# Patient Record
Sex: MALE | Race: WHITE | NOT HISPANIC OR LATINO | Employment: UNEMPLOYED | URBAN - METROPOLITAN AREA
[De-identification: names, ages, dates, MRNs, and addresses within clinical notes are randomized per-mention and may not be internally consistent; named-entity substitution may affect disease eponyms.]

---

## 2017-01-05 ENCOUNTER — OFFICE VISIT (OUTPATIENT)
Dept: PAIN MEDICINE | Facility: CLINIC | Age: 42
End: 2017-01-05
Payer: COMMERCIAL

## 2017-01-05 VITALS
WEIGHT: 207 LBS | HEIGHT: 67 IN | SYSTOLIC BLOOD PRESSURE: 173 MMHG | RESPIRATION RATE: 17 BRPM | TEMPERATURE: 99 F | BODY MASS INDEX: 32.49 KG/M2 | DIASTOLIC BLOOD PRESSURE: 103 MMHG | HEART RATE: 108 BPM

## 2017-01-05 DIAGNOSIS — M47.816 LUMBAR FACET ARTHROPATHY: ICD-10-CM

## 2017-01-05 DIAGNOSIS — M62.838 MUSCLE SPASM: ICD-10-CM

## 2017-01-05 DIAGNOSIS — M79.18 MYOFASCIAL PAIN: ICD-10-CM

## 2017-01-05 DIAGNOSIS — M70.62 GREATER TROCHANTERIC BURSITIS, LEFT: ICD-10-CM

## 2017-01-05 DIAGNOSIS — M54.17 LUMBOSACRAL RADICULOPATHY: Primary | ICD-10-CM

## 2017-01-05 DIAGNOSIS — M96.1 POSTLAMINECTOMY SYNDROME OF LUMBAR REGION: ICD-10-CM

## 2017-01-05 PROCEDURE — 99214 OFFICE O/P EST MOD 30 MIN: CPT | Mod: S$GLB,,, | Performed by: PHYSICIAN ASSISTANT

## 2017-01-05 PROCEDURE — 1159F MED LIST DOCD IN RCRD: CPT | Mod: S$GLB,,, | Performed by: PHYSICIAN ASSISTANT

## 2017-01-05 PROCEDURE — 99999 PR PBB SHADOW E&M-EST. PATIENT-LVL III: CPT | Mod: PBBFAC,,, | Performed by: PHYSICIAN ASSISTANT

## 2017-01-05 RX ORDER — HYDROCODONE BITARTRATE AND ACETAMINOPHEN 10; 325 MG/1; MG/1
1 TABLET ORAL EVERY 8 HOURS PRN
Qty: 90 TABLET | Refills: 0 | Status: SHIPPED | OUTPATIENT
Start: 2017-01-05 | End: 2017-02-04

## 2017-01-05 RX ORDER — METHYLPREDNISOLONE ACETATE 40 MG/ML
40 INJECTION, SUSPENSION INTRA-ARTICULAR; INTRALESIONAL; INTRAMUSCULAR; SOFT TISSUE ONCE
Status: COMPLETED | OUTPATIENT
Start: 2017-01-05 | End: 2017-01-05

## 2017-01-05 RX ORDER — ZOLPIDEM TARTRATE 10 MG/1
TABLET ORAL
Refills: 3 | COMMUNITY
Start: 2017-01-02 | End: 2017-02-07

## 2017-01-05 RX ORDER — HYDROCODONE BITARTRATE AND ACETAMINOPHEN 10; 325 MG/1; MG/1
1 TABLET ORAL EVERY 8 HOURS PRN
Qty: 90 TABLET | Refills: 0 | Status: SHIPPED | OUTPATIENT
Start: 2017-01-05 | End: 2017-01-05 | Stop reason: SDUPTHER

## 2017-01-05 RX ORDER — BACLOFEN 10 MG/1
10 TABLET ORAL 3 TIMES DAILY PRN
Qty: 90 TABLET | Refills: 0 | Status: SHIPPED | OUTPATIENT
Start: 2017-01-05 | End: 2017-02-07

## 2017-01-05 RX ORDER — VENLAFAXINE HYDROCHLORIDE 225 MG/1
TABLET, EXTENDED RELEASE ORAL
Refills: 3 | COMMUNITY
Start: 2016-12-16 | End: 2017-01-31 | Stop reason: SDUPTHER

## 2017-01-05 RX ADMIN — METHYLPREDNISOLONE ACETATE 40 MG: 40 INJECTION, SUSPENSION INTRA-ARTICULAR; INTRALESIONAL; INTRAMUSCULAR; SOFT TISSUE at 10:01

## 2017-01-05 NOTE — PROGRESS NOTES
Referring Physician: No ref. provider found    PCP: Emilie Modi MD      CC: Low back and left leg pain.    INTERVAL HISTORY:   Mr. Quintanilla is a 41 y.o. male with chronic low back pain who presents today for evaluation of worsening pain. He is s/p lumbar RFA on 12/20. Pain improved on the right. 1 week ago he developed severe pain that starts in his low back and extends into his mid back. He also has severe pain in left lateral hip that is extending into his groin. He is having difficulty with weight bearing. Zanaflex has not been helpful.  Continues to have pain down posterior left thigh.  He has history of  microdiskectomy, decompression and fusion at L5-S1 on 6/6/16.  Lumbar JEWELS in December helped  with his radicular pain.   He denies any worsening weakness.  No bowel bladder changes.    He currently takes Lyrica 25 mg 3 times a day with mild benefits.  He rates his pain 10/10 today.    Prior HPI:   Patient is a 40-year-old male referred to us by Dr. Dowling for low back and left leg pain.  Pain has been present for over 6 years.  No traumatic incident.  He has constant aching, throbbing, deep lower back pain.  Pain shoots down his buttocks into his left hip.  Pain also radiates to his groin and testicles and down his medial leg to his knee.  He has tingling in his feet.  No weakness.  No bowel bladder changes.  Pain worsens with sitting, bending, lifting, extension, and getting up.  Pain improves with rest.  He was taking NSAIDs very often until recently.  He was found to have worsening kidney function.  He had a recent lumbar MRI which showed a disc bulge at L5-S1.   He also states having posterior neck pain as well.  This is been present for similar moderate time.  No traumatic incident.  Pain radiates into bilateral shoulders.  He denies weakness.  He rates his pain 9/10 today.    Pain interventional history: s/p L5-S1 TFESI on 3/2016 and 10/2016 with 75% relief of his leg  pain    ROS:  CONSTITUTIONAL: No fevers, chills, night sweats, wt. loss, appetite changes  SKIN: no rashes or itching  ENT: No headaches, head trauma, vision changes, or eye pain  LYMPH NODES: None noticed   CV: No chest pain, palpitations.   RESP: No shortness of breath, dyspnea on exertion, cough, wheezing, or hemoptysis  GI: No nausea, emesis, diarrhea, constipation, melena, hematochezia, pain.    : No dysuria, hematuria, urgency, or frequency   HEME: No easy bruising, bleeding problems  PSYCHIATRIC: No depression, anxiety, psychosis, hallucinations.  NEURO: No seizures, memory loss, dizziness or difficulty sleeping  MSK: + History of present illness      Past Medical History   Diagnosis Date    ADHD (attention deficit hyperactivity disorder)     Arthritis     Asthma      as child only    Back pain     Degeneration of lumbar intervertebral disc 05/2016    Depression     Elevated PSA     Headache     Kidney damage      stage 3 ; d/t aleve abuse    Kidney stone     Neck pain     Numbness and tingling in hands     Numbness and tingling of both legs     Seizures      from inapsine 2002    Sleep apnea      no cpap     Past Surgical History   Procedure Laterality Date    Removal of abcess       scrotal    Pilonidal cyst drainage      Tympanostomy tube placement      Back surgery  2016     back surgery     Family History   Problem Relation Age of Onset    Heart disease Mother     Migraines Mother     Hypertension Mother     Early death Father     Diabetes Maternal Aunt     Diabetes Maternal Uncle     Diabetes Maternal Grandfather      Social History     Social History    Marital status:      Spouse name: N/A    Number of children: N/A    Years of education: N/A     Social History Main Topics    Smoking status: Former Smoker     Packs/day: 0.25     Types: Cigarettes    Smokeless tobacco: Former User     Quit date: 6/5/2016    Alcohol use Yes      Comment: rare    Drug use: No  "   Sexual activity: Yes     Partners: Female     Other Topics Concern    None     Social History Narrative         Medications/Allergies: See med card    Vitals:    01/05/17 0953 01/05/17 0956   BP: (!) 173/103    Pulse: 108    Resp: 17    Temp: 98.5 °F (36.9 °C)    Weight: 93.9 kg (207 lb 0.2 oz)    Height: 5' 7" (1.702 m)    PainSc: 10-Worst pain ever 10-Worst pain ever   PainLoc: Back          Physical exam:    GENERAL: A and O x3, the patient appears well groomed and is in no acute distress.  Skin: No rashes or obvious lesions  HEENT: normocephalic, atraumatic  CARDIOVASCULAR:  RRR  LUNGS: non labored breathing  ABDOMEN: soft, nontender   UPPER EXTREMITIES: Normal alignment, normal range of motion, no atrophy, no skin changes,  hair growth and nail growth normal and equal bilaterally. No swelling, no tenderness.    LOWER EXTREMITIES:  Normal alignment, normal range of motion, no atrophy, no skin changes,  hair growth and nail growth normal and equal bilaterally. No swelling, no tenderness.  CERVICAL SPINE:  Cervical spine: ROM is full in flexion, extension and lateral rotation with mild increased pain.  Spurling's maneuver causes neck pain to bilateral side.  Myofascial exam: No Tenderness to palpation across cervical paraspinous region bilaterally.    LUMBAR SPINE  Lumbar spine: ROM is full with flexion extension and oblique extension with mild increased pain.    Daniel's test causes no increased pain on either side.    Supine straight leg raise is positive on left at 60°    Internal and external rotation of the hip causes no increased pain on either side.  Myofascial exam: moderate tenderness to palpation across lumbar paraspinous muscles. L>R. Trigger points noted on left      MENTAL STATUS: normal orientation, speech, language, and fund of knowledge for social situation.  Emotional state appropriate.    CRANIAL NERVES:  II:  PERRL bilaterally,   III,IV,VI: EOMI.    V:  Facial sensation equal " bilaterally  VII:  Facial motor function normal.  VIII:  Hearing equal to finger rub bilaterally  IX/X: Gag normal, palate symmetric  XI:  Shoulder shrug equal, head turn equal  XII:  Tongue midline without fasciculations      MOTOR: Tone and bulk: normal bilateral upper and lower Strength: normal   Delt Bi Tri WE WF     R 5 5 5 5 5 5   L 5 5 5 5 5 5     IP ADD ABD Quad TA Gas HAM  R 5 5 5 5 5 5 5  L 5 5 5 5 5 5 5    SENSATION: Light touch and pinprick intact bilaterally  REFLEXES: normal, symmetric, nonbrisk.  Toes down, no clonus. No hoffmans.  GAIT: normal rise, base, steps, and arm swing.        Imaging:  MRI lumbar spine 2/17/2016  At L5-S1 there is disc protrusion/herniation to the left with mild spinal canal and severe left neural foraminal stenosis with L5 radiculitis. Milder degenerative disc disease is noted at T12-L1, L1-2 and L2-3 without disc herniation or stenosis    X-ray cervical spine 2/11/2016  Degenerative disc disease and spondylosis at C5-6. Otherwise negative cervical spine x-rays       Assessment:  Mr. Quintanilla is a 41 y.o. male with   1. Lumbar radiculopathy    2. Lumbar facet arthropathy    3. Postlaminectomy syndrome of lumbar region    4. Myofascial pain    5. Muscle spasm    6. Greater trochanteric bursitis, left          Plan:  1. IM Mythylprednisolone injection 40 mg  2. Start Baclofen 10 mg q 8 h prn  3. Hydrocodone  mg q 8 h prn   4. If no improvement in 1 week, will update lumbar MRI  5. F/u prn

## 2017-01-05 NOTE — MR AVS SNAPSHOT
[facility]  [address] [place] [address]  [address]  [place] [address]  Phone: [phone]                  Lee Quintanilla   2017 3:00 PM   Office Visit    Description:  Male : 1975   Provider:  Jolene Guerra PA-C   Department:  [facility]           Reason for Visit     Low-back Pain     Neck Pain     Headache                To Do List           Future Appointments        Provider Department Dept Phone    2017 3:00 PM [facility] [phone]    2017 9:00 AM [facility] [phone]    2017 10:40 AM MD [facility] [phone]      Goals (5 Years of Data)     None       These Medications        Disp Refills Start End    baclofen (LIORESAL) 10 MG tablet 90 tablet 0 2017    Take 1 tablet (10 mg total) by mouth 3 (three) times daily as needed. - Oral    Pharmacy: [facility] [place], [place]  [address]eda [facility]. Ph #: [phone]       hydrocodone-acetaminophen 10-325mg (NORCO)  mg Tab 90 tablet 0 2017    Take 1 tablet by mouth every 8 (eight) hours as needed for Pain. - Oral    Pharmacy: [facility] [place], [place]  [address]a [facility]. Ph #: [phone]         [facility] Call     [facility] On Call Nurse Care Line -  Assistance  Registered nurses in the [facility] provide clinical advisement, health education, appointment booking, and other advisory services.  Call for this free service at [phone].             Medications           Message regarding Medications     Verify the changes and/or additions to your medication regime listed below are the same as discussed with your clinician today.  If any of these changes or additions are incorrect, please notify your healthcare provider.        START taking these NEW medications        Refills    baclofen (LIORESAL) 10 MG tablet 0    Sig:  "Take 1 tablet (10 mg total) by mouth 3 (three) times daily as needed.    Class: Normal    Route: Oral    hydrocodone-acetaminophen 10-325mg (NORCO)  mg Tab 0    Sig: Take 1 tablet by mouth every 8 (eight) hours as needed for Pain.    Class: Print    Route: Oral           Verify that the below list of medications is an accurate representation of the medications you are currently taking.  If none reported, the list may be blank. If incorrect, please contact your healthcare provider. Carry this list with you in case of emergency.           Current Medications     pregabalin (LYRICA) 25 MG capsule Week 1: 1 pill QHS, week 2 1 pill am/pm, week 3: 1 pill am/2 pills PM    venlafaxine 225 mg TR24 Take 225 mg by mouth once daily.    VYVANSE 60 mg capsule Take 1 capsule (60 mg total) by mouth every morning.    zolpidem (AMBIEN) 10 mg Tab     baclofen (LIORESAL) 10 MG tablet Take 1 tablet (10 mg total) by mouth 3 (three) times daily as needed.    hydrocodone-acetaminophen 10-325mg (NORCO)  mg Tab Take 1 tablet by mouth every 8 (eight) hours as needed for Pain.    venlafaxine 225 mg TR24     VYVANSE 60 mg capsule Take 1 capsule (60 mg total) by mouth every morning.    VYVANSE 60 mg capsule Starting on Jan 07, 2017. Take 1 capsule (60 mg total) by mouth every morning.           Clinical Reference Information           Vital Signs - Last Recorded  Most recent update: 1/5/2017  9:54 AM by Maylin Nair LPN    BP Pulse Temp Resp Ht Wt    (!) 173/103 108 98.5 °F (36.9 °C) 17 5' 7" (1.702 m) 93.9 kg (207 lb 0.2 oz)    BMI                32.42 kg/m2          Blood Pressure          Most Recent Value    BP  (!)  173/103      Allergies as of 1/5/2017     Inapsine [Droperidol]    Pcn [Penicillins]    Bactrim [Sulfamethoxazole-trimethoprim]      Immunizations Administered on Date of Encounter - 1/5/2017     None      "

## 2017-01-09 ENCOUNTER — PATIENT MESSAGE (OUTPATIENT)
Dept: PAIN MEDICINE | Facility: CLINIC | Age: 42
End: 2017-01-09

## 2017-01-12 ENCOUNTER — OFFICE VISIT (OUTPATIENT)
Dept: UROLOGY | Facility: CLINIC | Age: 42
End: 2017-01-12
Payer: COMMERCIAL

## 2017-01-12 ENCOUNTER — APPOINTMENT (OUTPATIENT)
Dept: LAB | Facility: HOSPITAL | Age: 42
End: 2017-01-12
Attending: UROLOGY
Payer: COMMERCIAL

## 2017-01-12 VITALS
BODY MASS INDEX: 33.98 KG/M2 | WEIGHT: 216.5 LBS | HEIGHT: 67 IN | HEART RATE: 103 BPM | TEMPERATURE: 99 F | DIASTOLIC BLOOD PRESSURE: 96 MMHG | SYSTOLIC BLOOD PRESSURE: 150 MMHG

## 2017-01-12 DIAGNOSIS — R81 GLUCOSURIA: Primary | ICD-10-CM

## 2017-01-12 DIAGNOSIS — N52.9 ERECTILE DYSFUNCTION, UNSPECIFIED ERECTILE DYSFUNCTION TYPE: ICD-10-CM

## 2017-01-12 DIAGNOSIS — R31.29 MICROSCOPIC HEMATURIA: ICD-10-CM

## 2017-01-12 DIAGNOSIS — N50.819 TESTICULAR PAIN: ICD-10-CM

## 2017-01-12 LAB
BILIRUB SERPL-MCNC: ABNORMAL MG/DL
BLOOD URINE, POC: ABNORMAL
COLOR, POC UA: ABNORMAL
GLUCOSE UR QL STRIP: ABNORMAL
KETONES UR QL STRIP: ABNORMAL
LEUKOCYTE ESTERASE URINE, POC: ABNORMAL
MICROSCOPIC COMMENT: NORMAL
NITRITE, POC UA: ABNORMAL
PH, POC UA: 5
PROTEIN, POC: ABNORMAL
RBC #/AREA URNS HPF: 1 /HPF (ref 0–4)
SPECIFIC GRAVITY, POC UA: 1.02
URATE CRY URNS QL MICRO: NORMAL
UROBILINOGEN, POC UA: ABNORMAL
WBC #/AREA URNS HPF: 1 /HPF (ref 0–5)

## 2017-01-12 PROCEDURE — 99999 PR PBB SHADOW E&M-EST. PATIENT-LVL III: CPT | Mod: PBBFAC,,, | Performed by: UROLOGY

## 2017-01-12 PROCEDURE — 81000 URINALYSIS NONAUTO W/SCOPE: CPT

## 2017-01-12 PROCEDURE — 1159F MED LIST DOCD IN RCRD: CPT | Mod: S$GLB,,, | Performed by: UROLOGY

## 2017-01-12 PROCEDURE — 99214 OFFICE O/P EST MOD 30 MIN: CPT | Mod: 25,S$GLB,, | Performed by: UROLOGY

## 2017-01-12 PROCEDURE — 81002 URINALYSIS NONAUTO W/O SCOPE: CPT | Mod: S$GLB,,, | Performed by: UROLOGY

## 2017-01-12 RX ORDER — SILDENAFIL 100 MG/1
TABLET, FILM COATED ORAL
Qty: 6 TABLET | Refills: 12 | Status: SHIPPED | OUTPATIENT
Start: 2017-01-12 | End: 2017-03-21

## 2017-01-12 NOTE — PROGRESS NOTES
[facility] Progress Note  PCP: Dr.Guidry [facility] active    Chief Complaint: Advice Only (testicular pain and severe ED)      Subjective:        HPI: Lee Quintanilla is a 41 y.o. male presents with     Last seen 8/16/16    He had a h/o scrotal abscess s/p drainage on 6/4/15    Testicular pain  He's had scrotal pain for the past 6 years. He's seen two urologists in the pasts. He was given doxycycline by one and another one did a cystoscopy. Nothing helped. He says he has constant heaviness in his scrotum that initially resolved with back surgery. Was also referred for pt which he went to. Comes in otday stating that he stopped going to PT bc his insurance ran out. Was helping lower back and neck. Testicular pain started a few months ago.     microhematuria  Called back in for appt as pt had microhematuria which showed 4rbc's in April and I had reocmmended ctu, cytology and cystoscopy but pt never followed up. He does htesave known kid dz and  saw the nephrologist who told him to stop taking the tramadol and alleve and cr was rechecked and improved. Today he has blood and glucose.    Severe ED  Rarely able to get an erection hard enough penetration, started in October when he was having work issues. Not having morning erections. Poor duration. Has no sex partner. Testosterone checked a while ago. Denies libido. Having fatigue. Tried [id number] which helped.     IIEF:2  AUASSx: 13, mostly satisfied     The past medical, surgical, social and family hx were reviewed. He has had back surgery since I've seen him.   Cataracts? none    Urologic meds: none  Anticoagulation: No    Objective:     Vitals:    01/12/17 1109   BP: (!) 150/96   Pulse: 103   Temp: 98.8 °F (37.1 °C)          Physical Exam   Constitutional: He is oriented to person, place, and time. He appears well-developed and well-nourished. He is cooperative. No distress.   HENT:   Head: Normocephalic and atraumatic.   Eyes:  Pupils are equal, round, and reactive to light.   Cardiovascular: Normal rate and regular rhythm.    Pulmonary/Chest: Effort normal.   Abdominal: Soft. Normal appearance.   Musculoskeletal: Normal range of motion.   Neurological: He is alert and oriented to person, place, and time. He has normal reflexes.   Skin: Skin is intact.     Psychiatric: He has a normal mood and affect.       Scrotum: no testes in scrotum    Urinalysis:1.010/5/remainder negative  Labs:cr 1.2 7/25/16  ua microscopic: 2rbcs phpf    hba1c - 5.3    Urine culture  8/25/14 ng    PATHOLOGY REVIEW:  Urine cytology 8/25/14 - negative    RADIOGRAPHIC REVIEW:  CTRSS 9/8/14  punctate nonobstructing renal stone with no hydronephrosis, opaque ureteral stone, ureteral obstruction or acute findings. Tiny fat containing umbilical hernia and small fat containing inguinal hernias also noted    US Scrotum 9/3/14  Echogenic area      rbus 7/24/14  normal      Assessment:       1. Glucosuria    2. Erectile dysfunction, unspecified erectile dysfunction type    3. Microscopic hematuria    4. Testicular pain        Plan:     Microhematuria  -ua  Microscopic, needs dawn if elevated. Does  Have a h/o punctate non obstructibe stone    Glucosuria  -hba1c    Testicular pain  -explained that I will not be able to help him much for this, he does have worsening back pain and he had improvement of scrotal pain with his back issues.   -ibuprofen as needed.      ED  -testosterone panel in am  -Viagra as needed - will try 50 first, he will  Let me know if insuranc overs    F/u 2-3 months    Pt will make appt with ortho and neuro to further eval back which may help his scrotal sx      [facility]: active

## 2017-01-12 NOTE — MR AVS SNAPSHOT
[facility]  [address]  [place] [address]  Phone: [phone]                  Lee Quintanilla   2017 10:45 AM   Office Visit    Description:  Male : 1975   Provider:  Vicky Saeed MD   Department:  [facility]           Reason for Visit     Advice Only           Diagnoses this Visit        Comments    Glucosuria    -  Primary     Erectile dysfunction, unspecified erectile dysfunction type         Microscopic hematuria         Testicular pain                To Do List           Future Appointments        Provider Department Dept Phone    2017 8:30 AM LAB, [address] [address] [facility] [phone]    2017 9:00 AM [facility] [phone]    2017 10:40 AM MD [facility] - Family Medicine [phone]    3/9/2017 10:00 AM Ronaldo Lanier MD [facility] - Neurology [phone]      Goals (5 Years of Data)     None      Follow-Up and Disposition     Follow-up and Disposition History       These Medications        Disp Refills Start End    sildenafil (VIAGRA) 100 MG tablet 6 tablet 12 2017     Take 1/2 to 1 tablet daily as needed.  Take on an empty stomach or with a light meal.    Pharmacy: [facility] [place] [facility] - [address] Ph #: [phone]         [facility]     [facility] Care [facility] -  Assistance  Registered nurses in the [facility] provide clinical advisement, health education, appointment booking, and other advisory services.  Call for this free service at [phone].             Medications           Message regarding Medications     Verify the changes and/or additions to your medication regime listed below are the same as discussed with your clinician today.  If any of these changes or additions are incorrect, please notify your healthcare provider.        START taking these NEW  "medications        Refills    sildenafil (VIAGRA) 100 MG tablet 12    Sig: Take 1/2 to 1 tablet daily as needed.  Take on an empty stomach or with a light meal.    Class: Normal           Verify that the below list of medications is an accurate representation of the medications you are currently taking.  If none reported, the list may be blank. If incorrect, please contact your healthcare provider. Carry this list with you in case of emergency.           Current Medications     baclofen (LIORESAL) 10 MG tablet Take 1 tablet (10 mg total) by mouth 3 (three) times daily as needed.    hydrocodone-acetaminophen 10-325mg (NORCO)  mg Tab Take 1 tablet by mouth every 8 (eight) hours as needed for Pain.    pregabalin (LYRICA) 25 MG capsule Week 1: 1 pill QHS, week 2 1 pill am/pm, week 3: 1 pill am/2 pills PM    sildenafil (VIAGRA) 100 MG tablet Take 1/2 to 1 tablet daily as needed.  Take on an empty stomach or with a light meal.    venlafaxine 225 mg TR24 Take 225 mg by mouth once daily.    venlafaxine 225 mg TR24     VYVANSE 60 mg capsule Take 1 capsule (60 mg total) by mouth every morning.    VYVANSE 60 mg capsule Take 1 capsule (60 mg total) by mouth every morning.    zolpidem (AMBIEN) 10 mg Tab            Clinical Reference Information           Vital Signs - Last Recorded  Most recent update: 1/12/2017 11:10 AM by Syeda Child MA    BP Pulse Temp Ht Wt BMI    (!) 150/96 103 98.8 °F (37.1 °C) 5' 7" (1.702 m) 98.2 kg (216 lb 7.9 oz) 33.91 kg/m2      Blood Pressure          Most Recent Value    BP  (!)  150/96      Allergies as of 1/12/2017     Inapsine [Droperidol]    Pcn [Penicillins]    Bactrim [Sulfamethoxazole-trimethoprim]      Immunizations Administered on Date of Encounter - 1/12/2017     None      Orders Placed During Today's Visit      Normal Orders This Visit    POCT URINE DIPSTICK WITHOUT MICROSCOPE     Urinalysis Microscopic     Future Labs/Procedures Expected by Expires    Hemoglobin A1c  " 1/12/2017 3/13/2018    TESTOSTERONE PANEL  1/12/2017 3/13/2018         1/12/2017 12:06 PM - Syeda Child MA      Component Results     Component    Color, UA    y/c    Spec Grav, UA    1.020    pH, UA    5    WBC, UA    n    Nitrite, UA    n    Protein, UA    n    Glucose, UA    tr    Ketones, UA    n    Urobilinogen, UA    n    Bilirubin, UA    n    Blood, UA    tr

## 2017-01-13 ENCOUNTER — LAB VISIT (OUTPATIENT)
Dept: LAB | Facility: HOSPITAL | Age: 42
End: 2017-01-13
Attending: UROLOGY
Payer: COMMERCIAL

## 2017-01-13 DIAGNOSIS — R81 GLUCOSURIA: ICD-10-CM

## 2017-01-13 PROCEDURE — 83036 HEMOGLOBIN GLYCOSYLATED A1C: CPT

## 2017-01-13 PROCEDURE — 84270 ASSAY OF SEX HORMONE GLOBUL: CPT

## 2017-01-13 PROCEDURE — 36415 COLL VENOUS BLD VENIPUNCTURE: CPT

## 2017-01-14 LAB
ESTIMATED AVG GLUCOSE: 108 MG/DL
HBA1C MFR BLD HPLC: 5.4 %

## 2017-01-19 LAB
ALBUMIN SERPL-MCNC: 4.3 G/DL (ref 3.6–5.1)
SHBG SERPL-SCNC: 17 NMOL/L (ref 10–50)
TESTOST FREE SERPL-MCNC: 33.6 PG/ML (ref 46–224)
TESTOST SERPL-MCNC: 168 NG/DL (ref 250–1100)
TESTOSTERONE.FREE+WB SERPL-MCNC: 66.1 NG/DL (ref 110–575)

## 2017-01-31 ENCOUNTER — OFFICE VISIT (OUTPATIENT)
Dept: PAIN MEDICINE | Facility: CLINIC | Age: 42
End: 2017-01-31
Payer: COMMERCIAL

## 2017-01-31 VITALS
SYSTOLIC BLOOD PRESSURE: 158 MMHG | HEIGHT: 67 IN | DIASTOLIC BLOOD PRESSURE: 105 MMHG | WEIGHT: 216 LBS | HEART RATE: 118 BPM | BODY MASS INDEX: 33.9 KG/M2

## 2017-01-31 DIAGNOSIS — M54.17 LUMBOSACRAL RADICULOPATHY: Primary | ICD-10-CM

## 2017-01-31 DIAGNOSIS — M96.1 POSTLAMINECTOMY SYNDROME OF LUMBAR REGION: ICD-10-CM

## 2017-01-31 DIAGNOSIS — M47.816 LUMBAR FACET ARTHROPATHY: ICD-10-CM

## 2017-01-31 DIAGNOSIS — M79.18 MYOFASCIAL PAIN: ICD-10-CM

## 2017-01-31 PROCEDURE — 99999 PR PBB SHADOW E&M-EST. PATIENT-LVL III: CPT | Mod: PBBFAC,,, | Performed by: PHYSICIAN ASSISTANT

## 2017-01-31 PROCEDURE — 99213 OFFICE O/P EST LOW 20 MIN: CPT | Mod: S$GLB,,, | Performed by: PHYSICIAN ASSISTANT

## 2017-01-31 PROCEDURE — 1159F MED LIST DOCD IN RCRD: CPT | Mod: S$GLB,,, | Performed by: PHYSICIAN ASSISTANT

## 2017-01-31 NOTE — PROGRESS NOTES
Referring Physician: No ref. provider found    PCP: Emilie Modi MD      CC: Low back and left leg pain.    INTERVAL HISTORY:   Mr. Quintanilla is a 41 y.o. male with chronic low back pain who presents today for follow up. Low back pain has improved since LCV.  He is s/p lumbar RFA on 12/20.  Baclofen is beneficial. He takes this and Hydrocodone very sparingly. He has history of  microdiskectomy, decompression and fusion at L5-S1 on 6/6/16.  Lumbar JEWELS in December helped  with his radicular pain.   He denies any worsening weakness.  No bowel bladder changes.    He currently takes Lyrica 25 mg 3 times a day with mild benefits.  He rates his pain 6/10 today.    Prior HPI:   Patient is a 40-year-old male referred to us by Dr. Dowling for low back and left leg pain.  Pain has been present for over 6 years.  No traumatic incident.  He has constant aching, throbbing, deep lower back pain.  Pain shoots down his buttocks into his left hip.  Pain also radiates to his groin and testicles and down his medial leg to his knee.  He has tingling in his feet.  No weakness.  No bowel bladder changes.  Pain worsens with sitting, bending, lifting, extension, and getting up.  Pain improves with rest.  He was taking NSAIDs very often until recently.  He was found to have worsening kidney function.  He had a recent lumbar MRI which showed a disc bulge at L5-S1.   He also states having posterior neck pain as well.  This is been present for similar moderate time.  No traumatic incident.  Pain radiates into bilateral shoulders.  He denies weakness.  He rates his pain 9/10 today.    Pain interventional history: s/p L5-S1 TFESI on 3/2016 and 10/2016 with 75% relief of his leg pain    ROS:  CONSTITUTIONAL: No fevers, chills, night sweats, wt. loss, appetite changes  SKIN: no rashes or itching  ENT: No headaches, head trauma, vision changes, or eye pain  LYMPH NODES: None noticed   CV: No chest pain, palpitations.   RESP: No shortness of  "breath, dyspnea on exertion, cough, wheezing, or hemoptysis  GI: No nausea, emesis, diarrhea, constipation, melena, hematochezia, pain.    : No dysuria, hematuria, urgency, or frequency   HEME: No easy bruising, bleeding problems  PSYCHIATRIC: No depression, anxiety, psychosis, hallucinations.  NEURO: No seizures, memory loss, dizziness or difficulty sleeping  MSK: + History of present illness      Past Medical History   Diagnosis Date    ADHD (attention deficit hyperactivity disorder)     Arthritis     Asthma      as child only    Back pain     Degeneration of lumbar intervertebral disc 05/2016    Depression     Elevated PSA     Headache     Kidney damage      stage 3 ; d/t aleve abuse    Kidney stone     Neck pain     Numbness and tingling in hands     Numbness and tingling of both legs     Seizures      from inapsine 2002    Sleep apnea      no cpap     Past Surgical History   Procedure Laterality Date    Removal of abcess       scrotal    Pilonidal cyst drainage      Tympanostomy tube placement      Back surgery  2016     back surgery     Family History   Problem Relation Age of Onset    Heart disease Mother     Migraines Mother     Hypertension Mother     Early death Father     Diabetes Maternal Aunt     Diabetes Maternal Uncle     Diabetes Maternal Grandfather      Social History     Social History    Marital status:      Spouse name: N/A    Number of children: N/A    Years of education: N/A     Social History Main Topics    Smoking status: Former Smoker     Packs/day: 0.25     Types: Cigarettes    Smokeless tobacco: Former User     Quit date: 6/5/2016    Alcohol use Yes      Comment: rare    Drug use: No    Sexual activity: Yes     Partners: Female     Other Topics Concern    None     Social History Narrative         Medications/Allergies: See med card    Vitals:    01/31/17 0916   BP: (!) 158/105   Pulse: (!) 118   Weight: 98 kg (216 lb)   Height: 5' 7" (1.702 m) "   PainSc:   6   PainLoc: Back         Physical exam:    GENERAL: A and O x3, the patient appears well groomed and is in no acute distress.  Skin: No rashes or obvious lesions  HEENT: normocephalic, atraumatic  CARDIOVASCULAR:  Tachycardia   LUNGS: non labored breathing  ABDOMEN: soft, nontender   UPPER EXTREMITIES: Normal alignment, normal range of motion, no atrophy, no skin changes,  hair growth and nail growth normal and equal bilaterally. No swelling, no tenderness.    LOWER EXTREMITIES:  Normal alignment, normal range of motion, no atrophy, no skin changes,  hair growth and nail growth normal and equal bilaterally. No swelling, no tenderness.  CERVICAL SPINE:  Cervical spine: ROM is full in flexion, extension and lateral rotation with mild increased pain.  Spurling's maneuver causes neck pain to bilateral side.  Myofascial exam: No Tenderness to palpation across cervical paraspinous region bilaterally.    LUMBAR SPINE  Lumbar spine: ROM is full with flexion extension and oblique extension with mild increased pain.    Daniel's test causes no increased pain on either side.    Supine straight leg raise is positive on left at 60°    Internal and external rotation of the hip causes no increased pain on either side.  Myofascial exam: moderate tenderness to palpation across lumbar paraspinous muscles. L>R. Trigger points noted on left      MENTAL STATUS: normal orientation, speech, language, and fund of knowledge for social situation.  Emotional state appropriate.    CRANIAL NERVES:  II:  PERRL bilaterally,   III,IV,VI: EOMI.    V:  Facial sensation equal bilaterally  VII:  Facial motor function normal.  VIII:  Hearing equal to finger rub bilaterally  IX/X: Gag normal, palate symmetric  XI:  Shoulder shrug equal, head turn equal  XII:  Tongue midline without fasciculations      MOTOR: Tone and bulk: normal bilateral upper and lower Strength: normal    Delt Bi Tri WE WF     R 5 5 5 5 5 5   L 5 5 5 5 5 5     IP ADD ABD Quad TA Gas HAM  R 5 5 5 5 5 5 5  L 5 5 5 5 5 5 5    SENSATION: Light touch and pinprick intact bilaterally  REFLEXES: normal, symmetric, nonbrisk.  Toes down, no clonus. No hoffmans.  GAIT: normal rise, base, steps, and arm swing.        Imaging:  MRI lumbar spine 2/17/2016  At L5-S1 there is disc protrusion/herniation to the left with mild spinal canal and severe left neural foraminal stenosis with L5 radiculitis. Milder degenerative disc disease is noted at T12-L1, L1-2 and L2-3 without disc herniation or stenosis    X-ray cervical spine 2/11/2016  Degenerative disc disease and spondylosis at C5-6. Otherwise negative cervical spine x-rays       Assessment:  Mr. Quintanilla is a 41 y.o. male with   1. Lumbosacral radiculopathy    2. Lumbar facet arthropathy    3. Postlaminectomy syndrome of lumbar region    4. Myofascial pain          Plan:  1.  I have stressed the importance of physical activity and a home exercise plan to help with pain and improve overall health.  2. Continue Baclofen 10 mg q 8 h prn and Hydrocodone  mg q 8 h prn   3. Continue PT  4. F/u with PCP for elevated BP and tachycardia   5. F/u prn

## 2017-02-01 ENCOUNTER — DOCUMENTATION ONLY (OUTPATIENT)
Dept: FAMILY MEDICINE | Facility: CLINIC | Age: 42
End: 2017-02-01

## 2017-02-01 NOTE — PROGRESS NOTES
Pre-Visit Chart Review  For Appointment Scheduled on 2/7/17.    Health Maintenance Due   Topic Date Due    Lipid Panel  1975    TETANUS VACCINE  08/29/1993

## 2017-02-07 ENCOUNTER — OFFICE VISIT (OUTPATIENT)
Dept: UROLOGY | Facility: CLINIC | Age: 42
End: 2017-02-07
Payer: COMMERCIAL

## 2017-02-07 ENCOUNTER — OFFICE VISIT (OUTPATIENT)
Dept: FAMILY MEDICINE | Facility: CLINIC | Age: 42
End: 2017-02-07
Payer: COMMERCIAL

## 2017-02-07 ENCOUNTER — APPOINTMENT (OUTPATIENT)
Dept: LAB | Facility: HOSPITAL | Age: 42
End: 2017-02-07
Attending: UROLOGY
Payer: COMMERCIAL

## 2017-02-07 VITALS
BODY MASS INDEX: 33.94 KG/M2 | DIASTOLIC BLOOD PRESSURE: 80 MMHG | SYSTOLIC BLOOD PRESSURE: 130 MMHG | TEMPERATURE: 99 F | HEIGHT: 67 IN | HEART RATE: 103 BPM | WEIGHT: 216.25 LBS

## 2017-02-07 VITALS
BODY MASS INDEX: 33.94 KG/M2 | SYSTOLIC BLOOD PRESSURE: 168 MMHG | TEMPERATURE: 98 F | HEART RATE: 91 BPM | HEIGHT: 67 IN | DIASTOLIC BLOOD PRESSURE: 102 MMHG | WEIGHT: 216.25 LBS

## 2017-02-07 DIAGNOSIS — F98.8 ADD (ATTENTION DEFICIT DISORDER): ICD-10-CM

## 2017-02-07 DIAGNOSIS — Z23 IMMUNIZATION DUE: Primary | ICD-10-CM

## 2017-02-07 DIAGNOSIS — R31.29 MICROHEMATURIA: Primary | ICD-10-CM

## 2017-02-07 DIAGNOSIS — R81 GLUCOSURIA: ICD-10-CM

## 2017-02-07 DIAGNOSIS — N50.819 TESTICULAR PAIN: ICD-10-CM

## 2017-02-07 LAB
BILIRUB SERPL-MCNC: ABNORMAL MG/DL
BLOOD URINE, POC: ABNORMAL
COLOR, POC UA: ABNORMAL
GLUCOSE UR QL STRIP: ABNORMAL
KETONES UR QL STRIP: ABNORMAL
LEUKOCYTE ESTERASE URINE, POC: ABNORMAL
NITRITE, POC UA: ABNORMAL
PH, POC UA: 5
PROTEIN, POC: ABNORMAL
SPECIFIC GRAVITY, POC UA: 1.01
UROBILINOGEN, POC UA: ABNORMAL

## 2017-02-07 PROCEDURE — 81001 URINALYSIS AUTO W/SCOPE: CPT | Mod: S$GLB,,, | Performed by: UROLOGY

## 2017-02-07 PROCEDURE — 99999 PR PBB SHADOW E&M-EST. PATIENT-LVL III: CPT | Mod: PBBFAC,,, | Performed by: FAMILY MEDICINE

## 2017-02-07 PROCEDURE — 88112 CYTOPATH CELL ENHANCE TECH: CPT | Performed by: PATHOLOGY

## 2017-02-07 PROCEDURE — 99999 PR PBB SHADOW E&M-EST. PATIENT-LVL IV: CPT | Mod: PBBFAC,,, | Performed by: UROLOGY

## 2017-02-07 PROCEDURE — 88112 CYTOPATH CELL ENHANCE TECH: CPT | Mod: 26,,, | Performed by: PATHOLOGY

## 2017-02-07 PROCEDURE — 81002 URINALYSIS NONAUTO W/O SCOPE: CPT | Mod: S$GLB,,, | Performed by: UROLOGY

## 2017-02-07 PROCEDURE — 99213 OFFICE O/P EST LOW 20 MIN: CPT | Mod: 25,S$GLB,, | Performed by: UROLOGY

## 2017-02-07 PROCEDURE — 99214 OFFICE O/P EST MOD 30 MIN: CPT | Mod: 25,S$PBB,, | Performed by: FAMILY MEDICINE

## 2017-02-07 PROCEDURE — 90471 IMMUNIZATION ADMIN: CPT | Mod: PBBFAC,PO | Performed by: FAMILY MEDICINE

## 2017-02-07 PROCEDURE — 99213 OFFICE O/P EST LOW 20 MIN: CPT | Mod: PBBFAC,PO,25 | Performed by: FAMILY MEDICINE

## 2017-02-07 PROCEDURE — 90715 TDAP VACCINE 7 YRS/> IM: CPT | Mod: PBBFAC,PO | Performed by: FAMILY MEDICINE

## 2017-02-07 RX ORDER — LISDEXAMFETAMINE DIMESYLATE 60 MG/1
60 CAPSULE ORAL EVERY MORNING
Qty: 30 CAPSULE | Refills: 0 | Status: SHIPPED | OUTPATIENT
Start: 2017-03-11 | End: 2017-04-26 | Stop reason: SDUPTHER

## 2017-02-07 RX ORDER — LISDEXAMFETAMINE DIMESYLATE 60 MG/1
60 CAPSULE ORAL EVERY MORNING
Qty: 30 CAPSULE | Refills: 0 | Status: ON HOLD | OUTPATIENT
Start: 2017-04-10 | End: 2017-04-05

## 2017-02-07 RX ORDER — TESTOSTERONE CYPIONATE 200 MG/ML
200 INJECTION, SOLUTION INTRAMUSCULAR
Qty: 1 ML | Refills: 5 | Status: SHIPPED | OUTPATIENT
Start: 2017-02-07 | End: 2017-02-09 | Stop reason: SDUPTHER

## 2017-02-07 RX ORDER — LISDEXAMFETAMINE DIMESYLATE 60 MG/1
60 CAPSULE ORAL EVERY MORNING
Qty: 30 CAPSULE | Refills: 0 | Status: SHIPPED | OUTPATIENT
Start: 2017-02-09 | End: 2017-03-11

## 2017-02-07 NOTE — MR AVS SNAPSHOT
[facility]  [address]  [place] [address]  Phone: [phone]                  Lee Quintanilla   2017 2:15 PM   Office Visit    Description:  Male : 1975   Provider:  Vicky Saeed MD   Department:  [facility]           Reason for Visit     Follow-up           Diagnoses this Visit        Comments    Microhematuria    -  Primary     Glucosuria         Testicular pain                To Do List           Future Appointments        Provider Department Dept Phone    2017 9:10 AM LAB, MOB 1 DRAW STATION [facility] [phone]    2017 10:00 AM UROLOGY, NURSE [facility] [phone]    2/10/2017 9:00 AM [facility]CH CT1 LIMIT 400 LBS [facility] [phone]    3/9/2017 10:00 AM Ronaldo Lanier MD [facility] [phone]    2017 3:20 PM MD [facility] [phone]      Goals (5 Years of Data)     None      Follow-Up and Disposition     Follow-up and Disposition History       These Medications        Disp Refills Start End    testosterone cypionate (DEPOTESTOTERONE CYPIONATE) 200 mg/mL injection 1 mL 5 2017 3/9/2017    Inject 1 mL (200 mg total) into the muscle every 28 days. - Intramuscular    Pharmacy: [facility] - [place] - [address] Ph #: [phone]         [facility]     [facility] Care [facility] -  Assistance  Registered nurses in the [facility] provide clinical advisement, health education, appointment booking, and other advisory services.  Call for this free service at [phone].             Medications           Message regarding Medications     Verify the changes and/or additions to your medication regime listed below are the same as discussed with your clinician today.  If any of these changes or additions are incorrect, please notify your healthcare provider.       "  START taking these NEW medications        Refills    testosterone cypionate (DEPOTESTOTERONE CYPIONATE) 200 mg/mL injection 5    Sig: Inject 1 mL (200 mg total) into the muscle every 28 days.    Class: Normal    Route: Intramuscular           Verify that the below list of medications is an accurate representation of the medications you are currently taking.  If none reported, the list may be blank. If incorrect, please contact your healthcare provider. Carry this list with you in case of emergency.           Current Medications     pregabalin (LYRICA) 25 MG capsule Week 1: 1 pill QHS, week 2 1 pill am/pm, week 3: 1 pill am/2 pills PM    venlafaxine 225 mg TR24 Take 225 mg by mouth once daily.    VYVANSE 60 mg capsule Starting on Feb 09, 2017. Take 1 capsule (60 mg total) by mouth every morning.    VYVANSE 60 mg capsule Starting on Mar 11, 2017. Take 1 capsule (60 mg total) by mouth every morning.    VYVANSE 60 mg capsule Starting on Apr 10, 2017. Take 1 capsule (60 mg total) by mouth every morning.    sildenafil (VIAGRA) 100 MG tablet Take 1/2 to 1 tablet daily as needed.  Take on an empty stomach or with a light meal.    testosterone cypionate (DEPOTESTOTERONE CYPIONATE) 200 mg/mL injection Inject 1 mL (200 mg total) into the muscle every 28 days.           Clinical Reference Information           Your Vitals Were     BP Pulse Temp Height Weight BMI    168/102 91 98.2 °F (36.8 °C) 5' 7" (1.702 m) 98.1 kg (216 lb 4.3 oz) 33.87 kg/m2      Blood Pressure          Most Recent Value    BP  (!)  168/102      Allergies as of 2/7/2017     Inapsine [Droperidol]    Pcn [Penicillins]    Bactrim [Sulfamethoxazole-trimethoprim]      Immunizations Administered on Date of Encounter - 2/7/2017     Name Date Dose VIS Date Route    TDAP 2/7/2017 0.5 mL 2/24/2015 Intramuscular      Orders Placed During Today's Visit      Normal Orders This Visit    Case Request Operating Room: CYSTOSCOPY     Cytology, urine     POCT URINE DIPSTICK " WITHOUT MICROSCOPE     Urine Dip with micro, POC     Future Labs/Procedures Expected by Expires    CBC Without Differential  2/7/2017 2/7/2018    Comprehensive metabolic panel  2/7/2017 2/7/2018    CT Urogram Abd Pelvis W WO  2/7/2017 2/7/2018    Lipid panel  2/7/2017 2/7/2018    Prostate Specific Antigen, Diagnostic  2/7/2017 2/7/2018      Language Assistance Services     ATTENTION: Language assistance services are available, free of charge. Please call [phone].      ATENCIÓN: Si habla español, tiene a whittington disposición servicios gratuitos de asistencia lingüística. Llame al [phone].     CHÚ Ý: N?u b?n nói Ti?ng Vi?t, có các d?ch v? h? tr? ngôn ng? mi?n phí dành cho b?n. G?i s? [phone].         [facility] complies with applicable Federal civil rights laws and does not discriminate on the basis of race, color, national origin, age, disability, or sex.

## 2017-02-07 NOTE — PROGRESS NOTES
[facility] Progress Note  PCP: Dr.Guidry [facility]: active    Chief Complaint: Follow-up (discuss low testosterone)      Subjective:        HPI: Lee Quintanilla is a 41 y.o. male presents with     Last seen 1/12/17    He had a h/o scrotal abscess s/p drainage on 6/4/15    Testicular pain  He's had scrotal pain for the past 6 years. He's seen two urologists in the pasts. He was given doxycycline by one and another one did a cystoscopy. Nothing helped. He says he has constant heaviness in his scrotum that initially resolved with back surgery. Was also referred for pt which he went to. Comes in otday stating that he stopped going to PT bc his insurance ran out. Was helping lower back and neck. Testicular pain started a few months ago.     microhematuria  Called back in for appt as pt had microhematuria which showed 4rbc's in April and I had reocmmended ctu, cytology and cystoscopy but pt never followed up. He does have known kid dz and  saw the nephrologist who told him to stop taking the tramadol and alleve and cr was rechecked and improved. Today he has blood and glucose.    Severe ED  Rarely able to get an erection hard enough penetration, started in October when he was having work issues. Not having morning erections. Poor duration. Has no sex partner. Testosterone checked a while ago. poor libido. Having fatigue. Tried [id number] which helped. I checked a T which was 168 at 9am in the morning. He is here today discuss his T results. i wrote him for viagra but he never tried. He tried cialis 10mg last year which helped wo SE.     IIEF:2  AUASSx: 11, mostly satisfied     The past medical, surgical, social and family hx were reviewed. He has had back surgery since I've seen him.   Cataracts? none    Urologic meds: none  Anticoagulation: No    Objective:     Vitals:    02/07/17 1427   BP: (!) 168/102   Pulse: 91   Temp: 98.2 °F (36.8 °C)          Physical Exam   Constitutional: He is  oriented to person, place, and time. He appears well-developed and well-nourished. He is cooperative. No distress.   HENT:   Head: Normocephalic and atraumatic.   Eyes: Pupils are equal, round, and reactive to light.   Cardiovascular: Normal rate and regular rhythm.    Pulmonary/Chest: Effort normal.   Abdominal: Soft. Normal appearance.   Musculoskeletal: Normal range of motion.   Neurological: He is alert and oriented to person, place, and time. He has normal reflexes.   Skin: Skin is intact.     Psychiatric: He has a normal mood and affect.       Scrotum: no testes in scrotum    Urinalysis:1.010/5/remainder negative  Labs:cr 1.2 7/25/16  ua microscopic:   1/12/17 - 1   5/31/16 - 4    hba1c 1/13/17 - 5.4    Urine culture  8/25/14 ng    PATHOLOGY REVIEW:  Urine cytology 8/25/14 - negative    RADIOGRAPHIC REVIEW:  CTRSS 9/8/14  punctate nonobstructing renal stone with no hydronephrosis, opaque ureteral stone, ureteral obstruction or acute findings. Tiny fat containing umbilical hernia and small fat containing inguinal hernias also noted    US Scrotum 9/3/14  Echogenic area      rbus 7/24/14  normal      Assessment:       1. Microhematuria    2. Glucosuria    3. Testicular pain        Plan:     Microhematuria  -persistent MH  -recommend ctu and cystoscopy, ordered today, urine cytology sent again (20 pk yr hx of smoking but has quit), also no longer on alleve, does have h/o small nonobstructing stones    Glucosuria  None today    Testicular pain  -explained that I will not be able to help him much for this, he does have worsening back pain and he had improvement of scrotal pain with his back issues.   -ibuprofen as needed.      ED and hypogonadism  -Viagra as needed 100mg dose (which he will cut in half) - he received 6 pills for 30  -has hypogonadism with fatigue, decreased libido and ED  Start with cbc, cmp, psa and lipid panel  Discussed gel, injection and testopel (but pt prone to boils)  Start testosterone 200mg  q3 weeks    Just prior to 4th injections will recheck all labs  He will let me know and I'll repeat all labs then    F/u 3 months    Pt will make appt with ortho and neuro to further eval back which may help his scrotal sx      [facility]: active

## 2017-02-07 NOTE — PROGRESS NOTES
CHIEF COMPLAINT:   Follow up      HISTORY OF PRESENT ILLNESS:  Lee Quintanilla is a 41 y.o. male who presents to clinic for follow up on MDD, MYNOR     1. ADD: on vyvanse 60 mg daily. This controls his symptoms. He denies any CP, tremor, worsening anxiety, insomnia, lexi loss, palpitations    2  MDD, MYNOR: please see my note from 8/9/16 for complete details. At his last visit his effexor xr was increased to 225 mg daily.  He denies any side effects at this higher dose.  He states that this has been helping with his symptoms.     3. He was recently found to have low testosterone level and will be following up with urology today. He wonders if his vyvanse and effexor can be stopped in the future if he starts testosterone replacement.          REVIEW OF SYSTEMS:  The patient denies any fever, chills, night sweats, headaches, vision changes, difficulty speaking or swallowing, decreased hearing, weight loss, weight gain, chest pain, palpitations, shortness of breath, cough, nausea, vomiting, abdominal pain, dysuria, diarrhea, constipation, hematuria, hematochezia, melena, changes in his hair, skin, nails, numbness or weakness in his extremities, erythema, swelling or pain over any of his joints, myalgia, swollen glands, easy bruising, fatigue, edema. He denies any scrotal/testicular masses, penile discharge.       MEDICATIONS:   Reviewed and/or reconciled in EPIC    ALLERGIES:  Reviewed and/or reconciled in [facility]    PAST MEDICAL/SURGICAL HISTORY:   Past Medical History   Diagnosis Date    ADHD (attention deficit hyperactivity disorder)     Arthritis     Asthma      as child only    Back pain     Degeneration of lumbar intervertebral disc 05/2016    Depression     Elevated PSA     Headache     Kidney damage      stage 3 ; d/t aleve abuse    Kidney stone     Neck pain     Numbness and tingling in hands     Numbness and tingling of both legs     Seizures      from inapsine 2002    Sleep apnea      no  cpap      Past Surgical History   Procedure Laterality Date    Removal of abcess       scrotal    Pilonidal cyst drainage      Tympanostomy tube placement      Back surgery  2016     back surgery       FAMILY HISTORY:    Family History   Problem Relation Age of Onset    Heart disease Mother     Migraines Mother     Hypertension Mother     Early death Father     Diabetes Maternal Aunt     Diabetes Maternal Uncle     Diabetes Maternal Grandfather        SOCIAL HISTORY:    Social History     Social History    Marital status:      Spouse name: N/A    Number of children: N/A    Years of education: N/A     Occupational History    Not on file.     Social History Main Topics    Smoking status: Former Smoker     Packs/day: 0.25     Types: Cigarettes    Smokeless tobacco: Former User     Quit date: 6/5/2016    Alcohol use Yes      Comment: rare    Drug use: No    Sexual activity: Yes     Partners: Female     Other Topics Concern    Not on file     Social History Narrative       PHYSICAL EXAM:  VITAL SIGNS:   There were no vitals filed for this visit.  GENERAL:  Patient appears well nourished, sitting on exam table, in no acute distress.  HEENT:  Atraumatic, normocephalic, PERRLA, EOMI, no conjunctival injection, sclerae are anicteric, normal external auditory canals,TMs clear b/l, gross hearing intact to whisper, MMM, no oropharygneal erythema or exudate.  NECK:  Supple, normal ROM, trachea is midline , no supraclavicular or cervical LAD or masses palpated.  Thyroid gland not palpable.  CARDIOVASCULAR:  RRR, normal S1 and S2, no m/r/g.  RESPIRATORY:  CTA b/l, no wheezes, rhonchi, rales.  No increased work of breathing, no  use of accessory muscles.  ABDOMEN:  Soft, nontender, nondistended, normoactive bowel sounds in all four quadrants, no rebound or guarding, no HSM or masses palpated.  Normal percussion.  EXTREMITIES:  2+ DP pulses b/l, no edema.  SKIN:  Warm, no lesions on exposed  "skin.  NEUROMUSCULAR:  Cranial nerves II-XII grossly intact.   No clubbing or cyanosis of digits/nails.  Steady gait.  PSYCH:  Patient is alert and oriented to person, time, place. They are appropriately dressed and groomed. There is normal eye contact. Rate and tone of speech is normal. Normal insight, judgement. Normal thought content and process. He describes his mood as "ok" affect is euthymic        ASSESSMENT/PLAN: This is a 41 y.o. male who presents to clinic for evaluation of the following concerns  1. ADD (attention deficit disorder)  Refill vyvanse. He will receive a prescription with 2/9/17 as the first date and 2 prescriptions with start dates of 30 and 60 days from today. The risks, side effects of this medication were discussed with him, follow up in 3 months    2. Major depressive disorder, single episode, mild  Continue with  effexor  225 mg daily. Consider tapering and stopping this if he starts testosterone supplementation      3. MYNOR (generalized anxiety disorder)  See above    4. Obesity, Class I, BMI 30-34.9  See below        Patient readiness: acceptance and barriers:none    During the course of the visit the patient was educated and counseled about the following:     Obesity:Not addressed at today's visit      Goals: Obesity: Reduce calorie intake and BMI     Did patient meet goals/outcomes: No    The following self management tools provided: declined    Patient Instructions (the written plan) was given to the patient/family.     Time spent with patient: 30 minutes            Emilie Modi MD  "

## 2017-02-07 NOTE — MR AVS SNAPSHOT
[facility]  [address]  [place] [address]  Phone: [phone]  Fax: [phone]                  Lee Quintanilla   2017 10:40 AM   Office Visit    Description:  Male : 1975   Provider:  Emilie Modi MD   Department:  [place] [facility]           Reason for Visit     Follow-up           Diagnoses this Visit        Comments    Immunization due    -  Primary     ADD (attention deficit disorder)                To Do List           Future Appointments        Provider Department Dept Phone    2017 2:15 PM MD Stephania Schilling [facility] - Urology [phone]    3/9/2017 10:00 AM Ronaldo Lanier MD [facility] Neurology [phone]    2017 3:20 PM MD Stephania Estes [facility] [phone]      Goals (5 Years of Data)     None      Follow-Up and Disposition     Return in about 3 months (around 2017) for ADD.       These Medications        Disp Refills Start End    VYVANSE 60 mg capsule 30 capsule 0 2017 3/11/2017    Take 1 capsule (60 mg total) by mouth every morning. - Oral    Pharmacy: [facility] [place], [place] - [address] Ph #: [phone]       VYVANSE 60 mg capsule 30 capsule 0 3/11/2017 4/10/2017    Take 1 capsule (60 mg total) by mouth every morning. - Oral    Pharmacy: [facility] [place], [place] - [address] Ph #: [phone]       VYVANSE 60 mg capsule 30 capsule 0 4/10/2017 5/10/2017    Take 1 capsule (60 mg total) by mouth every morning. - Oral    Pharmacy: [facility] [place], [place] - [address] Ph #: [phone]         [facility]     [facility] Nurse Care Line -  Assistance  Registered nurses in the [facility] Center provide clinical advisement, health education, appointment booking, and other advisory services.  Call for this free service at [phone].             Medications           Message regarding Medications     Verify the changes  "and/or additions to your medication regime listed below are the same as discussed with your clinician today.  If any of these changes or additions are incorrect, please notify your healthcare provider.        START taking these NEW medications        Refills    VYVANSE 60 mg capsule 0    Starting on: 3/11/2017    Sig: Take 1 capsule (60 mg total) by mouth every morning.    Class: Print    Route: Oral    VYVANSE 60 mg capsule 0    Starting on: 4/10/2017    Sig: Take 1 capsule (60 mg total) by mouth every morning.    Class: Print    Route: Oral      STOP taking these medications     baclofen (LIORESAL) 10 MG tablet Take 1 tablet (10 mg total) by mouth 3 (three) times daily as needed.    zolpidem (AMBIEN) 10 mg Tab            Verify that the below list of medications is an accurate representation of the medications you are currently taking.  If none reported, the list may be blank. If incorrect, please contact your healthcare provider. Carry this list with you in case of emergency.           Current Medications     pregabalin (LYRICA) 25 MG capsule Week 1: 1 pill QHS, week 2 1 pill am/pm, week 3: 1 pill am/2 pills PM    venlafaxine 225 mg TR24 Take 225 mg by mouth once daily.    VYVANSE 60 mg capsule Starting on Feb 09, 2017. Take 1 capsule (60 mg total) by mouth every morning.    VYVANSE 60 mg capsule Starting on Mar 11, 2017. Take 1 capsule (60 mg total) by mouth every morning.    VYVANSE 60 mg capsule Starting on Apr 10, 2017. Take 1 capsule (60 mg total) by mouth every morning.    sildenafil (VIAGRA) 100 MG tablet Take 1/2 to 1 tablet daily as needed.  Take on an empty stomach or with a light meal.           Clinical Reference Information           Your Vitals Were     BP Pulse Temp Height Weight BMI    130/80 (BP Location: Left arm, Patient Position: Sitting, BP Method: Manual) 103 99.4 °F (37.4 °C) (Oral) 5' 7" (1.702 m) 98.1 kg (216 lb 4.3 oz) 33.87 kg/m2      Blood Pressure          Most Recent Value    BP  " 130/80      Allergies as of 2/7/2017     Inapsine [Droperidol]    Pcn [Penicillins]    Bactrim [Sulfamethoxazole-trimethoprim]      Immunizations Administered on Date of Encounter - 2/7/2017     Name Date Dose VIS Date Route    TDAP 2/7/2017 0.5 mL 2/24/2015 Intramuscular      Orders Placed During Today's Visit      Normal Orders This Visit    Tdap Vaccine (Adult)       Language Assistance Services     ATTENTION: Language assistance services are available, free of charge. Please call [phone].      ATENCIÓN: Si habla español, tiene a whittington disposición servicios gratuitos de asistencia lingüística. Llame al [phone].     CURTIS Ý: N?u b?n nói Ti?ng Vi?t, có các d?ch v? h? tr? ngôn ng? mi?n phí dành cho b?n. G?i s? [phone].         [facility] complies with applicable Federal civil rights laws and does not discriminate on the basis of race, color, national origin, age, disability, or sex.

## 2017-02-08 ENCOUNTER — LAB VISIT (OUTPATIENT)
Dept: LAB | Facility: HOSPITAL | Age: 42
End: 2017-02-08
Attending: UROLOGY
Payer: COMMERCIAL

## 2017-02-08 ENCOUNTER — PATIENT MESSAGE (OUTPATIENT)
Dept: UROLOGY | Facility: CLINIC | Age: 42
End: 2017-02-08

## 2017-02-08 DIAGNOSIS — R31.29 MICROHEMATURIA: ICD-10-CM

## 2017-02-08 DIAGNOSIS — N50.819 TESTICULAR PAIN: ICD-10-CM

## 2017-02-08 DIAGNOSIS — R81 GLUCOSURIA: ICD-10-CM

## 2017-02-08 LAB
ALBUMIN SERPL BCP-MCNC: 3.8 G/DL
ALP SERPL-CCNC: 61 U/L
ALT SERPL W/O P-5'-P-CCNC: 25 U/L
ANION GAP SERPL CALC-SCNC: 11 MMOL/L
AST SERPL-CCNC: 14 U/L
BILIRUB SERPL-MCNC: 0.4 MG/DL
BUN SERPL-MCNC: 9 MG/DL
CALCIUM SERPL-MCNC: 9.5 MG/DL
CHLORIDE SERPL-SCNC: 105 MMOL/L
CHOLEST/HDLC SERPL: 6.4 {RATIO}
CO2 SERPL-SCNC: 22 MMOL/L
COMPLEXED PSA SERPL-MCNC: 1.3 NG/ML
CREAT SERPL-MCNC: 1.1 MG/DL
ERYTHROCYTE [DISTWIDTH] IN BLOOD BY AUTOMATED COUNT: 13.9 %
EST. GFR  (AFRICAN AMERICAN): >60 ML/MIN/1.73 M^2
EST. GFR  (NON AFRICAN AMERICAN): >60 ML/MIN/1.73 M^2
GLUCOSE SERPL-MCNC: 92 MG/DL
HCT VFR BLD AUTO: 46 %
HDL/CHOLESTEROL RATIO: 15.5 %
HDLC SERPL-MCNC: 283 MG/DL
HDLC SERPL-MCNC: 44 MG/DL
HGB BLD-MCNC: 15.5 G/DL
LDLC SERPL CALC-MCNC: 187.2 MG/DL
MCH RBC QN AUTO: 31.2 PG
MCHC RBC AUTO-ENTMCNC: 33.6 %
MCV RBC AUTO: 93 FL
NONHDLC SERPL-MCNC: 239 MG/DL
PLATELET # BLD AUTO: 285 K/UL
PMV BLD AUTO: 9.4 FL
POTASSIUM SERPL-SCNC: 4.1 MMOL/L
PROT SERPL-MCNC: 7.5 G/DL
RBC # BLD AUTO: 4.95 M/UL
SODIUM SERPL-SCNC: 138 MMOL/L
TRIGL SERPL-MCNC: 259 MG/DL
WBC # BLD AUTO: 10.3 K/UL

## 2017-02-08 PROCEDURE — 85027 COMPLETE CBC AUTOMATED: CPT

## 2017-02-08 PROCEDURE — 36415 COLL VENOUS BLD VENIPUNCTURE: CPT

## 2017-02-08 PROCEDURE — 84153 ASSAY OF PSA TOTAL: CPT

## 2017-02-08 PROCEDURE — 80053 COMPREHEN METABOLIC PANEL: CPT

## 2017-02-08 PROCEDURE — 80061 LIPID PANEL: CPT

## 2017-02-09 ENCOUNTER — PATIENT MESSAGE (OUTPATIENT)
Dept: FAMILY MEDICINE | Facility: CLINIC | Age: 42
End: 2017-02-09

## 2017-02-09 ENCOUNTER — TELEPHONE (OUTPATIENT)
Dept: UROLOGY | Facility: CLINIC | Age: 42
End: 2017-02-09

## 2017-02-09 ENCOUNTER — CLINICAL SUPPORT (OUTPATIENT)
Dept: UROLOGY | Facility: CLINIC | Age: 42
End: 2017-02-09
Payer: COMMERCIAL

## 2017-02-09 DIAGNOSIS — R79.89 LOW SERUM TESTOSTERONE: Primary | ICD-10-CM

## 2017-02-09 PROCEDURE — 99499 UNLISTED E&M SERVICE: CPT | Mod: S$GLB,,, | Performed by: UROLOGY

## 2017-02-09 PROCEDURE — 96372 THER/PROPH/DIAG INJ SC/IM: CPT | Mod: S$GLB,,, | Performed by: UROLOGY

## 2017-02-09 RX ORDER — TESTOSTERONE CYPIONATE 200 MG/ML
200 INJECTION, SOLUTION INTRAMUSCULAR
Status: DISCONTINUED | OUTPATIENT
Start: 2017-02-09 | End: 2017-03-22

## 2017-02-09 RX ORDER — TESTOSTERONE CYPIONATE 200 MG/ML
200 INJECTION, SOLUTION INTRAMUSCULAR
Qty: 1 ML | Refills: 5 | Status: SHIPPED | OUTPATIENT
Start: 2017-02-09 | End: 2017-02-22 | Stop reason: SDUPTHER

## 2017-02-09 RX ADMIN — TESTOSTERONE CYPIONATE 200 MG: 200 INJECTION, SOLUTION INTRAMUSCULAR at 10:02

## 2017-02-09 NOTE — PROGRESS NOTES
Pt in clinic today per  received testosterone 200mg IM in right gluteal. Pt tolerated well, no adverse reaction noted. Patient supplied medication

## 2017-02-09 NOTE — TELEPHONE ENCOUNTER
Was the lab work fasting or not?    i saw that he was started on testosterone, needs to let me know in the next month if he wants to try to wean the effexor or vyvanse

## 2017-02-09 NOTE — TELEPHONE ENCOUNTER
At this point in time the LDL level and the cardiac risk is not high enough to start a statin.  The triglycerides are elevated as well.  He needs to eat a low fat diet.  I would consider starting a statin as he cannot exercise, and he is starting testosterone which can increase cholesterol levels.    Let me know if we can start a medication such as lipitor or crestor or pravachol

## 2017-02-09 NOTE — TELEPHONE ENCOUNTER
Patient in clinic today for testosterone injection prescription states testosterone every 28 days. Pt needs new prescription for testosterone 200mg every 21 days

## 2017-02-10 ENCOUNTER — HOSPITAL ENCOUNTER (OUTPATIENT)
Dept: RADIOLOGY | Facility: HOSPITAL | Age: 42
Discharge: HOME | End: 2017-02-10
Attending: UROLOGY
Payer: COMMERCIAL

## 2017-02-10 ENCOUNTER — TELEPHONE (OUTPATIENT)
Dept: UROLOGY | Facility: CLINIC | Age: 42
End: 2017-02-10

## 2017-02-10 DIAGNOSIS — R31.29 MICROHEMATURIA: ICD-10-CM

## 2017-02-10 DIAGNOSIS — N50.819 TESTICULAR PAIN: ICD-10-CM

## 2017-02-10 DIAGNOSIS — R81 GLUCOSURIA: ICD-10-CM

## 2017-02-10 DIAGNOSIS — N20.1 LEFT URETERAL STONE: Primary | ICD-10-CM

## 2017-02-10 PROCEDURE — 74178 CT ABD&PLV WO CNTR FLWD CNTR: CPT | Mod: 26,,, | Performed by: RADIOLOGY

## 2017-02-10 PROCEDURE — 25500020 PHARM REV CODE 255

## 2017-02-10 PROCEDURE — 74178 CT ABD&PLV WO CNTR FLWD CNTR: CPT | Mod: TC

## 2017-02-10 RX ORDER — TAMSULOSIN HYDROCHLORIDE 0.4 MG/1
0.4 CAPSULE ORAL DAILY
Qty: 30 CAPSULE | Refills: 0 | Status: SHIPPED | OUTPATIENT
Start: 2017-02-10 | End: 2017-03-28

## 2017-02-10 RX ADMIN — IOHEXOL 100 ML: 350 INJECTION, SOLUTION INTRAVENOUS at 09:02

## 2017-02-10 NOTE — TELEPHONE ENCOUNTER
Pt with 4mm left proximal ureteral stone wo hydro  kub ordered for now  kub ordered in a month  Start flomax  Fu in 1month after kub    See result note

## 2017-02-13 ENCOUNTER — HOSPITAL ENCOUNTER (OUTPATIENT)
Dept: RADIOLOGY | Facility: HOSPITAL | Age: 42
Discharge: HOME | End: 2017-02-13
Attending: UROLOGY
Payer: COMMERCIAL

## 2017-02-13 ENCOUNTER — TELEPHONE (OUTPATIENT)
Dept: UROLOGY | Facility: CLINIC | Age: 42
End: 2017-02-13

## 2017-02-13 DIAGNOSIS — N20.0 NEPHROLITHIASIS: Primary | ICD-10-CM

## 2017-02-13 DIAGNOSIS — N20.1 LEFT URETERAL STONE: ICD-10-CM

## 2017-02-13 PROCEDURE — 74000 XR ABDOMEN AP 1 VIEW: CPT | Mod: TC

## 2017-02-13 PROCEDURE — 74000 XR ABDOMEN AP 1 VIEW: CPT | Mod: 26,,, | Performed by: RADIOLOGY

## 2017-02-13 NOTE — TELEPHONE ENCOUNTER
kub shows probable left uvj stone  If pt doesn't pass stone will fu with kub in 4 weeks (ordered)  Continue flomax until then    Also scheduled for cysto   Of note: pt had atypical cytology - i will discuss with him when i se him

## 2017-02-20 ENCOUNTER — TELEPHONE (OUTPATIENT)
Dept: UROLOGY | Facility: CLINIC | Age: 42
End: 2017-02-20

## 2017-02-20 ENCOUNTER — HOSPITAL ENCOUNTER (OUTPATIENT)
Facility: AMBULARY SURGERY CENTER | Age: 42
Discharge: HOME | End: 2017-02-20
Attending: UROLOGY | Admitting: UROLOGY
Payer: COMMERCIAL

## 2017-02-20 ENCOUNTER — APPOINTMENT (OUTPATIENT)
Dept: LAB | Facility: HOSPITAL | Age: 42
End: 2017-02-20
Attending: UROLOGY
Payer: COMMERCIAL

## 2017-02-20 ENCOUNTER — SURGERY (OUTPATIENT)
Age: 42
End: 2017-02-20

## 2017-02-20 DIAGNOSIS — N50.819 TESTICULAR PAIN: ICD-10-CM

## 2017-02-20 DIAGNOSIS — R31.29 MICROHEMATURIA: ICD-10-CM

## 2017-02-20 DIAGNOSIS — E29.1 HYPOGONADISM IN MALE: Primary | ICD-10-CM

## 2017-02-20 DIAGNOSIS — R81 GLUCOSURIA: ICD-10-CM

## 2017-02-20 LAB
BACTERIA SPEC CULT: NORMAL
BILIRUB SERPL-MCNC: NORMAL MG/DL
BLOOD URINE, POC: 50
CASTS: NORMAL
COLOR, POC UA: NORMAL
CRYSTALS: NORMAL
GLUCOSE UR QL STRIP: NORMAL
KETONES UR QL STRIP: NORMAL
LEUKOCYTE ESTERASE URINE, POC: NORMAL
NITRITE, POC UA: NORMAL
PH, POC UA: 5
PROTEIN, POC: NORMAL
RBC CELLS COUNTED: NORMAL
SPECIFIC GRAVITY, POC UA: 1.01
UROBILINOGEN, POC UA: NORMAL
WHITE BLOOD CELLS: NORMAL

## 2017-02-20 PROCEDURE — 52000 CYSTOURETHROSCOPY: CPT | Performed by: UROLOGY

## 2017-02-20 PROCEDURE — 87086 URINE CULTURE/COLONY COUNT: CPT

## 2017-02-20 PROCEDURE — 52000 CYSTOURETHROSCOPY: CPT | Mod: ,,, | Performed by: UROLOGY

## 2017-02-20 RX ORDER — WATER 1000 ML/1000ML
INJECTION, SOLUTION INTRAVENOUS
Status: DISCONTINUED | OUTPATIENT
Start: 2017-02-20 | End: 2017-02-20 | Stop reason: HOSPADM

## 2017-02-20 RX ORDER — LIDOCAINE HYDROCHLORIDE 20 MG/ML
JELLY TOPICAL
Status: DISCONTINUED | OUTPATIENT
Start: 2017-02-20 | End: 2017-02-20 | Stop reason: HOSPADM

## 2017-02-20 RX ADMIN — LIDOCAINE HYDROCHLORIDE 5 ML: 20 JELLY TOPICAL at 02:02

## 2017-02-20 RX ADMIN — WATER 500 ML: 1000 INJECTION, SOLUTION INTRAVENOUS at 02:02

## 2017-02-20 NOTE — OP NOTE
[facility] [place] Procedure Note  Date: 02/20/2017    Procedures: Flexible cystourethroscopy     Pre Procedure Diagnosis: microhematuria    Post Procedure Diagnosis:Normal cystoscopy    Surgeon: Vicky Saeed MD    UA:1.015/5/tr wbc/50 blood/tr protein'    Specimen: urine for culture today    Anesthesia: 2% uro-jet lidocaine jelly for local analgesia    Flexible cysto-urethroscopy was performed after consent was obtained.  The risks and benefits were explained.    2% lidocaine urojet was used for local analgesia.  The genitalia was prepped and draped in the sterile fashion with betadine. .    The flexible scope was advanced into the urethra and into the bladder.  Bilateral ureteral orifice were evaluated and noted to be normal with clear efflux.  The bladder was completely surveyed in a systematic fashion.   No bladder tumors or lesions were seen.  No strictures were noted.  The prostate showed Mild hypertrophy.  There was No median lobe present.    The patient tolerated the procedure well without complication.    HPI: Lee Quintanilla is a 41 y.o. male who presents for cystoscopy today for microhematuria. ctu showed left ureteral stone. Cytology was abnormal (+h/o smoking). Hx of testicular/groin pain.    Findings:   1. No tumors, no stones in bladder  2. No median lobe  3. Mild b hypertrophy  4. prostatitis    Plan:  Strain all urine  Continue flomax  F/u 1 month with ctrss prior (our nurse will schedule)  If pt passes stone then cancel ctrss and will plan to f/u after 3rd injection of Testosterone prior to 4th injection with cbc, cmp, testosterone, lipid panel and psa (fasting)  He will f/u with dr lutz for lipid panel  F/u urine culture    Follow up:  1 month with ctrss prior  After 3rd injection prior to 4th with labs

## 2017-02-20 NOTE — IP AVS SNAPSHOT
[facility]  [address] [address]           Patient Discharge Instructions     Our goal is to set you up for success. This packet includes information on your condition, medications, and your home care. It will help you to care for yourself so you don't get sicker and need to go back to the hospital.     Please ask your nurse if you have any questions.        There are many details to remember when preparing to leave the hospital. Here is what you will need to do:    1. Take your medicine. If you are prescribed medications, review your Medication List in the following pages. You may have new medications to  at the pharmacy and others that you'll need to stop taking. Review the instructions for how and when to take your medications. Talk with your doctor or nurses if you are unsure of what to do.     2. Go to your follow-up appointments. Specific follow-up information is listed in the following pages. Your may be contacted by a transition nurse or clinical provider about future appointments. Be sure we have all of the phone numbers to reach you, if needed. Please contact your provider's office if you are unable to make an appointment.     3. Watch for warning signs. Your doctor or nurse will give you detailed warning signs to watch for and when to call for assistance. These instructions may also include educational information about your condition. If you experience any of warning signs to your health, call your doctor.               [facility]  Unless otherwise directed by your provider, please contact [facility], our nurse care line that is available for 24/7 assistance.     [phone] (toll-free)    Registered nurses in the [facility] provide clinical advisement, health education, appointment booking, and other advisory services.                    ** Verify the list of medication(s) below is accurate and up  to date. Carry this with you in case of emergency. If your medications have changed, please notify your healthcare provider.             Medication List      ASK your doctor about these medications        Additional Info                      pregabalin 25 MG capsule   Commonly known as:  LYRICA   Quantity:  90 capsule   Refills:  6    Instructions:  Week 1: 1 pill QHS, week 2 1 pill am/pm, week 3: 1 pill am/2 pills PM     Begin Date    AM    Noon    PM    Bedtime       sildenafil 100 MG tablet   Commonly known as:  VIAGRA   Quantity:  6 tablet   Refills:  12    Instructions:  Take 1/2 to 1 tablet daily as needed.  Take on an empty stomach or with a light meal.     Begin Date    AM    Noon    PM    Bedtime       tamsulosin 0.4 mg Cp24   Commonly known as:  FLOMAX   Quantity:  30 capsule   Refills:  0   Dose:  0.4 mg    Instructions:  Take 1 capsule (0.4 mg total) by mouth once daily. Take at bedtime     Begin Date    AM    Noon    PM    Bedtime       testosterone cypionate 200 mg/mL injection   Commonly known as:  DEPOTESTOTERONE CYPIONATE   Quantity:  1 mL   Refills:  5   Dose:  200 mg    Instructions:  Inject 1 mL (200 mg total) into the muscle every 21 days.     Begin Date    AM    Noon    PM    Bedtime       venlafaxine 225 mg Tr24   Quantity:  90 each   Refills:  3   Dose:  225 mg    Instructions:  Take 225 mg by mouth once daily.     Begin Date    AM    Noon    PM    Bedtime       * VYVANSE 60 MG capsule   Quantity:  30 capsule   Refills:  0   Dose:  60 mg   Generic drug:  lisdexamfetamine    Instructions:  Take 1 capsule (60 mg total) by mouth every morning.     Begin Date    AM    Noon    PM    Bedtime       * VYVANSE 60 MG capsule   Quantity:  30 capsule   Refills:  0   Dose:  60 mg   Generic drug:  lisdexamfetamine    Start Date:  3/11/2017   Instructions:  Take 1 capsule (60 mg total) by mouth every morning.     Begin Date    AM    Noon    PM    Bedtime       * VYVANSE 60 MG capsule   Quantity:  30 capsule    Refills:  0   Dose:  60 mg   Generic drug:  lisdexamfetamine    Start Date:  4/10/2017   Instructions:  Take 1 capsule (60 mg total) by mouth every morning.     Begin Date    AM    Noon    PM    Bedtime       * Notice:  This list has 3 medication(s) that are the same as other medications prescribed for you. Read the directions carefully, and ask your doctor or other care provider to review them with you.               Please bring to all follow up appointments:    1. A copy of your discharge instructions.  2. All medicines you are currently taking in their original bottles.  3. Identification and insurance card.    Please arrive 15 minutes ahead of scheduled appointment time.    Please call 24 hours in advance if you must reschedule your appointment and/or time.        Your Scheduled Appointments     Mar 02, 2017 10:00 AM CST   Nurse Visit with UROLOGY, NURSE STEPHANIA HURD - Urology ([facility])    [address]  [facility] [address]   [phone]            Mar 09, 2017 10:00 AM CST   Consult with Ronaldo Lanier MD   [facility] - Neurology ([facility] )    [address]  [place] [address]   [phone]            Mar 20, 2017 10:15 AM CDT   Ct Renal Stone with [facility] CT1 LIMIT 400 LBS   [facility] [address]  [facility] [address]   [phone]            Mar 21, 2017 10:30 AM CDT   Established Patient Visit with MD Stepahnia Schilling - Urology ([facility])    [address]  [facility] [address]   [phone]            May 08, 2017  3:20 PM CDT   Established Patient Visit with MD Stephania Estes - Family Medicine ([place])    [address]  [facility] [address]   [phone]                Discharge Instructions     Future Orders    CT Renal Stone Study ABD Pelvis WO     Questions:    Reason for Exam:  left ureteral stone    Diet general     Questions:     "Total calories:      Fat restriction, if any:      Protein restriction, if any:      Na restriction, if any:      Fluid restriction:      Additional restrictions:          Discharge Instructions                After the procedure    · Drink plenty of fluids.  · You may have burning or light bleeding when you urinate--this is normal.  · Medications may be prescribed to ease any discomfort or prevent infection. Take these as directed.  · Call your doctor if you have heavy bleeding or blood clots, burning that lasts more than a day, a fever over 100°F  (38° C), or trouble urinating.              Admission Information     Date & Time Provider Department CSN    2/20/2017  1:15 PM Vicky Saeed MD [facility] [id number]      Care Providers     Provider Role Specialty Primary office phone    Vicky Saeed MD Attending Provider Urology [phone]    Vicky Saeed MD Surgeon  Urology [phone]      Your Vitals Were     BP Pulse Temp Resp Height Weight    151/100 115 99.7 °F (37.6 °C) (Oral) 20 5' 7" (1.702 m) 98 kg (216 lb)    SpO2 BMI             98% 33.83 kg/m2         Recent Lab Values        7/8/2014 4/12/2016 1/13/2017                     9:46 AM  8:31 AM  8:54 AM         A1C 5.4 5.3 5.4         Comment for A1C at  8:54 AM on 1/13/2017:  According to ADA guidelines, hemoglobin A1C <7.0% represents  optimal control in non-pregnant diabetic patients.  Different  metrics may apply to specific populations.   Standards of Medical Care in Diabetes - 2016.  For the purpose of screening for the presence of diabetes:  <5.7%     Consistent with the absence of diabetes  5.7-6.4%  Consistent with increasing risk for diabetes   (prediabetes)  >or=6.5%  Consistent with diabetes  Currently no consensus exists for use of hemoglobin A1C  for diagnosis of diabetes for children.        Pending Labs     Order Current Status    Urine culture In process      Allergies as of 2/20/2017     "    Reactions    Inapsine [Droperidol] Anaphylaxis    seizures    Pcn [Penicillins]     Bactrim [Sulfamethoxazole-trimethoprim] Rash    Dry red rash all over      Smoking Cessation     If you would like to quit smoking:   You may be eligible for free services if you are a [place] resident and started smoking cigarettes before September 1, 1988.  Call the Smoking Cessation [employer] (SCT) toll free at (970) 090-0855 or (874) 488-1501.   Call 1-800-QUIT-NOW if you do not meet the above criteria.            Language Assistance Services     ATTENTION: Language assistance services are available, free of charge. Please call [phone].      ATENCIÓN: Si habla español, tiene a whittington disposición servicios gratuitos de asistencia lingüística. Llame al [phone].     CURTIS Ý: N?u b?n nói Ti?ng Vi?t, có các d?ch v? h? tr? ngôn ng? mi?n phí dành cho b?n. G?i s? [phone].         [facility] complies with applicable Federal civil rights laws and does not discriminate on the basis of race, color, national origin, age, disability, or sex.

## 2017-02-20 NOTE — H&P
[facility] Progress Note  PCP: Dr.Guidry [facility]: active     Chief Complaint: Follow-up (discuss low testosterone)        Subjective:         HPI: Lee Quintanilla is a 41 y.o. male presents with      Last seen 1/12/17     He had a h/o scrotal abscess s/p drainage on 6/4/15     Microhematuria/nephrolithiasis  ctu 2/10/17- showed left proximal 4mm ureteral stone and nonobstrcucting left 2mm renal stone, started on flomax  Cystoscopy today  Urine cytology with atypical stones  kub 2/13/17  Probable 3mm left uvj stone  LLP - 3mm, 1mm and 2mm stone    Testicular pain  He's had scrotal pain for the past 6 years. He's seen two urologists in the pasts. He was given doxycycline by one and another one did a cystoscopy. Nothing helped. He says he has constant heaviness in his scrotum that initially resolved with back surgery. Was also referred for pt which he went to. Comes in otday stating that he stopped going to PT bc his insurance ran out. Was helping lower back and neck. Testicular pain started a few months ago.      Severe ED  Rarely able to get an erection hard enough penetration, started in October when he was having work issues. Not having morning erections. Poor duration. Has no sex partner. Testosterone checked a while ago. poor libido. Having fatigue. Tried [id number] which helped. I checked a T which was 168 at 9am in the morning. He is here today discuss his T results. i wrote him for viagra but he never tried. He tried cialis 10mg last year which helped wo SE. On viagra as needed started lst visit. Started on T 200q3 weeks for T 168 on 1/13/17.  All labs normal except lipid panel.      IIEF:2  AUASSx: 11, mostly satisfied      The past medical, surgical, social and family hx were reviewed. He has had back surgery since I've seen him.   Cataracts? none     Urologic meds: none  Anticoagulation: No     Objective:      Vitals:    02/20/17 1420   BP: (!) 151/100   Pulse: (!) 115    Resp: 20   Temp:           Physical Exam   Constitutional: He is oriented to person, place, and time. He appears well-developed and well-nourished. He is cooperative. No distress.   HENT:   Head: Normocephalic and atraumatic.   Eyes: Pupils are equal, round, and reactive to light.   Cardiovascular: Normal rate and regular rhythm.    Pulmonary/Chest: Effort normal.   Abdominal: Soft. Normal appearance.   Musculoskeletal: Normal range of motion.   Neurological: He is alert and oriented to person, place, and time. He has normal reflexes.   Skin: Skin is intact.     Psychiatric: He has a normal mood and affect.       Scrotum: no testes in scrotum    Urinalysistr :leukocytes  /50 blood/tr protein - sent for culture  Labs:cr 1.2 7/25/16  ua microscopic:   1/12/17 - 1   5/31/16 - 4     hba1c 1/13/17 - 5.4  psa 2/8/17  1.3  Lipid panel 2/8/17 - very elevated  H/h 2/8/17 /15.5/46.0  lfts 2/8/17 - normal    Urine culture  8/25/14                      ng     PATHOLOGY REVIEW:  Voided urine 2/7/17:Atypical cells present.  Urine cytology 8/25/14 - negative     RADIOGRAPHIC REVIEW:  kub 2/13/17  Probable 3mm left uvj stone however pt has phleboliths on ct  LLP - 3mm, 1mm and 2mm stone    Ct urogram 2/10/17  2mm nonosbtructing left renal stone  4mm left proximal ureteral stone  No right renal stones  Other findings: 8mm hypodense cyst, small fat cotnaining inguinal hernias, prostate gland mildly enlarged    CTRSS 9/8/14  punctate nonobstructing renal stone with no hydronephrosis, opaque ureteral stone, ureteral obstruction or acute findings. Tiny fat containing umbilical hernia and small fat containing inguinal hernias also noted     US Scrotum 9/3/14  Echogenic area       rbus 7/24/14  normal        Assessment:       1. Microhematuria    2. Glucosuria    3. Testicular pain        Plan:      Microhematuria/nephrolithaisis  -persistent MH  -cysto today  -flomax for stone. There is a stone on kub but i's likely a phlebolith. Will  need ctrss if does not pass stone in a month     Glucosuria  None today     Testicular pain  -explained that I will not be able to help him much for this, he does have worsening back pain and he had improvement of scrotal pain with his back issues.   -ibuprofen as needed.       ED and hypogonadism  -viagra as needed  -continue T 200 q3 weeks  Discussed gel, injection and testopel (but pt prone to boils)    Just prior to 4th injections will recheck all labs  He will let me know and I'll repeat all labs then        Pt will make appt with ortho and neuro to further eval back which may help his scrotal sx    F/u 1 month with ctrss prior        [facility] active

## 2017-02-20 NOTE — TELEPHONE ENCOUNTER
----- Message from Vicky Saeed MD sent at 2/20/2017  2:58 PM CST -----  F/u 1 month with ctrss prior

## 2017-02-20 NOTE — DISCHARGE SUMMARY
[facility]  Discharge Note  Short Stay    Admit Date: 2/20/2017    Discharge Date and Time: No discharge date for patient encounter.     Attending Physician: Vicky Saeed,*     Discharge Provider: Vicky Saeed    Diagnoses:  Active Hospital Problems    Diagnosis  POA    Microhematuria [R31.29]  Yes      Resolved Hospital Problems    Diagnosis Date Resolved POA   No resolved problems to display.       Discharged Condition: good    Hospital Course: Patient was admitted for an outpatient procedure and tolerated the procedure well with no complications.    Final Diagnoses: Same as principal problem.    Disposition: Home or Self Care    Follow up/Patient Instructions:    Medications:  Reconciled Home Medications:   Current Discharge Medication List      CONTINUE these medications which have NOT CHANGED    Details   pregabalin (LYRICA) 25 MG capsule Week 1: 1 pill QHS, week 2 1 pill am/pm, week 3: 1 pill am/2 pills PM  Qty: 90 capsule, Refills: 6      tamsulosin (FLOMAX) 0.4 mg Cp24 Take 1 capsule (0.4 mg total) by mouth once daily. Take at bedtime  Qty: 30 capsule, Refills: 0      venlafaxine 225 mg TR24 Take 225 mg by mouth once daily.  Qty: 90 each, Refills: 3      !! VYVANSE 60 mg capsule Take 1 capsule (60 mg total) by mouth every morning.  Qty: 30 capsule, Refills: 0      sildenafil (VIAGRA) 100 MG tablet Take 1/2 to 1 tablet daily as needed.  Take on an empty stomach or with a light meal.  Qty: 6 tablet, Refills: 12      testosterone cypionate (DEPOTESTOTERONE CYPIONATE) 200 mg/mL injection Inject 1 mL (200 mg total) into the muscle every 21 days.  Qty: 1 mL, Refills: 5      !! VYVANSE 60 mg capsule Take 1 capsule (60 mg total) by mouth every morning.  Qty: 30 capsule, Refills: 0      !! VYVANSE 60 mg capsule Take 1 capsule (60 mg total) by mouth every morning.  Qty: 30 capsule, Refills: 0       !! - Potential duplicate medications found. Please discuss with provider.           Discharge Procedure Orders  CT Renal Stone Study ABD Pelvis WO   Standing Status: Future  Standing Exp. Date: 02/21/18   Order Specific Question Answer Comments   Reason for Exam: left ureteral stone      Diet general           Discharge Procedure Orders (must include Diet, Follow-up, Activity):    Discharge Procedure Orders (must include Diet, Follow-up, Activity)  CT Renal Stone Study ABD Pelvis WO   Standing Status: Future  Standing Exp. Date: 02/21/18   Order Specific Question Answer Comments   Reason for Exam: left ureteral stone      Diet general

## 2017-02-22 VITALS
HEART RATE: 100 BPM | RESPIRATION RATE: 20 BRPM | WEIGHT: 216 LBS | BODY MASS INDEX: 33.9 KG/M2 | DIASTOLIC BLOOD PRESSURE: 91 MMHG | HEIGHT: 67 IN | SYSTOLIC BLOOD PRESSURE: 140 MMHG | TEMPERATURE: 100 F | OXYGEN SATURATION: 98 %

## 2017-02-22 LAB — BACTERIA UR CULT: NO GROWTH

## 2017-02-22 RX ORDER — TESTOSTERONE CYPIONATE 200 MG/ML
200 INJECTION, SOLUTION INTRAMUSCULAR
Qty: 1 ML | Refills: 5 | Status: SHIPPED | OUTPATIENT
Start: 2017-02-22 | End: 2017-05-08

## 2017-02-22 NOTE — TELEPHONE ENCOUNTER
----- Message from Vicky Saeed MD sent at 2/22/2017  1:55 PM CST -----  Your urine culture showed no bacteria

## 2017-03-09 ENCOUNTER — TELEPHONE (OUTPATIENT)
Dept: NEUROLOGY | Facility: CLINIC | Age: 42
End: 2017-03-09

## 2017-03-09 NOTE — TELEPHONE ENCOUNTER
Left a message for the patient to let him know an appointment was scheduled for him on March 15 at 12:20 with Dr. Gallegos. He was asked to call back to reschedule if this is not a good day and time for him or to confirm this appointment.

## 2017-03-09 NOTE — TELEPHONE ENCOUNTER
Called the patient to let him know his appointment with Dr. Lanier was cancelled because he is being treated for his back pain. The patient asked to call back to confirm this message.

## 2017-03-14 ENCOUNTER — PATIENT MESSAGE (OUTPATIENT)
Dept: UROLOGY | Facility: CLINIC | Age: 42
End: 2017-03-14

## 2017-03-17 ENCOUNTER — LAB VISIT (OUTPATIENT)
Dept: LAB | Facility: HOSPITAL | Age: 42
End: 2017-03-17
Attending: UROLOGY
Payer: COMMERCIAL

## 2017-03-17 DIAGNOSIS — E29.1 HYPOGONADISM IN MALE: ICD-10-CM

## 2017-03-17 LAB
ALBUMIN SERPL BCP-MCNC: 3.9 G/DL
ALP SERPL-CCNC: 59 U/L
ALT SERPL W/O P-5'-P-CCNC: 31 U/L
ANION GAP SERPL CALC-SCNC: 12 MMOL/L
AST SERPL-CCNC: 15 U/L
BILIRUB SERPL-MCNC: 0.6 MG/DL
BUN SERPL-MCNC: 12 MG/DL
CALCIUM SERPL-MCNC: 9.3 MG/DL
CHLORIDE SERPL-SCNC: 104 MMOL/L
CHOLEST/HDLC SERPL: 6.4 {RATIO}
CO2 SERPL-SCNC: 21 MMOL/L
COMPLEXED PSA SERPL-MCNC: 0.98 NG/ML
CREAT SERPL-MCNC: 1.3 MG/DL
ERYTHROCYTE [DISTWIDTH] IN BLOOD BY AUTOMATED COUNT: 14.2 %
EST. GFR  (AFRICAN AMERICAN): >60 ML/MIN/1.73 M^2
EST. GFR  (NON AFRICAN AMERICAN): >60 ML/MIN/1.73 M^2
GLUCOSE SERPL-MCNC: 74 MG/DL
HCT VFR BLD AUTO: 48.1 %
HDL/CHOLESTEROL RATIO: 15.7 %
HDLC SERPL-MCNC: 281 MG/DL
HDLC SERPL-MCNC: 44 MG/DL
HGB BLD-MCNC: 15.8 G/DL
LDLC SERPL CALC-MCNC: 202.4 MG/DL
MCH RBC QN AUTO: 31.4 PG
MCHC RBC AUTO-ENTMCNC: 32.9 %
MCV RBC AUTO: 96 FL
NONHDLC SERPL-MCNC: 237 MG/DL
PLATELET # BLD AUTO: 274 K/UL
PMV BLD AUTO: 10.2 FL
POTASSIUM SERPL-SCNC: 4 MMOL/L
PROT SERPL-MCNC: 7.9 G/DL
RBC # BLD AUTO: 5.03 M/UL
SODIUM SERPL-SCNC: 137 MMOL/L
TESTOST SERPL-MCNC: 136 NG/DL
TRIGL SERPL-MCNC: 173 MG/DL
WBC # BLD AUTO: 9.3 K/UL

## 2017-03-17 PROCEDURE — 84153 ASSAY OF PSA TOTAL: CPT

## 2017-03-17 PROCEDURE — 84403 ASSAY OF TOTAL TESTOSTERONE: CPT

## 2017-03-17 PROCEDURE — 80053 COMPREHEN METABOLIC PANEL: CPT

## 2017-03-17 PROCEDURE — 36415 COLL VENOUS BLD VENIPUNCTURE: CPT

## 2017-03-17 PROCEDURE — 80061 LIPID PANEL: CPT

## 2017-03-17 PROCEDURE — 85027 COMPLETE CBC AUTOMATED: CPT

## 2017-03-20 ENCOUNTER — HOSPITAL ENCOUNTER (OUTPATIENT)
Dept: RADIOLOGY | Facility: HOSPITAL | Age: 42
Discharge: HOME | End: 2017-03-20
Attending: UROLOGY
Payer: COMMERCIAL

## 2017-03-20 DIAGNOSIS — R31.29 MICROHEMATURIA: ICD-10-CM

## 2017-03-20 PROCEDURE — 74176 CT ABD & PELVIS W/O CONTRAST: CPT | Mod: TC

## 2017-03-20 PROCEDURE — 74176 CT ABD & PELVIS W/O CONTRAST: CPT | Mod: 26,,, | Performed by: RADIOLOGY

## 2017-03-21 ENCOUNTER — OFFICE VISIT (OUTPATIENT)
Dept: UROLOGY | Facility: CLINIC | Age: 42
End: 2017-03-21
Payer: COMMERCIAL

## 2017-03-21 VITALS
HEIGHT: 67 IN | HEART RATE: 83 BPM | WEIGHT: 214.75 LBS | TEMPERATURE: 100 F | SYSTOLIC BLOOD PRESSURE: 154 MMHG | BODY MASS INDEX: 33.71 KG/M2 | DIASTOLIC BLOOD PRESSURE: 98 MMHG

## 2017-03-21 DIAGNOSIS — N20.1 URETERAL STONE: ICD-10-CM

## 2017-03-21 DIAGNOSIS — R31.29 MICROHEMATURIA: ICD-10-CM

## 2017-03-21 DIAGNOSIS — E29.1 HYPOGONADISM IN MALE: Primary | ICD-10-CM

## 2017-03-21 LAB
BILIRUB SERPL-MCNC: NORMAL MG/DL
BLOOD URINE, POC: NORMAL
COLOR, POC UA: NORMAL
GLUCOSE UR QL STRIP: NORMAL
KETONES UR QL STRIP: NORMAL
LEUKOCYTE ESTERASE URINE, POC: NORMAL
NITRITE, POC UA: NORMAL
PH, POC UA: 5
PROTEIN, POC: NORMAL
SPECIFIC GRAVITY, POC UA: 1.02
UROBILINOGEN, POC UA: NORMAL

## 2017-03-21 PROCEDURE — 99999 PR PBB SHADOW E&M-EST. PATIENT-LVL III: CPT | Mod: PBBFAC,,, | Performed by: UROLOGY

## 2017-03-21 PROCEDURE — 1160F RVW MEDS BY RX/DR IN RCRD: CPT | Mod: S$GLB,,, | Performed by: UROLOGY

## 2017-03-21 PROCEDURE — 99215 OFFICE O/P EST HI 40 MIN: CPT | Mod: 25,S$GLB,, | Performed by: UROLOGY

## 2017-03-21 PROCEDURE — 81002 URINALYSIS NONAUTO W/O SCOPE: CPT | Mod: S$GLB,,, | Performed by: UROLOGY

## 2017-03-21 RX ORDER — TESTOSTERONE 20.25 MG/1.25G
40.5 GEL TOPICAL DAILY
Qty: 75 G | Refills: 2 | Status: SHIPPED | OUTPATIENT
Start: 2017-03-21 | End: 2017-03-22 | Stop reason: SDUPTHER

## 2017-03-21 NOTE — PROGRESS NOTES
[facility] Progress Note  PCP: Dr.Guidry [facility]: active    Chief Complaint: Follow-up (1 month fu)      Subjective:        HPI: Lee Quintanilla is a 41 y.o. male presents with     Last seen 1/12/17    Severe ED and hypogonadism.   No significant improvement with cialis. Found to have T 198. Started on T 200q3 weeks 2/9/17. Has had 2 injections now.  Repeat labs show no significant change and T still 136. Returns today stating he still feels chronic fatigue an  No libido. Has used cialis in the past with success but has no desire. +breast enlargement and loss of hair progressively increasing.     Testicular pain  No longer c/o pain    Microhematuria, atypical urine cytology, kidney stone  ctu 2/10/17 - 2mm left renal stone, 4mm proximal ureteral stone  Cystoscopy 2/20/17 - normal, prostatitis, mild b hypertrophy  Urine cytology 2/7/17 - atypical cells,  8/25/14 - normal    Repeat ct on 3/20/17 (5 weeks later) to see if stone passed with flomax shows stone still in left proximal ureter without hydro. He says he's had stones before, years ago and caught the stone. No c/o flank pain. Not on flomax now.     IIEF:5   AUASSx: 17, mixed     The past medical, surgical, social and family hx were reviewed. He has had back surgery since I've seen him.   Cataracts? none    Urologic meds:  T 200q3 weeks.   Anticoagulation: No    Objective:     Vitals:    03/21/17 1113   BP: (!) 154/98   Pulse: 83   Temp: 99.9 °F (37.7 °C)          Physical Exam   Constitutional: He is oriented to person, place, and time. He appears well-developed and well-nourished. He is cooperative. No distress.   HENT:   Head: Normocephalic and atraumatic.   Eyes: Pupils are equal, round, and reactive to light.   Cardiovascular: Normal rate and regular rhythm.    Pulmonary/Chest: Effort normal.   Abdominal: Soft. Normal appearance.   Musculoskeletal: Normal range of motion.   Neurological: He is alert and oriented to person,  place, and time. He has normal reflexes.   Skin: Skin is intact.     Psychiatric: He has a normal mood and affect.       Scrotum: no testes in scrotum    Urinalysis:1.010/5/remainder negative  Labs:cr 1.2 7/25/16  ua microscopic:   1/12/17 - 1   5/31/16 - 4    hba1c 1/13/17 - 5.4    Urine culture  2/20/17 ng  8/25/14 Ng    Labs 3/17/17  psa  0.98  Lipid panel - elevated, not worse than last labs 1 month ago  T 136  LFTs normal  H/H 15.8/48.1     PATHOLOGY REVIEW:  Urine cytology 2/7/17 - atypical cells  Urine cytology 8/25/14 - negative    RADIOGRAPHIC REVIEW:  ctrss 3/20/17  ctrss 2/10/17  3mm left upj stone without hydro  2mm left intrarenal stone    kub 2/13/17  Stone in left ureter not visible    ctu 2/10/17  2mm left renal stone, 4mm proximal ureteral stone  Prostate gland mildly enlarged  Other findings: 8mm hypodense mass in leiver likely a cyst    CTRSS 9/8/14  punctate nonobstructing renal stone with no hydronephrosis, opaque ureteral stone, ureteral obstruction or acute findings. Tiny fat containing umbilical hernia and small fat containing inguinal hernias also noted    US Scrotum 9/3/14  Echogenic area      rbus 7/24/14  normal      Assessment:       1. Hypogonadism in male        Plan:     Microhematuria  -dawn showed left ureteral stone and renal stone  -atypical urine cytology, will repeat after we treat kidney stone as I think this is what is causing this. Does have a h/o smoking. 1.5 ppd x 20 years, quit last year    nephrolithaisis  Gave a 5 week trial of passage on flomax. Ct on 3/20 showed stone still there. Has not had pain  Will plan for eswl on 3/29. Stone not seen on kub last time so will repeat morning of. But will plan to put pt on eswl table to see if we can idenitify ureteral stone. If not will place stent and plan for ureteroscopy in 2 weeks after stent placement. Will also hold off on any more flomax for now.  If stone is seen on eswl and does not break up will still plan to place stent  and remove stone later. May try to ureteroscopy same day if able to do so and ureter dilated but unlikely. Pt understands this.     ED and hypogonadism  -all labs reviewed  -start androgel 1.62% 2 pumps (40.5mg) qday  -check T level at 14 days and 28 days  -testopel would be good for pt but easily prone to boils  -cialis 10mg work for him bu thasn't used  -watch for breast enlargement, c/o this today    Testicular pain  -improved, rarely having pain       Pt will make appt with ortho and neuro to further eval back which may help his scrotal sx      [facility]: active

## 2017-03-22 RX ORDER — TESTOSTERONE 20.25 MG/1.25G
40.5 GEL TOPICAL DAILY
Qty: 75 G | Refills: 2 | Status: SHIPPED | OUTPATIENT
Start: 2017-03-22 | End: 2017-03-22 | Stop reason: SDUPTHER

## 2017-03-22 RX ORDER — TESTOSTERONE 20.25 MG/1.25G
40.5 GEL TOPICAL DAILY
Qty: 75 G | Refills: 2 | Status: SHIPPED | OUTPATIENT
Start: 2017-03-22 | End: 2017-07-18 | Stop reason: SDUPTHER

## 2017-03-23 RX ORDER — VENLAFAXINE HYDROCHLORIDE 225 MG/1
225 TABLET, EXTENDED RELEASE ORAL DAILY
Qty: 90 EACH | Refills: 3 | Status: CANCELLED | OUTPATIENT
Start: 2017-03-23 | End: 2018-03-23

## 2017-03-28 ENCOUNTER — ANESTHESIA EVENT (OUTPATIENT)
Dept: SURGERY | Facility: HOSPITAL | Age: 42
End: 2017-03-28
Payer: COMMERCIAL

## 2017-03-29 ENCOUNTER — ANESTHESIA (OUTPATIENT)
Dept: SURGERY | Facility: HOSPITAL | Age: 42
End: 2017-03-29
Payer: COMMERCIAL

## 2017-03-29 ENCOUNTER — TELEPHONE (OUTPATIENT)
Dept: UROLOGY | Facility: CLINIC | Age: 42
End: 2017-03-29

## 2017-03-29 ENCOUNTER — SURGERY (OUTPATIENT)
Age: 42
End: 2017-03-29

## 2017-04-05 ENCOUNTER — HOSPITAL ENCOUNTER (OUTPATIENT)
Facility: HOSPITAL | Age: 42
Discharge: HOME | End: 2017-04-05
Attending: UROLOGY | Admitting: UROLOGY
Payer: COMMERCIAL

## 2017-04-05 ENCOUNTER — SURGERY (OUTPATIENT)
Age: 42
End: 2017-04-05

## 2017-04-05 VITALS
RESPIRATION RATE: 18 BRPM | OXYGEN SATURATION: 98 % | DIASTOLIC BLOOD PRESSURE: 69 MMHG | BODY MASS INDEX: 33.74 KG/M2 | HEIGHT: 67 IN | HEART RATE: 87 BPM | WEIGHT: 215 LBS | TEMPERATURE: 98 F | SYSTOLIC BLOOD PRESSURE: 129 MMHG

## 2017-04-05 DIAGNOSIS — N20.0 NEPHROLITHIASIS: ICD-10-CM

## 2017-04-05 DIAGNOSIS — R10.9 ABDOMINAL PAIN: ICD-10-CM

## 2017-04-05 DIAGNOSIS — E29.1 HYPOGONADISM IN MALE: Primary | ICD-10-CM

## 2017-04-05 PROCEDURE — 71000015 HC POSTOP RECOV 1ST HR: Performed by: UROLOGY

## 2017-04-05 PROCEDURE — 25000003 PHARM REV CODE 250: Performed by: ANESTHESIOLOGY

## 2017-04-05 PROCEDURE — C1769 GUIDE WIRE: HCPCS | Performed by: UROLOGY

## 2017-04-05 PROCEDURE — 37000009 HC ANESTHESIA EA ADD 15 MINS: Performed by: UROLOGY

## 2017-04-05 PROCEDURE — 63600175 PHARM REV CODE 636 W HCPCS: Performed by: UROLOGY

## 2017-04-05 PROCEDURE — 63600175 PHARM REV CODE 636 W HCPCS: Performed by: NURSE ANESTHETIST, CERTIFIED REGISTERED

## 2017-04-05 PROCEDURE — 71000033 HC RECOVERY, INTIAL HOUR: Performed by: UROLOGY

## 2017-04-05 PROCEDURE — 71000039 HC RECOVERY, EACH ADD'L HOUR: Performed by: UROLOGY

## 2017-04-05 PROCEDURE — D9220A PRA ANESTHESIA: Mod: CRNA,,, | Performed by: NURSE ANESTHETIST, CERTIFIED REGISTERED

## 2017-04-05 PROCEDURE — 52332 CYSTOSCOPY AND TREATMENT: CPT | Mod: 51,LT,, | Performed by: UROLOGY

## 2017-04-05 PROCEDURE — 25000003 PHARM REV CODE 250: Performed by: NURSE ANESTHETIST, CERTIFIED REGISTERED

## 2017-04-05 PROCEDURE — 36000707: Performed by: UROLOGY

## 2017-04-05 PROCEDURE — 63600175 PHARM REV CODE 636 W HCPCS: Performed by: ANESTHESIOLOGY

## 2017-04-05 PROCEDURE — 99900104 DSU ONLY-NO CHARGE-EA ADD'L HR (STAT): Performed by: UROLOGY

## 2017-04-05 PROCEDURE — 99900103 DSU ONLY-NO CHARGE-INITIAL HR (STAT): Performed by: UROLOGY

## 2017-04-05 PROCEDURE — C2617 STENT, NON-COR, TEM W/O DEL: HCPCS | Performed by: UROLOGY

## 2017-04-05 PROCEDURE — D9220A PRA ANESTHESIA: Mod: ANES,,, | Performed by: ANESTHESIOLOGY

## 2017-04-05 PROCEDURE — 50590 FRAGMENTING OF KIDNEY STONE: CPT | Mod: LT,,, | Performed by: UROLOGY

## 2017-04-05 PROCEDURE — 37000008 HC ANESTHESIA 1ST 15 MINUTES: Performed by: UROLOGY

## 2017-04-05 PROCEDURE — 27100019 HC AMBU BAG ADULT/PED: Performed by: NURSE ANESTHETIST, CERTIFIED REGISTERED

## 2017-04-05 PROCEDURE — 36000706: Performed by: UROLOGY

## 2017-04-05 PROCEDURE — 27200651 HC AIRWAY, LMA: Performed by: NURSE ANESTHETIST, CERTIFIED REGISTERED

## 2017-04-05 DEVICE — STENT SET URETERAL 6X24CM: Type: IMPLANTABLE DEVICE | Site: URETER | Status: FUNCTIONAL

## 2017-04-05 RX ORDER — MIDAZOLAM HYDROCHLORIDE 1 MG/ML
INJECTION, SOLUTION INTRAMUSCULAR; INTRAVENOUS
Status: DISCONTINUED | OUTPATIENT
Start: 2017-04-05 | End: 2017-04-05

## 2017-04-05 RX ORDER — METOCLOPRAMIDE HYDROCHLORIDE 5 MG/ML
10 INJECTION INTRAMUSCULAR; INTRAVENOUS EVERY 10 MIN PRN
Status: DISCONTINUED | OUTPATIENT
Start: 2017-04-05 | End: 2017-04-05 | Stop reason: HOSPADM

## 2017-04-05 RX ORDER — LIDOCAINE HYDROCHLORIDE 10 MG/ML
1 INJECTION, SOLUTION EPIDURAL; INFILTRATION; INTRACAUDAL; PERINEURAL ONCE
Status: COMPLETED | OUTPATIENT
Start: 2017-04-05 | End: 2017-04-05

## 2017-04-05 RX ORDER — HYDROCODONE BITARTRATE AND ACETAMINOPHEN 5; 325 MG/1; MG/1
1 TABLET ORAL EVERY 6 HOURS PRN
Qty: 20 TABLET | Refills: 0 | Status: SHIPPED | OUTPATIENT
Start: 2017-04-05 | End: 2017-04-15

## 2017-04-05 RX ORDER — FENTANYL CITRATE 50 UG/ML
25 INJECTION, SOLUTION INTRAMUSCULAR; INTRAVENOUS EVERY 5 MIN PRN
Status: DISCONTINUED | OUTPATIENT
Start: 2017-04-05 | End: 2017-04-05 | Stop reason: HOSPADM

## 2017-04-05 RX ORDER — PROPOFOL 10 MG/ML
VIAL (ML) INTRAVENOUS
Status: DISCONTINUED | OUTPATIENT
Start: 2017-04-05 | End: 2017-04-05

## 2017-04-05 RX ORDER — GLYCOPYRROLATE 0.2 MG/ML
INJECTION INTRAMUSCULAR; INTRAVENOUS
Status: DISCONTINUED | OUTPATIENT
Start: 2017-04-05 | End: 2017-04-05

## 2017-04-05 RX ORDER — LIDOCAINE HCL/PF 100 MG/5ML
SYRINGE (ML) INTRAVENOUS
Status: DISCONTINUED | OUTPATIENT
Start: 2017-04-05 | End: 2017-04-05

## 2017-04-05 RX ORDER — CIPROFLOXACIN 500 MG/1
500 TABLET ORAL 2 TIMES DAILY
Qty: 6 TABLET | Refills: 0 | Status: SHIPPED | OUTPATIENT
Start: 2017-04-05 | End: 2017-04-08

## 2017-04-05 RX ORDER — HYDROCODONE BITARTRATE AND ACETAMINOPHEN 5; 325 MG/1; MG/1
1 TABLET ORAL
Status: DISCONTINUED | OUTPATIENT
Start: 2017-04-05 | End: 2017-04-05 | Stop reason: HOSPADM

## 2017-04-05 RX ORDER — SODIUM CHLORIDE 0.9 % (FLUSH) 0.9 %
3 SYRINGE (ML) INJECTION
Status: DISCONTINUED | OUTPATIENT
Start: 2017-04-05 | End: 2017-04-05 | Stop reason: HOSPADM

## 2017-04-05 RX ORDER — TAMSULOSIN HYDROCHLORIDE 0.4 MG/1
0.4 CAPSULE ORAL
Qty: 30 CAPSULE | Refills: 0 | Status: SHIPPED | OUTPATIENT
Start: 2017-04-05 | End: 2017-05-08

## 2017-04-05 RX ORDER — SODIUM CHLORIDE, SODIUM LACTATE, POTASSIUM CHLORIDE, CALCIUM CHLORIDE 600; 310; 30; 20 MG/100ML; MG/100ML; MG/100ML; MG/100ML
INJECTION, SOLUTION INTRAVENOUS CONTINUOUS
Status: DISCONTINUED | OUTPATIENT
Start: 2017-04-05 | End: 2017-04-05 | Stop reason: HOSPADM

## 2017-04-05 RX ORDER — FENTANYL CITRATE 50 UG/ML
INJECTION, SOLUTION INTRAMUSCULAR; INTRAVENOUS
Status: DISCONTINUED | OUTPATIENT
Start: 2017-04-05 | End: 2017-04-05

## 2017-04-05 RX ORDER — CIPROFLOXACIN 2 MG/ML
400 INJECTION, SOLUTION INTRAVENOUS
Status: COMPLETED | OUTPATIENT
Start: 2017-04-05 | End: 2017-04-05

## 2017-04-05 RX ORDER — ONDANSETRON 2 MG/ML
4 INJECTION INTRAMUSCULAR; INTRAVENOUS DAILY PRN
Status: DISCONTINUED | OUTPATIENT
Start: 2017-04-05 | End: 2017-04-05 | Stop reason: HOSPADM

## 2017-04-05 RX ORDER — DEXAMETHASONE SODIUM PHOSPHATE 4 MG/ML
INJECTION, SOLUTION INTRA-ARTICULAR; INTRALESIONAL; INTRAMUSCULAR; INTRAVENOUS; SOFT TISSUE
Status: DISCONTINUED | OUTPATIENT
Start: 2017-04-05 | End: 2017-04-05

## 2017-04-05 RX ORDER — ACETAMINOPHEN 10 MG/ML
INJECTION, SOLUTION INTRAVENOUS
Status: DISCONTINUED | OUTPATIENT
Start: 2017-04-05 | End: 2017-04-05

## 2017-04-05 RX ADMIN — HYDROCODONE BITARTRATE AND ACETAMINOPHEN 1 TABLET: 5; 325 TABLET ORAL at 11:04

## 2017-04-05 RX ADMIN — PROPOFOL 300 MG: 10 INJECTION, EMULSION INTRAVENOUS at 09:04

## 2017-04-05 RX ADMIN — LIDOCAINE HYDROCHLORIDE 100 MG: 20 INJECTION, SOLUTION INTRAVENOUS at 09:04

## 2017-04-05 RX ADMIN — PROPOFOL 100 MG: 10 INJECTION, EMULSION INTRAVENOUS at 10:04

## 2017-04-05 RX ADMIN — MIDAZOLAM 2 MG: 1 INJECTION INTRAMUSCULAR; INTRAVENOUS at 09:04

## 2017-04-05 RX ADMIN — CIPROFLOXACIN 400 MG: 2 INJECTION, SOLUTION INTRAVENOUS at 09:04

## 2017-04-05 RX ADMIN — FENTANYL CITRATE 100 MCG: 50 INJECTION INTRAMUSCULAR; INTRAVENOUS at 09:04

## 2017-04-05 RX ADMIN — ONDANSETRON 4 MG: 2 INJECTION, SOLUTION INTRAMUSCULAR; INTRAVENOUS at 10:04

## 2017-04-05 RX ADMIN — SODIUM CHLORIDE, SODIUM LACTATE, POTASSIUM CHLORIDE, AND CALCIUM CHLORIDE: .6; .31; .03; .02 INJECTION, SOLUTION INTRAVENOUS at 10:04

## 2017-04-05 RX ADMIN — DEXAMETHASONE SODIUM PHOSPHATE 8 MG: 4 INJECTION, SOLUTION INTRAMUSCULAR; INTRAVENOUS at 09:04

## 2017-04-05 RX ADMIN — GLYCOPYRROLATE 0.2 MG: 0.2 INJECTION, SOLUTION INTRAMUSCULAR; INTRAVENOUS at 10:04

## 2017-04-05 RX ADMIN — LIDOCAINE HYDROCHLORIDE 10 MG: 10 INJECTION, SOLUTION EPIDURAL; INFILTRATION; INTRACAUDAL; PERINEURAL at 07:04

## 2017-04-05 RX ADMIN — SODIUM CHLORIDE, SODIUM LACTATE, POTASSIUM CHLORIDE, AND CALCIUM CHLORIDE: .6; .31; .03; .02 INJECTION, SOLUTION INTRAVENOUS at 09:04

## 2017-04-05 RX ADMIN — ACETAMINOPHEN 1000 MG: 10 INJECTION, SOLUTION INTRAVENOUS at 10:04

## 2017-04-05 NOTE — IP AVS SNAPSHOT
[address] [place] [address]  Phone: [phone]           Patient Discharge Instructions   Our goal is to set you up for success. This packet includes information on your condition, medications, and your home care.  It will help you care for yourself to prevent having to return to the hospital.     Please ask your nurse if you have any questions.      There are many details to remember when preparing to leave the hospital. Here is what you will need to do:    1. Take your medicine. If you are prescribed medications, review your Medication List on the following pages. You may have new medications to  at the pharmacy and others that you'll need to stop taking. Review the instructions for how and when to take your medications. Talk with your doctor or nurses if you are unsure of what to do.     2. Go to your follow-up appointments. Specific follow-up information is listed in the following pages. Your may be contacted by a nurse or clinical provider about future appointments. Be sure we have all of the phone numbers to reach you. Please contact your provider's office if you are unable to make an appointment.     3. Watch for warning signs. Your doctor or nurse will give you detailed warning signs to watch for and when to call for assistance. These instructions may also include educational information about your condition. If you experience any of warning signs to your health, call your doctor.           [facility]  Unless otherwise directed by your provider, please   contact [facility], our nurse care line   that is available for 24/7 assistance.     [phone] (toll-free)     Registered nurses in the [facility]   provide: appointment scheduling, clinical advisement, health education, and other advisory services.                  ** Verify the list of medication(s) below is accurate and up to date. Carry this with you in case of  emergency. If your medications have changed, please notify your healthcare provider.             Medication List      START taking these medications        Additional Info                      ciprofloxacin HCl 500 MG tablet   Commonly known as:  CIPRO   Quantity:  6 tablet   Refills:  0   Dose:  500 mg    Instructions:  Take 1 tablet (500 mg total) by mouth 2 (two) times daily.     Begin Date    AM    Noon    PM    Bedtime       hydrocodone-acetaminophen 5-325mg 5-325 mg per tablet   Commonly known as:  NORCO   Quantity:  20 tablet   Refills:  0   Dose:  1 tablet    Last time this was given:  1 tablet on 4/5/2017 11:25 AM   Instructions:  Take 1 tablet by mouth every 6 (six) hours as needed for Pain.     Begin Date    AM    Noon    PM    Bedtime       tamsulosin 0.4 mg Cp24   Commonly known as:  FLOMAX   Quantity:  30 capsule   Refills:  0   Dose:  0.4 mg    Instructions:  Take 1 capsule (0.4 mg total) by mouth after dinner.     Begin Date    AM    Noon    PM    Bedtime         ASK your doctor about these medications        Additional Info                      testosterone 20.25 mg/1.25 gram (1.62 %) Glpm   Commonly known as:  ANDROGEL   Quantity:  75 g   Refills:  2   Dose:  40.5 mg    Instructions:  Place 40.5 mg onto the skin once daily.     Begin Date    AM    Noon    PM    Bedtime       testosterone cypionate 200 mg/mL injection   Commonly known as:  DEPOTESTOTERONE CYPIONATE   Quantity:  1 mL   Refills:  5   Dose:  200 mg    Instructions:  Inject 1 mL (200 mg total) into the muscle every 21 days.     Begin Date    AM    Noon    PM    Bedtime       venlafaxine 225 mg Tr24   Quantity:  90 each   Refills:  3   Dose:  225 mg    Instructions:  Take 225 mg by mouth once daily.     Begin Date    AM    Noon    PM    Bedtime       VYVANSE 60 MG capsule   Quantity:  30 capsule   Refills:  0   Dose:  60 mg   What changed:  Another medication with the same name was removed. Continue taking this medication, and follow the  directions you see here.   Generic drug:  lisdexamfetamine    Instructions:  Take 1 capsule (60 mg total) by mouth every morning.     Begin Date    AM    Noon    PM    Bedtime            Where to Get Your Medications      These medications were sent to [facility] - [address]  [address] [place] [address]     Phone:  [phone]     ciprofloxacin HCl 500 MG tablet    hydrocodone-acetaminophen 5-325mg 5-325 mg per tablet    tamsulosin 0.4 mg Cp24                  Please bring to all follow up appointments:    1. A copy of your discharge instructions.  2. All medicines you are currently taking in their original bottles.  3. Identification and insurance card.    Please arrive 15 minutes ahead of scheduled appointment time.    Please call 24 hours in advance if you must reschedule your appointment and/or time.        Your Scheduled Appointments     May 08, 2017  3:20 PM CDT   Established Patient Visit with MD Stephania Estes - [facility] ([facility])    [address]  [place] [address] [phone]                Discharge Instructions     Future Orders    X-Ray Abdomen AP 1 View     Questions:    Reason for Exam:  left ureteral and renal stone s/p eswl (l2/l3)    Activity as tolerated     Call MD for:  temperature >100.4     Diet general     Questions:    Total calories:      Fat restriction, if any:      Protein restriction, if any:      Na restriction, if any:      Fluid restriction:      Additional restrictions:          Discharge Instructions       General Information:    1.  Do not drink alcoholic beverages including beer for 24 hours or as long as you are on pain medication..  2.  Do not drive a motor vehicle, operate machinery or power tools, or signs legal papers for 24 hours or as long as you are on pain medication.   3.  You may experience light-headedness, dizziness, and sleepiness following surgery. Please do not stay alone. A responsible adult  should be with you for this 24 hour period.  4.  Go home and rest.    5. Progress slowly to a normal diet unless instructed.  Otherwise, begin with liquids such as soft drinks, then soup and crackers working up to solid foods. Drink plenty of nonalcoholic fluids.  6.  Certain anesthetics and pain medications produce nausea and vomiting in certain       individuals. If nausea becomes a problem at home, call you doctor.    7. A nurse will be calling you sometime after surgery. Do not be alarmed. This is our way of finding out how you are doing.    8. Several times every hour while you are awake, take 2-3 deep breaths and cough. If you had stomach surgery hold a pillow or rolled towel firmly against your stomach before you cough. This will help with any pain the cough might cause.  9. Several times every hour while you are awake, pump and flex your feet 5-6 times and do foot circles. This will help prevent blood clots.    10.Call your doctor for severe pain, bleeding, fever, or signs or symptoms of infection (pain, swelling, redness, foul odor, drainage).Discharge Instructions: After Your Surgery/Procedure  Youve just had surgery. During surgery you were given medicine called anesthesia to keep you relaxed and free of pain. After surgery you may have some pain or nausea. This is common. Here are some tips for feeling better and getting well after surgery.     Stay on schedule with your medication.   Going home  Your doctor or nurse will show you how to take care of yourself when you go home. He or she will also answer your questions. Have an adult family member or friend drive you home.      For your safety we recommend these precaution for the first 24 hours after your procedure:  · Do not drive or use heavy equipment.  · Do not make important decisions or sign legal papers.  · Do not drink alcohol.  · Have someone stay with you, if needed. He or she can watch for problems and help keep you safe.  · Your  "concentration, balance, coordination, and judgement may be impaired for many hours after anesthesia.  Use caution when ambulating or standing up.     · You may feel weak and "washed out" after anesthesia and surgery.      Subtle residual effects of general anesthesia or sedation with regional / local anesthesia can last more than 24 hours.  Rest for the remainder of the day or longer if your Doctor/Surgeon has advised you to do so.  Although you may feel normal within the first 24 hours, your reflexes and mental ability may be impaired without you realizing it.  You may feel dizzy, lightheaded or sleepy for 24 hours or longer.      Be sure to go to all follow-up visits with your doctor. And rest after your surgery for as long as your doctor tells you to.  Coping with pain  If you have pain after surgery, pain medicine will help you feel better. Take it as told, before pain becomes severe. Also, ask your doctor or pharmacist about other ways to control pain. This might be with heat, ice, or relaxation. And follow any other instructions your surgeon or nurse gives you.  Tips for taking pain medicine  To get the best relief possible, remember these points:  · Pain medicines can upset your stomach. Taking them with a little food may help.  · Most pain relievers taken by mouth need at least 20 to 30 minutes to start to work.  · Taking medicine on a schedule can help you remember to take it. Try to time your medicine so that you can take it before starting an activity. This might be before you get dressed, go for a walk, or sit down for dinner.  · Constipation is a common side effect of pain medicines. Call your doctor before taking any medicines such as laxatives or stool softeners to help ease constipation. Also ask if you should skip any foods. Drinking lots of fluids and eating foods such as fruits and vegetables that are high in fiber can also help. Remember, do not take laxatives unless your surgeon has prescribed " them.  · Drinking alcohol and taking pain medicine can cause dizziness and slow your breathing. It can even be deadly. Do not drink alcohol while taking pain medicine.  · Pain medicine can make you react more slowly to things. Do not drive or run machinery while taking pain medicine.  Your health care provider may tell you to take acetaminophen to help ease your pain. Ask him or her how much you are supposed to take each day. Acetaminophen or other pain relievers may interact with your prescription medicines or other over-the-counter (OTC) drugs. Some prescription medicines have acetaminophen and other ingredients. Using both prescription and OTC acetaminophen for pain can cause you to overdose. Read the labels on your OTC medicines with care. This will help you to clearly know the list of ingredients, how much to take, and any warnings. It may also help you not take too much acetaminophen. If you have questions or do not understand the information, ask your pharmacist or health care provider to explain it to you before you take the OTC medicine.  Managing nausea  Some people have an upset stomach after surgery. This is often because of anesthesia, pain, or pain medicine, or the stress of surgery. These tips will help you handle nausea and eat healthy foods as you get better. If you were on a special food plan before surgery, ask your doctor if you should follow it while you get better. These tips may help:  · Do not push yourself to eat. Your body will tell you when to eat and how much.  · Start off with clear liquids and soup. They are easier to digest.  · Next try semi-solid foods, such as mashed potatoes, applesauce, and gelatin, as you feel ready.  · Slowly move to solid foods. Dont eat fatty, rich, or spicy foods at first.  · Do not force yourself to have 3 large meals a day. Instead eat smaller amounts more often.  · Take pain medicines with a small amount of solid food, such as crackers or toast, to avoid  nausea.     Call your surgeon if  · You still have pain an hour after taking medicine. The medicine may not be strong enough.  · You feel too sleepy, dizzy, or groggy. The medicine may be too strong.  · You have side effects like nausea, vomiting, or skin changes, such as rash, itching, or hives.       If you have obstructive sleep apnea  You were given anesthesia medicine during surgery to keep you comfortable and free of pain. After surgery, you may have more apnea spells because of this medicine and other medicines you were given. The spells may last longer than usual.   At home:  · Keep using the continuous positive airway pressure (CPAP) device when you sleep. Unless your health care provider tells you not to, use it when you sleep, day or night. CPAP is a common device used to treat obstructive sleep apnea.  · Talk with your provider before taking any pain medicine, muscle relaxants, or sedatives. Your provider will tell you about the possible dangers of taking these medicines.  © 7433-2928 [employer]. [address] [place] [address]. All rights reserved. This information is not intended as a substitute for professional medical care. Always follow your healthcare professional's instructions.    Ureteral Stents  A ureteral stent is a soft plastic tube with holes in it. Its temporarily inserted into a ureter to help drain urine into the bladder. One end goes in the kidney. The other end goes in the bladder. A coil on each end holds the stent in place. The stent cant be seen from outside the body. It shouldnt interfere with your normal routine. Your stent will be put in by a urologist (doctor trained in treating the urinary tract) or another specialist. The procedure is done in a hospital or surgery center. Youll likely go home the same day.  When Is a Ureteral Stent Used?  A ureteral stent may be used:  · To bypass a blockage in a kidney or ureter.  · During kidney stone  removal.  · To let a ureter heal after surgery.    Before the Procedure  Your doctor will give you instructions to prepare for the procedure. X-rays or other imaging tests of your kidneys and ureters may be done beforehand.  During the Procedure  · You receive medication to prevent pain and help you relax or sleep during the procedure. Once this takes effect, the procedure starts.  · The doctor inserts a cystoscope (lighted instrument) through the urethra and into the bladder. This shows the opening to the ureter.  · A thin wire is carefully threaded through the cystoscope, up the ureter, and into the kidney. The stent is inserted over the wire.  · A fluoroscope (special X-ray machine) is used to help position the stent. When the stent is in place, the wire and cystoscope are removed.  While You Have a Stent  · Some discomfort is normal. Certain movements may trigger pain or a feeling that you need to urinate. You may also feel mild soreness or pressure before or during urination. These symptoms will go away a few days after the stent is removed.  · Medication to control pain or bladder spasms or to prevent infection may be prescribed. Take this as directed.  · Drink plenty of fluids to help flush out your urinary tract.  · Your urine may be slightly pink or red. This is due to bleeding caused by minor irritation from the stent. This may happen on and off while you have the stent.  · As with any synthetic device placed in the body, there is a risk of infection. The stent may have to be removed if this happens.   How Long Will You Need a Stent?  The stent is often taken out after the blockage in the ureter is treated or the ureter has healed. This may take 1 week to 2 weeks, or longer. If a stent is needed for a long time, it may need to be changed every few months.  Call Your Doctor  Contact your doctor right away if:  · Your urine contains blood clots or you see a large amount of blood-tinged urine.   · You have  "symptoms similar to those you had before the stent was placed.   · You constantly leak urine.  · You have a fever over 100.4°F (38°C), chills, nausea, or vomiting.  · Your pain is not relieved with medication.  · The end of the stent comes out of the urethra.   Date Last Reviewed: 11/26/2014  © 9064-5217 [employer]. [address] [place] [address], [place] [address]. All rights reserved. This information is not intended as a substitute for professional medical care. Always follow your healthcare professional's instructions.      Testerone level on Tuesday when Xray is done.     Follow up appointment with Dr Saeed will be decided when she calls you        Primary Diagnosis     Your primary diagnosis was:  Kidney Stone      Admission Information     Date & Time Provider Department CSN    4/5/2017  7:26 AM Vicky Saeed MD [facility] [id number]      Care Providers     Provider Role Specialty Primary office phone    Vicky Saeed MD Attending Provider Urology [phone]    Vicky Saeed MD Surgeon  Urology [phone]      Your Vitals Were     BP Pulse Temp Resp Height Weight    129/76 88 97.7 °F (36.5 °C) (Oral) 26 5' 7" (1.702 m) 97.5 kg (215 lb)    SpO2 BMI             96% 33.67 kg/m2         Recent Lab Values        7/8/2014 4/12/2016 1/13/2017                     9:46 AM  8:31 AM  8:54 AM         A1C 5.4 5.3 5.4         Comment for A1C at  8:54 AM on 1/13/2017:  According to ADA guidelines, hemoglobin A1C <7.0% represents  optimal control in non-pregnant diabetic patients.  Different  metrics may apply to specific populations.   Standards of Medical Care in Diabetes - 2016.  For the purpose of screening for the presence of diabetes:  <5.7%     Consistent with the absence of diabetes  5.7-6.4%  Consistent with increasing risk for diabetes   (prediabetes)  >or=6.5%  Consistent with diabetes  Currently no consensus exists for use of hemoglobin A1C  for " diagnosis of diabetes for children.        Allergies as of 4/5/2017        Reactions    Inapsine [Droperidol] Anaphylaxis    seizures    Pcn [Penicillins]     Bactrim [Sulfamethoxazole-trimethoprim] Rash    Dry red rash all over      Advance Directives     An advance directive is a document which, in the event you are no longer able to make decisions for yourself, tells your healthcare team what kind of treatment you do or do not want to receive, or who you would like to make those decisions for you.  If you do not currently have an advance directive, [facility] encourages you to create one.  For more information call:  [phone]), [phone] ([phone]),  or log on to [email or web address].        Smoking Cessation     If you would like to quit smoking:   You may be eligible for free services if you are a [place] resident and started smoking cigarettes before September 1, 1988.  Call the Smoking Cessation Trust ([facility]) toll free at [phone] or [phone].   Call 5-150-QUIT-NOW if you do not meet the above criteria.   Contact us via email: [email or web address]   View our website for more information: [email or web address]        Language Assistance Services     ATTENTION: Language assistance services are available, free of charge. Please call [phone].      ATENCIÓN: Si habla español, tiene a whittington disposición servicios gratuitos de asistencia lingüística. Llame al [phone].     CHÚ Ý: N?u b?n nói Ti?ng Vi?t, có các d?ch v? h? tr? ngôn ng? mi?n phí dành cho b?n. G?i s? [phone].        Chronic Kindey Disease Education              [facility] complies with applicable Federal civil rights laws and does not discriminate on the basis of race, color, national origin, age, disability, or sex.

## 2017-04-05 NOTE — DISCHARGE INSTRUCTIONS
General Information:    1.  Do not drink alcoholic beverages including beer for 24 hours or as long as you are on pain medication..  2.  Do not drive a motor vehicle, operate machinery or power tools, or signs legal papers for 24 hours or as long as you are on pain medication.   3.  You may experience light-headedness, dizziness, and sleepiness following surgery. Please do not stay alone. A responsible adult should be with you for this 24 hour period.  4.  Go home and rest.    5. Progress slowly to a normal diet unless instructed.  Otherwise, begin with liquids such as soft drinks, then soup and crackers working up to solid foods. Drink plenty of nonalcoholic fluids.  6.  Certain anesthetics and pain medications produce nausea and vomiting in certain       individuals. If nausea becomes a problem at home, call you doctor.    7. A nurse will be calling you sometime after surgery. Do not be alarmed. This is our way of finding out how you are doing.    8. Several times every hour while you are awake, take 2-3 deep breaths and cough. If you had stomach surgery hold a pillow or rolled towel firmly against your stomach before you cough. This will help with any pain the cough might cause.  9. Several times every hour while you are awake, pump and flex your feet 5-6 times and do foot circles. This will help prevent blood clots.    10.Call your doctor for severe pain, bleeding, fever, or signs or symptoms of infection (pain, swelling, redness, foul odor, drainage).Discharge Instructions: After Your Surgery/Procedure  Youve just had surgery. During surgery you were given medicine called anesthesia to keep you relaxed and free of pain. After surgery you may have some pain or nausea. This is common. Here are some tips for feeling better and getting well after surgery.     Stay on schedule with your medication.   Going home  Your doctor or nurse will show you how to take care of yourself when you go home. He or she will also  "answer your questions. Have an adult family member or friend drive you home.      For your safety we recommend these precaution for the first 24 hours after your procedure:  · Do not drive or use heavy equipment.  · Do not make important decisions or sign legal papers.  · Do not drink alcohol.  · Have someone stay with you, if needed. He or she can watch for problems and help keep you safe.  · Your concentration, balance, coordination, and judgement may be impaired for many hours after anesthesia.  Use caution when ambulating or standing up.     · You may feel weak and "washed out" after anesthesia and surgery.      Subtle residual effects of general anesthesia or sedation with regional / local anesthesia can last more than 24 hours.  Rest for the remainder of the day or longer if your Doctor/Surgeon has advised you to do so.  Although you may feel normal within the first 24 hours, your reflexes and mental ability may be impaired without you realizing it.  You may feel dizzy, lightheaded or sleepy for 24 hours or longer.      Be sure to go to all follow-up visits with your doctor. And rest after your surgery for as long as your doctor tells you to.  Coping with pain  If you have pain after surgery, pain medicine will help you feel better. Take it as told, before pain becomes severe. Also, ask your doctor or pharmacist about other ways to control pain. This might be with heat, ice, or relaxation. And follow any other instructions your surgeon or nurse gives you.  Tips for taking pain medicine  To get the best relief possible, remember these points:  · Pain medicines can upset your stomach. Taking them with a little food may help.  · Most pain relievers taken by mouth need at least 20 to 30 minutes to start to work.  · Taking medicine on a schedule can help you remember to take it. Try to time your medicine so that you can take it before starting an activity. This might be before you get dressed, go for a walk, or sit " down for dinner.  · Constipation is a common side effect of pain medicines. Call your doctor before taking any medicines such as laxatives or stool softeners to help ease constipation. Also ask if you should skip any foods. Drinking lots of fluids and eating foods such as fruits and vegetables that are high in fiber can also help. Remember, do not take laxatives unless your surgeon has prescribed them.  · Drinking alcohol and taking pain medicine can cause dizziness and slow your breathing. It can even be deadly. Do not drink alcohol while taking pain medicine.  · Pain medicine can make you react more slowly to things. Do not drive or run machinery while taking pain medicine.  Your health care provider may tell you to take acetaminophen to help ease your pain. Ask him or her how much you are supposed to take each day. Acetaminophen or other pain relievers may interact with your prescription medicines or other over-the-counter (OTC) drugs. Some prescription medicines have acetaminophen and other ingredients. Using both prescription and OTC acetaminophen for pain can cause you to overdose. Read the labels on your OTC medicines with care. This will help you to clearly know the list of ingredients, how much to take, and any warnings. It may also help you not take too much acetaminophen. If you have questions or do not understand the information, ask your pharmacist or health care provider to explain it to you before you take the OTC medicine.  Managing nausea  Some people have an upset stomach after surgery. This is often because of anesthesia, pain, or pain medicine, or the stress of surgery. These tips will help you handle nausea and eat healthy foods as you get better. If you were on a special food plan before surgery, ask your doctor if you should follow it while you get better. These tips may help:  · Do not push yourself to eat. Your body will tell you when to eat and how much.  · Start off with clear liquids and  soup. They are easier to digest.  · Next try semi-solid foods, such as mashed potatoes, applesauce, and gelatin, as you feel ready.  · Slowly move to solid foods. Dont eat fatty, rich, or spicy foods at first.  · Do not force yourself to have 3 large meals a day. Instead eat smaller amounts more often.  · Take pain medicines with a small amount of solid food, such as crackers or toast, to avoid nausea.     Call your surgeon if  · You still have pain an hour after taking medicine. The medicine may not be strong enough.  · You feel too sleepy, dizzy, or groggy. The medicine may be too strong.  · You have side effects like nausea, vomiting, or skin changes, such as rash, itching, or hives.       If you have obstructive sleep apnea  You were given anesthesia medicine during surgery to keep you comfortable and free of pain. After surgery, you may have more apnea spells because of this medicine and other medicines you were given. The spells may last longer than usual.   At home:  · Keep using the continuous positive airway pressure (CPAP) device when you sleep. Unless your health care provider tells you not to, use it when you sleep, day or night. CPAP is a common device used to treat obstructive sleep apnea.  · Talk with your provider before taking any pain medicine, muscle relaxants, or sedatives. Your provider will tell you about the possible dangers of taking these medicines.  © 3432-1570 [employer]. [address] [place] [id number]. All rights reserved. This information is not intended as a substitute for professional medical care. Always follow your healthcare professional's instructions.    Ureteral Stents  A ureteral stent is a soft plastic tube with holes in it. Its temporarily inserted into a ureter to help drain urine into the bladder. One end goes in the kidney. The other end goes in the bladder. A coil on each end holds the stent in place. The stent cant be seen from outside the  body. It shouldnt interfere with your normal routine. Your stent will be put in by a urologist (doctor trained in treating the urinary tract) or another specialist. The procedure is done in a hospital or surgery center. Youll likely go home the same day.  When Is a Ureteral Stent Used?  A ureteral stent may be used:  · To bypass a blockage in a kidney or ureter.  · During kidney stone removal.  · To let a ureter heal after surgery.    Before the Procedure  Your doctor will give you instructions to prepare for the procedure. X-rays or other imaging tests of your kidneys and ureters may be done beforehand.  During the Procedure  · You receive medication to prevent pain and help you relax or sleep during the procedure. Once this takes effect, the procedure starts.  · The doctor inserts a cystoscope (lighted instrument) through the urethra and into the bladder. This shows the opening to the ureter.  · A thin wire is carefully threaded through the cystoscope, up the ureter, and into the kidney. The stent is inserted over the wire.  · A fluoroscope (special X-ray machine) is used to help position the stent. When the stent is in place, the wire and cystoscope are removed.  While You Have a Stent  · Some discomfort is normal. Certain movements may trigger pain or a feeling that you need to urinate. You may also feel mild soreness or pressure before or during urination. These symptoms will go away a few days after the stent is removed.  · Medication to control pain or bladder spasms or to prevent infection may be prescribed. Take this as directed.  · Drink plenty of fluids to help flush out your urinary tract.  · Your urine may be slightly pink or red. This is due to bleeding caused by minor irritation from the stent. This may happen on and off while you have the stent.  · As with any synthetic device placed in the body, there is a risk of infection. The stent may have to be removed if this happens.   How Long Will You  Need a Stent?  The stent is often taken out after the blockage in the ureter is treated or the ureter has healed. This may take 1 week to 2 weeks, or longer. If a stent is needed for a long time, it may need to be changed every few months.  Call Your Doctor  Contact your doctor right away if:  · Your urine contains blood clots or you see a large amount of blood-tinged urine.   · You have symptoms similar to those you had before the stent was placed.   · You constantly leak urine.  · You have a fever over 100.4°F (38°C), chills, nausea, or vomiting.  · Your pain is not relieved with medication.  · The end of the stent comes out of the urethra.   Date Last Reviewed: 11/26/2014 © 2000-2016 [employer]. [address] [place] [address], [place] [address]. All rights reserved. This information is not intended as a substitute for professional medical care. Always follow your healthcare professional's instructions.      Testerone level on Tuesday when Xray is done.     Follow up appointment with Dr Saeed will be decided when she calls you

## 2017-04-05 NOTE — ANESTHESIA PREPROCEDURE EVALUATION
04/05/2017  Lee Quintanilla is a 41 y.o., male.    [facility] Anesthesia Evaluation    I have reviewed the Patient Summary Reports.    I have reviewed the Nursing Notes.   I have reviewed the Medications.     Review of Systems  Anesthesia Hx:  Denies Hx of Anesthetic complications  Denies Family Hx of Anesthesia complications.   Denies Personal Hx of Anesthesia complications.   Social:  Smoker    Pulmonary:   Asthma mild Sleep Apnea    Renal/:   Chronic Renal Disease renal calculi Azotemia from aleve use   Hepatic/GI:  Hepatic/GI Normal    Neurological:   Headaches Denies Seizures. Extrapyramidal side effects from Inapsine mistaken for seizure   Endocrine:  Endocrine Normal        Physical Exam  General:  Obesity    Airway/Jaw/Neck:  Airway Findings: Mouth Opening: Normal Tongue: Normal  General Airway Assessment: Adult  Mallampati: III  TM Distance: Normal, at least 6 cm  Jaw/Neck Findings:  Neck ROM: Extension Decreased, Mild  Neck Findings:  Girth Increased      Dental:  Dental Findings: In tact   Chest/Lungs:  Chest/Lungs Clear    Heart/Vascular:  Heart Findings: Normal            Anesthesia Plan  Type of Anesthesia, risks & benefits discussed:  Anesthesia Type:  general  Patient's Preference:   Intra-op Monitoring Plan:   Intra-op Monitoring Plan Comments:   Post Op Pain Control Plan:   Post Op Pain Control Plan Comments:   Induction:   IV  Beta Blocker:  Patient is not currently on a Beta-Blocker (No further documentation required).       Informed Consent: Patient understands risks and agrees with Anesthesia plan.  Questions answered. Anesthesia consent signed with patient.  ASA Score: 2     Day of Surgery Review of History & Physical:    H&P update referred to the surgeon.         Ready For Surgery From Anesthesia Perspective.

## 2017-04-05 NOTE — OP NOTE
[facility]  Operative Note    Date: 04/05/2017    Pre-Op Diagnosis: Left renal stone    Post-Op Diagnosis: same    Procedure(s) Performed:   1.  Left ESWL  2.  Cystoscopy with Left JJ ureteral stent placement  Fluoro < 1 h    Stent indications:   Left ureteral stone    Specimen(s): none    Staff Surgeon: Vicky Saeed MD    Findings:   Left ureteral stone and renal stone completely fragmented    Estimated Blood Loss: none    Drains:   1.  6 x 24 Left Double J ureteral stent    Anesthesia: Monitored Local Anesthesia with Sedation    Indications:Lee Quintanilla is a 41 y.o. male with ureteral 4mm and left renal 2mm  stone presenting for definitive stone management.  The stone is radio-opaque on KUB.      Procedure in Detail:  After risks, benefits and possible complications of the procedure were explained, the patient elected to undergo the procedure and informed consent was obtained.  All questions were answered in the marlon-operative area. The patient was transferred to the cystoscopy suite and placed on the fluoroscopy table in the supine position.  SCDs were applied and working. Time out was performed, marlon-procedural antibiotics were given. MAC anesthesia was administered.  After adequate anesthesia the patient was placed in dorsal lithotomy position and prepped and draped in the usual sterile fashion.     A rigid cystoscope in a 22 Fr sheath was introduced into the patients bladder per urethra.  This passed easily.  The entire urethra was visualized and revealed no strictures or masses.  Formal cystoscopy was performed which showed the right and left ureteral orifices in the normal anatomic position effluxing clear urine.  There were no masses or lesions seen, no bladder stones, no diverticuli and no trabeculations.      Our attention was turned to the patients left ureteral orifice.    A 0.38 guide wire was advanced up the  ureteral orifice and the 5 Bahamian open ended ureteral  catheter was advanced over the wire leaving 5 french open ended ureteral catheter in place giving me an estimate of ureteral length. The wire was then replaced through the 5 french open ended ureteral catheter and the 5 french open ended ureteral catheter was removed.A sensor tip guide wire was advanced up the left ureteral orifice. This was confirmed using fluoro.  We then passed a 6 x 24 Double J ureteral stent with strings over the wire to the level of the renal pelvis under direct vision as well as flouroscopy. The guide wire was removed. A 180 degree coil was observed in the renal pelvis as well as the bladder using fluoro.  A 180 degree coil was also seen using direct visualization in the bladder.      The patient's bladder was drained. The pt was then taking to a separate OR for ESWL.      The pt was placed on the litho vito table.   The patient's left ureteral  stone was aligned on the X-Y- and Z axis and  ESWL was begun. 1500 shocks delivered to left ureteral stone, completely fragmented then moved to left renal stone.  3000 thousand shocks were administered at a rate of 2 shocks per second, beginning at 16 KV of power and advanced to 26 KV. Intermittent fluoroscopy was used to monitor fragmentation of the stone.    At the end of the procedure the stones appeared completely fragmented.      The patient tolerated the procedure well and was transferred to the PACU in stable condition.      Plan:   Home with cipro 500 bid x 3d, flomax x 2 weeks, norco as needed  kub on Tuesday  Keep stent in until i see kub  Then i will call him  And tell him when to remove stent      Need to find out if pt on T  May need T as well    Vicky Saeed MD

## 2017-04-05 NOTE — PLAN OF CARE
Discharge instructions given to pt and mother who voice understanding.  Taking po fluids without nausea. Denies pain.  Voiding clear/red urine without difficulty.  Stent string secured to penis w/tegaderm

## 2017-04-05 NOTE — ANESTHESIA POSTPROCEDURE EVALUATION
"Anesthesia Post Evaluation    Patient: Lee Quintanilla    Procedure(s) Performed: Procedure(s) (LRB):  CYSTOSCOPY WITH STENT PLACEMENT (Left)  LITHOTRIPSY-EXTRACORPOREAL SHOCK WAVE (Left)    Final Anesthesia Type: general  Patient location during evaluation: PACU  Patient participation: Yes- Able to Participate  Level of consciousness: awake and alert and oriented  Post-procedure vital signs: reviewed and stable  Pain management: adequate  Airway patency: patent  PONV status at discharge: No PONV  Anesthetic complications: no      Cardiovascular status: blood pressure returned to baseline  Respiratory status: unassisted, spontaneous ventilation and room air  Hydration status: euvolemic  Follow-up not needed.        Visit Vitals    /76    Pulse 88    Temp 36.5 °C (97.7 °F) (Oral)    Resp (!) 26    Ht 5' 7" (1.702 m)    Wt 97.5 kg (215 lb)    SpO2 96%    BMI 33.67 kg/m2       Pain/Tanmay Score: Pain Assessment Performed: Yes (4/5/2017 12:00 PM)  Presence of Pain: denies (4/5/2017 12:00 PM)  Pain Rating Prior to Med Admin: 2 (4/5/2017 11:25 AM)  Tanmay Score: 10 (4/5/2017 12:00 PM)      "

## 2017-04-05 NOTE — H&P
[facility] Progress Note  PCP: Dr.Guidry [facility]: active     Chief Complaint: Follow-up (1 month fu)        Subjective:         HPI: Lee Quintanilla is a 41 y.o. male presents with      Last seen 1/12/17     Severe ED and hypogonadism.   No significant improvement with cialis. Found to have T 198. Started on T 200q3 weeks 2/9/17. Has had 2 injections now.  Repeat labs show no significant change and T still 136. Returns today stating he still feels chronic fatigue an  No libido. Has used cialis in the past with success but has no desire. +breast enlargement and loss of hair progressively increasing.      Testicular pain  No longer c/o pain     Microhematuria, atypical urine cytology, kidney stone  ctu 2/10/17 - 2mm left renal stone, 4mm proximal ureteral stone  Cystoscopy 2/20/17 - normal, prostatitis, mild b hypertrophy  Urine cytology 2/7/17 - atypical cells,  8/25/14 - normal     Repeat ct on 3/20/17 (5 weeks later) to see if stone passed with flomax shows stone still in left proximal ureter without hydro. He says he's had stones before, years ago and caught the stone. No c/o flank pain. Not on flomax now.      IIEF:5   AUASSx: 17, mixed      The past medical, surgical, social and family hx were reviewed. He has had back surgery since I've seen him.   Cataracts? none     Urologic meds:  T 200q3 weeks.   Anticoagulation: No     Objective:      Vitals:    04/05/17 0749   BP: (!) 155/84   Pulse: 94   Resp: 18   Temp: 99 °F (37.2 °C)          Physical Exam   Constitutional: He is oriented to person, place, and time. He appears well-developed and well-nourished. He is cooperative. No distress.   HENT:   Head: Normocephalic and atraumatic.   Eyes: Pupils are equal, round, and reactive to light.   Cardiovascular: Normal rate and regular rhythm.    Pulmonary/Chest: Effort normal.   Abdominal: Soft. Normal appearance.   Musculoskeletal: Normal range of motion.   Neurological: He is alert  and oriented to person, place, and time. He has normal reflexes.   Skin: Skin is intact.     Psychiatric: He has a normal mood and affect.       Scrotum: no testes in scrotum     Urinalysis 3/21/17:1.010/5/remainder negative  Labs:cr 1.2 7/25/16  ua microscopic:   1/12/17 - 1   5/31/16 - 4     hba1c 1/13/17 - 5.4     Urine culture  2/20/17                      ng  8/25/14                      Ng     Labs 3/17/17  psa  0.98  Lipid panel - elevated, not worse than last labs 1 month ago  T 136  LFTs normal  H/H 15.8/48.1      PATHOLOGY REVIEW:  Urine cytology 2/7/17 - atypical cells  Urine cytology 8/25/14 - negative     RADIOGRAPHIC REVIEW:  ctrss 3/20/17  ctrss 2/10/17  3mm left upj stone without hydro  2mm left intrarenal stone     kub 2/13/17  Stone in left ureter not visible  On review of hard films can see stone in ureter and kidney.     ctu 2/10/17  2mm left renal stone, 4mm proximal ureteral stone  Prostate gland mildly enlarged  Other findings: 8mm hypodense mass in leiver likely a cyst     CTRSS 9/8/14  punctate nonobstructing renal stone with no hydronephrosis, opaque ureteral stone, ureteral obstruction or acute findings. Tiny fat containing umbilical hernia and small fat containing inguinal hernias also noted     US Scrotum 9/3/14  Echogenic area       rbus 7/24/14  normal        Assessment:       1. Hypogonadism in male        Plan:     nephrolithaisis  Gave a 5 week trial of passage on flomax. Ct on 3/20 showed stone still there. Has not had pain  Will plan for eswl on 3/29. Stone not seen on kub last time so will repeat morning of. But will plan to put pt on eswl table to see if we can idenitify ureteral stone. If not will place stent and plan for ureteroscopy in 2 weeks after stent placement. Will also hold off on any more flomax for now.  If stone is seen on eswl and does not break up will still plan to place stent and remove stone later. May try to ureteroscopy same day if able to do so and ureter  dilated but unlikely. Pt understands this.  kub this morning morning       ED and hypogonadism  -all labs reviewed  -androgel 1.62% 2 pumps (40.5mg) qday  -check T level at 14 days and 28 days - need to order  -testopel would be good for pt but easily prone to boils  -cialis 10mg work for him bu thasn't used  -watch for breast enlargement, c/o this today     Testicular pain  -improved, rarely having pain      Microhematuria  -dawn showed left ureteral stone and renal stone  -atypical urine cytology, will repeat after we treat kidney stone as I think this is what is causing this. Does have a h/o smoking. 1.5 ppd x 20 years, quit last year   Pt will make appt with ortho and neuro to further eval back which may help his scrotal sx          [facility]: active

## 2017-04-06 ENCOUNTER — TELEPHONE (OUTPATIENT)
Dept: UROLOGY | Facility: CLINIC | Age: 42
End: 2017-04-06

## 2017-04-06 NOTE — TELEPHONE ENCOUNTER
----- Message from Vicky Saeed MD sent at 4/5/2017 11:20 AM CDT -----  pleaase find out if pt has been on testosterone gel  If so needs a testosterone level next Tuesday as well when he gets kub  In am

## 2017-04-06 NOTE — TELEPHONE ENCOUNTER
Spoke with patient warren scheduled for Tuesday informed him doctor will call him to let him know when to pull string. Patient is using testosterone gel.

## 2017-04-06 NOTE — TELEPHONE ENCOUNTER
----- Message from Brianna Reed sent at 4/5/2017  4:07 PM CDT -----  Contact: [phone]  Patient is returning nurse's phone call.  Please call patient back at [phone].

## 2017-04-11 ENCOUNTER — TELEPHONE (OUTPATIENT)
Dept: UROLOGY | Facility: CLINIC | Age: 42
End: 2017-04-11

## 2017-04-11 ENCOUNTER — HOSPITAL ENCOUNTER (OUTPATIENT)
Dept: RADIOLOGY | Facility: HOSPITAL | Age: 42
Discharge: HOME | End: 2017-04-11
Attending: UROLOGY
Payer: COMMERCIAL

## 2017-04-11 DIAGNOSIS — N20.0 NEPHROLITHIASIS: Primary | ICD-10-CM

## 2017-04-11 DIAGNOSIS — N20.0 NEPHROLITHIASIS: ICD-10-CM

## 2017-04-11 PROCEDURE — 74000 XR ABDOMEN AP 1 VIEW: CPT | Mod: 26,,, | Performed by: RADIOLOGY

## 2017-04-11 PROCEDURE — 74000 XR ABDOMEN AP 1 VIEW: CPT | Mod: TC

## 2017-04-11 NOTE — TELEPHONE ENCOUNTER
----- Message from Jolene Campbell LPN sent at 4/11/2017  2:55 PM CDT -----  Spoke with patient informed him of results. Patient states he has passed some stone fragments not much. Wants to repeat to CT scan, if surgery needs to be scheduled patient states he can do surgery either week. Please place orders for ct

## 2017-04-11 NOTE — TELEPHONE ENCOUNTER
Please schedule ct for pt asap and text me when results return. i will look at results or speak to whomever reveiws them and then decide if pt needs procedure next week or the following week

## 2017-04-12 ENCOUNTER — TELEPHONE (OUTPATIENT)
Dept: UROLOGY | Facility: CLINIC | Age: 42
End: 2017-04-12

## 2017-04-12 ENCOUNTER — PATIENT MESSAGE (OUTPATIENT)
Dept: UROLOGY | Facility: CLINIC | Age: 42
End: 2017-04-12

## 2017-04-12 NOTE — TELEPHONE ENCOUNTER
----- Message from Vicky Saeed MD sent at 4/11/2017  5:33 PM CDT -----  Josué said he would look at ct when its done to see if there are stones along stent

## 2017-04-12 NOTE — TELEPHONE ENCOUNTER
It looks like he decided on repeating CT and it is scheduled. When the results return please have one of the other physicians look at it, I think I already asked  but anyone that happens to be in the office and if there are no remaining stones he can pull his stent out. If there are remaining stones then see if he would be ok with doing ureteroscopy to remove the stones on 4/26/17 and let me know in a telephone note so I can book.     If he absolutely insists on ureteroscopy (if stones still present) on 4/19 then he will unfortunatley be later in the day and I have 6 scheduled. I can make him one of the first cases on the 26th. If he decides on the 19th, I need him to come for a urine culture asap. If he decides on the 26th I need a urine culture on the 19th.

## 2017-04-13 ENCOUNTER — TELEPHONE (OUTPATIENT)
Dept: UROLOGY | Facility: CLINIC | Age: 42
End: 2017-04-13

## 2017-04-13 ENCOUNTER — HOSPITAL ENCOUNTER (OUTPATIENT)
Dept: RADIOLOGY | Facility: HOSPITAL | Age: 42
Discharge: HOME | End: 2017-04-13
Attending: UROLOGY
Payer: COMMERCIAL

## 2017-04-13 DIAGNOSIS — N20.0 NEPHROLITHIASIS: ICD-10-CM

## 2017-04-13 PROCEDURE — 74176 CT ABD & PELVIS W/O CONTRAST: CPT | Mod: 26,,, | Performed by: RADIOLOGY

## 2017-04-13 PROCEDURE — 74176 CT ABD & PELVIS W/O CONTRAST: CPT | Mod: TC

## 2017-04-14 NOTE — TELEPHONE ENCOUNTER
No stones seen along stent  Still has 1mm stone in left mid pole  Please call pt and ask him to remove his stent  Can f/u with me as scheduled. If no fu please make sure has a 3 month fu with me.   Document when you have spoken to him about removing his stent.

## 2017-04-18 NOTE — TELEPHONE ENCOUNTER
Spoke with patient informed him of recommendations. Patient verbally voiced understanding and states he spoke with someone last week. Follow up scheduled for 7/13 with

## 2017-04-26 RX ORDER — LISDEXAMFETAMINE DIMESYLATE 60 MG/1
60 CAPSULE ORAL EVERY MORNING
Qty: 30 CAPSULE | Refills: 0 | Status: SHIPPED | OUTPATIENT
Start: 2017-04-26 | End: 2017-05-08 | Stop reason: SDUPTHER

## 2017-04-26 NOTE — TELEPHONE ENCOUNTER
Dr. Modi---please review pts My Chart message and advise. Last OV Dr. Modi 2/7/17. Thanks, Ellyn

## 2017-05-05 ENCOUNTER — DOCUMENTATION ONLY (OUTPATIENT)
Dept: FAMILY MEDICINE | Facility: CLINIC | Age: 42
End: 2017-05-05

## 2017-05-05 ENCOUNTER — TELEPHONE (OUTPATIENT)
Dept: PAIN MEDICINE | Facility: CLINIC | Age: 42
End: 2017-05-05

## 2017-05-05 RX ORDER — HYDROCODONE BITARTRATE AND ACETAMINOPHEN 10; 325 MG/1; MG/1
1 TABLET ORAL EVERY 8 HOURS PRN
Qty: 45 TABLET | Refills: 0 | Status: SHIPPED | OUTPATIENT
Start: 2017-05-05 | End: 2017-06-04

## 2017-05-05 NOTE — TELEPHONE ENCOUNTER
Pain is increasing in lower back. It is running across left hip and down back of left leg. Moderate numbness. Also neck pain and headaches have increased also. Pain starts in lower portion of neck and runs up the back of head. Also have moderate numbness in arms and hands. Would like to get something for the pain until i can an appt to see Mushtaq Fraser or Jolene. Thank you

## 2017-05-08 ENCOUNTER — OFFICE VISIT (OUTPATIENT)
Dept: FAMILY MEDICINE | Facility: CLINIC | Age: 42
End: 2017-05-08
Payer: COMMERCIAL

## 2017-05-08 ENCOUNTER — OFFICE VISIT (OUTPATIENT)
Dept: PAIN MEDICINE | Facility: CLINIC | Age: 42
End: 2017-05-08
Payer: COMMERCIAL

## 2017-05-08 VITALS
BODY MASS INDEX: 34.67 KG/M2 | DIASTOLIC BLOOD PRESSURE: 82 MMHG | SYSTOLIC BLOOD PRESSURE: 130 MMHG | RESPIRATION RATE: 18 BRPM | HEART RATE: 101 BPM | HEIGHT: 67 IN | WEIGHT: 220.88 LBS | OXYGEN SATURATION: 96 % | TEMPERATURE: 100 F

## 2017-05-08 VITALS
DIASTOLIC BLOOD PRESSURE: 92 MMHG | WEIGHT: 215 LBS | HEIGHT: 67 IN | SYSTOLIC BLOOD PRESSURE: 136 MMHG | BODY MASS INDEX: 33.74 KG/M2 | HEART RATE: 104 BPM

## 2017-05-08 DIAGNOSIS — M54.17 LUMBOSACRAL RADICULOPATHY: Primary | ICD-10-CM

## 2017-05-08 DIAGNOSIS — R51.9 FREQUENT HEADACHES: ICD-10-CM

## 2017-05-08 DIAGNOSIS — M96.1 POSTLAMINECTOMY SYNDROME OF LUMBAR REGION: ICD-10-CM

## 2017-05-08 DIAGNOSIS — E66.9 OBESITY (BMI 30.0-34.9): ICD-10-CM

## 2017-05-08 DIAGNOSIS — F98.8 ADD (ATTENTION DEFICIT DISORDER): Primary | ICD-10-CM

## 2017-05-08 DIAGNOSIS — M47.816 LUMBAR FACET ARTHROPATHY: ICD-10-CM

## 2017-05-08 DIAGNOSIS — F41.1 GAD (GENERALIZED ANXIETY DISORDER): ICD-10-CM

## 2017-05-08 DIAGNOSIS — F32.0 MAJOR DEPRESSIVE DISORDER, SINGLE EPISODE, MILD: ICD-10-CM

## 2017-05-08 DIAGNOSIS — M79.18 MYOFASCIAL PAIN: ICD-10-CM

## 2017-05-08 PROCEDURE — 99213 OFFICE O/P EST LOW 20 MIN: CPT | Mod: S$GLB,,, | Performed by: PHYSICIAN ASSISTANT

## 2017-05-08 PROCEDURE — 1160F RVW MEDS BY RX/DR IN RCRD: CPT | Mod: S$GLB,,, | Performed by: FAMILY MEDICINE

## 2017-05-08 PROCEDURE — 99999 PR PBB SHADOW E&M-EST. PATIENT-LVL II: CPT | Mod: PBBFAC,,, | Performed by: PHYSICIAN ASSISTANT

## 2017-05-08 PROCEDURE — 99999 PR PBB SHADOW E&M-EST. PATIENT-LVL III: CPT | Mod: PBBFAC,,, | Performed by: FAMILY MEDICINE

## 2017-05-08 PROCEDURE — 99214 OFFICE O/P EST MOD 30 MIN: CPT | Mod: S$GLB,,, | Performed by: FAMILY MEDICINE

## 2017-05-08 PROCEDURE — 1160F RVW MEDS BY RX/DR IN RCRD: CPT | Mod: S$GLB,,, | Performed by: PHYSICIAN ASSISTANT

## 2017-05-08 RX ORDER — LISDEXAMFETAMINE DIMESYLATE 60 MG/1
60 CAPSULE ORAL EVERY MORNING
Qty: 30 CAPSULE | Refills: 0 | Status: SHIPPED | OUTPATIENT
Start: 2017-06-25 | End: 2017-07-25

## 2017-05-08 RX ORDER — LISDEXAMFETAMINE DIMESYLATE 60 MG/1
60 CAPSULE ORAL EVERY MORNING
Qty: 30 CAPSULE | Refills: 0 | Status: SHIPPED | OUTPATIENT
Start: 2017-05-26 | End: 2017-06-25

## 2017-05-08 RX ORDER — LISDEXAMFETAMINE DIMESYLATE 60 MG/1
60 CAPSULE ORAL EVERY MORNING
Qty: 30 CAPSULE | Refills: 0 | Status: SHIPPED | OUTPATIENT
Start: 2017-07-25 | End: 2017-08-09

## 2017-05-08 NOTE — MR AVS SNAPSHOT
[facility]  [address]  [facility] [address]  Phone: [phone]  Fax: [phone]                  Lee Quintanilla   2017 3:20 PM   Office Visit    Description:  Male : 1975   Provider:  Emilie Modi MD   Department:  [place] [facility]           Reason for Visit     Follow-up           Diagnoses this Visit        Comments    ADD (attention deficit disorder)    -  Primary     MYNOR (generalized anxiety disorder)         Major depressive disorder, single episode, mild         Obesity (BMI 30.0-34.9)                To Do List           Future Appointments        Provider Department Dept Phone    2017 9:15 AM Vicky Saeed MD [facility] Urology [phone]    2017 3:40 PM Emilie Modi MD [facility] [phone]      Goals (5 Years of Data)     None       These Medications        Disp Refills Start End    VYVANSE 60 mg capsule 30 capsule 0 2017    Take 1 capsule (60 mg total) by mouth every morning. - Oral    Pharmacy: [facility] [place] [address]. Ph #: [phone]       VYVANSE 60 mg capsule 30 capsule 0 2017    Take 1 capsule (60 mg total) by mouth every morning. - Oral    Pharmacy: [facility] [place] [address]. Ph #: [phone]       VYVANSE 60 mg capsule 30 capsule 0 2017    Take 1 capsule (60 mg total) by mouth every morning. - Oral    Pharmacy: [facility] [place] [address]. Ph #: [phone]         [facility]     [place] On Call Nurse Care Line -  Assistance  Unless otherwise directed by your provider, please contact [facility], our nurse care line that is available for  assistance.     Registered nurses in the [facility] provide: appointment scheduling, clinical advisement, health education, and other advisory services.  Call: [phone] (toll free)                Medications           Message regarding Medications     Verify the changes and/or additions to your medication regime listed below are the same as discussed with your clinician today.  If any of these changes or additions are incorrect, please notify your healthcare provider.        START taking these NEW medications        Refills    VYVANSE 60 mg capsule 0    Starting on: 6/25/2017    Sig: Take 1 capsule (60 mg total) by mouth every morning.    Class: Print    Route: Oral    VYVANSE 60 mg capsule 0    Starting on: 7/25/2017    Sig: Take 1 capsule (60 mg total) by mouth every morning.    Class: Print    Route: Oral      STOP taking these medications     tamsulosin (FLOMAX) 0.4 mg Cp24 Take 1 capsule (0.4 mg total) by mouth after dinner.    testosterone cypionate (DEPOTESTOTERONE CYPIONATE) 200 mg/mL injection Inject 1 mL (200 mg total) into the muscle every 21 days.           Verify that the below list of medications is an accurate representation of the medications you are currently taking.  If none reported, the list may be blank. If incorrect, please contact your healthcare provider. Carry this list with you in case of emergency.           Current Medications     hydrocodone-acetaminophen 10-325mg (NORCO)  mg Tab Take 1 tablet by mouth every 8 (eight) hours as needed (pain).    testosterone (ANDROGEL) 20.25 mg/1.25 gram (1.62 %) GlPm Place 40.5 mg onto the skin once daily.    venlafaxine 225 mg TR24 Take 225 mg by mouth once daily.    VYVANSE 60 mg capsule Starting on May 26, 2017. Take 1 capsule (60 mg total) by mouth every morning.    VYVANSE 60 mg capsule Starting on Jun 25, 2017. Take 1 capsule (60 mg total) by mouth every morning.    VYVANSE 60 mg capsule Starting on Jul 25, 2017. Take 1 capsule (60 mg total) by mouth every morning.           Clinical Reference Information           Your Vitals Were     BP Pulse Temp Resp Height Weight    130/82 (BP Location: Left arm, Patient Position: Sitting, BP  "Method: Manual) 101 99.8 °F (37.7 °C) (Oral) 18 5' 7" (1.702 m) 100.2 kg (220 lb 14.4 oz)    SpO2 BMI             96% 34.6 kg/m2         Blood Pressure          Most Recent Value    BP  130/82      Allergies as of 5/8/2017     Inapsine [Droperidol]    Pcn [Penicillins]    Bactrim [Sulfamethoxazole-trimethoprim]      Immunizations Administered on Date of Encounter - 5/8/2017     None      Language Assistance Services     ATTENTION: Language assistance services are available, free of charge. Please call [phone].      ATENCIÓN: Si habla español, tiene a whittington disposición servicios gratuitos de asistencia lingüística. Llame al [phone].     CURTIS Ý: N?u b?n nói Ti?ng Vi?t, có các d?ch v? h? tr? ngôn ng? mi?n phí dành cho b?n. G?i s? [phone].         [facility] complies with applicable Federal civil rights laws and does not discriminate on the basis of race, color, national origin, age, disability, or sex.        "

## 2017-05-08 NOTE — PROGRESS NOTES
CHIEF COMPLAINT:   Follow up      HISTORY OF PRESENT ILLNESS:  Lee Quintanilla is a 41 y.o. male who presents to clinic for follow up on MDD, MYNOR     1. ADD: on vyvanse 60 mg daily. This controls his symptoms. He denies any CP, tremor, worsening anxiety, insomnia, lexi loss, palpitations, HTN.     2  MDD, MYNOR: please see my note from 8/9/16 for complete details. At his last visit his effexor xr was increased to 225 mg daily.  He denies any side effects at this higher dose.  He states that this has been helping with his symptoms.             REVIEW OF SYSTEMS:  The patient denies any fever, chills, night sweats, headaches, vision changes, difficulty speaking or swallowing, decreased hearing, weight loss, weight gain, chest pain, palpitations, shortness of breath, cough, nausea, vomiting, abdominal pain, dysuria, diarrhea, constipation, hematuria, hematochezia, melena, changes in his hair, skin, nails, numbness or weakness in his extremities, erythema, swelling or pain over any of his joints, myalgia, swollen glands, easy bruising, fatigue, edema. He denies any scrotal/testicular masses, penile discharge.       MEDICATIONS:   Reviewed and/or reconciled in EPIC    ALLERGIES:  Reviewed and/or reconciled in [facility]    PAST MEDICAL/SURGICAL HISTORY:   Past Medical History:   Diagnosis Date    ADHD (attention deficit hyperactivity disorder)     Arthritis     Asthma     as child only    Back pain     Degeneration of lumbar intervertebral disc 05/2016    Depression     Elevated PSA     Headache     Kidney damage     stage 3 ; d/t aleve abuse    Kidney stone     Neck pain     Numbness and tingling in hands     Numbness and tingling of both legs     Seizures     from inapsine 2002    Sleep apnea     no cpap      Past Surgical History:   Procedure Laterality Date    BACK SURGERY  2016    back surgery    PILONIDAL CYST DRAINAGE      removal of abcess      scrotal    TYMPANOSTOMY TUBE PLACEMENT          FAMILY HISTORY:    Family History   Problem Relation Age of Onset    Heart disease Mother     Migraines Mother     Hypertension Mother     Early death Father     Diabetes Maternal Aunt     Diabetes Maternal Uncle     Diabetes Maternal Grandfather        SOCIAL HISTORY:    Social History     Social History    Marital status:      Spouse name: N/A    Number of children: N/A    Years of education: N/A     Occupational History    Not on file.     Social History Main Topics    Smoking status: Former Smoker     Packs/day: 0.25     Types: Cigarettes     Quit date: 1/1/2016    Smokeless tobacco: Former User     Quit date: 6/5/2016    Alcohol use Yes      Comment: rare    Drug use: No    Sexual activity: Yes     Partners: Female     Other Topics Concern    Not on file     Social History Narrative       PHYSICAL EXAM:  VITAL SIGNS:   There were no vitals filed for this visit.  GENERAL:  Patient appears well nourished, sitting on exam table, in no acute distress.  HEENT:  Atraumatic, normocephalic, PERRLA, EOMI, no conjunctival injection, sclerae are anicteric, normal external auditory canals,TMs clear b/l, gross hearing intact to whisper, MMM, no oropharygneal erythema or exudate.  NECK:  Supple, normal ROM, trachea is midline , no supraclavicular or cervical LAD or masses palpated.  Thyroid gland not palpable.  CARDIOVASCULAR:  RRR, normal S1 and S2, no m/r/g.  RESPIRATORY:  CTA b/l, no wheezes, rhonchi, rales.  No increased work of breathing, no  use of accessory muscles.  ABDOMEN:  Soft, nontender, nondistended, normoactive bowel sounds in all four quadrants, no rebound or guarding, no HSM or masses palpated.  Normal percussion.  EXTREMITIES:  2+ DP pulses b/l, no edema.  SKIN:  Warm, no lesions on exposed skin.  NEUROMUSCULAR:  Cranial nerves II-XII grossly intact.   No clubbing or cyanosis of digits/nails.  Steady gait.  PSYCH:  Patient is alert and oriented to person, time, place. They are  "appropriately dressed and groomed. There is normal eye contact. Rate and tone of speech is normal. Normal insight, judgement. Normal thought content and process. He describes his mood as "ok" affect is euthymic        ASSESSMENT/PLAN: This is a 41 y.o. male who presents to clinic for evaluation of the following concerns  1. ADD (attention deficit disorder)  Refill vyvanse. He will receive a prescription with 5/26/17 as the first date and 2 prescriptions with start dates of 30 and 60 days from today. The risks, side effects of this medication were discussed with him, follow up in 3 months    2. Major depressive disorder, single episode, mild  Continue with  effexor  225 mg daily.       3. MYNOR (generalized anxiety disorder)  See above    4. Obesity, Class I, BMI 30-34.9  See below        Patient readiness: acceptance and barriers:none    During the course of the visit the patient was educated and counseled about the following:     Obesity:Not addressed at today's visit      Goals: Obesity: Reduce calorie intake and BMI     Did patient meet goals/outcomes: No    The following self management tools provided: declined    Patient Instructions (the written plan) was given to the patient/family.     Time spent with patient: 30 minutes            Emilie Modi MD  "

## 2017-05-08 NOTE — PROGRESS NOTES
Referring Physician: No ref. provider found    PCP: Emilie Modi MD      CC: Low back and left leg pain.    INTERVAL HISTORY:   Mr. Quintanilla is a 41 y.o. male with chronic low back pain who presents today for follow up. Reports a recent exacerbation of neck and back pain. Low back pain and bilateral leg pain has worsened. numbness reading down his left leg as well as pain shooting down his right posterior thigh. He is s/p lumbar RFA on 12/20.  He takes Hydrocodone very sparingly. He has history of  microdiskectomy, decompression and fusion at L5-S1 on 6/6/16.     He denies any worsening weakness.  No bowel bladder changes.    He currently takes Lyrica 25 mg 3 times a day with mild benefits. He also c/o neck pain, popping and pulling. Pain extends from the base of his neck to the top of his head.   He rates his pain 7/10 today.    Prior HPI:   Patient is a 40-year-old male referred to us by Dr. Dowling for low back and left leg pain.  Pain has been present for over 6 years.  No traumatic incident.  He has constant aching, throbbing, deep lower back pain.  Pain shoots down his buttocks into his left hip.  Pain also radiates to his groin and testicles and down his medial leg to his knee.  He has tingling in his feet.  No weakness.  No bowel bladder changes.  Pain worsens with sitting, bending, lifting, extension, and getting up.  Pain improves with rest.  He was taking NSAIDs very often until recently.  He was found to have worsening kidney function.  He had a recent lumbar MRI which showed a disc bulge at L5-S1.   He also states having posterior neck pain as well.  This is been present for similar moderate time.  No traumatic incident.  Pain radiates into bilateral shoulders.  He denies weakness.  He rates his pain 9/10 today.    Pain interventional history: s/p L5-S1 TFESI on 3/2016 and 10/2016 with 75% relief of his leg pain    ROS:  CONSTITUTIONAL: No fevers, chills, night sweats, wt. loss, appetite  changes  SKIN: no rashes or itching  ENT: No headaches, head trauma, vision changes, or eye pain  LYMPH NODES: None noticed   CV: No chest pain, palpitations.   RESP: No shortness of breath, dyspnea on exertion, cough, wheezing, or hemoptysis  GI: No nausea, emesis, diarrhea, constipation, melena, hematochezia, pain.    : No dysuria, hematuria, urgency, or frequency   HEME: No easy bruising, bleeding problems  PSYCHIATRIC: No depression, anxiety, psychosis, hallucinations.  NEURO: No seizures, memory loss, dizziness or difficulty sleeping  MSK: + History of present illness      Past Medical History:   Diagnosis Date    ADHD (attention deficit hyperactivity disorder)     Arthritis     Asthma     as child only    Back pain     Degeneration of lumbar intervertebral disc 05/2016    Depression     Elevated PSA     Headache     Kidney damage     stage 3 ; d/t aleve abuse    Kidney stone     Neck pain     Numbness and tingling in hands     Numbness and tingling of both legs     Seizures     from inapsine 2002    Sleep apnea     no cpap     Past Surgical History:   Procedure Laterality Date    BACK SURGERY  2016    back surgery    PILONIDAL CYST DRAINAGE      removal of abcess      scrotal    TYMPANOSTOMY TUBE PLACEMENT       Family History   Problem Relation Age of Onset    Heart disease Mother     Migraines Mother     Hypertension Mother     Early death Father     Diabetes Maternal Aunt     Diabetes Maternal Uncle     Diabetes Maternal Grandfather      Social History     Social History    Marital status:      Spouse name: N/A    Number of children: N/A    Years of education: N/A     Social History Main Topics    Smoking status: Former Smoker     Packs/day: 0.25     Types: Cigarettes     Quit date: 1/1/2016    Smokeless tobacco: Former User     Quit date: 6/5/2016    Alcohol use Yes      Comment: rare    Drug use: No    Sexual activity: Yes     Partners: Female     Other Topics  "Concern    Not on file     Social History Narrative         Medications/Allergies: See med card    Vitals:    05/08/17 0912   BP: (!) 136/92   Pulse: 104   Weight: 97.5 kg (215 lb)   Height: 5' 7" (1.702 m)   PainSc:   7         Physical exam:    GENERAL: A and O x3, the patient appears well groomed and is in no acute distress.  Skin: No rashes or obvious lesions  HEENT: normocephalic, atraumatic  CARDIOVASCULAR:  Tachycardia   LUNGS: non labored breathing  ABDOMEN: soft, nontender   UPPER EXTREMITIES: Normal alignment, normal range of motion, no atrophy, no skin changes,  hair growth and nail growth normal and equal bilaterally. No swelling, no tenderness.    LOWER EXTREMITIES:  Normal alignment, normal range of motion, no atrophy, no skin changes,  hair growth and nail growth normal and equal bilaterally. No swelling, no tenderness.  CERVICAL SPINE:  Cervical spine: ROM is full in flexion, extension and lateral rotation with mild increased pain.  Spurling's maneuver causes neck pain to bilateral side.  Myofascial exam: No Tenderness to palpation across cervical paraspinous region bilaterally.    LUMBAR SPINE  Lumbar spine: ROM is full with flexion extension and oblique extension with mild increased pain.    Daniel's test causes no increased pain on either side.    Supine straight leg raise is positive on left at 60°    Internal and external rotation of the hip causes no increased pain on either side.  Myofascial exam: moderate tenderness to palpation across lumbar paraspinous muscles. L>R. Trigger points noted on left      MENTAL STATUS: normal orientation, speech, language, and fund of knowledge for social situation.  Emotional state appropriate.    CRANIAL NERVES:  II:  PERRL bilaterally,   III,IV,VI: EOMI.    V:  Facial sensation equal bilaterally  VII:  Facial motor function normal.  VIII:  Hearing equal to finger rub bilaterally  IX/X: Gag normal, palate symmetric  XI:  Shoulder shrug equal, head turn " equal  XII:  Tongue midline without fasciculations      MOTOR: Tone and bulk: normal bilateral upper and lower Strength: normal   Delt Bi Tri WE WF     R 5 5 5 5 5 5   L 5 5 5 5 5 5     IP ADD ABD Quad TA Gas HAM  R 5 5 5 5 5 5 5  L 5 5 5 5 5 5 5    SENSATION: Light touch and pinprick intact bilaterally  REFLEXES: normal, symmetric, nonbrisk.  Toes down, no clonus. No hoffmans.  GAIT: normal rise, base, steps, and arm swing.        Imaging:  MRI lumbar spine 2/17/2016  At L5-S1 there is disc protrusion/herniation to the left with mild spinal canal and severe left neural foraminal stenosis with L5 radiculitis. Milder degenerative disc disease is noted at T12-L1, L1-2 and L2-3 without disc herniation or stenosis    X-ray cervical spine 2/11/2016  Degenerative disc disease and spondylosis at C5-6. Otherwise negative cervical spine x-rays     Assessment:  Mr. Quintanilla is a 41 y.o. male with   1. Lumbosacral radiculopathy    2. Lumbar facet arthropathy    3. Postlaminectomy syndrome of lumbar region    4. Myofascial pain    5. Frequent headaches          Plan:  1.  I have stressed the importance of physical activity and a home exercise plan to help with pain and improve overall health.  2. Continue Baclofen 10 mg q 8 h prn and Hydrocodone  mg q 8 h prn   3. Continue PT exercises  4. F/u with PCP for tachycardia   5. Schedule bilateral lumbar TFESI at L5-S1. I have explained the risks, benefits, and alternatives of the procedure in detail. The patient voices understanding and all questions have been answered. The patient agrees to proceed as planned. Written Consent obtained.   6. Next month we can schedule lumbar MB RFA.   7. Discussed referral to neurosurgery for 2nd opinion. He will think about this  8. F/u with Dr. Fraser for cervical TPI and possible occipital nerve block.

## 2017-05-16 DIAGNOSIS — M54.16 LUMBAR RADICULOPATHY: Primary | ICD-10-CM

## 2017-05-26 ENCOUNTER — PATIENT MESSAGE (OUTPATIENT)
Dept: UROLOGY | Facility: CLINIC | Age: 42
End: 2017-05-26

## 2017-05-26 DIAGNOSIS — E29.1 HYPOGONADISM IN MALE: Primary | ICD-10-CM

## 2017-05-26 NOTE — TELEPHONE ENCOUNTER
Please schedule morning testosterone and estrogen level for pt  Will review and call pt and depending on results may suggest testopel (would need bactrim prophylaxis with h/o mrsa)

## 2017-05-30 ENCOUNTER — LAB VISIT (OUTPATIENT)
Dept: LAB | Facility: HOSPITAL | Age: 42
End: 2017-05-30
Attending: UROLOGY
Payer: COMMERCIAL

## 2017-05-30 DIAGNOSIS — E29.1 HYPOGONADISM IN MALE: ICD-10-CM

## 2017-05-30 PROCEDURE — 36415 COLL VENOUS BLD VENIPUNCTURE: CPT

## 2017-05-30 PROCEDURE — 82672 ASSAY OF ESTROGEN: CPT

## 2017-06-01 ENCOUNTER — TELEPHONE (OUTPATIENT)
Dept: PAIN MEDICINE | Facility: CLINIC | Age: 42
End: 2017-06-01

## 2017-06-01 ENCOUNTER — PATIENT MESSAGE (OUTPATIENT)
Dept: UROLOGY | Facility: CLINIC | Age: 42
End: 2017-06-01

## 2017-06-01 LAB — ESTROGEN SERPL-MCNC: 227 PG/ML

## 2017-06-02 ENCOUNTER — PATIENT MESSAGE (OUTPATIENT)
Dept: UROLOGY | Facility: CLINIC | Age: 42
End: 2017-06-02

## 2017-06-02 DIAGNOSIS — E29.1 HYPOGONADISM IN MALE: Primary | ICD-10-CM

## 2017-06-02 NOTE — TELEPHONE ENCOUNTER
Please have pt repeat the testosterone and estrogen in the am before he applies the cream  Is he on the cream or shot?   Please update his med list with whatever he is on  If on the shot what is his dose again - 200 every 2 weeks?  Also find out if he is having breast swelling or tenderness?    Will have pt f/u after labs to discuss labs and plan  May need referral depending on results  Tried to contact via phone but no answer

## 2017-06-05 ENCOUNTER — PATIENT MESSAGE (OUTPATIENT)
Dept: UROLOGY | Facility: CLINIC | Age: 42
End: 2017-06-05

## 2017-06-06 ENCOUNTER — TELEPHONE (OUTPATIENT)
Dept: UROLOGY | Facility: CLINIC | Age: 42
End: 2017-06-06

## 2017-06-06 ENCOUNTER — LAB VISIT (OUTPATIENT)
Dept: LAB | Facility: HOSPITAL | Age: 42
End: 2017-06-06
Attending: UROLOGY
Payer: COMMERCIAL

## 2017-06-06 DIAGNOSIS — E29.1 HYPOGONADISM IN MALE: ICD-10-CM

## 2017-06-06 LAB — TESTOST SERPL-MCNC: 496 NG/DL

## 2017-06-06 PROCEDURE — 84403 ASSAY OF TOTAL TESTOSTERONE: CPT

## 2017-06-06 PROCEDURE — 36415 COLL VENOUS BLD VENIPUNCTURE: CPT

## 2017-06-06 PROCEDURE — 82672 ASSAY OF ESTROGEN: CPT

## 2017-06-06 NOTE — TELEPHONE ENCOUNTER
Currently he's on androgel 2 pumps a day and he's started noticing some breast swelling. He has fatigue on this for 3 months but is developing breast enlargement.      He used to be on T 200 q3 weeks. He still has fatigue on this. He's having no improvement with erections either. Had previously tried viagra but has no libido.     Labs: (all labs in am, prior to shot or gel)  Testosterone      Estrogen  1/13/17 168 on T 200 q3 weeks   3/7/17  136 switched to androgel  4/11/17 576 Androgel 2 pumps daily  5/30/17      227    Will see what the labs return and discuss changing to testosterone injections weekly vs testopel. Had held off on his testopel bc of his h/o skin infections with MRSA.     Awaiting repeating labs  Pt following up Monday  Discuss possible aromatase inhibitor.

## 2017-06-08 LAB — ESTROGEN SERPL-MCNC: 121 PG/ML

## 2017-06-12 ENCOUNTER — APPOINTMENT (OUTPATIENT)
Dept: LAB | Facility: HOSPITAL | Age: 42
End: 2017-06-12
Attending: UROLOGY
Payer: COMMERCIAL

## 2017-06-12 ENCOUNTER — OFFICE VISIT (OUTPATIENT)
Dept: UROLOGY | Facility: CLINIC | Age: 42
End: 2017-06-12
Payer: COMMERCIAL

## 2017-06-12 VITALS
SYSTOLIC BLOOD PRESSURE: 136 MMHG | TEMPERATURE: 98 F | BODY MASS INDEX: 34.67 KG/M2 | WEIGHT: 220.88 LBS | HEART RATE: 86 BPM | HEIGHT: 67 IN | DIASTOLIC BLOOD PRESSURE: 88 MMHG

## 2017-06-12 DIAGNOSIS — R82.89 URINE CYTOLOGY ABNORMAL: Primary | ICD-10-CM

## 2017-06-12 DIAGNOSIS — E29.1 HYPOGONADISM IN MALE: ICD-10-CM

## 2017-06-12 PROCEDURE — 99214 OFFICE O/P EST MOD 30 MIN: CPT | Mod: 24,25,S$GLB, | Performed by: UROLOGY

## 2017-06-12 PROCEDURE — 88112 CYTOPATH CELL ENHANCE TECH: CPT | Performed by: PATHOLOGY

## 2017-06-12 PROCEDURE — 99999 PR PBB SHADOW E&M-EST. PATIENT-LVL III: CPT | Mod: PBBFAC,,, | Performed by: UROLOGY

## 2017-06-12 PROCEDURE — 81002 URINALYSIS NONAUTO W/O SCOPE: CPT | Mod: S$GLB,,, | Performed by: UROLOGY

## 2017-06-12 PROCEDURE — 88112 CYTOPATH CELL ENHANCE TECH: CPT | Mod: 26,,, | Performed by: PATHOLOGY

## 2017-06-12 RX ORDER — ANASTROZOLE 1 MG/1
1 TABLET ORAL DAILY
Qty: 30 TABLET | Refills: 3 | Status: SHIPPED | OUTPATIENT
Start: 2017-06-12 | End: 2017-11-01 | Stop reason: ALTCHOICE

## 2017-06-12 NOTE — PROGRESS NOTES
[facility] Progress Note  PCP: Dr.Havlovic [facility]: active     Chief Complaint: Follow-up (1 month fu)        Subjective:        HPI: Lee Quintanilla is a 41 y.o. male presents with      Last seen 3/21/17 (3 months)  We have been actively communicating via [facility] and epic     Severe ED and hypogonadism.   No significant improvement with cialis. Found to have T 198. Started on T 200q3 weeks 2/9/17. Has had 2 injections now.  Repeat labs show no significant change and T still 136. When I saw him in March he was c/o fatigue and decreased libido. I switched him from T injections to Androgel 2 pumps. But he states that he has been having night sweats, fatigue, sensitive disposition/labile emotions,  breast enlargement (which has actually been present since before starting T injections). He is concerned the androgel is sweating off.     Labs: (all labs in am, prior to shot or gel)     Testosterone                                             Estrogen  1/13/17                      168 on T 200 q3 weeks   3/7/17                        136 switched to androgel  4/11/17                     576 Androgel 2 pumps daily  5/30/17                                                              227  6/6/17   496      121        Testicular pain  No longer c/o pain     Microhematuria, atypical urine cytology, kidney stone  ctu 2/10/17 - 2mm left renal stone, 4mm proximal ureteral stone  Cystoscopy 2/20/17 - normal, prostatitis, mild b hypertrophy  Urine cytology 2/7/17 - atypical cells,  8/25/14 - normal     Repeat ct on 3/20/17 (5 weeks later) to see if stone passed with flomax shows stone still in left proximal ureter without hydro.   4/5/17 cystoscopy, L ESWL and stent placement on strings. Pt did not pass any cytology   4/13/17 ctrss showed stone had passed but still had 1mm stone in LMP, pt removed stent which was on strings     IIEF: did not octavio out today, but previously 5  AUASSx: 8, mostly  satisfied    REVIEW OF SYSTEMS:  General ROS: no fevers, no chills  Psychological ROS: no depression  Endocrine ROS: no heat or cold  Respiratory ROS: no SOB  Cardiovascular ROS: no CP  Gastrointestinal ROS: no abdominal pain, no constipation, no diarrhea, no BRBPR  Musculoskeletal ROS: no muscle pain  Neurological ROS: no headaches  Dermatological ROS: no rashes  HEENT: no glasses, no sinus   ROS: per HPI       The past medical, surgical, social and family hx were reviewed. He has had back surgery since I've seen him.   Cataracts? none     Urologic meds:  androgel 2 pumps a day  Anticoagulation: No     Objective:      Vitals:    06/12/17 1115   BP: 136/88   Pulse: 86   Temp: 98.4 °F (36.9 °C)          Physical Exam   Constitutional: He is oriented to person, place, and time. He appears well-developed and well-nourished. He is cooperative. No distress.   HENT:   Head: Normocephalic and atraumatic.   Eyes: Pupils are equal, round, and reactive to light.   Cardiovascular: Normal rate and regular rhythm.    Pulmonary/Chest: Effort normal.   Abdominal: Soft. Normal appearance.   Musculoskeletal: Normal range of motion.   Neurological: He is alert and oriented to person, place, and time. He has normal reflexes.   Skin: Skin is intact.     Psychiatric: He has a normal mood and affect.       Scrotum: no testes in scrotum     Urinalysis:1.020/5/tr protein/tr blood - MH dawn negative    Labs:cr 1.2 7/25/16    ua microscopic:   1/12/17 - 1   5/31/16 - 4     hba1c 1/13/17 - 5.4     Urine culture  2/20/17                      ng  8/25/14                      Ng     Labs 3/17/17  psa  0.98  Lipid panel - elevated, not worse than last labs 1 month ago  T 136  LFTs normal  H/H 15.8/48.1      PATHOLOGY REVIEW:  Urine cytology 2/7/17 - atypical cells  Urine cytology 8/25/14 - negative     RADIOGRAPHIC REVIEW:  ctrss 4/13/17  Appropriately positioned left ureteral stent with resolution of left ureterolithiasis, without residual  ureteral stone.  Persistent punctate left nephrolithiasis.    ctrss 3/20/17  ctrss 2/10/17  3mm left upj stone without hydro  2mm left intrarenal stone     kub 2/13/17  Stone in left ureter not visible     ctu 2/10/17  2mm left renal stone, 4mm proximal ureteral stone  Prostate gland mildly enlarged  Other findings: 8mm hypodense mass in leiver likely a cyst     CTRSS 9/8/14  punctate nonobstructing renal stone with no hydronephrosis, opaque ureteral stone, ureteral obstruction or acute findings. Tiny fat containing umbilical hernia and small fat containing inguinal hernias also noted     US Scrotum 9/3/14  Echogenic area       rbus 7/24/14  normal        Assessment:       1. Hypogonadism in male        Plan:      Microhematuria. Found to have stone. H/o abnormal urine cytology.   -dawn showed left ureteral stone and renal stone  -atypical urine cytology, will repeat after we treat kidney stone as I think this is what is causing this. Does have a h/o smoking. 1.5 ppd x 20 years, quit last year. Repeating today  -if urine cytology is abnormal still will see in 5 weeks, repeat and then plan for cysto selective cytologies     nephrolithaisis  -had a proximal ureteral stone tx with eswl  -still has 1mm LMP stone     ED and hypogonadism, with breast enlargement, persistent decreased libido  -start anastrozole 1mg daily  -recommended continuing androgel 2 pumps a day instead of switching to T because more level  -check a testosterone and estrogen in 1 month (in am prior to application)  -testopel would be good for pt but easily prone to boils, discussed again today but pt uninterested.   -cialis 10mg work for him bu thasn't used  -watch for breast enlargement, c/o this today     Testicular pain  -improved, rarely having pain    F/u 5 weeks        [facility]: active

## 2017-06-12 NOTE — ADDENDUM NOTE
Encounter addended by: Syeda Child MA on: 6/12/2017  2:30 PM<BR>    Actions taken:  activity accessed

## 2017-06-12 NOTE — PATIENT INSTRUCTIONS
Microhematuria. Found to have stone. H/o abnormal urine cytology.   -dawn showed left ureteral stone and renal stone  -atypical urine cytology, will repeat after we treat kidney stone as I think this is what is causing this. Does have a h/o smoking. 1.5 ppd x 20 years, quit last year. Repeating today  -if urine cytology is abnormal still will see in 5 weeks, repeat and then plan for cysto selective cytologies     nephrolithaisis  -had a proximal ureteral stone tx with eswl  -still has 1mm LMP stone     ED and hypogonadism, with breast enlargement, persistent decreased libido  -start anastrozole 1mg daily  -recommended continuing androgel 2 pumps a day instead of switching to T because more level  -check a testosterone and estrogen in 1 month (in am prior to application)  -testopel would be good for pt but easily prone to boils, discussed again today but pt uninterested.   -cialis 10mg work for him bu thasn't used  -watch for breast enlargement, c/o this today     Testicular pain  -improved, rarely having pain    F/u 5 weeks

## 2017-06-13 LAB
BILIRUB SERPL-MCNC: ABNORMAL MG/DL
BLOOD URINE, POC: ABNORMAL
COLOR, POC UA: ABNORMAL
GLUCOSE UR QL STRIP: ABNORMAL
KETONES UR QL STRIP: ABNORMAL
LEUKOCYTE ESTERASE URINE, POC: ABNORMAL
NITRITE, POC UA: ABNORMAL
PH, POC UA: 5
PROTEIN, POC: ABNORMAL
SPECIFIC GRAVITY, POC UA: 1020
UROBILINOGEN, POC UA: ABNORMAL

## 2017-07-04 ENCOUNTER — PATIENT MESSAGE (OUTPATIENT)
Dept: UROLOGY | Facility: CLINIC | Age: 42
End: 2017-07-04

## 2017-07-06 ENCOUNTER — PATIENT MESSAGE (OUTPATIENT)
Dept: NEUROSURGERY | Facility: CLINIC | Age: 42
End: 2017-07-06

## 2017-07-12 ENCOUNTER — LAB VISIT (OUTPATIENT)
Dept: LAB | Facility: HOSPITAL | Age: 42
End: 2017-07-12
Attending: UROLOGY
Payer: COMMERCIAL

## 2017-07-12 DIAGNOSIS — E29.1 HYPOGONADISM IN MALE: ICD-10-CM

## 2017-07-12 DIAGNOSIS — R82.89 URINE CYTOLOGY ABNORMAL: ICD-10-CM

## 2017-07-12 LAB — TESTOST SERPL-MCNC: 487 NG/DL

## 2017-07-12 PROCEDURE — 82672 ASSAY OF ESTROGEN: CPT

## 2017-07-12 PROCEDURE — 36415 COLL VENOUS BLD VENIPUNCTURE: CPT

## 2017-07-12 PROCEDURE — 84403 ASSAY OF TOTAL TESTOSTERONE: CPT

## 2017-07-14 LAB — ESTROGEN SERPL-MCNC: 80 PG/ML

## 2017-07-18 ENCOUNTER — OFFICE VISIT (OUTPATIENT)
Dept: UROLOGY | Facility: CLINIC | Age: 42
End: 2017-07-18
Payer: COMMERCIAL

## 2017-07-18 VITALS
DIASTOLIC BLOOD PRESSURE: 94 MMHG | SYSTOLIC BLOOD PRESSURE: 142 MMHG | WEIGHT: 203.06 LBS | HEART RATE: 107 BPM | TEMPERATURE: 98 F | HEIGHT: 67 IN | BODY MASS INDEX: 31.87 KG/M2

## 2017-07-18 DIAGNOSIS — E29.1 HYPOGONADISM IN MALE: ICD-10-CM

## 2017-07-18 DIAGNOSIS — N20.0 NEPHROLITHIASIS: Primary | ICD-10-CM

## 2017-07-18 DIAGNOSIS — R31.29 MICROHEMATURIA: ICD-10-CM

## 2017-07-18 LAB
BILIRUB SERPL-MCNC: ABNORMAL MG/DL
BLOOD URINE, POC: 50
COLOR, POC UA: ABNORMAL
GLUCOSE UR QL STRIP: ABNORMAL
KETONES UR QL STRIP: ABNORMAL
LEUKOCYTE ESTERASE URINE, POC: ABNORMAL
NITRITE, POC UA: ABNORMAL
PH, POC UA: 6
PROTEIN, POC: ABNORMAL
SPECIFIC GRAVITY, POC UA: 1020
UROBILINOGEN, POC UA: ABNORMAL

## 2017-07-18 PROCEDURE — 81002 URINALYSIS NONAUTO W/O SCOPE: CPT | Mod: S$GLB,,, | Performed by: UROLOGY

## 2017-07-18 PROCEDURE — 99213 OFFICE O/P EST LOW 20 MIN: CPT | Mod: 25,S$GLB,, | Performed by: UROLOGY

## 2017-07-18 PROCEDURE — 87086 URINE CULTURE/COLONY COUNT: CPT

## 2017-07-18 PROCEDURE — 99999 PR PBB SHADOW E&M-EST. PATIENT-LVL III: CPT | Mod: PBBFAC,,, | Performed by: UROLOGY

## 2017-07-18 RX ORDER — TESTOSTERONE 20.25 MG/1.25G
40.5 GEL TOPICAL DAILY
Qty: 75 G | Refills: 2 | Status: SHIPPED | OUTPATIENT
Start: 2017-07-18 | End: 2017-09-26 | Stop reason: SDUPTHER

## 2017-07-18 RX ORDER — TESTOSTERONE CYPIONATE 200 MG/ML
INJECTION, SOLUTION INTRAMUSCULAR
COMMUNITY
Start: 2017-07-10 | End: 2017-07-18 | Stop reason: ALTCHOICE

## 2017-07-18 NOTE — PROGRESS NOTES
"[facility] Progress Note  PCP: Dr.Havlovic [facility]: active     Chief Complaint: Follow-up (1 month fu)        Subjective:        HPI: Lee Quintanilla is a 41 y.o. male presents with      Last seen 6/13/17 (1 month ago)  We have been actively communicating via [facility] and epic     Severe ED and hypogonadism.   -No significant improvement with cialis. Found to have T 198. Started on T 200q3 weeks 2/9/17. Repeat labs show no significant change and T still 136. When I saw him in March he was c/o fatigue and decreased libido. I switched him from T injections to Androgel 2 pumps. He states that he had been having night sweats, fatigue, sensitive disposition/labile emotions,  breast enlargement (which has actually been present since before starting T injections) and was concerned the androgel was sweating off. His T was found to be 496 on androgel 2 pumps daily with estrogen of 121. I started him on anastrazole on 6/13/17 and he returns today stating that he could not get the androgel filled again and had a shot 2 weeks ago - 200mg. His repeat T level was 487 and estrogen 80.  He says he still feels like "crap" but not as crappy. He thinks he's lost 20 pounds in the past month (also changed diet). Still  No desire. On venlafaxine and vyvanz. Hasn't seen psych.   -cialis works well for him when he does have libido.     Labs: (all labs in am, prior to shot or gel)     Testosterone                                   Estrogen  1/13/17            168 on T 200 q3 weeks   3/7/17                        136 switched to androgel  4/11/17                     576 Androgel 2 pumps daily  5/30/17                                                             227  6/6/17   496     121   7/12/17   487     80       Microhematuria, atypical urine cytology, kidney stone  ctu 2/10/17 - 2mm left renal stone, 4mm proximal ureteral stone  Cystoscopy 2/20/17 - normal, prostatitis, mild b hypertrophy  Urine " cytology 2/7/17 - atypical cells,  8/25/14 - normal     Repeat ct on 3/20/17 (5 weeks later) to see if stone passed with flomax shows stone still in left proximal ureter without hydro.   4/5/17 cystoscopy, L ESWL and stent placement on strings. Pt did not pass any cytology   4/13/17 ctrss showed stone had passed but still had 1mm stone in LMP, pt removed stent which was on strings     IIEF: did not octavio out today, but previously 5  AUASSx: 8, mostly satisfied    REVIEW OF SYSTEMS:  General ROS: no fevers, no chills  Psychological ROS: no depression  Endocrine ROS: no heat or cold  Respiratory ROS: no SOB  Cardiovascular ROS: no CP  Gastrointestinal ROS: no abdominal pain, no constipation, no diarrhea, no BRBPR  Musculoskeletal ROS: no muscle pain  Neurological ROS: no headaches  Dermatological ROS: no rashes  HEENT: no glasses, no sinus   ROS: per HPI       The past medical, surgical, social and family hx were reviewed. He has had back surgery since I've seen him. Saw neurosurgeon and said s1 impinging on back and causing issues in his left leg.   Cataracts? none     Urologic meds:  androgel 2 pumps a day, but most recently had T 200 2 weeks ago bc pump was not refilled.   Anticoagulation: No     Objective:      Vitals:    07/18/17 1041   BP: (!) 142/94   Pulse: 107   Temp: 98.4 °F (36.9 °C)          Physical Exam   Constitutional: He is oriented to person, place, and time. He appears well-developed and well-nourished. He is cooperative. No distress.   HENT:   Head: Normocephalic and atraumatic.   Eyes: Pupils are equal, round, and reactive to light.   Cardiovascular: Normal rate and regular rhythm.    Pulmonary/Chest: Effort normal.   Abdominal: Soft. Normal appearance.   Musculoskeletal: Normal range of motion.   Neurological: He is alert and oriented to person, place, and time. He has normal reflexes.   Skin: Skin is intact.     Psychiatric: He has a normal mood and affect.       Scrotum: no testes in scrotum      Urinalysis:1.020/6/1+leuk/1+protein/2+ketones/50 blood - send for culture, voided, no sx (circumcised)    Labs:cr 1.2 7/25/16    ua microscopic:   1/12/17 - 1   5/31/16 - 4     hba1c 1/13/17 - 5.4     Urine culture  2/20/17                      ng  8/25/14                      Ng     Labs 3/17/17  psa  0.98, Lipid panel - elevated, not worse than last labs 1 month ago, T 136, LFTs normal, H/H 15.8/48.1      PATHOLOGY REVIEW:  Voided urine 6/12/17- Negative for malignant cells.  Urine cytology 2/7/17 - atypical cells  Urine cytology 8/25/14 - negative     RADIOGRAPHIC REVIEW:  ctrss 4/13/17  Appropriately positioned left ureteral stent with resolution of left ureterolithiasis, without residual ureteral stone.  Persistent punctate left nephrolithiasis.    ctrss 3/20/17  ctrss 2/10/17  3mm left upj stone without hydro  2mm left intrarenal stone     kub 2/13/17  Stone in left ureter not visible     ctu 2/10/17  2mm left renal stone, 4mm proximal ureteral stone  Prostate gland mildly enlarged  Other findings: 8mm hypodense mass in leiver likely a cyst     CTRSS 9/8/14  punctate nonobstructing renal stone with no hydronephrosis, opaque ureteral stone, ureteral obstruction or acute findings. Tiny fat containing umbilical hernia and small fat containing inguinal hernias also noted     US Scrotum 9/3/14  Echogenic area       rbus 7/24/14  normal        Assessment:       1. Hypogonadism in male      microhematuria   Kidney stone  Plan:      ED and hypogonadism, with breast enlargement, persistent decreased libido  -continue anastrozole 1mg daily. Breast enlargement improvement. Has lost 20 pounds.   -recommended continuing androgel 2 pumps a day instead of switching to T because more level, will refill this.   -cbc, cmp, lipid panel, tsh (will fax results to ) - labs haven't been checked since starting androgel  -testopel would be good for pt but easily prone to boils, discussed again today but pt uninterested.    -cialis 10mg work for him bu thasn't used bc of decreased libido. Will see  next week. Libido could be related to antidepressant. Consider checking thyroid panel to explain energy level. Think pt would benefit from seeing psych.      Microhematuria. Found to have stone. H/o abnormal urine cytology.   -dawn showed left ureteral stone which was treated  and renal stone  -urine cytology came back normal after stone treated.  Does have a h/o smoking. 1.5 ppd x 20 years, quit last year.      nephrolithaisis  -had a proximal ureteral stone tx with eswl  -still has 1mm LMP stone    F/u 3 months     [facility]: active

## 2017-07-19 ENCOUNTER — LAB VISIT (OUTPATIENT)
Dept: LAB | Facility: HOSPITAL | Age: 42
End: 2017-07-19
Attending: UROLOGY
Payer: COMMERCIAL

## 2017-07-19 DIAGNOSIS — N20.0 NEPHROLITHIASIS: ICD-10-CM

## 2017-07-19 DIAGNOSIS — E29.1 HYPOGONADISM IN MALE: ICD-10-CM

## 2017-07-19 LAB
ALBUMIN SERPL BCP-MCNC: 3.9 G/DL
ALP SERPL-CCNC: 64 U/L
ALT SERPL W/O P-5'-P-CCNC: 67 U/L
ANION GAP SERPL CALC-SCNC: 12 MMOL/L
AST SERPL-CCNC: 29 U/L
BACTERIA UR CULT: NO GROWTH
BILIRUB SERPL-MCNC: 0.5 MG/DL
BUN SERPL-MCNC: 12 MG/DL
CALCIUM SERPL-MCNC: 9.7 MG/DL
CHLORIDE SERPL-SCNC: 105 MMOL/L
CHOLEST/HDLC SERPL: 8.4 {RATIO}
CO2 SERPL-SCNC: 21 MMOL/L
CREAT SERPL-MCNC: 1.2 MG/DL
ERYTHROCYTE [DISTWIDTH] IN BLOOD BY AUTOMATED COUNT: 13.5 %
EST. GFR  (AFRICAN AMERICAN): >60 ML/MIN/1.73 M^2
EST. GFR  (NON AFRICAN AMERICAN): >60 ML/MIN/1.73 M^2
GLUCOSE SERPL-MCNC: 56 MG/DL
HCT VFR BLD AUTO: 49.1 %
HDL/CHOLESTEROL RATIO: 11.9 %
HDLC SERPL-MCNC: 311 MG/DL
HDLC SERPL-MCNC: 37 MG/DL
HGB BLD-MCNC: 16.6 G/DL
LDLC SERPL CALC-MCNC: 224.6 MG/DL
MCH RBC QN AUTO: 32.5 PG
MCHC RBC AUTO-ENTMCNC: 33.9 %
MCV RBC AUTO: 96 FL
NONHDLC SERPL-MCNC: 274 MG/DL
PLATELET # BLD AUTO: 238 K/UL
PMV BLD AUTO: 10.7 FL
POTASSIUM SERPL-SCNC: 4.1 MMOL/L
PROT SERPL-MCNC: 8.1 G/DL
RBC # BLD AUTO: 5.11 M/UL
SODIUM SERPL-SCNC: 138 MMOL/L
TRIGL SERPL-MCNC: 247 MG/DL
TSH SERPL DL<=0.005 MIU/L-ACNC: 1.62 UIU/ML
WBC # BLD AUTO: 14.4 K/UL

## 2017-07-19 PROCEDURE — 80053 COMPREHEN METABOLIC PANEL: CPT

## 2017-07-19 PROCEDURE — 84443 ASSAY THYROID STIM HORMONE: CPT

## 2017-07-19 PROCEDURE — 36415 COLL VENOUS BLD VENIPUNCTURE: CPT

## 2017-07-19 PROCEDURE — 80061 LIPID PANEL: CPT

## 2017-07-19 PROCEDURE — 85027 COMPLETE CBC AUTOMATED: CPT

## 2017-07-21 ENCOUNTER — PATIENT MESSAGE (OUTPATIENT)
Dept: UROLOGY | Facility: CLINIC | Age: 42
End: 2017-07-21

## 2017-08-04 ENCOUNTER — DOCUMENTATION ONLY (OUTPATIENT)
Dept: FAMILY MEDICINE | Facility: CLINIC | Age: 42
End: 2017-08-04

## 2017-08-04 NOTE — PROGRESS NOTES
Pre-Visit Chart Review  For Appointment Scheduled on 8/9/17.    Health Maintenance Due   Topic Date Due    Influenza Vaccine  08/01/2017

## 2017-08-07 ENCOUNTER — PATIENT MESSAGE (OUTPATIENT)
Dept: UROLOGY | Facility: CLINIC | Age: 42
End: 2017-08-07

## 2017-08-09 ENCOUNTER — OFFICE VISIT (OUTPATIENT)
Dept: FAMILY MEDICINE | Facility: CLINIC | Age: 42
End: 2017-08-09
Payer: COMMERCIAL

## 2017-08-09 VITALS
DIASTOLIC BLOOD PRESSURE: 100 MMHG | SYSTOLIC BLOOD PRESSURE: 150 MMHG | BODY MASS INDEX: 32.53 KG/M2 | WEIGHT: 207.25 LBS | HEIGHT: 67 IN | HEART RATE: 102 BPM | TEMPERATURE: 99 F

## 2017-08-09 DIAGNOSIS — F32.1 MODERATE SINGLE CURRENT EPISODE OF MAJOR DEPRESSIVE DISORDER: Primary | ICD-10-CM

## 2017-08-09 DIAGNOSIS — R79.89 ABNORMAL CBC: ICD-10-CM

## 2017-08-09 DIAGNOSIS — F41.1 GAD (GENERALIZED ANXIETY DISORDER): ICD-10-CM

## 2017-08-09 DIAGNOSIS — E78.5 HYPERLIPIDEMIA, UNSPECIFIED HYPERLIPIDEMIA TYPE: ICD-10-CM

## 2017-08-09 DIAGNOSIS — E66.9 OBESITY (BMI 30.0-34.9): ICD-10-CM

## 2017-08-09 PROCEDURE — 99999 PR PBB SHADOW E&M-EST. PATIENT-LVL III: CPT | Mod: PBBFAC,,, | Performed by: FAMILY MEDICINE

## 2017-08-09 PROCEDURE — 3008F BODY MASS INDEX DOCD: CPT | Mod: S$GLB,,, | Performed by: FAMILY MEDICINE

## 2017-08-09 PROCEDURE — 99214 OFFICE O/P EST MOD 30 MIN: CPT | Mod: S$GLB,,, | Performed by: FAMILY MEDICINE

## 2017-08-09 RX ORDER — BUSPIRONE HYDROCHLORIDE 10 MG/1
TABLET ORAL
Qty: 30 TABLET | Refills: 0 | Status: SHIPPED | OUTPATIENT
Start: 2017-08-09 | End: 2017-10-11 | Stop reason: CLARIF

## 2017-08-09 NOTE — PATIENT INSTRUCTIONS
Dr. Jumana Stevens    [facility]    Dr. Urvashi Judge       Call or e-mail clinic in one week to give update on buspar.

## 2017-08-10 ENCOUNTER — LAB VISIT (OUTPATIENT)
Dept: LAB | Facility: HOSPITAL | Age: 42
End: 2017-08-10
Attending: FAMILY MEDICINE
Payer: COMMERCIAL

## 2017-08-10 DIAGNOSIS — R79.89 ABNORMAL CBC: ICD-10-CM

## 2017-08-10 LAB
BASOPHILS # BLD AUTO: 0.07 K/UL
BASOPHILS NFR BLD: 0.4 %
DIFFERENTIAL METHOD: ABNORMAL
EOSINOPHIL # BLD AUTO: 0.7 K/UL
EOSINOPHIL NFR BLD: 4.3 %
ERYTHROCYTE [DISTWIDTH] IN BLOOD BY AUTOMATED COUNT: 15.2 %
HCT VFR BLD AUTO: 49.3 %
HGB BLD-MCNC: 16.2 G/DL
LYMPHOCYTES # BLD AUTO: 3.2 K/UL
LYMPHOCYTES NFR BLD: 20 %
MCH RBC QN AUTO: 31.9 PG
MCHC RBC AUTO-ENTMCNC: 32.9 G/DL
MCV RBC AUTO: 97 FL
MONOCYTES # BLD AUTO: 0.9 K/UL
MONOCYTES NFR BLD: 5.6 %
NEUTROPHILS # BLD AUTO: 11 K/UL
NEUTROPHILS NFR BLD: 69 %
PLATELET # BLD AUTO: 322 K/UL
PMV BLD AUTO: 12.2 FL
RBC # BLD AUTO: 5.08 M/UL
WBC # BLD AUTO: 15.88 K/UL

## 2017-08-10 PROCEDURE — 85025 COMPLETE CBC W/AUTO DIFF WBC: CPT

## 2017-08-10 PROCEDURE — 36415 COLL VENOUS BLD VENIPUNCTURE: CPT | Mod: PO

## 2017-08-11 ENCOUNTER — TELEPHONE (OUTPATIENT)
Dept: PAIN MEDICINE | Facility: CLINIC | Age: 42
End: 2017-08-11

## 2017-08-11 NOTE — TELEPHONE ENCOUNTER
----- Message from Mikaela Burrows sent at 8/11/2017  9:51 AM CDT -----  [facility] / [place] / sekou [phone]  [phone] /asking for  any restrictions for work

## 2017-08-12 NOTE — PROGRESS NOTES
CHIEF COMPLAINT:   Follow up      HISTORY OF PRESENT ILLNESS:  Lee Quintanilla is a 41 y.o. male who presents to clinic for follow up on MDD, MYNOR     1.MDD, MYNOR: Please see my note from 8/9/16 for complete details. He is on effexor xr 225 mg daily but feels that this is not helping with his symptoms. His depression and anxiety has worsened as he lost his job with [employer] and is filing for bankruptcy. He stopped his vyvanse.  He denies any SI.     2. He recently had evidence of leukocytosis on CBC, denies any fever, chills, GI,  or respiratory symptoms.    3. Cholesterol is elevated, he is currently on testosterone supplementation.       REVIEW OF SYSTEMS:  The patient denies any fever, chills, night sweats, headaches, vision changes, difficulty speaking or swallowing, decreased hearing, weight loss, weight gain, chest pain, palpitations, shortness of breath, cough, nausea, vomiting, abdominal pain, dysuria, diarrhea, constipation, hematuria, hematochezia, melena, changes in his hair, skin, nails, numbness or weakness in his extremities, erythema, swelling  over any of his joints, myalgia, swollen glands, easy bruising, fatigue, edema.      MEDICATIONS:   Reviewed and/or reconciled in EPIC    ALLERGIES:  Reviewed and/or reconciled in [facility]    PAST MEDICAL/SURGICAL HISTORY:   Past Medical History:   Diagnosis Date    ADHD (attention deficit hyperactivity disorder)     Arthritis     Asthma     as child only    Back pain     Degeneration of lumbar intervertebral disc 05/2016    Depression     Elevated PSA     Headache     Kidney damage     stage 3 ; d/t aleve abuse    Kidney stone     Neck pain     Numbness and tingling in hands     Numbness and tingling of both legs     Seizures     from inapsine 2002    Sleep apnea     no cpap      Past Surgical History:   Procedure Laterality Date    BACK SURGERY  2016    back surgery    PILONIDAL CYST DRAINAGE      removal of abcess      scrotal     "TYMPANOSTOMY TUBE PLACEMENT         FAMILY HISTORY:    Family History   Problem Relation Age of Onset    Heart disease Mother     Migraines Mother     Hypertension Mother     Early death Father     Diabetes Maternal Aunt     Diabetes Maternal Uncle     Diabetes Maternal Grandfather        SOCIAL HISTORY:    Social History     Social History    Marital status:      Spouse name: N/A    Number of children: N/A    Years of education: N/A     Occupational History    Not on file.     Social History Main Topics    Smoking status: Former Smoker     Packs/day: 0.25     Types: Cigarettes     Quit date: 1/1/2016    Smokeless tobacco: Former User     Quit date: 6/5/2016    Alcohol use Yes      Comment: rare    Drug use: No    Sexual activity: Yes     Partners: Female     Other Topics Concern    Not on file     Social History Narrative    No narrative on file       PHYSICAL EXAM:  VITAL SIGNS:   Vitals:    08/09/17 1527   BP: (!) 150/100   BP Location: Right arm   Patient Position: Sitting   Pulse: 102   Temp: 98.5 °F (36.9 °C)   TempSrc: Oral   Weight: 94 kg (207 lb 3.7 oz)   Height: 5' 7" (1.702 m)     GENERAL:  Patient appears well nourished, sitting on exam table, in no acute distress.  HEENT:  Atraumatic, normocephalic, PERRLA, EOMI, no conjunctival injection, sclerae are anicteric, normal external auditory canals,TMs clear b/l, gross hearing intact to whisper, MMM, no oropharygneal erythema or exudate.  NECK:  Supple, normal ROM, trachea is midline , no supraclavicular or cervical LAD or masses palpated.  Thyroid gland not palpable.  CARDIOVASCULAR:  RRR, normal S1 and S2, no m/r/g.  RESPIRATORY:  CTA b/l, no wheezes, rhonchi, rales.  No increased work of breathing, no  use of accessory muscles.  ABDOMEN:  Soft, nontender, nondistended, normoactive bowel sounds in all four quadrants, no rebound or guarding, no HSM or masses palpated.  Normal percussion.  EXTREMITIES:  2+ DP pulses b/l, no " "edema.  SKIN:  Warm, no lesions on exposed skin.  NEUROMUSCULAR:  Cranial nerves II-XII grossly intact.   No clubbing or cyanosis of digits/nails.  Steady gait.  PSYCH:  Patient is alert and oriented to person, time, place. They are appropriately dressed and groomed. There is normal eye contact. Rate and tone of speech is normal. Normal insight, judgement. Normal thought content and process. He describes his mood as "ok" affect is depressed.         ASSESSMENT/PLAN: This is a 41 y.o. male who presents to clinic for evaluation of the following concerns  1. Major depressive disorder, single episode, moderate, MYNOR  Continue with  effexor 225 mg daily and add buspar until he is able to establish care with psychiatry.     2. Abnormal CBC  - CBC auto differential; Future    3. Obesity (BMI 30.0-34.9)  See below    4.Hyperlipidemia, unspecified hyperlipidemia type  Patient does not wish to start a statin at this time, will eat low fat/cholesterol diet, work on increasing exercise and recheck lipid panel in 3 months.         Patient readiness: acceptance and barriers:none    During the course of the visit the patient was educated and counseled about the following:     Obesity:Not addressed at today's visit      Goals: Obesity: Reduce calorie intake and BMI     Did patient meet goals/outcomes: No    The following self management tools provided: declined    Patient Instructions (the written plan) was given to the patient/family.     Time spent with patient: 30 minutes            Emilie Modi MD  "

## 2017-08-14 ENCOUNTER — PATIENT MESSAGE (OUTPATIENT)
Dept: FAMILY MEDICINE | Facility: CLINIC | Age: 42
End: 2017-08-14

## 2017-08-14 ENCOUNTER — TELEPHONE (OUTPATIENT)
Dept: PAIN MEDICINE | Facility: CLINIC | Age: 42
End: 2017-08-14

## 2017-08-14 NOTE — TELEPHONE ENCOUNTER
----- Message from Belen Corral sent at 8/14/2017 12:26 PM CDT -----  Contact: Reg with [employer] with [employer] called advising that he received a call back from Dr. Fraser's nurse yesterday advising that the patient does not have any updated work restrictions.  Reg would like to know if there are any work restrictions.  Please call Reg back at [phone] ext [phone].  Thank you!

## 2017-08-16 ENCOUNTER — PATIENT MESSAGE (OUTPATIENT)
Dept: FAMILY MEDICINE | Facility: CLINIC | Age: 42
End: 2017-08-16

## 2017-08-16 ENCOUNTER — TELEPHONE (OUTPATIENT)
Dept: NEUROSURGERY | Facility: CLINIC | Age: 42
End: 2017-08-16

## 2017-08-16 DIAGNOSIS — D72.829 LEUKOCYTOSIS, UNSPECIFIED TYPE: Primary | ICD-10-CM

## 2017-08-16 NOTE — TELEPHONE ENCOUNTER
[employer] called regarding pt restrictions. Per svetlana the restrictions have not changed since may but he does not have the restrictions from may. Please call back at [phone] ext [phone]

## 2017-08-16 NOTE — TELEPHONE ENCOUNTER
Please schedule CBC, CMP, UA, UCX and CXR for next week. If everything is negative but WBC is still high will refer to hematology.

## 2017-08-16 NOTE — TELEPHONE ENCOUNTER
The total white blood cell count is slightly higher than usual and the neutrophil and eosinophil counts are also slightly elevated.  He has no signes of infection?  URI, urinary, or GI?  He hasn't been on any steroid therapy?  The testosterone and arimidex would cause the opposite effect.

## 2017-08-17 ENCOUNTER — PATIENT MESSAGE (OUTPATIENT)
Dept: FAMILY MEDICINE | Facility: CLINIC | Age: 42
End: 2017-08-17

## 2017-08-23 ENCOUNTER — CLINICAL SUPPORT (OUTPATIENT)
Dept: FAMILY MEDICINE | Facility: CLINIC | Age: 42
End: 2017-08-23
Payer: COMMERCIAL

## 2017-08-23 ENCOUNTER — HOSPITAL ENCOUNTER (OUTPATIENT)
Dept: RADIOLOGY | Facility: CLINIC | Age: 42
Discharge: HOME | End: 2017-08-23
Attending: FAMILY MEDICINE
Payer: COMMERCIAL

## 2017-08-23 VITALS — DIASTOLIC BLOOD PRESSURE: 82 MMHG | SYSTOLIC BLOOD PRESSURE: 138 MMHG

## 2017-08-23 DIAGNOSIS — D72.829 LEUKOCYTOSIS, UNSPECIFIED TYPE: ICD-10-CM

## 2017-08-23 PROCEDURE — 71020 XR CHEST PA AND LATERAL: CPT | Mod: 26,,, | Performed by: RADIOLOGY

## 2017-08-23 PROCEDURE — 71020 XR CHEST PA AND LATERAL: CPT | Mod: TC,PO

## 2017-08-25 ENCOUNTER — HOSPITAL ENCOUNTER (OUTPATIENT)
Dept: RADIOLOGY | Facility: HOSPITAL | Age: 42
Discharge: HOME | End: 2017-08-25
Attending: FAMILY MEDICINE
Payer: COMMERCIAL

## 2017-08-25 ENCOUNTER — PATIENT MESSAGE (OUTPATIENT)
Dept: FAMILY MEDICINE | Facility: CLINIC | Age: 42
End: 2017-08-25

## 2017-08-25 ENCOUNTER — TELEPHONE (OUTPATIENT)
Dept: FAMILY MEDICINE | Facility: CLINIC | Age: 42
End: 2017-08-25

## 2017-08-25 DIAGNOSIS — Z87.442 HISTORY OF NEPHROLITHIASIS: ICD-10-CM

## 2017-08-25 DIAGNOSIS — R10.32 LEFT GROIN PAIN: ICD-10-CM

## 2017-08-25 DIAGNOSIS — R50.9 FEVER, UNSPECIFIED FEVER CAUSE: Primary | ICD-10-CM

## 2017-08-25 DIAGNOSIS — R50.9 FEVER, UNSPECIFIED FEVER CAUSE: ICD-10-CM

## 2017-08-25 PROCEDURE — 74176 CT ABD & PELVIS W/O CONTRAST: CPT | Mod: TC

## 2017-08-25 PROCEDURE — 74176 CT ABD & PELVIS W/O CONTRAST: CPT | Mod: 26,,, | Performed by: RADIOLOGY

## 2017-08-25 RX ORDER — CIPROFLOXACIN 500 MG/1
500 TABLET ORAL EVERY 12 HOURS
Qty: 14 TABLET | Refills: 0 | Status: SHIPPED | OUTPATIENT
Start: 2017-08-25 | End: 2017-09-01

## 2017-08-25 NOTE — TELEPHONE ENCOUNTER
WBC is still elevated but lower than previously.    UA demonstrates blood but culture is negative.    His kidney function is decreased    His chest xray was normal.     He needs to stay hydrated, avoid NSAIDS, and can take tylenol for any fever, pain. He needs to have repeat CMP next week and CBC next week. He needs a stat renal CT to look for stones again.     Am empirically starting antibiotics. If his symptoms worsen he needs to go to the eR

## 2017-08-28 ENCOUNTER — PATIENT MESSAGE (OUTPATIENT)
Dept: FAMILY MEDICINE | Facility: CLINIC | Age: 42
End: 2017-08-28

## 2017-08-28 NOTE — TELEPHONE ENCOUNTER
I was out of the office on Friday afternoon and he would have been notified by the on call physician if there was an abnormality. I am sorry that this was not communicated to him.    CT was negative for any renal or bladder stones. However he needs to complete the entire course of antibiotics.     The repeat CBC and CMP were supposed to be scheduled for this week-but aren't. Can we please get them scheduled.    How is he feeling?

## 2017-08-29 ENCOUNTER — LAB VISIT (OUTPATIENT)
Dept: LAB | Facility: HOSPITAL | Age: 42
End: 2017-08-29
Attending: FAMILY MEDICINE
Payer: COMMERCIAL

## 2017-08-29 DIAGNOSIS — R50.9 FEVER, UNSPECIFIED FEVER CAUSE: ICD-10-CM

## 2017-08-29 DIAGNOSIS — Z87.442 HISTORY OF NEPHROLITHIASIS: ICD-10-CM

## 2017-08-29 DIAGNOSIS — R10.32 LEFT GROIN PAIN: ICD-10-CM

## 2017-08-29 LAB
ALBUMIN SERPL BCP-MCNC: 3.6 G/DL
ALP SERPL-CCNC: 57 U/L
ALT SERPL W/O P-5'-P-CCNC: 38 U/L
ANION GAP SERPL CALC-SCNC: 12 MMOL/L
AST SERPL-CCNC: 18 U/L
BASOPHILS # BLD AUTO: 0.04 K/UL
BASOPHILS NFR BLD: 0.5 %
BILIRUB SERPL-MCNC: 0.5 MG/DL
BUN SERPL-MCNC: 12 MG/DL
CALCIUM SERPL-MCNC: 9.3 MG/DL
CHLORIDE SERPL-SCNC: 106 MMOL/L
CO2 SERPL-SCNC: 21 MMOL/L
CREAT SERPL-MCNC: 1.6 MG/DL
DIFFERENTIAL METHOD: ABNORMAL
EOSINOPHIL # BLD AUTO: 0.5 K/UL
EOSINOPHIL NFR BLD: 6.3 %
ERYTHROCYTE [DISTWIDTH] IN BLOOD BY AUTOMATED COUNT: 14.9 %
EST. GFR  (AFRICAN AMERICAN): >60 ML/MIN/1.73 M^2
EST. GFR  (NON AFRICAN AMERICAN): 52.4 ML/MIN/1.73 M^2
GLUCOSE SERPL-MCNC: 84 MG/DL
HCT VFR BLD AUTO: 49.2 %
HGB BLD-MCNC: 16.4 G/DL
LYMPHOCYTES # BLD AUTO: 3 K/UL
LYMPHOCYTES NFR BLD: 34.8 %
MCH RBC QN AUTO: 31.5 PG
MCHC RBC AUTO-ENTMCNC: 33.3 G/DL
MCV RBC AUTO: 95 FL
MONOCYTES # BLD AUTO: 0.5 K/UL
MONOCYTES NFR BLD: 5.9 %
NEUTROPHILS # BLD AUTO: 4.4 K/UL
NEUTROPHILS NFR BLD: 51.7 %
PLATELET # BLD AUTO: 355 K/UL
PMV BLD AUTO: 12 FL
POTASSIUM SERPL-SCNC: 4.6 MMOL/L
PROT SERPL-MCNC: 8.1 G/DL
RBC # BLD AUTO: 5.2 M/UL
SODIUM SERPL-SCNC: 139 MMOL/L
WBC # BLD AUTO: 8.48 K/UL

## 2017-08-29 PROCEDURE — 36415 COLL VENOUS BLD VENIPUNCTURE: CPT | Mod: PO

## 2017-08-29 PROCEDURE — 85025 COMPLETE CBC W/AUTO DIFF WBC: CPT

## 2017-08-29 PROCEDURE — 80053 COMPREHEN METABOLIC PANEL: CPT

## 2017-08-30 ENCOUNTER — PATIENT MESSAGE (OUTPATIENT)
Dept: FAMILY MEDICINE | Facility: CLINIC | Age: 42
End: 2017-08-30

## 2017-08-30 DIAGNOSIS — N28.9 DECREASED RENAL FUNCTION: Primary | ICD-10-CM

## 2017-09-01 ENCOUNTER — PATIENT MESSAGE (OUTPATIENT)
Dept: FAMILY MEDICINE | Facility: CLINIC | Age: 42
End: 2017-09-01

## 2017-09-01 NOTE — TELEPHONE ENCOUNTER
Please see e-mail I sent to patient and call him on Tuesday to set up repeat bmp for 1-2 weeks to make sure renal function has normalized

## 2017-09-07 NOTE — TELEPHONE ENCOUNTER
I would recommend seeing ID/immunology as we cannot find a reason for his infection or fever. Let me know if we can schedule. Have Dr. Collins, Dr. Montano, Dr. Moya in [place] and then [facility].

## 2017-09-08 ENCOUNTER — PATIENT MESSAGE (OUTPATIENT)
Dept: FAMILY MEDICINE | Facility: CLINIC | Age: 42
End: 2017-09-08

## 2017-09-08 DIAGNOSIS — R50.9 RECURRENT FEVER OF UNKNOWN CAUSE: Primary | ICD-10-CM

## 2017-09-11 ENCOUNTER — TELEPHONE (OUTPATIENT)
Dept: FAMILY MEDICINE | Facility: CLINIC | Age: 42
End: 2017-09-11

## 2017-09-11 NOTE — TELEPHONE ENCOUNTER
Left message on machine for pt to call back. Received fax from Dr. Montano and Dr. Moya's office. They will not be able to see patient for over 4 weeks due to one of the providers out on maternity leave. Pt will have to go to [facility] [place]. Thanks, Ellyn

## 2017-09-12 NOTE — TELEPHONE ENCOUNTER
----- Message from Abigail Tsang sent at 9/12/2017 10:10 AM CDT -----  Contact: self  Patient called regarding missed call. Please contact [phone] (home)

## 2017-09-12 NOTE — TELEPHONE ENCOUNTER
Called pt and advised Dr. Moya and Dr. Montano did not have any appts. Scheduled at [facility] [place] for next week. Thanks, Ellyn

## 2017-09-25 ENCOUNTER — OFFICE VISIT (OUTPATIENT)
Dept: INFECTIOUS DISEASES | Facility: CLINIC | Age: 42
End: 2017-09-25
Payer: COMMERCIAL

## 2017-09-25 ENCOUNTER — LAB VISIT (OUTPATIENT)
Dept: LAB | Facility: HOSPITAL | Age: 42
End: 2017-09-25
Attending: INTERNAL MEDICINE
Payer: COMMERCIAL

## 2017-09-25 VITALS — BODY MASS INDEX: 35.33 KG/M2 | WEIGHT: 225.06 LBS | HEIGHT: 67 IN | TEMPERATURE: 99 F

## 2017-09-25 DIAGNOSIS — K46.9 HERNIA: ICD-10-CM

## 2017-09-25 DIAGNOSIS — R50.9 FUO (FEVER OF UNKNOWN ORIGIN): Primary | ICD-10-CM

## 2017-09-25 DIAGNOSIS — R50.9 FUO (FEVER OF UNKNOWN ORIGIN): ICD-10-CM

## 2017-09-25 PROBLEM — R31.29 MICROHEMATURIA: Status: RESOLVED | Noted: 2017-02-20 | Resolved: 2017-09-25

## 2017-09-25 LAB
25(OH)D3+25(OH)D2 SERPL-MCNC: 13 NG/ML
ALBUMIN SERPL BCP-MCNC: 3.5 G/DL
ALP SERPL-CCNC: 61 U/L
ALT SERPL W/O P-5'-P-CCNC: 57 U/L
ANION GAP SERPL CALC-SCNC: 8 MMOL/L
AST SERPL-CCNC: 22 U/L
BASOPHILS # BLD AUTO: 0.06 K/UL
BASOPHILS NFR BLD: 0.5 %
BILIRUB SERPL-MCNC: 0.3 MG/DL
BUN SERPL-MCNC: 8 MG/DL
CALCIUM SERPL-MCNC: 9 MG/DL
CHLORIDE SERPL-SCNC: 109 MMOL/L
CO2 SERPL-SCNC: 23 MMOL/L
CREAT SERPL-MCNC: 1.3 MG/DL
CRP SERPL-MCNC: 8.7 MG/L
DIFFERENTIAL METHOD: ABNORMAL
EOSINOPHIL # BLD AUTO: 0.6 K/UL
EOSINOPHIL NFR BLD: 5.3 %
ERYTHROCYTE [DISTWIDTH] IN BLOOD BY AUTOMATED COUNT: 15.1 %
ERYTHROCYTE [SEDIMENTATION RATE] IN BLOOD BY WESTERGREN METHOD: 1 MM/HR
EST. GFR  (AFRICAN AMERICAN): >60 ML/MIN/1.73 M^2
EST. GFR  (NON AFRICAN AMERICAN): >60 ML/MIN/1.73 M^2
GLUCOSE SERPL-MCNC: 88 MG/DL
HCT VFR BLD AUTO: 47 %
HGB BLD-MCNC: 15.3 G/DL
LYMPHOCYTES # BLD AUTO: 2.8 K/UL
LYMPHOCYTES NFR BLD: 24.6 %
MCH RBC QN AUTO: 30.5 PG
MCHC RBC AUTO-ENTMCNC: 32.6 G/DL
MCV RBC AUTO: 94 FL
MONOCYTES # BLD AUTO: 0.7 K/UL
MONOCYTES NFR BLD: 5.8 %
NEUTROPHILS # BLD AUTO: 7 K/UL
NEUTROPHILS NFR BLD: 62.5 %
PLATELET # BLD AUTO: 375 K/UL
PMV BLD AUTO: 12 FL
POTASSIUM SERPL-SCNC: 4 MMOL/L
PROT SERPL-MCNC: 7.7 G/DL
RBC # BLD AUTO: 5.01 M/UL
RHEUMATOID FACT SERPL-ACNC: <10 IU/ML
SODIUM SERPL-SCNC: 140 MMOL/L
TSH SERPL DL<=0.005 MIU/L-ACNC: 1.54 UIU/ML
WBC # BLD AUTO: 11.24 K/UL

## 2017-09-25 PROCEDURE — 85025 COMPLETE CBC W/AUTO DIFF WBC: CPT

## 2017-09-25 PROCEDURE — 86644 CMV ANTIBODY: CPT

## 2017-09-25 PROCEDURE — 85651 RBC SED RATE NONAUTOMATED: CPT

## 2017-09-25 PROCEDURE — 3008F BODY MASS INDEX DOCD: CPT | Mod: S$GLB,,, | Performed by: INTERNAL MEDICINE

## 2017-09-25 PROCEDURE — 86592 SYPHILIS TEST NON-TREP QUAL: CPT

## 2017-09-25 PROCEDURE — 99205 OFFICE O/P NEW HI 60 MIN: CPT | Mod: S$GLB,,, | Performed by: INTERNAL MEDICINE

## 2017-09-25 PROCEDURE — 84443 ASSAY THYROID STIM HORMONE: CPT

## 2017-09-25 PROCEDURE — 86665 EPSTEIN-BARR CAPSID VCA: CPT | Mod: 59

## 2017-09-25 PROCEDURE — 86431 RHEUMATOID FACTOR QUANT: CPT

## 2017-09-25 PROCEDURE — 86140 C-REACTIVE PROTEIN: CPT

## 2017-09-25 PROCEDURE — 80053 COMPREHEN METABOLIC PANEL: CPT

## 2017-09-25 PROCEDURE — 86645 CMV ANTIBODY IGM: CPT

## 2017-09-25 PROCEDURE — 99999 PR PBB SHADOW E&M-EST. PATIENT-LVL III: CPT | Mod: PBBFAC,,, | Performed by: INTERNAL MEDICINE

## 2017-09-25 PROCEDURE — 86038 ANTINUCLEAR ANTIBODIES: CPT

## 2017-09-25 PROCEDURE — 86665 EPSTEIN-BARR CAPSID VCA: CPT

## 2017-09-25 PROCEDURE — 82306 VITAMIN D 25 HYDROXY: CPT

## 2017-09-25 PROCEDURE — 86703 HIV-1/HIV-2 1 RESULT ANTBDY: CPT

## 2017-09-25 NOTE — LETTER
September 25, 2017      JOSE M Mueller  [address]  [place] [address]           [facility]  [address]  [facility] [address]  Phone: [phone]  Fax: [phone]          Patient: Lee Quintanilla   MR Number: [id number]   YOB: 1975   Date of Visit: 9/25/2017       Dear Tamia Hector:    Thank you for referring Lee Quintanilla to me for evaluation. Attached you will find relevant portions of my assessment and plan of care.    If you have questions, please do not hesitate to call me. I look forward to following Lee Quintanilla along with you.    Sincerely,    Rocio Mcnally MD    Enclosure  CC:  No Recipients    If you would like to receive this communication electronically, please contact [email or web address] or [phone] to request more information on [employer] Link access.    For providers and/or their staff who would like to refer a patient to [facility], please contact us through our one-stop-shop provider referral line, [facility], at [phone].    If you feel you have received this communication in error or would no longer like to receive these types of communications, please e-mail [email or web address]

## 2017-09-25 NOTE — PROGRESS NOTES
"Subjective:      Patient ID: Lee Quintanilla is a 42 y.o. male.    Chief Complaint:Fever (for 5 months)      History of Present Illness  HPI     Fever    Additional comments: for 5 months       Last edited by Sirena Galvin MA on 9/25/2017  1:13 PM. (History)      "feels bad".  Last felt well about 4-5 months ago.  Currently not working, had back fusion June 2016 by Dr. Simms at [facility].  L5-S1 fusion with persistent pain.  Saw someone in Dr. Santa's group who plans on hardware removal in May 2018.  Previously worked as  for [employer].  Unable to work because of back pain, depression/ anxiety.  Long standing history of depression, worse since the surgery.  , no children.  Lives alone, 2 dogs.  [place]ives in city, parents close by.      Not seeing anyone for his depression, on antidepressants since teenage years.  No prior psychiatric evaluation.  No suicidal or homicidal ideations.      States that he has fever that begins in the afternoon, 99.5- 100.5.  Goes to sleep and feels poorly when he wakes up.    C/o night sweats, no weight loss.  15# weight gain in last month.  Generally comfort food diet.  No alcohol routinely.      Also, complains of groin pain, worse with activity.      History of kidney stones, s/p stent placement in past.  CT shows small R inguinal hernia.    History of leukocytosis which has resolved.    Had trial of cipro which did not resolve his symptoms.    Review of Systems   Constitution: Positive for chills, fever, weakness, malaise/fatigue, night sweats and weight gain. Negative for decreased appetite and weight loss.   HENT: Negative for congestion, ear pain, hearing loss, hoarse voice, sore throat and tinnitus.    Eyes: Negative for blurred vision, redness and visual disturbance.   Cardiovascular: Positive for palpitations. Negative for chest pain and leg swelling.   Respiratory: Negative for cough, hemoptysis, shortness of breath and sputum production.  "   Hematologic/Lymphatic: Negative for adenopathy. Bruises/bleeds easily.   Skin: Negative for dry skin, itching, rash and suspicious lesions.   Musculoskeletal: Positive for back pain, joint pain and myalgias. Negative for neck pain.   Gastrointestinal: Positive for heartburn. Negative for abdominal pain, constipation, diarrhea, nausea and vomiting.   Genitourinary: Positive for dysuria and hematuria. Negative for flank pain, frequency, hesitancy and urgency.   Neurological: Negative for dizziness, headaches, numbness and paresthesias.   Psychiatric/Behavioral: Positive for depression and memory loss. The patient has insomnia and is nervous/anxious.      Objective:   Physical Exam   Constitutional: He is oriented to person, place, and time. He appears well-developed and well-nourished.   HENT:   Head: Normocephalic and atraumatic.   Right Ear: External ear normal.   Left Ear: External ear normal.   Mouth/Throat: Oropharynx is clear and moist.   Eyes: Conjunctivae and EOM are normal. Pupils are equal, round, and reactive to light.   Neck: Normal range of motion. Neck supple. No thyromegaly present.   Cardiovascular: Normal rate, regular rhythm and normal heart sounds.    No murmur heard.  Pulmonary/Chest: Effort normal and breath sounds normal. He has no wheezes. He has no rales.   Abdominal: Soft. Bowel sounds are normal. He exhibits no mass. There is no tenderness. There is no rebound.   Musculoskeletal: Normal range of motion.   Lymphadenopathy:     He has no cervical adenopathy.   Neurological: He is alert and oriented to person, place, and time.   Skin: Skin is warm and dry.   Psychiatric: He has a normal mood and affect. His behavior is normal.   Vitals reviewed.    Assessment:       1. FUO (fever of unknown origin)    2. Hernia          Plan:           Lee was seen today for fever.    Diagnoses and all orders for this visit:    FUO (fever of unknown origin)  -     CBC auto differential; Future  -      Comprehensive metabolic panel; Future  -     C-reactive protein; Future  -     Sedimentation rate, manual; Future  -     RPR; Future  -     HIV-1 and HIV-2 antibodies; Future  -     TSH; Future  -     Vitamin D; Future  -     Slava-Gtz virus VCA, IgG; Future  -     Slava-Barr virus VCA, IgM; Future  -     Cytomegalovirus (Cmv) Ab, Igm; Future  -     Cytomegalovirus antibody, IgG; Future  -     Rheumatoid factor; Future  -     RAVEN; Future    Hernia  -     Ambulatory referral to General Surgery    RTC 3 week    Consider psych consult for anxiety and depression.

## 2017-09-26 LAB
ANA SER QL IF: NORMAL
CMV IGG SERPL QL IA: NORMAL
EBV VCA IGG SER QL IA: POSITIVE
EBV VCA IGM SER QL IA: NEGATIVE
HIV 1+2 AB+HIV1 P24 AG SERPL QL IA: NEGATIVE
RPR SER QL: NORMAL

## 2017-09-27 LAB — CMV IGM TITR SERPL: <8 U/ML

## 2017-10-04 ENCOUNTER — OFFICE VISIT (OUTPATIENT)
Dept: SURGERY | Facility: CLINIC | Age: 42
End: 2017-10-04
Payer: COMMERCIAL

## 2017-10-04 VITALS
DIASTOLIC BLOOD PRESSURE: 102 MMHG | BODY MASS INDEX: 33.74 KG/M2 | SYSTOLIC BLOOD PRESSURE: 169 MMHG | HEART RATE: 113 BPM | TEMPERATURE: 99 F | HEIGHT: 67 IN | WEIGHT: 215 LBS

## 2017-10-04 DIAGNOSIS — K40.20 NON-RECURRENT BILATERAL INGUINAL HERNIA WITHOUT OBSTRUCTION OR GANGRENE: Primary | ICD-10-CM

## 2017-10-04 PROCEDURE — 99999 PR PBB SHADOW E&M-EST. PATIENT-LVL III: CPT | Mod: PBBFAC,,, | Performed by: SURGERY

## 2017-10-04 PROCEDURE — 99203 OFFICE O/P NEW LOW 30 MIN: CPT | Mod: S$GLB,,, | Performed by: SURGERY

## 2017-10-04 RX ORDER — TESTOSTERONE 16.2 MG/G
GEL TRANSDERMAL
Qty: 75 G | Refills: 2 | Status: SHIPPED | OUTPATIENT
Start: 2017-10-04 | End: 2017-10-26 | Stop reason: ALTCHOICE

## 2017-10-04 NOTE — PATIENT INSTRUCTIONS
What Is a Hernia?    A hernia is when an organ or tissue pushes through a weak area in the belly (abdominal) wall. This weak area may be present at birth. Or it may be caused by abdominal strain over time. If not treated, a hernia can get worse with time and physical stress.  When a bulge forms  When there is a weak area in the abdominal wall, an organ or tissue can push outward. This often causes a bulge that you can see under your skin. The bulge may get bigger when you stand up. It may go away when you lie down. You may also feel some pressure or mild pain when lifting, coughing, urinating, or doing other activities.  Types of hernias  The type of hernia you have depends on its location. Most hernias form in the groin at or near the internal ring. This is the entrance to a canal between the abdomen and groin. Hernias can also occur in the abdomen, thigh, or genitals.  · An incisional hernia occurs at the site of a previous surgical incision.  · An umbilical hernia occurs at the navel.  · An indirect inguinal hernia occurs in the groin at the internal ring.  · A direct inguinal hernia occurs in the groin near the internal ring.  · A femoral hernia occurs just below the groin.  · An epigastric hernia occurs in the upper abdomen at the midline.  Diagnosis  In most cases, your healthcare provider can diagnose a hernia by doing a physical exam.  In some cases it might not be clear why you have a swelling in the belly wall. Then your provider may order an imaging test such as an ultrasound. This can help with the diagnosis.  Surgery  A hernia will not heal on its own. Surgery is needed to fix the weak spot in the abdominal wall. If not treated, a hernia can get larger. It can also cause serious health problems. The good news is that hernia surgery can be done quickly and safely. In some cases, you can go home the same day as your surgery.   When to call your provider  Call your healthcare provider right away if the  swelling around your hernia becomes larger, firmer, or more painful. These may be signs that your intestines are stuck in the abdominal wall. This is an emergency. The hernia must be repaired right away to avoid serious problems.  Date Last Reviewed: 7/1/2016  © 2049-4001 [employer]. [address] [place] [address]. All rights reserved. This information is not intended as a substitute for professional medical care. Always follow your healthcare professional's instructions.        How a Hernia Develops  Although a hernia bulge may appear suddenly, hernias often take years to develop. They grow larger as pressure inside the body presses the intestines or other tissues out through a weak area in the abdominal wall, often at the belly button or a site of previous surgery. With time, these tissues can bulge out beneath the skin.  Stages of hernia development    The wall weakens or tears. The abdominal lining bulges out through a weak area and begins to form a hernia sac. The sac may contain fat, intestine, or other tissues. At this point, the hernia may or may not cause a visible bulge.        The intestine pushes into the sac. As the intestine pushes further into the sac, it forms a visible bulge. The bulge may flatten when you lie down or push against it. This is called a reducible hernia and does not cause any immediate danger.             The intestine may become trapped. The sac containing the intestine may become trapped by muscle (incarcerated). If this happens, you wont be able to flatten the bulge. You may also have pain. Prompt treatment is needed.        The intestine may become strangulated. If the intestine is tightly trapped, it becomes strangulated. The strangulated area loses blood supply and may die. This can cause severe pain and block the intestine. Emergency surgery is needed.   Date Last Reviewed: 8/1/2016  © 8113-1996 [employer]. [address] [place] [address],  JOSE M Chaudhry [address]. All rights reserved. This information is not intended as a substitute for professional medical care. Always follow your healthcare professional's instructions.        Having Hernia Surgery: Patch Repair  Surgery treats a hernia by repairing the weakness in the abdominal wall. An incision is made so the surgeon has a direct view of the hernia. The repair is then done through this incision (open surgery). To repair the defect, special mesh materials are used to patch the weak area and make a tension-free repair. Follow your healthcare providers advice on how to get ready for the procedure. You can usually go home the same day as your surgery. In some cases, though, you may need to stay in the hospital overnight.  Getting ready for surgery  Your healthcare provider will talk with you about preparing for surgery. Follow all the instructions youre given and be sure to:   · Tell your healthcare provider about any medicines, supplements, or herbs you take. This includes both prescription and over-the-counter items.  · Stop taking aspirin, ibuprofen, naproxen and other NSAIDs as directed.  · Arrange for an adult family member or friend to give you a ride home after surgery.  · Stop smoking. Smoking affects blood flow and can slow healing.  · Gently wash the surgical area the night before surgery.  · Follow any directions you are given for not eating or drinking before surgery.     Repair in Front           Repair in Back           Combination Repair      The day of surgery  Arrive at the hospital or surgical center at your scheduled time. Youll be asked to change into a patient gown. Youll then be given an IV to provide fluids and medicine. Shortly before surgery, an anesthesiologist or nurse anesthetist will talk with you. He or she will explain the types of anesthesia used to prevent pain during surgery. You will have one or more of the following:  · Monitored sedation to make you relaxed and  sleepy.  · Local anesthesia to numb the surgical site.  · Regional anesthesia to numb specific areas of your body.  · General anesthesia to let you sleep during surgery.  During the surgery  Most hernias are treated using tension-free repairs. This is surgery that uses special mesh materials to repair the weak area. Unlike traditional repairs, the abdominal fascia (tissue around the muscle that gives strength to the abdominal wall) isnt sewn together. Instead, the mesh covers the weak area like a patch. This repairs the defect without tension on the muscles. It also makes it less likely to happen again. The mesh is made of strong, flexible plastic that stays in the body. Over time, nearby tissues grow into the mesh to strengthen the repair.  After surgery  When the procedure is over, youll be taken to the recovery area to rest. Your blood pressure and heart rate will be monitored. Youll also have a bandage over the surgical site. To help reduce discomfort, youll be given pain medicines. You may also be given breathing exercises to keep your lungs clear. Later, youll be asked to get up and walk. This helps prevent blood clots in the legs. You can go home when your healthcare provider says youre ready.     Risks and complications  Hernia surgery is safe, but does have risks including:  · Bleeding  · Infection  · Anesthesia risks  · Mesh complications  · Inability to urinate   · Bowel or bladder injury   · Numbness or pain in the groin or leg  · Risk the hernia will happen again  · Damage to the testicles or testicular function      Date Last Reviewed: 10/1/2016  © 6104-1186 [employer]. [address] [place] [address], [place] [address]. All rights reserved. This information is not intended as a substitute for professional medical care. Always follow your healthcare professional's instructions.        After Hernia Surgery    You can usually go home the same day as surgery. If you had surgery to  repair ventral or incisional hernia, you may need to stay in the hospital overnight. To speed healing, take an active role in your recovery. The tips below can help.  Reducing swelling  Early on, its common for the area around your incision to be swollen, bruised, and sore. To reduce swelling, put an ice pack or bag of frozen peas in a thin towel. Place the towel on the swollen area 3 to 5 times a day for 15 to 20 minutes at a time.  Managing pain  Take any prescribed pain medicines as directed. Be aware that some pain medicines can cause constipation. So your healthcare provider may also suggest a laxative or stool softener.  Returning to normal  You can return to your normal routine as soon as you feel able. Just take it easy and follow these guidelines:  · Take short walks to improve circulation.  · Avoid heavy lifting for at least a week.  · Ask your healthcare provider when you can drive and return to work.  · You can have sex again when you feel ready.  Follow-up care  Be sure to keep all follow-up appointments with your healthcare provider. These ensure youre healing well. During visits, your stitches, staples, or bandage may be removed.  When to call your healthcare provider   Call your healthcare provider if you have any of the following:  · A large amount of swelling or bruising (some testicular swelling and bruising is normal)  · Fever of 100.4°F (38°C) or higher, or as directed by your healthcare provider  · Pain, redness, bleeding, or fluid from the incision that gets worse  · Trouble urinating  · Constipation  · Vomiting   Date Last Reviewed: 10/1/2016  © 5075-1515 [employer]. [address] [place] [address], [place] [address]. All rights reserved. This information is not intended as a substitute for professional medical care. Always follow your healthcare professional's instructions.

## 2017-10-04 NOTE — LETTER
October 4, 2017      Rocio Mcnally MD  [address]  [place] [address]           [facility]  [address]  [facility] [address]  Phone: [phone]          Patient: Lee Quintanilla   MR Number: [id number]   YOB: 1975   Date of Visit: 10/4/2017       Dear Dr. Rocio Mcnally:    Thank you for referring Lee Quintanilla to me for evaluation. Attached you will find relevant portions of my assessment and plan of care.    If you have questions, please do not hesitate to call me. I look forward to following Lee Quintanilla along with you.    Sincerely,    Vincent Waller MD    Enclosure  CC:  No Recipients    If you would like to receive this communication electronically, please contact [email or web address] or [phone] to request more information on [employer] Link access.    For providers and/or their staff who would like to refer a patient to [facility], please contact us through our one-stop-shop provider referral line, [facility], at [phone].    If you feel you have received this communication in error or would no longer like to receive these types of communications, please e-mail [email or web address]

## 2017-10-04 NOTE — PROGRESS NOTES
History & Physical    SUBJECTIVE:     History of Present Illness:  Patient is a 42 y.o. male presents with bilaterl groin pain left greater than right. Onset of symptoms was gradual starting several years ago with gradually worsening course since that time. Patient denies obstructive symptoms. Symptoms are aggravated by activity. Symptoms improve with rest.      Chief Complaint   Patient presents with    Consult       Review of patient's allergies indicates:   Allergen Reactions    Inapsine [droperidol] Anaphylaxis     seizures    Pcn [penicillins]     Bactrim [sulfamethoxazole-trimethoprim] Rash     Dry red rash all over       Current Outpatient Prescriptions   Medication Sig Dispense Refill    anastrozole (ARIMIDEX) 1 mg Tab Take 1 tablet (1 mg total) by mouth once daily. 30 tablet 3    ANDROGEL 20.25 mg/1.25 gram (1.62 %) GlPm PLACE 40.5MG ONTO THE SKIN ONCE A DAY 75 g 2    busPIRone (BUSPAR) 10 MG tablet 1 tablet PO up to three times daily as needed for anxiety 30 tablet 0    venlafaxine 225 mg TR24 Take 225 mg by mouth once daily. 90 each 3     No current facility-administered medications for this visit.        Past Medical History:   Diagnosis Date    ADHD (attention deficit hyperactivity disorder)     Arthritis     Asthma     as child only    Back pain     Degeneration of lumbar intervertebral disc 05/2016    Depression     Elevated PSA     Headache     Kidney damage     stage 3 ; d/t aleve abuse    Kidney stone     Neck pain     Numbness and tingling in hands     Numbness and tingling of both legs     Seizures     from inapsine 2002    Sleep apnea     no cpap     Past Surgical History:   Procedure Laterality Date    BACK SURGERY  2016    back surgery    PILONIDAL CYST DRAINAGE      removal of abcess      scrotal    TYMPANOSTOMY TUBE PLACEMENT       Family History   Problem Relation Age of Onset    Heart disease Mother     Migraines Mother     Hypertension Mother     Early death  "Father     Diabetes Maternal Aunt     Diabetes Maternal Uncle     Diabetes Maternal Grandfather      Social History   Substance Use Topics    Smoking status: Former Smoker     Packs/day: 0.25     Types: Cigarettes     Quit date: 1/1/2016    Smokeless tobacco: Former User     Quit date: 6/5/2016    Alcohol use Yes      Comment: rare        Review of Systems:           Review of Systems  Anesthesia Hx:  No problems with previous Anesthesia   Hematology/Oncology:  Hematology Normal   Oncology Normal     EENT/Dental:EENT/Dental Normal   Cardiovascular:  Cardiovascular Normal     Renal/:   Chronic Renal Disease    Musculoskeletal:   Arthritis   Spine Disorders: lumbar    Neurological:   Neuromuscular Disease, Headaches Seizures    Dermatological:  Skin Normal    Psych:  Psychiatric Normal           OBJECTIVE:     Vital Signs (Most Recent)  Temp: 98.6 °F (37 °C) (10/04/17 1124)  Pulse: (!) 113 (10/04/17 1124)  BP: (!) 169/102 (10/04/17 1124)  5' 7" (1.702 m)  97.5 kg (215 lb)     Physical Exam:    Physical Exam  General:  Well nourished    Airway/Jaw/Neck:  AIRWAY FINDINGS: Normal      Eyes/Ears/Nose:  EYES/EARS/NOSE FINDINGS: Normal    Chest/Lungs:  Chest/Lungs Clear    Heart/Vascular:  Heart Findings: Normal Heart Murmur: Bilateral inguinal hernias.     Abdomen:  Abdomen Findings:  Soft, Nontender  Bilateral inguinal hernias       Musculoskeletal:  Musculoskeletal Findings: Normal   Skin:  Skin Findings: Normal    Mental Status:  Mental Status Findings: Normal        Laboratory  none    Diagnostic Results:  CT:  small bilateral fat containing hernias    ASSESSMENT/PLAN:     [facility]    PLAN:Plan     Repair with mesh       "

## 2017-10-05 DIAGNOSIS — K40.20 NON-RECURRENT BILATERAL INGUINAL HERNIA WITHOUT OBSTRUCTION OR GANGRENE: Primary | ICD-10-CM

## 2017-10-06 DIAGNOSIS — K40.20 BILATERAL INGUINAL HERNIA: ICD-10-CM

## 2017-10-06 RX ORDER — SODIUM CHLORIDE 9 MG/ML
INJECTION, SOLUTION INTRAVENOUS CONTINUOUS
Status: CANCELLED | OUTPATIENT
Start: 2017-10-06

## 2017-10-11 ENCOUNTER — TELEPHONE (OUTPATIENT)
Dept: SURGERY | Facility: CLINIC | Age: 42
End: 2017-10-11

## 2017-10-23 ENCOUNTER — OFFICE VISIT (OUTPATIENT)
Dept: INFECTIOUS DISEASES | Facility: CLINIC | Age: 42
End: 2017-10-23
Payer: COMMERCIAL

## 2017-10-23 VITALS
SYSTOLIC BLOOD PRESSURE: 138 MMHG | BODY MASS INDEX: 34.01 KG/M2 | TEMPERATURE: 99 F | DIASTOLIC BLOOD PRESSURE: 78 MMHG | HEART RATE: 84 BPM | WEIGHT: 216.69 LBS | HEIGHT: 67 IN

## 2017-10-23 DIAGNOSIS — K41.20 BILATERAL FEMORAL HERNIA WITHOUT OBSTRUCTION OR GANGRENE, RECURRENCE NOT SPECIFIED: ICD-10-CM

## 2017-10-23 DIAGNOSIS — R53.83 OTHER FATIGUE: Primary | ICD-10-CM

## 2017-10-23 PROCEDURE — 99999 PR PBB SHADOW E&M-EST. PATIENT-LVL III: CPT | Mod: PBBFAC,,, | Performed by: INTERNAL MEDICINE

## 2017-10-23 PROCEDURE — 99215 OFFICE O/P EST HI 40 MIN: CPT | Mod: S$GLB,,, | Performed by: INTERNAL MEDICINE

## 2017-10-23 NOTE — PROGRESS NOTES
Subjective:      Patient ID: Lee Quintanilla is a 42 y.o. male.    Chief Complaint:Follow-up (FUO)      History of Present Illness  HPI     Follow-up    Additional comments: FUO       Last edited by Carmen Antoine LPN on 10/23/2017  1:13 PM. (History)      Continues to run low grade temps and feels poorly.  Has B hernias; has postponed repaired.    Review of Systems   Constitution: Positive for chills, fever, weakness and night sweats. Negative for decreased appetite, malaise/fatigue, weight gain and weight loss.   HENT: Positive for congestion, ear pain and sore throat. Negative for hearing loss, hoarse voice and tinnitus.    Eyes: Negative for blurred vision, redness and visual disturbance.   Cardiovascular: Negative for chest pain, leg swelling and palpitations.   Respiratory: Negative for cough, hemoptysis, shortness of breath and sputum production.    Hematologic/Lymphatic: Negative for adenopathy. Does not bruise/bleed easily.   Skin: Negative for dry skin, itching, rash and suspicious lesions.   Musculoskeletal: Positive for back pain and joint pain. Negative for myalgias and neck pain.   Gastrointestinal: Positive for abdominal pain, heartburn and nausea. Negative for constipation, diarrhea and vomiting.   Genitourinary: Negative for dysuria, flank pain, frequency, hematuria, hesitancy and urgency.   Neurological: Positive for dizziness, numbness and paresthesias. Negative for headaches.   Psychiatric/Behavioral: Positive for depression. Negative for memory loss. The patient has insomnia and is nervous/anxious.      Objective:   Physical Exam   Constitutional: He appears well-developed and well-nourished.   Cardiovascular: Normal rate and regular rhythm.    Pulmonary/Chest: Effort normal and breath sounds normal.   Abdominal: Soft. Bowel sounds are normal.   Nursing note and vitals reviewed.    Assessment:       1. Other fatigue    2. Bilateral femoral hernia without obstruction or gangrene,  recurrence not specified          Plan:           Other problems as before.    Advised to have surgical repair.

## 2017-10-26 ENCOUNTER — OFFICE VISIT (OUTPATIENT)
Dept: UROLOGY | Facility: CLINIC | Age: 42
End: 2017-10-26
Payer: COMMERCIAL

## 2017-10-26 VITALS
BODY MASS INDEX: 34.3 KG/M2 | WEIGHT: 218.56 LBS | DIASTOLIC BLOOD PRESSURE: 85 MMHG | SYSTOLIC BLOOD PRESSURE: 145 MMHG | HEIGHT: 67 IN | HEART RATE: 102 BPM | TEMPERATURE: 99 F

## 2017-10-26 DIAGNOSIS — E29.1 HYPOGONADISM IN MALE: ICD-10-CM

## 2017-10-26 DIAGNOSIS — N52.9 ERECTILE DYSFUNCTION, UNSPECIFIED ERECTILE DYSFUNCTION TYPE: ICD-10-CM

## 2017-10-26 DIAGNOSIS — R31.29 MICROHEMATURIA: ICD-10-CM

## 2017-10-26 DIAGNOSIS — N20.0 NEPHROLITHIASIS: Primary | ICD-10-CM

## 2017-10-26 LAB
BILIRUB SERPL-MCNC: ABNORMAL MG/DL
BLOOD URINE, POC: ABNORMAL
COLOR, POC UA: YELLOW
GLUCOSE UR QL STRIP: ABNORMAL
KETONES UR QL STRIP: ABNORMAL
LEUKOCYTE ESTERASE URINE, POC: ABNORMAL
NITRITE, POC UA: ABNORMAL
PH, POC UA: 6
PROTEIN, POC: ABNORMAL
SPECIFIC GRAVITY, POC UA: 1.02
UROBILINOGEN, POC UA: ABNORMAL

## 2017-10-26 PROCEDURE — 81002 URINALYSIS NONAUTO W/O SCOPE: CPT | Mod: S$GLB,,, | Performed by: UROLOGY

## 2017-10-26 PROCEDURE — 99214 OFFICE O/P EST MOD 30 MIN: CPT | Mod: 25,S$GLB,, | Performed by: UROLOGY

## 2017-10-26 PROCEDURE — 99999 PR PBB SHADOW E&M-EST. PATIENT-LVL III: CPT | Mod: PBBFAC,,, | Performed by: UROLOGY

## 2017-10-26 NOTE — PROGRESS NOTES
"[facility] Progress Note  PCP: Dr.Havlovic [facility]: active     Chief Complaint: Follow-up (1 month fu)        Subjective:        HPI: Lee Quintanilla is a 41 y.o. male presents with      Last seen 7/18/17 (3 months ago)  We have been actively communicating via [facility] and [facility]    Hypgonadism and Fatigue  -Pt has tried T 200 q3 weeks, no improvement in sx, then androgel 2 pumps daily, no improvement in sx and c/o breast enlargement,  then started anastrozole daily. He had no improvement in sx and in sept stopped the androgel and started T100 q2 weeks (personal rx??). Has not had labs rechecked and he said he "feels good". Still doing anastrozole twice a week.  -no longer c/o breast enlargement  -sx of low T include: tired, sluggish         Testosterone                                   Estrogen  7/12/17  487     80  6/6/17   576 started anastrozole  121  5/30/17       227  4/11/17                     576 Androgel 2 pumps aristeo  3/7/17                        136 switched to androgel   1/13/17            168 on T 200 q3 weeks     ED  -on this much T without medication he has able to get an erection hard enough for penetration but has poor duration with partner, self ok. Has not had to take cialis.         Microhematuria, atypical urine cytology repeated and was normal, kidney stones  ctu 2/10/17 - 2mm left renal stone, 4mm L proximal ureteral stone  Cystoscopy 2/20/17 - normal, prostatitis, mild b hypertrophy  Urine cytology 6/12/17 - negative, 2/7/17 - atypical cells,  8/25/14 - normal   Has started smoking again - 1 pack a week    Repeat ct on 3/20/17 (5 weeks later) to see if stone passed with flomax shows stone still in left proximal ureter without hydro.   4/5/17 cystoscopy, L ESWL and stent placement on strings. Pt did not pass any cytology   4/13/17 ctrss showed stone had passed but still had 1mm stone in LMP, pt removed stent which was on strings  8/25/17 ctrss showed 2mm " LMP stone  Drinks 5 to 6 bottles of water a day.     Groin pain/ B hernias   found to have bilateral hernias and was going to get surgery in [place] but wants tx here on [place].      IIEF: did not octavio out today, but previously 5  AUASSx: 7, pleased    REVIEW OF SYSTEMS:  General ROS: no fevers, no chills  Psychological ROS: no depression  Endocrine ROS: no heat or cold  Respiratory ROS: no SOB  Cardiovascular ROS: no CP  Gastrointestinal ROS: no abdominal pain, no constipation, no diarrhea, no BRBPR  Musculoskeletal ROS: no muscle pain  Neurological ROS: no headaches  Dermatological ROS: no rashes  HEENT: no glasses, no sinus   ROS: per HPI       The past medical, surgical, social and family hx were reviewed. He has had back surgery since I've seen him. Saw neurosurgeon and said s1 impinging on back and causing issues in his left leg.   Cataracts? none     Urologic meds:  androgel 2 pumps a day, but most recently had T 200 2 weeks ago bc pump was not refilled.   Anticoagulation: No     Objective:      Vitals:    10/26/17 1428   BP: (!) 145/85   Pulse: 102   Temp: 99.3 °F (37.4 °C)          Physical Exam   Constitutional: He is oriented to person, place, and time. He appears well-developed and well-nourished. He is cooperative. No distress.   HENT:   Head: Normocephalic and atraumatic.   Eyes: Pupils are equal, round, and reactive to light.   Cardiovascular: Normal rate and regular rhythm.    Pulmonary/Chest: Effort normal.   Abdominal: Soft. Normal appearance.   Musculoskeletal: Normal range of motion.   Neurological: He is alert and oriented to person, place, and time. He has normal reflexes.   Skin: Skin is intact.     Psychiatric: He has a normal mood and affect.       Urinalysis:1.020/5/tr leuk and 1+blood - no sx    Labs:cr 1.2 7/25/16    ua microscopic:   1/12/17 - 1   5/31/16 - 4     hba1c 1/13/17 - 5.4     Urine culture  8/23/17 Ng,   7/18/17  Ng, 1+leuk/1+protein/2+ketones/50 blood - voided, no sx  2/20/17                       ng  8/25/14                      Ng    Labs 9/25/17  H/h 15.3/47  Platelets 375  Alt elevated     Labs 3/17/17  psa  0.98  Lipid panel - elevated, not worse than last labs 1 month ago  T 136  LFTs normal  H/H 15.8/48.1      PATHOLOGY REVIEW:  Voided urine 6/12/17- Negative for malignant cells.  Urine cytology 2/7/17 - atypical cells  Urine cytology 8/25/14 - negative     RADIOGRAPHIC REVIEW:  ctrss 8/25/17  2mm LMP stone  Other findings: 3mm nodule in lung unchanged from 2014    ctrss 4/13/17  Appropriately positioned left ureteral stent with resolution of left ureterolithiasis, without residual ureteral stone.  Persistent punctate left nephrolithiasis.    ctrss 3/20/17  ctrss 2/10/17  3mm left upj stone without hydro  2mm left intrarenal stone     kub 2/13/17  Stone in left ureter not visible     ctu 2/10/17  2mm left renal stone, 4mm proximal ureteral stone  Prostate gland mildly enlarged  Other findings: 8mm hypodense mass in leiver likely a cyst     CTRSS 9/8/14  punctate nonobstructing renal stone with no hydronephrosis, opaque ureteral stone, ureteral obstruction or acute findings. Tiny fat containing umbilical hernia and small fat containing inguinal hernias also noted     US Scrotum 9/3/14  Echogenic area       rbus 7/24/14  normal        Assessment:       hypgonadism and ED  Nephrolithiasis  Groin pain    Plan:      ED and hypogonadism  -pt is self administering 100 2x a week and says he sig improvement in sx, started this a month ago. T is from [place]. Do not recommend this.   -next shot is Monday, prior to injection he will obtain labs  -cbc, cmp, lipid panel, T, estrogen and PSA     nephrolithaisis/microhematuria  -had a proximal ureteral stone tx with eswl  -still has 1mm LMP stone  -pth and uric acid  -recommend 24 hour urine    Groin pain and BIH  -refer back to general surgery     Recommend no smoking    F/u 3 weeks to review all labs      [facility]: active

## 2017-10-26 NOTE — PATIENT INSTRUCTIONS
ED and hypogonadism  -pt is self administering 100 2x a week and says he sig improvement in sx, started this a month ago. T is from [place]. Do not recommend this.   -next shot is Monday, prior to injection he will obtain labs  -cbc, cmp, lipid panel, T, estrogen and PSA     nephrolithaisis/microhematuria  -had a proximal ureteral stone tx with eswl  -still has 1mm LMP stone  -pth and uric acid  -recommend 24 hour urine    Groin pain and BIH  -refer back to general surgery     Recommend no smoking    F/u 3 weeks to review all labs

## 2017-10-30 ENCOUNTER — LAB VISIT (OUTPATIENT)
Dept: LAB | Facility: HOSPITAL | Age: 42
End: 2017-10-30
Attending: UROLOGY
Payer: COMMERCIAL

## 2017-10-30 DIAGNOSIS — N20.0 NEPHROLITHIASIS: ICD-10-CM

## 2017-10-30 DIAGNOSIS — N52.9 ERECTILE DYSFUNCTION, UNSPECIFIED ERECTILE DYSFUNCTION TYPE: ICD-10-CM

## 2017-10-30 DIAGNOSIS — R31.29 MICROHEMATURIA: ICD-10-CM

## 2017-10-30 DIAGNOSIS — E29.1 HYPOGONADISM IN MALE: ICD-10-CM

## 2017-10-30 LAB
ALBUMIN SERPL BCP-MCNC: 3.2 G/DL
ALP SERPL-CCNC: 59 U/L
ALT SERPL W/O P-5'-P-CCNC: 148 U/L
ANION GAP SERPL CALC-SCNC: 13 MMOL/L
AST SERPL-CCNC: 49 U/L
BILIRUB SERPL-MCNC: 0.5 MG/DL
BUN SERPL-MCNC: 10 MG/DL
CALCIUM SERPL-MCNC: 9 MG/DL
CHLORIDE SERPL-SCNC: 107 MMOL/L
CHOLEST SERPL-MCNC: 280 MG/DL
CHOLEST/HDLC SERPL: 10.8 {RATIO}
CO2 SERPL-SCNC: 21 MMOL/L
COMPLEXED PSA SERPL-MCNC: 1.4 NG/ML
CREAT SERPL-MCNC: 1.4 MG/DL
ERYTHROCYTE [DISTWIDTH] IN BLOOD BY AUTOMATED COUNT: 14.8 %
EST. GFR  (AFRICAN AMERICAN): >60 ML/MIN/1.73 M^2
EST. GFR  (NON AFRICAN AMERICAN): >60 ML/MIN/1.73 M^2
GLUCOSE SERPL-MCNC: 70 MG/DL
HCT VFR BLD AUTO: 46.7 %
HDLC SERPL-MCNC: 26 MG/DL
HDLC SERPL: 9.3 %
HGB BLD-MCNC: 15.4 G/DL
LDLC SERPL CALC-MCNC: 209 MG/DL
MCH RBC QN AUTO: 29.8 PG
MCHC RBC AUTO-ENTMCNC: 33.1 G/DL
MCV RBC AUTO: 90 FL
NONHDLC SERPL-MCNC: 254 MG/DL
PLATELET # BLD AUTO: 410 K/UL
PMV BLD AUTO: 10.8 FL
POTASSIUM SERPL-SCNC: 4.3 MMOL/L
PROT SERPL-MCNC: 7.7 G/DL
PTH-INTACT SERPL-MCNC: 109 PG/ML
RBC # BLD AUTO: 5.19 M/UL
SODIUM SERPL-SCNC: 141 MMOL/L
TESTOST SERPL-MCNC: 728 NG/DL
TRIGL SERPL-MCNC: 225 MG/DL
URATE SERPL-MCNC: 3 MG/DL
WBC # BLD AUTO: 13.5 K/UL

## 2017-10-30 PROCEDURE — 83970 ASSAY OF PARATHORMONE: CPT

## 2017-10-30 PROCEDURE — 85027 COMPLETE CBC AUTOMATED: CPT

## 2017-10-30 PROCEDURE — 36415 COLL VENOUS BLD VENIPUNCTURE: CPT

## 2017-10-30 PROCEDURE — 84153 ASSAY OF PSA TOTAL: CPT

## 2017-10-30 PROCEDURE — 84403 ASSAY OF TOTAL TESTOSTERONE: CPT

## 2017-10-30 PROCEDURE — 80061 LIPID PANEL: CPT

## 2017-10-30 PROCEDURE — 80053 COMPREHEN METABOLIC PANEL: CPT

## 2017-10-30 PROCEDURE — 82672 ASSAY OF ESTROGEN: CPT

## 2017-10-30 PROCEDURE — 84550 ASSAY OF BLOOD/URIC ACID: CPT

## 2017-10-31 ENCOUNTER — PATIENT MESSAGE (OUTPATIENT)
Dept: UROLOGY | Facility: CLINIC | Age: 42
End: 2017-10-31

## 2017-11-01 ENCOUNTER — DOCUMENTATION ONLY (OUTPATIENT)
Dept: TRANSPLANT | Facility: CLINIC | Age: 42
End: 2017-11-01

## 2017-11-01 ENCOUNTER — TELEPHONE (OUTPATIENT)
Dept: UROLOGY | Facility: CLINIC | Age: 42
End: 2017-11-01

## 2017-11-01 ENCOUNTER — PATIENT MESSAGE (OUTPATIENT)
Dept: UROLOGY | Facility: CLINIC | Age: 42
End: 2017-11-01

## 2017-11-01 ENCOUNTER — TELEPHONE (OUTPATIENT)
Dept: FAMILY MEDICINE | Facility: CLINIC | Age: 42
End: 2017-11-01

## 2017-11-01 DIAGNOSIS — R79.89 LFT ELEVATION: Primary | ICD-10-CM

## 2017-11-01 DIAGNOSIS — E29.1 HYPOGONADISM IN MALE: Primary | ICD-10-CM

## 2017-11-01 LAB — ESTROGEN SERPL-MCNC: 448 PG/ML

## 2017-11-01 NOTE — TELEPHONE ENCOUNTER
The pt's LFTs are elevate, likely related to the twice weekly T use from [place]. He also has elevated lipids and needs treatment but cannot start this with elevated LFts.    I recommend that we go back to using T 100 q1 week and check another T level just prior to the 3rd injection (instead of the 2nd) as well as another cmp (labs to be drawn before 9am) and estrogen. If cmp remains elevated cannot increase T injections and will need workup for transaminitis.     His Estrogen level is very high (449) which could also be a result of the T or not using the anastrozole regularly. Either way anastrozole can cause hepatic dysfxn so would like to hold off on this until we recheck after we've changed to T 100 weekly.     Also let him know I have been communicating with  and she states she cannot start a statin yet for his cholesterol until we workup or evaluate if his LFTs (liver function tests) improve with decreased use of T from a trusted source. In the meantime he needs to continue a low fat diet. We also recommend a psychiatry f/u to discuss his anti-depressants and see if we can optimize this as well as untreated depression can cause similar symptoms to low testosterone.     Ask him if he has had any fevers and let him know his elevated white count could be from ingredients in his T he is receiving from [employer].  will cc his hematologist with these results. Not sure if these symptoms, fevers, elevation in white count started after he started using the [place] T 2x a week. I see his labs were elevated in July and pt says he started using this T in September, but he may not know the exact dates    Jolene please schedule the labs this for pt and let him know and have him come to clinic here for his T shots.

## 2017-11-01 NOTE — TELEPHONE ENCOUNTER
----- Message from Rocio Mcnally MD sent at 11/1/2017  1:11 PM CDT -----  You are so correct about the depression.  I advised him to seek counseling as well.  I couldn't find any evidence of an infection , and the last time I saw him he had not had any fever.  He could have fever now with LFTs.  Suggest heptaology consult.  I can't remember if he smokes are not, but that can elevate your wbc count.  ----- Message -----  From: Emilie Modi MD  Sent: 11/1/2017  12:32 PM  To: Vicky Saeed MD, #    I wanted to let you both know that I cc'd you in a message on Mr. Quintanilla's labs.  It sounds like he was taking testosterone from [place]? It may be the cause of the elevated LFTs. He can't start a statin with them that elevated, let me know Vicky if you are rechecking them or want him worked up for transaminitis.      I don't know what to make of his intermittent leukocytosis or the fact that he constantly feels unwell, I know he has had a complete work up with you Dr. Mcnally and would appreciate any additional input you may have.    I think he is seriously depressed due to his pain and chronic medical conditions and have advised that he establish care with psychiatry due to multiple SSRI/SNRIs being ineffective. I  have not heard if he has done so.     Thank you,    Emilie

## 2017-11-01 NOTE — TELEPHONE ENCOUNTER
----- Message from Vicky Saeed MD sent at 10/30/2017  4:04 PM CDT -----  pth could be related to kidneys  Will plan to repeat his pth in a few months    Also platelets elevated and wbc elevated   Will cc dr lutz

## 2017-11-01 NOTE — TELEPHONE ENCOUNTER
Please let patient know that I received the recent lab results that Dr. Saeed ordered    1. WBC is mildly elevated. Is he continuing to have a fever?  I am forwarding this message to his ID doctor.     2. LFTS are elevated, higher than previously and his cholesterol is extremely elevated especially LDL or bad cholesterol. The arimidex or testosterone could potentially elevated the LFTs.  Has he been started on any other medications?  I will contact Dr. Saeed and see if she feels that this is the case or if she wants him evaluated for other causes of elevated liver function tests.  He needs to eat a low fat/cholesterol diet. We can't start a cholesterol medication with his liver function tests this elevated.    3. Did he establish care with a psychiatirst?

## 2017-11-01 NOTE — LETTER
November 1, 2017    Lee Quintanilla  [address] [address]      Dear Lee Quintanilla:    Your doctor has referred you to the [facility]. You will be contacted by our office and an initial appointment will then be scheduled for you.    We look forward to seeing you soon. If you have any further questions, please contact us at [phone].       Sincerely,        [facility]   [address] [place] [id number]  [phone]

## 2017-11-02 NOTE — NURSING
Pt records reviewed.   Pt will be referred to Hepatology.    Initial referral received  from the [facility].   Referring Provider/diagnosis  SEEMA SAMPSON Provider:   Diagnosis: LFT elevation       Referral letter sent to provider and patient.

## 2017-11-03 ENCOUNTER — TELEPHONE (OUTPATIENT)
Dept: HEPATOLOGY | Facility: CLINIC | Age: 42
End: 2017-11-03

## 2017-11-03 NOTE — TELEPHONE ENCOUNTER
----- Message from Dayana De Jesus sent at 11/1/2017 10:20 PM CDT -----  Pt records reviewed.   Pt will be referred to Hepatology.    Initial referral received  from the [facility].   Referring Provider/diagnosis  SEEMA SAMPSON Provider:  Diagnosis: LFT elevation      Referral letter sent to provider and patient.

## 2017-11-05 ENCOUNTER — PATIENT MESSAGE (OUTPATIENT)
Dept: UROLOGY | Facility: CLINIC | Age: 42
End: 2017-11-05

## 2017-11-06 ENCOUNTER — TELEPHONE (OUTPATIENT)
Dept: FAMILY MEDICINE | Facility: CLINIC | Age: 42
End: 2017-11-06

## 2017-11-06 ENCOUNTER — CLINICAL SUPPORT (OUTPATIENT)
Dept: UROLOGY | Facility: CLINIC | Age: 42
End: 2017-11-06
Payer: COMMERCIAL

## 2017-11-06 DIAGNOSIS — E29.1 HYPOGONADISM IN MALE: Primary | ICD-10-CM

## 2017-11-06 DIAGNOSIS — D72.829 LEUKOCYTOSIS, UNSPECIFIED TYPE: Primary | ICD-10-CM

## 2017-11-06 PROCEDURE — 96372 THER/PROPH/DIAG INJ SC/IM: CPT | Mod: S$GLB,,, | Performed by: UROLOGY

## 2017-11-06 PROCEDURE — 99499 UNLISTED E&M SERVICE: CPT | Mod: S$GLB,,, | Performed by: UROLOGY

## 2017-11-06 RX ORDER — TESTOSTERONE CYPIONATE 200 MG/ML
100 INJECTION, SOLUTION INTRAMUSCULAR WEEKLY
Status: COMPLETED | OUTPATIENT
Start: 2017-11-06 | End: 2017-11-20

## 2017-11-06 RX ADMIN — TESTOSTERONE CYPIONATE 100 MG: 200 INJECTION, SOLUTION INTRAMUSCULAR at 12:11

## 2017-11-06 NOTE — PROGRESS NOTES
Patient in clinic today per  received testosterone IM in left gluteal. Patient tolerated well, no adverse reaction noted.

## 2017-11-06 NOTE — TELEPHONE ENCOUNTER
Patient came to clinic concerning referral to hematology and hepatology, patient upset states no one in Dr Madrigal office could give him information about what to do regarding hepatology and hematology appointments.  Notified patient his wbc count is elevated possible due to elevated LFT per Dr Modi . Patient states he usually feels like he has a fever in the evening,temp in clinic at this time 98.4 oral.patient states he also feels like he has sore throat and is congested Please sign referral will give patient information to call and send staff message to both hepatology and hematology to call and schedule patient

## 2017-11-07 ENCOUNTER — TELEPHONE (OUTPATIENT)
Dept: HEMATOLOGY/ONCOLOGY | Facility: CLINIC | Age: 42
End: 2017-11-07

## 2017-11-07 NOTE — TELEPHONE ENCOUNTER
----- Message from Mikaela Burrows sent at 11/7/2017  7:02 AM CST -----  Pt is being referred by Dr Modi .. Request schedule an appointment ..[phone]

## 2017-11-09 ENCOUNTER — OFFICE VISIT (OUTPATIENT)
Dept: SURGERY | Facility: CLINIC | Age: 42
End: 2017-11-09
Payer: COMMERCIAL

## 2017-11-09 VITALS — WEIGHT: 218.69 LBS | HEIGHT: 67 IN | BODY MASS INDEX: 34.33 KG/M2

## 2017-11-09 DIAGNOSIS — K40.20 NON-RECURRENT BILATERAL INGUINAL HERNIA WITHOUT OBSTRUCTION OR GANGRENE: Primary | ICD-10-CM

## 2017-11-09 PROCEDURE — 99999 PR PBB SHADOW E&M-EST. PATIENT-LVL II: CPT | Mod: PBBFAC,,, | Performed by: SURGERY

## 2017-11-09 PROCEDURE — 99214 OFFICE O/P EST MOD 30 MIN: CPT | Mod: S$GLB,,, | Performed by: SURGERY

## 2017-11-09 NOTE — PROGRESS NOTES
Subjective:       Patient ID: Lee Quintanilla is a 42 y.o. male.    Chief Complaint: Consult (bilateral inguinal hernias)    Patient is a 42 y.o. male presents with bilaterl groin pain left greater than right. Onset of symptoms was gradual starting several years ago with gradually worsening course since that time. Patient denies obstructive symptoms. Symptoms are aggravated by activity. Symptoms improve with rest.    He saw Dr. Waller and was scheduled for surgery but cancelled.  He would like to schedule repair.   Has been having elevated liver enzymes and is being worked up for that.  Also has appt with Heme Onc tomorrow     Past Medical History:  No date: ADHD (attention deficit hyperactivity disorder)  No date: Arthritis  No date: Asthma      Comment: as child only  No date: Back pain  05/2016: Degeneration of lumbar intervertebral disc  No date: Depression  No date: Elevated PSA  No date: Headache  No date: Kidney damage      Comment: stage 3 ; d/t aleve abuse  No date: Kidney stone  No date: Neck pain  No date: Numbness and tingling in hands  No date: Numbness and tingling of both legs  No date: Seizures      Comment: from inapsine 2002  No date: Sleep apnea      Comment: no cpap  Past Surgical History:  2016: BACK SURGERY      Comment: back surgery  No date: PILONIDAL CYST DRAINAGE  No date: removal of abcess      Comment: scrotal  No date: TYMPANOSTOMY TUBE PLACEMENT      Current Outpatient Prescriptions:   TESTOSTERONE CYPIONATE IM, Inject 200 mg into the muscle., Disp: , Rfl:   venlafaxine 225 mg TR24, Take 225 mg by mouth once daily. (Patient taking differently: Take 225 mg by mouth every morning. ), Disp: 90 each, Rfl: 3  Current Facility-Administered Medications:   testosterone cypionate injection 100 mg, 100 mg, Intramuscular, Weekly, Vicky Saeed MD, 100 mg at 11/06/17 5947    Review of patient's allergies indicates:   -- Inapsine (droperidol) -- Anaphylaxis    --  seizures    -- Pcn (penicillins)    -- Bactrim (sulfamethoxazole-trimethoprim) -- Rash    --  Dry red rash all over    Review of patient's family history indicates:    Heart disease                  Mother                    Migraines                      Mother                    Hypertension                   Mother                    Early death                    Father                    Diabetes                       Maternal Aunt             Diabetes                       Maternal Uncle            Diabetes                       Maternal Grandfather      Social History    Marital status:             Spouse name:                       Years of education:                 Number of children:               Occupational History    None on file    Social History Main Topics    Smoking status: Former Smoker                                                                Packs/day: 0.25      Years: 0.00           Types: Cigarettes       Quit date: 1/1/2016    Smokeless tobacco: Former User                          Quit date: 6/5/2016    Alcohol use: Yes                Comment: rare    Drug use: No              Sexual activity: Yes               Partners with: Female    Other Topics            Concern    None on file    Social History Narrative    None on file          Review of Systems   Constitutional: Negative for activity change, appetite change, chills, fatigue, fever and unexpected weight change.   HENT: Negative for congestion, dental problem, hearing loss, nosebleeds, sinus pressure, sore throat and voice change.    Eyes: Negative for pain and visual disturbance.   Respiratory: Negative for cough, chest tightness and shortness of breath.    Cardiovascular: Negative for chest pain, palpitations and leg swelling.   Gastrointestinal: Negative for abdominal distention, abdominal pain, constipation, diarrhea, nausea and vomiting.   Endocrine: Negative for cold intolerance and heat intolerance.   Genitourinary: Negative  for difficulty urinating, flank pain, frequency and hematuria.   Musculoskeletal: Negative for arthralgias, back pain, gait problem, joint swelling, myalgias, neck pain and neck stiffness.   Skin: Negative for rash.   Allergic/Immunologic: Negative for immunocompromised state.   Neurological: Negative for dizziness, tremors, seizures, syncope, weakness, light-headedness, numbness and headaches.   Hematological: Negative for adenopathy. Does not bruise/bleed easily.   Psychiatric/Behavioral: Negative for confusion, decreased concentration, hallucinations, sleep disturbance and suicidal ideas. The patient is not nervous/anxious.        Objective:      Physical Exam   Constitutional: He is oriented to person, place, and time. He appears well-developed and well-nourished.   HENT:   Head: Normocephalic and atraumatic.   Right Ear: External ear normal.   Left Ear: External ear normal.   Eyes: Conjunctivae are normal. Pupils are equal, round, and reactive to light.   Neck: Normal range of motion.   Cardiovascular: Normal rate and regular rhythm.    Pulmonary/Chest: Effort normal. No respiratory distress. He exhibits no tenderness.   Abdominal: Soft. He exhibits no distension and no mass. A hernia is present. Hernia confirmed positive in the right inguinal area and confirmed positive in the left inguinal area.   Musculoskeletal: He exhibits no edema or tenderness.   Neurological: He is alert and oriented to person, place, and time. He has normal reflexes. No cranial nerve deficit.   Skin: Skin is warm and dry. No rash noted. No erythema. No pallor.   Psychiatric: He has a normal mood and affect. His behavior is normal. Judgment and thought content normal.   Nursing note and vitals reviewed.      Assessment:       1. Non-recurrent bilateral inguinal hernia without obstruction or gangrene        Plan:       For robotic repair when cleared by heme onc and hepatology.  All aspects of procedure including risks and possible  complications were discussed in detail with the patient who agrees to proceed with procedure.  All questions were answered and consent was obtained. He will call to schedule.

## 2017-11-10 ENCOUNTER — INITIAL CONSULT (OUTPATIENT)
Dept: HEMATOLOGY/ONCOLOGY | Facility: CLINIC | Age: 42
End: 2017-11-10
Payer: COMMERCIAL

## 2017-11-10 VITALS
WEIGHT: 218.06 LBS | HEIGHT: 67 IN | TEMPERATURE: 99 F | BODY MASS INDEX: 34.22 KG/M2 | RESPIRATION RATE: 18 BRPM | DIASTOLIC BLOOD PRESSURE: 87 MMHG | SYSTOLIC BLOOD PRESSURE: 153 MMHG | HEART RATE: 94 BPM

## 2017-11-10 DIAGNOSIS — R53.83 FATIGUE, UNSPECIFIED TYPE: ICD-10-CM

## 2017-11-10 DIAGNOSIS — E34.9 TESTOSTERONE DEFICIENCY: ICD-10-CM

## 2017-11-10 DIAGNOSIS — D75.839 THROMBOCYTOSIS: ICD-10-CM

## 2017-11-10 DIAGNOSIS — Z15.89 JAK2 GENE MUTATION: ICD-10-CM

## 2017-11-10 DIAGNOSIS — R74.01 TRANSAMINITIS: ICD-10-CM

## 2017-11-10 DIAGNOSIS — R20.2 PARESTHESIA: ICD-10-CM

## 2017-11-10 DIAGNOSIS — D72.829 LEUKOCYTOSIS, UNSPECIFIED TYPE: Primary | ICD-10-CM

## 2017-11-10 DIAGNOSIS — D72.823 LEUKEMOID REACTION: ICD-10-CM

## 2017-11-10 PROCEDURE — 99999 PR PBB SHADOW E&M-EST. PATIENT-LVL III: CPT | Mod: PBBFAC,,, | Performed by: INTERNAL MEDICINE

## 2017-11-10 PROCEDURE — 99245 OFF/OP CONSLTJ NEW/EST HI 55: CPT | Mod: S$GLB,,, | Performed by: INTERNAL MEDICINE

## 2017-11-10 NOTE — LETTER
November 15, 2017      Emilie Modi MD  [address]  [facility] [address]           [facility]  [address], [address]  [facility] [address]  Phone: [phone]          Patient: Lee Quintanilla   MR Number: [id number]   YOB: 1975   Date of Visit: 11/10/2017       Dear Dr. Emilie Modi:    Thank you for referring Lee Quintanilla to me for evaluation. Attached you will find relevant portions of my assessment and plan of care.    If you have questions, please do not hesitate to call me. I look forward to following Lee Quintanilla along with you.    Sincerely,    Pinky Sweet  CC:  No Recipients    If you would like to receive this communication electronically, please contact [email or web address] or [phone] to request more information on [facility] [employer] access.    For providers and/or their staff who would like to refer a patient to [facility], please contact us through our one-stop-shop provider referral line, Lamont Montoya, at [phone].    If you feel you have received this communication in error or would no longer like to receive these types of communications, please e-mail [email or web address]

## 2017-11-10 NOTE — PROGRESS NOTES
Consult (Dr Modi - leukocytosis)      Lee Quintanilla is a 42 y.o.  This is 42 yr old man who has had an elevated WBC count of unknown etiology. He reports times where he is extremely exhausted  He is on testosterone replacement with an antidepressant  He has chronic back pain and is worried that he may have an underlying malignancy given his chronically elevated WBC count      Past Medical History:   Diagnosis Date    ADHD (attention deficit hyperactivity disorder)     Arthritis     Asthma     as child only    Back pain     Degeneration of lumbar intervertebral disc 05/2016    Depression     Elevated PSA     Headache     Kidney damage     stage 3 ; d/t aleve abuse    Kidney stone     Neck pain     Numbness and tingling in hands     Numbness and tingling of both legs     Seizures     from inapsine 2002    Sleep apnea     no cpap     Past Surgical History:   Procedure Laterality Date    BACK SURGERY  2016    back surgery    PILONIDAL CYST DRAINAGE      removal of abcess      scrotal    TYMPANOSTOMY TUBE PLACEMENT         Current Outpatient Prescriptions:     TESTOSTERONE CYPIONATE IM, Inject 200 mg into the muscle., Disp: , Rfl:     venlafaxine 225 mg TR24, Take 225 mg by mouth once daily. (Patient taking differently: Take 225 mg by mouth every morning. ), Disp: 90 each, Rfl: 3    Current Facility-Administered Medications:     testosterone cypionate injection 100 mg, 100 mg, Intramuscular, Weekly, Vicky Saeed MD, 100 mg at 11/06/17 1217  Review of patient's allergies indicates:   Allergen Reactions    Inapsine [droperidol] Anaphylaxis     seizures    Pcn [penicillins]     Bactrim [sulfamethoxazole-trimethoprim] Rash     Dry red rash all over     Social History   Substance Use Topics    Smoking status: Former Smoker     Packs/day: 0.25     Types: Cigarettes     Quit date: 1/1/2016    Smokeless tobacco: Former User     Quit date: 6/5/2016    Alcohol use Yes       "Comment: rare     Family History   Problem Relation Age of Onset    Heart disease Mother     Migraines Mother     Hypertension Mother     Early death Father     Diabetes Maternal Aunt     Diabetes Maternal Uncle     Diabetes Maternal Grandfather        CONSTITUTIONAL: No fevers, chills, night sweats, wt. loss, appetite changes  SKIN: no rashes or itching  ENT: No headaches, head trauma, vision changes, or eye pain  LYMPH NODES: None noticed   CV: No chest pain, palpitations.   RESP: No dyspnea on exertion, cough, wheezing, or hemoptysis  GI: No nausea, emesis, diarrhea, constipation, melena, hematochezia, pain.   : No dysuria, hematuria, urgency, or frequency   HEME: No easy bruising, bleeding problems  PSYCHIATRIC: No depression, anxiety, psychosis, hallucinations.  NEURO: No seizures, memory loss, dizziness or difficulty sleeping  MSK: No arthralgias or joint swelling         BP (!) 153/87   Pulse 94   Temp 98.8 °F (37.1 °C)   Resp 18   Ht 5' 7" (1.702 m)   Wt 98.9 kg (218 lb 0.6 oz)   BMI 34.15 kg/m²   Gen: NAD, A and O x3,   Psych: pleasant affect, normal thought process  Eyes: Pupils round and non dilated, EOM intact  Nose: Nares patent  OP clear, mucosa patent  Neck: suppple, no JVD, trachea midline, no palpable mass, no adenopathy  Lungs: CTAB, no wheezes, no use of accessory muscles  CV: S1S2 with RRR, No mrg  Abd: soft, NTND, + BS, No HSM, no ascites  Extr: No CCE, ANTONIO, strength normal, good capillary refill  Neuro: steady gait, CNs grossly intact  Skin: intact, no lesions noted  Rheum: No joint swelling    Lab Results   Component Value Date    WBC 13.50 (H) 10/30/2017    HGB 15.4 10/30/2017    HCT 46.7 10/30/2017    MCV 90 10/30/2017     (H) 10/30/2017     CMP  Sodium   Date Value Ref Range Status   10/30/2017 141 136 - 145 mmol/L Final     Potassium   Date Value Ref Range Status   10/30/2017 4.3 3.5 - 5.1 mmol/L Final     Chloride   Date Value Ref Range Status   10/30/2017 107 95 - " 110 mmol/L Final     CO2   Date Value Ref Range Status   10/30/2017 21 (L) 23 - 29 mmol/L Final     Glucose   Date Value Ref Range Status   10/30/2017 70 70 - 110 mg/dL Final     BUN, Bld   Date Value Ref Range Status   10/30/2017 10 6 - 20 mg/dL Final     Creatinine   Date Value Ref Range Status   10/30/2017 1.4 0.5 - 1.4 mg/dL Final     Calcium   Date Value Ref Range Status   10/30/2017 9.0 8.7 - 10.5 mg/dL Final     Total Protein   Date Value Ref Range Status   10/30/2017 7.7 6.0 - 8.4 g/dL Final     Albumin   Date Value Ref Range Status   10/30/2017 3.2 (L) 3.5 - 5.2 g/dL Final   01/13/2017 4.3 3.6 - 5.1 g/dL Final     Comment:     @ Test Performed By:  [facility] DREW Dodd M.D.,   [address] [place] [id number]  [facility]  [id number]       Total Bilirubin   Date Value Ref Range Status   10/30/2017 0.5 0.1 - 1.0 mg/dL Final     Comment:     For infants and newborns, interpretation of results should be based  on gestational age, weight and in agreement with clinical  observations.  Premature Infant recommended reference ranges:  Up to 24 hours.............<8.0 mg/dL  Up to 48 hours............<12.0 mg/dL  3-5 days..................<15.0 mg/dL  6-29 days.................<15.0 mg/dL       Alkaline Phosphatase   Date Value Ref Range Status   10/30/2017 59 55 - 135 U/L Final     AST   Date Value Ref Range Status   10/30/2017 49 (H) 10 - 40 U/L Final     ALT   Date Value Ref Range Status   10/30/2017 148 (H) 10 - 44 U/L Final     Anion Gap   Date Value Ref Range Status   10/30/2017 13 8 - 16 mmol/L Final     eGFR if    Date Value Ref Range Status   10/30/2017 >60 >60 mL/min/1.73 m^2 Final     eGFR if non    Date Value Ref Range Status   10/30/2017 >60 >60 mL/min/1.73 m^2 Final     Comment:     Calculation used to obtain the estimated glomerular filtration  rate (eGFR) is the CKD-EPI equation. Since race is unknown   in our  information system, the eGFR values for   -American and Non--American patients are given   for each creatinine result.         Leukocytosis, unspecified type  -     ANTI-NEUTROPHILIC CYTOPLASMIC ANTIBODY; Future; Expected date: 11/24/2017  -     ANTI SM/RNP ANTIBODY; Future; Expected date: 11/24/2017  -     RAVEN; Future; Expected date: 11/24/2017  -     ANTI-DNA ANTIBODY, DOUBLE-STRANDED; Future; Expected date: 11/24/2017  -     ANTI -SSA ANTIBODY; Future; Expected date: 11/24/2017  -     ANTI-SSB ANTIBODY; Future; Expected date: 11/24/2017  -     Lactate dehydrogenase; Future; Expected date: 11/10/2017  -     Beta 2 Microglobulin, Serum; Future; Expected date: 11/10/2017  -     TSH; Future; Expected date: 11/10/2017  -     T3; Future; Expected date: 11/24/2017  -     Leukemia/Lymphoma Screen - Blood; Future; Expected date: 11/24/2017  -     Methylmalonic acid, serum; Future; Expected date: 11/10/2017  -     Folate; Future; Expected date: 11/10/2017  -     Iron and TIBC; Future; Expected date: 11/10/2017  -     Pathologist Interpretation Differential; Future; Expected date: 11/24/2017  -     SEDIMENTATION RATE, MANUAL; Future; Expected date: 11/24/2017  -     C-REACTIVE PROTEIN; Future; Expected date: 11/24/2017  -     IMMUNOGLOBULIN FREE LT CHAINS BLOOD; Future; Expected date: 11/24/2017  -     IMMUNOGLOBULINS (IGG, IGA, IGM) QUANTITATIVE; Future; Expected date: 11/24/2017  -     CMP; Future; Expected date: 11/24/2017  -     CBC auto differential; Future; Expected date: 11/10/2017  -     CBC auto differential; Future; Expected date: 11/19/2017    Fatigue, unspecified type  -     ANTI-NEUTROPHILIC CYTOPLASMIC ANTIBODY; Future; Expected date: 11/24/2017  -     ANTI SM/RNP ANTIBODY; Future; Expected date: 11/24/2017  -     RAVEN; Future; Expected date: 11/24/2017  -     ANTI-DNA ANTIBODY, DOUBLE-STRANDED; Future; Expected date: 11/24/2017  -     ANTI -SSA ANTIBODY; Future; Expected date: 11/24/2017  -      ANTI-SSB ANTIBODY; Future; Expected date: 11/24/2017  -     Lactate dehydrogenase; Future; Expected date: 11/10/2017  -     Beta 2 Microglobulin, Serum; Future; Expected date: 11/10/2017  -     TSH; Future; Expected date: 11/10/2017  -     T3; Future; Expected date: 11/24/2017  -     Leukemia/Lymphoma Screen - Blood; Future; Expected date: 11/24/2017  -     Methylmalonic acid, serum; Future; Expected date: 11/10/2017  -     Folate; Future; Expected date: 11/10/2017  -     Iron and TIBC; Future; Expected date: 11/10/2017  -     Pathologist Interpretation Differential; Future; Expected date: 11/24/2017  -     SEDIMENTATION RATE, MANUAL; Future; Expected date: 11/24/2017  -     C-REACTIVE PROTEIN; Future; Expected date: 11/24/2017  -     IMMUNOGLOBULIN FREE LT CHAINS BLOOD; Future; Expected date: 11/24/2017  -     IMMUNOGLOBULINS (IGG, IGA, IGM) QUANTITATIVE; Future; Expected date: 11/24/2017  -     CMP; Future; Expected date: 11/24/2017  -     CBC auto differential; Future; Expected date: 11/10/2017    Thrombocytosis  -     ANTI-NEUTROPHILIC CYTOPLASMIC ANTIBODY; Future; Expected date: 11/24/2017  -     ANTI SM/RNP ANTIBODY; Future; Expected date: 11/24/2017  -     RAVEN; Future; Expected date: 11/24/2017  -     ANTI-DNA ANTIBODY, DOUBLE-STRANDED; Future; Expected date: 11/24/2017  -     ANTI -SSA ANTIBODY; Future; Expected date: 11/24/2017  -     ANTI-SSB ANTIBODY; Future; Expected date: 11/24/2017  -     Lactate dehydrogenase; Future; Expected date: 11/10/2017  -     Beta 2 Microglobulin, Serum; Future; Expected date: 11/10/2017  -     TSH; Future; Expected date: 11/10/2017  -     T3; Future; Expected date: 11/24/2017  -     Leukemia/Lymphoma Screen - Blood; Future; Expected date: 11/24/2017  -     Methylmalonic acid, serum; Future; Expected date: 11/10/2017  -     Folate; Future; Expected date: 11/10/2017  -     Iron and TIBC; Future; Expected date: 11/10/2017  -     Pathologist Interpretation Differential; Future;  Expected date: 11/24/2017  -     SEDIMENTATION RATE, MANUAL; Future; Expected date: 11/24/2017  -     C-REACTIVE PROTEIN; Future; Expected date: 11/24/2017  -     IMMUNOGLOBULIN FREE LT CHAINS BLOOD; Future; Expected date: 11/24/2017  -     IMMUNOGLOBULINS (IGG, IGA, IGM) QUANTITATIVE; Future; Expected date: 11/24/2017  -     CMP; Future; Expected date: 11/24/2017  -     CBC auto differential; Future; Expected date: 11/10/2017  -     CBC auto differential; Future; Expected date: 11/19/2017    JAK2 gene mutation  -     ANTI-NEUTROPHILIC CYTOPLASMIC ANTIBODY; Future; Expected date: 11/24/2017  -     ANTI SM/RNP ANTIBODY; Future; Expected date: 11/24/2017  -     RAVEN; Future; Expected date: 11/24/2017  -     ANTI-DNA ANTIBODY, DOUBLE-STRANDED; Future; Expected date: 11/24/2017  -     ANTI -SSA ANTIBODY; Future; Expected date: 11/24/2017  -     ANTI-SSB ANTIBODY; Future; Expected date: 11/24/2017  -     Lactate dehydrogenase; Future; Expected date: 11/10/2017  -     Beta 2 Microglobulin, Serum; Future; Expected date: 11/10/2017  -     TSH; Future; Expected date: 11/10/2017  -     T3; Future; Expected date: 11/24/2017  -     Leukemia/Lymphoma Screen - Blood; Future; Expected date: 11/24/2017  -     Methylmalonic acid, serum; Future; Expected date: 11/10/2017  -     Folate; Future; Expected date: 11/10/2017  -     Iron and TIBC; Future; Expected date: 11/10/2017  -     Pathologist Interpretation Differential; Future; Expected date: 11/24/2017  -     SEDIMENTATION RATE, MANUAL; Future; Expected date: 11/24/2017  -     C-REACTIVE PROTEIN; Future; Expected date: 11/24/2017  -     IMMUNOGLOBULIN FREE LT CHAINS BLOOD; Future; Expected date: 11/24/2017  -     IMMUNOGLOBULINS (IGG, IGA, IGM) QUANTITATIVE; Future; Expected date: 11/24/2017  -     CMP; Future; Expected date: 11/24/2017  -     CBC auto differential; Future; Expected date: 11/10/2017    Paresthesia  -     ANTI-NEUTROPHILIC CYTOPLASMIC ANTIBODY; Future; Expected date:  11/24/2017  -     ANTI SM/RNP ANTIBODY; Future; Expected date: 11/24/2017  -     RAVEN; Future; Expected date: 11/24/2017  -     ANTI-DNA ANTIBODY, DOUBLE-STRANDED; Future; Expected date: 11/24/2017  -     ANTI -SSA ANTIBODY; Future; Expected date: 11/24/2017  -     ANTI-SSB ANTIBODY; Future; Expected date: 11/24/2017  -     Lactate dehydrogenase; Future; Expected date: 11/10/2017  -     Beta 2 Microglobulin, Serum; Future; Expected date: 11/10/2017  -     TSH; Future; Expected date: 11/10/2017  -     T3; Future; Expected date: 11/24/2017  -     Leukemia/Lymphoma Screen - Blood; Future; Expected date: 11/24/2017  -     Methylmalonic acid, serum; Future; Expected date: 11/10/2017  -     Folate; Future; Expected date: 11/10/2017  -     Iron and TIBC; Future; Expected date: 11/10/2017  -     Pathologist Interpretation Differential; Future; Expected date: 11/24/2017  -     SEDIMENTATION RATE, MANUAL; Future; Expected date: 11/24/2017  -     C-REACTIVE PROTEIN; Future; Expected date: 11/24/2017  -     IMMUNOGLOBULIN FREE LT CHAINS BLOOD; Future; Expected date: 11/24/2017  -     IMMUNOGLOBULINS (IGG, IGA, IGM) QUANTITATIVE; Future; Expected date: 11/24/2017  -     CMP; Future; Expected date: 11/24/2017  -     CBC auto differential; Future; Expected date: 11/10/2017    Transaminitis  -     ANTI-NEUTROPHILIC CYTOPLASMIC ANTIBODY; Future; Expected date: 11/24/2017  -     ANTI SM/RNP ANTIBODY; Future; Expected date: 11/24/2017  -     RAVEN; Future; Expected date: 11/24/2017  -     ANTI-DNA ANTIBODY, DOUBLE-STRANDED; Future; Expected date: 11/24/2017  -     ANTI -SSA ANTIBODY; Future; Expected date: 11/24/2017  -     ANTI-SSB ANTIBODY; Future; Expected date: 11/24/2017  -     Lactate dehydrogenase; Future; Expected date: 11/10/2017  -     Beta 2 Microglobulin, Serum; Future; Expected date: 11/10/2017  -     TSH; Future; Expected date: 11/10/2017  -     T3; Future; Expected date: 11/24/2017  -     Leukemia/Lymphoma Screen - Blood;  Future; Expected date: 11/24/2017  -     Methylmalonic acid, serum; Future; Expected date: 11/10/2017  -     Folate; Future; Expected date: 11/10/2017  -     Iron and TIBC; Future; Expected date: 11/10/2017  -     Pathologist Interpretation Differential; Future; Expected date: 11/24/2017  -     SEDIMENTATION RATE, MANUAL; Future; Expected date: 11/24/2017  -     C-REACTIVE PROTEIN; Future; Expected date: 11/24/2017  -     IMMUNOGLOBULIN FREE LT CHAINS BLOOD; Future; Expected date: 11/24/2017  -     IMMUNOGLOBULINS (IGG, IGA, IGM) QUANTITATIVE; Future; Expected date: 11/24/2017  -     CMP; Future; Expected date: 11/24/2017  -     CBC auto differential; Future; Expected date: 11/10/2017    Testosterone deficiency  -     ANTI-NEUTROPHILIC CYTOPLASMIC ANTIBODY; Future; Expected date: 11/24/2017  -     ANTI SM/RNP ANTIBODY; Future; Expected date: 11/24/2017  -     RAVEN; Future; Expected date: 11/24/2017  -     ANTI-DNA ANTIBODY, DOUBLE-STRANDED; Future; Expected date: 11/24/2017  -     ANTI -SSA ANTIBODY; Future; Expected date: 11/24/2017  -     ANTI-SSB ANTIBODY; Future; Expected date: 11/24/2017  -     Lactate dehydrogenase; Future; Expected date: 11/10/2017  -     Beta 2 Microglobulin, Serum; Future; Expected date: 11/10/2017  -     TSH; Future; Expected date: 11/10/2017  -     T3; Future; Expected date: 11/24/2017  -     Leukemia/Lymphoma Screen - Blood; Future; Expected date: 11/24/2017  -     Methylmalonic acid, serum; Future; Expected date: 11/10/2017  -     Folate; Future; Expected date: 11/10/2017  -     Iron and TIBC; Future; Expected date: 11/10/2017  -     Pathologist Interpretation Differential; Future; Expected date: 11/24/2017  -     SEDIMENTATION RATE, MANUAL; Future; Expected date: 11/24/2017  -     C-REACTIVE PROTEIN; Future; Expected date: 11/24/2017  -     IMMUNOGLOBULIN FREE LT CHAINS BLOOD; Future; Expected date: 11/24/2017  -     IMMUNOGLOBULINS (IGG, IGA, IGM) QUANTITATIVE; Future; Expected date:  11/24/2017  -     CMP; Future; Expected date: 11/24/2017  -     CBC auto differential; Future; Expected date: 11/10/2017    Estrogen excess  -     ANTI-NEUTROPHILIC CYTOPLASMIC ANTIBODY; Future; Expected date: 11/24/2017  -     ANTI SM/RNP ANTIBODY; Future; Expected date: 11/24/2017  -     RAVEN; Future; Expected date: 11/24/2017  -     ANTI-DNA ANTIBODY, DOUBLE-STRANDED; Future; Expected date: 11/24/2017  -     ANTI -SSA ANTIBODY; Future; Expected date: 11/24/2017  -     ANTI-SSB ANTIBODY; Future; Expected date: 11/24/2017  -     Lactate dehydrogenase; Future; Expected date: 11/10/2017  -     Beta 2 Microglobulin, Serum; Future; Expected date: 11/10/2017  -     TSH; Future; Expected date: 11/10/2017  -     T3; Future; Expected date: 11/24/2017  -     Leukemia/Lymphoma Screen - Blood; Future; Expected date: 11/24/2017  -     Methylmalonic acid, serum; Future; Expected date: 11/10/2017  -     Folate; Future; Expected date: 11/10/2017  -     Iron and TIBC; Future; Expected date: 11/10/2017  -     Pathologist Interpretation Differential; Future; Expected date: 11/24/2017  -     SEDIMENTATION RATE, MANUAL; Future; Expected date: 11/24/2017  -     C-REACTIVE PROTEIN; Future; Expected date: 11/24/2017  -     IMMUNOGLOBULIN FREE LT CHAINS BLOOD; Future; Expected date: 11/24/2017  -     IMMUNOGLOBULINS (IGG, IGA, IGM) QUANTITATIVE; Future; Expected date: 11/24/2017  -     CMP; Future; Expected date: 11/24/2017  -     CBC auto differential; Future; Expected date: 11/10/2017    Leukemoid reaction      Hx of hematuria and renal stones      Recommend flow cytometry   ecplained differential with elev platelets as well  Add inflammatory markers to assess further incl sed rate and CRP  Add branden 2 reflex  Hold arimidex  This could be the cause of his elevated LFTs  Recheck cmp  If LFTs are elevated then will send him for an ultrasound to check for fatty liver etc  Assess nutritional status with B12 and folate levels      Thank you for  allowing me to evaluate and participate in the care of this pleasant patient. Please fell free to call me with any questions or concerns.    Warmly,  Netta Vicente MD    This note was dictated with Dragon and briefly proofread. Please excuse any sentences that may be unclear or words misspelled

## 2017-11-13 ENCOUNTER — CLINICAL SUPPORT (OUTPATIENT)
Dept: UROLOGY | Facility: CLINIC | Age: 42
End: 2017-11-13
Payer: COMMERCIAL

## 2017-11-13 ENCOUNTER — TELEPHONE (OUTPATIENT)
Dept: UROLOGY | Facility: CLINIC | Age: 42
End: 2017-11-13

## 2017-11-13 DIAGNOSIS — E29.1 HYPOGONADISM IN MALE: Primary | ICD-10-CM

## 2017-11-13 PROCEDURE — 99499 UNLISTED E&M SERVICE: CPT | Mod: S$GLB,,, | Performed by: UROLOGY

## 2017-11-13 PROCEDURE — 96372 THER/PROPH/DIAG INJ SC/IM: CPT | Mod: S$GLB,,, | Performed by: UROLOGY

## 2017-11-13 RX ADMIN — TESTOSTERONE CYPIONATE 100 MG: 200 INJECTION, SOLUTION INTRAMUSCULAR at 09:11

## 2017-11-13 NOTE — TELEPHONE ENCOUNTER
Can reschedule for a few weeks after labs  Which labs is he getting for me exactly?  He is supposed to get a T level just before his next shot, so if he's do for another shot next week it needs to be the morning before his shot that day

## 2017-11-13 NOTE — TELEPHONE ENCOUNTER
Patient in clinic today per  for testosterone injection 100mg #2 scheduled for labs on Monday 11/20 and follow up appointment this Thursday on 11/13 should appointment be rescheduled

## 2017-11-14 ENCOUNTER — PATIENT MESSAGE (OUTPATIENT)
Dept: UROLOGY | Facility: CLINIC | Age: 42
End: 2017-11-14

## 2017-11-17 ENCOUNTER — PATIENT MESSAGE (OUTPATIENT)
Dept: SURGERY | Facility: CLINIC | Age: 42
End: 2017-11-17

## 2017-11-17 ENCOUNTER — OFFICE VISIT (OUTPATIENT)
Dept: HEMATOLOGY/ONCOLOGY | Facility: CLINIC | Age: 42
End: 2017-11-17
Payer: COMMERCIAL

## 2017-11-17 VITALS
HEIGHT: 67 IN | RESPIRATION RATE: 20 BRPM | BODY MASS INDEX: 34.26 KG/M2 | DIASTOLIC BLOOD PRESSURE: 51 MMHG | SYSTOLIC BLOOD PRESSURE: 159 MMHG | HEART RATE: 97 BPM | WEIGHT: 218.25 LBS

## 2017-11-17 DIAGNOSIS — D72.9 NEUTROPHILIA: ICD-10-CM

## 2017-11-17 DIAGNOSIS — K40.20 INGUINAL HERNIA BILATERAL, NON-RECURRENT: ICD-10-CM

## 2017-11-17 DIAGNOSIS — K40.20 NON-RECURRENT BILATERAL INGUINAL HERNIA WITHOUT OBSTRUCTION OR GANGRENE: Primary | ICD-10-CM

## 2017-11-17 DIAGNOSIS — D72.823 LEUKEMOID REACTION: Primary | ICD-10-CM

## 2017-11-17 DIAGNOSIS — R74.01 TRANSAMINITIS: ICD-10-CM

## 2017-11-17 PROCEDURE — 99999 PR PBB SHADOW E&M-EST. PATIENT-LVL III: CPT | Mod: PBBFAC,,, | Performed by: INTERNAL MEDICINE

## 2017-11-17 PROCEDURE — 99213 OFFICE O/P EST LOW 20 MIN: CPT | Mod: S$GLB,,, | Performed by: INTERNAL MEDICINE

## 2017-11-17 RX ORDER — SODIUM CHLORIDE 9 MG/ML
INJECTION, SOLUTION INTRAVENOUS CONTINUOUS
Status: CANCELLED | OUTPATIENT
Start: 2017-11-22

## 2017-11-17 NOTE — PROGRESS NOTES
Follow-up (1 week follow up with labs [facility])      Lee Quintanilla is a 42 y.o.  Pt reports he is nervous wreck. He remains with night sweats, no weight loss    Past Medical History:   Diagnosis Date    ADHD (attention deficit hyperactivity disorder)     Arthritis     Asthma     as child only    Back pain     Degeneration of lumbar intervertebral disc 05/2016    Depression     Elevated PSA     Headache     Kidney damage     stage 3 ; d/t aleve abuse    Kidney stone     Neck pain     Numbness and tingling in hands     Numbness and tingling of both legs     Seizures     from inapsine 2002    Sleep apnea     no cpap     Past Surgical History:   Procedure Laterality Date    BACK SURGERY  2016    back surgery    PILONIDAL CYST DRAINAGE      removal of abcess      scrotal    TYMPANOSTOMY TUBE PLACEMENT         Current Outpatient Prescriptions:     TESTOSTERONE CYPIONATE IM, Inject 200 mg into the muscle., Disp: , Rfl:     Current Facility-Administered Medications:     testosterone cypionate injection 100 mg, 100 mg, Intramuscular, Weekly, Vicky Saeed MD, 100 mg at 11/13/17 0941  Review of patient's allergies indicates:   Allergen Reactions    Inapsine [droperidol] Anaphylaxis     seizures    Pcn [penicillins]     Bactrim [sulfamethoxazole-trimethoprim] Rash     Dry red rash all over         CONSTITUTIONAL: No fevers, chills, night sweats, wt. loss, appetite changes  SKIN: no rashes or itching  ENT: No headaches, head trauma, vision changes, or eye pain  LYMPH NODES: None noticed   CV: No chest pain, palpitations.   RESP: No dyspnea on exertion, cough, wheezing, or hemoptysis  GI: No nausea, emesis, diarrhea, constipation, melena, hematochezia, pain.   : No dysuria, hematuria, urgency, or frequency   HEME: No easy bruising, bleeding problems  PSYCHIATRIC: No depression, anxiety, psychosis, hallucinations.  NEURO: No seizures, memory loss, dizziness or difficulty sleeping  MSK: No  "arthralgias or joint swelling         BP (!) 159/51 (BP Location: Left arm, Patient Position: Sitting, BP Method: Medium (Automatic))   Pulse 97   Resp 20   Ht 5' 7" (1.702 m)   Wt 99 kg (218 lb 4.1 oz)   BMI 34.18 kg/m²   Gen: NAD, A and O x3,   Psych: pleasant affect, normal thought process  Eyes: Pupils round and non dilated, EOM intact  Nose: Nares patent  OP clear, mucosa patent  Neck: suppple, no JVD, trachea midline, no palpable mass, no adenopathy  Lungs: CTAB, no wheezes, no use of accessory muscles  CV: S1S2 with RRR, No mrg  Abd: soft, NTND, + BS, No HSM, no ascites  Extr: No CCE, ANTONIO, strength normal, good capillary refill  Neuro: steady gait, CNs grossly intact  Skin: intact, no lesions noted  Rheum: No joint swelling    Lab Results   Component Value Date    WBC 13.50 (H) 10/30/2017    HGB 15.4 10/30/2017    HCT 46.7 10/30/2017    MCV 90 10/30/2017     (H) 10/30/2017     CMP  Sodium   Date Value Ref Range Status   10/30/2017 141 136 - 145 mmol/L Final     Potassium   Date Value Ref Range Status   10/30/2017 4.3 3.5 - 5.1 mmol/L Final     Chloride   Date Value Ref Range Status   10/30/2017 107 95 - 110 mmol/L Final     CO2   Date Value Ref Range Status   10/30/2017 21 (L) 23 - 29 mmol/L Final     Glucose   Date Value Ref Range Status   10/30/2017 70 70 - 110 mg/dL Final     BUN, Bld   Date Value Ref Range Status   10/30/2017 10 6 - 20 mg/dL Final     Creatinine   Date Value Ref Range Status   10/30/2017 1.4 0.5 - 1.4 mg/dL Final     Calcium   Date Value Ref Range Status   10/30/2017 9.0 8.7 - 10.5 mg/dL Final     Total Protein   Date Value Ref Range Status   10/30/2017 7.7 6.0 - 8.4 g/dL Final     Albumin   Date Value Ref Range Status   10/30/2017 3.2 (L) 3.5 - 5.2 g/dL Final   01/13/2017 4.3 3.6 - 5.1 g/dL Final     Comment:     @ Test Performed By:  [facility]  Pinky Conner M.D., FCBETTINA.,   [address] [place] [id number]  [place]  " [id number]       Total Bilirubin   Date Value Ref Range Status   10/30/2017 0.5 0.1 - 1.0 mg/dL Final     Comment:     For infants and newborns, interpretation of results should be based  on gestational age, weight and in agreement with clinical  observations.  Premature Infant recommended reference ranges:  Up to 24 hours.............<8.0 mg/dL  Up to 48 hours............<12.0 mg/dL  3-5 days..................<15.0 mg/dL  6-29 days.................<15.0 mg/dL       Alkaline Phosphatase   Date Value Ref Range Status   10/30/2017 59 55 - 135 U/L Final     AST   Date Value Ref Range Status   10/30/2017 49 (H) 10 - 40 U/L Final     ALT   Date Value Ref Range Status   10/30/2017 148 (H) 10 - 44 U/L Final     Anion Gap   Date Value Ref Range Status   10/30/2017 13 8 - 16 mmol/L Final     eGFR if    Date Value Ref Range Status   10/30/2017 >60 >60 mL/min/1.73 m^2 Final     eGFR if non    Date Value Ref Range Status   10/30/2017 >60 >60 mL/min/1.73 m^2 Final     Comment:     Calculation used to obtain the estimated glomerular filtration  rate (eGFR) is the CKD-EPI equation. Since race is unknown   in our information system, the eGFR values for   -American and Non--American patients are given   for each creatinine result.       All labs reviewed with pt    Leukemoid reaction  -     CBC auto differential; Future; Expected date: 11/17/2017    Neutrophilia  -     CBC auto differential; Future; Expected date: 11/17/2017    Transaminitis    awaiting results from flow cytometry  Will call pt with such and make further recs accordingly  Assured him all other tests were negative  He will return to hs PCP for routine follow up as well    Thank you for allowing me to evaluate and participate in the care of this pleasant patient. Please fell free to call me with any questions or concerns.    WarmlyNetta MD    This note was dictated with Dragon and briefly proofread. Please excuse  any sentences that may be unclear or words misspelled

## 2017-11-20 ENCOUNTER — HOSPITAL ENCOUNTER (OUTPATIENT)
Dept: PREADMISSION TESTING | Facility: HOSPITAL | Age: 42
Discharge: HOME | End: 2017-11-20
Attending: UROLOGY
Payer: COMMERCIAL

## 2017-11-20 ENCOUNTER — OFFICE VISIT (OUTPATIENT)
Dept: UROLOGY | Facility: CLINIC | Age: 42
End: 2017-11-20
Payer: COMMERCIAL

## 2017-11-20 VITALS — WEIGHT: 215 LBS | HEIGHT: 67 IN | BODY MASS INDEX: 33.74 KG/M2

## 2017-11-20 VITALS — SYSTOLIC BLOOD PRESSURE: 155 MMHG | DIASTOLIC BLOOD PRESSURE: 67 MMHG | HEART RATE: 97 BPM | TEMPERATURE: 98 F

## 2017-11-20 DIAGNOSIS — R82.991 HYPOCITRATURIA: ICD-10-CM

## 2017-11-20 DIAGNOSIS — E29.1 HYPOGONADISM MALE: Primary | ICD-10-CM

## 2017-11-20 DIAGNOSIS — R82.994 HYPERCALCIURIA: ICD-10-CM

## 2017-11-20 DIAGNOSIS — E29.1 HYPOGONADISM IN MALE: Primary | ICD-10-CM

## 2017-11-20 DIAGNOSIS — R82.992 HYPEROXALURIA: ICD-10-CM

## 2017-11-20 DIAGNOSIS — E21.3 HYPERPARATHYROIDISM: ICD-10-CM

## 2017-11-20 DIAGNOSIS — K40.20 NON-RECURRENT BILATERAL INGUINAL HERNIA WITHOUT OBSTRUCTION OR GANGRENE: ICD-10-CM

## 2017-11-20 DIAGNOSIS — N20.0 NEPHROLITHIASIS: ICD-10-CM

## 2017-11-20 LAB
BASOPHILS # BLD AUTO: 0.1 K/UL
BASOPHILS NFR BLD: 0.7 %
DIFFERENTIAL METHOD: NORMAL
EOSINOPHIL # BLD AUTO: 0.4 K/UL
EOSINOPHIL NFR BLD: 3.6 %
ERYTHROCYTE [DISTWIDTH] IN BLOOD BY AUTOMATED COUNT: 14.2 %
HCT VFR BLD AUTO: 46.7 %
HGB BLD-MCNC: 15.6 G/DL
LYMPHOCYTES # BLD AUTO: 2 K/UL
LYMPHOCYTES NFR BLD: 20.5 %
MCH RBC QN AUTO: 29.4 PG
MCHC RBC AUTO-ENTMCNC: 33.5 G/DL
MCV RBC AUTO: 88 FL
MONOCYTES # BLD AUTO: 0.6 K/UL
MONOCYTES NFR BLD: 6.3 %
NEUTROPHILS # BLD AUTO: 6.6 K/UL
NEUTROPHILS NFR BLD: 68.9 %
PLATELET # BLD AUTO: 323 K/UL
PMV BLD AUTO: 9.7 FL
RBC # BLD AUTO: 5.31 M/UL
WBC # BLD AUTO: 9.6 K/UL

## 2017-11-20 PROCEDURE — 99999 PR PBB SHADOW E&M-EST. PATIENT-LVL III: CPT | Mod: PBBFAC,,, | Performed by: UROLOGY

## 2017-11-20 PROCEDURE — 96372 THER/PROPH/DIAG INJ SC/IM: CPT | Mod: S$GLB,,, | Performed by: UROLOGY

## 2017-11-20 PROCEDURE — 99900104 DSU ONLY-NO CHARGE-EA ADD'L HR (STAT)

## 2017-11-20 PROCEDURE — 85025 COMPLETE CBC W/AUTO DIFF WBC: CPT

## 2017-11-20 PROCEDURE — 99900103 DSU ONLY-NO CHARGE-INITIAL HR (STAT)

## 2017-11-20 PROCEDURE — 36415 COLL VENOUS BLD VENIPUNCTURE: CPT

## 2017-11-20 PROCEDURE — 99214 OFFICE O/P EST MOD 30 MIN: CPT | Mod: 25,S$GLB,, | Performed by: UROLOGY

## 2017-11-20 RX ORDER — FERROUS SULFATE 325(65) MG
325 TABLET ORAL
COMMUNITY
End: 2018-09-04

## 2017-11-20 RX ORDER — HYDROCHLOROTHIAZIDE 25 MG/1
25 TABLET ORAL DAILY
Qty: 30 TABLET | Refills: 3 | Status: SHIPPED | OUTPATIENT
Start: 2017-11-20 | End: 2018-02-21 | Stop reason: SDUPTHER

## 2017-11-20 RX ADMIN — TESTOSTERONE CYPIONATE 100 MG: 200 INJECTION, SOLUTION INTRAMUSCULAR at 10:11

## 2017-11-20 NOTE — PROGRESS NOTES
[facility] Progress Note  PCP: Dr.Havlovic [facility]: active     Chief Complaint: Follow-up (1 month fu)        Subjective:        HPI: Lee Quintanilla is a 41 y.o. male presents with      Last seen 10/30/17  We have been actively communicating via [facility] and [facility]    Hypgonadism and Fatigue  -Pt had tried T 200 q3 weeks, no improvement in sx, then androgel 2 pumps daily, no improvement in sx and c/o breast enlargement,  then started anastrozole daily. He had no improvement in sx and in sept stopped the androgel and started T100 2x a week (from [place]) and was taking anastrozole 2x a week. I had rec'd he stop this as he was vague complaints, elevated wbc and elevated liver enzymes. We restarted T 100 1x a week with plans to recheck a T,E and CMP. He was referred to hepatology but has been evaluated by  who says that his LFTs have improved since stopping anastrozole and has held off on hepatology appt. She also started him on Iron for iron deficiency (3d ago). He also has elevated lipids but had to hold off on tx bc of elevated LFTs.   -no longer c/o breast enlargement  -sx of low T include: tired, sluggish    Does the pt have the following?  Incomplete or Delayed sexual development - no  Breast discomfort or gynecomastia - yes  Loss of axillar and/or pubic body hair - no  Very small or shrinking testes - no  Height loss due to vertebral fractures, low trauma fractures or low bone density - no  No hot flashes, + night sweats, + chills       Testosterone                                   Estrogen  10/30/17  728     448  7/12/17  487     80  6/6/17   576 started anastrozole  121  5/30/17       227  4/11/17                     576 Androgel 2 pumps aristeo  3/7/17                        136 switched to androgel   1/13/17            168 on T 200 q3 weeks     ED  -on this much T without medication he has able to get an erection hard enough for penetration but has poor duration with  partner, self ok. Has not had to take cialis.         Microhematuria, atypical urine cytology repeated and was normal, kidney stones  ctu 2/10/17 - 2mm left renal stone, 4mm L proximal ureteral stone  Cystoscopy 2/20/17 - normal, prostatitis, mild b hypertrophy  Urine cytology 6/12/17 - negative, 2/7/17 - atypical cells,  8/25/14 - normal   Has started smoking again - 1 pack a week    Repeat ct on 3/20/17 (5 weeks later) to see if stone passed with flomax shows stone still in left proximal ureter without hydro.   4/5/17 cystoscopy, L ESWL and stent placement on strings. Pt did not pass any cytology   4/13/17 ctrss showed stone had passed but still had 1mm stone in LMP, pt removed stent which was on strings  8/25/17 ctrss showed 2mm LMP stone  Drinks 5 to 6 bottles of water a day.     24 Hour Urine Results From [facility]  Date: 11/7/17    Urine volume (0.5-4L): 1.96  SS CaOx (6-10): 9.14  Urine Calcium (mg/day) (male <250, female <200): 346  Urine Oxalate (mg/day) (20-40): 57  Urine Citrate (mg/day)(male>450, female>550): 203  SS CaP (0.5-2): 2.49  24 Hour Urine pH (5.8-6.2):6.092  SS Uric Acid (0-1): 1.05  Urine Uric Acid (g/day) (male<0.800, female<0.750): 1.245  Creatinine: 3848 (says he collected for exactly 24 hours)    Interpretation of Lab Results:  suboptimal urine volume -increase fluid intake (1 gallon a day) to increase urine output to 2.5 L per day  Hypercalciuria - lower sodium and could will start hctz   Hyperoxaluria   hypocitraturia    Serum labs:   Uric Acid: 3  PTH: 109  Calcium: 9.0    Groin pain/ B hernias   found to have bilateral hernias and was going to get surgery in [place] but wants tx here on [place].      IIEF: did not octavio out today, but previously 5  AUASSx: 7, pleased    REVIEW OF SYSTEMS:  General ROS: no fevers, no chills  Psychological ROS: no depression  Endocrine ROS: no heat or cold  Respiratory ROS: no SOB  Cardiovascular ROS: no CP  Gastrointestinal ROS: no abdominal pain, no  constipation, no diarrhea, no BRBPR  Musculoskeletal ROS: no muscle pain  Neurological ROS: no headaches  Dermatological ROS: no rashes  HEENT: no glasses, no sinus   ROS: per HPI    The past medical, surgical, social and family hx were reviewed. He has had back surgery since I've seen him. Saw neurosurgeon and said s1 impinging on back and causing issues in his left leg.   Cataracts? none     Urologic meds:  androgel 2 pumps a day, but most recently had T 200 2 weeks ago bc pump was not refilled.   Anticoagulation: No     Objective:      Vitals:    11/20/17 0951   BP: (!) 155/67   Pulse: 97   Temp: 98 °F (36.7 °C)          Physical Exam   Constitutional: He is oriented to person, place, and time. He appears well-developed and well-nourished. He is cooperative. No distress.   HENT:   Head: Normocephalic and atraumatic.   Eyes: Pupils are equal, round, and reactive to light.   Cardiovascular: Normal rate and regular rhythm.    Pulmonary/Chest: Effort normal.   Abdominal: Soft. Normal appearance.   Musculoskeletal: Normal range of motion.   Neurological: He is alert and oriented to person, place, and time. He has normal reflexes.   Skin: Skin is intact.     Psychiatric: He has a normal mood and affect.       Urinalysis:1.020/5/tr leuk and 1+blood - no sx    Labs:cr 1.2 7/25/16    ua microscopic:   1/12/17 - 1   5/31/16 - 4     hba1c 1/13/17 - 5.4     Urine culture  8/23/17  Ng,   7/18/17  Ng, 1+leuk/1+protein/2+ketones/50 blood - voided, no sx  2/20/17                      ng  8/25/14                      Ng    Labs 9/25/17  H/h 15.3/47  Platelets 375  Alt elevated     Labs 3/17/17  psa  0.98  Lipid panel - elevated, not worse than last labs 1 month ago  T 136  LFTs normal  H/H 15.8/48.1      PATHOLOGY REVIEW:  Voided urine 6/12/17- Negative for malignant cells.  Urine cytology 2/7/17 - atypical cells  Urine cytology 8/25/14 - negative     RADIOGRAPHIC REVIEW:  ctrss 8/25/17  2mm LMP stone  Other findings: 3mm  nodule in lung unchanged from 2014    ctrss 4/13/17  Appropriately positioned left ureteral stent with resolution of left ureterolithiasis, without residual ureteral stone.  Persistent punctate left nephrolithiasis.    ctrss 3/20/17  ctrss 2/10/17  3mm left upj stone without hydro  2mm left intrarenal stone     kub 2/13/17  Stone in left ureter not visible     ctu 2/10/17  2mm left renal stone, 4mm proximal ureteral stone  Prostate gland mildly enlarged  Other findings: 8mm hypodense mass in leiver likely a cyst     CTRSS 9/8/14  punctate nonobstructing renal stone with no hydronephrosis, opaque ureteral stone, ureteral obstruction or acute findings. Tiny fat containing umbilical hernia and small fat containing inguinal hernias also noted     US Scrotum 9/3/14  Echogenic area       rbus 7/24/14  normal        Assessment:       hypgonadism and ED  Elevated LFTs  Groin pain, b hernias  Nephrolithiasis  hypocitraturia  Hypercalciuria  hyperoxaluria      Plan:      ED and hypogonadism  -f/u labs  -continue T 100q1 week, can self administer, will write final rx based on labs today.   -will hold of on anastrozole depending on what cmp looks like and what  says.  -We will contact [facility] and schedule date and they will work on referral and see if insurance covers testopel. Would need bactrim prophylaxis with h/o boils.      nephrolithaisis/microhematuria  -had a proximal ureteral stone tx with eswl  -still has 1mm LMP stone  - Hypercalciuria, hyperparathryoidism and has elevated cr, slightly, unsure if pth is elevated in response to this. Referral to  If kidneys normal may refer to  for further workup of his hyperparathyroidism. Bone density test?? Will go ahead and start on hctz in the meantime. Also has elevated bp.   -hyperoxaluria: given list of foods to avoid  -hypocitraturia: pt wants to avoid any new meds, will hold off on starting but pt will add lemon juice and lemon oil, lemon  extract, crystal light lemonade  -repeat the 24 hours in 6 months and if still low will start then.     Groin pain and BIH  -pt have b hernia repair wed     Recommend no smoking    F/u 1 month     [facility]: active

## 2017-11-20 NOTE — PROGRESS NOTES
Per  patient received testosterone 100mg IM in left gluteal. Patient tolerated well no adverse reaction noted

## 2017-11-21 ENCOUNTER — ANESTHESIA EVENT (OUTPATIENT)
Dept: SURGERY | Facility: HOSPITAL | Age: 42
End: 2017-11-21
Payer: COMMERCIAL

## 2017-11-21 ENCOUNTER — TELEPHONE (OUTPATIENT)
Dept: HEMATOLOGY/ONCOLOGY | Facility: CLINIC | Age: 42
End: 2017-11-21

## 2017-11-21 ENCOUNTER — PATIENT MESSAGE (OUTPATIENT)
Dept: UROLOGY | Facility: CLINIC | Age: 42
End: 2017-11-21

## 2017-11-21 ENCOUNTER — PATIENT MESSAGE (OUTPATIENT)
Dept: HEMATOLOGY/ONCOLOGY | Facility: CLINIC | Age: 42
End: 2017-11-21

## 2017-11-21 DIAGNOSIS — R94.5 LIVER FUNCTION ABNORMALITY: Primary | ICD-10-CM

## 2017-11-21 DIAGNOSIS — E29.1 HYPOGONADISM IN MALE: ICD-10-CM

## 2017-11-21 RX ORDER — TESTOSTERONE CYPIONATE 200 MG/ML
100 INJECTION, SOLUTION INTRAMUSCULAR
Qty: 10 ML | Refills: 0 | Status: SHIPPED | OUTPATIENT
Start: 2017-11-21 | End: 2018-07-05

## 2017-11-21 NOTE — TELEPHONE ENCOUNTER
Pt's T level is 806 on 100mg q1 week, estrogen pending  We will continue at this dose  He wants to self administer at home while he is out of town  Stressed to him he cannot abuse this  I would like to check another T level in a month (before 9am just befor a shot) and refer to hepatology for persistently elevated LFTs.     If we do testopel will start with 6 pellets first -please let reji michel know

## 2017-11-21 NOTE — TELEPHONE ENCOUNTER
Message left instructing patient to call [phone] ext [phone] to f/uy with labs and urine sample.

## 2017-11-22 ENCOUNTER — SURGERY (OUTPATIENT)
Age: 42
End: 2017-11-22

## 2017-11-22 ENCOUNTER — HOSPITAL ENCOUNTER (OUTPATIENT)
Facility: HOSPITAL | Age: 42
Discharge: HOME | End: 2017-11-22
Attending: SURGERY | Admitting: SURGERY
Payer: COMMERCIAL

## 2017-11-22 ENCOUNTER — ANESTHESIA (OUTPATIENT)
Dept: SURGERY | Facility: HOSPITAL | Age: 42
End: 2017-11-22
Payer: COMMERCIAL

## 2017-11-22 ENCOUNTER — PATIENT MESSAGE (OUTPATIENT)
Dept: UROLOGY | Facility: CLINIC | Age: 42
End: 2017-11-22

## 2017-11-22 ENCOUNTER — DOCUMENTATION ONLY (OUTPATIENT)
Dept: TRANSPLANT | Facility: CLINIC | Age: 42
End: 2017-11-22

## 2017-11-22 VITALS
DIASTOLIC BLOOD PRESSURE: 77 MMHG | BODY MASS INDEX: 33.74 KG/M2 | OXYGEN SATURATION: 100 % | HEIGHT: 67 IN | WEIGHT: 215 LBS | HEART RATE: 90 BPM | SYSTOLIC BLOOD PRESSURE: 146 MMHG | TEMPERATURE: 98 F | RESPIRATION RATE: 18 BRPM

## 2017-11-22 DIAGNOSIS — K40.20 NON-RECURRENT BILATERAL INGUINAL HERNIA WITHOUT OBSTRUCTION OR GANGRENE: ICD-10-CM

## 2017-11-22 DIAGNOSIS — K40.20 INGUINAL HERNIA BILATERAL, NON-RECURRENT: Primary | ICD-10-CM

## 2017-11-22 PROCEDURE — 71000039 HC RECOVERY, EACH ADD'L HOUR: Performed by: SURGERY

## 2017-11-22 PROCEDURE — C1781 MESH (IMPLANTABLE): HCPCS | Performed by: SURGERY

## 2017-11-22 PROCEDURE — 25000003 PHARM REV CODE 250: Performed by: ANESTHESIOLOGY

## 2017-11-22 PROCEDURE — 63600175 PHARM REV CODE 636 W HCPCS: Performed by: NURSE ANESTHETIST, CERTIFIED REGISTERED

## 2017-11-22 PROCEDURE — 49650 LAP ING HERNIA REPAIR INIT: CPT | Mod: 50,,, | Performed by: SURGERY

## 2017-11-22 PROCEDURE — 71000015 HC POSTOP RECOV 1ST HR: Performed by: SURGERY

## 2017-11-22 PROCEDURE — 25000003 PHARM REV CODE 250: Performed by: NURSE ANESTHETIST, CERTIFIED REGISTERED

## 2017-11-22 PROCEDURE — 36000711: Performed by: SURGERY

## 2017-11-22 PROCEDURE — 99900104 DSU ONLY-NO CHARGE-EA ADD'L HR (STAT): Performed by: SURGERY

## 2017-11-22 PROCEDURE — 27201423 OPTIME MED/SURG SUP & DEVICES STERILE SUPPLY: Performed by: SURGERY

## 2017-11-22 PROCEDURE — 25000003 PHARM REV CODE 250: Performed by: SURGERY

## 2017-11-22 PROCEDURE — 71000016 HC POSTOP RECOV ADDL HR: Performed by: SURGERY

## 2017-11-22 PROCEDURE — 71000033 HC RECOVERY, INTIAL HOUR: Performed by: SURGERY

## 2017-11-22 PROCEDURE — D9220A PRA ANESTHESIA: Mod: CRNA,,, | Performed by: NURSE ANESTHETIST, CERTIFIED REGISTERED

## 2017-11-22 PROCEDURE — 36000710: Performed by: SURGERY

## 2017-11-22 PROCEDURE — 63600175 PHARM REV CODE 636 W HCPCS: Performed by: SURGERY

## 2017-11-22 PROCEDURE — 99900103 DSU ONLY-NO CHARGE-INITIAL HR (STAT): Performed by: SURGERY

## 2017-11-22 PROCEDURE — D9220A PRA ANESTHESIA: Mod: ANES,,, | Performed by: ANESTHESIOLOGY

## 2017-11-22 PROCEDURE — 37000008 HC ANESTHESIA 1ST 15 MINUTES: Performed by: SURGERY

## 2017-11-22 PROCEDURE — 37000009 HC ANESTHESIA EA ADD 15 MINS: Performed by: SURGERY

## 2017-11-22 DEVICE — MESH PROGRIP LAP 10X15CM RIGHT: Type: IMPLANTABLE DEVICE | Site: ABDOMEN | Status: FUNCTIONAL

## 2017-11-22 DEVICE — MESH PROGRIP LAP 10X15CM LEFT: Type: IMPLANTABLE DEVICE | Site: ABDOMEN | Status: FUNCTIONAL

## 2017-11-22 RX ORDER — LIDOCAINE HCL/PF 100 MG/5ML
SYRINGE (ML) INTRAVENOUS
Status: DISCONTINUED | OUTPATIENT
Start: 2017-11-22 | End: 2017-11-22

## 2017-11-22 RX ORDER — ONDANSETRON 2 MG/ML
4 INJECTION INTRAMUSCULAR; INTRAVENOUS ONCE AS NEEDED
Status: DISCONTINUED | OUTPATIENT
Start: 2017-11-22 | End: 2017-11-22 | Stop reason: HOSPADM

## 2017-11-22 RX ORDER — HYDROMORPHONE HYDROCHLORIDE 2 MG/ML
0.2 INJECTION, SOLUTION INTRAMUSCULAR; INTRAVENOUS; SUBCUTANEOUS EVERY 5 MIN PRN
Status: DISCONTINUED | OUTPATIENT
Start: 2017-11-22 | End: 2017-11-22 | Stop reason: HOSPADM

## 2017-11-22 RX ORDER — ACETAMINOPHEN 10 MG/ML
INJECTION, SOLUTION INTRAVENOUS
Status: DISCONTINUED | OUTPATIENT
Start: 2017-11-22 | End: 2017-11-22

## 2017-11-22 RX ORDER — MEPERIDINE HYDROCHLORIDE 25 MG/ML
12.5 INJECTION INTRAMUSCULAR; INTRAVENOUS; SUBCUTANEOUS ONCE AS NEEDED
Status: DISCONTINUED | OUTPATIENT
Start: 2017-11-22 | End: 2017-11-22 | Stop reason: HOSPADM

## 2017-11-22 RX ORDER — OXYCODONE HYDROCHLORIDE 5 MG/1
5 TABLET ORAL ONCE AS NEEDED
Status: COMPLETED | OUTPATIENT
Start: 2017-11-22 | End: 2017-11-22

## 2017-11-22 RX ORDER — CISATRACURIUM BESYLATE 10 MG/ML
INJECTION, SOLUTION INTRAVENOUS
Status: DISCONTINUED | OUTPATIENT
Start: 2017-11-22 | End: 2017-11-22

## 2017-11-22 RX ORDER — ACETAMINOPHEN 500 MG
1000 TABLET ORAL EVERY 6 HOURS PRN
COMMUNITY
End: 2018-11-21 | Stop reason: ALTCHOICE

## 2017-11-22 RX ORDER — PHENYLEPHRINE HYDROCHLORIDE 10 MG/ML
INJECTION INTRAVENOUS
Status: DISCONTINUED | OUTPATIENT
Start: 2017-11-22 | End: 2017-11-22

## 2017-11-22 RX ORDER — SODIUM CHLORIDE 9 MG/ML
INJECTION, SOLUTION INTRAVENOUS CONTINUOUS
Status: DISCONTINUED | OUTPATIENT
Start: 2017-11-22 | End: 2017-11-22 | Stop reason: HOSPADM

## 2017-11-22 RX ORDER — FENTANYL CITRATE 50 UG/ML
25 INJECTION, SOLUTION INTRAMUSCULAR; INTRAVENOUS EVERY 5 MIN PRN
Status: DISCONTINUED | OUTPATIENT
Start: 2017-11-22 | End: 2017-11-22 | Stop reason: HOSPADM

## 2017-11-22 RX ORDER — BUPIVACAINE HYDROCHLORIDE AND EPINEPHRINE 2.5; 5 MG/ML; UG/ML
INJECTION, SOLUTION EPIDURAL; INFILTRATION; INTRACAUDAL; PERINEURAL
Status: DISCONTINUED | OUTPATIENT
Start: 2017-11-22 | End: 2017-11-22 | Stop reason: HOSPADM

## 2017-11-22 RX ORDER — LIDOCAINE HYDROCHLORIDE 10 MG/ML
1 INJECTION, SOLUTION EPIDURAL; INFILTRATION; INTRACAUDAL; PERINEURAL ONCE
Status: COMPLETED | OUTPATIENT
Start: 2017-11-22 | End: 2017-11-22

## 2017-11-22 RX ORDER — ONDANSETRON HYDROCHLORIDE 2 MG/ML
INJECTION, SOLUTION INTRAMUSCULAR; INTRAVENOUS
Status: DISCONTINUED | OUTPATIENT
Start: 2017-11-22 | End: 2017-11-22

## 2017-11-22 RX ORDER — SUCCINYLCHOLINE CHLORIDE 20 MG/ML
INJECTION INTRAMUSCULAR; INTRAVENOUS
Status: DISCONTINUED | OUTPATIENT
Start: 2017-11-22 | End: 2017-11-22

## 2017-11-22 RX ORDER — NEOSTIGMINE METHYLSULFATE 1 MG/ML
INJECTION, SOLUTION INTRAVENOUS
Status: DISCONTINUED | OUTPATIENT
Start: 2017-11-22 | End: 2017-11-22

## 2017-11-22 RX ORDER — HYDROMORPHONE HYDROCHLORIDE 2 MG/ML
1 INJECTION, SOLUTION INTRAMUSCULAR; INTRAVENOUS; SUBCUTANEOUS EVERY 4 HOURS PRN
Status: DISCONTINUED | OUTPATIENT
Start: 2017-11-22 | End: 2017-11-22 | Stop reason: HOSPADM

## 2017-11-22 RX ORDER — CIPROFLOXACIN 2 MG/ML
400 INJECTION, SOLUTION INTRAVENOUS
Status: DISCONTINUED | OUTPATIENT
Start: 2017-11-22 | End: 2017-11-22 | Stop reason: HOSPADM

## 2017-11-22 RX ORDER — DEXAMETHASONE SODIUM PHOSPHATE 4 MG/ML
INJECTION, SOLUTION INTRA-ARTICULAR; INTRALESIONAL; INTRAMUSCULAR; INTRAVENOUS; SOFT TISSUE
Status: DISCONTINUED | OUTPATIENT
Start: 2017-11-22 | End: 2017-11-22

## 2017-11-22 RX ORDER — ROCURONIUM BROMIDE 10 MG/ML
INJECTION, SOLUTION INTRAVENOUS
Status: DISCONTINUED | OUTPATIENT
Start: 2017-11-22 | End: 2017-11-22

## 2017-11-22 RX ORDER — MIDAZOLAM HYDROCHLORIDE 1 MG/ML
INJECTION, SOLUTION INTRAMUSCULAR; INTRAVENOUS
Status: DISCONTINUED | OUTPATIENT
Start: 2017-11-22 | End: 2017-11-22

## 2017-11-22 RX ORDER — PROPOFOL 10 MG/ML
VIAL (ML) INTRAVENOUS
Status: DISCONTINUED | OUTPATIENT
Start: 2017-11-22 | End: 2017-11-22

## 2017-11-22 RX ORDER — SODIUM CHLORIDE 0.9 % (FLUSH) 0.9 %
3 SYRINGE (ML) INJECTION
Status: DISCONTINUED | OUTPATIENT
Start: 2017-11-22 | End: 2017-11-22 | Stop reason: HOSPADM

## 2017-11-22 RX ORDER — SODIUM CHLORIDE, SODIUM LACTATE, POTASSIUM CHLORIDE, CALCIUM CHLORIDE 600; 310; 30; 20 MG/100ML; MG/100ML; MG/100ML; MG/100ML
75 INJECTION, SOLUTION INTRAVENOUS CONTINUOUS
Status: DISCONTINUED | OUTPATIENT
Start: 2017-11-22 | End: 2017-11-22 | Stop reason: HOSPADM

## 2017-11-22 RX ORDER — KETAMINE HYDROCHLORIDE 10 MG/ML
INJECTION, SOLUTION INTRAMUSCULAR; INTRAVENOUS
Status: DISCONTINUED | OUTPATIENT
Start: 2017-11-22 | End: 2017-11-22

## 2017-11-22 RX ORDER — GLYCOPYRROLATE 0.2 MG/ML
INJECTION INTRAMUSCULAR; INTRAVENOUS
Status: DISCONTINUED | OUTPATIENT
Start: 2017-11-22 | End: 2017-11-22

## 2017-11-22 RX ORDER — HYDROCODONE BITARTRATE AND ACETAMINOPHEN 5; 325 MG/1; MG/1
1 TABLET ORAL EVERY 4 HOURS PRN
Qty: 20 TABLET | Refills: 0 | Status: SHIPPED | OUTPATIENT
Start: 2017-11-22 | End: 2017-12-04

## 2017-11-22 RX ORDER — FENTANYL CITRATE 50 UG/ML
INJECTION, SOLUTION INTRAMUSCULAR; INTRAVENOUS
Status: DISCONTINUED | OUTPATIENT
Start: 2017-11-22 | End: 2017-11-22

## 2017-11-22 RX ORDER — ONDANSETRON 2 MG/ML
4 INJECTION INTRAMUSCULAR; INTRAVENOUS ONCE
Status: DISCONTINUED | OUTPATIENT
Start: 2017-11-22 | End: 2017-11-22 | Stop reason: HOSPADM

## 2017-11-22 RX ORDER — OXYCODONE HYDROCHLORIDE 5 MG/1
5 TABLET ORAL ONCE AS NEEDED
Status: DISCONTINUED | OUTPATIENT
Start: 2017-11-23 | End: 2017-11-22 | Stop reason: HOSPADM

## 2017-11-22 RX ORDER — KETOROLAC TROMETHAMINE 30 MG/ML
INJECTION, SOLUTION INTRAMUSCULAR; INTRAVENOUS
Status: DISCONTINUED | OUTPATIENT
Start: 2017-11-22 | End: 2017-11-22

## 2017-11-22 RX ORDER — DIPHENHYDRAMINE HYDROCHLORIDE 50 MG/ML
25 INJECTION INTRAMUSCULAR; INTRAVENOUS EVERY 6 HOURS PRN
Status: DISCONTINUED | OUTPATIENT
Start: 2017-11-22 | End: 2017-11-22 | Stop reason: HOSPADM

## 2017-11-22 RX ADMIN — PROPOFOL 150 MG: 10 INJECTION, EMULSION INTRAVENOUS at 09:11

## 2017-11-22 RX ADMIN — CISATRACURIUM BESYLATE 4 MG: 10 INJECTION INTRAVENOUS at 10:11

## 2017-11-22 RX ADMIN — SODIUM CHLORIDE, SODIUM GLUCONATE, SODIUM ACETATE, POTASSIUM CHLORIDE, MAGNESIUM CHLORIDE, SODIUM PHOSPHATE, DIBASIC, AND POTASSIUM PHOSPHATE: .53; .5; .37; .037; .03; .012; .00082 INJECTION, SOLUTION INTRAVENOUS at 08:11

## 2017-11-22 RX ADMIN — FENTANYL CITRATE 50 MCG: 50 INJECTION, SOLUTION INTRAMUSCULAR; INTRAVENOUS at 11:11

## 2017-11-22 RX ADMIN — PHENYLEPHRINE HYDROCHLORIDE 200 MCG: 10 INJECTION INTRAVENOUS at 10:11

## 2017-11-22 RX ADMIN — LIDOCAINE HYDROCHLORIDE 100 MG: 20 INJECTION, SOLUTION INTRAVENOUS at 11:11

## 2017-11-22 RX ADMIN — DEXAMETHASONE SODIUM PHOSPHATE 4 MG: 4 INJECTION, SOLUTION INTRAMUSCULAR; INTRAVENOUS at 09:11

## 2017-11-22 RX ADMIN — PHENYLEPHRINE HYDROCHLORIDE 100 MCG: 10 INJECTION INTRAVENOUS at 10:11

## 2017-11-22 RX ADMIN — CISATRACURIUM BESYLATE 6 MG: 10 INJECTION INTRAVENOUS at 09:11

## 2017-11-22 RX ADMIN — ONDANSETRON 4 MG: 2 INJECTION, SOLUTION INTRAMUSCULAR; INTRAVENOUS at 09:11

## 2017-11-22 RX ADMIN — FENTANYL CITRATE 50 MCG: 50 INJECTION, SOLUTION INTRAMUSCULAR; INTRAVENOUS at 09:11

## 2017-11-22 RX ADMIN — LIDOCAINE HYDROCHLORIDE 75 MG: 20 INJECTION, SOLUTION INTRAVENOUS at 09:11

## 2017-11-22 RX ADMIN — BUPIVACAINE HYDROCHLORIDE AND EPINEPHRINE BITARTRATE 30 ML: 2.5; .0091 INJECTION, SOLUTION EPIDURAL; INFILTRATION; INTRACAUDAL; PERINEURAL at 10:11

## 2017-11-22 RX ADMIN — EPHEDRINE SULFATE 10 MG: 50 INJECTION, SOLUTION INTRAMUSCULAR; INTRAVENOUS; SUBCUTANEOUS at 10:11

## 2017-11-22 RX ADMIN — EPHEDRINE SULFATE 5 MG: 50 INJECTION, SOLUTION INTRAMUSCULAR; INTRAVENOUS; SUBCUTANEOUS at 11:11

## 2017-11-22 RX ADMIN — ACETAMINOPHEN 1000 MG: 10 INJECTION, SOLUTION INTRAVENOUS at 10:11

## 2017-11-22 RX ADMIN — SUCCINYLCHOLINE CHLORIDE 150 MG: 20 INJECTION, SOLUTION INTRAMUSCULAR; INTRAVENOUS at 09:11

## 2017-11-22 RX ADMIN — CISATRACURIUM BESYLATE 2 MG: 10 INJECTION INTRAVENOUS at 11:11

## 2017-11-22 RX ADMIN — EPHEDRINE SULFATE 5 MG: 50 INJECTION, SOLUTION INTRAMUSCULAR; INTRAVENOUS; SUBCUTANEOUS at 10:11

## 2017-11-22 RX ADMIN — MIDAZOLAM 2 MG: 1 INJECTION INTRAMUSCULAR; INTRAVENOUS at 09:11

## 2017-11-22 RX ADMIN — KETAMINE HYDROCHLORIDE 50 MG: 10 INJECTION, SOLUTION INTRAMUSCULAR; INTRAVENOUS at 09:11

## 2017-11-22 RX ADMIN — CIPROFLOXACIN 400 MG: 2 INJECTION, SOLUTION INTRAVENOUS at 09:11

## 2017-11-22 RX ADMIN — ROCURONIUM BROMIDE 10 MG: 10 INJECTION, SOLUTION INTRAVENOUS at 09:11

## 2017-11-22 RX ADMIN — LIDOCAINE HYDROCHLORIDE: 10 INJECTION, SOLUTION EPIDURAL; INFILTRATION; INTRACAUDAL; PERINEURAL at 08:11

## 2017-11-22 RX ADMIN — CISATRACURIUM BESYLATE 2 MG: 10 INJECTION INTRAVENOUS at 10:11

## 2017-11-22 RX ADMIN — OXYCODONE HYDROCHLORIDE 5 MG: 5 TABLET ORAL at 01:11

## 2017-11-22 RX ADMIN — OXYCODONE HYDROCHLORIDE 5 MG: 5 TABLET ORAL at 12:11

## 2017-11-22 RX ADMIN — SODIUM CHLORIDE, SODIUM GLUCONATE, SODIUM ACETATE, POTASSIUM CHLORIDE, MAGNESIUM CHLORIDE, SODIUM PHOSPHATE, DIBASIC, AND POTASSIUM PHOSPHATE: .53; .5; .37; .037; .03; .012; .00082 INJECTION, SOLUTION INTRAVENOUS at 11:11

## 2017-11-22 RX ADMIN — GLYCOPYRROLATE 0.6 MG: 0.2 INJECTION, SOLUTION INTRAMUSCULAR; INTRAVENOUS at 11:11

## 2017-11-22 RX ADMIN — NEOSTIGMINE METHYLSULFATE 5 MG: 1 INJECTION INTRAVENOUS at 11:11

## 2017-11-22 RX ADMIN — KETOROLAC TROMETHAMINE 30 MG: 30 INJECTION, SOLUTION INTRAMUSCULAR; INTRAVENOUS at 11:11

## 2017-11-22 NOTE — DISCHARGE SUMMARY
Discharge Summary  General Surgery      Admit Date: 11/22/2017    Discharge Date :11/22/2017    Attending Physician: Dakota Macario     Discharge Physician: Dakota Macario    Discharged Condition: good    Discharge Diagnosis: Non-recurrent bilateral inguinal hernia without obstruction or gangrene [K40.20]    Treatments/Procedures: Procedure(s) (LRB):  REPAIR-HERNIA-INGUINAL-ROBOTIC (Bilateral)    Hospital Course: Uneventful; Discharged home from Recovery    Significant Diagnostic Studies: none    Disposition: Home or Self Care    Diet: Regular    Follow up: Office 10-14 days    Activity: No heavy lifting till seen in office.    Patient Instructions:   Current Discharge Medication List      START taking these medications    Details   hydrocodone-acetaminophen 5-325mg (NORCO) 5-325 mg per tablet Take 1 tablet by mouth every 4 (four) hours as needed for Pain.  Qty: 20 tablet, Refills: 0         CONTINUE these medications which have NOT CHANGED    Details   acetaminophen (TYLENOL) 500 MG tablet Take 1,000 mg by mouth every 6 (six) hours as needed for Pain.      ferrous sulfate 325 mg (65 mg iron) Tab tablet Take 325 mg by mouth daily with breakfast.      hydroCHLOROthiazide (HYDRODIURIL) 25 MG tablet Take 1 tablet (25 mg total) by mouth once daily.  Qty: 30 tablet, Refills: 3      testosterone cypionate (DEPOTESTOTERONE CYPIONATE) 200 mg/mL injection Inject 0.5 mLs (100 mg total) into the muscle every 7 days.  Qty: 10 mL, Refills: 0               Discharge Procedure Orders  Diet general     Activity as tolerated     Shower on day dressing removed (No bath)     Lifting restrictions     Call MD for:  temperature >100.4     Call MD for:  persistent nausea and vomiting     Call MD for:  severe uncontrolled pain     Call MD for:  difficulty breathing, headache or visual disturbances     Call MD for:  redness, tenderness, or signs of infection (pain, swelling, redness, odor or green/yellow discharge around incision site)     Call  MD for:  hives     Call MD for:  persistent dizziness or light-headedness     Call MD for:  extreme fatigue

## 2017-11-22 NOTE — PLAN OF CARE
Pt lying in bed sleeping/snoring. No signs/symptoms of pain or distress. Family updated upon pt arrival to recovery room.

## 2017-11-22 NOTE — ANESTHESIA POSTPROCEDURE EVALUATION
"Anesthesia Post Evaluation    Patient: Lee Quintanilla    Procedure(s) Performed: Procedure(s) (LRB):  REPAIR-HERNIA-INGUINAL-ROBOTIC (Bilateral)    Final Anesthesia Type: general  Patient location during evaluation: PACU  Patient participation: Yes- Able to Participate  Level of consciousness: awake and alert  Post-procedure vital signs: reviewed and stable  Pain management: adequate  Airway patency: patent  PONV status at discharge: No PONV  Anesthetic complications: no      Cardiovascular status: blood pressure returned to baseline and hemodynamically stable  Respiratory status: unassisted  Hydration status: euvolemic  Follow-up not needed.        Visit Vitals  BP (!) 152/67   Pulse 96   Temp 37 °C (98.6 °F) (Temporal)   Resp 16   Ht 5' 7" (1.702 m)   Wt 97.5 kg (215 lb)   SpO2 95%   BMI 33.67 kg/m²       Pain/Tanmay Score: Pain Assessment Performed: Yes (11/22/2017 11:48 AM)  Presence of Pain: non-verbal indicators absent (11/22/2017 12:00 PM)  Tanmay Score: 8 (11/22/2017 12:00 PM)      "

## 2017-11-22 NOTE — DISCHARGE INSTRUCTIONS
General Information:    1.  Do not drink alcoholic beverages including beer for 24 hours or as long as you are on pain medication..  2.  Do not drive a motor vehicle, operate machinery or power tools, or signs legal papers for 24 hours or as long as you are on pain medication.   3.  You may experience light-headedness, dizziness, and sleepiness following surgery. Please do not stay alone. A responsible adult should be with you for this 24 hour period.  4.  Go home and rest.    5. Progress slowly to a normal diet unless instructed.  Otherwise, begin with liquids such as soft drinks, then soup and crackers working up to solid foods. Drink plenty of nonalcoholic fluids.  6.  Certain anesthetics and pain medications produce nausea and vomiting in certain       individuals. If nausea becomes a problem at home, call you doctor.    7. A nurse will be calling you sometime after surgery. Do not be alarmed. This is our way of finding out how you are doing.    8. Several times every hour while you are awake, take 2-3 deep breaths and cough. If you had stomach surgery hold a pillow or rolled towel firmly against your stomach before you cough. This will help with any pain the cough might cause.  9. Several times every hour while you are awake, pump and flex your feet 5-6 times and do foot circles. This will help prevent blood clots.    10.Call your doctor for severe pain, bleeding, fever, or signs or symptoms of infection (pain, swelling, redness, foul odor, drainage).    Discharge Instructions: After Your Surgery/Procedure  Youve just had surgery. During surgery you were given medicine called anesthesia to keep you relaxed and free of pain. After surgery you may have some pain or nausea. This is common. Here are some tips for feeling better and getting well after surgery.     Stay on schedule with your medication.   Going home  Your doctor or nurse will show you how to take care of yourself when you go home. He or she will  "also answer your questions. Have an adult family member or friend drive you home.      For your safety we recommend these precaution for the first 24 hours after your procedure:  · Do not drive or use heavy equipment.  · Do not make important decisions or sign legal papers.  · Do not drink alcohol.  · Have someone stay with you, if needed. He or she can watch for problems and help keep you safe.  · Your concentration, balance, coordination, and judgement may be impaired for many hours after anesthesia.  Use caution when ambulating or standing up.     · You may feel weak and "washed out" after anesthesia and surgery.      Subtle residual effects of general anesthesia or sedation with regional / local anesthesia can last more than 24 hours.  Rest for the remainder of the day or longer if your Doctor/Surgeon has advised you to do so.  Although you may feel normal within the first 24 hours, your reflexes and mental ability may be impaired without you realizing it.  You may feel dizzy, lightheaded or sleepy for 24 hours or longer.      Be sure to go to all follow-up visits with your doctor. And rest after your surgery for as long as your doctor tells you to.  Coping with pain  If you have pain after surgery, pain medicine will help you feel better. Take it as told, before pain becomes severe. Also, ask your doctor or pharmacist about other ways to control pain. This might be with heat, ice, or relaxation. And follow any other instructions your surgeon or nurse gives you.  Tips for taking pain medicine  To get the best relief possible, remember these points:  · Pain medicines can upset your stomach. Taking them with a little food may help.  · Most pain relievers taken by mouth need at least 20 to 30 minutes to start to work.  · Taking medicine on a schedule can help you remember to take it. Try to time your medicine so that you can take it before starting an activity. This might be before you get dressed, go for a walk, " or sit down for dinner.  · Constipation is a common side effect of pain medicines. Call your doctor before taking any medicines such as laxatives or stool softeners to help ease constipation. Also ask if you should skip any foods. Drinking lots of fluids and eating foods such as fruits and vegetables that are high in fiber can also help. Remember, do not take laxatives unless your surgeon has prescribed them.  · Drinking alcohol and taking pain medicine can cause dizziness and slow your breathing. It can even be deadly. Do not drink alcohol while taking pain medicine.  · Pain medicine can make you react more slowly to things. Do not drive or run machinery while taking pain medicine.  Your health care provider may tell you to take acetaminophen to help ease your pain. Ask him or her how much you are supposed to take each day. Acetaminophen or other pain relievers may interact with your prescription medicines or other over-the-counter (OTC) drugs. Some prescription medicines have acetaminophen and other ingredients. Using both prescription and OTC acetaminophen for pain can cause you to overdose. Read the labels on your OTC medicines with care. This will help you to clearly know the list of ingredients, how much to take, and any warnings. It may also help you not take too much acetaminophen. If you have questions or do not understand the information, ask your pharmacist or health care provider to explain it to you before you take the OTC medicine.  Managing nausea  Some people have an upset stomach after surgery. This is often because of anesthesia, pain, or pain medicine, or the stress of surgery. These tips will help you handle nausea and eat healthy foods as you get better. If you were on a special food plan before surgery, ask your doctor if you should follow it while you get better. These tips may help:  · Do not push yourself to eat. Your body will tell you when to eat and how much.  · Start off with clear  liquids and soup. They are easier to digest.  · Next try semi-solid foods, such as mashed potatoes, applesauce, and gelatin, as you feel ready.  · Slowly move to solid foods. Dont eat fatty, rich, or spicy foods at first.  · Do not force yourself to have 3 large meals a day. Instead eat smaller amounts more often.  · Take pain medicines with a small amount of solid food, such as crackers or toast, to avoid nausea.     Call your surgeon if  · You still have pain an hour after taking medicine. The medicine may not be strong enough.  · You feel too sleepy, dizzy, or groggy. The medicine may be too strong.  · You have side effects like nausea, vomiting, or skin changes, such as rash, itching, or hives.       If you have obstructive sleep apnea  You were given anesthesia medicine during surgery to keep you comfortable and free of pain. After surgery, you may have more apnea spells because of this medicine and other medicines you were given. The spells may last longer than usual.   At home:  · Keep using the continuous positive airway pressure (CPAP) device when you sleep. Unless your health care provider tells you not to, use it when you sleep, day or night. CPAP is a common device used to treat obstructive sleep apnea.  · Talk with your provider before taking any pain medicine, muscle relaxants, or sedatives. Your provider will tell you about the possible dangers of taking these medicines.  © 4836-3859 [employer]. [address] [place] [address]. All rights reserved. This information is not intended as a substitute for professional medical care. Always follow your healthcare professional's instructions.  Post op instructions for prevention of DVT  What is deep vein thrombosis?  Deep vein thrombosis (DVT) is the medical term for blood clots in the deep veins of the leg.  These blood clots can be dangerous.  A DVT can block a blood vessel and keep blood from getting where it needs to go.   Another problem is that the clot can travel to other parts of the body such as the lungs.  A clot that travels to the lungs is called a pulmonary embolus (PE) and can cause serious problems with breathing which can lead to death.  Am I at risk for DVT/PE?  If you are not very active, you are at risk of DVT.  Anyone confined to bed, sitting for long periods of time, recovering from surgery, etc. increases the risk of DVT.  Other risk factors are cancer diagnosis, certain medications, estrogen replacement in any form,older age, obesity, pregnancy, smoking, history of clotting disorders, and dehydration.  How will I know if I have a DVT?   Swelling in the lower leg   Pain   Warmth, redness, hardness or bulging of the vein  If you have any of these symptoms, call your doctors office right away.  Some people will not have any symptoms until the clot moves to the lungs.  What are the symptoms of a PE?   Panting, shortness of breath, or trouble breathing   Sharp, knife-like chest pain when you breathe   Coughing or coughing up blood   Rapid heartbeat  If you have any of these symptoms or get worse quickly, call 911 for emergency treatment.  How can I prevent a DVT?   Avoid long periods of inactivity and dont cross your legs--get up and walk around every hour or so.   Stay active--walking after surgery is highly encouraged.  This means you should get out of the house and walk in the neighborhood.  Going up and down stairs will not impair healing (unless advised against such activity by your doctor).     Drink plenty of noncaffeinated, nonalcoholic fluids each day to prevent dehydration.   Wear special support stockings, if they have been advised by your doctor.   If you travel, stop at least once an hour and walk around.   Avoid smoking (assistance with stopping is available through your healthcare provider)  Always notify your doctor if you are not able to follow the post operative instructions that are  given to you at the time of discharge.  It may be necessary to prescribe one of the medications available to prevent DVT.Using Opioids for Pain Management     Your doctor has given instructions for you to take an opioid.  This is a drug for bad pain.  It helps control pain without causing bleeding and kidney problems.  Common opioid names are morphine, hydromorphone, oxycodone, and methadone. These drugs are called narcotics.    There are several safety concerns you need to know.     · It is against the law to give or sell this drug to another person.  You must keep this medicine safely locked.    · You may have side effects from taking this medication.  These include nausea, itching, sweating, sleepiness, a change in your ability to breathe, and depression.  · Do not take alcohol or sleeping pills opioids.    · Long-term opoid use may no longer giver you relief from pain.  It can cause you stomach pain, mental anxiety, and headaches.  Long-term opoid use can potentially lead to unlawful street drug abuse and reduce your ability to stay employed.    · Your body may become opioid tolerant if you need to take more to get relief.    · You must stop taking opioids if you begin having more pain as a result of the medicine.    · Opioid withdrawal occurs when you have to stop taking the drug.  It can cause you to have nausea, vomiting, diarrhea, stomach pain, anxiety, and dilated pupils in your eyes. This condition means you are opioid dependent.    · Addiction is a drug induced brain disease. It means there are changes in how your brain is working.  Children, teens, and young adults under 25 years old are more likely to get addicted to opioids.      · Addiction can happen with repeated opioid use.  It does not happen with short-term use of two weeks or less.       For more information, please speak with your doctor or pharmacist.          After Laparoscopic Hernia Repair  You had a procedure called laparoscopic hernia  repair. A hernia is a defect in the tough tissue covering the musculature of the abdominal wall (fascia). During laparoscopic hernia surgery, a surgeon inserts a telescope attached to a camera as well as surgical instruments through tiny incisions in your abdomen. The surgeon repairs the hernia with a mesh, which patches the tear or weakness in the fascia.  Home care  · Note that your shoulder may feel tight or your neck may be stiff for 24 to 48 hours after your surgery. This is common and usually lasts a short time. You may also have numbness around the incision area.  · Keep doing the coughing and deep breathing exercises that you learned in the hospital. These will help to prevent lung infection.  · Prevent constipation so you dont strain when going to the bathroom. Eat fruits, vegetables, and whole grains. Drink 6 to 8 glasses of water a day, unless otherwise directed. Use a laxative or a mild stool softener if your healthcare provider says its OK.  · Wash your incision with mild soap and water. Pat it dry. Dont use oil, powder, or lotion on your incision.  · Shower or take baths as instructed by your healthcare provider. Instructions will vary based on how your incision was closed and how its healing. It may be closed with glue, sutures, or staples. Your healthcare provider may have different advice for each kind.  Activity  · Ask others to help with chores and errands while you recover.  · Dont lift anything heavier than 10 pounds until your healthcare provider says its OK.  · Dont mow the lawn, use a vacuum , or do other strenuous activities until your healthcare provider says it's OK.  · Climb stairs slowly and pause after every few steps.  · Walk as often as you feel able.  · Ask your healthcare provider when you can drive again. This may be when you stop taking pain medicine and can move comfortably from side to side. Dont drive if you are still taking opioid pain medicine.  When to call  your healthcare provider   Call your healthcare provider right away if you have any of the following:  · Pain, bleeding, redness, or fluid at the incision site that gets worse  · Fever of 100.4°F (38°C) or higher, or as directed by your healthcare provider  · Vomiting or nausea that doesnt go away  · Inability to urinate  · No bowel movement after 3 days  · Swelling in abdomen or groin that gets worse  · Pain thats not relieved by medicine   Date Last Reviewed: 10/1/2016  © 6815-2128 [employer]. [address] [place] [address], [place] [address]. All rights reserved. This information is not intended as a substitute for professional medical care. Always follow your healthcare professional's instructions.      We hope your stay was comfortable as you heal now, mend and rest.    For we have enjoyed taking care of you by giving your our best.    And as you get better, by regaining your health and strength;   We count it as a privilege to have served you and hope your time at [facility] was well spent.      Thank  You!!!Discharge Instructions: After Your Surgery/Procedure  Youve just had surgery. During surgery you were given medicine called anesthesia to keep you relaxed and free of pain. After surgery you may have some pain or nausea. This is common. Here are some tips for feeling better and getting well after surgery.     Stay on schedule with your medication.   Going home  Your doctor or nurse will show you how to take care of yourself when you go home. He or she will also answer your questions. Have an adult family member or friend drive you home.      For your safety we recommend these precaution for the first 24 hours after your procedure:  · Do not drive or use heavy equipment.  · Do not make important decisions or sign legal papers.  · Do not drink alcohol.  · Have someone stay with you, if needed. He or she can watch for problems and help keep you safe.  · Your concentration, balance, coordination, and  "judgement may be impaired for many hours after anesthesia.  Use caution when ambulating or standing up.     · You may feel weak and "washed out" after anesthesia and surgery.      Subtle residual effects of general anesthesia or sedation with regional / local anesthesia can last more than 24 hours.  Rest for the remainder of the day or longer if your Doctor/Surgeon has advised you to do so.  Although you may feel normal within the first 24 hours, your reflexes and mental ability may be impaired without you realizing it.  You may feel dizzy, lightheaded or sleepy for 24 hours or longer.      Be sure to go to all follow-up visits with your doctor. And rest after your surgery for as long as your doctor tells you to.  Coping with pain  If you have pain after surgery, pain medicine will help you feel better. Take it as told, before pain becomes severe. Also, ask your doctor or pharmacist about other ways to control pain. This might be with heat, ice, or relaxation. And follow any other instructions your surgeon or nurse gives you.  Tips for taking pain medicine  To get the best relief possible, remember these points:  · Pain medicines can upset your stomach. Taking them with a little food may help.  · Most pain relievers taken by mouth need at least 20 to 30 minutes to start to work.  · Taking medicine on a schedule can help you remember to take it. Try to time your medicine so that you can take it before starting an activity. This might be before you get dressed, go for a walk, or sit down for dinner.  · Constipation is a common side effect of pain medicines. Call your doctor before taking any medicines such as laxatives or stool softeners to help ease constipation. Also ask if you should skip any foods. Drinking lots of fluids and eating foods such as fruits and vegetables that are high in fiber can also help. Remember, do not take laxatives unless your surgeon has prescribed them.  · Drinking alcohol and taking pain " medicine can cause dizziness and slow your breathing. It can even be deadly. Do not drink alcohol while taking pain medicine.  · Pain medicine can make you react more slowly to things. Do not drive or run machinery while taking pain medicine.  Your health care provider may tell you to take acetaminophen to help ease your pain. Ask him or her how much you are supposed to take each day. Acetaminophen or other pain relievers may interact with your prescription medicines or other over-the-counter (OTC) drugs. Some prescription medicines have acetaminophen and other ingredients. Using both prescription and OTC acetaminophen for pain can cause you to overdose. Read the labels on your OTC medicines with care. This will help you to clearly know the list of ingredients, how much to take, and any warnings. It may also help you not take too much acetaminophen. If you have questions or do not understand the information, ask your pharmacist or health care provider to explain it to you before you take the OTC medicine.  Managing nausea  Some people have an upset stomach after surgery. This is often because of anesthesia, pain, or pain medicine, or the stress of surgery. These tips will help you handle nausea and eat healthy foods as you get better. If you were on a special food plan before surgery, ask your doctor if you should follow it while you get better. These tips may help:  · Do not push yourself to eat. Your body will tell you when to eat and how much.  · Start off with clear liquids and soup. They are easier to digest.  · Next try semi-solid foods, such as mashed potatoes, applesauce, and gelatin, as you feel ready.  · Slowly move to solid foods. Dont eat fatty, rich, or spicy foods at first.  · Do not force yourself to have 3 large meals a day. Instead eat smaller amounts more often.  · Take pain medicines with a small amount of solid food, such as crackers or toast, to avoid nausea.     Call your surgeon if  · You  still have pain an hour after taking medicine. The medicine may not be strong enough.  · You feel too sleepy, dizzy, or groggy. The medicine may be too strong.  · You have side effects like nausea, vomiting, or skin changes, such as rash, itching, or hives.       If you have obstructive sleep apnea  You were given anesthesia medicine during surgery to keep you comfortable and free of pain. After surgery, you may have more apnea spells because of this medicine and other medicines you were given. The spells may last longer than usual.   At home:  · Keep using the continuous positive airway pressure (CPAP) device when you sleep. Unless your health care provider tells you not to, use it when you sleep, day or night. CPAP is a common device used to treat obstructive sleep apnea.  · Talk with your provider before taking any pain medicine, muscle relaxants, or sedatives. Your provider will tell you about the possible dangers of taking these medicines.  © 2017-8354 [employer]. [address] [place] [address], [place] [address]. All rights reserved. This information is not intended as a substitute for professional medical care. Always follow your healthcare professional's instructions.    General Information:    1.  Do not drink alcoholic beverages including beer for 24 hours or as long as you are on pain medication..  2.  Do not drive a motor vehicle, operate machinery or power tools, or signs legal papers for 24 hours or as long as you are on pain medication.   3.  You may experience light-headedness, dizziness, and sleepiness following surgery. Please do not stay alone. A responsible adult should be with you for this 24 hour period.  4.  Go home and rest.    5. Progress slowly to a normal diet unless instructed.  Otherwise, begin with liquids such as soft drinks, then soup and crackers working up to solid foods. Drink plenty of nonalcoholic fluids.  6.  Certain anesthetics and pain medications produce nausea and  vomiting in certain       individuals. If nausea becomes a problem at home, call you doctor.    7. A nurse will be calling you sometime after surgery. Do not be alarmed. This is our way of finding out how you are doing.    8. Several times every hour while you are awake, take 2-3 deep breaths and cough. If you had stomach surgery hold a pillow or rolled towel firmly against your stomach before you cough. This will help with any pain the cough might cause.  9. Several times every hour while you are awake, pump and flex your feet 5-6 times and do foot circles. This will help prevent blood clots.    10.Call your doctor for severe pain, bleeding, fever, or signs or symptoms of infection (pain, swelling, redness, foul odor, drainage).    Post op instructions for prevention of DVT  What is deep vein thrombosis?  Deep vein thrombosis (DVT) is the medical term for blood clots in the deep veins of the leg.  These blood clots can be dangerous.  A DVT can block a blood vessel and keep blood from getting where it needs to go.  Another problem is that the clot can travel to other parts of the body such as the lungs.  A clot that travels to the lungs is called a pulmonary embolus (PE) and can cause serious problems with breathing which can lead to death.  Am I at risk for DVT/PE?  If you are not very active, you are at risk of DVT.  Anyone confined to bed, sitting for long periods of time, recovering from surgery, etc. increases the risk of DVT.  Other risk factors are cancer diagnosis, certain medications, estrogen replacement in any form,older age, obesity, pregnancy, smoking, history of clotting disorders, and dehydration.  How will I know if I have a DVT?   Swelling in the lower leg   Pain   Warmth, redness, hardness or bulging of the vein  If you have any of these symptoms, call your doctors office right away.  Some people will not have any symptoms until the clot moves to the lungs.  What are the symptoms of a  PE?   Panting, shortness of breath, or trouble breathing   Sharp, knife-like chest pain when you breathe   Coughing or coughing up blood   Rapid heartbeat  If you have any of these symptoms or get worse quickly, call 911 for emergency treatment.  How can I prevent a DVT?   Avoid long periods of inactivity and dont cross your legs--get up and walk around every hour or so.   Stay active--walking after surgery is highly encouraged.  This means you should get out of the house and walk in the neighborhood.  Going up and down stairs will not impair healing (unless advised against such activity by your doctor).     Drink plenty of noncaffeinated, nonalcoholic fluids each day to prevent dehydration.   Wear special support stockings, if they have been advised by your doctor.   If you travel, stop at least once an hour and walk around.   Avoid smoking (assistance with stopping is available through your healthcare provider)  Always notify your doctor if you are not able to follow the post operative instructions that are given to you at the time of discharge.  It may be necessary to prescribe one of the   medications available to prevent DVT.    Using Opioids for Pain Management     Your doctor has given instructions for you to take an opioid.  This is a drug for bad pain.  It helps control pain without causing bleeding and kidney problems.  Common opioid names are morphine, hydromorphone, oxycodone, and methadone. These drugs are called narcotics.    There are several safety concerns you need to know.     · It is against the law to give or sell this drug to another person.  You must keep this medicine safely locked.    · You may have side effects from taking this medication.  These include nausea, itching, sweating, sleepiness, a change in your ability to breathe, and depression.  · Do not take alcohol or sleeping pills opioids.    · Long-term opoid use may no longer giver you relief from pain.  It can cause you  stomach pain, mental anxiety, and headaches.  Long-term opoid use can potentially lead to unlawful street drug abuse and reduce your ability to stay employed.    · Your body may become opioid tolerant if you need to take more to get relief.    · You must stop taking opioids if you begin having more pain as a result of the medicine.    · Opioid withdrawal occurs when you have to stop taking the drug.  It can cause you to have nausea, vomiting, diarrhea, stomach pain, anxiety, and dilated pupils in your eyes. This condition means you are opioid dependent.    · Addiction is a drug induced brain disease. It means there are changes in how your brain is working.  Children, teens, and young adults under 25 years old are more likely to get addicted to opioids.      · Addiction can happen with repeated opioid use.  It does not happen with short-term use of two weeks or less.       For more information, please speak with your doctor or pharmacist.      We hope your stay was comfortable as you heal now, mend and rest.    For we have enjoyed taking care of you by giving your our best.    And as you get better, by regaining your health and strength;   We count it as a privilege to have served you and hope your time at [facility] was well spent.      Thank  You!!!  After Laparoscopic Hernia Repair  You had a procedure called laparoscopic hernia repair. A hernia is a defect in the tough tissue covering the musculature of the abdominal wall (fascia). During laparoscopic hernia surgery, a surgeon inserts a telescope attached to a camera as well as surgical instruments through tiny incisions in your abdomen. The surgeon repairs the hernia with a mesh, which patches the tear or weakness in the fascia.  Home care  · Note that your shoulder may feel tight or your neck may be stiff for 24 to 48 hours after your surgery. This is common and usually lasts a short time. You may also have numbness around the incision area.  · Keep doing the  coughing and deep breathing exercises that you learned in the hospital. These will help to prevent lung infection.  · Prevent constipation so you dont strain when going to the bathroom. Eat fruits, vegetables, and whole grains. Drink 6 to 8 glasses of water a day, unless otherwise directed. Use a laxative or a mild stool softener if your healthcare provider says its OK.  · Wash your incision with mild soap and water. Pat it dry. Dont use oil, powder, or lotion on your incision.  · Shower or take baths as instructed by your healthcare provider. Instructions will vary based on how your incision was closed and how its healing. It may be closed with glue, sutures, or staples. Your healthcare provider may have different advice for each kind.  Activity  · Ask others to help with chores and errands while you recover.  · Dont lift anything heavier than 10 pounds until your healthcare provider says its OK.  · Dont mow the lawn, use a vacuum , or do other strenuous activities until your healthcare provider says it's OK.  · Climb stairs slowly and pause after every few steps.  · Walk as often as you feel able.  · Ask your healthcare provider when you can drive again. This may be when you stop taking pain medicine and can move comfortably from side to side. Dont drive if you are still taking opioid pain medicine.  When to call your healthcare provider   Call your healthcare provider right away if you have any of the following:  · Pain, bleeding, redness, or fluid at the incision site that gets worse  · Fever of 100.4°F (38°C) or higher, or as directed by your healthcare provider  · Vomiting or nausea that doesnt go away  · Inability to urinate  · No bowel movement after 3 days  · Swelling in abdomen or groin that gets worse  · Pain thats not relieved by medicine   Date Last Reviewed: 10/1/2016  © 2655-5425 [employer]. [address] [place] [address], [place] [address]. All rights reserved. This  information is not intended as a substitute for professional medical care. Always follow your healthcare professional's instructions.

## 2017-11-22 NOTE — LETTER
November 22, 2017    Lee Quintanilla  [address] [address]      Dear Lee Quintanilla:    Your doctor has referred you to the [facility]. You will be contacted by our office and an initial appointment will then be scheduled for you.    We look forward to seeing you soon. If you have any further questions, please contact us at [phone].       Sincerely,        [facility]   [address] [place] [id number]  [phone]

## 2017-11-22 NOTE — H&P (VIEW-ONLY)
Subjective:       Patient ID: Lee Quintanilla is a 42 y.o. male.    Chief Complaint: Consult (bilateral inguinal hernias)    Patient is a 42 y.o. male presents with bilaterl groin pain left greater than right. Onset of symptoms was gradual starting several years ago with gradually worsening course since that time. Patient denies obstructive symptoms. Symptoms are aggravated by activity. Symptoms improve with rest.    He saw Dr. Waller and was scheduled for surgery but cancelled.  He would like to schedule repair.   Has been having elevated liver enzymes and is being worked up for that.  Also has appt with Heme Onc tomorrow     Past Medical History:  No date: ADHD (attention deficit hyperactivity disorder)  No date: Arthritis  No date: Asthma      Comment: as child only  No date: Back pain  05/2016: Degeneration of lumbar intervertebral disc  No date: Depression  No date: Elevated PSA  No date: Headache  No date: Kidney damage      Comment: stage 3 ; d/t aleve abuse  No date: Kidney stone  No date: Neck pain  No date: Numbness and tingling in hands  No date: Numbness and tingling of both legs  No date: Seizures      Comment: from inapsine 2002  No date: Sleep apnea      Comment: no cpap  Past Surgical History:  2016: BACK SURGERY      Comment: back surgery  No date: PILONIDAL CYST DRAINAGE  No date: removal of abcess      Comment: scrotal  No date: TYMPANOSTOMY TUBE PLACEMENT      Current Outpatient Prescriptions:   TESTOSTERONE CYPIONATE IM, Inject 200 mg into the muscle., Disp: , Rfl:   venlafaxine 225 mg TR24, Take 225 mg by mouth once daily. (Patient taking differently: Take 225 mg by mouth every morning. ), Disp: 90 each, Rfl: 3  Current Facility-Administered Medications:   testosterone cypionate injection 100 mg, 100 mg, Intramuscular, Weekly, Vicky Saeed MD, 100 mg at 11/06/17 7087    Review of patient's allergies indicates:   -- Inapsine (droperidol) -- Anaphylaxis    --  seizures    -- Pcn (penicillins)    -- Bactrim (sulfamethoxazole-trimethoprim) -- Rash    --  Dry red rash all over    Review of patient's family history indicates:    Heart disease                  Mother                    Migraines                      Mother                    Hypertension                   Mother                    Early death                    Father                    Diabetes                       Maternal Aunt             Diabetes                       Maternal Uncle            Diabetes                       Maternal Grandfather      Social History    Marital status:             Spouse name:                       Years of education:                 Number of children:               Occupational History    None on file    Social History Main Topics    Smoking status: Former Smoker                                                                Packs/day: 0.25      Years: 0.00           Types: Cigarettes       Quit date: 1/1/2016    Smokeless tobacco: Former User                          Quit date: 6/5/2016    Alcohol use: Yes                Comment: rare    Drug use: No              Sexual activity: Yes               Partners with: Female    Other Topics            Concern    None on file    Social History Narrative    None on file          Review of Systems   Constitutional: Negative for activity change, appetite change, chills, fatigue, fever and unexpected weight change.   HENT: Negative for congestion, dental problem, hearing loss, nosebleeds, sinus pressure, sore throat and voice change.    Eyes: Negative for pain and visual disturbance.   Respiratory: Negative for cough, chest tightness and shortness of breath.    Cardiovascular: Negative for chest pain, palpitations and leg swelling.   Gastrointestinal: Negative for abdominal distention, abdominal pain, constipation, diarrhea, nausea and vomiting.   Endocrine: Negative for cold intolerance and heat intolerance.   Genitourinary: Negative  for difficulty urinating, flank pain, frequency and hematuria.   Musculoskeletal: Negative for arthralgias, back pain, gait problem, joint swelling, myalgias, neck pain and neck stiffness.   Skin: Negative for rash.   Allergic/Immunologic: Negative for immunocompromised state.   Neurological: Negative for dizziness, tremors, seizures, syncope, weakness, light-headedness, numbness and headaches.   Hematological: Negative for adenopathy. Does not bruise/bleed easily.   Psychiatric/Behavioral: Negative for confusion, decreased concentration, hallucinations, sleep disturbance and suicidal ideas. The patient is not nervous/anxious.        Objective:      Physical Exam   Constitutional: He is oriented to person, place, and time. He appears well-developed and well-nourished.   HENT:   Head: Normocephalic and atraumatic.   Right Ear: External ear normal.   Left Ear: External ear normal.   Eyes: Conjunctivae are normal. Pupils are equal, round, and reactive to light.   Neck: Normal range of motion.   Cardiovascular: Normal rate and regular rhythm.    Pulmonary/Chest: Effort normal. No respiratory distress. He exhibits no tenderness.   Abdominal: Soft. He exhibits no distension and no mass. A hernia is present. Hernia confirmed positive in the right inguinal area and confirmed positive in the left inguinal area.   Musculoskeletal: He exhibits no edema or tenderness.   Neurological: He is alert and oriented to person, place, and time. He has normal reflexes. No cranial nerve deficit.   Skin: Skin is warm and dry. No rash noted. No erythema. No pallor.   Psychiatric: He has a normal mood and affect. His behavior is normal. Judgment and thought content normal.   Nursing note and vitals reviewed.      Assessment:       1. Non-recurrent bilateral inguinal hernia without obstruction or gangrene        Plan:       For robotic repair when cleared by heme onc and hepatology.  All aspects of procedure including risks and possible  complications were discussed in detail with the patient who agrees to proceed with procedure.  All questions were answered and consent was obtained. He will call to schedule.

## 2017-11-22 NOTE — PLAN OF CARE
Vs stable.  Pain tolerable.  Due to void.  Tolerating po fluids.  Will discharge when voids and discharge teaching completed if condition remains unchanged.

## 2017-11-22 NOTE — ANESTHESIA PREPROCEDURE EVALUATION
11/22/2017  Lee Quintanilla is a 42 y.o., male.    Anesthesia Evaluation    I have reviewed the Patient Summary Reports.    I have reviewed the Nursing Notes.      Review of Systems  Anesthesia Hx:  No problems with previous Anesthesia    Pulmonary:   Asthma asymptomatic Sleep Apnea, CPAP        Physical Exam  General:  Obesity                 Anesthesia Plan  Type of Anesthesia, risks & benefits discussed:  Anesthesia Type:  general  Patient's Preference:   Intra-op Monitoring Plan:   Intra-op Monitoring Plan Comments:   Post Op Pain Control Plan:   Post Op Pain Control Plan Comments:   Induction:   IV  Beta Blocker:  Patient is not currently on a Beta-Blocker (No further documentation required).       Informed Consent: Patient understands risks and agrees with Anesthesia plan.  Questions answered. Anesthesia consent signed with patient.  ASA Score: 2     Day of Surgery Review of History & Physical:    H&P update referred to the surgeon.         Ready For Surgery From Anesthesia Perspective.

## 2017-11-22 NOTE — BRIEF OP NOTE
[facility]  Brief Operative Note    SUMMARY     Surgery Date: 11/22/2017     Surgeon(s) and Role:     * Dakota Macario MD - Primary    Assisting Surgeon: None    Pre-op Diagnosis:  Non-recurrent bilateral inguinal hernia without obstruction or gangrene [K40.20]    Post-op Diagnosis:  Post-Op Diagnosis Codes:     * Non-recurrent bilateral inguinal hernia without obstruction or gangrene [K40.20]    Procedure(s) (LRB):  REPAIR-HERNIA-INGUINAL-ROBOTIC (Bilateral)    Anesthesia: General    Description of Procedure: Repair with Progrip mesh.    Description of the findings of the procedure: Bilateral indirect lipoma hernias    Estimated Blood Loss: *10 cc *         Specimens:   Specimen (12h ago through future)    None

## 2017-11-22 NOTE — INTERVAL H&P NOTE
The patient has been examined and the H&P has been reviewed:    I concur with the findings and no changes have occurred since H&P was written.    Anesthesia/Surgery risks, benefits and alternative options discussed and understood by patient/family.          Active Hospital Problems    Diagnosis  POA    Inguinal hernia bilateral, non-recurrent [K40.20]  Yes      Resolved Hospital Problems    Diagnosis Date Resolved POA   No resolved problems to display.

## 2017-11-22 NOTE — PLAN OF CARE
Pt ambulated to bathroom and voided without difficulty.  States that pain is tolerable.  States that he is ready for discharge

## 2017-11-22 NOTE — OP NOTE
DATE OF PROCEDURE:  11/22/2017     PREOPERATIVE DIAGNOSIS:  Bilateral inguinal hernias.     POSTOPERATIVE DIAGNOSIS:  Bilateral indirect inguinal hernias     OPERATION:  Robotic-assisted repair of bilateral inguinal hernias with ProGrip   mesh.     SURGEON:  Dakota Macario M.D.     ANESTHESIA:  General.     PROCEDURE AND FINDINGS:  With the patient in supine position under satisfactory   general endotracheal anesthesia, the abdomen was prepped and draped in the usual   manner for surgery.  An epigastric skin incision was made approximately 2   fingerbreadths above the umbilicus and the Veress needle was inserted in the   abdomen in the standard manner.  The abdomen was insufflated to 12 mmHg pressure   with carbon dioxide and the Veress needle replaced with a laparoscopic trocar.    The laparoscope was then introduced and the abdomen was surveyed.    On both the left and right sides there was no   evidence of any peritoneal sacs going into the inguinal canal; however,   preoperative CT scan as well as physical examination confirmed fat-containing inguinal hernias, which   were causing the patient symptoms.      Two other 8 mm robotic trocars were placed on the right and left   side, approximately 10 cm from the first trocar.  The da Guy SI robot was then   docked after placing the patient in steep Trendelenburg position.  A monopolar   mikey and bipolar fenestrated forceps were introduced under direct vision into   the abdomen and I then went to the robotic console to assume control of the   robot.   Attention was then turned toward the   left inguinal hernia first.  A transverse skin incision was made approximately 4   centimeters above the inguinal canal from the iliac spine to the median   umbilical ligaments.  A skin flap was then developed inferiorly sweeping the   peritoneum off of the transversalis fascia.  Dissection was carried down to the internal ring and the peritoneum was dissected completely away from  the spermatic cord, exposing the cord structures. The internal ring was identified   after clearing the epigastric vessels off of the peritoneum.  The cord itself   was identified and there was noted to be a large lipoma.  The lipoma was then   reduced back into the preperitoneal space using blunt dissection  it   from the cord with good hemostasis.  The   peritoneum was cleared inferiorly and posteriorly in order to allow placement of   the ProGrip mesh.    The pubis and Coopers ligament was cleared.  The anatomic   left ProGrip mesh was then introduced into the abdomen and put into position.    This lay very nicely against the transversalis overlying all defects with no   wrinkling.  The peritoneum was then closed with a running 2-0 StrataFix suture.    Repair was very satisfactory.  Attention was turned toward the right side where   identical procedure was performed.  On this side there was an indirect defect and lipoma of the cord which was reduced into the preperitoneal space and the peritoneum  from the cord structures.  Repair was done in a   similar manner, creating a peritoneal flap, which was taken down exposing the   inguinal canal.  The perineum was dissected off of the cord.  The lipoma was   reduced into the preperitoneal space from the indirect defect.  The pubis was cleared, the vas identified and traced into the pelvis.  An anatomic right ProGrip mesh was then positioned into place and the peritoneum was resecured with 2-0 Stratafix sutures.    Repair was very satisfactory.  Hemostasis was very satisfactory.  The   abdomen was then desufflated after removing the instruments and undocked the   robot.  Trocars were removed.  The fascia was closed with figure-of-eight 0   Vicryl and the skin incisions were closed with 4-0 Monocryl subcuticular   sutures.  The wounds were washed and dried.  Steri-Strips and sterile dressings   were applied.  The patient tolerated the procedure well  and was sent to Recovery   in stable condition.     ESTIMATED BLOOD LOSS:  10 mL     COMPLICATIONS:  None.     DRAINS:  None.     SPECIMENS:  None.

## 2017-11-22 NOTE — LETTER
November 22, 2017    Vicky Saeed MD  [address] [place] [address] [address]  [facility] [address]      Dear Dr. Saeed    Patient: Lee Quintanilla   MR Number: [id number]   YOB: 1975     Thank you for the referral of Lee Quintanilla to the [facility]. An initial appointment will be scheduled for your patient with one of our Hepatologists.      Thank you again for your trust in our program.  If there is anything we can do for you or your staff, please feel free to contact us.        Sincerely,        [facility]  [address] [place] [id number]  [phone]

## 2017-11-22 NOTE — NURSING
Pt records reviewed.   Pt will be referred to Hepatology.  Liver function abnormality  Hypogonadism in male  Initial referral received  from the [facility].   Referring Provider/diagnosis  Vicky Saeed MD  Referral letter sent to provider and patient.

## 2017-11-22 NOTE — TRANSFER OF CARE
"Anesthesia Transfer of Care Note    Patient: Lee Quintanilla    Procedure(s) Performed: Procedure(s) (LRB):  REPAIR-HERNIA-INGUINAL-ROBOTIC (Bilateral)    Patient location: PACU    Anesthesia Type: general    Transport from OR: Transported from OR on 2-3 L/min O2 by NC with adequate spontaneous ventilation    Post pain: adequate analgesia    Post assessment: no apparent anesthetic complications    Post vital signs: stable    Level of consciousness: awake and alert    Nausea/Vomiting: no nausea/vomiting    Complications: none    Transfer of care protocol was followed      Last vitals:   Visit Vitals  BP (!) 132/55   Pulse 89   Temp 37 °C (98.6 °F) (Temporal)   Resp 19   Ht 5' 7" (1.702 m)   Wt 97.5 kg (215 lb)   SpO2 (!) 90%   BMI 33.67 kg/m²     "

## 2017-11-24 ENCOUNTER — TELEPHONE (OUTPATIENT)
Dept: HEPATOLOGY | Facility: CLINIC | Age: 42
End: 2017-11-24

## 2017-11-24 ENCOUNTER — TELEPHONE (OUTPATIENT)
Dept: HEMATOLOGY/ONCOLOGY | Facility: CLINIC | Age: 42
End: 2017-11-24

## 2017-11-24 NOTE — TELEPHONE ENCOUNTER
----- Message from Yolande Deng LPN sent at 11/24/2017  8:37 AM CST -----  Pt being referred.  Dr. Vicente requesting patient scheduled asap.  Referral in [facility].  Happy Friday.  Thank you so much!  Yolande

## 2017-11-24 NOTE — TELEPHONE ENCOUNTER
MA attempted to call patient to schedule his appt he is unable to reached left him Vm to please give us a callback. CARLO

## 2017-11-27 ENCOUNTER — PATIENT MESSAGE (OUTPATIENT)
Dept: HEMATOLOGY/ONCOLOGY | Facility: CLINIC | Age: 42
End: 2017-11-27

## 2017-12-04 ENCOUNTER — OFFICE VISIT (OUTPATIENT)
Dept: NEPHROLOGY | Facility: CLINIC | Age: 42
End: 2017-12-04
Payer: COMMERCIAL

## 2017-12-04 ENCOUNTER — LAB VISIT (OUTPATIENT)
Dept: LAB | Facility: HOSPITAL | Age: 42
End: 2017-12-04
Attending: INTERNAL MEDICINE
Payer: COMMERCIAL

## 2017-12-04 ENCOUNTER — OFFICE VISIT (OUTPATIENT)
Dept: ORTHOPEDICS | Facility: CLINIC | Age: 42
End: 2017-12-04
Payer: COMMERCIAL

## 2017-12-04 VITALS
HEIGHT: 67 IN | WEIGHT: 215.38 LBS | BODY MASS INDEX: 33.8 KG/M2 | HEART RATE: 96 BPM | DIASTOLIC BLOOD PRESSURE: 84 MMHG | SYSTOLIC BLOOD PRESSURE: 140 MMHG | OXYGEN SATURATION: 98 %

## 2017-12-04 VITALS
DIASTOLIC BLOOD PRESSURE: 98 MMHG | HEIGHT: 67 IN | WEIGHT: 215 LBS | BODY MASS INDEX: 33.74 KG/M2 | HEART RATE: 89 BPM | SYSTOLIC BLOOD PRESSURE: 133 MMHG

## 2017-12-04 DIAGNOSIS — E21.3 HYPERPARATHYROIDISM: ICD-10-CM

## 2017-12-04 DIAGNOSIS — R80.9 PROTEINURIA, UNSPECIFIED TYPE: ICD-10-CM

## 2017-12-04 DIAGNOSIS — N20.0 KIDNEY STONES: Primary | ICD-10-CM

## 2017-12-04 DIAGNOSIS — M70.62 TROCHANTERIC BURSITIS OF LEFT HIP: ICD-10-CM

## 2017-12-04 DIAGNOSIS — M54.42 CHRONIC LEFT-SIDED LOW BACK PAIN WITH LEFT-SIDED SCIATICA: Primary | ICD-10-CM

## 2017-12-04 DIAGNOSIS — G89.29 CHRONIC LEFT-SIDED LOW BACK PAIN WITH LEFT-SIDED SCIATICA: Primary | ICD-10-CM

## 2017-12-04 DIAGNOSIS — N20.0 KIDNEY STONES: ICD-10-CM

## 2017-12-04 DIAGNOSIS — N18.30 CKD (CHRONIC KIDNEY DISEASE) STAGE 3, GFR 30-59 ML/MIN: ICD-10-CM

## 2017-12-04 DIAGNOSIS — N20.0 NEPHROLITHIASIS: ICD-10-CM

## 2017-12-04 LAB
ANION GAP SERPL CALC-SCNC: 9 MMOL/L
BUN SERPL-MCNC: 14 MG/DL
CA-I BLDV-SCNC: 1.21 MMOL/L
CALCIUM SERPL-MCNC: 9.9 MG/DL
CHLORIDE SERPL-SCNC: 100 MMOL/L
CO2 SERPL-SCNC: 28 MMOL/L
CREAT SERPL-MCNC: 1.5 MG/DL
EST. GFR  (AFRICAN AMERICAN): >60 ML/MIN/1.73 M^2
EST. GFR  (NON AFRICAN AMERICAN): 56.6 ML/MIN/1.73 M^2
GLUCOSE SERPL-MCNC: 93 MG/DL
POTASSIUM SERPL-SCNC: 4 MMOL/L
PTH-INTACT SERPL-MCNC: 54 PG/ML
SODIUM SERPL-SCNC: 137 MMOL/L

## 2017-12-04 PROCEDURE — 20610 DRAIN/INJ JOINT/BURSA W/O US: CPT | Mod: LT,,, | Performed by: ORTHOPAEDIC SURGERY

## 2017-12-04 PROCEDURE — 99203 OFFICE O/P NEW LOW 30 MIN: CPT | Mod: 25,,, | Performed by: ORTHOPAEDIC SURGERY

## 2017-12-04 PROCEDURE — 80048 BASIC METABOLIC PNL TOTAL CA: CPT

## 2017-12-04 PROCEDURE — 72100 X-RAY EXAM L-S SPINE 2/3 VWS: CPT | Mod: ,,, | Performed by: ORTHOPAEDIC SURGERY

## 2017-12-04 PROCEDURE — 36415 COLL VENOUS BLD VENIPUNCTURE: CPT | Mod: PO

## 2017-12-04 PROCEDURE — 99243 OFF/OP CNSLTJ NEW/EST LOW 30: CPT | Mod: S$GLB,,, | Performed by: INTERNAL MEDICINE

## 2017-12-04 PROCEDURE — 82330 ASSAY OF CALCIUM: CPT

## 2017-12-04 PROCEDURE — 72170 X-RAY EXAM OF PELVIS: CPT | Mod: RT,,, | Performed by: ORTHOPAEDIC SURGERY

## 2017-12-04 PROCEDURE — 83970 ASSAY OF PARATHORMONE: CPT

## 2017-12-04 PROCEDURE — 99999 PR PBB SHADOW E&M-EST. PATIENT-LVL III: CPT | Mod: PBBFAC,,, | Performed by: INTERNAL MEDICINE

## 2017-12-04 RX ORDER — TRIAMCINOLONE ACETONIDE 40 MG/ML
40 INJECTION, SUSPENSION INTRA-ARTICULAR; INTRAMUSCULAR
Status: DISCONTINUED | OUTPATIENT
Start: 2017-12-04 | End: 2017-12-04 | Stop reason: HOSPADM

## 2017-12-04 RX ADMIN — TRIAMCINOLONE ACETONIDE 40 MG: 40 INJECTION, SUSPENSION INTRA-ARTICULAR; INTRAMUSCULAR at 02:12

## 2017-12-04 NOTE — PROCEDURES
Large Joint Aspiration/Injection  Date/Time: 12/4/2017 2:21 PM  Performed by: ALESIA WATERS  Authorized by: ALESIA WATERS     Consent Done?:  Yes (Verbal)  Indications:  Pain  Procedure site marked: Yes    Timeout: Prior to procedure the correct patient, procedure, and site was verified      Location:  Hip  Site:  L greater trochanteric bursa  Prep: Patient was prepped and draped in usual sterile fashion    Needle size:  22 G  Medications:  40 mg triamcinolone acetonide 40 mg/mL  Patient tolerance:  Patient tolerated the procedure well with no immediate complications

## 2017-12-04 NOTE — Clinical Note
December 14, 2017      Vicky Saeed MD  [address] [place] [address] [address]  [facility] [address]           [facility]  [address] [facility] [address] [place] [address]  Phone: [phone]          Patient: Lee Quintanilla   MR Number: [id number]   YOB: 1975   Date of Visit: 12/4/2017       Dear Dr. Vicky Saeed:    Thank you for referring Lee Quintanilla to me for evaluation. Attached you will find relevant portions of my assessment and plan of care.    If you have questions, please do not hesitate to call me. I look forward to following Lee Quintanilla along with you.    Sincerely,    Pinky Sweet  CC:  No Recipients    If you would like to receive this communication electronically, please contact [email or web address] or [phone] to request more information on [employer] Link access.    For providers and/or their staff who would like to refer a patient to [facility], please contact us through our one-stop-shop provider referral line, Lamont Montoya, at [phone].    If you feel you have received this communication in error or would no longer like to receive these types of communications, please e-mail [email or web address]

## 2017-12-04 NOTE — PROGRESS NOTES
Past Medical History:   Diagnosis Date    ADHD (attention deficit hyperactivity disorder)     Arthritis     Asthma     as child only    Back pain     Degeneration of lumbar intervertebral disc 05/2016    Depression     Elevated PSA     Headache     Kidney damage     stage 3 ; d/t aleve abuse    Kidney stone     Neck pain     Numbness and tingling in hands     Numbness and tingling of both legs     Seizures     from inapsine 2002    Sleep apnea     no cpap       Past Surgical History:   Procedure Laterality Date    BACK SURGERY  2016    back surgery    LITHOTRIPSY      PILONIDAL CYST DRAINAGE      removal of abcess      scrotal    TYMPANOSTOMY TUBE PLACEMENT         Current Outpatient Prescriptions   Medication Sig    acetaminophen (TYLENOL) 500 MG tablet Take 1,000 mg by mouth every 6 (six) hours as needed for Pain.    ferrous sulfate 325 mg (65 mg iron) Tab tablet Take 325 mg by mouth daily with breakfast.    hydroCHLOROthiazide (HYDRODIURIL) 25 MG tablet Take 1 tablet (25 mg total) by mouth once daily.    testosterone cypionate (DEPOTESTOTERONE CYPIONATE) 200 mg/mL injection Inject 0.5 mLs (100 mg total) into the muscle every 7 days.     No current facility-administered medications for this visit.        Review of patient's allergies indicates:   Allergen Reactions    Inapsine [droperidol] Anaphylaxis     seizures    Pcn [penicillins]        Family History   Problem Relation Age of Onset    Heart disease Mother     Migraines Mother     Hypertension Mother     Early death Father     Diabetes Maternal Aunt     Diabetes Maternal Uncle     Diabetes Maternal Grandfather        Social History     Social History    Marital status:      Spouse name: N/A    Number of children: N/A    Years of education: N/A     Occupational History    Not on file.     Social History Main Topics    Smoking status: Current Some Day Smoker     Packs/day: 0.25     Types: Cigarettes     Last attempt  "to quit: 1/1/2016    Smokeless tobacco: Former User     Quit date: 6/5/2016      Comment: occasional    Alcohol use Yes      Comment: rare    Drug use: No    Sexual activity: Yes     Partners: Female     Other Topics Concern    Not on file     Social History Narrative    No narrative on file       Chief Complaint:   Chief Complaint   Patient presents with    Spine - Pain     For about 18 months has had Low back pain that goes across the left hip with nerve pain through the buttocks. Patient is also having weakness in the left leg. Patient had Back SX in 06/2016. Patient had MRI and CT       Consulting Physician: No ref. provider found    History of Present Illness:    This is a 42 y.o. year old male who complains of the patient seen today with chronic lower back pain and left hip pain patient is about a year and a half status post L5-S1 fusion with pedicle screws he was seen recently by Dr. Quiroga who sent him to me with left hip pain      ROS    Examination:    Vital Signs:    Vitals:    12/04/17 1347   BP: (!) 133/98   Pulse: 89   Weight: 97.5 kg (215 lb)   Height: 5' 7" (1.702 m)   PainSc:   6   PainLoc: Back       This a well-developed, well nourished patient in no acute distress.    Alert and oriented and cooperative to examination.       Physical Exam: Left Hip Exam    Gait:   Normal    Skin  Rash:   None  Scars:   None    Inspection  Erythema:  None  Bruising:  None  Swelling:  None  Masses:  None  Lymphadenopathy: None    Range of Motion  Flexion:  150°  Extension:  0°  External Rotation: 50°  Internal Rotation: 15°  Abduction:  50°    No pain with hip range of motion.    Straight Leg Raise: Negative  Log Roll:  Negative    Tenderness  Groin:   Negative  Greater Trochanter: Positive    Strength:  5/5    Stability:  Normal    Sensation:  Intact    Vascular  Pulses:  Palpable distally          Imaging: X-rays ordered and reviewed today Apand lateral x-ray lumbar spine shows an L5-S1 fusion with pedicle " screws AP of the pelvis shows no abnormalities in the left hip     Assessment: status post L5-S1 lumbar fusion bursitis left hip    Plan:  We'll inject the area with Kenalog in his left greater trochanter of his hip      DISCLAIMER: This note may have been dictated using voice recognition software and may contain grammatical errors.     NOTE: Consult report sent to referring provider via [facility].

## 2017-12-05 ENCOUNTER — PATIENT MESSAGE (OUTPATIENT)
Dept: NEPHROLOGY | Facility: CLINIC | Age: 42
End: 2017-12-05

## 2017-12-05 ENCOUNTER — TELEPHONE (OUTPATIENT)
Dept: NEPHROLOGY | Facility: CLINIC | Age: 42
End: 2017-12-05

## 2017-12-05 DIAGNOSIS — N20.0 KIDNEY STONES: Primary | ICD-10-CM

## 2017-12-06 ENCOUNTER — OFFICE VISIT (OUTPATIENT)
Dept: SURGERY | Facility: CLINIC | Age: 42
End: 2017-12-06
Payer: COMMERCIAL

## 2017-12-06 VITALS — TEMPERATURE: 98 F

## 2017-12-06 DIAGNOSIS — Z98.890 POST-OPERATIVE STATE: Primary | ICD-10-CM

## 2017-12-06 PROCEDURE — 99024 POSTOP FOLLOW-UP VISIT: CPT | Mod: S$GLB,,, | Performed by: SURGERY

## 2017-12-06 PROCEDURE — 99999 PR PBB SHADOW E&M-EST. PATIENT-LVL II: CPT | Mod: PBBFAC,,, | Performed by: SURGERY

## 2017-12-07 ENCOUNTER — PATIENT MESSAGE (OUTPATIENT)
Dept: NEPHROLOGY | Facility: CLINIC | Age: 42
End: 2017-12-07

## 2017-12-11 ENCOUNTER — LAB VISIT (OUTPATIENT)
Dept: LAB | Facility: HOSPITAL | Age: 42
End: 2017-12-11
Attending: INTERNAL MEDICINE
Payer: COMMERCIAL

## 2017-12-11 ENCOUNTER — PATIENT MESSAGE (OUTPATIENT)
Dept: NEPHROLOGY | Facility: CLINIC | Age: 42
End: 2017-12-11

## 2017-12-11 ENCOUNTER — TELEPHONE (OUTPATIENT)
Dept: HEPATOLOGY | Facility: CLINIC | Age: 42
End: 2017-12-11

## 2017-12-11 ENCOUNTER — OFFICE VISIT (OUTPATIENT)
Dept: HEPATOLOGY | Facility: CLINIC | Age: 42
End: 2017-12-11
Payer: COMMERCIAL

## 2017-12-11 ENCOUNTER — PROCEDURE VISIT (OUTPATIENT)
Dept: HEPATOLOGY | Facility: CLINIC | Age: 42
End: 2017-12-11
Attending: INTERNAL MEDICINE
Payer: COMMERCIAL

## 2017-12-11 VITALS
TEMPERATURE: 97 F | OXYGEN SATURATION: 97 % | WEIGHT: 212.06 LBS | DIASTOLIC BLOOD PRESSURE: 92 MMHG | HEART RATE: 90 BPM | SYSTOLIC BLOOD PRESSURE: 155 MMHG | BODY MASS INDEX: 33.28 KG/M2 | RESPIRATION RATE: 18 BRPM | HEIGHT: 67 IN

## 2017-12-11 DIAGNOSIS — R74.01 ELEVATED TRANSAMINASE LEVEL: ICD-10-CM

## 2017-12-11 DIAGNOSIS — N20.0 KIDNEY STONES: ICD-10-CM

## 2017-12-11 DIAGNOSIS — R74.01 ELEVATED TRANSAMINASE LEVEL: Primary | ICD-10-CM

## 2017-12-11 LAB
ANION GAP SERPL CALC-SCNC: 9 MMOL/L
BUN SERPL-MCNC: 15 MG/DL
CALCIUM SERPL-MCNC: 9.2 MG/DL
CERULOPLASMIN SERPL-MCNC: 28 MG/DL
CHLORIDE SERPL-SCNC: 105 MMOL/L
CO2 SERPL-SCNC: 24 MMOL/L
CREAT SERPL-MCNC: 1.3 MG/DL
EST. GFR  (AFRICAN AMERICAN): >60 ML/MIN/1.73 M^2
EST. GFR  (NON AFRICAN AMERICAN): >60 ML/MIN/1.73 M^2
GLUCOSE SERPL-MCNC: 92 MG/DL
POTASSIUM SERPL-SCNC: 4.2 MMOL/L
SODIUM SERPL-SCNC: 138 MMOL/L

## 2017-12-11 PROCEDURE — 36415 COLL VENOUS BLD VENIPUNCTURE: CPT

## 2017-12-11 PROCEDURE — 82104 ALPHA-1-ANTITRYPSIN PHENO: CPT

## 2017-12-11 PROCEDURE — 99244 OFF/OP CNSLTJ NEW/EST MOD 40: CPT | Mod: S$GLB,,, | Performed by: INTERNAL MEDICINE

## 2017-12-11 PROCEDURE — 80048 BASIC METABOLIC PNL TOTAL CA: CPT

## 2017-12-11 PROCEDURE — 86704 HEP B CORE ANTIBODY TOTAL: CPT

## 2017-12-11 PROCEDURE — 82390 ASSAY OF CERULOPLASMIN: CPT

## 2017-12-11 PROCEDURE — 91200 LIVER ELASTOGRAPHY: CPT | Mod: S$GLB,,, | Performed by: INTERNAL MEDICINE

## 2017-12-11 PROCEDURE — 86803 HEPATITIS C AB TEST: CPT

## 2017-12-11 PROCEDURE — 86790 VIRUS ANTIBODY NOS: CPT

## 2017-12-11 PROCEDURE — 87340 HEPATITIS B SURFACE AG IA: CPT

## 2017-12-11 PROCEDURE — 86706 HEP B SURFACE ANTIBODY: CPT

## 2017-12-11 PROCEDURE — 99999 PR PBB SHADOW E&M-EST. PATIENT-LVL IV: CPT | Mod: PBBFAC,,, | Performed by: INTERNAL MEDICINE

## 2017-12-11 NOTE — TELEPHONE ENCOUNTER
Fibroscan Procedure     Name: Lee Quintanilla  Date of Procedure : 2017   :: Nikki Childress MD  Diagnosis: other    Probe: M    Fibroscan readin.4 KPa    Fibrosis:F 0-1 minimal or no fibrosis    CAP readin dB/m    Steatosis: :S2 11-34% steatosis.      Pl inform patient:  No scarring in the liver.  There is some fat in the liver 11-34%.      Be on a low carbohydrate diet, eat less flour-based products (such as bread, pasta), potatoes, corn, rice and sweets.

## 2017-12-11 NOTE — PATIENT INSTRUCTIONS
Fibroscan Procedure     Name: Lee Quintanilla  Date of Procedure : 2017   :: Nikki Childress MD  Diagnosis: other    Probe: M    Fibroscan readin.4 KPa    Fibrosis:F 0-1 minimal or no fibrosis    CAP readin dB/m    Steatosis: :S2 11-34% steatosis.

## 2017-12-11 NOTE — PROCEDURES
Procedures   Fibroscan Procedure     Name: Lee Quintanilla  Date of Procedure : 2017   :: Nikki Childress MD  Diagnosis: other    Probe: M    Fibroscan readin.4 KPa    Fibrosis:F 0-1 minimal or no fibrosis    CAP readin dB/m    Steatosis: :S2 11-34% steatosis.

## 2017-12-11 NOTE — Clinical Note
Recommendations: -  Labs today:   HAVAb, HBsAg, HBsAb, HBcAb, HCVAb, ceruloplasmin, alpha-1 AT phenotype.  -  Fibroscan today. -  Continue current meds, cut down on tylenol -  Avoid high intake of Tylenol (more than 4 extra-strength pills in one day) -  Return in 1 month.

## 2017-12-11 NOTE — PROGRESS NOTES
[facility] Consultation Note    Reason for Consult:  The encounter diagnosis was Elevated transaminase level.    PCP: Primary Doctor [address] / [place] [id number]    HPI:  This is a 42 y.o. male here for evaluation of: elevation of AST, ALT since July 2017, lately 67, 143, resp.      Had daily fevers 99.5 to 101, chills, night sweats, associated with leukocytosis ranging from 11K to 16K, early during illness, he was diagnosed with left renal stone, was treated with lithotripsy, ureteral stent placed for a week, then removed.  Fevers have persisted from Feb to August 2017, but now reduced to twice a week.  Bilateral inguinal herniae repaired on 11/22/17.    Has taken 10-12 500 mg tylenols (5-6 gm)/day and some days aleve for back pain.  - still taking these.    CT abd and pelvis 4/13/17: The liver, gallbladder, pancreas, spleen, and adrenal glands are unremarkable.  The small bowel and colon are normal in caliber and appearance.  Normal appendix is in place.  There is a small fat containing right inguinal hernia.      Ct renal stone study 8/25/17:  The liver is normal in size and contour but contains a subcentimeter hypodense focus within segment 3 of the left hepatic lobe is too small to characterize but suggestive for a small cyst.    RAVEN neg.   No hepatitis serologies available.        Elevated liver enzymes: Yes  Abnormal imaging: No  Cirrhosis: No  Hepatitis C: No  Hepatitis B: No  Fatty liver: No  Encephalopathy: No  Post-hospital discharge: No  Symptoms: fevers twice/wk.  Has nausea, vomiting bile a few nights ago.      Primary hepatic manifestations:  Fatigue:Yes  Edema:No  Ascites:No  Encephalopathy:No  Abdominal pain:No  GI bleeds: No  Pruritus:No  Weight Changes:No  Changes in Bowel habits: No  Muscle cramps:No    Risk factors for liver disease:  No jaundice  No transfusions  No IVDU  Did not snort cocaine or similar agents  Did not live with anyone with  hepatitis B or C  Sexual partner not tested  No hepatotoxic medications  No exposure to industrial toxins  Alcohol: drank 3-4 beers/day, a few more on weekends until mid 2016.  However, stopped when he had back surgery in 2016, and had 2-3 drinks this entire year.        ROS:  Constitutional: No fevers, chills, weight changes, fatigue  ENT: No allergies, nosebleeds,   CV: No chest pain  Pulm: No cough, shortness of breath  Ophtho: No vision changes  GI/Liver: see HPI  Derm: No rash, itching  Heme: No swollen glands, bruising  MSK: Had spinal fusion, and pain after the surgery, back pain then determined to be from screw near the L5 vertebra, No joint pains, joint swelling.  : No dysuria, hematuria, decrease in urine output.  Endo: No hot or cold intolerance  Neuro: No confusion, disorientation, difficulty with sleep, memory, concentration, syncope, seizure  Psych: No anxiety, depression    Medical History:  has a past medical history of ADHD (attention deficit hyperactivity disorder); Arthritis; Asthma; Back pain; Degeneration of lumbar intervertebral disc (05/2016); Depression; Elevated PSA; Headache; Kidney damage; Kidney stone; Neck pain; Numbness and tingling in hands; Numbness and tingling of both legs; Seizures; and Sleep apnea.    Surgical History:  has a past surgical history that includes removal of abcess; Pilonidal cyst drainage; Tympanostomy tube placement; Back surgery (2016); Lithotripsy; and Hernia repair (Bilateral, 11/22/2017).    Family History: family history includes Diabetes in his maternal aunt, maternal grandfather, and maternal uncle; Early death in his father; Heart disease in his mother; Hypertension in his mother; Migraines in his mother..     Social History:  reports that he has been smoking Cigarettes.  He has been smoking about 0.25 packs per day. He quit smokeless tobacco use about 18 months ago. He reports that he drinks alcohol. He reports that he does not use drugs.    Review of  "patient's allergies indicates:   Allergen Reactions    Inapsine [droperidol] Anaphylaxis     seizures    Pcn [penicillins]        Current Outpatient Prescriptions   Medication Sig    acetaminophen (TYLENOL) 500 MG tablet Take 1,000 mg by mouth every 6 (six) hours as needed for Pain.    ferrous sulfate 325 mg (65 mg iron) Tab tablet Take 325 mg by mouth daily with breakfast.    hydroCHLOROthiazide (HYDRODIURIL) 25 MG tablet Take 1 tablet (25 mg total) by mouth once daily.    testosterone cypionate (DEPOTESTOTERONE CYPIONATE) 200 mg/mL injection Inject 0.5 mLs (100 mg total) into the muscle every 7 days.     No current facility-administered medications for this visit.        Objective Findings:    Vital Signs:  BP (!) 155/92 (BP Location: Left arm, Patient Position: Sitting)   Pulse 90   Temp 96.9 °F (36.1 °C) (Oral)   Resp 18   Ht 5' 7" (1.702 m)   Wt 96.2 kg (212 lb 1.3 oz)   SpO2 97%   BMI 33.22 kg/m²   Body mass index is 33.22 kg/m².    Physical Exam:  General Appearance: Well appearing in no acute distress  Head:   Normocephalic, without obvious abnormality  Eyes:    No scleral icterus, EOMI  ENT: Neck supple, Lips, mucosa, and tongue normal; teeth and gums normal  Lungs: CTA bilaterally in anterior and posterior fields, no wheezes, no crackles.  Heart:  Regular rate and rhythm, S1, S2 normal, no murmurs heard  Abdomen: Soft, non tender, non distended with positive bowel sounds in all four quadrants. No hepatosplenomegaly, ascites, or mass  Extremities: 2+ pulses, no clubbing, cyanosis or edema  Skin: No rash  Neurologic: CN II-XII intact, alert, oriented x 3. No asterixis      Labs:  Lab Results   Component Value Date    WBC 9.60 11/20/2017    HGB 15.6 11/20/2017    HCT 46.7 11/20/2017     11/20/2017    CHOL 280 (H) 10/30/2017    TRIG 225 (H) 10/30/2017    HDL 26 (L) 10/30/2017    INR 0.9 05/31/2016    CREATININE 1.3 12/11/2017    BUN 15 12/11/2017    BILITOT 1.1 (H) 11/20/2017     (H) " 11/20/2017    AST 67 (H) 11/20/2017    ALKPHOS 63 11/20/2017     12/11/2017    K 4.2 12/11/2017     12/11/2017    CO2 24 12/11/2017    TSH 1.544 09/25/2017    PSA 1.4 10/30/2017    HGBA1C 5.4 01/13/2017       Imaging:       Endoscopy:      Assessment:  1. Elevated transaminase level       elevated enzymes preceded by several months of fevers.   No serologic markers available.   Could be due to tylenol 5-6 gm/d..  Need markers.     Recommendations:  -  Labs today:    HAVAb, HBsAg, HBsAb, HBcAb, HCVAb, ceruloplasmin, alpha-1 AT phenotype.   -  Fibroscan today.  -  Continue current meds, cut down on tylenol  -  Avoid high intake of Tylenol (more than 4 extra-strength pills in one day)  -  Return in 1 month.       Return in about 1 month (around 1/11/2018).      Order summary:  Orders Placed This Encounter   Procedures    US Elastography Liver    US Elastography Liver    Alpha 1 Antitrypsin Phenotype    Ceruloplasmin    Hepatitis B core antibody, total    Hepatitis B surface antibody    Hepatitis B surface antigen    Hepatitis C antibody    Hepatitis A antibody, IgG    Hepatitis B surface antigen       Thank you so much for allowing me to participate in the care of Lee Childress MD

## 2017-12-12 ENCOUNTER — TELEPHONE (OUTPATIENT)
Dept: HEPATOLOGY | Facility: CLINIC | Age: 42
End: 2017-12-12

## 2017-12-12 LAB
HBV CORE AB SERPL QL IA: NEGATIVE
HBV SURFACE AB SER-ACNC: NEGATIVE M[IU]/ML
HBV SURFACE AG SERPL QL IA: NEGATIVE
HCV AB SERPL QL IA: NEGATIVE
HEPATITIS A ANTIBODY, IGG: NEGATIVE

## 2017-12-13 LAB
A1AT PHENOTYP SERPL-IMP: ABNORMAL BANDS
A1AT SERPL NEPH-MCNC: 205 MG/DL

## 2017-12-17 ENCOUNTER — TELEPHONE (OUTPATIENT)
Dept: HEPATOLOGY | Facility: CLINIC | Age: 42
End: 2017-12-17

## 2017-12-17 DIAGNOSIS — Z29.9 PROPHYLACTIC MEASURE: Primary | ICD-10-CM

## 2017-12-20 ENCOUNTER — TELEPHONE (OUTPATIENT)
Dept: HEPATOLOGY | Facility: CLINIC | Age: 42
End: 2017-12-20

## 2017-12-20 RX ORDER — ANASTROZOLE 1 MG/1
TABLET ORAL
Qty: 30 TABLET | Refills: 3 | OUTPATIENT
Start: 2017-12-20

## 2017-12-20 NOTE — TELEPHONE ENCOUNTER
----- Message from Nikki Childress MD sent at 12/17/2017  7:16 AM CST -----  Pl Inform patient:  All labs normal/negative. Therefore, consistent with fatty liver. Needs twinrix vaccination to protect against hep  A and B. Order placed. Give injection appts, series of 3 injections. thanks

## 2017-12-20 NOTE — TELEPHONE ENCOUNTER
MA attempted to call patient he is unable to reached left him Vm to please give us a callback. CARLO

## 2017-12-22 ENCOUNTER — TELEPHONE (OUTPATIENT)
Dept: HEPATOLOGY | Facility: CLINIC | Age: 42
End: 2017-12-22

## 2017-12-22 NOTE — TELEPHONE ENCOUNTER
----- Message from Rajendra Joel sent at 12/22/2017 10:54 AM CST -----  Contact: Pt  Pt returning Mayela call in regards to test results.     Pt would like to know if she can leave the results in a message on [facility], pt it snot around his phone a lot and will probably miss the next call he says.           Call back number: [phone]

## 2017-12-22 NOTE — TELEPHONE ENCOUNTER
MA attempted to call patient again he is unable to reached left him VM to please give us a callback. CARLO

## 2017-12-26 NOTE — PROGRESS NOTES
Subjective:       Patient ID: Lee Quintanilla is a 42 y.o. White male who presents for new evaluation of Nephrolithiasis    HPI  Review of Systems    Objective:      Physical Exam    Assessment:       1. Kidney stones    2. Hyperparathyroidism    3. Proteinuria, unspecified type    4. CKD (chronic kidney disease) stage 3, GFR 30-59 ml/min    5. Nephrolithiasis        Plan:             24 hour stone study May 2018

## 2018-01-04 ENCOUNTER — TELEPHONE (OUTPATIENT)
Dept: HEPATOLOGY | Facility: CLINIC | Age: 43
End: 2018-01-04

## 2018-01-04 NOTE — TELEPHONE ENCOUNTER
MA called patient to reschedule his appt today 1/4/18 due to MD had Emergency. He understood reschedule his appt mailed appt reminder to patient.Schuyler

## 2018-01-08 DIAGNOSIS — E29.1 HYPOGONADISM IN MALE: Primary | ICD-10-CM

## 2018-01-11 ENCOUNTER — PATIENT MESSAGE (OUTPATIENT)
Dept: UROLOGY | Facility: CLINIC | Age: 43
End: 2018-01-11

## 2018-01-16 ENCOUNTER — LAB VISIT (OUTPATIENT)
Dept: LAB | Facility: HOSPITAL | Age: 43
End: 2018-01-16
Attending: INTERNAL MEDICINE
Payer: COMMERCIAL

## 2018-01-16 DIAGNOSIS — D72.9 NEUTROPHILIA: ICD-10-CM

## 2018-01-16 DIAGNOSIS — D72.823 LEUKEMOID REACTION: ICD-10-CM

## 2018-01-16 LAB
BASOPHILS # BLD AUTO: 0 K/UL
BASOPHILS NFR BLD: 0.4 %
DIFFERENTIAL METHOD: ABNORMAL
EOSINOPHIL # BLD AUTO: 0.4 K/UL
EOSINOPHIL NFR BLD: 4.5 %
ERYTHROCYTE [DISTWIDTH] IN BLOOD BY AUTOMATED COUNT: 15.9 %
HCT VFR BLD AUTO: 48.2 %
HGB BLD-MCNC: 15.8 G/DL
LYMPHOCYTES # BLD AUTO: 2 K/UL
LYMPHOCYTES NFR BLD: 21 %
MCH RBC QN AUTO: 28.2 PG
MCHC RBC AUTO-ENTMCNC: 32.9 G/DL
MCV RBC AUTO: 86 FL
MONOCYTES # BLD AUTO: 0.6 K/UL
MONOCYTES NFR BLD: 6.4 %
NEUTROPHILS # BLD AUTO: 6.3 K/UL
NEUTROPHILS NFR BLD: 67.7 %
PLATELET # BLD AUTO: 303 K/UL
PMV BLD AUTO: 9.8 FL
RBC # BLD AUTO: 5.62 M/UL
WBC # BLD AUTO: 9.3 K/UL

## 2018-01-16 PROCEDURE — 36415 COLL VENOUS BLD VENIPUNCTURE: CPT

## 2018-01-16 PROCEDURE — 85025 COMPLETE CBC W/AUTO DIFF WBC: CPT

## 2018-01-17 ENCOUNTER — PATIENT MESSAGE (OUTPATIENT)
Dept: HEMATOLOGY/ONCOLOGY | Facility: CLINIC | Age: 43
End: 2018-01-17

## 2018-01-17 ENCOUNTER — TELEPHONE (OUTPATIENT)
Dept: HEMATOLOGY/ONCOLOGY | Facility: CLINIC | Age: 43
End: 2018-01-17

## 2018-01-17 NOTE — TELEPHONE ENCOUNTER
Message left notifying patient that the clinic will be closed until noon today due to the weather.

## 2018-01-19 ENCOUNTER — OFFICE VISIT (OUTPATIENT)
Dept: HEMATOLOGY/ONCOLOGY | Facility: CLINIC | Age: 43
End: 2018-01-19
Payer: COMMERCIAL

## 2018-01-19 VITALS
SYSTOLIC BLOOD PRESSURE: 151 MMHG | RESPIRATION RATE: 18 BRPM | HEART RATE: 84 BPM | HEIGHT: 67 IN | DIASTOLIC BLOOD PRESSURE: 83 MMHG

## 2018-01-19 DIAGNOSIS — F41.1 GAD (GENERALIZED ANXIETY DISORDER): Primary | ICD-10-CM

## 2018-01-19 DIAGNOSIS — R74.01 ELEVATED TRANSAMINASE LEVEL: ICD-10-CM

## 2018-01-19 PROCEDURE — 99999 PR PBB SHADOW E&M-EST. PATIENT-LVL II: CPT | Mod: PBBFAC,,, | Performed by: INTERNAL MEDICINE

## 2018-01-19 PROCEDURE — 99213 OFFICE O/P EST LOW 20 MIN: CPT | Mod: S$GLB,,, | Performed by: INTERNAL MEDICINE

## 2018-01-19 NOTE — PROGRESS NOTES
Follow-up (2 month fu ) and Results (labs)      Lee Quintanilla is a 42 y.o.  Pt  Here to review labs for history leukocytosis  CBC shows normalization of his WBC  Flow re reviewed no evidence of lymphoma or leukemia  He is tolerating iron po and testosterone replacement    Past Medical History:   Diagnosis Date    ADHD (attention deficit hyperactivity disorder)     Arthritis     Asthma     as child only    Back pain     Degeneration of lumbar intervertebral disc 05/2016    Depression     Elevated PSA     Headache     Kidney damage     stage 3 ; d/t aleve abuse    Kidney stone     Neck pain     Numbness and tingling in hands     Numbness and tingling of both legs     Seizures     from inapsine 2002    Sleep apnea     no cpap         Current Outpatient Prescriptions:     acetaminophen (TYLENOL) 500 MG tablet, Take 1,000 mg by mouth every 6 (six) hours as needed for Pain., Disp: , Rfl:     ferrous sulfate 325 mg (65 mg iron) Tab tablet, Take 325 mg by mouth daily with breakfast., Disp: , Rfl:     hydroCHLOROthiazide (HYDRODIURIL) 25 MG tablet, Take 1 tablet (25 mg total) by mouth once daily., Disp: 30 tablet, Rfl: 3    testosterone cypionate (DEPOTESTOTERONE CYPIONATE) 200 mg/mL injection, Inject 0.5 mLs (100 mg total) into the muscle every 7 days., Disp: 10 mL, Rfl: 0      CONSTITUTIONAL: No fevers, chills, night sweats, wt. loss, appetite changes  SKIN: no rashes or itching  ENT: No headaches, head trauma, vision changes, or eye pain  LYMPH NODES: None noticed   CV: No chest pain, palpitations.   RESP: No dyspnea on exertion, cough, wheezing, or hemoptysis  GI: No nausea, emesis, diarrhea, constipation, melena, hematochezia, pain.   : No dysuria, hematuria, urgency, or frequency   HEME: No easy bruising, bleeding problems  PSYCHIATRIC: No depression, anxiety, psychosis, hallucinations.  NEURO: No seizures, memory loss, dizziness or difficulty sleeping  MSK: No arthralgias or joint  "swelling         BP (!) 151/83   Pulse 84   Resp 18   Ht 5' 7" (1.702 m)   Gen: NAD, A and O x3,   Psych: pleasant affect, normal thought process  Eyes: Pupils round and non dilated, EOM intact  Nose: Nares patent  OP clear, mucosa patent  Neck: suppple, no JVD, trachea midline, no palpable mass, no adenopathy  Lungs: CTAB, no wheezes, no use of accessory muscles  CV: S1S2 with RRR, No mrg  Abd: soft, NTND, + BS, No HSM, no ascites  Extr: No CCE, ANTONIO, strength normal, good capillary refill  Neuro: steady gait, CNs grossly intact  Rheum: No joint swelling    Lab Results   Component Value Date    WBC 9.30 01/16/2018    HGB 15.8 01/16/2018    HCT 48.2 01/16/2018    MCV 86 01/16/2018     01/16/2018     CMP  Sodium   Date Value Ref Range Status   12/11/2017 138 136 - 145 mmol/L Final     Potassium   Date Value Ref Range Status   12/11/2017 4.2 3.5 - 5.1 mmol/L Final     Chloride   Date Value Ref Range Status   12/11/2017 105 95 - 110 mmol/L Final     CO2   Date Value Ref Range Status   12/11/2017 24 23 - 29 mmol/L Final     Glucose   Date Value Ref Range Status   12/11/2017 92 70 - 110 mg/dL Final     BUN, Bld   Date Value Ref Range Status   12/11/2017 15 6 - 20 mg/dL Final     Creatinine   Date Value Ref Range Status   12/11/2017 1.3 0.5 - 1.4 mg/dL Final     Calcium   Date Value Ref Range Status   12/11/2017 9.2 8.7 - 10.5 mg/dL Final     Total Protein   Date Value Ref Range Status   11/20/2017 7.8 6.0 - 8.4 g/dL Final     Albumin   Date Value Ref Range Status   11/20/2017 3.8 3.5 - 5.2 g/dL Final   01/13/2017 4.3 3.6 - 5.1 g/dL Final     Comment:     @ Test Performed By:  [facility] Conner M.D., FCAPIsaac,   [id number] [place] [id number]  [facility]  [id number]       Total Bilirubin   Date Value Ref Range Status   11/20/2017 1.1 (H) 0.1 - 1.0 mg/dL Final     Comment:     For infants and newborns, interpretation of results should be based  on " gestational age, weight and in agreement with clinical  observations.  Premature Infant recommended reference ranges:  Up to 24 hours.............<8.0 mg/dL  Up to 48 hours............<12.0 mg/dL  3-5 days..................<15.0 mg/dL  6-29 days.................<15.0 mg/dL       Alkaline Phosphatase   Date Value Ref Range Status   11/20/2017 63 55 - 135 U/L Final     AST   Date Value Ref Range Status   11/20/2017 67 (H) 10 - 40 U/L Final     ALT   Date Value Ref Range Status   11/20/2017 143 (H) 10 - 44 U/L Final     Anion Gap   Date Value Ref Range Status   12/11/2017 9 8 - 16 mmol/L Final     eGFR if    Date Value Ref Range Status   12/11/2017 >60 >60 mL/min/1.73 m^2 Final     eGFR if non    Date Value Ref Range Status   12/11/2017 >60 >60 mL/min/1.73 m^2 Final     Comment:     Calculation used to obtain the estimated glomerular filtration  rate (eGFR) is the CKD-EPI equation.        All labs reviewed with pt    MYNOR (generalized anxiety disorder)    Elevated transaminase level       Explained that he has no evidence of a blood cancer  Back pain due to hardware : this can cause fluctuations in his WBC count  For now he is stable  Cont iron to help stimulate marrow  Cont HRT as needed  Discharged from [facility] for now   RTC prn     Thank you for allowing me to evaluate and participate in the care of this pleasant patient. Please fell free to call me with any questions or concerns.    Netta Anderson MD    This note was dictated with Dragon and briefly proofread. Please excuse any sentences that may be unclear or words misspelled

## 2018-01-29 ENCOUNTER — PATIENT MESSAGE (OUTPATIENT)
Dept: NEPHROLOGY | Facility: CLINIC | Age: 43
End: 2018-01-29

## 2018-02-05 ENCOUNTER — LAB VISIT (OUTPATIENT)
Dept: LAB | Facility: HOSPITAL | Age: 43
End: 2018-02-05
Attending: INTERNAL MEDICINE
Payer: COMMERCIAL

## 2018-02-05 ENCOUNTER — OFFICE VISIT (OUTPATIENT)
Dept: HEPATOLOGY | Facility: CLINIC | Age: 43
End: 2018-02-05
Payer: COMMERCIAL

## 2018-02-05 ENCOUNTER — CLINICAL SUPPORT (OUTPATIENT)
Dept: INFECTIOUS DISEASES | Facility: CLINIC | Age: 43
End: 2018-02-05

## 2018-02-05 VITALS
BODY MASS INDEX: 33.71 KG/M2 | SYSTOLIC BLOOD PRESSURE: 147 MMHG | RESPIRATION RATE: 18 BRPM | OXYGEN SATURATION: 95 % | DIASTOLIC BLOOD PRESSURE: 76 MMHG | HEART RATE: 88 BPM | WEIGHT: 214.75 LBS | HEIGHT: 67 IN

## 2018-02-05 DIAGNOSIS — Z29.9 PROPHYLACTIC MEASURE: ICD-10-CM

## 2018-02-05 DIAGNOSIS — R74.01 ELEVATED TRANSAMINASE LEVEL: ICD-10-CM

## 2018-02-05 DIAGNOSIS — R74.01 ELEVATED TRANSAMINASE LEVEL: Primary | ICD-10-CM

## 2018-02-05 LAB
ALBUMIN SERPL BCP-MCNC: 3.6 G/DL
ALP SERPL-CCNC: 44 U/L
ALT SERPL W/O P-5'-P-CCNC: 43 U/L
AST SERPL-CCNC: 23 U/L
BILIRUB DIRECT SERPL-MCNC: 0.2 MG/DL
BILIRUB SERPL-MCNC: 0.5 MG/DL
PROT SERPL-MCNC: 7.9 G/DL

## 2018-02-05 PROCEDURE — 99999 PR PBB SHADOW E&M-EST. PATIENT-LVL III: CPT | Mod: PBBFAC,,, | Performed by: INTERNAL MEDICINE

## 2018-02-05 PROCEDURE — 3008F BODY MASS INDEX DOCD: CPT | Mod: ,,, | Performed by: INTERNAL MEDICINE

## 2018-02-05 PROCEDURE — 99999 PR PBB SHADOW E&M-EST. PATIENT-LVL I: CPT | Mod: PBBFAC,,,

## 2018-02-05 PROCEDURE — 80076 HEPATIC FUNCTION PANEL: CPT

## 2018-02-05 PROCEDURE — 36415 COLL VENOUS BLD VENIPUNCTURE: CPT

## 2018-02-05 PROCEDURE — 90471 IMMUNIZATION ADMIN: CPT | Mod: PBBFAC

## 2018-02-05 PROCEDURE — 99213 OFFICE O/P EST LOW 20 MIN: CPT | Mod: S$PBB,,, | Performed by: INTERNAL MEDICINE

## 2018-02-05 NOTE — Clinical Note
Plan: -  Labs today:  Hepatic panel today and every 3 months from now.  -  Pl give hepatitis A and B vaccine injections today, and 1 and 6 months from today.  -  Continue current meds, cut down on tylenol -  Avoid high intake of Tylenol (more than 4 extra-strength pills in one day) -  Return in 1 yr.

## 2018-02-05 NOTE — PROGRESS NOTES
[facility] Follow-up Note    Reason for Consult:  The encounter diagnosis was Elevated transaminase level.    PCP: Primary Doctor No       HPI:  This is a 42 y.o. male here for evaluation of: elevation of AST, ALT since July 2017, lately 67, 143, resp.      Had daily fevers 99.5 to 101, chills, night sweats, associated with leukocytosis ranging from 11K to 16K, early during illness, he was diagnosed with left renal stone, was treated with lithotripsy, ureteral stent placed for a week, then removed.  Fevers have persisted from Feb to August 2017, but now reduced to twice a week.  Bilateral inguinal herniae repaired on 11/22/17.    Has taken 10-12 500 mg tylenols (5-6 gm)/day and some days aleve for back pain.  - still taking these.    CT abd and pelvis 4/13/17: The liver, gallbladder, pancreas, spleen, and adrenal glands are unremarkable.  The small bowel and colon are normal in caliber and appearance.  Normal appendix is in place.  There is a small fat containing right inguinal hernia.      Ct renal stone study 8/25/17:  The liver is normal in size and contour but contains a subcentimeter hypodense focus within segment 3 of the left hepatic lobe is too small to characterize but suggestive for a small cyst.    RAVEN neg.   Hepatitis serologies negative, including HBcAb total and HAVAB IgG.  Ceruloplasmin 28 wnl, A1AT MM, 205 wnl,     My impression: All labs normal/negative. Therefore, consistent with fatty liver. Needs twinrix vaccination to protect against hep  A and B. Order placed. Give injection appts, series of 3 injections.        Elevated liver enzymes: Yes  Abnormal imaging: No  Cirrhosis: No  Hepatitis C: No  Hepatitis B: No  Fatty liver: No  Encephalopathy: No  Post-hospital discharge: No  Symptoms: fevers twice/wk.  Has nausea, vomiting bile a few nights ago.      Primary hepatic manifestations:  Fatigue:Yes  Edema:No  Ascites:No  Encephalopathy:No  Abdominal pain:No  GI bleeds:  No  Pruritus:No  Weight Changes:No  Changes in Bowel habits: No  Muscle cramps:No    Risk factors for liver disease:  No jaundice  No transfusions  No IVDU  Did not snort cocaine or similar agents  Did not live with anyone with hepatitis B or C  Sexual partner not tested  No hepatotoxic medications  No exposure to industrial toxins  Alcohol: drank 3-4 beers/day, a few more on weekends until mid 2016.  However, stopped when he had back surgery in 2016, and had 2-3 drinks this entire year.        ROS:  Constitutional: No fevers, chills, weight changes, fatigue  ENT: No allergies, nosebleeds,   CV: No chest pain  Pulm: No cough, shortness of breath  Ophtho: No vision changes  GI/Liver: see HPI  Derm: No rash, itching  Heme: No swollen glands, bruising  MSK: Had spinal fusion, and pain after the surgery, back pain then determined to be from screw near the L5 vertebra, No joint pains, joint swelling.  : No dysuria, hematuria, decrease in urine output.  Endo: No hot or cold intolerance  Neuro: No confusion, disorientation, difficulty with sleep, memory, concentration, syncope, seizure  Psych: No anxiety, depression    Medical History:  has a past medical history of ADHD (attention deficit hyperactivity disorder); Arthritis; Asthma; Back pain; Degeneration of lumbar intervertebral disc (05/2016); Depression; Elevated PSA; Headache; Kidney damage; Kidney stone; Neck pain; Numbness and tingling in hands; Numbness and tingling of both legs; Seizures; and Sleep apnea.    Surgical History:  has a past surgical history that includes removal of abcess; Pilonidal cyst drainage; Tympanostomy tube placement; Back surgery (2016); Lithotripsy; and Hernia repair (Bilateral, 11/22/2017).    Family History: family history includes Diabetes in his maternal aunt, maternal grandfather, and maternal uncle; Early death in his father; Heart disease in his mother; Hypertension in his mother; Migraines in his mother..     Social History:   "reports that he has been smoking Cigarettes.  He has been smoking about 0.25 packs per day. He quit smokeless tobacco use about 20 months ago. He reports that he drinks alcohol. He reports that he does not use drugs.    Review of patient's allergies indicates:   Allergen Reactions    Inapsine [droperidol] Anaphylaxis     seizures    Pcn [penicillins]        Current Outpatient Prescriptions   Medication Sig    acetaminophen (TYLENOL) 500 MG tablet Take 1,000 mg by mouth every 6 (six) hours as needed for Pain.    ferrous sulfate 325 mg (65 mg iron) Tab tablet Take 325 mg by mouth daily with breakfast.    hydroCHLOROthiazide (HYDRODIURIL) 25 MG tablet Take 1 tablet (25 mg total) by mouth once daily.    testosterone cypionate (DEPOTESTOTERONE CYPIONATE) 200 mg/mL injection Inject 0.5 mLs (100 mg total) into the muscle every 7 days.     No current facility-administered medications for this visit.        Objective Findings:    Vital Signs:  BP (!) 147/76 (BP Location: Left arm, Patient Position: Sitting, BP Method: Medium (Automatic))   Pulse 88   Resp 18   Ht 5' 7" (1.702 m)   Wt 97.4 kg (214 lb 11.7 oz)   SpO2 95%   BMI 33.63 kg/m²   Body mass index is 33.63 kg/m².    Physical Exam:  General Appearance: Well appearing in no acute distress  Head:   Normocephalic, without obvious abnormality  Eyes:    No scleral icterus, EOMI  ENT: Neck supple, Lips, mucosa, and tongue normal; teeth and gums normal  Lungs: CTA bilaterally in anterior and posterior fields, no wheezes, no crackles.  Heart:  Regular rate and rhythm, S1, S2 normal, no murmurs heard  Abdomen: Soft, non tender, non distended with positive bowel sounds in all four quadrants. No hepatosplenomegaly, ascites, or mass  Extremities: 2+ pulses, no clubbing, cyanosis or edema  Skin: No rash  Neurologic: CN II-XII intact, alert, oriented x 3. No asterixis      Labs:  Lab Results   Component Value Date    WBC 9.30 01/16/2018    HGB 15.8 01/16/2018    HCT 48.2 " 2018     2018    CHOL 280 (H) 10/30/2017    TRIG 225 (H) 10/30/2017    HDL 26 (L) 10/30/2017    INR 0.9 2016    CREATININE 1.3 2017    BUN 15 2017    BILITOT 1.1 (H) 2017     (H) 2017    AST 67 (H) 2017    ALKPHOS 63 2017     2017    K 4.2 2017     2017    CO2 24 2017    TSH 1.544 2017    PSA 1.4 10/30/2017    HGBA1C 5.4 2017       Imaging:       Endoscopy:      Assessment:  1. Elevated transaminase level      Elevated enzymes preceded by several months of fevers.   serologic markers neg, A1AT and cerulo normal. Elevations ould be due to tylenol 5-6 gm/d. Or fatty liver.       Fibroscan 17:  6.4 KPa, Fibrosis: F 0-1 minimal or no fibrosis.  CAP readin dB/m, Steatosis: :S2 11-34% steatosis.      Needed twinrix vaccination to protect against hep  A and B. Order was placed for series of 3 injections.     Plan:  -  Labs today:  Hepatic panel today and every 3 months from now.   -  Pl give hepatitis A and B vaccine injections today, and 1 and 6 months from today.   -  Continue current meds, cut down on tylenol  -  Avoid high intake of Tylenol (more than 4 extra-strength pills in one day)  -  Return in 1 yr.       Follow-up in about 1 year (around 2019).      Order summary:  Orders Placed This Encounter   Procedures    Hepatic function panel       Thank you so much for allowing me to participate in the care of Lee Tristan Childress MD

## 2018-02-21 RX ORDER — HYDROCHLOROTHIAZIDE 25 MG/1
TABLET ORAL
Qty: 30 TABLET | Refills: 3 | Status: SHIPPED | OUTPATIENT
Start: 2018-02-21 | End: 2018-03-22 | Stop reason: SDUPTHER

## 2018-03-01 ENCOUNTER — PROCEDURE VISIT (OUTPATIENT)
Dept: UROLOGY | Facility: CLINIC | Age: 43
End: 2018-03-01
Payer: COMMERCIAL

## 2018-03-01 VITALS
TEMPERATURE: 98 F | HEART RATE: 90 BPM | DIASTOLIC BLOOD PRESSURE: 75 MMHG | SYSTOLIC BLOOD PRESSURE: 143 MMHG | BODY MASS INDEX: 33.57 KG/M2 | HEIGHT: 67 IN | WEIGHT: 213.88 LBS

## 2018-03-01 DIAGNOSIS — E29.1 HYPOGONADISM IN MALE: Primary | ICD-10-CM

## 2018-03-01 LAB
BILIRUB SERPL-MCNC: NORMAL MG/DL
BLOOD URINE, POC: NORMAL
COLOR, POC UA: NORMAL
GLUCOSE UR QL STRIP: NORMAL
KETONES UR QL STRIP: NORMAL
LEUKOCYTE ESTERASE URINE, POC: NORMAL
NITRITE, POC UA: NORMAL
PH, POC UA: 7
PROTEIN, POC: NORMAL
SPECIFIC GRAVITY, POC UA: 1010
UROBILINOGEN, POC UA: NORMAL

## 2018-03-01 PROCEDURE — 11980 IMPLANT HORMONE PELLET(S): CPT | Mod: S$GLB,,, | Performed by: UROLOGY

## 2018-03-01 PROCEDURE — S0189 TESTOSTERONE PELLET 75 MG: HCPCS | Mod: S$GLB,,, | Performed by: UROLOGY

## 2018-03-01 PROCEDURE — 81002 URINALYSIS NONAUTO W/O SCOPE: CPT | Mod: S$GLB,,, | Performed by: UROLOGY

## 2018-03-01 RX ORDER — SULFAMETHOXAZOLE AND TRIMETHOPRIM 800; 160 MG/1; MG/1
1 TABLET ORAL 2 TIMES DAILY
Qty: 14 TABLET | Refills: 0 | Status: SHIPPED | OUTPATIENT
Start: 2018-03-01 | End: 2018-03-08

## 2018-03-01 NOTE — PATIENT INSTRUCTIONS
Disposition: Status post testopel insertion  Bactrim ds po BID x 7d - high risk for abscess.   Fasting lipid- will see NP if elevated to discuss starting lipid meds (has lost about 15 pounds)  Testosterone in 3 months and f/u after   Cbc, testosterone, psa, lipid panel, cmp in 6 months (order at 3 month f/u)

## 2018-03-01 NOTE — PROCEDURES
Procedures         [facility]: Testopel Insertion  [facility]: active    Verbal consent obtained. Risks and benefits explained.    Procedures : Testopel insertion (subcutaneous testosterone pellet implantation),   Procedure code: 45362  - location: :right  lateral thigh     Diagnosis: Hypogonadism     EBL: none     Complications: none     Anesthesia: Local: 1% lidocaine with epinephrine    Findings: 10 pellets inserted    Indications:  Lee Quintanilla is a 42 y.o. male with hypgonadism on T 100q1 week. Was on anastrozole but stopped bc of elevated LFTS Has been worked up for elevated LFTS, now normalized. Previous lipids elevated never started on cholesterol meds bc of LFTs. Does not have a pcp right now.      Procedure in Detail: Testopel Insertion    Appropriate informed written consent was obtained about discussion of risks and benefits.The risks of testopel explained to the patient include but are not limited to worsening of BPH, edema with or without congestive heart failure, gynecomastia, cellulitis and abscess, venous thromboembolic events, testicular atrophy, possible cardiac sequelae and with high dose testosterone the risk of liver function elevation (peliosis hepatitis) and hepatocellular carcinoma. Peliosis hepatitis can be a fatal complication.     Time Out was performed with the patient and nurse in the room.       He was placed in left lateral decubitous position. Patient's lateral thigh/gluteal region was then prepped with alcohol then betadine solution and allowed to dry. A bleb was made under the skin using Lidocaine 1% with epinephrine at the planned site of pellet placement, and then to anesthetize two tracts under the skin in a V formation. Once adequate analgesia had taken effect, a 15 blade was used to make a small incision at site of initial bleb. Using manufactures trocar, a tract was made in the subcutaneous tissues, following the V  pattern previously anesthetized, and the penetrating trocar was removed. The pusher tool was inserted and 10 pellets were inserted, 5 per tract, in the subcutaneous tissue. The incision site was then closed using one 4-0 monocryl horizontal mattress sutures and pressure dressing was then applied with gauze and silk tape. Patient was asked to lay on the side the pellets were placed for  For and additional 5 minutesadded hemostasis. Patient tolerated the procedure well, there were no complications.      Disposition: Status post testopel insertion  Bactrim ds po BID x 7d - high risk for abscess.   Fasting lipid- will see NP if elevated to discuss starting lipid meds (has lost about 15 pounds)  Testosterone in 3 months and f/u after   Cbc, testosterone, psa, lipid panel, cmp in 6 months (order at 3 month f/u)

## 2018-03-02 ENCOUNTER — LAB VISIT (OUTPATIENT)
Dept: LAB | Facility: HOSPITAL | Age: 43
End: 2018-03-02
Attending: UROLOGY
Payer: COMMERCIAL

## 2018-03-02 ENCOUNTER — PATIENT MESSAGE (OUTPATIENT)
Dept: UROLOGY | Facility: CLINIC | Age: 43
End: 2018-03-02

## 2018-03-02 DIAGNOSIS — E29.1 HYPOGONADISM IN MALE: ICD-10-CM

## 2018-03-02 LAB
CHOLEST SERPL-MCNC: 364 MG/DL
CHOLEST/HDLC SERPL: 15.2 {RATIO}
HDLC SERPL-MCNC: 24 MG/DL
HDLC SERPL: 6.6 %
LDLC SERPL CALC-MCNC: 270 MG/DL
NONHDLC SERPL-MCNC: 340 MG/DL
TRIGL SERPL-MCNC: 350 MG/DL

## 2018-03-02 PROCEDURE — 36415 COLL VENOUS BLD VENIPUNCTURE: CPT

## 2018-03-02 PROCEDURE — 80061 LIPID PANEL: CPT

## 2018-03-05 ENCOUNTER — TELEPHONE (OUTPATIENT)
Dept: FAMILY MEDICINE | Facility: CLINIC | Age: 43
End: 2018-03-05

## 2018-03-05 NOTE — TELEPHONE ENCOUNTER
----- Message from Vicky Saeed MD sent at 3/2/2018  2:37 PM CST -----  Regarding: cholesterol elvated, on T treatment, needs cholesterol meds  Emilie Santos his cholesterol is very elevated. He's even lost 15 pounds and its higher than it was. I have treated him with testopel and I am concerned his lipids will only worsen. Testopel is subcutaneous implant and essentially non-reversible.    Anyway you guys could see him soon and set him up for discussion of lipid lowering meds?    He did mention to me in the past he did not get along with you bc you didn't just stop his anti-depressant which was making him lethargic. I tried to explain to him you have to be tapered off (like you were doing) and that you are a very good doctor. Only telling you bc he said he didn't want to f/u initially. So he never really made further f/u for his cholesterol. I  Should have checked it before I gave him the testopel but  With his weight loss assumed it would be improving.     Either way he will be losing his insurance soon until he switches to disability? Any way you or your NP could see him? I understand if you want the NP to see him.     Thanks again and please in the future let me know if any of my patients are disgruntled with me and why. Some things I can fix or work on. His grudge with you is only bc you were trying to do the right thing    kaz

## 2018-03-05 NOTE — TELEPHONE ENCOUNTER
Please see Dr. Saeed's message. Patient needs to be seen for hyperlipidemia as soon as possible before he potentially loses insurance. If he does not wish to see me can we get him in with another MD or ADILENE? Both his LDL and his triglycerides need to be lowered to prevent heart disease, stroke or other vascular disease.

## 2018-03-05 NOTE — TELEPHONE ENCOUNTER
Spoke to patient. Patient states he is aware of his elevated cholesterol.   Appointment scheduled with richie dobbs patient confirmed appointment.

## 2018-03-12 ENCOUNTER — LAB VISIT (OUTPATIENT)
Dept: LAB | Facility: HOSPITAL | Age: 43
End: 2018-03-12
Attending: PHYSICIAN ASSISTANT

## 2018-03-12 ENCOUNTER — OFFICE VISIT (OUTPATIENT)
Dept: FAMILY MEDICINE | Facility: CLINIC | Age: 43
End: 2018-03-12

## 2018-03-12 ENCOUNTER — DOCUMENTATION ONLY (OUTPATIENT)
Dept: FAMILY MEDICINE | Facility: CLINIC | Age: 43
End: 2018-03-12

## 2018-03-12 VITALS
HEIGHT: 67 IN | TEMPERATURE: 99 F | OXYGEN SATURATION: 97 % | HEART RATE: 84 BPM | DIASTOLIC BLOOD PRESSURE: 73 MMHG | SYSTOLIC BLOOD PRESSURE: 154 MMHG | WEIGHT: 204.56 LBS | BODY MASS INDEX: 32.11 KG/M2

## 2018-03-12 DIAGNOSIS — R74.01 TRANSAMINITIS: ICD-10-CM

## 2018-03-12 DIAGNOSIS — E78.5 HYPERLIPIDEMIA, UNSPECIFIED HYPERLIPIDEMIA TYPE: Primary | ICD-10-CM

## 2018-03-12 DIAGNOSIS — E66.9 OBESITY (BMI 30.0-34.9): ICD-10-CM

## 2018-03-12 PROCEDURE — 99214 OFFICE O/P EST MOD 30 MIN: CPT | Mod: S$PBB,,, | Performed by: PHYSICIAN ASSISTANT

## 2018-03-12 PROCEDURE — 36415 COLL VENOUS BLD VENIPUNCTURE: CPT | Mod: PO

## 2018-03-12 PROCEDURE — 80076 HEPATIC FUNCTION PANEL: CPT

## 2018-03-12 PROCEDURE — 99999 PR PBB SHADOW E&M-EST. PATIENT-LVL III: CPT | Mod: PBBFAC,,, | Performed by: PHYSICIAN ASSISTANT

## 2018-03-12 PROCEDURE — 99213 OFFICE O/P EST LOW 20 MIN: CPT | Mod: PBBFAC,PO | Performed by: PHYSICIAN ASSISTANT

## 2018-03-12 NOTE — PROGRESS NOTES
Pre-Visit Chart Review  For Appointment Scheduled on 03/11/18    Health Maintenance Due   Topic Date Due    Pneumococcal PPSV23 (Medium Risk) (1) 08/29/1993    Influenza Vaccine  08/01/2017

## 2018-03-12 NOTE — PROGRESS NOTES
Subjective:       Patient ID: Lee Quintanilla is a 42 y.o. male.    Chief Complaint: Hyperlipidemia    HPI   Patient is a 42 year old  male presenting to the clinic per Dr. Saeed's request after noting very elevated cholesterol after starting testosterone injections. Patient also seeing Dr. Childress for elevated liver functions. Patient reports testosterone is making him feel much better & would rather deal with treating high cholesterol than stopping testosterone replacement. He does report that he is not eating a very healthy diet despite 10 lb weight loss.   Review of Systems   Constitutional: Negative for activity change, appetite change, chills, fatigue and fever.   HENT: Negative for congestion, ear pain, postnasal drip, rhinorrhea and sinus pressure.    Respiratory: Negative for cough, shortness of breath and wheezing.    Cardiovascular: Negative for chest pain and palpitations.   Gastrointestinal: Negative for abdominal pain, constipation, diarrhea, nausea and vomiting.   Genitourinary: Negative for frequency, hematuria and urgency.   Musculoskeletal: Negative for back pain and gait problem.   Skin: Negative for rash.   Neurological: Negative for syncope, weakness and headaches.   Psychiatric/Behavioral: Negative for agitation and confusion.       Objective:      Physical Exam   Constitutional: Vital signs are normal. He appears well-developed and well-nourished. No distress.   Cardiovascular: Normal rate, regular rhythm, S1 normal, S2 normal and normal heart sounds.  Exam reveals no gallop.    No murmur heard.  Pulses:       Radial pulses are 2+ on the right side, and 2+ on the left side.   Pulmonary/Chest: Effort normal and breath sounds normal. No respiratory distress. He has no wheezes. He has no rhonchi.   Skin: Skin is warm and dry. He is not diaphoretic.   Psychiatric: He has a normal mood and affect. His speech is normal and behavior is normal. Judgment and thought content normal.  Cognition and memory are normal.       Assessment:       1. Hyperlipidemia, unspecified hyperlipidemia type    2. Transaminitis    3. Obesity (BMI 30.0-34.9)        Plan:       Lee was seen today for hyperlipidemia.    Diagnoses and all orders for this visit:    Hyperlipidemia, unspecified hyperlipidemia type  Consider starting Crestor 5mg (at [facility] [employer]) with goodrx card for about $20/month since patient lost insurance as of today.  The only Statin on [facility] $4 list is lovastatin & we discussed that medication may not work as well.  Hepatic function lab first  Will send message to Dr. Childress following lab to discuss if potentially starting a statin medication would be okay.    Transaminitis  -     Hepatic function panel; Future    Obesity (BMI 30.0-34.9)  Patient readiness: acceptance and barriers:none    During the course of the visit the patient was educated and counseled about the following:     Obesity:   Regular aerobic exercise program discussed.    Goals: Obesity: Reduce calorie intake and BMI    Did patient meet goals/outcomes: No    The following self management tools provided: declined    Patient Instructions (the written plan) was given to the patient/family.     Time spent with patient: 15 minutes    Barriers to medications present (no )    Adverse reactions to current medications (no)    Over the counter medications reviewed (Yes)

## 2018-03-13 LAB
ALBUMIN SERPL BCP-MCNC: 3.4 G/DL
ALP SERPL-CCNC: 44 U/L
ALT SERPL W/O P-5'-P-CCNC: 61 U/L
AST SERPL-CCNC: 35 U/L
BILIRUB DIRECT SERPL-MCNC: 0.3 MG/DL
BILIRUB SERPL-MCNC: 0.7 MG/DL
PROT SERPL-MCNC: 7.3 G/DL

## 2018-03-14 DIAGNOSIS — E78.5 HYPERLIPIDEMIA, UNSPECIFIED HYPERLIPIDEMIA TYPE: Primary | ICD-10-CM

## 2018-03-14 DIAGNOSIS — R74.8 ELEVATED LIVER ENZYMES: ICD-10-CM

## 2018-03-14 RX ORDER — ROSUVASTATIN CALCIUM 5 MG/1
5 TABLET, COATED ORAL DAILY
Qty: 30 TABLET | Refills: 2 | Status: SHIPPED | OUTPATIENT
Start: 2018-03-14 | End: 2018-10-18 | Stop reason: SDUPTHER

## 2018-03-14 NOTE — TELEPHONE ENCOUNTER
----- Message from JOSE M Castanon sent at 3/14/2018 11:22 AM CDT -----  Please inform patient that Dr. Childress agreed to start patient on crestor 5mg daily. This was sent to [facility]. Needs to recheck hepatic function & lipid panel in 3 months. Please schedule labs.

## 2018-03-22 RX ORDER — HYDROCHLOROTHIAZIDE 25 MG/1
25 TABLET ORAL DAILY
Qty: 90 TABLET | Refills: 3 | Status: SHIPPED | OUTPATIENT
Start: 2018-03-22 | End: 2019-04-05 | Stop reason: SDUPTHER

## 2018-03-29 ENCOUNTER — LAB VISIT (OUTPATIENT)
Dept: LAB | Facility: HOSPITAL | Age: 43
End: 2018-03-29
Attending: INTERNAL MEDICINE

## 2018-03-29 DIAGNOSIS — N20.0 KIDNEY STONES: ICD-10-CM

## 2018-03-29 LAB
ALBUMIN SERPL BCP-MCNC: 3.7 G/DL
ANION GAP SERPL CALC-SCNC: 11 MMOL/L
BUN SERPL-MCNC: 22 MG/DL
CALCIUM SERPL-MCNC: 9.3 MG/DL
CHLORIDE SERPL-SCNC: 99 MMOL/L
CO2 SERPL-SCNC: 27 MMOL/L
CREAT SERPL-MCNC: 1.4 MG/DL
EST. GFR  (AFRICAN AMERICAN): >60 ML/MIN/1.73 M^2
EST. GFR  (NON AFRICAN AMERICAN): >60 ML/MIN/1.73 M^2
GLUCOSE SERPL-MCNC: 80 MG/DL
PHOSPHATE SERPL-MCNC: 2.7 MG/DL
POTASSIUM SERPL-SCNC: 3.8 MMOL/L
PTH-INTACT SERPL-MCNC: 47.9 PG/ML
SODIUM SERPL-SCNC: 137 MMOL/L

## 2018-03-29 PROCEDURE — 80069 RENAL FUNCTION PANEL: CPT

## 2018-03-29 PROCEDURE — 83970 ASSAY OF PARATHORMONE: CPT

## 2018-03-29 PROCEDURE — 36415 COLL VENOUS BLD VENIPUNCTURE: CPT

## 2018-04-03 ENCOUNTER — PATIENT MESSAGE (OUTPATIENT)
Dept: UROLOGY | Facility: CLINIC | Age: 43
End: 2018-04-03

## 2018-04-03 DIAGNOSIS — N52.9 ERECTILE DYSFUNCTION, UNSPECIFIED ERECTILE DYSFUNCTION TYPE: Primary | ICD-10-CM

## 2018-04-03 NOTE — TELEPHONE ENCOUNTER
Pt c/o nipple soreness, please schedule him for E and T level (am).   I told him I would hold off on any anastrozole for now bc of LFTs but want to get baseline E and T

## 2018-04-04 ENCOUNTER — TELEPHONE (OUTPATIENT)
Dept: UROLOGY | Facility: CLINIC | Age: 43
End: 2018-04-04

## 2018-04-04 NOTE — TELEPHONE ENCOUNTER
LM orders for lab draw (estrogen and testosterone) in the system for tomorrow morning.  Call clinic if any questions.

## 2018-04-09 ENCOUNTER — LAB VISIT (OUTPATIENT)
Dept: LAB | Facility: HOSPITAL | Age: 43
End: 2018-04-09
Attending: UROLOGY

## 2018-04-09 DIAGNOSIS — N52.9 ERECTILE DYSFUNCTION, UNSPECIFIED ERECTILE DYSFUNCTION TYPE: ICD-10-CM

## 2018-04-09 LAB — TESTOST SERPL-MCNC: 743 NG/DL

## 2018-04-09 PROCEDURE — 36415 COLL VENOUS BLD VENIPUNCTURE: CPT

## 2018-04-09 PROCEDURE — 84403 ASSAY OF TOTAL TESTOSTERONE: CPT

## 2018-04-09 PROCEDURE — 82672 ASSAY OF ESTROGEN: CPT

## 2018-04-10 LAB — ESTROGEN SERPL-MCNC: 406 PG/ML

## 2018-04-11 ENCOUNTER — PATIENT MESSAGE (OUTPATIENT)
Dept: UROLOGY | Facility: CLINIC | Age: 43
End: 2018-04-11

## 2018-04-16 ENCOUNTER — PATIENT MESSAGE (OUTPATIENT)
Dept: UROLOGY | Facility: CLINIC | Age: 43
End: 2018-04-16

## 2018-04-16 DIAGNOSIS — E29.1 HYPOGONADISM IN MALE: Primary | ICD-10-CM

## 2018-04-16 RX ORDER — ANASTROZOLE 1 MG/1
1 TABLET ORAL
Qty: 15 TABLET | Refills: 0 | Status: SHIPPED | OUTPATIENT
Start: 2018-04-16 | End: 2018-05-14 | Stop reason: SDUPTHER

## 2018-04-16 NOTE — TELEPHONE ENCOUNTER
Pt's estrogen is 406 and he is c/o nipple tenderness. I'm going to start anastrozole 1mg 3x a week and recheck a cmp, testosterone and estrogen in 3 weeks.    If his liver function has not worsened I will continue

## 2018-04-22 ENCOUNTER — PATIENT MESSAGE (OUTPATIENT)
Dept: UROLOGY | Facility: CLINIC | Age: 43
End: 2018-04-22

## 2018-05-04 ENCOUNTER — LAB VISIT (OUTPATIENT)
Dept: LAB | Facility: HOSPITAL | Age: 43
End: 2018-05-04
Attending: INTERNAL MEDICINE

## 2018-05-04 DIAGNOSIS — R74.01 ELEVATED TRANSAMINASE LEVEL: ICD-10-CM

## 2018-05-04 LAB
ALBUMIN SERPL BCP-MCNC: 3.7 G/DL
ALP SERPL-CCNC: 50 U/L
ALT SERPL W/O P-5'-P-CCNC: 79 U/L
AST SERPL-CCNC: 41 U/L
BILIRUB DIRECT SERPL-MCNC: 0.3 MG/DL
BILIRUB SERPL-MCNC: 0.7 MG/DL
PROT SERPL-MCNC: 7.4 G/DL

## 2018-05-04 PROCEDURE — 80076 HEPATIC FUNCTION PANEL: CPT

## 2018-05-04 PROCEDURE — 36415 COLL VENOUS BLD VENIPUNCTURE: CPT | Mod: PO

## 2018-05-07 ENCOUNTER — LAB VISIT (OUTPATIENT)
Dept: LAB | Facility: HOSPITAL | Age: 43
End: 2018-05-07
Attending: UROLOGY

## 2018-05-07 DIAGNOSIS — E29.1 HYPOGONADISM IN MALE: ICD-10-CM

## 2018-05-07 LAB
ALBUMIN SERPL BCP-MCNC: 3.8 G/DL
ALP SERPL-CCNC: 48 U/L
ALT SERPL W/O P-5'-P-CCNC: 47 U/L
ANION GAP SERPL CALC-SCNC: 8 MMOL/L
AST SERPL-CCNC: 27 U/L
BILIRUB SERPL-MCNC: 0.6 MG/DL
BUN SERPL-MCNC: 16 MG/DL
CALCIUM SERPL-MCNC: 9.3 MG/DL
CHLORIDE SERPL-SCNC: 101 MMOL/L
CO2 SERPL-SCNC: 29 MMOL/L
CREAT SERPL-MCNC: 1.4 MG/DL
EST. GFR  (AFRICAN AMERICAN): >60 ML/MIN/1.73 M^2
EST. GFR  (NON AFRICAN AMERICAN): >60 ML/MIN/1.73 M^2
GLUCOSE SERPL-MCNC: 93 MG/DL
POTASSIUM SERPL-SCNC: 3.8 MMOL/L
PROT SERPL-MCNC: 7.5 G/DL
SODIUM SERPL-SCNC: 138 MMOL/L
TESTOST SERPL-MCNC: 349 NG/DL

## 2018-05-07 PROCEDURE — 80053 COMPREHEN METABOLIC PANEL: CPT

## 2018-05-07 PROCEDURE — 36415 COLL VENOUS BLD VENIPUNCTURE: CPT

## 2018-05-07 PROCEDURE — 84403 ASSAY OF TOTAL TESTOSTERONE: CPT

## 2018-05-07 PROCEDURE — 82672 ASSAY OF ESTROGEN: CPT

## 2018-05-08 ENCOUNTER — PATIENT MESSAGE (OUTPATIENT)
Dept: UROLOGY | Facility: CLINIC | Age: 43
End: 2018-05-08

## 2018-05-08 ENCOUNTER — TELEPHONE (OUTPATIENT)
Dept: HEPATOLOGY | Facility: CLINIC | Age: 43
End: 2018-05-08

## 2018-05-08 LAB — ESTROGEN SERPL-MCNC: 812 PG/ML

## 2018-05-08 NOTE — TELEPHONE ENCOUNTER
----- Message from Nikki Childress MD sent at 5/8/2018  4:18 AM CDT -----  Pl inform pt:  Liver enzymes remain elevated.  Continue low carb diet.

## 2018-05-09 ENCOUNTER — PATIENT MESSAGE (OUTPATIENT)
Dept: UROLOGY | Facility: CLINIC | Age: 43
End: 2018-05-09

## 2018-05-09 DIAGNOSIS — R79.89 LOW TESTOSTERONE: Primary | ICD-10-CM

## 2018-05-09 NOTE — TELEPHONE ENCOUNTER
We can restart (1cc)t100 q1 week for now until he gets appt with endocrinology, does he want to inject himself?    [facility]  I would like to repeat the testosterone and estrogen in a month.     Will place referral for endocrinology

## 2018-05-09 NOTE — TELEPHONE ENCOUNTER
He will have to call to schedule this appointment I don't have access to schedule on there schedule

## 2018-05-10 ENCOUNTER — TELEPHONE (OUTPATIENT)
Dept: UROLOGY | Facility: CLINIC | Age: 43
End: 2018-05-10

## 2018-05-10 NOTE — TELEPHONE ENCOUNTER
----- Message from Vicky Saeed MD sent at 5/10/2018 10:30 AM CDT -----  He needs 1cc a week or 200mg once a week

## 2018-05-10 NOTE — TELEPHONE ENCOUNTER
Spoke with patient informed him prescription called in to the pharmacy. Patient verbally voiced understanding.

## 2018-05-10 NOTE — TELEPHONE ENCOUNTER
----- Message from Tamara Chu sent at 5/10/2018 10:26 AM CDT -----  Contact: patient  Type:  RX Refill Request    Who Called:  Patient   Refill or New Rx: Refill  RX Name and Strength: testosterone cypionate (DEPOTESTOTERONE CYPIONATE) 200 mg/mL injection  How is the patient currently taking it? (ex. 1XDay):  Inject 0.5 mLs (100 mg total) into the muscle every 7 days. - Intramuscular  Is this a 30 day or 90 day RX:  10 mL  Preferred Pharmacy with phone number:    [place] [address]     Local or Mail Order:  Local  Ordering Provider:  Dr Vicky Saeed  Best Call Back Number:  [phone]  Additional Information:  He is calling to request a refill. Please advise.

## 2018-05-10 NOTE — TELEPHONE ENCOUNTER
Testosterone 200mg 10cc vial inject 1cc every 7 days called in to [facility] at [facility] pharmacy.

## 2018-05-14 RX ORDER — ANASTROZOLE 1 MG/1
1 TABLET ORAL
Qty: 15 TABLET | Refills: 0 | Status: SHIPPED | OUTPATIENT
Start: 2018-05-14 | End: 2018-07-05 | Stop reason: ALTCHOICE

## 2018-05-27 ENCOUNTER — PATIENT MESSAGE (OUTPATIENT)
Dept: UROLOGY | Facility: CLINIC | Age: 43
End: 2018-05-27

## 2018-05-29 ENCOUNTER — PATIENT MESSAGE (OUTPATIENT)
Dept: UROLOGY | Facility: CLINIC | Age: 43
End: 2018-05-29

## 2018-05-29 ENCOUNTER — LAB VISIT (OUTPATIENT)
Dept: LAB | Facility: HOSPITAL | Age: 43
End: 2018-05-29
Attending: UROLOGY

## 2018-05-29 DIAGNOSIS — R79.89 LOW TESTOSTERONE: ICD-10-CM

## 2018-05-29 LAB — TESTOST SERPL-MCNC: >1500 NG/DL

## 2018-05-29 PROCEDURE — 36415 COLL VENOUS BLD VENIPUNCTURE: CPT

## 2018-05-29 PROCEDURE — 84403 ASSAY OF TOTAL TESTOSTERONE: CPT

## 2018-06-01 ENCOUNTER — TELEPHONE (OUTPATIENT)
Dept: UROLOGY | Facility: CLINIC | Age: 43
End: 2018-06-01

## 2018-06-11 NOTE — TELEPHONE ENCOUNTER
Patient needs lipid panel & hepatic function panel to see how cholesterol medication is working. There are already orders in order review that can he scheduled.

## 2018-07-05 RX ORDER — ANASTROZOLE 1 MG/1
1 TABLET ORAL WEEKLY
Qty: 4 TABLET | Refills: 3 | Status: SHIPPED | OUTPATIENT
Start: 2018-07-05 | End: 2018-09-04 | Stop reason: SDUPTHER

## 2018-07-05 RX ORDER — TESTOSTERONE CYPIONATE 200 MG/ML
200 INJECTION, SOLUTION INTRAMUSCULAR
Qty: 1 ML | Refills: 5 | Status: SHIPPED | OUTPATIENT
Start: 2018-07-05 | End: 2018-09-04 | Stop reason: SDUPTHER

## 2018-07-05 RX ORDER — ANASTROZOLE 1 MG/1
1 TABLET ORAL
Qty: 15 TABLET | Refills: 0 | OUTPATIENT
Start: 2018-07-06

## 2018-07-05 RX ORDER — TESTOSTERONE CYPIONATE 200 MG/ML
100 INJECTION, SOLUTION INTRAMUSCULAR
Qty: 10 ML | Refills: 0 | OUTPATIENT
Start: 2018-07-05 | End: 2019-04-11

## 2018-07-05 NOTE — TELEPHONE ENCOUNTER
Prescription sent for testsoteone 200 q 10 days and anastrozole 1mg one x a week  Cannot write for any  More after sept unless he has an appt. Needs to be seen every 6 months at minimum

## 2018-07-06 ENCOUNTER — PATIENT MESSAGE (OUTPATIENT)
Dept: UROLOGY | Facility: CLINIC | Age: 43
End: 2018-07-06

## 2018-07-06 NOTE — TELEPHONE ENCOUNTER
Meant to write 200 q 10d (1cc every 10d).  - 6cc is enough to last for 2 months.   However I can only write him for a max of 6 months from last treatment which was in march.   I asked him how many cc's he received.   If he has enough to last until then then no more refills.

## 2018-07-09 NOTE — TELEPHONE ENCOUNTER
Prescription to be sent to [facility] for 10mL vial  200mg/10mL vial     Inject 1cc/200mg every 3 weeks   arimidex 1x  A week     F/u in sept

## 2018-07-31 ENCOUNTER — PATIENT MESSAGE (OUTPATIENT)
Dept: UROLOGY | Facility: CLINIC | Age: 43
End: 2018-07-31

## 2018-07-31 DIAGNOSIS — E29.1 HYPOGONADISM IN MALE: Primary | ICD-10-CM

## 2018-07-31 NOTE — TELEPHONE ENCOUNTER
These labs need to be done at least 1-2 weeks prior to f/u   He also needs to note when he does injection in relation to testosterone draw  Will offer to see if pt wants to see a pcp which may be less expensive for him since he is uninsured

## 2018-08-28 ENCOUNTER — LAB VISIT (OUTPATIENT)
Dept: LAB | Facility: HOSPITAL | Age: 43
End: 2018-08-28
Attending: UROLOGY

## 2018-08-28 DIAGNOSIS — E29.1 HYPOGONADISM IN MALE: ICD-10-CM

## 2018-08-28 LAB
ALBUMIN SERPL BCP-MCNC: 3.9 G/DL
ALP SERPL-CCNC: 57 U/L
ALT SERPL W/O P-5'-P-CCNC: 27 U/L
ANION GAP SERPL CALC-SCNC: 8 MMOL/L
AST SERPL-CCNC: 23 U/L
BILIRUB SERPL-MCNC: 0.6 MG/DL
BUN SERPL-MCNC: 18 MG/DL
CALCIUM SERPL-MCNC: 9.4 MG/DL
CHLORIDE SERPL-SCNC: 99 MMOL/L
CO2 SERPL-SCNC: 28 MMOL/L
COMPLEXED PSA SERPL-MCNC: 0.8 NG/ML
CREAT SERPL-MCNC: 1.1 MG/DL
ERYTHROCYTE [DISTWIDTH] IN BLOOD BY AUTOMATED COUNT: 16.1 %
EST. GFR  (AFRICAN AMERICAN): >60 ML/MIN/1.73 M^2
EST. GFR  (NON AFRICAN AMERICAN): >60 ML/MIN/1.73 M^2
GLUCOSE SERPL-MCNC: 94 MG/DL
HCT VFR BLD AUTO: 51.7 %
HGB BLD-MCNC: 17 G/DL
MCH RBC QN AUTO: 28.9 PG
MCHC RBC AUTO-ENTMCNC: 32.8 G/DL
MCV RBC AUTO: 88 FL
PLATELET # BLD AUTO: 203 K/UL
PMV BLD AUTO: 10.1 FL
POTASSIUM SERPL-SCNC: 3.7 MMOL/L
PROT SERPL-MCNC: 7.4 G/DL
RBC # BLD AUTO: 5.87 M/UL
SODIUM SERPL-SCNC: 135 MMOL/L
TESTOST SERPL-MCNC: 704 NG/DL
WBC # BLD AUTO: 10.6 K/UL

## 2018-08-28 PROCEDURE — 85027 COMPLETE CBC AUTOMATED: CPT

## 2018-08-28 PROCEDURE — 84153 ASSAY OF PSA TOTAL: CPT

## 2018-08-28 PROCEDURE — 84403 ASSAY OF TOTAL TESTOSTERONE: CPT

## 2018-08-28 PROCEDURE — 82672 ASSAY OF ESTROGEN: CPT

## 2018-08-28 PROCEDURE — 80053 COMPREHEN METABOLIC PANEL: CPT

## 2018-08-28 PROCEDURE — 36415 COLL VENOUS BLD VENIPUNCTURE: CPT

## 2018-08-30 LAB — ESTROGEN SERPL-MCNC: 214 PG/ML

## 2018-09-04 ENCOUNTER — OFFICE VISIT (OUTPATIENT)
Dept: UROLOGY | Facility: CLINIC | Age: 43
End: 2018-09-04

## 2018-09-04 VITALS
SYSTOLIC BLOOD PRESSURE: 139 MMHG | TEMPERATURE: 98 F | HEIGHT: 67 IN | DIASTOLIC BLOOD PRESSURE: 80 MMHG | WEIGHT: 181.44 LBS | BODY MASS INDEX: 28.48 KG/M2 | HEART RATE: 85 BPM

## 2018-09-04 DIAGNOSIS — R79.89 LOW TESTOSTERONE IN MALE: ICD-10-CM

## 2018-09-04 DIAGNOSIS — R31.29 MICROHEMATURIA: Primary | ICD-10-CM

## 2018-09-04 DIAGNOSIS — N20.0 NEPHROLITHIASIS: ICD-10-CM

## 2018-09-04 LAB
BILIRUB SERPL-MCNC: NORMAL MG/DL
BLOOD URINE, POC: NORMAL
COLOR, POC UA: YELLOW
GLUCOSE UR QL STRIP: NORMAL
KETONES UR QL STRIP: NORMAL
LEUKOCYTE ESTERASE URINE, POC: NORMAL
NITRITE, POC UA: NORMAL
PH, POC UA: 6
PROTEIN, POC: NORMAL
SPECIFIC GRAVITY, POC UA: 1.01
UROBILINOGEN, POC UA: NORMAL

## 2018-09-04 PROCEDURE — 81002 URINALYSIS NONAUTO W/O SCOPE: CPT | Mod: PBBFAC,PN | Performed by: UROLOGY

## 2018-09-04 PROCEDURE — 99213 OFFICE O/P EST LOW 20 MIN: CPT | Mod: PBBFAC,PN | Performed by: UROLOGY

## 2018-09-04 PROCEDURE — 99999 PR PBB SHADOW E&M-EST. PATIENT-LVL III: CPT | Mod: PBBFAC,,, | Performed by: UROLOGY

## 2018-09-04 PROCEDURE — 99214 OFFICE O/P EST MOD 30 MIN: CPT | Mod: S$PBB,,, | Performed by: UROLOGY

## 2018-09-04 RX ORDER — NAPROXEN SODIUM 220 MG/1
81 TABLET, FILM COATED ORAL DAILY
Qty: 180 TABLET | Refills: 3 | Status: SHIPPED | OUTPATIENT
Start: 2018-09-04 | End: 2019-04-05 | Stop reason: ALTCHOICE

## 2018-09-04 RX ORDER — ANASTROZOLE 1 MG/1
1 TABLET ORAL
Qty: 24 TABLET | Refills: 1 | Status: SHIPPED | OUTPATIENT
Start: 2018-09-06 | End: 2019-04-05

## 2018-09-04 RX ORDER — TESTOSTERONE CYPIONATE 200 MG/ML
100 INJECTION, SOLUTION INTRAMUSCULAR
Qty: 10 ML | Refills: 0 | Status: SHIPPED | OUTPATIENT
Start: 2018-09-04 | End: 2019-04-05

## 2018-09-04 NOTE — PATIENT INSTRUCTIONS
Low testosterone  -continue T 100 q 1 week. Doing well on this. Testopel caused too high elevation E and also has no insurance and T 100 q 1 week affordable.  -elevated H/H. Will start aspiring 81mg daily indefinitely. Counseled on stroke risk. Will also ask him to donate 1 unit every 4 weeks for now.  -increase anastrozole to 2x a week. Currently 4:1 ratio (704/214). LFts normal now that he is not taking tylenol like he was taking  -cbc and cmp in 3 months to see how his labs are on q1 month phlebotomy and anastrozole 2x a week.   -return in 6 months with a cbc, T, cmp, psa, estrogen    Microhematuria and kidney stones  -likely still has LMP stone. With no insurance and no symptoms and no blood in urine will hold off on further imaging for now.  -continue increased citrate take, any kind of citrus fruits and water intake.     -cbc and cmp in 3 months to see how his labs are on q1 month phlebotomy and anastrozole 2x a week.   -return in 6 months with a cbc, T, cmp, psa, estrogen      F/u in 6 months with labs

## 2018-09-04 NOTE — PROGRESS NOTES
[facility] Progress Note - Stephania  Urology Group: Darlin/Hilario/Felicia/Josué  PCP: Kristi Kennon [facility]: active    Chief Complaint: hypgonadism, microhematuria, h/o stones    Subjective:        HPI: Lee Quintanilla is a 43 y.o. male presents with     Hypgonadism and Fatigue  -Pt had tried T 200 q3 weeks, no improvement in sx, then androgel 2 pumps daily, no improvement in sx and c/o breast enlargement,  then started anastrozole daily. He had no improvement in sx and in sept stopped the androgel and started T100 2x a week (from [place]) and was taking anastrozole 2x a week. I had rec'd he stop this as he was vague complaints, elevated wbc and elevated liver enzymes. We restarted T 100 1x a week with plans to recheck a T,E and CMP. He was referred to hepatology but has been evaluated by  who says that his LFTs have improved since stopping anastrozole and has held off on hepatology appt. She also started him on Iron for iron deficiency (3d ago). He also has elevated lipids but had to hold off on tx bc of elevated LFTs. he was also taking 10 tylenols a day.  -returns today for f/u since testopel 3/18which resulted in elevated E, which he was symptomatic from. We ended up starting him back on T 100 q 1 week. LFts normal again but H/H high. On anastrozole 1x a week.   -sx of low T include: tired, sluggish     Labs  8/28/18   T 704 (1 d prior to next injection), E 214, psa 0.8, LFTs normal (first time), 17.0/51.7 (highest its been) on T100 q1 week  7/9/18  Asked him to do 200 q3 weeks  5/29/18 T > 1500 (injection day prior)  5/7/18 E 812 5/4/18 LFts elevated, asked him to do 0.5cc/100 1x a week  4/9/18  T 743, E 409  3/1/18 Testopel 10 pellets  10/3/17 T 728, E 448, 9/25/17 15.3/47, LFts elevated   7/12/17 T 487, E 80  6/6/17 T 576, E 121  5/30/17 E 227  3/7/17 T 136, switched to androgel, psa 0.98, LFTs normal  1/13/17 T 168 T200q3 weeks       ED  -on this much T without  medication he has able to get an erection hard enough for penetration but has poor duration with partner, self ok. Has not had to take cialis.                     Microhematuria, atypical urine cytology repeated and was normal, kidney stones  ctu 2/10/17 - 2mm left renal stone, 4mm L proximal ureteral stone  Cystoscopy 2/20/17 - normal, prostatitis, mild b hypertrophy  Urine cytology 6/12/17 - negative, 2/7/17 - atypical cells,  8/25/14 - normal     Repeat ct on 3/20/17 (5 weeks later) to see if stone passed with flomax shows stone still in left proximal ureter without hydro.   4/5/17 cystoscopy, L ESWL and stent placement on strings. Pt did not pass any cytology   4/13/17 ctrss showed stone had passed but still had 1mm stone in LMP, pt removed stent which was on strings  8/25/17 ctrss showed 2mm LMP stone. Drinks 5 to 6 bottles of water a day.   24 Hour Urine Results From [facility] 11/7/17: Urine Calcium: 346, Urine [id number], Urine Citrate: 203    He has not passed any stones since I've seen him. He has not been actively increasing his citrate intake.     Ua today: 1.015/6/remainder neg  Urine history  8/23/17                        Ng,   7/18/17                        Ng, 1+leuk/1+protein/2+ketones/50 blood - voided, no sx  2/20/17                      ng  1/12/17   1 rbc  5/31/16  4 rbc  8/25/14                      Ng          REVIEW OF SYSTEMS:  General ROS: no fevers, no chills  Psychological ROS: no depression  Endocrine ROS: no heat or cold  Respiratory ROS: no SOB  Cardiovascular ROS: n CP  Gastrointestinal ROS: no abdominal pain, no constipation, no diarrhea, no BRBPR  Musculoskeletal ROS: no muscle pain  Neurological ROS: no headaches  Dermatological ROS: no rashes  HEENT: no glasses, no sinus   ROS: per HPI     The past medical, surgical, social and family hx were reviewed. There have not been any changes.   Cataracts? none    Urologic meds: T 100 q 1week  Anticoagulation: No    Objective:     Vitals:     09/04/18 0952   BP: 139/80   Pulse: 85   Temp: 98.2 °F (36.8 °C)       General:WDWN in NAD  Eyes: PERRLA, normal conjunctiva  Respiratory: No increased work on breathing.   Cardiovascular: No obvious extremity edema. Warm and well perfused.   GI: palpation of masses. No tenderness. No hepatosplenomegaly to palpation.  Musculoskeletal: normal range of motion of bilateral upper extremities. Normal muscle strength and tone.  Skin: no obvious rashes or lesions. No tightening of skin noted.  Neurologic: CN grossly normal. Normal sensation.   Psychiatric: awake, alert and oriented x 3. Mood and affect normal. Cooperative.    :  Inspection of anus normal  No scrotal rashes, cysts or lesions  Epididymis normal in size, no tenderness  Testes normal and size, equal size bilaterally, no masses  Urethral meatus normal without discharge  Penis is circumcised,    TEODORA: 35g gland without masses, tenderness. SV not palpable. Normal sphincter tone. No hemhorroids.  No bilateral inguinal hernias noted       Labs:  Lab Results   Component Value Date    WBC 10.60 08/28/2018    HGB 17.0 08/28/2018    HCT 51.7 08/28/2018    MCV 88 08/28/2018     08/28/2018     BMP  Lab Results   Component Value Date     (L) 08/28/2018    K 3.7 08/28/2018    CL 99 08/28/2018    CO2 28 08/28/2018    BUN 18 08/28/2018    CREATININE 1.1 08/28/2018    CALCIUM 9.4 08/28/2018    ANIONGAP 8 08/28/2018    ESTGFRAFRICA >60 08/28/2018    EGFRNONAA >60 08/28/2018       Lab Results   Component Value Date    PSA 0.80 08/28/2018    PSA 1.4 10/30/2017           PATHOLOGY REVIEW:  Voided urine 6/12/17- Negative for malignant cells.  Urine cytology 2/7/17 - atypical cells  Urine cytology 8/25/14 - negative     RADIOGRAPHIC REVIEW:  ctrss 8/25/17  2mm LMP stone  Other findings: 3mm nodule in lung unchanged from 2014     ctrss 4/13/17  Appropriately positioned left ureteral stent with resolution of left ureterolithiasis, without residual ureteral stone.   Persistent punctate left nephrolithiasis.     ctrss 3/20/17  ctrss 2/10/17  3mm left upj stone without hydro  2mm left intrarenal stone     kub 2/13/17  Stone in left ureter not visible     ctu 2/10/17  2mm left renal stone, 4mm proximal ureteral stone  Prostate gland mildly enlarged  Other findings: 8mm hypodense mass in leiver likely a cyst     CTRSS 9/8/14  punctate nonobstructing renal stone with no hydronephrosis, opaque ureteral stone, ureteral obstruction or acute findings. Tiny fat containing umbilical hernia and small fat containing inguinal hernias also noted     US Scrotum 9/3/14  Echogenic area       rbus 7/24/14  normal    Assessment:       1. Microhematuria    2. Low testosterone in male    3. Nephrolithiasis        Plan:       Low testosterone  -continue T 100 q 1 week. Doing well on this. Testopel caused too high elevation E and also has no insurance and T 100 q 1 week affordable.  -elevated H/H. Will start aspiring 81mg daily indefinitely. Counseled on stroke risk. Will also ask him to donate 1 unit every 4 weeks for now.  -increase anastrozole to 2x a week. Currently 4:1 ratio (704/214). LFts normal now that he is not taking tylenol like he was taking  -cbc and cmp in 3 months to see how his labs are on q1 month phlebotomy and anastrozole 2x a week.   -return in 6 months with a cbc, T, cmp, psa, estrogen    Microhematuria and kidney stones  -likely still has LMP stone. With no insurance and no symptoms and no blood in urine will hold off on further imaging for now.  -continue increased citrate take, any kind of citrus fruits and water intake.     -cbc and cmp in 3 months to see how his labs are on q1 month phlebotomy and anastrozole 2x a week.   -return in 6 months with a cbc, T, cmp, psa, estrogen      F/u in 6 months with labs

## 2018-09-13 ENCOUNTER — PATIENT MESSAGE (OUTPATIENT)
Dept: FAMILY MEDICINE | Facility: CLINIC | Age: 43
End: 2018-09-13

## 2018-09-13 NOTE — TELEPHONE ENCOUNTER
Sent message to patient via Patient Portal to call us to schedule hepatic function panel and lipid panel.

## 2018-10-15 ENCOUNTER — TELEPHONE (OUTPATIENT)
Dept: FAMILY MEDICINE | Facility: CLINIC | Age: 43
End: 2018-10-15

## 2018-10-15 NOTE — TELEPHONE ENCOUNTER
----- Message from Kavita Dumas sent at 10/15/2018 11:48 AM CDT -----  Contact: self   Patient miss call from your office please call back at [phone] (ynpr)

## 2018-10-16 ENCOUNTER — PATIENT MESSAGE (OUTPATIENT)
Dept: FAMILY MEDICINE | Facility: CLINIC | Age: 43
End: 2018-10-16

## 2018-10-16 ENCOUNTER — TELEPHONE (OUTPATIENT)
Dept: FAMILY MEDICINE | Facility: CLINIC | Age: 43
End: 2018-10-16

## 2018-10-16 NOTE — TELEPHONE ENCOUNTER
Sent message via Patient Portal to patient to have him call into the office, as we have left numerous voice mails without success.

## 2018-10-18 ENCOUNTER — LAB VISIT (OUTPATIENT)
Dept: LAB | Facility: HOSPITAL | Age: 43
End: 2018-10-18
Attending: PHYSICIAN ASSISTANT

## 2018-10-18 DIAGNOSIS — E78.5 HYPERLIPIDEMIA, UNSPECIFIED HYPERLIPIDEMIA TYPE: ICD-10-CM

## 2018-10-18 DIAGNOSIS — R74.8 ELEVATED LIVER ENZYMES: ICD-10-CM

## 2018-10-18 LAB
ALBUMIN SERPL BCP-MCNC: 3.7 G/DL
ALP SERPL-CCNC: 45 U/L
ALT SERPL W/O P-5'-P-CCNC: 37 U/L
AST SERPL-CCNC: 25 U/L
BILIRUB DIRECT SERPL-MCNC: 0.2 MG/DL
BILIRUB SERPL-MCNC: 0.5 MG/DL
CHOLEST SERPL-MCNC: 271 MG/DL
CHOLEST/HDLC SERPL: 8.5 {RATIO}
HDLC SERPL-MCNC: 32 MG/DL
HDLC SERPL: 11.8 %
LDLC SERPL CALC-MCNC: 205 MG/DL
NONHDLC SERPL-MCNC: 239 MG/DL
PROT SERPL-MCNC: 7.5 G/DL
TRIGL SERPL-MCNC: 170 MG/DL

## 2018-10-18 PROCEDURE — 80076 HEPATIC FUNCTION PANEL: CPT

## 2018-10-18 PROCEDURE — 80061 LIPID PANEL: CPT

## 2018-10-18 PROCEDURE — 36415 COLL VENOUS BLD VENIPUNCTURE: CPT

## 2018-10-18 RX ORDER — ROSUVASTATIN CALCIUM 5 MG/1
TABLET, COATED ORAL
Qty: 30 TABLET | Refills: 2 | OUTPATIENT
Start: 2018-10-18

## 2018-10-18 RX ORDER — ROSUVASTATIN CALCIUM 10 MG/1
5 TABLET, COATED ORAL DAILY
Qty: 90 TABLET | Refills: 1 | Status: SHIPPED | OUTPATIENT
Start: 2018-10-18 | End: 2018-11-21 | Stop reason: ALTCHOICE

## 2018-10-19 ENCOUNTER — TELEPHONE (OUTPATIENT)
Dept: FAMILY MEDICINE | Facility: CLINIC | Age: 43
End: 2018-10-19

## 2018-10-19 NOTE — TELEPHONE ENCOUNTER
----- Message from Reena Patton sent at 10/19/2018 12:24 PM CDT -----  Contact: Lee  Type:  Patient Returning Call    Who Called:  patient  Who Left Message for Patient:  Gaby  Does the patient know what this is regarding?:  Lab results  Best Call Back Number:  [phone]  Additional Information:   Asking if will sent via [facility] response to why calling due to picked up Rx. Thanks!

## 2018-10-30 ENCOUNTER — PATIENT MESSAGE (OUTPATIENT)
Dept: UROLOGY | Facility: CLINIC | Age: 43
End: 2018-10-30

## 2018-11-06 ENCOUNTER — PATIENT MESSAGE (OUTPATIENT)
Dept: FAMILY MEDICINE | Facility: CLINIC | Age: 43
End: 2018-11-06

## 2018-11-08 ENCOUNTER — PATIENT MESSAGE (OUTPATIENT)
Dept: UROLOGY | Facility: CLINIC | Age: 43
End: 2018-11-08

## 2018-11-08 RX ORDER — TESTOSTERONE CYPIONATE 200 MG/ML
100 INJECTION, SOLUTION INTRAMUSCULAR
Qty: 10 ML | Refills: 0 | OUTPATIENT
Start: 2018-11-08 | End: 2019-01-11

## 2018-11-21 ENCOUNTER — OFFICE VISIT (OUTPATIENT)
Dept: FAMILY MEDICINE | Facility: CLINIC | Age: 43
End: 2018-11-21

## 2018-11-21 ENCOUNTER — DOCUMENTATION ONLY (OUTPATIENT)
Dept: FAMILY MEDICINE | Facility: CLINIC | Age: 43
End: 2018-11-21

## 2018-11-21 VITALS
SYSTOLIC BLOOD PRESSURE: 134 MMHG | BODY MASS INDEX: 28.2 KG/M2 | WEIGHT: 179.69 LBS | HEIGHT: 67 IN | HEART RATE: 75 BPM | TEMPERATURE: 99 F | DIASTOLIC BLOOD PRESSURE: 74 MMHG

## 2018-11-21 DIAGNOSIS — E78.5 HYPERLIPIDEMIA, UNSPECIFIED HYPERLIPIDEMIA TYPE: ICD-10-CM

## 2018-11-21 DIAGNOSIS — F41.8 DEPRESSION WITH ANXIETY: Primary | ICD-10-CM

## 2018-11-21 DIAGNOSIS — Z23 NEEDS FLU SHOT: ICD-10-CM

## 2018-11-21 PROCEDURE — 99999 PR PBB SHADOW E&M-EST. PATIENT-LVL III: CPT | Mod: PBBFAC,,, | Performed by: PHYSICIAN ASSISTANT

## 2018-11-21 PROCEDURE — 99213 OFFICE O/P EST LOW 20 MIN: CPT | Mod: S$PBB,,, | Performed by: PHYSICIAN ASSISTANT

## 2018-11-21 PROCEDURE — 90471 IMMUNIZATION ADMIN: CPT | Mod: PBBFAC,PO

## 2018-11-21 PROCEDURE — 99213 OFFICE O/P EST LOW 20 MIN: CPT | Mod: PBBFAC,PO | Performed by: PHYSICIAN ASSISTANT

## 2018-11-21 RX ORDER — DULOXETIN HYDROCHLORIDE 30 MG/1
CAPSULE, DELAYED RELEASE ORAL
Qty: 60 CAPSULE | Refills: 0 | Status: SHIPPED | OUTPATIENT
Start: 2018-11-21 | End: 2018-12-26 | Stop reason: SDUPTHER

## 2018-11-21 RX ORDER — ROSUVASTATIN CALCIUM 10 MG/1
5 TABLET, COATED ORAL DAILY
COMMUNITY
End: 2018-12-26 | Stop reason: SDUPTHER

## 2018-11-21 NOTE — PROGRESS NOTES
Subjective:       Patient ID: Lee Quintanilla is a 43 y.o. male.    Chief Complaint: Anxiety and Depression    HPI   Patient is a 43 year old  male presenting to the clinic with concern of worsening depression and anxiety. He has been treated in the past with BuSpar, Pristiq, Effexor, Xanax. He reports depression & anxiety stem from him being without a job/insurance, now enrolled in school. He has to go to court next week regarding his previous work related back issues. This has increased his stress. He reports depressed mood, loss of interest in doing things. He reports that when he gets under stress, he tends to become reclused. 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   Review of Systems   Constitutional: Negative for activity change and unexpected weight change.   HENT: Negative for hearing loss, rhinorrhea and trouble swallowing.    Eyes: Negative for discharge and visual disturbance.   Respiratory: Negative for chest tightness and wheezing.    Cardiovascular: Negative for chest pain.   Gastrointestinal: Negative for blood in stool, constipation, diarrhea and vomiting.   Endocrine: Negative for polydipsia and polyuria.   Genitourinary: Negative for difficulty urinating, hematuria and urgency.   Musculoskeletal: Positive for arthralgias. Negative for joint swelling and neck pain.   Neurological: Negative for weakness and headaches.   Psychiatric/Behavioral: Positive for decreased concentration and dysphoric mood. Negative for confusion, self-injury and suicidal ideas. The patient is nervous/anxious. The patient is not hyperactive.        Objective:      Physical Exam   Constitutional: Vital signs are normal. He appears well-developed and well-nourished. No distress.   Cardiovascular: Normal rate, regular rhythm, S1 normal, S2 normal and normal heart sounds. Exam reveals no gallop.   No murmur heard.  Pulses:       Radial pulses are  2+ on the right side, and 2+ on the left side.   <2sec cap refill fingers bilat     Pulmonary/Chest: Effort normal and breath sounds normal. No respiratory distress. He has no wheezes. He has no rhonchi.   Skin: Skin is warm and dry. He is not diaphoretic.   Appropriate skin turgor   Psychiatric: His behavior is normal. Judgment and thought content normal. His mood appears anxious. His speech is rapid and/or pressured. Cognition and memory are normal. He exhibits a depressed mood.       Assessment:       1. Depression with anxiety    2. Hyperlipidemia, unspecified hyperlipidemia type    3. Needs flu shot        Plan:       Lee was seen today for anxiety and depression.    Diagnoses and all orders for this visit:    Depression with anxiety  -     Ambulatory referral to Psychology  -     DULoxetine (CYMBALTA) 30 MG capsule; Take 1 tablet daily x 2 weeks; then increase to 2 tablets daily  Patient to email us in 4 weeks to let us know how medication is working.  He is going to call and try to get a cash payment plan set up to see therapist    Hyperlipidemia, unspecified hyperlipidemia type  Patient reports myalgias with STATIN medication.  He will try to take the medication every other day & add Co-Q 10 to help with mylagias and arthralgias.     Needs flu shot  -     Influenza - Quadrivalent (3 years & older) (PF)    Other orders

## 2018-11-21 NOTE — PROGRESS NOTES
Pre-Visit Chart Review  For Appointment Scheduled on 11/21/18    Health Maintenance Due   Topic Date Due    Pneumococcal PPSV23 (Medium Risk) (1) 08/29/1993    Influenza Vaccine  08/01/2018

## 2018-12-04 ENCOUNTER — LAB VISIT (OUTPATIENT)
Dept: LAB | Facility: HOSPITAL | Age: 43
End: 2018-12-04
Attending: UROLOGY

## 2018-12-04 DIAGNOSIS — R31.29 MICROHEMATURIA: ICD-10-CM

## 2018-12-04 DIAGNOSIS — R79.89 LOW TESTOSTERONE IN MALE: ICD-10-CM

## 2018-12-04 DIAGNOSIS — N20.0 NEPHROLITHIASIS: ICD-10-CM

## 2018-12-04 LAB
ALBUMIN SERPL BCP-MCNC: 4.1 G/DL
ALP SERPL-CCNC: 64 U/L
ALT SERPL W/O P-5'-P-CCNC: 52 U/L
ANION GAP SERPL CALC-SCNC: 11 MMOL/L
AST SERPL-CCNC: 28 U/L
BILIRUB SERPL-MCNC: 0.6 MG/DL
BUN SERPL-MCNC: 19 MG/DL
CALCIUM SERPL-MCNC: 10.1 MG/DL
CHLORIDE SERPL-SCNC: 100 MMOL/L
CO2 SERPL-SCNC: 28 MMOL/L
CREAT SERPL-MCNC: 1.4 MG/DL
ERYTHROCYTE [DISTWIDTH] IN BLOOD BY AUTOMATED COUNT: 14.6 %
EST. GFR  (AFRICAN AMERICAN): >60 ML/MIN/1.73 M^2
EST. GFR  (NON AFRICAN AMERICAN): >60 ML/MIN/1.73 M^2
GLUCOSE SERPL-MCNC: 111 MG/DL
HCT VFR BLD AUTO: 51.2 %
HGB BLD-MCNC: 17.8 G/DL
MCH RBC QN AUTO: 30.6 PG
MCHC RBC AUTO-ENTMCNC: 34.8 G/DL
MCV RBC AUTO: 88 FL
PLATELET # BLD AUTO: 207 K/UL
PMV BLD AUTO: 10.2 FL
POTASSIUM SERPL-SCNC: 4 MMOL/L
PROT SERPL-MCNC: 8.1 G/DL
RBC # BLD AUTO: 5.82 M/UL
SODIUM SERPL-SCNC: 139 MMOL/L
WBC # BLD AUTO: 7.8 K/UL

## 2018-12-04 PROCEDURE — 80053 COMPREHEN METABOLIC PANEL: CPT

## 2018-12-04 PROCEDURE — 85027 COMPLETE CBC AUTOMATED: CPT

## 2018-12-04 PROCEDURE — 36415 COLL VENOUS BLD VENIPUNCTURE: CPT

## 2018-12-05 RX ORDER — ANASTROZOLE 1 MG/1
TABLET ORAL
Qty: 24 TABLET | Refills: 1 | OUTPATIENT
Start: 2018-12-05

## 2018-12-06 ENCOUNTER — TELEPHONE (OUTPATIENT)
Dept: UROLOGY | Facility: CLINIC | Age: 43
End: 2018-12-06

## 2018-12-06 NOTE — TELEPHONE ENCOUNTER
----- Message from Mikaela Burrows sent at 12/6/2018 12:48 PM CST -----  Please call pt at [phone] returning call for Jolene

## 2018-12-14 ENCOUNTER — PATIENT MESSAGE (OUTPATIENT)
Dept: UROLOGY | Facility: CLINIC | Age: 43
End: 2018-12-14

## 2018-12-18 ENCOUNTER — LAB VISIT (OUTPATIENT)
Dept: LAB | Facility: HOSPITAL | Age: 43
End: 2018-12-18
Attending: PHYSICIAN ASSISTANT

## 2018-12-18 DIAGNOSIS — E78.5 HYPERLIPIDEMIA, UNSPECIFIED HYPERLIPIDEMIA TYPE: ICD-10-CM

## 2018-12-18 LAB
ALBUMIN SERPL BCP-MCNC: 4 G/DL
ALP SERPL-CCNC: 59 U/L
ALT SERPL W/O P-5'-P-CCNC: 65 U/L
AST SERPL-CCNC: 28 U/L
BILIRUB DIRECT SERPL-MCNC: 0.3 MG/DL
BILIRUB SERPL-MCNC: 0.5 MG/DL
PROT SERPL-MCNC: 7.8 G/DL

## 2018-12-18 PROCEDURE — 80076 HEPATIC FUNCTION PANEL: CPT

## 2018-12-18 PROCEDURE — 36415 COLL VENOUS BLD VENIPUNCTURE: CPT | Mod: PO

## 2018-12-20 ENCOUNTER — PATIENT MESSAGE (OUTPATIENT)
Dept: FAMILY MEDICINE | Facility: CLINIC | Age: 43
End: 2018-12-20

## 2018-12-26 ENCOUNTER — PATIENT MESSAGE (OUTPATIENT)
Dept: FAMILY MEDICINE | Facility: CLINIC | Age: 43
End: 2018-12-26

## 2018-12-26 RX ORDER — DULOXETIN HYDROCHLORIDE 30 MG/1
30 CAPSULE, DELAYED RELEASE ORAL 2 TIMES DAILY
Qty: 60 CAPSULE | Refills: 5 | Status: SHIPPED | OUTPATIENT
Start: 2018-12-26 | End: 2019-04-05 | Stop reason: SDUPTHER

## 2018-12-26 RX ORDER — ROSUVASTATIN CALCIUM 10 MG/1
5 TABLET, COATED ORAL DAILY
Qty: 90 TABLET | Refills: 3 | Status: SHIPPED | OUTPATIENT
Start: 2018-12-26 | End: 2019-12-27 | Stop reason: SDUPTHER

## 2019-01-14 ENCOUNTER — OFFICE VISIT (OUTPATIENT)
Dept: ENDOCRINOLOGY | Facility: CLINIC | Age: 44
End: 2019-01-14

## 2019-01-14 VITALS
HEIGHT: 67 IN | DIASTOLIC BLOOD PRESSURE: 86 MMHG | TEMPERATURE: 100 F | SYSTOLIC BLOOD PRESSURE: 141 MMHG | RESPIRATION RATE: 18 BRPM | WEIGHT: 179 LBS | BODY MASS INDEX: 28.09 KG/M2 | HEART RATE: 93 BPM

## 2019-01-14 DIAGNOSIS — E55.9 HYPOVITAMINOSIS D: ICD-10-CM

## 2019-01-14 DIAGNOSIS — N20.0 KIDNEY STONES: ICD-10-CM

## 2019-01-14 DIAGNOSIS — E29.1 HYPOGONADISM IN MALE: Primary | ICD-10-CM

## 2019-01-14 DIAGNOSIS — E78.5 HYPERLIPIDEMIA, UNSPECIFIED HYPERLIPIDEMIA TYPE: ICD-10-CM

## 2019-01-14 DIAGNOSIS — Z83.3 FAMILY HISTORY OF DIABETES MELLITUS: ICD-10-CM

## 2019-01-14 PROCEDURE — 99999 PR PBB SHADOW E&M-EST. PATIENT-LVL III: ICD-10-PCS | Mod: PBBFAC,,, | Performed by: PHYSICIAN ASSISTANT

## 2019-01-14 PROCEDURE — 99213 OFFICE O/P EST LOW 20 MIN: CPT | Mod: PBBFAC,PO | Performed by: PHYSICIAN ASSISTANT

## 2019-01-14 PROCEDURE — 99999 PR PBB SHADOW E&M-EST. PATIENT-LVL III: CPT | Mod: PBBFAC,,, | Performed by: PHYSICIAN ASSISTANT

## 2019-01-14 PROCEDURE — 99203 PR OFFICE/OUTPT VISIT, NEW, LEVL III, 30-44 MIN: ICD-10-PCS | Mod: S$PBB,,, | Performed by: PHYSICIAN ASSISTANT

## 2019-01-14 PROCEDURE — 99203 OFFICE O/P NEW LOW 30 MIN: CPT | Mod: S$PBB,,, | Performed by: PHYSICIAN ASSISTANT

## 2019-01-14 NOTE — PROGRESS NOTES
"CC: Hypogonadism    HPI: Lee Quintanilla is a 43 y.o. male here for hypogonadism along with pending conditions listed in the Visit Diagnosis. +FHx of DM in his aunts, grandma and uncle. Started injections in 2017. He has used pellets (this increased his estrogen and caused breast enlargement)  and androgel ( 2 pumps) in the past. Referred by urology after pt stated his testosterone spilled but his H/H were still elevated on labs ~ 1 mth later. Also, he was supposed to be donating blood every month but states he has not done this in 10 years. His LFTs were elevated from 10/17 to 8/18. They decreased when the dose of arimdex decreased.    New to endocrine.    Taking 100 mg of testosterone cypionate every seven days. Taking arimdex 1 mg twice weekly. Last injection was 2nd week of November.     Started puberty at 12-13 years old.   He has had ~3-4 concussions.   He did use testosterone injections ~15 years ago for ~2 mths.  He did have one child ~ 10 years ago who passed away during delivery.     He energy is decreased, no libido, sexual function has decreased. Occasional morning erections. No breast tenderness. He has been emotional (feeling down) over the past 3 weeks.    PMHx, PSHx: reviewed in epic.  Social Hx: no ETOH use. He smokes 0.25 ppd since he was 16 years old.      ROS:   Constitutional: No recent significant weight change  Eyes: No recent visual changes  Cardiovascular: Denies current anginal symptoms  Respiratory: Denies current respiratory difficulty  Gastrointestinal: Denies recent bowel disturbances  GenitoUrinary - No dysuria  Skin: No new skin rash  Neurologic: No focal neurologic complaints  Remainder ROS negative     BP (!) 141/86 (BP Location: Left arm, Patient Position: Sitting, BP Method: Medium (Automatic))   Pulse 93   Temp 99.6 °F (37.6 °C) (Oral)   Resp 18   Ht 5' 7" (1.702 m)   Wt 81.2 kg (179 lb 0.2 oz)   BMI 28.04 kg/m²      Personally reviewed labs below:    Labs  8/28/18 "   T 704 (1 d prior to next injection), E 214, psa 0.8, LFTs normal (first time), 17.0/51.7 (highest its been) on T100 q1 week  7/9/18   200 q3 weeks  5/29/18 T > 1500 (injection day prior)  5/7/18  E 812   5/4/18 LFts elevated, decreased dose to 0.5cc/100 1x a week  4/9/18   T 743, E 409  3/1/18 Testopel 10 pellets  10/3/17 T 728, E 448, 9/25/17 15.3/47, LFts elevated   7/12/17 T 487, E 80  6/6/17  T 576, E 121  5/30/17 E 227  3/7/17  T 136, switched to androgel, psa 0.98, LFTs normal  1/13/17 T 168 T200q3 weeks    Lab Results   Component Value Date    TSH 1.544 09/25/2017          Chemistry        Component Value Date/Time     12/04/2018 0736    K 4.0 12/04/2018 0736     12/04/2018 0736    CO2 28 12/04/2018 0736    BUN 19 12/04/2018 0736    CREATININE 1.4 12/04/2018 0736     (H) 12/04/2018 0736        Component Value Date/Time    CALCIUM 10.1 12/04/2018 0736    ALKPHOS 59 12/18/2018 0900    AST 28 12/18/2018 0900    ALT 65 (H) 12/18/2018 0900    BILITOT 0.5 12/18/2018 0900    ESTGFRAFRICA >60 12/04/2018 0736    EGFRNONAA >60 12/04/2018 0736           Lab Results   Component Value Date    HGBA1C 5.4 01/13/2017    HGBA1C 5.3 04/12/2016    HGBA1C 5.4 07/08/2014        PE:  GENERAL: Well developed, well nourished  NECK: Supple neck, normal thyroid. No bruit  LYMPHATIC: No cervical or supraclavicular lymphadenopathy  CARDIOVASCULAR: Normal heart sounds, no pedal edema  RESPIRATORY: Normal effort, clear to auscultation  ABDOMEN: soft, non-tender, non-distended.  FEET: appropriate footwear.     Assessment/Plan:   1. Hypogonadism in male  Follicle stimulating hormone    Luteinizing hormone    CBC auto differential    Comprehensive metabolic panel    Testosterone Panel    ESTROGENS, FRACTIONATED    PSA, total and free    CANCELED: Testosterone Panel   2. Hyperlipidemia, unspecified hyperlipidemia type  Lipid panel    T3    T4, free    TSH   3. Kidney stones  T3    T4, free    TSH    PTH, intact     Calcium, ionized   4. Hypovitaminosis D  Vitamin D   5. Family history of diabetes mellitus  Hemoglobin A1c       Hypogonadism- labs. Will proceed with MRI or u/s following results of FSH/LH  HLD-cholesterol and TFTs  Kidney stones-check pth and ica  Hypovitaminosis D-check vd  Fhx of DM-check a1c    F/u in 3 mths

## 2019-01-14 NOTE — LETTER
January 15, 2019      Vicky Saeed MD  [address] [place] [address]   [address]  [place] [address]           [place] [facility]  [address]  [place] [address]  Phone: [phone]          Patient: Lee Quintanilla   MR Number: [id number]   YOB: 1975   Date of Visit: 1/14/2019       Dear Dr. Vicky Saeed:    Thank you for referring Lee Quintanilla to me for evaluation. Attached you will find relevant portions of my assessment and plan of care.    If you have questions, please do not hesitate to call me. I look forward to following Lee Quintanilla along with you.    Sincerely,    KI Arriaga PA-C    Enclosure  CC:  No Recipients    If you would like to receive this communication electronically, please contact [email or web address] or [phone] to request more information on [facility] [employer] access.    For providers and/or their staff who would like to refer a patient to [facility], please contact us through our one-stop-shop provider referral line, [facility] , at [phone].    If you feel you have received this communication in error or would no longer like to receive these types of communications, please e-mail [email or web address]

## 2019-01-15 ENCOUNTER — LAB VISIT (OUTPATIENT)
Dept: LAB | Facility: HOSPITAL | Age: 44
End: 2019-01-15
Attending: PHYSICIAN ASSISTANT

## 2019-01-15 DIAGNOSIS — N20.0 KIDNEY STONES: ICD-10-CM

## 2019-01-15 DIAGNOSIS — E55.9 HYPOVITAMINOSIS D: ICD-10-CM

## 2019-01-15 DIAGNOSIS — E29.1 HYPOGONADISM IN MALE: ICD-10-CM

## 2019-01-15 DIAGNOSIS — E78.5 HYPERLIPIDEMIA, UNSPECIFIED HYPERLIPIDEMIA TYPE: ICD-10-CM

## 2019-01-15 DIAGNOSIS — Z83.3 FAMILY HISTORY OF DIABETES MELLITUS: ICD-10-CM

## 2019-01-15 LAB
25(OH)D3+25(OH)D2 SERPL-MCNC: 16 NG/ML
ALBUMIN SERPL BCP-MCNC: 4 G/DL
ALP SERPL-CCNC: 60 U/L
ALT SERPL W/O P-5'-P-CCNC: 45 U/L
ANION GAP SERPL CALC-SCNC: 9 MMOL/L
AST SERPL-CCNC: 24 U/L
BASOPHILS # BLD AUTO: 0.05 K/UL
BASOPHILS NFR BLD: 0.8 %
BILIRUB SERPL-MCNC: 0.6 MG/DL
BUN SERPL-MCNC: 13 MG/DL
CA-I BLDV-SCNC: 1.21 MMOL/L
CALCIUM SERPL-MCNC: 9.4 MG/DL
CHLORIDE SERPL-SCNC: 101 MMOL/L
CHOLEST SERPL-MCNC: 203 MG/DL
CHOLEST/HDLC SERPL: 3.9 {RATIO}
CO2 SERPL-SCNC: 26 MMOL/L
CREAT SERPL-MCNC: 1.2 MG/DL
DIFFERENTIAL METHOD: ABNORMAL
EOSINOPHIL # BLD AUTO: 0.6 K/UL
EOSINOPHIL NFR BLD: 9.3 %
ERYTHROCYTE [DISTWIDTH] IN BLOOD BY AUTOMATED COUNT: 13.8 %
EST. GFR  (AFRICAN AMERICAN): >60 ML/MIN/1.73 M^2
EST. GFR  (NON AFRICAN AMERICAN): >60 ML/MIN/1.73 M^2
ESTIMATED AVG GLUCOSE: 103 MG/DL
FSH SERPL-ACNC: 3.8 MIU/ML
GLUCOSE SERPL-MCNC: 101 MG/DL
HBA1C MFR BLD HPLC: 5.2 %
HCT VFR BLD AUTO: 47.3 %
HDLC SERPL-MCNC: 52 MG/DL
HDLC SERPL: 25.6 %
HGB BLD-MCNC: 16.1 G/DL
IMM GRANULOCYTES # BLD AUTO: 0.02 K/UL
IMM GRANULOCYTES NFR BLD AUTO: 0.3 %
LDLC SERPL CALC-MCNC: 126.6 MG/DL
LH SERPL-ACNC: 2.6 MIU/ML
LYMPHOCYTES # BLD AUTO: 1.5 K/UL
LYMPHOCYTES NFR BLD: 24.2 %
MCH RBC QN AUTO: 31.6 PG
MCHC RBC AUTO-ENTMCNC: 34 G/DL
MCV RBC AUTO: 93 FL
MONOCYTES # BLD AUTO: 0.4 K/UL
MONOCYTES NFR BLD: 7.3 %
NEUTROPHILS # BLD AUTO: 3.5 K/UL
NEUTROPHILS NFR BLD: 58.1 %
NONHDLC SERPL-MCNC: 151 MG/DL
NRBC BLD-RTO: 0 /100 WBC
PLATELET # BLD AUTO: 204 K/UL
PMV BLD AUTO: 12.2 FL
POTASSIUM SERPL-SCNC: 3.6 MMOL/L
PROSTATE SPECIFIC ANTIGEN, TOTAL: 0.38 NG/ML
PROT SERPL-MCNC: 7.7 G/DL
PSA FREE MFR SERPL: 65.79 %
PSA FREE SERPL-MCNC: 0.25 NG/ML
PTH-INTACT SERPL-MCNC: 39 PG/ML
RBC # BLD AUTO: 5.1 M/UL
SODIUM SERPL-SCNC: 136 MMOL/L
T3 SERPL-MCNC: 90 NG/DL
T4 FREE SERPL-MCNC: 0.98 NG/DL
TRIGL SERPL-MCNC: 122 MG/DL
TSH SERPL DL<=0.005 MIU/L-ACNC: 0.62 UIU/ML
WBC # BLD AUTO: 5.99 K/UL

## 2019-01-15 PROCEDURE — 84443 ASSAY THYROID STIM HORMONE: CPT

## 2019-01-15 PROCEDURE — 83002 ASSAY OF GONADOTROPIN (LH): CPT

## 2019-01-15 PROCEDURE — 84439 ASSAY OF FREE THYROXINE: CPT

## 2019-01-15 PROCEDURE — 82679 ASSAY OF ESTRONE: CPT

## 2019-01-15 PROCEDURE — 82330 ASSAY OF CALCIUM: CPT

## 2019-01-15 PROCEDURE — 83970 ASSAY OF PARATHORMONE: CPT

## 2019-01-15 PROCEDURE — 84480 ASSAY TRIIODOTHYRONINE (T3): CPT

## 2019-01-15 PROCEDURE — 83036 HEMOGLOBIN GLYCOSYLATED A1C: CPT

## 2019-01-15 PROCEDURE — 82306 VITAMIN D 25 HYDROXY: CPT

## 2019-01-15 PROCEDURE — 83001 ASSAY OF GONADOTROPIN (FSH): CPT

## 2019-01-15 PROCEDURE — 85025 COMPLETE CBC W/AUTO DIFF WBC: CPT

## 2019-01-15 PROCEDURE — 80061 LIPID PANEL: CPT

## 2019-01-15 PROCEDURE — 36415 COLL VENOUS BLD VENIPUNCTURE: CPT | Mod: PO

## 2019-01-15 PROCEDURE — 80053 COMPREHEN METABOLIC PANEL: CPT

## 2019-01-15 PROCEDURE — 82040 ASSAY OF SERUM ALBUMIN: CPT

## 2019-01-15 PROCEDURE — 84154 ASSAY OF PSA FREE: CPT

## 2019-01-17 ENCOUNTER — PATIENT MESSAGE (OUTPATIENT)
Dept: ENDOCRINOLOGY | Facility: CLINIC | Age: 44
End: 2019-01-17

## 2019-01-17 LAB
ESTRADIOL SERPL-MCNC: 25 PG/ML (ref 10–40)
ESTRONE SERPL-MCNC: 47 PG/ML (ref 10–60)

## 2019-01-19 LAB
ALBUMIN SERPL-MCNC: 4.4 G/DL (ref 3.6–5.1)
SHBG SERPL-SCNC: 35 NMOL/L (ref 10–50)
TESTOST FREE SERPL-MCNC: 60.2 PG/ML (ref 46–224)
TESTOST SERPL-MCNC: 470 NG/DL (ref 250–1100)
TESTOSTERONE.FREE+WB SERPL-MCNC: 121.2 NG/DL (ref 110–575)

## 2019-01-22 ENCOUNTER — PATIENT MESSAGE (OUTPATIENT)
Dept: ENDOCRINOLOGY | Facility: CLINIC | Age: 44
End: 2019-01-22

## 2019-01-24 RX ORDER — DULOXETIN HYDROCHLORIDE 30 MG/1
30 CAPSULE, DELAYED RELEASE ORAL 2 TIMES DAILY
Qty: 60 CAPSULE | Refills: 5 | OUTPATIENT
Start: 2019-01-24

## 2019-03-25 RX ORDER — ANASTROZOLE 1 MG/1
1 TABLET ORAL
Qty: 24 TABLET | Refills: 1 | OUTPATIENT
Start: 2019-03-25 | End: 2019-10-21

## 2019-03-25 RX ORDER — HYDROCHLOROTHIAZIDE 25 MG/1
25 TABLET ORAL DAILY
Qty: 90 TABLET | Refills: 3 | OUTPATIENT
Start: 2019-03-25

## 2019-03-26 RX ORDER — HYDROCHLOROTHIAZIDE 25 MG/1
25 TABLET ORAL DAILY
Qty: 90 TABLET | Refills: 3 | Status: CANCELLED | OUTPATIENT
Start: 2019-03-26

## 2019-03-26 RX ORDER — HYDROCHLOROTHIAZIDE 25 MG/1
TABLET ORAL
Qty: 90 TABLET | Refills: 3 | OUTPATIENT
Start: 2019-03-26

## 2019-04-05 ENCOUNTER — DOCUMENTATION ONLY (OUTPATIENT)
Dept: FAMILY MEDICINE | Facility: CLINIC | Age: 44
End: 2019-04-05

## 2019-04-05 ENCOUNTER — OFFICE VISIT (OUTPATIENT)
Dept: FAMILY MEDICINE | Facility: CLINIC | Age: 44
End: 2019-04-05

## 2019-04-05 VITALS
HEART RATE: 91 BPM | HEIGHT: 67 IN | DIASTOLIC BLOOD PRESSURE: 83 MMHG | SYSTOLIC BLOOD PRESSURE: 133 MMHG | BODY MASS INDEX: 29.55 KG/M2 | TEMPERATURE: 98 F | WEIGHT: 188.25 LBS

## 2019-04-05 DIAGNOSIS — F41.1 GAD (GENERALIZED ANXIETY DISORDER): Primary | ICD-10-CM

## 2019-04-05 DIAGNOSIS — K43.2 INCISIONAL HERNIA, WITHOUT OBSTRUCTION OR GANGRENE: ICD-10-CM

## 2019-04-05 DIAGNOSIS — I10 HYPERTENSION, UNSPECIFIED TYPE: ICD-10-CM

## 2019-04-05 PROCEDURE — 99213 OFFICE O/P EST LOW 20 MIN: CPT | Mod: S$PBB,,, | Performed by: PHYSICIAN ASSISTANT

## 2019-04-05 PROCEDURE — 99999 PR PBB SHADOW E&M-EST. PATIENT-LVL III: CPT | Mod: PBBFAC,,, | Performed by: PHYSICIAN ASSISTANT

## 2019-04-05 PROCEDURE — 99213 PR OFFICE/OUTPT VISIT, EST, LEVL III, 20-29 MIN: ICD-10-PCS | Mod: S$PBB,,, | Performed by: PHYSICIAN ASSISTANT

## 2019-04-05 PROCEDURE — 99999 PR PBB SHADOW E&M-EST. PATIENT-LVL III: ICD-10-PCS | Mod: PBBFAC,,, | Performed by: PHYSICIAN ASSISTANT

## 2019-04-05 PROCEDURE — 99213 OFFICE O/P EST LOW 20 MIN: CPT | Mod: PBBFAC,PO | Performed by: PHYSICIAN ASSISTANT

## 2019-04-05 RX ORDER — TESTOSTERONE CYPIONATE 1000 MG/10ML
200 INJECTION, SOLUTION INTRAMUSCULAR WEEKLY
COMMUNITY
End: 2020-05-13

## 2019-04-05 RX ORDER — DULOXETIN HYDROCHLORIDE 30 MG/1
CAPSULE, DELAYED RELEASE ORAL
Qty: 270 CAPSULE | Refills: 0 | Status: SHIPPED | OUTPATIENT
Start: 2019-04-05 | End: 2019-05-20 | Stop reason: SDUPTHER

## 2019-04-05 RX ORDER — HYDROCHLOROTHIAZIDE 25 MG/1
25 TABLET ORAL DAILY
Qty: 90 TABLET | Refills: 3 | Status: SHIPPED | OUTPATIENT
Start: 2019-04-05 | End: 2020-05-13

## 2019-04-05 NOTE — PROGRESS NOTES
"Subjective:       Patient ID: Lee Quintanilla is a 43 y.o. male.    Chief Complaint: Hernia and Anxiety    HPI   Patient is a 43-year-old  male presenting to the clinic for several issues:    1.)  Anxiety-patient states that he was previously doing well on Cymbalta 30 mg twice daily, but over the past month he has felt like he is having more panic attacks normal.  He attributes this to normal daily life and also feels like it is related to his ongoing back pain, school stressors.  Is requesting to increase his Cymbalta.  2.)  Incisional hernia- patient had previous laprascopic surgery for b/l inguinal hernia repair with Dr. Macario. Patient states that he has a very small hernia through one of the incisions above his umbilicus. He reports this feels "uncomfortable" at times, but otherwise, no pain. This has always been reducible.   Review of Systems   Constitutional: Negative for activity change and unexpected weight change.   HENT: Negative for hearing loss, rhinorrhea and trouble swallowing.    Eyes: Negative for discharge and visual disturbance.   Respiratory: Negative for chest tightness and wheezing.    Cardiovascular: Negative for chest pain and palpitations.   Gastrointestinal: Negative for blood in stool, constipation, diarrhea and vomiting.   Endocrine: Negative for polydipsia and polyuria.   Genitourinary: Negative for difficulty urinating, hematuria and urgency.   Musculoskeletal: Positive for arthralgias and neck pain. Negative for joint swelling.   Neurological: Positive for headaches. Negative for weakness.   Psychiatric/Behavioral: Positive for agitation. Negative for confusion and dysphoric mood. The patient is nervous/anxious.        Objective:      Physical Exam   Constitutional: Vital signs are normal. He appears well-developed and well-nourished. No distress.   Cardiovascular: Normal rate, regular rhythm, S1 normal, S2 normal and normal heart sounds. Exam reveals no gallop.   No " murmur heard.  Pulses:       Radial pulses are 2+ on the right side, and 2+ on the left side.   <2sec cap refill fingers bilat     Pulmonary/Chest: Effort normal and breath sounds normal. No respiratory distress. He has no wheezes. He has no rhonchi.   Abdominal: A hernia is present. Hernia confirmed positive in the ventral area.       Skin: Skin is warm and dry. He is not diaphoretic.   Appropriate skin turgor   Psychiatric: He has a normal mood and affect. His speech is normal and behavior is normal. Judgment and thought content normal. Cognition and memory are normal.       Assessment:       1. MYNOR (generalized anxiety disorder)    2. Incisional hernia, without obstruction or gangrene    3. Hypertension, unspecified type        Plan:       Lee was seen today for hernia and anxiety.    Diagnoses and all orders for this visit:    MYNOR (generalized anxiety disorder)  Uncontrolled; Increase Cymbalta to 30mg AM and 60mg PM; may further increase to cymbalta 60mg BID  -     DULoxetine (CYMBALTA) 30 MG capsule; Take 1 tablet AM and 2 tablets PM    Incisional hernia, without obstruction or gangrene  Watchful waiting; consider referral to gen surgery with worsening symptoms/      Hypertension, unspecified type  Well controlled; no changes needed   -     hydroCHLOROthiazide (HYDRODIURIL) 25 MG tablet; Take 1 tablet (25 mg total) by mouth once daily.    Patient readiness: acceptance and barriers:none    During the course of the visit the patient was educated and counseled about the following:     Hypertension:   Medication: no change.    Goals: Hypertension: Reduce Blood Pressure    Did patient meet goals/outcomes: No    The following self management tools provided: declined    Patient Instructions (the written plan) was given to the patient/family.     Time spent with patient: 15 minutes    Barriers to medications present (no )    Adverse reactions to current medications (no)    Over the counter medications reviewed  (Yes)

## 2019-04-05 NOTE — PROGRESS NOTES
Pre-Visit Chart Review  For Appointment Scheduled on 04/05/19    Health Maintenance Due   Topic Date Due    Pneumococcal Vaccine (Highest Risk) (1 of 3 - PCV13) 08/29/1994

## 2019-05-20 DIAGNOSIS — F41.1 GAD (GENERALIZED ANXIETY DISORDER): ICD-10-CM

## 2019-07-09 RX ORDER — DULOXETIN HYDROCHLORIDE 30 MG/1
CAPSULE, DELAYED RELEASE ORAL
Qty: 60 CAPSULE | Refills: 5 | Status: SHIPPED | OUTPATIENT
Start: 2019-07-09 | End: 2020-05-13

## 2019-08-03 ENCOUNTER — TELEPHONE (OUTPATIENT)
Dept: HEPATOLOGY | Facility: CLINIC | Age: 44
End: 2019-08-03

## 2019-08-12 ENCOUNTER — OFFICE VISIT (OUTPATIENT)
Dept: ORTHOPEDICS | Facility: CLINIC | Age: 44
End: 2019-08-12

## 2019-08-12 VITALS
DIASTOLIC BLOOD PRESSURE: 72 MMHG | BODY MASS INDEX: 29.51 KG/M2 | HEIGHT: 67 IN | HEART RATE: 79 BPM | WEIGHT: 188 LBS | SYSTOLIC BLOOD PRESSURE: 110 MMHG

## 2019-08-12 DIAGNOSIS — M54.16 LUMBAR RADICULOPATHY: ICD-10-CM

## 2019-08-12 DIAGNOSIS — Z98.1 HISTORY OF LUMBAR SPINAL FUSION: ICD-10-CM

## 2019-08-12 DIAGNOSIS — S32.009K PSEUDOARTHROSIS OF LUMBAR SPINE: ICD-10-CM

## 2019-08-12 DIAGNOSIS — M51.36 DDD (DEGENERATIVE DISC DISEASE), LUMBAR: Primary | ICD-10-CM

## 2019-08-12 PROCEDURE — 99213 PR OFFICE/OUTPT VISIT, EST, LEVL III, 20-29 MIN: ICD-10-PCS | Mod: 25,S$GLB,, | Performed by: ORTHOPAEDIC SURGERY

## 2019-08-12 PROCEDURE — 99213 OFFICE O/P EST LOW 20 MIN: CPT | Mod: 25,S$GLB,, | Performed by: ORTHOPAEDIC SURGERY

## 2019-08-12 NOTE — PROGRESS NOTES
Subjective:       Patient ID: Lee Quintanilla is a 43 y.o. male.    Chief Complaint: Pain of the Lumbar Spine (/lumbar pain x years that radiates down both hip and legs to feet with numbness, tingling and burning. pt had surgery w/ Dr. Simms in 2016. CT done at [facility] 5-2018)      History of Present Illness  Patient presents with complaints of low back pain and bilateral leg pain. The history is that he underwent an L5-S1 laminectomy and spinal fusion with interbody devices and segmental instrumentation performed by Dr. Carter on 06/06/2016. Following surgery due to continued complete symptoms he had an epidural steroid injection in October 2016 medial branch blocks December 2016 and radiofrequency ablation December 2016. He saw Dr. Lobo Vicente who ordered a CT scan of the lumbar spine on 05/17/2018 follow his lumbar surgery his pain never resolved. He has symptoms that the forearm the left leg prior before surgery and now he has bilateral leg complaints he has numbness of his legs no bowel or bladder incontinence symptoms on a constant daily basis.    Current Medications  Current Outpatient Medications   Medication Sig Dispense Refill    DULoxetine (CYMBALTA) 30 MG capsule TAKE ONE CAPSULE BY MOUTH DAILY FOR 2 WEEKS THEN INCREASE TO TAKE TWO CAPSULES EVERY DAY 60 capsule 5    hydroCHLOROthiazide (HYDRODIURIL) 25 MG tablet Take 1 tablet (25 mg total) by mouth once daily. 90 tablet 3    testosterone cypionate (DEPOTESTOTERONE CYPIONATE) 100 mg/mL injection Inject into the muscle once a week.      rosuvastatin (CRESTOR) 10 MG tablet Take 0.5 tablets (5 mg total) by mouth once daily. 90 tablet 3     No current facility-administered medications for this visit.        Allergies  Review of patient's allergies indicates:   Allergen Reactions    Inapsine [droperidol] Anaphylaxis     seizures    Pcn [penicillins]        Past Medical History  Past Medical History:   Diagnosis Date    ADHD (attention deficit  hyperactivity disorder)     Arthritis     Asthma     as child only    Back pain     Degeneration of lumbar intervertebral disc 05/2016    Depression     Elevated PSA     Headache     Hyperlipidemia     Hypertension     Kidney damage     stage 3 ; d/t aleve abuse    Kidney stone     Neck pain     Numbness and tingling in hands     Numbness and tingling of both legs     Seizures     from inapsine 2002    Sleep apnea     no cpap       Surgical History  Past Surgical History:   Procedure Laterality Date    BACK SURGERY  2016    back surgery    BLOCK-NERVE-MEDIAL BRANCH-LUMBAR Bilateral 12/6/2016    [facility]    CYSTOSCOPY N/A 2/20/2017    [facility]    CYSTOSCOPY WITH STENT PLACEMENT Left 4/5/2017    [facility]    FUSION-LAMINECTOMY-LUMBAR-L5-S1 Decompression and PSIF N/A 6/6/2016    [facility] OR    HERNIA REPAIR Bilateral 11/22/2017    INJECTION-STEROID-EPIDURAL-TRANSFORAMINAL Bilateral 10/14/2016    [facility]    INJECTION-STEROID-EPIDURAL-TRANSFORAMINAL Left 3/18/2016    [facility]    LITHOTRIPSY      LITHOTRIPSY-EXTRACORPOREAL SHOCK WAVE Left 4/5/2017    [facility]    PILONIDAL CYST DRAINAGE      RADIOFREQUENCY THERMOCOAGULATION (RFTC)-NERVE-MEDIAN BRANCH-LUMBAR Bilateral 12/20/2016    [facility] OR    removal of abcess      scrotal    REPAIR-HERNIA-INGUINAL-ROBOTIC Bilateral 11/22/2017    [facility]    TYMPANOSTOMY TUBE PLACEMENT         Family History:   Family History   Problem Relation Age of Onset    Heart disease Mother     Migraines Mother     Hypertension Mother     Early death Father     Diabetes Maternal Aunt     Diabetes Maternal Uncle     Diabetes Maternal Grandfather         Social History:   Social History     Socioeconomic History    Marital status:      Spouse name: Not on file    Number of children: Not on file    Years of education: Not on file    Highest education level: Not on file   Occupational History    Not on file   Social Needs    Financial resource strain: Not on file    Food insecurity:     Worry: Not on file     Inability: Not on file    Transportation needs:     Medical: Not on file     Non-medical: Not on file   Tobacco Use    Smoking status: Former Smoker     Packs/day: 0.25     Types: Cigarettes     Last attempt to quit: 1/1/2016     Years since quitting: 3.6    Smokeless tobacco: Former User     Quit date: 6/5/2016    Tobacco comment: occasional   Substance and Sexual Activity    Alcohol use: Yes     Comment: rare    Drug use: No    Sexual activity: Yes     Partners: Female   Lifestyle    Physical activity:     Days per week: Not on file     Minutes per session: Not on file    Stress: Not on file   Relationships    Social connections:     Talks on phone: Not on file     Gets together: Not on file     Attends Sikhism service: Not on file     Active member of club or organization: Not on file     Attends meetings of clubs or organizations: Not on file     Relationship status: Not on file   Other Topics Concern    Not on file   Social History Narrative    Not on file       Hospitalization/Major Diagnostic Procedure:     Review of Systems     General/Constitutional:  Chills denies. Fatigue denies. Fever denies. Weight gain denies. Weight loss denies.    Respiratory:  Shortness of breath denies.    Cardiovascular:  Chest pain denies.    Gastrointestinal:  Constipation denies. Diarrhea denies. Nausea denies. Vomiting denies.     Hematology:  Easy bruising denies. Prolonged bleeding denies.     Genitourinary:  Frequent urination denies. Pain in lower back denies. Painful urination denies.     Musculoskeletal:  See HPI for details    Skin:   Rash denies.    Neurologic:  Dizziness denies. Gait abnormalities denies. Seizures denies. Tingling/Numbess denies.    Psychiatric:  Anxiety denies. Depressed mood denies.     Objective:   Vital Signs:   Vitals:    08/12/19 0855   BP: 110/72   Pulse: 79        Physical Exam      General Examination:     Constitutional: The patient is alert and oriented to lace person and time. Mood is pleasant.     Head/Face: Normal facial features normal eyebrows    Eyes: Normal extraocular motion bilaterally    Lungs: Respirations are equal and unlabored    Gait is coordinated.    Cardiovascular: There are no swelling or varicosities present.    Lymphatic: Negative for adenopathy    Skin: Normal    Neurological: Level of consciousness normal. Oriented to place person and time and situation    Psychiatric: Oriented to time place person and situation  Lumbar exam shows well-healed 5 cm midline incision bilateral paraspinous muscle spasm noted L4-S1 range of motion markedly restricted in all directions. Straight leg raising maneuver weakly positive on left side. Patella and Achilles reflexes intact. No edema adenopathy or varicosities noted. Peripheral pulses intact. Hip and knee range of motion intact.          XRAY Report/ Interpretation : MRI lumbar spine performed 02/17/2016 was reviewed. There is focal disc herniation at L5-S1 with a protrusion to the left side causing severe neural foraminal stenosis  Lumbar MRI performed 06/08/2017 following surgery was reviewed the MRI study shows a transitional L5 level and postoperative changes With his now termed L4-5  Lumbar CT scan without contrast performed 05/17/2018 was reviewed there is a transitional segment postoperative changes at with his termed L4-5 and evidence of bone graft within the L4-5 disc space but no mature bony fusion across the interbody graft. There is no bony fusion posterior laterally actually all of bone posterior laterally appears to be resort on the axial view  of the left L5 screw appears to be in fairly close approximation to the L5 nerve root it may have reached a medial pedicle wall  The previous operative report of the lumbar spine dated 06/06/2016 was personally reviewed  AP and lateral x-rays of the lumbar spine will performed today. Indications postop lumbar spine surgery. Findings: Instrumented lumbar fusion L5-S1 level with transitional S1-S2 disc. No signs of any hardware loosening. Interbody device and attempted fusion in the disc space. I believe this still is a transverse radiolucency in the posterior aspect of the disc suggestive of a persistent nonunion.    AP pelvis x-ray was taken today. Indications low back pain and/or hip pain. Findings AP pelvis x-ray appears to be normal with no evidence of fractures or significant degenerative disease  Assessment:       1. DDD (degenerative disc disease), lumbar    2. Lumbar radiculopathy    3. History of lumbar spinal fusion    4. Pseudoarthrosis of lumbar spine        Plan:       Lee was seen today for pain.    Diagnoses and all orders for this visit:    DDD (degenerative disc disease), lumbar  -     X-Ray Lumbar Spine Ap And Lateral  -     CT Lumbar Spine Without Contrast  -     MRI Lumbar Spine W WO Cont    Lumbar radiculopathy  -     X-Ray Pelvis Routine AP  -     CT Lumbar Spine Without Contrast  -     MRI Lumbar Spine W WO Cont    History of lumbar spinal fusion  -     CT Lumbar Spine Without Contrast  -     MRI Lumbar Spine W WO Cont    Pseudoarthrosis of lumbar spine  Comments:  L5-S1 level         Follow up for MRI Lumbar Results, CT lumbar results.    A lengthy discussion ensued regards to this patient's condition. Patient was educated regards his present state and prognosis. 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 I see no evidence of any posterior lateral fusion present wears his operative report suggested that some bone graft and bone morphogenic protein was placed  in the posterior lateral gutters. Therefore it appears this bone has resorbed. A CT scan performed a year ago does show a radiolucency in the ConstaVac is forced the bone graft in the disc space. However there is not significant loosening of the hardware a not an not a significant lucency. I'm not sure cause of his symptoms perhaps he has postoperative arachnoiditis. Further diagnostic studies are needed.   A CT scan with 1 mm cuts has been advised to determine the status of the interbody fusion.  Him bar MRI with and without contrast advised to rule out postoperative arachnoiditis a prior MRI was not done with and without contrast  Patient agrees to the present treatment plan.    This note was created using Dragon voice recognition software that occasionally misinterpreted phrases or words.

## 2019-08-15 ENCOUNTER — HOSPITAL ENCOUNTER (OUTPATIENT)
Dept: RADIOLOGY | Facility: HOSPITAL | Age: 44
Discharge: HOME | End: 2019-08-15
Attending: ORTHOPAEDIC SURGERY

## 2019-08-15 LAB
CREAT SERPL-MCNC: 1.8 MG/DL (ref 0.5–1.4)
SAMPLE: ABNORMAL

## 2019-08-15 PROCEDURE — 72158 MRI LUMBAR SPINE W/O & W/DYE: CPT | Mod: TC

## 2019-08-15 PROCEDURE — A9585 GADOBUTROL INJECTION: HCPCS | Performed by: ORTHOPAEDIC SURGERY

## 2019-08-15 PROCEDURE — 25500020 PHARM REV CODE 255: Performed by: ORTHOPAEDIC SURGERY

## 2019-08-15 PROCEDURE — 72131 CT LUMBAR SPINE W/O DYE: CPT | Mod: TC

## 2019-08-15 RX ORDER — GADOBUTROL 604.72 MG/ML
8.5 INJECTION INTRAVENOUS
Status: COMPLETED | OUTPATIENT
Start: 2019-08-15 | End: 2019-08-15

## 2019-08-15 RX ADMIN — GADOBUTROL 8.5 ML: 604.72 INJECTION INTRAVENOUS at 02:08

## 2019-08-21 ENCOUNTER — OFFICE VISIT (OUTPATIENT)
Dept: ORTHOPEDICS | Facility: CLINIC | Age: 44
End: 2019-08-21

## 2019-08-21 VITALS
HEIGHT: 67 IN | BODY MASS INDEX: 29.03 KG/M2 | DIASTOLIC BLOOD PRESSURE: 80 MMHG | HEART RATE: 96 BPM | WEIGHT: 185 LBS | SYSTOLIC BLOOD PRESSURE: 138 MMHG

## 2019-08-21 DIAGNOSIS — M51.36 DDD (DEGENERATIVE DISC DISEASE), LUMBAR: ICD-10-CM

## 2019-08-21 DIAGNOSIS — M54.16 LUMBAR RADICULOPATHY: ICD-10-CM

## 2019-08-21 DIAGNOSIS — M48.062 SPINAL STENOSIS OF LUMBAR REGION WITH NEUROGENIC CLAUDICATION: ICD-10-CM

## 2019-08-21 DIAGNOSIS — S32.009K PSEUDOARTHROSIS OF LUMBAR SPINE: ICD-10-CM

## 2019-08-21 DIAGNOSIS — Z98.1 HISTORY OF LUMBAR SPINAL FUSION: Primary | ICD-10-CM

## 2019-08-21 PROCEDURE — 99213 PR OFFICE/OUTPT VISIT, EST, LEVL III, 20-29 MIN: ICD-10-PCS | Mod: S$GLB,,, | Performed by: ORTHOPAEDIC SURGERY

## 2019-08-21 PROCEDURE — 99213 OFFICE O/P EST LOW 20 MIN: CPT | Mod: S$GLB,,, | Performed by: ORTHOPAEDIC SURGERY

## 2019-08-21 RX ORDER — ANASTROZOLE 1 MG/1
1 TABLET ORAL DAILY
COMMUNITY
End: 2019-10-22

## 2019-08-27 ENCOUNTER — TELEPHONE (OUTPATIENT)
Dept: ORTHOPEDICS | Facility: CLINIC | Age: 44
End: 2019-08-27

## 2019-08-27 NOTE — TELEPHONE ENCOUNTER
----- Message from Khushboo Long sent at 8/27/2019 12:21 PM CDT -----  Patient returning your call to schedule surgery, please call [phone]

## 2019-08-29 DIAGNOSIS — M48.062 SPINAL STENOSIS OF LUMBAR REGION WITH NEUROGENIC CLAUDICATION: ICD-10-CM

## 2019-08-29 DIAGNOSIS — S32.009K LUMBAR PSEUDOARTHROSIS: ICD-10-CM

## 2019-08-29 DIAGNOSIS — Z98.1 HISTORY OF LUMBAR SPINAL FUSION: Primary | ICD-10-CM

## 2019-08-29 DIAGNOSIS — M51.36 DDD (DEGENERATIVE DISC DISEASE), LUMBAR: ICD-10-CM

## 2019-08-29 DIAGNOSIS — S32.009K PSEUDOARTHROSIS OF LUMBAR SPINE: ICD-10-CM

## 2019-09-03 ENCOUNTER — PATIENT MESSAGE (OUTPATIENT)
Dept: ORTHOPEDICS | Facility: CLINIC | Age: 44
End: 2019-09-03

## 2019-09-04 ENCOUNTER — HOSPITAL ENCOUNTER (OUTPATIENT)
Dept: PREADMISSION TESTING | Facility: HOSPITAL | Age: 44
Discharge: HOME | End: 2019-09-04

## 2019-09-04 DIAGNOSIS — S32.009K PSEUDOARTHROSIS OF LUMBAR SPINE: ICD-10-CM

## 2019-09-04 DIAGNOSIS — M51.36 DDD (DEGENERATIVE DISC DISEASE), LUMBAR: ICD-10-CM

## 2019-09-04 DIAGNOSIS — Z98.1 HISTORY OF LUMBAR SPINAL FUSION: ICD-10-CM

## 2019-09-04 DIAGNOSIS — M48.062 SPINAL STENOSIS OF LUMBAR REGION WITH NEUROGENIC CLAUDICATION: ICD-10-CM

## 2019-09-04 NOTE — TELEPHONE ENCOUNTER
I spoke with pt and he states that he had a headache and could not wait.  I spoke with him and we will plan for next week or the following week  He will restart flomax which he has been on for a while and the stone did not move  Pt not c/o any pain  Explained it is important that we treat the stone at some point, but ok to postpone for a week or two - no hydro  kub not helpful at this point, last one did not show proximal ureteral stone that well.   Plan is for kub morning of and eswl if stone visible with possible cysto stent.    2.24

## 2019-09-16 ENCOUNTER — PATIENT MESSAGE (OUTPATIENT)
Dept: ORTHOPEDICS | Facility: CLINIC | Age: 44
End: 2019-09-16

## 2019-10-11 ENCOUNTER — PATIENT MESSAGE (OUTPATIENT)
Dept: ORTHOPEDICS | Facility: CLINIC | Age: 44
End: 2019-10-11

## 2019-10-14 ENCOUNTER — HOSPITAL ENCOUNTER (EMERGENCY)
Facility: HOSPITAL | Age: 44
Discharge: HOME | End: 2019-10-14
Attending: EMERGENCY MEDICINE

## 2019-10-14 ENCOUNTER — TELEPHONE (OUTPATIENT)
Dept: ORTHOPEDICS | Facility: CLINIC | Age: 44
End: 2019-10-14

## 2019-10-14 VITALS
BODY MASS INDEX: 29.03 KG/M2 | HEIGHT: 67 IN | OXYGEN SATURATION: 97 % | WEIGHT: 185 LBS | TEMPERATURE: 99 F | DIASTOLIC BLOOD PRESSURE: 94 MMHG | SYSTOLIC BLOOD PRESSURE: 130 MMHG | HEART RATE: 105 BPM | RESPIRATION RATE: 18 BRPM

## 2019-10-14 DIAGNOSIS — F41.1 GAD (GENERALIZED ANXIETY DISORDER): ICD-10-CM

## 2019-10-14 DIAGNOSIS — M54.16 LUMBAR RADICULOPATHY: Primary | ICD-10-CM

## 2019-10-14 LAB
ANION GAP SERPL CALC-SCNC: 10 MMOL/L (ref 8–16)
BUN SERPL-MCNC: 14 MG/DL (ref 6–20)
CALCIUM SERPL-MCNC: 10 MG/DL (ref 8.7–10.5)
CHLORIDE SERPL-SCNC: 102 MMOL/L (ref 95–110)
CO2 SERPL-SCNC: 27 MMOL/L (ref 23–29)
CREAT SERPL-MCNC: 1.5 MG/DL (ref 0.5–1.4)
EST. GFR  (AFRICAN AMERICAN): >60 ML/MIN/1.73 M^2
EST. GFR  (NON AFRICAN AMERICAN): 56 ML/MIN/1.73 M^2
GLUCOSE SERPL-MCNC: 98 MG/DL (ref 70–110)
POTASSIUM SERPL-SCNC: 4 MMOL/L (ref 3.5–5.1)
SODIUM SERPL-SCNC: 139 MMOL/L (ref 136–145)

## 2019-10-14 PROCEDURE — 36415 COLL VENOUS BLD VENIPUNCTURE: CPT

## 2019-10-14 PROCEDURE — 80048 BASIC METABOLIC PNL TOTAL CA: CPT

## 2019-10-14 PROCEDURE — 99283 EMERGENCY DEPT VISIT LOW MDM: CPT

## 2019-10-14 RX ORDER — DULOXETIN HYDROCHLORIDE 30 MG/1
CAPSULE, DELAYED RELEASE ORAL
Qty: 60 CAPSULE | Refills: 5 | OUTPATIENT
Start: 2019-10-14

## 2019-10-14 NOTE — LETTER
Patient: Lee Quintanilla  YOB: 1975  Date: 10/14/2019 Time: 1:27 PM  Location: [facility]    Leaving the [facility] Against Medical Advice    Chart #:[id number]    This will certify that I, the undersigned,    ______________________________________________________________________    A patient in the above named medical center, having requested discharge and removal from the medical [facility] against the advice of my attending physician(s), hereby release [facility], its physicians, officers and employees, severally and individually, from any and all liability of any nature whatsoever for any injury or harm or complication of any kind that may result directly or indirectly, by reason of my terminating my stay as a patient at [facility] and my departure from [facility], and hereby waive any and all rights of action I may now have or later acquire as a result of my voluntary departure from [facility] and the termination of my stay as a patient therein.    This release is made with the full knowledge of the danger that may result from the action which I am taking.      Date:_______________________                         ___________________________                                                                                    Patient/Legal Representative    Witness:        ____________________________                          ___________________________  Nurse                                                                        Physician

## 2019-10-14 NOTE — ED PROVIDER NOTES
Encounter Date: 10/14/2019    SCRIBE #1 NOTE: Kirsten MIGUEL and rakesh scribing for, and in the presence of, Alex Garcia III, MD.       History     Chief Complaint   Patient presents with    Back Pain     numbness to perineal area and lower back / previous surgery      Time seen by provider: 11:03 AM on 10/14/2019    Lee Quintanilla is a 44 y.o. male who presents to the ED with an onset of numbness in the groin, back, and bilateral legs for the past 3 days. He also endorses generalized weakness starting around the same time. The patient is scheduled for a lumbar spine laminectomy and fusion in 2 weeks. He had an MRI 2 months ago of his lumbar spine. The patient denies fever, chills, or any other symptoms at this time. Patient's PMHx includes back pain, degeneration of lumbar intervertebral disc, kidney stone, and numbness and tingling of both legs. Patient's PSHx includes back surgery. Drug allergies to Inapsine and Penicillins.    The history is provided by the patient.     Review of patient's allergies indicates:   Allergen Reactions    Inapsine [droperidol] Anaphylaxis     seizures    Pcn [penicillins]      Past Medical History:   Diagnosis Date    ADHD (attention deficit hyperactivity disorder)     Arthritis     Asthma     as child only    Back pain     Degeneration of lumbar intervertebral disc 05/2016    Depression     Elevated PSA     Headache     Hyperlipidemia     Hypertension     Kidney damage     stage 3 ; d/t aleve abuse    Kidney stone     Neck pain     Numbness and tingling in hands     Numbness and tingling of both legs     Seizures     from inapsine 2002    Sleep apnea     no cpap     Past Surgical History:   Procedure Laterality Date    BACK SURGERY  2016    back surgery    HERNIA REPAIR Bilateral 11/22/2017    LITHOTRIPSY      PILONIDAL CYST DRAINAGE      removal of abcess      scrotal    TYMPANOSTOMY TUBE PLACEMENT       Family History   Problem Relation  Age of Onset    Heart disease Mother     Migraines Mother     Hypertension Mother     Early death Father     Diabetes Maternal Aunt     Diabetes Maternal Uncle     Diabetes Maternal Grandfather      Social History     Tobacco Use    Smoking status: Former Smoker     Packs/day: 0.25     Types: Cigarettes     Last attempt to quit: 1/1/2016     Years since quitting: 3.7    Smokeless tobacco: Former User     Quit date: 6/5/2016    Tobacco comment: occasional   Substance Use Topics    Alcohol use: Yes     Comment: rare    Drug use: No     Review of Systems   Constitutional: Negative for activity change, appetite change, chills, fatigue and fever.   Eyes: Negative for visual disturbance.   Respiratory: Negative for apnea and shortness of breath.    Cardiovascular: Negative for chest pain and palpitations.   Gastrointestinal: Negative for abdominal distention and abdominal pain.   Genitourinary: Negative for difficulty urinating.   Musculoskeletal: Negative for neck pain.   Skin: Negative for pallor and rash.   Neurological: Positive for weakness and numbness. Negative for headaches.   Hematological: Does not bruise/bleed easily.   Psychiatric/Behavioral: Negative for agitation.       Physical Exam     Initial Vitals [10/14/19 1041]   BP Pulse Resp Temp SpO2   (!) 130/94 105 18 98.9 °F (37.2 °C) 97 %      MAP       --         Physical Exam    Nursing note and vitals reviewed.  Constitutional: He appears well-developed and well-nourished.   HENT:   Head: Normocephalic and atraumatic.   Eyes: Conjunctivae are normal.   Neck: Normal range of motion. Neck supple.   Cardiovascular: Normal rate, regular rhythm and normal heart sounds. Exam reveals no gallop and no friction rub.    No murmur heard.  Pulmonary/Chest: Breath sounds normal. No respiratory distress. He has no wheezes. He has no rhonchi. He has no rales.   Abdominal: Soft. He exhibits no distension. There is no tenderness.   Genitourinary:   Genitourinary  Comments: Normal rectal tone   Musculoskeletal: Normal range of motion.        Lumbar back: He exhibits pain.   Midline and paraspinal lumbar pain.    Neurological: He is alert and oriented to person, place, and time. He has normal strength. A sensory deficit (Decreased light touch sensation of the right scrotum and perineum.  Decreased sensation of the left medial calf.) is present. GCS score is 15. GCS eye subscore is 4. GCS verbal subscore is 5. GCS motor subscore is 6.   Skin: Skin is warm and dry.   Psychiatric: He has a normal mood and affect.         ED Course   Procedures  Labs Reviewed   BASIC METABOLIC PANEL - Abnormal; Notable for the following components:       Result Value    Creatinine 1.5 (*)     eGFR if non  56 (*)     All other components within normal limits          Imaging Results    None          Medical Decision Making:   History:   Old Medical Records: I decided to obtain old medical records.  ED Management:  44-year-old male with known lumbar disc disease status post L5-S1 fusion presents with new onset of perineal numbness.  He has no other focal deficits except for new numbness of the left calf.  I discussed the case with Dr. Blanco who recommends MRI.  Patient is instructed that it will be 2-3 hours for an MRI and he elects to leave against medical advice.  He is offered to remain in the hospital with no restrictions; however, insist on leaving against medical advice.  He understands that he may have cauda equina syndrome or other significant nerve impingement.       APC / Resident Notes:   I, Dr. Alex Garcia III, personally performed the services described in this documentation. All medical record entries made by the scribe were at my direction and in my presence.  I have reviewed the chart and agree that the record reflects my personal performance and is accurate and complete       Scribe Attestation:   Scribe #1: I performed the above scribed service and the  documentation accurately describes the services I performed. I attest to the accuracy of the note.               Clinical Impression:       ICD-10-CM ICD-9-CM   1. Lumbar radiculopathy M54.16 724.4         Disposition:   Disposition: Discharged  Condition: Stable                        Alex Garcia III, MD  10/14/19 [id number]

## 2019-10-14 NOTE — TELEPHONE ENCOUNTER
Mr Quintanilla came to Dr. Blanco's office at 1:45pm he was at [facility] ER waiting for a MRI he said the wait was too long and he could not sit any longer. He wanted an order for the MRI to be done at at [facility]. Dr. Blanco advised that the patient go back to ER and wait for the MRI. The results of the MRI will determine if the patient requires a surgical procedure. Patient acknowledged he would go back to ER.

## 2019-10-14 NOTE — ED NOTES
Pt updated on plan of care. States he is unable to wait for MRI. After speaking with Dr. Garcia Pt decided to leave A

## 2019-10-14 NOTE — TELEPHONE ENCOUNTER
Received portal message from patient he was experiencing numbness in the genital area. I replied via portal, but also called the patient to speak with him directly. Patient stated that he has numbness to left and right groin area, including genital area. Also stated that when wiping his butt, his butt feels numb. He denied loss of bowel and bladder. Stated that this started on Thursday. He was advised to go to [facility] emergency room to rule out cauda equina syndrome. Patient stated he was getting in the shower, and then would go to the emergency room. Dr Blanco notified.

## 2019-10-22 ENCOUNTER — TELEPHONE (OUTPATIENT)
Dept: ORTHOPEDICS | Facility: CLINIC | Age: 44
End: 2019-10-22

## 2019-10-22 ENCOUNTER — HOSPITAL ENCOUNTER (OUTPATIENT)
Dept: RADIOLOGY | Facility: HOSPITAL | Age: 44
Discharge: HOME | End: 2019-10-22
Attending: ORTHOPAEDIC SURGERY

## 2019-10-22 ENCOUNTER — PATIENT MESSAGE (OUTPATIENT)
Dept: ORTHOPEDICS | Facility: CLINIC | Age: 44
End: 2019-10-22

## 2019-10-22 ENCOUNTER — HOSPITAL ENCOUNTER (OUTPATIENT)
Dept: PREADMISSION TESTING | Facility: HOSPITAL | Age: 44
Discharge: HOME | End: 2019-10-22
Attending: ORTHOPAEDIC SURGERY

## 2019-10-22 VITALS — BODY MASS INDEX: 29.19 KG/M2 | HEIGHT: 67 IN | WEIGHT: 186 LBS

## 2019-10-22 DIAGNOSIS — Z98.1 HISTORY OF LUMBAR SPINAL FUSION: Primary | ICD-10-CM

## 2019-10-22 DIAGNOSIS — S32.009K PSEUDOARTHROSIS OF LUMBAR SPINE: ICD-10-CM

## 2019-10-22 DIAGNOSIS — Z98.1 HISTORY OF LUMBAR SPINAL FUSION: ICD-10-CM

## 2019-10-22 DIAGNOSIS — M51.36 DDD (DEGENERATIVE DISC DISEASE), LUMBAR: ICD-10-CM

## 2019-10-22 DIAGNOSIS — M48.062 SPINAL STENOSIS OF LUMBAR REGION WITH NEUROGENIC CLAUDICATION: ICD-10-CM

## 2019-10-22 LAB
ABO + RH BLD: NORMAL
ALBUMIN SERPL BCP-MCNC: 4.5 G/DL (ref 3.5–5.2)
ALP SERPL-CCNC: 54 U/L (ref 55–135)
ALT SERPL W/O P-5'-P-CCNC: 39 U/L (ref 10–44)
ANION GAP SERPL CALC-SCNC: 13 MMOL/L (ref 8–16)
AST SERPL-CCNC: 26 U/L (ref 10–40)
BASOPHILS # BLD AUTO: 0.04 K/UL (ref 0–0.2)
BASOPHILS NFR BLD: 0.4 % (ref 0–1.9)
BILIRUB SERPL-MCNC: 0.5 MG/DL (ref 0.1–1)
BILIRUB UR QL STRIP: NEGATIVE
BLD GP AB SCN CELLS X3 SERPL QL: NORMAL
BUN SERPL-MCNC: 12 MG/DL (ref 6–20)
CALCIUM SERPL-MCNC: 10 MG/DL (ref 8.7–10.5)
CHLORIDE SERPL-SCNC: 102 MMOL/L (ref 95–110)
CLARITY UR: CLEAR
CO2 SERPL-SCNC: 23 MMOL/L (ref 23–29)
COLOR UR: YELLOW
CREAT SERPL-MCNC: 1.4 MG/DL (ref 0.5–1.4)
DIFFERENTIAL METHOD: ABNORMAL
EOSINOPHIL # BLD AUTO: 0.5 K/UL (ref 0–0.5)
EOSINOPHIL NFR BLD: 4.8 % (ref 0–8)
ERYTHROCYTE [DISTWIDTH] IN BLOOD BY AUTOMATED COUNT: 13.2 % (ref 11.5–14.5)
EST. GFR  (AFRICAN AMERICAN): >60 ML/MIN/1.73 M^2
EST. GFR  (NON AFRICAN AMERICAN): >60 ML/MIN/1.73 M^2
GLUCOSE SERPL-MCNC: 94 MG/DL (ref 70–110)
GLUCOSE UR QL STRIP: NEGATIVE
HCT VFR BLD AUTO: 55 % (ref 40–54)
HGB BLD-MCNC: 18.6 G/DL (ref 14–18)
HGB UR QL STRIP: ABNORMAL
IMM GRANULOCYTES # BLD AUTO: 0.05 K/UL (ref 0–0.04)
KETONES UR QL STRIP: NEGATIVE
LEUKOCYTE ESTERASE UR QL STRIP: NEGATIVE
LYMPHOCYTES # BLD AUTO: 1.6 K/UL (ref 1–4.8)
LYMPHOCYTES NFR BLD: 15.5 % (ref 18–48)
MCH RBC QN AUTO: 31.3 PG (ref 27–31)
MCHC RBC AUTO-ENTMCNC: 33.8 G/DL (ref 32–36)
MCV RBC AUTO: 93 FL (ref 82–98)
MONOCYTES # BLD AUTO: 0.7 K/UL (ref 0.3–1)
MONOCYTES NFR BLD: 6.9 % (ref 4–15)
NEUTROPHILS # BLD AUTO: 7.6 K/UL (ref 1.8–7.7)
NEUTROPHILS NFR BLD: 71.9 % (ref 38–73)
NITRITE UR QL STRIP: NEGATIVE
NRBC BLD-RTO: 0 /100 WBC
PH UR STRIP: 7 [PH] (ref 5–8)
PLATELET # BLD AUTO: 241 K/UL (ref 150–350)
PMV BLD AUTO: 11.7 FL (ref 9.2–12.9)
POTASSIUM SERPL-SCNC: 3.8 MMOL/L (ref 3.5–5.1)
PROT SERPL-MCNC: 8.2 G/DL (ref 6–8.4)
PROT UR QL STRIP: NEGATIVE
RBC # BLD AUTO: 5.94 M/UL (ref 4.6–6.2)
SODIUM SERPL-SCNC: 138 MMOL/L (ref 136–145)
SP GR UR STRIP: 1.02 (ref 1–1.03)
URN SPEC COLLECT METH UR: ABNORMAL
UROBILINOGEN UR STRIP-ACNC: NEGATIVE EU/DL
WBC # BLD AUTO: 10.59 K/UL (ref 3.9–12.7)

## 2019-10-22 PROCEDURE — 86901 BLOOD TYPING SEROLOGIC RH(D): CPT

## 2019-10-22 PROCEDURE — 99900104 DSU ONLY-NO CHARGE-EA ADD'L HR (STAT)

## 2019-10-22 PROCEDURE — 71046 X-RAY EXAM CHEST 2 VIEWS: CPT | Mod: 26,,, | Performed by: RADIOLOGY

## 2019-10-22 PROCEDURE — 93005 ELECTROCARDIOGRAM TRACING: CPT

## 2019-10-22 PROCEDURE — 99900103 DSU ONLY-NO CHARGE-INITIAL HR (STAT)

## 2019-10-22 PROCEDURE — 81003 URINALYSIS AUTO W/O SCOPE: CPT

## 2019-10-22 PROCEDURE — 85025 COMPLETE CBC W/AUTO DIFF WBC: CPT

## 2019-10-22 PROCEDURE — 71046 XR CHEST PA AND LATERAL: ICD-10-PCS | Mod: 26,,, | Performed by: RADIOLOGY

## 2019-10-22 PROCEDURE — 71046 X-RAY EXAM CHEST 2 VIEWS: CPT | Mod: TC,FY

## 2019-10-22 PROCEDURE — 80053 COMPREHEN METABOLIC PANEL: CPT

## 2019-10-22 PROCEDURE — 93010 ELECTROCARDIOGRAM REPORT: CPT | Mod: ,,, | Performed by: INTERNAL MEDICINE

## 2019-10-22 PROCEDURE — 93010 EKG 12-LEAD: ICD-10-PCS | Mod: ,,, | Performed by: INTERNAL MEDICINE

## 2019-10-22 RX ORDER — ANASTROZOLE 1 MG/1
1 TABLET ORAL WEEKLY
Status: ON HOLD | COMMUNITY
End: 2022-12-11 | Stop reason: HOSPADM

## 2019-10-22 RX ORDER — CHOLECALCIFEROL (VITAMIN D3) 25 MCG
1000 TABLET ORAL DAILY
COMMUNITY
End: 2020-05-13

## 2019-10-22 NOTE — PRE-PROCEDURE INSTRUCTIONS
Normal sinus rhythm  Inferior infarct ,age undetermined  Cannot rule out Anterior infarct ,age undetermined  Abnormal ECG  No previous ECGs available    Informed Dr. Verdugo with anesthesia.  He stated patient needs cardiac clearance prior to surgery.  Informed Angela with Dr. Blanco's office.

## 2019-10-22 NOTE — DISCHARGE INSTRUCTIONS
To confirm, Your doctor has instructed you that surgery is scheduled for: 10/29/2019    Please report to [facility] the morning of surgery. You must check-in and receive a wristband before going to your procedure.    Pre-Op will call the afternoon prior to surgery between 1:00 and 6:00 PM with the final arrival time.  Phone number: [phone]    PLEASE NOTE:  The surgery schedule has many variables which may affect the time of your surgery case.  Family members should be available if your surgery time changes.  Plan to be here the day of your procedure between 4-6 hours.    MEDICATIONS:  TAKE ONLY THESE MEDICATIONS WITH A SMALL SIP OF WATER THE MORNING OF YOUR PROCEDURE:  CYMBALTA, CRESTOR      DO NOT TAKE THESE MEDICATIONS 5-7 DAYS PRIOR to your procedure or per your surgeon's request: ASPIRIN, ALEVE, ADVIL, IBUPROFEN, FISH OIL VITAMIN E, HERBALS  (May take Tylenol)    ONLY if you are prescribed any types of blood thinners such as:  Aspirin, Coumadin, Plavix, Pradaxa, Xarelto, Aggrenox, Effient, Eliquis, Savasya, Brilinta, or any other, ask your surgeon whether you should stop taking them and how long before surgery you should stop.  You may also need to verify with the prescribing physician if it is ok to stop your medication.      INSTRUCTIONS IMPORTANT!!  · Do not eat or drink anything between midnight and the time of your procedure- this includes gum, mints, and candy.  · Do not smoke or drink alcoholic beverages 24 hours prior to your procedure.  · Shower the night before AND the morning of your procedure with a Chlorhexidine wash such as Hibiclens or Dial antibacterial soap from the neck down.  Do not get it on your face or in your eyes.  You may use your own shampoo and face wash. This helps your skin to be as bacteria free as possible.    · If you wear contact lenses, dentures, hearing aids or glasses, bring a container to put them in during surgery and give to a  family member for safe keeping.  Please leave all jewelry, piercing's and valuables at home.   · DO NOT remove hair from the surgery site.  Do not shave the incision site unless you are given specific instructions to do so.    · ONLY if you have been diagnosed with sleep apnea please bring your C-PAP machine.  · ONLY if you wear home oxygen please bring your portable oxygen tank the day of your procedure.  · ONLY if you have a history of OPEN HEART SURGERY you will need a clearance from your Cardiologist per Anesthesia.      · ONLY for patients requiring bowel prep, written instructions will be given by your doctor's office.  · ONLY if you have a neuro stimulator, please bring the controller with you the morning of surgery  · ONLY if a type and screen test is needed before surgery, please return: DONE TODAY  · If your doctor has scheduled you for an overnight stay, bring a small overnight bag with any personal items you need.  · Make arrangements in advance for transportation home by a responsible adult.  It is not safe to drive a vehicle during the 24 hours after anesthesia.      · Visiting hours are 10:00AM to 8:30PM.  For the safety of all patients, visitors under the age of 12 are not allowed above the first floor of the hospital.    · All [facility] facilities and properties are tobacco free.  Smoking is NOT allowed.       If you have any questions about these instructions, call Pre-Op Admit  Nursing at [phone] or the Pre-Op Day Surgery Unit at [phone].

## 2019-10-23 ENCOUNTER — PATIENT MESSAGE (OUTPATIENT)
Dept: ORTHOPEDICS | Facility: CLINIC | Age: 44
End: 2019-10-23

## 2019-10-24 ENCOUNTER — HOSPITAL ENCOUNTER (OUTPATIENT)
Dept: RADIOLOGY | Facility: HOSPITAL | Age: 44
Discharge: HOME | End: 2019-10-24
Attending: ORTHOPAEDIC SURGERY

## 2019-10-24 DIAGNOSIS — Z98.1 HISTORY OF LUMBAR SPINAL FUSION: ICD-10-CM

## 2019-10-24 LAB
CREAT SERPL-MCNC: 1.7 MG/DL (ref 0.5–1.4)
SAMPLE: ABNORMAL

## 2019-10-24 PROCEDURE — 25500020 PHARM REV CODE 255: Mod: PO | Performed by: ORTHOPAEDIC SURGERY

## 2019-10-24 PROCEDURE — A9585 GADOBUTROL INJECTION: HCPCS | Mod: PO | Performed by: ORTHOPAEDIC SURGERY

## 2019-10-24 PROCEDURE — 72158 MRI LUMBAR SPINE W/O & W/DYE: CPT | Mod: TC,PO

## 2019-10-24 RX ORDER — GADOBUTROL 604.72 MG/ML
8.5 INJECTION INTRAVENOUS
Status: COMPLETED | OUTPATIENT
Start: 2019-10-24 | End: 2019-10-24

## 2019-10-24 RX ADMIN — GADOBUTROL 8.5 ML: 604.72 INJECTION INTRAVENOUS at 02:10

## 2019-10-28 ENCOUNTER — OFFICE VISIT (OUTPATIENT)
Dept: ORTHOPEDICS | Facility: CLINIC | Age: 44
End: 2019-10-28

## 2019-10-28 VITALS
WEIGHT: 187 LBS | BODY MASS INDEX: 29.29 KG/M2 | DIASTOLIC BLOOD PRESSURE: 90 MMHG | SYSTOLIC BLOOD PRESSURE: 150 MMHG | HEART RATE: 99 BPM

## 2019-10-28 DIAGNOSIS — Z98.1 HISTORY OF LUMBAR SPINAL FUSION: ICD-10-CM

## 2019-10-28 DIAGNOSIS — S32.009K PSEUDOARTHROSIS OF LUMBAR SPINE: ICD-10-CM

## 2019-10-28 DIAGNOSIS — M48.062 SPINAL STENOSIS OF LUMBAR REGION WITH NEUROGENIC CLAUDICATION: Primary | ICD-10-CM

## 2019-10-28 PROCEDURE — 99213 PR OFFICE/OUTPT VISIT, EST, LEVL III, 20-29 MIN: ICD-10-PCS | Mod: S$GLB,,, | Performed by: ORTHOPAEDIC SURGERY

## 2019-10-28 PROCEDURE — 99213 OFFICE O/P EST LOW 20 MIN: CPT | Mod: S$GLB,,, | Performed by: ORTHOPAEDIC SURGERY

## 2019-10-28 NOTE — PROGRESS NOTES
Subjective:       Patient ID: Lee Quintanilla is a 44 y.o. male.    Chief Complaint: Pain of the Lumbar Spine (MRI results)      History of Present Illness  Continued complaints of low back pain radiating down both legs since his last visit he started having some intermittent numbness in the perineal area.  Last night he had erectile dysfunction.  No bowel or bladder incontinence.  Continued low back pain. Repeat MRI was performed because of change in symptoms    Current Medications  Current Outpatient Medications   Medication Sig Dispense Refill    anastrozole (ARIMIDEX) 1 mg Tab Take 1 mg by mouth once a week.      DULoxetine (CYMBALTA) 30 MG capsule TAKE ONE CAPSULE BY MOUTH DAILY FOR 2 WEEKS THEN INCREASE TO TAKE TWO CAPSULES EVERY DAY (Patient taking differently: Take 30 mg by mouth 2 (two) times daily. TAKE ONE CAPSULE BY MOUTH DAILY FOR 2 WEEKS THEN INCREASE TO TAKE TWO CAPSULES EVERY DAY) 60 capsule 5    hydroCHLOROthiazide (HYDRODIURIL) 25 MG tablet Take 1 tablet (25 mg total) by mouth once daily. 90 tablet 3    rosuvastatin (CRESTOR) 10 MG tablet Take 0.5 tablets (5 mg total) by mouth once daily. 90 tablet 3    testosterone cypionate (DEPOTESTOTERONE CYPIONATE) 100 mg/mL injection Inject into the muscle once a week.      vitamin D (VITAMIN D3) 1000 units Tab Take 1,000 Units by mouth once daily.       No current facility-administered medications for this visit.        Allergies  Review of patient's allergies indicates:   Allergen Reactions    Inapsine [droperidol] Anaphylaxis     seizures    Pcn [penicillins]     Bactrim [sulfamethoxazole-trimethoprim] Rash     Dry red rash all over when in the sun       Past Medical History  Past Medical History:   Diagnosis Date    ADHD (attention deficit hyperactivity disorder)     Arthritis     Asthma     as child only    Back pain     Degeneration of lumbar intervertebral disc 05/2016    Depression     Elevated PSA     Headache      Hyperlipidemia     Hypertension     Kidney damage     stage 3 ; d/t aleve abuse    Kidney stone     Neck pain     Numbness and tingling in hands     Numbness and tingling of both legs     Seizures     from inapsine 2002    Sleep apnea     no cpap       Surgical History  Past Surgical History:   Procedure Laterality Date    BACK SURGERY  2016    back surgery    HERNIA REPAIR Bilateral 11/22/2017    LITHOTRIPSY      PILONIDAL CYST DRAINAGE      removal of abcess      scrotal    TYMPANOSTOMY TUBE PLACEMENT         Family History:   Family History   Problem Relation Age of Onset    Heart disease Mother     Migraines Mother     Hypertension Mother     Early death Father     Diabetes Maternal Aunt     Diabetes Maternal Uncle     Diabetes Maternal Grandfather        Social History:   Social History     Socioeconomic History    Marital status:      Spouse name: Not on file    Number of children: Not on file    Years of education: Not on file    Highest education level: Not on file   Occupational History    Not on file   Social Needs    Financial resource strain: Not on file    Food insecurity:     Worry: Not on file     Inability: Not on file    Transportation needs:     Medical: Not on file     Non-medical: Not on file   Tobacco Use    Smoking status: Former Smoker     Packs/day: 0.25     Types: Cigarettes     Last attempt to quit: 1/1/2016     Years since quitting: 3.8    Smokeless tobacco: Former User     Quit date: 6/5/2016    Tobacco comment: occasional   Substance and Sexual Activity    Alcohol use: Not Currently     Comment: rare    Drug use: No    Sexual activity: Yes     Partners: Female   Lifestyle    Physical activity:     Days per week: Not on file     Minutes per session: Not on file    Stress: Not on file   Relationships    Social connections:     Talks on phone: Not on file     Gets together: Not on file     Attends Mu-ism service: Not on file     Active member  of club or organization: Not on file     Attends meetings of clubs or organizations: Not on file     Relationship status: Not on file   Other Topics Concern    Not on file   Social History Narrative    Not on file       Hospitalization/Major Diagnostic Procedure:     Review of Systems     General/Constitutional:  Chills denies. Fatigue denies. Fever denies. Weight gain denies. Weight loss denies.    Respiratory:  Shortness of breath denies.    Cardiovascular:  Chest pain denies.    Gastrointestinal:  Constipation denies. Diarrhea denies. Nausea denies. Vomiting denies.     Hematology:  Easy bruising denies. Prolonged bleeding denies.     Genitourinary:  Frequent urination denies. Pain in lower back denies. Painful urination denies.     Musculoskeletal:  See HPI for details    Skin:  Rash denies.    Neurologic:  Dizziness denies. Gait abnormalities denies. Seizures denies. Tingling/Numbess denies.    Psychiatric:  Anxiety denies. Depressed mood denies.     Objective:   Vital Signs:   Vitals:    10/28/19 0851   BP: (!) 150/90   Pulse: 99        Physical Exam      General Examination:     Constitutional: The patient is alert and oriented to lace person and time. Mood is pleasant.     Head/Face: Normal facial features normal eyebrows    Eyes: Normal extraocular motion bilaterally    Lungs: Respirations are equal and unlabored    Gait is coordinated.    Cardiovascular: There are no swelling or varicosities present.    Lymphatic: Negative for adenopathy    Skin: Normal    Neurological: Level of consciousness normal. Oriented to place person and time and situation    Psychiatric: Oriented to time place person and situation    Patient can stand erect has pain when doing so some bilateral paraspinous muscle spasm well-healed lumbar incision noted lumbar range of motion moderately restricted.  Straight leg raising maneuver with positive.  Subjective numbness L4 nerve root distribution bilaterally. Motor exam intact to all  major muscle groups.  XRAY Report/ Interpretation:  AP and lateral x-rays of the lumbar spine will performed today. Indications postop lumbar spine surgery. Findings:  Instrumented lumbar fusion L 4 5 level.  Repeat MRI was personally reviewed moderate spinal canal stenosis central and foraminal L3-4 level above instrumented lumbar fusion L4-5      Assessment:       1. Spinal stenosis of lumbar region with neurogenic claudication    2. History of lumbar spinal fusion    3. Pseudoarthrosis of lumbar spine        Plan:       Lee was seen today for pain.    Diagnoses and all orders for this visit:    Spinal stenosis of lumbar region with neurogenic claudication  Comments:  L3-4    History of lumbar spinal fusion  Comments:  L4-5    Pseudoarthrosis of lumbar spine  Comments:  L4-5         Follow up if symptoms worsen or fail to improve.    Treatment options were discussed regards to the nature of the spinal condition conservative pain interventional and surgical options were discussed in detail and the probability of success of the separate treatment options was discussed in detail the patient expressed a clear understanding of the treatment options would regards to surgery the procedure risks benefits complications and outcomes were discussed.  No guarantees were given regards to surgical outcome.  The risk associated with the spinal condition and an worsening of the condition was discussed with the patient expressed a thorough understanding.  The patient was warned about the possible development of cauda equina syndrome and its symptoms which include but are not limited to bowel or bladder dysfunction sexual dysfunction increasing pain increasing extremity numbness or weakness and saddle anesthesia.  The patient was advised to either contact me immediately or to go to the nearest hospital emergency room if symptoms of cauda equina syndrome with to develop.  The patient expressed an understanding of these  instructions.  The technical aspects of the surgery were discussed in detail with the patient today. The patient was able to verbalize an understanding. The procedure risk benefits alternatives and possible complications of the surgical procedure was discussed including expected outcomes. No guarantees were given regards to outcomes. Consent forms were will be signed at a later date. All questions regarding the surgery itself were answered. The patient wishes to proceed with surgery and will be scheduled. The patient may require preoperative medical clearance.  We continued erectile in surgery but the problem is that this gentleman has an abnormal EKG and must be cleared from an anesthesia standpoint the surgery has been discussed in great detail and he would like to proceed as soon as possible patient advised to contact me if there is any change and loss of bowel or bladder function    This note was created using Dragon voice recognition software that occasionally misinterpreted phrases or words.

## 2019-11-06 ENCOUNTER — PATIENT MESSAGE (OUTPATIENT)
Dept: ORTHOPEDICS | Facility: CLINIC | Age: 44
End: 2019-11-06

## 2019-11-22 ENCOUNTER — PATIENT MESSAGE (OUTPATIENT)
Dept: ORTHOPEDICS | Facility: CLINIC | Age: 44
End: 2019-11-22

## 2019-12-23 ENCOUNTER — OFFICE VISIT (OUTPATIENT)
Dept: ORTHOPEDICS | Facility: CLINIC | Age: 44
End: 2019-12-23
Payer: MEDICAID

## 2019-12-23 VITALS — WEIGHT: 188 LBS | BODY MASS INDEX: 29.44 KG/M2 | DIASTOLIC BLOOD PRESSURE: 80 MMHG | SYSTOLIC BLOOD PRESSURE: 140 MMHG

## 2019-12-23 DIAGNOSIS — Z98.1 HISTORY OF LUMBAR SPINAL FUSION: Primary | ICD-10-CM

## 2019-12-23 DIAGNOSIS — S32.009K PSEUDOARTHROSIS OF LUMBAR SPINE: ICD-10-CM

## 2019-12-23 DIAGNOSIS — M51.36 DDD (DEGENERATIVE DISC DISEASE), LUMBAR: ICD-10-CM

## 2019-12-23 DIAGNOSIS — M48.062 SPINAL STENOSIS OF LUMBAR REGION WITH NEUROGENIC CLAUDICATION: ICD-10-CM

## 2019-12-23 PROCEDURE — 99212 OFFICE O/P EST SF 10 MIN: CPT | Mod: S$GLB,,, | Performed by: ORTHOPAEDIC SURGERY

## 2019-12-23 PROCEDURE — 99212 PR OFFICE/OUTPT VISIT, EST, LEVL II, 10-19 MIN: ICD-10-PCS | Mod: S$GLB,,, | Performed by: ORTHOPAEDIC SURGERY

## 2019-12-23 RX ORDER — HYDROCODONE BITARTRATE AND ACETAMINOPHEN 10; 325 MG/1; MG/1
1 TABLET ORAL EVERY 4 HOURS PRN
Qty: 42 TABLET | Refills: 0 | Status: SHIPPED | OUTPATIENT
Start: 2019-12-23 | End: 2019-12-30

## 2019-12-23 RX ORDER — BACLOFEN 10 MG/1
10 TABLET ORAL 3 TIMES DAILY
Qty: 90 TABLET | Refills: 5 | Status: SHIPPED | OUTPATIENT
Start: 2019-12-23 | End: 2020-06-21 | Stop reason: SDUPTHER

## 2019-12-23 RX ORDER — ASPIRIN 325 MG
325 TABLET ORAL DAILY
COMMUNITY
End: 2022-04-25

## 2019-12-23 NOTE — PROGRESS NOTES
Subjective:       Patient ID: Lee Quintanilla is a 44 y.o. male.    Chief Complaint: Pain of the Lumbar Spine (Here to discuss lumbar surgery after open heart surgery in January by Dr Elaine. Lumbar pain radiates to both hips to legs and feet with numbness, tingling and burning. He is having severe spams)      History of Present Illness  Patient was last here in October 2019.  He was scheduled for lumbar laminectomy secondary to spinal stenosis and partial cauda equina symptomatology such as saddle anesthesia. The patient at some point developed severe numbness from his waist down and was seen at the emergency room. He did not have any bowel or bladder incontinence.  During the preoperative workup it was found that he had significant cardiovascular disease.  His surgery was canceled.  He is scheduled for cardiovascular surgery and less than a month.    Regarding his lumbar spine he continues to have bilateral lower extremity dysesthesia and saddle anesthesia. He continues to have significant back pain secondary to muscle spasms bilateral lumbar lumbosacral regions.  He is currently dealing with the pain secondary to his other medical issues/comorbidities.  Plans to proceed with lumbar surgery sometime in the next year after recovering from his open heart surgery.    Current Medications  Current Outpatient Medications   Medication Sig Dispense Refill    anastrozole (ARIMIDEX) 1 mg Tab Take 1 mg by mouth once a week.      aspirin (ECOTRIN) 81 MG EC tablet Take 81 mg by mouth once daily.      DULoxetine (CYMBALTA) 30 MG capsule TAKE ONE CAPSULE BY MOUTH DAILY FOR 2 WEEKS THEN INCREASE TO TAKE TWO CAPSULES EVERY DAY (Patient taking differently: Take 30 mg by mouth 2 (two) times daily. TAKE ONE CAPSULE BY MOUTH DAILY FOR 2 WEEKS THEN INCREASE TO TAKE TWO CAPSULES EVERY DAY) 60 capsule 5    hydroCHLOROthiazide (HYDRODIURIL) 25 MG tablet Take 1 tablet (25 mg total) by mouth once daily. 90 tablet 3     rosuvastatin (CRESTOR) 10 MG tablet Take 0.5 tablets (5 mg total) by mouth once daily. 90 tablet 3    testosterone cypionate (DEPOTESTOTERONE CYPIONATE) 100 mg/mL injection Inject into the muscle once a week.      vitamin D (VITAMIN D3) 1000 units Tab Take 1,000 Units by mouth once daily.       No current facility-administered medications for this visit.        Allergies  Review of patient's allergies indicates:   Allergen Reactions    Inapsine [droperidol] Anaphylaxis     seizures    Pcn [penicillins]     Bactrim [sulfamethoxazole-trimethoprim] Rash     Dry red rash all over when in the sun       Past Medical History  Past Medical History:   Diagnosis Date    ADHD (attention deficit hyperactivity disorder)     Arthritis     Asthma     as child only    Back pain     Degeneration of lumbar intervertebral disc 05/2016    Depression     Elevated PSA     Headache     Hyperlipidemia     Hypertension     Kidney damage     stage 3 ; d/t aleve abuse    Kidney stone     Neck pain     Numbness and tingling in hands     Numbness and tingling of both legs     Seizures     from inapsine 2002    Sleep apnea     no cpap       Surgical History  Past Surgical History:   Procedure Laterality Date    BACK SURGERY  2016    back surgery    HERNIA REPAIR Bilateral 11/22/2017    LITHOTRIPSY      PILONIDAL CYST DRAINAGE      removal of abcess      scrotal    TYMPANOSTOMY TUBE PLACEMENT         Family History:   Family History   Problem Relation Age of Onset    Heart disease Mother     Migraines Mother     Hypertension Mother     Early death Father     Diabetes Maternal Aunt     Diabetes Maternal Uncle     Diabetes Maternal Grandfather        Social History:   Social History     Socioeconomic History    Marital status:      Spouse name: Not on file    Number of children: Not on file    Years of education: Not on file    Highest education level: Not on file   Occupational History    Not on file    Social Needs    Financial resource strain: Not on file    Food insecurity:     Worry: Not on file     Inability: Not on file    Transportation needs:     Medical: Not on file     Non-medical: Not on file   Tobacco Use    Smoking status: Former Smoker     Packs/day: 0.25     Types: Cigarettes     Last attempt to quit: 1/1/2016     Years since quitting: 3.9    Smokeless tobacco: Former User     Quit date: 6/5/2016    Tobacco comment: occasional   Substance and Sexual Activity    Alcohol use: Not Currently     Comment: rare    Drug use: No    Sexual activity: Yes     Partners: Female   Lifestyle    Physical activity:     Days per week: Not on file     Minutes per session: Not on file    Stress: Not on file   Relationships    Social connections:     Talks on phone: Not on file     Gets together: Not on file     Attends Baptism service: Not on file     Active member of club or organization: Not on file     Attends meetings of clubs or organizations: Not on file     Relationship status: Not on file   Other Topics Concern    Not on file   Social History Narrative    Not on file       Hospitalization/Major Diagnostic Procedure:     Review of Systems     General/Constitutional:  Chills denies. Fatigue denies. Fever denies. Weight gain denies. Weight loss denies.    Respiratory:  Shortness of breath denies.    Cardiovascular:  Chest pain denies.    Gastrointestinal:  Constipation denies. Diarrhea denies. Nausea denies. Vomiting denies.     Hematology:  Easy bruising denies. Prolonged bleeding denies.     Genitourinary:  Frequent urination denies. Pain in lower back denies. Painful urination denies.     Musculoskeletal:  See HPI for details    Skin:  Rash denies.    Neurologic:  Dizziness denies. Gait abnormalities denies. Seizures denies. Tingling/Numbess denies.    Psychiatric:  Anxiety denies. Depressed mood denies.     Objective:   Vital Signs:   Vitals:    12/23/19 0832   BP: (!) 140/80        Physical  "Exam      General Examination:     Constitutional: The patient is alert and oriented to lace person and time. Mood is pleasant.     Head/Face: Normal facial features normal eyebrows    Eyes: Normal extraocular motion bilaterally    Lungs: Respirations are equal and unlabored    Gait is coordinated.    Cardiovascular: There are no swelling or varicosities present.    Lymphatic: Negative for adenopathy    Skin: Normal    Neurological: Level of consciousness normal. Oriented to place person and time and situation    Psychiatric: Oriented to time place person and situation        Patient can stand erect has pain when doing so some bilateral paraspinous muscle spasm well-healed lumbar incision noted lumbar range of motion moderately restricted.  Straight leg raising maneuver with positive.  Subjective numbness L4 nerve root distribution bilaterally. Motor exam intact to all major muscle groups.    XRAY Report/ Interpretation:  No new studies today      Assessment:       1. History of lumbar spinal fusion    2. Pseudoarthrosis of lumbar spine    3. Spinal stenosis of lumbar region with neurogenic claudication    4. DDD (degenerative disc disease), lumbar        Plan:       Lee was seen today for pain.    Diagnoses and all orders for this visit:    History of lumbar spinal fusion    Pseudoarthrosis of lumbar spine    Spinal stenosis of lumbar region with neurogenic claudication    DDD (degenerative disc disease), lumbar         No follow-ups on file.  Vincent Craig, physician's assistant served in the capacity as a "scribe" for this patient encounter  A "face to face" encounter occurred with Dr. Blanco on this date  The treatment plan and medical decision making is outlined below:  At this time we recommend that he proceed with his cardiovascular surgery as scheduled.  We will defer any thoughts of his lumbar surgery until after he is fully recovered from his cardiovascular surgery and he is no longer taking " anticoagulant medication.  In the meantime I will give him a prescription for some pain medication for when his pain is at its worst and for baclofen 10 mg 3 times a day.  We will see him back as needed in the future when he is ready to discuss surgical options.    This note was created using Dragon voice recognition software that occasionally misinterpreted phrases or words.

## 2019-12-27 RX ORDER — ROSUVASTATIN CALCIUM 10 MG/1
5 TABLET, COATED ORAL DAILY
Qty: 90 TABLET | Refills: 0 | Status: SHIPPED | OUTPATIENT
Start: 2019-12-27 | End: 2021-10-25

## 2019-12-27 NOTE — TELEPHONE ENCOUNTER
Received refill request for Rosuvastatin addressed to JOSE M Aggarwal who is no longer with [facility]. Left message for patient to call office to make appt to establish care with another provider.

## 2020-01-02 ENCOUNTER — PATIENT MESSAGE (OUTPATIENT)
Dept: FAMILY MEDICINE | Facility: CLINIC | Age: 45
End: 2020-01-02

## 2020-02-18 ENCOUNTER — PATIENT MESSAGE (OUTPATIENT)
Dept: ORTHOPEDICS | Facility: CLINIC | Age: 45
End: 2020-02-18

## 2020-02-20 ENCOUNTER — TELEPHONE (OUTPATIENT)
Dept: CARDIAC REHAB | Facility: CLINIC | Age: 45
End: 2020-02-20

## 2020-02-20 NOTE — TELEPHONE ENCOUNTER
----- Message from Theresa Aguiar MA sent at 2/20/2020 12:11 PM CST -----  Contact: patient call   The patient would like to talk to someone about his insurance for rehab please call [phone]. Thank you.

## 2020-03-30 NOTE — TELEPHONE ENCOUNTER
Hematology referral placed. They and hepatology should be contacting him to schedule the appointments- it appears that hepatology was reviewing his chart on Friday.    Spoke to patient  Tramadol takes the edge off for a couple hours  She takes it with extra strength tylenol around the clock  Still cant sleep more then a couple hours at a time  Cant get comfortable  Pain in sciatic area left side d/t herniated disc  Shooting pain/numbness down left leg  Having trouble walking  Wasn't bothering her for a while but had a massage recently and since then it started hurting and has progressively worsening  Patient [facility] this issue who ordered physical therapy  So she is doing her exercises from her therapy  She said something the exercises help  Asking if her tramadol can be increased? Please advise

## 2020-05-05 ENCOUNTER — TELEPHONE (OUTPATIENT)
Dept: FAMILY MEDICINE | Facility: CLINIC | Age: 45
End: 2020-05-05

## 2020-05-05 NOTE — TELEPHONE ENCOUNTER
"----- Message from Emma Emery MA sent at 5/5/2020 10:58 AM CDT -----  Pt will be a "re-establishing care" pt with Medicaid.  I scheduled him with Riya on 6/29.  He is now calling to see if he can scheduled with Dr. Mansfield sooner.  Please advise.    Pt - [phone]  "

## 2020-05-13 ENCOUNTER — OFFICE VISIT (OUTPATIENT)
Dept: FAMILY MEDICINE | Facility: CLINIC | Age: 45
End: 2020-05-13
Payer: MEDICAID

## 2020-05-13 VITALS
BODY MASS INDEX: 31.23 KG/M2 | HEIGHT: 67 IN | WEIGHT: 199 LBS | HEART RATE: 72 BPM | TEMPERATURE: 99 F | DIASTOLIC BLOOD PRESSURE: 60 MMHG | SYSTOLIC BLOOD PRESSURE: 96 MMHG

## 2020-05-13 DIAGNOSIS — E78.5 HYPERLIPIDEMIA, UNSPECIFIED HYPERLIPIDEMIA TYPE: ICD-10-CM

## 2020-05-13 DIAGNOSIS — N18.30 CKD (CHRONIC KIDNEY DISEASE) STAGE 3, GFR 30-59 ML/MIN: ICD-10-CM

## 2020-05-13 DIAGNOSIS — G62.9 NEUROPATHY: Primary | ICD-10-CM

## 2020-05-13 DIAGNOSIS — N40.0 BENIGN PROSTATIC HYPERPLASIA, UNSPECIFIED WHETHER LOWER URINARY TRACT SYMPTOMS PRESENT: ICD-10-CM

## 2020-05-13 DIAGNOSIS — M51.36 DDD (DEGENERATIVE DISC DISEASE), LUMBAR: ICD-10-CM

## 2020-05-13 DIAGNOSIS — G47.00 INSOMNIA, UNSPECIFIED TYPE: ICD-10-CM

## 2020-05-13 DIAGNOSIS — Z98.890 HISTORY OF LUMBAR SURGERY: ICD-10-CM

## 2020-05-13 DIAGNOSIS — F32.1 CURRENT MODERATE EPISODE OF MAJOR DEPRESSIVE DISORDER WITHOUT PRIOR EPISODE: ICD-10-CM

## 2020-05-13 DIAGNOSIS — F98.8 ATTENTION DEFICIT DISORDER, UNSPECIFIED HYPERACTIVITY PRESENCE: ICD-10-CM

## 2020-05-13 DIAGNOSIS — F41.1 GAD (GENERALIZED ANXIETY DISORDER): ICD-10-CM

## 2020-05-13 DIAGNOSIS — M54.16 LUMBAR RADICULOPATHY: ICD-10-CM

## 2020-05-13 DIAGNOSIS — F32.A DEPRESSION, UNSPECIFIED DEPRESSION TYPE: ICD-10-CM

## 2020-05-13 PROCEDURE — 99213 OFFICE O/P EST LOW 20 MIN: CPT | Mod: S$GLB,,, | Performed by: NURSE PRACTITIONER

## 2020-05-13 PROCEDURE — 99213 PR OFFICE/OUTPT VISIT, EST, LEVL III, 20-29 MIN: ICD-10-PCS | Mod: S$GLB,,, | Performed by: NURSE PRACTITIONER

## 2020-05-13 RX ORDER — TESTOSTERONE CYPIONATE 200 MG/ML
200 INJECTION, SOLUTION INTRAMUSCULAR
Status: ON HOLD | COMMUNITY
Start: 2020-04-21 | End: 2022-12-11 | Stop reason: HOSPADM

## 2020-05-13 RX ORDER — FLUOXETINE 10 MG/1
10 CAPSULE ORAL DAILY
Qty: 30 CAPSULE | Refills: 11 | Status: SHIPPED | OUTPATIENT
Start: 2020-05-13 | End: 2020-06-10

## 2020-05-13 RX ORDER — DULOXETIN HYDROCHLORIDE 60 MG/1
1 CAPSULE, DELAYED RELEASE ORAL DAILY
COMMUNITY
Start: 2020-04-29 | End: 2020-06-10

## 2020-05-13 RX ORDER — RAMIPRIL 5 MG/1
1 CAPSULE ORAL DAILY
COMMUNITY
Start: 2020-04-07 | End: 2020-08-26

## 2020-05-13 RX ORDER — METOPROLOL TARTRATE 25 MG/1
530 TABLET, FILM COATED ORAL 2 TIMES DAILY
Status: ON HOLD | COMMUNITY
Start: 2020-04-07 | End: 2020-11-07 | Stop reason: SDUPTHER

## 2020-05-13 RX ORDER — CLONAZEPAM 0.5 MG/1
0.5 TABLET ORAL 2 TIMES DAILY PRN
Qty: 45 TABLET | Refills: 0 | Status: SHIPPED | OUTPATIENT
Start: 2020-05-13 | End: 2020-06-10 | Stop reason: SDUPTHER

## 2020-05-13 NOTE — PROGRESS NOTES
SUBJECTIVE:    Patient ID: Lee Quintanilla is a 44 y.o. male.    Chief Complaint: [facility]    44 year old male presents to [facility]. Reports that recently had cardiac bypass with Dr. Elaine. 7 artery bypassed. Followed by cardiology and [employer]. Has upcoming ov. Does have strong family history of heart disease. Has back surgery planned but needs cardiac clearance. Not able to do much. On disability.  He was scheduled for lumbar laminectomy secondary to spinal stenosis and partial cauda equina symptomatology such as saddle anesthesia. The patient at some point developed severe numbness from his waist down and was seen at the emergency room. He did not have any bowel or bladder incontinence.  During the preoperative workup it was found that he had significant cardiovascular disease.  His surgery was canceled.    Regarding his lumbar spine he continues to have bilateral lower extremity dysesthesia and saddle anesthesia. He continues to have significant back pain secondary to muscle spasms bilateral lumbar lumbosacral regions.  He is currently dealing with the pain secondary to his other medical issues/comorbidities.  Plans to proceed with lumbar surgery sometime in the next year after recovering from his open heart surgery.has lots of anxiety and irritability. Thinks related to not being able to do much.       No visits with results within 6 Month(s) from this visit.   Latest known visit with results is:   Hospital Outpatient Visit on 10/24/2019   Component Date Value Ref Range Status    POC Creatinine 10/24/2019 1.7* 0.5 - 1.4 mg/dL Final    Sample 10/24/2019 ART   Final       Past Medical History:   Diagnosis Date    ADHD (attention deficit hyperactivity disorder)     Arthritis     Asthma     as child only    Back pain     Degeneration of lumbar intervertebral disc 05/2016    Depression     Elevated PSA     Headache     Hyperlipidemia     Hypertension     Kidney damage      stage 3 ; d/t aleve abuse    Kidney stone     Neck pain     Numbness and tingling in hands     Numbness and tingling of both legs     Seizures     from inapsine 2002    Sleep apnea     no cpap     Past Surgical History:   Procedure Laterality Date    BACK SURGERY  2016    back surgery    CORONARY ARTERY BYPASS GRAFT      7 vessels    HERNIA REPAIR Bilateral 11/22/2017    LITHOTRIPSY      PILONIDAL CYST DRAINAGE      removal of abcess      scrotal    TYMPANOSTOMY TUBE PLACEMENT       Family History   Problem Relation Age of Onset    Heart disease Mother     Migraines Mother     Hypertension Mother     Early death Father     Heart disease Father     Diabetes Maternal Aunt     Diabetes Maternal Uncle     Diabetes Maternal Grandfather        Marital Status:   Alcohol History:  reports that he drinks about 1.0 standard drinks of alcohol per week.  Tobacco History:  reports that he quit smoking about 4 years ago. His smoking use included cigarettes. He smoked 0.25 packs per day. He quit smokeless tobacco use about 3 years ago.  Drug History:  reports that he does not use drugs.    Review of patient's allergies indicates:   Allergen Reactions    Inapsine [droperidol] Anaphylaxis     seizures    Effexor [venlafaxine]      Increased anxiety    Pcn [penicillins]     Bactrim [sulfamethoxazole-trimethoprim] Rash     Dry red rash all over when in the sun       Current Outpatient Medications:     anastrozole (ARIMIDEX) 1 mg Tab, Take 1 mg by mouth once a week., Disp: , Rfl:     aspirin 325 MG tablet, Take 325 mg by mouth once daily. , Disp: , Rfl:     baclofen (LIORESAL) 10 MG tablet, Take 1 tablet (10 mg total) by mouth 3 (three) times daily., Disp: 90 tablet, Rfl: 5    DULoxetine (CYMBALTA) 60 MG capsule, Take 1 capsule by mouth once daily., Disp: , Rfl:     metoprolol tartrate (LOPRESSOR) 25 MG tablet, Take 1 tablet by mouth 2 (two) times a day., Disp: , Rfl:     ramipriL (ALTACE) 5 MG  "capsule, Take 1 capsule by mouth once daily., Disp: , Rfl:     rosuvastatin (CRESTOR) 10 MG tablet, Take 0.5 tablets (5 mg total) by mouth once daily., Disp: 90 tablet, Rfl: 0    testosterone cypionate (DEPOTESTOTERONE CYPIONATE) 200 mg/mL injection, Inject 200 mg into the muscle every 7 days., Disp: , Rfl:     clonazePAM (KLONOPIN) 0.5 MG tablet, Take 1 tablet (0.5 mg total) by mouth 2 (two) times daily as needed for Anxiety., Disp: 45 tablet, Rfl: 0    FLUoxetine 10 MG capsule, Take 1 capsule (10 mg total) by mouth once daily., Disp: 30 capsule, Rfl: 11    Review of Systems   Constitutional: Negative for fatigue, fever and unexpected weight change.   HENT: Negative for ear pain, sinus pressure and sore throat.    Eyes: Negative for pain.   Respiratory: Negative for cough and shortness of breath.    Cardiovascular: Negative for chest pain and leg swelling.   Gastrointestinal: Negative for abdominal pain, constipation, nausea and vomiting.   Genitourinary: Negative for dysuria, frequency and urgency.   Musculoskeletal: Positive for back pain. Negative for arthralgias.   Skin: Negative for rash.   Neurological: Negative for dizziness, weakness and headaches.   Psychiatric/Behavioral: Positive for agitation and sleep disturbance. The patient is nervous/anxious.           Objective:      Vitals:    05/13/20 0918   BP: 96/60   Pulse: 72   Temp: 98.8 °F (37.1 °C)   Weight: 90.3 kg (199 lb)   Height: 5' 7" (1.702 m)     Body mass index is 31.17 kg/m².  Physical Exam   Constitutional: He is oriented to person, place, and time. He appears well-developed and well-nourished.   HENT:   Right Ear: External ear normal.   Left Ear: External ear normal.   Mouth/Throat: Oropharynx is clear and moist.   Eyes: Pupils are equal, round, and reactive to light.   Neck: Neck supple. No tracheal deviation present.   Cardiovascular: Normal rate and regular rhythm. Exam reveals no gallop and no friction rub.   No murmur " heard.  Pulmonary/Chest: Breath sounds normal. No stridor. He has no wheezes. He has no rales.   Abdominal: Soft. He exhibits no mass. There is no tenderness.   Musculoskeletal: He exhibits no edema or deformity.        Lumbar back: He exhibits decreased range of motion and tenderness.   Lymphadenopathy:     He has no cervical adenopathy.   Neurological: He is alert and oriented to person, place, and time. Coordination normal.   Skin: Skin is warm and dry.   Psychiatric: He has a normal mood and affect. Thought content normal.         Assessment:       1. Neuropathy    2. Lumbar radiculopathy    3. Attention deficit disorder, unspecified hyperactivity presence    4. Current moderate episode of major depressive disorder without prior episode    5. MYNOR (generalized anxiety disorder)    6. Hyperlipidemia, unspecified hyperlipidemia type    7. Insomnia, unspecified type    8. DDD (degenerative disc disease), lumbar    9. Benign prostatic hyperplasia, unspecified whether lower urinary tract symptoms present    10. Depression, unspecified depression type    11. CKD (chronic kidney disease) stage 3, GFR 30-59 ml/min    12. History of lumbar surgery         Plan:       Neuropathy    Lumbar radiculopathy    Attention deficit disorder, unspecified hyperactivity presence    Current moderate episode of major depressive disorder without prior episode    MYNOR (generalized anxiety disorder)  -     FLUoxetine 10 MG capsule; Take 1 capsule (10 mg total) by mouth once daily.  Dispense: 30 capsule; Refill: 11  -     clonazePAM (KLONOPIN) 0.5 MG tablet; Take 1 tablet (0.5 mg total) by mouth 2 (two) times daily as needed for Anxiety.  Dispense: 45 tablet; Refill: 0    Hyperlipidemia, unspecified hyperlipidemia type    Insomnia, unspecified type    DDD (degenerative disc disease), lumbar    Benign prostatic hyperplasia, unspecified whether lower urinary tract symptoms present    Depression, unspecified depression type    CKD (chronic  kidney disease) stage 3, GFR 30-59 ml/min    History of lumbar surgery      Follow up in about 4 weeks (around 6/10/2020) for medication management.

## 2020-06-02 ENCOUNTER — TELEPHONE (OUTPATIENT)
Dept: FAMILY MEDICINE | Facility: CLINIC | Age: 45
End: 2020-06-02

## 2020-06-10 ENCOUNTER — OFFICE VISIT (OUTPATIENT)
Dept: FAMILY MEDICINE | Facility: CLINIC | Age: 45
End: 2020-06-10
Payer: MEDICAID

## 2020-06-10 VITALS — WEIGHT: 203 LBS | TEMPERATURE: 98 F | BODY MASS INDEX: 31.86 KG/M2 | HEIGHT: 67 IN

## 2020-06-10 DIAGNOSIS — E78.5 HYPERLIPIDEMIA, UNSPECIFIED HYPERLIPIDEMIA TYPE: ICD-10-CM

## 2020-06-10 DIAGNOSIS — N18.30 CKD (CHRONIC KIDNEY DISEASE) STAGE 3, GFR 30-59 ML/MIN: ICD-10-CM

## 2020-06-10 DIAGNOSIS — F32.1 CURRENT MODERATE EPISODE OF MAJOR DEPRESSIVE DISORDER WITHOUT PRIOR EPISODE: Primary | ICD-10-CM

## 2020-06-10 DIAGNOSIS — I10 HYPERTENSION, UNSPECIFIED TYPE: ICD-10-CM

## 2020-06-10 DIAGNOSIS — F41.1 GAD (GENERALIZED ANXIETY DISORDER): ICD-10-CM

## 2020-06-10 DIAGNOSIS — M54.16 LUMBAR RADICULOPATHY: ICD-10-CM

## 2020-06-10 PROCEDURE — 99214 OFFICE O/P EST MOD 30 MIN: CPT | Mod: S$GLB,,, | Performed by: NURSE PRACTITIONER

## 2020-06-10 PROCEDURE — 99214 PR OFFICE/OUTPT VISIT, EST, LEVL IV, 30-39 MIN: ICD-10-PCS | Mod: S$GLB,,, | Performed by: NURSE PRACTITIONER

## 2020-06-10 RX ORDER — DULOXETIN HYDROCHLORIDE 30 MG/1
30 CAPSULE, DELAYED RELEASE ORAL DAILY
Qty: 30 CAPSULE | Refills: 11
Start: 2020-06-10 | End: 2020-10-27 | Stop reason: SDUPTHER

## 2020-06-10 RX ORDER — CLONAZEPAM 0.5 MG/1
0.5 TABLET ORAL 2 TIMES DAILY PRN
Qty: 45 TABLET | Refills: 2 | Status: SHIPPED | OUTPATIENT
Start: 2020-06-10 | End: 2020-08-13 | Stop reason: SDUPTHER

## 2020-06-10 RX ORDER — FUROSEMIDE 20 MG/1
20 TABLET ORAL DAILY
COMMUNITY
Start: 2020-06-02 | End: 2020-08-26

## 2020-06-10 RX ORDER — FLUOXETINE HYDROCHLORIDE 20 MG/1
20 CAPSULE ORAL DAILY
Qty: 30 CAPSULE | Refills: 11 | Status: SHIPPED | OUTPATIENT
Start: 2020-06-10 | End: 2021-08-19 | Stop reason: SDUPTHER

## 2020-06-10 NOTE — PROGRESS NOTES
SUBJECTIVE:    Patient ID: Lee Quintanilla is a 44 y.o. male.    Chief Complaint: Follow-up    44 year old male presents for follow up.  Was seen in our office about 1 month ago with complaints of progressing anxiety.  Is currently being followed for CAD, hyperlipidemia DDD, ADD,.  Yesterday had appointment with cardiology and due to increasing shortness of breath will be scheduling upcoming stress test to further evaluate.  Due to this lumbar surgery has been postponed indefinitely.  Does feel the addition of Prozac since last appointment has helped with anxiety some.  Reports the duration him feeling anxious has lessened.  Does still admit to days that anxiety is heightened.  Sleeping better since Klonopin.      No visits with results within 6 Month(s) from this visit.   Latest known visit with results is:   Hospital Outpatient Visit on 10/24/2019   Component Date Value Ref Range Status    POC Creatinine 10/24/2019 1.7* 0.5 - 1.4 mg/dL Final    Sample 10/24/2019 ART   Final       Past Medical History:   Diagnosis Date    ADHD (attention deficit hyperactivity disorder)     Arthritis     Asthma     as child only    Back pain     Degeneration of lumbar intervertebral disc 05/2016    Depression     Elevated PSA     Headache     Hyperlipidemia     Hypertension     Kidney damage     stage 3 ; d/t aleve abuse    Kidney stone     Neck pain     Numbness and tingling in hands     Numbness and tingling of both legs     Seizures     from inapsine 2002    Sleep apnea     no cpap     Past Surgical History:   Procedure Laterality Date    BACK SURGERY  2016    back surgery    CORONARY ARTERY BYPASS GRAFT      7 vessels    HERNIA REPAIR Bilateral 11/22/2017    LITHOTRIPSY      PILONIDAL CYST DRAINAGE      removal of abcess      scrotal    TYMPANOSTOMY TUBE PLACEMENT       Family History   Problem Relation Age of Onset    Heart disease Mother     Migraines Mother     Hypertension Mother      Early death Father     Heart disease Father     Diabetes Maternal Aunt     Diabetes Maternal Uncle     Diabetes Maternal Grandfather        Marital Status:   Alcohol History:  reports that he drinks about 1.0 standard drinks of alcohol per week.  Tobacco History:  reports that he quit smoking about 4 years ago. His smoking use included cigarettes. He smoked 0.25 packs per day. He quit smokeless tobacco use about 4 years ago.  Drug History:  reports that he does not use drugs.    Review of patient's allergies indicates:   Allergen Reactions    Inapsine [droperidol] Anaphylaxis     seizures    Effexor [venlafaxine]      Increased anxiety    Pcn [penicillins]     Bactrim [sulfamethoxazole-trimethoprim] Rash     Dry red rash all over when in the sun       Current Outpatient Medications:     anastrozole (ARIMIDEX) 1 mg Tab, Take 1 mg by mouth once a week., Disp: , Rfl:     aspirin 325 MG tablet, Take 325 mg by mouth once daily. , Disp: , Rfl:     baclofen (LIORESAL) 10 MG tablet, Take 1 tablet (10 mg total) by mouth 3 (three) times daily., Disp: 90 tablet, Rfl: 5    clonazePAM (KLONOPIN) 0.5 MG tablet, Take 1 tablet (0.5 mg total) by mouth 2 (two) times daily as needed for Anxiety., Disp: 45 tablet, Rfl: 2    furosemide (LASIX) 20 MG tablet, Take 20 mg by mouth once daily. , Disp: , Rfl:     metoprolol tartrate (LOPRESSOR) 25 MG tablet, Take 1 tablet by mouth 2 (two) times a day., Disp: , Rfl:     ramipriL (ALTACE) 5 MG capsule, Take 1 capsule by mouth once daily., Disp: , Rfl:     rosuvastatin (CRESTOR) 10 MG tablet, Take 0.5 tablets (5 mg total) by mouth once daily., Disp: 90 tablet, Rfl: 0    testosterone cypionate (DEPOTESTOTERONE CYPIONATE) 200 mg/mL injection, Inject 200 mg into the muscle every 7 days., Disp: , Rfl:     DULoxetine (CYMBALTA) 30 MG capsule, Take 1 capsule (30 mg total) by mouth once daily., Disp: 30 capsule, Rfl: 11    FLUoxetine 20 MG capsule, Take 1 capsule (20 mg  "total) by mouth once daily., Disp: 30 capsule, Rfl: 11    Review of Systems   Constitutional: Negative for fatigue, fever and unexpected weight change.   HENT: Negative for ear pain, sinus pressure and sore throat.    Eyes: Negative for pain.   Respiratory: Positive for shortness of breath. Negative for cough.    Cardiovascular: Positive for leg swelling. Negative for chest pain.   Gastrointestinal: Negative for abdominal pain, constipation, nausea and vomiting.   Genitourinary: Negative for dysuria, frequency and urgency.   Musculoskeletal: Positive for back pain. Negative for arthralgias.   Skin: Negative for rash.   Neurological: Negative for dizziness, weakness and headaches.   Psychiatric/Behavioral: Negative for sleep disturbance.          Objective:      Vitals:    06/10/20 0957   Temp: 98.3 °F (36.8 °C)   Weight: 92.1 kg (203 lb)   Height: 5' 7" (1.702 m)     Body mass index is 31.79 kg/m².  Physical Exam   Constitutional: He is oriented to person, place, and time. He appears well-developed and well-nourished.   HENT:   Right Ear: External ear normal.   Left Ear: External ear normal.   Neck: Neck supple. No tracheal deviation present.   Cardiovascular: Normal rate and regular rhythm. Exam reveals no gallop and no friction rub.   No murmur heard.  Pulmonary/Chest: Breath sounds normal. No stridor. He has no wheezes. He has no rales.   Musculoskeletal: He exhibits no edema, tenderness or deformity.   Lymphadenopathy:     He has no cervical adenopathy.   Neurological: He is alert and oriented to person, place, and time. Coordination normal.   Psychiatric: He has a normal mood and affect. Thought content normal.         Assessment:       1. Current moderate episode of major depressive disorder without prior episode    2. MYNOR (generalized anxiety disorder)    3. Hypertension, unspecified type    4. CKD (chronic kidney disease) stage 3, GFR 30-59 ml/min    5. Hyperlipidemia, unspecified hyperlipidemia type    6. " Lumbar radiculopathy         Plan:       Current moderate episode of major depressive disorder without prior episode  Comments:  decrease cymbalta to 30mg  Orders:  -     DULoxetine (CYMBALTA) 30 MG capsule; Take 1 capsule (30 mg total) by mouth once daily.  Dispense: 30 capsule; Refill: 11    MYNOR (generalized anxiety disorder)  Comments:  increase prozac to 20mg  Orders:  -     clonazePAM (KLONOPIN) 0.5 MG tablet; Take 1 tablet (0.5 mg total) by mouth 2 (two) times daily as needed for Anxiety.  Dispense: 45 tablet; Refill: 2    Hypertension, unspecified type    CKD (chronic kidney disease) stage 3, GFR 30-59 ml/min    Hyperlipidemia, unspecified hyperlipidemia type    Lumbar radiculopathy    Other orders  -     FLUoxetine 20 MG capsule; Take 1 capsule (20 mg total) by mouth once daily.  Dispense: 30 capsule; Refill: 11      Follow up in about 4 weeks (around 7/8/2020) for medication management.

## 2020-06-21 DIAGNOSIS — M48.062 SPINAL STENOSIS OF LUMBAR REGION WITH NEUROGENIC CLAUDICATION: ICD-10-CM

## 2020-06-21 DIAGNOSIS — Z98.1 HISTORY OF LUMBAR SPINAL FUSION: ICD-10-CM

## 2020-06-21 DIAGNOSIS — S32.009K PSEUDOARTHROSIS OF LUMBAR SPINE: ICD-10-CM

## 2020-06-21 DIAGNOSIS — M51.36 DDD (DEGENERATIVE DISC DISEASE), LUMBAR: ICD-10-CM

## 2020-06-22 RX ORDER — BACLOFEN 10 MG/1
10 TABLET ORAL 3 TIMES DAILY
Qty: 90 TABLET | Refills: 5 | Status: SHIPPED | OUTPATIENT
Start: 2020-06-22 | End: 2020-11-02

## 2020-07-07 ENCOUNTER — OFFICE VISIT (OUTPATIENT)
Dept: UROLOGY | Facility: CLINIC | Age: 45
End: 2020-07-07
Payer: MEDICAID

## 2020-07-07 VITALS
HEART RATE: 67 BPM | WEIGHT: 200.5 LBS | SYSTOLIC BLOOD PRESSURE: 112 MMHG | HEIGHT: 67 IN | BODY MASS INDEX: 31.47 KG/M2 | DIASTOLIC BLOOD PRESSURE: 76 MMHG

## 2020-07-07 DIAGNOSIS — N52.9 ERECTILE DYSFUNCTION, UNSPECIFIED ERECTILE DYSFUNCTION TYPE: Primary | ICD-10-CM

## 2020-07-07 LAB
BILIRUB SERPL-MCNC: NORMAL MG/DL
BLOOD URINE, POC: NORMAL
CLARITY, POC UA: CLEAR
COLOR, POC UA: YELLOW
GLUCOSE UR QL STRIP: NORMAL
KETONES UR QL STRIP: NORMAL
LEUKOCYTE ESTERASE URINE, POC: NORMAL
NITRITE, POC UA: NORMAL
PH, POC UA: 6
PROTEIN, POC: NORMAL
SPECIFIC GRAVITY, POC UA: 1.02
UROBILINOGEN, POC UA: NORMAL

## 2020-07-07 PROCEDURE — 99214 OFFICE O/P EST MOD 30 MIN: CPT | Mod: S$PBB,,, | Performed by: UROLOGY

## 2020-07-07 PROCEDURE — 99214 PR OFFICE/OUTPT VISIT, EST, LEVL IV, 30-39 MIN: ICD-10-PCS | Mod: S$PBB,,, | Performed by: UROLOGY

## 2020-07-07 PROCEDURE — 81002 URINALYSIS NONAUTO W/O SCOPE: CPT | Mod: PBBFAC,PN | Performed by: UROLOGY

## 2020-07-07 PROCEDURE — 99999 PR PBB SHADOW E&M-EST. PATIENT-LVL III: ICD-10-PCS | Mod: PBBFAC,,, | Performed by: UROLOGY

## 2020-07-07 PROCEDURE — 99999 PR PBB SHADOW E&M-EST. PATIENT-LVL III: CPT | Mod: PBBFAC,,, | Performed by: UROLOGY

## 2020-07-07 PROCEDURE — 99213 OFFICE O/P EST LOW 20 MIN: CPT | Mod: PBBFAC,PN | Performed by: UROLOGY

## 2020-07-07 RX ORDER — TADALAFIL 20 MG/1
TABLET ORAL
Qty: 30 TABLET | Refills: 3 | Status: ON HOLD | OUTPATIENT
Start: 2020-07-07 | End: 2020-11-07 | Stop reason: HOSPADM

## 2020-07-07 RX ORDER — ANASTROZOLE 1 MG/1
TABLET ORAL
COMMUNITY
End: 2020-08-26

## 2020-07-07 NOTE — PROGRESS NOTES
[facility] Progress Note - Stephania  Urology Group: Darlin/Hilario/Felicia/Josué  PCP: Kristi Kennon [facility]: active    Chief Complaint: hypgonadism, microhematuria, h/o stones    Subjective:        HPI: Lee Quintanilla is a 44 y.o. male presents with     Hypgonadism and Fatigue  -Pt had tried T 200 q3 weeks, no improvement in sx, then androgel 2 pumps daily, no improvement in sx and c/o breast enlargement,  then started anastrozole daily. He had no improvement in sx and in sept stopped the androgel and started T100 2x a week (from [place]) and was taking anastrozole 2x a week. I had rec'd he stop this as he was vague complaints, elevated wbc and elevated liver enzymes. We restarted T 100 1x a week with plans to recheck a T,E and CMP. He was referred to hepatology but has been evaluated by  who says that his LFTs have improved since stopping anastrozole and has held off on hepatology appt. She also started him on Iron for iron deficiency (3d ago). He also has elevated lipids but had to hold off on tx bc of elevated LFTs. he was also taking 10 tylenols a day.  -testopel 3/18 resulted in elevated E, which he was symptomatic from. We ended up starting him back on T 100 q 1 week. LFts normal again but H/H high. On anastrozole 1x a week. sx of low T include: tired, sluggish    ED-on this much T without medication he has able to get an erection hard enough for penetration but has poor duration with partner, self ok. Has not had to take cialis.     Microhematuria, atypical urine cytology repeated and was normal, kidney stones  ctu 2/10/17 - 2mm left renal stone, 4mm L proximal ureteral stone  Cystoscopy 2/20/17 - normal, prostatitis, mild b hypertrophy  Urine cytology 6/12/17 - negative, 2/7/17 - atypical cells,  8/25/14 - normal     Repeat ct on 3/20/17 (5 weeks later) to see if stone passed with flomax shows stone still in left proximal ureter without hydro.   4/5/17  "cystoscopy, L ESWL and stent placement on strings. Pt did not pass any cytology   4/13/17 ctrss showed stone had passed but still had 1mm stone in LMP, pt removed stent which was on strings  8/25/17 ctrss showed 2mm LMP stone. Drinks 5 to 6 bottles of water a day.   24 Hour Urine Results From [facility] 11/7/17: Urine Calcium: 346, Urine Ofanncm25, Urine Citrate: 203    Interval history 7/7/20:   Pt lost insurance and was lost to f/u. Last time pt was seen he was on T100q1 week. He was noted to have elevated LFTs and had been on arimidex and cholesterol meds so cause was unclear. He was referred to endocrine for further management of his hypogonadism.     Since I last saw him he had a CABG. He has been having his T treated by [facility], Emma Buenrostro. On T200 q 1 week now for fatigue. On arimidex 2 mg 1x a week.     Has not tried tadalafil 5mg daily. When he took viagra "It worked". In past 2 weeks 1 morning erection. Issue is with getting erection hard enough for penetration and duration. Has significant other and would like to be sexually active at least 2-3x a week. On NG.     Has not had any flank pain or seen any blood in urine.     ua void today: Neg  Urine history  10/22/19 Tr bld  9/4/18  neg  8/23/17 Ng,   7/18/17                        Ng, 1+leuk/1+protein/2+ketones/50 blood - voided, no sx  2/20/17                      ng  1/12/17   1 rbc  5/31/16  4 rbc  8/25/14                      Ng    Labs and psa history -no family history of prostate cancer  7/7/20 5/22/20 T 776.12. psa 1.58, h/h 17.1/50.0, LFts normal on T200 q 1 week  10/22/19 LFts normal. Cr 1.5  1/15/19 psa 0.38, %free 65.79  8/28/18   T 704 (1 d prior to next injection), E 214, psa 0.8, LFTs normal (first time), 17.0/51.7 (highest its been) on T100 q1 week  7/9/18  Asked him to do 200 q3 weeks  5/29/18 T > 1500 (injection day prior)  5/7/18 E 812 5/4/18 LFts elevated, asked him to do 0.5cc/100 1x a week  4/9/18  T 743, E " 409  3/1/18 Testopel 10 pellets  10/3/17 T 728, E 448, 9/25/17 15.3/47, LFts elevated, psa 1.4   7/12/17 T 487, E 80  6/6/17 T 576, E 121  5/30/17 E 227  3/7/17 T 136, switched to androgel, psa 0.98, LFTs normal  2/8/17 psa 1.3  1/13/17 T 168 T200q3 weeks          REVIEW OF SYSTEMS:  General ROS: no fevers, no chills  Psychological ROS: no depression  Endocrine ROS: no heat or cold  Respiratory ROS: no SOB  Cardiovascular ROS: n CP  Gastrointestinal ROS: no abdominal pain, no constipation, no diarrhea, no BRBPR  Musculoskeletal ROS: no muscle pain  Neurological ROS: no headaches  Dermatological ROS: no rashes  HEENT: no glasses, no sinus   ROS: per HPI    Past Medical History:   Diagnosis Date    ADHD (attention deficit hyperactivity disorder)     Arthritis     Asthma     as child only    Back pain     Degeneration of lumbar intervertebral disc 05/2016    Depression     Elevated PSA     Headache     Hyperlipidemia     Hypertension     Kidney damage     stage 3 ; d/t aleve abuse    Kidney stone     Neck pain     Numbness and tingling in hands     Numbness and tingling of both legs     Seizures     from inapsine 2002    Sleep apnea     no cpap     Past Surgical History:   Procedure Laterality Date    BACK SURGERY  2016    back surgery    CORONARY ARTERY BYPASS GRAFT      7 vessels    HERNIA REPAIR Bilateral 11/22/2017    LITHOTRIPSY      PILONIDAL CYST DRAINAGE      removal of abcess      scrotal    TYMPANOSTOMY TUBE PLACEMENT       Social and family hx reviewed  Not working    Urologic meds: testosterone 200q 1 week  Anticoagulation:    Objective:     Vitals:    07/07/20 0926   BP: 112/76   Pulse: 67       General:WDWN in NAD  Eyes: PERRLA, normal conjunctiva  Respiratory: No increased work on breathing.   Cardiovascular: No obvious extremity edema. Warm and well perfused.   GI: palpation of masses. No tenderness. No hepatosplenomegaly to palpation.  Musculoskeletal: normal range of motion  of bilateral upper extremities. Normal muscle strength and tone.  Skin: no obvious rashes or lesions. No tightening of skin noted.  Neurologic: CN grossly normal. Normal sensation.   Psychiatric: awake, alert and oriented x 3. Mood and affect normal. Cooperative.      Recent Labs   Lab 12/04/18  0736 01/15/19  0825 10/22/19  1045   WBC 7.80 5.99 10.59   Hemoglobin 17.8 16.1 18.6 H   Hematocrit 51.2 47.3 55.0 H   Platelets 207 204 241   ]  Recent Labs   Lab 03/29/18  0804  12/18/18  0900 01/15/19  0825 10/14/19  1210 10/22/19  1045   Sodium 137   < >  --  136 139 138   Potassium 3.8   < >  --  3.6 4.0 3.8   Chloride 99   < >  --  101 102 102   CO2 27   < >  --  26 27 23   BUN, Bld 22 H   < >  --  13 14 12   Creatinine 1.4   < >  --  1.2 1.5 H 1.4   Glucose 80   < >  --  101 98 94   Calcium 9.3   < >  --  9.4 10.0 10.0   Phosphorus 2.7  --   --   --   --   --    Alkaline Phosphatase  --    < > 59 60  --  54 L   Total Protein  --    < > 7.8 7.7  --  8.2   Albumin 3.7   < > 4.0 4.0  4.4  --  4.5   Total Bilirubin  --    < > 0.5 0.6  --  0.5   AST  --    < > 28 24  --  26   ALT  --    < > 65 H 45 H  --  39    < > = values in this interval not displayed.   ]    Lab Results   Component Value Date    HGBA1C 5.2 01/15/2019       PATHOLOGY REVIEW: See hpi for recent     Voided urine 6/12/17- Negative for malignant cells.  Urine cytology 2/7/17 - atypical cells  Urine cytology 8/25/14 - negative     RADIOGRAPHIC REVIEW: See hpi for recent     ctrss 8/25/17  2mm LMP stone  Other findings: 3mm nodule in lung unchanged from 2014     ctrss 4/13/17  Appropriately positioned left ureteral stent with resolution of left ureterolithiasis, without residual ureteral stone.  Persistent punctate left nephrolithiasis.     ctrss 3/20/17  ctrss 2/10/17  3mm left upj stone without hydro  2mm left intrarenal stone     kub 2/13/17  Stone in left ureter not visible     ctu 2/10/17  2mm left renal stone, 4mm proximal ureteral stone  Prostate  gland mildly enlarged  Other findings: 8mm hypodense mass in [facility] likely a cyst     CTRSS 9/8/14  punctate nonobstructing renal stone with no hydronephrosis, opaque ureteral stone, ureteral obstruction or acute findings. Tiny fat containing umbilical hernia and small fat containing inguinal hernias also noted     US Scrotum 9/3/14  Echogenic area       rbus 7/24/14  normal    Assessment:       1. Erectile dysfunction, unspecified erectile dysfunction type        Plan:     Previously had a kidney stone and microscopic hematuria  No blood in urine since. Will hold off on further imaging.     Hypogonadism is treated by pcp/ayush nicole/joe marina  On T200q1 week  Should have cbc, psa, T q6 months and TEODORA q1 year   Refer back to me if noted to have rising psa.   psa 1.58 recently, but as high as 1.3 in past     For his Ed will try tadalafil 20mg as needed  Writing from [place]. Most cost effective for him  Can be seen once yearly by me for refills or pcp can refill

## 2020-07-07 NOTE — PATIENT INSTRUCTIONS
Previously had a kidney stone and microscopic hematuria  No blood in urine since. Will hold off on further imaging.     Hypogonadism is treated by pcp/ayush nicole/joe marina  On T200q1 week  Should have cbc, psa, T q6 months and TEODORA q1 year   Refer back to me if noted to have rising psa.   psa 1.58 recently, but as high as 1.3 in past     For his Ed will try tadalafil 20mg as needed  Writing from [facility]. Most cost effective for him  Can be seen once yearly by me for refills or pcp can refill    Erectile Dysfunction Medication Prescription (if brand name too expensive or not covered)     sent tadalafil 20mg to [facility], a compounding pharmacy in [place]  (Tadalafil is the compounded form of Cialis)  You must Call [phone] with credit card and they should ship for free if you do auto-refill      You were written for:   Tadalafil 20mg, 10 tablets $23.00 with refills for the year  If you do auto-refill, they waive the shipping charge but please confirm with them.     Instructions for the 20mg dose:  You Can cut the pill in half if you do not require the 20mg dose.   No more than 1 pill in 36 hours (use as needed)  Take at least 20 min prior to intercourse  Side effects include flushing and headache    Medications available from [facility]- these are compounded forms (not exactly like cialis or viagra)  Tadalafil (cialis) 5mg, 30 tablets $29.50- daily dosing  Tadalafil 20mg (cialis), 10 tablets $23.00-as needed dosing (every 36 hours max)  Sildenafil (viagra) 25mg/50mg/100mg, 6 tablets for $16.27, 10 tablets for $19.53, 35 tablets for 35.80 - as needed dosing (every 24 hours max and must be taken on empty stomach)    [facility]- compounding pharmacy  [facility]  [address] [place] [address], [address]  [place] [address]

## 2020-08-13 DIAGNOSIS — F41.1 GAD (GENERALIZED ANXIETY DISORDER): ICD-10-CM

## 2020-08-13 RX ORDER — CLONAZEPAM 0.5 MG/1
0.5 TABLET ORAL 2 TIMES DAILY PRN
Qty: 45 TABLET | Refills: 2 | Status: SHIPPED | OUTPATIENT
Start: 2020-08-13 | End: 2020-10-26 | Stop reason: SDUPTHER

## 2020-08-17 RX ORDER — DOXYCYCLINE 100 MG/1
100 CAPSULE ORAL 2 TIMES DAILY
Qty: 14 CAPSULE | Refills: 0 | Status: SHIPPED | OUTPATIENT
Start: 2020-08-17 | End: 2020-08-24

## 2020-08-17 NOTE — TELEPHONE ENCOUNTER
Spoke with pt he is having a sore throat no other sx. Per Dr.Casey yates for floresitay. Pt was told to call back if sx worsen or if he started to have any other sx.

## 2020-08-18 ENCOUNTER — PATIENT MESSAGE (OUTPATIENT)
Dept: FAMILY MEDICINE | Facility: CLINIC | Age: 45
End: 2020-08-18

## 2020-08-18 DIAGNOSIS — J03.90 TONSILLITIS: Primary | ICD-10-CM

## 2020-08-19 ENCOUNTER — PATIENT MESSAGE (OUTPATIENT)
Dept: FAMILY MEDICINE | Facility: CLINIC | Age: 45
End: 2020-08-19

## 2020-08-19 DIAGNOSIS — J03.90 TONSILLITIS: Primary | ICD-10-CM

## 2020-08-19 DIAGNOSIS — R22.1 SWOLLEN UVULA: ICD-10-CM

## 2020-08-26 ENCOUNTER — OFFICE VISIT (OUTPATIENT)
Dept: ORTHOPEDICS | Facility: CLINIC | Age: 45
End: 2020-08-26
Payer: MEDICAID

## 2020-08-26 ENCOUNTER — TELEPHONE (OUTPATIENT)
Dept: FAMILY MEDICINE | Facility: CLINIC | Age: 45
End: 2020-08-26

## 2020-08-26 VITALS — DIASTOLIC BLOOD PRESSURE: 84 MMHG | SYSTOLIC BLOOD PRESSURE: 132 MMHG | WEIGHT: 202 LBS | BODY MASS INDEX: 31.64 KG/M2

## 2020-08-26 DIAGNOSIS — M51.36 DDD (DEGENERATIVE DISC DISEASE), LUMBAR: Primary | ICD-10-CM

## 2020-08-26 DIAGNOSIS — M54.50 LUMBAR PAIN: Primary | ICD-10-CM

## 2020-08-26 DIAGNOSIS — M51.36 DDD (DEGENERATIVE DISC DISEASE), LUMBAR: ICD-10-CM

## 2020-08-26 PROCEDURE — 99213 OFFICE O/P EST LOW 20 MIN: CPT | Mod: S$GLB,,, | Performed by: ORTHOPAEDIC SURGERY

## 2020-08-26 PROCEDURE — 99213 PR OFFICE/OUTPT VISIT, EST, LEVL III, 20-29 MIN: ICD-10-PCS | Mod: S$GLB,,, | Performed by: ORTHOPAEDIC SURGERY

## 2020-08-26 RX ORDER — SACUBITRIL AND VALSARTAN 24; 26 MG/1; MG/1
TABLET, FILM COATED ORAL 2 TIMES DAILY
COMMUNITY
Start: 2020-08-13 | End: 2021-10-25

## 2020-08-26 NOTE — TELEPHONE ENCOUNTER
----- Message from Jaylen Jhaveri sent at 8/26/2020 10:13 AM CDT -----  Regarding: referral  Pt is needing an referral to dr ross pt has an appt today for 1048 am   [phone]

## 2020-08-26 NOTE — PROGRESS NOTES
Subjective:       Patient ID: Lee Quintanilla is a 44 y.o. male.    Chief Complaint: Pain of the Lumbar Spine ( MRI done   Lower back pain is the same. Pain radiates down legs and he is here to talk about surgery)      History of Present Illness  Previous history of L4-5 fusion done in 2016 with adjacent segment stenosis at L3-4 he was scheduled to have surgery at L3-4 however in his preoperative workup he was discovered to have a cardiac abnormality that requires heart surgery he now has undergone his heart surgery and returns for reassessment the limb persistent complaints of low back pain dysesthesias and numbness in both legs no bowel or bladder incontinence but in the past had saddle anesthesia  He had a surgery performed in 2016 by Dr. peraza [facility] consisting of a laminectomy and spinal fusion at the L4-5 level using [employer] implants    Current Medications  Current Outpatient Medications   Medication Sig Dispense Refill    anastrozole (ARIMIDEX) 1 mg Tab Take 1 mg by mouth once a week.      aspirin 325 MG tablet Take 325 mg by mouth once daily.       baclofen (LIORESAL) 10 MG tablet Take 1 tablet (10 mg total) by mouth 3 (three) times daily. 90 tablet 5    clonazePAM (KLONOPIN) 0.5 MG tablet Take 1 tablet (0.5 mg total) by mouth 2 (two) times daily as needed for Anxiety. 45 tablet 2    DULoxetine (CYMBALTA) 30 MG capsule Take 1 capsule (30 mg total) by mouth once daily. 30 capsule 11    ENTRESTO 24-26 mg per tablet       FLUoxetine 20 MG capsule Take 1 capsule (20 mg total) by mouth once daily. 30 capsule 11    metoprolol tartrate (LOPRESSOR) 25 MG tablet Take 1 tablet by mouth 2 (two) times a day.      rosuvastatin (CRESTOR) 10 MG tablet Take 0.5 tablets (5 mg total) by mouth once daily. 90 tablet 0    tadalafiL (CIALIS) 20 MG Tab Take 1/2 to 1 tablet as needed prior to intercourse. No more than 1 in 36 hours 30 tablet 3    testosterone cypionate  (DEPOTESTOTERONE CYPIONATE) 200 mg/mL injection Inject 200 mg into the muscle every 7 days.       No current facility-administered medications for this visit.        Allergies  Review of patient's allergies indicates:   Allergen Reactions    Inapsine [droperidol] Anaphylaxis     seizures    Effexor [venlafaxine]      Increased anxiety    Pcn [penicillins]     Bactrim [sulfamethoxazole-trimethoprim] Rash     Dry red rash all over when in the sun       Past Medical History  Past Medical History:   Diagnosis Date    ADHD (attention deficit hyperactivity disorder)     Arthritis     Asthma     as child only    Back pain     Degeneration of lumbar intervertebral disc 05/2016    Depression     Elevated PSA     Headache     Hyperlipidemia     Hypertension     Kidney damage     stage 3 ; d/t aleve abuse    Kidney stone     Neck pain     Numbness and tingling in hands     Numbness and tingling of both legs     Seizures     from inapsine 2002    Sleep apnea     no cpap       Surgical History  Past Surgical History:   Procedure Laterality Date    BACK SURGERY  2016    back surgery    CARDIAC SURGERY      CORONARY ARTERY BYPASS GRAFT      7 vessels    HERNIA REPAIR Bilateral 11/22/2017    LITHOTRIPSY      PILONIDAL CYST DRAINAGE      removal of abcess      scrotal    TYMPANOSTOMY TUBE PLACEMENT         Family History:   Family History   Problem Relation Age of Onset    Heart disease Mother     Migraines Mother     Hypertension Mother     Early death Father     Heart disease Father     Diabetes Maternal Aunt     Diabetes Maternal Uncle     Diabetes Maternal Grandfather        Social History:   Social History     Socioeconomic History    Marital status:      Spouse name: Not on file    Number of children: Not on file    Years of education: Not on file    Highest education level: Not on file   Occupational History    Occupation: disability   Social Needs    Financial resource  strain: Not very hard    Food insecurity     Worry: Not on file     Inability: Not on file    Transportation needs     Medical: Not on file     Non-medical: Not on file   Tobacco Use    Smoking status: Former Smoker     Packs/day: 0.25     Types: Cigarettes     Quit date: 2016     Years since quittin.6    Smokeless tobacco: Former User     Quit date: 2016    Tobacco comment: occasional   Substance and Sexual Activity    Alcohol use: Yes     Alcohol/week: 1.0 standard drinks     Types: 1 Glasses of wine per week     Frequency: 4 or more times a week     Drinks per session: 1 or 2     Binge frequency: Never     Comment: rare    Drug use: No    Sexual activity: Yes     Partners: Female   Lifestyle    Physical activity     Days per week: 3 days     Minutes per session: 20 min    Stress: Not on file   Relationships    Social connections     Talks on phone: More than three times a week     Gets together: More than three times a week     Attends [employer] service: Not on file     Active member of club or organization: No     Attends meetings of clubs or organizations: Never     Relationship status: Patient refused   Other Topics Concern    Not on file   Social History Narrative    Not on file       Hospitalization/Major Diagnostic Procedure:     Review of Systems     General/Constitutional:  Chills denies. Fatigue denies. Fever denies. Weight gain denies. Weight loss denies.    Respiratory:  Shortness of breath denies.    Cardiovascular:  Chest pain denies.    Gastrointestinal:  Constipation denies. Diarrhea denies. Nausea denies. Vomiting denies.     Hematology:  Easy bruising denies. Prolonged bleeding denies.     Genitourinary:  Frequent urination denies. Pain in lower back denies. Painful urination denies.     Musculoskeletal:  See HPI for details    Skin:  Rash denies.    Neurologic:  Dizziness denies. Gait abnormalities denies. Seizures denies. Tingling/Numbess denies.    Psychiatric:   Anxiety denies. Depressed mood denies.     Objective:   Vital Signs:   Vitals:    08/26/20 1051   BP: 132/84        Physical Exam      General Examination:     Constitutional: The patient is alert and oriented to lace person and time. Mood is pleasant.     Head/Face: Normal facial features normal eyebrows    Eyes: Normal extraocular motion bilaterally    Lungs: Respirations are equal and unlabored    Gait is coordinated.    Cardiovascular: There are no swelling or varicosities present.    Lymphatic: Negative for adenopathy    Skin: Normal    Neurological: Level of consciousness normal. Oriented to place person and time and situation    Psychiatric: Oriented to time place person and situation    Well-healed lumbar incision at the L4-5 level noted.  Moderate bilateral paraspinous spasm range of motion is moderately restricted.  Straight leg raising is positive on the left side subjective numbness L4 nerve root distribution noted on the left.  Pulses intact motor exam grossly normal  XRAY Report/ Interpretation:  AP and lateral x-rays of lumbar spine will performed today. Indications low back pain. Findings:  Instrumented lumbar fusion at what I will term as L4-5 since there is a pseudo disc at the L5-S1 level. there is posterior hardware at L4-5  The L3-4 disc is well maintained.  Previous lumbar MRI was personally reviewed this MRI shows evidence of disc degeneration broad-based disc protrusion facet arthropathy and central and foraminal stenosis at what will be termed the L3-4 level that being the next  mobile segment above the instrumented lumbar fusion.    Assessment:       1. Lumbar pain    2. DDD (degenerative disc disease), lumbar        Plan:       Lee was seen today for pain.    Diagnoses and all orders for this visit:    Lumbar pain  -     X-Ray Lumbar Spine Ap And Lateral  -     X-Ray Pelvis Routine AP    DDD (degenerative disc disease), lumbar  -     Ambulatory referral/consult to Orthopedics         No  follow-ups on file.    He has had instrumented lumbar fusion done at without call L4-5 using [employer] instrumentation and now has adjacent segment stenosis he fe This patient has multiple comorbidities that may include but is not limited to cardiovascular disease obesity diabetes hypothyroidism obstructive sleep apnea COPD anxiety and depression. These comorbidities require more complex surgical decision-making and may complicate the postoperative process as well.  The technical aspects of the surgery were discussed in detail with the patient today. The patient was able to verbalize an understanding. The procedure risk benefits alternatives and possible complications of the surgical procedure was discussed including expected outcomes. No guarantees were given regards to outcomes. Consent forms were will be signed at a later date. All questions regarding the surgery itself were answered.   The patient wishes to proceed with surgery and will be scheduled. The patient may require preoperative medical clearance.  his symptoms are intolerable and wishes to proceed      Surgery will consist of a laminectomy at the L3 level with foraminotomy and nerve root decompressions combined  With a posterolateral fusion at L3-4. an interbody fusion at the L3-4 level and we may need to completely remove the existing hardware at the L4-5 level    Existing hardware is [employer] instrumentation    We will require cardiology clearance since he has had heart surgery approximately 8 months ago        This note was created using Dragon voice recognition software that occasionally misinterpreted phrases or words.

## 2020-08-26 NOTE — LETTER
August 26, 2020      Riya Vidales NP  [address]  [address]  [facility] [address]           [facility]  [address]  [place] [address]  Phone: [phone]  Fax: [phone]          Patient: Lee Quintanilla   MR Number: [id number]   YOB: 1975   Date of Visit: 8/26/2020       Dear Riya Vidales:    Thank you for referring Lee Quintanilla to me for evaluation. Attached you will find relevant portions of my assessment and plan of care.    If you have questions, please do not hesitate to call me. I look forward to following Lee Quintanilla along with you.    Sincerely,    Mateusz Blanco MD    Enclosure  CC:  No Recipients    If you would like to receive this communication electronically, please contact [email or web address] or [phone] to request more information on [employer] Link access.    For providers and/or their staff who would like to refer a patient to [facility], please contact us through our one-stop-shop provider referral line, [facility] , at [phone].    If you feel you have received this communication in error or would no longer like to receive these types of communications, please e-mail [email or web address]

## 2020-08-28 ENCOUNTER — OFFICE VISIT (OUTPATIENT)
Dept: OTOLARYNGOLOGY | Facility: CLINIC | Age: 45
End: 2020-08-28
Payer: MEDICAID

## 2020-08-28 VITALS — BODY MASS INDEX: 31.42 KG/M2 | HEIGHT: 67 IN | WEIGHT: 200.19 LBS

## 2020-08-28 DIAGNOSIS — R09.81 NASAL CONGESTION: Primary | ICD-10-CM

## 2020-08-28 DIAGNOSIS — J03.90 TONSILLITIS: ICD-10-CM

## 2020-08-28 DIAGNOSIS — R22.1 SWOLLEN UVULA: ICD-10-CM

## 2020-08-28 PROCEDURE — 99203 OFFICE O/P NEW LOW 30 MIN: CPT | Mod: S$PBB,,, | Performed by: NURSE PRACTITIONER

## 2020-08-28 PROCEDURE — 99203 PR OFFICE/OUTPT VISIT, NEW, LEVL III, 30-44 MIN: ICD-10-PCS | Mod: S$PBB,,, | Performed by: NURSE PRACTITIONER

## 2020-08-28 PROCEDURE — 99999 PR PBB SHADOW E&M-EST. PATIENT-LVL IV: CPT | Mod: PBBFAC,,, | Performed by: NURSE PRACTITIONER

## 2020-08-28 PROCEDURE — 99999 PR PBB SHADOW E&M-EST. PATIENT-LVL IV: ICD-10-PCS | Mod: PBBFAC,,, | Performed by: NURSE PRACTITIONER

## 2020-08-28 PROCEDURE — 99214 OFFICE O/P EST MOD 30 MIN: CPT | Mod: PBBFAC,PO | Performed by: NURSE PRACTITIONER

## 2020-08-28 RX ORDER — FLUTICASONE PROPIONATE 50 MCG
1 SPRAY, SUSPENSION (ML) NASAL 2 TIMES DAILY
Qty: 16 G | Refills: 12 | Status: SHIPPED | OUTPATIENT
Start: 2020-08-28 | End: 2021-08-28

## 2020-08-28 RX ORDER — AZELASTINE 1 MG/ML
1 SPRAY, METERED NASAL 2 TIMES DAILY
Qty: 30 ML | Refills: 12 | Status: ON HOLD | OUTPATIENT
Start: 2020-08-28 | End: 2022-12-11 | Stop reason: HOSPADM

## 2020-08-28 NOTE — PROGRESS NOTES
Subjective:       Patient ID: Lee Quintanilla is a 44 y.o. male.    Chief Complaint: Other (swollen tonsils, swollen uvula)    HPI   Patient is new to ENT, referred by NIKKI Vidales for consultation for tonsils. Patient states he woke up with swollen uvula and tonsils on 8/14/20. He felt like he had a golf ball on the left side of his throat with swallowing. No throat cultures available. His PCP called in Doxycycline on 8/17/20. Better now. Patient states this happens about 3-4 times per year.  He typically does not seek treatment when these episodes occur. They tend to resolve spontaneously.    Review of Systems   Constitutional: Negative.    HENT: Positive for sore throat and trouble swallowing.    Eyes: Negative.    Respiratory: Negative.    Cardiovascular: Negative.    Gastrointestinal: Negative.    Musculoskeletal: Negative.    Integumentary:  Negative.   Neurological: Negative.    Hematological: Negative.    Psychiatric/Behavioral: Negative.          Objective:      Physical Exam  Vitals signs and nursing note reviewed.   Constitutional:       General: He is not in acute distress.     Appearance: He is well-developed. He is not ill-appearing or diaphoretic.   HENT:      Head: Normocephalic and atraumatic.      Right Ear: Hearing, tympanic membrane, ear canal and external ear normal. No middle ear effusion. Tympanic membrane is not erythematous.      Left Ear: Hearing, tympanic membrane, ear canal and external ear normal.  No middle ear effusion. Tympanic membrane is not erythematous.      Nose: Mucosal edema and rhinorrhea present.      Right Turbinates: Swollen.      Right Sinus: No maxillary sinus tenderness or frontal sinus tenderness.      Left Sinus: No maxillary sinus tenderness or frontal sinus tenderness.      Mouth/Throat:      Mouth: Mucous membranes are not pale, not dry and not cyanotic. No oral lesions.      Pharynx: Uvula midline. No oropharyngeal exudate or posterior oropharyngeal erythema.       Tonsils: 2+ on the right. 2+ on the left.   Eyes:      General: Lids are normal. No scleral icterus.        Right eye: No discharge.         Left eye: No discharge.      Conjunctiva/sclera: Conjunctivae normal.      Pupils: Pupils are equal, round, and reactive to light.   Neck:      Musculoskeletal: Normal range of motion and neck supple.      Thyroid: No thyroid mass or thyromegaly.      Trachea: Trachea normal. No tracheal deviation.   Cardiovascular:      Rate and Rhythm: Normal rate.   Pulmonary:      Effort: Pulmonary effort is normal. No respiratory distress.      Breath sounds: No stridor. No wheezing.   Musculoskeletal: Normal range of motion.   Lymphadenopathy:      Head:      Right side of head: No submental, submandibular, tonsillar, preauricular or posterior auricular adenopathy.      Left side of head: No submental, submandibular, tonsillar, preauricular or posterior auricular adenopathy.      Cervical: No cervical adenopathy.      Right cervical: No superficial or posterior cervical adenopathy.     Left cervical: No superficial or posterior cervical adenopathy.   Skin:     General: Skin is warm and dry.      Coloration: Skin is not pale.      Findings: No lesion or rash.   Neurological:      Mental Status: He is alert and oriented to person, place, and time.      Coordination: Coordination normal.      Gait: Gait normal.   Psychiatric:         Speech: Speech normal.         Behavior: Behavior normal. Behavior is cooperative.         Thought Content: Thought content normal.         Judgment: Judgment normal.         Assessment:       1. Nasal congestion    2. Tonsillitis    3. Swollen uvula        Plan:     Discussed criteria for tonsillectomy:  At least seven episodes in the previous year, at least five episodes in each of the previous two years, or at least three episodes in each of the previous three years    encouraged pt to be seen here or PCP or UC in person for future episodes for throat  swab/culture w/exam  flonase & astelin for nasal congestion. Saline.  Return to clinic as needed for further ENT concerns.

## 2020-08-28 NOTE — PATIENT INSTRUCTIONS
"DIFFERENT TYPES OF "ENT-APPROVED" NASAL SPRAYS:  · Flonase / fluticasone / Nasacort / Rhinocort (steroid spray) is best for stuffy, pressure, fullness.  · Astelin / azelastine (antihistamine spray) is best for itchy, drippy, sneezy.    Use as directed, spraying 1-2 times in each nostril each day. It may take 2-3 days to 2-3 weeks to begin seeing improvement. This medication needs to be taken consistently to see results.     Nasal spray instructions:  Blow nose first gently to clean. Keep chin level with the floor (do not tilt head forward or back). Using the opposite hand (example: right hand for left nostril, left hand for right nostril) insert nasal spray taking caution to direct it AWAY from the middle wall inside the nose (septum) to avoid irritating nasal septum which could cause nosebleed.  Do not tilt spray up but rather flat and out along the roof of your mouth to spray. Angle the tip of the spray out slightly toward the direction of the ears; then spray. Do not take quick vigorous sniff but rather slow gentle inhalation while waiting for medication to absorb into nasal passages. Then administer second spray in same way.     Overall, this is a well-tolerated medication with low side effects. The benefit of nasal steroids as opposed to oral steroids is that the nasal steroid spray works primarily in the nose. Common side effects can include: headache, nasal dryness, minor nose bleed.  Rare side effects may include:  septal perforation, elevation in eye pressure, dry eyes, change in smell, allergic reaction.  Notify your provider if you have any concerns or experience these symptoms.     EUSTACHIAN TUBE INSTRUCTIONS:  Nasal valsalva:  Pinch nose and with closed mouth try to "pop" air into ears through the back of the nose. Attempt this several times a day. The more popping you have, the more likely the fluid will resolve.     Nasal saline rinse kit (use Neti pot or ChorPpay sinus rinse kit) -- Rinse your " sinuses once to twice daily to reduce the allergen burden in your nose. Use sterile water (boiled tap water which has cooled) or distilled bottled water. Add 1/4 teaspoon sea salt and a pinch of baking soda or a mixture packet from the maker of your sinus rinse kit.  Rinse through both sides of nose to cleanse sinus and nasal passages, bending forward with head tilted down. Keep your mouth open, without holding your breath. Squeeze bottle gently until solution starts draining from the opposite nasal passage. After bottle is empty, blow nose very gently, without pinching nose completely, to avoid pressure on eardrums.  There are useful YouTube videos that show demonstration of how to do these properly.     Ponaris Nasal Emollient is used for the relief of: nasal congestion due to colds, nasal irritation, allergy exacerbations, nasal crusting. Specifically prepared iodized organic oils of pine, eucalyptus, peppermint, cajeput, and cottonseed. To order Ponaris: ask your pharmacist to order it for you or we carry it in our pharmacy downstairs on the first floor.

## 2020-08-28 NOTE — LETTER
August 28, 2020      Riya Vidales NP  [address]  [address]  [place] [address]           [facility]  [address] [place] [address]  Phone: [phone]  Fax: [phone]          Patient: Lee Quintanilla   MR Number: [id number]   YOB: 1975   Date of Visit: 8/28/2020       Dear Riya Vidales:    Thank you for referring Lee Quintanilla to me for evaluation. Attached you will find relevant portions of my assessment and plan of care.    If you have questions, please do not hesitate to call me. I look forward to following Lee Quintanilla along with you.    Sincerely,    Leigh Ann Plaza NP    Enclosure  CC:  No Recipients    If you would like to receive this communication electronically, please contact [email or web address] or [phone] to request more information on [employer] Link access.    For providers and/or their staff who would like to refer a patient to [facility], please contact us through our one-stop-shop provider referral line, [facility] , at [phone].    If you feel you have received this communication in error or would no longer like to receive these types of communications, please e-mail [email or web address]

## 2020-09-09 ENCOUNTER — TELEPHONE (OUTPATIENT)
Dept: OTOLARYNGOLOGY | Facility: CLINIC | Age: 45
End: 2020-09-09

## 2020-09-09 NOTE — TELEPHONE ENCOUNTER
S/w pt that states his tonsils are swollen again and needs an appt with Leigh Ann. Booked appt on Monday, pt confirmed.

## 2020-09-09 NOTE — TELEPHONE ENCOUNTER
----- Message from Yolande Benavidez sent at 9/9/2020 11:20 AM CDT -----  Regarding: Same Day Appt  Type:  Same Day Appointment Request    Caller is requesting a same day appointment.  Caller declined first available appointment listed below.      Name of Caller: pt  When is the first available appointment?  9/22  Symptoms:  swollen tonsils  Best Call Back Number:  [phone]

## 2020-09-14 ENCOUNTER — OFFICE VISIT (OUTPATIENT)
Dept: OTOLARYNGOLOGY | Facility: CLINIC | Age: 45
End: 2020-09-14
Payer: MEDICAID

## 2020-09-14 VITALS — HEIGHT: 67 IN | WEIGHT: 198.44 LBS | BODY MASS INDEX: 31.15 KG/M2

## 2020-09-14 DIAGNOSIS — J02.9 PHARYNGITIS, UNSPECIFIED ETIOLOGY: ICD-10-CM

## 2020-09-14 DIAGNOSIS — K12.2 UVULITIS: Primary | ICD-10-CM

## 2020-09-14 PROCEDURE — 99999 PR PBB SHADOW E&M-EST. PATIENT-LVL IV: ICD-10-PCS | Mod: PBBFAC,,, | Performed by: NURSE PRACTITIONER

## 2020-09-14 PROCEDURE — 99214 OFFICE O/P EST MOD 30 MIN: CPT | Mod: PBBFAC,PO | Performed by: NURSE PRACTITIONER

## 2020-09-14 PROCEDURE — 99214 OFFICE O/P EST MOD 30 MIN: CPT | Mod: S$PBB,,, | Performed by: NURSE PRACTITIONER

## 2020-09-14 PROCEDURE — 99214 PR OFFICE/OUTPT VISIT, EST, LEVL IV, 30-39 MIN: ICD-10-PCS | Mod: S$PBB,,, | Performed by: NURSE PRACTITIONER

## 2020-09-14 PROCEDURE — 99999 PR PBB SHADOW E&M-EST. PATIENT-LVL IV: CPT | Mod: PBBFAC,,, | Performed by: NURSE PRACTITIONER

## 2020-09-14 RX ORDER — CLINDAMYCIN HYDROCHLORIDE 300 MG/1
300 CAPSULE ORAL 3 TIMES DAILY
Qty: 10 CAPSULE | Refills: 0 | Status: SHIPPED | OUTPATIENT
Start: 2020-09-14 | End: 2020-09-17

## 2020-09-14 RX ORDER — CLINDAMYCIN HYDROCHLORIDE 300 MG/1
CAPSULE ORAL
COMMUNITY
Start: 2020-09-11 | End: 2020-10-29

## 2020-09-14 RX ORDER — METHYLPREDNISOLONE 4 MG/1
TABLET ORAL
Qty: 21 TABLET | Refills: 0 | Status: SHIPPED | OUTPATIENT
Start: 2020-09-14 | End: 2020-10-29

## 2020-09-14 NOTE — PATIENT INSTRUCTIONS
Some of the Top Considerations for Recurrent Sore Throat:     1. Nasal allergies -- Typical constellation of symptoms seen with nasal allergies: itchy, red, watery eyes; itchy, red, watery nose; excessive sneezing; excessive stuffiness. Discuss with your primary care provider whether you should see an allergist or take daily allergy medications.     2. Silent reflux -- Typical constellation of symptoms seen with silent reflux: post-nasal drip sensation with absence of significant runny nose or nasal congestion, sensation of thick or too much mucus in the back of throat, raspy voice, frequent throat clearing, lump in the back of throat, frequent sore throats. Discuss with your primary care provider whether you should see a gastroenterologist or take daily reflux medications.     3. Sinus Infection -- Typical constellation of symptoms seen with acute bacterial sinus infection are:  Green-gold, foul-smelling, foul-tasting mucus from nose and throat, inability to breathe through nose, inability to smell or taste well, facial pain and swelling, dental pain, headaches around eyes, sore throat and productive cough. Sinus imaging may be needed to rule out infection if these symptoms are present.    4. Pharyngitis/Tonsillitis -- Typical constellation of symptoms seen with acute bacterial tonsillitis/pharyngitis are:  Smelly pus (exudate), very red inflamed throat, swollen lymph nodes, fever, general malaise, absence of cough. If these symptoms are present, you may need a throat swab.    Criteria for tonsillectomy:  At least seven episodes in the previous year, at least five episodes in each of the previous two years, or at least three episodes in each of the previous three years.

## 2020-09-14 NOTE — PROGRESS NOTES
Subjective:       Patient ID: Lee Quintanilla is a 45 y.o. male.    Chief Complaint: No chief complaint on file.    HPI   Patient was seen by me 3 weeks ago for his tonsils. Patient states he wakes up with swollen uvula/tonsils approximately 3-4 times per year.  Doxycycline called in by PCP for episode in August 2020. Then returned again last week; went to Urgent Care in [place] where strep swab was negative; culture and labs done (no results yet); given Clindamcyin 300 mg TID X 7 days. Throat pain is still 5-6/10. Hurts to swallow. Uvula swollen causing constant swallowing, gagging.     Review of Systems   Constitutional: Negative.    HENT: Positive for sore throat and trouble swallowing.    Eyes: Negative.    Respiratory: Negative.    Cardiovascular: Negative.    Gastrointestinal: Negative.    Musculoskeletal: Negative.    Integumentary:  Negative.   Neurological: Negative.    Hematological: Negative.    Psychiatric/Behavioral: Negative.          Objective:      Physical Exam  Vitals signs and nursing note reviewed.   Constitutional:       General: He is not in acute distress.     Appearance: He is well-developed. He is not ill-appearing or diaphoretic.   HENT:      Head: Normocephalic and atraumatic.      Right Ear: Hearing, tympanic membrane, ear canal and external ear normal. No middle ear effusion. Tympanic membrane is not erythematous.      Left Ear: Hearing, tympanic membrane, ear canal and external ear normal.  No middle ear effusion. Tympanic membrane is not erythematous.      Nose: Mucosal edema present.      Right Sinus: No maxillary sinus tenderness or frontal sinus tenderness.      Left Sinus: No maxillary sinus tenderness or frontal sinus tenderness.      Mouth/Throat:      Mouth: Mucous membranes are not pale, not dry and not cyanotic. No oral lesions.      Pharynx: Uvula midline. No oropharyngeal exudate or posterior oropharyngeal erythema.      Tonsils: No tonsillar exudate or tonsillar  abscesses. 3+ on the right. 3+ on the left.     Eyes:      General: Lids are normal. No scleral icterus.        Right eye: No discharge.         Left eye: No discharge.      Conjunctiva/sclera: Conjunctivae normal.      Pupils: Pupils are equal, round, and reactive to light.   Neck:      Musculoskeletal: Normal range of motion and neck supple.      Thyroid: No thyroid mass or thyromegaly.      Trachea: Trachea normal. No tracheal deviation.   Cardiovascular:      Rate and Rhythm: Normal rate.   Pulmonary:      Effort: Pulmonary effort is normal. No respiratory distress.      Breath sounds: No stridor. No wheezing.   Musculoskeletal: Normal range of motion.   Lymphadenopathy:      Head:      Right side of head: No submental, submandibular, tonsillar, preauricular or posterior auricular adenopathy.      Left side of head: No submental, submandibular, tonsillar, preauricular or posterior auricular adenopathy.      Cervical: No cervical adenopathy.      Right cervical: No superficial or posterior cervical adenopathy.     Left cervical: No superficial or posterior cervical adenopathy.   Skin:     General: Skin is warm and dry.      Coloration: Skin is not pale.      Findings: No lesion or rash.   Neurological:      Mental Status: He is alert and oriented to person, place, and time.      Coordination: Coordination normal.      Gait: Gait normal.   Psychiatric:         Speech: Speech normal.         Behavior: Behavior normal. Behavior is cooperative.         Thought Content: Thought content normal.         Judgment: Judgment normal.         Assessment:     Uvulitis/Pharyngitis      Plan:     Reviewed criteria for tonsillectomy:  At least seven episodes in the previous year, at least five episodes in each of the previous two years, or at least three episodes in each of the previous three years.     Pt agrees to have his recent culture/lab results from  faxed to me  Added 3 more days to his current 7 day regimen of  Clindamycin, for a total of 10 days. MDP also added.   Return to clinic as needed for further ENT concerns.

## 2020-10-21 DIAGNOSIS — M51.36 DDD (DEGENERATIVE DISC DISEASE), LUMBAR: ICD-10-CM

## 2020-10-21 DIAGNOSIS — Z98.1 HISTORY OF LUMBAR SPINAL FUSION: ICD-10-CM

## 2020-10-21 DIAGNOSIS — M54.50 LUMBAR PAIN: Primary | ICD-10-CM

## 2020-10-21 DIAGNOSIS — Z01.818 PREOP EXAMINATION: ICD-10-CM

## 2020-10-21 DIAGNOSIS — M51.36 DEGENERATIVE LUMBAR DISC: ICD-10-CM

## 2020-10-21 PROBLEM — M51.369 DEGENERATIVE LUMBAR DISC: Status: ACTIVE | Noted: 2020-10-21

## 2020-10-21 RX ORDER — MUPIROCIN 20 MG/G
OINTMENT TOPICAL
Status: CANCELLED | OUTPATIENT
Start: 2020-10-21

## 2020-10-22 DIAGNOSIS — F41.1 GAD (GENERALIZED ANXIETY DISORDER): ICD-10-CM

## 2020-10-26 DIAGNOSIS — F41.1 GAD (GENERALIZED ANXIETY DISORDER): ICD-10-CM

## 2020-10-27 DIAGNOSIS — F32.1 CURRENT MODERATE EPISODE OF MAJOR DEPRESSIVE DISORDER WITHOUT PRIOR EPISODE: ICD-10-CM

## 2020-10-27 RX ORDER — CLONAZEPAM 0.5 MG/1
0.5 TABLET ORAL 2 TIMES DAILY PRN
Qty: 45 TABLET | Refills: 2 | Status: SHIPPED | OUTPATIENT
Start: 2020-10-27 | End: 2021-01-06 | Stop reason: SDUPTHER

## 2020-10-27 RX ORDER — CLONAZEPAM 0.5 MG/1
0.5 TABLET ORAL 2 TIMES DAILY PRN
Qty: 45 TABLET | Refills: 2 | OUTPATIENT
Start: 2020-10-27 | End: 2021-10-27

## 2020-10-27 RX ORDER — DULOXETIN HYDROCHLORIDE 30 MG/1
30 CAPSULE, DELAYED RELEASE ORAL DAILY
Qty: 30 CAPSULE | Refills: 11 | Status: SHIPPED | OUTPATIENT
Start: 2020-10-27 | End: 2021-01-06

## 2020-10-29 ENCOUNTER — HOSPITAL ENCOUNTER (OUTPATIENT)
Dept: PREADMISSION TESTING | Facility: HOSPITAL | Age: 45
Discharge: HOME | End: 2020-10-29
Attending: ORTHOPAEDIC SURGERY
Payer: MEDICAID

## 2020-10-29 ENCOUNTER — HOSPITAL ENCOUNTER (OUTPATIENT)
Dept: RADIOLOGY | Facility: HOSPITAL | Age: 45
Discharge: HOME | End: 2020-10-29
Attending: ORTHOPAEDIC SURGERY
Payer: MEDICAID

## 2020-10-29 VITALS — BODY MASS INDEX: 30.76 KG/M2 | HEIGHT: 67 IN | WEIGHT: 196 LBS

## 2020-10-29 DIAGNOSIS — M51.36 DDD (DEGENERATIVE DISC DISEASE), LUMBAR: ICD-10-CM

## 2020-10-29 DIAGNOSIS — M51.36 DEGENERATIVE LUMBAR DISC: Primary | ICD-10-CM

## 2020-10-29 DIAGNOSIS — M54.50 LUMBAR PAIN: ICD-10-CM

## 2020-10-29 DIAGNOSIS — Z98.1 HISTORY OF LUMBAR SPINAL FUSION: ICD-10-CM

## 2020-10-29 LAB
ABO + RH BLD: NORMAL
ALBUMIN SERPL BCP-MCNC: 3.8 G/DL (ref 3.5–5.2)
ALP SERPL-CCNC: 59 U/L (ref 55–135)
ALT SERPL W/O P-5'-P-CCNC: 24 U/L (ref 10–44)
ANION GAP SERPL CALC-SCNC: 10 MMOL/L (ref 8–16)
AST SERPL-CCNC: 16 U/L (ref 10–40)
BASOPHILS # BLD AUTO: 0.05 K/UL (ref 0–0.2)
BASOPHILS NFR BLD: 0.7 % (ref 0–1.9)
BILIRUB SERPL-MCNC: 0.5 MG/DL (ref 0.1–1)
BILIRUB UR QL STRIP: NEGATIVE
BLD GP AB SCN CELLS X3 SERPL QL: NORMAL
BUN SERPL-MCNC: 16 MG/DL (ref 6–20)
CALCIUM SERPL-MCNC: 8.9 MG/DL (ref 8.7–10.5)
CHLORIDE SERPL-SCNC: 103 MMOL/L (ref 95–110)
CLARITY UR: CLEAR
CO2 SERPL-SCNC: 26 MMOL/L (ref 23–29)
COLOR UR: YELLOW
CREAT SERPL-MCNC: 1.4 MG/DL (ref 0.5–1.4)
DIFFERENTIAL METHOD: ABNORMAL
EOSINOPHIL # BLD AUTO: 0.5 K/UL (ref 0–0.5)
EOSINOPHIL NFR BLD: 7.3 % (ref 0–8)
ERYTHROCYTE [DISTWIDTH] IN BLOOD BY AUTOMATED COUNT: 15 % (ref 11.5–14.5)
EST. GFR  (AFRICAN AMERICAN): >60 ML/MIN/1.73 M^2
EST. GFR  (NON AFRICAN AMERICAN): >60 ML/MIN/1.73 M^2
GLUCOSE SERPL-MCNC: 78 MG/DL (ref 70–110)
GLUCOSE UR QL STRIP: NEGATIVE
HCT VFR BLD AUTO: 50.9 % (ref 40–54)
HGB BLD-MCNC: 16.5 G/DL (ref 14–18)
HGB UR QL STRIP: NEGATIVE
IMM GRANULOCYTES # BLD AUTO: 0.02 K/UL (ref 0–0.04)
IMM GRANULOCYTES NFR BLD AUTO: 0.3 % (ref 0–0.5)
KETONES UR QL STRIP: NEGATIVE
LEUKOCYTE ESTERASE UR QL STRIP: NEGATIVE
LYMPHOCYTES # BLD AUTO: 1.2 K/UL (ref 1–4.8)
LYMPHOCYTES NFR BLD: 18.1 % (ref 18–48)
MCH RBC QN AUTO: 30.3 PG (ref 27–31)
MCHC RBC AUTO-ENTMCNC: 32.4 G/DL (ref 32–36)
MCV RBC AUTO: 94 FL (ref 82–98)
MONOCYTES # BLD AUTO: 0.5 K/UL (ref 0.3–1)
MONOCYTES NFR BLD: 7.5 % (ref 4–15)
NEUTROPHILS # BLD AUTO: 4.4 K/UL (ref 1.8–7.7)
NEUTROPHILS NFR BLD: 66.1 % (ref 38–73)
NITRITE UR QL STRIP: NEGATIVE
NRBC BLD-RTO: 0 /100 WBC
PH UR STRIP: 6 [PH] (ref 5–8)
PLATELET # BLD AUTO: 168 K/UL (ref 150–350)
PMV BLD AUTO: 11.2 FL (ref 9.2–12.9)
POTASSIUM SERPL-SCNC: 4.5 MMOL/L (ref 3.5–5.1)
PROT SERPL-MCNC: 7.3 G/DL (ref 6–8.4)
PROT UR QL STRIP: NEGATIVE
RBC # BLD AUTO: 5.44 M/UL (ref 4.6–6.2)
SODIUM SERPL-SCNC: 139 MMOL/L (ref 136–145)
SP GR UR STRIP: 1.02 (ref 1–1.03)
URN SPEC COLLECT METH UR: NORMAL
UROBILINOGEN UR STRIP-ACNC: NEGATIVE EU/DL
WBC # BLD AUTO: 6.67 K/UL (ref 3.9–12.7)

## 2020-10-29 PROCEDURE — 99900103 DSU ONLY-NO CHARGE-INITIAL HR (STAT)

## 2020-10-29 PROCEDURE — 71046 X-RAY EXAM CHEST 2 VIEWS: CPT | Mod: 26,,, | Performed by: RADIOLOGY

## 2020-10-29 PROCEDURE — 80053 COMPREHEN METABOLIC PANEL: CPT

## 2020-10-29 PROCEDURE — 36415 COLL VENOUS BLD VENIPUNCTURE: CPT

## 2020-10-29 PROCEDURE — 86850 RBC ANTIBODY SCREEN: CPT

## 2020-10-29 PROCEDURE — 71046 X-RAY EXAM CHEST 2 VIEWS: CPT | Mod: TC,FY

## 2020-10-29 PROCEDURE — 99900104 DSU ONLY-NO CHARGE-EA ADD'L HR (STAT)

## 2020-10-29 PROCEDURE — 85025 COMPLETE CBC W/AUTO DIFF WBC: CPT

## 2020-10-29 PROCEDURE — 71046 XR CHEST PA AND LATERAL: ICD-10-PCS | Mod: 26,,, | Performed by: RADIOLOGY

## 2020-10-29 PROCEDURE — 87081 CULTURE SCREEN ONLY: CPT

## 2020-10-29 PROCEDURE — 81003 URINALYSIS AUTO W/O SCOPE: CPT

## 2020-10-29 PROCEDURE — 93010 ELECTROCARDIOGRAM REPORT: CPT | Mod: ,,, | Performed by: SPECIALIST

## 2020-10-29 PROCEDURE — 93010 EKG 12-LEAD: ICD-10-PCS | Mod: ,,, | Performed by: SPECIALIST

## 2020-10-29 PROCEDURE — 93005 ELECTROCARDIOGRAM TRACING: CPT

## 2020-10-29 NOTE — DISCHARGE INSTRUCTIONS
To confirm, Your doctor has instructed you that surgery is scheduled for:     Please report to [facility], Registration the morning of surgery. You must check-in and receive a wristband before going to your procedure.    Pre-Op will call the afternoon prior to surgery between 1:00 and 6:00 PM with the final arrival time.  Phone number: [phone]    PLEASE NOTE:  The surgery schedule has many variables which may affect the time of your surgery case.  Family members should be available if your surgery time changes.  Plan to be here the day of your procedure between 4-6 hours.    MEDICATIONS:  TAKE ONLY THESE MEDICATIONS WITH A SMALL SIP OF WATER THE MORNING OF YOUR PROCEDURE:      DO NOT TAKE THESE MEDICATIONS 5-7 DAYS PRIOR to your procedure or per your surgeon's request: ASPIRIN, ALEVE, ADVIL, IBUPROFEN, FISH OIL VITAMIN E, HERBALS  (May take Tylenol)    ONLY if you are prescribed any types of blood thinners such as:  Aspirin, Coumadin, Plavix, Pradaxa, Xarelto, Aggrenox, Effient, Eliquis, Savasya, Brilinta, or any other, ask your surgeon whether you should stop taking them and how long before surgery you should stop.  You may also need to verify with the prescribing physician if it is ok to stop your medication.      INSTRUCTIONS IMPORTANT!!  · Do not eat or drink anything between midnight and the time of your procedure- this includes gum, mints, and candy.  · Do not smoke or drink alcoholic beverages 24 hours prior to your procedure.  · Shower the night before AND the morning of your procedure with a Chlorhexidine wash such as Hibiclens or Dial antibacterial soap from the neck down.  Do not get it on your face or in your eyes.  You may use your own shampoo and face wash. This helps your skin to be as bacteria free as possible.    · If you wear contact lenses, dentures, hearing aids or glasses, bring a container to put them in during surgery and give to a family member for safe keeping.   Please leave all jewelry, piercing's and valuables at home.   · DO NOT remove hair from the surgery site.  Do not shave the incision site unless you are given specific instructions to do so.    · ONLY if you have been diagnosed with sleep apnea please bring your C-PAP machine.  · ONLY if you wear home oxygen please bring your portable oxygen tank the day of your procedure.  · ONLY if you have a history of OPEN HEART SURGERY you will need a clearance from your Cardiologist per Anesthesia.      · ONLY for patients requiring bowel prep, written instructions will be given by your doctor's office.  · ONLY if you have a neuro stimulator, please bring the controller with you the morning of surgery  · ONLY if a type and screen test is needed before surgery, please return:  · If your doctor has scheduled you for an overnight stay, bring a small overnight bag with any personal items you need.  · Make arrangements in advance for transportation home by a responsible adult.  It is not safe to drive a vehicle during the 24 hours after anesthesia.     · ONLY ONE VISITOR PER PATIENT IS ALLOWED TO COME IN THE HOSPITAL THE DAY OF PROCEDURE.   · Visiting hours are 8:00AM-6:00PM. For the safety of all patients, visitors under the age of 12 are not allowed above the first floor of the hospital.    · All [facility] facilities and properties are tobacco free.  Smoking is NOT allowed.       If you have any questions about these instructions, call Pre-Op Admit  Nursing at [phone] or the Pre-Op Day Surgery Unit at [phone].

## 2020-10-30 ENCOUNTER — ANESTHESIA EVENT (OUTPATIENT)
Dept: SURGERY | Facility: HOSPITAL | Age: 45
DRG: 454 | End: 2020-10-30
Payer: MEDICAID

## 2020-10-31 LAB — MRSA SPEC QL CULT: NORMAL

## 2020-11-02 ENCOUNTER — LAB VISIT (OUTPATIENT)
Dept: PRIMARY CARE CLINIC | Facility: CLINIC | Age: 45
End: 2020-11-02
Payer: MEDICAID

## 2020-11-02 DIAGNOSIS — Z01.818 PREOP EXAMINATION: ICD-10-CM

## 2020-11-02 PROCEDURE — U0003 INFECTIOUS AGENT DETECTION BY NUCLEIC ACID (DNA OR RNA); SEVERE ACUTE RESPIRATORY SYNDROME CORONAVIRUS 2 (SARS-COV-2) (CORONAVIRUS DISEASE [COVID-19]), AMPLIFIED PROBE TECHNIQUE, MAKING USE OF HIGH THROUGHPUT TECHNOLOGIES AS DESCRIBED BY CMS-2020-01-R: HCPCS

## 2020-11-03 LAB — SARS-COV-2 RNA RESP QL NAA+PROBE: NOT DETECTED

## 2020-11-05 ENCOUNTER — HOSPITAL ENCOUNTER (INPATIENT)
Facility: HOSPITAL | Age: 45
LOS: 2 days | Discharge: HOME | DRG: 454 | End: 2020-11-07
Attending: ORTHOPAEDIC SURGERY | Admitting: INTERNAL MEDICINE
Payer: MEDICAID

## 2020-11-05 ENCOUNTER — ANESTHESIA (OUTPATIENT)
Dept: SURGERY | Facility: HOSPITAL | Age: 45
DRG: 454 | End: 2020-11-05
Payer: MEDICAID

## 2020-11-05 DIAGNOSIS — Z98.1 HISTORY OF LUMBAR SPINAL FUSION: ICD-10-CM

## 2020-11-05 DIAGNOSIS — M51.36 DEGENERATIVE LUMBAR DISC: ICD-10-CM

## 2020-11-05 DIAGNOSIS — M51.36 DDD (DEGENERATIVE DISC DISEASE), LUMBAR: ICD-10-CM

## 2020-11-05 DIAGNOSIS — M54.50 LUMBAR PAIN: ICD-10-CM

## 2020-11-05 DIAGNOSIS — Z98.890 HISTORY OF LUMBAR SURGERY: Primary | ICD-10-CM

## 2020-11-05 DIAGNOSIS — M50.30 DEGENERATIVE DISC DISEASE, CERVICAL: ICD-10-CM

## 2020-11-05 PROBLEM — I25.810 CORONARY ARTERY DISEASE INVOLVING CORONARY BYPASS GRAFT: Status: ACTIVE | Noted: 2020-11-05

## 2020-11-05 LAB
ANION GAP SERPL CALC-SCNC: 12 MMOL/L (ref 8–16)
BASOPHILS # BLD AUTO: 0.03 K/UL (ref 0–0.2)
BASOPHILS NFR BLD: 0.4 % (ref 0–1.9)
BUN SERPL-MCNC: 12 MG/DL (ref 6–20)
CALCIUM SERPL-MCNC: 7 MG/DL (ref 8.7–10.5)
CHLORIDE SERPL-SCNC: 105 MMOL/L (ref 95–110)
CO2 SERPL-SCNC: 22 MMOL/L (ref 23–29)
CREAT SERPL-MCNC: 1.5 MG/DL (ref 0.5–1.4)
DIFFERENTIAL METHOD: ABNORMAL
EOSINOPHIL # BLD AUTO: 0.2 K/UL (ref 0–0.5)
EOSINOPHIL NFR BLD: 2.5 % (ref 0–8)
ERYTHROCYTE [DISTWIDTH] IN BLOOD BY AUTOMATED COUNT: 14.8 % (ref 11.5–14.5)
EST. GFR  (AFRICAN AMERICAN): >60 ML/MIN/1.73 M^2
EST. GFR  (NON AFRICAN AMERICAN): 55 ML/MIN/1.73 M^2
GLUCOSE SERPL-MCNC: 111 MG/DL (ref 70–110)
HCT VFR BLD AUTO: 41.1 % (ref 40–54)
HGB BLD-MCNC: 13.4 G/DL (ref 14–18)
IMM GRANULOCYTES # BLD AUTO: 0.04 K/UL (ref 0–0.04)
IMM GRANULOCYTES NFR BLD AUTO: 0.5 % (ref 0–0.5)
LYMPHOCYTES # BLD AUTO: 0.8 K/UL (ref 1–4.8)
LYMPHOCYTES NFR BLD: 11.1 % (ref 18–48)
MCH RBC QN AUTO: 30.8 PG (ref 27–31)
MCHC RBC AUTO-ENTMCNC: 32.6 G/DL (ref 32–36)
MCV RBC AUTO: 95 FL (ref 82–98)
MONOCYTES # BLD AUTO: 0.4 K/UL (ref 0.3–1)
MONOCYTES NFR BLD: 4.6 % (ref 4–15)
NEUTROPHILS # BLD AUTO: 6.1 K/UL (ref 1.8–7.7)
NEUTROPHILS NFR BLD: 80.9 % (ref 38–73)
NRBC BLD-RTO: 0 /100 WBC
PLATELET # BLD AUTO: 148 K/UL (ref 150–350)
PMV BLD AUTO: 11.4 FL (ref 9.2–12.9)
POTASSIUM SERPL-SCNC: 4.5 MMOL/L (ref 3.5–5.1)
RBC # BLD AUTO: 4.35 M/UL (ref 4.6–6.2)
SODIUM SERPL-SCNC: 139 MMOL/L (ref 136–145)
WBC # BLD AUTO: 7.54 K/UL (ref 3.9–12.7)

## 2020-11-05 PROCEDURE — 63600175 PHARM REV CODE 636 W HCPCS: Performed by: ANESTHESIOLOGY

## 2020-11-05 PROCEDURE — 63600175 PHARM REV CODE 636 W HCPCS: Performed by: NURSE ANESTHETIST, CERTIFIED REGISTERED

## 2020-11-05 PROCEDURE — 25000003 PHARM REV CODE 250: Performed by: NURSE ANESTHETIST, CERTIFIED REGISTERED

## 2020-11-05 PROCEDURE — 27000221 HC OXYGEN, UP TO 24 HOURS

## 2020-11-05 PROCEDURE — 80048 BASIC METABOLIC PNL TOTAL CA: CPT

## 2020-11-05 PROCEDURE — 25000003 PHARM REV CODE 250: Performed by: ANESTHESIOLOGY

## 2020-11-05 PROCEDURE — 36620 INSERTION CATHETER ARTERY: CPT | Mod: 59,,, | Performed by: ANESTHESIOLOGY

## 2020-11-05 PROCEDURE — 36000710: Performed by: ORTHOPAEDIC SURGERY

## 2020-11-05 PROCEDURE — 88304 TISSUE EXAM BY PATHOLOGIST: CPT | Mod: 26,,, | Performed by: STUDENT IN AN ORGANIZED HEALTH CARE EDUCATION/TRAINING PROGRAM

## 2020-11-05 PROCEDURE — 94770 HC EXHALED C02 TEST: CPT

## 2020-11-05 PROCEDURE — 63600175 PHARM REV CODE 636 W HCPCS: Performed by: ORTHOPAEDIC SURGERY

## 2020-11-05 PROCEDURE — 27800505 HC CATH, RADIAL ARTERY KIT: Performed by: ANESTHESIOLOGY

## 2020-11-05 PROCEDURE — 88304 TISSUE EXAM BY PATHOLOGIST: CPT | Performed by: STUDENT IN AN ORGANIZED HEALTH CARE EDUCATION/TRAINING PROGRAM

## 2020-11-05 PROCEDURE — 88304 PR  SURG PATH,LEVEL III: ICD-10-PCS | Mod: 26,,, | Performed by: STUDENT IN AN ORGANIZED HEALTH CARE EDUCATION/TRAINING PROGRAM

## 2020-11-05 PROCEDURE — 25000003 PHARM REV CODE 250: Performed by: ORTHOPAEDIC SURGERY

## 2020-11-05 PROCEDURE — 99900104 DSU ONLY-NO CHARGE-EA ADD'L HR (STAT): Performed by: ORTHOPAEDIC SURGERY

## 2020-11-05 PROCEDURE — 36000711: Performed by: ORTHOPAEDIC SURGERY

## 2020-11-05 PROCEDURE — 37000008 HC ANESTHESIA 1ST 15 MINUTES: Performed by: ORTHOPAEDIC SURGERY

## 2020-11-05 PROCEDURE — 99900103 DSU ONLY-NO CHARGE-INITIAL HR (STAT): Performed by: ORTHOPAEDIC SURGERY

## 2020-11-05 PROCEDURE — 27200677 HC TRANSDUCER MONITOR KIT SINGLE: Performed by: ANESTHESIOLOGY

## 2020-11-05 PROCEDURE — 94799 UNLISTED PULMONARY SVC/PX: CPT

## 2020-11-05 PROCEDURE — 63600175 PHARM REV CODE 636 W HCPCS: Performed by: PHYSICIAN ASSISTANT

## 2020-11-05 PROCEDURE — 36556 PR INSERT NON-TUNNEL CV CATH 5+ YRS OLD: ICD-10-PCS | Mod: 59,,, | Performed by: ANESTHESIOLOGY

## 2020-11-05 PROCEDURE — 88311 DECALCIFY TISSUE: CPT | Performed by: STUDENT IN AN ORGANIZED HEALTH CARE EDUCATION/TRAINING PROGRAM

## 2020-11-05 PROCEDURE — 25000003 PHARM REV CODE 250: Performed by: PHYSICIAN ASSISTANT

## 2020-11-05 PROCEDURE — 88311 PR  DECALCIFY TISSUE: ICD-10-PCS | Mod: 26,,, | Performed by: STUDENT IN AN ORGANIZED HEALTH CARE EDUCATION/TRAINING PROGRAM

## 2020-11-05 PROCEDURE — C1751 CATH, INF, PER/CENT/MIDLINE: HCPCS | Performed by: ANESTHESIOLOGY

## 2020-11-05 PROCEDURE — 36415 COLL VENOUS BLD VENIPUNCTURE: CPT

## 2020-11-05 PROCEDURE — 88311 DECALCIFY TISSUE: CPT | Mod: 26,,, | Performed by: STUDENT IN AN ORGANIZED HEALTH CARE EDUCATION/TRAINING PROGRAM

## 2020-11-05 PROCEDURE — 94761 N-INVAS EAR/PLS OXIMETRY MLT: CPT

## 2020-11-05 PROCEDURE — 36556 INSERT NON-TUNNEL CV CATH: CPT | Mod: 59,,, | Performed by: ANESTHESIOLOGY

## 2020-11-05 PROCEDURE — 12000002 HC ACUTE/MED SURGE SEMI-PRIVATE ROOM

## 2020-11-05 PROCEDURE — 85025 COMPLETE CBC W/AUTO DIFF WBC: CPT

## 2020-11-05 PROCEDURE — 76937 PR  US GUIDE, VASCULAR ACCESS: ICD-10-PCS | Mod: 26,,, | Performed by: ANESTHESIOLOGY

## 2020-11-05 PROCEDURE — C1713 ANCHOR/SCREW BN/BN,TIS/BN: HCPCS | Performed by: ORTHOPAEDIC SURGERY

## 2020-11-05 PROCEDURE — D9220A PRA ANESTHESIA: ICD-10-PCS | Mod: ANES,,, | Performed by: ANESTHESIOLOGY

## 2020-11-05 PROCEDURE — 71000033 HC RECOVERY, INTIAL HOUR: Performed by: ORTHOPAEDIC SURGERY

## 2020-11-05 PROCEDURE — 76937 US GUIDE VASCULAR ACCESS: CPT | Mod: 26,,, | Performed by: ANESTHESIOLOGY

## 2020-11-05 PROCEDURE — D9220A PRA ANESTHESIA: Mod: ANES,,, | Performed by: ANESTHESIOLOGY

## 2020-11-05 PROCEDURE — D9220A PRA ANESTHESIA: Mod: CRNA,,, | Performed by: NURSE ANESTHETIST, CERTIFIED REGISTERED

## 2020-11-05 PROCEDURE — C1729 CATH, DRAINAGE: HCPCS | Performed by: ORTHOPAEDIC SURGERY

## 2020-11-05 PROCEDURE — 37000009 HC ANESTHESIA EA ADD 15 MINS: Performed by: ORTHOPAEDIC SURGERY

## 2020-11-05 PROCEDURE — D9220A PRA ANESTHESIA: ICD-10-PCS | Mod: CRNA,,, | Performed by: NURSE ANESTHETIST, CERTIFIED REGISTERED

## 2020-11-05 PROCEDURE — 25000242 PHARM REV CODE 250 ALT 637 W/ HCPCS: Performed by: PHYSICIAN ASSISTANT

## 2020-11-05 PROCEDURE — 36620 PR INSERT CATH,ART,PERCUT,SHORTTERM: ICD-10-PCS | Mod: 59,,, | Performed by: ANESTHESIOLOGY

## 2020-11-05 PROCEDURE — 27201423 OPTIME MED/SURG SUP & DEVICES STERILE SUPPLY: Performed by: ORTHOPAEDIC SURGERY

## 2020-11-05 PROCEDURE — 71000039 HC RECOVERY, EACH ADD'L HOUR: Performed by: ORTHOPAEDIC SURGERY

## 2020-11-05 PROCEDURE — P9045 ALBUMIN (HUMAN), 5%, 250 ML: HCPCS | Performed by: NURSE ANESTHETIST, CERTIFIED REGISTERED

## 2020-11-05 PROCEDURE — 99900035 HC TECH TIME PER 15 MIN (STAT)

## 2020-11-05 DEVICE — SET SCREW BREAK OFF TI 4.75MM: Type: IMPLANTABLE DEVICE | Site: BACK | Status: FUNCTIONAL

## 2020-11-05 DEVICE — IMPLANTABLE DEVICE: Type: IMPLANTABLE DEVICE | Site: BACK | Status: FUNCTIONAL

## 2020-11-05 RX ORDER — MUPIROCIN 20 MG/G
OINTMENT TOPICAL
Status: DISCONTINUED | OUTPATIENT
Start: 2020-11-05 | End: 2020-11-05 | Stop reason: HOSPADM

## 2020-11-05 RX ORDER — ROSUVASTATIN CALCIUM 5 MG/1
5 TABLET, COATED ORAL DAILY
Status: DISCONTINUED | OUTPATIENT
Start: 2020-11-05 | End: 2020-11-07 | Stop reason: HOSPADM

## 2020-11-05 RX ORDER — MAG HYDROX/ALUMINUM HYD/SIMETH 200-200-20
30 SUSPENSION, ORAL (FINAL DOSE FORM) ORAL EVERY 4 HOURS PRN
Status: DISCONTINUED | OUTPATIENT
Start: 2020-11-05 | End: 2020-11-07 | Stop reason: HOSPADM

## 2020-11-05 RX ORDER — ACETAMINOPHEN 10 MG/ML
1000 INJECTION, SOLUTION INTRAVENOUS ONCE
Status: COMPLETED | OUTPATIENT
Start: 2020-11-05 | End: 2020-11-05

## 2020-11-05 RX ORDER — ANASTROZOLE 1 MG/1
1 TABLET ORAL
Status: DISCONTINUED | OUTPATIENT
Start: 2020-11-05 | End: 2020-11-07 | Stop reason: HOSPADM

## 2020-11-05 RX ORDER — HEPARIN SODIUM 10000 [USP'U]/ML
INJECTION, SOLUTION INTRAVENOUS; SUBCUTANEOUS
Status: DISCONTINUED | OUTPATIENT
Start: 2020-11-05 | End: 2020-11-05 | Stop reason: HOSPADM

## 2020-11-05 RX ORDER — SUCCINYLCHOLINE CHLORIDE 20 MG/ML
INJECTION INTRAMUSCULAR; INTRAVENOUS
Status: DISCONTINUED | OUTPATIENT
Start: 2020-11-05 | End: 2020-11-05

## 2020-11-05 RX ORDER — OXYCODONE HYDROCHLORIDE 10 MG/1
10 TABLET ORAL EVERY 4 HOURS PRN
Status: DISCONTINUED | OUTPATIENT
Start: 2020-11-05 | End: 2020-11-07

## 2020-11-05 RX ORDER — CLONAZEPAM 0.5 MG/1
0.5 TABLET ORAL 2 TIMES DAILY PRN
Status: DISCONTINUED | OUTPATIENT
Start: 2020-11-05 | End: 2020-11-07 | Stop reason: HOSPADM

## 2020-11-05 RX ORDER — DIPHENHYDRAMINE HCL 25 MG
50 CAPSULE ORAL EVERY 6 HOURS PRN
Status: DISCONTINUED | OUTPATIENT
Start: 2020-11-05 | End: 2020-11-07 | Stop reason: HOSPADM

## 2020-11-05 RX ORDER — FENTANYL CITRATE 50 UG/ML
INJECTION, SOLUTION INTRAMUSCULAR; INTRAVENOUS
Status: DISCONTINUED | OUTPATIENT
Start: 2020-11-05 | End: 2020-11-05

## 2020-11-05 RX ORDER — BISACODYL 10 MG
10 SUPPOSITORY, RECTAL RECTAL DAILY
Status: DISCONTINUED | OUTPATIENT
Start: 2020-11-05 | End: 2020-11-07 | Stop reason: HOSPADM

## 2020-11-05 RX ORDER — HYDROMORPHONE HYDROCHLORIDE 2 MG/ML
INJECTION, SOLUTION INTRAMUSCULAR; INTRAVENOUS; SUBCUTANEOUS
Status: DISCONTINUED | OUTPATIENT
Start: 2020-11-05 | End: 2020-11-05

## 2020-11-05 RX ORDER — SODIUM CHLORIDE, SODIUM LACTATE, POTASSIUM CHLORIDE, CALCIUM CHLORIDE 600; 310; 30; 20 MG/100ML; MG/100ML; MG/100ML; MG/100ML
125 INJECTION, SOLUTION INTRAVENOUS CONTINUOUS
Status: DISCONTINUED | OUTPATIENT
Start: 2020-11-05 | End: 2020-11-07 | Stop reason: HOSPADM

## 2020-11-05 RX ORDER — DEXAMETHASONE SODIUM PHOSPHATE 4 MG/ML
INJECTION, SOLUTION INTRA-ARTICULAR; INTRALESIONAL; INTRAMUSCULAR; INTRAVENOUS; SOFT TISSUE
Status: DISCONTINUED | OUTPATIENT
Start: 2020-11-05 | End: 2020-11-05

## 2020-11-05 RX ORDER — FENTANYL CITRATE 50 UG/ML
25 INJECTION, SOLUTION INTRAMUSCULAR; INTRAVENOUS EVERY 5 MIN PRN
Status: COMPLETED | OUTPATIENT
Start: 2020-11-05 | End: 2020-11-05

## 2020-11-05 RX ORDER — ONDANSETRON 8 MG/1
8 TABLET, ORALLY DISINTEGRATING ORAL EVERY 6 HOURS PRN
Status: DISCONTINUED | OUTPATIENT
Start: 2020-11-05 | End: 2020-11-07 | Stop reason: HOSPADM

## 2020-11-05 RX ORDER — DOCUSATE SODIUM 100 MG/1
100 CAPSULE, LIQUID FILLED ORAL DAILY
Status: DISCONTINUED | OUTPATIENT
Start: 2020-11-05 | End: 2020-11-07 | Stop reason: HOSPADM

## 2020-11-05 RX ORDER — NALOXONE HCL 0.4 MG/ML
0.02 VIAL (ML) INJECTION
Status: DISCONTINUED | OUTPATIENT
Start: 2020-11-05 | End: 2020-11-07 | Stop reason: HOSPADM

## 2020-11-05 RX ORDER — ANASTROZOLE 1 MG/1
1 TABLET ORAL
Status: DISCONTINUED | OUTPATIENT
Start: 2020-11-05 | End: 2020-11-05

## 2020-11-05 RX ORDER — ASPIRIN 325 MG
325 TABLET ORAL DAILY
Status: DISCONTINUED | OUTPATIENT
Start: 2020-11-06 | End: 2020-11-07 | Stop reason: HOSPADM

## 2020-11-05 RX ORDER — LIDOCAINE HYDROCHLORIDE 10 MG/ML
0.5 INJECTION, SOLUTION EPIDURAL; INFILTRATION; INTRACAUDAL; PERINEURAL ONCE
Status: DISCONTINUED | OUTPATIENT
Start: 2020-11-05 | End: 2020-11-05 | Stop reason: HOSPADM

## 2020-11-05 RX ORDER — MIDAZOLAM HYDROCHLORIDE 1 MG/ML
INJECTION, SOLUTION INTRAMUSCULAR; INTRAVENOUS
Status: DISCONTINUED | OUTPATIENT
Start: 2020-11-05 | End: 2020-11-05

## 2020-11-05 RX ORDER — VANCOMYCIN HCL IN 5 % DEXTROSE 1G/250ML
1000 PLASTIC BAG, INJECTION (ML) INTRAVENOUS
Status: COMPLETED | OUTPATIENT
Start: 2020-11-05 | End: 2020-11-05

## 2020-11-05 RX ORDER — NEOSTIGMINE METHYLSULFATE 1 MG/ML
INJECTION, SOLUTION INTRAVENOUS
Status: DISCONTINUED | OUTPATIENT
Start: 2020-11-05 | End: 2020-11-05

## 2020-11-05 RX ORDER — ONDANSETRON HYDROCHLORIDE 2 MG/ML
INJECTION, SOLUTION INTRAMUSCULAR; INTRAVENOUS
Status: DISCONTINUED | OUTPATIENT
Start: 2020-11-05 | End: 2020-11-05

## 2020-11-05 RX ORDER — SODIUM CHLORIDE, SODIUM LACTATE, POTASSIUM CHLORIDE, CALCIUM CHLORIDE 600; 310; 30; 20 MG/100ML; MG/100ML; MG/100ML; MG/100ML
INJECTION, SOLUTION INTRAVENOUS CONTINUOUS
Status: DISCONTINUED | OUTPATIENT
Start: 2020-11-05 | End: 2020-11-05

## 2020-11-05 RX ORDER — TRANEXAMIC ACID 100 MG/ML
1000 INJECTION, SOLUTION INTRAVENOUS
Status: DISCONTINUED | OUTPATIENT
Start: 2020-11-05 | End: 2020-11-05 | Stop reason: HOSPADM

## 2020-11-05 RX ORDER — AMOXICILLIN 250 MG
2 CAPSULE ORAL NIGHTLY PRN
Status: DISCONTINUED | OUTPATIENT
Start: 2020-11-05 | End: 2020-11-07 | Stop reason: HOSPADM

## 2020-11-05 RX ORDER — HYDROMORPHONE HYDROCHLORIDE 2 MG/ML
2 INJECTION, SOLUTION INTRAMUSCULAR; INTRAVENOUS; SUBCUTANEOUS
Status: DISCONTINUED | OUTPATIENT
Start: 2020-11-05 | End: 2020-11-07 | Stop reason: HOSPADM

## 2020-11-05 RX ORDER — PROPOFOL 10 MG/ML
VIAL (ML) INTRAVENOUS
Status: DISCONTINUED | OUTPATIENT
Start: 2020-11-05 | End: 2020-11-05

## 2020-11-05 RX ORDER — OXYCODONE AND ACETAMINOPHEN 10; 325 MG/1; MG/1
1 TABLET ORAL EVERY 4 HOURS PRN
Qty: 40 TABLET | Refills: 0 | Status: SHIPPED | OUTPATIENT
Start: 2020-11-05 | End: 2020-11-13 | Stop reason: SDUPTHER

## 2020-11-05 RX ORDER — FLUTICASONE PROPIONATE 50 MCG
1 SPRAY, SUSPENSION (ML) NASAL 2 TIMES DAILY
Status: DISCONTINUED | OUTPATIENT
Start: 2020-11-05 | End: 2020-11-07 | Stop reason: HOSPADM

## 2020-11-05 RX ORDER — KETAMINE HYDROCHLORIDE 10 MG/ML
INJECTION, SOLUTION INTRAMUSCULAR; INTRAVENOUS
Status: DISCONTINUED | OUTPATIENT
Start: 2020-11-05 | End: 2020-11-05

## 2020-11-05 RX ORDER — OXYCODONE HYDROCHLORIDE 5 MG/1
5 TABLET ORAL EVERY 4 HOURS PRN
Status: DISCONTINUED | OUTPATIENT
Start: 2020-11-05 | End: 2020-11-07

## 2020-11-05 RX ORDER — ALBUMIN HUMAN 50 G/1000ML
SOLUTION INTRAVENOUS CONTINUOUS PRN
Status: DISCONTINUED | OUTPATIENT
Start: 2020-11-05 | End: 2020-11-05

## 2020-11-05 RX ORDER — ACETAMINOPHEN 500 MG
1000 TABLET ORAL EVERY 8 HOURS
Status: COMPLETED | OUTPATIENT
Start: 2020-11-05 | End: 2020-11-06

## 2020-11-05 RX ORDER — AZELASTINE 1 MG/ML
1 SPRAY, METERED NASAL 2 TIMES DAILY
Status: DISCONTINUED | OUTPATIENT
Start: 2020-11-05 | End: 2020-11-07 | Stop reason: HOSPADM

## 2020-11-05 RX ORDER — DULOXETIN HYDROCHLORIDE 30 MG/1
30 CAPSULE, DELAYED RELEASE ORAL DAILY
Status: DISCONTINUED | OUTPATIENT
Start: 2020-11-05 | End: 2020-11-07 | Stop reason: HOSPADM

## 2020-11-05 RX ORDER — EPHEDRINE SULFATE 50 MG/ML
INJECTION, SOLUTION INTRAVENOUS
Status: DISCONTINUED | OUTPATIENT
Start: 2020-11-05 | End: 2020-11-05

## 2020-11-05 RX ORDER — OXYCODONE HYDROCHLORIDE 5 MG/1
5 TABLET ORAL ONCE AS NEEDED
Status: COMPLETED | OUTPATIENT
Start: 2020-11-05 | End: 2020-11-05

## 2020-11-05 RX ORDER — FLUOXETINE HYDROCHLORIDE 20 MG/1
20 CAPSULE ORAL DAILY
Status: DISCONTINUED | OUTPATIENT
Start: 2020-11-05 | End: 2020-11-07 | Stop reason: HOSPADM

## 2020-11-05 RX ORDER — METOPROLOL TARTRATE 25 MG/1
25 TABLET, FILM COATED ORAL 2 TIMES DAILY
Status: DISCONTINUED | OUTPATIENT
Start: 2020-11-05 | End: 2020-11-07 | Stop reason: HOSPADM

## 2020-11-05 RX ORDER — PHENYLEPHRINE HYDROCHLORIDE 10 MG/ML
INJECTION INTRAVENOUS
Status: DISCONTINUED | OUTPATIENT
Start: 2020-11-05 | End: 2020-11-05

## 2020-11-05 RX ORDER — BACLOFEN 10 MG/1
10 TABLET ORAL 3 TIMES DAILY
Status: DISCONTINUED | OUTPATIENT
Start: 2020-11-05 | End: 2020-11-07 | Stop reason: HOSPADM

## 2020-11-05 RX ORDER — MUPIROCIN 20 MG/G
1 OINTMENT TOPICAL 2 TIMES DAILY
Status: DISCONTINUED | OUTPATIENT
Start: 2020-11-05 | End: 2020-11-07 | Stop reason: HOSPADM

## 2020-11-05 RX ORDER — HYDROMORPHONE HCL IN 0.9% NACL 6 MG/30 ML
PATIENT CONTROLLED ANALGESIA SYRINGE INTRAVENOUS CONTINUOUS
Status: DISCONTINUED | OUTPATIENT
Start: 2020-11-05 | End: 2020-11-06

## 2020-11-05 RX ORDER — LIDOCAINE HCL/PF 100 MG/5ML
SYRINGE (ML) INTRAVENOUS
Status: DISCONTINUED | OUTPATIENT
Start: 2020-11-05 | End: 2020-11-05

## 2020-11-05 RX ORDER — ROCURONIUM BROMIDE 10 MG/ML
INJECTION, SOLUTION INTRAVENOUS
Status: DISCONTINUED | OUTPATIENT
Start: 2020-11-05 | End: 2020-11-05

## 2020-11-05 RX ORDER — ACETAMINOPHEN 325 MG/1
650 TABLET ORAL EVERY 6 HOURS PRN
Status: DISCONTINUED | OUTPATIENT
Start: 2020-11-06 | End: 2020-11-07 | Stop reason: HOSPADM

## 2020-11-05 RX ADMIN — DOCUSATE SODIUM 100 MG: 100 CAPSULE, LIQUID FILLED ORAL at 03:11

## 2020-11-05 RX ADMIN — ONDANSETRON 4 MG: 2 INJECTION, SOLUTION INTRAMUSCULAR; INTRAVENOUS at 10:11

## 2020-11-05 RX ADMIN — PHENYLEPHRINE HYDROCHLORIDE 200 MCG: 10 INJECTION INTRAVENOUS at 08:11

## 2020-11-05 RX ADMIN — EPHEDRINE SULFATE 10 MG: 50 INJECTION, SOLUTION INTRAMUSCULAR; INTRAVENOUS; SUBCUTANEOUS at 08:11

## 2020-11-05 RX ADMIN — BACLOFEN 10 MG: 10 TABLET ORAL at 03:11

## 2020-11-05 RX ADMIN — KETAMINE HYDROCHLORIDE 25 MG: 10 INJECTION, SOLUTION INTRAMUSCULAR; INTRAVENOUS at 07:11

## 2020-11-05 RX ADMIN — EPHEDRINE SULFATE 10 MG: 50 INJECTION, SOLUTION INTRAMUSCULAR; INTRAVENOUS; SUBCUTANEOUS at 10:11

## 2020-11-05 RX ADMIN — VANCOMYCIN HYDROCHLORIDE 1000 MG: 1 INJECTION, POWDER, LYOPHILIZED, FOR SOLUTION INTRAVENOUS at 05:11

## 2020-11-05 RX ADMIN — ROCURONIUM BROMIDE 45 MG: 10 INJECTION, SOLUTION INTRAVENOUS at 07:11

## 2020-11-05 RX ADMIN — HYDROMORPHONE HYDROCHLORIDE 0.5 MG: 2 INJECTION, SOLUTION INTRAMUSCULAR; INTRAVENOUS; SUBCUTANEOUS at 10:11

## 2020-11-05 RX ADMIN — MUPIROCIN 1 G: 20 OINTMENT TOPICAL at 08:11

## 2020-11-05 RX ADMIN — SODIUM CHLORIDE, SODIUM GLUCONATE, SODIUM ACETATE, POTASSIUM CHLORIDE, MAGNESIUM CHLORIDE, SODIUM PHOSPHATE, DIBASIC, AND POTASSIUM PHOSPHATE: .53; .5; .37; .037; .03; .012; .00082 INJECTION, SOLUTION INTRAVENOUS at 09:11

## 2020-11-05 RX ADMIN — GLYCOPYRROLATE 0.4 MG: 0.2 INJECTION, SOLUTION INTRAMUSCULAR; INTRAVENOUS at 08:11

## 2020-11-05 RX ADMIN — GLYCOPYRROLATE 0.4 MG: 0.2 INJECTION, SOLUTION INTRAMUSCULAR; INTRAVENOUS at 10:11

## 2020-11-05 RX ADMIN — PHENYLEPHRINE HYDROCHLORIDE 200 MCG: 10 INJECTION INTRAVENOUS at 07:11

## 2020-11-05 RX ADMIN — DEXAMETHASONE SODIUM PHOSPHATE 4 MG: 4 INJECTION, SOLUTION INTRA-ARTICULAR; INTRALESIONAL; INTRAMUSCULAR; INTRAVENOUS; SOFT TISSUE at 10:11

## 2020-11-05 RX ADMIN — EPHEDRINE SULFATE 10 MG: 50 INJECTION, SOLUTION INTRAMUSCULAR; INTRAVENOUS; SUBCUTANEOUS at 09:11

## 2020-11-05 RX ADMIN — BISACODYL 10 MG: 10 SUPPOSITORY RECTAL at 03:11

## 2020-11-05 RX ADMIN — Medication: at 12:11

## 2020-11-05 RX ADMIN — ANASTROZOLE 1 MG: 1 TABLET, COATED ORAL at 08:11

## 2020-11-05 RX ADMIN — ACETAMINOPHEN 1000 MG: 10 INJECTION, SOLUTION INTRAVENOUS at 12:11

## 2020-11-05 RX ADMIN — MUPIROCIN: 20 OINTMENT TOPICAL at 06:11

## 2020-11-05 RX ADMIN — FENTANYL CITRATE 25 MCG: 50 INJECTION INTRAMUSCULAR; INTRAVENOUS at 12:11

## 2020-11-05 RX ADMIN — AZELASTINE HYDROCHLORIDE 137 MCG: 137 SPRAY, METERED NASAL at 08:11

## 2020-11-05 RX ADMIN — LIDOCAINE HYDROCHLORIDE 75 MG: 20 INJECTION, SOLUTION INTRAVENOUS at 07:11

## 2020-11-05 RX ADMIN — ROSUVASTATIN CALCIUM 5 MG: 5 TABLET, COATED ORAL at 03:11

## 2020-11-05 RX ADMIN — Medication: at 05:11

## 2020-11-05 RX ADMIN — ACETAMINOPHEN 1000 MG: 500 TABLET ORAL at 10:11

## 2020-11-05 RX ADMIN — Medication: at 10:11

## 2020-11-05 RX ADMIN — VANCOMYCIN HYDROCHLORIDE 1000 MG: 1 INJECTION, POWDER, LYOPHILIZED, FOR SOLUTION INTRAVENOUS at 06:11

## 2020-11-05 RX ADMIN — FENTANYL CITRATE 100 MCG: 50 INJECTION, SOLUTION INTRAMUSCULAR; INTRAVENOUS at 07:11

## 2020-11-05 RX ADMIN — PHENYLEPHRINE HYDROCHLORIDE 100 MCG: 10 INJECTION INTRAVENOUS at 07:11

## 2020-11-05 RX ADMIN — FLUTICASONE PROPIONATE 50 MCG: 50 SPRAY, METERED NASAL at 08:11

## 2020-11-05 RX ADMIN — NEOSTIGMINE METHYLSULFATE 3 MG: 1 INJECTION INTRAVENOUS at 10:11

## 2020-11-05 RX ADMIN — SODIUM CHLORIDE, SODIUM LACTATE, POTASSIUM CHLORIDE, AND CALCIUM CHLORIDE 125 ML/HR: .6; .31; .03; .02 INJECTION, SOLUTION INTRAVENOUS at 12:11

## 2020-11-05 RX ADMIN — PHENYLEPHRINE HYDROCHLORIDE 20 MCG/MIN: 10 INJECTION INTRAVENOUS at 08:11

## 2020-11-05 RX ADMIN — METOPROLOL TARTRATE 25 MG: 25 TABLET, FILM COATED ORAL at 08:11

## 2020-11-05 RX ADMIN — SACUBITRIL AND VALSARTAN 1 TABLET: 24; 26 TABLET, FILM COATED ORAL at 08:11

## 2020-11-05 RX ADMIN — ALBUMIN (HUMAN): 12.5 SOLUTION INTRAVENOUS at 08:11

## 2020-11-05 RX ADMIN — PROPOFOL 130 MG: 10 INJECTION, EMULSION INTRAVENOUS at 07:11

## 2020-11-05 RX ADMIN — EPHEDRINE SULFATE 10 MG: 50 INJECTION, SOLUTION INTRAMUSCULAR; INTRAVENOUS; SUBCUTANEOUS at 07:11

## 2020-11-05 RX ADMIN — HYDROMORPHONE HYDROCHLORIDE 0.5 MG: 2 INJECTION, SOLUTION INTRAMUSCULAR; INTRAVENOUS; SUBCUTANEOUS at 09:11

## 2020-11-05 RX ADMIN — SUCCINYLCHOLINE CHLORIDE 140 MG: 20 INJECTION, SOLUTION INTRAMUSCULAR; INTRAVENOUS; PARENTERAL at 07:11

## 2020-11-05 RX ADMIN — BACLOFEN 10 MG: 10 TABLET ORAL at 08:11

## 2020-11-05 RX ADMIN — OXYCODONE 5 MG: 5 TABLET ORAL at 12:11

## 2020-11-05 RX ADMIN — ROCURONIUM BROMIDE 5 MG: 10 INJECTION, SOLUTION INTRAVENOUS at 07:11

## 2020-11-05 RX ADMIN — SODIUM CHLORIDE, SODIUM GLUCONATE, SODIUM ACETATE, POTASSIUM CHLORIDE, MAGNESIUM CHLORIDE, SODIUM PHOSPHATE, DIBASIC, AND POTASSIUM PHOSPHATE: .53; .5; .37; .037; .03; .012; .00082 INJECTION, SOLUTION INTRAVENOUS at 06:11

## 2020-11-05 RX ADMIN — MIDAZOLAM 2 MG: 1 INJECTION INTRAMUSCULAR; INTRAVENOUS at 07:11

## 2020-11-05 RX ADMIN — CLONAZEPAM 0.5 MG: 0.5 TABLET ORAL at 10:11

## 2020-11-05 NOTE — ANESTHESIA PROCEDURE NOTES
Arterial    Diagnosis: cardiomyopathy    Patient location during procedure: done in OR  Procedure start time: 11/5/2020 7:38 AM  Timeout: 11/5/2020 7:36 AM  Procedure end time: 11/5/2020 7:40 AM    Staffing  Authorizing Provider: Chris Lanza MD  Performing Provider: Chris Lanza MD    Anesthesiologist was present at the time of the procedure.    Preanesthetic Checklist  Completed: patient identified, site marked, surgical consent, pre-op evaluation, timeout performed, IV checked, risks and benefits discussed, monitors and equipment checked and anesthesia consent givenArterial  Skin Prep: chlorhexidine gluconate  Local Infiltration: none  Orientation: right  Location: radial  Catheter Size: 20 G  Catheter placement by Anatomical landmarks. Heme positive aspiration all ports.Insertion Attempts: 1  Assessment  Dressing: secured with tape and tegaderm

## 2020-11-05 NOTE — BRIEF OP NOTE
[facility]  Brief Operative Note    SUMMARY     Surgery Date: 11/5/2020     Surgeon(s) and Role:     * Mateusz Blanco MD - Primary    Assisting Surgeon:  ashlyn Craig    Pre-op Diagnosis:  Lumbar pain [M54.5]  DDD (degenerative disc disease), lumbar [M51.36]  History of lumbar spinal fusion [Z98.1]    Post-op Diagnosis:  Post-Op Diagnosis Codes:     * Lumbar pain [M54.5]     * DDD (degenerative disc disease), lumbar [M51.36]     * History of lumbar spinal fusion [Z98.1]    Procedure(s) (LRB):  LAMINECTOMY, SPINE, LUMBAR, WITH FUSION (Bilateral)  REMOVAL, HARDWARE, SPINE (Bilateral)  BONE GRAFT (N/A)    Anesthesia: General    Description of Procedure:  L3 laminectomy with facetectomy foraminotomy nerve root decompression, combined posterolateral fusion and interbody fusion with nonsegmental instrumentation L3-4    Description of the findings of the procedure:  Adjacent segment spinal stenosis L3-4 level previous fusion with instrumentation L4-5    Estimated Blood Loss: 100 mL    Estimated Blood Loss has been documented.         Specimens:   Specimen (12h ago, onward)    None          [id number]

## 2020-11-05 NOTE — OP NOTE
[facility]  Orthopedic  Operative Note    SUMMARY     Date of Procedure: 11/5/2020     Procedure:   1.  L3 laminectomy for spinal stenosis CPT code 43484  2.  Combined transforaminal lumbar interbody fusion and posterolateral fusion L3-4 CPT code 66001  3.  Insertion of intervertebral prosthetic interbody device L3-4 CPT code 00073  4.  Posterior nonsegmental instrumentation L3-4 Medtronic titanium pedicle screw and rods CPT code 53292  5.  Use of local autogenous bone graft for spinal fusion CPT code 82067  6.  Removal of posterior nonsegmental instrumentation L4-5 CPT code 33052  7.  Computer assisted spinal navigation for insertion of pedicle screws CPT code 87552      Surgeon(s) and Role:     * Mateusz Blanco MD - Primary    Assisting Surgeon: None    Pre-Operative Diagnosis: Lumbar pain [M54.5]  DDD (degenerative disc disease), lumbar [M51.36]  History of lumbar spinal fusion [Z98.1]    Post-Operative Diagnosis: Post-Op Diagnosis Codes:     * Lumbar pain [M54.5]     * DDD (degenerative disc disease), lumbar [M51.36]     * History of lumbar spinal fusion [Z98.1]  Lumbar spinal stenosis L3-4 level  Previous posterior nonsegmental instrumentation L4-5    Anesthesia: General    Procedure in General:  Due to complexity of surgical procedure skilled surgical assistant was necessary.  Vincent Craig physician's assistant served neck capacity.  No qualified resident physician was available.  It should be noted that the extent of the laminectomy necessary at the L3 level to decompress bilaterally the thecal sac and nerve roots was more extensive than that normally required to perform an interbody fusion and thus should be considered a separate procedure from the interbody fusion at the L3-4 level.  The patient is brought to the operating room while supine was intubated monitoring lines were placed by the patient including an arterial line and he was turned prone on transverse rolls after  inserting a Washburn catheter bony prominences were padded and his abdomen was allowed to hang free his back was shaved cleansed with alcohol and then prepped ChloraPrep solution.  A time-out was performed confirming the surgery.  An incision was then made from L3-L5 and paraspinous muscles elevated from the midline out to the facet joints at L3-4.  At that level we then identified the pedicle screws at L4 and L5 bilaterally.  These were L4 tech company screws and the set screws removed followed by the rods and pedicle screws at L4 and L5 without difficulty  We identified the L3-4 facet joints and noted that there was hypermobility at that level.  We identified L3 lamina then started our laminectomy.  Patient's symptoms as far as extremities were predominantly left-sided in the decompression was greater on the left and right side a complete hemilaminectomy of the L3 lamina on the left side was performed there was facet joint hypertrophy and this was causing lateral recess and foraminal stenosis the L3-4 facet joint on the left side was completely removed.  We decompressed the L4 and L3 nerve roots on the left side.  We identified the thecal sac and the disc space at L3-4 confirming it with the fluoroscopy.  We then performed a laminotomy and decompression on the right side decompressing the L4 and L3 nerve roots on the right.  All bone graft obtained during the decompression was morselized to later be used for the interbody fusion and posterolateral fusion.  We completed our decompression of the thecal sac as well as exiting L3 and L4 nerve roots bilaterally.  Next we used the interlaminar distracted on the right side to kyphosis the segment to allow greater room working the foraminal zone then and identified the exiting L3 and traversing L4 nerve roots on the right side protecting the neural structures we then incised the disc annulus on the left side and performed a diskectomy at that level.  We used interbody  Hyacinth and trial interbody sizers.  A 12 mm trial gave a good fit.  The morselized autograft was then packed into the anterior aspect of the L3-4 disc space and a Medtronic 12 mm capstone titanium coated peek implant was brought onto the field and also filled with autogenous bone graft.  Under direct visualization protecting neurovascular structures we then impacted the interbody device into the L3-4 disc space and checked its position with fluoroscopy.  Next we removed the the distractor from the right side and placed the Medtronic reference arc on the L2 spinous process.  The Tow Choice O arm navigation device was brought onto the field to allow is visualized pedicles of L3 and L4 bilaterally an intraoperative CT scan was performed and then interpreted.  We proceeded with placing pedicle screws in the pedicles of L3 bilaterally using normal anatomic landmarks and computer assisted spinal navigation.  Medtronic 6.5 x 45 mm titanium screws were placed into the pedicles of L3 bilaterally.  We noted that the previously removed screws at L4 measured 6.5 mm in diameter and 50 mm in length we chose a Medtronic 7.5 mm x 45 mm screw to insert into the pedicles of L4 and use the same pedicle tract as the previous screws we inserted the screws with spinal navigation and we had a good bony fit.  Intraoperative EMG pedicle screw testing had then been performed of both L3 and L4 nerve root.  It was necessary to monitor the patient throughout the case to lessen the chance of any neurological injury.  All levels tested with greater than 30 milliamps.  We then decorticated the posterior aspect of transverse process of L3 on the right side and the superior aspect of the posterolateral fusion and transverse process on right and with the remaining bone graft on the right side.  Two separate Medtronic titanium 35 mm rods were then used to connect the screws on each side and set screws were inserted and tightened to proper talk after  compressing the construct to restore normal lordosis at that segment.  We checked the instrumentation and looked excellent with fluoroscopy.  Gelfoam was placed over the exposed dura and nerve roots in the lumbodorsal fascia was closed.  A drain was brought through a separate stab incision subcutaneous tissues were closed with 2 O Vicryl and the skin was closed with stainless steel staples sterile dressings were applied and the patient was turned supine then brought to recovery room            Complications: None    Estimated Blood Loss (EBL):            Implants:   Implant Name Type Inv. Item Serial No.  Lot No. LRB No. Used Action   SPACER    MEDTRONIC SOFAMOR DANEK KYPHON [id number]  1 Implanted   SET SCREW BREAK OFF TI 4.75MM - [id number]  SET SCREW BREAK OFF TI 4.75MM  [employer] [facility]   4 Implanted   [id number] SCREW    MEDTRONIC SOFAMOR DANEK KYPHON   2 Implanted   [id number] SCREW    MEDTRONIC SOFAMOR DANEK KYPHON   2 Implanted   SHIRA [id number]    MEDTRONIC SOFAMOR DANEK KYPHON   2 Implanted       Specimens:   Specimen (12h ago, onward)    None                  Condition: Good    Disposition: PACU - hemodynamically stable.    Attestation: I was present and scrubbed for the entire procedure.

## 2020-11-05 NOTE — H&P
CC/Indication for Procedure: 45 y.o. male with Lumbar pain [M54.5]  DDD (degenerative disc disease), lumbar [M51.36]  History of lumbar spinal fusion [Z98.1]  Degenerative lumbar disc [M51.36].  Adjacent segment spinal stenosis L 3-4    Patient scheduled for WI LAMINEC/FACETECT/FORAMIN,LUMBAR 1 SEG [15127] (LAMINECTOMY, SPINE, LUMBAR, WITH FUSION)  WI LUMBAR SPINE FUSION COMBINED [14916] (REMOVAL, HARDWARE, SPINE)  WI POSTERIOR NON-SEGMENTAL INSTRUMENTATION [31927] (BONE GRAFT)  WI REMOVE SPINE FIX DEV,POST SGMTAL [98964]  WI INSERT BIOMECH DEV W/INTERBODY ARTHRODESIS, EA CONTIGUOUS DEFECT [02924]  WI AUTOGRAFT SPINE SURGERY LOCAL FROM SAME INCISION [19600].    Past Medical History:   Diagnosis Date    ADHD (attention deficit hyperactivity disorder)     Arthritis     Asthma     as child only    Back pain     Coronary artery disease     Degeneration of lumbar intervertebral disc 05/2016    Depression     Elevated PSA     Headache     Hyperlipidemia     Hypertension     Kidney damage     stage 3 ; d/t aleve abuse    Kidney stone     Myocardial infarction     Neck pain     Numbness and tingling in hands     Numbness and tingling of both legs     Seizures     from inapsine 2002    Sleep apnea     no cpap    Wears glasses     CONTACS     Past Surgical History:   Procedure Laterality Date    BACK SURGERY  2016    back surgery    CARDIAC SURGERY  01/06/2020    CABG X 7    CORONARY ARTERY BYPASS GRAFT      7 vessels    HERNIA REPAIR Bilateral 11/22/2017    LITHOTRIPSY      PILONIDAL CYST DRAINAGE      removal of abcess      scrotal    TYMPANOSTOMY TUBE PLACEMENT       Family History   Problem Relation Age of Onset    Heart disease Mother     Migraines Mother     Hypertension Mother     Early death Father     Heart disease Father     Diabetes Maternal Aunt     Diabetes Maternal Uncle     Diabetes Maternal Grandfather      Social History     Socioeconomic History    Marital status:       Spouse name: Not on file    Number of children: Not on file    Years of education: Not on file    Highest education level: Not on file   Occupational History    Occupation: disability   Social Needs    Financial resource strain: Not very hard    Food insecurity     Worry: Not on file     Inability: Not on file    Transportation needs     Medical: Not on file     Non-medical: Not on file   Tobacco Use    Smoking status: Former Smoker     Packs/day: 0.25     Types: Cigarettes     Quit date: 2016     Years since quittin.8    Smokeless tobacco: Former User     Quit date: 2016    Tobacco comment: occasional   Substance and Sexual Activity    Alcohol use: Yes     Alcohol/week: 1.0 standard drinks     Types: 1 Glasses of wine per week     Frequency: 4 or more times a week     Drinks per session: 1 or 2     Binge frequency: Never     Comment: rare    Drug use: No    Sexual activity: Yes     Partners: Female   Lifestyle    Physical activity     Days per week: 3 days     Minutes per session: 20 min    Stress: Not on file   Relationships    Social connections     Talks on phone: More than three times a week     Gets together: More than three times a week     Attends [employer] service: Not on file     Active member of club or organization: No     Attends meetings of clubs or organizations: Never     Relationship status: Patient refused   Other Topics Concern    Not on file   Social History Narrative    Not on file       Review of patient's allergies indicates:   Allergen Reactions    Inapsine [droperidol] Anaphylaxis     seizures    Effexor [venlafaxine]      Increased anxiety    Pcn [penicillins]     Bactrim [sulfamethoxazole-trimethoprim] Rash     Dry red rash all over when in the sun         Current Facility-Administered Medications:     electrolyte-S (ISOLYTE), , Intravenous, Continuous, Arnol Escobar MD, Last Rate: 10 mL/hr at 20 0630    lactated ringers infusion,  , Intravenous, Continuous, Arnol Escobar MD    lidocaine (PF) 10 mg/ml (1%) injection 5 mg, 0.5 mL, Intradermal, Once, Arnol Escobar MD    mupirocin 2 % ointment, , Nasal, On Call Procedure, Mateusz Blanco MD    tranexamic acid (CYKLOKAPRON) 1,000 mg in sodium chloride 0.9 % 100 mL (ready to mix system), 1,000 mg, Intravenous, On Call Procedure, Mateusz Blanco MD    vancomycin in dextrose 5 % 1 gram/250 mL IVPB 1,000 mg, 1,000 mg, Intravenous, On Call Procedure, Mateusz Blanco MD, Last Rate: 166.7 mL/hr at 11/05/20 0628, 1,000 mg at 11/05/20 0628    ROS:    Denies chest pain or palpitations  Denies shortness of breath  Denies fevers or chills  Denies chest pain  Denies abdominal pain    PE:    General Appearance: Well nourished  Orientation: Oriented to time, place, person  Mental Status: Alert  Heart: RRR  Lungs: CTA  Abdomen: Soft and non-tender    Anesthesia/Surgery risks, benefits and alternative options discussed and understood by patient/family.    This note was created using Dragon voice recognition software that occasionally misinterpreted phrases or words.

## 2020-11-05 NOTE — ANESTHESIA PROCEDURE NOTES
Central Line    Diagnosis: cardiomyopathy  Patient location during procedure: done in OR  Procedure start time: 11/5/2020 7:54 AM  Timeout: 11/5/2020 7:42 AM    Staffing  Authorizing Provider: Chris Lanza MD  Performing Provider: Chris Lanza MD    Staffing  Anesthesiologist: Chris Lanza MD  Performed: anesthesiologist   Anesthesiologist was present at the time of the procedure.  Preanesthetic Checklist  Completed: patient identified, site marked, surgical consent, pre-op evaluation, timeout performed, IV checked, risks and benefits discussed, monitors and equipment checked and anesthesia consent given  Indication   Indication: hemodynamic monitoring, vascular access     Anesthesia   general anesthesia    Central Line   Skin Prep: skin prepped with ChloraPrep, skin prep agent completely dried prior to procedure  maximum sterile barriers used during central venous catheter insertion  hand hygiene performed prior to central venous catheter insertion  Location: right, internal jugular.   Catheter type: triple lumen  Catheter Size: 7 Fr  Ultrasound: vascular probe with ultrasound  Vessel Caliber: medium, patent, compressibility normal  Needle advanced into vessel with real time Ultrasound guidance.  Image recorded and saved.   Manometry: Venous cannualation confirmed by visual estimation of blood vessel pressure using manometry.  Insertion Attempts: 1   Securement:line sutured, chlorhexidine patch, sterile dressing applied and blood return through all ports    Post-Procedure   X-Ray: no pneumothorax on x-ray  Adverse Events:none    Guidewire

## 2020-11-05 NOTE — RESPIRATORY THERAPY
11/05/20 1145   Patient Assessment/Suction   Level of Consciousness (AVPU) alert   PRE-TX-O2   O2 Device (Oxygen Therapy) nasal cannula   $ Is the patient on Low Flow Oxygen? Yes   Flow (L/min) 2   SpO2 (!) 93 %  (Pt from surgery on room air. Placed pt back on NC 2L)   Pulse Oximetry Type Intermittent   $ Pulse Oximetry - Multiple Charge Pulse Oximetry - Multiple   ETCO2   $ ETCO2 Charge Exhaled CO2 Monitoring   $ ETCO2 Usage Currently wearing   ETCO2 (mmHg) 40 mmHg

## 2020-11-05 NOTE — ANESTHESIA PREPROCEDURE EVALUATION
11/05/2020  Lee Quintanilla is a 45 y.o., male.    Anesthesia Evaluation          Review of Systems  Anesthesia Hx:  No problems with previous Anesthesia   Social:  Non-Smoker    Cardiovascular:   Exercise tolerance: good Hypertension Past MI CAD  CABG/stent  ECG has been reviewed. EF 30%   Pulmonary:   Asthma Sleep Apnea    Education provided regarding risk of obstructive sleep apnea     Renal/:   Chronic Renal Disease, CRI    Hepatic/GI:  Hepatic/GI Normal    Musculoskeletal:   Arthritis     Neurological:   Neuromuscular Disease, Headaches Seizures   Peripheral Neuropathy    Endocrine:  Endocrine Normal    Psych:   Psychiatric History          Physical Exam  General:  Obesity    Airway/Jaw/Neck:  Airway Findings: Mouth Opening: Normal Tongue: Normal  General Airway Assessment: Adult  Mallampati: II  Improves to I with phonation.  TM Distance: Normal, at least 6 cm  Jaw/Neck Findings:  Neck Findings:  Girth Increased       Chest/Lungs:  Chest/Lungs Clear    Heart/Vascular:  Heart Findings: Normal            Anesthesia Plan  Type of Anesthesia, risks & benefits discussed:  Anesthesia Type:  general  Patient's Preference:   Intra-op Monitoring Plan: standard ASA monitors  Intra-op Monitoring Plan Comments:   Post Op Pain Control Plan: multimodal analgesia and IV/PO Opioids PRN  Post Op Pain Control Plan Comments:   Induction:   IV  Beta Blocker:  Patient is not currently on a Beta-Blocker (No further documentation required).       Informed Consent: Patient understands risks and agrees with Anesthesia plan.  Questions answered. Anesthesia consent signed with patient.  ASA Score: 3     Day of Surgery Review of History & Physical:    H&P update referred to the surgeon.     Anesthesia Plan Notes: I have personally evaluated the patient and discussed risk/benefits/alternatives of general anesthesia.  Will  place intra-op central and magali        Ready For Surgery From Anesthesia Perspective.

## 2020-11-05 NOTE — ASSESSMENT & PLAN NOTE
Status post bilateral lumbar laminectomy and fusion with removal of old hardware- POD #0.  Continue to follow Orthopedic recommendations.  Needs aggressive incentive spirometry.  Follow hemoglobin and hematocrit closely.  Pain control with IV narcotics and antiemetics as needed.  Physical therapy as per Orthopedics protocol with fall precautions.

## 2020-11-05 NOTE — SUBJECTIVE & OBJECTIVE
Past Medical History:   Diagnosis Date    ADHD (attention deficit hyperactivity disorder)     Arthritis     Asthma     as child only    Back pain     Coronary artery disease     Degeneration of lumbar intervertebral disc 05/2016    Depression     Elevated PSA     Headache     Hyperlipidemia     Hypertension     Kidney damage     stage 3 ; d/t aleve abuse    Kidney stone     Myocardial infarction     Neck pain     Numbness and tingling in hands     Numbness and tingling of both legs     Seizures     from inapsine 2002    Sleep apnea     no cpap    Wears glasses     CONTACS       Past Surgical History:   Procedure Laterality Date    BACK SURGERY  2016    back surgery    CARDIAC SURGERY  01/06/2020    CABG X 7    CORONARY ARTERY BYPASS GRAFT      7 vessels    HERNIA REPAIR Bilateral 11/22/2017    LITHOTRIPSY      PILONIDAL CYST DRAINAGE      removal of abcess      scrotal    TYMPANOSTOMY TUBE PLACEMENT         Review of patient's allergies indicates:   Allergen Reactions    Inapsine [droperidol] Anaphylaxis     seizures    Effexor [venlafaxine]      Increased anxiety    Pcn [penicillins]     Bactrim [sulfamethoxazole-trimethoprim] Rash     Dry red rash all over when in the sun       No current facility-administered medications on file prior to encounter.      Current Outpatient Medications on File Prior to Encounter   Medication Sig    anastrozole (ARIMIDEX) 1 mg Tab Take 1 mg by mouth twice a week .    aspirin 325 MG tablet Take 325 mg by mouth once daily.     azelastine (ASTELIN) 137 mcg (0.1 %) nasal spray 1 spray (137 mcg total) by Nasal route 2 (two) times daily.    ENTRESTO 24-26 mg per tablet 2 (two) times daily.     FLUoxetine 20 MG capsule Take 1 capsule (20 mg total) by mouth once daily.    fluticasone propionate (FLONASE) 50 mcg/actuation nasal spray 1 spray (50 mcg total) by Each Nostril route 2 (two) times daily.    metoprolol tartrate (LOPRESSOR) 25 MG tablet Take  530 tablets by mouth 2 (two) times a day.     rosuvastatin (CRESTOR) 10 MG tablet Take 0.5 tablets (5 mg total) by mouth once daily.    tadalafiL (CIALIS) 20 MG Tab Take 1/2 to 1 tablet as needed prior to intercourse. No more than 1 in 36 hours    testosterone cypionate (DEPOTESTOTERONE CYPIONATE) 200 mg/mL injection Inject 200 mg into the muscle every 7 days.     Family History     Problem Relation (Age of Onset)    Diabetes Maternal Aunt, Maternal Uncle, Maternal Grandfather    Early death Father    Heart disease Mother, Father    Hypertension Mother    Migraines Mother        Tobacco Use    Smoking status: Former Smoker     Packs/day: 0.25     Types: Cigarettes     Quit date: 2016     Years since quittin.8    Smokeless tobacco: Former User     Quit date: 2016    Tobacco comment: occasional   Substance and Sexual Activity    Alcohol use: Yes     Alcohol/week: 1.0 standard drinks     Types: 1 Glasses of wine per week     Frequency: 4 or more times a week     Drinks per session: 1 or 2     Binge frequency: Never     Comment: rare    Drug use: No    Sexual activity: Yes     Partners: Female     Review of Systems   Musculoskeletal: Positive for back pain.   All other systems reviewed and are negative.    Objective:     Vital Signs (Most Recent):  Temp: 98.1 °F (36.7 °C) (20)  Pulse: (!) 59 (20)  Resp: 16 (20)  BP: 120/79 (20)  SpO2: 95 % (20) Vital Signs (24h Range):  Temp:  [98.1 °F (36.7 °C)] 98.1 °F (36.7 °C)  Pulse:  [59] 59  Resp:  [16] 16  SpO2:  [95 %] 95 %  BP: (120)/(79) 120/79     Weight: 88.5 kg (195 lb)  Body mass index is 30.54 kg/m².    Physical Exam  Vitals signs and nursing note reviewed.   Constitutional:       Appearance: Normal appearance. He is well-developed.   HENT:      Head: Normocephalic and atraumatic.      Nose: Nose normal. No septal deviation.   Eyes:      Conjunctiva/sclera: Conjunctivae normal.      Pupils:  Pupils are equal, round, and reactive to light.   Neck:      Thyroid: No thyroid mass.      Vascular: No JVD.      Trachea: No tracheal tenderness or tracheal deviation.   Cardiovascular:      Rate and Rhythm: Normal rate and regular rhythm.      Heart sounds: S1 normal and S2 normal. No murmur. No friction rub. No gallop.       Comments: Old healed sternotomy scar noted  Pulmonary:      Effort: Pulmonary effort is normal.      Breath sounds: Normal breath sounds. No decreased breath sounds, wheezing, rhonchi or rales.   Abdominal:      General: Bowel sounds are normal. There is no distension.      Palpations: Abdomen is soft. There is no hepatomegaly, splenomegaly or mass.      Tenderness: There is no abdominal tenderness.   Skin:     Findings: No rash.   Neurological:      Mental Status: He is alert.      Cranial Nerves: No cranial nerve deficit.      Sensory: No sensory deficit.           CRANIAL NERVES     CN III, IV, VI   Pupils are equal, round, and reactive to light.       Significant Labs: CBC: No results for input(s): WBC, HGB, HCT, PLT in the last 48 hours.  CMP: No results for input(s): NA, K, CL, CO2, GLU, BUN, CREATININE, CALCIUM, PROT, ALBUMIN, BILITOT, ALKPHOS, AST, ALT, ANIONGAP, EGFRNONAA in the last 48 hours.    Invalid input(s): [place]    Significant Imaging: None

## 2020-11-05 NOTE — ANESTHESIA PROCEDURE NOTES
Peripheral Bloc    Patient location during procedure: pre-op   Block not for primary anesthetic.  Reason for block: at surgeon's request and post-op pain management   Post-op Pain Location: left shoulder     Staffing  Authorizing Provider: Chris Lanza MD  Performing Provider: Chris Lanza MD    Preanesthetic Checklist  Completed: patient identified, site marked, surgical consent, pre-op evaluation, timeout performed, IV checked, risks and benefits discussed and monitors and equipment checked  Peripheral Block  Patient position: sitting  Prep: ChloraPrep  Patient monitoring: heart rate, cardiac monitor, continuous pulse ox, continuous capnometry and frequent blood pressure checks  Block type: interscalene  Laterality: left  Injection technique: single shot  Needle  Needle type: Stimuplex   Needle gauge: 22 G  Needle length: 2 in  Needle localization: anatomical landmarks and ultrasound guidance   -ultrasound image captured on disc.  Assessment  Injection assessment: negative aspiration, negative parasthesia and local visualized surrounding nerve  Paresthesia pain: none  Heart rate change: no  Slow fractionated injection: yes  Additional Notes  VSS.  [facility] RN monitoring vitals throughout procedure.  Patient tolerated procedure well.

## 2020-11-05 NOTE — RESPIRATORY THERAPY
11/05/20 1420   Patient Assessment/Suction   Level of Consciousness (AVPU) alert   PRE-TX-O2   O2 Device (Oxygen Therapy) nasal cannula   Flow (L/min) 2   SpO2 95 %   Incentive Spirometer   $ Incentive Spirometer Charges postop instruction;proper technique demonstrated   Administration (IS) instruction provided, initial;mouthpiece   Number of Repetitions (IS) 10   Level Incentive Spirometer (mL) 4000   Patient Tolerance (IS) good

## 2020-11-05 NOTE — ANESTHESIA POSTPROCEDURE EVALUATION
Anesthesia Post Evaluation    Patient: Lee Quintanilla    Procedure(s) Performed: Procedure(s) (LRB):  LAMINECTOMY, SPINE, LUMBAR, WITH FUSION (Bilateral)  REMOVAL, HARDWARE, SPINE (Bilateral)  BONE GRAFT (N/A)    Final Anesthesia Type: general    Patient location during evaluation: PACU  Patient participation: Yes- Able to Participate  Level of consciousness: awake and alert and oriented  Post-procedure vital signs: reviewed and stable  Pain management: adequate  Airway patency: patent  OLEG mitigation strategies: Multimodal analgesia, Extubation while patient is awake and Verification of full reversal of neuromuscular block  PONV status at discharge: No PONV  Anesthetic complications: no      Cardiovascular status: blood pressure returned to baseline  Respiratory status: unassisted, spontaneous ventilation and room air  Hydration status: euvolemic  Follow-up not needed.          Vitals Value Taken Time   /58 11/05/20 1350   Temp 36.6 °C (97.9 °F) 11/05/20 1350   Pulse 76 11/05/20 1350   Resp 16 11/05/20 1350   SpO2 95 % 11/05/20 1420         Event Time   Out of Recovery 13:09:27         Pain/Tanmay Score: Pain Rating Prior to Med Admin: 6 (11/5/2020  1:50 PM)  Tanmay Score: 9 (11/5/2020  1:00 PM)

## 2020-11-05 NOTE — PLAN OF CARE
Dr. Monzon, Dr. Lanza and Dr. Blanco are all in agreement that pt can go to the PCU/Telemetry floor on telemetry instead of ICU due to staffing and bed issues in ICU.

## 2020-11-05 NOTE — TRANSFER OF CARE
"Anesthesia Transfer of Care Note    Patient: Lee Quintanilla    Procedure(s) Performed: Procedure(s) (LRB):  LAMINECTOMY, SPINE, LUMBAR, WITH FUSION (Bilateral)  REMOVAL, HARDWARE, SPINE (Bilateral)  BONE GRAFT (N/A)    Patient location: PACU    Anesthesia Type: general    Transport from OR: Transported from OR on room air with adequate spontaneous ventilation    Post pain: adequate analgesia    Post assessment: no apparent anesthetic complications    Post vital signs: stable    Level of consciousness: awake    Nausea/Vomiting: no nausea/vomiting    Complications: none    Transfer of care protocol was followed      Last vitals:   Visit Vitals  /79 (BP Location: Right arm, Patient Position: Lying)   Pulse (!) 59   Temp 36.7 °C (98.1 °F) (Skin)   Resp 16   Ht 5' 7" (1.702 m)   Wt 88.5 kg (195 lb)   SpO2 95%   BMI 30.54 kg/m²     "

## 2020-11-05 NOTE — HPI
Patient is a 45-year-old  male with past medical history significant for chronic low back pain, chronic kidney disease stage 3, hypertension, hyperlipidemia, benign prostatic hypertrophy and attention deficit disorder who underwent bilateral lumbar laminectomy with fusion and removal of old hardware surgery by Dr. Mateusz Blanco. Post-operatively, patient denied chest pain, shortness of breath, abdominal pain, nausea, vomiting, headache, vision changes, focal neuro-deficits, cough or fever.

## 2020-11-05 NOTE — H&P
[facility] Medicine  History & Physical    Patient Name: Lee Quintanilla  MRN: [id number]  Admission Date: 11/5/2020  Attending Physician: Mateusz Blanco MD   Primary Care Provider: Riya Vidales NP         Patient information was obtained from patient.     Subjective:     Principal Problem:Degenerative lumbar disc    Chief Complaint:  Postop medical management status post lumbar laminectomy     HPI: Patient is a 45-year-old  male with past medical history significant for chronic low back pain, chronic kidney disease stage 3, hypertension, hyperlipidemia, benign prostatic hypertrophy and attention deficit disorder who underwent bilateral lumbar laminectomy with fusion and removal of old hardware surgery by Dr. Mateusz Blanco. Post-operatively, patient denied chest pain, shortness of breath, abdominal pain, nausea, vomiting, headache, vision changes, focal neuro-deficits, cough or fever.      Past Medical History:   Diagnosis Date    ADHD (attention deficit hyperactivity disorder)     Arthritis     Asthma     as child only    Back pain     Coronary artery disease     Degeneration of lumbar intervertebral disc 05/2016    Depression     Elevated PSA     Headache     Hyperlipidemia     Hypertension     Kidney damage     stage 3 ; d/t aleve abuse    Kidney stone     Myocardial infarction     Neck pain     Numbness and tingling in hands     Numbness and tingling of both legs     Seizures     from inapsine 2002    Sleep apnea     no cpap    Wears glasses     CONTACS       Past Surgical History:   Procedure Laterality Date    BACK SURGERY  2016    back surgery    CARDIAC SURGERY  01/06/2020    CABG X 7    CORONARY ARTERY BYPASS GRAFT      7 vessels    HERNIA REPAIR Bilateral 11/22/2017    LITHOTRIPSY      PILONIDAL CYST DRAINAGE      removal of abcess      scrotal    TYMPANOSTOMY TUBE PLACEMENT         Review of patient's allergies indicates:   Allergen  Reactions    Inapsine [droperidol] Anaphylaxis     seizures    Effexor [venlafaxine]      Increased anxiety    Pcn [penicillins]     Bactrim [sulfamethoxazole-trimethoprim] Rash     Dry red rash all over when in the sun       No current facility-administered medications on file prior to encounter.      Current Outpatient Medications on File Prior to Encounter   Medication Sig    anastrozole (ARIMIDEX) 1 mg Tab Take 1 mg by mouth twice a week .    aspirin 325 MG tablet Take 325 mg by mouth once daily.     azelastine (ASTELIN) 137 mcg (0.1 %) nasal spray 1 spray (137 mcg total) by Nasal route 2 (two) times daily.    ENTRESTO 24-26 mg per tablet 2 (two) times daily.     FLUoxetine 20 MG capsule Take 1 capsule (20 mg total) by mouth once daily.    fluticasone propionate (FLONASE) 50 mcg/actuation nasal spray 1 spray (50 mcg total) by Each Nostril route 2 (two) times daily.    metoprolol tartrate (LOPRESSOR) 25 MG tablet Take 530 tablets by mouth 2 (two) times a day.     rosuvastatin (CRESTOR) 10 MG tablet Take 0.5 tablets (5 mg total) by mouth once daily.    tadalafiL (CIALIS) 20 MG Tab Take 1/2 to 1 tablet as needed prior to intercourse. No more than 1 in 36 hours    testosterone cypionate (DEPOTESTOTERONE CYPIONATE) 200 mg/mL injection Inject 200 mg into the muscle every 7 days.     Family History     Problem Relation (Age of Onset)    Diabetes Maternal Aunt, Maternal Uncle, Maternal Grandfather    Early death Father    Heart disease Mother, Father    Hypertension Mother    Migraines Mother        Tobacco Use    Smoking status: Former Smoker     Packs/day: 0.25     Types: Cigarettes     Quit date: 2016     Years since quittin.8    Smokeless tobacco: Former User     Quit date: 2016    Tobacco comment: occasional   Substance and Sexual Activity    Alcohol use: Yes     Alcohol/week: 1.0 standard drinks     Types: 1 Glasses of wine per week     Frequency: 4 or more times a week     Drinks per  session: 1 or 2     Binge frequency: Never     Comment: rare    Drug use: No    Sexual activity: Yes     Partners: Female     Review of Systems   Musculoskeletal: Positive for back pain.   All other systems reviewed and are negative.    Objective:     Vital Signs (Most Recent):  Temp: 98.1 °F (36.7 °C) (11/05/20 0625)  Pulse: (!) 59 (11/05/20 0625)  Resp: 16 (11/05/20 0625)  BP: 120/79 (11/05/20 0625)  SpO2: 95 % (11/05/20 0625) Vital Signs (24h Range):  Temp:  [98.1 °F (36.7 °C)] 98.1 °F (36.7 °C)  Pulse:  [59] 59  Resp:  [16] 16  SpO2:  [95 %] 95 %  BP: (120)/(79) 120/79     Weight: 88.5 kg (195 lb)  Body mass index is 30.54 kg/m².    Physical Exam  Vitals signs and nursing note reviewed.   Constitutional:       Appearance: Normal appearance. He is well-developed.   HENT:      Head: Normocephalic and atraumatic.      Nose: Nose normal. No septal deviation.   Eyes:      Conjunctiva/sclera: Conjunctivae normal.      Pupils: Pupils are equal, round, and reactive to light.   Neck:      Thyroid: No thyroid mass.      Vascular: No JVD.      Trachea: No tracheal tenderness or tracheal deviation.   Cardiovascular:      Rate and Rhythm: Normal rate and regular rhythm.      Heart sounds: S1 normal and S2 normal. No murmur. No friction rub. No gallop.       Comments: Old healed sternotomy scar noted  Pulmonary:      Effort: Pulmonary effort is normal.      Breath sounds: Normal breath sounds. No decreased breath sounds, wheezing, rhonchi or rales.   Abdominal:      General: Bowel sounds are normal. There is no distension.      Palpations: Abdomen is soft. There is no hepatomegaly, splenomegaly or mass.      Tenderness: There is no abdominal tenderness.   Skin:     Findings: No rash.   Neurological:      Mental Status: He is alert.      Cranial Nerves: No cranial nerve deficit.      Sensory: No sensory deficit.           CRANIAL NERVES     CN III, IV, VI   Pupils are equal, round, and reactive to light.       Significant  Labs: CBC: No results for input(s): WBC, HGB, HCT, PLT in the last 48 hours.  CMP: No results for input(s): NA, K, CL, CO2, GLU, BUN, CREATININE, CALCIUM, PROT, ALBUMIN, BILITOT, ALKPHOS, AST, ALT, ANIONGAP, EGFRNONAA in the last 48 hours.    Invalid input(s): [place]    Significant Imaging: None    Assessment/Plan:     * Degenerative lumbar disc  Status post bilateral lumbar laminectomy and fusion with removal of old hardware- POD #0.  Continue to follow Orthopedic recommendations.  Needs aggressive incentive spirometry.  Follow hemoglobin and hematocrit closely.  Pain control with IV narcotics and antiemetics as needed.  Physical therapy as per Orthopedics protocol with fall precautions.          Coronary artery disease involving coronary bypass graft  Continue tele monitoring.  Continue current cardiac medications.  Patient is status post 7 vessel cardiac bypass surgery case.      Depression  Continue Cymbalta, Paxil, Klonopin as before      Hyperlipidemia  Continue Crestor use as before      MYNOR (generalized anxiety disorder)  Continues SSRI as before.  Patient also takes Klonopin.      Obesity (BMI 30.0-34.9)  Body mass index is 30.54 kg/m². Morbid obesity complicates all aspects of disease management from diagnostic modalities to treatment. Weight loss encouraged and health benefits explained to patient.          DVT prophylaxis:  Use sequential compression stockings and Maurice hose.  No anticoagulation as per recommendations by Dr. Blanco..    Shabana Monzon MD  Department of Hospital Medicine   [facility]

## 2020-11-05 NOTE — ASSESSMENT & PLAN NOTE
Continue tele monitoring.  Continue current cardiac medications.  Patient is status post 7 vessel cardiac bypass surgery case.

## 2020-11-05 NOTE — PLAN OF CARE
Patient is stable at this time.  VSS. Anesthesiologist at patient's bedside and is ok for patient to transfer to the floor.  Dressings clean, dry and intact.  Pain is a 6/10.  No complaints of nausea or vomiting.  Patient tolerating po intake well. [place] cath present and patent and draining well.  Patient seen by Dr Monzon, hospitals medicine.  Glasses returned to patient.

## 2020-11-05 NOTE — ASSESSMENT & PLAN NOTE
Body mass index is 30.54 kg/m². Morbid obesity complicates all aspects of disease management from diagnostic modalities to treatment. Weight loss encouraged and health benefits explained to patient.

## 2020-11-06 LAB
ANION GAP SERPL CALC-SCNC: 10 MMOL/L (ref 8–16)
BASOPHILS # BLD AUTO: 0.02 K/UL (ref 0–0.2)
BASOPHILS NFR BLD: 0.2 % (ref 0–1.9)
BUN SERPL-MCNC: 13 MG/DL (ref 6–20)
CALCIUM SERPL-MCNC: 8 MG/DL (ref 8.7–10.5)
CHLORIDE SERPL-SCNC: 101 MMOL/L (ref 95–110)
CO2 SERPL-SCNC: 24 MMOL/L (ref 23–29)
CREAT SERPL-MCNC: 1.5 MG/DL (ref 0.5–1.4)
DIFFERENTIAL METHOD: ABNORMAL
EOSINOPHIL # BLD AUTO: 0.1 K/UL (ref 0–0.5)
EOSINOPHIL NFR BLD: 1.1 % (ref 0–8)
ERYTHROCYTE [DISTWIDTH] IN BLOOD BY AUTOMATED COUNT: 14.9 % (ref 11.5–14.5)
EST. GFR  (AFRICAN AMERICAN): >60 ML/MIN/1.73 M^2
EST. GFR  (NON AFRICAN AMERICAN): 55 ML/MIN/1.73 M^2
GLUCOSE SERPL-MCNC: 95 MG/DL (ref 70–110)
HCT VFR BLD AUTO: 40.2 % (ref 40–54)
HGB BLD-MCNC: 12.8 G/DL (ref 14–18)
IMM GRANULOCYTES # BLD AUTO: 0.05 K/UL (ref 0–0.04)
IMM GRANULOCYTES NFR BLD AUTO: 0.4 % (ref 0–0.5)
LYMPHOCYTES # BLD AUTO: 1 K/UL (ref 1–4.8)
LYMPHOCYTES NFR BLD: 8 % (ref 18–48)
MCH RBC QN AUTO: 30.3 PG (ref 27–31)
MCHC RBC AUTO-ENTMCNC: 31.8 G/DL (ref 32–36)
MCV RBC AUTO: 95 FL (ref 82–98)
MONOCYTES # BLD AUTO: 1.1 K/UL (ref 0.3–1)
MONOCYTES NFR BLD: 9.2 % (ref 4–15)
NEUTROPHILS # BLD AUTO: 10 K/UL (ref 1.8–7.7)
NEUTROPHILS NFR BLD: 81.1 % (ref 38–73)
NRBC BLD-RTO: 0 /100 WBC
PLATELET # BLD AUTO: 150 K/UL (ref 150–350)
PMV BLD AUTO: 12.1 FL (ref 9.2–12.9)
POTASSIUM SERPL-SCNC: 4.2 MMOL/L (ref 3.5–5.1)
RBC # BLD AUTO: 4.22 M/UL (ref 4.6–6.2)
SODIUM SERPL-SCNC: 135 MMOL/L (ref 136–145)
WBC # BLD AUTO: 12.3 K/UL (ref 3.9–12.7)

## 2020-11-06 PROCEDURE — 85025 COMPLETE CBC W/AUTO DIFF WBC: CPT

## 2020-11-06 PROCEDURE — 36415 COLL VENOUS BLD VENIPUNCTURE: CPT

## 2020-11-06 PROCEDURE — 80048 BASIC METABOLIC PNL TOTAL CA: CPT

## 2020-11-06 PROCEDURE — 25000003 PHARM REV CODE 250: Performed by: ANESTHESIOLOGY

## 2020-11-06 PROCEDURE — 97116 GAIT TRAINING THERAPY: CPT

## 2020-11-06 PROCEDURE — 63600175 PHARM REV CODE 636 W HCPCS: Performed by: ANESTHESIOLOGY

## 2020-11-06 PROCEDURE — 25000003 PHARM REV CODE 250: Performed by: ORTHOPAEDIC SURGERY

## 2020-11-06 PROCEDURE — 94799 UNLISTED PULMONARY SVC/PX: CPT

## 2020-11-06 PROCEDURE — 94761 N-INVAS EAR/PLS OXIMETRY MLT: CPT

## 2020-11-06 PROCEDURE — 25000003 PHARM REV CODE 250: Performed by: PHYSICIAN ASSISTANT

## 2020-11-06 PROCEDURE — 97161 PT EVAL LOW COMPLEX 20 MIN: CPT

## 2020-11-06 PROCEDURE — 12000002 HC ACUTE/MED SURGE SEMI-PRIVATE ROOM

## 2020-11-06 PROCEDURE — 97165 OT EVAL LOW COMPLEX 30 MIN: CPT

## 2020-11-06 RX ADMIN — DOCUSATE SODIUM 100 MG: 100 CAPSULE, LIQUID FILLED ORAL at 08:11

## 2020-11-06 RX ADMIN — SACUBITRIL AND VALSARTAN 1 TABLET: 24; 26 TABLET, FILM COATED ORAL at 08:11

## 2020-11-06 RX ADMIN — FLUOXETINE HYDROCHLORIDE 20 MG: 20 CAPSULE ORAL at 08:11

## 2020-11-06 RX ADMIN — METOPROLOL TARTRATE 25 MG: 25 TABLET, FILM COATED ORAL at 08:11

## 2020-11-06 RX ADMIN — AZELASTINE HYDROCHLORIDE 137 MCG: 137 SPRAY, METERED NASAL at 08:11

## 2020-11-06 RX ADMIN — ACETAMINOPHEN 1000 MG: 500 TABLET ORAL at 06:11

## 2020-11-06 RX ADMIN — Medication: at 03:11

## 2020-11-06 RX ADMIN — CLONAZEPAM 0.5 MG: 0.5 TABLET ORAL at 10:11

## 2020-11-06 RX ADMIN — BACLOFEN 10 MG: 10 TABLET ORAL at 08:11

## 2020-11-06 RX ADMIN — OXYCODONE HYDROCHLORIDE 15 MG: 10 TABLET ORAL at 08:11

## 2020-11-06 RX ADMIN — OXYCODONE HYDROCHLORIDE 15 MG: 10 TABLET ORAL at 11:11

## 2020-11-06 RX ADMIN — FLUTICASONE PROPIONATE 50 MCG: 50 SPRAY, METERED NASAL at 08:11

## 2020-11-06 RX ADMIN — ROSUVASTATIN CALCIUM 5 MG: 5 TABLET, COATED ORAL at 08:11

## 2020-11-06 RX ADMIN — BACLOFEN 10 MG: 10 TABLET ORAL at 02:11

## 2020-11-06 RX ADMIN — MUPIROCIN 1 G: 20 OINTMENT TOPICAL at 08:11

## 2020-11-06 RX ADMIN — ACETAMINOPHEN 1000 MG: 500 TABLET ORAL at 02:11

## 2020-11-06 RX ADMIN — OXYCODONE HYDROCHLORIDE 15 MG: 10 TABLET ORAL at 03:11

## 2020-11-06 RX ADMIN — OXYCODONE HYDROCHLORIDE 15 MG: 10 TABLET ORAL at 07:11

## 2020-11-06 RX ADMIN — DULOXETINE 30 MG: 30 CAPSULE, DELAYED RELEASE ORAL at 08:11

## 2020-11-06 RX ADMIN — ASPIRIN 325 MG ORAL TABLET 325 MG: 325 PILL ORAL at 08:11

## 2020-11-06 RX ADMIN — DIPHENHYDRAMINE HYDROCHLORIDE 50 MG: 25 CAPSULE ORAL at 12:11

## 2020-11-06 NOTE — RESPIRATORY THERAPY
11/06/20 0743   Patient Assessment/Suction   Level of Consciousness (AVPU) alert   PRE-TX-O2   O2 Device (Oxygen Therapy) room air   SpO2 95 %   Pulse Oximetry Type Intermittent   $ Pulse Oximetry - Multiple Charge Pulse Oximetry - Multiple   Incentive Spirometer   $ Incentive Spirometer Charges done with encouragement   Administration (IS) mouthpiece   Number of Repetitions (IS) 10   Level Incentive Spirometer (mL) 4500   Patient Tolerance (IS) good

## 2020-11-06 NOTE — PROGRESS NOTES
[facility] Hospital Medicine  Progress Note    Patient Name: Lee Quintanilla  MRN: [id number]  Patient Class: IP- Inpatient   Admission Date: 11/5/2020  Length of Stay: 1 days  Attending Physician: Shabana Monzon MD  Primary Care Provider: Riya Vidales NP        Subjective:     Principal Problem:Degenerative lumbar disc        HPI:  Patient is a 45-year-old  male with past medical history significant for chronic low back pain, chronic kidney disease stage 3, hypertension, hyperlipidemia, benign prostatic hypertrophy and attention deficit disorder who underwent bilateral lumbar laminectomy with fusion and removal of old hardware surgery by Dr. Mateusz Blanco. Post-operatively, patient denied chest pain, shortness of breath, abdominal pain, nausea, vomiting, headache, vision changes, focal neuro-deficits, cough or fever.      Overview/Hospital Course:  No notes on file    Interval History:  Patient is in good spirits.  Reports adequately control lumbar pain at the surgery site.  Patient worked with physical therapy and ambulating.  Patient denies any chest pain or shortness of breath.  Patient is tolerating diet.  Patient's wife is at bedside.    Review of Systems   Musculoskeletal: Positive for back pain.   All other systems reviewed and are negative.    Objective:     Vital Signs (Most Recent):  Temp: 97.8 °F (36.6 °C) (11/06/20 0805)  Pulse: 84 (11/06/20 0805)  Resp: 18 (11/06/20 0805)  BP: (!) 111/59 (11/06/20 0805)  SpO2: (!) 93 % (11/06/20 0805) Vital Signs (24h Range):  Temp:  [96.7 °F (35.9 °C)-99.1 °F (37.3 °C)] 97.8 °F (36.6 °C)  Pulse:  [68-84] 84  Resp:  [8-20] 18  SpO2:  [90 %-97 %] 93 %  BP: ()/(50-67) 111/59  Arterial Line BP: ()/(54-68) 96/62     Weight: 88.5 kg (195 lb)  Body mass index is 30.54 kg/m².    Intake/Output Summary (Last 24 hours) at 11/6/2020 0966  Last data filed at 11/6/2020 0526  Gross per 24 hour   Intake 2960 ml   Output 2050 ml   Net 910 ml       Physical Exam  Vitals signs and nursing note reviewed.   Constitutional:       Appearance: Normal appearance. He is well-developed.   HENT:      Head: Normocephalic and atraumatic.      Nose: Nose normal. No septal deviation.   Eyes:      Conjunctiva/sclera: Conjunctivae normal.      Pupils: Pupils are equal, round, and reactive to light.   Neck:      Thyroid: No thyroid mass.      Vascular: No JVD.      Trachea: No tracheal tenderness or tracheal deviation.   Cardiovascular:      Rate and Rhythm: Normal rate and regular rhythm.      Heart sounds: S1 normal and S2 normal. No murmur. No friction rub. No gallop.       Comments: Old healed sternotomy scar noted  Pulmonary:      Effort: Pulmonary effort is normal.      Breath sounds: Normal breath sounds. No decreased breath sounds, wheezing, rhonchi or rales.   Abdominal:      General: Bowel sounds are normal. There is no distension.      Palpations: Abdomen is soft. There is no hepatomegaly, splenomegaly or mass.      Tenderness: There is no abdominal tenderness.   Skin:     Findings: No rash.   Neurological:      Mental Status: He is alert.      Cranial Nerves: No cranial nerve deficit.      Sensory: No sensory deficit.         Significant Labs:   CBC:   Recent Labs   Lab 11/05/20  1134 11/06/20  0510   WBC 7.54 12.30   HGB 13.4* 12.8*   HCT 41.1 40.2   * 150     CMP:   Recent Labs   Lab 11/05/20  1134 11/06/20  0510    135*   K 4.5 4.2    101   CO2 22* 24   * 95   BUN 12 13   CREATININE 1.5* 1.5*   CALCIUM 7.0* 8.0*   ANIONGAP 12 10   EGFRNONAA 55* 55*       Significant Imaging: None      Assessment/Plan:      * Degenerative lumbar disc  Status post bilateral lumbar laminectomy and fusion with removal of old hardware- POD #1.  Continue to follow Orthopedic recommendations.  Needs aggressive incentive spirometry.  Follow hemoglobin and hematocrit closely.  Pain control with PO narcotics and antiemetics as needed.  Physical therapy as  per Orthopedics protocol with fall precautions.          Coronary artery disease involving coronary bypass graft  Continue tele monitoring.  Continue current cardiac medications.  Patient is status post 7 vessel cardiac bypass surgery case.      Depression  Continue Cymbalta, Paxil, Klonopin as before      Hyperlipidemia  Continue Crestor use as before      MYNOR (generalized anxiety disorder)  Continues SSRI as before.  Patient also takes Klonopin.      Obesity (BMI 30.0-34.9)  Body mass index is 30.54 kg/m². Morbid obesity complicates all aspects of disease management from diagnostic modalities to treatment. Weight loss encouraged and health benefits explained to patient.           Patient will require home health services for discharge.  VTE Risk Mitigation (From admission, onward)    None          Discharge Planning   FANTA:      Code Status: Prior   Is the patient medically ready for discharge?:     Reason for patient still in hospital (select all that apply): Patient unstable, Patient trending condition and Consult recommendations                     Shabana Monzon MD  Department of Hospital Medicine   [facility]

## 2020-11-06 NOTE — NURSING
Drain removed per pt accidentally caught in chair.  Washburn removed per nurse rosemary well.  Pt up in busch ambulating.

## 2020-11-06 NOTE — SUBJECTIVE & OBJECTIVE
Interval History:  Patient is in good spirits.  Reports adequately control lumbar pain at the surgery site.  Patient worked with physical therapy and ambulating.  Patient denies any chest pain or shortness of breath.  Patient is tolerating diet.  Patient's wife is at bedside.    Review of Systems   Musculoskeletal: Positive for back pain.   All other systems reviewed and are negative.    Objective:     Vital Signs (Most Recent):  Temp: 97.8 °F (36.6 °C) (11/06/20 0805)  Pulse: 84 (11/06/20 0805)  Resp: 18 (11/06/20 0805)  BP: (!) 111/59 (11/06/20 0805)  SpO2: (!) 93 % (11/06/20 0805) Vital Signs (24h Range):  Temp:  [96.7 °F (35.9 °C)-99.1 °F (37.3 °C)] 97.8 °F (36.6 °C)  Pulse:  [68-84] 84  Resp:  [8-20] 18  SpO2:  [90 %-97 %] 93 %  BP: ()/(50-67) 111/59  Arterial Line BP: ()/(54-68) 96/62     Weight: 88.5 kg (195 lb)  Body mass index is 30.54 kg/m².    Intake/Output Summary (Last 24 hours) at 11/6/2020 0956  Last data filed at 11/6/2020 0526  Gross per 24 hour   Intake 2960 ml   Output 2050 ml   Net 910 ml      Physical Exam  Vitals signs and nursing note reviewed.   Constitutional:       Appearance: Normal appearance. He is well-developed.   HENT:      Head: Normocephalic and atraumatic.      Nose: Nose normal. No septal deviation.   Eyes:      Conjunctiva/sclera: Conjunctivae normal.      Pupils: Pupils are equal, round, and reactive to light.   Neck:      Thyroid: No thyroid mass.      Vascular: No JVD.      Trachea: No tracheal tenderness or tracheal deviation.   Cardiovascular:      Rate and Rhythm: Normal rate and regular rhythm.      Heart sounds: S1 normal and S2 normal. No murmur. No friction rub. No gallop.       Comments: Old healed sternotomy scar noted  Pulmonary:      Effort: Pulmonary effort is normal.      Breath sounds: Normal breath sounds. No decreased breath sounds, wheezing, rhonchi or rales.   Abdominal:      General: Bowel sounds are normal. There is no distension.       Palpations: Abdomen is soft. There is no hepatomegaly, splenomegaly or mass.      Tenderness: There is no abdominal tenderness.   Skin:     Findings: No rash.   Neurological:      Mental Status: He is alert.      Cranial Nerves: No cranial nerve deficit.      Sensory: No sensory deficit.         Significant Labs:   CBC:   Recent Labs   Lab 11/05/20  1134 11/06/20  0510   WBC 7.54 12.30   HGB 13.4* 12.8*   HCT 41.1 40.2   * 150     CMP:   Recent Labs   Lab 11/05/20  1134 11/06/20  0510    135*   K 4.5 4.2    101   CO2 22* 24   * 95   BUN 12 13   CREATININE 1.5* 1.5*   CALCIUM 7.0* 8.0*   ANIONGAP 12 10   EGFRNONAA 55* 55*       Significant Imaging: None

## 2020-11-06 NOTE — PROGRESS NOTES
[facility]  Orthopedics  Progress Note    Patient Name: Lee Quintanilla  MRN: [id number]  Admission Date: 11/5/2020  Hospital Length of Stay: 1 days  Attending Provider: Shabana Monzon MD  Primary Care Provider: Riya Vidales NP  Follow-up For: Procedure(s) (LRB):  LAMINECTOMY, SPINE, LUMBAR, WITH FUSION (Bilateral)  REMOVAL, HARDWARE, SPINE (Bilateral)  BONE GRAFT (N/A)    Post-Operative Day: 1 Day Post-Op  Subjective:     Principal Problem:Degenerative lumbar disc    Principal Orthopedic Problem: S/P L3-4 TLIF, lami and PL fusion    Interval History: Complicated PMH    Review of patient's allergies indicates:   Allergen Reactions    Inapsine [droperidol] Anaphylaxis     seizures    Effexor [venlafaxine]      Increased anxiety    Pcn [penicillins]     Bactrim [sulfamethoxazole-trimethoprim] Rash     Dry red rash all over when in the sun       Current Facility-Administered Medications   Medication    acetaminophen tablet 1,000 mg    acetaminophen tablet 650 mg    aluminum-magnesium hydroxide-simethicone 200-200-20 mg/5 mL suspension 30 mL    anastrozole tablet 1 mg    aspirin tablet 325 mg    azelastine 137 mcg (0.1 %) nasal spray 137 mcg    baclofen tablet 10 mg    bisacodyL suppository 10 mg    clonazePAM tablet 0.5 mg    diphenhydrAMINE capsule 50 mg    docusate sodium capsule 100 mg    DULoxetine DR capsule 30 mg    FLUoxetine capsule 20 mg    fluticasone propionate 50 mcg/actuation nasal spray 50 mcg    hydromorphone (PF) injection 2 mg    HYDROmorphone PCA syringe 6 mg/30 mL (0.2 mg/mL) NS    influenza (QUADRIVALENT PF) vaccine 0.5 mL    lactated ringers infusion    metoprolol tartrate (LOPRESSOR) tablet 25 mg    mupirocin 2 % ointment 1 g    naloxone 0.4 mg/mL injection 0.02 mg    ondansetron disintegrating tablet 8 mg    oxyCODONE immediate release tablet 15 mg    oxyCODONE immediate release tablet 5 mg    oxyCODONE immediate release tablet Tab 10 mg     "promethazine (PHENERGAN) 12.5 mg in dextrose 5 % 50 mL IVPB    rosuvastatin tablet 5 mg    sacubitriL-valsartan 24-26 mg per tablet 1 tablet    senna-docusate 8.6-50 mg per tablet 2 tablet     Objective:     Vital Signs (Most Recent):  Temp: 99.1 °F (37.3 °C) (11/06/20 0356)  Pulse: 84 (11/06/20 0356)  Resp: 18 (11/06/20 0356)  BP: 112/67 (11/06/20 0356)  SpO2: 96 % (11/06/20 0356) Vital Signs (24h Range):  Temp:  [96.7 °F (35.9 °C)-99.1 °F (37.3 °C)] 99.1 °F (37.3 °C)  Pulse:  [68-84] 84  Resp:  [8-20] 18  SpO2:  [90 %-97 %] 96 %  BP: ()/(50-67) 112/67  Arterial Line BP: ()/(54-68) 96/62     Weight: 88.5 kg (195 lb)  Height: 5' 7" (170.2 cm)  Body mass index is 30.54 kg/m².      Intake/Output Summary (Last 24 hours) at 11/6/2020 0730  Last data filed at 11/6/2020 0526  Gross per 24 hour   Intake 4210 ml   Output 2265 ml   Net 1945 ml       General    Vitals reviewed.  Constitutional: He is oriented to person, place, and time. He appears well-developed and well-nourished.   Pulmonary/Chest: Effort normal.   Neurological: He is alert and oriented to person, place, and time.   Psychiatric: He has a normal mood and affect. His behavior is normal.         Back (L-Spine & T-Spine) / Neck (C-Spine) Exam     Comments:  BLEs DNVI. Post-op dressing intact; hemovac in place. Still has Washburn and PCA        Significant Labs:   CBC:   Recent Labs   Lab 11/05/20  1134 11/06/20  0510   WBC 7.54 12.30   HGB 13.4* 12.8*   HCT 41.1 40.2   * 150     CMP:   Recent Labs   Lab 11/05/20  1134 11/06/20  0510    135*   K 4.5 4.2    101   CO2 22* 24   * 95   BUN 12 13   CREATININE 1.5* 1.5*   CALCIUM 7.0* 8.0*   ANIONGAP 12 10   EGFRNONAA 55* 55*     All pertinent labs within the past 24 hours have been reviewed.    Significant Imaging: None    Assessment/Plan:     * Degenerative lumbar disc  Moving slow with post-op pain  OOB with PT and nursing  DC Washburn, PCA, and Hemovac as ordered  Change dressing " as ordered  Anticipate DC home tomorrow am with JOSE M MEYERS  Orthopedics  [facility]

## 2020-11-06 NOTE — RESPIRATORY THERAPY
11/05/20 1937   Patient Assessment/Suction   Level of Consciousness (AVPU) alert   PRE-TX-O2   O2 Device (Oxygen Therapy) nasal cannula   Flow (L/min) 2   Oxygen Concentration (%) 28   SpO2 (!) 94 %   Pulse Oximetry Type Intermittent   Pulse 68   Resp 18   ETCO2   ETCO2 (mmHg) 44 mmHg   Incentive Spirometer   $ Incentive Spirometer Charges done with encouragement   Administration (IS) proper technique demonstrated   Number of Repetitions (IS) 10   Level Incentive Spirometer (mL) 3000   Patient Tolerance (IS) good   Ready to Wean/Extubation Screen   FIO2<=50 (chart decimal) 0.28

## 2020-11-06 NOTE — PLAN OF CARE
Plan of care reviewed with patient. SR on telemetry. Dilaudid PCA in use for pain. O2-2L per NC in use.RIJ TLC intact & patent. ML back incision C/D/I. Hemovac intact with small amount of bloody drainage noted. Pedal pulses WNL.Washburn catheter intact & patent. Remains free from falls/injury. Instructed to call for assistance as needed during night, verbalized understanding. Call light in reach, bed alarm on .

## 2020-11-06 NOTE — PLAN OF CARE
POC discussed with patient. Patient verbalized understanding. VSS. Afebrile. AAO. Wound dressing on lower back cdi. PIV cdi infusing. Centril line cdi.  Antibiotics given as ordered. PCA pump maintained. SCD's maintained. Bed low and call light in place. Safety maintained. Will continue to monitor..

## 2020-11-06 NOTE — HPI
POD #2 S/P Lumbar Lami and fusion L3-4 w/ TLIF  - LLE radicular pain is resolved  - Significant post-op LBP  - Did well with PT yesterday

## 2020-11-06 NOTE — SUBJECTIVE & OBJECTIVE
Principal Problem:Degenerative lumbar disc    Principal Orthopedic Problem: S/P L3-4 TLIF, lami and PL fusion    Interval History: Complicated PMH    Review of patient's allergies indicates:   Allergen Reactions    Inapsine [droperidol] Anaphylaxis     seizures    Effexor [venlafaxine]      Increased anxiety    Pcn [penicillins]     Bactrim [sulfamethoxazole-trimethoprim] Rash     Dry red rash all over when in the sun       Current Facility-Administered Medications   Medication    acetaminophen tablet 1,000 mg    acetaminophen tablet 650 mg    aluminum-magnesium hydroxide-simethicone 200-200-20 mg/5 mL suspension 30 mL    anastrozole tablet 1 mg    aspirin tablet 325 mg    azelastine 137 mcg (0.1 %) nasal spray 137 mcg    baclofen tablet 10 mg    bisacodyL suppository 10 mg    clonazePAM tablet 0.5 mg    diphenhydrAMINE capsule 50 mg    docusate sodium capsule 100 mg    DULoxetine DR capsule 30 mg    FLUoxetine capsule 20 mg    fluticasone propionate 50 mcg/actuation nasal spray 50 mcg    hydromorphone (PF) injection 2 mg    HYDROmorphone PCA syringe 6 mg/30 mL (0.2 mg/mL) NS    influenza (QUADRIVALENT PF) vaccine 0.5 mL    lactated ringers infusion    metoprolol tartrate (LOPRESSOR) tablet 25 mg    mupirocin 2 % ointment 1 g    naloxone 0.4 mg/mL injection 0.02 mg    ondansetron disintegrating tablet 8 mg    oxyCODONE immediate release tablet 15 mg    oxyCODONE immediate release tablet 5 mg    oxyCODONE immediate release tablet Tab 10 mg    promethazine (PHENERGAN) 12.5 mg in dextrose 5 % 50 mL IVPB    rosuvastatin tablet 5 mg    sacubitriL-valsartan 24-26 mg per tablet 1 tablet    senna-docusate 8.6-50 mg per tablet 2 tablet     Objective:     Vital Signs (Most Recent):  Temp: 99.1 °F (37.3 °C) (11/06/20 0356)  Pulse: 84 (11/06/20 0356)  Resp: 18 (11/06/20 0356)  BP: 112/67 (11/06/20 0356)  SpO2: 96 % (11/06/20 0356) Vital Signs (24h Range):  Temp:  [96.7 °F (35.9 °C)-99.1 °F (37.3  "°C)] 99.1 °F (37.3 °C)  Pulse:  [68-84] 84  Resp:  [8-20] 18  SpO2:  [90 %-97 %] 96 %  BP: ()/(50-67) 112/67  Arterial Line BP: ()/(54-68) 96/62     Weight: 88.5 kg (195 lb)  Height: 5' 7" (170.2 cm)  Body mass index is 30.54 kg/m².      Intake/Output Summary (Last 24 hours) at 11/6/2020 0730  Last data filed at 11/6/2020 0526  Gross per 24 hour   Intake 4210 ml   Output 2265 ml   Net 1945 ml       General    Vitals reviewed.  Constitutional: He is oriented to person, place, and time. He appears well-developed and well-nourished.   Pulmonary/Chest: Effort normal.   Neurological: He is alert and oriented to person, place, and time.   Psychiatric: He has a normal mood and affect. His behavior is normal.         Back (L-Spine & T-Spine) / Neck (C-Spine) Exam     Comments:  BLEs DNVI. Post-op dressing intact; hemovac in place. Still has Washburn and PCA        Significant Labs:   CBC:   Recent Labs   Lab 11/05/20  1134 11/06/20  0510   WBC 7.54 12.30   HGB 13.4* 12.8*   HCT 41.1 40.2   * 150     CMP:   Recent Labs   Lab 11/05/20  1134 11/06/20  0510    135*   K 4.5 4.2    101   CO2 22* 24   * 95   BUN 12 13   CREATININE 1.5* 1.5*   CALCIUM 7.0* 8.0*   ANIONGAP 12 10   EGFRNONAA 55* 55*     All pertinent labs within the past 24 hours have been reviewed.    Significant Imaging: None  "

## 2020-11-06 NOTE — ASSESSMENT & PLAN NOTE
Moving slow with post-op pain  OOB with PT and nursing  DC Washburn, PCA, and Hemovac as ordered  Change dressing as ordered  Anticipate DC home tomorrow am with HH

## 2020-11-06 NOTE — PT/OT/SLP EVAL
"Physical Therapy Evaluation    Patient Name:  Lee Quintanilla   MRN:  [id number]    Recommendations:     Discharge Recommendations:  outpatient PT(when appropriate per orthopedic surgeon)   Discharge Equipment Recommendations: none   Barriers to discharge: None    Assessment:     Lee Quintanilla is a 45 y.o. male admitted with a medical diagnosis of Degenerative lumbar disc.  He presents with the following impairments/functional limitations:  impaired endurance, impaired functional mobilty, pain, orthopedic precautions. Patient is agreeable to participation with PT evaluation. He reports 7/10 back pain at rest and was premedicated for activity. He has a medical history of chronic LBP, CKD, HTN, HLD, BPH, ADD, and CABG x7 in January of this year. He will be staying with his fiance who is a RN upon D/C. He was educated on log rolling, spinal precautions, and TLSO use. He requires Roopa for supine to sit and CGA for sit to stand transfer. He ambulated 250' with no AD and CGA. He is agreeable to sit up in chair with chair alarm on and RN notified.     Rehab Prognosis: Good; patient would benefit from acute skilled PT services to address these deficits and reach maximum level of function.    Recent Surgery: Procedure(s) (LRB):  LAMINECTOMY, SPINE, LUMBAR, WITH FUSION (Bilateral)  REMOVAL, HARDWARE, SPINE (Bilateral)  BONE GRAFT (N/A) 1 Day Post-Op    Plan:     During this hospitalization, patient to be seen daily to address the identified rehab impairments via gait training, therapeutic activities, therapeutic exercises and progress toward the following goals:    · Plan of Care Expires:  12/06/20    Subjective     Chief Complaint: 7/10 back pain   Patient/Family Comments/goals: "I think I am leaving tomorrow morning"  Pain/Comfort:  · Pain Rating 1: 7/10  · Location - Orientation 1: lower  · Location 1: back  · Pain Addressed 1: Pre-medicate for activity, Reposition, Distraction    Patients cultural, " spiritual, Sikh conflicts given the current situation:      Living Environment:  Patient lives alone, but is staying with his fiance upon D/C who is a RN. [facility] with no ARIADNE   Prior to admission, patients level of function was independent. He has a SPC that he used when his RLE was numb last year. He denies any falls. He does not work and is unable to perform yard work. Equipment used at home: none.  DME owned (not currently used): single point cane.  Upon discharge, patient will have assistance from ana rosa.    Objective:     Communicated with FREYA Valenzuela prior to session.  Patient found HOB elevated with bed alarm, central line, sarabia catheter, hemovac, SCD, telemetry  upon PT entry to room.    General Precautions: Standard, fall   Orthopedic Precautions:spinal precautions   Braces: TLSO     Exams:  · Cognitive Exam:  Patient is oriented to Person, Place, Time and Situation  · RUE ROM: WFL  · LUE ROM: WFL  · RLE Strength: WFL  · LLE Strength: WFL    Functional Mobility:  · Bed Mobility:     · Supine to Sit: minimum assistance  · Transfers:     · Sit to Stand:  contact guard assistance with no AD  · Gait: 250' no AD CGA  · Balance: Fair -    Therapeutic Activities and Exercises:   Patient was educated on the importance of OOB activity and functional mobility to negate negative effects of prolonged bed rest during hospitalization, safe transfers and ambulation, log rolling, spinal precautions, TLSO use, and D/C planning     AM-PAC 6 CLICK MOBILITY  Total Score:21     Patient left up in chair with all lines intact, call button in reach, chair alarm on and RN notified.    GOALS:   Multidisciplinary Problems     Physical Therapy Goals        Problem: Physical Therapy Goal    Goal Priority Disciplines Outcome Goal Variances Interventions   Physical Therapy Goal     PT, PT/OT      Description: Goals to be met by: 2020     Patient will increase functional independence with mobility by performin. Supine to sit  with Supervision  2. Sit to stand transfer with Supervision  3. Bed to chair transfer with Supervision   4. Gait  x 250 feet with Supervision.                      History:     Past Medical History:   Diagnosis Date    ADHD (attention deficit hyperactivity disorder)     Arthritis     Asthma     as child only    Back pain     Coronary artery disease     Degeneration of lumbar intervertebral disc 05/2016    Depression     Elevated PSA     Headache     Hyperlipidemia     Hypertension     Kidney damage     stage 3 ; d/t aleve abuse    Kidney stone     Myocardial infarction     Neck pain     Numbness and tingling in hands     Numbness and tingling of both legs     Seizures     from inapsine 2002    Sleep apnea     no cpap    Wears glasses     CONTACS       Past Surgical History:   Procedure Laterality Date    BACK SURGERY  2016    back surgery    BONE GRAFT N/A 11/5/2020    Procedure: BONE GRAFT;  Surgeon: Mateusz Blanco MD;  Location: [facility] OR;  Service: Orthopedics;  Laterality: N/A;    CARDIAC SURGERY  01/06/2020    CABG X 7    CORONARY ARTERY BYPASS GRAFT      7 vessels    HERNIA REPAIR Bilateral 11/22/2017    LITHOTRIPSY      LUMBAR LAMINECTOMY WITH FUSION Bilateral 11/5/2020    Procedure: LAMINECTOMY, SPINE, LUMBAR, WITH FUSION;  Surgeon: Mateusz Blanco MD;  Location: [facility] OR;  Service: Orthopedics;  Laterality: Bilateral;  [employer]  NTI  L-3    PILONIDAL CYST DRAINAGE      removal of abcess      scrotal    REMOVAL OF HARDWARE FROM SPINE Bilateral 11/5/2020    Procedure: REMOVAL, HARDWARE, SPINE;  Surgeon: Mateusz Blanco MD;  Location: [facility] OR;  Service: Orthopedics;  Laterality: Bilateral;  L 4-5    TYMPANOSTOMY TUBE PLACEMENT         Time Tracking:     PT Received On: 11/06/20  PT Start Time: 0931     PT Stop Time: 0954  PT Total Time (min): 23 min     Billable Minutes: Evaluation 10 and Gait Training 13      Chani Dominguez, PT  11/06/2020

## 2020-11-06 NOTE — PT/OT/SLP EVAL
Occupational Therapy   Evaluation and Discharge Note    Name: Lee Quintanilla  MRN: [id number]  Admitting Diagnosis:  Degenerative lumbar disc 1 Day Post-Op    Recommendations:     Discharge Recommendations: home  Discharge Equipment Recommendations:  none  Barriers to discharge:  None    Assessment:     Lee Quintanilla is a 45 y.o. male with a medical diagnosis of Degenerative lumbar disc. At this time, patient is functioning at their prior level of function and does not require further acute OT services.     Plan:     During this hospitalization, patient does not require further acute OT services.  Please re-consult if situation changes.    · Plan of Care Reviewed with: patient, significant other    Subjective     Chief Complaint: Pain of low back (surgical site)  Patient/Family Comments/goals: To stay one more night in order to get pain better controlled     Occupational Profile:  Living Environment: Lives with christine in single story home with tub/shower combo  Previous level of function: Independent - pt owns but did not use single point cane   Equipment Used at home:  none, other (see comments)(pt owns but did not use single point cane)  Assistance upon Discharge: Christine (available to assist 24/7 as she works from home)    Pain/Comfort:  · Pain Rating 1: 7/10  · Location - Side 1: Bilateral  · Location 1: back  · Pain Addressed 1: Nurse notified  · Pain Rating Post-Intervention 1: other (see comments)(no pain reported at end of session)    Patients cultural, spiritual, Hoahaoism conflicts given the current situation: no    Objective:     Communicated with: Nicolas PILLAI prior to session.  Patient found up near sink with handheld assistance from christine with telemetry, central line, bed alarm(TLSO donned and fiance present) upon OT entry to room.    General Precautions: Standard, fall   Orthopedic Precautions:spinal precautions   Braces: TLSO     Occupational Performance:    Functional  Mobility/Transfers:  · Patient completed Sit <> Stand Transfer with stand by assistance and contact guard assistance  with  hand-held assist   Functional Mobility: Patient walking in room with handheld assistance from fiance - no loss of balance    Activities of Daily Living:  · Feeding:  independence    · Grooming: Supervision  standing sink side  · Upper Body Dressing: (A) to don/doff TLSO otherwise Set-/up for pull over  · Lower Body Dressing: Max/Mod (A) without use of AE - patient reports he had a reacher after his 1st back sx a few years ago but he gave it to his mother; he reports he is aware that he needs one in order to perform LB dressing inadherence with spinal precautions so he and fiance confirm they will purchase a new reacher - OTR reinforcing use of reacher, long handled sponge and additional AE in order to perform ADL safely and in adherence with spinal precautions - pt/fiance verbalize understanding and in agreement     Cognitive/Visual Perceptual:  Oriented x 4    Physical Exam:  Dominant hand:    -       Right  Upper Extremity Strength:    -       Right Upper Extremity: WNL  -       Left Upper Extremity: WNL    AMPAC 6 Click ADL:  AMPAC Total Score: 17    Treatment & Education:  - OTR providing reinforcement of spinal precautions, safety awareness/fall prevention in hospital and home environment - recommending patient use single point during all mobility - pt/fiance verbalize understanding and in agreement; OTR also recommending use of AE for ADL and reviewing use to increase independence with dressing tasks while adhering to spinal precautions; OTR also recommending (A) to Supervision for shower transfer and bathing and recommending use of TTB or shower chair - pt and fiance verbalize understanding and in agreement with all - but they report they will determine whether to purchase shower chair closer to discharge   Education:    Patient left up with fiance in room  with all lines intact, call  button in reach, nurse notified and fiance present    GOALS:   Multidisciplinary Problems     Occupational Therapy Goals     Not on file                History:     Past Medical History:   Diagnosis Date    ADHD (attention deficit hyperactivity disorder)     Arthritis     Asthma     as child only    Back pain     Coronary artery disease     Degeneration of lumbar intervertebral disc 05/2016    Depression     Elevated PSA     Headache     Hyperlipidemia     Hypertension     Kidney damage     stage 3 ; d/t aleve abuse    Kidney stone     Myocardial infarction     Neck pain     Numbness and tingling in hands     Numbness and tingling of both legs     Seizures     from inapsine 2002    Sleep apnea     no cpap    Wears glasses     CONTACS       Past Surgical History:   Procedure Laterality Date    BACK SURGERY  2016    back surgery    BONE GRAFT N/A 11/5/2020    Procedure: BONE GRAFT;  Surgeon: Mateusz Blanco MD;  Location: [facility] OR;  Service: Orthopedics;  Laterality: N/A;    CARDIAC SURGERY  01/06/2020    CABG X 7    CORONARY ARTERY BYPASS GRAFT      7 vessels    HERNIA REPAIR Bilateral 11/22/2017    LITHOTRIPSY      LUMBAR LAMINECTOMY WITH FUSION Bilateral 11/5/2020    Procedure: LAMINECTOMY, SPINE, LUMBAR, WITH FUSION;  Surgeon: Mateusz Blanco MD;  Location: [facility] OR;  Service: Orthopedics;  Laterality: Bilateral;  [employer]  NTI  L-3    PILONIDAL CYST DRAINAGE      removal of abcess      scrotal    REMOVAL OF HARDWARE FROM SPINE Bilateral 11/5/2020    Procedure: REMOVAL, HARDWARE, SPINE;  Surgeon: Mateusz Blanco MD;  Location: [facility] OR;  Service: Orthopedics;  Laterality: Bilateral;  L 4-5    TYMPANOSTOMY TUBE PLACEMENT         Time Tracking:     OT Date of Treatment: 11/06/20  OT Start Time: 1055  OT Stop Time: 1103  OT Total Time (min): 8 min    Billable Minutes:Evaluation 8    ROBERT Jaimes LOTR  11/6/2020

## 2020-11-06 NOTE — ASSESSMENT & PLAN NOTE
Status post bilateral lumbar laminectomy and fusion with removal of old hardware- POD #1.  Continue to follow Orthopedic recommendations.  Needs aggressive incentive spirometry.  Follow hemoglobin and hematocrit closely.  Pain control with PO narcotics and antiemetics as needed.  Physical therapy as per Orthopedics protocol with fall precautions.

## 2020-11-06 NOTE — PLAN OF CARE
CM met with pt bedside to complete discharge assessment. Pt verified information on facesheet as correct. Reports he is discharging to his fiance's address ([place]) at [address] [place]. Pcp is NP Riya Vidales. Pharm is [facility]. Denies hh/hd/dme. Verified insurance has medicaid. Pt requesting outpt therapy upon DC. DC plan is home. Pt does not have home health benefits.      11/06/20 1150   Discharge Assessment   Assessment Type Discharge Planning Assessment   Confirmed/corrected address and phone number on facesheet? Yes   Assessment information obtained from? Patient   Communicated expected length of stay with patient/caregiver yes   Prior to hospitilization cognitive status: Alert/Oriented   Prior to hospitalization functional status: Independent   Current cognitive status: Alert/Oriented   Current Functional Status: Independent   Lives With alone   Able to Return to Prior Arrangements yes   Is patient able to care for self after discharge? Yes   Patient's perception of discharge disposition home or selfcare   Readmission Within the Last 30 Days no previous admission in last 30 days   Patient currently being followed by outpatient case management? No   Patient currently receives any other outside agency services? No   Equipment Currently Used at Home none   Do you have any problems affording any of your prescribed medications? No   Is the patient taking medications as prescribed? yes   Does the patient have transportation home? Yes   Transportation Anticipated family or friend will provide   Does the patient receive services at the [facility]? No   Discharge Plan A Home   Discharge Plan B Home with family   DME Needed Upon Discharge  none   Patient/Family in Agreement with Plan yes

## 2020-11-07 VITALS
TEMPERATURE: 98 F | WEIGHT: 195 LBS | BODY MASS INDEX: 30.61 KG/M2 | HEIGHT: 67 IN | RESPIRATION RATE: 16 BRPM | OXYGEN SATURATION: 98 % | DIASTOLIC BLOOD PRESSURE: 67 MMHG | HEART RATE: 86 BPM | SYSTOLIC BLOOD PRESSURE: 117 MMHG

## 2020-11-07 LAB
ANION GAP SERPL CALC-SCNC: 9 MMOL/L (ref 8–16)
BASOPHILS # BLD AUTO: 0.03 K/UL (ref 0–0.2)
BASOPHILS NFR BLD: 0.4 % (ref 0–1.9)
BUN SERPL-MCNC: 10 MG/DL (ref 6–20)
CALCIUM SERPL-MCNC: 8.4 MG/DL (ref 8.7–10.5)
CHLORIDE SERPL-SCNC: 101 MMOL/L (ref 95–110)
CO2 SERPL-SCNC: 25 MMOL/L (ref 23–29)
CREAT SERPL-MCNC: 1.3 MG/DL (ref 0.5–1.4)
DIFFERENTIAL METHOD: ABNORMAL
EOSINOPHIL # BLD AUTO: 0.3 K/UL (ref 0–0.5)
EOSINOPHIL NFR BLD: 4 % (ref 0–8)
ERYTHROCYTE [DISTWIDTH] IN BLOOD BY AUTOMATED COUNT: 14.9 % (ref 11.5–14.5)
EST. GFR  (AFRICAN AMERICAN): >60 ML/MIN/1.73 M^2
EST. GFR  (NON AFRICAN AMERICAN): >60 ML/MIN/1.73 M^2
GLUCOSE SERPL-MCNC: 80 MG/DL (ref 70–110)
HCT VFR BLD AUTO: 40.2 % (ref 40–54)
HGB BLD-MCNC: 13 G/DL (ref 14–18)
IMM GRANULOCYTES # BLD AUTO: 0.05 K/UL (ref 0–0.04)
IMM GRANULOCYTES NFR BLD AUTO: 0.6 % (ref 0–0.5)
LYMPHOCYTES # BLD AUTO: 1.1 K/UL (ref 1–4.8)
LYMPHOCYTES NFR BLD: 12.7 % (ref 18–48)
MCH RBC QN AUTO: 30.3 PG (ref 27–31)
MCHC RBC AUTO-ENTMCNC: 32.3 G/DL (ref 32–36)
MCV RBC AUTO: 94 FL (ref 82–98)
MONOCYTES # BLD AUTO: 1 K/UL (ref 0.3–1)
MONOCYTES NFR BLD: 12.1 % (ref 4–15)
NEUTROPHILS # BLD AUTO: 6 K/UL (ref 1.8–7.7)
NEUTROPHILS NFR BLD: 70.2 % (ref 38–73)
NRBC BLD-RTO: 0 /100 WBC
PLATELET # BLD AUTO: 135 K/UL (ref 150–350)
PMV BLD AUTO: 12.3 FL (ref 9.2–12.9)
POTASSIUM SERPL-SCNC: 4 MMOL/L (ref 3.5–5.1)
RBC # BLD AUTO: 4.29 M/UL (ref 4.6–6.2)
SODIUM SERPL-SCNC: 135 MMOL/L (ref 136–145)
WBC # BLD AUTO: 8.53 K/UL (ref 3.9–12.7)

## 2020-11-07 PROCEDURE — 94799 UNLISTED PULMONARY SVC/PX: CPT

## 2020-11-07 PROCEDURE — 94761 N-INVAS EAR/PLS OXIMETRY MLT: CPT

## 2020-11-07 PROCEDURE — 85025 COMPLETE CBC W/AUTO DIFF WBC: CPT

## 2020-11-07 PROCEDURE — 25000003 PHARM REV CODE 250: Performed by: ORTHOPAEDIC SURGERY

## 2020-11-07 PROCEDURE — 36415 COLL VENOUS BLD VENIPUNCTURE: CPT

## 2020-11-07 PROCEDURE — 25000003 PHARM REV CODE 250: Performed by: PHYSICIAN ASSISTANT

## 2020-11-07 PROCEDURE — 80048 BASIC METABOLIC PNL TOTAL CA: CPT

## 2020-11-07 PROCEDURE — 99900031 HC PATIENT EDUCATION (STAT)

## 2020-11-07 RX ORDER — HYDROMORPHONE HYDROCHLORIDE 2 MG/1
4 TABLET ORAL EVERY 4 HOURS PRN
Status: DISCONTINUED | OUTPATIENT
Start: 2020-11-07 | End: 2020-11-07 | Stop reason: HOSPADM

## 2020-11-07 RX ORDER — METOPROLOL TARTRATE 25 MG/1
25 TABLET, FILM COATED ORAL 2 TIMES DAILY
Qty: 60 TABLET | Refills: 0 | Status: ON HOLD | OUTPATIENT
Start: 2020-11-07 | End: 2022-12-11 | Stop reason: HOSPADM

## 2020-11-07 RX ADMIN — ASPIRIN 325 MG ORAL TABLET 325 MG: 325 PILL ORAL at 10:11

## 2020-11-07 RX ADMIN — BACLOFEN 10 MG: 10 TABLET ORAL at 10:11

## 2020-11-07 RX ADMIN — OXYCODONE HYDROCHLORIDE 15 MG: 10 TABLET ORAL at 04:11

## 2020-11-07 RX ADMIN — DULOXETINE 30 MG: 30 CAPSULE, DELAYED RELEASE ORAL at 10:11

## 2020-11-07 RX ADMIN — SACUBITRIL AND VALSARTAN 1 TABLET: 24; 26 TABLET, FILM COATED ORAL at 10:11

## 2020-11-07 RX ADMIN — METOPROLOL TARTRATE 25 MG: 25 TABLET, FILM COATED ORAL at 10:11

## 2020-11-07 RX ADMIN — HYDROMORPHONE HYDROCHLORIDE 4 MG: 2 TABLET ORAL at 07:11

## 2020-11-07 RX ADMIN — DOCUSATE SODIUM 100 MG: 100 CAPSULE, LIQUID FILLED ORAL at 10:11

## 2020-11-07 RX ADMIN — ROSUVASTATIN CALCIUM 5 MG: 5 TABLET, COATED ORAL at 10:11

## 2020-11-07 RX ADMIN — OXYCODONE HYDROCHLORIDE 15 MG: 10 TABLET ORAL at 12:11

## 2020-11-07 RX ADMIN — FLUTICASONE PROPIONATE 50 MCG: 50 SPRAY, METERED NASAL at 10:11

## 2020-11-07 NOTE — PLAN OF CARE
Discharge instructions discussed with patient and wife. Both verbalized understanding. PIV taken out. Cath intact. Central line taken out. Cath intact. Pt tolerated well with minimal bleeding. Personal belongings accounted for. Pt transported via staff transport to front entrance.

## 2020-11-07 NOTE — HOSPITAL COURSE
Patient was observed in the hospital post bilateral lumbar laminectomy with fusion and removal of old hardware surgery by Dr. Mateusz Blanco.  Patient did well postoperatively physical therapy was consulted patient.  Pain control was good.  Lab work remained stable postop.  Patient was closely followed by orthopedics while in the hospital.  Orthopedics has cleared patient to be discharged.  Patient will be following with physical therapy outpatient. Patient and family was seen on day of discharge by myself and was examined. They were stable for discharge. Discharge plan, follow up instructions, and contacts in case of further needs were discussed in detail.  Patient has outpatient appointment with orthopedics.

## 2020-11-07 NOTE — DISCHARGE SUMMARY
[facility] Hospital Medicine  Discharge Summary      Patient Name: Lee Quintanilla  MRN: [id number]  Admission Date: 11/5/2020  Hospital Length of Stay: 2 days  Discharge Date and Time: 11/7/2020 11:30 AM  Attending Physician: Mallika att. providers found   Discharging Provider: Tami Ramon MD  Primary Care Provider: Riya Vidales NP      HPI:   Patient is a 45-year-old  male with past medical history significant for chronic low back pain, chronic kidney disease stage 3, hypertension, hyperlipidemia, benign prostatic hypertrophy and attention deficit disorder who underwent bilateral lumbar laminectomy with fusion and removal of old hardware surgery by Dr. Mateusz Blanco. Post-operatively, patient denied chest pain, shortness of breath, abdominal pain, nausea, vomiting, headache, vision changes, focal neuro-deficits, cough or fever.      Procedure(s) (LRB):  LAMINECTOMY, SPINE, LUMBAR, WITH FUSION (Bilateral)  REMOVAL, HARDWARE, SPINE (Bilateral)  BONE GRAFT (N/A)      Hospital Course:   Patient was observed in the hospital post bilateral lumbar laminectomy with fusion and removal of old hardware surgery by Dr. Mateusz Blanco.  Patient did well postoperatively physical therapy was consulted patient.  Pain control was good.  Lab work remained stable postop.  Patient was closely followed by orthopedics while in the hospital.  Orthopedics has cleared patient to be discharged.  Patient will be following with physical therapy outpatient. Patient and family was seen on day of discharge by myself and was examined. They were stable for discharge. Discharge plan, follow up instructions, and contacts in case of further needs were discussed in detail.  Patient has outpatient appointment with orthopedics.       Consults:   Consults (From admission, onward)        Status Ordering Provider     Inpatient consult to Social Work  Once     Provider:  (Not yet assigned)    Completed GISELE RG           No new Assessment & Plan notes have been filed under this hospital service since the last note was generated.  Service: Hospital Medicine    Final Active Diagnoses:    Diagnosis Date Noted POA    PRINCIPAL PROBLEM:  Degenerative lumbar disc [M51.36] 10/21/2020 Yes    Coronary artery disease involving coronary bypass graft [I25.810] 11/05/2020 Yes    Depression [F32.9] 05/13/2020 Yes    Hyperlipidemia [E78.5] 11/21/2018 Yes    MYNOR (generalized anxiety disorder) [F41.1] 04/25/2016 Yes    Obesity (BMI 30.0-34.9) [E66.9] 01/22/2016 Yes      Problems Resolved During this Admission:       Discharged Condition: good    Disposition: Home or Self Care    Follow Up:  Follow-up Information     Call [facility].    Specialty: Physical Therapy  Why: on Monday to make an appointment   Contact information:  [address] [place] [address] DR Stephania GODINEZ [id number]  [phone]                 Patient Instructions:      Ambulatory referral/consult to Physical/Occupational Therapy   Standing Status: Future   Referral Priority: Routine Referral Type: Physical Medicine   Referral Reason: Specialty Services Required   Number of Visits Requested: 1     Diet Adult Regular     Activity as tolerated       Significant Diagnostic Studies: Labs:   BMP:   Recent Labs   Lab 11/06/20  0510 11/07/20  0609   GLU 95 80   * 135*   K 4.2 4.0    101   CO2 24 25   BUN 13 10   CREATININE 1.5* 1.3   CALCIUM 8.0* 8.4*   , CMP   Recent Labs   Lab 11/06/20  0510 11/07/20  0609   * 135*   K 4.2 4.0    101   CO2 24 25   GLU 95 80   BUN 13 10   CREATININE 1.5* 1.3   CALCIUM 8.0* 8.4*   ANIONGAP 10 9   ESTGFRAFRICA >60 >60   EGFRNONAA 55* >60    and CBC   Recent Labs   Lab 11/06/20  0510 11/07/20  0609   WBC 12.30 8.53   HGB 12.8* 13.0*   HCT 40.2 40.2    135*       Pending Diagnostic Studies:     Procedure Component Value Units Date/Time    Specimen to Pathology, Surgery Orthopedics [[id number]] Collected:  11/05/20 1022    Order Status: Sent Lab Status: In process Updated: 11/05/20 1117         Medications:  Reconciled Home Medications:      Medication List      START taking these medications    oxyCODONE-acetaminophen  mg per tablet  Commonly known as: PERCOCET  Take 1 tablet by mouth every 4 (four) hours as needed.        CHANGE how you take these medications    DULoxetine 30 MG capsule  Commonly known as: CYMBALTA  Take 1 capsule (30 mg total) by mouth once daily.  What changed: additional instructions     metoprolol tartrate 25 MG tablet  Commonly known as: LOPRESSOR  Take 1 tablet (25 mg total) by mouth 2 (two) times a day.  What changed: how much to take        CONTINUE taking these medications    anastrozole 1 mg Tab  Commonly known as: ARIMIDEX  Take 1 mg by mouth twice a week .     aspirin 325 MG tablet  Take 325 mg by mouth once daily.     azelastine 137 mcg (0.1 %) nasal spray  Commonly known as: ASTELIN  1 spray (137 mcg total) by Nasal route 2 (two) times daily.     baclofen 10 MG tablet  Commonly known as: LIORESAL  TAKE ONE TABLET BY MOUTH THREE TIMES A DAY     clonazePAM 0.5 MG tablet  Commonly known as: KLONOPIN  Take 1 tablet (0.5 mg total) by mouth 2 (two) times daily as needed for Anxiety.     ENTRESTO 24-26 mg per tablet  Generic drug: sacubitriL-valsartan  2 (two) times daily.     FLUoxetine 20 MG capsule  Take 1 capsule (20 mg total) by mouth once daily.     fluticasone propionate 50 mcg/actuation nasal spray  Commonly known as: FLONASE  1 spray (50 mcg total) by Each Nostril route 2 (two) times daily.     rosuvastatin 10 MG tablet  Commonly known as: CRESTOR  Take 0.5 tablets (5 mg total) by mouth once daily.     testosterone cypionate 200 mg/mL injection  Commonly known as: DEPOTESTOTERONE CYPIONATE  Inject 200 mg into the muscle every 7 days.        STOP taking these medications    tadalafiL 20 MG Tab  Commonly known as: CIALIS            Indwelling Lines/Drains at time of discharge:    Lines/Drains/Airways     None                 Time spent on the discharge of patient: 45 minutes  Patient was seen and examined on the date of discharge and determined to be suitable for discharge.         Tami Ramon MD  Department of Hospital Medicine  [facility]

## 2020-11-07 NOTE — PT/OT/SLP PROGRESS
Physical Therapy      Patient Name:  Lee Quintanilla   MRN:  [id number]    Patient not seen today secondary to Patient unwilling to participate(Pt stated he's walking on his own now and doesn't need PT/waiting to go home.). Will not follow-up per pt request.    Arti Guillaume, PT

## 2020-11-07 NOTE — PROGRESS NOTES
[facility]  Orthopedics  Progress Note    Patient Name: Lee Quintanilla  MRN: [id number]  Admission Date: 11/5/2020  Hospital Length of Stay: 2 days  Attending Provider: Tami Ramon MD  Primary Care Provider: Riya Vidales NP  Follow-up For: Procedure(s) (LRB):  LAMINECTOMY, SPINE, LUMBAR, WITH FUSION (Bilateral)  REMOVAL, HARDWARE, SPINE (Bilateral)  BONE GRAFT (N/A)    Post-Operative Day: 2 Days Post-Op  Subjective:     Principal Problem:Degenerative lumbar disc    Principal Orthopedic Problem: S/P L3-4 lami/ TLIF/ PLF    Interval History: poor pain control    Review of patient's allergies indicates:   Allergen Reactions    Inapsine [droperidol] Anaphylaxis     seizures    Effexor [venlafaxine]      Increased anxiety    Pcn [penicillins]     Bactrim [sulfamethoxazole-trimethoprim] Rash     Dry red rash all over when in the sun       Current Facility-Administered Medications   Medication    acetaminophen tablet 650 mg    aluminum-magnesium hydroxide-simethicone 200-200-20 mg/5 mL suspension 30 mL    anastrozole tablet 1 mg    aspirin tablet 325 mg    azelastine 137 mcg (0.1 %) nasal spray 137 mcg    baclofen tablet 10 mg    bisacodyL suppository 10 mg    clonazePAM tablet 0.5 mg    diphenhydrAMINE capsule 50 mg    docusate sodium capsule 100 mg    DULoxetine DR capsule 30 mg    FLUoxetine capsule 20 mg    fluticasone propionate 50 mcg/actuation nasal spray 50 mcg    hydromorphone (PF) injection 2 mg    influenza (QUADRIVALENT PF) vaccine 0.5 mL    lactated ringers infusion    metoprolol tartrate (LOPRESSOR) tablet 25 mg    mupirocin 2 % ointment 1 g    naloxone 0.4 mg/mL injection 0.02 mg    ondansetron disintegrating tablet 8 mg    oxyCODONE immediate release tablet 15 mg    oxyCODONE immediate release tablet 5 mg    oxyCODONE immediate release tablet Tab 10 mg    promethazine (PHENERGAN) 12.5 mg in dextrose 5 % 50 mL IVPB    rosuvastatin tablet 5 mg     "sacubitriL-valsartan 24-26 mg per tablet 1 tablet    senna-docusate 8.6-50 mg per tablet 2 tablet     Objective:     Vital Signs (Most Recent):  Temp: 99.9 °F (37.7 °C) (11/07/20 0419)  Pulse: 86 (11/07/20 0419)  Resp: 18 (11/07/20 0419)  BP: (!) 144/71 (11/07/20 0419)  SpO2: 96 % (11/07/20 0419) Vital Signs (24h Range):  Temp:  [96.4 °F (35.8 °C)-99.9 °F (37.7 °C)] 99.9 °F (37.7 °C)  Pulse:  [68-86] 86  Resp:  [16-20] 18  SpO2:  [93 %-97 %] 96 %  BP: (100-144)/(53-71) 144/71     Weight: 88.5 kg (195 lb)  Height: 5' 7" (170.2 cm)  Body mass index is 30.54 kg/m².      Intake/Output Summary (Last 24 hours) at 11/7/2020 0726  Last data filed at 11/7/2020 0423  Gross per 24 hour   Intake 500 ml   Output 700 ml   Net -200 ml       General    Nursing note and vitals reviewed.  Constitutional: He is oriented to person, place, and time. He appears well-developed and well-nourished.   Pulmonary/Chest: Effort normal.   Neurological: He is alert and oriented to person, place, and time.   Psychiatric: He has a normal mood and affect. His behavior is normal.         Back (L-Spine & T-Spine) / Neck (C-Spine) Exam     Comments:  BLEs DNVI. Post-lumbar incision / dressing C/D/I        Significant Labs:   CBC:   Recent Labs   Lab 11/05/20  1134 11/06/20  0510 11/07/20  0609   WBC 7.54 12.30 8.53   HGB 13.4* 12.8* 13.0*   HCT 41.1 40.2 40.2   * 150 135*     CMP:   Recent Labs   Lab 11/05/20  1134 11/06/20  0510    135*   K 4.5 4.2    101   CO2 22* 24   * 95   BUN 12 13   CREATININE 1.5* 1.5*   CALCIUM 7.0* 8.0*   ANIONGAP 12 10   EGFRNONAA 55* 55*     All pertinent labs within the past 24 hours have been reviewed.    Significant Imaging: None    Assessment/Plan:     * Degenerative lumbar disc  Will adjust meds for pain control  - Hold all Oxycodone  - Dilaudid 4mg po q4h prn pain  Cont OOB with PT and nursing  Stable and may be DC'd home today; no HH because patient has Medicaid and HH is not covered    If " Dilaudid is better for pain control then he can go home with Rx of po dilaudid. If not, then he will have to manage with Oxycodone 15mg q4h prn          JOSE M VELOZ  Orthopedics  [facility]

## 2020-11-07 NOTE — PLAN OF CARE
Pt clear for discharge from case management standpoint. Pt discharging home with outpatient PT/OT. SW remains available.       11/07/20 1117   Final Note   Assessment Type Final Discharge Note   Anticipated Discharge Disposition Home   Right Care Referral Info   Post Acute Recommendation No Care

## 2020-11-07 NOTE — CARE UPDATE
11/07/20 0830   PRE-TX-O2   O2 Device (Oxygen Therapy) room air   SpO2 97 %   Pulse Oximetry Type Intermittent   $ Pulse Oximetry - Multiple Charge Pulse Oximetry - Multiple   Incentive Spirometer   $ Incentive Spirometer Charges done with encouragement   Incentive Spirometer Predicted Level (mL) 3000   Administration (IS) instruction provided, follow-up;proper technique demonstrated   Number of Repetitions (IS) 10   Level Incentive Spirometer (mL) 2750   Patient Tolerance (IS) good

## 2020-11-07 NOTE — SUBJECTIVE & OBJECTIVE
Principal Problem:Degenerative lumbar disc    Principal Orthopedic Problem: S/P L3-4 lami/ TLIF/ PLF    Interval History: poor pain control    Review of patient's allergies indicates:   Allergen Reactions    Inapsine [droperidol] Anaphylaxis     seizures    Effexor [venlafaxine]      Increased anxiety    Pcn [penicillins]     Bactrim [sulfamethoxazole-trimethoprim] Rash     Dry red rash all over when in the sun       Current Facility-Administered Medications   Medication    acetaminophen tablet 650 mg    aluminum-magnesium hydroxide-simethicone 200-200-20 mg/5 mL suspension 30 mL    anastrozole tablet 1 mg    aspirin tablet 325 mg    azelastine 137 mcg (0.1 %) nasal spray 137 mcg    baclofen tablet 10 mg    bisacodyL suppository 10 mg    clonazePAM tablet 0.5 mg    diphenhydrAMINE capsule 50 mg    docusate sodium capsule 100 mg    DULoxetine DR capsule 30 mg    FLUoxetine capsule 20 mg    fluticasone propionate 50 mcg/actuation nasal spray 50 mcg    hydromorphone (PF) injection 2 mg    influenza (QUADRIVALENT PF) vaccine 0.5 mL    lactated ringers infusion    metoprolol tartrate (LOPRESSOR) tablet 25 mg    mupirocin 2 % ointment 1 g    naloxone 0.4 mg/mL injection 0.02 mg    ondansetron disintegrating tablet 8 mg    oxyCODONE immediate release tablet 15 mg    oxyCODONE immediate release tablet 5 mg    oxyCODONE immediate release tablet Tab 10 mg    promethazine (PHENERGAN) 12.5 mg in dextrose 5 % 50 mL IVPB    rosuvastatin tablet 5 mg    sacubitriL-valsartan 24-26 mg per tablet 1 tablet    senna-docusate 8.6-50 mg per tablet 2 tablet     Objective:     Vital Signs (Most Recent):  Temp: 99.9 °F (37.7 °C) (11/07/20 0419)  Pulse: 86 (11/07/20 0419)  Resp: 18 (11/07/20 0419)  BP: (!) 144/71 (11/07/20 0419)  SpO2: 96 % (11/07/20 0419) Vital Signs (24h Range):  Temp:  [96.4 °F (35.8 °C)-99.9 °F (37.7 °C)] 99.9 °F (37.7 °C)  Pulse:  [68-86] 86  Resp:  [16-20] 18  SpO2:  [93 %-97 %] 96 %  BP:  "(100-144)/(53-71) 144/71     Weight: 88.5 kg (195 lb)  Height: 5' 7" (170.2 cm)  Body mass index is 30.54 kg/m².      Intake/Output Summary (Last 24 hours) at 11/7/2020 0726  Last data filed at 11/7/2020 0423  Gross per 24 hour   Intake 500 ml   Output 700 ml   Net -200 ml       General    Nursing note and vitals reviewed.  Constitutional: He is oriented to person, place, and time. He appears well-developed and well-nourished.   Pulmonary/Chest: Effort normal.   Neurological: He is alert and oriented to person, place, and time.   Psychiatric: He has a normal mood and affect. His behavior is normal.         Back (L-Spine & T-Spine) / Neck (C-Spine) Exam     Comments:  BLEs DNVI. Post-lumbar incision / dressing C/D/I        Significant Labs:   CBC:   Recent Labs   Lab 11/05/20  1134 11/06/20  0510 11/07/20  0609   WBC 7.54 12.30 8.53   HGB 13.4* 12.8* 13.0*   HCT 41.1 40.2 40.2   * 150 135*     CMP:   Recent Labs   Lab 11/05/20  1134 11/06/20  0510    135*   K 4.5 4.2    101   CO2 22* 24   * 95   BUN 12 13   CREATININE 1.5* 1.5*   CALCIUM 7.0* 8.0*   ANIONGAP 12 10   EGFRNONAA 55* 55*     All pertinent labs within the past 24 hours have been reviewed.    Significant Imaging: None  "

## 2020-11-07 NOTE — ASSESSMENT & PLAN NOTE
Will adjust meds for pain control  - Hold all Oxycodone  - Dilaudid 4mg po q4h prn pain  Cont OOB with PT and nursing  Stable and may be DC'd home today; no HH because patient has Medicaid and HH is not covered    If Dilaudid is better for pain control then he can go home with Rx of po dilaudid. If not, then he will have to manage with Oxycodone 15mg q4h prn

## 2020-11-07 NOTE — RESPIRATORY THERAPY
11/06/20 1950   Patient Assessment/Suction   Level of Consciousness (AVPU) alert   Respiratory Effort Unlabored   Expansion/Accessory Muscles/Retractions no use of accessory muscles   All Lung Fields Breath Sounds clear   Rhythm/Pattern, Respiratory unlabored;pattern regular;depth regular   Cough Frequency no cough   PRE-TX-O2   O2 Device (Oxygen Therapy) room air   SpO2 97 %   Pulse Oximetry Type Intermittent   $ Pulse Oximetry - Multiple Charge Pulse Oximetry - Multiple   Pulse 83   Resp 16   Incentive Spirometer   $ Incentive Spirometer Charges done with encouragement   Administration (IS) mouthpiece   Number of Repetitions (IS) 10   Level Incentive Spirometer (mL) 2500   Patient Tolerance (IS) good

## 2020-11-07 NOTE — PLAN OF CARE
Plan of care reviewed with patient. ML dressing C/D/I. Medicated for pain three times this shift, mild relief obtained. Pedal pulses WNL.remains free from falls/injury.instructed to call for assistance as needed during night,verbalized understanding. Call light in reach.

## 2020-11-07 NOTE — PT/OT/SLP DISCHARGE
"Physical Therapy Discharge Summary    Name: Lee Quintanilla  MRN: [id number]   Principal Problem: Degenerative lumbar disc     Patient Discharged from acute Physical Therapy on 20.  Please refer to prior PT noted date on 20 for functional status.     Assessment:     Patient was discharged unexpectedly.  Information required to complete an accurate discharge summary is unknown.  Refer to therapy initial evaluation and last progress note for initial and most recent functional status and goal achievement.  Recommendations made may be found in medical record. Pt being discharged at his request as pt found walking in his room independently and refused PT stating, "I don't need it."    Objective:     GOALS:   Multidisciplinary Problems     Physical Therapy Goals        Problem: Physical Therapy Goal    Goal Priority Disciplines Outcome Goal Variances Interventions   Physical Therapy Goal     PT, PT/OT      Description: Goals to be met by: 2020     Patient will increase functional independence with mobility by performin. Supine to sit with Supervision  2. Sit to stand transfer with Supervision  3. Bed to chair transfer with Supervision   4. Gait  x 250 feet with Supervision.                      Reasons for Discontinuation of Therapy Services  Patient declines to continue care.      Plan:     Patient Discharged to: Home with MD to determine if follow up PT needed..    Arti Guillaume, PT  2020  "

## 2020-11-07 NOTE — PLAN OF CARE
Problem: Physical Therapy Goal  Goal: Physical Therapy Goal  Description: Goals to be met by: 2020     Patient will increase functional independence with mobility by performin. Supine to sit with Supervision  2. Sit to stand transfer with Supervision  3. Bed to chair transfer with Supervision   4. Gait  x 250 feet with Supervision.     2020 1216 by Arti Guillaume, PT  Outcome: Met  2020 1215 by Arti Guillaume, PT  Outcome: Adequate for Care Transition   Pt discharged from PT at his request due to pt walking on his own.

## 2020-11-09 ENCOUNTER — TELEPHONE (OUTPATIENT)
Dept: FAMILY MEDICINE | Facility: CLINIC | Age: 45
End: 2020-11-09

## 2020-11-09 ENCOUNTER — TELEPHONE (OUTPATIENT)
Dept: MEDSURG UNIT | Facility: HOSPITAL | Age: 45
End: 2020-11-09

## 2020-11-09 ENCOUNTER — PATIENT OUTREACH (OUTPATIENT)
Dept: ADMINISTRATIVE | Facility: CLINIC | Age: 45
End: 2020-11-09

## 2020-11-09 LAB
COMMENT: NORMAL
FINAL PATHOLOGIC DIAGNOSIS: NORMAL
GROSS: NORMAL
Lab: NORMAL

## 2020-11-09 NOTE — PROGRESS NOTES
C3 nurse attempted to contact patient. No answer. The following message was left for the patient to return the call:  Good afternoon,  I am a nurse calling on behalf of [facility] from the [facility].  This is a Transitional Care Call for Lee Quintanilla. When you have a moment please contact us at [phone] or [phone] Monday through Friday, between the hours of 8 am to 4 pm. We look forward to speaking with you. On behalf of [facility] have a nice day.    The patient has a scheduled POST OP appointment on 11/20 @ 0915.

## 2020-11-12 ENCOUNTER — PATIENT MESSAGE (OUTPATIENT)
Dept: ORTHOPEDICS | Facility: CLINIC | Age: 45
End: 2020-11-12

## 2020-11-13 DIAGNOSIS — M54.50 LUMBAR PAIN: Primary | ICD-10-CM

## 2020-11-13 RX ORDER — OXYCODONE AND ACETAMINOPHEN 10; 325 MG/1; MG/1
1 TABLET ORAL EVERY 6 HOURS PRN
Qty: 28 TABLET | Refills: 0 | Status: SHIPPED | OUTPATIENT
Start: 2020-11-13 | End: 2020-11-20 | Stop reason: SDUPTHER

## 2020-11-20 ENCOUNTER — OFFICE VISIT (OUTPATIENT)
Dept: ORTHOPEDICS | Facility: CLINIC | Age: 45
End: 2020-11-20
Payer: MEDICAID

## 2020-11-20 VITALS
BODY MASS INDEX: 30.92 KG/M2 | HEART RATE: 81 BPM | SYSTOLIC BLOOD PRESSURE: 107 MMHG | HEIGHT: 67 IN | WEIGHT: 197 LBS | DIASTOLIC BLOOD PRESSURE: 70 MMHG

## 2020-11-20 DIAGNOSIS — Z98.1 HISTORY OF LUMBAR SPINAL FUSION: Primary | ICD-10-CM

## 2020-11-20 DIAGNOSIS — M54.50 LUMBAR PAIN: ICD-10-CM

## 2020-11-20 PROCEDURE — 99024 POSTOP FOLLOW-UP VISIT: CPT | Mod: S$GLB,,, | Performed by: PHYSICIAN ASSISTANT

## 2020-11-20 PROCEDURE — 99024 PR POST-OP FOLLOW-UP VISIT: ICD-10-PCS | Mod: S$GLB,,, | Performed by: PHYSICIAN ASSISTANT

## 2020-11-20 RX ORDER — ORPHENADRINE CITRATE 100 MG/1
100 TABLET, EXTENDED RELEASE ORAL 2 TIMES DAILY
Qty: 60 TABLET | Refills: 2 | Status: SHIPPED | OUTPATIENT
Start: 2020-11-20 | End: 2020-12-20

## 2020-11-20 RX ORDER — OXYCODONE AND ACETAMINOPHEN 10; 325 MG/1; MG/1
1 TABLET ORAL EVERY 6 HOURS PRN
Qty: 28 TABLET | Refills: 0 | Status: SHIPPED | OUTPATIENT
Start: 2020-11-20 | End: 2020-11-27

## 2020-11-20 NOTE — PROGRESS NOTES
Subjective:    Patient ID: Lee Quintanilla is a 45 y.o. male.    Chief Complaint: Post-op Evaluation of the Lumbar Spine (s/p L3-4 PLF, rem'l inst L4-5 on 11/5/20, having usual post op pain, having spasms across back, no leg pain)      History of Present Illness  Patient is here for his 2 week postoperative appointment status post L3-4 posterior lateral fusion and interbody fusion.  Doing well but very stiff and having some significant muscle spasms.    Current Medications  Current Outpatient Medications   Medication Sig Dispense Refill    anastrozole (ARIMIDEX) 1 mg Tab Take 1 mg by mouth twice a week .      aspirin 325 MG tablet Take 325 mg by mouth once daily.       azelastine (ASTELIN) 137 mcg (0.1 %) nasal spray 1 spray (137 mcg total) by Nasal route 2 (two) times daily. 30 mL 12    baclofen (LIORESAL) 10 MG tablet TAKE ONE TABLET BY MOUTH THREE TIMES A DAY 90 tablet 5    clonazePAM (KLONOPIN) 0.5 MG tablet Take 1 tablet (0.5 mg total) by mouth 2 (two) times daily as needed for Anxiety. 45 tablet 2    DULoxetine (CYMBALTA) 30 MG capsule Take 1 capsule (30 mg total) by mouth once daily. (Patient taking differently: Take 30 mg by mouth once daily. BID) 30 capsule 11    ENTRESTO 24-26 mg per tablet 2 (two) times daily.       FLUoxetine 20 MG capsule Take 1 capsule (20 mg total) by mouth once daily. 30 capsule 11    fluticasone propionate (FLONASE) 50 mcg/actuation nasal spray 1 spray (50 mcg total) by Each Nostril route 2 (two) times daily. 16 g 12    metoprolol tartrate (LOPRESSOR) 25 MG tablet Take 1 tablet (25 mg total) by mouth 2 (two) times a day. 60 tablet 0    oxyCODONE-acetaminophen (PERCOCET)  mg per tablet Take 1 tablet by mouth every 6 (six) hours as needed. 28 tablet 0    rosuvastatin (CRESTOR) 10 MG tablet Take 0.5 tablets (5 mg total) by mouth once daily. 90 tablet 0    testosterone cypionate (DEPOTESTOTERONE CYPIONATE) 200 mg/mL injection Inject 200 mg into the muscle  every 7 days.       No current facility-administered medications for this visit.        Allergies  Review of patient's allergies indicates:   Allergen Reactions    Inapsine [droperidol] Anaphylaxis     seizures    Effexor [venlafaxine]      Increased anxiety    Pcn [penicillins]     Bactrim [sulfamethoxazole-trimethoprim] Rash     Dry red rash all over when in the sun       Past Medical History  Past Medical History:   Diagnosis Date    ADHD (attention deficit hyperactivity disorder)     Arthritis     Asthma     as child only    Back pain     Coronary artery disease     Degeneration of lumbar intervertebral disc 05/2016    Depression     Elevated PSA     Headache     Hyperlipidemia     Hypertension     Kidney damage     stage 3 ; d/t aleve abuse    Kidney stone     Myocardial infarction     Neck pain     Numbness and tingling in hands     Numbness and tingling of both legs     Seizures     from inapsine 2002    Sleep apnea     no cpap    Wears glasses     CONTACS       Surgical History  Past Surgical History:   Procedure Laterality Date    BACK SURGERY  2016    back surgery    BONE GRAFT N/A 11/5/2020    Procedure: BONE GRAFT;  Surgeon: Mateusz Blanco MD;  Location: [facility] OR;  Service: Orthopedics;  Laterality: N/A;    CARDIAC SURGERY  01/06/2020    CABG X 7    CORONARY ARTERY BYPASS GRAFT      7 vessels    HERNIA REPAIR Bilateral 11/22/2017    LITHOTRIPSY      LUMBAR LAMINECTOMY WITH FUSION Bilateral 11/5/2020    Procedure: LAMINECTOMY, SPINE, LUMBAR, WITH FUSION;  Surgeon: Mateusz Blanco MD;  Location: [facility] OR;  Service: Orthopedics;  Laterality: Bilateral;  [employer]  NTI  L-3    PILONIDAL CYST DRAINAGE      removal of abcess      scrotal    REMOVAL OF HARDWARE FROM SPINE Bilateral 11/5/2020    Procedure: REMOVAL, HARDWARE, SPINE;  Surgeon: Mateusz Blanco MD;  Location: [facility] OR;  Service: Orthopedics;  Laterality: Bilateral;  L 4-5    TYMPANOSTOMY TUBE PLACEMENT          Family History:   Family History   Problem Relation Age of Onset    Heart disease Mother     Migraines Mother     Hypertension Mother     Early death Father     Heart disease Father     Diabetes Maternal Aunt     Diabetes Maternal Uncle     Diabetes Maternal Grandfather        Social History:   Social History     Socioeconomic History    Marital status:      Spouse name: Not on file    Number of children: Not on file    Years of education: Not on file    Highest education level: Not on file   Occupational History    Occupation: disability   Social Needs    Financial resource strain: Not very hard    Food insecurity     Worry: Not on file     Inability: Not on file    Transportation needs     Medical: Not on file     Non-medical: Not on file   Tobacco Use    Smoking status: Former Smoker     Packs/day: 0.25     Types: Cigarettes     Quit date: 2016     Years since quittin.8    Smokeless tobacco: Former User     Quit date: 2016    Tobacco comment: occasional   Substance and Sexual Activity    Alcohol use: Yes     Alcohol/week: 1.0 standard drinks     Types: 1 Glasses of wine per week     Frequency: 4 or more times a week     Drinks per session: 1 or 2     Binge frequency: Never     Comment: rare    Drug use: No    Sexual activity: Yes     Partners: Female   Lifestyle    Physical activity     Days per week: 3 days     Minutes per session: 20 min    Stress: Not on file   Relationships    Social connections     Talks on phone: More than three times a week     Gets together: More than three times a week     Attends [employer] service: Not on file     Active member of club or organization: No     Attends meetings of clubs or organizations: Never     Relationship status: Patient refused   Other Topics Concern    Not on file   Social History Narrative    Not on file       Date of surgery:  2020    Review of Systems     General/Constitutional: Chills denies. Fatigue  denies. Fever denies. Weight gain denies. Weight loss denies.    Musculoskeletal: Comments: See HPI for details    Skin: Rash denies.    Objective:   Vital Signs:   Vitals:    11/20/20 0858   BP: 107/70   Pulse: 81        Physical Exam    This a well-developed, well nourished patient in no acute distress.  They are alert and oriented and cooperative to examination.  Pt. walks without an antalgic gait.      General Examination:     Constitutional: The patient is alert and oriented to lace person and time. Mood is pleasant.     Head/Face: Normal facial features normal eyebrows    Eyes: Normal extraocular motion bilaterally    Lungs: Respirations are equal and unlabored    Gait is coordinated.    Cardiovascular: There are no swelling or varicosities present.    Lymphatic: Negative for adenopathy    Skin: Normal    Neurological: Level of consciousness normal. Oriented to place person and time and situation    Psychiatric: Oriented to time place person and situation    Posterior lumbar incision healing well with no signs or symptoms of infection.  Staples removed today.  Antalgic and guarded gait.  Limited range of motion secondary to pain and guarding.  Bilateral lower extremities distal neurovascular intact.    XRAY Report/ Interpretation:  Postop lumbar AP and lateral x-rays taken in the office today reviewed the patient demonstrate a normal postoperative appearance.      Assessment:       1. History of lumbar spinal fusion        Plan:       Lee was seen today for post-op evaluation.    Diagnoses and all orders for this visit:    History of lumbar spinal fusion  -     X-Ray Lumbar Spine Ap And Lateral         No follow-ups on file.    Patient is scheduled to start physical therapy next week.  I refilled his pain medication.  No lifting for another 4-6 weeks.  Follow-up in 4 weeks or sooner if needed.      This note was created using Dragon voice recognition software that occasionally misinterpreted phrases or  words.

## 2020-11-24 ENCOUNTER — CLINICAL SUPPORT (OUTPATIENT)
Dept: REHABILITATION | Facility: HOSPITAL | Age: 45
End: 2020-11-24
Attending: INTERNAL MEDICINE
Payer: MEDICAID

## 2020-11-24 DIAGNOSIS — Z98.890 HISTORY OF LUMBAR SURGERY: ICD-10-CM

## 2020-11-24 DIAGNOSIS — R29.898 IMPAIRED FLEXIBILITY OF LOWER EXTREMITY: ICD-10-CM

## 2020-11-24 DIAGNOSIS — R53.81 DEBILITY: ICD-10-CM

## 2020-11-24 PROCEDURE — 97161 PT EVAL LOW COMPLEX 20 MIN: CPT | Mod: PN | Performed by: PHYSICAL THERAPIST

## 2020-11-24 PROCEDURE — 97110 THERAPEUTIC EXERCISES: CPT | Mod: PN | Performed by: PHYSICAL THERAPIST

## 2020-11-24 NOTE — PLAN OF CARE
[facility]  Physical Therapy Initial Evaluation    [id number]    Therapy Diagnosis:   Encounter Diagnoses   Name Primary?    History of lumbar surgery     Impaired flexibility of lower extremity     Debility      Physician: Tami Ramon MD, Mateusz Blanco MD    Physician Orders: PT Eval and Treat   Medical Diagnosis from Referral: History of lumbar surgery  Evaluation Date: 11/24/2020  Authorization Period Expiration: 12/24/2020  Plan of Care Expiration: 1/15/2021  Visit # / Visits authorized: 1/ 1 (POC 1/12)  FOTO Due visit 5  FOTO Due visit 10    Time In: 1430  Time Out: 1515  Total Billable Time: 45 minutes    Precautions: Standard and cardiac  Insurance: Payor: MEDICAID / Plan: [employer] / Product Type: Managed Medicaid /     Subjective   Date of onset: 11/5/2020  History of current condition - Lee reports: a chronic h/o lumbar and LE pain/paresthesia following years as a  for [employer]. He underwent a fusion at L4/L5 in 2016. The patient returned to work and began experiencing lumbar and LE symptoms again. On 11/5/2020, the patient underwent a transforaminal lumbar interbody fusion and posterolateral fusion at L3-4. Hardware was also removed from his previous surgery. He reports decreased LE symptoms since surgery but still notes intermittent lumbar spasms. The patient reports pain with prolonged sitting (>5-10 minutes)     Medical History:   Past Medical History:   Diagnosis Date    ADHD (attention deficit hyperactivity disorder)     Arthritis     Asthma     as child only    Back pain     Coronary artery disease     Degeneration of lumbar intervertebral disc 05/2016    Depression     Elevated PSA     Headache     Hyperlipidemia     Hypertension     Kidney damage     stage 3 ; d/t aleve abuse    Kidney stone     Myocardial infarction     Neck pain     Numbness and tingling in hands     Numbness and  tingling of both legs     Seizures     from inapsine 2002    Sleep apnea     no cpap    Wears glasses     CONTACS       Surgical History:   Lee Quintanilla  has a past surgical history that includes removal of abcess; Pilonidal cyst drainage; Tympanostomy tube placement; Back surgery (2016); Lithotripsy; Hernia repair (Bilateral, 11/22/2017); Coronary artery bypass graft; Cardiac surgery (01/06/2020); Lumbar laminectomy with fusion (Bilateral, 11/5/2020); Removal of hardware from spine (Bilateral, 11/5/2020); and Bone graft (N/A, 11/5/2020).    Medications:   Lee has a current medication list which includes the following prescription(s): anastrozole, aspirin, azelastine, baclofen, clonazepam, duloxetine, entresto, fluoxetine, fluticasone propionate, metoprolol tartrate, orphenadrine, oxycodone-acetaminophen, rosuvastatin, and testosterone cypionate.    Allergies:   Review of patient's allergies indicates:   Allergen Reactions    Inapsine [droperidol] Anaphylaxis     seizures    Effexor [venlafaxine]      Increased anxiety    Pcn [penicillins]     Bactrim [sulfamethoxazole-trimethoprim] Rash     Dry red rash all over when in the sun        Imaging, MRI studies, X-rays: See Epic    Prior Therapy: PT  Social History: Engaged lives with their spouse  Occupation: Not work (former , wants to go to [email or web address] school)  Prior Level of Function: Independent  Current Level of Function: Decreased ability to perform ADL and limited tolerance to sitting and prolonged walking    Pain:  Current 5/10, worst 10/10, best 5/10   Location: bilateral lumbar region   Description: Throbbing and Spasm  Aggravating Factors: Sitting and Walking  Easing Factors: pain medication    Pts goals: Increase tolerance to standing and walking    Objective     Posture: Guarded, Forward head protracted scapulae  Palpation: Severe point tenderness noted with palpation of the lumbar paraspinals  Sensation: Decreased along the  incision site, otherwise intact      Lumbar AROM: ROM-   Response to repeated movements   Flexion NT degrees - Secondary post op status   Extension NT degrees - Secondary post op status   Right side bending NT degrees    Secondary post op status   Left side bending NT degrees    Secondary post op status     L/E MMT Left Right   Hip Flexion 4+/5. 4+/5.   Hip Extension 4+/5. 4+/5.   Hip Abduction 4/5. 4/5.   Hip Adduction 5/5. 5/5.   Hip IR 4/5. 4/5.   Hip ER 4/5. 4/5.   Abdominal 3/5. 3/5.       Flexibility Left Right   Hamstrings 30 degrees 30 degrees   Achilles 2 degrees 2 degrees       Special Tests Left Right   BECKY positive. positive.   Piriformis positive. positive.       Gait Without AD   Analysis The patient ambulates with bilateral LE externally rotated in stance phase         Other:     CMS Impairment/Limitation/Restriction for FOTO  Survey    Therapist reviewed FOTO scores for Lee Quintanilla on 11/24/2020.   FOTO documents entered into [employer] - see Media section.    Limitation Score: 60%  Category: Mobility    Current : CL = least 60% but < 80% impaired, limited or restricted  Goal: CK = at least 40% but < 60% impaired, limited or restricted           TREATMENT   Treatment Time In: 1445  Treatment Time Out: 1315  Total Treatment time separate from Evaluation: 30 minutes    Lee received therapeutic exercises to develop ROM, flexibility and core stabilization for 30 minutes including:    Supine HSS 10 x 15 sec B  Supine piriformis stretch 3 x 30 sec B  SKTC 3 x 10 B  PB crunches 3 x 10  PB oblique crunch 3 x 10      Home Exercises and Patient Education Provided    Education provided:   - Role of PT    Written Home Exercises Provided: yes.  Exercises were reviewed and Lee was able to demonstrate them prior to the end of the session.  Lee demonstrated good  understanding of the education provided.     See EMR under Patient Instructions for exercises provided 11/24/2020.    Assessment   Lee is a  45 y.o. male referred to outpatient Physical Therapy with a medical diagnosis of History of lumbar surgery. Pt presents with     1. S/P Lumbar fusion  2. Decreased lumbar ROM  3. Decreased LE flexibility  4. Decreased LE strength  5. Decreased core stability  6. Limited tolerance to sitting and walking    Pt prognosis is Good.   Pt will benefit from skilled outpatient Physical Therapy to address the deficits stated above and in the chart below, provide pt/family education, and to maximize pt's level of independence.     Plan of care discussed with patient: Yes  Pt's spiritual, cultural and educational needs considered and patient is agreeable to the plan of care and goals as stated below:     Anticipated Barriers for therapy: none    Medical Necessity is demonstrated by the following  History  Co-morbidities and personal factors that may impact the plan of care Co-morbidities:   CAD, depression and high BMI    Personal Factors:   no deficits     moderate   Examination  Body Structures and Functions, activity limitations and participation restrictions that may impact the plan of care Body Regions:   back  lower extremities    Body Systems:    ROM  strength    Participation Restrictions:   none    Activity limitations:   Learning and applying knowledge  no deficits    General Tasks and Commands  no deficits    Communication  no deficits    Mobility  lifting and carrying objects    Self care  no deficits    Domestic Life  no deficits    Interactions/Relationships  no deficits    Life Areas  no deficits    Community and Social Life  no deficits         moderate   Clinical Presentation stable and uncomplicated low   Decision Making/ Complexity Score: low     Goals:  Short Term Goals (STG) # weeks Goal Review Date Reviewed Date Met   1. The patient will begin a written HEP 1 Initial 11/24/2020    2. Increase hamstring flexibility to 55* 3 Initial 11/24/2020    3. Decrease soft tissue tenderness to moderate 3 Initial  11/24/2020      Long Term Goals (LTG) # weeks Goal Review Date Reviewed Date Met   1. The patient will be independent with his HEP for maintenance 6 Initial 11/24/2020    2. Increase hamstring flexibility to 70* 6 Initial 11/24/2020    3. Decrease FOTO score to 49% 6 Initial 11/24/2020    4. Decrease soft tissue tenderness to mild 6 Initial 11/24/2020    5. Patient will tolerate 15 minutes of ambulation without onset of symptoms 6 Initial 11/24/2020        Plan   Plan of care Certification: 11/24/2020 to 1/15/2021.    Outpatient Physical Therapy 2 times weekly for 6 weeks to include the following interventions: Manual Therapy, Moist Heat/ Ice, Neuromuscular Re-ed, Patient Education, Therapeutic Activites and Therapeutic Exercise.     Colby White, PT

## 2020-12-02 ENCOUNTER — CLINICAL SUPPORT (OUTPATIENT)
Dept: REHABILITATION | Facility: HOSPITAL | Age: 45
End: 2020-12-02
Attending: INTERNAL MEDICINE
Payer: MEDICAID

## 2020-12-02 DIAGNOSIS — R29.898 IMPAIRED FLEXIBILITY OF LOWER EXTREMITY: ICD-10-CM

## 2020-12-02 DIAGNOSIS — Z98.890 HISTORY OF LUMBAR SURGERY: ICD-10-CM

## 2020-12-02 DIAGNOSIS — R53.81 DEBILITY: ICD-10-CM

## 2020-12-02 PROCEDURE — 97110 THERAPEUTIC EXERCISES: CPT | Mod: PN,CQ

## 2020-12-02 NOTE — PROGRESS NOTES
"    Physical Therapy Daily Treatment Note     Name: Lee Hudson [facility] Number: [id number]    Therapy Diagnosis:        Encounter Diagnoses   Name Primary?    History of lumbar surgery      Impaired flexibility of lower extremity      Debility      Physician: Tami Ramon MD    Visit Date: 12/2/2020  Physician Orders: PT Eval and Treat   Medical Diagnosis from Referral: History of lumbar surgery  Evaluation Date: 11/24/2020  Authorization Period Expiration: 12/24/2020  Plan of Care Expiration: 1/15/2021  Visit # / Visits authorized: 1/ 10 (POC 1/12)  FOTO Due visit 5  FOTO Due visit 10    Time In: 1345  Time Out: 1430  Total Billable Time: 45 minutes    Precautions: Standard and cardiac    Subjective     Pt reports: Felt a little sore after last session for about a day.    He was compliant with home exercise program.  Response to previous treatment: a little sore after last session  Functional change: First treat after eval    Pain: 6/10  Location: bilateral back      Objective     Lee received therapeutic exercises to develop ROM, flexibility and core stabilization for 45 minutes including:     Supine HSS 10 x 15 sec B  Supine piriformis stretch 3 x 30 sec B  SKTC 10 x10" stretch  DKTC with PB, x20  PB crunches 2 x 10 5"  PB oblique crunch 2x10 5"  SLR 2x10 B  Bridging, 2x10  Rows with TB, 3x10 RTB      Lee participated in neuromuscular re-education activities to improve: Balance, Coordination, Kinesthetic and Proprioception for 0 minutes. The following activities were included:  PPT, 2x10 3" holds  Supine marching, x30     Home Exercises Provided and Patient Education Provided     Education provided:   - Continue HEP    Written Home Exercises Provided: Patient instructed to cont prior HEP.  Exercises were reviewed and Lee was able to demonstrate them prior to the end of the session.  Lee demonstrated good  understanding of the education provided.     See EMR under Patient Instructions for " exercises provided prior visit.    Assessment   Pt ambulated into clinic independently, no AD but wearing Lumbar back brace. Required TC for PPT and VC to continue breathing through TA contraction.  Pt with good tolerance to exercise with no c/o increased pain.   Lee is progressing well towards his goals.   Pt prognosis is Good.     Pt will continue to benefit from skilled outpatient physical therapy to address the deficits listed in the problem list box on initial evaluation, provide pt/family education and to maximize pt's level of independence in the home and community environment.     Pt's spiritual, cultural and educational needs considered and pt agreeable to plan of care and goals.    Anticipated barriers to physical therapy: none    Goals:  Short Term Goals (STG) # weeks Goal Review Date Reviewed Date Met   1. The patient will begin a written HEP 1 progressing   11/24/2020   2. Increase hamstring flexibility to 55* 3 progressing 11/24/2020     3. Decrease soft tissue tenderness to moderate 3 progressing 11/24/2020        Long Term Goals (LTG) # weeks Goal Review Date Reviewed Date Met   1. The patient will be independent with his HEP for maintenance 6 progressing 11/24/2020     2. Increase hamstring flexibility to 70* 6 progressing 11/24/2020     3. Decrease FOTO score to 49% 6 progressing 11/24/2020     4. Decrease soft tissue tenderness to mild 6 progressing 11/24/2020     5. Patient will tolerate 15 minutes of ambulation without onset of symptoms 6 progressing 11/24/2020            Plan     Continue to treat and progress per POC and pt tolerance. Focus on core strengthening and lumbar stabilization.     Erik Huerta, IGNACIO

## 2020-12-04 ENCOUNTER — CLINICAL SUPPORT (OUTPATIENT)
Dept: REHABILITATION | Facility: HOSPITAL | Age: 45
End: 2020-12-04
Attending: INTERNAL MEDICINE
Payer: MEDICAID

## 2020-12-04 DIAGNOSIS — Z98.890 HISTORY OF LUMBAR SURGERY: ICD-10-CM

## 2020-12-04 DIAGNOSIS — R29.898 IMPAIRED FLEXIBILITY OF LOWER EXTREMITY: ICD-10-CM

## 2020-12-04 DIAGNOSIS — R53.81 DEBILITY: ICD-10-CM

## 2020-12-04 PROCEDURE — 97110 THERAPEUTIC EXERCISES: CPT | Mod: PN | Performed by: PHYSICAL THERAPIST

## 2020-12-04 NOTE — PROGRESS NOTES
"    Physical Therapy Daily Treatment Note     Name: Lee Hudson [facility] Number: [id number]    Therapy Diagnosis:        Encounter Diagnoses   Name Primary?    History of lumbar surgery      Impaired flexibility of lower extremity      Debility      Physician: Tami Ramon MD    Visit Date: 12/4/2020  Physician Orders: PT Eval and Treat   Medical Diagnosis from Referral: History of lumbar surgery  Evaluation Date: 11/24/2020  Authorization Period Expiration: 12/24/2020  Plan of Care Expiration: 1/15/2021  Visit # / Visits authorized: 2/ 10 (POC 3/12)  FOTO Due visit 5  FOTO Due visit 10    Time In: 845  Time Out: 945  Total Billable Time: 60 minutes    Precautions: Standard and cardiac    Subjective     Pt reports: Felt a little sore after last session for about a day.    He was compliant with home exercise program.  Response to previous treatment: a little sore after last session  Functional change: First treat after eval    Pain: 6/10  Location: bilateral back      Objective     Lee received therapeutic exercises to develop ROM, flexibility and core stabilization for 60 minutes including:     Supine HSS 10 x 15 sec B  Supine piriformis stretch 3 x 30 sec B  SKTC 10 x10" stretch  DKTC with PB, 3 x 10  PB crunches 3 x 10 5"  PB oblique crunch 3 x 10 5"  Supine hip abduction with GTB 3 x 10  SLR 3 x 10 B 1#  Bridging with adductor squeeze  3 x 10  CC seated row on PB 3 x 10 3#  CC seated shoulder extension on PB 3 x 10 3#      Lee participated in neuromuscular re-education activities to improve: Balance, Coordination, Kinesthetic and Proprioception for 0 minutes. The following activities were included:  PPT, 2x10 3" holds  Supine marching, x30     Home Exercises Provided and Patient Education Provided     Education provided:   - Continue HEP    Written Home Exercises Provided: Patient instructed to cont prior HEP.  Exercises were reviewed and Lee was able to demonstrate them prior to the end of the " session.  Lee demonstrated good  understanding of the education provided.     See EMR under Patient Instructions for exercises provided prior visit.    Assessment   Patient able to progress to lumbar stabilization exercises on physioball without complaints  Lee is progressing well towards his goals.   Pt prognosis is Good.     Pt will continue to benefit from skilled outpatient physical therapy to address the deficits listed in the problem list box on initial evaluation, provide pt/family education and to maximize pt's level of independence in the home and community environment.     Pt's spiritual, cultural and educational needs considered and pt agreeable to plan of care and goals.    Anticipated barriers to physical therapy: none    Goals:  Short Term Goals (STG) # weeks Goal Review Date Reviewed Date Met   1. The patient will begin a written HEP 1 progressing   11/24/2020   2. Increase hamstring flexibility to 55* 3 progressing 11/24/2020     3. Decrease soft tissue tenderness to moderate 3 progressing 11/24/2020        Long Term Goals (LTG) # weeks Goal Review Date Reviewed Date Met   1. The patient will be independent with his HEP for maintenance 6 progressing 11/24/2020     2. Increase hamstring flexibility to 70* 6 progressing 11/24/2020     3. Decrease FOTO score to 49% 6 progressing 11/24/2020     4. Decrease soft tissue tenderness to mild 6 progressing 11/24/2020     5. Patient will tolerate 15 minutes of ambulation without onset of symptoms 6 progressing 11/24/2020            Plan     Continue to treat and progress per POC and pt tolerance     Colby White, PT

## 2020-12-08 ENCOUNTER — CLINICAL SUPPORT (OUTPATIENT)
Dept: REHABILITATION | Facility: HOSPITAL | Age: 45
End: 2020-12-08
Attending: INTERNAL MEDICINE
Payer: MEDICAID

## 2020-12-08 DIAGNOSIS — Z98.890 HISTORY OF LUMBAR SURGERY: ICD-10-CM

## 2020-12-08 DIAGNOSIS — R29.898 IMPAIRED FLEXIBILITY OF LOWER EXTREMITY: ICD-10-CM

## 2020-12-08 DIAGNOSIS — R53.81 DEBILITY: ICD-10-CM

## 2020-12-08 PROCEDURE — 97110 THERAPEUTIC EXERCISES: CPT | Mod: PN | Performed by: PHYSICAL THERAPIST

## 2020-12-08 NOTE — PROGRESS NOTES
"    Physical Therapy Daily Treatment Note     Name: Lee Hudson [facility] Number: [id number]    Therapy Diagnosis:        Encounter Diagnoses   Name Primary?    History of lumbar surgery      Impaired flexibility of lower extremity      Debility      Physician: Tami Ramon MD    Visit Date: 12/8/2020  Physician Orders: PT Eval and Treat   Medical Diagnosis from Referral: History of lumbar surgery  Evaluation Date: 11/24/2020  Authorization Period Expiration: 12/24/2020  Plan of Care Expiration: 1/15/2021  Visit # / Visits authorized: 3/ 10 (POC 4/12)  FOTO Due visit 5  FOTO Due visit 10    Time In: 1430  Time Out: 1515  Total Billable Time: 45 minutes    Precautions: Standard and cardiac    Subjective     Pt reports: doing better, just feels stiff.    He was compliant with home exercise program.  Response to previous treatment: a little sore after last session  Functional change: First treat after eval    Pain: 6/10  Location: bilateral back      Objective     Lee received therapeutic exercises to develop ROM, flexibility and core stabilization for 45 minutes including:     Supine HSS 10 x 15 sec B  Supine piriformis stretch 3 x 30 sec B  SKTC 10 x10" stretch  DKTC with PB, 3 x 10  LTR with PB 3 x 10  PB crunches 3 x 10 5"  PB oblique crunch 3 x 10 5"  Supine hip abduction with GTB 3 x 10  SLR 3 x 10 B 1#  Bridging with adductor squeeze  3 x 10  CC seated row on PB 3 x 10 3#  CC seated shoulder extension on PB 3 x 10 3#  Treadmill at 1.5 mph x 5 min       Lee participated in neuromuscular re-education activities to improve: Balance, Coordination, Kinesthetic and Proprioception for 0 minutes. The following activities were included:  PPT, 2x10 3" holds  Supine marching, x30     Home Exercises Provided and Patient Education Provided     Education provided:   - Continue HEP    Written Home Exercises Provided: Patient instructed to cont prior HEP.  Exercises were reviewed and Lee was able to " demonstrate them prior to the end of the session.  Lee demonstrated good  understanding of the education provided.     See EMR under Patient Instructions for exercises provided prior visit.    Assessment   Patient able to progress to lumbar stabilization exercises on physioball without complaints.    Lee is progressing well towards his goals.   Pt prognosis is Good.     Pt will continue to benefit from skilled outpatient physical therapy to address the deficits listed in the problem list box on initial evaluation, provide pt/family education and to maximize pt's level of independence in the home and community environment.     Pt's spiritual, cultural and educational needs considered and pt agreeable to plan of care and goals.    Anticipated barriers to physical therapy: none    Goals:  Short Term Goals (STG) # weeks Goal Review Date Reviewed Date Met   1. The patient will begin a written HEP 1 progressing   11/24/2020   2. Increase hamstring flexibility to 55* 3 progressing 11/24/2020     3. Decrease soft tissue tenderness to moderate 3 progressing 11/24/2020        Long Term Goals (LTG) # weeks Goal Review Date Reviewed Date Met   1. The patient will be independent with his HEP for maintenance 6 progressing 11/24/2020     2. Increase hamstring flexibility to 70* 6 progressing 11/24/2020     3. Decrease FOTO score to 49% 6 progressing 11/24/2020     4. Decrease soft tissue tenderness to mild 6 progressing 11/24/2020     5. Patient will tolerate 15 minutes of ambulation without onset of symptoms 6 progressing 11/24/2020            Plan     Continue to treat and progress per POC and pt tolerance     Colby White, PT

## 2020-12-10 ENCOUNTER — CLINICAL SUPPORT (OUTPATIENT)
Dept: REHABILITATION | Facility: HOSPITAL | Age: 45
End: 2020-12-10
Attending: INTERNAL MEDICINE
Payer: MEDICAID

## 2020-12-10 DIAGNOSIS — R53.81 DEBILITY: ICD-10-CM

## 2020-12-10 DIAGNOSIS — Z98.890 HISTORY OF LUMBAR SURGERY: ICD-10-CM

## 2020-12-10 DIAGNOSIS — R29.898 IMPAIRED FLEXIBILITY OF LOWER EXTREMITY: ICD-10-CM

## 2020-12-10 PROCEDURE — 97110 THERAPEUTIC EXERCISES: CPT | Mod: PN,CQ

## 2020-12-10 NOTE — PROGRESS NOTES
"    Physical Therapy Daily Treatment Note     [id number]    Therapy Diagnosis:        Encounter Diagnoses   Name Primary?    History of lumbar surgery      Impaired flexibility of lower extremity      Debility      Physician: Tami Ramon MD    Visit Date: 12/10/2020  Physician Orders: PT Eval and Treat   Medical Diagnosis from Referral: History of lumbar surgery  Evaluation Date: 11/24/2020  Authorization Period Expiration: 12/24/2020  Plan of Care Expiration: 1/15/2021  Visit # / Visits authorized: 3/ 10 (POC 4/12)  FOTO Due visit 5  FOTO Due visit 10    Time In: 1430  Time Out: 1515  Total Billable Time: 45 minutes    Precautions: Standard and cardiac    Subjective     Pt reports: No significant change, soreness after last session   He was compliant with home exercise program.  Response to previous treatment: a little sore after last session  Functional change: Able to wear brace less often    Pain: 5/10  Location: bilateral back      Objective     Lee received therapeutic exercises to develop ROM, flexibility and core stabilization for 45 minutes including:     Supine HSS 10 x 15 sec B  Supine piriformis stretch 3 x 30 sec B  SKTC 10 x10" stretch  DKTC with PB, 3 x 10  LTR with PB 3 x 10  PB crunches 3 x 10 5"  PB oblique crunch 3 x 10 5"  Supine hip abduction with GTB 3 x 10  SLR 2 x 10 B 1#  Bridging with adductor squeeze  3 x 10  CC seated row on PB 3 x 10 3#  CC seated shoulder extension on PB 3 x 10 3#  Treadmill at 1.5 mph x 5 min-NP       Lee participated in neuromuscular re-education activities to improve: Balance, Coordination, Kinesthetic and Proprioception for 0 minutes. The following activities were included:  PPT, 2x10 3" holds  Supine marching, x30     Home Exercises Provided and Patient Education Provided     Education provided:   - Continue HEP    Written Home Exercises Provided: Patient instructed to cont prior HEP.  Exercises were reviewed and " Lee was able to demonstrate them prior to the end of the session.  Lee demonstrated good  understanding of the education provided.     See EMR under Patient Instructions for exercises provided prior visit.    Assessment   Pt with piriformis tightness more significant on L. Pt with good toleration to therapy session with no adverse effects reported.   Lee is progressing well towards his goals.   Pt prognosis is Good.     Pt will continue to benefit from skilled outpatient physical therapy to address the deficits listed in the problem list box on initial evaluation, provide pt/family education and to maximize pt's level of independence in the home and community environment.     Pt's spiritual, cultural and educational needs considered and pt agreeable to plan of care and goals.    Anticipated barriers to physical therapy: none    Goals:  Short Term Goals (STG) # weeks Goal Review Date Reviewed Date Met   1. The patient will begin a written HEP 1 progressing   11/24/2020   2. Increase hamstring flexibility to 55* 3 progressing 11/24/2020     3. Decrease soft tissue tenderness to moderate 3 progressing 11/24/2020        Long Term Goals (LTG) # weeks Goal Review Date Reviewed Date Met   1. The patient will be independent with his HEP for maintenance 6 progressing 11/24/2020     2. Increase hamstring flexibility to 70* 6 progressing 11/24/2020     3. Decrease FOTO score to 49% 6 progressing 11/24/2020     4. Decrease soft tissue tenderness to mild 6 progressing 11/24/2020     5. Patient will tolerate 15 minutes of ambulation without onset of symptoms 6 progressing 11/24/2020            Plan     Continue to treat and progress per POC and pt tolerance     Erik Huerta, PTA

## 2020-12-15 ENCOUNTER — CLINICAL SUPPORT (OUTPATIENT)
Dept: REHABILITATION | Facility: HOSPITAL | Age: 45
End: 2020-12-15
Attending: INTERNAL MEDICINE
Payer: MEDICAID

## 2020-12-15 DIAGNOSIS — Z98.890 HISTORY OF LUMBAR SURGERY: ICD-10-CM

## 2020-12-15 DIAGNOSIS — R29.898 IMPAIRED FLEXIBILITY OF LOWER EXTREMITY: ICD-10-CM

## 2020-12-15 DIAGNOSIS — R53.81 DEBILITY: ICD-10-CM

## 2020-12-15 PROCEDURE — 97110 THERAPEUTIC EXERCISES: CPT | Mod: PN | Performed by: PHYSICAL THERAPIST

## 2020-12-15 NOTE — PROGRESS NOTES
"    Physical Therapy Daily Treatment Note     [id number]    Therapy Diagnosis:        Encounter Diagnoses   Name Primary?    History of lumbar surgery      Impaired flexibility of lower extremity      Debility      Physician: Tami Ramon MD    Visit Date: 12/15/2020  Physician Orders: PT Eval and Treat   Medical Diagnosis from Referral: History of lumbar surgery  Evaluation Date: 11/24/2020  Authorization Period Expiration: 12/24/2020  Plan of Care Expiration: 1/15/2021  Visit # / Visits authorized: 4/ 10 (POC 5/12)  FOTO Due visit 5  FOTO Due visit 10    Time In: 1330  Time Out: 1430  Total Billable Time: 60 minutes    Precautions: Standard and cardiac    Subjective     Pt reports:generalized soreness at this time but no increase in pain   He was compliant with home exercise program.  Response to previous treatment: a little sore after last session  Functional change: Able to wear brace less often    Pain: 5/10  Location: bilateral back      Objective     Lee received therapeutic exercises to develop ROM, flexibility and core stabilization for 60 minutes including:     Supine HSS 10 x 15 sec B  Supine piriformis stretch 3 x 30 sec B  SKTC 10 x10" stretch  DKTC with PB, 3 x 10  LTR with PB 3 x 10  PB crunches 3 x 10 5"  PB oblique crunch 3 x 10 5"  Supine hip abduction with GTB 3 x 10  SLR 2 x 10 B 1#  Bridging with adductor squeeze  3 x 10  CC seated row on PB 3 x 10 3#  CC seated shoulder extension on PB 3 x 10 3#  Treadmill at 1.5 mph x 10 min      Lee participated in neuromuscular re-education activities to improve: Balance, Coordination, Kinesthetic and Proprioception for 0 minutes. The following activities were included:  PPT, 2x10 3" holds  Supine marching, x30     Home Exercises Provided and Patient Education Provided     Education provided:   - Continue HEP    Written Home Exercises Provided: Patient instructed to cont prior HEP.  Exercises were reviewed " and Lee was able to demonstrate them prior to the end of the session.  Lee demonstrated good  understanding of the education provided.     See EMR under Patient Instructions for exercises provided prior visit.    Assessment   Increased tolerance to cardiovascular conditioning. Pt with good toleration to therapy session with no adverse effects reported.   Lee is progressing well towards his goals.   Pt prognosis is Good.     Pt will continue to benefit from skilled outpatient physical therapy to address the deficits listed in the problem list box on initial evaluation, provide pt/family education and to maximize pt's level of independence in the home and community environment.     Pt's spiritual, cultural and educational needs considered and pt agreeable to plan of care and goals.    Anticipated barriers to physical therapy: none    Goals:  Short Term Goals (STG) # weeks Goal Review Date Reviewed Date Met   1. The patient will begin a written HEP 1 progressing   11/24/2020   2. Increase hamstring flexibility to 55* 3 progressing 11/24/2020     3. Decrease soft tissue tenderness to moderate 3 progressing 11/24/2020        Long Term Goals (LTG) # weeks Goal Review Date Reviewed Date Met   1. The patient will be independent with his HEP for maintenance 6 progressing 11/24/2020     2. Increase hamstring flexibility to 70* 6 progressing 11/24/2020     3. Decrease FOTO score to 49% 6 progressing 11/24/2020     4. Decrease soft tissue tenderness to mild 6 progressing 11/24/2020     5. Patient will tolerate 15 minutes of ambulation without onset of symptoms 6 progressing 11/24/2020            Plan     Continue to treat and progress per POC and pt tolerance     Colby White, PT

## 2020-12-16 ENCOUNTER — OFFICE VISIT (OUTPATIENT)
Dept: ORTHOPEDICS | Facility: CLINIC | Age: 45
End: 2020-12-16
Payer: MEDICAID

## 2020-12-16 VITALS
BODY MASS INDEX: 30.92 KG/M2 | HEIGHT: 67 IN | DIASTOLIC BLOOD PRESSURE: 70 MMHG | WEIGHT: 197 LBS | HEART RATE: 73 BPM | SYSTOLIC BLOOD PRESSURE: 118 MMHG

## 2020-12-16 DIAGNOSIS — Z98.1 S/P LUMBAR FUSION: Primary | ICD-10-CM

## 2020-12-16 DIAGNOSIS — Z98.1 HISTORY OF LUMBAR SPINAL FUSION: ICD-10-CM

## 2020-12-16 PROCEDURE — 99024 POSTOP FOLLOW-UP VISIT: CPT | Mod: S$GLB,,, | Performed by: ORTHOPAEDIC SURGERY

## 2020-12-16 PROCEDURE — 99024 PR POST-OP FOLLOW-UP VISIT: ICD-10-PCS | Mod: S$GLB,,, | Performed by: ORTHOPAEDIC SURGERY

## 2020-12-16 NOTE — PROGRESS NOTES
Subjective:    Patient ID: Lee Quintanilla is a 45 y.o. male.    Chief Complaint: Pain and Post-op Evaluation of the Lumbar Spine (S/p L3/L4 PLF on 11/5/20. Patient is doing well, the weather is making him sore, pain is better. Patient is still doing PT, says it is going well)      History of Present Illness  Status post L3 laminectomy with transforaminal in fusion and instrumentation L3-4.  lumbar spine patient improved since surgery he is undergoing physical therapy  No further leg pain.  No pain medications.  Current Medications  Current Outpatient Medications   Medication Sig Dispense Refill    anastrozole (ARIMIDEX) 1 mg Tab Take 1 mg by mouth twice a week .      aspirin 325 MG tablet Take 325 mg by mouth once daily.       azelastine (ASTELIN) 137 mcg (0.1 %) nasal spray 1 spray (137 mcg total) by Nasal route 2 (two) times daily. 30 mL 12    clonazePAM (KLONOPIN) 0.5 MG tablet Take 1 tablet (0.5 mg total) by mouth 2 (two) times daily as needed for Anxiety. 45 tablet 2    DULoxetine (CYMBALTA) 30 MG capsule Take 1 capsule (30 mg total) by mouth once daily. (Patient taking differently: Take 30 mg by mouth once daily. BID) 30 capsule 11    ENTRESTO 24-26 mg per tablet 2 (two) times daily.       FLUoxetine 20 MG capsule Take 1 capsule (20 mg total) by mouth once daily. 30 capsule 11    fluticasone propionate (FLONASE) 50 mcg/actuation nasal spray 1 spray (50 mcg total) by Each Nostril route 2 (two) times daily. 16 g 12    metoprolol tartrate (LOPRESSOR) 25 MG tablet Take 1 tablet (25 mg total) by mouth 2 (two) times a day. 60 tablet 0    orphenadrine (NORFLEX) 100 mg tablet Take 1 tablet (100 mg total) by mouth 2 (two) times daily. 60 tablet 2    rosuvastatin (CRESTOR) 10 MG tablet Take 0.5 tablets (5 mg total) by mouth once daily. 90 tablet 0    testosterone cypionate (DEPOTESTOTERONE CYPIONATE) 200 mg/mL injection Inject 200 mg into the muscle every 7 days.      baclofen (LIORESAL) 10 MG  tablet TAKE ONE TABLET BY MOUTH THREE TIMES A DAY 90 tablet 5     No current facility-administered medications for this visit.        Allergies  Review of patient's allergies indicates:   Allergen Reactions    Inapsine [droperidol] Anaphylaxis     seizures    Effexor [venlafaxine]      Increased anxiety    Pcn [penicillins]     Bactrim [sulfamethoxazole-trimethoprim] Rash     Dry red rash all over when in the sun       Past Medical History  Past Medical History:   Diagnosis Date    ADHD (attention deficit hyperactivity disorder)     Arthritis     Asthma     as child only    Back pain     Coronary artery disease     Degeneration of lumbar intervertebral disc 05/2016    Depression     Elevated PSA     Headache     Hyperlipidemia     Hypertension     Kidney damage     stage 3 ; d/t aleve abuse    Kidney stone     Myocardial infarction     Neck pain     Numbness and tingling in hands     Numbness and tingling of both legs     Seizures     from inapsine 2002    Sleep apnea     no cpap    Wears glasses     CONTACS       Surgical History  Past Surgical History:   Procedure Laterality Date    BACK SURGERY  2016    back surgery    BONE GRAFT N/A 11/5/2020    Procedure: BONE GRAFT;  Surgeon: Mateusz Blanco MD;  Location: [facility] OR;  Service: Orthopedics;  Laterality: N/A;    CARDIAC SURGERY  01/06/2020    CABG X 7    CORONARY ARTERY BYPASS GRAFT      7 vessels    HERNIA REPAIR Bilateral 11/22/2017    LITHOTRIPSY      LUMBAR LAMINECTOMY WITH FUSION Bilateral 11/5/2020    Procedure: LAMINECTOMY, SPINE, LUMBAR, WITH FUSION;  Surgeon: Mateusz Blanco MD;  Location: [facility] OR;  Service: Orthopedics;  Laterality: Bilateral;  [employer]  NTI  L-3    PILONIDAL CYST DRAINAGE      removal of abcess      scrotal    REMOVAL OF HARDWARE FROM SPINE Bilateral 11/5/2020    Procedure: REMOVAL, HARDWARE, SPINE;  Surgeon: Mateusz Blanco MD;  Location: [facility] OR;  Service: Orthopedics;  Laterality: Bilateral;  L  4-5    TYMPANOSTOMY TUBE PLACEMENT         Family History:   Family History   Problem Relation Age of Onset    Heart disease Mother     Migraines Mother     Hypertension Mother     Early death Father     Heart disease Father     Diabetes Maternal Aunt     Diabetes Maternal Uncle     Diabetes Maternal Grandfather        Social History:   Social History     Socioeconomic History    Marital status:      Spouse name: Not on file    Number of children: Not on file    Years of education: Not on file    Highest education level: Not on file   Occupational History    Occupation: disability   Social Needs    Financial resource strain: Not very hard    Food insecurity     Worry: Not on file     Inability: Not on file    Transportation needs     Medical: Not on file     Non-medical: Not on file   Tobacco Use    Smoking status: Former Smoker     Packs/day: 0.25     Types: Cigarettes     Quit date: 2016     Years since quittin.9    Smokeless tobacco: Former User     Quit date: 2016    Tobacco comment: occasional   Substance and Sexual Activity    Alcohol use: Yes     Alcohol/week: 1.0 standard drinks     Types: 1 Glasses of wine per week     Frequency: 4 or more times a week     Drinks per session: 1 or 2     Binge frequency: Never     Comment: rare    Drug use: No    Sexual activity: Yes     Partners: Female   Lifestyle    Physical activity     Days per week: 3 days     Minutes per session: 20 min    Stress: Not on file   Relationships    Social connections     Talks on phone: More than three times a week     Gets together: More than three times a week     Attends Christianity service: Not on file     Active member of club or organization: No     Attends meetings of clubs or organizations: Never     Relationship status: Patient refused   Other Topics Concern    Not on file   Social History Narrative    Not on file       Date of surgery:     Review of Systems      General/Constitutional: Chills denies. Fatigue denies. Fever denies. Weight gain denies. Weight loss denies.    Musculoskeletal: Comments: See HPI for details    Skin: Rash denies.    Objective:   Vital Signs:   Vitals:    12/16/20 0918   BP: 118/70   Pulse: 73        Physical Exam    This a well-developed, well nourished patient in no acute distress.  They are alert and oriented and cooperative to examination.  Pt. walks without an antalgic gait.      General Examination:     Constitutional: The patient is alert and oriented to lace person and time. Mood is pleasant.     Head/Face: Normal facial features normal eyebrows    Eyes: Normal extraocular motion bilaterally    Lungs: Respirations are equal and unlabored    Gait is coordinated.    Cardiovascular: There are no swelling or varicosities present.    Lymphatic: Negative for adenopathy    Skin: Normal    Neurological: Level of consciousness normal. Oriented to place person and time and situation    Psychiatric: Oriented to time place person and situation    Incision well-healed mild tenderness no swelling noted neurovascular status normal  XRAY Report/ Interpretation:  AP and lateral x-rays of the lumbar spine will performed today. Indications postop lumbar spine surgery. Findings:  Instrumented lumbar fusion interbody device L3-4 early consolidation of interbody fusion.  No signs of hardware loosening      Assessment:       1. S/P lumbar fusion        Plan:       Lee was seen today for pain and post-op evaluation.    Diagnoses and all orders for this visit:    S/P lumbar fusion  -     X-Ray Lumbar Spine Ap And Lateral         No follow-ups on file.    Sedentary activities as tolerated return 2 months of lumbar x-rays advised      This note was created using Dragon voice recognition software that occasionally misinterpreted phrases or words.

## 2020-12-17 ENCOUNTER — CLINICAL SUPPORT (OUTPATIENT)
Dept: REHABILITATION | Facility: HOSPITAL | Age: 45
End: 2020-12-17
Attending: INTERNAL MEDICINE
Payer: MEDICAID

## 2020-12-17 DIAGNOSIS — R53.81 DEBILITY: ICD-10-CM

## 2020-12-17 DIAGNOSIS — R29.898 IMPAIRED FLEXIBILITY OF LOWER EXTREMITY: ICD-10-CM

## 2020-12-17 DIAGNOSIS — Z98.890 HISTORY OF LUMBAR SURGERY: ICD-10-CM

## 2020-12-17 PROCEDURE — 97110 THERAPEUTIC EXERCISES: CPT | Mod: PN,CQ

## 2020-12-17 NOTE — PROGRESS NOTES
"    Physical Therapy Daily Treatment Note     [id number]    Therapy Diagnosis:        Encounter Diagnoses   Name Primary?    History of lumbar surgery      Impaired flexibility of lower extremity      Debility      Physician: Tami Ramon MD    Visit Date: 12/17/2020  Physician Orders: PT Eval and Treat   Medical Diagnosis from Referral: History of lumbar surgery  Evaluation Date: 11/24/2020  Authorization Period Expiration: 12/24/2020  Plan of Care Expiration: 1/15/2021  Visit # / Visits authorized: 5/ 10 (POC 6/12)  FOTO Due visit 5  FOTO Due visit 10    Time In: 1425  Time Out: 1525  Total Billable Time: 60 minutes    Precautions: Standard and cardiac    Subjective     Pt reports:Pt with slight soreness but still has usual dull pain in lower back.    He was compliant with home exercise program.  Response to previous treatment: a little sore after last session  Functional change: Able to wear brace less often    Pain: 5/10  Location: bilateral back      Objective     Lee received therapeutic exercises to develop ROM, flexibility and core stabilization for 60 minutes including:     Supine HSS 10 x 15 sec B  Supine piriformis stretch 3 x 30 sec B  SKTC 10 x10" stretch  DKTC with PB, 3 x 10  LTR with PB 3 x 10  PB crunches 3 x 10 5"  PB oblique crunch 3 x 10 5"  Supine hip abduction with GTB 3 x 10  SLR 2 x 10 B 1#  Bridging with adductor squeeze  3 x 10  CC seated row on PB 3 x 10 7#  CC seated shoulder extension on PB 3 x 10 7#  Treadmill at 1.5 mph x 10 min      Lee participated in neuromuscular re-education activities to improve: Balance, Coordination, Kinesthetic and Proprioception for 0 minutes. The following activities were included:  PPT, 2x10 3" holds  Supine marching, x30     Home Exercises Provided and Patient Education Provided     Education provided:   - Continue HEP    Written Home Exercises Provided: Patient instructed to cont prior HEP.  Exercises " were reviewed and Lee was able to demonstrate them prior to the end of the session.  Lee demonstrated good  understanding of the education provided.     See EMR under Patient Instructions for exercises provided prior visit.    Assessment   Pt with good toleration to therapy session with no adverse effects reported. Able to progress to 7# on CC exercises with good quality and control.  Lee is progressing well towards his goals.   Pt prognosis is Good.     Pt will continue to benefit from skilled outpatient physical therapy to address the deficits listed in the problem list box on initial evaluation, provide pt/family education and to maximize pt's level of independence in the home and community environment.     Pt's spiritual, cultural and educational needs considered and pt agreeable to plan of care and goals.    Anticipated barriers to physical therapy: none    Goals:  Short Term Goals (STG) # weeks Goal Review Date Reviewed Date Met   1. The patient will begin a written HEP 1 progressing   11/24/2020   2. Increase hamstring flexibility to 55* 3 progressing 11/24/2020     3. Decrease soft tissue tenderness to moderate 3 progressing 11/24/2020        Long Term Goals (LTG) # weeks Goal Review Date Reviewed Date Met   1. The patient will be independent with his HEP for maintenance 6 progressing 11/24/2020     2. Increase hamstring flexibility to 70* 6 progressing 11/24/2020     3. Decrease FOTO score to 49% 6 progressing 11/24/2020     4. Decrease soft tissue tenderness to mild 6 progressing 11/24/2020     5. Patient will tolerate 15 minutes of ambulation without onset of symptoms 6 progressing 11/24/2020            Plan     Continue to treat and progress per POC and pt tolerance     Erik Huerta, IGNACIO

## 2020-12-21 ENCOUNTER — CLINICAL SUPPORT (OUTPATIENT)
Dept: REHABILITATION | Facility: HOSPITAL | Age: 45
End: 2020-12-21
Attending: INTERNAL MEDICINE
Payer: MEDICAID

## 2020-12-21 DIAGNOSIS — R29.898 IMPAIRED FLEXIBILITY OF LOWER EXTREMITY: ICD-10-CM

## 2020-12-21 DIAGNOSIS — Z98.890 HISTORY OF LUMBAR SURGERY: ICD-10-CM

## 2020-12-21 DIAGNOSIS — R53.81 DEBILITY: ICD-10-CM

## 2020-12-21 PROCEDURE — 97110 THERAPEUTIC EXERCISES: CPT | Mod: PN

## 2020-12-21 NOTE — PROGRESS NOTES
"    Physical Therapy Daily Treatment Note     [id number]    Therapy Diagnosis:        Encounter Diagnoses   Name Primary?    History of lumbar surgery      Impaired flexibility of lower extremity      Debility      Physician: Tami Ramon MD    Visit Date: 12/21/2020  Physician Orders: PT Eval and Treat   Medical Diagnosis from Referral: History of lumbar surgery  Evaluation Date: 11/24/2020  Authorization Period Expiration: 12/24/2020  Plan of Care Expiration: 1/15/2021  Visit # / Visits authorized: 6/ 10 (POC 7/12)  FOTO Due visit 5  FOTO Due visit 10    Time In: 1115  Time Out: 1215  Total Billable Time: 60 minutes    Precautions: Standard and cardiac    Subjective     Pt reports: mild soreness today that tends to improve after therapy. No major overall improvements  He was compliant with home exercise program.  Response to previous treatment: doing well  Functional change: Able to wear brace less often    Pain: 2/10  Location: bilateral back      Objective     Lee received therapeutic exercises to develop ROM, flexibility and core stabilization for 50 minutes including:     Supine HSS 10 x 15 sec B  Supine piriformis stretch 3 x 30 sec B  SKTC 10 x10" stretch  DKTC with PB, 3 x 10  LTR with PB 3 x 10  PB crunches 3 x 10 5"  PB oblique crunch 3 x 10 5"  Supine hip abduction with GTB 3 x 10  SLR 2 x 10 B 1#  Bridging with adductor squeeze  3 x 10  CC seated row on PB 3 x 10 7# DNP  CC seated shoulder extension on PB 3 x 10 7# DNP  Treadmill at 1.5 mph x 10 min DNP    Lee participated in neuromuscular re-education activities to improve: Balance, Coordination, Kinesthetic and Proprioception for 0 minutes. The following activities were included:  PPT, 2x10 3" holds  Supine marching, x 10 each leg  Supine fall outs x 10 each leg    Home Exercises Provided and Patient Education Provided     Education provided:   - Continue HEP    Written Home Exercises Provided: " Patient instructed to cont prior HEP.  Exercises were reviewed and Lee was able to demonstrate them prior to the end of the session.  Lee demonstrated good  understanding of the education provided.     See EMR under Patient Instructions for exercises provided prior visit.    Assessment   Pt decreased lumbar motor control, but good endurance. Focused session on abdominal bracing sequencing  Lee is progressing well towards his goals.   Pt prognosis is Good.     Pt will continue to benefit from skilled outpatient physical therapy to address the deficits listed in the problem list box on initial evaluation, provide pt/family education and to maximize pt's level of independence in the home and community environment.     Pt's spiritual, cultural and educational needs considered and pt agreeable to plan of care and goals.    Anticipated barriers to physical therapy: none    Goals:  Short Term Goals (STG) # weeks Goal Review Date Reviewed Date Met   1. The patient will begin a written HEP 1 met  12/21/2020   2. Increase hamstring flexibility to 55* 3 progressing 11/24/2020     3. Decrease soft tissue tenderness to moderate 3 progressing 11/24/2020        Long Term Goals (LTG) # weeks Goal Review Date Reviewed Date Met   1. The patient will be independent with his HEP for maintenance 6 progressing 11/24/2020     2. Increase hamstring flexibility to 70* 6 progressing 11/24/2020     3. Decrease FOTO score to 49% 6 progressing 11/24/2020     4. Decrease soft tissue tenderness to mild 6 progressing 11/24/2020     5. Patient will tolerate 15 minutes of ambulation without onset of symptoms 6 progressing 11/24/2020            Plan     Continue to treat and progress per POC and pt tolerance     Elmer Arthur, PT

## 2020-12-23 ENCOUNTER — CLINICAL SUPPORT (OUTPATIENT)
Dept: REHABILITATION | Facility: HOSPITAL | Age: 45
End: 2020-12-23
Attending: INTERNAL MEDICINE
Payer: MEDICAID

## 2020-12-23 DIAGNOSIS — R29.898 IMPAIRED FLEXIBILITY OF LOWER EXTREMITY: ICD-10-CM

## 2020-12-23 DIAGNOSIS — R53.81 DEBILITY: ICD-10-CM

## 2020-12-23 DIAGNOSIS — Z98.890 HISTORY OF LUMBAR SURGERY: ICD-10-CM

## 2020-12-23 PROCEDURE — 97110 THERAPEUTIC EXERCISES: CPT | Mod: PN,CQ

## 2020-12-23 NOTE — PROGRESS NOTES
"    Physical Therapy Daily Treatment Note     [id number]    Therapy Diagnosis:        Encounter Diagnoses   Name Primary?    History of lumbar surgery      Impaired flexibility of lower extremity      Debility      Physician: Tami Ramon MD    Visit Date: 12/23/2020  Physician Orders: PT Eval and Treat   Medical Diagnosis from Referral: History of lumbar surgery  Evaluation Date: 11/24/2020  Authorization Period Expiration: 12/24/2020  Plan of Care Expiration: 1/15/2021  Visit # / Visits authorized: 7/ 10 (POC 8/12)  FOTO Due visit 5  FOTO Due visit 10    Time In: 1430  Time Out: 1520  Total Billable Time: 50 minutes    Precautions: Standard and cardiac    Subjective     Pt reports: Increased soreness/dull pain in lower lumber and SI.   He was compliant with home exercise program.  Response to previous treatment: doing well  Functional change: Able to wear brace less often    Pain: 6/10  Location: bilateral back      Objective     Lee received therapeutic exercises to develop ROM, flexibility and core stabilization for 50 minutes including:     Supine HSS 10 x 15 sec B  Supine piriformis stretch 3 x 30 sec B  SKTC 10 x10" stretch  DKTC with PB, 3 x 10  LTR with PB 3 x 10  PB crunches 3 x 10 5"  PB oblique crunch 3 x 10   Supine hip abduction with GTB 3 x 10  SLR 2 x 10 B 1#  Bridging with adductor squeeze  2 x 10  CC seated row on PB 3 x 10 7# DNP  CC seated shoulder extension on PB 3 x 10 7# DNP  Treadmill at 1.5 mph x 10 min DNP    Lee participated in neuromuscular re-education activities to improve: Balance, Coordination, Kinesthetic and Proprioception for 0 minutes. The following activities were included:  PPT, 2x10 3" holds  Supine marching, x 10 each leg  Supine fall outs x 10 each leg    Home Exercises Provided and Patient Education Provided     Education provided:   - Continue HEP    Written Home Exercises Provided: Patient instructed to cont prior " HEP.  Exercises were reviewed and Lee was able to demonstrate them prior to the end of the session.  Lee demonstrated good  understanding of the education provided.     See EMR under Patient Instructions for exercises provided prior visit.    Assessment   Pt with increased pain and stiffness this session. Lumbar mobility improved with MHP and LTR. Continues to have B tight HS with mm fasciculations when stretched. Pt completed session with good tolerance, no increase in pain reported.   Lee is progressing well towards his goals.   Pt prognosis is Good.     Pt will continue to benefit from skilled outpatient physical therapy to address the deficits listed in the problem list box on initial evaluation, provide pt/family education and to maximize pt's level of independence in the home and community environment.     Pt's spiritual, cultural and educational needs considered and pt agreeable to plan of care and goals.    Anticipated barriers to physical therapy: none    Goals:  Short Term Goals (STG) # weeks Goal Review Date Reviewed Date Met   1. The patient will begin a written HEP 1 met  12/21/2020   2. Increase hamstring flexibility to 55* 3 progressing 11/24/2020     3. Decrease soft tissue tenderness to moderate 3 progressing 11/24/2020        Long Term Goals (LTG) # weeks Goal Review Date Reviewed Date Met   1. The patient will be independent with his HEP for maintenance 6 progressing 11/24/2020     2. Increase hamstring flexibility to 70* 6 progressing 11/24/2020     3. Decrease FOTO score to 49% 6 progressing 11/24/2020     4. Decrease soft tissue tenderness to mild 6 progressing 11/24/2020     5. Patient will tolerate 15 minutes of ambulation without onset of symptoms 6 progressing 11/24/2020            Plan     Continue to treat and progress per POC and pt tolerance     Erik Huerta PTA

## 2020-12-30 ENCOUNTER — CLINICAL SUPPORT (OUTPATIENT)
Dept: REHABILITATION | Facility: HOSPITAL | Age: 45
End: 2020-12-30
Attending: INTERNAL MEDICINE
Payer: MEDICAID

## 2020-12-30 DIAGNOSIS — R29.898 IMPAIRED FLEXIBILITY OF LOWER EXTREMITY: ICD-10-CM

## 2020-12-30 DIAGNOSIS — R53.81 DEBILITY: ICD-10-CM

## 2020-12-30 DIAGNOSIS — Z98.890 HISTORY OF LUMBAR SURGERY: ICD-10-CM

## 2020-12-30 PROCEDURE — 97110 THERAPEUTIC EXERCISES: CPT | Mod: PN,CQ

## 2020-12-30 NOTE — PROGRESS NOTES
"    Physical Therapy Daily Treatment Note     [id number]    Therapy Diagnosis:        Encounter Diagnoses   Name Primary?    History of lumbar surgery      Impaired flexibility of lower extremity      Debility      Physician: Tami Ramon MD    Visit Date: 12/30/2020  Physician Orders: PT Eval and Treat   Medical Diagnosis from Referral: History of lumbar surgery  Evaluation Date: 11/24/2020  Authorization Period Expiration: 12/24/2020  Plan of Care Expiration: 1/15/2021  Visit # / Visits authorized: 8/ 10 (POC 9/12)  FOTO Due visit 5  FOTO Due visit 10    Time In: 1427  Time Out: 1515  Total Billable Time: 48 minutes    Precautions: Standard and cardiac    Subjective     Pt reports: Tightness in lower back around SI  He was compliant with home exercise program.  Response to previous treatment: doing well  Functional change: Able to wear brace less often    Pain: 5/10  Location: bilateral back      Objective     Lee received therapeutic exercises to develop ROM, flexibility and core stabilization for 48 minutes including:     Supine HSS 3x 30 sec B  Supine piriformis stretch 3 x 30 sec B  SKTC 10 x10" stretch  DKTC with PB, 3 x 10  LTR with PB 3 x 10  PB crunches 3 x 10 5"  PB oblique crunch 3 x 10   Supine hip abduction with GTB 3 x 10  SLR 2 x 10 B 1#  Bridging with adductor squeeze  2 x 10  CC seated row on PB 3 x 10 7# DNP  CC seated shoulder extension on PB 3 x 10 7# DNP  Treadmill at 1.5 mph x 10 min DNP    Lee participated in neuromuscular re-education activities to improve: Balance, Coordination, Kinesthetic and Proprioception for 0 minutes. The following activities were included:  PPT, 2x10 3" holds  Supine marching, x 10 each leg  Supine fall outs x 10 each leg    Home Exercises Provided and Patient Education Provided     Education provided:   - Continue HEP    Written Home Exercises Provided: Patient instructed to cont prior HEP.  Exercises were reviewed " and Lee was able to demonstrate them prior to the end of the session.  Lee demonstrated good  understanding of the education provided.     See EMR under Patient Instructions for exercises provided prior visit.    Assessment   Pt with lumbar tightness but improved after LTR on PB. Pt continues to make strength gains; has discontinued brace at home unless he is going into public in crowds.    Lee is progressing well towards his goals.   Pt prognosis is Good.     Pt will continue to benefit from skilled outpatient physical therapy to address the deficits listed in the problem list box on initial evaluation, provide pt/family education and to maximize pt's level of independence in the home and community environment.     Pt's spiritual, cultural and educational needs considered and pt agreeable to plan of care and goals.    Anticipated barriers to physical therapy: none    Goals:  Short Term Goals (STG) # weeks Goal Review Date Reviewed Date Met   1. The patient will begin a written HEP 1 met  12/21/2020   2. Increase hamstring flexibility to 55* 3 progressing 11/24/2020     3. Decrease soft tissue tenderness to moderate 3 progressing 11/24/2020        Long Term Goals (LTG) # weeks Goal Review Date Reviewed Date Met   1. The patient will be independent with his HEP for maintenance 6 progressing 11/24/2020     2. Increase hamstring flexibility to 70* 6 progressing 11/24/2020     3. Decrease FOTO score to 49% 6 progressing 11/24/2020     4. Decrease soft tissue tenderness to mild 6 progressing 11/24/2020     5. Patient will tolerate 15 minutes of ambulation without onset of symptoms 6 progressing 11/24/2020            Plan     Continue to treat and progress per POC and pt tolerance     Erik Huerta, IGNACIO

## 2021-01-04 DIAGNOSIS — F41.1 GAD (GENERALIZED ANXIETY DISORDER): ICD-10-CM

## 2021-01-04 RX ORDER — CLONAZEPAM 0.5 MG/1
0.5 TABLET ORAL 2 TIMES DAILY PRN
Qty: 45 TABLET | Refills: 2 | Status: CANCELLED | OUTPATIENT
Start: 2021-01-04 | End: 2022-01-04

## 2021-01-05 ENCOUNTER — TELEPHONE (OUTPATIENT)
Dept: FAMILY MEDICINE | Facility: CLINIC | Age: 46
End: 2021-01-05

## 2021-01-05 ENCOUNTER — CLINICAL SUPPORT (OUTPATIENT)
Dept: REHABILITATION | Facility: HOSPITAL | Age: 46
End: 2021-01-05
Attending: INTERNAL MEDICINE
Payer: MEDICAID

## 2021-01-05 DIAGNOSIS — R53.81 DEBILITY: ICD-10-CM

## 2021-01-05 DIAGNOSIS — Z98.890 HISTORY OF LUMBAR SURGERY: ICD-10-CM

## 2021-01-05 DIAGNOSIS — R29.898 IMPAIRED FLEXIBILITY OF LOWER EXTREMITY: ICD-10-CM

## 2021-01-05 PROCEDURE — 97110 THERAPEUTIC EXERCISES: CPT | Mod: PN | Performed by: PHYSICAL THERAPIST

## 2021-01-06 ENCOUNTER — OFFICE VISIT (OUTPATIENT)
Dept: FAMILY MEDICINE | Facility: CLINIC | Age: 46
End: 2021-01-06
Payer: MEDICAID

## 2021-01-06 VITALS
BODY MASS INDEX: 30.29 KG/M2 | HEIGHT: 67 IN | HEART RATE: 68 BPM | WEIGHT: 193 LBS | TEMPERATURE: 98 F | SYSTOLIC BLOOD PRESSURE: 118 MMHG | DIASTOLIC BLOOD PRESSURE: 78 MMHG

## 2021-01-06 DIAGNOSIS — F41.1 GAD (GENERALIZED ANXIETY DISORDER): ICD-10-CM

## 2021-01-06 DIAGNOSIS — N18.30 STAGE 3 CHRONIC KIDNEY DISEASE, UNSPECIFIED WHETHER STAGE 3A OR 3B CKD: ICD-10-CM

## 2021-01-06 DIAGNOSIS — E78.5 HYPERLIPIDEMIA, UNSPECIFIED HYPERLIPIDEMIA TYPE: ICD-10-CM

## 2021-01-06 DIAGNOSIS — M54.16 LUMBAR RADICULOPATHY: ICD-10-CM

## 2021-01-06 DIAGNOSIS — Z79.899 HIGH RISK MEDICATION USE: Primary | ICD-10-CM

## 2021-01-06 DIAGNOSIS — I10 HYPERTENSION, UNSPECIFIED TYPE: ICD-10-CM

## 2021-01-06 DIAGNOSIS — G47.00 INSOMNIA, UNSPECIFIED TYPE: ICD-10-CM

## 2021-01-06 DIAGNOSIS — Z98.890 HISTORY OF LUMBAR SURGERY: ICD-10-CM

## 2021-01-06 DIAGNOSIS — G62.9 NEUROPATHY: ICD-10-CM

## 2021-01-06 DIAGNOSIS — F32.1 CURRENT MODERATE EPISODE OF MAJOR DEPRESSIVE DISORDER WITHOUT PRIOR EPISODE: ICD-10-CM

## 2021-01-06 PROCEDURE — 99214 PR OFFICE/OUTPT VISIT, EST, LEVL IV, 30-39 MIN: ICD-10-PCS | Mod: S$GLB,,, | Performed by: NURSE PRACTITIONER

## 2021-01-06 PROCEDURE — 99214 OFFICE O/P EST MOD 30 MIN: CPT | Mod: S$GLB,,, | Performed by: NURSE PRACTITIONER

## 2021-01-06 RX ORDER — DULOXETIN HYDROCHLORIDE 60 MG/1
60 CAPSULE, DELAYED RELEASE ORAL DAILY
Qty: 90 CAPSULE | Refills: 1 | Status: SHIPPED | OUTPATIENT
Start: 2021-01-06 | End: 2021-10-25 | Stop reason: SDUPTHER

## 2021-01-06 RX ORDER — CLONAZEPAM 0.5 MG/1
0.5 TABLET ORAL 2 TIMES DAILY PRN
Qty: 45 TABLET | Refills: 2 | Status: CANCELLED | OUTPATIENT
Start: 2021-01-06 | End: 2022-01-06

## 2021-01-06 RX ORDER — CLONAZEPAM 0.5 MG/1
0.5 TABLET ORAL 2 TIMES DAILY PRN
Qty: 45 TABLET | Refills: 2 | Status: SHIPPED | OUTPATIENT
Start: 2021-01-06 | End: 2021-03-23

## 2021-01-07 ENCOUNTER — CLINICAL SUPPORT (OUTPATIENT)
Dept: REHABILITATION | Facility: HOSPITAL | Age: 46
End: 2021-01-07
Attending: INTERNAL MEDICINE
Payer: MEDICAID

## 2021-01-07 DIAGNOSIS — Z98.890 HISTORY OF LUMBAR SURGERY: ICD-10-CM

## 2021-01-07 DIAGNOSIS — R29.898 IMPAIRED FLEXIBILITY OF LOWER EXTREMITY: ICD-10-CM

## 2021-01-07 DIAGNOSIS — R53.81 DEBILITY: ICD-10-CM

## 2021-01-07 LAB
ALBUMIN SERPL-MCNC: 4.4 G/DL (ref 3.6–5.1)
ALBUMIN/GLOB SERPL: 1.4 (CALC) (ref 1–2.5)
ALP SERPL-CCNC: 58 U/L (ref 36–130)
ALT SERPL-CCNC: 28 U/L (ref 9–46)
APPEARANCE UR: CLEAR
AST SERPL-CCNC: 21 U/L (ref 10–40)
BACTERIA #/AREA URNS HPF: ABNORMAL /HPF
BACTERIA UR CULT: ABNORMAL
BASOPHILS # BLD AUTO: 39 CELLS/UL (ref 0–200)
BASOPHILS NFR BLD AUTO: 0.5 %
BILIRUB SERPL-MCNC: 0.5 MG/DL (ref 0.2–1.2)
BILIRUB UR QL STRIP: NEGATIVE
BUN SERPL-MCNC: 13 MG/DL (ref 7–25)
BUN/CREAT SERPL: NORMAL (CALC) (ref 6–22)
CALCIUM SERPL-MCNC: 9.6 MG/DL (ref 8.6–10.3)
CHLORIDE SERPL-SCNC: 100 MMOL/L (ref 98–110)
CHOLEST SERPL-MCNC: 204 MG/DL
CHOLEST/HDLC SERPL: 3 (CALC)
CO2 SERPL-SCNC: 30 MMOL/L (ref 20–32)
COLOR UR: YELLOW
CREAT SERPL-MCNC: 1.35 MG/DL (ref 0.6–1.35)
EOSINOPHIL # BLD AUTO: 250 CELLS/UL (ref 15–500)
EOSINOPHIL NFR BLD AUTO: 3.2 %
ERYTHROCYTE [DISTWIDTH] IN BLOOD BY AUTOMATED COUNT: 14.4 % (ref 11–15)
GFRSERPLBLD MDRD-ARVRAT: 63 ML/MIN/1.73M2
GLOBULIN SER CALC-MCNC: 3.1 G/DL (CALC) (ref 1.9–3.7)
GLUCOSE SERPL-MCNC: 81 MG/DL (ref 65–99)
GLUCOSE UR QL STRIP: NEGATIVE
HCT VFR BLD AUTO: 48.4 % (ref 38.5–50)
HDLC SERPL-MCNC: 69 MG/DL
HGB BLD-MCNC: 16.1 G/DL (ref 13.2–17.1)
HGB UR QL STRIP: NEGATIVE
HYALINE CASTS #/AREA URNS LPF: ABNORMAL /LPF
KETONES UR QL STRIP: NEGATIVE
LDLC SERPL CALC-MCNC: 113 MG/DL (CALC)
LEUKOCYTE ESTERASE UR QL STRIP: NEGATIVE
LYMPHOCYTES # BLD AUTO: 1131 CELLS/UL (ref 850–3900)
LYMPHOCYTES NFR BLD AUTO: 14.5 %
MCH RBC QN AUTO: 30.7 PG (ref 27–33)
MCHC RBC AUTO-ENTMCNC: 33.3 G/DL (ref 32–36)
MCV RBC AUTO: 92.4 FL (ref 80–100)
MONOCYTES # BLD AUTO: 593 CELLS/UL (ref 200–950)
MONOCYTES NFR BLD AUTO: 7.6 %
NEUTROPHILS # BLD AUTO: 5788 CELLS/UL (ref 1500–7800)
NEUTROPHILS NFR BLD AUTO: 74.2 %
NITRITE UR QL STRIP: NEGATIVE
NONHDLC SERPL-MCNC: 135 MG/DL (CALC)
PH UR STRIP: 7.5 [PH] (ref 5–8)
PLATELET # BLD AUTO: 198 THOUSAND/UL (ref 140–400)
PMV BLD REES-ECKER: 11.7 FL (ref 7.5–12.5)
POTASSIUM SERPL-SCNC: 4.7 MMOL/L (ref 3.5–5.3)
PROT SERPL-MCNC: 7.5 G/DL (ref 6.1–8.1)
PROT UR QL STRIP: ABNORMAL
RBC # BLD AUTO: 5.24 MILLION/UL (ref 4.2–5.8)
RBC #/AREA URNS HPF: ABNORMAL /HPF
SODIUM SERPL-SCNC: 136 MMOL/L (ref 135–146)
SP GR UR STRIP: 1.02 (ref 1–1.03)
SQUAMOUS #/AREA URNS HPF: ABNORMAL /HPF
TRIGL SERPL-MCNC: 109 MG/DL
TSH SERPL-ACNC: 1.26 MIU/L (ref 0.4–4.5)
WBC # BLD AUTO: 7.8 THOUSAND/UL (ref 3.8–10.8)
WBC #/AREA URNS HPF: ABNORMAL /HPF

## 2021-01-07 PROCEDURE — 97110 THERAPEUTIC EXERCISES: CPT | Mod: PN | Performed by: PHYSICAL THERAPIST

## 2021-01-20 ENCOUNTER — PATIENT MESSAGE (OUTPATIENT)
Dept: ORTHOPEDICS | Facility: CLINIC | Age: 46
End: 2021-01-20

## 2021-01-20 DIAGNOSIS — M51.36 DDD (DEGENERATIVE DISC DISEASE), LUMBAR: ICD-10-CM

## 2021-01-20 DIAGNOSIS — Z98.1 S/P LUMBAR FUSION: Primary | ICD-10-CM

## 2021-01-20 RX ORDER — TIZANIDINE 4 MG/1
4 TABLET ORAL 3 TIMES DAILY
Qty: 90 TABLET | Refills: 0 | Status: SHIPPED | OUTPATIENT
Start: 2021-01-20 | End: 2021-02-19

## 2021-02-17 ENCOUNTER — OFFICE VISIT (OUTPATIENT)
Dept: ORTHOPEDICS | Facility: CLINIC | Age: 46
End: 2021-02-17
Payer: MEDICAID

## 2021-02-17 VITALS
SYSTOLIC BLOOD PRESSURE: 124 MMHG | BODY MASS INDEX: 31.39 KG/M2 | WEIGHT: 200 LBS | DIASTOLIC BLOOD PRESSURE: 76 MMHG | HEART RATE: 82 BPM | HEIGHT: 67 IN

## 2021-02-17 DIAGNOSIS — Z98.1 HISTORY OF LUMBAR SPINAL FUSION: Primary | ICD-10-CM

## 2021-02-17 PROCEDURE — 99213 OFFICE O/P EST LOW 20 MIN: CPT | Mod: S$GLB,,, | Performed by: ORTHOPAEDIC SURGERY

## 2021-02-17 PROCEDURE — 99213 PR OFFICE/OUTPT VISIT, EST, LEVL III, 20-29 MIN: ICD-10-PCS | Mod: S$GLB,,, | Performed by: ORTHOPAEDIC SURGERY

## 2021-02-17 RX ORDER — ORPHENADRINE CITRATE 100 MG/1
TABLET, EXTENDED RELEASE ORAL
COMMUNITY
End: 2021-03-23

## 2021-03-05 ENCOUNTER — PATIENT MESSAGE (OUTPATIENT)
Dept: OTOLARYNGOLOGY | Facility: CLINIC | Age: 46
End: 2021-03-05

## 2021-03-23 ENCOUNTER — PATIENT MESSAGE (OUTPATIENT)
Dept: OTOLARYNGOLOGY | Facility: CLINIC | Age: 46
End: 2021-03-23

## 2021-03-23 ENCOUNTER — OFFICE VISIT (OUTPATIENT)
Dept: OTOLARYNGOLOGY | Facility: CLINIC | Age: 46
End: 2021-03-23
Payer: MEDICAID

## 2021-03-23 VITALS — WEIGHT: 199.94 LBS | HEIGHT: 67 IN | BODY MASS INDEX: 31.38 KG/M2

## 2021-03-23 DIAGNOSIS — G47.33 OBSTRUCTIVE SLEEP APNEA ON CPAP: ICD-10-CM

## 2021-03-23 DIAGNOSIS — J35.1 TONSILLAR HYPERTROPHY: ICD-10-CM

## 2021-03-23 DIAGNOSIS — J03.91 RECURRENT ACUTE TONSILLITIS: Primary | ICD-10-CM

## 2021-03-23 PROCEDURE — 99213 OFFICE O/P EST LOW 20 MIN: CPT | Mod: PBBFAC,PO | Performed by: NURSE PRACTITIONER

## 2021-03-23 PROCEDURE — 99999 PR PBB SHADOW E&M-EST. PATIENT-LVL III: ICD-10-PCS | Mod: PBBFAC,,, | Performed by: NURSE PRACTITIONER

## 2021-03-23 PROCEDURE — 99214 OFFICE O/P EST MOD 30 MIN: CPT | Mod: S$PBB,,, | Performed by: NURSE PRACTITIONER

## 2021-03-23 PROCEDURE — 99999 PR PBB SHADOW E&M-EST. PATIENT-LVL III: CPT | Mod: PBBFAC,,, | Performed by: NURSE PRACTITIONER

## 2021-03-23 PROCEDURE — 99214 PR OFFICE/OUTPT VISIT, EST, LEVL IV, 30-39 MIN: ICD-10-PCS | Mod: S$PBB,,, | Performed by: NURSE PRACTITIONER

## 2021-04-15 ENCOUNTER — PATIENT MESSAGE (OUTPATIENT)
Dept: OTOLARYNGOLOGY | Facility: CLINIC | Age: 46
End: 2021-04-15

## 2021-04-15 ENCOUNTER — PATIENT MESSAGE (OUTPATIENT)
Dept: ORTHOPEDICS | Facility: CLINIC | Age: 46
End: 2021-04-15

## 2021-04-29 ENCOUNTER — PATIENT MESSAGE (OUTPATIENT)
Dept: RESEARCH | Facility: HOSPITAL | Age: 46
End: 2021-04-29

## 2021-05-17 ENCOUNTER — OFFICE VISIT (OUTPATIENT)
Dept: ORTHOPEDICS | Facility: CLINIC | Age: 46
End: 2021-05-17
Payer: MEDICAID

## 2021-05-17 VITALS
WEIGHT: 199 LBS | BODY MASS INDEX: 31.23 KG/M2 | SYSTOLIC BLOOD PRESSURE: 138 MMHG | HEIGHT: 67 IN | DIASTOLIC BLOOD PRESSURE: 90 MMHG

## 2021-05-17 DIAGNOSIS — M62.830 MUSCLE SPASM OF BACK: ICD-10-CM

## 2021-05-17 DIAGNOSIS — Z98.1 HISTORY OF LUMBAR SPINAL FUSION: Primary | ICD-10-CM

## 2021-05-17 PROCEDURE — 99213 OFFICE O/P EST LOW 20 MIN: CPT | Mod: S$GLB,,, | Performed by: ORTHOPAEDIC SURGERY

## 2021-05-17 PROCEDURE — 99213 PR OFFICE/OUTPT VISIT, EST, LEVL III, 20-29 MIN: ICD-10-PCS | Mod: S$GLB,,, | Performed by: ORTHOPAEDIC SURGERY

## 2021-05-17 RX ORDER — TIZANIDINE 4 MG/1
4 TABLET ORAL EVERY 8 HOURS
Qty: 60 TABLET | Refills: 2 | Status: SHIPPED | OUTPATIENT
Start: 2021-05-17 | End: 2021-06-16

## 2021-08-18 DIAGNOSIS — Z98.1 HISTORY OF LUMBAR SPINAL FUSION: Primary | ICD-10-CM

## 2021-08-18 RX ORDER — TIZANIDINE 4 MG/1
4 TABLET ORAL EVERY 8 HOURS
Qty: 90 TABLET | Refills: 1 | Status: SHIPPED | OUTPATIENT
Start: 2021-08-18 | End: 2021-09-17

## 2021-08-19 ENCOUNTER — TELEPHONE (OUTPATIENT)
Dept: FAMILY MEDICINE | Facility: CLINIC | Age: 46
End: 2021-08-19

## 2021-08-19 RX ORDER — FLUOXETINE HYDROCHLORIDE 20 MG/1
20 CAPSULE ORAL DAILY
Qty: 30 CAPSULE | Refills: 0 | Status: SHIPPED | OUTPATIENT
Start: 2021-08-19 | End: 2021-10-25

## 2021-10-21 ENCOUNTER — TELEPHONE (OUTPATIENT)
Dept: FAMILY MEDICINE | Facility: CLINIC | Age: 46
End: 2021-10-21

## 2021-10-25 ENCOUNTER — OFFICE VISIT (OUTPATIENT)
Dept: FAMILY MEDICINE | Facility: CLINIC | Age: 46
End: 2021-10-25
Payer: MEDICAID

## 2021-10-25 VITALS
DIASTOLIC BLOOD PRESSURE: 82 MMHG | BODY MASS INDEX: 35.16 KG/M2 | HEIGHT: 67 IN | SYSTOLIC BLOOD PRESSURE: 136 MMHG | HEART RATE: 76 BPM | WEIGHT: 224 LBS

## 2021-10-25 DIAGNOSIS — F32.1 CURRENT MODERATE EPISODE OF MAJOR DEPRESSIVE DISORDER WITHOUT PRIOR EPISODE: ICD-10-CM

## 2021-10-25 DIAGNOSIS — M51.36 DDD (DEGENERATIVE DISC DISEASE), LUMBAR: ICD-10-CM

## 2021-10-25 DIAGNOSIS — F41.1 GAD (GENERALIZED ANXIETY DISORDER): ICD-10-CM

## 2021-10-25 DIAGNOSIS — M54.16 LUMBAR RADICULOPATHY: ICD-10-CM

## 2021-10-25 DIAGNOSIS — G47.00 INSOMNIA, UNSPECIFIED TYPE: ICD-10-CM

## 2021-10-25 DIAGNOSIS — Z23 NEED FOR IMMUNIZATION AGAINST INFLUENZA: Primary | ICD-10-CM

## 2021-10-25 DIAGNOSIS — N18.30 STAGE 3 CHRONIC KIDNEY DISEASE, UNSPECIFIED WHETHER STAGE 3A OR 3B CKD: ICD-10-CM

## 2021-10-25 DIAGNOSIS — Z79.899 HIGH RISK MEDICATION USE: ICD-10-CM

## 2021-10-25 DIAGNOSIS — I10 HYPERTENSION, UNSPECIFIED TYPE: ICD-10-CM

## 2021-10-25 DIAGNOSIS — E78.5 HYPERLIPIDEMIA, UNSPECIFIED HYPERLIPIDEMIA TYPE: ICD-10-CM

## 2021-10-25 PROCEDURE — 90471 FLU VACCINE - QUADRIVALENT (RECOMBINANT) PRESERVATIVE FREE: ICD-10-PCS | Mod: S$GLB,,, | Performed by: NURSE PRACTITIONER

## 2021-10-25 PROCEDURE — 99214 OFFICE O/P EST MOD 30 MIN: CPT | Mod: 25,S$GLB,, | Performed by: NURSE PRACTITIONER

## 2021-10-25 PROCEDURE — 90471 IMMUNIZATION ADMIN: CPT | Mod: S$GLB,,, | Performed by: NURSE PRACTITIONER

## 2021-10-25 PROCEDURE — 99214 PR OFFICE/OUTPT VISIT, EST, LEVL IV, 30-39 MIN: ICD-10-PCS | Mod: 25,S$GLB,, | Performed by: NURSE PRACTITIONER

## 2021-10-25 PROCEDURE — 90682 RIV4 VACC RECOMBINANT DNA IM: CPT | Mod: S$GLB,,, | Performed by: NURSE PRACTITIONER

## 2021-10-25 PROCEDURE — 90682 FLU VACCINE - QUADRIVALENT (RECOMBINANT) PRESERVATIVE FREE: ICD-10-PCS | Mod: S$GLB,,, | Performed by: NURSE PRACTITIONER

## 2021-10-25 RX ORDER — SACUBITRIL AND VALSARTAN 49; 51 MG/1; MG/1
TABLET, FILM COATED ORAL
Status: ON HOLD | COMMUNITY
Start: 2021-10-12 | End: 2022-12-11 | Stop reason: HOSPADM

## 2021-10-25 RX ORDER — ROSUVASTATIN CALCIUM 20 MG/1
20 TABLET, COATED ORAL DAILY
COMMUNITY
Start: 2021-10-12 | End: 2021-10-25

## 2021-10-25 RX ORDER — DULOXETIN HYDROCHLORIDE 60 MG/1
60 CAPSULE, DELAYED RELEASE ORAL DAILY
Qty: 90 CAPSULE | Refills: 1 | Status: SHIPPED | OUTPATIENT
Start: 2021-10-25 | End: 2022-04-25 | Stop reason: SDUPTHER

## 2021-10-25 RX ORDER — DULOXETIN HYDROCHLORIDE 30 MG/1
30 CAPSULE, DELAYED RELEASE ORAL DAILY
COMMUNITY
Start: 2021-10-12 | End: 2021-10-25

## 2021-10-25 RX ORDER — ROSUVASTATIN CALCIUM 40 MG/1
40 TABLET, COATED ORAL DAILY
COMMUNITY
Start: 2021-10-12

## 2021-10-25 RX ORDER — PANTOPRAZOLE SODIUM 40 MG/1
40 TABLET, DELAYED RELEASE ORAL DAILY PRN
COMMUNITY
Start: 2021-06-12 | End: 2021-10-25

## 2021-10-25 RX ORDER — EZETIMIBE 10 MG/1
10 TABLET ORAL DAILY
Status: ON HOLD | COMMUNITY
Start: 2021-10-12 | End: 2022-12-11 | Stop reason: HOSPADM

## 2021-10-25 RX ORDER — BUPROPION HYDROCHLORIDE 150 MG/1
150 TABLET ORAL DAILY
COMMUNITY
Start: 2021-10-12 | End: 2021-10-25

## 2021-11-03 ENCOUNTER — OFFICE VISIT (OUTPATIENT)
Dept: URGENT CARE | Facility: CLINIC | Age: 46
End: 2021-11-03
Payer: OTHER MISCELLANEOUS

## 2021-11-03 VITALS
HEIGHT: 67 IN | HEART RATE: 79 BPM | SYSTOLIC BLOOD PRESSURE: 142 MMHG | WEIGHT: 224 LBS | DIASTOLIC BLOOD PRESSURE: 80 MMHG | TEMPERATURE: 99 F | BODY MASS INDEX: 35.16 KG/M2 | OXYGEN SATURATION: 98 %

## 2021-11-03 DIAGNOSIS — S61.452A: Primary | ICD-10-CM

## 2021-11-03 PROCEDURE — 99203 OFFICE O/P NEW LOW 30 MIN: CPT | Mod: S$GLB,,, | Performed by: FAMILY MEDICINE

## 2021-11-03 PROCEDURE — 99203 PR OFFICE/OUTPT VISIT, NEW, LEVL III, 30-44 MIN: ICD-10-PCS | Mod: S$GLB,,, | Performed by: FAMILY MEDICINE

## 2022-01-12 ENCOUNTER — PATIENT MESSAGE (OUTPATIENT)
Dept: FAMILY MEDICINE | Facility: CLINIC | Age: 47
End: 2022-01-12
Payer: MEDICAID

## 2022-01-12 DIAGNOSIS — U07.1 COVID-19: Primary | ICD-10-CM

## 2022-01-14 ENCOUNTER — INFUSION (OUTPATIENT)
Dept: INFECTIOUS DISEASES | Facility: HOSPITAL | Age: 47
End: 2022-01-14
Attending: INTERNAL MEDICINE
Payer: MEDICAID

## 2022-01-14 ENCOUNTER — HOSPITAL ENCOUNTER (OUTPATIENT)
Facility: HOSPITAL | Age: 47
Discharge: HOME | End: 2022-01-15
Attending: EMERGENCY MEDICINE | Admitting: HOSPITALIST
Payer: MEDICAID

## 2022-01-14 VITALS
HEART RATE: 70 BPM | DIASTOLIC BLOOD PRESSURE: 88 MMHG | TEMPERATURE: 100 F | SYSTOLIC BLOOD PRESSURE: 157 MMHG | RESPIRATION RATE: 17 BRPM | OXYGEN SATURATION: 97 %

## 2022-01-14 DIAGNOSIS — R55 SYNCOPE: ICD-10-CM

## 2022-01-14 DIAGNOSIS — U07.1 COVID-19: Primary | ICD-10-CM

## 2022-01-14 DIAGNOSIS — U07.1 COVID-19: ICD-10-CM

## 2022-01-14 LAB
ALBUMIN SERPL BCP-MCNC: 2.8 G/DL (ref 3.5–5.2)
ALP SERPL-CCNC: 68 U/L (ref 55–135)
ALT SERPL W/O P-5'-P-CCNC: 59 U/L (ref 10–44)
ANION GAP SERPL CALC-SCNC: 10 MMOL/L (ref 8–16)
AST SERPL-CCNC: 49 U/L (ref 10–40)
BASOPHILS # BLD AUTO: 0.01 K/UL (ref 0–0.2)
BASOPHILS NFR BLD: 0.3 % (ref 0–1.9)
BILIRUB SERPL-MCNC: 0.3 MG/DL (ref 0.1–1)
BNP SERPL-MCNC: 40 PG/ML (ref 0–99)
BUN SERPL-MCNC: 12 MG/DL (ref 6–20)
CALCIUM SERPL-MCNC: 8.1 MG/DL (ref 8.7–10.5)
CHLORIDE SERPL-SCNC: 110 MMOL/L (ref 95–110)
CO2 SERPL-SCNC: 19 MMOL/L (ref 23–29)
CREAT SERPL-MCNC: 1.5 MG/DL (ref 0.5–1.4)
DIFFERENTIAL METHOD: ABNORMAL
EOSINOPHIL # BLD AUTO: 0.1 K/UL (ref 0–0.5)
EOSINOPHIL NFR BLD: 4.1 % (ref 0–8)
ERYTHROCYTE [DISTWIDTH] IN BLOOD BY AUTOMATED COUNT: 13.3 % (ref 11.5–14.5)
EST. GFR  (AFRICAN AMERICAN): >60 ML/MIN/1.73 M^2
EST. GFR  (NON AFRICAN AMERICAN): 55 ML/MIN/1.73 M^2
GLUCOSE SERPL-MCNC: 103 MG/DL (ref 70–110)
HCT VFR BLD AUTO: 41.6 % (ref 40–54)
HGB BLD-MCNC: 13.8 G/DL (ref 14–18)
IMM GRANULOCYTES # BLD AUTO: 0.02 K/UL (ref 0–0.04)
IMM GRANULOCYTES NFR BLD AUTO: 0.7 % (ref 0–0.5)
LACTATE SERPL-SCNC: 2.3 MMOL/L (ref 0.5–2.2)
LYMPHOCYTES # BLD AUTO: 0.6 K/UL (ref 1–4.8)
LYMPHOCYTES NFR BLD: 19.2 % (ref 18–48)
MCH RBC QN AUTO: 30.9 PG (ref 27–31)
MCHC RBC AUTO-ENTMCNC: 33.2 G/DL (ref 32–36)
MCV RBC AUTO: 93 FL (ref 82–98)
MONOCYTES # BLD AUTO: 0.4 K/UL (ref 0.3–1)
MONOCYTES NFR BLD: 14.1 % (ref 4–15)
NEUTROPHILS # BLD AUTO: 1.8 K/UL (ref 1.8–7.7)
NEUTROPHILS NFR BLD: 61.6 % (ref 38–73)
NRBC BLD-RTO: 0 /100 WBC
PLATELET # BLD AUTO: 116 K/UL (ref 150–450)
PMV BLD AUTO: 11.4 FL (ref 9.2–12.9)
POTASSIUM SERPL-SCNC: 3.7 MMOL/L (ref 3.5–5.1)
PROT SERPL-MCNC: 6 G/DL (ref 6–8.4)
RBC # BLD AUTO: 4.47 M/UL (ref 4.6–6.2)
SODIUM SERPL-SCNC: 139 MMOL/L (ref 136–145)
TROPONIN I SERPL DL<=0.01 NG/ML-MCNC: 0.04 NG/ML (ref 0–0.03)
TROPONIN I SERPL DL<=0.01 NG/ML-MCNC: 0.04 NG/ML (ref 0–0.03)
TROPONIN I SERPL DL<=0.01 NG/ML-MCNC: 0.05 NG/ML (ref 0–0.03)
WBC # BLD AUTO: 2.91 K/UL (ref 3.9–12.7)

## 2022-01-14 PROCEDURE — 25000003 PHARM REV CODE 250: Performed by: HOSPITALIST

## 2022-01-14 PROCEDURE — 99285 EMERGENCY DEPT VISIT HI MDM: CPT | Mod: 25

## 2022-01-14 PROCEDURE — 63600175 PHARM REV CODE 636 W HCPCS: Performed by: NURSE PRACTITIONER

## 2022-01-14 PROCEDURE — 84484 ASSAY OF TROPONIN QUANT: CPT | Performed by: EMERGENCY MEDICINE

## 2022-01-14 PROCEDURE — 25000003 PHARM REV CODE 250: Performed by: EMERGENCY MEDICINE

## 2022-01-14 PROCEDURE — G0378 HOSPITAL OBSERVATION PER HR: HCPCS

## 2022-01-14 PROCEDURE — 36415 COLL VENOUS BLD VENIPUNCTURE: CPT | Performed by: HOSPITALIST

## 2022-01-14 PROCEDURE — M0243 CASIRIVI AND IMDEVI INFUSION: HCPCS | Performed by: NURSE PRACTITIONER

## 2022-01-14 PROCEDURE — 80053 COMPREHEN METABOLIC PANEL: CPT | Performed by: EMERGENCY MEDICINE

## 2022-01-14 PROCEDURE — 83880 ASSAY OF NATRIURETIC PEPTIDE: CPT | Performed by: EMERGENCY MEDICINE

## 2022-01-14 PROCEDURE — 96361 HYDRATE IV INFUSION ADD-ON: CPT

## 2022-01-14 PROCEDURE — 93005 ELECTROCARDIOGRAM TRACING: CPT

## 2022-01-14 PROCEDURE — 96360 HYDRATION IV INFUSION INIT: CPT

## 2022-01-14 PROCEDURE — 83605 ASSAY OF LACTIC ACID: CPT | Performed by: EMERGENCY MEDICINE

## 2022-01-14 PROCEDURE — 25000003 PHARM REV CODE 250: Performed by: NURSE PRACTITIONER

## 2022-01-14 PROCEDURE — 85025 COMPLETE CBC W/AUTO DIFF WBC: CPT | Performed by: EMERGENCY MEDICINE

## 2022-01-14 PROCEDURE — 84484 ASSAY OF TROPONIN QUANT: CPT | Mod: 91 | Performed by: HOSPITALIST

## 2022-01-14 RX ORDER — LANOLIN ALCOHOL/MO/W.PET/CERES
800 CREAM (GRAM) TOPICAL
Status: DISCONTINUED | OUTPATIENT
Start: 2022-01-14 | End: 2022-01-15 | Stop reason: HOSPADM

## 2022-01-14 RX ORDER — SODIUM CHLORIDE 9 MG/ML
INJECTION, SOLUTION INTRAVENOUS CONTINUOUS
Status: ACTIVE | OUTPATIENT
Start: 2022-01-14 | End: 2022-01-15

## 2022-01-14 RX ORDER — ACETAMINOPHEN 500 MG
1000 TABLET ORAL EVERY 8 HOURS PRN
Status: DISCONTINUED | OUTPATIENT
Start: 2022-01-14 | End: 2022-01-15 | Stop reason: HOSPADM

## 2022-01-14 RX ORDER — DULOXETIN HYDROCHLORIDE 30 MG/1
60 CAPSULE, DELAYED RELEASE ORAL DAILY
Status: DISCONTINUED | OUTPATIENT
Start: 2022-01-15 | End: 2022-01-15 | Stop reason: HOSPADM

## 2022-01-14 RX ORDER — ONDANSETRON 4 MG/1
4 TABLET, ORALLY DISINTEGRATING ORAL ONCE AS NEEDED
Status: DISCONTINUED | OUTPATIENT
Start: 2022-01-14 | End: 2022-04-25

## 2022-01-14 RX ORDER — TALC
6 POWDER (GRAM) TOPICAL NIGHTLY PRN
Status: DISCONTINUED | OUTPATIENT
Start: 2022-01-14 | End: 2022-01-15 | Stop reason: HOSPADM

## 2022-01-14 RX ORDER — ASPIRIN 325 MG
325 TABLET ORAL DAILY
Status: DISCONTINUED | OUTPATIENT
Start: 2022-01-15 | End: 2022-01-15 | Stop reason: HOSPADM

## 2022-01-14 RX ORDER — EPINEPHRINE 0.3 MG/.3ML
0.3 INJECTION SUBCUTANEOUS
Status: DISCONTINUED | OUTPATIENT
Start: 2022-01-14 | End: 2022-01-15 | Stop reason: HOSPADM

## 2022-01-14 RX ORDER — DIPHENHYDRAMINE HYDROCHLORIDE 50 MG/ML
25 INJECTION INTRAMUSCULAR; INTRAVENOUS ONCE AS NEEDED
Status: DISCONTINUED | OUTPATIENT
Start: 2022-01-14 | End: 2022-01-15 | Stop reason: HOSPADM

## 2022-01-14 RX ORDER — ACETAMINOPHEN 325 MG/1
650 TABLET ORAL ONCE AS NEEDED
Status: DISCONTINUED | OUTPATIENT
Start: 2022-01-14 | End: 2022-04-25

## 2022-01-14 RX ORDER — ALBUTEROL SULFATE 90 UG/1
2 AEROSOL, METERED RESPIRATORY (INHALATION)
Status: DISCONTINUED | OUTPATIENT
Start: 2022-01-14 | End: 2022-04-25

## 2022-01-14 RX ORDER — ONDANSETRON 8 MG/1
8 TABLET, ORALLY DISINTEGRATING ORAL EVERY 6 HOURS PRN
Status: DISCONTINUED | OUTPATIENT
Start: 2022-01-14 | End: 2022-01-15 | Stop reason: HOSPADM

## 2022-01-14 RX ORDER — SODIUM CHLORIDE 0.9 % (FLUSH) 0.9 %
10 SYRINGE (ML) INJECTION
Status: DISCONTINUED | OUTPATIENT
Start: 2022-01-14 | End: 2022-04-25

## 2022-01-14 RX ADMIN — ACETAMINOPHEN 1000 MG: 500 TABLET ORAL at 07:01

## 2022-01-14 RX ADMIN — SODIUM CHLORIDE: 0.9 INJECTION, SOLUTION INTRAVENOUS at 08:01

## 2022-01-14 RX ADMIN — SODIUM CHLORIDE 125 ML/HR: 0.9 INJECTION, SOLUTION INTRAVENOUS at 06:01

## 2022-01-14 RX ADMIN — CASIRIVIMAB AND IMDEVIMAB 600 MG: 600; 600 INJECTION, SOLUTION, CONCENTRATE INTRAVENOUS at 08:01

## 2022-01-14 RX ADMIN — SODIUM CHLORIDE 1000 ML: 0.9 INJECTION, SOLUTION INTRAVENOUS at 01:01

## 2022-01-14 NOTE — ED NOTES
Patient was brought to the ED by EMS due to having a syncope episode falling face forward on a glass cabinet. Patient had COVID infusion this morning where he noted lightheadedness. Patient went home and watching TV when he stood up and fell forward hitting his head on a cabinet. Patient blood pressure is low, he hasn't been drinking fluids the last for days because of his COVID + and he took his metoprolol and Entresto this morning.

## 2022-01-14 NOTE — ED PROVIDER NOTES
Encounter Date: 1/14/2022    SCRIBE #1 NOTE: IAngela, rakesh scribing for, and in the presence of, Barry Chong MD.       History     Chief Complaint   Patient presents with    Hypotension     + LOC hit pt head. Pt received covid infusion     Time seen by provider: 1:12 PM on 01/14/2022    Lee Quintanilla is a 46 y.o. male who presents to the ED via EMS with an onset of syncope and persistent malaise PTA.  Patient reports a recent diagnosis of COVID-19 for which he received the antibody infusions earlier this morning with accompanying lightheadedness.  Since Dx patient has had decreased oral intake and drinks about 3-4 bottles of water per day.  Metoprolol and Entresto were taken this morning per usual regimen.  After returning home patient stood up from watching T.V. when he felt dizzy and fell forward hitting his head on a cabinet and breaking it.  Patient's wife heard a crash with breaking glass and she activated EMS.  She notes after syncopal episode the patient had about 1 minute of snoring respirations before returning to [facility].  EMS reports the patient was hypotensive at 70/43 mmHg upon their arrival.  They state a CBG of 252 mg/dL and notes about 600 mL were administered en route to the ED.  Patient endorses rhinorrhea and cough 3 days ago.  He notes a fever that has resolved and malaise that started yesterday and has somewhat improved after fluids were given en route.  The patient denies fluid on the lungs secondary to CHF, CP, SOB, N/V/D, loss of bowels, bladder incontinence or any other symptoms at this time.      The history is provided by the patient and the EMS personnel.     Review of patient's allergies indicates:   Allergen Reactions    Inapsine [droperidol] Anaphylaxis     seizures    Effexor [venlafaxine]      Increased anxiety    Pcn [penicillins]     Bactrim [sulfamethoxazole-trimethoprim] Rash     Dry red rash all over when in the sun     Past Medical History:    Diagnosis Date    ADHD (attention deficit hyperactivity disorder)     Arthritis     Asthma     as child only    Back pain     Coronary artery disease     Degeneration of lumbar intervertebral disc 2016    Depression     Elevated PSA     Headache     Hyperlipidemia     Hypertension     Kidney damage     stage 3 ; d/t aleve abuse    Kidney stone     Myocardial infarction     Neck pain     Numbness and tingling in hands     Numbness and tingling of both legs     Seizures     from inapsine     Sleep apnea     no cpap    Wears glasses     CONTACS     Past Surgical History:   Procedure Laterality Date    BACK SURGERY  2016    back surgery    BONE GRAFT N/A 2020    Procedure: BONE GRAFT;  Surgeon: Mateusz Blanco MD;  Location: [facility] OR;  Service: Orthopedics;  Laterality: N/A;    CARDIAC SURGERY  2020    CABG X 7    CORONARY ARTERY BYPASS GRAFT      7 vessels    HERNIA REPAIR Bilateral 2017    LITHOTRIPSY      LUMBAR LAMINECTOMY WITH FUSION Bilateral 2020    Procedure: LAMINECTOMY, SPINE, LUMBAR, WITH FUSION;  Surgeon: Mateusz Blanco MD;  Location: [facility] OR;  Service: Orthopedics;  Laterality: Bilateral;  [employer]  NTI  L-3    PILONIDAL CYST DRAINAGE      removal of abcess      scrotal    REMOVAL OF HARDWARE FROM SPINE Bilateral 2020    Procedure: REMOVAL, HARDWARE, SPINE;  Surgeon: Mateusz Blanco MD;  Location: [facility] OR;  Service: Orthopedics;  Laterality: Bilateral;  L 4-5    TYMPANOSTOMY TUBE PLACEMENT       Family History   Problem Relation Age of Onset    Heart disease Mother     Migraines Mother     Hypertension Mother     Early death Father     Heart disease Father     Diabetes Maternal Aunt     Diabetes Maternal Uncle     Diabetes Maternal Grandfather      Social History     Tobacco Use    Smoking status: Former Smoker     Packs/day: 0.25     Types: Cigarettes     Quit date: 2016     Years since quittin.0    Smokeless tobacco:  Former User     Quit date: 6/5/2016    Tobacco comment: occasional   Substance Use Topics    Alcohol use: Yes     Alcohol/week: 1.0 standard drink     Types: 1 Glasses of wine per week     Comment: rare    Drug use: No     Review of Systems   Constitutional: Positive for appetite change and fever (resolved).   HENT: Positive for rhinorrhea. Negative for congestion.    Eyes: Negative for visual disturbance.   Respiratory: Positive for cough. Negative for shortness of breath and wheezing.    Cardiovascular: Negative for chest pain.   Gastrointestinal: Negative for abdominal pain, diarrhea, nausea and vomiting.   Genitourinary: Negative for dysuria.        - Loss of bowels  - Bladder incontinence   Musculoskeletal: Negative for joint swelling.   Skin: Negative for rash.   Neurological: Positive for dizziness, syncope and light-headedness.   Hematological: Does not bruise/bleed easily.   Psychiatric/Behavioral: Negative for confusion.       Physical Exam     Initial Vitals [01/14/22 1259]   BP Pulse Resp Temp SpO2   (!) 103/49 65 20 98 °F (36.7 °C) (!) 94 %      MAP       --         Physical Exam    Nursing note and vitals reviewed.  Constitutional: He appears well-nourished.   HENT:   Head: Normocephalic and atraumatic.   Eyes: Conjunctivae and EOM are normal.   Neck: Neck supple. No thyroid mass present.   Normal range of motion.  Cardiovascular: Normal rate, regular rhythm and normal heart sounds. Exam reveals no gallop and no friction rub.    No murmur heard.  Pulmonary/Chest: Breath sounds normal. He has no wheezes. He has no rhonchi. He has no rales.   Abdominal: Abdomen is soft. Bowel sounds are normal. There is no abdominal tenderness.   Musculoskeletal:      Cervical back: Normal range of motion and neck supple.     Neurological: He is alert and oriented to person, place, and time. He has normal strength. No cranial nerve deficit or sensory deficit.   Skin: Skin is warm and dry. No rash noted. No erythema.    Psychiatric: He has a normal mood and affect. His speech is normal. Cognition and memory are normal.         ED Course   Procedures  Labs Reviewed   CBC W/ AUTO DIFFERENTIAL - Abnormal; Notable for the following components:       Result Value    WBC 2.91 (*)     RBC 4.47 (*)     Hemoglobin 13.8 (*)     Platelets 116 (*)     Immature Granulocytes 0.7 (*)     Lymph # 0.6 (*)     All other components within normal limits   COMPREHENSIVE METABOLIC PANEL - Abnormal; Notable for the following components:    CO2 19 (*)     Creatinine 1.5 (*)     Calcium 8.1 (*)     Albumin 2.8 (*)     AST 49 (*)     ALT 59 (*)     eGFR if non  55 (*)     All other components within normal limits   LACTIC ACID, PLASMA - Abnormal; Notable for the following components:    Lactate (Lactic Acid) 2.3 (*)     All other components within normal limits   TROPONIN I - Abnormal; Notable for the following components:    Troponin I 0.042 (*)     All other components within normal limits   B-TYPE NATRIURETIC PEPTIDE     EKG Readings: (Independently Interpreted)   Initial Reading: No STEMI.   NSR at a rate of 72 bpm with normal intervals.  Inferior infart, age undetermined and possible anterolateral infarct, age undetermined.        Imaging Results          CT Head Without Contrast (Final result)  Result time 01/14/22 15:09:21    Final result by Devante Holt MD (01/14/22 15:09:21)                 Impression:      No acute post-traumatic intracranial abnormality appreciated.      Electronically signed by: Porter Holt MD  Date:    01/14/2022  Time:    15:09             Narrative:    EXAMINATION:  CT HEAD WITHOUT CONTRAST    CLINICAL HISTORY:  Head trauma, moderate-severe;    TECHNIQUE:  5 mm noncontrast axial images were acquired through the head.    COMPARISON:  None    FINDINGS:  The brain is normally formed with preserved gray-white matter junction differentiation. No evidence of acute/recent major vascular territory cerebral  infarction, parenchymal hemorrhage, or intra-axial mass.    No hydrocephalus.  No effacement of the skull-base cisterns.  No extra-axial fluid collections or blood products.    There is prominent mucoperiosteal thickening noted diffusely throughout the ethmoid air cells, sphenoid sinus, and maxillary sinus.  No air-fluid level or opacification of the paranasal sinuses.  There is trace mucoperiosteal thickening/fluid present within the left mastoid air cells.  The visualized orbits are unremarkable.  The bony calvarium and visualized facial bones show no acute abnormality.  Specifically, no depressed skull fracture.                               X-Ray Chest AP Portable (Final result)  Result time 01/14/22 13:52:17    Final result by Allie Perera MD (01/14/22 13:52:17)                 Impression:      No acute cardiopulmonary disease      Electronically signed by: Allie Perera MD  Date:    01/14/2022  Time:    13:52             Narrative:    EXAMINATION:  XR CHEST AP PORTABLE    CLINICAL HISTORY:  COVID-19;    TECHNIQUE:  Single frontal view of the chest was performed.    COMPARISON:  11/05/2020    FINDINGS:  The cardiomediastinal silhouette is stable.  There are median sternotomy sutures.  Lungs are clear of infiltrate.  There is no pleural effusion.                                 Medications   aspirin tablet 325 mg (has no administration in time range)   DULoxetine DR capsule 60 mg (has no administration in time range)   potassium bicarbonate disintegrating tablet 50 mEq (has no administration in time range)   potassium bicarbonate disintegrating tablet 35 mEq (has no administration in time range)   potassium bicarbonate disintegrating tablet 60 mEq (has no administration in time range)   magnesium oxide tablet 800 mg (has no administration in time range)   magnesium oxide tablet 800 mg (has no administration in time range)   acetaminophen tablet 1,000 mg (has no administration in time range)   ondansetron  disintegrating tablet 8 mg (has no administration in time range)   melatonin tablet 6 mg (has no administration in time range)   0.9%  NaCl infusion (has no administration in time range)   sodium chloride 0.9% bolus 1,000 mL (0 mLs Intravenous Stopped 1/14/22 1500)     Medical Decision Making:   History:   Old Medical Records: I decided to obtain old medical records.  Independently Interpreted Test(s):   I have ordered and independently interpreted X-rays - see prior notes.  I have ordered and independently interpreted EKG Reading(s) - see prior notes  Clinical Tests:   Lab Tests: Ordered and Reviewed  Radiological Study: Ordered and Reviewed  Medical Tests: Ordered and Reviewed  ED Management:  This patient was emergently assessed shortly after arrival.  Initial vital signs significant for hypotension with a systolic at home of 80. Patient was initiated on 1 L normal saline bolus by EMS.  On my eval, he is alert with some improvement in blood pressure to 100 systolic.  An additional 1 L saline bolus will be provided over 2 hours.  The patient is COVID positive but exhibits normal work of breathing.  He had a syncopal episode and CT scan of the head is negative for intracranial bleeding or skull fracture.  Troponin is found to be mildly elevated at 0.042 and the patient denies chest pain.  After receiving 2nd bolus, the patient was noted to have hypotension resume with systolics of 90/95 persistently.  I do think the patient warrants observation for further normalization of his blood pressures and gentle hydration considering his history of ACS, CHF.  Case discussed with and accepted by the on-call hospitalist.  The patient and mother were in agreement with this disposition and he will be transported to a telemetry bed in guarded condition.          Scribe Attestation:   Scribe #1: I performed the above scribed service and the documentation accurately describes the services I performed. I attest to the accuracy of  the note.               I, Dr. Barry Chong, personally performed the services described in this documentation. All medical record entries made by the scribe were at my direction and in my presence.  I have reviewed the chart and agree that the record reflects my personal performance and is accurate and complete. Barry Chong MD.  6:01 PM 01/14/2022      Clinical Impression:   Final diagnoses:  [R55] Syncope  [U07.1] COVID-19          ED Disposition Condition    Observation               Barry Chong MD  01/14/22 5015

## 2022-01-14 NOTE — PATIENT INSTRUCTIONS
Call physician or report to emergency room with any increase in symptoms especially difficulty breathing.    Continue to quarantine as instructed in this after visit summary.

## 2022-01-15 VITALS
SYSTOLIC BLOOD PRESSURE: 181 MMHG | RESPIRATION RATE: 18 BRPM | WEIGHT: 227 LBS | TEMPERATURE: 97 F | DIASTOLIC BLOOD PRESSURE: 110 MMHG | OXYGEN SATURATION: 96 % | HEIGHT: 67 IN | BODY MASS INDEX: 35.63 KG/M2 | HEART RATE: 68 BPM

## 2022-01-15 PROBLEM — N28.9 ACUTE RENAL INSUFFICIENCY: Status: ACTIVE | Noted: 2022-01-15

## 2022-01-15 PROBLEM — R55 SYNCOPE: Status: ACTIVE | Noted: 2022-01-15

## 2022-01-15 PROBLEM — E86.0 DEHYDRATION: Status: ACTIVE | Noted: 2022-01-15

## 2022-01-15 PROBLEM — E83.42 HYPOMAGNESEMIA: Status: ACTIVE | Noted: 2022-01-15

## 2022-01-15 LAB
ANION GAP SERPL CALC-SCNC: 12 MMOL/L (ref 8–16)
BASOPHILS # BLD AUTO: 0.01 K/UL (ref 0–0.2)
BASOPHILS NFR BLD: 0.4 % (ref 0–1.9)
BUN SERPL-MCNC: 12 MG/DL (ref 6–20)
CALCIUM SERPL-MCNC: 8.6 MG/DL (ref 8.7–10.5)
CHLORIDE SERPL-SCNC: 108 MMOL/L (ref 95–110)
CO2 SERPL-SCNC: 21 MMOL/L (ref 23–29)
CREAT SERPL-MCNC: 1.3 MG/DL (ref 0.5–1.4)
DIFFERENTIAL METHOD: ABNORMAL
EOSINOPHIL # BLD AUTO: 0.2 K/UL (ref 0–0.5)
EOSINOPHIL NFR BLD: 7.7 % (ref 0–8)
ERYTHROCYTE [DISTWIDTH] IN BLOOD BY AUTOMATED COUNT: 13.2 % (ref 11.5–14.5)
EST. GFR  (AFRICAN AMERICAN): >60 ML/MIN/1.73 M^2
EST. GFR  (NON AFRICAN AMERICAN): >60 ML/MIN/1.73 M^2
GLUCOSE SERPL-MCNC: 90 MG/DL (ref 70–110)
HCT VFR BLD AUTO: 43.2 % (ref 40–54)
HGB BLD-MCNC: 14.4 G/DL (ref 14–18)
IMM GRANULOCYTES # BLD AUTO: 0.03 K/UL (ref 0–0.04)
IMM GRANULOCYTES NFR BLD AUTO: 1.2 % (ref 0–0.5)
LYMPHOCYTES # BLD AUTO: 0.7 K/UL (ref 1–4.8)
LYMPHOCYTES NFR BLD: 25 % (ref 18–48)
MAGNESIUM SERPL-MCNC: 1.5 MG/DL (ref 1.6–2.6)
MCH RBC QN AUTO: 31.1 PG (ref 27–31)
MCHC RBC AUTO-ENTMCNC: 33.3 G/DL (ref 32–36)
MCV RBC AUTO: 93 FL (ref 82–98)
MONOCYTES # BLD AUTO: 0.3 K/UL (ref 0.3–1)
MONOCYTES NFR BLD: 12.7 % (ref 4–15)
NEUTROPHILS # BLD AUTO: 1.4 K/UL (ref 1.8–7.7)
NEUTROPHILS NFR BLD: 53 % (ref 38–73)
NRBC BLD-RTO: 0 /100 WBC
PLATELET # BLD AUTO: 94 K/UL (ref 150–450)
PMV BLD AUTO: 10.8 FL (ref 9.2–12.9)
POTASSIUM SERPL-SCNC: 4.2 MMOL/L (ref 3.5–5.1)
RBC # BLD AUTO: 4.63 M/UL (ref 4.6–6.2)
SODIUM SERPL-SCNC: 141 MMOL/L (ref 136–145)
WBC # BLD AUTO: 2.6 K/UL (ref 3.9–12.7)

## 2022-01-15 PROCEDURE — 90471 IMMUNIZATION ADMIN: CPT | Performed by: HOSPITALIST

## 2022-01-15 PROCEDURE — 90686 IIV4 VACC NO PRSV 0.5 ML IM: CPT | Performed by: HOSPITALIST

## 2022-01-15 PROCEDURE — 63600175 PHARM REV CODE 636 W HCPCS: Performed by: HOSPITALIST

## 2022-01-15 PROCEDURE — 85025 COMPLETE CBC W/AUTO DIFF WBC: CPT | Performed by: HOSPITALIST

## 2022-01-15 PROCEDURE — 25000003 PHARM REV CODE 250: Performed by: HOSPITALIST

## 2022-01-15 PROCEDURE — 83735 ASSAY OF MAGNESIUM: CPT | Performed by: HOSPITALIST

## 2022-01-15 PROCEDURE — 96361 HYDRATE IV INFUSION ADD-ON: CPT

## 2022-01-15 PROCEDURE — 80048 BASIC METABOLIC PNL TOTAL CA: CPT | Performed by: HOSPITALIST

## 2022-01-15 PROCEDURE — 36415 COLL VENOUS BLD VENIPUNCTURE: CPT | Performed by: HOSPITALIST

## 2022-01-15 PROCEDURE — G0378 HOSPITAL OBSERVATION PER HR: HCPCS

## 2022-01-15 RX ADMIN — DULOXETINE 60 MG: 30 CAPSULE, DELAYED RELEASE ORAL at 09:01

## 2022-01-15 RX ADMIN — Medication 800 MG: at 11:01

## 2022-01-15 RX ADMIN — Medication 800 MG: at 10:01

## 2022-01-15 RX ADMIN — MELATONIN TAB 3 MG 6 MG: 3 TAB at 12:01

## 2022-01-15 RX ADMIN — INFLUENZA VIRUS VACCINE 0.5 ML: 15; 15; 15; 15 SUSPENSION INTRAMUSCULAR at 12:01

## 2022-01-15 RX ADMIN — ASPIRIN 325 MG ORAL TABLET 325 MG: 325 PILL ORAL at 09:01

## 2022-01-15 NOTE — PLAN OF CARE
Pt cleared for discharge from Case Management     01/15/22 1129   Final Note   Assessment Type Final Discharge Note   Anticipated Discharge Disposition Home   What phone number can be called within the next 1-3 days to see how you are doing after discharge?   ([phone])

## 2022-01-15 NOTE — PLAN OF CARE
Patient resting well In bed. No acute changes noted. POC reviewed. Patient encourage to call for assist when needed. Reviewed call light and encourage to use when needed. Reviewed medication with patient. Care discussed with wife per patient request. No other concerns. Denies pain, SOB or discomfort. Nonproductive cough noted. Will cont  to monitor and provide care as needed. Patient is ambulatory and able to perform ADLs. Safety measure in place. Tele in place. No other concerns noted.

## 2022-01-15 NOTE — NURSING
Discharged pt per MD order. Educated pt about medications and when to make his follow up appointments. Removed pts IV and tele. Was taken by nurse via wheelchair to spouse's vehicle outside.

## 2022-01-15 NOTE — PLAN OF CARE
[facility]  Initial Discharge Assessment       Primary Care Provider: Riya Vidales NP    Admission Diagnosis: Syncope [R55]  COVID-19 [U07.1]    Admission Date: 1/14/2022  Expected Discharge Date: 1/15/2022    Discharge Barriers Identified: None      completed the assessment with pt. Pt lives with spouse and independent in care.  Demographic, PCP and Insurance is current on face sheet.  Pt denies diabetes, dialysis and coumadin. Pt uses CPAP at home. Disposition:  Pt will discharge to home.  Spouse will provide transportation  On discharge.  No needs verbalized at this time.      Payor: MEDICAID / Plan: [place] [employer] / Product Type: Managed Medicaid /     Extended Emergency Contact Information  Primary Emergency Contact: Fer Farooq  Mobile Phone: [phone]  Relation: Friend   needed? No  Secondary Emergency Contact: Leti Rollins  Address: [address] [place] [address] [place] [address] [place]  Mobile Phone: [phone]  Relation: Mother  Preferred language: English   needed? No    Discharge Plan A: Home with family  Discharge Plan B: Home with family      [facility] [place] - [address]  [address]  [place] [address]  Phone: [phone] Fax: [phone]    [facility]  [place] [address]  [place] [address]  Phone: [phone] Fax: [phone]      Initial Assessment (most recent)     Adult Discharge Assessment - 01/15/22 1032        Discharge Assessment    Assessment Type Discharge Planning Assessment     Confirmed/corrected address, phone number and insurance Yes     Confirmed Demographics Correct on Facesheet     Source of Information patient     Reason For Admission hypotension     Lives With spouse     Facility Arrived From: home     Do you expect to return to your current living situation? Yes     Do you have help at home or someone to help you manage your care  at home? Yes     Who are your caregiver(s) and their phone number(s)? wife- Capri Fernando [phone]     Prior to hospitilization cognitive status: Alert/Oriented     Current cognitive status: Alert/Oriented     Walking or Climbing Stairs Difficulty none     Equipment Currently Used at Home CPAP     Readmission within 30 days? No     Patient currently being followed by outpatient case management? No     Do you currently have service(s) that help you manage your care at home? No     Do you take prescription medications? Yes     Do you have prescription coverage? Yes     Do you have any problems affording any of your prescribed medications? No     Is the patient taking medications as prescribed? yes     Who is going to help you get home at discharge? wife     How do you get to doctors appointments? car, drives self     Are you on dialysis? No     Do you take coumadin? No     Discharge Plan A Home with family     Discharge Plan B Home with family     DME Needed Upon Discharge  none     Discharge Plan discussed with: Patient     Discharge Barriers Identified None

## 2022-01-16 NOTE — H&P
[facility] Medicine  History & Physical    Patient Name: Lee Quintanilla  MRN: [id number]  Patient Class: OP- Observation  Admission Date: 1/14/2022  Attending Physician: No att. providers found   Primary Care Provider: Riya Vidales NP         Patient information was obtained from patient, past medical records and ER records.     Subjective:     Principal Problem:Syncope    Chief Complaint:   Chief Complaint   Patient presents with    Hypotension     + LOC hit pt head. Pt received covid infusion        HPI: Pt presented to ED with syncope.  Occurred hours before.  He was recently diagnosed with COVID-19, and yesterday, he received a monoclonal antibody infusion.  He has been drinking very little since he started getting sick.  The syncope occurred at home after he got up from the couch, and upon falling, he struck his head on a cabinet.  Fortunately, he doesn't have a laceration or bruise.  He was unconscious for about one minute.  Woke up on his own. EMS checked his BP.  It was low -- about 70 systolic.  His COVID symptoms have been typical, consisting of intermittent fevers, cough, nasal congestion, etc.  Has not had chest pain or SOB.  No nausea or vomiting.  No diarrhea.      Past Medical History:   Diagnosis Date    ADHD (attention deficit hyperactivity disorder)     Arthritis     Asthma     as child only    Back pain     Coronary artery disease     Degeneration of lumbar intervertebral disc 05/2016    Depression     Elevated PSA     Headache     Hyperlipidemia     Hypertension     Kidney damage     stage 3 ; d/t aleve abuse    Kidney stone     Myocardial infarction     Neck pain     Numbness and tingling in hands     Numbness and tingling of both legs     Seizures     from inapsine 2002    Sleep apnea     no cpap    Wears glasses     CONTACS       Past Surgical History:   Procedure Laterality Date    BACK SURGERY  2016    back surgery    BONE GRAFT N/A  11/5/2020    Procedure: BONE GRAFT;  Surgeon: Mateusz Blanco MD;  Location: [facility] OR;  Service: Orthopedics;  Laterality: N/A;    CARDIAC SURGERY  01/06/2020    CABG X 7    CORONARY ARTERY BYPASS GRAFT      7 vessels    HERNIA REPAIR Bilateral 11/22/2017    LITHOTRIPSY      LUMBAR LAMINECTOMY WITH FUSION Bilateral 11/5/2020    Procedure: LAMINECTOMY, SPINE, LUMBAR, WITH FUSION;  Surgeon: Mateusz Blanco MD;  Location: [facility] OR;  Service: Orthopedics;  Laterality: Bilateral;  MEDTRONIC  NTI  L-3    PILONIDAL CYST DRAINAGE      removal of abcess      scrotal    REMOVAL OF HARDWARE FROM SPINE Bilateral 11/5/2020    Procedure: REMOVAL, HARDWARE, SPINE;  Surgeon: Mateusz Blanco MD;  Location: [facility] OR;  Service: Orthopedics;  Laterality: Bilateral;  L 4-5    TYMPANOSTOMY TUBE PLACEMENT         Review of patient's allergies indicates:   Allergen Reactions    Inapsine [droperidol] Anaphylaxis     seizures    Effexor [venlafaxine]      Increased anxiety    Pcn [penicillins]     Bactrim [sulfamethoxazole-trimethoprim] Rash     Dry red rash all over when in the sun       Current Facility-Administered Medications on File Prior to Encounter   Medication    acetaminophen tablet 650 mg    albuterol inhaler 2 puff    ondansetron disintegrating tablet 4 mg    sodium chloride 0.9% 500 mL flush bag    sodium chloride 0.9% flush 10 mL    [DISCONTINUED] diphenhydrAMINE injection 25 mg    [DISCONTINUED] EPINEPHrine (EPIPEN) 0.3 mg/0.3 mL pen injection 0.3 mg    [DISCONTINUED] methylPREDNISolone sodium succinate injection 40 mg     Current Outpatient Medications on File Prior to Encounter   Medication Sig    anastrozole (ARIMIDEX) 1 mg Tab Take 1 mg by mouth once a week.    aspirin 325 MG tablet Take 325 mg by mouth once daily.     DULoxetine (CYMBALTA) 60 MG capsule Take 1 capsule (60 mg total) by mouth once daily.    ENTRESTO 49-51 mg per tablet TAKE ONE TABLET BY MOUTH TWICE A DAY AS DIRECTED    ezetimibe  (ZETIA) 10 mg tablet Take 10 mg by mouth once daily.    metoprolol tartrate (LOPRESSOR) 25 MG tablet Take 1 tablet (25 mg total) by mouth 2 (two) times a day.    rosuvastatin (CRESTOR) 40 MG Tab TAKE ONE TABLET BY MOUTH ONCE A DAY AS DIRECTED    tadalafiL (CIALIS) 20 MG Tab Take 1/2 to 1 tablet as needed prior to intercourse. No more than 1 in 36 hours    testosterone cypionate (DEPOTESTOTERONE CYPIONATE) 200 mg/mL injection Inject 200 mg into the muscle every 7 days.    azelastine (ASTELIN) 137 mcg (0.1 %) nasal spray 1 spray (137 mcg total) by Nasal route 2 (two) times daily.     Family History     Problem Relation (Age of Onset)    Diabetes Maternal Aunt, Maternal Uncle, Maternal Grandfather    Early death Father    Heart disease Mother, Father    Hypertension Mother    Migraines Mother        Tobacco Use    Smoking status: Former Smoker     Packs/day: 0.25     Types: Cigarettes     Quit date: 2016     Years since quittin.0    Smokeless tobacco: Former User     Quit date: 2016    Tobacco comment: occasional   Substance and Sexual Activity    Alcohol use: Yes     Alcohol/week: 1.0 standard drink     Types: 1 Glasses of wine per week     Comment: rare    Drug use: No    Sexual activity: Yes     Partners: Female     Review of Systems   Constitutional: Positive for fatigue and fever. Negative for chills.   HENT: Negative for congestion and sinus pressure.    Eyes: Negative for pain and visual disturbance.   Respiratory: Positive for cough. Negative for shortness of breath and wheezing.    Cardiovascular: Negative for chest pain, palpitations and leg swelling.   Gastrointestinal: Negative for abdominal pain, diarrhea, nausea and vomiting.   Genitourinary: Positive for decreased urine volume. Negative for difficulty urinating, dysuria and hematuria.   Musculoskeletal: Positive for myalgias. Negative for arthralgias.   Skin: Negative for rash and wound.   Neurological: Positive for syncope and  light-headedness. Negative for dizziness, weakness and headaches.   Hematological: Negative for adenopathy. Does not bruise/bleed easily.   Psychiatric/Behavioral: Negative for confusion and dysphoric mood. The patient is not nervous/anxious.      Objective:     Vital Signs (Most Recent):  Temp: 97.3 °F (36.3 °C) (01/15/22 1150)  Pulse: 68 (01/15/22 1150)  Resp: 18 (01/15/22 1150)  BP: (!) 181/110 (01/15/22 1150)  SpO2: 96 % (01/15/22 1150) Vital Signs (24h Range):  Temp:  [96.5 °F (35.8 °C)-98.7 °F (37.1 °C)] 97.3 °F (36.3 °C)  Pulse:  [68-75] 68  Resp:  [18-19] 18  SpO2:  [94 %-96 %] 96 %  BP: (160-181)/() 181/110     Weight: 103 kg (226 lb 15.8 oz)  Body mass index is 35.55 kg/m².    Physical Exam  Constitutional:       General: He is not in acute distress.     Appearance: He is well-nourished. He is not ill-appearing.   HENT:      Head: Normocephalic and atraumatic.      Right Ear: External ear normal.      Left Ear: External ear normal.      Mouth/Throat:      Pharynx: No oropharyngeal exudate.   Eyes:      General: No scleral icterus.        Right eye: No discharge.         Left eye: No discharge.      Conjunctiva/sclera: Conjunctivae normal.   Neck:      Thyroid: No thyromegaly.      Vascular: No JVD.   Cardiovascular:      Rate and Rhythm: Normal rate and regular rhythm.      Heart sounds: No gallop.    Pulmonary:      Effort: Pulmonary effort is normal.      Breath sounds: Normal breath sounds. No wheezing.   Abdominal:      General: Bowel sounds are normal. There is no distension.      Palpations: Abdomen is soft. There is no mass.      Tenderness: There is no abdominal tenderness.   Musculoskeletal:         General: No deformity or edema.      Cervical back: Normal range of motion and neck supple.   Lymphadenopathy:      Cervical: No cervical adenopathy.   Skin:     General: Skin is warm and dry.      Capillary Refill: Capillary refill takes less than 2 seconds.      Findings: No rash.    Neurological:      Mental Status: He is alert and oriented to person, place, and time.   Psychiatric:         Behavior: Behavior normal.             Significant Labs:   BMP:   Recent Labs   Lab 01/15/22  0353   GLU 90      K 4.2      CO2 21*   BUN 12   CREATININE 1.3   CALCIUM 8.6*   MG 1.5*     CBC:   Recent Labs   Lab 01/14/22  1349 01/15/22  0951   WBC 2.91* 2.60*   HGB 13.8* 14.4   HCT 41.6 43.2   * 94*       Significant Imaging: I have reviewed all pertinent imaging results/findings within the past 24 hours.    Assessment/Plan:     * Syncope  Secondary to hypovolemia from drinking very little.  The hypovolemia caused the low blood pressure, which then led to syncope.  He received IVF overnight and today, he feels good.  No lightheadedness.  No need for further testing.  We've held his anti-hypertensives.  He is ok for discharge home today and can resume his usual meds, including Entresto.    Hypomagnesemia  Being given supplement by mouth.      Acute renal insufficiency  Resolved.  His creatinine was slightly bumped yesterday, but this morning it's back to baseline.   It was due to hypovolemia.  He can go back on his Entresto.      Dehydration  Resolved.  He received plenty of IVF overnight.      Severe obesity (BMI 35.0-35.9 with comorbidity)  Body mass index is 35.55 kg/m². Morbid obesity complicates all aspects of disease management from diagnostic modalities to treatment.       VTE Risk Mitigation (From admission, onward)         Ordered     IP VTE LOW RISK PATIENT  Once         01/14/22 5439                   Aayush Valenzuela MD  Department of Hospital Medicine   [facility]

## 2022-01-16 NOTE — ASSESSMENT & PLAN NOTE
Body mass index is 35.55 kg/m². Morbid obesity complicates all aspects of disease management from diagnostic modalities to treatment.

## 2022-01-16 NOTE — HPI
Pt presented to ED with syncope.  Occurred hours before.  He was recently diagnosed with COVID-19, and yesterday, he received a monoclonal antibody infusion.  He has been drinking very little since he started getting sick.  The syncope occurred at home after he got up from the couch, and upon falling, he struck his head on a cabinet.  Fortunately, he doesn't have a laceration or bruise.  He was unconscious for about one minute.  Woke up on his own. EMS checked his BP.  It was low -- about 70 systolic.  His COVID symptoms have been typical, consisting of intermittent fevers, cough, nasal congestion, etc.  Has not had chest pain or SOB.  No nausea or vomiting.  No diarrhea.

## 2022-01-16 NOTE — ASSESSMENT & PLAN NOTE
Secondary to hypovolemia from drinking very little.  The hypovolemia caused the low blood pressure, which then led to syncope.  He received IVF overnight and today, he feels good.  No lightheadedness.  No need for further testing.  We've held his anti-hypertensives.  He is ok for discharge home today and can resume his usual meds, including Entresto.

## 2022-01-16 NOTE — ASSESSMENT & PLAN NOTE
Resolved.  His creatinine was slightly bumped yesterday, but this morning it's back to baseline.   It was due to hypovolemia.  He can go back on his Entresto.

## 2022-01-16 NOTE — SUBJECTIVE & OBJECTIVE
Past Medical History:   Diagnosis Date    ADHD (attention deficit hyperactivity disorder)     Arthritis     Asthma     as child only    Back pain     Coronary artery disease     Degeneration of lumbar intervertebral disc 05/2016    Depression     Elevated PSA     Headache     Hyperlipidemia     Hypertension     Kidney damage     stage 3 ; d/t aleve abuse    Kidney stone     Myocardial infarction     Neck pain     Numbness and tingling in hands     Numbness and tingling of both legs     Seizures     from inapsine 2002    Sleep apnea     no cpap    Wears glasses     CONTACS       Past Surgical History:   Procedure Laterality Date    BACK SURGERY  2016    back surgery    BONE GRAFT N/A 11/5/2020    Procedure: BONE GRAFT;  Surgeon: Mateusz Blanco MD;  Location: [facility] OR;  Service: Orthopedics;  Laterality: N/A;    CARDIAC SURGERY  01/06/2020    CABG X 7    CORONARY ARTERY BYPASS GRAFT      7 vessels    HERNIA REPAIR Bilateral 11/22/2017    LITHOTRIPSY      LUMBAR LAMINECTOMY WITH FUSION Bilateral 11/5/2020    Procedure: LAMINECTOMY, SPINE, LUMBAR, WITH FUSION;  Surgeon: Mateusz Blanco MD;  Location: [facility] OR;  Service: Orthopedics;  Laterality: Bilateral;  MEDTRONIC  NTI  L-3    PILONIDAL CYST DRAINAGE      removal of abcess      scrotal    REMOVAL OF HARDWARE FROM SPINE Bilateral 11/5/2020    Procedure: REMOVAL, HARDWARE, SPINE;  Surgeon: Mateusz Blanco MD;  Location: [facility] OR;  Service: Orthopedics;  Laterality: Bilateral;  L 4-5    TYMPANOSTOMY TUBE PLACEMENT         Review of patient's allergies indicates:   Allergen Reactions    Inapsine [droperidol] Anaphylaxis     seizures    Effexor [venlafaxine]      Increased anxiety    Pcn [penicillins]     Bactrim [sulfamethoxazole-trimethoprim] Rash     Dry red rash all over when in the sun       Current Facility-Administered Medications on File Prior to Encounter   Medication    acetaminophen tablet 650 mg    albuterol inhaler 2 puff     ondansetron disintegrating tablet 4 mg    sodium chloride 0.9% 500 mL flush bag    sodium chloride 0.9% flush 10 mL    [DISCONTINUED] diphenhydrAMINE injection 25 mg    [DISCONTINUED] EPINEPHrine (EPIPEN) 0.3 mg/0.3 mL pen injection 0.3 mg    [DISCONTINUED] methylPREDNISolone sodium succinate injection 40 mg     Current Outpatient Medications on File Prior to Encounter   Medication Sig    anastrozole (ARIMIDEX) 1 mg Tab Take 1 mg by mouth once a week.    aspirin 325 MG tablet Take 325 mg by mouth once daily.     DULoxetine (CYMBALTA) 60 MG capsule Take 1 capsule (60 mg total) by mouth once daily.    ENTRESTO 49-51 mg per tablet TAKE ONE TABLET BY MOUTH TWICE A DAY AS DIRECTED    ezetimibe (ZETIA) 10 mg tablet Take 10 mg by mouth once daily.    metoprolol tartrate (LOPRESSOR) 25 MG tablet Take 1 tablet (25 mg total) by mouth 2 (two) times a day.    rosuvastatin (CRESTOR) 40 MG Tab TAKE ONE TABLET BY MOUTH ONCE A DAY AS DIRECTED    tadalafiL (CIALIS) 20 MG Tab Take 1/2 to 1 tablet as needed prior to intercourse. No more than 1 in 36 hours    testosterone cypionate (DEPOTESTOTERONE CYPIONATE) 200 mg/mL injection Inject 200 mg into the muscle every 7 days.    azelastine (ASTELIN) 137 mcg (0.1 %) nasal spray 1 spray (137 mcg total) by Nasal route 2 (two) times daily.     Family History     Problem Relation (Age of Onset)    Diabetes Maternal Aunt, Maternal Uncle, Maternal Grandfather    Early death Father    Heart disease Mother, Father    Hypertension Mother    Migraines Mother        Tobacco Use    Smoking status: Former Smoker     Packs/day: 0.25     Types: Cigarettes     Quit date: 2016     Years since quittin.0    Smokeless tobacco: Former User     Quit date: 2016    Tobacco comment: occasional   Substance and Sexual Activity    Alcohol use: Yes     Alcohol/week: 1.0 standard drink     Types: 1 Glasses of wine per week     Comment: rare    Drug use: No    Sexual activity: Yes      Partners: Female     Review of Systems   Constitutional: Positive for fatigue and fever. Negative for chills.   HENT: Negative for congestion and sinus pressure.    Eyes: Negative for pain and visual disturbance.   Respiratory: Positive for cough. Negative for shortness of breath and wheezing.    Cardiovascular: Negative for chest pain, palpitations and leg swelling.   Gastrointestinal: Negative for abdominal pain, diarrhea, nausea and vomiting.   Genitourinary: Positive for decreased urine volume. Negative for difficulty urinating, dysuria and hematuria.   Musculoskeletal: Positive for myalgias. Negative for arthralgias.   Skin: Negative for rash and wound.   Neurological: Positive for syncope and light-headedness. Negative for dizziness, weakness and headaches.   Hematological: Negative for adenopathy. Does not bruise/bleed easily.   Psychiatric/Behavioral: Negative for confusion and dysphoric mood. The patient is not nervous/anxious.      Objective:     Vital Signs (Most Recent):  Temp: 97.3 °F (36.3 °C) (01/15/22 1150)  Pulse: 68 (01/15/22 1150)  Resp: 18 (01/15/22 1150)  BP: (!) 181/110 (01/15/22 1150)  SpO2: 96 % (01/15/22 1150) Vital Signs (24h Range):  Temp:  [96.5 °F (35.8 °C)-98.7 °F (37.1 °C)] 97.3 °F (36.3 °C)  Pulse:  [68-75] 68  Resp:  [18-19] 18  SpO2:  [94 %-96 %] 96 %  BP: (160-181)/() 181/110     Weight: 103 kg (226 lb 15.8 oz)  Body mass index is 35.55 kg/m².    Physical Exam  Constitutional:       General: He is not in acute distress.     Appearance: He is well-nourished. He is not ill-appearing.   HENT:      Head: Normocephalic and atraumatic.      Right Ear: External ear normal.      Left Ear: External ear normal.      Mouth/Throat:      Pharynx: No oropharyngeal exudate.   Eyes:      General: No scleral icterus.        Right eye: No discharge.         Left eye: No discharge.      Conjunctiva/sclera: Conjunctivae normal.   Neck:      Thyroid: No thyromegaly.      Vascular: No JVD.    Cardiovascular:      Rate and Rhythm: Normal rate and regular rhythm.      Heart sounds: No gallop.    Pulmonary:      Effort: Pulmonary effort is normal.      Breath sounds: Normal breath sounds. No wheezing.   Abdominal:      General: Bowel sounds are normal. There is no distension.      Palpations: Abdomen is soft. There is no mass.      Tenderness: There is no abdominal tenderness.   Musculoskeletal:         General: No deformity or edema.      Cervical back: Normal range of motion and neck supple.   Lymphadenopathy:      Cervical: No cervical adenopathy.   Skin:     General: Skin is warm and dry.      Capillary Refill: Capillary refill takes less than 2 seconds.      Findings: No rash.   Neurological:      Mental Status: He is alert and oriented to person, place, and time.   Psychiatric:         Behavior: Behavior normal.             Significant Labs:   BMP:   Recent Labs   Lab 01/15/22  0353   GLU 90      K 4.2      CO2 21*   BUN 12   CREATININE 1.3   CALCIUM 8.6*   MG 1.5*     CBC:   Recent Labs   Lab 01/14/22  1349 01/15/22  0951   WBC 2.91* 2.60*   HGB 13.8* 14.4   HCT 41.6 43.2   * 94*       Significant Imaging: I have reviewed all pertinent imaging results/findings within the past 24 hours.

## 2022-01-17 NOTE — DISCHARGE SUMMARY
[facility] Medicine  Discharge Summary      Patient Name: Lee Quintanilla  MRN: [id number]  Patient Class: OP- Observation  Admission Date: 1/14/2022  Hospital Length of Stay: 0 days  Discharge Date and Time: 1/15/2022  1:16 PM  Attending Physician: No att. providers found   Discharging Provider: Aayush Valenzuela MD  Primary Care Provider: Riya Vidales NP      HPI:   Pt presented to ED with syncope.  Occurred hours before.  He was recently diagnosed with COVID-19, and yesterday, he received a monoclonal antibody infusion.  He has been drinking very little since he started getting sick.  The syncope occurred at home after he got up from the couch, and upon falling, he struck his head on a cabinet.  Fortunately, he doesn't have a laceration or bruise.  He was unconscious for about one minute.  Woke up on his own. EMS checked his BP.  It was low -- about 70 systolic.  His COVID symptoms have been typical, consisting of intermittent fevers, cough, nasal congestion, etc.  Has not had chest pain or SOB.  No nausea or vomiting.  No diarrhea.      * No surgery found *      Hospital Course:   Patient was diagnosed with syncope secondary to hypotension.  The hypotension was due to hypovolemia from poor fluid intake.  He was kept on cardiac monitor.  No arrhythmias seen.  Troponin only mildly elevated due to the hypotension.  It didn't trend upward.  He was given volume expansion of at least a liter.  Next day, he felt well and was ambulating with no dizziness.  Labwork showed improvement as well (see below).  He was discharged home in good condition.       Goals of Care Treatment Preferences:  Code Status: Full Code      Consults:     No new Assessment & Plan notes have been filed under this hospital service since the last note was generated.  Service: Hospital Medicine    Final Active Diagnoses:    Diagnosis Date Noted POA    PRINCIPAL PROBLEM:  Syncope [R55] 01/15/2022 Yes    Dehydration  [E86.0] 01/15/2022 Yes    Acute renal insufficiency [N28.9] 01/15/2022 Yes    Hypomagnesemia [E83.42] 01/15/2022 Yes    Severe obesity (BMI 35.0-35.9 with comorbidity) [E66.01, Z68.35] 01/22/2016 Not Applicable      Problems Resolved During this Admission:       Discharged Condition: good    Disposition: Home or Self Care    Follow Up:   Follow-up Information     Riya Vidaels NP.    Specialty: Family Medicine  Why: paulie sent email to Sri hatfield appt  Contact information:  [address]  [address]  [place] [address]  [phone]                       Patient Instructions:      Diet Adult Regular     Activity as tolerated       Significant Diagnostic Studies:   BMP  Lab Results   Component Value Date     01/15/2022    K 4.2 01/15/2022     01/15/2022    CO2 21 (L) 01/15/2022    BUN 12 01/15/2022    CREATININE 1.3 01/15/2022    CALCIUM 8.6 (L) 01/15/2022    ANIONGAP 12 01/15/2022    ESTGFRAFRICA >60 01/15/2022    EGFRNONAA >60 01/15/2022     Creatinine   Date Value Ref Range Status   01/15/2022 1.3 0.5 - 1.4 mg/dL Final   01/14/2022 1.5 (H) 0.5 - 1.4 mg/dL Final   ]  Lab Results   Component Value Date    WBC 2.60 (L) 01/15/2022    HGB 14.4 01/15/2022    HCT 43.2 01/15/2022    MCV 93 01/15/2022    PLT 94 (L) 01/15/2022           Pending Diagnostic Studies:     None         Medications:  Reconciled Home Medications:      Medication List      CONTINUE taking these medications    anastrozole 1 mg Tab  Commonly known as: ARIMIDEX  Take 1 mg by mouth once a week.     aspirin 325 MG tablet  Take 325 mg by mouth once daily.     azelastine 137 mcg (0.1 %) nasal spray  Commonly known as: ASTELIN  1 spray (137 mcg total) by Nasal route 2 (two) times daily.     DULoxetine 60 MG capsule  Commonly known as: CYMBALTA  Take 1 capsule (60 mg total) by mouth once daily.     ENTRESTO 49-51 mg per tablet  Generic drug: sacubitriL-valsartan  TAKE ONE TABLET BY MOUTH TWICE A DAY AS DIRECTED     ezetimibe 10 mg tablet  Commonly  known as: ZETIA  Take 10 mg by mouth once daily.     metoprolol tartrate 25 MG tablet  Commonly known as: LOPRESSOR  Take 1 tablet (25 mg total) by mouth 2 (two) times a day.     rosuvastatin 40 MG Tab  Commonly known as: CRESTOR  TAKE ONE TABLET BY MOUTH ONCE A DAY AS DIRECTED     tadalafiL 20 MG Tab  Commonly known as: CIALIS  Take 1/2 to 1 tablet as needed prior to intercourse. No more than 1 in 36 hours     testosterone cypionate 200 mg/mL injection  Commonly known as: DEPOTESTOTERONE CYPIONATE  Inject 200 mg into the muscle every 7 days.            Indwelling Lines/Drains at time of discharge:   Lines/Drains/Airways     None                 Time spent on the discharge of patient: 19 minutes         Aayush Valenzuela MD  Department of Hospital Medicine  [facility]

## 2022-01-17 NOTE — HOSPITAL COURSE
Patient was diagnosed with syncope secondary to hypotension.  The hypotension was due to hypovolemia from poor fluid intake.  He was kept on cardiac monitor.  No arrhythmias seen.  Troponin only mildly elevated due to the hypotension.  It didn't trend upward.  He was given volume expansion of at least a liter.  Next day, he felt well and was ambulating with no dizziness.  Labwork showed improvement as well (see below).  He was discharged home in good condition.

## 2022-01-18 ENCOUNTER — TELEPHONE (OUTPATIENT)
Dept: MEDSURG UNIT | Facility: HOSPITAL | Age: 47
End: 2022-01-18
Payer: MEDICAID

## 2022-03-03 ENCOUNTER — PATIENT MESSAGE (OUTPATIENT)
Dept: FAMILY MEDICINE | Facility: CLINIC | Age: 47
End: 2022-03-03
Payer: COMMERCIAL

## 2022-03-04 ENCOUNTER — PATIENT MESSAGE (OUTPATIENT)
Dept: FAMILY MEDICINE | Facility: CLINIC | Age: 47
End: 2022-03-04
Payer: COMMERCIAL

## 2022-03-04 DIAGNOSIS — Z12.11 SPECIAL SCREENING FOR MALIGNANT NEOPLASMS, COLON: Primary | ICD-10-CM

## 2022-04-25 ENCOUNTER — OFFICE VISIT (OUTPATIENT)
Dept: FAMILY MEDICINE | Facility: CLINIC | Age: 47
End: 2022-04-25
Payer: COMMERCIAL

## 2022-04-25 VITALS
DIASTOLIC BLOOD PRESSURE: 76 MMHG | HEIGHT: 67 IN | HEART RATE: 72 BPM | SYSTOLIC BLOOD PRESSURE: 128 MMHG | BODY MASS INDEX: 37.51 KG/M2 | WEIGHT: 239 LBS

## 2022-04-25 DIAGNOSIS — F32.1 CURRENT MODERATE EPISODE OF MAJOR DEPRESSIVE DISORDER WITHOUT PRIOR EPISODE: ICD-10-CM

## 2022-04-25 DIAGNOSIS — G62.9 NEUROPATHY: ICD-10-CM

## 2022-04-25 DIAGNOSIS — F98.8 ATTENTION DEFICIT DISORDER, UNSPECIFIED HYPERACTIVITY PRESENCE: ICD-10-CM

## 2022-04-25 DIAGNOSIS — I25.810 CORONARY ARTERY DISEASE INVOLVING CORONARY BYPASS GRAFT OF NATIVE HEART WITHOUT ANGINA PECTORIS: ICD-10-CM

## 2022-04-25 DIAGNOSIS — Z98.890 HISTORY OF LUMBAR SURGERY: ICD-10-CM

## 2022-04-25 DIAGNOSIS — I10 HYPERTENSION, UNSPECIFIED TYPE: ICD-10-CM

## 2022-04-25 DIAGNOSIS — F41.1 GAD (GENERALIZED ANXIETY DISORDER): Primary | ICD-10-CM

## 2022-04-25 DIAGNOSIS — N18.30 STAGE 3 CHRONIC KIDNEY DISEASE, UNSPECIFIED WHETHER STAGE 3A OR 3B CKD: ICD-10-CM

## 2022-04-25 DIAGNOSIS — M51.36 DDD (DEGENERATIVE DISC DISEASE), LUMBAR: ICD-10-CM

## 2022-04-25 DIAGNOSIS — M54.16 LUMBAR RADICULOPATHY: ICD-10-CM

## 2022-04-25 PROCEDURE — 4010F ACE/ARB THERAPY RXD/TAKEN: CPT | Mod: S$GLB,,, | Performed by: NURSE PRACTITIONER

## 2022-04-25 PROCEDURE — 3078F PR MOST RECENT DIASTOLIC BLOOD PRESSURE < 80 MM HG: ICD-10-PCS | Mod: S$GLB,,, | Performed by: NURSE PRACTITIONER

## 2022-04-25 PROCEDURE — 1160F PR REVIEW ALL MEDS BY PRESCRIBER/CLIN PHARMACIST DOCUMENTED: ICD-10-PCS | Mod: S$GLB,,, | Performed by: NURSE PRACTITIONER

## 2022-04-25 PROCEDURE — 3074F SYST BP LT 130 MM HG: CPT | Mod: S$GLB,,, | Performed by: NURSE PRACTITIONER

## 2022-04-25 PROCEDURE — 3078F DIAST BP <80 MM HG: CPT | Mod: S$GLB,,, | Performed by: NURSE PRACTITIONER

## 2022-04-25 PROCEDURE — 4010F PR ACE/ARB THEARPY RXD/TAKEN: ICD-10-PCS | Mod: S$GLB,,, | Performed by: NURSE PRACTITIONER

## 2022-04-25 PROCEDURE — 99214 PR OFFICE/OUTPT VISIT, EST, LEVL IV, 30-39 MIN: ICD-10-PCS | Mod: S$GLB,,, | Performed by: NURSE PRACTITIONER

## 2022-04-25 PROCEDURE — 3008F BODY MASS INDEX DOCD: CPT | Mod: S$GLB,,, | Performed by: NURSE PRACTITIONER

## 2022-04-25 PROCEDURE — 1160F RVW MEDS BY RX/DR IN RCRD: CPT | Mod: S$GLB,,, | Performed by: NURSE PRACTITIONER

## 2022-04-25 PROCEDURE — 99214 OFFICE O/P EST MOD 30 MIN: CPT | Mod: S$GLB,,, | Performed by: NURSE PRACTITIONER

## 2022-04-25 PROCEDURE — 3008F PR BODY MASS INDEX (BMI) DOCUMENTED: ICD-10-PCS | Mod: S$GLB,,, | Performed by: NURSE PRACTITIONER

## 2022-04-25 PROCEDURE — 3074F PR MOST RECENT SYSTOLIC BLOOD PRESSURE < 130 MM HG: ICD-10-PCS | Mod: S$GLB,,, | Performed by: NURSE PRACTITIONER

## 2022-04-25 RX ORDER — NAPROXEN SODIUM 220 MG/1
81 TABLET, FILM COATED ORAL DAILY
Status: ON HOLD | COMMUNITY
End: 2022-12-11 | Stop reason: HOSPADM

## 2022-04-25 RX ORDER — BUPROPION HYDROCHLORIDE 150 MG/1
150 TABLET ORAL DAILY
COMMUNITY
Start: 2022-01-28 | End: 2022-04-25

## 2022-04-25 RX ORDER — AMLODIPINE BESYLATE 5 MG/1
TABLET ORAL
COMMUNITY
Start: 2022-03-22 | End: 2022-04-25

## 2022-04-25 RX ORDER — BUSPIRONE HYDROCHLORIDE 10 MG/1
10 TABLET ORAL 2 TIMES DAILY
Qty: 60 TABLET | Refills: 1 | Status: SHIPPED | OUTPATIENT
Start: 2022-04-25 | End: 2022-07-07 | Stop reason: SDUPTHER

## 2022-04-25 RX ORDER — DULOXETIN HYDROCHLORIDE 60 MG/1
60 CAPSULE, DELAYED RELEASE ORAL DAILY
Qty: 90 CAPSULE | Refills: 1 | Status: ON HOLD | OUTPATIENT
Start: 2022-04-25 | End: 2022-12-11 | Stop reason: HOSPADM

## 2022-04-25 RX ORDER — HYDRALAZINE HYDROCHLORIDE 50 MG/1
TABLET, FILM COATED ORAL
COMMUNITY
Start: 2022-01-21 | End: 2022-04-25

## 2022-04-27 NOTE — PROGRESS NOTES
SUBJECTIVE:    Patient ID: Lee Quintanilla is a 46 y.o. male.    Chief Complaint: Anxiety (F/u for anxiety // did not bring bottles. ac)    46 year old male presents for check up. he  Is currently being followed for CAD, hyperlipidemia DDD, ADD,.  Stress test last year was negative. Follow regularly by cardio. Hospitalized in January for syncope. No changes to medication. Suspected related to covid/dehydration. Feels stressed at night and first thing in am. Would like to discuss options. Taking cymbalta as prescribed.       Admission on 01/14/2022, Discharged on 01/15/2022   Component Date Value Ref Range Status    WBC 01/14/2022 2.91 (A) 3.90 - 12.70 K/uL Final    RBC 01/14/2022 4.47 (A) 4.60 - 6.20 M/uL Final    Hemoglobin 01/14/2022 13.8 (A) 14.0 - 18.0 g/dL Final    Hematocrit 01/14/2022 41.6  40.0 - 54.0 % Final    MCV 01/14/2022 93  82 - 98 fL Final    MCH 01/14/2022 30.9  27.0 - 31.0 pg Final    MCHC 01/14/2022 33.2  32.0 - 36.0 g/dL Final    RDW 01/14/2022 13.3  11.5 - 14.5 % Final    Platelets 01/14/2022 116 (A) 150 - 450 K/uL Final    MPV 01/14/2022 11.4  9.2 - 12.9 fL Final    Immature Granulocytes 01/14/2022 0.7 (A) 0.0 - 0.5 % Final    Gran # (ANC) 01/14/2022 1.8  1.8 - 7.7 K/uL Final    Immature Grans (Abs) 01/14/2022 0.02  0.00 - 0.04 K/uL Final    Lymph # 01/14/2022 0.6 (A) 1.0 - 4.8 K/uL Final    Mono # 01/14/2022 0.4  0.3 - 1.0 K/uL Final    Eos # 01/14/2022 0.1  0.0 - 0.5 K/uL Final    Baso # 01/14/2022 0.01  0.00 - 0.20 K/uL Final    nRBC 01/14/2022 0  0 /100 WBC Final    Gran % 01/14/2022 61.6  38.0 - 73.0 % Final    Lymph % 01/14/2022 19.2  18.0 - 48.0 % Final    Mono % 01/14/2022 14.1  4.0 - 15.0 % Final    Eosinophil % 01/14/2022 4.1  0.0 - 8.0 % Final    Basophil % 01/14/2022 0.3  0.0 - 1.9 % Final    Differential Method 01/14/2022 Automated   Final    Sodium 01/14/2022 139  136 - 145 mmol/L Final    Potassium 01/14/2022 3.7  3.5 - 5.1 mmol/L Final     Chloride 01/14/2022 110  95 - 110 mmol/L Final    CO2 01/14/2022 19 (A) 23 - 29 mmol/L Final    Glucose 01/14/2022 103  70 - 110 mg/dL Final    BUN 01/14/2022 12  6 - 20 mg/dL Final    Creatinine 01/14/2022 1.5 (A) 0.5 - 1.4 mg/dL Final    Calcium 01/14/2022 8.1 (A) 8.7 - 10.5 mg/dL Final    Total Protein 01/14/2022 6.0  6.0 - 8.4 g/dL Final    Albumin 01/14/2022 2.8 (A) 3.5 - 5.2 g/dL Final    Total Bilirubin 01/14/2022 0.3  0.1 - 1.0 mg/dL Final    Alkaline Phosphatase 01/14/2022 68  55 - 135 U/L Final    AST 01/14/2022 49 (A) 10 - 40 U/L Final    ALT 01/14/2022 59 (A) 10 - 44 U/L Final    Anion Gap 01/14/2022 10  8 - 16 mmol/L Final    eGFR if African American 01/14/2022 >60  >60 mL/min/1.73 m^2 Final    eGFR if non  01/14/2022 55 (A) >60 mL/min/1.73 m^2 Final    Lactate (Lactic Acid) 01/14/2022 2.3 (A) 0.5 - 2.2 mmol/L Final    Troponin I 01/14/2022 0.042 (A) 0.000 - 0.026 ng/mL Final    BNP 01/14/2022 40  0 - 99 pg/mL Final    Troponin I 01/14/2022 0.035 (A) 0.000 - 0.026 ng/mL Final    Troponin I 01/14/2022 0.049 (A) 0.000 - 0.026 ng/mL Final    Sodium 01/15/2022 141  136 - 145 mmol/L Final    Potassium 01/15/2022 4.2  3.5 - 5.1 mmol/L Final    Chloride 01/15/2022 108  95 - 110 mmol/L Final    CO2 01/15/2022 21 (A) 23 - 29 mmol/L Final    Glucose 01/15/2022 90  70 - 110 mg/dL Final    BUN 01/15/2022 12  6 - 20 mg/dL Final    Creatinine 01/15/2022 1.3  0.5 - 1.4 mg/dL Final    Calcium 01/15/2022 8.6 (A) 8.7 - 10.5 mg/dL Final    Anion Gap 01/15/2022 12  8 - 16 mmol/L Final    eGFR if African American 01/15/2022 >60  >60 mL/min/1.73 m^2 Final    eGFR if non African American 01/15/2022 >60  >60 mL/min/1.73 m^2 Final    Magnesium 01/15/2022 1.5 (A) 1.6 - 2.6 mg/dL Final    WBC 01/15/2022 2.60 (A) 3.90 - 12.70 K/uL Final    RBC 01/15/2022 4.63  4.60 - 6.20 M/uL Final    Hemoglobin 01/15/2022 14.4  14.0 - 18.0 g/dL Final    Hematocrit 01/15/2022 43.2  40.0 - 54.0 %  Final    MCV 01/15/2022 93  82 - 98 fL Final    MCH 01/15/2022 31.1 (A) 27.0 - 31.0 pg Final    MCHC 01/15/2022 33.3  32.0 - 36.0 g/dL Final    RDW 01/15/2022 13.2  11.5 - 14.5 % Final    Platelets 01/15/2022 94 (A) 150 - 450 K/uL Final    MPV 01/15/2022 10.8  9.2 - 12.9 fL Final    Immature Granulocytes 01/15/2022 1.2 (A) 0.0 - 0.5 % Final    Gran # (ANC) 01/15/2022 1.4 (A) 1.8 - 7.7 K/uL Final    Immature Grans (Abs) 01/15/2022 0.03  0.00 - 0.04 K/uL Final    Lymph # 01/15/2022 0.7 (A) 1.0 - 4.8 K/uL Final    Mono # 01/15/2022 0.3  0.3 - 1.0 K/uL Final    Eos # 01/15/2022 0.2  0.0 - 0.5 K/uL Final    Baso # 01/15/2022 0.01  0.00 - 0.20 K/uL Final    nRBC 01/15/2022 0  0 /100 WBC Final    Gran % 01/15/2022 53.0  38.0 - 73.0 % Final    Lymph % 01/15/2022 25.0  18.0 - 48.0 % Final    Mono % 01/15/2022 12.7  4.0 - 15.0 % Final    Eosinophil % 01/15/2022 7.7  0.0 - 8.0 % Final    Basophil % 01/15/2022 0.4  0.0 - 1.9 % Final    Differential Method 01/15/2022 Automated   Final       Past Medical History:   Diagnosis Date    ADHD (attention deficit hyperactivity disorder)     Arthritis     Asthma     as child only    Back pain     Coronary artery disease     Degeneration of lumbar intervertebral disc 2016    Depression     Elevated PSA     Headache     Hyperlipidemia     Hypertension     Kidney damage     stage 3 ; d/t aleve abuse    Kidney stone     Myocardial infarction     Neck pain     Numbness and tingling in hands     Numbness and tingling of both legs     Seizures     from inapsine     Sleep apnea     no cpap    Wears glasses     CONTACS     Social History     Socioeconomic History    Marital status:    Occupational History    Occupation: disability   Tobacco Use    Smoking status: Former Smoker     Packs/day: 0.25     Types: Cigarettes     Quit date: 2016     Years since quittin.3    Smokeless tobacco: Former User     Quit date: 2016    Tobacco  comment: occasional   Substance and Sexual Activity    Alcohol use: Yes     Alcohol/week: 1.0 standard drink     Types: 1 Glasses of wine per week     Comment: rare    Drug use: No    Sexual activity: Yes     Partners: Female     Social Determinants of Health     Financial Resource Strain: Unknown    Difficulty of Paying Living Expenses: Patient refused   Food Insecurity: Unknown    Worried About Running Out of Food in the Last Year: Patient refused    Ran Out of Food in the Last Year: Patient refused   Transportation Needs: Unknown    Lack of Transportation (Medical): Patient refused    Lack of Transportation (Non-Medical): Patient refused   Physical Activity: Unknown    Days of Exercise per Week: Patient refused   Stress: Stress Concern Present    Feeling of Stress : Rather much   Social Connections: Unknown    Frequency of Communication with Friends and Family: Patient refused    Frequency of Social Gatherings with Friends and Family: Patient refused    Active Member of Clubs or Organizations: No    Attends Club or Organization Meetings: Never    Marital Status:    Housing Stability: Unknown    Unable to Pay for Housing in the Last Year: Patient refused    Unstable Housing in the Last Year: Patient refused     Past Surgical History:   Procedure Laterality Date    BACK SURGERY  2016    back surgery    BONE GRAFT N/A 11/5/2020    Procedure: BONE GRAFT;  Surgeon: Mateusz Blanco MD;  Location: [facility];  Service: Orthopedics;  Laterality: N/A;    CARDIAC SURGERY  01/06/2020    CABG X 7    CORONARY ARTERY BYPASS GRAFT      7 vessels    HERNIA REPAIR Bilateral 11/22/2017    LITHOTRIPSY      LUMBAR LAMINECTOMY WITH FUSION Bilateral 11/5/2020    Procedure: LAMINECTOMY, SPINE, LUMBAR, WITH FUSION;  Surgeon: Mateusz Blanco MD;  Location: [facility] OR;  Service: Orthopedics;  Laterality: Bilateral;  [employer]  NTI  L-3    PILONIDAL CYST DRAINAGE      removal of abcess      scrotal    REMOVAL  OF HARDWARE FROM SPINE Bilateral 11/5/2020    Procedure: REMOVAL, HARDWARE, SPINE;  Surgeon: Mateusz Blanco MD;  Location: [facility] OR;  Service: Orthopedics;  Laterality: Bilateral;  L 4-5    TYMPANOSTOMY TUBE PLACEMENT       Family History   Problem Relation Age of Onset    Heart disease Mother     Migraines Mother     Hypertension Mother     Early death Father     Heart disease Father     Diabetes Maternal Aunt     Diabetes Maternal Uncle     Diabetes Maternal Grandfather        Review of patient's allergies indicates:   Allergen Reactions    Inapsine [droperidol] Anaphylaxis     seizures    Effexor [venlafaxine]      Increased anxiety    Pcn [penicillins]     Bactrim [sulfamethoxazole-trimethoprim] Rash     Dry red rash all over when in the sun       Current Outpatient Medications:     anastrozole (ARIMIDEX) 1 mg Tab, Take 1 mg by mouth once a week., Disp: , Rfl:     aspirin 81 MG Chew, Take 81 mg by mouth once daily., Disp: , Rfl:     azelastine (ASTELIN) 137 mcg (0.1 %) nasal spray, 1 spray (137 mcg total) by Nasal route 2 (two) times daily., Disp: 30 mL, Rfl: 12    busPIRone (BUSPAR) 10 MG tablet, Take 1 tablet (10 mg total) by mouth 2 (two) times daily., Disp: 60 tablet, Rfl: 1    DULoxetine (CYMBALTA) 60 MG capsule, Take 1 capsule (60 mg total) by mouth once daily., Disp: 90 capsule, Rfl: 1    ENTRESTO 49-51 mg per tablet, TAKE ONE TABLET BY MOUTH TWICE A DAY AS DIRECTED, Disp: , Rfl:     ezetimibe (ZETIA) 10 mg tablet, Take 10 mg by mouth once daily., Disp: , Rfl:     metoprolol tartrate (LOPRESSOR) 25 MG tablet, Take 1 tablet (25 mg total) by mouth 2 (two) times a day., Disp: 60 tablet, Rfl: 0    rosuvastatin (CRESTOR) 40 MG Tab, TAKE ONE TABLET BY MOUTH ONCE A DAY AS DIRECTED, Disp: , Rfl:     tadalafiL (CIALIS) 20 MG Tab, Take 1/2 to 1 tablet as needed prior to intercourse. No more than 1 in 36 hours, Disp: 30 tablet, Rfl: 3    testosterone cypionate (DEPOTESTOTERONE CYPIONATE) 200  "mg/mL injection, Inject 200 mg into the muscle every 7 days., Disp: , Rfl:     Review of Systems   Constitutional: Negative for fatigue, fever and unexpected weight change.   HENT: Negative for ear pain, sinus pressure and sore throat.    Eyes: Negative for pain.   Respiratory: Negative for cough and shortness of breath.    Cardiovascular: Negative for chest pain and leg swelling.   Gastrointestinal: Negative for abdominal pain, constipation, nausea and vomiting.   Genitourinary: Negative for dysuria, frequency and urgency.   Musculoskeletal: Negative for arthralgias.   Skin: Negative for rash.   Neurological: Negative for dizziness, weakness and headaches.   Psychiatric/Behavioral: Negative for sleep disturbance.          Objective:      Vitals:    04/25/22 0811   BP: 128/76   Pulse: 72   Weight: 108.4 kg (239 lb)   Height: 5' 7" (1.702 m)     Physical Exam  Constitutional:       Appearance: He is well-developed.   HENT:      Right Ear: External ear normal.      Left Ear: External ear normal.   Eyes:      Pupils: Pupils are equal, round, and reactive to light.   Neck:      Trachea: No tracheal deviation.   Cardiovascular:      Rate and Rhythm: Normal rate and regular rhythm.      Heart sounds: No murmur heard.    No friction rub. No gallop.   Pulmonary:      Breath sounds: Normal breath sounds. No stridor. No wheezing or rales.   Abdominal:      Palpations: Abdomen is soft. There is no mass.      Tenderness: There is no abdominal tenderness.   Musculoskeletal:         General: No tenderness or deformity.      Cervical back: Neck supple.   Lymphadenopathy:      Cervical: No cervical adenopathy.   Skin:     General: Skin is warm and dry.   Neurological:      Mental Status: He is alert and oriented to person, place, and time.      Coordination: Coordination normal.   Psychiatric:         Thought Content: Thought content normal.           Assessment:       1. MYNOR (generalized anxiety disorder)    2. DDD (degenerative " disc disease), lumbar    3. Current moderate episode of major depressive disorder without prior episode    4. Lumbar radiculopathy    5. Neuropathy    6. Attention deficit disorder, unspecified hyperactivity presence    7. Coronary artery disease involving coronary bypass graft of native heart without angina pectoris    8. Hypertension, unspecified type    9. Stage 3 chronic kidney disease, unspecified whether stage 3a or 3b CKD    10. History of lumbar surgery         Plan:       MYNOR (generalized anxiety disorder)  Comments:  start buspar  Orders:  -     DULoxetine (CYMBALTA) 60 MG capsule; Take 1 capsule (60 mg total) by mouth once daily.  Dispense: 90 capsule; Refill: 1  -     busPIRone (BUSPAR) 10 MG tablet; Take 1 tablet (10 mg total) by mouth 2 (two) times daily.  Dispense: 60 tablet; Refill: 1    DDD (degenerative disc disease), lumbar    Current moderate episode of major depressive disorder without prior episode    Lumbar radiculopathy    Neuropathy    Attention deficit disorder, unspecified hyperactivity presence    Coronary artery disease involving coronary bypass graft of native heart without angina pectoris    Hypertension, unspecified type    Stage 3 chronic kidney disease, unspecified whether stage 3a or 3b CKD    History of lumbar surgery      Follow up in about 6 months (around 10/25/2022), or if symptoms worsen or fail to improve, for medication management.        5/2/2022 Riya Vidales

## 2022-09-06 NOTE — TELEPHONE ENCOUNTER
Info sent to Dr. Montano/Dr. Moya's office. They will contact pt for appt. Sent info to pt. Thanks, Ellyn  
Please put in external orders for ID consult. Thanks, Ellyn  
Referral placed  
5

## 2022-10-09 ENCOUNTER — PATIENT MESSAGE (OUTPATIENT)
Dept: FAMILY MEDICINE | Facility: CLINIC | Age: 47
End: 2022-10-09

## 2022-10-10 ENCOUNTER — PATIENT MESSAGE (OUTPATIENT)
Dept: FAMILY MEDICINE | Facility: CLINIC | Age: 47
End: 2022-10-10

## 2022-10-10 RX ORDER — HYDROXYZINE HYDROCHLORIDE 25 MG/1
25 TABLET, FILM COATED ORAL 3 TIMES DAILY PRN
Qty: 60 TABLET | Refills: 0 | Status: SHIPPED | OUTPATIENT
Start: 2022-10-10 | End: 2022-11-22 | Stop reason: SDUPTHER

## 2022-12-01 ENCOUNTER — OFFICE VISIT (OUTPATIENT)
Dept: FAMILY MEDICINE | Facility: CLINIC | Age: 47
End: 2022-12-01
Payer: MEDICAID

## 2022-12-01 VITALS
HEIGHT: 67 IN | BODY MASS INDEX: 37.51 KG/M2 | HEART RATE: 72 BPM | SYSTOLIC BLOOD PRESSURE: 90 MMHG | DIASTOLIC BLOOD PRESSURE: 60 MMHG | WEIGHT: 239 LBS

## 2022-12-01 DIAGNOSIS — N40.1 BENIGN PROSTATIC HYPERPLASIA WITH WEAK URINARY STREAM: ICD-10-CM

## 2022-12-01 DIAGNOSIS — E78.5 HYPERLIPIDEMIA, UNSPECIFIED HYPERLIPIDEMIA TYPE: ICD-10-CM

## 2022-12-01 DIAGNOSIS — N18.30 STAGE 3 CHRONIC KIDNEY DISEASE, UNSPECIFIED WHETHER STAGE 3A OR 3B CKD: ICD-10-CM

## 2022-12-01 DIAGNOSIS — I10 HYPERTENSION, UNSPECIFIED TYPE: ICD-10-CM

## 2022-12-01 DIAGNOSIS — F32.1 CURRENT MODERATE EPISODE OF MAJOR DEPRESSIVE DISORDER WITHOUT PRIOR EPISODE: ICD-10-CM

## 2022-12-01 DIAGNOSIS — Z79.899 HIGH RISK MEDICATION USE: ICD-10-CM

## 2022-12-01 DIAGNOSIS — I25.810 CORONARY ARTERY DISEASE INVOLVING CORONARY BYPASS GRAFT OF NATIVE HEART WITHOUT ANGINA PECTORIS: ICD-10-CM

## 2022-12-01 DIAGNOSIS — Z23 NEED FOR INFLUENZA VACCINATION: Primary | ICD-10-CM

## 2022-12-01 DIAGNOSIS — R39.12 BENIGN PROSTATIC HYPERPLASIA WITH WEAK URINARY STREAM: ICD-10-CM

## 2022-12-01 DIAGNOSIS — F41.1 GAD (GENERALIZED ANXIETY DISORDER): ICD-10-CM

## 2022-12-01 PROCEDURE — 90682 RIV4 VACC RECOMBINANT DNA IM: CPT | Mod: S$GLB,,, | Performed by: NURSE PRACTITIONER

## 2022-12-01 PROCEDURE — 3074F PR MOST RECENT SYSTOLIC BLOOD PRESSURE < 130 MM HG: ICD-10-PCS | Mod: CPTII,S$GLB,, | Performed by: NURSE PRACTITIONER

## 2022-12-01 PROCEDURE — 3008F BODY MASS INDEX DOCD: CPT | Mod: CPTII,S$GLB,, | Performed by: NURSE PRACTITIONER

## 2022-12-01 PROCEDURE — 90682 FLU VACCINE - QUADRIVALENT (RECOMBINANT) PRESERVATIVE FREE: ICD-10-PCS | Mod: S$GLB,,, | Performed by: NURSE PRACTITIONER

## 2022-12-01 PROCEDURE — 3078F DIAST BP <80 MM HG: CPT | Mod: CPTII,S$GLB,, | Performed by: NURSE PRACTITIONER

## 2022-12-01 PROCEDURE — 90471 FLU VACCINE - QUADRIVALENT (RECOMBINANT) PRESERVATIVE FREE: ICD-10-PCS | Mod: S$GLB,,, | Performed by: NURSE PRACTITIONER

## 2022-12-01 PROCEDURE — 3008F PR BODY MASS INDEX (BMI) DOCUMENTED: ICD-10-PCS | Mod: CPTII,S$GLB,, | Performed by: NURSE PRACTITIONER

## 2022-12-01 PROCEDURE — 99214 OFFICE O/P EST MOD 30 MIN: CPT | Mod: 25,S$GLB,, | Performed by: NURSE PRACTITIONER

## 2022-12-01 PROCEDURE — 3078F PR MOST RECENT DIASTOLIC BLOOD PRESSURE < 80 MM HG: ICD-10-PCS | Mod: CPTII,S$GLB,, | Performed by: NURSE PRACTITIONER

## 2022-12-01 PROCEDURE — 99214 PR OFFICE/OUTPT VISIT, EST, LEVL IV, 30-39 MIN: ICD-10-PCS | Mod: 25,S$GLB,, | Performed by: NURSE PRACTITIONER

## 2022-12-01 PROCEDURE — 4010F ACE/ARB THERAPY RXD/TAKEN: CPT | Mod: CPTII,S$GLB,, | Performed by: NURSE PRACTITIONER

## 2022-12-01 PROCEDURE — 90471 IMMUNIZATION ADMIN: CPT | Mod: S$GLB,,, | Performed by: NURSE PRACTITIONER

## 2022-12-01 PROCEDURE — 3074F SYST BP LT 130 MM HG: CPT | Mod: CPTII,S$GLB,, | Performed by: NURSE PRACTITIONER

## 2022-12-01 PROCEDURE — 4010F PR ACE/ARB THEARPY RXD/TAKEN: ICD-10-PCS | Mod: CPTII,S$GLB,, | Performed by: NURSE PRACTITIONER

## 2022-12-01 NOTE — PROGRESS NOTES
SUBJECTIVE:    Patient ID: Lee Quintanilla is a 47 y.o. male.    Chief Complaint: Medication Management (Went over meds verbally, Flu wants// SW)    47year old male presents for check up. he  Is currently being followed for CAD, hyperlipidemia DDD, ADD,.  Stress test last year was negative. Follow regularly by cardio. Reports that he has upcoming appointment in December. Feels tired most days. No currently working. No recent labs. Partner is irritated that he is not nworking. Does cause some stress. Has not had labs recently.       Office Visit on 12/01/2022   Component Date Value Ref Range Status    WBC 12/02/2022 8.4  3.8 - 10.8 Thousand/uL Final    RBC 12/02/2022 3.84 (L)  4.20 - 5.80 Million/uL Final    Hemoglobin 12/02/2022 13.0 (L)  13.2 - 17.1 g/dL Final    Hematocrit 12/02/2022 38.3 (L)  38.5 - 50.0 % Final    MCV 12/02/2022 99.7  80.0 - 100.0 fL Final    MCH 12/02/2022 33.9 (H)  27.0 - 33.0 pg Final    MCHC 12/02/2022 33.9  32.0 - 36.0 g/dL Final    RDW 12/02/2022 14.5  11.0 - 15.0 % Final    Platelets 12/02/2022 218  140 - 400 Thousand/uL Final    MPV 12/02/2022 10.9  7.5 - 12.5 fL Final    Neutrophils, Abs 12/02/2022 5,863  1,500 - 7,800 cells/uL Final    Lymph # 12/02/2022 1,394  850 - 3,900 cells/uL Final    Mono # 12/02/2022 697  200 - 950 cells/uL Final    Eos # 12/02/2022 361  15 - 500 cells/uL Final    Baso # 12/02/2022 84  0 - 200 cells/uL Final    Neutrophils Relative 12/02/2022 69.8  % Final    Lymph % 12/02/2022 16.6  % Final    Mono % 12/02/2022 8.3  % Final    Eosinophil % 12/02/2022 4.3  % Final    Basophil % 12/02/2022 1.0  % Final    Glucose 12/02/2022 113 (H)  65 - 99 mg/dL Final    BUN 12/02/2022 117 (H)  7 - 25 mg/dL Final    Creatinine 12/02/2022 >20.0 (H)  0.60 - 1.29 mg/dL Final    eGFR 12/02/2022 <4 (L)  > OR = 60 mL/min/1.73m2 Final    BUN/Creatinine Ratio 12/02/2022   6 - 22 (calc) Final    Sodium 12/02/2022 138  135 - 146 mmol/L Final    Potassium 12/02/2022 5.3  3.5 -  5.3 mmol/L Final    Chloride 2022 100  98 - 110 mmol/L Final    CO2 2022 13 (L)  20 - 32 mmol/L Final    Calcium 2022 8.1 (L)  8.6 - 10.3 mg/dL Final    Total Protein 2022 7.6  6.1 - 8.1 g/dL Final    Albumin 2022 4.0  3.6 - 5.1 g/dL Final    Globulin, Total 2022 3.6  1.9 - 3.7 g/dL (calc) Final    Albumin/Globulin Ratio 2022 1.1  1.0 - 2.5 (calc) Final    Total Bilirubin 2022 0.5  0.2 - 1.2 mg/dL Final    Alkaline Phosphatase 2022 101  36 - 130 U/L Final    AST 2022 47 (H)  10 - 40 U/L Final    ALT 2022 61 (H)  9 - 46 U/L Final    Cholesterol 2022 146  <200 mg/dL Final    HDL 2022 49  > OR = 40 mg/dL Final    Triglycerides 2022 172 (H)  <150 mg/dL Final    LDL Cholesterol 2022 72  mg/dL (calc) Final    HDL/Cholesterol Ratio 2022 3.0  <5.0 (calc) Final    Non HDL Chol. (LDL+VLDL) 2022 97  <130 mg/dL (calc) Final    TSH w/reflex to FT4 2022 3.36  0.40 - 4.50 mIU/L Final    TESTOSTERONE, TOTAL, MALE 2022 105 (L)  250 - 827 ng/dL Final       Past Medical History:   Diagnosis Date    ADHD (attention deficit hyperactivity disorder)     Arthritis     Asthma     as child only    Back pain     Coronary artery disease     Degeneration of lumbar intervertebral disc 2016    Depression     Elevated PSA     Headache     Hyperlipidemia     Hypertension     Kidney damage     stage 3 ; d/t aleve abuse    Kidney stone     Myocardial infarction     Neck pain     Numbness and tingling in hands     Numbness and tingling of both legs     Seizures     from inapsine     Sleep apnea     no cpap    Wears glasses     CONTACS     Social History     Socioeconomic History    Marital status:    Occupational History    Occupation: disability   Tobacco Use    Smoking status: Former     Packs/day: 0.25     Types: Cigarettes     Quit date: 2016     Years since quittin.9    Smokeless tobacco: Former     Quit date:  6/5/2016    Tobacco comments:     occasional   Substance and Sexual Activity    Alcohol use: Yes     Alcohol/week: 1.0 standard drink     Types: 1 Glasses of wine per week     Comment: rare    Drug use: No    Sexual activity: Yes     Partners: Female     Social Determinants of Health     Financial Resource Strain: Unknown    Difficulty of Paying Living Expenses: Patient refused   Food Insecurity: Unknown    Worried About Running Out of Food in the Last Year: Patient refused    Ran Out of Food in the Last Year: Patient refused   Transportation Needs: Unknown    Lack of Transportation (Medical): Patient refused    Lack of Transportation (Non-Medical): Patient refused   Physical Activity: Unknown    Days of Exercise per Week: Patient refused   Stress: Stress Concern Present    Feeling of Stress : Rather much   Social Connections: Unknown    Frequency of Communication with Friends and Family: Patient refused    Frequency of Social Gatherings with Friends and Family: Patient refused    Active Member of Clubs or Organizations: No    Attends Club or Organization Meetings: Never    Marital Status:    Housing Stability: Unknown    Unable to Pay for Housing in the Last Year: Patient refused    Unstable Housing in the Last Year: Patient refused     Past Surgical History:   Procedure Laterality Date    BACK SURGERY  2016    back surgery    BONE GRAFT N/A 11/5/2020    Procedure: BONE GRAFT;  Surgeon: Mateusz Blanco MD;  Location: [facility] OR;  Service: Orthopedics;  Laterality: N/A;    CARDIAC SURGERY  01/06/2020    CABG X 7    CORONARY ARTERY BYPASS GRAFT      7 vessels    HERNIA REPAIR Bilateral 11/22/2017    LITHOTRIPSY      LUMBAR LAMINECTOMY WITH FUSION Bilateral 11/5/2020    Procedure: LAMINECTOMY, SPINE, LUMBAR, WITH FUSION;  Surgeon: Mateusz Blanco MD;  Location: [facility] OR;  Service: Orthopedics;  Laterality: Bilateral;  MEDTRONIC  NTI  L-3    PILONIDAL CYST DRAINAGE      removal of abcess      scrotal    REMOVAL OF  "HARDWARE FROM SPINE Bilateral 11/5/2020    Procedure: REMOVAL, HARDWARE, SPINE;  Surgeon: Mateusz Blanco MD;  Location: [facility] OR;  Service: Orthopedics;  Laterality: Bilateral;  L 4-5    TYMPANOSTOMY TUBE PLACEMENT       Family History   Problem Relation Age of Onset    Heart disease Mother     Migraines Mother     Hypertension Mother     Early death Father     Heart disease Father     Diabetes Maternal Aunt     Diabetes Maternal Uncle     Diabetes Maternal Grandfather        Review of patient's allergies indicates:   Allergen Reactions    Inapsine [droperidol] Anaphylaxis     seizures    Effexor [venlafaxine]      Increased anxiety    Pcn [penicillins]     Bactrim [sulfamethoxazole-trimethoprim] Rash     Dry red rash all over when in the sun       Current Outpatient Medications:     rosuvastatin (CRESTOR) 40 MG Tab, TAKE ONE TABLET BY MOUTH ONCE A DAY AS DIRECTED, Disp: , Rfl:     pantoprazole (PROTONIX) 40 MG tablet, Take 1 tablet (40 mg total) by mouth once daily., Disp: 30 tablet, Rfl: 0    predniSONE (DELTASONE) 20 MG tablet, Take 2 tablets (40 mg total) by mouth once daily. for 7 days, Disp: 14 tablet, Rfl: 0    Review of Systems   Constitutional:  Negative for fatigue, fever and unexpected weight change.   HENT:  Negative for ear pain, sinus pressure and sore throat.    Eyes:  Negative for pain.   Respiratory:  Negative for cough and shortness of breath.    Cardiovascular:  Negative for chest pain and leg swelling.   Gastrointestinal:  Negative for abdominal pain, constipation, nausea and vomiting.   Genitourinary:  Negative for dysuria, frequency and urgency.   Musculoskeletal:  Negative for arthralgias.   Skin:  Negative for rash.   Neurological:  Negative for dizziness, weakness and headaches.   Psychiatric/Behavioral:  Negative for sleep disturbance.         Objective:      Vitals:    12/01/22 1145 12/01/22 1148   BP: 100/60 90/60   Pulse: 72    Weight: 108.4 kg (239 lb)    Height: 5' 7" (1.702 m)  "     Physical Exam  Vitals and nursing note reviewed.   Constitutional:       General: He is not in acute distress.     Appearance: Normal appearance. He is well-developed. He is obese.   HENT:      Head: Normocephalic and atraumatic.      Right Ear: External ear normal.      Left Ear: External ear normal.   Eyes:      Pupils: Pupils are equal, round, and reactive to light.   Neck:      Trachea: No tracheal deviation.   Cardiovascular:      Rate and Rhythm: Normal rate and regular rhythm.      Heart sounds: No murmur heard.    No friction rub. No gallop.   Pulmonary:      Breath sounds: Normal breath sounds. No stridor. No wheezing or rales.   Abdominal:      Palpations: Abdomen is soft. There is no mass.      Tenderness: There is no abdominal tenderness.   Musculoskeletal:         General: No tenderness or deformity.      Cervical back: Neck supple.      Right lower leg: No edema.      Left lower leg: No edema.   Lymphadenopathy:      Cervical: No cervical adenopathy.   Skin:     General: Skin is warm and dry.   Neurological:      Mental Status: He is alert and oriented to person, place, and time.      Coordination: Coordination normal.   Psychiatric:         Mood and Affect: Affect is flat.         Thought Content: Thought content normal.         Assessment:       1. Need for influenza vaccination    2. Hypertension, unspecified type    3. Current moderate episode of major depressive disorder without prior episode    4. Stage 3 chronic kidney disease, unspecified whether stage 3a or 3b CKD    5. High risk medication use    6. Hyperlipidemia, unspecified hyperlipidemia type    7. Coronary artery disease involving coronary bypass graft of native heart without angina pectoris    8. Benign prostatic hypertrophy (BPH) with weak urinary stream    9. MYNOR (generalized anxiety disorder)           Plan:       Need for influenza vaccination  -     Influenza - Quadrivalent (Recombinant) (PF)    Hypertension, unspecified  type  -     CBC Auto Differential; Future; Expected date: 12/01/2022  -     Comprehensive Metabolic Panel; Future; Expected date: 12/01/2022  -     Lipid Panel; Future; Expected date: 12/01/2022  -     TSH w/reflex to FT4; Future; Expected date: 12/01/2022  -     Urinalysis, Reflex to Urine Culture Urine, Clean Catch; Future; Expected date: 12/01/2022  -     Testosterone, Total, Males; Future; Expected date: 12/01/2022    Current moderate episode of major depressive disorder without prior episode  -     CBC Auto Differential; Future; Expected date: 12/01/2022  -     Comprehensive Metabolic Panel; Future; Expected date: 12/01/2022  -     Lipid Panel; Future; Expected date: 12/01/2022  -     TSH w/reflex to FT4; Future; Expected date: 12/01/2022  -     Urinalysis, Reflex to Urine Culture Urine, Clean Catch; Future; Expected date: 12/01/2022  -     Testosterone, Total, Males; Future; Expected date: 12/01/2022    Stage 3 chronic kidney disease, unspecified whether stage 3a or 3b CKD  -     CBC Auto Differential; Future; Expected date: 12/01/2022  -     Comprehensive Metabolic Panel; Future; Expected date: 12/01/2022  -     Lipid Panel; Future; Expected date: 12/01/2022  -     TSH w/reflex to FT4; Future; Expected date: 12/01/2022  -     Urinalysis, Reflex to Urine Culture Urine, Clean Catch; Future; Expected date: 12/01/2022  -     Testosterone, Total, Males; Future; Expected date: 12/01/2022    High risk medication use    Hyperlipidemia, unspecified hyperlipidemia type    Coronary artery disease involving coronary bypass graft of native heart without angina pectoris    Benign prostatic hypertrophy (BPH) with weak urinary stream    MYNOR (generalized anxiety disorder)      Follow up in about 6 months (around 6/1/2023), or if symptoms worsen or fail to improve.        12/14/2022 Riya Vidales

## 2022-12-03 LAB
ALBUMIN SERPL-MCNC: 4 G/DL (ref 3.6–5.1)
ALBUMIN/GLOB SERPL: 1.1 (CALC) (ref 1–2.5)
ALP SERPL-CCNC: 101 U/L (ref 36–130)
ALT SERPL-CCNC: 61 U/L (ref 9–46)
AST SERPL-CCNC: 47 U/L (ref 10–40)
BASOPHILS # BLD AUTO: 84 CELLS/UL (ref 0–200)
BASOPHILS NFR BLD AUTO: 1 %
BILIRUB SERPL-MCNC: 0.5 MG/DL (ref 0.2–1.2)
BUN SERPL-MCNC: 117 MG/DL (ref 7–25)
BUN/CREAT SERPL: ABNORMAL (CALC) (ref 6–22)
CALCIUM SERPL-MCNC: 8.1 MG/DL (ref 8.6–10.3)
CHLORIDE SERPL-SCNC: 100 MMOL/L (ref 98–110)
CHOLEST SERPL-MCNC: 146 MG/DL
CHOLEST/HDLC SERPL: 3 (CALC)
CO2 SERPL-SCNC: 13 MMOL/L (ref 20–32)
CREAT SERPL-MCNC: >20 MG/DL (ref 0.6–1.29)
EGFR: <4 ML/MIN/1.73M2
EOSINOPHIL # BLD AUTO: 361 CELLS/UL (ref 15–500)
EOSINOPHIL NFR BLD AUTO: 4.3 %
ERYTHROCYTE [DISTWIDTH] IN BLOOD BY AUTOMATED COUNT: 14.5 % (ref 11–15)
GLOBULIN SER CALC-MCNC: 3.6 G/DL (CALC) (ref 1.9–3.7)
GLUCOSE SERPL-MCNC: 113 MG/DL (ref 65–99)
HCT VFR BLD AUTO: 38.3 % (ref 38.5–50)
HDLC SERPL-MCNC: 49 MG/DL
HGB BLD-MCNC: 13 G/DL (ref 13.2–17.1)
LDLC SERPL CALC-MCNC: 72 MG/DL (CALC)
LYMPHOCYTES # BLD AUTO: 1394 CELLS/UL (ref 850–3900)
LYMPHOCYTES NFR BLD AUTO: 16.6 %
MCH RBC QN AUTO: 33.9 PG (ref 27–33)
MCHC RBC AUTO-ENTMCNC: 33.9 G/DL (ref 32–36)
MCV RBC AUTO: 99.7 FL (ref 80–100)
MONOCYTES # BLD AUTO: 697 CELLS/UL (ref 200–950)
MONOCYTES NFR BLD AUTO: 8.3 %
NEUTROPHILS # BLD AUTO: 5863 CELLS/UL (ref 1500–7800)
NEUTROPHILS NFR BLD AUTO: 69.8 %
NONHDLC SERPL-MCNC: 97 MG/DL (CALC)
PLATELET # BLD AUTO: 218 THOUSAND/UL (ref 140–400)
PMV BLD REES-ECKER: 10.9 FL (ref 7.5–12.5)
POTASSIUM SERPL-SCNC: 5.3 MMOL/L (ref 3.5–5.3)
PROT SERPL-MCNC: 7.6 G/DL (ref 6.1–8.1)
RBC # BLD AUTO: 3.84 MILLION/UL (ref 4.2–5.8)
SODIUM SERPL-SCNC: 138 MMOL/L (ref 135–146)
TESTOST SERPL-MCNC: 105 NG/DL (ref 250–827)
TRIGL SERPL-MCNC: 172 MG/DL
TSH SERPL-ACNC: 3.36 MIU/L (ref 0.4–4.5)
WBC # BLD AUTO: 8.4 THOUSAND/UL (ref 3.8–10.8)

## 2022-12-05 ENCOUNTER — HOSPITAL ENCOUNTER (INPATIENT)
Facility: HOSPITAL | Age: 47
LOS: 6 days | Discharge: HOME | DRG: 683 | End: 2022-12-11
Attending: EMERGENCY MEDICINE | Admitting: INTERNAL MEDICINE
Payer: MEDICAID

## 2022-12-05 ENCOUNTER — CLINICAL SUPPORT (OUTPATIENT)
Dept: CARDIOLOGY | Facility: HOSPITAL | Age: 47
DRG: 683 | End: 2022-12-05
Attending: EMERGENCY MEDICINE
Payer: MEDICAID

## 2022-12-05 ENCOUNTER — TELEPHONE (OUTPATIENT)
Dept: FAMILY MEDICINE | Facility: CLINIC | Age: 47
End: 2022-12-05

## 2022-12-05 VITALS — BODY MASS INDEX: 37.51 KG/M2 | HEIGHT: 67 IN | WEIGHT: 239 LBS

## 2022-12-05 DIAGNOSIS — R07.9 CHEST PAIN: ICD-10-CM

## 2022-12-05 DIAGNOSIS — F41.1 GAD (GENERALIZED ANXIETY DISORDER): ICD-10-CM

## 2022-12-05 DIAGNOSIS — R53.83 FATIGUE: ICD-10-CM

## 2022-12-05 DIAGNOSIS — N17.9 ACUTE KIDNEY FAILURE: ICD-10-CM

## 2022-12-05 DIAGNOSIS — N28.9 ACUTE RENAL INSUFFICIENCY: ICD-10-CM

## 2022-12-05 DIAGNOSIS — N19 KIDNEY FAILURE: ICD-10-CM

## 2022-12-05 PROBLEM — K21.9 GASTROESOPHAGEAL REFLUX DISEASE WITHOUT ESOPHAGITIS: Status: ACTIVE | Noted: 2022-12-05

## 2022-12-05 PROBLEM — Z95.1 HX OF CABG: Status: ACTIVE | Noted: 2022-12-05

## 2022-12-05 PROBLEM — D63.8 ANEMIA, CHRONIC DISEASE: Status: ACTIVE | Noted: 2022-12-05

## 2022-12-05 LAB
ALBUMIN SERPL BCP-MCNC: 4.1 G/DL (ref 3.5–5.2)
ALP SERPL-CCNC: 89 U/L (ref 55–135)
ALT SERPL W/O P-5'-P-CCNC: 65 U/L (ref 10–44)
ANION GAP SERPL CALC-SCNC: 18 MMOL/L (ref 8–16)
AORTIC ROOT ANNULUS: 3.44 CM
AORTIC VALVE CUSP SEPERATION: 1.8 CM
AST SERPL-CCNC: 34 U/L (ref 10–40)
AV INDEX (PROSTH): 0.91
AV MEAN GRADIENT: 2 MMHG
AV PEAK GRADIENT: 4 MMHG
AV VALVE AREA: 2.98 CM2
AV VELOCITY RATIO: 0.89
BACTERIA #/AREA URNS HPF: NEGATIVE /HPF
BASOPHILS # BLD AUTO: 0.08 K/UL (ref 0–0.2)
BASOPHILS NFR BLD: 0.9 % (ref 0–1.9)
BILIRUB SERPL-MCNC: 0.9 MG/DL (ref 0.1–1)
BILIRUB UR QL STRIP: NEGATIVE
BSA FOR ECHO PROCEDURE: 2.26 M2
BUN SERPL-MCNC: 118 MG/DL (ref 6–20)
CALCIUM SERPL-MCNC: 7.9 MG/DL (ref 8.7–10.5)
CHLORIDE SERPL-SCNC: 101 MMOL/L (ref 95–110)
CK SERPL-CCNC: 300 U/L (ref 20–200)
CLARITY UR: CLEAR
CO2 SERPL-SCNC: 16 MMOL/L (ref 23–29)
COLOR UR: YELLOW
CREAT SERPL-MCNC: 21 MG/DL (ref 0.5–1.4)
CV ECHO LV RWT: 0.7 CM
DIFFERENTIAL METHOD: ABNORMAL
DOP CALC AO PEAK VEL: 1.01 M/S
DOP CALC AO VTI: 16.2 CM
DOP CALC LVOT AREA: 3.3 CM2
DOP CALC LVOT DIAMETER: 2.04 CM
DOP CALC LVOT PEAK VEL: 0.9 M/S
DOP CALC LVOT STROKE VOLUME: 48.35 CM3
DOP CALCLVOT PEAK VEL VTI: 14.8 CM
E WAVE DECELERATION TIME: 265.21 MSEC
E/A RATIO: 0.87
E/E' RATIO: 6.32 M/S
ECHO LV POSTERIOR WALL: 1.38 CM (ref 0.6–1.1)
EJECTION FRACTION: 38 %
EOSINOPHIL # BLD AUTO: 0.4 K/UL (ref 0–0.5)
EOSINOPHIL NFR BLD: 4.4 % (ref 0–8)
ERYTHROCYTE [DISTWIDTH] IN BLOOD BY AUTOMATED COUNT: 14.2 % (ref 11.5–14.5)
EST. GFR  (NO RACE VARIABLE): 2.4 ML/MIN/1.73 M^2
FERRITIN SERPL-MCNC: 455 NG/ML (ref 20–300)
FRACTIONAL SHORTENING: 15 % (ref 28–44)
GLUCOSE SERPL-MCNC: 236 MG/DL (ref 70–110)
GLUCOSE SERPL-MCNC: 73 MG/DL (ref 70–110)
GLUCOSE UR QL STRIP: ABNORMAL
HCT VFR BLD AUTO: 34.8 % (ref 40–54)
HGB BLD-MCNC: 12.4 G/DL (ref 14–18)
HGB UR QL STRIP: ABNORMAL
HYALINE CASTS #/AREA URNS LPF: 6 /LPF
IMM GRANULOCYTES # BLD AUTO: 0.06 K/UL (ref 0–0.04)
IMM GRANULOCYTES NFR BLD AUTO: 0.6 % (ref 0–0.5)
INTERVENTRICULAR SEPTUM: 1.15 CM (ref 0.6–1.1)
IRON SERPL-MCNC: 130 UG/DL (ref 45–160)
KETONES UR QL STRIP: NEGATIVE
LEFT ATRIUM SIZE: 3.26 CM
LEFT ATRIUM VOLUME INDEX MOD: 17.8 ML/M2
LEFT ATRIUM VOLUME MOD: 38.9 CM3
LEFT INTERNAL DIMENSION IN SYSTOLE: 3.35 CM (ref 2.1–4)
LEFT VENTRICLE DIASTOLIC VOLUME INDEX: 30.51 ML/M2
LEFT VENTRICLE DIASTOLIC VOLUME: 66.52 ML
LEFT VENTRICLE MASS INDEX: 80 G/M2
LEFT VENTRICLE SYSTOLIC VOLUME INDEX: 21 ML/M2
LEFT VENTRICLE SYSTOLIC VOLUME: 45.69 ML
LEFT VENTRICULAR INTERNAL DIMENSION IN DIASTOLE: 3.92 CM (ref 3.5–6)
LEFT VENTRICULAR MASS: 173.74 G
LEUKOCYTE ESTERASE UR QL STRIP: NEGATIVE
LIPASE SERPL-CCNC: 94 U/L (ref 4–60)
LV LATERAL E/E' RATIO: 5.45 M/S
LV SEPTAL E/E' RATIO: 7.5 M/S
LVOT MG: 1.51 MMHG
LVOT MV: 0.56 CM/S
LYMPHOCYTES # BLD AUTO: 0.7 K/UL (ref 1–4.8)
LYMPHOCYTES NFR BLD: 7.7 % (ref 18–48)
MAGNESIUM SERPL-MCNC: 1.7 MG/DL (ref 1.6–2.6)
MCH RBC QN AUTO: 35 PG (ref 27–31)
MCHC RBC AUTO-ENTMCNC: 35.6 G/DL (ref 32–36)
MCV RBC AUTO: 98 FL (ref 82–98)
MICROSCOPIC COMMENT: ABNORMAL
MONOCYTES # BLD AUTO: 0.9 K/UL (ref 0.3–1)
MONOCYTES NFR BLD: 10 % (ref 4–15)
MV PEAK A VEL: 0.69 M/S
MV PEAK E VEL: 0.6 M/S
MV STENOSIS PRESSURE HALF TIME: 76.91 MS
MV VALVE AREA P 1/2 METHOD: 2.86 CM2
NEUTROPHILS # BLD AUTO: 7.1 K/UL (ref 1.8–7.7)
NEUTROPHILS NFR BLD: 76.4 % (ref 38–73)
NITRITE UR QL STRIP: NEGATIVE
NRBC BLD-RTO: 0 /100 WBC
PH UR STRIP: 6 [PH] (ref 5–8)
PISA MRMAX VEL: 0.75 M/S
PLATELET # BLD AUTO: 236 K/UL (ref 150–450)
PMV BLD AUTO: 12 FL (ref 9.2–12.9)
POTASSIUM SERPL-SCNC: 4.7 MMOL/L (ref 3.5–5.1)
PROT SERPL-MCNC: 9.2 G/DL (ref 6–8.4)
PROT UR QL STRIP: ABNORMAL
PV MV: 0.78 M/S
PV PEAK VELOCITY: 1.14 CM/S
RA PRESSURE: 3 MMHG
RBC # BLD AUTO: 3.54 M/UL (ref 4.6–6.2)
RBC #/AREA URNS HPF: 1 /HPF (ref 0–4)
RETICS/RBC NFR AUTO: 0.8 % (ref 0.4–2)
RV TISSUE DOPPLER FREE WALL SYSTOLIC VELOCITY 1 (APICAL 4 CHAMBER VIEW): 0.01 CM/S
SARS-COV-2 RDRP RESP QL NAA+PROBE: NEGATIVE
SATURATED IRON: 45 % (ref 20–50)
SODIUM SERPL-SCNC: 135 MMOL/L (ref 136–145)
SP GR UR STRIP: 1.01 (ref 1–1.03)
SQUAMOUS #/AREA URNS HPF: 2 /HPF
TDI LATERAL: 0.11 M/S
TDI SEPTAL: 0.08 M/S
TDI: 0.1 M/S
TOTAL IRON BINDING CAPACITY: 290 UG/DL (ref 250–450)
TRANSFERRIN SERPL-MCNC: 207 MG/DL (ref 200–375)
TRICUSPID ANNULAR PLANE SYSTOLIC EXCURSION: 1.82 CM
TSH SERPL DL<=0.005 MIU/L-ACNC: 1.93 UIU/ML (ref 0.34–5.6)
URN SPEC COLLECT METH UR: ABNORMAL
UROBILINOGEN UR STRIP-ACNC: NEGATIVE EU/DL
VIT B12 SERPL-MCNC: 290 PG/ML (ref 210–950)
WBC # BLD AUTO: 9.32 K/UL (ref 3.9–12.7)
WBC #/AREA URNS HPF: 5 /HPF (ref 0–5)

## 2022-12-05 PROCEDURE — 63600175 PHARM REV CODE 636 W HCPCS: Performed by: NURSE PRACTITIONER

## 2022-12-05 PROCEDURE — 85045 AUTOMATED RETICULOCYTE COUNT: CPT | Performed by: NURSE PRACTITIONER

## 2022-12-05 PROCEDURE — 86235 NUCLEAR ANTIGEN ANTIBODY: CPT | Mod: 59 | Performed by: INTERNAL MEDICINE

## 2022-12-05 PROCEDURE — U0002 COVID-19 LAB TEST NON-CDC: HCPCS | Performed by: EMERGENCY MEDICINE

## 2022-12-05 PROCEDURE — 99285 EMERGENCY DEPT VISIT HI MDM: CPT | Mod: 25

## 2022-12-05 PROCEDURE — 87340 HEPATITIS B SURFACE AG IA: CPT | Performed by: INTERNAL MEDICINE

## 2022-12-05 PROCEDURE — 82550 ASSAY OF CK (CPK): CPT | Performed by: EMERGENCY MEDICINE

## 2022-12-05 PROCEDURE — 84466 ASSAY OF TRANSFERRIN: CPT | Performed by: NURSE PRACTITIONER

## 2022-12-05 PROCEDURE — 84165 PROTEIN E-PHORESIS SERUM: CPT | Performed by: INTERNAL MEDICINE

## 2022-12-05 PROCEDURE — 21400001 HC TELEMETRY ROOM

## 2022-12-05 PROCEDURE — 80053 COMPREHEN METABOLIC PANEL: CPT | Performed by: EMERGENCY MEDICINE

## 2022-12-05 PROCEDURE — 85025 COMPLETE CBC W/AUTO DIFF WBC: CPT | Performed by: EMERGENCY MEDICINE

## 2022-12-05 PROCEDURE — 86708 HEPATITIS A ANTIBODY: CPT | Performed by: INTERNAL MEDICINE

## 2022-12-05 PROCEDURE — 25000003 PHARM REV CODE 250: Performed by: NURSE PRACTITIONER

## 2022-12-05 PROCEDURE — 93306 TTE W/DOPPLER COMPLETE: CPT | Mod: 26,,, | Performed by: INTERNAL MEDICINE

## 2022-12-05 PROCEDURE — 93010 ELECTROCARDIOGRAM REPORT: CPT | Mod: ,,, | Performed by: INTERNAL MEDICINE

## 2022-12-05 PROCEDURE — 83735 ASSAY OF MAGNESIUM: CPT | Performed by: EMERGENCY MEDICINE

## 2022-12-05 PROCEDURE — 82607 VITAMIN B-12: CPT | Performed by: NURSE PRACTITIONER

## 2022-12-05 PROCEDURE — 83690 ASSAY OF LIPASE: CPT | Performed by: EMERGENCY MEDICINE

## 2022-12-05 PROCEDURE — 93005 ELECTROCARDIOGRAM TRACING: CPT | Performed by: INTERNAL MEDICINE

## 2022-12-05 PROCEDURE — 86037 ANCA TITER EACH ANTIBODY: CPT | Mod: 59 | Performed by: INTERNAL MEDICINE

## 2022-12-05 PROCEDURE — 81001 URINALYSIS AUTO W/SCOPE: CPT | Performed by: EMERGENCY MEDICINE

## 2022-12-05 PROCEDURE — 93306 ECHO (CUPID ONLY): ICD-10-PCS | Mod: 26,,, | Performed by: INTERNAL MEDICINE

## 2022-12-05 PROCEDURE — 84443 ASSAY THYROID STIM HORMONE: CPT | Performed by: NURSE PRACTITIONER

## 2022-12-05 PROCEDURE — 82728 ASSAY OF FERRITIN: CPT | Performed by: NURSE PRACTITIONER

## 2022-12-05 PROCEDURE — 93010 EKG 12-LEAD: ICD-10-PCS | Mod: ,,, | Performed by: INTERNAL MEDICINE

## 2022-12-05 PROCEDURE — 93306 TTE W/DOPPLER COMPLETE: CPT

## 2022-12-05 RX ORDER — IBUPROFEN 200 MG
24 TABLET ORAL
Status: DISCONTINUED | OUTPATIENT
Start: 2022-12-05 | End: 2022-12-11 | Stop reason: HOSPADM

## 2022-12-05 RX ORDER — HEPARIN SODIUM 5000 [USP'U]/ML
5000 INJECTION, SOLUTION INTRAVENOUS; SUBCUTANEOUS EVERY 8 HOURS
Status: DISCONTINUED | OUTPATIENT
Start: 2022-12-05 | End: 2022-12-11 | Stop reason: HOSPADM

## 2022-12-05 RX ORDER — SODIUM,POTASSIUM PHOSPHATES 280-250MG
2 POWDER IN PACKET (EA) ORAL
Status: DISCONTINUED | OUTPATIENT
Start: 2022-12-05 | End: 2022-12-11 | Stop reason: HOSPADM

## 2022-12-05 RX ORDER — NAPROXEN SODIUM 220 MG/1
81 TABLET, FILM COATED ORAL DAILY
Status: DISCONTINUED | OUTPATIENT
Start: 2022-12-06 | End: 2022-12-06

## 2022-12-05 RX ORDER — IBUPROFEN 200 MG
16 TABLET ORAL
Status: DISCONTINUED | OUTPATIENT
Start: 2022-12-05 | End: 2022-12-11 | Stop reason: HOSPADM

## 2022-12-05 RX ORDER — ONDANSETRON 2 MG/ML
4 INJECTION INTRAMUSCULAR; INTRAVENOUS EVERY 8 HOURS PRN
Status: DISCONTINUED | OUTPATIENT
Start: 2022-12-05 | End: 2022-12-11 | Stop reason: HOSPADM

## 2022-12-05 RX ORDER — TALC
6 POWDER (GRAM) TOPICAL NIGHTLY PRN
Status: DISCONTINUED | OUTPATIENT
Start: 2022-12-05 | End: 2022-12-11 | Stop reason: HOSPADM

## 2022-12-05 RX ORDER — NALOXONE HCL 0.4 MG/ML
0.02 VIAL (ML) INJECTION
Status: DISCONTINUED | OUTPATIENT
Start: 2022-12-05 | End: 2022-12-11 | Stop reason: HOSPADM

## 2022-12-05 RX ORDER — SIMETHICONE 80 MG
1 TABLET,CHEWABLE ORAL 4 TIMES DAILY PRN
Status: DISCONTINUED | OUTPATIENT
Start: 2022-12-05 | End: 2022-12-11 | Stop reason: HOSPADM

## 2022-12-05 RX ORDER — PANTOPRAZOLE SODIUM 40 MG/1
40 TABLET, DELAYED RELEASE ORAL DAILY
Status: DISCONTINUED | OUTPATIENT
Start: 2022-12-05 | End: 2022-12-06

## 2022-12-05 RX ORDER — GLUCAGON 1 MG
1 KIT INJECTION
Status: DISCONTINUED | OUTPATIENT
Start: 2022-12-05 | End: 2022-12-11 | Stop reason: HOSPADM

## 2022-12-05 RX ORDER — HYDRALAZINE HYDROCHLORIDE 20 MG/ML
10 INJECTION INTRAMUSCULAR; INTRAVENOUS EVERY 8 HOURS PRN
Status: DISCONTINUED | OUTPATIENT
Start: 2022-12-05 | End: 2022-12-11 | Stop reason: HOSPADM

## 2022-12-05 RX ORDER — SODIUM CHLORIDE 9 MG/ML
INJECTION, SOLUTION INTRAVENOUS CONTINUOUS
Status: DISCONTINUED | OUTPATIENT
Start: 2022-12-05 | End: 2022-12-06

## 2022-12-05 RX ORDER — ATORVASTATIN CALCIUM 40 MG/1
80 TABLET, FILM COATED ORAL NIGHTLY
Status: DISCONTINUED | OUTPATIENT
Start: 2022-12-06 | End: 2022-12-06

## 2022-12-05 RX ORDER — LANOLIN ALCOHOL/MO/W.PET/CERES
800 CREAM (GRAM) TOPICAL
Status: DISCONTINUED | OUTPATIENT
Start: 2022-12-05 | End: 2022-12-11 | Stop reason: HOSPADM

## 2022-12-05 RX ORDER — INSULIN ASPART 100 [IU]/ML
0-5 INJECTION, SOLUTION INTRAVENOUS; SUBCUTANEOUS
Status: DISCONTINUED | OUTPATIENT
Start: 2022-12-05 | End: 2022-12-11 | Stop reason: HOSPADM

## 2022-12-05 RX ORDER — EZETIMIBE 10 MG/1
10 TABLET ORAL DAILY
Status: DISCONTINUED | OUTPATIENT
Start: 2022-12-06 | End: 2022-12-11 | Stop reason: HOSPADM

## 2022-12-05 RX ORDER — ACETAMINOPHEN 325 MG/1
650 TABLET ORAL EVERY 4 HOURS PRN
Status: DISCONTINUED | OUTPATIENT
Start: 2022-12-05 | End: 2022-12-11 | Stop reason: HOSPADM

## 2022-12-05 RX ORDER — MAG HYDROX/ALUMINUM HYD/SIMETH 200-200-20
30 SUSPENSION, ORAL (FINAL DOSE FORM) ORAL 4 TIMES DAILY PRN
Status: DISCONTINUED | OUTPATIENT
Start: 2022-12-05 | End: 2022-12-11 | Stop reason: HOSPADM

## 2022-12-05 RX ORDER — SODIUM CHLORIDE 0.9 % (FLUSH) 0.9 %
10 SYRINGE (ML) INJECTION EVERY 12 HOURS PRN
Status: DISCONTINUED | OUTPATIENT
Start: 2022-12-05 | End: 2022-12-11 | Stop reason: HOSPADM

## 2022-12-05 RX ORDER — IPRATROPIUM BROMIDE AND ALBUTEROL SULFATE 2.5; .5 MG/3ML; MG/3ML
3 SOLUTION RESPIRATORY (INHALATION) EVERY 6 HOURS PRN
Status: DISCONTINUED | OUTPATIENT
Start: 2022-12-05 | End: 2022-12-11 | Stop reason: HOSPADM

## 2022-12-05 RX ORDER — METOPROLOL TARTRATE 25 MG/1
25 TABLET, FILM COATED ORAL 2 TIMES DAILY
Status: DISCONTINUED | OUTPATIENT
Start: 2022-12-05 | End: 2022-12-11 | Stop reason: HOSPADM

## 2022-12-05 RX ADMIN — PANTOPRAZOLE SODIUM 40 MG: 40 TABLET, DELAYED RELEASE ORAL at 10:12

## 2022-12-05 RX ADMIN — INSULIN ASPART 1 UNITS: 100 INJECTION, SOLUTION INTRAVENOUS; SUBCUTANEOUS at 11:12

## 2022-12-05 RX ADMIN — SODIUM CHLORIDE: 0.9 INJECTION, SOLUTION INTRAVENOUS at 06:12

## 2022-12-05 RX ADMIN — HEPARIN SODIUM 5000 UNITS: 5000 INJECTION INTRAVENOUS; SUBCUTANEOUS at 10:12

## 2022-12-05 RX ADMIN — METOPROLOL TARTRATE 25 MG: 25 TABLET, FILM COATED ORAL at 10:12

## 2022-12-05 NOTE — ED NOTES
Assumed care of pt.  Pt resting in bed with wife at bedside, NAD. Vss. No resp distress. Denies pain at this migdalia.  Urine collected and sent, new gold tops drawn and sent to lab

## 2022-12-05 NOTE — CONSULTS
Nephrology Consult Note        Patient Name: Lee Quintanilla  MRN: [id number]    Patient Class: Emergency   Admission Date: 12/5/2022  Length of Stay: 0 days  Date of Service: 12/5/2022    Attending Physician: Daniel Carter MD  Primary Care Provider: Riya Vidales NP    Reason for Consult: mariah/uremia/acidosis/hyponatremia/chf/htn    SUBJECTIVE:     HPI: 47M with notable history of CABG x7 on Entresto, spinal surgeries, remote history of stones requiring lithotripsy and sCr around 1.3 at least until 1/2022, was sent to hospital by PCP for abnormal scr 20 and uremia symptoms. Accompanied by wife. Describes insidious onset of lethargy, poor appetite, swelling, weakness over last few weeks. A few nearsyncopal episodes of unclear significance. Had episode of bilateral LE non-pruritic ? Vasculitic rash, now mostly resolved. Denies disuria, hematuria, bladder symptoms, infection, unusual OTC meds - notably no NSAIDDs or contrast.    Past Medical History:   Diagnosis Date    ADHD (attention deficit hyperactivity disorder)     Arthritis     Asthma     as child only    Back pain     Coronary artery disease     Degeneration of lumbar intervertebral disc 05/2016    Depression     Elevated PSA     Headache     Hyperlipidemia     Hypertension     Kidney damage     stage 3 ; d/t aleve abuse    Kidney stone     Myocardial infarction     Neck pain     Numbness and tingling in hands     Numbness and tingling of both legs     Seizures     from inapsine 2002    Sleep apnea     no cpap    Wears glasses     CONTACS     Past Surgical History:   Procedure Laterality Date    BACK SURGERY  2016    back surgery    BONE GRAFT N/A 11/5/2020    Procedure: BONE GRAFT;  Surgeon: Mateusz Blanco MD;  Location: [facility];  Service: Orthopedics;  Laterality: N/A;    CARDIAC SURGERY  01/06/2020    CABG X 7    CORONARY ARTERY BYPASS GRAFT      7 vessels    HERNIA REPAIR Bilateral 11/22/2017    LITHOTRIPSY      LUMBAR LAMINECTOMY WITH FUSION  Bilateral 2020    Procedure: LAMINECTOMY, SPINE, LUMBAR, WITH FUSION;  Surgeon: Mateusz Blanco MD;  Location: [facility] OR;  Service: Orthopedics;  Laterality: Bilateral;  MEDTRONIC  NTI  L-3    PILONIDAL CYST DRAINAGE      removal of abcess      scrotal    REMOVAL OF HARDWARE FROM SPINE Bilateral 2020    Procedure: REMOVAL, HARDWARE, SPINE;  Surgeon: Mateusz Blanco MD;  Location: [facility] OR;  Service: Orthopedics;  Laterality: Bilateral;  L 4-5    TYMPANOSTOMY TUBE PLACEMENT       Family History   Problem Relation Age of Onset    Heart disease Mother     Migraines Mother     Hypertension Mother     Early death Father     Heart disease Father     Diabetes Maternal Aunt     Diabetes Maternal Uncle     Diabetes Maternal Grandfather      Social History     Tobacco Use    Smoking status: Former     Packs/day: 0.25     Types: Cigarettes     Quit date: 2016     Years since quittin.9    Smokeless tobacco: Former     Quit date: 2016    Tobacco comments:     occasional   Substance Use Topics    Alcohol use: Yes     Alcohol/week: 1.0 standard drink     Types: 1 Glasses of wine per week     Comment: rare    Drug use: No       Review of patient's allergies indicates:   Allergen Reactions    Inapsine [droperidol] Anaphylaxis     seizures    Effexor [venlafaxine]      Increased anxiety    Pcn [penicillins]     Bactrim [sulfamethoxazole-trimethoprim] Rash     Dry red rash all over when in the sun       Outpatient meds:  No current facility-administered medications on file prior to encounter.     Current Outpatient Medications on File Prior to Encounter   Medication Sig Dispense Refill    aspirin 81 MG Chew Take 81 mg by mouth once daily.      DULoxetine (CYMBALTA) 60 MG capsule Take 1 capsule (60 mg total) by mouth once daily. 90 capsule 1    ENTRESTO 49-51 mg per tablet TAKE ONE TABLET BY MOUTH TWICE A DAY AS DIRECTED      ezetimibe (ZETIA) 10 mg tablet Take 10 mg by mouth once daily.      hydrOXYzine HCL  (ATARAX) 25 MG tablet Take 1 tablet (25 mg total) by mouth 3 (three) times daily as needed for Itching. 60 tablet 0    metoprolol tartrate (LOPRESSOR) 25 MG tablet Take 1 tablet (25 mg total) by mouth 2 (two) times a day. 60 tablet 0    rosuvastatin (CRESTOR) 40 MG Tab TAKE ONE TABLET BY MOUTH ONCE A DAY AS DIRECTED      testosterone cypionate (DEPOTESTOTERONE CYPIONATE) 200 mg/mL injection Inject 200 mg into the muscle every 7 days.      anastrozole (ARIMIDEX) 1 mg Tab Take 1 mg by mouth once a week.      azelastine (ASTELIN) 137 mcg (0.1 %) nasal spray 1 spray (137 mcg total) by Nasal route 2 (two) times daily. 30 mL 12    busPIRone (BUSPAR) 10 MG tablet Take 1 tablet (10 mg total) by mouth 2 (two) times daily. 60 tablet 1    tadalafiL (CIALIS) 20 MG Tab Take 1/2 to 1 tablet as needed prior to intercourse. No more than 1 in 36 hours 30 tablet 3       Scheduled meds:      Infusions:      PRN meds:      Review of Systems:  Review of Systems   Constitutional:  Positive for malaise/fatigue. Negative for chills, fever and weight loss.   HENT:  Negative for hearing loss and nosebleeds.    Eyes:  Negative for blurred vision, double vision and photophobia.   Respiratory:  Negative for cough, shortness of breath and wheezing.    Cardiovascular:  Positive for leg swelling. Negative for chest pain and palpitations.   Gastrointestinal:  Positive for nausea. Negative for abdominal pain, blood in stool, constipation, diarrhea, heartburn, melena and vomiting.   Genitourinary:  Negative for dysuria, frequency, hematuria and urgency.   Musculoskeletal:  Negative for falls, joint pain and myalgias.   Skin:  Negative for itching and rash.   Neurological:  Positive for weakness. Negative for dizziness, speech change, focal weakness, loss of consciousness and headaches.   Endo/Heme/Allergies:  Does not bruise/bleed easily.   Psychiatric/Behavioral:  Negative for depression and substance abuse. The patient is not nervous/anxious.       OBJECTIVE:     Vital Signs and IO:  Temp:  [98.1 °F (36.7 °C)]   Pulse:  [110]   Resp:  [20]   BP: (106)/(63)   SpO2:  [98 %]   No intake/output data recorded.  Wt Readings from Last 5 Encounters:   12/05/22 108.4 kg (239 lb)   12/01/22 108.4 kg (239 lb)   04/25/22 108.4 kg (239 lb)   01/15/22 103 kg (226 lb 15.8 oz)   11/03/21 101.6 kg (224 lb)     Body mass index is 37.43 kg/m².    Physical Exam  Constitutional:       General: He is not in acute distress.     Appearance: He is well-developed. He is not diaphoretic.   HENT:      Head: Normocephalic and atraumatic.      Mouth/Throat:      Mouth: Mucous membranes are moist.   Eyes:      General: No scleral icterus.     Pupils: Pupils are equal, round, and reactive to light.   Cardiovascular:      Rate and Rhythm: Normal rate and regular rhythm.   Pulmonary:      Effort: Pulmonary effort is normal. No respiratory distress.      Breath sounds: No stridor.   Abdominal:      General: There is no distension.      Palpations: Abdomen is soft.   Musculoskeletal:         General: No deformity. Normal range of motion.      Cervical back: Neck supple.   Skin:     General: Skin is warm and dry.      Findings: No erythema or rash.   Neurological:      Mental Status: He is alert and oriented to person, place, and time.      Cranial Nerves: No cranial nerve deficit.   Psychiatric:         Behavior: Behavior normal.       Laboratory:  Recent Labs   Lab 12/02/22  0845 12/05/22  1335    135*   K 5.3 4.7    101   CO2 13* 16*   * 118*   CREATININE >20.0* 21.0*   * 73       Recent Labs   Lab 12/02/22  0845 12/05/22  1335   CALCIUM 8.1* 7.9*   ALBUMIN 4.0 4.1   MG  --  1.7             No results for input(s): POCTGLUCOSE in the last 168 hours.          Recent Labs   Lab 12/02/22  0845 12/05/22  1335   WBC 8.4 9.32   HGB 13.0* 12.4*   HCT 38.3* 34.8*    236   MCV 99.7 98   MCHC 33.9 35.6   NEUTOPHILPCT 69.8  --    MONO 8.3  697 10.0  0.9       Recent  Labs   Lab 12/02/22  0845 12/05/22  1335   BILITOT 0.5 0.9   PROT 7.6 9.2*   ALBUMIN 4.0 4.1   ALKPHOS  --  89   ALT 61* 65*   AST 47* 34       Recent Labs   Lab 07/07/20  0934 10/29/20  1104 01/06/21  1153   Color, UA Yellow Yellow YELLOW   Appearance, UA  --  Clear CLEAR   Clarity, UA Clear  --   --    pH, UA 6.0 6.0 7.5   Specific Gravity, UA  --  1.025 1.019   Spec Grav UA 1.025  --   --    Protein, UA  --  Negative TRACE A   Glucose, UA  --  Negative  --    Ketones, UA n Negative NEGATIVE   Urobilinogen, UA n Negative  --    Bilirubin (UA)  --  Negative  --    Occult Blood UA  --  Negative NEGATIVE   Nitrite, UA n Negative NEGATIVE   Bacteria, UA  --   --  NONE SEEN   Hyaline Casts, UA  --   --  NONE SEEN             Microbiology Results (last 7 days)       ** No results found for the last 168 hours. **            ASSESSMENT/PLAN:     RALF  CKD stage 3  Uremia  Hyponatremia  Acidosis  History of kidney stones  Anemia  CHF after CABG x7  No NSAIDs, ACEI/ARB, IV contrast or other nephrotoxins.  Keep MAP > 60, SBP > 100.  Dose meds for GFR < 30 ml/min.  Renal diet - low K, low phos.  Hold Entresto.  Renal US and Echo. May need sarabia if obstructed.  Urinalysis and GN work-up.  No blood thinners! For now - may need a kidney biopsy soon.    Thank you for allowing us to participate in the care of your patient!   We will follow the patient and provide recommendations as needed.    Patient care time was spent personally by me on the following activities:     Obtaining a history.  Examination of patient.  Providing medical care at the patients bedside.  Developing a treatment plan with patient or surrogate and bedside caregivers.  Ordering and reviewing laboratory studies, radiographic studies, pulse oximetry.  Ordering and performing treatments and interventions.  Evaluation of patient's response to treatment.  Discussions with consultants while on the unit and immediately available to the patient.  Re-evaluation of the  patient's condition.  Documentation in the medical record.     Law Chiang MD    [facility]  [address] [place] [address], [place] [id number] [phone] - tel  [phone] - fax    12/5/2022

## 2022-12-05 NOTE — TELEPHONE ENCOUNTER
"Tried calling patient twice - no answer. I did leave one voicemail. No one is listed on his consent form however I did call the "spouse" and his mothers number listed in the chart so we could at least try and get a good number for him or a call back.  "

## 2022-12-05 NOTE — H&P
Formerly [facility] - Emergency Dept  Hospital Medicine  History & Physical    Patient Name: Lee Quintanilla  MRN: [id number]  Patient Class: IP- Inpatient  Admission Date: 12/5/2022  Attending Physician: Deborah Green MD   Primary Care Provider: Riya Vidales NP         Patient information was obtained from patient and ER records.     Subjective:     Principal Problem:Acute renal insufficiency    Chief Complaint:   Chief Complaint   Patient presents with    Abnormal Labs     Sent by PCP for BUN of 117 and Creatinine greater than 20         HPI: Lee Quintanilla is a 47 y.o. male with a history as  has a past medical history of ADHD (attention deficit hyperactivity disorder), Arthritis, Asthma, Back pain, Coronary artery disease, Degeneration of lumbar intervertebral disc (05/2016), Depression, Elevated PSA, Headache, Hyperlipidemia, Hypertension, Kidney damage, Kidney stone, Myocardial infarction, Neck pain, Numbness and tingling in hands, Numbness and tingling of both legs, Seizures, Sleep apnea, and Wears glasses. who presented to the ED with a Abnormal Labs (Sent by PCP for BUN of 117 and Creatinine greater than 20 )    Patient presented to the emergency room per PCP request for evaluation of abnormal labs.  Patient reports he was seen by Dr. Vidales last week with labs drawn.  Further states he was called and told to go to the emergency room for significant decline in renal function.  Patient wife at bedside reports, over the last few weeks, patient has been very tired, sleeping a lot, has had episodes of low blood pressure and syncopal episodes.  Patient reports occasionally nausea.  Additionally reports diarrhea over last couple of days and poor appetite.    Denies fever, chills, diaphoresis, SOB, dizziness, HA, chest pain, palpitations, urinary symptoms, recent trauma or any other associated symptoms. No aggravating and alleviating factor.    Lab and imaging obtained and reviewed.  CBC  shows RBCs 3.54 H/H 12.4/34.8 MCH 35 g% 76.4 lymph % 7.7.  Chemistry profile shows sodium 135 CO2 16 Ag 18  creatinine 21.0 GFR 2.4 calcium 7.9 total protein 9.2 ALT 65 lipase 94.  .  EKG non-ischemic. Chest x-ray without acute cardiopulmonary findings.  On admit, afebrile heart rate 110 respirations 20 blood pressure 106/63 sats 98% on room air    Echo 12/05/2022  Summary  The left ventricle is normal in size with mild concentric hypertrophy and moderately decreased systolic function.  The estimated ejection fraction is 38%.  Grade I left ventricular diastolic dysfunction.  There is moderate left ventricular global hypokinesis.  Normal right ventricular size with normal right ventricular systolic function.  Mild mitral regurgitation.  Normal central venous pressure (3 mmHg).    Per ED provider, patient presented for evaluation of decreased renal function.  Nephrology was consulted, will recommendations for echocardiogram, bladder scan and renal ultrasound.  Will admit for further workup per nephrology recommendations.                  Past Medical History:   Diagnosis Date    ADHD (attention deficit hyperactivity disorder)     Arthritis     Asthma     as child only    Back pain     Coronary artery disease     Degeneration of lumbar intervertebral disc 05/2016    Depression     Elevated PSA     Headache     Hyperlipidemia     Hypertension     Kidney damage     stage 3 ; d/t aleve abuse    Kidney stone     Myocardial infarction     Neck pain     Numbness and tingling in hands     Numbness and tingling of both legs     Seizures     from inapsine 2002    Sleep apnea     no cpap    Wears glasses     CONTACS       Past Surgical History:   Procedure Laterality Date    BACK SURGERY  2016    back surgery    BONE GRAFT N/A 11/5/2020    Procedure: BONE GRAFT;  Surgeon: Mateusz Blanco MD;  Location: [facility];  Service: Orthopedics;  Laterality: N/A;    CARDIAC SURGERY  01/06/2020    CABG X 7    CORONARY ARTERY  BYPASS GRAFT      7 vessels    HERNIA REPAIR Bilateral 11/22/2017    LITHOTRIPSY      LUMBAR LAMINECTOMY WITH FUSION Bilateral 11/5/2020    Procedure: LAMINECTOMY, SPINE, LUMBAR, WITH FUSION;  Surgeon: Mateusz Blanco MD;  Location: [facility] OR;  Service: Orthopedics;  Laterality: Bilateral;  MEDTRONIC  NTI  L-3    PILONIDAL CYST DRAINAGE      removal of abcess      scrotal    REMOVAL OF HARDWARE FROM SPINE Bilateral 11/5/2020    Procedure: REMOVAL, HARDWARE, SPINE;  Surgeon: Mateusz Blanco MD;  Location: [facility] OR;  Service: Orthopedics;  Laterality: Bilateral;  L 4-5    TYMPANOSTOMY TUBE PLACEMENT         Review of patient's allergies indicates:   Allergen Reactions    Inapsine [droperidol] Anaphylaxis     seizures    Effexor [venlafaxine]      Increased anxiety    Pcn [penicillins]     Bactrim [sulfamethoxazole-trimethoprim] Rash     Dry red rash all over when in the sun       No current facility-administered medications on file prior to encounter.     Current Outpatient Medications on File Prior to Encounter   Medication Sig    aspirin 81 MG Chew Take 81 mg by mouth once daily.    DULoxetine (CYMBALTA) 60 MG capsule Take 1 capsule (60 mg total) by mouth once daily.    ENTRESTO 49-51 mg per tablet TAKE ONE TABLET BY MOUTH TWICE A DAY AS DIRECTED    ezetimibe (ZETIA) 10 mg tablet Take 10 mg by mouth once daily.    hydrOXYzine HCL (ATARAX) 25 MG tablet Take 1 tablet (25 mg total) by mouth 3 (three) times daily as needed for Itching.    metoprolol tartrate (LOPRESSOR) 25 MG tablet Take 1 tablet (25 mg total) by mouth 2 (two) times a day.    rosuvastatin (CRESTOR) 40 MG Tab TAKE ONE TABLET BY MOUTH ONCE A DAY AS DIRECTED    testosterone cypionate (DEPOTESTOTERONE CYPIONATE) 200 mg/mL injection Inject 200 mg into the muscle every 7 days.    anastrozole (ARIMIDEX) 1 mg Tab Take 1 mg by mouth once a week.    azelastine (ASTELIN) 137 mcg (0.1 %) nasal spray 1 spray (137 mcg total) by Nasal route 2 (two) times daily.     tadalafiL (CIALIS) 20 MG Tab Take 1/2 to 1 tablet as needed prior to intercourse. No more than 1 in 36 hours    [DISCONTINUED] busPIRone (BUSPAR) 10 MG tablet Take 1 tablet (10 mg total) by mouth 2 (two) times daily.     Family History       Problem Relation (Age of Onset)    Diabetes Maternal Aunt, Maternal Uncle, Maternal Grandfather    Early death Father    Heart disease Mother, Father    Hypertension Mother    Migraines Mother          Tobacco Use    Smoking status: Former     Packs/day: 0.25     Types: Cigarettes     Quit date: 2016     Years since quittin.9    Smokeless tobacco: Former     Quit date: 2016    Tobacco comments:     occasional   Substance and Sexual Activity    Alcohol use: Yes     Alcohol/week: 1.0 standard drink     Types: 1 Glasses of wine per week     Comment: rare    Drug use: No    Sexual activity: Yes     Partners: Female     Review of Systems   Constitutional:  Positive for appetite change and fatigue. Negative for chills, diaphoresis and fever.   HENT:  Negative for congestion, postnasal drip, sinus pressure and sore throat.    Eyes:  Negative for visual disturbance.   Respiratory:  Negative for cough, chest tightness, shortness of breath and wheezing.    Cardiovascular:  Negative for chest pain, palpitations and leg swelling.   Gastrointestinal:  Positive for diarrhea and nausea. Negative for abdominal distention, abdominal pain, blood in stool, constipation and vomiting.   Endocrine: Negative.    Genitourinary:  Negative for dysuria.   Musculoskeletal: Negative.    Skin: Negative.    Allergic/Immunologic: Negative.    Neurological:  Negative for dizziness, weakness, numbness and headaches.   Hematological: Negative.    Psychiatric/Behavioral: Negative.     Objective:     Vital Signs (Most Recent):  Temp: 98.1 °F (36.7 °C) (22 1236)  Pulse: 69 (22 1600)  Resp: 18 (22 1600)  BP: (!) 98/54 (22 1600)  SpO2: 100 % (22 1600)   Vital Signs (24h  Range):  Temp:  [98.1 °F (36.7 °C)] 98.1 °F (36.7 °C)  Pulse:  [] 69  Resp:  [18-20] 18  SpO2:  [98 %-100 %] 100 %  BP: ()/(54-63) 98/54     Weight: 108.4 kg (239 lb)  Body mass index is 37.43 kg/m².    Physical Exam  Constitutional:       General: He is not in acute distress.     Appearance: Normal appearance. He is well-developed. He is not toxic-appearing or diaphoretic.   HENT:      Head: Normocephalic and atraumatic.   Eyes:      General: Lids are normal.      Conjunctiva/sclera: Conjunctivae normal.      Pupils: Pupils are equal, round, and reactive to light.   Neck:      Thyroid: No thyroid mass or thyromegaly.      Vascular: Normal carotid pulses. No JVD.      Trachea: Trachea normal. No tracheal deviation.   Cardiovascular:      Rate and Rhythm: Normal rate and regular rhythm.      Pulses: Normal pulses.      Heart sounds: Normal heart sounds, S1 normal and S2 normal.   Pulmonary:      Effort: Pulmonary effort is normal.      Breath sounds: Normal breath sounds. No stridor.   Abdominal:      General: Bowel sounds are normal.      Palpations: Abdomen is soft.      Tenderness: There is no abdominal tenderness.   Musculoskeletal:         General: Normal range of motion.      Cervical back: Full passive range of motion without pain, normal range of motion and neck supple.   Skin:     General: Skin is warm and dry.      Nails: There is no clubbing.   Neurological:      Mental Status: He is alert and oriented to person, place, and time.      Cranial Nerves: No cranial nerve deficit.      Sensory: No sensory deficit.   Psychiatric:         Speech: Speech normal.         Behavior: Behavior normal. Behavior is cooperative.         Thought Content: Thought content normal.         Judgment: Judgment normal.         CRANIAL NERVES     CN III, IV, VI   Pupils are equal, round, and reactive to light.     Significant Labs: All pertinent labs within the past 24 hours have been reviewed.  Bilirubin:   Recent  Labs   Lab 12/02/22  0845 12/05/22  1335   BILITOT 0.5 0.9     BMP:   Recent Labs   Lab 12/05/22  1335   GLU 73   *   K 4.7      CO2 16*   *   CREATININE 21.0*   CALCIUM 7.9*   MG 1.7     CBC:   Recent Labs   Lab 12/05/22  1335   WBC 9.32   HGB 12.4*   HCT 34.8*        CMP:   Recent Labs   Lab 12/05/22  1335   *   K 4.7      CO2 16*   GLU 73   *   CREATININE 21.0*   CALCIUM 7.9*   PROT 9.2*   ALBUMIN 4.1   BILITOT 0.9   ALKPHOS 89   AST 34   ALT 65*   ANIONGAP 18*     Lipase:   Recent Labs   Lab 12/05/22  1335   LIPASE 94*     Magnesium:   Recent Labs   Lab 12/05/22  1335   MG 1.7     Urine Studies:   Recent Labs   Lab 12/05/22  1606   COLORU Yellow   APPEARANCEUA Clear   PHUR 6.0   SPECGRAV 1.010   PROTEINUA 1+*   GLUCUA 1+*   KETONESU Negative   BILIRUBINUA Negative   OCCULTUA 2+*   NITRITE Negative   UROBILINOGEN Negative   LEUKOCYTESUR Negative   RBCUA 1   WBCUA 5   BACTERIA Negative   SQUAMEPITHEL 2   HYALINECASTS 6*       Significant Imaging: I have reviewed all pertinent imaging results/findings within the past 24 hours.  X-Ray Chest PA And Lateral    Result Date: 12/5/2022  Reason: Sent by PCP for BUN of 117 and Creatinine greater than 20 FINDINGS: PA and lateral chest compared with 1/14/2022 show normal cardiomediastinal silhouette with postsurgical changes of CABG. Lungs are clear. Pulmonary vasculature is normal. Bones are normal. IMPRESSION: No acute cardiopulmonary abnormality. Electronically signed by:  Tung Michaels MD  12/5/2022 2:06 PM CST Workstation: [id number]    Echo Saline Bubble? No    Result Date: 12/5/2022  · The left ventricle is normal in size with mild concentric hypertrophy and moderately decreased systolic function. · The estimated ejection fraction is 38%. · Grade I left ventricular diastolic dysfunction. · There is moderate left ventricular global hypokinesis. · Normal right ventricular size with normal right ventricular systolic function.  · Mild mitral regurgitation. · Normal central venous pressure (3 mmHg).         Assessment/Plan:     Active Hospital Problems    Diagnosis  POA    *Acute renal insufficiency [N28.9]  Yes     Priority: 1 - High    Gastroesophageal reflux disease without esophagitis [K21.9]  Yes    Hx of CABG [Z95.1]  Not Applicable    Coronary artery disease involving coronary bypass graft [I25.810]  Yes    Hypertension [I10]  Yes    Hyperlipidemia [E78.5]  Yes    Severe obesity with body mass index (BMI) of 35.0 to 39.9 with serious comorbidity [E66.01]  Yes      Resolved Hospital Problems   No resolved problems to display.     47-year-old male with history as above presented to the emergency room on request of his primary care provider for abnormal labs, significant for decreased renal function    ECHO completed showed EF 38%    Renal US negative for hydronephrosis     Plan:  Admit inpatient - telemetry  Gentle IVF hydration  Avoid nephrotoxic agents  Strict I/O's and daily labs  Renally dose medications   Nephrology consult appreciated   Daily labs + Iron panel and TSH   Electrolytes sliding scale repletion  Continue chronic home medications  Further plan as per clinical course      VTE Risk Mitigation (From admission, onward)           Ordered     heparin (porcine) injection 5,000 Units  Every 8 hours         12/05/22 1652     IP VTE HIGH RISK PATIENT  Once         12/05/22 1652     Place sequential compression device  Until discontinued         12/05/22 1652                       ANDERS Holder  Department of Hospital Medicine   [facility] - Emergency Dept

## 2022-12-05 NOTE — SUBJECTIVE & OBJECTIVE
Past Medical History:   Diagnosis Date    ADHD (attention deficit hyperactivity disorder)     Arthritis     Asthma     as child only    Back pain     Coronary artery disease     Degeneration of lumbar intervertebral disc 05/2016    Depression     Elevated PSA     Headache     Hyperlipidemia     Hypertension     Kidney damage     stage 3 ; d/t aleve abuse    Kidney stone     Myocardial infarction     Neck pain     Numbness and tingling in hands     Numbness and tingling of both legs     Seizures     from inapsine 2002    Sleep apnea     no cpap    Wears glasses     CONTACS       Past Surgical History:   Procedure Laterality Date    BACK SURGERY  2016    back surgery    BONE GRAFT N/A 11/5/2020    Procedure: BONE GRAFT;  Surgeon: Mateusz Blanco MD;  Location: [facility] OR;  Service: Orthopedics;  Laterality: N/A;    CARDIAC SURGERY  01/06/2020    CABG X 7    CORONARY ARTERY BYPASS GRAFT      7 vessels    HERNIA REPAIR Bilateral 11/22/2017    LITHOTRIPSY      LUMBAR LAMINECTOMY WITH FUSION Bilateral 11/5/2020    Procedure: LAMINECTOMY, SPINE, LUMBAR, WITH FUSION;  Surgeon: Mateusz Blanco MD;  Location: [facility] OR;  Service: Orthopedics;  Laterality: Bilateral;  MEDTRONIC  NTI  L-3    PILONIDAL CYST DRAINAGE      removal of abcess      scrotal    REMOVAL OF HARDWARE FROM SPINE Bilateral 11/5/2020    Procedure: REMOVAL, HARDWARE, SPINE;  Surgeon: Mateusz Blanco MD;  Location: [facility] OR;  Service: Orthopedics;  Laterality: Bilateral;  L 4-5    TYMPANOSTOMY TUBE PLACEMENT         Review of patient's allergies indicates:   Allergen Reactions    Inapsine [droperidol] Anaphylaxis     seizures    Effexor [venlafaxine]      Increased anxiety    Pcn [penicillins]     Bactrim [sulfamethoxazole-trimethoprim] Rash     Dry red rash all over when in the sun       No current facility-administered medications on file prior to encounter.     Current Outpatient Medications on File Prior to Encounter   Medication Sig    aspirin 81 MG Chew  Take 81 mg by mouth once daily.    DULoxetine (CYMBALTA) 60 MG capsule Take 1 capsule (60 mg total) by mouth once daily.    ENTRESTO 49-51 mg per tablet TAKE ONE TABLET BY MOUTH TWICE A DAY AS DIRECTED    ezetimibe (ZETIA) 10 mg tablet Take 10 mg by mouth once daily.    hydrOXYzine HCL (ATARAX) 25 MG tablet Take 1 tablet (25 mg total) by mouth 3 (three) times daily as needed for Itching.    metoprolol tartrate (LOPRESSOR) 25 MG tablet Take 1 tablet (25 mg total) by mouth 2 (two) times a day.    rosuvastatin (CRESTOR) 40 MG Tab TAKE ONE TABLET BY MOUTH ONCE A DAY AS DIRECTED    testosterone cypionate (DEPOTESTOTERONE CYPIONATE) 200 mg/mL injection Inject 200 mg into the muscle every 7 days.    anastrozole (ARIMIDEX) 1 mg Tab Take 1 mg by mouth once a week.    azelastine (ASTELIN) 137 mcg (0.1 %) nasal spray 1 spray (137 mcg total) by Nasal route 2 (two) times daily.    tadalafiL (CIALIS) 20 MG Tab Take 1/2 to 1 tablet as needed prior to intercourse. No more than 1 in 36 hours    [DISCONTINUED] busPIRone (BUSPAR) 10 MG tablet Take 1 tablet (10 mg total) by mouth 2 (two) times daily.     Family History       Problem Relation (Age of Onset)    Diabetes Maternal Aunt, Maternal Uncle, Maternal Grandfather    Early death Father    Heart disease Mother, Father    Hypertension Mother    Migraines Mother          Tobacco Use    Smoking status: Former     Packs/day: 0.25     Types: Cigarettes     Quit date: 2016     Years since quittin.9    Smokeless tobacco: Former     Quit date: 2016    Tobacco comments:     occasional   Substance and Sexual Activity    Alcohol use: Yes     Alcohol/week: 1.0 standard drink     Types: 1 Glasses of wine per week     Comment: rare    Drug use: No    Sexual activity: Yes     Partners: Female     Review of Systems   Constitutional:  Positive for appetite change and fatigue. Negative for chills, diaphoresis and fever.   HENT:  Negative for congestion, postnasal drip, sinus pressure and  sore throat.    Eyes:  Negative for visual disturbance.   Respiratory:  Negative for cough, chest tightness, shortness of breath and wheezing.    Cardiovascular:  Negative for chest pain, palpitations and leg swelling.   Gastrointestinal:  Positive for diarrhea and nausea. Negative for abdominal distention, abdominal pain, blood in stool, constipation and vomiting.   Endocrine: Negative.    Genitourinary:  Negative for dysuria.   Musculoskeletal: Negative.    Skin: Negative.    Allergic/Immunologic: Negative.    Neurological:  Negative for dizziness, weakness, numbness and headaches.   Hematological: Negative.    Psychiatric/Behavioral: Negative.     Objective:     Vital Signs (Most Recent):  Temp: 98.1 °F (36.7 °C) (12/05/22 1236)  Pulse: 69 (12/05/22 1600)  Resp: 18 (12/05/22 1600)  BP: (!) 98/54 (12/05/22 1600)  SpO2: 100 % (12/05/22 1600)   Vital Signs (24h Range):  Temp:  [98.1 °F (36.7 °C)] 98.1 °F (36.7 °C)  Pulse:  [] 69  Resp:  [18-20] 18  SpO2:  [98 %-100 %] 100 %  BP: ()/(54-63) 98/54     Weight: 108.4 kg (239 lb)  Body mass index is 37.43 kg/m².    Physical Exam  Constitutional:       General: He is not in acute distress.     Appearance: Normal appearance. He is well-developed. He is not toxic-appearing or diaphoretic.   HENT:      Head: Normocephalic and atraumatic.   Eyes:      General: Lids are normal.      Conjunctiva/sclera: Conjunctivae normal.      Pupils: Pupils are equal, round, and reactive to light.   Neck:      Thyroid: No thyroid mass or thyromegaly.      Vascular: Normal carotid pulses. No JVD.      Trachea: Trachea normal. No tracheal deviation.   Cardiovascular:      Rate and Rhythm: Normal rate and regular rhythm.      Pulses: Normal pulses.      Heart sounds: Normal heart sounds, S1 normal and S2 normal.   Pulmonary:      Effort: Pulmonary effort is normal.      Breath sounds: Normal breath sounds. No stridor.   Abdominal:      General: Bowel sounds are normal.       Palpations: Abdomen is soft.      Tenderness: There is no abdominal tenderness.   Musculoskeletal:         General: Normal range of motion.      Cervical back: Full passive range of motion without pain, normal range of motion and neck supple.   Skin:     General: Skin is warm and dry.      Nails: There is no clubbing.   Neurological:      Mental Status: He is alert and oriented to person, place, and time.      Cranial Nerves: No cranial nerve deficit.      Sensory: No sensory deficit.   Psychiatric:         Speech: Speech normal.         Behavior: Behavior normal. Behavior is cooperative.         Thought Content: Thought content normal.         Judgment: Judgment normal.         CRANIAL NERVES     CN III, IV, VI   Pupils are equal, round, and reactive to light.     Significant Labs: All pertinent labs within the past 24 hours have been reviewed.  Bilirubin:   Recent Labs   Lab 12/02/22  0845 12/05/22  1335   BILITOT 0.5 0.9     BMP:   Recent Labs   Lab 12/05/22  1335   GLU 73   *   K 4.7      CO2 16*   *   CREATININE 21.0*   CALCIUM 7.9*   MG 1.7     CBC:   Recent Labs   Lab 12/05/22  1335   WBC 9.32   HGB 12.4*   HCT 34.8*        CMP:   Recent Labs   Lab 12/05/22  1335   *   K 4.7      CO2 16*   GLU 73   *   CREATININE 21.0*   CALCIUM 7.9*   PROT 9.2*   ALBUMIN 4.1   BILITOT 0.9   ALKPHOS 89   AST 34   ALT 65*   ANIONGAP 18*     Lipase:   Recent Labs   Lab 12/05/22  1335   LIPASE 94*     Magnesium:   Recent Labs   Lab 12/05/22  1335   MG 1.7     Urine Studies:   Recent Labs   Lab 12/05/22  1606   COLORU Yellow   APPEARANCEUA Clear   PHUR 6.0   SPECGRAV 1.010   PROTEINUA 1+*   GLUCUA 1+*   KETONESU Negative   BILIRUBINUA Negative   OCCULTUA 2+*   NITRITE Negative   UROBILINOGEN Negative   LEUKOCYTESUR Negative   RBCUA 1   WBCUA 5   BACTERIA Negative   SQUAMEPITHEL 2   HYALINECASTS 6*       Significant Imaging: I have reviewed all pertinent imaging results/findings within  the past 24 hours.  X-Ray Chest PA And Lateral    Result Date: 12/5/2022  Reason: Sent by PCP for BUN of 117 and Creatinine greater than 20 FINDINGS: PA and lateral chest compared with 1/14/2022 show normal cardiomediastinal silhouette with postsurgical changes of CABG. Lungs are clear. Pulmonary vasculature is normal. Bones are normal. IMPRESSION: No acute cardiopulmonary abnormality. Electronically signed by:  Tung Michaels MD  12/5/2022 2:06 PM CST Workstation: [id number]    Echo Saline Bubble? No    Result Date: 12/5/2022  · The left ventricle is normal in size with mild concentric hypertrophy and moderately decreased systolic function. · The estimated ejection fraction is 38%. · Grade I left ventricular diastolic dysfunction. · There is moderate left ventricular global hypokinesis. · Normal right ventricular size with normal right ventricular systolic function. · Mild mitral regurgitation. · Normal central venous pressure (3 mmHg).

## 2022-12-05 NOTE — TELEPHONE ENCOUNTER
Patient called back and was advised to go directly to the emergency room with his critical lab results States he is not having any active symptoms and no trouble urinating, patient asked if he can wait until tomorrow. I expressed the urgency of this matter and let him know we recommend him heading over immediately. Patient verbalized understanding and states he will go to [facility] now.

## 2022-12-05 NOTE — ED PROVIDER NOTES
Encounter Date: 12/5/2022       History     Chief Complaint   Patient presents with    Abnormal Labs     Sent by PCP for BUN of 117 and Creatinine greater than 20      47-year-old male with history of asthma, ADHD, coronary artery disease, hypertension, hyperlipidemia, chronic kidney disease stage 3.  Patient sent for abnormal outpatient labs which were obtained 3 days ago.  Patient found to have a BUN of 117, creatinine greater than 20.  Patient's baseline creatinine noted to be 1.3 approximately 1 month ago.  Patient followed by Dr. Mansfield, primary care.  Dr. James cardiology.    Review of patient's allergies indicates:   Allergen Reactions    Inapsine [droperidol] Anaphylaxis     seizures    Effexor [venlafaxine]      Increased anxiety    Pcn [penicillins]     Bactrim [sulfamethoxazole-trimethoprim] Rash     Dry red rash all over when in the sun     Past Medical History:   Diagnosis Date    ADHD (attention deficit hyperactivity disorder)     Arthritis     Asthma     as child only    Back pain     Coronary artery disease     Degeneration of lumbar intervertebral disc 05/2016    Depression     Elevated PSA     Headache     Hyperlipidemia     Hypertension     Kidney damage     stage 3 ; d/t aleve abuse    Kidney stone     Myocardial infarction     Neck pain     Numbness and tingling in hands     Numbness and tingling of both legs     Seizures     from inapsine 2002    Sleep apnea     no cpap    Wears glasses     CONTACS     Past Surgical History:   Procedure Laterality Date    BACK SURGERY  2016    back surgery    BONE GRAFT N/A 11/5/2020    Procedure: BONE GRAFT;  Surgeon: Mateusz Blanco MD;  Location: [facility];  Service: Orthopedics;  Laterality: N/A;    CARDIAC SURGERY  01/06/2020    CABG X 7    CORONARY ARTERY BYPASS GRAFT      7 vessels    HERNIA REPAIR Bilateral 11/22/2017    LITHOTRIPSY      LUMBAR LAMINECTOMY WITH FUSION Bilateral 11/5/2020    Procedure: LAMINECTOMY, SPINE, LUMBAR, WITH FUSION;  Surgeon:  Mateusz Blanco MD;  Location: [facility] OR;  Service: Orthopedics;  Laterality: Bilateral;  MEDTRONIC  NTI  L-3    PILONIDAL CYST DRAINAGE      removal of abcess      scrotal    REMOVAL OF HARDWARE FROM SPINE Bilateral 2020    Procedure: REMOVAL, HARDWARE, SPINE;  Surgeon: Mateusz Blanco MD;  Location: [facility] OR;  Service: Orthopedics;  Laterality: Bilateral;  L 4-5    TYMPANOSTOMY TUBE PLACEMENT       Family History   Problem Relation Age of Onset    Heart disease Mother     Migraines Mother     Hypertension Mother     Early death Father     Heart disease Father     Diabetes Maternal Aunt     Diabetes Maternal Uncle     Diabetes Maternal Grandfather      Social History     Tobacco Use    Smoking status: Former     Packs/day: 0.25     Types: Cigarettes     Quit date: 2016     Years since quittin.9    Smokeless tobacco: Former     Quit date: 2016    Tobacco comments:     occasional   Substance Use Topics    Alcohol use: Yes     Alcohol/week: 1.0 standard drink     Types: 1 Glasses of wine per week     Comment: rare    Drug use: No     Review of Systems   Constitutional:  Negative for fever.   HENT:  Negative for sore throat.    Respiratory:  Negative for shortness of breath.    Cardiovascular:  Negative for chest pain.   Gastrointestinal:  Positive for nausea and vomiting.   Genitourinary:  Negative for dysuria.   Musculoskeletal:  Negative for back pain.   Skin:  Negative for rash.   Neurological:  Positive for weakness. Negative for dizziness and light-headedness.   Hematological:  Does not bruise/bleed easily.     Physical Exam     Initial Vitals [22 1236]   BP Pulse Resp Temp SpO2   106/63 110 20 98.1 °F (36.7 °C) 98 %      MAP       --         Physical Exam    Nursing note and vitals reviewed.  Constitutional: He appears well-developed and well-nourished.   HENT:   Head: Normocephalic and atraumatic.   Nose: Nose normal.   Mouth/Throat: Oropharynx is clear and moist.   Eyes: Conjunctivae and  EOM are normal. Pupils are equal, round, and reactive to light. No scleral icterus.   Neck: Neck supple.   Normal range of motion.  Cardiovascular:  Normal rate, regular rhythm, normal heart sounds and intact distal pulses.     Exam reveals no gallop and no friction rub.       No murmur heard.  Pulmonary/Chest: No stridor. No respiratory distress.   Course bilateral breath sounds no adventitious sounds   Abdominal: Abdomen is soft. Bowel sounds are normal. He exhibits no mass. There is no abdominal tenderness. There is no rebound and no guarding.   Musculoskeletal:         General: No tenderness or edema. Normal range of motion.      Cervical back: Normal range of motion and neck supple.     Lymphadenopathy:     He has no cervical adenopathy.   Neurological: He is alert and oriented to person, place, and time. He has normal strength and normal reflexes. No cranial nerve deficit or sensory deficit. GCS score is 15. GCS eye subscore is 4. GCS verbal subscore is 5. GCS motor subscore is 6.   Skin: Skin is warm and dry. Capillary refill takes less than 2 seconds. No rash noted.   Psychiatric: He has a normal mood and affect. His behavior is normal. Judgment and thought content normal.       ED Course   Procedures  Labs Reviewed   CBC W/ AUTO DIFFERENTIAL - Abnormal; Notable for the following components:       Result Value    RBC 3.54 (*)     Hemoglobin 12.4 (*)     Hematocrit 34.8 (*)     MCH 35.0 (*)     Immature Granulocytes 0.6 (*)     Immature Grans (Abs) 0.06 (*)     Lymph # 0.7 (*)     Gran % 76.4 (*)     Lymph % 7.7 (*)     All other components within normal limits   COMPREHENSIVE METABOLIC PANEL - Abnormal; Notable for the following components:    Sodium 135 (*)     CO2 16 (*)      (*)     Creatinine 21.0 (*)     Calcium 7.9 (*)     Total Protein 9.2 (*)     ALT 65 (*)     Anion Gap 18 (*)     eGFR 2.4 (*)     All other components within normal limits   LIPASE - Abnormal; Notable for the following  components:    Lipase 94 (*)     All other components within normal limits   CK - Abnormal; Notable for the following components:     (*)     All other components within normal limits   URINALYSIS - Abnormal; Notable for the following components:    Protein, UA 1+ (*)     Glucose, UA 1+ (*)     Occult Blood UA 2+ (*)     All other components within normal limits    Narrative:     Collection Type->Urine, Clean Catch   URINALYSIS MICROSCOPIC - Abnormal; Notable for the following components:    Hyaline Casts, UA 6 (*)     All other components within normal limits    Narrative:     Collection Type->Urine, Clean Catch   MAGNESIUM   SARS-COV-2 RNA AMPLIFICATION, QUAL   RETICULOCYTES   RAVEN + SUSHIL + C3 + C4 RHEUMATOLOGY PANEL   ANCA PANEL W/MPO & PR3   HEPATITIS PANEL, COMPREHENSIVE   PROTEIN ELECTROPHORESIS, SERUM   POCT GLUCOSE, HAND-HELD DEVICE        ECG Results              EKG 12-lead (In process)  Result time 12/05/22 14:15:54      In process by Interface, Lab In [facility] (12/05/22 14:15:54)                   Narrative:    Test Reason : N19,    Vent. Rate : 073 BPM     Atrial Rate : 073 BPM     P-R Int : 160 ms          QRS Dur : 088 ms      QT Int : 406 ms       P-R-T Axes : 028 023 231 degrees     QTc Int : 447 ms    Normal sinus rhythm  Low voltage QRS  Septal infarct (cited on or before 22-OCT-2019)  Possible Lateral infarct (cited on or before 22-OCT-2019)  Inferior infarct (cited on or before 22-OCT-2019)  Abnormal ECG  When compared with ECG of 14-JAN-2022 13:52,  T wave inversion more evident in Lateral leads    Referred By: [email or web address]   SELF           Confirmed By:                                   Imaging Results              US Kidney (Final result)  Result time 12/05/22 17:15:28      Final result by Tung Michaels MD (12/05/22 17:15:28)                   Narrative:    Reason:Acute kidney failure    COMPARISON: None    FINDINGS:  Right kidney measures 9.6 cm in length, and left kidney measures 10.6 cm.  Kidneys maintain normal cortical thickness and echogenicity without hydronephrosis.    Bladder is unremarkable. Juxtahepatic IVC is unremarkable. Visualized aorta is nonaneurysmal. Aortic bifurcation is obscured by bowel gas. Increased echogenicity throughout partially visualized liver suggesting hepatic steatosis. Small cyst suggested within the liver.    IMPRESSION:  Negative for hydronephrosis.    Electronically signed by:  Tung Michaels MD  12/5/2022 5:15 PM CST Workstation: [id number]                                     X-Ray Chest PA And Lateral (Final result)  Result time 12/05/22 14:06:06      Final result by Tung Michaels MD (12/05/22 14:06:06)                   Narrative:    Reason: Sent by PCP for BUN of 117 and Creatinine greater than 20    FINDINGS:  PA and lateral chest compared with 1/14/2022 show normal cardiomediastinal silhouette with postsurgical changes of CABG.    Lungs are clear. Pulmonary vasculature is normal. Bones are normal.    IMPRESSION:  No acute cardiopulmonary abnormality.    Electronically signed by:  Tung Michaels MD  12/5/2022 2:06 PM CST Workstation: [id number]                                     Medications   ezetimibe tablet 10 mg (10 mg Oral Given 12/6/22 0914)   metoprolol tartrate (LOPRESSOR) tablet 25 mg (25 mg Oral Given 12/6/22 0915)   atorvastatin tablet 80 mg (has no administration in time range)   sodium chloride 0.9% flush 10 mL (has no administration in time range)   naloxone 0.4 mg/mL injection 0.02 mg (has no administration in time range)   glucose chewable tablet 16 g (has no administration in time range)   glucose chewable tablet 24 g (has no administration in time range)   glucagon (human recombinant) injection 1 mg (has no administration in time range)   dextrose 10% bolus 125 mL (has no administration in time range)   dextrose 10% bolus 250 mL (has no administration in time range)   heparin (porcine) injection 5,000 Units (5,000 Units Subcutaneous Given 12/6/22  1413)   potassium bicarbonate disintegrating tablet 50 mEq (has no administration in time range)   potassium bicarbonate disintegrating tablet 35 mEq (has no administration in time range)   potassium bicarbonate disintegrating tablet 60 mEq (has no administration in time range)   magnesium oxide tablet 800 mg (has no administration in time range)   magnesium oxide tablet 800 mg (has no administration in time range)   potassium, sodium phosphates 280-160-250 mg packet 2 packet (has no administration in time range)   potassium, sodium phosphates 280-160-250 mg packet 2 packet (has no administration in time range)   potassium, sodium phosphates 280-160-250 mg packet 2 packet (has no administration in time range)   acetaminophen tablet 650 mg (650 mg Oral Given 12/6/22 1043)   ondansetron injection 4 mg (has no administration in time range)   insulin aspart U-100 pen 0-5 Units (2 Units Subcutaneous Given 12/6/22 1217)   albuterol-ipratropium 2.5 mg-0.5 mg/3 mL nebulizer solution 3 mL (has no administration in time range)   melatonin tablet 6 mg (has no administration in time range)   simethicone chewable tablet 80 mg (has no administration in time range)   aluminum-magnesium hydroxide-simethicone 200-200-20 mg/5 mL suspension 30 mL (has no administration in time range)   pantoprazole EC tablet 40 mg (40 mg Oral Given 12/6/22 0600)   hydrALAZINE injection 10 mg (has no administration in time range)   sodium bicarbonate 150 mEq in dextrose 5 % 1,150 mL infusion ( Intravenous New Bag 12/6/22 1215)   sevelamer carbonate tablet 1,600 mg (1,600 mg Oral Given 12/6/22 1727)     Medical Decision Making:   Initial Assessment:   47-year-old male with history of asthma, ADHD, coronary artery disease, hypertension, hyperlipidemia, chronic kidney disease stage 3.  Patient sent for abnormal outpatient labs which were obtained 3 days ago.  Patient found to have a BUN of 117, creatinine greater than 20.  Patient's baseline creatinine  noted to be 1.3 approximately 1 month ago.  Patient followed by Dr. Mansfield, primary care.  Dr. James cardiology.    Differential Diagnosis:   Acute renal failure, volume depletion, dehydration, lab error   Clinical Tests:   Lab Tests: Ordered and Reviewed  Radiological Study: Ordered and Reviewed  Medical Tests: Ordered and Reviewed  ED Management:  Patient seen evaluated emergency department.  Currently at this time patient was found to have a creatinine of 20, .   Potassium 4.1.  Bicarb 16.  Patient was seen by Nephrology in emergency department.  At this time patient will undergo renal ultrasound, 2D echo, also will be placed on IV hydration normal saline at 120 cc an hour.  Patient will be admitted to [facility] telemetry.  Currently remained hemodynamically adequate.          Attending Attestation:         Attending Critical Care:   Critical Care Times:   Direct Patient Care (initial evaluation, reassessments, and time considering the case)................................................................30 minutes.   Additional History from reviewing old medical records or taking additional history from the family, EMS, PCP, etc.......................10 minutes.   Ordering, Reviewing, and Interpreting Diagnostic Studies...............................................................................................................10 minutes.   Documentation..................................................................................................................................................................................10 minutes.   Consultation with other Physicians. .................................................................................................................................................10 minutes.   Consultation with the patient's family directly relating to the patient's condition, care, and DNR status (when patient unable)......5 minutes.    ==============================================================  Total Critical Care Time - exclusive of procedural time: 75 minutes.  ==============================================================  Critical care was necessary to treat or prevent imminent or life-threatening deterioration of the following conditions: metabolic crisis.   Critical care was time spent personally by me on the following activities: obtaining history from patient or relative, examination of patient, review of x-rays / CT sent with the patient, review of old charts, ordering lab, x-rays, and/or EKG, development of treatment plan with patient or relative, ordering and performing treatments and interventions, evaluation of patient's response to treatment, discussion with consultants and re-evaluation of patient's conition.   Critical Care Condition: potentially life-threatening                      Clinical Impression:   Final diagnoses:  [R53.83] Fatigue  [N19] Kidney failure  [N17.9] Acute kidney failure  [N28.9] Acute renal insufficiency        ED Disposition Condition    Admit Stable                Daniel Carter MD  12/06/22 1800

## 2022-12-05 NOTE — FIRST PROVIDER EVALUATION
"Medical screening examination initiated.  I have conducted a focused provider triage encounter, findings are as follows:    Brief history of present illness: abnormal labs    Vitals:    12/05/22 1236   BP: 106/63   BP Location: Left arm   Patient Position: Sitting   Pulse: 110   Resp: 20   Temp: 98.1 °F (36.7 °C)   TempSrc: Oral   SpO2: 98%   Weight: 108.4 kg (239 lb)   Height: 5' 7" (1.702 m)       Pertinent physical exam:  Sent by PCP for abnormal labs    Brief workup plan: repeat labs     Preliminary workup initiated; this workup will be continued and followed by the physician or advanced practice provider that is assigned to the patient when roomed.  "

## 2022-12-05 NOTE — HPI
Lee Quintanilla is a 47 y.o. male with a history as  has a past medical history of ADHD (attention deficit hyperactivity disorder), Arthritis, Asthma, Back pain, Coronary artery disease, Degeneration of lumbar intervertebral disc (05/2016), Depression, Elevated PSA, Headache, Hyperlipidemia, Hypertension, Kidney damage, Kidney stone, Myocardial infarction, Neck pain, Numbness and tingling in hands, Numbness and tingling of both legs, Seizures, Sleep apnea, and Wears glasses. who presented to the ED with a Abnormal Labs (Sent by PCP for BUN of 117 and Creatinine greater than 20 )    Patient presented to the emergency room per PCP request for evaluation of abnormal labs.  Patient reports he was seen by Dr. Vidales last week with labs drawn.  Further states he was called and told to go to the emergency room for significant decline in renal function.  Patient wife at bedside reports, over the last few weeks, patient has been very tired, sleeping a lot, has had episodes of low blood pressure and syncopal episodes.  Patient reports occasionally nausea.  Additionally reports diarrhea over last couple of days and poor appetite.    Denies fever, chills, diaphoresis, SOB, dizziness, HA, chest pain, palpitations, urinary symptoms, recent trauma or any other associated symptoms. No aggravating and alleviating factor.    Lab and imaging obtained and reviewed.  CBC shows RBCs 3.54 H/H 12.4/34.8 MCH 35 g% 76.4 lymph % 7.7.  Chemistry profile shows sodium 135 CO2 16 Ag 18  creatinine 21.0 GFR 2.4 calcium 7.9 total protein 9.2 ALT 65 lipase 94.  .  EKG non-ischemic. Chest x-ray without acute cardiopulmonary findings.  On admit, afebrile heart rate 110 respirations 20 blood pressure 106/63 sats 98% on room air    Echo 12/05/2022  Summary  The left ventricle is normal in size with mild concentric hypertrophy and moderately decreased systolic function.  The estimated ejection fraction is 38%.  Grade I left  ventricular diastolic dysfunction.  There is moderate left ventricular global hypokinesis.  Normal right ventricular size with normal right ventricular systolic function.  Mild mitral regurgitation.  Normal central venous pressure (3 mmHg).    Per ED provider, patient presented for evaluation of decreased renal function.  Nephrology was consulted, will recommendations for echocardiogram, bladder scan and renal ultrasound.  Will admit for further workup per nephrology recommendations.

## 2022-12-05 NOTE — TELEPHONE ENCOUNTER
Tried calling patient again and left another voicemail to call back asap. Portal message also sent as he was active 2 days ago.

## 2022-12-06 LAB
ALBUMIN SERPL BCP-MCNC: 3.3 G/DL (ref 3.5–5.2)
ALBUMIN SERPL BCP-MCNC: 3.3 G/DL (ref 3.5–5.2)
ALP SERPL-CCNC: 74 U/L (ref 55–135)
ALT SERPL W/O P-5'-P-CCNC: 62 U/L (ref 10–44)
ANION GAP SERPL CALC-SCNC: 16 MMOL/L (ref 8–16)
ANION GAP SERPL CALC-SCNC: 16 MMOL/L (ref 8–16)
AST SERPL-CCNC: 53 U/L (ref 10–40)
BACTERIA #/AREA URNS HPF: NEGATIVE /HPF
BASOPHILS # BLD AUTO: 0.07 K/UL (ref 0–0.2)
BASOPHILS # BLD AUTO: 0.07 K/UL (ref 0–0.2)
BASOPHILS NFR BLD: 0.9 % (ref 0–1.9)
BASOPHILS NFR BLD: 0.9 % (ref 0–1.9)
BILIRUB SERPL-MCNC: 0.8 MG/DL (ref 0.1–1)
BILIRUB UR QL STRIP: NEGATIVE
BUN SERPL-MCNC: 120 MG/DL (ref 6–20)
BUN SERPL-MCNC: 120 MG/DL (ref 6–20)
CALCIUM SERPL-MCNC: 7.1 MG/DL (ref 8.7–10.5)
CALCIUM SERPL-MCNC: 7.1 MG/DL (ref 8.7–10.5)
CHLORIDE SERPL-SCNC: 106 MMOL/L (ref 95–110)
CHLORIDE SERPL-SCNC: 106 MMOL/L (ref 95–110)
CLARITY UR: CLEAR
CO2 SERPL-SCNC: 15 MMOL/L (ref 23–29)
CO2 SERPL-SCNC: 15 MMOL/L (ref 23–29)
COLOR UR: YELLOW
CREAT SERPL-MCNC: 19.4 MG/DL (ref 0.5–1.4)
CREAT SERPL-MCNC: 19.4 MG/DL (ref 0.5–1.4)
DIFFERENTIAL METHOD: ABNORMAL
DIFFERENTIAL METHOD: ABNORMAL
EOSINOPHIL # BLD AUTO: 0.4 K/UL (ref 0–0.5)
EOSINOPHIL # BLD AUTO: 0.4 K/UL (ref 0–0.5)
EOSINOPHIL NFR BLD: 4.7 % (ref 0–8)
EOSINOPHIL NFR BLD: 4.7 % (ref 0–8)
ERYTHROCYTE [DISTWIDTH] IN BLOOD BY AUTOMATED COUNT: 14.2 % (ref 11.5–14.5)
ERYTHROCYTE [DISTWIDTH] IN BLOOD BY AUTOMATED COUNT: 14.2 % (ref 11.5–14.5)
EST. GFR  (NO RACE VARIABLE): 2.7 ML/MIN/1.73 M^2
EST. GFR  (NO RACE VARIABLE): 2.7 ML/MIN/1.73 M^2
GLUCOSE SERPL-MCNC: 116 MG/DL (ref 70–110)
GLUCOSE SERPL-MCNC: 210 MG/DL (ref 70–110)
GLUCOSE SERPL-MCNC: 223 MG/DL (ref 70–110)
GLUCOSE SERPL-MCNC: 77 MG/DL (ref 70–110)
GLUCOSE SERPL-MCNC: 77 MG/DL (ref 70–110)
GLUCOSE SERPL-MCNC: 95 MG/DL (ref 70–110)
GLUCOSE UR QL STRIP: ABNORMAL
HCT VFR BLD AUTO: 31.8 % (ref 40–54)
HCT VFR BLD AUTO: 31.8 % (ref 40–54)
HGB BLD-MCNC: 10.7 G/DL (ref 14–18)
HGB BLD-MCNC: 10.7 G/DL (ref 14–18)
HGB UR QL STRIP: ABNORMAL
HYALINE CASTS #/AREA URNS LPF: 4 /LPF
IMM GRANULOCYTES # BLD AUTO: 0.05 K/UL (ref 0–0.04)
IMM GRANULOCYTES # BLD AUTO: 0.05 K/UL (ref 0–0.04)
IMM GRANULOCYTES NFR BLD AUTO: 0.6 % (ref 0–0.5)
IMM GRANULOCYTES NFR BLD AUTO: 0.6 % (ref 0–0.5)
KETONES UR QL STRIP: NEGATIVE
LEUKOCYTE ESTERASE UR QL STRIP: NEGATIVE
LYMPHOCYTES # BLD AUTO: 1.4 K/UL (ref 1–4.8)
LYMPHOCYTES # BLD AUTO: 1.4 K/UL (ref 1–4.8)
LYMPHOCYTES NFR BLD: 18 % (ref 18–48)
LYMPHOCYTES NFR BLD: 18 % (ref 18–48)
MAGNESIUM SERPL-MCNC: 1.6 MG/DL (ref 1.6–2.6)
MCH RBC QN AUTO: 33.8 PG (ref 27–31)
MCH RBC QN AUTO: 33.8 PG (ref 27–31)
MCHC RBC AUTO-ENTMCNC: 33.6 G/DL (ref 32–36)
MCHC RBC AUTO-ENTMCNC: 33.6 G/DL (ref 32–36)
MCV RBC AUTO: 100 FL (ref 82–98)
MCV RBC AUTO: 100 FL (ref 82–98)
MICROSCOPIC COMMENT: ABNORMAL
MONOCYTES # BLD AUTO: 0.9 K/UL (ref 0.3–1)
MONOCYTES # BLD AUTO: 0.9 K/UL (ref 0.3–1)
MONOCYTES NFR BLD: 10.6 % (ref 4–15)
MONOCYTES NFR BLD: 10.6 % (ref 4–15)
NEUTROPHILS # BLD AUTO: 5.2 K/UL (ref 1.8–7.7)
NEUTROPHILS # BLD AUTO: 5.2 K/UL (ref 1.8–7.7)
NEUTROPHILS NFR BLD: 65.2 % (ref 38–73)
NEUTROPHILS NFR BLD: 65.2 % (ref 38–73)
NITRITE UR QL STRIP: NEGATIVE
NRBC BLD-RTO: 0 /100 WBC
NRBC BLD-RTO: 0 /100 WBC
PH UR STRIP: 6 [PH] (ref 5–8)
PHOSPHATE SERPL-MCNC: 11.3 MG/DL (ref 2.7–4.5)
PHOSPHATE SERPL-MCNC: 11.9 MG/DL (ref 2.7–4.5)
PHOSPHATE SERPL-MCNC: 11.9 MG/DL (ref 2.7–4.5)
PLATELET # BLD AUTO: 175 K/UL (ref 150–450)
PLATELET # BLD AUTO: 175 K/UL (ref 150–450)
PMV BLD AUTO: 11.7 FL (ref 9.2–12.9)
PMV BLD AUTO: 11.7 FL (ref 9.2–12.9)
POTASSIUM SERPL-SCNC: 4.8 MMOL/L (ref 3.5–5.1)
POTASSIUM SERPL-SCNC: 4.8 MMOL/L (ref 3.5–5.1)
PROT SERPL-MCNC: 7.3 G/DL (ref 6–8.4)
PROT UR QL STRIP: ABNORMAL
PTH-INTACT SERPL-MCNC: 288.4 PG/ML (ref 9–77)
RBC # BLD AUTO: 3.17 M/UL (ref 4.6–6.2)
RBC # BLD AUTO: 3.17 M/UL (ref 4.6–6.2)
RBC #/AREA URNS HPF: 2 /HPF (ref 0–4)
SODIUM SERPL-SCNC: 137 MMOL/L (ref 136–145)
SODIUM SERPL-SCNC: 137 MMOL/L (ref 136–145)
SP GR UR STRIP: 1.01 (ref 1–1.03)
SQUAMOUS #/AREA URNS HPF: 3 /HPF
URN SPEC COLLECT METH UR: ABNORMAL
UROBILINOGEN UR STRIP-ACNC: NEGATIVE EU/DL
WBC # BLD AUTO: 8.01 K/UL (ref 3.9–12.7)
WBC # BLD AUTO: 8.01 K/UL (ref 3.9–12.7)
WBC #/AREA URNS HPF: 9 /HPF (ref 0–5)

## 2022-12-06 PROCEDURE — 21400001 HC TELEMETRY ROOM

## 2022-12-06 PROCEDURE — 25000003 PHARM REV CODE 250: Performed by: INTERNAL MEDICINE

## 2022-12-06 PROCEDURE — 84100 ASSAY OF PHOSPHORUS: CPT | Performed by: NURSE PRACTITIONER

## 2022-12-06 PROCEDURE — 83970 ASSAY OF PARATHORMONE: CPT | Performed by: NURSE PRACTITIONER

## 2022-12-06 PROCEDURE — 83735 ASSAY OF MAGNESIUM: CPT | Performed by: NURSE PRACTITIONER

## 2022-12-06 PROCEDURE — 84100 ASSAY OF PHOSPHORUS: CPT | Mod: 91 | Performed by: INTERNAL MEDICINE

## 2022-12-06 PROCEDURE — 25000003 PHARM REV CODE 250: Performed by: NURSE PRACTITIONER

## 2022-12-06 PROCEDURE — 36415 COLL VENOUS BLD VENIPUNCTURE: CPT | Performed by: NURSE PRACTITIONER

## 2022-12-06 PROCEDURE — 80053 COMPREHEN METABOLIC PANEL: CPT | Performed by: NURSE PRACTITIONER

## 2022-12-06 PROCEDURE — 63600175 PHARM REV CODE 636 W HCPCS: Performed by: NURSE PRACTITIONER

## 2022-12-06 PROCEDURE — 81001 URINALYSIS AUTO W/SCOPE: CPT | Performed by: NURSE PRACTITIONER

## 2022-12-06 PROCEDURE — 36415 COLL VENOUS BLD VENIPUNCTURE: CPT | Performed by: INTERNAL MEDICINE

## 2022-12-06 PROCEDURE — 85025 COMPLETE CBC W/AUTO DIFF WBC: CPT | Performed by: NURSE PRACTITIONER

## 2022-12-06 RX ORDER — SEVELAMER CARBONATE 800 MG/1
1600 TABLET, FILM COATED ORAL
Status: DISCONTINUED | OUTPATIENT
Start: 2022-12-06 | End: 2022-12-11 | Stop reason: HOSPADM

## 2022-12-06 RX ORDER — FAMOTIDINE 20 MG/1
20 TABLET, FILM COATED ORAL DAILY
Status: DISCONTINUED | OUTPATIENT
Start: 2022-12-07 | End: 2022-12-07

## 2022-12-06 RX ORDER — PREDNISONE 20 MG/1
60 TABLET ORAL DAILY
Status: DISCONTINUED | OUTPATIENT
Start: 2022-12-07 | End: 2022-12-10

## 2022-12-06 RX ADMIN — INSULIN ASPART 2 UNITS: 100 INJECTION, SOLUTION INTRAVENOUS; SUBCUTANEOUS at 08:12

## 2022-12-06 RX ADMIN — METOPROLOL TARTRATE 25 MG: 25 TABLET, FILM COATED ORAL at 09:12

## 2022-12-06 RX ADMIN — SEVELAMER CARBONATE 1600 MG: 800 TABLET, FILM COATED ORAL at 12:12

## 2022-12-06 RX ADMIN — PANTOPRAZOLE SODIUM 40 MG: 40 TABLET, DELAYED RELEASE ORAL at 06:12

## 2022-12-06 RX ADMIN — ACETAMINOPHEN 650 MG: 325 TABLET ORAL at 04:12

## 2022-12-06 RX ADMIN — SODIUM CHLORIDE: 0.9 INJECTION, SOLUTION INTRAVENOUS at 04:12

## 2022-12-06 RX ADMIN — SEVELAMER CARBONATE 1600 MG: 800 TABLET, FILM COATED ORAL at 05:12

## 2022-12-06 RX ADMIN — SODIUM BICARBONATE: 84 INJECTION, SOLUTION INTRAVENOUS at 12:12

## 2022-12-06 RX ADMIN — METOPROLOL TARTRATE 25 MG: 25 TABLET, FILM COATED ORAL at 08:12

## 2022-12-06 RX ADMIN — EZETIMIBE 10 MG: 10 TABLET ORAL at 09:12

## 2022-12-06 RX ADMIN — ACETAMINOPHEN 650 MG: 325 TABLET ORAL at 10:12

## 2022-12-06 RX ADMIN — HEPARIN SODIUM 5000 UNITS: 5000 INJECTION INTRAVENOUS; SUBCUTANEOUS at 02:12

## 2022-12-06 RX ADMIN — ASPIRIN 81 MG CHEWABLE TABLET 81 MG: 81 TABLET CHEWABLE at 09:12

## 2022-12-06 RX ADMIN — INSULIN ASPART 2 UNITS: 100 INJECTION, SOLUTION INTRAVENOUS; SUBCUTANEOUS at 12:12

## 2022-12-06 RX ADMIN — HEPARIN SODIUM 5000 UNITS: 5000 INJECTION INTRAVENOUS; SUBCUTANEOUS at 09:12

## 2022-12-06 NOTE — PLAN OF CARE
Problem: Adult Inpatient Plan of Care  Goal: Plan of Care Review  Outcome: Ongoing, Progressing  Goal: Patient-Specific Goal (Individualized)  Outcome: Ongoing, Progressing  Goal: Absence of Hospital-Acquired Illness or Injury  Outcome: Ongoing, Progressing  Goal: Optimal Comfort and Wellbeing  Outcome: Ongoing, Progressing  Goal: Readiness for Transition of Care  Outcome: Ongoing, Progressing     Problem: Fall Injury Risk  Goal: Absence of Fall and Fall-Related Injury  Outcome: Ongoing, Progressing     Problem: Renal Function Impairment (Acute Kidney Injury/Impairment)  Goal: Effective Renal Function  Outcome: Ongoing, Progressing

## 2022-12-06 NOTE — PROGRESS NOTES
Nephrology Consult Note        Patient Name: Lee Quintanilla  MRN: [id number]    Patient Class: IP- Inpatient   Admission Date: 12/5/2022  Length of Stay: 1 days  Date of Service: 12/6/2022    Attending Physician: Deborah Green MD  Primary Care Provider: Riya Vidales NP    Reason for Consult: mariah/uremia/acidosis/hyponatremia/chf/htn    SUBJECTIVE:     HPI: 47M with notable history of CABG x7 on Entresto, spinal surgeries, remote history of stones requiring lithotripsy and sCr around 1.3 at least until 1/2022, was sent to hospital by PCP for abnormal scr 20 and uremia symptoms. Accompanied by wife. Describes insidious onset of lethargy, poor appetite, swelling, weakness over last few weeks. A few nearsyncopal episodes of unclear significance. Had episode of bilateral LE non-pruritic ? Vasculitic rash, now mostly resolved. Denies disuria, hematuria, bladder symptoms, infection, unusual OTC meds - notably no NSAIDs or contrast.    12/6 VSS. No UO documented, will need strict UOs. Sadly received Aspirin this AM even though I requested no blood thinners to be given as he may need renal biopsy - will stop today. Can have prophylactic hepatin doses. Agree with IVF for now. GN work-up send and pending. Renal US WITHOUT cortical thinning is good news. Will add phos binders, renal diet and sod bicarb drip.    Past Medical History:   Diagnosis Date    ADHD (attention deficit hyperactivity disorder)     Arthritis     Asthma     as child only    Back pain     Coronary artery disease     Degeneration of lumbar intervertebral disc 05/2016    Depression     Elevated PSA     Headache     Hyperlipidemia     Hypertension     Kidney damage     stage 3 ; d/t aleve abuse    Kidney stone     Myocardial infarction     Neck pain     Numbness and tingling in hands     Numbness and tingling of both legs     Seizures     from inapsine 2002    Sleep apnea     no cpap    Wears glasses     CONTACS     Past Surgical History:   Procedure  Laterality Date    BACK SURGERY  2016    back surgery    BONE GRAFT N/A 2020    Procedure: BONE GRAFT;  Surgeon: Mateusz Blanco MD;  Location: [facility] OR;  Service: Orthopedics;  Laterality: N/A;    CARDIAC SURGERY  2020    CABG X 7    CORONARY ARTERY BYPASS GRAFT      7 vessels    HERNIA REPAIR Bilateral 2017    LITHOTRIPSY      LUMBAR LAMINECTOMY WITH FUSION Bilateral 2020    Procedure: LAMINECTOMY, SPINE, LUMBAR, WITH FUSION;  Surgeon: Mateusz Blanco MD;  Location: [facility] OR;  Service: Orthopedics;  Laterality: Bilateral;  MEDTRONIC  NTI  L-3    PILONIDAL CYST DRAINAGE      removal of abcess      scrotal    REMOVAL OF HARDWARE FROM SPINE Bilateral 2020    Procedure: REMOVAL, HARDWARE, SPINE;  Surgeon: Mateusz Blanco MD;  Location: [facility] OR;  Service: Orthopedics;  Laterality: Bilateral;  L 4-5    TYMPANOSTOMY TUBE PLACEMENT       Family History   Problem Relation Age of Onset    Heart disease Mother     Migraines Mother     Hypertension Mother     Early death Father     Heart disease Father     Diabetes Maternal Aunt     Diabetes Maternal Uncle     Diabetes Maternal Grandfather      Social History     Tobacco Use    Smoking status: Former     Packs/day: 0.25     Types: Cigarettes     Quit date: 2016     Years since quittin.9    Smokeless tobacco: Former     Quit date: 2016    Tobacco comments:     occasional   Substance Use Topics    Alcohol use: Yes     Alcohol/week: 1.0 standard drink     Types: 1 Glasses of wine per week     Comment: rare    Drug use: No       Review of patient's allergies indicates:   Allergen Reactions    Inapsine [droperidol] Anaphylaxis     seizures    Effexor [venlafaxine]      Increased anxiety    Pcn [penicillins]     Bactrim [sulfamethoxazole-trimethoprim] Rash     Dry red rash all over when in the sun       Outpatient meds:  No current facility-administered medications on file prior to encounter.     Current Outpatient Medications on File Prior  to Encounter   Medication Sig Dispense Refill    aspirin 81 MG Chew Take 81 mg by mouth once daily.      DULoxetine (CYMBALTA) 60 MG capsule Take 1 capsule (60 mg total) by mouth once daily. 90 capsule 1    ENTRESTO 49-51 mg per tablet TAKE ONE TABLET BY MOUTH TWICE A DAY AS DIRECTED      ezetimibe (ZETIA) 10 mg tablet Take 10 mg by mouth once daily.      hydrOXYzine HCL (ATARAX) 25 MG tablet Take 1 tablet (25 mg total) by mouth 3 (three) times daily as needed for Itching. 60 tablet 0    metoprolol tartrate (LOPRESSOR) 25 MG tablet Take 1 tablet (25 mg total) by mouth 2 (two) times a day. 60 tablet 0    rosuvastatin (CRESTOR) 40 MG Tab TAKE ONE TABLET BY MOUTH ONCE A DAY AS DIRECTED      testosterone cypionate (DEPOTESTOTERONE CYPIONATE) 200 mg/mL injection Inject 200 mg into the muscle every 7 days.      anastrozole (ARIMIDEX) 1 mg Tab Take 1 mg by mouth once a week.      azelastine (ASTELIN) 137 mcg (0.1 %) nasal spray 1 spray (137 mcg total) by Nasal route 2 (two) times daily. 30 mL 12    tadalafiL (CIALIS) 20 MG Tab Take 1/2 to 1 tablet as needed prior to intercourse. No more than 1 in 36 hours 30 tablet 3       Scheduled meds:      Infusions:      PRN meds:      Review of Systems:      OBJECTIVE:     Vital Signs and IO:  Temp:  [98 °F (36.7 °C)-98.2 °F (36.8 °C)]   Pulse:  []   Resp:  [15-20]   BP: ()/(54-75)   SpO2:  [98 %-100 %]   I/O last 3 completed shifts:  In: 720 [P.O.:720]  Out: 550 [Urine:550]  Wt Readings from Last 5 Encounters:   12/06/22 107.5 kg (236 lb 15.9 oz)   12/05/22 108.4 kg (239 lb)   12/01/22 108.4 kg (239 lb)   04/25/22 108.4 kg (239 lb)   01/15/22 103 kg (226 lb 15.8 oz)     Body mass index is 37.12 kg/m².    Physical Exam  Constitutional:       General: He is not in acute distress.     Appearance: He is well-developed. He is not diaphoretic.   HENT:      Head: Normocephalic and atraumatic.      Mouth/Throat:      Mouth: Mucous membranes are moist.   Eyes:      General: No  scleral icterus.     Pupils: Pupils are equal, round, and reactive to light.   Cardiovascular:      Rate and Rhythm: Normal rate and regular rhythm.   Pulmonary:      Effort: Pulmonary effort is normal. No respiratory distress.      Breath sounds: No stridor.   Abdominal:      General: There is no distension.      Palpations: Abdomen is soft.   Musculoskeletal:         General: No deformity. Normal range of motion.      Cervical back: Neck supple.   Skin:     General: Skin is warm and dry.      Findings: No erythema or rash.   Neurological:      Mental Status: He is alert and oriented to person, place, and time.      Cranial Nerves: No cranial nerve deficit.   Psychiatric:         Behavior: Behavior normal.     Laboratory:  Recent Labs   Lab 12/02/22 0845 12/05/22 1335 12/06/22 0415    135* 137  137   K 5.3 4.7 4.8  4.8    101 106  106   CO2 13* 16* 15*  15*   * 118* 120*  120*   CREATININE >20.0* 21.0* 19.4*  19.4*   * 73 77  77         Recent Labs   Lab 12/02/22 0845 12/05/22 1335 12/06/22 0415 12/06/22  0752   CALCIUM 8.1* 7.9* 7.1*  7.1*  --    ALBUMIN 4.0 4.1 3.3*  3.3*  --    PHOS  --   --  11.9*  11.9* 11.3*   MG  --  1.7 1.6  --                No results for input(s): POCTGLUCOSE in the last 168 hours.          Recent Labs   Lab 12/02/22 0845 12/05/22 1335 12/06/22 0415   WBC 8.4 9.32 8.01  8.01   HGB 13.0* 12.4* 10.7*  10.7*   HCT 38.3* 34.8* 31.8*  31.8*    236 175  175   MCV 99.7 98 100*  100*   MCHC 33.9 35.6 33.6  33.6   NEUTOPHILPCT 69.8  --   --    MONO 8.3  697 10.0  0.9 10.6  10.6  0.9  0.9         Recent Labs   Lab 12/02/22 0845 12/05/22 1335 12/06/22 0415   BILITOT 0.5 0.9 0.8   PROT 7.6 9.2* 7.3   ALBUMIN 4.0 4.1 3.3*  3.3*   ALKPHOS  --  89 74   ALT 61* 65* 62*   AST 47* 34 53*         Recent Labs   Lab 07/07/20  0934 10/29/20  1104 10/29/20  1104 01/06/21  1153 12/05/22  1606 12/06/22  0445   Color, UA Yellow Yellow   < > YELLOW  Yellow Yellow   Appearance, UA  --  Clear   < > CLEAR Clear Clear   Clarity, UA Clear  --   --   --   --   --    pH, UA 6.0 6.0   < > 7.5 6.0 6.0   Specific Gravity, UA  --  1.025   < > 1.019 1.010 1.010   Spec Grav UA 1.025  --   --   --   --   --    Protein, UA  --  Negative   < > TRACE A 1+ A 1+ A   Glucose, UA  --  Negative  --   --  1+ A 2+ A   Ketones, UA n Negative   < > NEGATIVE Negative Negative   Urobilinogen, UA n Negative  --   --  Negative Negative   Bilirubin (UA)  --  Negative  --   --  Negative Negative   Occult Blood UA  --  Negative   < > NEGATIVE 2+ A 2+ A   Nitrite, UA n Negative   < > NEGATIVE Negative Negative   RBC, UA  --   --   --   --  1 2   WBC, UA  --   --   --   --  5 9 H   Bacteria, UA  --   --   --  NONE SEEN  --   --    Bacteria  --   --   --   --  Negative Negative   Hyaline Casts, UA  --   --   --  NONE SEEN 6 A 4 A    < > = values in this interval not displayed.               Microbiology Results (last 7 days)       ** No results found for the last 168 hours. **            ASSESSMENT/PLAN:     RALF  CKD stage 3  Uremia  Hyponatremia  Acidosis  Hyperphosphatemia  History of kidney stones  Anemia  CHF after CABG x7  No NSAIDs, ACEI/ARB, IV contrast or other nephrotoxins.  Keep MAP > 60, SBP > 100.  Dose meds for GFR < 30 ml/min.  Renal diet - low K, low phos.  Hold Entresto.  Renal US without cortical thinning or obstruction and Echo pending.   May need sarabia if unable to monitor UO otherwise.  Urinalysis with unimpressive few cells, GN work-up sent and pending.  Donny niels Phos binders and check PTH and vitamin D.  No emergency need for dialysis.  No blood thinners - no ASA, PLAVIX, NOACs. Ok to have prophylactic heparin! - may need a kidney biopsy soon.    Thank you for allowing us to participate in the care of your patient!   We will follow the patient and provide recommendations as needed.    Patient care time was spent personally by me on the following activities:     Obtaining a  history.  Examination of patient.  Providing medical care at the patients bedside.  Developing a treatment plan with patient or surrogate and bedside caregivers.  Ordering and reviewing laboratory studies, radiographic studies, pulse oximetry.  Ordering and performing treatments and interventions.  Evaluation of patient's response to treatment.  Discussions with consultants while on the unit and immediately available to the patient.  Re-evaluation of the patient's condition.  Documentation in the medical record.     Law Chiang MD    [facility]  [address] [place] [id number]    [phone] - tel  [phone] - fax    12/6/2022

## 2022-12-06 NOTE — PLAN OF CARE
[facility]  Initial Discharge Assessment       Primary Care Provider: Riya Vidales NP    Admission Diagnosis: Acute renal insufficiency [N28.9]    Admission Date: 12/5/2022  Expected Discharge Date:     Pt confirmed home address, PCP, insurance and pharmacy. He denied HH and has a home CPAP. Pts   Spouse Capri Monroe [phone] will provide transport home. CM following.        Payor: MEDICAID / Plan: [facility]CT / Product Type: Managed Medicaid /     Extended Emergency Contact Information  Primary Emergency Contact: Capri Monroe  Address: [address] [place] [address] [facility]  Home Phone: [phone]  Mobile Phone: [phone]  Relation: Spouse   needed? No  Secondary Emergency Contact: RiaLeti richards  Address: [address] [place] [address] [place] [address] [place]  Mobile Phone: [phone]  Relation: Mother  Preferred language: English   needed? No    Discharge Plan A: Home with family  Discharge Plan B: Home      [facility] [place] - [address]  [address] [place] [address]  Phone: [phone] Fax: [phone]    [facility] [place] [address] [address]  Phone: [phone] Fax: [phone]      Initial Assessment (most recent)       Adult Discharge Assessment - 12/06/22 1222          Discharge Assessment    Assessment Type Discharge Planning Assessment     Confirmed/corrected address, phone number and insurance Yes     Confirmed Demographics Correct on Facesheet     Source of Information patient     People in Home spouse     Facility Arrived From: home     Do you expect to return to your current living situation? Yes     Do you have help at home or someone to help you manage your care at home? Yes     Who are your caregiver(s) and their phone number(s)? Spouse Capri Monroe [phone]     Prior to hospitilization cognitive status:  Alert/Oriented     Current cognitive status: Alert/Oriented     Home Layout Able to live on 1st floor     Equipment Currently Used at Home CPAP     Readmission within 30 days? No     Patient currently being followed by outpatient case management? No     Do you currently have service(s) that help you manage your care at home? No     Do you take prescription medications? Yes     Do you have prescription coverage? Yes     Do you have any problems affording any of your prescribed medications? No     Is the patient taking medications as prescribed? yes     Who is going to help you get home at discharge? Spouse Capri Monroe [phone]     How do you get to doctors appointments? car, drives self     Are you on dialysis? No     Do you take coumadin? No     Discharge Plan A Home with family     Discharge Plan B Home     DME Needed Upon Discharge  none     Discharge Plan discussed with: Patient        OTHER    Name(s) of People in Home Spouse Capri [place] [phone]

## 2022-12-07 ENCOUNTER — PATIENT MESSAGE (OUTPATIENT)
Dept: FAMILY MEDICINE | Facility: CLINIC | Age: 47
End: 2022-12-07

## 2022-12-07 LAB
ALBUMIN SERPL BCP-MCNC: 3.2 G/DL (ref 3.5–5.2)
ALBUMIN SERPL ELPH-MCNC: 3.3 G/DL (ref 2.9–4.4)
ALBUMIN/GLOB SERPL: 0.9 {RATIO} (ref 0.7–1.7)
ALP SERPL-CCNC: 66 U/L (ref 55–135)
ALPHA1 GLOB SERPL ELPH-MCNC: 0.4 G/DL (ref 0–0.4)
ALPHA2 GLOB SERPL ELPH-MCNC: 1.2 G/DL (ref 0.4–1)
ALT SERPL W/O P-5'-P-CCNC: 47 U/L (ref 10–44)
ANA TITR SER IF: NEGATIVE {TITER}
ANION GAP SERPL CALC-SCNC: 19 MMOL/L (ref 8–16)
AST SERPL-CCNC: 25 U/L (ref 10–40)
B-GLOBULIN SERPL ELPH-MCNC: 1 G/DL (ref 0.7–1.3)
BASOPHILS # BLD AUTO: 0.07 K/UL (ref 0–0.2)
BASOPHILS NFR BLD: 0.8 % (ref 0–1.9)
BILIRUB SERPL-MCNC: 0.7 MG/DL (ref 0.1–1)
BUN SERPL-MCNC: 117 MG/DL (ref 6–20)
C3 SERPL-MCNC: 181 MG/DL (ref 82–167)
C4 SERPL-MCNC: 39 MG/DL (ref 12–38)
CALCIUM SERPL-MCNC: 6.9 MG/DL (ref 8.7–10.5)
CHLORIDE SERPL-SCNC: 101 MMOL/L (ref 95–110)
CO2 SERPL-SCNC: 18 MMOL/L (ref 23–29)
CREAT SERPL-MCNC: 16.9 MG/DL (ref 0.5–1.4)
DIFFERENTIAL METHOD: ABNORMAL
DSDNA AB SER-ACNC: <1 IU/ML (ref 0–9)
ENA RNP AB SER-ACNC: 0.2 AI (ref 0–0.9)
ENA SCL70 AB SER-ACNC: <0.2 AI (ref 0–0.9)
ENA SM AB SER-ACNC: <0.2 AI (ref 0–0.9)
ENA SS-A AB SER-ACNC: 0.2 AI (ref 0–0.9)
ENA SS-B AB SER-ACNC: <0.2 AI (ref 0–0.9)
EOSINOPHIL # BLD AUTO: 0.5 K/UL (ref 0–0.5)
EOSINOPHIL NFR BLD: 5.7 % (ref 0–8)
ERYTHROCYTE [DISTWIDTH] IN BLOOD BY AUTOMATED COUNT: 14.2 % (ref 11.5–14.5)
EST. GFR  (NO RACE VARIABLE): 3.1 ML/MIN/1.73 M^2
GAMMA GLOB SERPL ELPH-MCNC: 1.3 G/DL (ref 0.4–1.8)
GLOBULIN SER CALC-MCNC: 3.8 G/DL (ref 2.2–3.9)
GLUCOSE SERPL-MCNC: 105 MG/DL (ref 70–110)
GLUCOSE SERPL-MCNC: 213 MG/DL (ref 70–110)
GLUCOSE SERPL-MCNC: 322 MG/DL (ref 70–110)
GLUCOSE SERPL-MCNC: 343 MG/DL (ref 70–110)
GLUCOSE SERPL-MCNC: 97 MG/DL (ref 70–110)
HAV AB SER QL IA: POSITIVE
HAV IGM SERPL QL IA: NEGATIVE
HBV CORE AB SERPL QL IA: NEGATIVE
HBV CORE IGM SERPL QL IA: NEGATIVE
HBV SURFACE AB SER QL: NON REACTIVE
HBV SURFACE AG SERPL QL IA: NEGATIVE
HCT VFR BLD AUTO: 29.2 % (ref 40–54)
HCV AB S/CO SERPL IA: <0.1 S/CO RATIO (ref 0–0.9)
HGB BLD-MCNC: 10.3 G/DL (ref 14–18)
IMM GRANULOCYTES # BLD AUTO: 0.05 K/UL (ref 0–0.04)
IMM GRANULOCYTES NFR BLD AUTO: 0.6 % (ref 0–0.5)
LABORATORY COMMENT REPORT: ABNORMAL
LYMPHOCYTES # BLD AUTO: 1.3 K/UL (ref 1–4.8)
LYMPHOCYTES NFR BLD: 14.6 % (ref 18–48)
M PROTEIN SERPL ELPH-MCNC: ABNORMAL G/DL
MAGNESIUM SERPL-MCNC: 1.3 MG/DL (ref 1.6–2.6)
MCH RBC QN AUTO: 34.3 PG (ref 27–31)
MCHC RBC AUTO-ENTMCNC: 35.3 G/DL (ref 32–36)
MCV RBC AUTO: 97 FL (ref 82–98)
MONOCYTES # BLD AUTO: 0.7 K/UL (ref 0.3–1)
MONOCYTES NFR BLD: 8.2 % (ref 4–15)
NEUTROPHILS # BLD AUTO: 6.1 K/UL (ref 1.8–7.7)
NEUTROPHILS NFR BLD: 70.1 % (ref 38–73)
NRBC BLD-RTO: 0 /100 WBC
PHOSPHATE SERPL-MCNC: 10.3 MG/DL (ref 2.7–4.5)
PLATELET # BLD AUTO: 178 K/UL (ref 150–450)
PMV BLD AUTO: 12.1 FL (ref 9.2–12.9)
POTASSIUM SERPL-SCNC: 3.7 MMOL/L (ref 3.5–5.1)
PROT SERPL-MCNC: 7.1 G/DL (ref 6–8.5)
PROT SERPL-MCNC: 7.3 G/DL (ref 6–8.4)
RBC # BLD AUTO: 3 M/UL (ref 4.6–6.2)
RHEUMATOID FACT SERPL-ACNC: 10.2 IU/ML
RIBOSOMAL P AB SER-ACNC: <0.2 AI (ref 0–0.9)
SODIUM SERPL-SCNC: 138 MMOL/L (ref 136–145)
THYROPEROXIDASE AB SERPL-ACNC: <9 IU/ML (ref 0–34)
WBC # BLD AUTO: 8.64 K/UL (ref 3.9–12.7)

## 2022-12-07 PROCEDURE — 63600175 PHARM REV CODE 636 W HCPCS: Performed by: INTERNAL MEDICINE

## 2022-12-07 PROCEDURE — 84100 ASSAY OF PHOSPHORUS: CPT | Performed by: NURSE PRACTITIONER

## 2022-12-07 PROCEDURE — 85025 COMPLETE CBC W/AUTO DIFF WBC: CPT | Performed by: NURSE PRACTITIONER

## 2022-12-07 PROCEDURE — 25000003 PHARM REV CODE 250: Performed by: INTERNAL MEDICINE

## 2022-12-07 PROCEDURE — 63600175 PHARM REV CODE 636 W HCPCS: Performed by: NURSE PRACTITIONER

## 2022-12-07 PROCEDURE — 36415 COLL VENOUS BLD VENIPUNCTURE: CPT | Performed by: NURSE PRACTITIONER

## 2022-12-07 PROCEDURE — 25000003 PHARM REV CODE 250: Performed by: NURSE PRACTITIONER

## 2022-12-07 PROCEDURE — 80053 COMPREHEN METABOLIC PANEL: CPT | Performed by: NURSE PRACTITIONER

## 2022-12-07 PROCEDURE — 99900035 HC TECH TIME PER 15 MIN (STAT)

## 2022-12-07 PROCEDURE — 21400001 HC TELEMETRY ROOM

## 2022-12-07 PROCEDURE — 94761 N-INVAS EAR/PLS OXIMETRY MLT: CPT

## 2022-12-07 PROCEDURE — 83735 ASSAY OF MAGNESIUM: CPT | Performed by: NURSE PRACTITIONER

## 2022-12-07 PROCEDURE — 99900031 HC PATIENT EDUCATION (STAT)

## 2022-12-07 RX ORDER — MAGNESIUM SULFATE HEPTAHYDRATE 40 MG/ML
2 INJECTION, SOLUTION INTRAVENOUS ONCE
Status: COMPLETED | OUTPATIENT
Start: 2022-12-07 | End: 2022-12-07

## 2022-12-07 RX ORDER — FAMOTIDINE 20 MG/1
20 TABLET, FILM COATED ORAL EVERY OTHER DAY
Status: DISCONTINUED | OUTPATIENT
Start: 2022-12-09 | End: 2022-12-11 | Stop reason: HOSPADM

## 2022-12-07 RX ADMIN — SEVELAMER CARBONATE 1600 MG: 800 TABLET, FILM COATED ORAL at 08:12

## 2022-12-07 RX ADMIN — ACETAMINOPHEN 650 MG: 325 TABLET ORAL at 09:12

## 2022-12-07 RX ADMIN — HEPARIN SODIUM 5000 UNITS: 5000 INJECTION INTRAVENOUS; SUBCUTANEOUS at 05:12

## 2022-12-07 RX ADMIN — PREDNISONE 60 MG: 20 TABLET ORAL at 08:12

## 2022-12-07 RX ADMIN — SODIUM BICARBONATE: 84 INJECTION, SOLUTION INTRAVENOUS at 02:12

## 2022-12-07 RX ADMIN — ACETAMINOPHEN 650 MG: 325 TABLET ORAL at 12:12

## 2022-12-07 RX ADMIN — SEVELAMER CARBONATE 1600 MG: 800 TABLET, FILM COATED ORAL at 12:12

## 2022-12-07 RX ADMIN — EZETIMIBE 10 MG: 10 TABLET ORAL at 08:12

## 2022-12-07 RX ADMIN — INSULIN ASPART 2 UNITS: 100 INJECTION, SOLUTION INTRAVENOUS; SUBCUTANEOUS at 12:12

## 2022-12-07 RX ADMIN — FAMOTIDINE 20 MG: 20 TABLET ORAL at 08:12

## 2022-12-07 RX ADMIN — MAGNESIUM SULFATE HEPTAHYDRATE 2 G: 40 INJECTION, SOLUTION INTRAVENOUS at 06:12

## 2022-12-07 RX ADMIN — SEVELAMER CARBONATE 1600 MG: 800 TABLET, FILM COATED ORAL at 04:12

## 2022-12-07 RX ADMIN — INSULIN ASPART 4 UNITS: 100 INJECTION, SOLUTION INTRAVENOUS; SUBCUTANEOUS at 09:12

## 2022-12-07 RX ADMIN — INSULIN ASPART 4 UNITS: 100 INJECTION, SOLUTION INTRAVENOUS; SUBCUTANEOUS at 04:12

## 2022-12-07 RX ADMIN — HEPARIN SODIUM 5000 UNITS: 5000 INJECTION INTRAVENOUS; SUBCUTANEOUS at 03:12

## 2022-12-07 RX ADMIN — METOPROLOL TARTRATE 25 MG: 25 TABLET, FILM COATED ORAL at 09:12

## 2022-12-07 RX ADMIN — METOPROLOL TARTRATE 25 MG: 25 TABLET, FILM COATED ORAL at 08:12

## 2022-12-07 RX ADMIN — SODIUM BICARBONATE: 84 INJECTION, SOLUTION INTRAVENOUS at 06:12

## 2022-12-07 RX ADMIN — HEPARIN SODIUM 5000 UNITS: 5000 INJECTION INTRAVENOUS; SUBCUTANEOUS at 10:12

## 2022-12-07 NOTE — PROGRESS NOTES
"[facility] Medicine Progress Note  Patient Name: Lee Quintanilla MRN: [id number]   Patient Class: IP- Inpatient  Length of Stay: 1   Admission Date: 12/5/2022 12:38 PM Attending Physician: Deborah Green MD   Primary Care Provider: Riya Vidales NP Face-to-Face encounter date: 12/06/2022   Chief Complaint: Abnormal Labs (Sent by PCP for BUN of 117 and Creatinine greater than 20 )      Subjective:    Interval History   No new issues  Denies chest pain, shortness of breath, palpitations, abdominal pain, nausea/vomiting.   No concerns/issues overnight reported by the patient or the nursing staff.  Reviewed the labs and discussed the plan of care.   Mother present at bedside.     Review of Systems   All other Review of Systems were found to be negative expect for that mentioned already in HPI.     Hospital Course     12/06/2022     ezetimibe  10 mg Oral Daily    [START ON 12/7/2022] famotidine  20 mg Oral Daily    heparin (porcine)  5,000 Units Subcutaneous Q8H    metoprolol tartrate  25 mg Oral BID    [START ON 12/7/2022] predniSONE  60 mg Oral Daily    sevelamer carbonate  1,600 mg Oral TID WM       acetaminophen, albuterol-ipratropium, aluminum-magnesium hydroxide-simethicone, dextrose 10%, dextrose 10%, glucagon (human recombinant), glucose, glucose, hydrALAZINE, insulin aspart U-100, magnesium oxide, magnesium oxide, melatonin, naloxone, ondansetron, potassium bicarbonate, potassium bicarbonate, potassium bicarbonate, potassium, sodium phosphates, potassium, sodium phosphates, potassium, sodium phosphates, simethicone, sodium chloride 0.9%      Objective:   Physical Exam  BP (!) 108/59 (BP Location: Right arm, Patient Position: Lying)   Pulse 68   Temp 98.1 °F (36.7 °C) (Oral)   Resp 18   Ht 5' 7" (1.702 m)   Wt 107.5 kg (236 lb 15.9 oz)   SpO2 99%   BMI 37.12 kg/m²   Constitutional: No distress.   HENT: Atraumatic.   Cardiovascular: Normal rate, regular rhythm and normal " heart sounds.   Pulmonary/Chest: Effort normal. Clear to auscultation bilaterally. No wheezes.   Abdominal: Soft. Bowel sounds are normal. Exhibits no distension and no mass. No tenderness  Neurological: Alert.   Skin: Skin is warm and dry.     Labs and Imaging    Significant Labs: All pertinent labs within the past 24 hours have been reviewed.    Significant Imaging: I have reviewed all pertinent imaging results/findings within the past 24 hours.    I have reviewed the Vitals, labs and imaging as above.     Assessment & Plan:   Lee Quintanilla is a 47 y.o. male admitted for    Active Hospital Problems    Diagnosis    *Acute renal insufficiency    Gastroesophageal reflux disease without esophagitis    Hx of CABG    Anemia, chronic disease    Coronary artery disease involving coronary bypass graft    Hypertension    Hyperlipidemia    Severe obesity with body mass index (BMI) of 35.0 to 39.9 with serious comorbidity       Plan  IV fluids as per nephro  Discussed with Dr. Chiang  He will need renal biopsy  Holding aspirin  No acute needs of dialysis    Active PT: No  Active OT: No  Active SLP: No    Core measures:  - Code status:   - Diet: Renal  VTE Risk Mitigation (From admission, onward)           Ordered     heparin (porcine) injection 5,000 Units  Every 8 hours         12/05/22 1652     IP VTE HIGH RISK PATIENT  Once         12/05/22 1652     Place sequential compression device  Until discontinued         12/05/22 1652                    Discharge Planning:   Discharge Planning   FANTA: 12/08    Code Status: Full Code   Is the patient medically ready for discharge?: no    Reason for patient still in hospital (select all that apply): Patient trending condition  Discharge Plan A: Home with assistance from family        Above encounter included review of the medical records, interviewing and examining the patient face-to-face, discussion with family and other health care providers, ordering and interpreting  lab/test results and formulating a plan of care.     Medical Decision Making:  [] Low Complexity  [] Moderate Complexity  [x] High Complexity    Deborah Green MD  [facility] Hospitalist  12/06/2022

## 2022-12-07 NOTE — CARE UPDATE
12/07/22 0816   Patient Assessment/Suction   Level of Consciousness (AVPU) alert   All Lung Fields Breath Sounds clear   PRE-TX-O2   O2 Device (Oxygen Therapy) room air   SpO2 99 %   Pulse Oximetry Type Intermittent   $ Pulse Oximetry - Multiple Charge Pulse Oximetry - Multiple   Pulse 75   Resp 15   Aerosol Therapy   $ Aerosol Therapy Charges PRN treatment not required   Education   $ Education Bronchodilator;15 min   Respiratory Evaluation   $ Care Plan Tech Time 15 min

## 2022-12-07 NOTE — PROGRESS NOTES
"[facility] Medicine Progress Note  Patient Name: Lee Quintanilla MRN: [id number]   Patient Class: IP- Inpatient  Length of Stay: 2   Admission Date: 12/5/2022 12:38 PM Attending Physician: Deborah Green MD   Primary Care Provider: Riya Vidales NP Face-to-Face encounter date: 12/07/2022   Chief Complaint: Abnormal Labs (Sent by PCP for BUN of 117 and Creatinine greater than 20 )      Subjective:    Interval History   No new issues  Creatinine improving  Denies chest pain, shortness of breath, palpitations, abdominal pain, nausea/vomiting.   No concerns/issues overnight reported by the patient or the nursing staff.  Reviewed the labs and discussed the plan of care.   Mother present at bedside.     Review of Systems   All other Review of Systems were found to be negative expect for that mentioned already in HPI.     Hospital Course     12/07/2022     ezetimibe  10 mg Oral Daily    [START ON 12/9/2022] famotidine  20 mg Oral Every other day    heparin (porcine)  5,000 Units Subcutaneous Q8H    magnesium sulfate IVPB  2 g Intravenous Once    metoprolol tartrate  25 mg Oral BID    predniSONE  60 mg Oral Daily    sevelamer carbonate  1,600 mg Oral TID WM       acetaminophen, albuterol-ipratropium, aluminum-magnesium hydroxide-simethicone, dextrose 10%, dextrose 10%, glucagon (human recombinant), glucose, glucose, hydrALAZINE, insulin aspart U-100, magnesium oxide, magnesium oxide, melatonin, naloxone, ondansetron, potassium bicarbonate, potassium bicarbonate, potassium bicarbonate, potassium, sodium phosphates, potassium, sodium phosphates, potassium, sodium phosphates, simethicone, sodium chloride 0.9%      Objective:   Physical Exam  /76 (BP Location: Right arm, Patient Position: Lying)   Pulse 72   Temp 98.2 °F (36.8 °C) (Oral)   Resp 16   Ht 5' 7" (1.702 m)   Wt 107.6 kg (237 lb 3.2 oz)   SpO2 99%   BMI 37.15 kg/m²   Constitutional: No distress.   HENT: Atraumatic. "   Cardiovascular: Normal rate, regular rhythm and normal heart sounds.   Pulmonary/Chest: Effort normal. Clear to auscultation bilaterally. No wheezes.   Abdominal: Soft. Bowel sounds are normal. Exhibits no distension and no mass. No tenderness  Neurological: Alert.   Skin: Skin is warm and dry.     Labs and Imaging    Significant Labs: All pertinent labs within the past 24 hours have been reviewed.    Significant Imaging: I have reviewed all pertinent imaging results/findings within the past 24 hours.    I have reviewed the Vitals, labs and imaging as above.     Assessment & Plan:   Lee Quintanilla is a 47 y.o. male admitted for    Active Hospital Problems    Diagnosis    *Acute renal insufficiency    Gastroesophageal reflux disease without esophagitis    Hx of CABG    Anemia, chronic disease    Coronary artery disease involving coronary bypass graft    Hypertension    Hyperlipidemia    Severe obesity with body mass index (BMI) of 35.0 to 39.9 with serious comorbidity       Plan  IV fluids as per nephro  Creatinine improving  Discussed with Dr. Chiang  He will need renal biopsy  Holding aspirin  No acute needs of dialysis    Active PT: No  Active OT: No  Active SLP: No    Core measures:  - Code status:   - Diet: Renal  VTE Risk Mitigation (From admission, onward)           Ordered     heparin (porcine) injection 5,000 Units  Every 8 hours         12/05/22 1652     IP VTE HIGH RISK PATIENT  Once         12/05/22 1652     Place sequential compression device  Until discontinued         12/05/22 1652                    Discharge Planning:   Discharge Planning   FANTA: 12/10    Code Status: Full Code   Is the patient medically ready for discharge?: no    Reason for patient still in hospital (select all that apply): Patient trending condition  Discharge Plan A: Home with assistance from family        Above encounter included review of the medical records, interviewing and examining the patient face-to-face,  discussion with family and other health care providers, ordering and interpreting lab/test results and formulating a plan of care.     Medical Decision Making:  [] Low Complexity  [] Moderate Complexity  [x] High Complexity    Deborah Green MD  [facility] Hospitalist  12/07/2022

## 2022-12-07 NOTE — PROGRESS NOTES
Nephrology Consult Note        Patient Name: Lee Quintanilla  MRN: [id number]    Patient Class: IP- Inpatient   Admission Date: 12/5/2022  Length of Stay: 2 days  Date of Service: 12/7/2022    Attending Physician: Deborah Green MD  Primary Care Provider: Riya Vidales NP    Reason for Consult: mariah/uremia/acidosis/hyponatremia/chf/htn    SUBJECTIVE:     HPI: 47M with notable history of CABG x7 on Entresto, spinal surgeries, remote history of stones requiring lithotripsy and sCr around 1.3 at least until 1/2022, was sent to hospital by PCP for abnormal scr 20 and uremia symptoms. Accompanied by wife. Describes insidious onset of lethargy, poor appetite, swelling, weakness over last few weeks. A few nearsyncopal episodes of unclear significance. Had episode of bilateral LE non-pruritic ? Vasculitic rash, now mostly resolved. Denies disuria, hematuria, bladder symptoms, infection, unusual OTC meds - notably no NSAIDs or contrast.    12/6 VSS. No UO documented, will need strict UOs. Sadly received Aspirin this AM even though I requested no blood thinners to be given as he may need renal biopsy - will stop today. Can have prophylactic hepatin doses. Agree with IVF for now. GN work-up send and pending. Renal US WITHOUT cortical thinning is good news. Will add phos binders, renal diet and sod bicarb drip.    12/7 VSS, decent UO. Scr better but still high. Continue IVF, Work-up pending. Started steroids and held PPI and Statin in addition to Entresto and blood thinners for now.    Past Medical History:   Diagnosis Date    ADHD (attention deficit hyperactivity disorder)     Arthritis     Asthma     as child only    Back pain     Coronary artery disease     Degeneration of lumbar intervertebral disc 05/2016    Depression     Elevated PSA     Headache     Hyperlipidemia     Hypertension     Kidney damage     stage 3 ; d/t aleve abuse    Kidney stone     Myocardial infarction     Neck pain     Numbness and tingling in  hands     Numbness and tingling of both legs     Seizures     from inapsine 2002    Sleep apnea     no cpap    Wears glasses     CONTACS     Past Surgical History:   Procedure Laterality Date    BACK SURGERY  2016    back surgery    BONE GRAFT N/A 2020    Procedure: BONE GRAFT;  Surgeon: Mateusz Blanco MD;  Location: [facility] OR;  Service: Orthopedics;  Laterality: N/A;    CARDIAC SURGERY  2020    CABG X 7    CORONARY ARTERY BYPASS GRAFT      7 vessels    HERNIA REPAIR Bilateral 2017    LITHOTRIPSY      LUMBAR LAMINECTOMY WITH FUSION Bilateral 2020    Procedure: LAMINECTOMY, SPINE, LUMBAR, WITH FUSION;  Surgeon: Mateusz Blanco MD;  Location: [facility] OR;  Service: Orthopedics;  Laterality: Bilateral;  MEDTRONIC  NTI  L-3    PILONIDAL CYST DRAINAGE      removal of abcess      scrotal    REMOVAL OF HARDWARE FROM SPINE Bilateral 2020    Procedure: REMOVAL, HARDWARE, SPINE;  Surgeon: Mateusz Blanco MD;  Location: [facility] OR;  Service: Orthopedics;  Laterality: Bilateral;  L 4-5    TYMPANOSTOMY TUBE PLACEMENT       Family History   Problem Relation Age of Onset    Heart disease Mother     Migraines Mother     Hypertension Mother     Early death Father     Heart disease Father     Diabetes Maternal Aunt     Diabetes Maternal Uncle     Diabetes Maternal Grandfather      Social History     Tobacco Use    Smoking status: Former     Packs/day: 0.25     Types: Cigarettes     Quit date: 2016     Years since quittin.9    Smokeless tobacco: Former     Quit date: 2016    Tobacco comments:     occasional   Substance Use Topics    Alcohol use: Yes     Alcohol/week: 1.0 standard drink     Types: 1 Glasses of wine per week     Comment: rare    Drug use: No       Review of patient's allergies indicates:   Allergen Reactions    Inapsine [droperidol] Anaphylaxis     seizures    Effexor [venlafaxine]      Increased anxiety    Pcn [penicillins]     Bactrim [sulfamethoxazole-trimethoprim] Rash     Dry red  rash all over when in the sun       Outpatient meds:  No current facility-administered medications on file prior to encounter.     Current Outpatient Medications on File Prior to Encounter   Medication Sig Dispense Refill    aspirin 81 MG Chew Take 81 mg by mouth once daily.      DULoxetine (CYMBALTA) 60 MG capsule Take 1 capsule (60 mg total) by mouth once daily. 90 capsule 1    ENTRESTO 49-51 mg per tablet TAKE ONE TABLET BY MOUTH TWICE A DAY AS DIRECTED      ezetimibe (ZETIA) 10 mg tablet Take 10 mg by mouth once daily.      hydrOXYzine HCL (ATARAX) 25 MG tablet Take 1 tablet (25 mg total) by mouth 3 (three) times daily as needed for Itching. 60 tablet 0    metoprolol tartrate (LOPRESSOR) 25 MG tablet Take 1 tablet (25 mg total) by mouth 2 (two) times a day. 60 tablet 0    rosuvastatin (CRESTOR) 40 MG Tab TAKE ONE TABLET BY MOUTH ONCE A DAY AS DIRECTED      testosterone cypionate (DEPOTESTOTERONE CYPIONATE) 200 mg/mL injection Inject 200 mg into the muscle every 7 days.      anastrozole (ARIMIDEX) 1 mg Tab Take 1 mg by mouth once a week.      azelastine (ASTELIN) 137 mcg (0.1 %) nasal spray 1 spray (137 mcg total) by Nasal route 2 (two) times daily. 30 mL 12    tadalafiL (CIALIS) 20 MG Tab Take 1/2 to 1 tablet as needed prior to intercourse. No more than 1 in 36 hours 30 tablet 3       Scheduled meds:      Infusions:      PRN meds:      Review of Systems:      OBJECTIVE:     Vital Signs and IO:  Temp:  [97.4 °F (36.3 °C)-98.4 °F (36.9 °C)]   Pulse:  [66-75]   Resp:  [15-19]   BP: (108-138)/(59-70)   SpO2:  [98 %-100 %]   I/O last 3 completed shifts:  In: 1940 [P.O.:1940]  Out: 2100 [Urine:2100]  Wt Readings from Last 5 Encounters:   12/07/22 107.6 kg (237 lb 3.2 oz)   12/05/22 108.4 kg (239 lb)   12/01/22 108.4 kg (239 lb)   04/25/22 108.4 kg (239 lb)   01/15/22 103 kg (226 lb 15.8 oz)     Body mass index is 37.15 kg/m².    Physical Exam  Constitutional:       General: He is not in acute distress.     Appearance:  He is well-developed. He is not diaphoretic.   HENT:      Head: Normocephalic and atraumatic.      Mouth/Throat:      Mouth: Mucous membranes are moist.   Eyes:      General: No scleral icterus.     Pupils: Pupils are equal, round, and reactive to light.   Cardiovascular:      Rate and Rhythm: Normal rate and regular rhythm.   Pulmonary:      Effort: Pulmonary effort is normal. No respiratory distress.      Breath sounds: No stridor.   Abdominal:      General: There is no distension.      Palpations: Abdomen is soft.   Musculoskeletal:         General: No deformity. Normal range of motion.      Cervical back: Neck supple.   Skin:     General: Skin is warm and dry.      Findings: No erythema or rash.   Neurological:      Mental Status: He is alert and oriented to person, place, and time.      Cranial Nerves: No cranial nerve deficit.   Psychiatric:         Behavior: Behavior normal.     Laboratory:  Recent Labs   Lab 12/05/22 1335 12/06/22 0415 12/07/22 0448   * 137  137 138   K 4.7 4.8  4.8 3.7    106  106 101   CO2 16* 15*  15* 18*   * 120*  120* 117*   CREATININE 21.0* 19.4*  19.4* 16.9*   GLU 73 77  77 97         Recent Labs   Lab 12/05/22  1335 12/06/22  0415 12/06/22  0752 12/07/22 0448   CALCIUM 7.9* 7.1*  7.1*  --  6.9*   ALBUMIN 4.1 3.3*  3.3*  --  3.2*   PHOS  --  11.9*  11.9* 11.3* 10.3*   MG 1.7 1.6  --  1.3*         Recent Labs   Lab 12/06/22  0415   PTH, Intact 288.4 H       No results for input(s): POCTGLUCOSE in the last 168 hours.          Recent Labs   Lab 12/02/22  0845 12/05/22  1335 12/06/22 0415 12/07/22  0448   WBC 8.4 9.32 8.01  8.01 8.64   HGB 13.0* 12.4* 10.7*  10.7* 10.3*   HCT 38.3* 34.8* 31.8*  31.8* 29.2*    236 175  175 178   MCV 99.7 98 100*  100* 97   MCHC 33.9 35.6 33.6  33.6 35.3   NEUTOPHILPCT 69.8  --   --   --    MONO 8.3  697 10.0  0.9 10.6  10.6  0.9  0.9 8.2  0.7         Recent Labs   Lab 12/05/22  1335 12/06/22  0415  12/07/22  0448   BILITOT 0.9 0.8 0.7   PROT 9.2* 7.3 7.3   ALBUMIN 4.1 3.3*  3.3* 3.2*   ALKPHOS 89 74 66   ALT 65* 62* 47*   AST 34 53* 25         Recent Labs   Lab 07/07/20  0934 10/29/20  1104 10/29/20  1104 01/06/21  1153 12/05/22  1606 12/06/22  0445   Color, UA Yellow Yellow   < > YELLOW Yellow Yellow   Appearance, UA  --  Clear   < > CLEAR Clear Clear   Clarity, UA Clear  --   --   --   --   --    pH, UA 6.0 6.0   < > 7.5 6.0 6.0   Specific Gravity, UA  --  1.025   < > 1.019 1.010 1.010   Spec Grav UA 1.025  --   --   --   --   --    Protein, UA  --  Negative   < > TRACE A 1+ A 1+ A   Glucose, UA  --  Negative  --   --  1+ A 2+ A   Ketones, UA n Negative   < > NEGATIVE Negative Negative   Urobilinogen, UA n Negative  --   --  Negative Negative   Bilirubin (UA)  --  Negative  --   --  Negative Negative   Occult Blood UA  --  Negative   < > NEGATIVE 2+ A 2+ A   Nitrite, UA n Negative   < > NEGATIVE Negative Negative   RBC, UA  --   --   --   --  1 2   WBC, UA  --   --   --   --  5 9 H   Bacteria, UA  --   --   --  NONE SEEN  --   --    Bacteria  --   --   --   --  Negative Negative   Hyaline Casts, UA  --   --   --  NONE SEEN 6 A 4 A    < > = values in this interval not displayed.               Microbiology Results (last 7 days)       ** No results found for the last 168 hours. **            ASSESSMENT/PLAN:     RALF, non-oliguric, bland UA ? AIN  CKD stage 3  Uremia  Hyponatremia  Acidosis  Hyperphosphatemia  History of kidney stones  Anemia  CHF after CABG x7  No NSAIDs, ACEI/ARB, IV contrast or other nephrotoxins.  Keep MAP > 60, SBP > 100.  Dose meds for GFR < 30 ml/min.  Renal diet - low K, low phos.  Hold Entresto, PPIs, Statin.  Renal US without cortical thinning or obstruction and Echo pending.   May need sarabia if unable to monitor UO otherwise.  Urinalysis with unimpressive few cells WBC? RBC, GN work-up sent and pending. ? Med-related AIN  Continue Phos binders, PTH is elevated and vitamin D is  pending.  No emergency need for dialysis.    No blood thinners - no ASA, PLAVIX, NOACs. Ok to have prophylactic heparin! - may need a kidney biopsy soon.    Thank you for allowing us to participate in the care of your patient!   We will follow the patient and provide recommendations as needed.    Patient care time was spent personally by me on the following activities:     Obtaining a history.  Examination of patient.  Providing medical care at the patients bedside.  Developing a treatment plan with patient or surrogate and bedside caregivers.  Ordering and reviewing laboratory studies, radiographic studies, pulse oximetry.  Ordering and performing treatments and interventions.  Evaluation of patient's response to treatment.  Discussions with consultants while on the unit and immediately available to the patient.  Re-evaluation of the patient's condition.  Documentation in the medical record.     Law Chiang MD    [facility]  [address] [place] [id number]    [phone] - tel  [phone] - fax    12/7/2022

## 2022-12-07 NOTE — NURSING
"Educated patient on reasoning for the bed alarm is to prevent falls from occurring and  for safety. Patient refused bed alarm and stated " I will call if I need you." Re-education given and bed alarm refused.  "

## 2022-12-07 NOTE — PROGRESS NOTES
Pharmacist Renal Dose Adjustment Note    Lee Quintanilla is a 47 y.o. male being treated with the medication famotidine    Patient Data:    Vital Signs (Most Recent):  Temp: 97.4 °F (36.3 °C) (12/07/22 0723)  Pulse: 75 (12/07/22 0816)  Resp: 15 (12/07/22 0816)  BP: (!) 129/59 (12/07/22 0723)  SpO2: 99 % (12/07/22 0816)   Vital Signs (72h Range):  Temp:  [97.4 °F (36.3 °C)-98.4 °F (36.9 °C)]   Pulse:  []   Resp:  [15-20]   BP: ()/(54-75)   SpO2:  [98 %-100 %]      Recent Labs   Lab 12/05/22  1335 12/06/22  0415 12/07/22  0448   CREATININE 21.0* 19.4*  19.4* 16.9*     Serum creatinine: 16.9 mg/dL (H) 12/07/22 0448  Estimated creatinine clearance: 6.3 mL/min (A)    Famotidine 20 mg dose: daily frequency will be changed to famotidine 20 mg dose: every 48 hours frequency.    Pharmacist's Name: Anju Henderson  Pharmacist's Extension: [phone]

## 2022-12-08 LAB
ALBUMIN SERPL BCP-MCNC: 3.3 G/DL (ref 3.5–5.2)
ALP SERPL-CCNC: 63 U/L (ref 55–135)
ALT SERPL W/O P-5'-P-CCNC: 35 U/L (ref 10–44)
ANION GAP SERPL CALC-SCNC: 16 MMOL/L (ref 8–16)
AST SERPL-CCNC: 16 U/L (ref 10–40)
BACTERIA #/AREA URNS HPF: NEGATIVE /HPF
BASOPHILS # BLD AUTO: 0.02 K/UL (ref 0–0.2)
BASOPHILS NFR BLD: 0.2 % (ref 0–1.9)
BILIRUB SERPL-MCNC: 0.7 MG/DL (ref 0.1–1)
BILIRUB UR QL STRIP: NEGATIVE
BUN SERPL-MCNC: 108 MG/DL (ref 6–20)
C-ANCA TITR SER IF: NORMAL TITER
CALCIUM SERPL-MCNC: 7 MG/DL (ref 8.7–10.5)
CHLORIDE SERPL-SCNC: 98 MMOL/L (ref 95–110)
CLARITY UR: CLEAR
CO2 SERPL-SCNC: 21 MMOL/L (ref 23–29)
COLOR UR: YELLOW
CREAT SERPL-MCNC: 14.5 MG/DL (ref 0.5–1.4)
DIFFERENTIAL METHOD: ABNORMAL
EOSINOPHIL # BLD AUTO: 0 K/UL (ref 0–0.5)
EOSINOPHIL NFR BLD: 0 % (ref 0–8)
ERYTHROCYTE [DISTWIDTH] IN BLOOD BY AUTOMATED COUNT: 14.3 % (ref 11.5–14.5)
EST. GFR  (NO RACE VARIABLE): 3.8 ML/MIN/1.73 M^2
GLUCOSE SERPL-MCNC: 139 MG/DL (ref 70–110)
GLUCOSE SERPL-MCNC: 140 MG/DL (ref 70–110)
GLUCOSE SERPL-MCNC: 142 MG/DL (ref 70–110)
GLUCOSE SERPL-MCNC: 253 MG/DL (ref 70–110)
GLUCOSE UR QL STRIP: ABNORMAL
HCT VFR BLD AUTO: 29.3 % (ref 40–54)
HGB BLD-MCNC: 10.1 G/DL (ref 14–18)
HGB UR QL STRIP: ABNORMAL
HYALINE CASTS #/AREA URNS LPF: 1 /LPF
IMM GRANULOCYTES # BLD AUTO: 0.05 K/UL (ref 0–0.04)
IMM GRANULOCYTES NFR BLD AUTO: 0.6 % (ref 0–0.5)
KETONES UR QL STRIP: NEGATIVE
LEUKOCYTE ESTERASE UR QL STRIP: NEGATIVE
LYMPHOCYTES # BLD AUTO: 0.8 K/UL (ref 1–4.8)
LYMPHOCYTES NFR BLD: 9.1 % (ref 18–48)
MAGNESIUM SERPL-MCNC: 1.9 MG/DL (ref 1.6–2.6)
MCH RBC QN AUTO: 33.8 PG (ref 27–31)
MCHC RBC AUTO-ENTMCNC: 34.5 G/DL (ref 32–36)
MCV RBC AUTO: 98 FL (ref 82–98)
MICROSCOPIC COMMENT: NORMAL
MONOCYTES # BLD AUTO: 0.8 K/UL (ref 0.3–1)
MONOCYTES NFR BLD: 9.9 % (ref 4–15)
MYELOPEROXIDASE AB: <0.2 UNITS (ref 0–0.9)
NEUTROPHILS # BLD AUTO: 6.8 K/UL (ref 1.8–7.7)
NEUTROPHILS NFR BLD: 80.2 % (ref 38–73)
NITRITE UR QL STRIP: NEGATIVE
NRBC BLD-RTO: 0 /100 WBC
P-ANCA ATYPICAL TITR SER IF: NORMAL TITER
P-ANCA TITR SER IF: NORMAL TITER
PH UR STRIP: 6 [PH] (ref 5–8)
PHOSPHATE SERPL-MCNC: 6.2 MG/DL (ref 2.7–4.5)
PLATELET # BLD AUTO: 185 K/UL (ref 150–450)
PMV BLD AUTO: 12 FL (ref 9.2–12.9)
POTASSIUM SERPL-SCNC: 3.5 MMOL/L (ref 3.5–5.1)
PROT SERPL-MCNC: 7.6 G/DL (ref 6–8.4)
PROT UR QL STRIP: ABNORMAL
PROTEINASE3 AB SER IA-ACNC: <0.2 UNITS (ref 0–0.9)
RBC # BLD AUTO: 2.99 M/UL (ref 4.6–6.2)
RBC #/AREA URNS HPF: 4 /HPF (ref 0–4)
SODIUM SERPL-SCNC: 135 MMOL/L (ref 136–145)
SP GR UR STRIP: 1.01 (ref 1–1.03)
SQUAMOUS #/AREA URNS HPF: 1 /HPF
URN SPEC COLLECT METH UR: ABNORMAL
UROBILINOGEN UR STRIP-ACNC: NEGATIVE EU/DL
WBC # BLD AUTO: 8.49 K/UL (ref 3.9–12.7)
WBC #/AREA URNS HPF: 3 /HPF (ref 0–5)
YEAST URNS QL MICRO: NORMAL

## 2022-12-08 PROCEDURE — 94761 N-INVAS EAR/PLS OXIMETRY MLT: CPT

## 2022-12-08 PROCEDURE — 83735 ASSAY OF MAGNESIUM: CPT | Performed by: NURSE PRACTITIONER

## 2022-12-08 PROCEDURE — 99900031 HC PATIENT EDUCATION (STAT)

## 2022-12-08 PROCEDURE — 21400001 HC TELEMETRY ROOM

## 2022-12-08 PROCEDURE — 36415 COLL VENOUS BLD VENIPUNCTURE: CPT | Performed by: NURSE PRACTITIONER

## 2022-12-08 PROCEDURE — 85025 COMPLETE CBC W/AUTO DIFF WBC: CPT | Performed by: NURSE PRACTITIONER

## 2022-12-08 PROCEDURE — 99900035 HC TECH TIME PER 15 MIN (STAT)

## 2022-12-08 PROCEDURE — 25000003 PHARM REV CODE 250: Performed by: INTERNAL MEDICINE

## 2022-12-08 PROCEDURE — 80053 COMPREHEN METABOLIC PANEL: CPT | Performed by: NURSE PRACTITIONER

## 2022-12-08 PROCEDURE — 63600175 PHARM REV CODE 636 W HCPCS: Performed by: INTERNAL MEDICINE

## 2022-12-08 PROCEDURE — 25000003 PHARM REV CODE 250: Performed by: NURSE PRACTITIONER

## 2022-12-08 PROCEDURE — 81001 URINALYSIS AUTO W/SCOPE: CPT | Performed by: INTERNAL MEDICINE

## 2022-12-08 PROCEDURE — 84100 ASSAY OF PHOSPHORUS: CPT | Performed by: NURSE PRACTITIONER

## 2022-12-08 PROCEDURE — 63600175 PHARM REV CODE 636 W HCPCS: Performed by: NURSE PRACTITIONER

## 2022-12-08 RX ADMIN — SEVELAMER CARBONATE 1600 MG: 800 TABLET, FILM COATED ORAL at 12:12

## 2022-12-08 RX ADMIN — PREDNISONE 60 MG: 20 TABLET ORAL at 08:12

## 2022-12-08 RX ADMIN — SEVELAMER CARBONATE 1600 MG: 800 TABLET, FILM COATED ORAL at 05:12

## 2022-12-08 RX ADMIN — ACETAMINOPHEN 650 MG: 325 TABLET ORAL at 07:12

## 2022-12-08 RX ADMIN — SEVELAMER CARBONATE 1600 MG: 800 TABLET, FILM COATED ORAL at 07:12

## 2022-12-08 RX ADMIN — HEPARIN SODIUM 5000 UNITS: 5000 INJECTION INTRAVENOUS; SUBCUTANEOUS at 06:12

## 2022-12-08 RX ADMIN — EZETIMIBE 10 MG: 10 TABLET ORAL at 08:12

## 2022-12-08 RX ADMIN — HEPARIN SODIUM 5000 UNITS: 5000 INJECTION INTRAVENOUS; SUBCUTANEOUS at 11:12

## 2022-12-08 RX ADMIN — HEPARIN SODIUM 5000 UNITS: 5000 INJECTION INTRAVENOUS; SUBCUTANEOUS at 03:12

## 2022-12-08 RX ADMIN — METOPROLOL TARTRATE 25 MG: 25 TABLET, FILM COATED ORAL at 08:12

## 2022-12-08 RX ADMIN — ACETAMINOPHEN 650 MG: 325 TABLET ORAL at 08:12

## 2022-12-08 RX ADMIN — Medication 6 MG: at 11:12

## 2022-12-08 NOTE — PROGRESS NOTES
Nephrology Consult Note        Patient Name: Lee Quintanilla  MRN: [id number]    Patient Class: IP- Inpatient   Admission Date: 12/5/2022  Length of Stay: 3 days  Date of Service: 12/8/2022    Attending Physician: Deborah Green MD  Primary Care Provider: Riya Vidales NP    Reason for Consult: mariah/uremia/acidosis/hyponatremia/chf/htn    SUBJECTIVE:     HPI: 47M with notable history of CABG x7 on Entresto, spinal surgeries, remote history of stones requiring lithotripsy and sCr around 1.3 at least until 1/2022, was sent to hospital by PCP for abnormal scr 20 and uremia symptoms. Accompanied by wife. Describes insidious onset of lethargy, poor appetite, swelling, weakness over last few weeks. A few nearsyncopal episodes of unclear significance. Had episode of bilateral LE non-pruritic ? Vasculitic rash, now mostly resolved. Denies disuria, hematuria, bladder symptoms, infection, unusual OTC meds - notably no NSAIDs or contrast.    12/6 VSS. No UO documented, will need strict UOs. Sadly received Aspirin this AM even though I requested no blood thinners to be given as he may need renal biopsy - will stop today. Can have prophylactic hepatin doses. Agree with IVF for now. GN work-up send and pending. Renal US WITHOUT cortical thinning is good news. Will add phos binders, renal diet and sod bicarb drip.    12/7 VSS, decent UO. Scr better but still high. Continue IVF, Work-up pending. Started steroids and held PPI and Statin in addition to Entresto and blood thinners for now.  12/8 VSS. SCr further improved. UO is OK. We can't do renal biopsy until 12/14 due to aspirin. Keep off blood thinners, continues steroids, keep off PPI and statins for now. GN work-up is negative. Repeat UA.    Past Medical History:   Diagnosis Date    ADHD (attention deficit hyperactivity disorder)     Arthritis     Asthma     as child only    Back pain     Coronary artery disease     Degeneration of lumbar intervertebral disc 05/2016     Depression     Elevated PSA     Headache     Hyperlipidemia     Hypertension     Kidney damage     stage 3 ; d/t aleve abuse    Kidney stone     Myocardial infarction     Neck pain     Numbness and tingling in hands     Numbness and tingling of both legs     Seizures     from inapsine 2002    Sleep apnea     no cpap    Wears glasses     CONTACS     Past Surgical History:   Procedure Laterality Date    BACK SURGERY  2016    back surgery    BONE GRAFT N/A 2020    Procedure: BONE GRAFT;  Surgeon: Mateusz Blanco MD;  Location: [facility] OR;  Service: Orthopedics;  Laterality: N/A;    CARDIAC SURGERY  2020    CABG X 7    CORONARY ARTERY BYPASS GRAFT      7 vessels    HERNIA REPAIR Bilateral 2017    LITHOTRIPSY      LUMBAR LAMINECTOMY WITH FUSION Bilateral 2020    Procedure: LAMINECTOMY, SPINE, LUMBAR, WITH FUSION;  Surgeon: Mateusz Blanco MD;  Location: [facility] OR;  Service: Orthopedics;  Laterality: Bilateral;  MEDTRONIC  NTI  L-3    PILONIDAL CYST DRAINAGE      removal of abcess      scrotal    REMOVAL OF HARDWARE FROM SPINE Bilateral 2020    Procedure: REMOVAL, HARDWARE, SPINE;  Surgeon: Mateusz Blanco MD;  Location: [facility] OR;  Service: Orthopedics;  Laterality: Bilateral;  L 4-5    TYMPANOSTOMY TUBE PLACEMENT       Family History   Problem Relation Age of Onset    Heart disease Mother     Migraines Mother     Hypertension Mother     Early death Father     Heart disease Father     Diabetes Maternal Aunt     Diabetes Maternal Uncle     Diabetes Maternal Grandfather      Social History     Tobacco Use    Smoking status: Former     Packs/day: 0.25     Types: Cigarettes     Quit date: 2016     Years since quittin.9    Smokeless tobacco: Former     Quit date: 2016    Tobacco comments:     occasional   Substance Use Topics    Alcohol use: Yes     Alcohol/week: 1.0 standard drink     Types: 1 Glasses of wine per week     Comment: rare    Drug use: No       Review of patient's allergies  indicates:   Allergen Reactions    Inapsine [droperidol] Anaphylaxis     seizures    Effexor [venlafaxine]      Increased anxiety    Pcn [penicillins]     Bactrim [sulfamethoxazole-trimethoprim] Rash     Dry red rash all over when in the sun       Outpatient meds:  No current facility-administered medications on file prior to encounter.     Current Outpatient Medications on File Prior to Encounter   Medication Sig Dispense Refill    aspirin 81 MG Chew Take 81 mg by mouth once daily.      DULoxetine (CYMBALTA) 60 MG capsule Take 1 capsule (60 mg total) by mouth once daily. 90 capsule 1    ENTRESTO 49-51 mg per tablet TAKE ONE TABLET BY MOUTH TWICE A DAY AS DIRECTED      ezetimibe (ZETIA) 10 mg tablet Take 10 mg by mouth once daily.      hydrOXYzine HCL (ATARAX) 25 MG tablet Take 1 tablet (25 mg total) by mouth 3 (three) times daily as needed for Itching. 60 tablet 0    metoprolol tartrate (LOPRESSOR) 25 MG tablet Take 1 tablet (25 mg total) by mouth 2 (two) times a day. 60 tablet 0    rosuvastatin (CRESTOR) 40 MG Tab TAKE ONE TABLET BY MOUTH ONCE A DAY AS DIRECTED      testosterone cypionate (DEPOTESTOTERONE CYPIONATE) 200 mg/mL injection Inject 200 mg into the muscle every 7 days.      anastrozole (ARIMIDEX) 1 mg Tab Take 1 mg by mouth once a week.      azelastine (ASTELIN) 137 mcg (0.1 %) nasal spray 1 spray (137 mcg total) by Nasal route 2 (two) times daily. 30 mL 12    tadalafiL (CIALIS) 20 MG Tab Take 1/2 to 1 tablet as needed prior to intercourse. No more than 1 in 36 hours 30 tablet 3       Scheduled meds:      Infusions:      PRN meds:      Review of Systems:      OBJECTIVE:     Vital Signs and IO:  Temp:  [97.7 °F (36.5 °C)-98.2 °F (36.8 °C)]   Pulse:  [69-78]   Resp:  [15-18]   BP: (130-145)/(74-82)   SpO2:  [94 %-100 %]   I/O last 3 completed shifts:  In: 1430 [P.O.:1430]  Out: 2275 [Urine:2275]  Wt Readings from Last 5 Encounters:   12/08/22 107.4 kg (236 lb 11.2 oz)   12/05/22 108.4 kg (239 lb)    12/01/22 108.4 kg (239 lb)   04/25/22 108.4 kg (239 lb)   01/15/22 103 kg (226 lb 15.8 oz)     Body mass index is 37.07 kg/m².    Physical Exam  Constitutional:       General: He is not in acute distress.     Appearance: He is well-developed. He is not diaphoretic.   HENT:      Head: Normocephalic and atraumatic.      Mouth/Throat:      Mouth: Mucous membranes are moist.   Eyes:      General: No scleral icterus.     Pupils: Pupils are equal, round, and reactive to light.   Cardiovascular:      Rate and Rhythm: Normal rate and regular rhythm.   Pulmonary:      Effort: Pulmonary effort is normal. No respiratory distress.      Breath sounds: No stridor.   Abdominal:      General: There is no distension.      Palpations: Abdomen is soft.   Musculoskeletal:         General: No deformity. Normal range of motion.      Cervical back: Neck supple.   Skin:     General: Skin is warm and dry.      Findings: No erythema or rash.   Neurological:      Mental Status: He is alert and oriented to person, place, and time.      Cranial Nerves: No cranial nerve deficit.   Psychiatric:         Behavior: Behavior normal.     Laboratory:  Recent Labs   Lab 12/06/22 0415 12/07/22 0448 12/08/22 0416     137 138 135*   K 4.8  4.8 3.7 3.5     106 101 98   CO2 15*  15* 18* 21*   *  120* 117* 108*   CREATININE 19.4*  19.4* 16.9* 14.5*   GLU 77  77 97 139*         Recent Labs   Lab 12/06/22  0415 12/06/22  0752 12/07/22 0448 12/08/22  0416   CALCIUM 7.1*  7.1*  --  6.9* 7.0*   ALBUMIN 3.3*  3.3*  --  3.2* 3.3*   PHOS 11.9*  11.9* 11.3* 10.3* 6.2*   MG 1.6  --  1.3* 1.9         Recent Labs   Lab 12/06/22  0415   PTH, Intact 288.4 H         No results for input(s): POCTGLUCOSE in the last 168 hours.          Recent Labs   Lab 12/02/22  0845 12/05/22  1335 12/06/22  0415 12/07/22  0448 12/08/22  0416   WBC 8.4   < > 8.01  8.01 8.64 8.49   HGB 13.0*   < > 10.7*  10.7* 10.3* 10.1*   HCT 38.3*   < > 31.8*  31.8*  29.2* 29.3*      < > 175  175 178 185   MCV 99.7   < > 100*  100* 97 98   MCHC 33.9   < > 33.6  33.6 35.3 34.5   NEUTOPHILPCT 69.8  --   --   --   --    MONO 8.3  697   < > 10.6  10.6  0.9  0.9 8.2  0.7 9.9  0.8    < > = values in this interval not displayed.         Recent Labs   Lab 12/06/22  0415 12/07/22  0448 12/08/22  0416   BILITOT 0.8 0.7 0.7   PROT 7.3 7.3 7.6   ALBUMIN 3.3*  3.3* 3.2* 3.3*   ALKPHOS 74 66 63   ALT 62* 47* 35   AST 53* 25 16         Recent Labs   Lab 07/07/20  0934 10/29/20  1104 10/29/20  1104 01/06/21  1153 12/05/22  1606 12/06/22 0445   Color, UA Yellow Yellow   < > YELLOW Yellow Yellow   Appearance, UA  --  Clear   < > CLEAR Clear Clear   Clarity, UA Clear  --   --   --   --   --    pH, UA 6.0 6.0   < > 7.5 6.0 6.0   Specific Gravity, UA  --  1.025   < > 1.019 1.010 1.010   Spec Grav UA 1.025  --   --   --   --   --    Protein, UA  --  Negative   < > TRACE A 1+ A 1+ A   Glucose, UA  --  Negative  --   --  1+ A 2+ A   Ketones, UA n Negative   < > NEGATIVE Negative Negative   Urobilinogen, UA n Negative  --   --  Negative Negative   Bilirubin (UA)  --  Negative  --   --  Negative Negative   Occult Blood UA  --  Negative   < > NEGATIVE 2+ A 2+ A   Nitrite, UA n Negative   < > NEGATIVE Negative Negative   RBC, UA  --   --   --   --  1 2   WBC, UA  --   --   --   --  5 9 H   Bacteria, UA  --   --   --  NONE SEEN  --   --    Bacteria  --   --   --   --  Negative Negative   Hyaline Casts, UA  --   --   --  NONE SEEN 6 A 4 A    < > = values in this interval not displayed.               Microbiology Results (last 7 days)       ** No results found for the last 168 hours. **            ASSESSMENT/PLAN:     RALF, non-oliguric, bland UA ? AIN ? 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  Keep MAP > 60, SBP > 100.  Dose meds  for GFR < 30 ml/min.  Renal diet - low K, low phos.  Hold Entresto, PPIs, Statin.  Renal US without cortical thinning or obstruction.   May need sarabia if unable to monitor UO otherwise.  Urinalysis with unimpressive few cells WBC? RBC, GN work-up negative. ? med-related AIN  Continue Phos binders, PTH is elevated and vitamin D is pending.  No emergency need for dialysis.  Check UA today.    No blood thinners - no ASA, PLAVIX, NOACs. Ok to have prophylactic heparin! - may need a kidney biopsy soon.    Thank you for allowing us to participate in the care of your patient!   We will follow the patient and provide recommendations as needed.    Patient care time was spent personally by me on the following activities:     Obtaining a history.  Examination of patient.  Providing medical care at the patients bedside.  Developing a treatment plan with patient or surrogate and bedside caregivers.  Ordering and reviewing laboratory studies, radiographic studies, pulse oximetry.  Ordering and performing treatments and interventions.  Evaluation of patient's response to treatment.  Discussions with consultants while on the unit and immediately available to the patient.  Re-evaluation of the patient's condition.  Documentation in the medical record.     Law Chiang MD    [facility]  [address] [place] [address]  [place] [id number]    [phone] - tel  [phone] - fax    12/8/2022

## 2022-12-08 NOTE — CARE UPDATE
12/08/22 0845   Patient Assessment/Suction   Level of Consciousness (AVPU) alert   All Lung Fields Breath Sounds clear   PRE-TX-O2   O2 Device (Oxygen Therapy) room air   SpO2 (!) 94 %   Pulse Oximetry Type Intermittent   $ Pulse Oximetry - Multiple Charge Pulse Oximetry - Multiple   Pulse 77   Resp 15   Aerosol Therapy   $ Aerosol Therapy Charges PRN treatment not required   Education   $ Education Bronchodilator;15 min   Respiratory Evaluation   $ Care Plan Tech Time 15 min

## 2022-12-08 NOTE — PROGRESS NOTES
"[facility] Medicine Progress Note  Patient Name: Lee Quintanilla MRN: [id number]   Patient Class: IP- Inpatient  Length of Stay: 3   Admission Date: 12/5/2022 12:38 PM Attending Physician: Deborah Green MD   Primary Care Provider: Riya Vidales NP Face-to-Face encounter date: 12/08/2022   Chief Complaint: Abnormal Labs (Sent by PCP for BUN of 117 and Creatinine greater than 20 )      Subjective:    Interval History   No new issues  Creatinine improving  Denies chest pain, shortness of breath, palpitations, abdominal pain, nausea/vomiting.   No concerns/issues overnight reported by the patient or the nursing staff.  Reviewed the labs and discussed the plan of care.   Mother present at bedside.     Review of Systems   All other Review of Systems were found to be negative expect for that mentioned already in HPI.     Hospital Course     12/08/2022     ezetimibe  10 mg Oral Daily    [START ON 12/9/2022] famotidine  20 mg Oral Every other day    heparin (porcine)  5,000 Units Subcutaneous Q8H    metoprolol tartrate  25 mg Oral BID    predniSONE  60 mg Oral Daily    sevelamer carbonate  1,600 mg Oral TID WM       acetaminophen, albuterol-ipratropium, aluminum-magnesium hydroxide-simethicone, dextrose 10%, dextrose 10%, glucagon (human recombinant), glucose, glucose, hydrALAZINE, insulin aspart U-100, magnesium oxide, magnesium oxide, melatonin, naloxone, ondansetron, potassium bicarbonate, potassium bicarbonate, potassium bicarbonate, potassium, sodium phosphates, potassium, sodium phosphates, potassium, sodium phosphates, simethicone, sodium chloride 0.9%      Objective:   Physical Exam  /80 (BP Location: Right arm, Patient Position: Lying)   Pulse 69   Temp 98.1 °F (36.7 °C) (Oral)   Resp 17   Ht 5' 7" (1.702 m)   Wt 107.4 kg (236 lb 11.2 oz)   SpO2 98%   BMI 37.07 kg/m²   Constitutional: No distress.   HENT: Atraumatic.   Cardiovascular: Normal rate, regular rhythm and " normal heart sounds.   Pulmonary/Chest: Effort normal. Clear to auscultation bilaterally. No wheezes.   Abdominal: Soft. Bowel sounds are normal. Exhibits no distension and no mass. No tenderness  Neurological: Alert.   Skin: Skin is warm and dry.     Labs and Imaging    Significant Labs: All pertinent labs within the past 24 hours have been reviewed.    Significant Imaging: I have reviewed all pertinent imaging results/findings within the past 24 hours.    I have reviewed the Vitals, labs and imaging as above.     Assessment & Plan:   Lee Quintanilla is a 47 y.o. male admitted for    Active Hospital Problems    Diagnosis    *Acute renal insufficiency    Gastroesophageal reflux disease without esophagitis    Hx of CABG    Anemia, chronic disease    Coronary artery disease involving coronary bypass graft    Hypertension    Hyperlipidemia    Severe obesity with body mass index (BMI) of 35.0 to 39.9 with serious comorbidity       Plan  IV fluids as per nephro  Creatinine improving  Discussed with Dr. Chiang  Holding aspirin  No acute needs of dialysis    Active PT: No  Active OT: No  Active SLP: No    Core measures:  - Code status:   - Diet: Renal  VTE Risk Mitigation (From admission, onward)           Ordered     heparin (porcine) injection 5,000 Units  Every 8 hours         12/05/22 1652     IP VTE HIGH RISK PATIENT  Once         12/05/22 1652     Place sequential compression device  Until discontinued         12/05/22 1652                    Discharge Planning:   Discharge Planning   FANTA: 12/10    Code Status: Full Code   Is the patient medically ready for discharge?: no    Reason for patient still in hospital (select all that apply): Patient trending condition  Discharge Plan A: Home with assistance from family        Above encounter included review of the medical records, interviewing and examining the patient face-to-face, discussion with family and other health care providers, ordering and interpreting  lab/test results and formulating a plan of care.     Medical Decision Making:  [] Low Complexity  [] Moderate Complexity  [x] High Complexity    Deborah Green MD  [facility] Hospitalist  12/08/2022

## 2022-12-09 LAB
ALBUMIN SERPL BCP-MCNC: 3.6 G/DL (ref 3.5–5.2)
ALP SERPL-CCNC: 59 U/L (ref 55–135)
ALT SERPL W/O P-5'-P-CCNC: 28 U/L (ref 10–44)
ANION GAP SERPL CALC-SCNC: 17 MMOL/L (ref 8–16)
AST SERPL-CCNC: 16 U/L (ref 10–40)
BASOPHILS # BLD AUTO: 0.02 K/UL (ref 0–0.2)
BASOPHILS NFR BLD: 0.2 % (ref 0–1.9)
BILIRUB SERPL-MCNC: 0.7 MG/DL (ref 0.1–1)
BUN SERPL-MCNC: 110 MG/DL (ref 6–20)
CALCIUM SERPL-MCNC: 7.1 MG/DL (ref 8.7–10.5)
CHLORIDE SERPL-SCNC: 98 MMOL/L (ref 95–110)
CO2 SERPL-SCNC: 23 MMOL/L (ref 23–29)
CREAT SERPL-MCNC: 11.7 MG/DL (ref 0.5–1.4)
DIFFERENTIAL METHOD: ABNORMAL
EOSINOPHIL # BLD AUTO: 0 K/UL (ref 0–0.5)
EOSINOPHIL NFR BLD: 0.1 % (ref 0–8)
ERYTHROCYTE [DISTWIDTH] IN BLOOD BY AUTOMATED COUNT: 14.4 % (ref 11.5–14.5)
EST. GFR  (NO RACE VARIABLE): 4.9 ML/MIN/1.73 M^2
GLUCOSE SERPL-MCNC: 113 MG/DL (ref 70–110)
GLUCOSE SERPL-MCNC: 194 MG/DL (ref 70–110)
GLUCOSE SERPL-MCNC: 230 MG/DL (ref 70–110)
GLUCOSE SERPL-MCNC: 351 MG/DL (ref 70–110)
HCT VFR BLD AUTO: 28.6 % (ref 40–54)
HGB BLD-MCNC: 10 G/DL (ref 14–18)
IMM GRANULOCYTES # BLD AUTO: 0.06 K/UL (ref 0–0.04)
IMM GRANULOCYTES NFR BLD AUTO: 0.6 % (ref 0–0.5)
LYMPHOCYTES # BLD AUTO: 1.2 K/UL (ref 1–4.8)
LYMPHOCYTES NFR BLD: 11.5 % (ref 18–48)
MAGNESIUM SERPL-MCNC: 1.7 MG/DL (ref 1.6–2.6)
MCH RBC QN AUTO: 34.2 PG (ref 27–31)
MCHC RBC AUTO-ENTMCNC: 35 G/DL (ref 32–36)
MCV RBC AUTO: 98 FL (ref 82–98)
MONOCYTES # BLD AUTO: 1 K/UL (ref 0.3–1)
MONOCYTES NFR BLD: 9.4 % (ref 4–15)
NEUTROPHILS # BLD AUTO: 8 K/UL (ref 1.8–7.7)
NEUTROPHILS NFR BLD: 78.2 % (ref 38–73)
NRBC BLD-RTO: 0 /100 WBC
PHOSPHATE SERPL-MCNC: 6.1 MG/DL (ref 2.7–4.5)
PLATELET # BLD AUTO: 197 K/UL (ref 150–450)
PMV BLD AUTO: 12 FL (ref 9.2–12.9)
POTASSIUM SERPL-SCNC: 3 MMOL/L (ref 3.5–5.1)
PROT SERPL-MCNC: 7.4 G/DL (ref 6–8.4)
RBC # BLD AUTO: 2.92 M/UL (ref 4.6–6.2)
SODIUM SERPL-SCNC: 138 MMOL/L (ref 136–145)
WBC # BLD AUTO: 10.27 K/UL (ref 3.9–12.7)

## 2022-12-09 PROCEDURE — 83735 ASSAY OF MAGNESIUM: CPT | Performed by: NURSE PRACTITIONER

## 2022-12-09 PROCEDURE — 94761 N-INVAS EAR/PLS OXIMETRY MLT: CPT

## 2022-12-09 PROCEDURE — 25000003 PHARM REV CODE 250: Performed by: NURSE PRACTITIONER

## 2022-12-09 PROCEDURE — 63600175 PHARM REV CODE 636 W HCPCS: Performed by: INTERNAL MEDICINE

## 2022-12-09 PROCEDURE — 80053 COMPREHEN METABOLIC PANEL: CPT | Performed by: NURSE PRACTITIONER

## 2022-12-09 PROCEDURE — 36415 COLL VENOUS BLD VENIPUNCTURE: CPT | Performed by: NURSE PRACTITIONER

## 2022-12-09 PROCEDURE — 25000003 PHARM REV CODE 250: Performed by: INTERNAL MEDICINE

## 2022-12-09 PROCEDURE — 99900035 HC TECH TIME PER 15 MIN (STAT)

## 2022-12-09 PROCEDURE — 63600175 PHARM REV CODE 636 W HCPCS: Performed by: NURSE PRACTITIONER

## 2022-12-09 PROCEDURE — 84100 ASSAY OF PHOSPHORUS: CPT | Performed by: NURSE PRACTITIONER

## 2022-12-09 PROCEDURE — 21400001 HC TELEMETRY ROOM

## 2022-12-09 PROCEDURE — 85025 COMPLETE CBC W/AUTO DIFF WBC: CPT | Performed by: NURSE PRACTITIONER

## 2022-12-09 RX ORDER — POTASSIUM CHLORIDE 20 MEQ/1
20 TABLET, EXTENDED RELEASE ORAL ONCE
Status: COMPLETED | OUTPATIENT
Start: 2022-12-09 | End: 2022-12-09

## 2022-12-09 RX ADMIN — SEVELAMER CARBONATE 1600 MG: 800 TABLET, FILM COATED ORAL at 12:12

## 2022-12-09 RX ADMIN — HEPARIN SODIUM 5000 UNITS: 5000 INJECTION INTRAVENOUS; SUBCUTANEOUS at 08:12

## 2022-12-09 RX ADMIN — Medication 6 MG: at 08:12

## 2022-12-09 RX ADMIN — METOPROLOL TARTRATE 25 MG: 25 TABLET, FILM COATED ORAL at 08:12

## 2022-12-09 RX ADMIN — HEPARIN SODIUM 5000 UNITS: 5000 INJECTION INTRAVENOUS; SUBCUTANEOUS at 04:12

## 2022-12-09 RX ADMIN — SEVELAMER CARBONATE 1600 MG: 800 TABLET, FILM COATED ORAL at 08:12

## 2022-12-09 RX ADMIN — POTASSIUM CHLORIDE 20 MEQ: 1500 TABLET, EXTENDED RELEASE ORAL at 10:12

## 2022-12-09 RX ADMIN — SEVELAMER CARBONATE 1600 MG: 800 TABLET, FILM COATED ORAL at 04:12

## 2022-12-09 RX ADMIN — EZETIMIBE 10 MG: 10 TABLET ORAL at 08:12

## 2022-12-09 RX ADMIN — HEPARIN SODIUM 5000 UNITS: 5000 INJECTION INTRAVENOUS; SUBCUTANEOUS at 11:12

## 2022-12-09 RX ADMIN — PREDNISONE 60 MG: 20 TABLET ORAL at 08:12

## 2022-12-09 RX ADMIN — INSULIN ASPART 3 UNITS: 100 INJECTION, SOLUTION INTRAVENOUS; SUBCUTANEOUS at 09:12

## 2022-12-09 RX ADMIN — FAMOTIDINE 20 MG: 20 TABLET ORAL at 08:12

## 2022-12-09 NOTE — PROGRESS NOTES
"[facility] Medicine Progress Note  Patient Name: Lee Quintanilla MRN: [id number]   Patient Class: IP- Inpatient  Length of Stay: 4   Admission Date: 12/5/2022 12:38 PM Attending Physician: Deborah Green MD   Primary Care Provider: Riya Vidales NP Face-to-Face encounter date: 12/09/2022   Chief Complaint: Abnormal Labs (Sent by PCP for BUN of 117 and Creatinine greater than 20 )      Subjective:    Interval History   No new issues  Creatinine improving  Denies chest pain, shortness of breath, palpitations, abdominal pain, nausea/vomiting.   No concerns/issues overnight reported by the patient or the nursing staff.  Reviewed the labs and discussed the plan of care.   Mother present at bedside.     Review of Systems   All other Review of Systems were found to be negative expect for that mentioned already in HPI.     Hospital Course     12/09/2022     ezetimibe  10 mg Oral Daily    famotidine  20 mg Oral Every other day    heparin (porcine)  5,000 Units Subcutaneous Q8H    metoprolol tartrate  25 mg Oral BID    predniSONE  60 mg Oral Daily    sevelamer carbonate  1,600 mg Oral TID WM       acetaminophen, albuterol-ipratropium, aluminum-magnesium hydroxide-simethicone, dextrose 10%, dextrose 10%, glucagon (human recombinant), glucose, glucose, hydrALAZINE, insulin aspart U-100, magnesium oxide, magnesium oxide, melatonin, naloxone, ondansetron, potassium bicarbonate, potassium bicarbonate, potassium bicarbonate, potassium, sodium phosphates, potassium, sodium phosphates, potassium, sodium phosphates, simethicone, sodium chloride 0.9%      Objective:   Physical Exam  BP (!) 148/84 (BP Location: Right arm, Patient Position: Sitting)   Pulse 85   Temp 98.7 °F (37.1 °C) (Oral)   Resp 17   Ht 5' 7" (1.702 m)   Wt 108.6 kg (239 lb 6.7 oz)   SpO2 100%   BMI 37.50 kg/m²   Constitutional: No distress.   HENT: Atraumatic.   Cardiovascular: Normal rate, regular rhythm and normal heart " sounds.   Pulmonary/Chest: Effort normal. Clear to auscultation bilaterally. No wheezes.   Abdominal: Soft. Bowel sounds are normal. Exhibits no distension and no mass. No tenderness  Neurological: Alert.   Skin: Skin is warm and dry.     Labs and Imaging    Significant Labs: All pertinent labs within the past 24 hours have been reviewed.    Significant Imaging: I have reviewed all pertinent imaging results/findings within the past 24 hours.    I have reviewed the Vitals, labs and imaging as above.     Assessment & Plan:   Lee Quintanilla is a 47 y.o. male admitted for    Active Hospital Problems    Diagnosis    *Acute renal insufficiency    Gastroesophageal reflux disease without esophagitis    Hx of CABG    Anemia, chronic disease    Coronary artery disease involving coronary bypass graft    Hypertension    Hyperlipidemia    Severe obesity with body mass index (BMI) of 35.0 to 39.9 with serious comorbidity       Plan  IV fluids as per nephro  Creatinine improving  Replacing electrolytes, K is low  Holding aspirin  No acute needs of dialysis    Active PT: No  Active OT: No  Active SLP: No    Core measures:  - Code status:   - Diet: Renal  VTE Risk Mitigation (From admission, onward)           Ordered     heparin (porcine) injection 5,000 Units  Every 8 hours         12/05/22 1652     IP VTE HIGH RISK PATIENT  Once         12/05/22 1652     Place sequential compression device  Until discontinued         12/05/22 1652                    Discharge Planning:   Discharge Planning   FANTA: 12/14    Code Status: Full Code   Is the patient medically ready for discharge?: no    Reason for patient still in hospital (select all that apply): Patient trending condition  Discharge Plan A: Home with assistance from family        Above encounter included review of the medical records, interviewing and examining the patient face-to-face, discussion with family and other health care providers, ordering and interpreting  lab/test results and formulating a plan of care.     Medical Decision Making:  [] Low Complexity  [x] Moderate Complexity  [] High Complexity    Deborah Green MD  [facility] Hospitalist  12/09/2022

## 2022-12-09 NOTE — PROGRESS NOTES
Nephrology Consult Note        Patient Name: Lee Quintanilla  MRN: [id number]    Patient Class: IP- Inpatient   Admission Date: 12/5/2022  Length of Stay: 4 days  Date of Service: 12/9/2022    Attending Physician: Deborah Green MD  Primary Care Provider: Riya Vidales NP    Reason for Consult: mariah/uremia/acidosis/hyponatremia/chf/htn    SUBJECTIVE:     HPI: 47M with notable history of CABG x7 on Entresto, spinal surgeries, remote history of stones requiring lithotripsy and sCr around 1.3 at least until 1/2022, was sent to hospital by PCP for abnormal scr 20 and uremia symptoms. Accompanied by wife. Describes insidious onset of lethargy, poor appetite, swelling, weakness over last few weeks. A few nearsyncopal episodes of unclear significance. Had episode of bilateral LE non-pruritic ? Vasculitic rash, now mostly resolved. Denies disuria, hematuria, bladder symptoms, infection, unusual OTC meds - notably no NSAIDs or contrast.    12/6 VSS. No UO documented, will need strict UOs. Sadly received Aspirin this AM even though I requested no blood thinners to be given as he may need renal biopsy - will stop today. Can have prophylactic hepatin doses. Agree with IVF for now. GN work-up send and pending. Renal US WITHOUT cortical thinning is good news. Will add phos binders, renal diet and sod bicarb drip.    12/7 VSS, decent UO. Scr better but still high. Continue IVF, Work-up pending. Started steroids and held PPI and Statin in addition to Entresto and blood thinners for now.  12/8 VSS. SCr further improved. UO is OK. We can't do renal biopsy until 12/14 due to aspirin. Keep off blood thinners, continues steroids, keep off PPI and statins for now. GN work-up is negative. Repeat UA.  12/9 VSS, U Ois good, scr better. Hypokalemia - start oral KCL.    Past Medical History:   Diagnosis Date    ADHD (attention deficit hyperactivity disorder)     Arthritis     Asthma     as child only    Back pain     Coronary artery  disease     Degeneration of lumbar intervertebral disc 2016    Depression     Elevated PSA     Headache     Hyperlipidemia     Hypertension     Kidney damage     stage 3 ; d/t aleve abuse    Kidney stone     Myocardial infarction     Neck pain     Numbness and tingling in hands     Numbness and tingling of both legs     Seizures     from inapsine 2002    Sleep apnea     no cpap    Wears glasses     CONTACS     Past Surgical History:   Procedure Laterality Date    BACK SURGERY  2016    back surgery    BONE GRAFT N/A 2020    Procedure: BONE GRAFT;  Surgeon: Mateusz Blanco MD;  Location: [facility] OR;  Service: Orthopedics;  Laterality: N/A;    CARDIAC SURGERY  2020    CABG X 7    CORONARY ARTERY BYPASS GRAFT      7 vessels    HERNIA REPAIR Bilateral 2017    LITHOTRIPSY      LUMBAR LAMINECTOMY WITH FUSION Bilateral 2020    Procedure: LAMINECTOMY, SPINE, LUMBAR, WITH FUSION;  Surgeon: Mateusz Blanco MD;  Location: [facility] OR;  Service: Orthopedics;  Laterality: Bilateral;  MEDTRONIC  NTI  L-3    PILONIDAL CYST DRAINAGE      removal of abcess      scrotal    REMOVAL OF HARDWARE FROM SPINE Bilateral 2020    Procedure: REMOVAL, HARDWARE, SPINE;  Surgeon: Mateusz Blanco MD;  Location: [facility] OR;  Service: Orthopedics;  Laterality: Bilateral;  L 4-5    TYMPANOSTOMY TUBE PLACEMENT       Family History   Problem Relation Age of Onset    Heart disease Mother     Migraines Mother     Hypertension Mother     Early death Father     Heart disease Father     Diabetes Maternal Aunt     Diabetes Maternal Uncle     Diabetes Maternal Grandfather      Social History     Tobacco Use    Smoking status: Former     Packs/day: 0.25     Types: Cigarettes     Quit date: 2016     Years since quittin.9    Smokeless tobacco: Former     Quit date: 2016    Tobacco comments:     occasional   Substance Use Topics    Alcohol use: Yes     Alcohol/week: 1.0 standard drink     Types: 1 Glasses of wine per week      Comment: rare    Drug use: No       Review of patient's allergies indicates:   Allergen Reactions    Inapsine [droperidol] Anaphylaxis     seizures    Effexor [venlafaxine]      Increased anxiety    Pcn [penicillins]     Bactrim [sulfamethoxazole-trimethoprim] Rash     Dry red rash all over when in the sun       Outpatient meds:  No current facility-administered medications on file prior to encounter.     Current Outpatient Medications on File Prior to Encounter   Medication Sig Dispense Refill    aspirin 81 MG Chew Take 81 mg by mouth once daily.      DULoxetine (CYMBALTA) 60 MG capsule Take 1 capsule (60 mg total) by mouth once daily. 90 capsule 1    ENTRESTO 49-51 mg per tablet TAKE ONE TABLET BY MOUTH TWICE A DAY AS DIRECTED      ezetimibe (ZETIA) 10 mg tablet Take 10 mg by mouth once daily.      hydrOXYzine HCL (ATARAX) 25 MG tablet Take 1 tablet (25 mg total) by mouth 3 (three) times daily as needed for Itching. 60 tablet 0    metoprolol tartrate (LOPRESSOR) 25 MG tablet Take 1 tablet (25 mg total) by mouth 2 (two) times a day. 60 tablet 0    rosuvastatin (CRESTOR) 40 MG Tab TAKE ONE TABLET BY MOUTH ONCE A DAY AS DIRECTED      testosterone cypionate (DEPOTESTOTERONE CYPIONATE) 200 mg/mL injection Inject 200 mg into the muscle every 7 days.      anastrozole (ARIMIDEX) 1 mg Tab Take 1 mg by mouth once a week.      azelastine (ASTELIN) 137 mcg (0.1 %) nasal spray 1 spray (137 mcg total) by Nasal route 2 (two) times daily. 30 mL 12    tadalafiL (CIALIS) 20 MG Tab Take 1/2 to 1 tablet as needed prior to intercourse. No more than 1 in 36 hours 30 tablet 3       Scheduled meds:      Infusions:      PRN meds:      Review of Systems:      OBJECTIVE:     Vital Signs and IO:  Temp:  [46 °F (7.8 °C)-98.7 °F (37.1 °C)]   Pulse:  [63-97]   Resp:  [16-22]   BP: (135-150)/(74-84)   SpO2:  [97 %-100 %]   I/O last 3 completed shifts:  In: 1080 [P.O.:1080]  Out: 1900 [Urine:1900]  Wt Readings from Last 5 Encounters:   12/09/22  108.6 kg (239 lb 6.7 oz)   12/05/22 108.4 kg (239 lb)   12/01/22 108.4 kg (239 lb)   04/25/22 108.4 kg (239 lb)   01/15/22 103 kg (226 lb 15.8 oz)     Body mass index is 37.5 kg/m².    Physical Exam  Constitutional:       General: He is not in acute distress.     Appearance: He is well-developed. He is not diaphoretic.   HENT:      Head: Normocephalic and atraumatic.      Mouth/Throat:      Mouth: Mucous membranes are moist.   Eyes:      General: No scleral icterus.     Pupils: Pupils are equal, round, and reactive to light.   Cardiovascular:      Rate and Rhythm: Normal rate and regular rhythm.   Pulmonary:      Effort: Pulmonary effort is normal. No respiratory distress.      Breath sounds: No stridor.   Abdominal:      General: There is no distension.      Palpations: Abdomen is soft.   Musculoskeletal:         General: No deformity. Normal range of motion.      Cervical back: Neck supple.   Skin:     General: Skin is warm and dry.      Findings: No erythema or rash.   Neurological:      Mental Status: He is alert and oriented to person, place, and time.      Cranial Nerves: No cranial nerve deficit.   Psychiatric:         Behavior: Behavior normal.     Laboratory:  Recent Labs   Lab 12/07/22 0448 12/08/22 0416 12/09/22  0420    135* 138   K 3.7 3.5 3.0*    98 98   CO2 18* 21* 23   * 108* 110*   CREATININE 16.9* 14.5* 11.7*   GLU 97 139* 113*         Recent Labs   Lab 12/07/22 0448 12/08/22 0416 12/09/22  0420   CALCIUM 6.9* 7.0* 7.1*   ALBUMIN 3.2* 3.3* 3.6   PHOS 10.3* 6.2* 6.1*   MG 1.3* 1.9 1.7         Recent Labs   Lab 12/06/22  0415   PTH, Intact 288.4 H         No results for input(s): POCTGLUCOSE in the last 168 hours.          Recent Labs   Lab 12/07/22 0448 12/08/22 0416 12/09/22  0420   WBC 8.64 8.49 10.27   HGB 10.3* 10.1* 10.0*   HCT 29.2* 29.3* 28.6*    185 197   MCV 97 98 98   MCHC 35.3 34.5 35.0   MONO 8.2  0.7 9.9  0.8 9.4  1.0         Recent Labs   Lab  12/07/22  0448 12/08/22  0416 12/09/22  0420   BILITOT 0.7 0.7 0.7   PROT 7.3 7.6 7.4   ALBUMIN 3.2* 3.3* 3.6   ALKPHOS 66 63 59   ALT 47* 35 28   AST 25 16 16         Recent Labs   Lab 07/07/20  0934 10/29/20  1104 12/05/22  1606 12/06/22  0445 12/08/22  1504   Color, UA Yellow   < > Yellow Yellow Yellow   Appearance, UA  --    < > Clear Clear Clear   Clarity, UA Clear  --   --   --   --    pH, UA 6.0   < > 6.0 6.0 6.0   Specific Gravity, UA  --    < > 1.010 1.010 1.010   Spec Grav UA 1.025  --   --   --   --    Protein, UA  --    < > 1+ A 1+ A 1+ A   Glucose, UA  --    < > 1+ A 2+ A 3+ A   Ketones, UA n   < > Negative Negative Negative   Urobilinogen, UA n   < > Negative Negative Negative   Bilirubin (UA)  --    < > Negative Negative Negative   Occult Blood UA  --    < > 2+ A 2+ A 2+ A   Nitrite, UA n   < > Negative Negative Negative   RBC, UA  --   --  1 2 4   WBC, UA  --   --  5 9 H 3   Bacteria, UA  --    < >  --   --   --    Bacteria  --   --  Negative Negative Negative   Hyaline Casts, UA  --    < > 6 A 4 A 1    < > = values in this interval not displayed.               Microbiology Results (last 7 days)       ** No results found for the last 168 hours. **            ASSESSMENT/PLAN:     RALF, non-oliguric, bland UA ? AIN ? IgAN with h/o rash recently  CKD stage 3  Uremia  Hypokalemia  Acidosis  Hyperphosphatemia  History of kidney stones  Anemia  CHF after CABG x7  No NSAIDs, ACEI/ARB, IV contrast or other nephrotoxins.  Keep MAP > 60, SBP > 100.  Dose meds for GFR < 30 ml/min.  Renal diet - low K, low phos.  Hold Entresto, PPIs, Statin.  Renal US without cortical thinning or obstruction.   Urinalysis with unimpressive few cells WBC? RBC, GN work-up negative. ? med-related AIN  Continue Phos binders, PTH is elevated and vitamin D is pending.  No emergency need for dialysis.    No blood thinners - no ASA, PLAVIX, NOACs. Ok to have prophylactic heparin! - may need a kidney biopsy soon - 12/14 is the  realiest.    Thank you for allowing us to participate in the care of your patient!   We will follow the patient and provide recommendations as needed.    Patient care time was spent personally by me on the following activities:     Obtaining a history.  Examination of patient.  Providing medical care at the patients bedside.  Developing a treatment plan with patient or surrogate and bedside caregivers.  Ordering and reviewing laboratory studies, radiographic studies, pulse oximetry.  Ordering and performing treatments and interventions.  Evaluation of patient's response to treatment.  Discussions with consultants while on the unit and immediately available to the patient.  Re-evaluation of the patient's condition.  Documentation in the medical record.     Law Chiang MD    [facility]  [address] [place] [id number] [phone] - tel  [phone] - fax    12/9/2022

## 2022-12-09 NOTE — CARE UPDATE
12/09/22 0845   Patient Assessment/Suction   Level of Consciousness (AVPU) alert   Respiratory Effort Unlabored   Expansion/Accessory Muscles/Retractions no use of accessory muscles   All Lung Fields Breath Sounds clear   PRE-TX-O2   Device (Oxygen Therapy) room air   SpO2 99 %   Pulse Oximetry Type Intermittent   $ Pulse Oximetry - Multiple Charge Pulse Oximetry - Multiple   Pulse 63   Aerosol Therapy   $ Aerosol Therapy Charges PRN treatment not required   Respiratory Evaluation   $ Care Plan Tech Time 15 min

## 2022-12-10 LAB
ALBUMIN SERPL BCP-MCNC: 3.3 G/DL (ref 3.5–5.2)
ALP SERPL-CCNC: 50 U/L (ref 55–135)
ALT SERPL W/O P-5'-P-CCNC: 28 U/L (ref 10–44)
ANION GAP SERPL CALC-SCNC: 13 MMOL/L (ref 8–16)
AST SERPL-CCNC: 24 U/L (ref 10–40)
BASOPHILS # BLD AUTO: 0.02 K/UL (ref 0–0.2)
BASOPHILS NFR BLD: 0.3 % (ref 0–1.9)
BILIRUB SERPL-MCNC: 0.6 MG/DL (ref 0.1–1)
BUN SERPL-MCNC: 108 MG/DL (ref 6–20)
CALCIUM SERPL-MCNC: 7.3 MG/DL (ref 8.7–10.5)
CHLORIDE SERPL-SCNC: 102 MMOL/L (ref 95–110)
CO2 SERPL-SCNC: 24 MMOL/L (ref 23–29)
CREAT SERPL-MCNC: 9 MG/DL (ref 0.5–1.4)
DIFFERENTIAL METHOD: ABNORMAL
EOSINOPHIL # BLD AUTO: 0 K/UL (ref 0–0.5)
EOSINOPHIL NFR BLD: 0.1 % (ref 0–8)
ERYTHROCYTE [DISTWIDTH] IN BLOOD BY AUTOMATED COUNT: 14.4 % (ref 11.5–14.5)
EST. GFR  (NO RACE VARIABLE): 6.7 ML/MIN/1.73 M^2
GLUCOSE SERPL-MCNC: 120 MG/DL (ref 70–110)
GLUCOSE SERPL-MCNC: 190 MG/DL (ref 70–110)
GLUCOSE SERPL-MCNC: 198 MG/DL (ref 70–110)
GLUCOSE SERPL-MCNC: 295 MG/DL (ref 70–110)
GLUCOSE SERPL-MCNC: 335 MG/DL (ref 70–110)
HCT VFR BLD AUTO: 26.7 % (ref 40–54)
HGB BLD-MCNC: 9 G/DL (ref 14–18)
IMM GRANULOCYTES # BLD AUTO: 0.05 K/UL (ref 0–0.04)
IMM GRANULOCYTES NFR BLD AUTO: 0.6 % (ref 0–0.5)
LYMPHOCYTES # BLD AUTO: 1 K/UL (ref 1–4.8)
LYMPHOCYTES NFR BLD: 12.6 % (ref 18–48)
MAGNESIUM SERPL-MCNC: 1.3 MG/DL (ref 1.6–2.6)
MCH RBC QN AUTO: 34.4 PG (ref 27–31)
MCHC RBC AUTO-ENTMCNC: 33.7 G/DL (ref 32–36)
MCV RBC AUTO: 102 FL (ref 82–98)
MONOCYTES # BLD AUTO: 0.7 K/UL (ref 0.3–1)
MONOCYTES NFR BLD: 8.8 % (ref 4–15)
NEUTROPHILS # BLD AUTO: 6.1 K/UL (ref 1.8–7.7)
NEUTROPHILS NFR BLD: 77.6 % (ref 38–73)
NRBC BLD-RTO: 0 /100 WBC
PHOSPHATE SERPL-MCNC: 5 MG/DL (ref 2.7–4.5)
PLATELET # BLD AUTO: 177 K/UL (ref 150–450)
PMV BLD AUTO: 11.9 FL (ref 9.2–12.9)
POTASSIUM SERPL-SCNC: 2.9 MMOL/L (ref 3.5–5.1)
PROT SERPL-MCNC: 7.1 G/DL (ref 6–8.4)
RBC # BLD AUTO: 2.62 M/UL (ref 4.6–6.2)
SODIUM SERPL-SCNC: 139 MMOL/L (ref 136–145)
WBC # BLD AUTO: 7.8 K/UL (ref 3.9–12.7)

## 2022-12-10 PROCEDURE — 85025 COMPLETE CBC W/AUTO DIFF WBC: CPT | Performed by: NURSE PRACTITIONER

## 2022-12-10 PROCEDURE — 83735 ASSAY OF MAGNESIUM: CPT | Performed by: NURSE PRACTITIONER

## 2022-12-10 PROCEDURE — 25000003 PHARM REV CODE 250: Performed by: INTERNAL MEDICINE

## 2022-12-10 PROCEDURE — 99900031 HC PATIENT EDUCATION (STAT)

## 2022-12-10 PROCEDURE — 25000003 PHARM REV CODE 250: Performed by: NURSE PRACTITIONER

## 2022-12-10 PROCEDURE — 63600175 PHARM REV CODE 636 W HCPCS: Performed by: INTERNAL MEDICINE

## 2022-12-10 PROCEDURE — 36415 COLL VENOUS BLD VENIPUNCTURE: CPT | Performed by: NURSE PRACTITIONER

## 2022-12-10 PROCEDURE — 99900035 HC TECH TIME PER 15 MIN (STAT)

## 2022-12-10 PROCEDURE — 84100 ASSAY OF PHOSPHORUS: CPT | Performed by: NURSE PRACTITIONER

## 2022-12-10 PROCEDURE — 80053 COMPREHEN METABOLIC PANEL: CPT | Performed by: NURSE PRACTITIONER

## 2022-12-10 PROCEDURE — 63600175 PHARM REV CODE 636 W HCPCS: Performed by: NURSE PRACTITIONER

## 2022-12-10 PROCEDURE — 94761 N-INVAS EAR/PLS OXIMETRY MLT: CPT

## 2022-12-10 PROCEDURE — 21400001 HC TELEMETRY ROOM

## 2022-12-10 RX ORDER — POTASSIUM CHLORIDE 20 MEQ/1
20 TABLET, EXTENDED RELEASE ORAL 3 TIMES DAILY
Status: DISCONTINUED | OUTPATIENT
Start: 2022-12-10 | End: 2022-12-11 | Stop reason: HOSPADM

## 2022-12-10 RX ORDER — POTASSIUM CHLORIDE 7.45 MG/ML
40 INJECTION INTRAVENOUS ONCE
Status: COMPLETED | OUTPATIENT
Start: 2022-12-10 | End: 2022-12-10

## 2022-12-10 RX ORDER — MAGNESIUM SULFATE HEPTAHYDRATE 40 MG/ML
2 INJECTION, SOLUTION INTRAVENOUS ONCE
Status: DISCONTINUED | OUTPATIENT
Start: 2022-12-10 | End: 2022-12-10

## 2022-12-10 RX ORDER — PREDNISONE 20 MG/1
40 TABLET ORAL DAILY
Status: DISCONTINUED | OUTPATIENT
Start: 2022-12-10 | End: 2022-12-11 | Stop reason: HOSPADM

## 2022-12-10 RX ORDER — MAGNESIUM SULFATE HEPTAHYDRATE 40 MG/ML
4 INJECTION, SOLUTION INTRAVENOUS ONCE
Status: COMPLETED | OUTPATIENT
Start: 2022-12-10 | End: 2022-12-10

## 2022-12-10 RX ADMIN — METOPROLOL TARTRATE 25 MG: 25 TABLET, FILM COATED ORAL at 09:12

## 2022-12-10 RX ADMIN — POTASSIUM CHLORIDE 20 MEQ: 1500 TABLET, EXTENDED RELEASE ORAL at 08:12

## 2022-12-10 RX ADMIN — SEVELAMER CARBONATE 1600 MG: 800 TABLET, FILM COATED ORAL at 11:12

## 2022-12-10 RX ADMIN — MAGNESIUM SULFATE HEPTAHYDRATE 4 G: 40 INJECTION, SOLUTION INTRAVENOUS at 01:12

## 2022-12-10 RX ADMIN — POTASSIUM CHLORIDE 20 MEQ: 1500 TABLET, EXTENDED RELEASE ORAL at 02:12

## 2022-12-10 RX ADMIN — HEPARIN SODIUM 5000 UNITS: 5000 INJECTION INTRAVENOUS; SUBCUTANEOUS at 04:12

## 2022-12-10 RX ADMIN — Medication 6 MG: at 08:12

## 2022-12-10 RX ADMIN — PREDNISONE 40 MG: 20 TABLET ORAL at 09:12

## 2022-12-10 RX ADMIN — POTASSIUM CHLORIDE 20 MEQ: 1500 TABLET, EXTENDED RELEASE ORAL at 09:12

## 2022-12-10 RX ADMIN — SEVELAMER CARBONATE 1600 MG: 800 TABLET, FILM COATED ORAL at 07:12

## 2022-12-10 RX ADMIN — HEPARIN SODIUM 5000 UNITS: 5000 INJECTION INTRAVENOUS; SUBCUTANEOUS at 07:12

## 2022-12-10 RX ADMIN — POTASSIUM CHLORIDE 40 MEQ: 7.46 INJECTION, SOLUTION INTRAVENOUS at 07:12

## 2022-12-10 RX ADMIN — EZETIMIBE 10 MG: 10 TABLET ORAL at 09:12

## 2022-12-10 RX ADMIN — METOPROLOL TARTRATE 25 MG: 25 TABLET, FILM COATED ORAL at 08:12

## 2022-12-10 RX ADMIN — INSULIN ASPART 2 UNITS: 100 INJECTION, SOLUTION INTRAVENOUS; SUBCUTANEOUS at 08:12

## 2022-12-10 RX ADMIN — SEVELAMER CARBONATE 1600 MG: 800 TABLET, FILM COATED ORAL at 04:12

## 2022-12-10 NOTE — PLAN OF CARE
12/10/22 1527   Patient Assessment/Suction   Level of Consciousness (AVPU) alert   Respiratory Effort Normal;Unlabored   All Lung Fields Breath Sounds clear   PRE-TX-O2   Device (Oxygen Therapy) room air   SpO2 99 %   Pulse Oximetry Type Intermittent   $ Pulse Oximetry - Multiple Charge Pulse Oximetry - Multiple   Pulse 75   Resp 18   Aerosol Therapy   $ Aerosol Therapy Charges PRN treatment not required   Respiratory Treatment Status (SVN) PRN treatment not required   Education   $ Education Bronchodilator;15 min   Respiratory Evaluation   $ Care Plan Tech Time 15 min

## 2022-12-10 NOTE — PROGRESS NOTES
Nephrology Consult Note        Patient Name: Lee Quintanilla  MRN: [id number]    Patient Class: IP- Inpatient   Admission Date: 12/5/2022  Length of Stay: 5 days  Date of Service: 12/10/2022    Attending Physician: Deborah Green MD  Primary Care Provider: Riya Vidales NP    Reason for Consult: mariah/uremia/acidosis/hyponatremia/chf/htn    SUBJECTIVE:     HPI: 47M with notable history of CABG x7 on Entresto, spinal surgeries, remote history of stones requiring lithotripsy and sCr around 1.3 at least until 1/2022, was sent to hospital by PCP for abnormal scr 20 and uremia symptoms. Accompanied by wife. Describes insidious onset of lethargy, poor appetite, swelling, weakness over last few weeks. A few nearsyncopal episodes of unclear significance. Had episode of bilateral LE non-pruritic ? Vasculitic rash, now mostly resolved. Denies disuria, hematuria, bladder symptoms, infection, unusual OTC meds - notably no NSAIDs or contrast.    12/6 VSS. No UO documented, will need strict UOs. Sadly received Aspirin this AM even though I requested no blood thinners to be given as he may need renal biopsy - will stop today. Can have prophylactic hepatin doses. Agree with IVF for now. GN work-up send and pending. Renal US WITHOUT cortical thinning is good news. Will add phos binders, renal diet and sod bicarb drip.    12/7 VSS, decent UO. Scr better but still high. Continue IVF, Work-up pending. Started steroids and held PPI and Statin in addition to Entresto and blood thinners for now.  12/8 VSS. SCr further improved. UO is OK. We can't do renal biopsy until 12/14 due to aspirin. Keep off blood thinners, continues steroids, keep off PPI and statins for now. GN work-up is negative. Repeat UA.  12/9 VSS, U Ois good, scr better. Hypokalemia - start oral KCL.  12/10 VSS, good UO, UA is better, sCr improving but still far from baseline. Will decrease Prednisone to 40mg daily, add oral KCL ATC and give IV mag to deal with  hypokalemia and hypomagnesemia induced by what looks like post ATN polyuria because BUN still high. If stable and improving, will consider dc tomorrow and f/u as outpatient.    Past Medical History:   Diagnosis Date    ADHD (attention deficit hyperactivity disorder)     Arthritis     Asthma     as child only    Back pain     Coronary artery disease     Degeneration of lumbar intervertebral disc 05/2016    Depression     Elevated PSA     Headache     Hyperlipidemia     Hypertension     Kidney damage     stage 3 ; d/t aleve abuse    Kidney stone     Myocardial infarction     Neck pain     Numbness and tingling in hands     Numbness and tingling of both legs     Seizures     from inapsine 2002    Sleep apnea     no cpap    Wears glasses     CONTACS     Past Surgical History:   Procedure Laterality Date    BACK SURGERY  2016    back surgery    BONE GRAFT N/A 11/5/2020    Procedure: BONE GRAFT;  Surgeon: Mateusz Blanco MD;  Location: [facility] OR;  Service: Orthopedics;  Laterality: N/A;    CARDIAC SURGERY  01/06/2020    CABG X 7    CORONARY ARTERY BYPASS GRAFT      7 vessels    HERNIA REPAIR Bilateral 11/22/2017    LITHOTRIPSY      LUMBAR LAMINECTOMY WITH FUSION Bilateral 11/5/2020    Procedure: LAMINECTOMY, SPINE, LUMBAR, WITH FUSION;  Surgeon: Mateusz Blanco MD;  Location: [facility] OR;  Service: Orthopedics;  Laterality: Bilateral;  MEDTRONIC  NTI  L-3    PILONIDAL CYST DRAINAGE      removal of abcess      scrotal    REMOVAL OF HARDWARE FROM SPINE Bilateral 11/5/2020    Procedure: REMOVAL, HARDWARE, SPINE;  Surgeon: Mateusz Blanco MD;  Location: [facility] OR;  Service: Orthopedics;  Laterality: Bilateral;  L 4-5    TYMPANOSTOMY TUBE PLACEMENT       Family History   Problem Relation Age of Onset    Heart disease Mother     Migraines Mother     Hypertension Mother     Early death Father     Heart disease Father     Diabetes Maternal Aunt     Diabetes Maternal Uncle     Diabetes Maternal Grandfather      Social History      Tobacco Use    Smoking status: Former     Packs/day: 0.25     Types: Cigarettes     Quit date: 2016     Years since quittin.9    Smokeless tobacco: Former     Quit date: 2016    Tobacco comments:     occasional   Substance Use Topics    Alcohol use: Yes     Alcohol/week: 1.0 standard drink     Types: 1 Glasses of wine per week     Comment: rare    Drug use: No       Review of patient's allergies indicates:   Allergen Reactions    Inapsine [droperidol] Anaphylaxis     seizures    Effexor [venlafaxine]      Increased anxiety    Pcn [penicillins]     Bactrim [sulfamethoxazole-trimethoprim] Rash     Dry red rash all over when in the sun       Outpatient meds:  No current facility-administered medications on file prior to encounter.     Current Outpatient Medications on File Prior to Encounter   Medication Sig Dispense Refill    aspirin 81 MG Chew Take 81 mg by mouth once daily.      DULoxetine (CYMBALTA) 60 MG capsule Take 1 capsule (60 mg total) by mouth once daily. 90 capsule 1    ENTRESTO 49-51 mg per tablet TAKE ONE TABLET BY MOUTH TWICE A DAY AS DIRECTED      ezetimibe (ZETIA) 10 mg tablet Take 10 mg by mouth once daily.      hydrOXYzine HCL (ATARAX) 25 MG tablet Take 1 tablet (25 mg total) by mouth 3 (three) times daily as needed for Itching. 60 tablet 0    metoprolol tartrate (LOPRESSOR) 25 MG tablet Take 1 tablet (25 mg total) by mouth 2 (two) times a day. 60 tablet 0    rosuvastatin (CRESTOR) 40 MG Tab TAKE ONE TABLET BY MOUTH ONCE A DAY AS DIRECTED      testosterone cypionate (DEPOTESTOTERONE CYPIONATE) 200 mg/mL injection Inject 200 mg into the muscle every 7 days.      anastrozole (ARIMIDEX) 1 mg Tab Take 1 mg by mouth once a week.      azelastine (ASTELIN) 137 mcg (0.1 %) nasal spray 1 spray (137 mcg total) by Nasal route 2 (two) times daily. 30 mL 12    tadalafiL (CIALIS) 20 MG Tab Take 1/2 to 1 tablet as needed prior to intercourse. No more than 1 in 36 hours 30 tablet 3       Scheduled  meds:      Infusions:      PRN meds:      Review of Systems:      OBJECTIVE:     Vital Signs and IO:  Temp:  [97.9 °F (36.6 °C)-98.7 °F (37.1 °C)]   Pulse:  [63-85]   Resp:  [16-20]   BP: (136-158)/(72-95)   SpO2:  [98 %-100 %]   I/O last 3 completed shifts:  In: 1360 [P.O.:1360]  Out: 3050 [Urine:3050]  Wt Readings from Last 5 Encounters:   12/10/22 108.3 kg (238 lb 12.1 oz)   12/05/22 108.4 kg (239 lb)   12/01/22 108.4 kg (239 lb)   04/25/22 108.4 kg (239 lb)   01/15/22 103 kg (226 lb 15.8 oz)     Body mass index is 37.39 kg/m².    Physical Exam  Constitutional:       General: He is not in acute distress.     Appearance: He is well-developed. He is not diaphoretic.   HENT:      Head: Normocephalic and atraumatic.      Mouth/Throat:      Mouth: Mucous membranes are moist.   Eyes:      General: No scleral icterus.     Pupils: Pupils are equal, round, and reactive to light.   Cardiovascular:      Rate and Rhythm: Normal rate and regular rhythm.   Pulmonary:      Effort: Pulmonary effort is normal. No respiratory distress.      Breath sounds: No stridor.   Abdominal:      General: There is no distension.      Palpations: Abdomen is soft.   Musculoskeletal:         General: No deformity. Normal range of motion.      Cervical back: Neck supple.   Skin:     General: Skin is warm and dry.      Findings: No erythema or rash.   Neurological:      Mental Status: He is alert and oriented to person, place, and time.      Cranial Nerves: No cranial nerve deficit.   Psychiatric:         Behavior: Behavior normal.     Laboratory:  Recent Labs   Lab 12/08/22  0416 12/09/22  0420 12/10/22  0447   * 138 139   K 3.5 3.0* 2.9*   CL 98 98 102   CO2 21* 23 24   * 110* 108*   CREATININE 14.5* 11.7* 9.0*   * 113* 190*         Recent Labs   Lab 12/08/22  0416 12/09/22  0420 12/10/22  0447   CALCIUM 7.0* 7.1* 7.3*   ALBUMIN 3.3* 3.6 3.3*   PHOS 6.2* 6.1* 5.0*   MG 1.9 1.7 1.3*         Recent Labs   Lab 12/06/22  0415    PTH, Intact 288.4 H         No results for input(s): POCTGLUCOSE in the last 168 hours.          Recent Labs   Lab 12/08/22  0416 12/09/22  0420 12/10/22  0447   WBC 8.49 10.27 7.80   HGB 10.1* 10.0* 9.0*   HCT 29.3* 28.6* 26.7*    197 177   MCV 98 98 102*   MCHC 34.5 35.0 33.7   MONO 9.9  0.8 9.4  1.0 8.8  0.7         Recent Labs   Lab 12/08/22  0416 12/09/22  0420 12/10/22  0447   BILITOT 0.7 0.7 0.6   PROT 7.6 7.4 7.1   ALBUMIN 3.3* 3.6 3.3*   ALKPHOS 63 59 50*   ALT 35 28 28   AST 16 16 24         Recent Labs   Lab 07/07/20  0934 10/29/20  1104 12/05/22  1606 12/06/22  0445 12/08/22  1504   Color, UA Yellow   < > Yellow Yellow Yellow   Appearance, UA  --    < > Clear Clear Clear   Clarity, UA Clear  --   --   --   --    pH, UA 6.0   < > 6.0 6.0 6.0   Specific Gravity, UA  --    < > 1.010 1.010 1.010   Spec Grav UA 1.025  --   --   --   --    Protein, UA  --    < > 1+ A 1+ A 1+ A   Glucose, UA  --    < > 1+ A 2+ A 3+ A   Ketones, UA n   < > Negative Negative Negative   Urobilinogen, UA n   < > Negative Negative Negative   Bilirubin (UA)  --    < > Negative Negative Negative   Occult Blood UA  --    < > 2+ A 2+ A 2+ A   Nitrite, UA n   < > Negative Negative Negative   RBC, UA  --   --  1 2 4   WBC, UA  --   --  5 9 H 3   Bacteria, UA  --    < >  --   --   --    Bacteria  --   --  Negative Negative Negative   Hyaline Casts, UA  --    < > 6 A 4 A 1    < > = values in this interval not displayed.               Microbiology Results (last 7 days)       ** No results found for the last 168 hours. **            ASSESSMENT/PLAN:     RALF, non-oliguric, bland UA ? AIN ? IgAN with h/o rash recently  CKD stage 3  Uremia  Hypokalemia and hypomagnesemia due to post-ATN polyuria  Acidosis  Hyperphosphatemia  History of kidney stones  Anemia  CHF after CABG x7  No NSAIDs, ACEI/ARB, IV contrast or other nephrotoxins.  Keep MAP > 60, SBP > 100.  Dose meds for GFR < 30 ml/min.  Renal diet - low K, low phos.  Hold  Entresto, PPIs, Statin.  Renal US without cortical thinning or obstruction.   Urinalysis with unimpressive few cells WBC? RBC, GN work-up negative. ? med-related AIN or ATN of unclear etiology  Continue Phos binders, PTH is elevated.  No emergency need for dialysis.    VSS, good UO, UA is better, sCr improving but still far from baseline. Will decrease Prednisone to 40mg daily, add oral KCL ATC and give IV mag to deal with hypokalemia and hypomagnesemia induced by what looks like post ATN polyuria because BUN still high. If stable and improving, will consider dc tomorrow and f/u as outpatient.    No blood thinners - no ASA, PLAVIX, NOACs. Ok to have prophylactic heparin! - may need a kidney biopsy soon - 12/14 is the earliest.    Thank you for allowing us to participate in the care of your patient!   We will follow the patient and provide recommendations as needed.    Patient care time was spent personally by me on the following activities:     Obtaining a history.  Examination of patient.  Providing medical care at the patients bedside.  Developing a treatment plan with patient or surrogate and bedside caregivers.  Ordering and reviewing laboratory studies, radiographic studies, pulse oximetry.  Ordering and performing treatments and interventions.  Evaluation of patient's response to treatment.  Discussions with consultants while on the unit and immediately available to the patient.  Re-evaluation of the patient's condition.  Documentation in the medical record.     Law Chiang MD    [facility]  [address] [place] [address]  [place] [id number]    [phone] - tel  [phone] - fax    12/10/2022

## 2022-12-10 NOTE — PROGRESS NOTES
"[facility] Medicine Progress Note  Patient Name: Lee Quintanilla MRN: [id number]   Patient Class: IP- Inpatient  Length of Stay: 5   Admission Date: 12/5/2022 12:38 PM Attending Physician: Deborah Green MD   Primary Care Provider: Riya Vidales NP Face-to-Face encounter date: 12/10/2022   Chief Complaint: Abnormal Labs (Sent by PCP for BUN of 117 and Creatinine greater than 20 )      Subjective:    Interval History   No new issues  Creatinine improving  Denies chest pain, shortness of breath, palpitations, abdominal pain, nausea/vomiting.   No concerns/issues overnight reported by the patient or the nursing staff.  Reviewed the labs and discussed the plan of care.   Mother present at bedside.     Review of Systems   All other Review of Systems were found to be negative expect for that mentioned already in HPI.     Hospital Course     12/10/2022     ezetimibe  10 mg Oral Daily    famotidine  20 mg Oral Every other day    heparin (porcine)  5,000 Units Subcutaneous Q8H    magnesium sulfate IVPB  4 g Intravenous Once    metoprolol tartrate  25 mg Oral BID    potassium chloride  20 mEq Oral TID    predniSONE  40 mg Oral Daily    sevelamer carbonate  1,600 mg Oral TID WM       acetaminophen, albuterol-ipratropium, aluminum-magnesium hydroxide-simethicone, dextrose 10%, dextrose 10%, glucagon (human recombinant), glucose, glucose, hydrALAZINE, insulin aspart U-100, magnesium oxide, magnesium oxide, melatonin, naloxone, ondansetron, potassium bicarbonate, potassium bicarbonate, potassium bicarbonate, potassium, sodium phosphates, potassium, sodium phosphates, potassium, sodium phosphates, simethicone, sodium chloride 0.9%      Objective:   Physical Exam  BP (!) 143/74 (BP Location: Right arm, Patient Position: Lying)   Pulse 69   Temp 98.1 °F (36.7 °C) (Oral)   Resp 18   Ht 5' 7" (1.702 m)   Wt 108.3 kg (238 lb 12.1 oz)   SpO2 100%   BMI 37.39 kg/m²   Constitutional: No distress. "   HENT: Atraumatic.   Cardiovascular: Normal rate, regular rhythm and normal heart sounds.   Pulmonary/Chest: Effort normal. Clear to auscultation bilaterally. No wheezes.   Abdominal: Soft. Bowel sounds are normal. Exhibits no distension and no mass. No tenderness  Neurological: Alert.   Skin: Skin is warm and dry.     Labs and Imaging    Significant Labs: All pertinent labs within the past 24 hours have been reviewed.    Significant Imaging: I have reviewed all pertinent imaging results/findings within the past 24 hours.    I have reviewed the Vitals, labs and imaging as above.     Assessment & Plan:   Lee Quintanilla is a 47 y.o. male admitted for    Active Hospital Problems    Diagnosis    *Acute renal insufficiency    Gastroesophageal reflux disease without esophagitis    Hx of CABG    Anemia, chronic disease    Coronary artery disease involving coronary bypass graft    Hypertension    Hyperlipidemia    Severe obesity with body mass index (BMI) of 35.0 to 39.9 with serious comorbidity       Plan  IV fluids as per nephro  Creatinine improving  Replacing electrolytes, K is low  Holding aspirin  No acute needs of dialysis  Possible discharge tomorrow if cr continues to improve  Discussed with Nephrology    Active PT: No  Active OT: No  Active SLP: No    Core measures:  - Code status:   - Diet: Renal  VTE Risk Mitigation (From admission, onward)           Ordered     heparin (porcine) injection 5,000 Units  Every 8 hours         12/05/22 1652     IP VTE HIGH RISK PATIENT  Once         12/05/22 1652     Place sequential compression device  Until discontinued         12/05/22 1652                    Discharge Planning:   Discharge Planning   FANTA: 12/11    Code Status: Full Code   Is the patient medically ready for discharge?: no    Reason for patient still in hospital (select all that apply): Patient trending condition  Discharge Plan A: Home with assistance from family        Above encounter included review  of the medical records, interviewing and examining the patient face-to-face, discussion with family and other health care providers, ordering and interpreting lab/test results and formulating a plan of care.     Medical Decision Making:  [] Low Complexity  [x] Moderate Complexity  [] High Complexity    Deborah Green MD  [facility] Hospitalist  12/10/2022

## 2022-12-11 VITALS
OXYGEN SATURATION: 99 % | DIASTOLIC BLOOD PRESSURE: 79 MMHG | WEIGHT: 239.19 LBS | HEART RATE: 65 BPM | RESPIRATION RATE: 20 BRPM | BODY MASS INDEX: 37.54 KG/M2 | HEIGHT: 67 IN | TEMPERATURE: 99 F | SYSTOLIC BLOOD PRESSURE: 131 MMHG

## 2022-12-11 LAB
ALBUMIN SERPL BCP-MCNC: 3.5 G/DL (ref 3.5–5.2)
ALP SERPL-CCNC: 55 U/L (ref 55–135)
ALT SERPL W/O P-5'-P-CCNC: 43 U/L (ref 10–44)
ANION GAP SERPL CALC-SCNC: 7 MMOL/L (ref 8–16)
AST SERPL-CCNC: 43 U/L (ref 10–40)
BASOPHILS # BLD AUTO: 0.01 K/UL (ref 0–0.2)
BASOPHILS NFR BLD: 0.1 % (ref 0–1.9)
BILIRUB SERPL-MCNC: 0.6 MG/DL (ref 0.1–1)
BUN SERPL-MCNC: 89 MG/DL (ref 6–20)
CALCIUM SERPL-MCNC: 7.7 MG/DL (ref 8.7–10.5)
CHLORIDE SERPL-SCNC: 106 MMOL/L (ref 95–110)
CO2 SERPL-SCNC: 25 MMOL/L (ref 23–29)
CREAT SERPL-MCNC: 6.1 MG/DL (ref 0.5–1.4)
DIFFERENTIAL METHOD: ABNORMAL
EOSINOPHIL # BLD AUTO: 0 K/UL (ref 0–0.5)
EOSINOPHIL NFR BLD: 0.3 % (ref 0–8)
ERYTHROCYTE [DISTWIDTH] IN BLOOD BY AUTOMATED COUNT: 14.2 % (ref 11.5–14.5)
EST. GFR  (NO RACE VARIABLE): 10.7 ML/MIN/1.73 M^2
GLUCOSE SERPL-MCNC: 104 MG/DL (ref 70–110)
GLUCOSE SERPL-MCNC: 165 MG/DL (ref 70–110)
GLUCOSE SERPL-MCNC: 89 MG/DL (ref 70–110)
HCT VFR BLD AUTO: 29.6 % (ref 40–54)
HGB BLD-MCNC: 9.8 G/DL (ref 14–18)
IMM GRANULOCYTES # BLD AUTO: 0.06 K/UL (ref 0–0.04)
IMM GRANULOCYTES NFR BLD AUTO: 0.8 % (ref 0–0.5)
LYMPHOCYTES # BLD AUTO: 1.3 K/UL (ref 1–4.8)
LYMPHOCYTES NFR BLD: 16.6 % (ref 18–48)
MAGNESIUM SERPL-MCNC: 2.1 MG/DL (ref 1.6–2.6)
MCH RBC QN AUTO: 33.8 PG (ref 27–31)
MCHC RBC AUTO-ENTMCNC: 33.1 G/DL (ref 32–36)
MCV RBC AUTO: 102 FL (ref 82–98)
MONOCYTES # BLD AUTO: 0.8 K/UL (ref 0.3–1)
MONOCYTES NFR BLD: 9.9 % (ref 4–15)
NEUTROPHILS # BLD AUTO: 5.7 K/UL (ref 1.8–7.7)
NEUTROPHILS NFR BLD: 72.3 % (ref 38–73)
NRBC BLD-RTO: 0 /100 WBC
PHOSPHATE SERPL-MCNC: 3.9 MG/DL (ref 2.7–4.5)
PLATELET # BLD AUTO: 196 K/UL (ref 150–450)
PMV BLD AUTO: 11.6 FL (ref 9.2–12.9)
POTASSIUM SERPL-SCNC: 3.4 MMOL/L (ref 3.5–5.1)
PROT SERPL-MCNC: 7.5 G/DL (ref 6–8.4)
RBC # BLD AUTO: 2.9 M/UL (ref 4.6–6.2)
SODIUM SERPL-SCNC: 138 MMOL/L (ref 136–145)
WBC # BLD AUTO: 7.81 K/UL (ref 3.9–12.7)

## 2022-12-11 PROCEDURE — 63600175 PHARM REV CODE 636 W HCPCS: Performed by: NURSE PRACTITIONER

## 2022-12-11 PROCEDURE — 25000003 PHARM REV CODE 250: Performed by: NURSE PRACTITIONER

## 2022-12-11 PROCEDURE — 83735 ASSAY OF MAGNESIUM: CPT | Performed by: NURSE PRACTITIONER

## 2022-12-11 PROCEDURE — 85025 COMPLETE CBC W/AUTO DIFF WBC: CPT | Performed by: NURSE PRACTITIONER

## 2022-12-11 PROCEDURE — 84100 ASSAY OF PHOSPHORUS: CPT | Performed by: NURSE PRACTITIONER

## 2022-12-11 PROCEDURE — 80053 COMPREHEN METABOLIC PANEL: CPT | Performed by: NURSE PRACTITIONER

## 2022-12-11 PROCEDURE — 25000003 PHARM REV CODE 250: Performed by: INTERNAL MEDICINE

## 2022-12-11 PROCEDURE — 63600175 PHARM REV CODE 636 W HCPCS: Performed by: INTERNAL MEDICINE

## 2022-12-11 PROCEDURE — 36415 COLL VENOUS BLD VENIPUNCTURE: CPT | Performed by: NURSE PRACTITIONER

## 2022-12-11 RX ORDER — PREDNISONE 20 MG/1
40 TABLET ORAL DAILY
Qty: 14 TABLET | Refills: 0 | Status: SHIPPED | OUTPATIENT
Start: 2022-12-12 | End: 2022-12-19

## 2022-12-11 RX ORDER — PANTOPRAZOLE SODIUM 40 MG/1
40 TABLET, DELAYED RELEASE ORAL DAILY
Qty: 30 TABLET | Refills: 0 | Status: SHIPPED | OUTPATIENT
Start: 2022-12-11 | End: 2023-09-21

## 2022-12-11 RX ADMIN — SEVELAMER CARBONATE 1600 MG: 800 TABLET, FILM COATED ORAL at 08:12

## 2022-12-11 RX ADMIN — EZETIMIBE 10 MG: 10 TABLET ORAL at 08:12

## 2022-12-11 RX ADMIN — METOPROLOL TARTRATE 25 MG: 25 TABLET, FILM COATED ORAL at 08:12

## 2022-12-11 RX ADMIN — PREDNISONE 40 MG: 20 TABLET ORAL at 08:12

## 2022-12-11 RX ADMIN — SEVELAMER CARBONATE 1600 MG: 800 TABLET, FILM COATED ORAL at 12:12

## 2022-12-11 RX ADMIN — FAMOTIDINE 20 MG: 20 TABLET ORAL at 08:12

## 2022-12-11 RX ADMIN — POTASSIUM CHLORIDE 20 MEQ: 1500 TABLET, EXTENDED RELEASE ORAL at 08:12

## 2022-12-11 RX ADMIN — HEPARIN SODIUM 5000 UNITS: 5000 INJECTION INTRAVENOUS; SUBCUTANEOUS at 12:12

## 2022-12-11 RX ADMIN — HEPARIN SODIUM 5000 UNITS: 5000 INJECTION INTRAVENOUS; SUBCUTANEOUS at 08:12

## 2022-12-11 NOTE — DISCHARGE INSTRUCTIONS
Diet:  Resume regular diet    Physical Activity:  Activity as tolerated    Instructions:  1. Take all medications as prescribed  2. Keep all follow-up appointments  3. Return to the hospital or call your primary care physicians if any worsening symptoms such as abdominal pain, nausea, vomiting occur.    Follow-Up Appointments:  Please call your primary care physician to schedule an appointment in 1 week time.

## 2022-12-11 NOTE — PROGRESS NOTES
Nephrology Consult Note        Patient Name: Lee Quintanilla  MRN: [id number]    Patient Class: IP- Inpatient   Admission Date: 12/5/2022  Length of Stay: 6 days  Date of Service: 12/11/2022    Attending Physician: Deborah Green MD  Primary Care Provider: Riya Vidales NP    Reason for Consult: mariah/uremia/acidosis/hyponatremia/chf/htn    SUBJECTIVE:     HPI: 47M with notable history of CABG x7 on Entresto, spinal surgeries, remote history of stones requiring lithotripsy and sCr around 1.3 at least until 1/2022, was sent to hospital by PCP for abnormal scr 20 and uremia symptoms. Accompanied by wife. Describes insidious onset of lethargy, poor appetite, swelling, weakness over last few weeks. A few nearsyncopal episodes of unclear significance. Had episode of bilateral LE non-pruritic ? Vasculitic rash, now mostly resolved. Denies disuria, hematuria, bladder symptoms, infection, unusual OTC meds - notably no NSAIDs or contrast.    12/6 VSS. No UO documented, will need strict UOs. Sadly received Aspirin this AM even though I requested no blood thinners to be given as he may need renal biopsy - will stop today. Can have prophylactic hepatin doses. Agree with IVF for now. GN work-up send and pending. Renal US WITHOUT cortical thinning is good news. Will add phos binders, renal diet and sod bicarb drip.    12/7 VSS, decent UO. Scr better but still high. Continue IVF, Work-up pending. Started steroids and held PPI and Statin in addition to Entresto and blood thinners for now.  12/8 VSS. SCr further improved. UO is OK. We can't do renal biopsy until 12/14 due to aspirin. Keep off blood thinners, continues steroids, keep off PPI and statins for now. GN work-up is negative. Repeat UA.  12/9 VSS, U Ois good, scr better. Hypokalemia - start oral KCL.  12/10 VSS, good UO, UA is better, sCr improving but still far from baseline. Will decrease Prednisone to 40mg daily, add oral KCL ATC and give IV mag to deal with  hypokalemia and hypomagnesemia induced by what looks like post ATN polyuria because BUN still high. If stable and improving, will consider dc tomorrow and f/u as outpatient.  12/11 VSS, good UO, sCr rapidly improving.can go home with oral steroids for 1 week, off ASA and Entresto and call my office for appointment tomorrow to schedule follow-up and obtain orders for weekly labs (renal panel, Mg, UA) and steroid taper. I may not do biopsy (earliest 12/14) if his sCr returns all the way back to normal and RALF does not recur after stopping steroids.    Past Medical History:   Diagnosis Date    ADHD (attention deficit hyperactivity disorder)     Arthritis     Asthma     as child only    Back pain     Coronary artery disease     Degeneration of lumbar intervertebral disc 05/2016    Depression     Elevated PSA     Headache     Hyperlipidemia     Hypertension     Kidney damage     stage 3 ; d/t aleve abuse    Kidney stone     Myocardial infarction     Neck pain     Numbness and tingling in hands     Numbness and tingling of both legs     Seizures     from inapsine 2002    Sleep apnea     no cpap    Wears glasses     CONTACS     Past Surgical History:   Procedure Laterality Date    BACK SURGERY  2016    back surgery    BONE GRAFT N/A 11/5/2020    Procedure: BONE GRAFT;  Surgeon: Mateusz Blanco MD;  Location: [facility] OR;  Service: Orthopedics;  Laterality: N/A;    CARDIAC SURGERY  01/06/2020    CABG X 7    CORONARY ARTERY BYPASS GRAFT      7 vessels    HERNIA REPAIR Bilateral 11/22/2017    LITHOTRIPSY      LUMBAR LAMINECTOMY WITH FUSION Bilateral 11/5/2020    Procedure: LAMINECTOMY, SPINE, LUMBAR, WITH FUSION;  Surgeon: Mateusz Blanco MD;  Location: [facility] OR;  Service: Orthopedics;  Laterality: Bilateral;  MEDTRONIC  NTI  L-3    PILONIDAL CYST DRAINAGE      removal of abcess      scrotal    REMOVAL OF HARDWARE FROM SPINE Bilateral 11/5/2020    Procedure: REMOVAL, HARDWARE, SPINE;  Surgeon: Mateusz Blanco MD;  Location:  [facility] OR;  Service: Orthopedics;  Laterality: Bilateral;  L 4-5    TYMPANOSTOMY TUBE PLACEMENT       Family History   Problem Relation Age of Onset    Heart disease Mother     Migraines Mother     Hypertension Mother     Early death Father     Heart disease Father     Diabetes Maternal Aunt     Diabetes Maternal Uncle     Diabetes Maternal Grandfather      Social History     Tobacco Use    Smoking status: Former     Packs/day: 0.25     Types: Cigarettes     Quit date: 2016     Years since quittin.9    Smokeless tobacco: Former     Quit date: 2016    Tobacco comments:     occasional   Substance Use Topics    Alcohol use: Yes     Alcohol/week: 1.0 standard drink     Types: 1 Glasses of wine per week     Comment: rare    Drug use: No       Review of patient's allergies indicates:   Allergen Reactions    Inapsine [droperidol] Anaphylaxis     seizures    Effexor [venlafaxine]      Increased anxiety    Pcn [penicillins]     Bactrim [sulfamethoxazole-trimethoprim] Rash     Dry red rash all over when in the sun       Outpatient meds:  No current facility-administered medications on file prior to encounter.     Current Outpatient Medications on File Prior to Encounter   Medication Sig Dispense Refill    aspirin 81 MG Chew Take 81 mg by mouth once daily.      DULoxetine (CYMBALTA) 60 MG capsule Take 1 capsule (60 mg total) by mouth once daily. 90 capsule 1    ENTRESTO 49-51 mg per tablet TAKE ONE TABLET BY MOUTH TWICE A DAY AS DIRECTED      ezetimibe (ZETIA) 10 mg tablet Take 10 mg by mouth once daily.      hydrOXYzine HCL (ATARAX) 25 MG tablet Take 1 tablet (25 mg total) by mouth 3 (three) times daily as needed for Itching. 60 tablet 0    metoprolol tartrate (LOPRESSOR) 25 MG tablet Take 1 tablet (25 mg total) by mouth 2 (two) times a day. 60 tablet 0    rosuvastatin (CRESTOR) 40 MG Tab TAKE ONE TABLET BY MOUTH ONCE A DAY AS DIRECTED      testosterone cypionate (DEPOTESTOTERONE CYPIONATE) 200 mg/mL injection  Inject 200 mg into the muscle every 7 days.      anastrozole (ARIMIDEX) 1 mg Tab Take 1 mg by mouth once a week.      azelastine (ASTELIN) 137 mcg (0.1 %) nasal spray 1 spray (137 mcg total) by Nasal route 2 (two) times daily. 30 mL 12    tadalafiL (CIALIS) 20 MG Tab Take 1/2 to 1 tablet as needed prior to intercourse. No more than 1 in 36 hours 30 tablet 3       Scheduled meds:      Infusions:      PRN meds:      Review of Systems:      OBJECTIVE:     Vital Signs and IO:  Temp:  [98 °F (36.7 °C)-98.5 °F (36.9 °C)]   Pulse:  [63-81]   Resp:  [18-20]   BP: (127-159)/(71-82)   SpO2:  [97 %-100 %]   I/O last 3 completed shifts:  In: 1600 [P.O.:1600]  Out: 2600 [Urine:2600]  Wt Readings from Last 5 Encounters:   12/11/22 108.5 kg (239 lb 3.2 oz)   12/05/22 108.4 kg (239 lb)   12/01/22 108.4 kg (239 lb)   04/25/22 108.4 kg (239 lb)   01/15/22 103 kg (226 lb 15.8 oz)     Body mass index is 37.46 kg/m².    Physical Exam  Constitutional:       General: He is not in acute distress.     Appearance: He is well-developed. He is not diaphoretic.   HENT:      Head: Normocephalic and atraumatic.      Mouth/Throat:      Mouth: Mucous membranes are moist.   Eyes:      General: No scleral icterus.     Pupils: Pupils are equal, round, and reactive to light.   Cardiovascular:      Rate and Rhythm: Normal rate and regular rhythm.   Pulmonary:      Effort: Pulmonary effort is normal. No respiratory distress.      Breath sounds: No stridor.   Abdominal:      General: There is no distension.      Palpations: Abdomen is soft.   Musculoskeletal:         General: No deformity. Normal range of motion.      Cervical back: Neck supple.   Skin:     General: Skin is warm and dry.      Findings: No erythema or rash.   Neurological:      Mental Status: He is alert and oriented to person, place, and time.      Cranial Nerves: No cranial nerve deficit.   Psychiatric:         Behavior: Behavior normal.     Laboratory:  Recent Labs   Lab 12/09/22  0420  12/10/22  0447 12/11/22  0415    139 138   K 3.0* 2.9* 3.4*   CL 98 102 106   CO2 23 24 25   * 108* 89*   CREATININE 11.7* 9.0* 6.1*   * 190* 89         Recent Labs   Lab 12/09/22  0420 12/10/22  0447 12/11/22  0415   CALCIUM 7.1* 7.3* 7.7*   ALBUMIN 3.6 3.3* 3.5   PHOS 6.1* 5.0* 3.9   MG 1.7 1.3* 2.1         Recent Labs   Lab 12/06/22  0415   PTH, Intact 288.4 H         No results for input(s): POCTGLUCOSE in the last 168 hours.          Recent Labs   Lab 12/09/22  0420 12/10/22  0447 12/11/22  0415   WBC 10.27 7.80 7.81   HGB 10.0* 9.0* 9.8*   HCT 28.6* 26.7* 29.6*    177 196   MCV 98 102* 102*   MCHC 35.0 33.7 33.1   MONO 9.4  1.0 8.8  0.7 9.9  0.8         Recent Labs   Lab 12/09/22  0420 12/10/22  0447 12/11/22  0415   BILITOT 0.7 0.6 0.6   PROT 7.4 7.1 7.5   ALBUMIN 3.6 3.3* 3.5   ALKPHOS 59 50* 55   ALT 28 28 43   AST 16 24 43*         Recent Labs   Lab 07/07/20  0934 10/29/20  1104 12/05/22  1606 12/06/22 0445 12/08/22  1504   Color, UA Yellow   < > Yellow Yellow Yellow   Appearance, UA  --    < > Clear Clear Clear   Clarity, UA Clear  --   --   --   --    pH, UA 6.0   < > 6.0 6.0 6.0   Specific Gravity, UA  --    < > 1.010 1.010 1.010   Spec Grav UA 1.025  --   --   --   --    Protein, UA  --    < > 1+ A 1+ A 1+ A   Glucose, UA  --    < > 1+ A 2+ A 3+ A   Ketones, UA n   < > Negative Negative Negative   Urobilinogen, UA n   < > Negative Negative Negative   Bilirubin (UA)  --    < > Negative Negative Negative   Occult Blood UA  --    < > 2+ A 2+ A 2+ A   Nitrite, UA n   < > Negative Negative Negative   RBC, UA  --   --  1 2 4   WBC, UA  --   --  5 9 H 3   Bacteria, UA  --    < >  --   --   --    Bacteria  --   --  Negative Negative Negative   Hyaline Casts, UA  --    < > 6 A 4 A 1    < > = values in this interval not displayed.               Microbiology Results (last 7 days)       ** No results found for the last 168 hours. **            ASSESSMENT/PLAN:     RALF, non-oliguric,  bland UA ? AIN ? IgAN with h/o rash recently  CKD stage 3  Uremia  Hypokalemia and hypomagnesemia due to post-ATN polyuria  Acidosis  Hyperphosphatemia  History of kidney stones  Anemia  CHF after CABG x7  No NSAIDs, ACEI/ARB, IV contrast or other nephrotoxins.  Keep MAP > 60, SBP > 100.  Dose meds for GFR < 30 ml/min.  Renal diet - low K, low phos.  Hold Entresto, PPIs, Statin.  Renal US without cortical thinning or obstruction.   Urinalysis with unimpressive few cells WBC? RBC, GN work-up negative. ? med-related AIN or ATN of unclear etiology  Continue Phos binders, PTH is elevated.  No emergency need for dialysis.    VSS, good UO, sCr rapidly improving.can go home with oral steroids for 1 week, off ASA and Entresto and call my office for appointment tomorrow to schedule follow-up and obtain orders for weekly labs (renal panel, Mg, UA) and steroid taper. I may not do biopsy (earliest 12/14) if his sCr returns all the way back to normal and RALF does not recur after stopping steroids.    No blood thinners - no ASA, PLAVIX, NOACs. Ok to have prophylactic heparin! - may need a kidney biopsy soon - 12/14 is the earliest.    Thank you for allowing us to participate in the care of your patient!   We will follow the patient and provide recommendations as needed.    Patient care time was spent personally by me on the following activities:     Obtaining a history.  Examination of patient.  Providing medical care at the patients bedside.  Developing a treatment plan with patient or surrogate and bedside caregivers.  Ordering and reviewing laboratory studies, radiographic studies, pulse oximetry.  Ordering and performing treatments and interventions.  Evaluation of patient's response to treatment.  Discussions with consultants while on the unit and immediately available to the patient.  Re-evaluation of the patient's condition.  Documentation in the medical record.     Law Chiang MD    [facility]  [facility]  [place] [address]    [phone] - tel  [phone] - fax    12/11/2022

## 2022-12-11 NOTE — NURSING
1500 Discharge instructions reviewed with pt.  Pt voice understanding.  Pt denies needs.  Peripheral IV removed, pressure applied.  Telemetry removed.  Pt assisted via wheelchair to private vehicle.

## 2022-12-11 NOTE — PLAN OF CARE
12/11/22 1407   Final Note   Assessment Type Final Discharge Note   Anticipated Discharge Disposition Home   What phone number can be called within the next 1-3 days to see how you are doing after discharge? [phone]   Post-Acute Status   Discharge Delays None known at this time

## 2022-12-11 NOTE — DISCHARGE SUMMARY
[facility]  Discharge Summary  Patient Name: Lee Quintanilla MRN: [id number]   Patient Class: IP- Inpatient  Length of Stay: 6   Admission Date: 12/5/2022 12:38 PM Attending Physician: Deborah Green MD   Primary Care Provider: Riya Vidales NP Face-to-Face encounter date: 12/11/2022   Chief Complaint: Abnormal Labs (Sent by PCP for BUN of 117 and Creatinine greater than 20 )    Date of Discharge: 12/11/2022  Discharge Disposition:Home or Self Care   Condition: Stable       Reason for Hospitalization     Active Hospital Problems    Diagnosis    *Acute renal insufficiency    Gastroesophageal reflux disease without esophagitis    Hx of CABG    Anemia, chronic disease    Coronary artery disease involving coronary bypass graft    Hypertension    Hyperlipidemia    Severe obesity with body mass index (BMI) of 35.0 to 39.9 with serious comorbidity         Brief History of Present Illness    Lee Quintanilla is a 47 y.o.  male who  has a past medical history of ADHD (attention deficit hyperactivity disorder), Arthritis, Asthma, Back pain, Coronary artery disease, Degeneration of lumbar intervertebral disc (05/2016), Depression, Elevated PSA, Headache, Hyperlipidemia, Hypertension, Kidney damage, Kidney stone, Myocardial infarction, Neck pain, Numbness and tingling in hands, Numbness and tingling of both legs, Seizures, Sleep apnea, and Wears glasses.. The patient presented to [facility] on 12/5/2022 with a primary complaint of Abnormal Labs (Sent by PCP for BUN of 117 and Creatinine greater than 20 )  .     For the full HPI please refer to the History & Physical from this admission.    Hospital Course By Problem with Pertinent Findings     Admitted for RALF. Nephrology consulted. Workup unremarkable. Differentials AIN/ATN. Renal biopsy not done due to patient being on aspirin. All medicines were stopped including metoprolol and entesto. Patient was IV hydrate during  "hospitalization.. Renal function slowly recovering. Also treated with prednisone 40mg daily. Patient discharged home after cleared by nephrology. He is going to follow up with nephrology as outpatient. If worsening renal function, he will need biopsy.      Patient was seen and examined on the date of discharge and determined to be suitable for discharge.    Physical Exam  /79 (BP Location: Right arm, Patient Position: Lying)   Pulse 65   Temp 98.5 °F (36.9 °C) (Oral)   Resp 20   Ht 5' 7" (1.702 m)   Wt 108.5 kg (239 lb 3.2 oz)   SpO2 99%   BMI 37.46 kg/m²   Vitals reviewed.    Constitutional: No distress.   HENT: Atraumatic.   Cardiovascular: Normal rate, regular rhythm and normal heart sounds.   Pulmonary/Chest: Effort normal. Clear to auscultation bilaterally. No wheezes.   Abdominal: Soft. Bowel sounds are normal. Exhibits no distension and no mass. No tenderness  Neurological: Alert.   Skin: Skin is warm and dry.     Following labs were Reviewed   Recent Labs   Lab 12/11/22  0415   WBC 7.81   HGB 9.8*   HCT 29.6*      CALCIUM 7.7*   ALBUMIN 3.5   PROT 7.5      K 3.4*   CO2 25      BUN 89*   CREATININE 6.1*   ALKPHOS 55   ALT 43   AST 43*   BILITOT 0.6     No results found for: POCTGLUCOSE     All labs within the past 24 hours have been reviewed    Microbiology Results (last 7 days)       ** No results found for the last 168 hours. **          US Kidney   Final Result      X-Ray Chest PA And Lateral   Final Result          No results found for this or any previous visit.      Consultants and Procedures   Consultants:  Consults (From admission, onward)          Status Ordering Provider     Inpatient consult to Nephrology  Once        Provider:  Law Chiang MD    Completed MORENO CARPIO     Inpatient consult to Hospitalist  Once        Provider:  Deborah Green MD    Acknowledged MANUEL FIORE            Procedures:   * No surgery found *     Discharge " Information:   Diet:  Resume regular diet    Physical Activity:  Activity as tolerated    Instructions:  1. Take all medications as prescribed  2. Keep all follow-up appointments  3. Return to the hospital or call your primary care physicians if any worsening symptoms such as abdominal pain, nausea, vomiting occur.    Follow-Up Appointments:  Please call your primary care physician to schedule an appointment in 1 week time.     Follow-up Information       Law Chiang MD Follow up in 1 day(s).    Specialty: Nephrology  Why: call to schedule appointment and labs  Contact information:  [address] [facility]  [place] [address]  [phone]                               Pending laboratory work/Tests to be performed/followed by the Primary care Physician: none    The patient was discharged in the care of her parents//wife/family/caregiver, with discharge instructions were reviewed in written and verbal form. All pertinent questions were discussed and prescriptions were provided. The importance of making follow up appointments and compliance of medications has been stressed repeatedly. The patient will follow up in 1 week or sooner as needed with the PCP, and the patient is on board with the plan. Upon discharge, patient needs to be on following medications.    Discharge Medications:     Medication List        START taking these medications      pantoprazole 40 MG tablet  Commonly known as: PROTONIX  Take 1 tablet (40 mg total) by mouth once daily.     predniSONE 20 MG tablet  Commonly known as: DELTASONE  Take 2 tablets (40 mg total) by mouth once daily. for 7 days  Start taking on: December 12, 2022            CONTINUE taking these medications      rosuvastatin 40 MG Tab  Commonly known as: CRESTOR            STOP taking these medications      anastrozole 1 mg Tab  Commonly known as: ARIMIDEX     aspirin 81 MG Chew     azelastine 137 mcg (0.1 %) nasal spray  Commonly known as:  ASTELIN     DULoxetine 60 MG capsule  Commonly known as: CYMBALTA     ENTRESTO 49-51 mg per tablet  Generic drug: sacubitriL-valsartan     ezetimibe 10 mg tablet  Commonly known as: ZETIA     hydrOXYzine HCL 25 MG tablet  Commonly known as: ATARAX     metoprolol tartrate 25 MG tablet  Commonly known as: LOPRESSOR     tadalafiL 20 MG Tab  Commonly known as: CIALIS     testosterone cypionate 200 mg/mL injection  Commonly known as: DEPOTESTOTERONE CYPIONATE               Where to Get Your Medications        These medications were sent to [facility] - [place] - [address] [facility]  [address] [place] [address]      Phone: [phone]   pantoprazole 40 MG tablet  predniSONE 20 MG tablet           I spent 40 minutes preparing the discharge including reviewing records from previous encounters, preparation of discharge summary, assessing and final examination of the patient, discharge medicine reconciliation, discussing plan of care, follow up and education and prescriptions.       Deborah Green  [facility] Hospitalist  12/11/2022

## 2022-12-13 ENCOUNTER — PATIENT OUTREACH (OUTPATIENT)
Dept: FAMILY MEDICINE | Facility: CLINIC | Age: 47
End: 2022-12-13

## 2022-12-13 ENCOUNTER — TELEPHONE (OUTPATIENT)
Dept: FAMILY MEDICINE | Facility: CLINIC | Age: 47
End: 2022-12-13

## 2022-12-13 NOTE — PROGRESS NOTES
Discharge Information     Discharge Date:   12/11/22    Primary Discharge Diagnosis:  kidney failure    Discharge Summary:  Reviewed      Medication & Order Review     Were medication changes made or new medications added?   Yes    If so, has the patient filled the prescriptions?  Yes     Was Home Health ordered? No    If so, has Home Health contacted patient and/or initiated services?  No    Name of Home Health Agency? N/A    Durable Medical Equipment ordered?  No     If so, has the DME provider contacted patient and delivered equipment?  N/A    Follow Up               Any problems since discharge? No    How is the patient feeling since returning home?      Have you set up recommended follow up appointments?  (cardiology, surgery, etc.)    Schedule Hospital Follow-up appointment within 7-14 days (preferably 7).      Notes:  Spoke with patient who states he is feeling okay since being discharged. States he does have a lot of confusion surround his medications because the doctor in the hospital put him on 2 new ones but took him off of everything else. He is going to bring his bottles of what he is currently taking to the visit so we can discuss with Ariadna Ortiz

## 2022-12-13 NOTE — TELEPHONE ENCOUNTER
----- Message from Kourtney Posada sent at 12/13/2022 10:53 AM CST -----  Please call for [facility] appointment [phone].

## 2022-12-15 NOTE — TELEPHONE ENCOUNTER
----- Message from Mikaela Matthews sent at 12/15/2022  9:12 AM CST -----  Pt needs a [facility] appt. Pt #[phone]

## 2022-12-15 NOTE — TELEPHONE ENCOUNTER
Informed pt that he needs to follow up with Nephrology. Pt voiced understanding. States the reason why he want to see joe is  the hospital they stopped all his medications. He wants to start back on them.

## 2022-12-16 ENCOUNTER — TELEPHONE (OUTPATIENT)
Dept: FAMILY MEDICINE | Facility: CLINIC | Age: 47
End: 2022-12-16

## 2022-12-16 NOTE — TELEPHONE ENCOUNTER
----- Message from Rosalinda Ortiz MA sent at 12/16/2022  9:50 AM CST -----    ----- Message -----  From: Jannette Umanzor  Sent: 12/16/2022   9:46 AM CST  To: Riya Vidales Staff    Patient called and stated that he was calling the nurse Hayley back from yesterday she left a message for him.  please call [phone]

## 2022-12-16 NOTE — TELEPHONE ENCOUNTER
Spoke to pt states he has appt with Nephrology med January. States its the only time he could get in. Pt does not feel that he should be off of his antidepressant medications. Advised I will send this message to Riya and give him a call Monday. No appts next week.

## 2022-12-19 ENCOUNTER — LAB VISIT (OUTPATIENT)
Dept: LAB | Facility: HOSPITAL | Age: 47
End: 2022-12-19
Attending: INTERNAL MEDICINE
Payer: MEDICAID

## 2022-12-19 DIAGNOSIS — R94.4 NONSPECIFIC ABNORMAL RESULTS OF KIDNEY FUNCTION STUDY: Primary | ICD-10-CM

## 2022-12-19 LAB
ALBUMIN SERPL BCP-MCNC: 3.4 G/DL (ref 3.5–5.2)
ANION GAP SERPL CALC-SCNC: 10 MMOL/L (ref 8–16)
BACTERIA #/AREA URNS HPF: NEGATIVE /HPF
BUN SERPL-MCNC: 24 MG/DL (ref 6–20)
CALCIUM SERPL-MCNC: 8.2 MG/DL (ref 8.7–10.5)
CHLORIDE SERPL-SCNC: 104 MMOL/L (ref 95–110)
CO2 SERPL-SCNC: 25 MMOL/L (ref 23–29)
CREAT SERPL-MCNC: 2 MG/DL (ref 0.5–1.4)
EST. GFR  (NO RACE VARIABLE): 40.7 ML/MIN/1.73 M^2
GLUCOSE SERPL-MCNC: 130 MG/DL (ref 70–110)
HYALINE CASTS #/AREA URNS LPF: 8 /LPF
MICROSCOPIC COMMENT: ABNORMAL
PHOSPHATE SERPL-MCNC: 3.4 MG/DL (ref 2.7–4.5)
POTASSIUM SERPL-SCNC: 3.3 MMOL/L (ref 3.5–5.1)
RBC #/AREA URNS HPF: 3 /HPF (ref 0–4)
SODIUM SERPL-SCNC: 139 MMOL/L (ref 136–145)
SQUAMOUS #/AREA URNS HPF: 3 /HPF
WBC #/AREA URNS HPF: 3 /HPF (ref 0–5)

## 2022-12-19 PROCEDURE — 80069 RENAL FUNCTION PANEL: CPT | Performed by: INTERNAL MEDICINE

## 2022-12-19 PROCEDURE — 36415 COLL VENOUS BLD VENIPUNCTURE: CPT | Performed by: INTERNAL MEDICINE

## 2022-12-19 PROCEDURE — 81001 URINALYSIS AUTO W/SCOPE: CPT | Performed by: INTERNAL MEDICINE

## 2022-12-19 RX ORDER — SERTRALINE HYDROCHLORIDE 50 MG/1
50 TABLET, FILM COATED ORAL DAILY
Qty: 30 TABLET | Refills: 0 | Status: SHIPPED | OUTPATIENT
Start: 2022-12-19 | End: 2023-01-19

## 2022-12-19 NOTE — TELEPHONE ENCOUNTER
Spoke to pt verbatim per joe. Pt voiced  understanding. States he would like to resume the Zoloft. Pt hung up before I could offer appt. Portal message sent.

## 2022-12-19 NOTE — TELEPHONE ENCOUNTER
What is he wanting to restart. ? Cymbalta can not be resumed at this time due to kidney  We can start zoloft 50mg daily and ov in 1 month to eval efficacy

## 2022-12-27 ENCOUNTER — LAB VISIT (OUTPATIENT)
Dept: LAB | Facility: HOSPITAL | Age: 47
End: 2022-12-27
Attending: INTERNAL MEDICINE
Payer: MEDICAID

## 2022-12-27 DIAGNOSIS — R94.4 NONSPECIFIC ABNORMAL RESULTS OF KIDNEY FUNCTION STUDY: Primary | ICD-10-CM

## 2022-12-27 LAB
ALBUMIN SERPL BCP-MCNC: 3.9 G/DL (ref 3.5–5.2)
ANION GAP SERPL CALC-SCNC: 14 MMOL/L (ref 8–16)
BILIRUB UR QL STRIP: NEGATIVE
BUN SERPL-MCNC: 16 MG/DL (ref 6–20)
CALCIUM SERPL-MCNC: 9.3 MG/DL (ref 8.7–10.5)
CHLORIDE SERPL-SCNC: 98 MMOL/L (ref 95–110)
CLARITY UR: CLEAR
CO2 SERPL-SCNC: 26 MMOL/L (ref 23–29)
COLOR UR: COLORLESS
CREAT SERPL-MCNC: 1.9 MG/DL (ref 0.5–1.4)
EST. GFR  (NO RACE VARIABLE): 43.2 ML/MIN/1.73 M^2
GLUCOSE SERPL-MCNC: 176 MG/DL (ref 70–110)
GLUCOSE UR QL STRIP: NEGATIVE
HGB UR QL STRIP: ABNORMAL
KETONES UR QL STRIP: NEGATIVE
LEUKOCYTE ESTERASE UR QL STRIP: NEGATIVE
NITRITE UR QL STRIP: NEGATIVE
PH UR STRIP: 8 [PH] (ref 5–8)
PHOSPHATE SERPL-MCNC: 2.8 MG/DL (ref 2.7–4.5)
POTASSIUM SERPL-SCNC: 3.8 MMOL/L (ref 3.5–5.1)
PROT UR QL STRIP: ABNORMAL
SODIUM SERPL-SCNC: 138 MMOL/L (ref 136–145)
SP GR UR STRIP: 1.01 (ref 1–1.03)
URN SPEC COLLECT METH UR: ABNORMAL
UROBILINOGEN UR STRIP-ACNC: NEGATIVE EU/DL

## 2022-12-27 PROCEDURE — 80069 RENAL FUNCTION PANEL: CPT | Performed by: INTERNAL MEDICINE

## 2022-12-27 PROCEDURE — 36415 COLL VENOUS BLD VENIPUNCTURE: CPT | Performed by: INTERNAL MEDICINE

## 2022-12-27 PROCEDURE — 81003 URINALYSIS AUTO W/O SCOPE: CPT | Performed by: INTERNAL MEDICINE

## 2022-12-30 ENCOUNTER — LAB VISIT (OUTPATIENT)
Dept: LAB | Facility: HOSPITAL | Age: 47
End: 2022-12-30
Attending: NURSE PRACTITIONER
Payer: MEDICAID

## 2022-12-30 DIAGNOSIS — I10 ESSENTIAL HYPERTENSION, MALIGNANT: ICD-10-CM

## 2022-12-30 DIAGNOSIS — D64.9 ANEMIA, UNSPECIFIED: Primary | ICD-10-CM

## 2022-12-30 LAB
ALBUMIN SERPL BCP-MCNC: 3.8 G/DL (ref 3.5–5.2)
ALP SERPL-CCNC: 75 U/L (ref 55–135)
ALT SERPL W/O P-5'-P-CCNC: 42 U/L (ref 10–44)
ANION GAP SERPL CALC-SCNC: 11 MMOL/L (ref 8–16)
AST SERPL-CCNC: 56 U/L (ref 10–40)
BILIRUB SERPL-MCNC: 0.7 MG/DL (ref 0.1–1)
BUN SERPL-MCNC: 14 MG/DL (ref 6–20)
CALCIUM SERPL-MCNC: 8.7 MG/DL (ref 8.7–10.5)
CHLORIDE SERPL-SCNC: 104 MMOL/L (ref 95–110)
CO2 SERPL-SCNC: 23 MMOL/L (ref 23–29)
CREAT SERPL-MCNC: 2.1 MG/DL (ref 0.5–1.4)
EST. GFR  (NO RACE VARIABLE): 38.4 ML/MIN/1.73 M^2
FOLATE SERPL-MCNC: 2.4 NG/ML (ref 4–24)
GLUCOSE SERPL-MCNC: 159 MG/DL (ref 70–110)
IRON SERPL-MCNC: 131 UG/DL (ref 45–160)
POTASSIUM SERPL-SCNC: 2.9 MMOL/L (ref 3.5–5.1)
PROT SERPL-MCNC: 7.7 G/DL (ref 6–8.4)
SATURATED IRON: 34 % (ref 20–50)
SODIUM SERPL-SCNC: 138 MMOL/L (ref 136–145)
TOTAL IRON BINDING CAPACITY: 389 UG/DL (ref 250–450)
TRANSFERRIN SERPL-MCNC: 278 MG/DL (ref 200–375)
VIT B12 SERPL-MCNC: 178 PG/ML (ref 210–950)

## 2022-12-30 PROCEDURE — 80053 COMPREHEN METABOLIC PANEL: CPT | Performed by: NURSE PRACTITIONER

## 2022-12-30 PROCEDURE — 36415 COLL VENOUS BLD VENIPUNCTURE: CPT | Performed by: NURSE PRACTITIONER

## 2022-12-30 PROCEDURE — 84466 ASSAY OF TRANSFERRIN: CPT | Performed by: NURSE PRACTITIONER

## 2022-12-30 PROCEDURE — 82607 VITAMIN B-12: CPT | Performed by: NURSE PRACTITIONER

## 2022-12-30 PROCEDURE — 82746 ASSAY OF FOLIC ACID SERUM: CPT | Performed by: NURSE PRACTITIONER

## 2023-01-03 ENCOUNTER — LAB VISIT (OUTPATIENT)
Dept: LAB | Facility: HOSPITAL | Age: 48
End: 2023-01-03
Attending: INTERNAL MEDICINE
Payer: MEDICAID

## 2023-01-03 DIAGNOSIS — R94.4 NONSPECIFIC ABNORMAL RESULTS OF KIDNEY FUNCTION STUDY: Primary | ICD-10-CM

## 2023-01-03 LAB
ALBUMIN SERPL BCP-MCNC: 4 G/DL (ref 3.5–5.2)
ANION GAP SERPL CALC-SCNC: 11 MMOL/L (ref 8–16)
BACTERIA #/AREA URNS HPF: NEGATIVE /HPF
BILIRUB UR QL STRIP: NEGATIVE
BUN SERPL-MCNC: 13 MG/DL (ref 6–20)
CALCIUM SERPL-MCNC: 9.1 MG/DL (ref 8.7–10.5)
CHLORIDE SERPL-SCNC: 109 MMOL/L (ref 95–110)
CLARITY UR: ABNORMAL
CO2 SERPL-SCNC: 15 MMOL/L (ref 23–29)
COLOR UR: YELLOW
CREAT SERPL-MCNC: 3.2 MG/DL (ref 0.5–1.4)
EST. GFR  (NO RACE VARIABLE): 23.1 ML/MIN/1.73 M^2
GLUCOSE SERPL-MCNC: 237 MG/DL (ref 70–110)
GLUCOSE UR QL STRIP: ABNORMAL
HGB UR QL STRIP: ABNORMAL
HYALINE CASTS #/AREA URNS LPF: 85 /LPF
KETONES UR QL STRIP: NEGATIVE
LEUKOCYTE ESTERASE UR QL STRIP: NEGATIVE
MICROSCOPIC COMMENT: ABNORMAL
NITRITE UR QL STRIP: NEGATIVE
PH UR STRIP: 8 [PH] (ref 5–8)
PHOSPHATE SERPL-MCNC: 3.6 MG/DL (ref 2.7–4.5)
POTASSIUM SERPL-SCNC: 3.2 MMOL/L (ref 3.5–5.1)
PROT UR QL STRIP: ABNORMAL
RBC #/AREA URNS HPF: 0 /HPF (ref 0–4)
SODIUM SERPL-SCNC: 135 MMOL/L (ref 136–145)
SP GR UR STRIP: 1.01 (ref 1–1.03)
SQUAMOUS #/AREA URNS HPF: 59 /HPF
URN SPEC COLLECT METH UR: ABNORMAL
UROBILINOGEN UR STRIP-ACNC: NEGATIVE EU/DL
WBC #/AREA URNS HPF: 8 /HPF (ref 0–5)
YEAST URNS QL MICRO: ABNORMAL

## 2023-01-03 PROCEDURE — 36415 COLL VENOUS BLD VENIPUNCTURE: CPT | Performed by: INTERNAL MEDICINE

## 2023-01-03 PROCEDURE — 81001 URINALYSIS AUTO W/SCOPE: CPT | Performed by: INTERNAL MEDICINE

## 2023-01-03 PROCEDURE — 80069 RENAL FUNCTION PANEL: CPT | Performed by: INTERNAL MEDICINE

## 2023-01-05 ENCOUNTER — LAB VISIT (OUTPATIENT)
Dept: LAB | Facility: HOSPITAL | Age: 48
End: 2023-01-05
Payer: MEDICAID

## 2023-01-05 DIAGNOSIS — D64.9 ANEMIA, UNSPECIFIED: Primary | ICD-10-CM

## 2023-01-05 LAB — OB PNL STL: NEGATIVE

## 2023-01-05 PROCEDURE — 82272 OCCULT BLD FECES 1-3 TESTS: CPT | Performed by: NURSE PRACTITIONER

## 2023-01-10 ENCOUNTER — LAB VISIT (OUTPATIENT)
Dept: LAB | Facility: HOSPITAL | Age: 48
End: 2023-01-10
Attending: INTERNAL MEDICINE
Payer: MEDICAID

## 2023-01-10 DIAGNOSIS — R94.4 NONSPECIFIC ABNORMAL RESULTS OF KIDNEY FUNCTION STUDY: Primary | ICD-10-CM

## 2023-01-10 LAB
ALBUMIN SERPL BCP-MCNC: 3.5 G/DL (ref 3.5–5.2)
ANION GAP SERPL CALC-SCNC: 8 MMOL/L (ref 8–16)
BACTERIA #/AREA URNS HPF: NEGATIVE /HPF
BUN SERPL-MCNC: 9 MG/DL (ref 6–20)
CALCIUM SERPL-MCNC: 8.4 MG/DL (ref 8.7–10.5)
CHLORIDE SERPL-SCNC: 110 MMOL/L (ref 95–110)
CO2 SERPL-SCNC: 20 MMOL/L (ref 23–29)
CREAT SERPL-MCNC: 2.2 MG/DL (ref 0.5–1.4)
EST. GFR  (NO RACE VARIABLE): 36.3 ML/MIN/1.73 M^2
GLUCOSE SERPL-MCNC: 218 MG/DL (ref 70–110)
HYALINE CASTS #/AREA URNS LPF: 24 /LPF
MICROSCOPIC COMMENT: ABNORMAL
PHOSPHATE SERPL-MCNC: 1.4 MG/DL (ref 2.7–4.5)
POTASSIUM SERPL-SCNC: 2.5 MMOL/L (ref 3.5–5.1)
RBC #/AREA URNS HPF: 0 /HPF (ref 0–4)
SODIUM SERPL-SCNC: 138 MMOL/L (ref 136–145)
SQUAMOUS #/AREA URNS HPF: 2 /HPF
WBC #/AREA URNS HPF: 1 /HPF (ref 0–5)

## 2023-01-10 PROCEDURE — 81001 URINALYSIS AUTO W/SCOPE: CPT | Performed by: INTERNAL MEDICINE

## 2023-01-10 PROCEDURE — 36415 COLL VENOUS BLD VENIPUNCTURE: CPT | Performed by: INTERNAL MEDICINE

## 2023-01-10 PROCEDURE — 80069 RENAL FUNCTION PANEL: CPT | Performed by: INTERNAL MEDICINE

## 2023-01-17 ENCOUNTER — LAB VISIT (OUTPATIENT)
Dept: LAB | Facility: HOSPITAL | Age: 48
End: 2023-01-17
Attending: INTERNAL MEDICINE
Payer: MEDICAID

## 2023-01-17 DIAGNOSIS — R31.9 HEMATURIA SYNDROME: ICD-10-CM

## 2023-01-17 DIAGNOSIS — N17.9 ACUTE KIDNEY FAILURE, UNSPECIFIED: Primary | ICD-10-CM

## 2023-01-17 DIAGNOSIS — E87.6 HYPOPOTASSEMIA: ICD-10-CM

## 2023-01-17 LAB
ALBUMIN SERPL BCP-MCNC: 3.4 G/DL (ref 3.5–5.2)
ANION GAP SERPL CALC-SCNC: 12 MMOL/L (ref 8–16)
BACTERIA #/AREA URNS HPF: NEGATIVE /HPF
BILIRUB UR QL STRIP: NEGATIVE
BUN SERPL-MCNC: 14 MG/DL (ref 6–20)
CALCIUM SERPL-MCNC: 8.5 MG/DL (ref 8.7–10.5)
CHLORIDE SERPL-SCNC: 104 MMOL/L (ref 95–110)
CLARITY UR: CLEAR
CO2 SERPL-SCNC: 22 MMOL/L (ref 23–29)
COLOR UR: YELLOW
CREAT SERPL-MCNC: 1.7 MG/DL (ref 0.5–1.4)
EST. GFR  (NO RACE VARIABLE): 49.4 ML/MIN/1.73 M^2
GLUCOSE SERPL-MCNC: 176 MG/DL (ref 70–110)
GLUCOSE UR QL STRIP: ABNORMAL
HGB UR QL STRIP: ABNORMAL
HYALINE CASTS #/AREA URNS LPF: 12 /LPF
KETONES UR QL STRIP: NEGATIVE
LEUKOCYTE ESTERASE UR QL STRIP: NEGATIVE
MAGNESIUM SERPL-MCNC: 1.8 MG/DL (ref 1.6–2.6)
MICROSCOPIC COMMENT: ABNORMAL
NITRITE UR QL STRIP: NEGATIVE
PH UR STRIP: 6 [PH] (ref 5–8)
PHOSPHATE SERPL-MCNC: 3.2 MG/DL (ref 2.7–4.5)
POTASSIUM SERPL-SCNC: 3 MMOL/L (ref 3.5–5.1)
PROT UR QL STRIP: ABNORMAL
PROT UR-MCNC: 134 MG/DL (ref 6–15)
RBC #/AREA URNS HPF: 1 /HPF (ref 0–4)
SODIUM SERPL-SCNC: 138 MMOL/L (ref 136–145)
SP GR UR STRIP: 1.02 (ref 1–1.03)
SQUAMOUS #/AREA URNS HPF: 2 /HPF
URN SPEC COLLECT METH UR: ABNORMAL
UROBILINOGEN UR STRIP-ACNC: NEGATIVE EU/DL
WBC #/AREA URNS HPF: 1 /HPF (ref 0–5)

## 2023-01-17 PROCEDURE — 83735 ASSAY OF MAGNESIUM: CPT | Performed by: INTERNAL MEDICINE

## 2023-01-17 PROCEDURE — 86037 ANCA TITER EACH ANTIBODY: CPT | Performed by: INTERNAL MEDICINE

## 2023-01-17 PROCEDURE — 86160 COMPLEMENT ANTIGEN: CPT | Mod: 59 | Performed by: INTERNAL MEDICINE

## 2023-01-17 PROCEDURE — 86160 COMPLEMENT ANTIGEN: CPT | Performed by: INTERNAL MEDICINE

## 2023-01-17 PROCEDURE — 36415 COLL VENOUS BLD VENIPUNCTURE: CPT | Performed by: INTERNAL MEDICINE

## 2023-01-17 PROCEDURE — 84156 ASSAY OF PROTEIN URINE: CPT | Performed by: INTERNAL MEDICINE

## 2023-01-17 PROCEDURE — 81001 URINALYSIS AUTO W/SCOPE: CPT | Performed by: INTERNAL MEDICINE

## 2023-01-17 PROCEDURE — 86038 ANTINUCLEAR ANTIBODIES: CPT | Performed by: INTERNAL MEDICINE

## 2023-01-17 PROCEDURE — 80069 RENAL FUNCTION PANEL: CPT | Performed by: INTERNAL MEDICINE

## 2023-01-19 ENCOUNTER — OFFICE VISIT (OUTPATIENT)
Dept: FAMILY MEDICINE | Facility: CLINIC | Age: 48
End: 2023-01-19
Payer: MEDICAID

## 2023-01-19 VITALS
HEART RATE: 73 BPM | OXYGEN SATURATION: 96 % | DIASTOLIC BLOOD PRESSURE: 82 MMHG | BODY MASS INDEX: 39.24 KG/M2 | WEIGHT: 250 LBS | HEIGHT: 67 IN | SYSTOLIC BLOOD PRESSURE: 132 MMHG

## 2023-01-19 DIAGNOSIS — I25.810 CORONARY ARTERY DISEASE INVOLVING CORONARY BYPASS GRAFT OF NATIVE HEART WITHOUT ANGINA PECTORIS: ICD-10-CM

## 2023-01-19 DIAGNOSIS — R73.01 IFG (IMPAIRED FASTING GLUCOSE): Primary | ICD-10-CM

## 2023-01-19 DIAGNOSIS — F41.1 GAD (GENERALIZED ANXIETY DISORDER): ICD-10-CM

## 2023-01-19 DIAGNOSIS — N17.9 ACUTE RENAL FAILURE, UNSPECIFIED ACUTE RENAL FAILURE TYPE: ICD-10-CM

## 2023-01-19 DIAGNOSIS — E78.5 HYPERLIPIDEMIA, UNSPECIFIED HYPERLIPIDEMIA TYPE: ICD-10-CM

## 2023-01-19 DIAGNOSIS — G62.9 NEUROPATHY: ICD-10-CM

## 2023-01-19 DIAGNOSIS — F32.1 CURRENT MODERATE EPISODE OF MAJOR DEPRESSIVE DISORDER WITHOUT PRIOR EPISODE: ICD-10-CM

## 2023-01-19 DIAGNOSIS — Z79.899 HIGH RISK MEDICATION USE: ICD-10-CM

## 2023-01-19 DIAGNOSIS — F32.A DEPRESSION, UNSPECIFIED DEPRESSION TYPE: ICD-10-CM

## 2023-01-19 DIAGNOSIS — M51.36 DDD (DEGENERATIVE DISC DISEASE), LUMBAR: ICD-10-CM

## 2023-01-19 DIAGNOSIS — M54.16 LUMBAR RADICULOPATHY: ICD-10-CM

## 2023-01-19 LAB
ANA TITR SER IF: NEGATIVE {TITER}
C-ANCA TITR SER IF: NORMAL TITER
C3 SERPL-MCNC: 125 MG/DL (ref 82–167)
C4 SERPL-MCNC: 29 MG/DL (ref 12–38)
HBA1C MFR BLD: 6 %
P-ANCA ATYPICAL TITR SER IF: NORMAL TITER
P-ANCA TITR SER IF: NORMAL TITER

## 2023-01-19 PROCEDURE — 4010F ACE/ARB THERAPY RXD/TAKEN: CPT | Mod: CPTII,S$GLB,, | Performed by: NURSE PRACTITIONER

## 2023-01-19 PROCEDURE — 3008F BODY MASS INDEX DOCD: CPT | Mod: CPTII,S$GLB,, | Performed by: NURSE PRACTITIONER

## 2023-01-19 PROCEDURE — 4010F PR ACE/ARB THEARPY RXD/TAKEN: ICD-10-PCS | Mod: CPTII,S$GLB,, | Performed by: NURSE PRACTITIONER

## 2023-01-19 PROCEDURE — 3079F PR MOST RECENT DIASTOLIC BLOOD PRESSURE 80-89 MM HG: ICD-10-PCS | Mod: CPTII,S$GLB,, | Performed by: NURSE PRACTITIONER

## 2023-01-19 PROCEDURE — 3044F HG A1C LEVEL LT 7.0%: CPT | Mod: CPTII,S$GLB,, | Performed by: NURSE PRACTITIONER

## 2023-01-19 PROCEDURE — 83036 HEMOGLOBIN GLYCOSYLATED A1C: CPT | Mod: QW,,, | Performed by: NURSE PRACTITIONER

## 2023-01-19 PROCEDURE — 1159F MED LIST DOCD IN RCRD: CPT | Mod: CPTII,S$GLB,, | Performed by: NURSE PRACTITIONER

## 2023-01-19 PROCEDURE — 3008F PR BODY MASS INDEX (BMI) DOCUMENTED: ICD-10-PCS | Mod: CPTII,S$GLB,, | Performed by: NURSE PRACTITIONER

## 2023-01-19 PROCEDURE — 3075F SYST BP GE 130 - 139MM HG: CPT | Mod: CPTII,S$GLB,, | Performed by: NURSE PRACTITIONER

## 2023-01-19 PROCEDURE — 3079F DIAST BP 80-89 MM HG: CPT | Mod: CPTII,S$GLB,, | Performed by: NURSE PRACTITIONER

## 2023-01-19 PROCEDURE — 83036 POCT HEMOGLOBIN A1C: ICD-10-PCS | Mod: QW,,, | Performed by: NURSE PRACTITIONER

## 2023-01-19 PROCEDURE — 3044F PR MOST RECENT HEMOGLOBIN A1C LEVEL <7.0%: ICD-10-PCS | Mod: CPTII,S$GLB,, | Performed by: NURSE PRACTITIONER

## 2023-01-19 PROCEDURE — 3075F PR MOST RECENT SYSTOLIC BLOOD PRESS GE 130-139MM HG: ICD-10-PCS | Mod: CPTII,S$GLB,, | Performed by: NURSE PRACTITIONER

## 2023-01-19 PROCEDURE — 99214 PR OFFICE/OUTPT VISIT, EST, LEVL IV, 30-39 MIN: ICD-10-PCS | Mod: S$GLB,,, | Performed by: NURSE PRACTITIONER

## 2023-01-19 PROCEDURE — 1159F PR MEDICATION LIST DOCUMENTED IN MEDICAL RECORD: ICD-10-PCS | Mod: CPTII,S$GLB,, | Performed by: NURSE PRACTITIONER

## 2023-01-19 PROCEDURE — 99214 OFFICE O/P EST MOD 30 MIN: CPT | Mod: S$GLB,,, | Performed by: NURSE PRACTITIONER

## 2023-01-19 RX ORDER — SERTRALINE HYDROCHLORIDE 100 MG/1
100 TABLET, FILM COATED ORAL DAILY
Qty: 30 TABLET | Refills: 11 | Status: SHIPPED | OUTPATIENT
Start: 2023-01-19 | End: 2023-09-21

## 2023-01-19 RX ORDER — FUROSEMIDE 40 MG/1
40 TABLET ORAL DAILY
COMMUNITY

## 2023-01-19 RX ORDER — AMLODIPINE BESYLATE 5 MG/1
TABLET ORAL
COMMUNITY
End: 2023-09-21

## 2023-01-19 RX ORDER — METOPROLOL TARTRATE 25 MG/1
50 TABLET, FILM COATED ORAL 2 TIMES DAILY
COMMUNITY
Start: 2023-01-19

## 2023-01-19 RX ORDER — POTASSIUM CHLORIDE 20 MEQ/1
20 TABLET, EXTENDED RELEASE ORAL DAILY
COMMUNITY
End: 2024-04-03 | Stop reason: CLARIF

## 2023-01-23 ENCOUNTER — LAB VISIT (OUTPATIENT)
Dept: LAB | Facility: HOSPITAL | Age: 48
End: 2023-01-23
Attending: INTERNAL MEDICINE
Payer: MEDICAID

## 2023-01-23 DIAGNOSIS — E87.6 HYPOPOTASSEMIA: Primary | ICD-10-CM

## 2023-01-23 LAB
ALBUMIN SERPL BCP-MCNC: 3.4 G/DL (ref 3.5–5.2)
ANION GAP SERPL CALC-SCNC: 11 MMOL/L (ref 8–16)
BUN SERPL-MCNC: 9 MG/DL (ref 6–20)
CALCIUM SERPL-MCNC: 8.6 MG/DL (ref 8.7–10.5)
CHLORIDE SERPL-SCNC: 109 MMOL/L (ref 95–110)
CO2 SERPL-SCNC: 22 MMOL/L (ref 23–29)
CREAT SERPL-MCNC: 1.4 MG/DL (ref 0.5–1.4)
EST. GFR  (NO RACE VARIABLE): >60 ML/MIN/1.73 M^2
GLUCOSE SERPL-MCNC: 149 MG/DL (ref 70–110)
MAGNESIUM SERPL-MCNC: 1.9 MG/DL (ref 1.6–2.6)
PHOSPHATE SERPL-MCNC: 3.5 MG/DL (ref 2.7–4.5)
POTASSIUM SERPL-SCNC: 3.5 MMOL/L (ref 3.5–5.1)
SODIUM SERPL-SCNC: 142 MMOL/L (ref 136–145)

## 2023-01-23 PROCEDURE — 80069 RENAL FUNCTION PANEL: CPT | Performed by: INTERNAL MEDICINE

## 2023-01-23 PROCEDURE — 83735 ASSAY OF MAGNESIUM: CPT | Performed by: INTERNAL MEDICINE

## 2023-01-23 PROCEDURE — 36415 COLL VENOUS BLD VENIPUNCTURE: CPT | Performed by: INTERNAL MEDICINE

## 2023-01-29 PROBLEM — I10 ESSENTIAL HYPERTENSION: Status: ACTIVE | Noted: 2019-11-04

## 2023-01-29 PROBLEM — I25.10 CORONARY ARTERIOSCLEROSIS: Status: ACTIVE | Noted: 2020-06-02

## 2023-01-30 NOTE — PROGRESS NOTES
SUBJECTIVE:    Patient ID: Lee Quintanilla is a 47 y.o. male.    Chief Complaint: Follow-up, Knee Pain (Bilateral ), Ankle Pain (Bilateral ), and feet pain (Bilateral )    47 y.o.  male presents for a check up . He was admitted to hospital due to acute renal failure on 12/5/22. Admitted for RALF. Nephrology consulted. Workup unremarkable. Differentials AIN/ATN. Renal biopsy not done due to patient being on aspirin. All medicines were stopped including metoprolol and entesto. Patient was IV hydrate during hospitalization.. Renal function slowly recovering. Also treated with prednisone 40mg daily. Patient discharged home after cleared by nephrology. He has been followed by dr. Messer. He has upcoming appointment with cardio. He is interested in restarting his testosterone. He does have some swelling at the end of the day. He is feeling down. Has all over joint aches. Has pending labs from nephrology  who  has a past medical history of ADHD (attention deficit hyperactivity disorder), Arthritis, Asthma, Back pain, Coronary artery disease, Degeneration of lumbar intervertebral disc (05/2016), Depression, Elevated PSA, Headache, Hyperlipidemia, Hypertension, Kidney damage, Kidney stone, Myocardial infarction, Neck pain, Numbness and tingling in hands, Numbness and tingling of both legs, Seizures, Sleep apnea, and Wears glasses.. T       Office Visit on 01/19/2023   Component Date Value Ref Range Status    Hemoglobin A1C, POC 01/19/2023 6.0  % Final   Lab Visit on 01/17/2023   Component Date Value Ref Range Status    Glucose 01/17/2023 176 (H)  70 - 110 mg/dL Final    Sodium 01/17/2023 138  136 - 145 mmol/L Final    Potassium 01/17/2023 3.0 (L)  3.5 - 5.1 mmol/L Final    Chloride 01/17/2023 104  95 - 110 mmol/L Final    CO2 01/17/2023 22 (L)  23 - 29 mmol/L Final    BUN 01/17/2023 14  6 - 20 mg/dL Final    Calcium 01/17/2023 8.5 (L)  8.7 - 10.5 mg/dL Final    Creatinine 01/17/2023 1.7 (H)  0.5 - 1.4 mg/dL Final     Albumin 01/17/2023 3.4 (L)  3.5 - 5.2 g/dL Final    Phosphorus 01/17/2023 3.2  2.7 - 4.5 mg/dL Final    eGFR 01/17/2023 49.4 (A)  >60 mL/min/1.73 m^2 Final    Anion Gap 01/17/2023 12  8 - 16 mmol/L Final    Magnesium 01/17/2023 1.8  1.6 - 2.6 mg/dL Final    RAVEN 01/17/2023 Negative   Final    Protein, Urine Random 01/17/2023 134 (H)  6 - 15 mg/dL Final    Cytoplasmic Neutrophilic Ab 01/17/2023 <1:20  Neg:<1:20 titer Final    Perinuclear (P-ANCA) 01/17/2023 <1:20  Neg:<1:20 titer Final    Atypical pANCA 01/17/2023 <1:20  Neg:<1:20 titer Final    Specimen UA 01/17/2023 Urine, Clean Catch   Final    Color, UA 01/17/2023 Yellow  Yellow, Straw, Sasha Final    Appearance, UA 01/17/2023 Clear  Clear Final    pH, UA 01/17/2023 6.0  5.0 - 8.0 Final    Specific Gravity, UA 01/17/2023 1.020  1.005 - 1.030 Final    Protein, UA 01/17/2023 2+ (A)  Negative Final    Glucose, UA 01/17/2023 1+ (A)  Negative Final    Ketones, UA 01/17/2023 Negative  Negative Final    Bilirubin (UA) 01/17/2023 Negative  Negative Final    Occult Blood UA 01/17/2023 1+ (A)  Negative Final    Nitrite, UA 01/17/2023 Negative  Negative Final    Urobilinogen, UA 01/17/2023 Negative  Negative EU/dL Final    Leukocytes, UA 01/17/2023 Negative  Negative Final    RBC, UA 01/17/2023 1  0 - 4 /hpf Final    WBC, UA 01/17/2023 1  0 - 5 /hpf Final    Bacteria 01/17/2023 Negative  None-Occ /hpf Final    Squam Epithel, UA 01/17/2023 2  /hpf Final    Hyaline Casts, UA 01/17/2023 12 (A)  0-1/lpf /lpf Final    Microscopic Comment 01/17/2023 SEE COMMENT   Final    C4 Complement 01/17/2023 29  12 - 38 mg/dL Final    Complement (C-3) 01/17/2023 125  82 - 167 mg/dL Final   Lab Visit on 01/10/2023   Component Date Value Ref Range Status    Glucose 01/10/2023 218 (H)  70 - 110 mg/dL Final    Sodium 01/10/2023 138  136 - 145 mmol/L Final    Potassium 01/10/2023 2.5 (LL)  3.5 - 5.1 mmol/L Final    Chloride 01/10/2023 110  95 - 110 mmol/L Final    CO2 01/10/2023 20 (L)  23 - 29  mmol/L Final    BUN 01/10/2023 9  6 - 20 mg/dL Final    Calcium 01/10/2023 8.4 (L)  8.7 - 10.5 mg/dL Final    Creatinine 01/10/2023 2.2 (H)  0.5 - 1.4 mg/dL Final    Albumin 01/10/2023 3.5  3.5 - 5.2 g/dL Final    Phosphorus 01/10/2023 1.4 (L)  2.7 - 4.5 mg/dL Final    eGFR 01/10/2023 36.3 (A)  >60 mL/min/1.73 m^2 Final    Anion Gap 01/10/2023 8  8 - 16 mmol/L Final    RBC, UA 01/10/2023 0  0 - 4 /hpf Final    WBC, UA 01/10/2023 1  0 - 5 /hpf Final    Bacteria 01/10/2023 Negative  None-Occ /hpf Final    Squam Epithel, UA 01/10/2023 2  /hpf Final    Hyaline Casts, UA 01/10/2023 24 (A)  0-1/lpf /lpf Final    Microscopic Comment 01/10/2023 SEE COMMENT   Final   Lab Visit on 01/05/2023   Component Date Value Ref Range Status    Occult Blood 01/05/2023 Negative  Negative Final   Lab Visit on 01/03/2023   Component Date Value Ref Range Status    Glucose 01/03/2023 237 (H)  70 - 110 mg/dL Final    Sodium 01/03/2023 135 (L)  136 - 145 mmol/L Final    Potassium 01/03/2023 3.2 (L)  3.5 - 5.1 mmol/L Final    Chloride 01/03/2023 109  95 - 110 mmol/L Final    CO2 01/03/2023 15 (L)  23 - 29 mmol/L Final    BUN 01/03/2023 13  6 - 20 mg/dL Final    Calcium 01/03/2023 9.1  8.7 - 10.5 mg/dL Final    Creatinine 01/03/2023 3.2 (H)  0.5 - 1.4 mg/dL Final    Albumin 01/03/2023 4.0  3.5 - 5.2 g/dL Final    Phosphorus 01/03/2023 3.6  2.7 - 4.5 mg/dL Final    eGFR 01/03/2023 23.1 (A)  >60 mL/min/1.73 m^2 Final    Anion Gap 01/03/2023 11  8 - 16 mmol/L Final    Specimen UA 01/03/2023 Urine, Clean Catch   Final    Color, UA 01/03/2023 Yellow  Yellow, Straw, Sasha Final    Appearance, UA 01/03/2023 Hazy (A)  Clear Final    pH, UA 01/03/2023 8.0  5.0 - 8.0 Final    Specific Gravity, UA 01/03/2023 1.010  1.005 - 1.030 Final    Protein, UA 01/03/2023 4+  Negative Final    Glucose, UA 01/03/2023 3+ (A)  Negative Final    Ketones, UA 01/03/2023 Negative  Negative Final    Bilirubin (UA) 01/03/2023 Negative  Negative Final    Occult Blood UA  01/03/2023 2+ (A)  Negative Final    Nitrite, UA 01/03/2023 Negative  Negative Final    Urobilinogen, UA 01/03/2023 Negative  Negative EU/dL Final    Leukocytes, UA 01/03/2023 Negative  Negative Final    RBC, UA 01/03/2023 0  0 - 4 /hpf Final    WBC, UA 01/03/2023 8 (H)  0 - 5 /hpf Final    Bacteria 01/03/2023 Negative  None-Occ /hpf Final    Yeast, UA 01/03/2023 None  None Final    Squam Epithel, UA 01/03/2023 59  /hpf Final    Hyaline Casts, UA 01/03/2023 85 (A)  0-1/lpf /lpf Final    Microscopic Comment 01/03/2023 SEE COMMENT   Final   Lab Visit on 12/30/2022   Component Date Value Ref Range Status    Vitamin B-12 12/30/2022 178 (L)  210 - 950 pg/mL Final    Iron 12/30/2022 131  45 - 160 ug/dL Final    Transferrin 12/30/2022 278  200 - 375 mg/dL Final    TIBC 12/30/2022 389  250 - 450 ug/dL Final    Saturated Iron 12/30/2022 34  20 - 50 % Final    Folate 12/30/2022 2.4 (L)  4.0 - 24.0 ng/mL Final    Sodium 12/30/2022 138  136 - 145 mmol/L Final    Potassium 12/30/2022 2.9 (LL)  3.5 - 5.1 mmol/L Final    Chloride 12/30/2022 104  95 - 110 mmol/L Final    CO2 12/30/2022 23  23 - 29 mmol/L Final    Glucose 12/30/2022 159 (H)  70 - 110 mg/dL Final    BUN 12/30/2022 14  6 - 20 mg/dL Final    Creatinine 12/30/2022 2.1 (H)  0.5 - 1.4 mg/dL Final    Calcium 12/30/2022 8.7  8.7 - 10.5 mg/dL Final    Total Protein 12/30/2022 7.7  6.0 - 8.4 g/dL Final    Albumin 12/30/2022 3.8  3.5 - 5.2 g/dL Final    Total Bilirubin 12/30/2022 0.7  0.1 - 1.0 mg/dL Final    Alkaline Phosphatase 12/30/2022 75  55 - 135 U/L Final    AST 12/30/2022 56 (H)  10 - 40 U/L Final    ALT 12/30/2022 42  10 - 44 U/L Final    Anion Gap 12/30/2022 11  8 - 16 mmol/L Final    eGFR 12/30/2022 38.4 (A)  >60 mL/min/1.73 m^2 Final   Lab Visit on 12/27/2022   Component Date Value Ref Range Status    Uric Acid 12/27/2022 4.3  3.4 - 7.0 mg/dL Final    WBC 12/27/2022 8.06  3.90 - 12.70 K/uL Final    RBC 12/27/2022 3.18 (L)  4.60 - 6.20 M/uL Final    Hemoglobin  12/27/2022 10.8 (L)  14.0 - 18.0 g/dL Final    Hematocrit 12/27/2022 33.2 (L)  40.0 - 54.0 % Final    MCV 12/27/2022 104 (H)  82 - 98 fL Final    MCH 12/27/2022 34.0 (H)  27.0 - 31.0 pg Final    MCHC 12/27/2022 32.5  32.0 - 36.0 g/dL Final    RDW 12/27/2022 16.8 (H)  11.5 - 14.5 % Final    Platelets 12/27/2022 213  150 - 450 K/uL Final    MPV 12/27/2022 9.8  9.2 - 12.9 fL Final    Immature Granulocytes 12/27/2022 1.6 (H)  0.0 - 0.5 % Final    Gran # (ANC) 12/27/2022 6.5  1.8 - 7.7 K/uL Final    Immature Grans (Abs) 12/27/2022 0.13 (H)  0.00 - 0.04 K/uL Final    Lymph # 12/27/2022 1.1  1.0 - 4.8 K/uL Final    Mono # 12/27/2022 0.3  0.3 - 1.0 K/uL Final    Eos # 12/27/2022 0.1  0.0 - 0.5 K/uL Final    Baso # 12/27/2022 0.02  0.00 - 0.20 K/uL Final    nRBC 12/27/2022 0  0 /100 WBC Final    Gran % 12/27/2022 80.1 (H)  38.0 - 73.0 % Final    Lymph % 12/27/2022 13.5 (L)  18.0 - 48.0 % Final    Mono % 12/27/2022 3.5 (L)  4.0 - 15.0 % Final    Eosinophil % 12/27/2022 1.1  0.0 - 8.0 % Final    Basophil % 12/27/2022 0.2  0.0 - 1.9 % Final    Differential Method 12/27/2022 Automated   Final   Lab Visit on 12/27/2022   Component Date Value Ref Range Status    Glucose 12/27/2022 176 (H)  70 - 110 mg/dL Final    Sodium 12/27/2022 138  136 - 145 mmol/L Final    Potassium 12/27/2022 3.8  3.5 - 5.1 mmol/L Final    Chloride 12/27/2022 98  95 - 110 mmol/L Final    CO2 12/27/2022 26  23 - 29 mmol/L Final    BUN 12/27/2022 16  6 - 20 mg/dL Final    Calcium 12/27/2022 9.3  8.7 - 10.5 mg/dL Final    Creatinine 12/27/2022 1.9 (H)  0.5 - 1.4 mg/dL Final    Albumin 12/27/2022 3.9  3.5 - 5.2 g/dL Final    Phosphorus 12/27/2022 2.8  2.7 - 4.5 mg/dL Final    eGFR 12/27/2022 43.2 (A)  >60 mL/min/1.73 m^2 Final    Anion Gap 12/27/2022 14  8 - 16 mmol/L Final    Specimen UA 12/27/2022 Urine, Clean Catch   Final    Color, UA 12/27/2022 Colorless (A)  Yellow, Straw, Sasha Final    Appearance, UA 12/27/2022 Clear  Clear Final    pH, UA 12/27/2022 8.0   5.0 - 8.0 Final    Specific Gravity, UA 12/27/2022 1.010  1.005 - 1.030 Final    Protein, UA 12/27/2022 Trace (A)  Negative Final    Glucose, UA 12/27/2022 Negative  Negative Final    Ketones, UA 12/27/2022 Negative  Negative Final    Bilirubin (UA) 12/27/2022 Negative  Negative Final    Occult Blood UA 12/27/2022 Trace (A)  Negative Final    Nitrite, UA 12/27/2022 Negative  Negative Final    Urobilinogen, UA 12/27/2022 Negative  Negative EU/dL Final    Leukocytes, UA 12/27/2022 Negative  Negative Final   Lab Visit on 12/19/2022   Component Date Value Ref Range Status    Glucose 12/19/2022 130 (H)  70 - 110 mg/dL Final    Sodium 12/19/2022 139  136 - 145 mmol/L Final    Potassium 12/19/2022 3.3 (L)  3.5 - 5.1 mmol/L Final    Chloride 12/19/2022 104  95 - 110 mmol/L Final    CO2 12/19/2022 25  23 - 29 mmol/L Final    BUN 12/19/2022 24 (H)  6 - 20 mg/dL Final    Calcium 12/19/2022 8.2 (L)  8.7 - 10.5 mg/dL Final    Creatinine 12/19/2022 2.0 (H)  0.5 - 1.4 mg/dL Final    Albumin 12/19/2022 3.4 (L)  3.5 - 5.2 g/dL Final    Phosphorus 12/19/2022 3.4  2.7 - 4.5 mg/dL Final    eGFR 12/19/2022 40.7 (A)  >60 mL/min/1.73 m^2 Final    Anion Gap 12/19/2022 10  8 - 16 mmol/L Final    RBC, UA 12/19/2022 3  0 - 4 /hpf Final    WBC, UA 12/19/2022 3  0 - 5 /hpf Final    Bacteria 12/19/2022 Negative  None-Occ /hpf Final    Squam Epithel, UA 12/19/2022 3  /hpf Final    Hyaline Casts, UA 12/19/2022 8 (A)  0-1/lpf /lpf Final    Microscopic Comment 12/19/2022 SEE COMMENT   Final   No results displayed because visit has over 200 results.      There may be more visits with results that are not included.       Past Medical History:   Diagnosis Date    ADHD (attention deficit hyperactivity disorder)     Arthritis     Asthma     as child only    Back pain     Coronary artery disease     Degeneration of lumbar intervertebral disc 05/2016    Depression     Elevated PSA     Headache     Hyperlipidemia     Hypertension     Kidney damage      stage 3 ; d/t aleve abuse    Kidney stone     Myocardial infarction     Neck pain     Numbness and tingling in hands     Numbness and tingling of both legs     Seizures     from inapsine 2002    Sleep apnea     no cpap    Wears glasses     CONTACS     Social History     Socioeconomic History    Marital status:    Occupational History    Occupation: disability   Tobacco Use    Smoking status: Former     Packs/day: 0.25     Types: Cigarettes     Quit date: 2016     Years since quittin.0    Smokeless tobacco: Former     Quit date: 2016    Tobacco comments:     occasional   Substance and Sexual Activity    Alcohol use: Yes     Alcohol/week: 1.0 standard drink     Types: 1 Glasses of wine per week     Comment: rare    Drug use: No    Sexual activity: Yes     Partners: Female     Social Determinants of Health     Financial Resource Strain: Unknown    Difficulty of Paying Living Expenses: Patient refused   Food Insecurity: Unknown    Worried About Running Out of Food in the Last Year: Patient refused    Ran Out of Food in the Last Year: Patient refused   Transportation Needs: Unknown    Lack of Transportation (Medical): Patient refused    Lack of Transportation (Non-Medical): Patient refused   Physical Activity: Unknown    Days of Exercise per Week: Patient refused   Stress: Stress Concern Present    Feeling of Stress : Rather much   Social Connections: Unknown    Frequency of Communication with Friends and Family: Patient refused    Frequency of Social Gatherings with Friends and Family: Patient refused    Active Member of Clubs or Organizations: No    Attends Club or Organization Meetings: Never    Marital Status:    Housing Stability: Unknown    Unable to Pay for Housing in the Last Year: Patient refused    Unstable Housing in the Last Year: Patient refused     Past Surgical History:   Procedure Laterality Date    BACK SURGERY  2016    back surgery    BONE GRAFT N/A 2020    Procedure: BONE  GRAFT;  Surgeon: Mateusz Blanco MD;  Location: [facility] OR;  Service: Orthopedics;  Laterality: N/A;    CARDIAC SURGERY  01/06/2020    CABG X 7    CORONARY ARTERY BYPASS GRAFT      7 vessels    HERNIA REPAIR Bilateral 11/22/2017    LITHOTRIPSY      LUMBAR LAMINECTOMY WITH FUSION Bilateral 11/5/2020    Procedure: LAMINECTOMY, SPINE, LUMBAR, WITH FUSION;  Surgeon: Mateusz Blanco MD;  Location: [facility] OR;  Service: Orthopedics;  Laterality: Bilateral;  MEDTRONIC  NTI  L-3    PILONIDAL CYST DRAINAGE      removal of abcess      scrotal    REMOVAL OF HARDWARE FROM SPINE Bilateral 11/5/2020    Procedure: REMOVAL, HARDWARE, SPINE;  Surgeon: Mateusz Blanco MD;  Location: [facility] OR;  Service: Orthopedics;  Laterality: Bilateral;  L 4-5    TYMPANOSTOMY TUBE PLACEMENT       Family History   Problem Relation Age of Onset    Heart disease Mother     Migraines Mother     Hypertension Mother     Early death Father     Heart disease Father     Diabetes Maternal Aunt     Diabetes Maternal Uncle     Diabetes Maternal Grandfather        Review of patient's allergies indicates:   Allergen Reactions    Inapsine [droperidol] Anaphylaxis     seizures    Effexor [venlafaxine]      Increased anxiety    Pcn [penicillins]     Bactrim [sulfamethoxazole-trimethoprim] Rash     Dry red rash all over when in the sun       Current Outpatient Medications:     amLODIPine (NORVASC) 5 MG tablet, amlodipine 5 mg tablet, Disp: , Rfl:     furosemide (LASIX) 40 MG tablet, furosemide 40 mg tablet, Disp: , Rfl:     metoprolol tartrate (LOPRESSOR) 25 MG tablet, Take 50 mg by mouth 2 (two) times daily., Disp: , Rfl:     pantoprazole (PROTONIX) 40 MG tablet, Take 1 tablet (40 mg total) by mouth once daily., Disp: 30 tablet, Rfl: 0    potassium chloride SA (K-DUR,KLOR-CON) 20 MEQ tablet, potassium chloride ER 20 mEq tablet,extended release(part/cryst), Disp: , Rfl:     rosuvastatin (CRESTOR) 40 MG Tab, TAKE ONE TABLET BY MOUTH ONCE A DAY AS DIRECTED, Disp: , Rfl:  "    sertraline (ZOLOFT) 100 MG tablet, Take 1 tablet (100 mg total) by mouth once daily., Disp: 30 tablet, Rfl: 11    Review of Systems   Constitutional:  Negative for fatigue, fever and unexpected weight change.   HENT:  Negative for ear pain, sinus pressure and sore throat.    Eyes:  Negative for pain.   Respiratory:  Negative for cough and shortness of breath.    Cardiovascular:  Positive for leg swelling. Negative for chest pain.   Gastrointestinal:  Negative for abdominal pain, constipation, nausea and vomiting.   Genitourinary:  Negative for dysuria, frequency and urgency.   Musculoskeletal:  Positive for arthralgias.   Skin:  Negative for rash.   Neurological:  Negative for dizziness, weakness and headaches.   Psychiatric/Behavioral:  Positive for agitation. Negative for sleep disturbance.         Objective:      Vitals:    01/19/23 1152   BP: 132/82   Pulse: 73   SpO2: 96%   Weight: 113.4 kg (250 lb)   Height: 5' 7" (1.702 m)     Physical Exam  Vitals and nursing note reviewed.   Constitutional:       General: He is not in acute distress.     Appearance: He is well-developed. He is obese.   HENT:      Head: Normocephalic and atraumatic.      Right Ear: External ear normal.      Left Ear: External ear normal.   Eyes:      Pupils: Pupils are equal, round, and reactive to light.   Neck:      Trachea: No tracheal deviation.   Cardiovascular:      Rate and Rhythm: Normal rate and regular rhythm.      Heart sounds: No murmur heard.    No friction rub. No gallop.   Pulmonary:      Breath sounds: Normal breath sounds. No stridor. No wheezing or rales.   Abdominal:      Palpations: Abdomen is soft. There is no mass.      Tenderness: There is no abdominal tenderness.   Musculoskeletal:         General: No tenderness or deformity.      Cervical back: Neck supple.      Right lower leg: Edema present.      Left lower leg: Edema present.      Comments: Crepitus to knees bilaterally   Lymphadenopathy:      Cervical: No " cervical adenopathy.   Skin:     General: Skin is warm and dry.   Neurological:      Mental Status: He is alert and oriented to person, place, and time.      Coordination: Coordination normal.   Psychiatric:         Thought Content: Thought content normal.         Assessment:       1. IFG (impaired fasting glucose)    2. DDD (degenerative disc disease), lumbar    3. Current moderate episode of major depressive disorder without prior episode    4. MYNOR (generalized anxiety disorder)    5. Depression, unspecified depression type    6. Coronary artery disease involving coronary bypass graft of native heart without angina pectoris    7. High risk medication use    8. Hyperlipidemia, unspecified hyperlipidemia type    9. Lumbar radiculopathy    10. Neuropathy    11. Acute renal failure, unspecified acute renal failure type           Plan:       IFG (impaired fasting glucose)  -     Hemoglobin A1C, POCT    DDD (degenerative disc disease), lumbar    Current moderate episode of major depressive disorder without prior episode  Comments:  increase zoloft to 100mg daily. will follow up in 6 weeks. needs clearance for additional meds    MYNOR (generalized anxiety disorder)    Depression, unspecified depression type    Coronary artery disease involving coronary bypass graft of native heart without angina pectoris    High risk medication use    Hyperlipidemia, unspecified hyperlipidemia type    Lumbar radiculopathy    Neuropathy    Acute renal failure, unspecified acute renal failure type    Other orders  -     sertraline (ZOLOFT) 100 MG tablet; Take 1 tablet (100 mg total) by mouth once daily.  Dispense: 30 tablet; Refill: 11      Follow up in about 6 weeks (around 3/2/2023), or if symptoms worsen or fail to improve, for medication management.        1/29/2023 Riya Vidales

## 2023-02-03 ENCOUNTER — HOSPITAL ENCOUNTER (EMERGENCY)
Facility: HOSPITAL | Age: 48
Discharge: HOME | End: 2023-02-03
Attending: EMERGENCY MEDICINE
Payer: MEDICAID

## 2023-02-03 VITALS
OXYGEN SATURATION: 98 % | DIASTOLIC BLOOD PRESSURE: 80 MMHG | WEIGHT: 246 LBS | RESPIRATION RATE: 20 BRPM | TEMPERATURE: 99 F | BODY MASS INDEX: 38.61 KG/M2 | HEART RATE: 92 BPM | HEIGHT: 67 IN | SYSTOLIC BLOOD PRESSURE: 132 MMHG

## 2023-02-03 DIAGNOSIS — R06.02 SHORTNESS OF BREATH: ICD-10-CM

## 2023-02-03 DIAGNOSIS — I95.9 HYPOTENSION, UNSPECIFIED HYPOTENSION TYPE: Primary | ICD-10-CM

## 2023-02-03 DIAGNOSIS — F10.939 ALCOHOL WITHDRAWAL SYNDROME WITH COMPLICATION: ICD-10-CM

## 2023-02-03 LAB
ALBUMIN SERPL BCP-MCNC: 3.6 G/DL (ref 3.5–5.2)
ALP SERPL-CCNC: 90 U/L (ref 55–135)
ALT SERPL W/O P-5'-P-CCNC: 51 U/L (ref 10–44)
AMPHET+METHAMPHET UR QL: NEGATIVE
ANION GAP SERPL CALC-SCNC: 12 MMOL/L (ref 8–16)
AST SERPL-CCNC: 107 U/L (ref 10–40)
BARBITURATES UR QL SCN>200 NG/ML: NEGATIVE
BASOPHILS # BLD AUTO: 0.05 K/UL (ref 0–0.2)
BASOPHILS NFR BLD: 0.6 % (ref 0–1.9)
BENZODIAZ UR QL SCN>200 NG/ML: NEGATIVE
BILIRUB SERPL-MCNC: 0.6 MG/DL (ref 0.1–1)
BILIRUB UR QL STRIP: NEGATIVE
BNP SERPL-MCNC: 88 PG/ML (ref 0–99)
BUN SERPL-MCNC: 8 MG/DL (ref 6–20)
BZE UR QL SCN: NEGATIVE
CALCIUM SERPL-MCNC: 8.9 MG/DL (ref 8.7–10.5)
CANNABINOIDS UR QL SCN: NEGATIVE
CHLORIDE SERPL-SCNC: 102 MMOL/L (ref 95–110)
CLARITY UR: CLEAR
CO2 SERPL-SCNC: 25 MMOL/L (ref 23–29)
COLOR UR: YELLOW
CREAT SERPL-MCNC: 1.6 MG/DL (ref 0.5–1.4)
CREAT UR-MCNC: 70 MG/DL (ref 23–375)
DIFFERENTIAL METHOD: ABNORMAL
EOSINOPHIL # BLD AUTO: 0.1 K/UL (ref 0–0.5)
EOSINOPHIL NFR BLD: 1.1 % (ref 0–8)
ERYTHROCYTE [DISTWIDTH] IN BLOOD BY AUTOMATED COUNT: 19.9 % (ref 11.5–14.5)
EST. GFR  (NO RACE VARIABLE): 53.1 ML/MIN/1.73 M^2
ETHANOL SERPL-MCNC: 125 MG/DL
GLUCOSE SERPL-MCNC: 128 MG/DL (ref 70–110)
GLUCOSE UR QL STRIP: NEGATIVE
HCT VFR BLD AUTO: 31.8 % (ref 40–54)
HGB BLD-MCNC: 10.3 G/DL (ref 14–18)
HGB UR QL STRIP: NEGATIVE
IMM GRANULOCYTES # BLD AUTO: 0.13 K/UL (ref 0–0.04)
IMM GRANULOCYTES NFR BLD AUTO: 1.6 % (ref 0–0.5)
KETONES UR QL STRIP: NEGATIVE
LEUKOCYTE ESTERASE UR QL STRIP: NEGATIVE
LYMPHOCYTES # BLD AUTO: 1 K/UL (ref 1–4.8)
LYMPHOCYTES NFR BLD: 11.6 % (ref 18–48)
MAGNESIUM SERPL-MCNC: 2 MG/DL (ref 1.6–2.6)
MCH RBC QN AUTO: 33.6 PG (ref 27–31)
MCHC RBC AUTO-ENTMCNC: 32.4 G/DL (ref 32–36)
MCV RBC AUTO: 104 FL (ref 82–98)
MONOCYTES # BLD AUTO: 0.6 K/UL (ref 0.3–1)
MONOCYTES NFR BLD: 7.4 % (ref 4–15)
NEUTROPHILS # BLD AUTO: 6.4 K/UL (ref 1.8–7.7)
NEUTROPHILS NFR BLD: 77.7 % (ref 38–73)
NITRITE UR QL STRIP: NEGATIVE
NRBC BLD-RTO: 0 /100 WBC
OPIATES UR QL SCN: NEGATIVE
PCP UR QL SCN>25 NG/ML: NEGATIVE
PH UR STRIP: 7 [PH] (ref 5–8)
PHOSPHATE SERPL-MCNC: 2.9 MG/DL (ref 2.7–4.5)
PLATELET # BLD AUTO: 237 K/UL (ref 150–450)
PMV BLD AUTO: 9.9 FL (ref 9.2–12.9)
POTASSIUM SERPL-SCNC: 4 MMOL/L (ref 3.5–5.1)
PROT SERPL-MCNC: 7.4 G/DL (ref 6–8.4)
PROT UR QL STRIP: NEGATIVE
RBC # BLD AUTO: 3.07 M/UL (ref 4.6–6.2)
SODIUM SERPL-SCNC: 139 MMOL/L (ref 136–145)
SP GR UR STRIP: 1.01 (ref 1–1.03)
TOXICOLOGY INFORMATION: NORMAL
TROPONIN I SERPL HS-MCNC: 31 PG/ML (ref 0–14.9)
URN SPEC COLLECT METH UR: NORMAL
UROBILINOGEN UR STRIP-ACNC: NEGATIVE EU/DL
WBC # BLD AUTO: 8.2 K/UL (ref 3.9–12.7)

## 2023-02-03 PROCEDURE — 81003 URINALYSIS AUTO W/O SCOPE: CPT | Mod: 59 | Performed by: EMERGENCY MEDICINE

## 2023-02-03 PROCEDURE — 84100 ASSAY OF PHOSPHORUS: CPT | Performed by: EMERGENCY MEDICINE

## 2023-02-03 PROCEDURE — 80307 DRUG TEST PRSMV CHEM ANLYZR: CPT | Performed by: EMERGENCY MEDICINE

## 2023-02-03 PROCEDURE — 25000003 PHARM REV CODE 250: Performed by: EMERGENCY MEDICINE

## 2023-02-03 PROCEDURE — 93005 ELECTROCARDIOGRAM TRACING: CPT | Performed by: SPECIALIST

## 2023-02-03 PROCEDURE — 83735 ASSAY OF MAGNESIUM: CPT | Performed by: EMERGENCY MEDICINE

## 2023-02-03 PROCEDURE — 82077 ASSAY SPEC XCP UR&BREATH IA: CPT | Performed by: EMERGENCY MEDICINE

## 2023-02-03 PROCEDURE — 93010 ELECTROCARDIOGRAM REPORT: CPT | Mod: ,,, | Performed by: SPECIALIST

## 2023-02-03 PROCEDURE — 83880 ASSAY OF NATRIURETIC PEPTIDE: CPT | Performed by: EMERGENCY MEDICINE

## 2023-02-03 PROCEDURE — 85025 COMPLETE CBC W/AUTO DIFF WBC: CPT | Performed by: EMERGENCY MEDICINE

## 2023-02-03 PROCEDURE — 93010 EKG 12-LEAD: ICD-10-PCS | Mod: ,,, | Performed by: SPECIALIST

## 2023-02-03 PROCEDURE — 80053 COMPREHEN METABOLIC PANEL: CPT | Performed by: EMERGENCY MEDICINE

## 2023-02-03 PROCEDURE — 99284 EMERGENCY DEPT VISIT MOD MDM: CPT | Mod: 25

## 2023-02-03 PROCEDURE — 84484 ASSAY OF TROPONIN QUANT: CPT | Performed by: EMERGENCY MEDICINE

## 2023-02-03 RX ORDER — DIAZEPAM 5 MG/1
5 TABLET ORAL
Status: COMPLETED | OUTPATIENT
Start: 2023-02-03 | End: 2023-02-03

## 2023-02-03 RX ORDER — DIAZEPAM 5 MG/1
TABLET ORAL
Qty: 18 TABLET | Refills: 0 | Status: SHIPPED | OUTPATIENT
Start: 2023-02-03 | End: 2023-02-16 | Stop reason: SDUPTHER

## 2023-02-03 RX ADMIN — DIAZEPAM 5 MG: 5 TABLET ORAL at 06:02

## 2023-02-03 RX ADMIN — DIAZEPAM 5 MG: 5 TABLET ORAL at 07:02

## 2023-02-03 NOTE — ED PROVIDER NOTES
Encounter Date: 2/3/2023       History     Chief Complaint   Patient presents with    Hypotension     Pt went to check into a detox facility and was told he was hypotensive.      Patient with a history of congestive heart failure, alcohol abuse.  Patient was admitted approximally 2 months ago with acute kidney injury and hypokalemia.  Unclear etiology of significant kidney injury.  Kidney function reportedly improved.  Patient was taking off his Entresto.  His medications were adjusted.  Patient apparently took 1 of his old Entresto pills this morning with his other medications.  Patient was checking into alcohol detox program and noted to be hypotensive.  Sent here for medical clearance.  Patient does endorse some mild edema to his ankle area.  He does report some mild increased shortness of breath over last several days.  There is no fever chills.  No chest pain, pleurisy hemoptysis.  No gastrointestinal bleeding.    Review of patient's allergies indicates:   Allergen Reactions    Inapsine [droperidol] Anaphylaxis     seizures    Effexor [venlafaxine]      Increased anxiety    Pcn [penicillins]     Bactrim [sulfamethoxazole-trimethoprim] Rash     Dry red rash all over when in the sun     Past Medical History:   Diagnosis Date    ADHD (attention deficit hyperactivity disorder)     Arthritis     Asthma     as child only    Back pain     Coronary artery disease     Degeneration of lumbar intervertebral disc 05/2016    Depression     Elevated PSA     Headache     Hyperlipidemia     Hypertension     Kidney damage     stage 3 ; d/t aleve abuse    Kidney stone     Myocardial infarction     Neck pain     Numbness and tingling in hands     Numbness and tingling of both legs     Seizures     from inapsine 2002    Sleep apnea     no cpap    Wears glasses     CONTACS     Past Surgical History:   Procedure Laterality Date    BACK SURGERY  2016    back surgery    BONE GRAFT N/A 11/5/2020    Procedure: BONE GRAFT;  Surgeon:  Mateusz Blanco MD;  Location: [facility] OR;  Service: Orthopedics;  Laterality: N/A;    CARDIAC SURGERY  2020    CABG X 7    CORONARY ARTERY BYPASS GRAFT      7 vessels    HERNIA REPAIR Bilateral 2017    LITHOTRIPSY      LUMBAR LAMINECTOMY WITH FUSION Bilateral 2020    Procedure: LAMINECTOMY, SPINE, LUMBAR, WITH FUSION;  Surgeon: Mateusz Blanco MD;  Location: [facility] OR;  Service: Orthopedics;  Laterality: Bilateral;  MEDTRONIC  NTI  L-3    PILONIDAL CYST DRAINAGE      removal of abcess      scrotal    REMOVAL OF HARDWARE FROM SPINE Bilateral 2020    Procedure: REMOVAL, HARDWARE, SPINE;  Surgeon: Mateusz Blanco MD;  Location: [facility] OR;  Service: Orthopedics;  Laterality: Bilateral;  L 4-5    TYMPANOSTOMY TUBE PLACEMENT       Family History   Problem Relation Age of Onset    Heart disease Mother     Migraines Mother     Hypertension Mother     Early death Father     Heart disease Father     Diabetes Maternal Aunt     Diabetes Maternal Uncle     Diabetes Maternal Grandfather      Social History     Tobacco Use    Smoking status: Former     Packs/day: 0.25     Types: Cigarettes     Quit date: 2016     Years since quittin.0    Smokeless tobacco: Former     Quit date: 2016    Tobacco comments:     occasional   Substance Use Topics    Alcohol use: Yes     Alcohol/week: 1.0 standard drink     Types: 1 Glasses of wine per week     Comment: rare    Drug use: No     Review of Systems   Constitutional:  Negative for chills and fever.   HENT:  Negative for sore throat.    Eyes:  Negative for photophobia and visual disturbance.   Respiratory:  Positive for shortness of breath.    Cardiovascular:  Negative for chest pain.   Gastrointestinal:  Negative for abdominal pain and vomiting.   Genitourinary:  Negative for dysuria.   Musculoskeletal:  Negative for joint swelling.   Skin:  Negative for rash.   Neurological:  Negative for seizures, syncope and headaches.   Psychiatric/Behavioral:  Negative for  confusion.      Physical Exam     Initial Vitals [02/03/23 1716]   BP Pulse Resp Temp SpO2   113/78 80 17 97.8 °F (36.6 °C) 97 %      MAP       --         Physical Exam    Nursing note and vitals reviewed.  Constitutional: He is not diaphoretic. No distress.   HENT:   Head: Normocephalic and atraumatic.   Eyes: Conjunctivae are normal.   Neck:   Normal range of motion.  Cardiovascular:  Regular rhythm.           Pulmonary/Chest: Breath sounds normal.   Abdominal: Abdomen is soft. There is no abdominal tenderness.   Musculoskeletal:         General: Normal range of motion.      Cervical back: Normal range of motion.      Comments: Trace pedal edema     Skin: No rash noted.   Psychiatric: He has a normal mood and affect.       ED Course   Procedures  Labs Reviewed   CBC W/ AUTO DIFFERENTIAL - Abnormal; Notable for the following components:       Result Value    RBC 3.07 (*)     Hemoglobin 10.3 (*)     Hematocrit 31.8 (*)      (*)     MCH 33.6 (*)     RDW 19.9 (*)     Immature Granulocytes 1.6 (*)     Immature Grans (Abs) 0.13 (*)     Gran % 77.7 (*)     Lymph % 11.6 (*)     All other components within normal limits   COMPREHENSIVE METABOLIC PANEL - Abnormal; Notable for the following components:    Glucose 128 (*)     Creatinine 1.6 (*)      (*)     ALT 51 (*)     eGFR 53.1 (*)     All other components within normal limits   TROPONIN I HIGH SENSITIVITY - Abnormal; Notable for the following components:    Troponin I High Sensitivity 31.0 (*)     All other components within normal limits   ALCOHOL,MEDICAL (ETHANOL) - Abnormal; Notable for the following components:    Alcohol, Serum 125 (*)     All other components within normal limits   MAGNESIUM   B-TYPE NATRIURETIC PEPTIDE   DRUG SCREEN PANEL, URINE EMERGENCY    Narrative:     Specimen Source->Urine   URINALYSIS, REFLEX TO URINE CULTURE    Narrative:     Specimen Source->Urine   PHOSPHORUS          Imaging Results              X-Ray Chest AP Portable  (Final result)  Result time 02/03/23 19:10:05      Final result by Wero Garcia MD (02/03/23 19:10:05)                   Narrative:    Chest single view    CLINICAL DATA: Shortness of breath    FINDINGS: Comparison to December 2022. Heart size is normal. There has been previous median sternotomy. The lungs are clear. There is mild chronic elevation of the right hemidiaphragm.    IMPRESSION:  1. No acute process.    Electronically signed by:  Wero Garcia MD  2/3/2023 7:10 PM [facility] Workstation: [id number]                      ED Interpretation by Nate Odonnell MD (02/03/23 19:08:54, [facility] - Emergency Dept, Emergency Medicine)    No acute process                                     Medications   diazePAM tablet 5 mg (has no administration in time range)   diazePAM tablet 5 mg (5 mg Oral Given 2/3/23 1843)     Medical Decision Making:   History:   Old Medical Records: I decided to obtain old medical records.  Clinical Tests:   Lab Tests: Reviewed  Radiological Study: Reviewed  Medical Tests: Reviewed  ED Management:  Patient reportedly had a systolic blood pressure in his 70s at detox facility.  Now blood pressure is normal.  Even with standing blood pressure is 125/84.  Patient with no evidence of congestive heart failure.  EKG is normal sinus rhythm.  Patient has a chronically slightly elevated troponin and continues to be in his usual range.  Patient is stable for discharge.  Patient does have history of renal sufficiency with recent significant kidney injury.  Currently creatinine is at baseline.  I feel likely reason patient had hypotension earlier today was due to taking Entresto on top of other medications.  Discussed with , on-call for patient's nephrologist.  She agrees with assessment and plan.  I had long discussion with patient his mother and his wife.  Will begin benzodiazepine taper for alcohol withdrawal.                        Clinical Impression:   Final  diagnoses:  [R06.02] Shortness of breath  [I95.9] Hypotension, unspecified hypotension type (Primary)  [F10.939] Alcohol withdrawal syndrome with complication        ED Disposition Condition    Discharge Stable          ED Prescriptions       Medication Sig Dispense Start Date End Date Auth. Provider    diazePAM (VALIUM) 5 MG tablet Take 1 tablet 3 times a day for 3 days then  Take 1 tablet 2 times a day for 3 days  Take 1 tablet at night for 3 days 18 tablet 2/3/2023 -- Nate Odonnell MD          Follow-up Information       Follow up With Specialties Details Why Contact Info Additional Information    [facility] - Emergency Dept Emergency Medicine Schedule an appointment as soon as possible for a visit   [address] [facility] [id number] [phone] [facility] floor    Riya Vidales NP Family Medicine   [address]  [address]  [facility] [place] [address]  [phone]       [facility] Behavioral Health, Psychiatry Schedule an appointment as soon as possible for a visit   [address] [place] [address]  [phone]                Nate Odonnell MD  02/03/23 1908       Nate Odonnell MD  02/03/23 1930

## 2023-02-04 NOTE — ED NOTES
Pt states he normally drinks 3-4 pints of vodka a day. Last drink was at 1100 this morning. Pt states he just doesn't feel good and feels sob. when he went to a detox facility today they told him his bp was low. Did take entresto before going.

## 2023-02-15 ENCOUNTER — PATIENT MESSAGE (OUTPATIENT)
Dept: FAMILY MEDICINE | Facility: CLINIC | Age: 48
End: 2023-02-15

## 2023-02-15 NOTE — TELEPHONE ENCOUNTER
Looks liket he er did this on 2/3 with the same intention. Yet he did not go. ????? When does he plan on going? Is he scheduled?

## 2023-02-16 ENCOUNTER — PATIENT MESSAGE (OUTPATIENT)
Dept: FAMILY MEDICINE | Facility: CLINIC | Age: 48
End: 2023-02-16

## 2023-02-16 RX ORDER — DIAZEPAM 5 MG/1
TABLET ORAL
Qty: 18 TABLET | Refills: 0 | Status: SHIPPED | OUTPATIENT
Start: 2023-02-16 | End: 2023-02-16 | Stop reason: SDUPTHER

## 2023-02-16 RX ORDER — DIAZEPAM 5 MG/1
5 TABLET ORAL EVERY 12 HOURS PRN
Qty: 6 TABLET | Refills: 0 | Status: SHIPPED | OUTPATIENT
Start: 2023-02-16 | End: 2023-02-16

## 2023-02-16 RX ORDER — DIAZEPAM 5 MG/1
5 TABLET ORAL 2 TIMES DAILY PRN
Qty: 6 TABLET | Refills: 0 | Status: SHIPPED | OUTPATIENT
Start: 2023-02-16 | End: 2023-09-21

## 2023-02-16 NOTE — TELEPHONE ENCOUNTER
Spoke with [facility] - they need the patient to call in for pre-assessment and could get him in as early as Monday. Tried calling patient information, did not leave detailed VM

## 2023-02-24 ENCOUNTER — PATIENT MESSAGE (OUTPATIENT)
Dept: FAMILY MEDICINE | Facility: CLINIC | Age: 48
End: 2023-02-24

## 2023-03-06 PROBLEM — N28.9 ACUTE RENAL INSUFFICIENCY: Status: RESOLVED | Noted: 2022-01-15 | Resolved: 2023-03-06

## 2023-04-03 ENCOUNTER — PATIENT MESSAGE (OUTPATIENT)
Dept: FAMILY MEDICINE | Facility: CLINIC | Age: 48
End: 2023-04-03

## 2023-04-03 DIAGNOSIS — M54.9 BACK PAIN, UNSPECIFIED BACK LOCATION, UNSPECIFIED BACK PAIN LATERALITY, UNSPECIFIED CHRONICITY: Primary | ICD-10-CM

## 2023-04-03 DIAGNOSIS — M51.36 DDD (DEGENERATIVE DISC DISEASE), LUMBAR: Primary | ICD-10-CM

## 2023-04-03 RX ORDER — HYDROXYZINE HYDROCHLORIDE 25 MG/1
25 TABLET, FILM COATED ORAL 2 TIMES DAILY PRN
Qty: 40 TABLET | Refills: 0 | Status: SHIPPED | OUTPATIENT
Start: 2023-04-03 | End: 2023-04-17 | Stop reason: SDUPTHER

## 2023-04-07 ENCOUNTER — LAB VISIT (OUTPATIENT)
Dept: LAB | Facility: HOSPITAL | Age: 48
End: 2023-04-07
Attending: NURSE PRACTITIONER
Payer: MEDICAID

## 2023-04-07 DIAGNOSIS — N18.2 CHRONIC KIDNEY DISEASE, STAGE II (MILD): ICD-10-CM

## 2023-04-07 DIAGNOSIS — M25.50 PAIN IN JOINT, MULTIPLE SITES: ICD-10-CM

## 2023-04-07 DIAGNOSIS — R89.1 ABNORMAL LEVEL OF HORMONES IN SPECIMENS FROM OTH ORG/TISS: Primary | ICD-10-CM

## 2023-04-07 DIAGNOSIS — R73.03 DIABETES MELLITUS, LATENT: ICD-10-CM

## 2023-04-07 LAB
ALBUMIN SERPL BCP-MCNC: 3.8 G/DL (ref 3.5–5.2)
ALP SERPL-CCNC: 55 U/L (ref 55–135)
ALT SERPL W/O P-5'-P-CCNC: 16 U/L (ref 10–44)
ANION GAP SERPL CALC-SCNC: 11 MMOL/L (ref 8–16)
AST SERPL-CCNC: 15 U/L (ref 10–40)
BILIRUB SERPL-MCNC: 0.7 MG/DL (ref 0.1–1)
BUN SERPL-MCNC: 10 MG/DL (ref 6–20)
CALCIUM SERPL-MCNC: 9.3 MG/DL (ref 8.7–10.5)
CHLORIDE SERPL-SCNC: 101 MMOL/L (ref 95–110)
CO2 SERPL-SCNC: 26 MMOL/L (ref 23–29)
CREAT SERPL-MCNC: 1.3 MG/DL (ref 0.5–1.4)
CRP SERPL-MCNC: 3.38 MG/DL
ERYTHROCYTE [SEDIMENTATION RATE] IN BLOOD BY WESTERGREN METHOD: 19 MM/HR (ref 0–10)
EST. GFR  (NO RACE VARIABLE): >60 ML/MIN/1.73 M^2
ESTIMATED AVG GLUCOSE: 117 MG/DL (ref 68–131)
GLUCOSE SERPL-MCNC: 119 MG/DL (ref 70–110)
HBA1C MFR BLD: 5.7 % (ref 4.5–6.2)
POTASSIUM SERPL-SCNC: 3.9 MMOL/L (ref 3.5–5.1)
PROT SERPL-MCNC: 8 G/DL (ref 6–8.4)
SODIUM SERPL-SCNC: 138 MMOL/L (ref 136–145)

## 2023-04-07 PROCEDURE — 86038 ANTINUCLEAR ANTIBODIES: CPT | Performed by: NURSE PRACTITIONER

## 2023-04-07 PROCEDURE — 84403 ASSAY OF TOTAL TESTOSTERONE: CPT | Performed by: NURSE PRACTITIONER

## 2023-04-07 PROCEDURE — 84270 ASSAY OF SEX HORMONE GLOBUL: CPT | Performed by: NURSE PRACTITIONER

## 2023-04-07 PROCEDURE — 80053 COMPREHEN METABOLIC PANEL: CPT | Performed by: NURSE PRACTITIONER

## 2023-04-07 PROCEDURE — 86140 C-REACTIVE PROTEIN: CPT | Performed by: NURSE PRACTITIONER

## 2023-04-07 PROCEDURE — 36415 COLL VENOUS BLD VENIPUNCTURE: CPT | Performed by: NURSE PRACTITIONER

## 2023-04-07 PROCEDURE — 86431 RHEUMATOID FACTOR QUANT: CPT | Performed by: NURSE PRACTITIONER

## 2023-04-07 PROCEDURE — 85651 RBC SED RATE NONAUTOMATED: CPT | Performed by: NURSE PRACTITIONER

## 2023-04-07 PROCEDURE — 83036 HEMOGLOBIN GLYCOSYLATED A1C: CPT | Performed by: NURSE PRACTITIONER

## 2023-04-08 LAB
ALBUMIN SERPL-MCNC: 4 G/DL (ref 4–5)
ANA TITR SER IF: NEGATIVE {TITER}
RHEUMATOID FACT SERPL-ACNC: <10 IU/ML
SHBG SERPL-SCNC: 25.9 NMOL/L (ref 16.5–55.9)
TESTOST FREE SERPL-MCNC: 67.2 PG/ML (ref 30.3–183.2)
TESTOST SERPL-MCNC: 288 NG/DL (ref 264–916)
TESTOST SERPL-MCNC: 288 NG/DL (ref 264–916)

## 2023-04-17 RX ORDER — HYDROXYZINE HYDROCHLORIDE 25 MG/1
25 TABLET, FILM COATED ORAL 2 TIMES DAILY PRN
Qty: 40 TABLET | Refills: 5 | Status: SHIPPED | OUTPATIENT
Start: 2023-05-03 | End: 2023-05-23

## 2023-04-19 ENCOUNTER — OFFICE VISIT (OUTPATIENT)
Dept: ORTHOPEDICS | Facility: CLINIC | Age: 48
End: 2023-04-19
Payer: MEDICAID

## 2023-04-19 VITALS — WEIGHT: 246 LBS | HEIGHT: 67 IN | BODY MASS INDEX: 38.61 KG/M2

## 2023-04-19 DIAGNOSIS — Z98.1 HISTORY OF LUMBAR SPINAL FUSION: Primary | ICD-10-CM

## 2023-04-19 DIAGNOSIS — M51.36 DDD (DEGENERATIVE DISC DISEASE), LUMBAR: ICD-10-CM

## 2023-04-19 DIAGNOSIS — M47.816 FACET ARTHRITIS OF LUMBAR REGION: ICD-10-CM

## 2023-04-19 PROCEDURE — 1160F PR REVIEW ALL MEDS BY PRESCRIBER/CLIN PHARMACIST DOCUMENTED: ICD-10-PCS | Mod: CPTII,S$GLB,, | Performed by: ORTHOPAEDIC SURGERY

## 2023-04-19 PROCEDURE — 3008F BODY MASS INDEX DOCD: CPT | Mod: CPTII,S$GLB,, | Performed by: ORTHOPAEDIC SURGERY

## 2023-04-19 PROCEDURE — 3044F PR MOST RECENT HEMOGLOBIN A1C LEVEL <7.0%: ICD-10-PCS | Mod: CPTII,S$GLB,, | Performed by: ORTHOPAEDIC SURGERY

## 2023-04-19 PROCEDURE — 1159F MED LIST DOCD IN RCRD: CPT | Mod: CPTII,S$GLB,, | Performed by: ORTHOPAEDIC SURGERY

## 2023-04-19 PROCEDURE — 1159F PR MEDICATION LIST DOCUMENTED IN MEDICAL RECORD: ICD-10-PCS | Mod: CPTII,S$GLB,, | Performed by: ORTHOPAEDIC SURGERY

## 2023-04-19 PROCEDURE — 99213 PR OFFICE/OUTPT VISIT, EST, LEVL III, 20-29 MIN: ICD-10-PCS | Mod: S$GLB,,, | Performed by: ORTHOPAEDIC SURGERY

## 2023-04-19 PROCEDURE — 3044F HG A1C LEVEL LT 7.0%: CPT | Mod: CPTII,S$GLB,, | Performed by: ORTHOPAEDIC SURGERY

## 2023-04-19 PROCEDURE — 1160F RVW MEDS BY RX/DR IN RCRD: CPT | Mod: CPTII,S$GLB,, | Performed by: ORTHOPAEDIC SURGERY

## 2023-04-19 PROCEDURE — 99213 OFFICE O/P EST LOW 20 MIN: CPT | Mod: S$GLB,,, | Performed by: ORTHOPAEDIC SURGERY

## 2023-04-19 PROCEDURE — 4010F PR ACE/ARB THEARPY RXD/TAKEN: ICD-10-PCS | Mod: CPTII,S$GLB,, | Performed by: ORTHOPAEDIC SURGERY

## 2023-04-19 PROCEDURE — 4010F ACE/ARB THERAPY RXD/TAKEN: CPT | Mod: CPTII,S$GLB,, | Performed by: ORTHOPAEDIC SURGERY

## 2023-04-19 PROCEDURE — 3008F PR BODY MASS INDEX (BMI) DOCUMENTED: ICD-10-PCS | Mod: CPTII,S$GLB,, | Performed by: ORTHOPAEDIC SURGERY

## 2023-04-19 RX ORDER — ANASTROZOLE 1 MG/1
1 TABLET ORAL
COMMUNITY
Start: 2023-02-28

## 2023-04-19 RX ORDER — LEVOMEFOLATE/ALGAL OIL 15-90.314
1 CAPSULE ORAL DAILY
COMMUNITY
Start: 2023-03-27 | End: 2024-04-03

## 2023-04-19 RX ORDER — NALTREXONE HYDROCHLORIDE 50 MG/1
50 TABLET, FILM COATED ORAL EVERY MORNING
COMMUNITY
Start: 2023-04-17 | End: 2023-09-21

## 2023-04-19 RX ORDER — TRIAMCINOLONE ACETONIDE 40 MG/ML
40 INJECTION, SUSPENSION INTRA-ARTICULAR; INTRAMUSCULAR
Status: COMPLETED | OUTPATIENT
Start: 2023-04-19 | End: 2023-04-19

## 2023-04-19 RX ORDER — SACUBITRIL AND VALSARTAN 24; 26 MG/1; MG/1
1 TABLET, FILM COATED ORAL
COMMUNITY
Start: 2023-04-01 | End: 2024-04-03 | Stop reason: CLARIF

## 2023-04-19 RX ORDER — TESTOSTERONE CYPIONATE 200 MG/ML
200 INJECTION, SOLUTION INTRAMUSCULAR
COMMUNITY

## 2023-04-19 RX ORDER — ASPIRIN 81 MG/1
81 TABLET ORAL DAILY
COMMUNITY
Start: 2023-02-28

## 2023-04-19 RX ORDER — MELOXICAM 15 MG/1
15 TABLET ORAL DAILY
Qty: 30 TABLET | Refills: 2 | Status: SHIPPED | OUTPATIENT
Start: 2023-04-19 | End: 2023-08-11

## 2023-04-19 RX ADMIN — TRIAMCINOLONE ACETONIDE 40 MG: 40 INJECTION, SUSPENSION INTRA-ARTICULAR; INTRAMUSCULAR at 03:04

## 2023-04-19 NOTE — PROGRESS NOTES
Subjective:       Patient ID: Lee Quintanilla is a 47 y.o. male.    Chief Complaint: Pain of the Lumbar Spine (//XR// s/p LS/P L3-4, L4-5 Lumbar Fusion 11/5/20, Lumbar Pain for couple months ago, started off sharp, has gotten little better, no leg pain, slight tingling to b/l legs)      History of Present Illness    Prior to meeting with the patient I reviewed the medical chart in [facility]. This included reviewing the previous progress notes from our office, review of the patient's last appointment with their primary care provider, review of any visits to the emergency room, and review of any pain management appointments or procedures.   Patient comes in today for follow-up for his lower back.  He is a history of fusion at L4-5 and L5-S1.  This is generally served him very well.  He is had left-sided low back pain for the past 4-5 months.  He does not recollect any new injury or trauma.  He denies any real radicular symptoms.  He denies any bowel or bladder changes.    Current Medications  Current Outpatient Medications   Medication Sig Dispense Refill    anastrozole (ARIMIDEX) 1 mg Tab Take by mouth.      aspirin (ECOTRIN) 81 MG EC tablet Take 81 mg by mouth.      ENTRESTO 24-26 mg per tablet Take 1 tablet by mouth.      furosemide (LASIX) 40 MG tablet furosemide 40 mg tablet      [START ON 5/3/2023] hydrOXYzine HCL (ATARAX) 25 MG tablet Take 1 tablet (25 mg total) by mouth 2 (two) times daily as needed for Itching. 40 tablet 5    L-METHYLFOLATE FORTE 15-90.314 mg Cap Take 1 capsule by mouth.      metoprolol tartrate (LOPRESSOR) 25 MG tablet Take 50 mg by mouth 2 (two) times daily.      naltrexone (DEPADE) 50 mg tablet Take 50 mg by mouth every morning.      potassium chloride SA (K-DUR,KLOR-CON) 20 MEQ tablet potassium chloride ER 20 mEq tablet,extended release(part/cryst)      rosuvastatin (CRESTOR) 40 MG Tab TAKE ONE TABLET BY MOUTH ONCE A DAY AS DIRECTED      sertraline (ZOLOFT) 100 MG tablet Take 1 tablet  (100 mg total) by mouth once daily. 30 tablet 11    testosterone cypionate (DEPOTESTOTERONE CYPIONATE) 200 mg/mL injection testosterone cypionate 200 mg/mL intramuscular oil   INJECT 1 MILLILITER INTRAMUSCULAR EVERY WEEK      amLODIPine (NORVASC) 5 MG tablet amlodipine 5 mg tablet      diazePAM (VALIUM) 5 MG tablet Take 1 tablet (5 mg total) by mouth 2 (two) times daily as needed for Anxiety. 6 tablet 0    meloxicam (MOBIC) 15 MG tablet Take 1 tablet (15 mg total) by mouth once daily. 30 tablet 2    pantoprazole (PROTONIX) 40 MG tablet Take 1 tablet (40 mg total) by mouth once daily. 30 tablet 0     No current facility-administered medications for this visit.       Allergies  Review of patient's allergies indicates:   Allergen Reactions    Inapsine [droperidol] Anaphylaxis     seizures    Effexor [venlafaxine]      Increased anxiety    Pcn [penicillins]     Bactrim [sulfamethoxazole-trimethoprim] Rash     Dry red rash all over when in the sun       Past Medical History  Past Medical History:   Diagnosis Date    ADHD (attention deficit hyperactivity disorder)     Arthritis     Asthma     as child only    Back pain     Coronary artery disease     Degeneration of lumbar intervertebral disc 05/2016    Depression     Elevated PSA     Headache     Hyperlipidemia     Hypertension     Kidney damage     stage 3 ; d/t aleve abuse    Kidney stone     Myocardial infarction     Neck pain     Numbness and tingling in hands     Numbness and tingling of both legs     Seizures     from inapsine 2002    Sleep apnea     no cpap    Wears glasses     CONTACS       Surgical History  Past Surgical History:   Procedure Laterality Date    BACK SURGERY  2016    back surgery    BONE GRAFT N/A 11/5/2020    Procedure: BONE GRAFT;  Surgeon: Mateusz Blanco MD;  Location: [facility];  Service: Orthopedics;  Laterality: N/A;    CARDIAC SURGERY  01/06/2020    CABG X 7    CORONARY ARTERY BYPASS GRAFT      7 vessels    HERNIA REPAIR Bilateral  2017    LITHOTRIPSY      LUMBAR LAMINECTOMY WITH FUSION Bilateral 2020    Procedure: LAMINECTOMY, SPINE, LUMBAR, WITH FUSION;  Surgeon: Mateusz Blanco MD;  Location: [facility] OR;  Service: Orthopedics;  Laterality: Bilateral;  MEDTRONIC  NTI  L-3    PILONIDAL CYST DRAINAGE      removal of abcess      scrotal    REMOVAL OF HARDWARE FROM SPINE Bilateral 2020    Procedure: REMOVAL, HARDWARE, SPINE;  Surgeon: Mateusz Blanco MD;  Location: [facility] OR;  Service: Orthopedics;  Laterality: Bilateral;  L 4-5    TYMPANOSTOMY TUBE PLACEMENT         Family History:   Family History   Problem Relation Age of Onset    Heart disease Mother     Migraines Mother     Hypertension Mother     Early death Father     Heart disease Father     Diabetes Maternal Aunt     Diabetes Maternal Uncle     Diabetes Maternal Grandfather        Social History:   Social History     Socioeconomic History    Marital status:    Occupational History    Occupation: disability   Tobacco Use    Smoking status: Former     Packs/day: 0.25     Types: Cigarettes     Quit date: 2016     Years since quittin.3    Smokeless tobacco: Former     Quit date: 2016    Tobacco comments:     occasional   Substance and Sexual Activity    Alcohol use: Yes     Alcohol/week: 1.0 standard drink     Types: 1 Glasses of wine per week     Comment: rare    Drug use: No    Sexual activity: Yes     Partners: Female     Social Determinants of Health     Financial Resource Strain: Unknown    Difficulty of Paying Living Expenses: Patient refused   Food Insecurity: Unknown    Worried About Running Out of Food in the Last Year: Patient refused    Ran Out of Food in the Last Year: Patient refused   Transportation Needs: Unknown    Lack of Transportation (Medical): Patient refused    Lack of Transportation (Non-Medical): Patient refused   Physical Activity: Unknown    Days of Exercise per Week: Patient refused   Stress: Stress Concern Present    Feeling of  Stress : Rather much   Social Connections: Unknown    Frequency of Communication with Friends and Family: Patient refused    Frequency of Social Gatherings with Friends and Family: Patient refused    Active Member of Clubs or Organizations: No    Attends Club or Organization Meetings: Never    Marital Status:    Housing Stability: Unknown    Unable to Pay for Housing in the Last Year: Patient refused    Unstable Housing in the Last Year: Patient refused       Hospitalization/Major Diagnostic Procedure:     Review of Systems     General/Constitutional:  Chills denies. Fatigue denies. Fever denies. Weight gain denies. Weight loss denies.    Respiratory:  Shortness of breath denies.    Cardiovascular:  Chest pain denies.    Gastrointestinal:  Constipation denies. Diarrhea denies. Nausea denies. Vomiting denies.     Hematology:  Easy bruising denies. Prolonged bleeding denies.     Genitourinary:  Frequent urination denies. Pain in lower back denies. Painful urination denies.     Musculoskeletal:  See HPI for details    Skin:  Rash denies.    Neurologic:  Dizziness denies. Gait abnormalities denies. Seizures denies. Tingling/Numbess denies.    Psychiatric:  Anxiety denies. Depressed mood denies.     Objective:   Vital Signs: There were no vitals filed for this visit.     Physical Exam      General Examination:     Constitutional: The patient is alert and oriented to lace person and time. Mood is pleasant.     Head/Face: Normal facial features normal eyebrows    Eyes: Normal extraocular motion bilaterally    Lungs: Respirations are equal and unlabored    Gait is coordinated.    Cardiovascular: There are no swelling or varicosities present.    Lymphatic: Negative for adenopathy    Skin: Normal    Neurological: Level of consciousness normal. Oriented to place person and time and situation    Psychiatric: Oriented to time place person and situation    Lumbar exam: Skin throughout his lower back is clean dry and  intact.  There is no erythema or ecchymosis.  There are no signs or symptoms of infection.  He has well-healed posterior lumbar incision consistent with his previous fusion surgeries.  He is neurovascularly intact throughout bilateral lower extremities.  He is a negative straight leg raise bilaterally.  He has well-preserved internal/external rotation of bilateral hips without pain.  He can weightbear as tolerated on bilateral lower extremities.  He is nontender to palpation over bilateral greater trochanters.  He has well-preserved flexion/extension of bilateral knees.  He is not really tender to palpation about the right-sided lumbar paravertebrals.  He is tender to palpation about the left-sided lumbar paravertebrals extending into the lumbosacral junction.    XRAY Report/ Interpretation :  Two views were taken of his lumbar spine today: AP and lateral views.  They reveal no acute fractures or dislocations.  Patient has interbody fusion at L5-S1.  He is posterior instrumentation with interbody placement at L4-5.  There is no evidence of hardware failure.  Visualized soft tissues appear unremarkable.      AP view was taken of his pelvis today.  Reveals no acute fractures or dislocations.  Visualized soft tissues appear unremarkable.  Femoroacetabular joint space is well-preserved bilaterally.    Assessment:       1. History of lumbar spinal fusion    2. DDD (degenerative disc disease), lumbar    3. Facet arthritis of lumbar region        Plan:       Lee was seen today for pain.    Diagnoses and all orders for this visit:    History of lumbar spinal fusion  -     X-Ray Lumbar Spine Ap And Lateral  -     X-Ray Pelvis Routine AP    DDD (degenerative disc disease), lumbar  -     Ambulatory referral/consult to Orthopedics  -     meloxicam (MOBIC) 15 MG tablet; Take 1 tablet (15 mg total) by mouth once daily.    Facet arthritis of lumbar region  -     Ambulatory referral/consult to Orthopedics         Follow up in  about 6 weeks (around 5/31/2023).  This is to attest that the physician's assistant Gregory Haynes served in the capacity as scribe for this patient's encounter.  This is also to verify that I have reviewed the patient's history and helped formulate the treatment plan and discussed it with the physician's assistant.  I have actively participated in the evaluation and treatment plan for this patient visit.  The treatment plan and medical decision-making is outlined below  We will start with conservative treatment measures.  I will place him on Mobic 15 mg by mouth once a day with food.  We gave him an intramuscular injection of 40 mg of Kenalog today.  He will use Tylenol for pain control.  Patient reports to me as a history of ethanol abuse and has been advised to stay away from narcotics.  I would like to help support him in that direction and will avoid any narcotics today.  We will also get him started in formal physical therapy to help with his back.  Adding their treatment modalities to include dry needling may be very beneficial.  We will see him back in 6 weeks to see how he is progressing with these conservative treatment measures.  If he is not much improved, consideration of advanced imaging with an MRI with progression to pain management procedures may be warranted.      Treatment options were discussed with regards to the nature of the medical condition. Conservative pain intervention and surgical options were discussed in detail. The probability of success of each separate treatment option was discussed. The patient expressed a clear understanding of the treatment options. With regards to surgery, the procedure risk, benefits, complications, and outcomes were discussed. No guarantees were given with regards to surgical outcome.   The risk of complications, morbidity, and mortality of patient management decisions have been made at the time of this visit. These are associated with the patient's  problems, diagnostic procedures and treatment options. This includes the possible management options selected and those considered but not selected by the patient after shared medical decision making we discussed with the patient.   This note was created using Dragon voice recognition software that occasionally misinterpreted phrases or words.

## 2023-08-06 NOTE — TELEPHONE ENCOUNTER
Appointment scheduled  
Please make sept f/u     msg sent to pt via my chart:    Ok but I have to see you at minimum every 6 monnths plus or minus labs to be able to write you for testosterone. i'm sorry:( I last saw you in march, at minimum you need a f/u in September  Also your testosterone was too high -1500. I think you should cut down on the aromatase to 1x a week and change your dosage to every 10 days.   ===View-only below this line===      ----- Message -----     From: Lee Quintanilla     Sent: 6/1/2018  9:37 AM CDT       To: Vicky Saeed MD  Subject: RE: your blood test today    Hey Dr ALBRIGHT   Had to cancel appt due to cost of appt. I'll rescues us once I get some insurance.   Thanks    ----- Message -----  From: Vicky Saeed MD  Sent: 5/29/18, 7:44 PM  To: Lee Quintanilla  Subject: your blood test today    When did you get your lab in relation to your testosterone shot?  
126.9

## 2023-08-11 DIAGNOSIS — M51.36 DDD (DEGENERATIVE DISC DISEASE), LUMBAR: ICD-10-CM

## 2023-08-11 RX ORDER — MELOXICAM 15 MG/1
15 TABLET ORAL
Qty: 30 TABLET | Refills: 2 | Status: SHIPPED | OUTPATIENT
Start: 2023-08-11 | End: 2023-09-21

## 2023-09-21 ENCOUNTER — OFFICE VISIT (OUTPATIENT)
Dept: FAMILY MEDICINE | Facility: CLINIC | Age: 48
End: 2023-09-21
Payer: MEDICAID

## 2023-09-21 ENCOUNTER — LAB VISIT (OUTPATIENT)
Dept: LAB | Facility: HOSPITAL | Age: 48
End: 2023-09-21
Attending: NURSE PRACTITIONER
Payer: MEDICAID

## 2023-09-21 VITALS
DIASTOLIC BLOOD PRESSURE: 72 MMHG | WEIGHT: 240.38 LBS | BODY MASS INDEX: 37.73 KG/M2 | SYSTOLIC BLOOD PRESSURE: 124 MMHG | OXYGEN SATURATION: 97 % | HEART RATE: 91 BPM | HEIGHT: 67 IN

## 2023-09-21 DIAGNOSIS — Z95.1 POSTSURGICAL AORTOCORONARY BYPASS STATUS: Primary | ICD-10-CM

## 2023-09-21 DIAGNOSIS — F41.1 GAD (GENERALIZED ANXIETY DISORDER): Primary | ICD-10-CM

## 2023-09-21 DIAGNOSIS — Z79.899 ENCOUNTER FOR LONG-TERM (CURRENT) USE OF OTHER MEDICATIONS: ICD-10-CM

## 2023-09-21 DIAGNOSIS — Z51.81 ENCOUNTER FOR THERAPEUTIC DRUG MONITORING: ICD-10-CM

## 2023-09-21 DIAGNOSIS — R73.02 IMPAIRED GLUCOSE TOLERANCE ASSOCIATED WITH DRUGS: ICD-10-CM

## 2023-09-21 DIAGNOSIS — T50.905A IMPAIRED GLUCOSE TOLERANCE ASSOCIATED WITH DRUGS: ICD-10-CM

## 2023-09-21 DIAGNOSIS — G62.9 NEUROPATHY: ICD-10-CM

## 2023-09-21 DIAGNOSIS — E66.01 SEVERE OBESITY WITH BODY MASS INDEX (BMI) OF 35.0 TO 39.9 WITH SERIOUS COMORBIDITY: ICD-10-CM

## 2023-09-21 DIAGNOSIS — I50.9 CONGESTIVE HEART FAILURE, UNSPECIFIED HF CHRONICITY, UNSPECIFIED HEART FAILURE TYPE: ICD-10-CM

## 2023-09-21 DIAGNOSIS — F32.1 CURRENT MODERATE EPISODE OF MAJOR DEPRESSIVE DISORDER WITHOUT PRIOR EPISODE: ICD-10-CM

## 2023-09-21 DIAGNOSIS — E78.5 HYPERLIPIDEMIA, UNSPECIFIED HYPERLIPIDEMIA TYPE: ICD-10-CM

## 2023-09-21 DIAGNOSIS — F98.8 ATTENTION DEFICIT DISORDER, UNSPECIFIED HYPERACTIVITY PRESENCE: ICD-10-CM

## 2023-09-21 DIAGNOSIS — F10.10 ALCOHOL ABUSE: ICD-10-CM

## 2023-09-21 DIAGNOSIS — Z98.890 HISTORY OF LUMBAR SURGERY: ICD-10-CM

## 2023-09-21 DIAGNOSIS — I25.810 CORONARY ARTERY DISEASE INVOLVING CORONARY BYPASS GRAFT OF NATIVE HEART WITHOUT ANGINA PECTORIS: ICD-10-CM

## 2023-09-21 LAB
ALBUMIN SERPL BCP-MCNC: 4.4 G/DL (ref 3.5–5.2)
ALP SERPL-CCNC: 65 U/L (ref 55–135)
ALT SERPL W/O P-5'-P-CCNC: 18 U/L (ref 10–44)
ANION GAP SERPL CALC-SCNC: 6 MMOL/L (ref 8–16)
AST SERPL-CCNC: 16 U/L (ref 10–40)
BILIRUB SERPL-MCNC: 0.2 MG/DL (ref 0.1–1)
BUN SERPL-MCNC: 14 MG/DL (ref 6–20)
CALCIUM SERPL-MCNC: 9.5 MG/DL (ref 8.7–10.5)
CHLORIDE SERPL-SCNC: 104 MMOL/L (ref 95–110)
CHOLEST SERPL-MCNC: 170 MG/DL (ref 120–199)
CHOLEST/HDLC SERPL: 3.6 {RATIO} (ref 2–5)
CO2 SERPL-SCNC: 26 MMOL/L (ref 23–29)
CREAT SERPL-MCNC: 1.7 MG/DL (ref 0.5–1.4)
ERYTHROCYTE [DISTWIDTH] IN BLOOD BY AUTOMATED COUNT: 13.9 % (ref 11.5–14.5)
EST. GFR  (NO RACE VARIABLE): 49.1 ML/MIN/1.73 M^2
GLUCOSE SERPL-MCNC: 96 MG/DL (ref 70–110)
HCT VFR BLD AUTO: 48.8 % (ref 40–54)
HDLC SERPL-MCNC: 47 MG/DL (ref 40–75)
HDLC SERPL: 27.6 % (ref 20–50)
HGB BLD-MCNC: 15.6 G/DL (ref 14–18)
LDLC SERPL CALC-MCNC: 80.8 MG/DL (ref 63–159)
MCH RBC QN AUTO: 28.4 PG (ref 27–31)
MCHC RBC AUTO-ENTMCNC: 32 G/DL (ref 32–36)
MCV RBC AUTO: 89 FL (ref 82–98)
NONHDLC SERPL-MCNC: 123 MG/DL
PLATELET # BLD AUTO: 241 K/UL (ref 150–450)
PMV BLD AUTO: 11.5 FL (ref 9.2–12.9)
POTASSIUM SERPL-SCNC: 4.1 MMOL/L (ref 3.5–5.1)
PROT SERPL-MCNC: 8.1 G/DL (ref 6–8.4)
RBC # BLD AUTO: 5.49 M/UL (ref 4.6–6.2)
SODIUM SERPL-SCNC: 136 MMOL/L (ref 136–145)
TRIGL SERPL-MCNC: 211 MG/DL (ref 30–150)
WBC # BLD AUTO: 9.13 K/UL (ref 3.9–12.7)

## 2023-09-21 PROCEDURE — 3074F PR MOST RECENT SYSTOLIC BLOOD PRESSURE < 130 MM HG: ICD-10-PCS | Mod: CPTII,S$GLB,, | Performed by: NURSE PRACTITIONER

## 2023-09-21 PROCEDURE — 1159F PR MEDICATION LIST DOCUMENTED IN MEDICAL RECORD: ICD-10-PCS | Mod: CPTII,S$GLB,, | Performed by: NURSE PRACTITIONER

## 2023-09-21 PROCEDURE — 1160F PR REVIEW ALL MEDS BY PRESCRIBER/CLIN PHARMACIST DOCUMENTED: ICD-10-PCS | Mod: CPTII,S$GLB,, | Performed by: NURSE PRACTITIONER

## 2023-09-21 PROCEDURE — 4010F PR ACE/ARB THEARPY RXD/TAKEN: ICD-10-PCS | Mod: CPTII,S$GLB,, | Performed by: NURSE PRACTITIONER

## 2023-09-21 PROCEDURE — 3008F PR BODY MASS INDEX (BMI) DOCUMENTED: ICD-10-PCS | Mod: CPTII,S$GLB,, | Performed by: NURSE PRACTITIONER

## 2023-09-21 PROCEDURE — 3044F HG A1C LEVEL LT 7.0%: CPT | Mod: CPTII,S$GLB,, | Performed by: NURSE PRACTITIONER

## 2023-09-21 PROCEDURE — 36415 COLL VENOUS BLD VENIPUNCTURE: CPT | Performed by: NURSE PRACTITIONER

## 2023-09-21 PROCEDURE — 80053 COMPREHEN METABOLIC PANEL: CPT | Performed by: NURSE PRACTITIONER

## 2023-09-21 PROCEDURE — 3074F SYST BP LT 130 MM HG: CPT | Mod: CPTII,S$GLB,, | Performed by: NURSE PRACTITIONER

## 2023-09-21 PROCEDURE — 99214 OFFICE O/P EST MOD 30 MIN: CPT | Mod: S$GLB,,, | Performed by: NURSE PRACTITIONER

## 2023-09-21 PROCEDURE — 80061 LIPID PANEL: CPT | Performed by: NURSE PRACTITIONER

## 2023-09-21 PROCEDURE — 4010F ACE/ARB THERAPY RXD/TAKEN: CPT | Mod: CPTII,S$GLB,, | Performed by: NURSE PRACTITIONER

## 2023-09-21 PROCEDURE — 3044F PR MOST RECENT HEMOGLOBIN A1C LEVEL <7.0%: ICD-10-PCS | Mod: CPTII,S$GLB,, | Performed by: NURSE PRACTITIONER

## 2023-09-21 PROCEDURE — 1159F MED LIST DOCD IN RCRD: CPT | Mod: CPTII,S$GLB,, | Performed by: NURSE PRACTITIONER

## 2023-09-21 PROCEDURE — 3008F BODY MASS INDEX DOCD: CPT | Mod: CPTII,S$GLB,, | Performed by: NURSE PRACTITIONER

## 2023-09-21 PROCEDURE — 3078F PR MOST RECENT DIASTOLIC BLOOD PRESSURE < 80 MM HG: ICD-10-PCS | Mod: CPTII,S$GLB,, | Performed by: NURSE PRACTITIONER

## 2023-09-21 PROCEDURE — 83036 HEMOGLOBIN GLYCOSYLATED A1C: CPT | Performed by: NURSE PRACTITIONER

## 2023-09-21 PROCEDURE — 85027 COMPLETE CBC AUTOMATED: CPT | Performed by: NURSE PRACTITIONER

## 2023-09-21 PROCEDURE — 3078F DIAST BP <80 MM HG: CPT | Mod: CPTII,S$GLB,, | Performed by: NURSE PRACTITIONER

## 2023-09-21 PROCEDURE — 99214 PR OFFICE/OUTPT VISIT, EST, LEVL IV, 30-39 MIN: ICD-10-PCS | Mod: S$GLB,,, | Performed by: NURSE PRACTITIONER

## 2023-09-21 PROCEDURE — 1160F RVW MEDS BY RX/DR IN RCRD: CPT | Mod: CPTII,S$GLB,, | Performed by: NURSE PRACTITIONER

## 2023-09-21 RX ORDER — HYDROXYZINE PAMOATE 50 MG/1
50 CAPSULE ORAL EVERY 8 HOURS PRN
Qty: 90 CAPSULE | Refills: 2 | Status: SHIPPED | OUTPATIENT
Start: 2023-09-21 | End: 2023-10-26 | Stop reason: SDUPTHER

## 2023-09-21 RX ORDER — EMPAGLIFLOZIN 10 MG/1
10 TABLET, FILM COATED ORAL
COMMUNITY
Start: 2023-06-08 | End: 2023-11-10 | Stop reason: DRUGHIGH

## 2023-09-21 RX ORDER — TRAZODONE HYDROCHLORIDE 150 MG/1
200 TABLET ORAL NIGHTLY
COMMUNITY
Start: 2023-08-23 | End: 2023-09-21 | Stop reason: SDUPTHER

## 2023-09-21 RX ORDER — NALTREXONE 380 MG
380 KIT INTRAMUSCULAR
Qty: 1 EACH | Refills: 0 | Status: SHIPPED | OUTPATIENT
Start: 2023-09-21 | End: 2023-10-26 | Stop reason: SDUPTHER

## 2023-09-21 RX ORDER — SERTRALINE HYDROCHLORIDE 100 MG/1
150 TABLET, FILM COATED ORAL DAILY
Qty: 45 TABLET | Refills: 11
Start: 2023-09-21 | End: 2024-01-10

## 2023-09-21 RX ORDER — TRAZODONE HYDROCHLORIDE 100 MG/1
200 TABLET ORAL NIGHTLY
Qty: 60 TABLET | Refills: 2 | Status: SHIPPED | OUTPATIENT
Start: 2023-09-21 | End: 2024-01-31 | Stop reason: SDUPTHER

## 2023-09-21 RX ORDER — HYDROXYZINE PAMOATE 50 MG/1
50 CAPSULE ORAL 3 TIMES DAILY
COMMUNITY
Start: 2023-06-08 | End: 2023-09-21 | Stop reason: SDUPTHER

## 2023-09-22 LAB
ESTIMATED AVG GLUCOSE: 108 MG/DL (ref 68–131)
HBA1C MFR BLD: 5.4 % (ref 4.5–6.2)

## 2023-09-23 ENCOUNTER — IMMUNIZATION (OUTPATIENT)
Dept: FAMILY MEDICINE | Facility: CLINIC | Age: 48
End: 2023-09-23
Payer: MEDICAID

## 2023-09-23 PROCEDURE — 90471 IMMUNIZATION ADMIN: CPT | Mod: PBBFAC,PO

## 2023-09-23 PROCEDURE — 99999PBSHW FLU VACCINE (QUAD) GREATER THAN OR EQUAL TO 3YO PRESERVATIVE FREE IM: ICD-10-PCS | Mod: PBBFAC,,,

## 2023-09-23 PROCEDURE — 99999PBSHW FLU VACCINE (QUAD) GREATER THAN OR EQUAL TO 3YO PRESERVATIVE FREE IM: Mod: PBBFAC,,,

## 2023-10-02 NOTE — PROGRESS NOTES
SUBJECTIVE:    Patient ID: Lee Quintanilla is a 48 y.o. male.    Chief Complaint: Follow-up (No bottles//Pt is here for a check up and medication refills//discuss sleeping medication//BUBBA )    48 year old  male presents for a check up . He has a past medical history of ADHD (attention deficit hyperactivity disorder), Arthritis, Asthma, Back pain, Coronary artery disease, Degeneration of lumbar intervertebral disc (05/2016), Depression, Elevated PSA, Headache, Hyperlipidemia, Hypertension, Kidney damage, Kidney stone, Myocardial infarction, Neck pain, Numbness and tingling in hands, Numbness and tingling of both legs, Seizures, Sleep apnea,. Recently returned from rehab. Was on vivitrol while in hospital . Would like to continue.still has anxiety. Taking zoloft. Not sure if effective. Does not sleep well. Has good support system . Living with mother. Recent ov with cardio        Lab Visit on 09/21/2023   Component Date Value Ref Range Status    Sodium 09/21/2023 136  136 - 145 mmol/L Final    Potassium 09/21/2023 4.1  3.5 - 5.1 mmol/L Final    Chloride 09/21/2023 104  95 - 110 mmol/L Final    CO2 09/21/2023 26  23 - 29 mmol/L Final    Glucose 09/21/2023 96  70 - 110 mg/dL Final    BUN 09/21/2023 14  6 - 20 mg/dL Final    Creatinine 09/21/2023 1.7 (H)  0.5 - 1.4 mg/dL Final    Calcium 09/21/2023 9.5  8.7 - 10.5 mg/dL Final    Total Protein 09/21/2023 8.1  6.0 - 8.4 g/dL Final    Albumin 09/21/2023 4.4  3.5 - 5.2 g/dL Final    Total Bilirubin 09/21/2023 0.2  0.1 - 1.0 mg/dL Final    Alkaline Phosphatase 09/21/2023 65  55 - 135 U/L Final    AST 09/21/2023 16  10 - 40 U/L Final    ALT 09/21/2023 18  10 - 44 U/L Final    eGFR 09/21/2023 49.1 (A)  >60 mL/min/1.73 m^2 Final    Anion Gap 09/21/2023 6 (L)  8 - 16 mmol/L Final    WBC 09/21/2023 9.13  3.90 - 12.70 K/uL Final    RBC 09/21/2023 5.49  4.60 - 6.20 M/uL Final    Hemoglobin 09/21/2023 15.6  14.0 - 18.0 g/dL Final    Hematocrit 09/21/2023 48.8  40.0 - 54.0 %  Final    MCV 09/21/2023 89  82 - 98 fL Final    MCH 09/21/2023 28.4  27.0 - 31.0 pg Final    MCHC 09/21/2023 32.0  32.0 - 36.0 g/dL Final    RDW 09/21/2023 13.9  11.5 - 14.5 % Final    Platelets 09/21/2023 241  150 - 450 K/uL Final    MPV 09/21/2023 11.5  9.2 - 12.9 fL Final    Cholesterol 09/21/2023 170  120 - 199 mg/dL Final    Triglycerides 09/21/2023 211 (H)  30 - 150 mg/dL Final    HDL 09/21/2023 47  40 - 75 mg/dL Final    LDL Cholesterol 09/21/2023 80.8  63.0 - 159.0 mg/dL Final    HDL/Cholesterol Ratio 09/21/2023 27.6  20.0 - 50.0 % Final    Total Cholesterol/HDL Ratio 09/21/2023 3.6  2.0 - 5.0 Final    Non-HDL Cholesterol 09/21/2023 123  mg/dL Final    Hemoglobin A1C 09/21/2023 5.4  4.5 - 6.2 % Final    Estimated Avg Glucose 09/21/2023 108  68 - 131 mg/dL Final   Lab Visit on 04/07/2023   Component Date Value Ref Range Status    Testosterone 04/07/2023 288  264 - 916 ng/dL Final    Albumin 04/07/2023 4.0  4.0 - 5.0 g/dL Final    Testosterone 04/07/2023 288  264 - 916 ng/dL Final    Sex Hormone Binding 04/07/2023 25.9  16.5 - 55.9 nmol/L Final    Testosterone, Free 04/07/2023 67.2  30.3 - 183.2 pg/mL Final    Hemoglobin A1C 04/07/2023 5.7  4.5 - 6.2 % Final    Estimated Avg Glucose 04/07/2023 117  68 - 131 mg/dL Final    RAVEN 04/07/2023 Negative   Final    Rheumatoid Factor 04/07/2023 <10.0  <14.0 IU/mL Final    Sed Rate 04/07/2023 19 (H)  0 - 10 mm/Hr Final    CRP 04/07/2023 3.38 (H)  <0.76 mg/dL Final    Sodium 04/07/2023 138  136 - 145 mmol/L Final    Potassium 04/07/2023 3.9  3.5 - 5.1 mmol/L Final    Chloride 04/07/2023 101  95 - 110 mmol/L Final    CO2 04/07/2023 26  23 - 29 mmol/L Final    Glucose 04/07/2023 119 (H)  70 - 110 mg/dL Final    BUN 04/07/2023 10  6 - 20 mg/dL Final    Creatinine 04/07/2023 1.3  0.5 - 1.4 mg/dL Final    Calcium 04/07/2023 9.3  8.7 - 10.5 mg/dL Final    Total Protein 04/07/2023 8.0  6.0 - 8.4 g/dL Final    Albumin 04/07/2023 3.8  3.5 - 5.2 g/dL Final    Total Bilirubin  2023 0.7  0.1 - 1.0 mg/dL Final    Alkaline Phosphatase 2023 55  55 - 135 U/L Final    AST 2023 15  10 - 40 U/L Final    ALT 2023 16  10 - 44 U/L Final    Anion Gap 2023 11  8 - 16 mmol/L Final    eGFR 2023 >60.0  >60 mL/min/1.73 m^2 Final       Past Medical History:   Diagnosis Date    ADHD (attention deficit hyperactivity disorder)     Arthritis     Asthma     as child only    Back pain     Coronary artery disease     Degeneration of lumbar intervertebral disc 2016    Depression     Elevated PSA     Headache     Hyperlipidemia     Hypertension     Kidney damage     stage 3 ; d/t aleve abuse    Kidney stone     Myocardial infarction     Neck pain     Numbness and tingling in hands     Numbness and tingling of both legs     Seizures     from inapsine     Sleep apnea     no cpap    Wears glasses     CONTACS     Social History     Socioeconomic History    Marital status:    Occupational History    Occupation: disability   Tobacco Use    Smoking status: Former     Current packs/day: 0.00     Types: Cigarettes     Quit date: 2016     Years since quittin.7     Passive exposure: Past    Smokeless tobacco: Former     Quit date: 2016    Tobacco comments:     occasional   Substance and Sexual Activity    Alcohol use: Yes     Alcohol/week: 1.0 standard drink of alcohol     Types: 1 Glasses of wine per week     Comment: rare    Drug use: No    Sexual activity: Yes     Partners: Female     Social Determinants of Health     Financial Resource Strain: Unknown (2022)    Overall Financial Resource Strain (CARDIA)     Difficulty of Paying Living Expenses: Patient refused   Food Insecurity: Unknown (2022)    Hunger Vital Sign     Worried About Running Out of Food in the Last Year: Patient refused     Ran Out of Food in the Last Year: Patient refused   Transportation Needs: Unknown (2022)    PRAPARE - Transportation     Lack of Transportation (Medical):  Patient refused     Lack of Transportation (Non-Medical): Patient refused   Physical Activity: Unknown (4/25/2022)    Exercise Vital Sign     Days of Exercise per Week: Patient refused   Stress: Stress Concern Present (4/25/2022)    [employer] - Occupational Stress Questionnaire     Feeling of Stress : Rather much   Social Connections: Unknown (4/25/2022)    Social Connection and Isolation Panel [NHANES]     Frequency of Communication with Friends and Family: Patient refused     Frequency of Social Gatherings with Friends and Family: Patient refused     Active Member of Clubs or Organizations: No     Attends Club or Organization Meetings: Never     Marital Status:    Housing Stability: Unknown (4/25/2022)    Housing Stability Vital Sign     Unable to Pay for Housing in the Last Year: Patient refused     Unstable Housing in the Last Year: Patient refused     Past Surgical History:   Procedure Laterality Date    BACK SURGERY  2016    back surgery    BONE GRAFT N/A 11/5/2020    Procedure: BONE GRAFT;  Surgeon: Mateusz Blanco MD;  Location: [facility] OR;  Service: Orthopedics;  Laterality: N/A;    CARDIAC SURGERY  01/06/2020    CABG X 7    CORONARY ARTERY BYPASS GRAFT      7 vessels    HERNIA REPAIR Bilateral 11/22/2017    LITHOTRIPSY      LUMBAR LAMINECTOMY WITH FUSION Bilateral 11/5/2020    Procedure: LAMINECTOMY, SPINE, LUMBAR, WITH FUSION;  Surgeon: Mateusz Blanco MD;  Location: [facility] OR;  Service: Orthopedics;  Laterality: Bilateral;  MEDTRONIC  NTI  L-3    PILONIDAL CYST DRAINAGE      removal of abcess      scrotal    REMOVAL OF HARDWARE FROM SPINE Bilateral 11/5/2020    Procedure: REMOVAL, HARDWARE, SPINE;  Surgeon: Mateusz Blanco MD;  Location: [facility] OR;  Service: Orthopedics;  Laterality: Bilateral;  L 4-5    TYMPANOSTOMY TUBE PLACEMENT       Family History   Problem Relation Age of Onset    Heart disease Mother     Migraines Mother     Hypertension Mother     Early death  Father     Heart disease Father     Diabetes Maternal Aunt     Diabetes Maternal Uncle     Diabetes Maternal Grandfather        All of your core healthy metrics are met.      Review of patient's allergies indicates:   Allergen Reactions    Inapsine [droperidol] Anaphylaxis     seizures    Effexor [venlafaxine]      Increased anxiety    Pcn [penicillins]     Bactrim [sulfamethoxazole-trimethoprim] Rash     Dry red rash all over when in the sun       Current Outpatient Medications:     anastrozole (ARIMIDEX) 1 mg Tab, Take by mouth., Disp: , Rfl:     aspirin (ECOTRIN) 81 MG EC tablet, Take 81 mg by mouth., Disp: , Rfl:     ENTRESTO 24-26 mg per tablet, Take 1 tablet by mouth., Disp: , Rfl:     furosemide (LASIX) 40 MG tablet, furosemide 40 mg tablet, Disp: , Rfl:     hydrOXYzine pamoate (VISTARIL) 50 MG Cap, Take 1 capsule (50 mg total) by mouth every 8 (eight) hours as needed., Disp: 90 capsule, Rfl: 2    JARDIANCE 10 mg tablet, Take 10 mg by mouth., Disp: , Rfl:     L-METHYLFOLATE FORTE 15-90.314 mg Cap, Take 1 capsule by mouth., Disp: , Rfl:     metoprolol tartrate (LOPRESSOR) 25 MG tablet, Take 50 mg by mouth 2 (two) times daily., Disp: , Rfl:     naltrexone microspheres (VIVITROL) 380 mg SSRR kit, Inject 1 each (380 mg total) into the muscle every 30 days., Disp: 1 each, Rfl: 0    potassium chloride SA (K-DUR,KLOR-CON) 20 MEQ tablet, potassium chloride ER 20 mEq tablet,extended release(part/cryst), Disp: , Rfl:     rosuvastatin (CRESTOR) 40 MG Tab, TAKE ONE TABLET BY MOUTH ONCE A DAY AS DIRECTED, Disp: , Rfl:     sertraline (ZOLOFT) 100 MG tablet, Take 1.5 tablets (150 mg total) by mouth once daily., Disp: 45 tablet, Rfl: 11    testosterone cypionate (DEPOTESTOTERONE CYPIONATE) 200 mg/mL injection, testosterone cypionate 200 mg/mL intramuscular oil  INJECT 1 MILLILITER INTRAMUSCULAR EVERY WEEK, Disp: , Rfl:     traZODone (DESYREL) 100 MG tablet, Take 2 tablets (200 mg total) by mouth every evening., Disp: 60  "tablet, Rfl: 2    Review of Systems   Constitutional:  Negative for fatigue, fever and unexpected weight change.   HENT:  Negative for ear pain, sinus pressure and sore throat.    Eyes:  Negative for pain.   Respiratory:  Negative for cough and shortness of breath.    Cardiovascular:  Negative for chest pain and leg swelling.   Gastrointestinal:  Negative for abdominal pain, constipation, nausea and vomiting.   Genitourinary:  Negative for dysuria, frequency and urgency.   Musculoskeletal:  Negative for arthralgias.   Skin:  Negative for rash.   Neurological:  Negative for dizziness, weakness and headaches.   Psychiatric/Behavioral:  Positive for agitation and sleep disturbance.           Objective:      Vitals:    09/21/23 1053   BP: 124/72   Pulse: 91   SpO2: 97%   Weight: 109 kg (240 lb 6.4 oz)   Height: 5' 7" (1.702 m)     Physical Exam  Vitals and nursing note reviewed.   Constitutional:       General: He is not in acute distress.     Appearance: Normal appearance. He is well-developed. He is obese.   HENT:      Head: Normocephalic and atraumatic.      Right Ear: External ear normal.      Left Ear: External ear normal.   Eyes:      Pupils: Pupils are equal, round, and reactive to light.   Neck:      Trachea: No tracheal deviation.   Cardiovascular:      Rate and Rhythm: Normal rate and regular rhythm.      Heart sounds: No murmur heard.     No friction rub. No gallop.   Pulmonary:      Breath sounds: Normal breath sounds. No stridor. No wheezing or rales.   Abdominal:      Palpations: Abdomen is soft. There is no mass.      Tenderness: There is no abdominal tenderness.   Musculoskeletal:         General: No tenderness or deformity.      Cervical back: Neck supple.   Lymphadenopathy:      Cervical: No cervical adenopathy.   Skin:     General: Skin is warm and dry.   Neurological:      Mental Status: He is alert and oriented to person, place, and time.      Coordination: Coordination normal.   Psychiatric:        "  Mood and Affect: Mood is anxious.         Thought Content: Thought content normal.           Assessment:       1. MYNOR (generalized anxiety disorder)    2. Encounter for therapeutic drug monitoring    3. Encounter for long-term (current) use of other medications    4. Severe obesity with body mass index (BMI) of 35.0 to 39.9 with serious comorbidity    5. Current moderate episode of major depressive disorder without prior episode    6. Attention deficit disorder, unspecified hyperactivity presence    7. Neuropathy    8. Alcohol abuse    9. Hyperlipidemia, unspecified hyperlipidemia type    10. Coronary artery disease involving coronary bypass graft of native heart without angina pectoris    11. History of lumbar surgery    12. Congestive heart failure, unspecified HF chronicity, unspecified heart failure type         Plan:       MYNOR (generalized anxiety disorder)  Comments:  increase zoloft to 150    Encounter for therapeutic drug monitoring    Encounter for long-term (current) use of other medications    Severe obesity with body mass index (BMI) of 35.0 to 39.9 with serious comorbidity    Current moderate episode of major depressive disorder without prior episode    Attention deficit disorder, unspecified hyperactivity presence    Neuropathy    Alcohol abuse  Comments:  vivitrol  Orders:  -     naltrexone microspheres (VIVITROL) 380 mg SSRR kit; Inject 1 each (380 mg total) into the muscle every 30 days.  Dispense: 1 each; Refill: 0    Hyperlipidemia, unspecified hyperlipidemia type  Comments:  cresetor    Coronary artery disease involving coronary bypass graft of native heart without angina pectoris    History of lumbar surgery    Congestive heart failure, unspecified HF chronicity, unspecified heart failure type  Comments:  follow up with cardio    Other orders  -     hydrOXYzine pamoate (VISTARIL) 50 MG Cap; Take 1 capsule (50 mg total) by mouth every 8 (eight) hours as needed.  Dispense: 90 capsule; Refill:  2  -     sertraline (ZOLOFT) 100 MG tablet; Take 1.5 tablets (150 mg total) by mouth once daily.  Dispense: 45 tablet; Refill: 11  -     traZODone (DESYREL) 100 MG tablet; Take 2 tablets (200 mg total) by mouth every evening.  Dispense: 60 tablet; Refill: 2      Follow up in about 8 weeks (around 11/16/2023), or if symptoms worsen or fail to improve, for medication management.        10/2/2023 Riya Vidales

## 2023-10-03 ENCOUNTER — LAB VISIT (OUTPATIENT)
Dept: LAB | Facility: HOSPITAL | Age: 48
End: 2023-10-03
Attending: INTERNAL MEDICINE
Payer: MEDICAID

## 2023-10-03 DIAGNOSIS — R35.0 URINARY FREQUENCY: ICD-10-CM

## 2023-10-03 DIAGNOSIS — E29.1 3-OXO-5 ALPHA-STEROID DELTA 4-DEHYDROGENASE DEFICIENCY: Primary | ICD-10-CM

## 2023-10-03 LAB — COMPLEXED PSA SERPL-MCNC: 0.99 NG/ML (ref 0–4)

## 2023-10-03 PROCEDURE — 36415 COLL VENOUS BLD VENIPUNCTURE: CPT

## 2023-10-03 PROCEDURE — 82670 ASSAY OF TOTAL ESTRADIOL: CPT

## 2023-10-03 PROCEDURE — 84403 ASSAY OF TOTAL TESTOSTERONE: CPT

## 2023-10-03 PROCEDURE — 84402 ASSAY OF FREE TESTOSTERONE: CPT

## 2023-10-03 PROCEDURE — 84153 ASSAY OF PSA TOTAL: CPT

## 2023-10-04 LAB
ESTRADIOL SERPL-MCNC: 93.8 PG/ML (ref 7.6–42.6)
TESTOST SERPL-MCNC: 556 NG/DL (ref 264–916)

## 2023-10-09 LAB — TESTOST FREE SERPL-MCNC: 14.7 PG/ML (ref 6.8–21.5)

## 2023-10-16 ENCOUNTER — PATIENT MESSAGE (OUTPATIENT)
Dept: FAMILY MEDICINE | Facility: CLINIC | Age: 48
End: 2023-10-16

## 2023-10-16 DIAGNOSIS — F10.10 ALCOHOL ABUSE: ICD-10-CM

## 2023-10-26 RX ORDER — NALTREXONE 380 MG
380 KIT INTRAMUSCULAR
Qty: 1 EACH | Refills: 0 | OUTPATIENT
Start: 2023-10-26 | End: 2023-10-31 | Stop reason: SDUPTHER

## 2023-10-26 RX ORDER — HYDROXYZINE PAMOATE 50 MG/1
50 CAPSULE ORAL EVERY 8 HOURS PRN
Qty: 90 CAPSULE | Refills: 2 | Status: SHIPPED | OUTPATIENT
Start: 2023-10-26 | End: 2024-04-03 | Stop reason: CLARIF

## 2023-10-26 NOTE — TELEPHONE ENCOUNTER
----- Message from Mikaela Matthews sent at 10/26/2023  3:21 PM CDT -----  Please send RX for Vistaril to [place] [id number]. [facility] does not have the medication. Pt #[phone]

## 2023-10-30 ENCOUNTER — TELEPHONE (OUTPATIENT)
Dept: FAMILY MEDICINE | Facility: CLINIC | Age: 48
End: 2023-10-30

## 2023-10-30 RX ORDER — NALTREXONE 380 MG
380 KIT INTRAMUSCULAR
Qty: 1 EACH | Refills: 0 | Status: CANCELLED | OUTPATIENT
Start: 2023-10-30 | End: 2024-10-29

## 2023-10-30 NOTE — TELEPHONE ENCOUNTER
Dont see rx was cancelled . Needs to get established with outpatient program. They would probably be more successful at locating and getting approved. [facility]?

## 2023-10-30 NOTE — TELEPHONE ENCOUNTER
----- Message from Vicky Patel sent at 10/30/2023  9:58 AM CDT -----  Vm-8:55- [facility] with [facility] is calling and we cancelled the vivatrol at [facility] and they did not receive it.  Please call it into them so they can order it   [phone]

## 2023-10-31 PROBLEM — F10.10 ALCOHOL ABUSE: Status: ACTIVE | Noted: 2023-10-31

## 2023-10-31 RX ORDER — NALTREXONE 380 MG
380 KIT INTRAMUSCULAR
Qty: 1 EACH | Refills: 0 | Status: SHIPPED | OUTPATIENT
Start: 2023-10-31 | End: 2023-11-01 | Stop reason: SDUPTHER

## 2023-11-01 DIAGNOSIS — F10.10 ALCOHOL ABUSE: ICD-10-CM

## 2023-11-01 RX ORDER — NALTREXONE 380 MG
380 KIT INTRAMUSCULAR
Qty: 1 EACH | Refills: 0 | Status: SHIPPED | OUTPATIENT
Start: 2023-11-01 | End: 2024-04-03 | Stop reason: CLARIF

## 2023-11-01 NOTE — TELEPHONE ENCOUNTER
----- Message from Salome Sloan, PharmMARISSA sent at 10/31/2023  4:45 PM CDT -----  Regarding: Vivitrol  Good afternoon,    I just spoke to this patient about the Vivitrol Rx. He would like to fill at [facility] but the Rx was discontinued and sent to [facility]. Can you re-send the prescription to [facility]?     Thank you,    Salome Sloan  Clinical Pharmacist   [facility]  [address] [place] [id number]  Ph: [phone]  Fx: [phone]

## 2023-11-03 ENCOUNTER — HOSPITAL ENCOUNTER (EMERGENCY)
Facility: HOSPITAL | Age: 48
Discharge: HOME | End: 2023-11-03
Attending: EMERGENCY MEDICINE
Payer: MEDICAID

## 2023-11-03 VITALS
WEIGHT: 240 LBS | HEIGHT: 67 IN | BODY MASS INDEX: 37.67 KG/M2 | RESPIRATION RATE: 20 BRPM | OXYGEN SATURATION: 94 % | DIASTOLIC BLOOD PRESSURE: 105 MMHG | TEMPERATURE: 99 F | HEART RATE: 117 BPM | SYSTOLIC BLOOD PRESSURE: 174 MMHG

## 2023-11-03 DIAGNOSIS — F10.20 ALCOHOLISM: ICD-10-CM

## 2023-11-03 DIAGNOSIS — F10.10 ALCOHOL ABUSE: Primary | ICD-10-CM

## 2023-11-03 LAB
ALBUMIN SERPL BCP-MCNC: 4.6 G/DL (ref 3.5–5.2)
ALP SERPL-CCNC: 59 U/L (ref 55–135)
ALT SERPL W/O P-5'-P-CCNC: 17 U/L (ref 10–44)
AMPHET+METHAMPHET UR QL: NEGATIVE
ANION GAP SERPL CALC-SCNC: 16 MMOL/L (ref 8–16)
APAP SERPL-MCNC: <0.1 UG/ML (ref 10–20)
AST SERPL-CCNC: 22 U/L (ref 10–40)
BACTERIA #/AREA URNS HPF: NEGATIVE /HPF
BARBITURATES UR QL SCN>200 NG/ML: NEGATIVE
BASOPHILS # BLD AUTO: 0.05 K/UL (ref 0–0.2)
BASOPHILS NFR BLD: 1.1 % (ref 0–1.9)
BENZODIAZ UR QL SCN>200 NG/ML: NEGATIVE
BILIRUB SERPL-MCNC: 0.4 MG/DL (ref 0.1–1)
BILIRUB UR QL STRIP: NEGATIVE
BUN SERPL-MCNC: 18 MG/DL (ref 6–20)
BZE UR QL SCN: NEGATIVE
CALCIUM SERPL-MCNC: 9.5 MG/DL (ref 8.7–10.5)
CANNABINOIDS UR QL SCN: NEGATIVE
CHLORIDE SERPL-SCNC: 100 MMOL/L (ref 95–110)
CLARITY UR: CLEAR
CO2 SERPL-SCNC: 19 MMOL/L (ref 23–29)
COLOR UR: YELLOW
CREAT SERPL-MCNC: 1.3 MG/DL (ref 0.5–1.4)
CREAT UR-MCNC: 64.8 MG/DL (ref 23–375)
DIFFERENTIAL METHOD: ABNORMAL
EOSINOPHIL # BLD AUTO: 0.1 K/UL (ref 0–0.5)
EOSINOPHIL NFR BLD: 1.7 % (ref 0–8)
ERYTHROCYTE [DISTWIDTH] IN BLOOD BY AUTOMATED COUNT: 15.6 % (ref 11.5–14.5)
EST. GFR  (NO RACE VARIABLE): >60 ML/MIN/1.73 M^2
ETHANOL SERPL-MCNC: 379 MG/DL
GLUCOSE SERPL-MCNC: 147 MG/DL (ref 70–110)
GLUCOSE UR QL STRIP: ABNORMAL
HCT VFR BLD AUTO: 43.2 % (ref 40–54)
HGB BLD-MCNC: 14.2 G/DL (ref 14–18)
HGB UR QL STRIP: ABNORMAL
HYALINE CASTS #/AREA URNS LPF: 3 /LPF
IMM GRANULOCYTES # BLD AUTO: 0.09 K/UL (ref 0–0.04)
IMM GRANULOCYTES NFR BLD AUTO: 1.9 % (ref 0–0.5)
KETONES UR QL STRIP: NEGATIVE
LEUKOCYTE ESTERASE UR QL STRIP: ABNORMAL
LYMPHOCYTES # BLD AUTO: 1.5 K/UL (ref 1–4.8)
LYMPHOCYTES NFR BLD: 31.6 % (ref 18–48)
MCH RBC QN AUTO: 27.4 PG (ref 27–31)
MCHC RBC AUTO-ENTMCNC: 32.9 G/DL (ref 32–36)
MCV RBC AUTO: 83 FL (ref 82–98)
MICROSCOPIC COMMENT: ABNORMAL
MONOCYTES # BLD AUTO: 0.2 K/UL (ref 0.3–1)
MONOCYTES NFR BLD: 4.8 % (ref 4–15)
NEUTROPHILS # BLD AUTO: 2.8 K/UL (ref 1.8–7.7)
NEUTROPHILS NFR BLD: 58.9 % (ref 38–73)
NITRITE UR QL STRIP: NEGATIVE
NRBC BLD-RTO: 0 /100 WBC
OPIATES UR QL SCN: NEGATIVE
PCP UR QL SCN>25 NG/ML: NEGATIVE
PH UR STRIP: 6 [PH] (ref 5–8)
PLATELET # BLD AUTO: 175 K/UL (ref 150–450)
PMV BLD AUTO: 9.9 FL (ref 9.2–12.9)
POTASSIUM SERPL-SCNC: 3.9 MMOL/L (ref 3.5–5.1)
PROT SERPL-MCNC: 8.5 G/DL (ref 6–8.4)
PROT UR QL STRIP: ABNORMAL
RBC # BLD AUTO: 5.18 M/UL (ref 4.6–6.2)
RBC #/AREA URNS HPF: 1 /HPF (ref 0–4)
SALICYLATES SERPL-MCNC: <1.5 MG/DL (ref 15–30)
SODIUM SERPL-SCNC: 135 MMOL/L (ref 136–145)
SP GR UR STRIP: 1.01 (ref 1–1.03)
SQUAMOUS #/AREA URNS HPF: 1 /HPF
TOXICOLOGY INFORMATION: NORMAL
TSH SERPL DL<=0.005 MIU/L-ACNC: 3.24 UIU/ML (ref 0.34–5.6)
URN SPEC COLLECT METH UR: ABNORMAL
UROBILINOGEN UR STRIP-ACNC: NEGATIVE EU/DL
WBC # BLD AUTO: 4.75 K/UL (ref 3.9–12.7)
WBC #/AREA URNS HPF: 9 /HPF (ref 0–5)

## 2023-11-03 PROCEDURE — 84443 ASSAY THYROID STIM HORMONE: CPT | Performed by: NURSE PRACTITIONER

## 2023-11-03 PROCEDURE — 63600175 PHARM REV CODE 636 W HCPCS: Performed by: EMERGENCY MEDICINE

## 2023-11-03 PROCEDURE — 80179 DRUG ASSAY SALICYLATE: CPT | Performed by: NURSE PRACTITIONER

## 2023-11-03 PROCEDURE — 80307 DRUG TEST PRSMV CHEM ANLYZR: CPT | Performed by: NURSE PRACTITIONER

## 2023-11-03 PROCEDURE — 81001 URINALYSIS AUTO W/SCOPE: CPT | Performed by: NURSE PRACTITIONER

## 2023-11-03 PROCEDURE — 96374 THER/PROPH/DIAG INJ IV PUSH: CPT

## 2023-11-03 PROCEDURE — 80143 DRUG ASSAY ACETAMINOPHEN: CPT | Performed by: NURSE PRACTITIONER

## 2023-11-03 PROCEDURE — 85025 COMPLETE CBC W/AUTO DIFF WBC: CPT | Performed by: NURSE PRACTITIONER

## 2023-11-03 PROCEDURE — 82077 ASSAY SPEC XCP UR&BREATH IA: CPT | Performed by: NURSE PRACTITIONER

## 2023-11-03 PROCEDURE — 99284 EMERGENCY DEPT VISIT MOD MDM: CPT | Mod: 25

## 2023-11-03 PROCEDURE — 80053 COMPREHEN METABOLIC PANEL: CPT | Performed by: NURSE PRACTITIONER

## 2023-11-03 RX ORDER — ONDANSETRON 2 MG/ML
4 INJECTION INTRAMUSCULAR; INTRAVENOUS
Status: COMPLETED | OUTPATIENT
Start: 2023-11-03 | End: 2023-11-03

## 2023-11-03 RX ADMIN — ONDANSETRON 4 MG: 2 INJECTION INTRAMUSCULAR; INTRAVENOUS at 05:11

## 2023-11-03 NOTE — FIRST PROVIDER EVALUATION
Medical screening examination initiated.  I have conducted a focused provider triage encounter, findings are as follows:    Brief history of present illness:  Drinks 20-30 shots a day and reports he is withdrawing and needs to be evaluated. Last drink one hour ago    There were no vitals filed for this visit.    Pertinent physical exam:  No seizure activity, normal gait    Brief workup plan:  Labs, monitoring    Preliminary workup initiated; this workup will be continued and followed by the physician or advanced practice provider that is assigned to the patient when roomed.

## 2023-11-04 NOTE — ED PROVIDER NOTES
Encounter Date: 11/3/2023       History     Chief Complaint   Patient presents with    Alcohol Problem     Pt drank 20 small bottles of vodka (airplane size bottles) today.  Pt normally drinks 30 a day     48-year-old male with history of ADHD, alcoholism, patient states he drinks a proximally 20-30 shot glasses daily.  Patient has been doing this for proximally 1 year.  History coronary artery disease, obesity, hypertension, hyperlipidemia.  Patient presents to the emergency department with complaint of wanting alcohol detox.  Patient states that he wants to stop drinking in his hoping to check herself in for alcoholism.  Patient states has been depressed however denies suicidal or homicidal ideation.  Patient has had 3 previous stays in alcohol detox programs.  Denies any seizures, no recent illness, denies any other constitutional symptoms.      Review of patient's allergies indicates:   Allergen Reactions    Inapsine [droperidol] Anaphylaxis     seizures    Effexor [venlafaxine]      Increased anxiety    Pcn [penicillins]     Bactrim [sulfamethoxazole-trimethoprim] Rash     Dry red rash all over when in the sun    Fluconazole Rash     Pruritis       Past Medical History:   Diagnosis Date    ADHD (attention deficit hyperactivity disorder)     Arthritis     Asthma     as child only    Back pain     Coronary artery disease     Degeneration of lumbar intervertebral disc 05/2016    Depression     Elevated PSA     Headache     Hyperlipidemia     Hypertension     Kidney damage     stage 3 ; d/t aleve abuse    Kidney stone     Myocardial infarction     Neck pain     Numbness and tingling in hands     Numbness and tingling of both legs     Seizures     from inapsine 2002    Sleep apnea     no cpap    Wears glasses     CONTACS     Past Surgical History:   Procedure Laterality Date    BACK SURGERY  2016    back surgery    BONE GRAFT N/A 11/5/2020    Procedure: BONE GRAFT;  Surgeon: Mateusz Blanco MD;  Location: [facility] OR;   Service: Orthopedics;  Laterality: N/A;    CARDIAC SURGERY  2020    CABG X 7    CORONARY ARTERY BYPASS GRAFT      7 vessels    HERNIA REPAIR Bilateral 2017    LITHOTRIPSY      LUMBAR LAMINECTOMY WITH FUSION Bilateral 2020    Procedure: LAMINECTOMY, SPINE, LUMBAR, WITH FUSION;  Surgeon: Mateusz Blanco MD;  Location: [facility] OR;  Service: Orthopedics;  Laterality: Bilateral;  MEDTRONIC  NTI  L-3    PILONIDAL CYST DRAINAGE      removal of abcess      scrotal    REMOVAL OF HARDWARE FROM SPINE Bilateral 2020    Procedure: REMOVAL, HARDWARE, SPINE;  Surgeon: Mateusz Blanco MD;  Location: [facility] OR;  Service: Orthopedics;  Laterality: Bilateral;  L 4-5    TYMPANOSTOMY TUBE PLACEMENT       Family History   Problem Relation Age of Onset    Heart disease Mother     Migraines Mother     Hypertension Mother     Early death Father     Heart disease Father     Diabetes Maternal Aunt     Diabetes Maternal Uncle     Diabetes Maternal Grandfather      Social History     Tobacco Use    Smoking status: Former     Current packs/day: 0.00     Types: Cigarettes     Quit date: 2016     Years since quittin.8     Passive exposure: Past    Smokeless tobacco: Former     Quit date: 2016    Tobacco comments:     occasional   Substance Use Topics    Alcohol use: Yes     Alcohol/week: 1.0 standard drink of alcohol     Types: 1 Glasses of wine per week     Comment: rare    Drug use: No     Review of Systems   Constitutional:  Negative for fever.   HENT:  Negative for sore throat.    Respiratory:  Negative for shortness of breath.    Cardiovascular:  Negative for chest pain.   Gastrointestinal:  Negative for nausea and vomiting.   Genitourinary:  Negative for dysuria.   Musculoskeletal:  Negative for back pain.   Skin:  Negative for rash.   Neurological:  Negative for weakness.   Hematological:  Does not bruise/bleed easily.   Psychiatric/Behavioral:  Positive for agitation. Negative for confusion, self-injury,  sleep disturbance and suicidal ideas. The patient is nervous/anxious.        Physical Exam     Initial Vitals [11/03/23 1652]   BP Pulse Resp Temp SpO2   (!) 174/105 (!) 117 20 98.9 °F (37.2 °C) (!) 94 %      MAP       --         Physical Exam    Nursing note and vitals reviewed.  Constitutional: He appears well-developed and well-nourished.   HENT:   Head: Normocephalic and atraumatic.   Nose: Nose normal.   Mouth/Throat: Oropharynx is clear and moist.   Eyes: Conjunctivae and EOM are normal. Pupils are equal, round, and reactive to light. No scleral icterus.   Neck: Neck supple.   Normal range of motion.  Cardiovascular:  Normal rate, regular rhythm, normal heart sounds and intact distal pulses.     Exam reveals no gallop and no friction rub.       No murmur heard.  Pulmonary/Chest: Breath sounds normal. No stridor. No respiratory distress.   Abdominal: Abdomen is soft. Bowel sounds are normal. He exhibits no mass. There is no abdominal tenderness. There is no rebound and no guarding.   Musculoskeletal:         General: No edema. Normal range of motion.      Cervical back: Normal range of motion and neck supple.     Lymphadenopathy:     He has no cervical adenopathy.   Neurological: He is alert and oriented to person, place, and time. He has normal strength and normal reflexes. No cranial nerve deficit or sensory deficit. GCS score is 15. GCS eye subscore is 4. GCS verbal subscore is 5. GCS motor subscore is 6.   Skin: Skin is warm and dry. Capillary refill takes less than 2 seconds. No rash noted.   Psychiatric: His behavior is normal.   Flat affect, poor judgment, patient seemingly anxious.  Denies homicidal, suicidal ideations.         ED Course   Procedures  Labs Reviewed   CBC W/ AUTO DIFFERENTIAL - Abnormal; Notable for the following components:       Result Value    RDW 15.6 (*)     Immature Granulocytes 1.9 (*)     Immature Grans (Abs) 0.09 (*)     Mono # 0.2 (*)     All other components within normal  limits   COMPREHENSIVE METABOLIC PANEL - Abnormal; Notable for the following components:    Sodium 135 (*)     CO2 19 (*)     Glucose 147 (*)     Total Protein 8.5 (*)     All other components within normal limits   URINALYSIS, REFLEX TO URINE CULTURE - Abnormal; Notable for the following components:    Protein, UA 1+ (*)     Glucose, UA 1+ (*)     Occult Blood UA 1+ (*)     Leukocytes, UA 1+ (*)     All other components within normal limits    Narrative:     Specimen Source->Urine   ALCOHOL,MEDICAL (ETHANOL) - Abnormal; Notable for the following components:    Alcohol, Serum 379 (*)     All other components within normal limits   ACETAMINOPHEN LEVEL - Abnormal; Notable for the following components:    Acetaminophen (Tylenol), Serum <0.1 (*)     All other components within normal limits   SALICYLATE LEVEL - Abnormal; Notable for the following components:    Salicylate Lvl <1.5 (*)     All other components within normal limits   URINALYSIS MICROSCOPIC - Abnormal; Notable for the following components:    WBC, UA 9 (*)     Hyaline Casts, UA 3 (*)     All other components within normal limits    Narrative:     Specimen Source->Urine   TSH   DRUG SCREEN PANEL, URINE EMERGENCY    Narrative:     Specimen Source->Urine          Imaging Results    None          Medications   sodium chloride 0.9% 1,000 mL with mvi, (ADULT) no.4 with vit K 3,300 unit- 150 mcg/10 mL 10 mL, thiamine 100 mg, folic acid 1 mg infusion ( Intravenous Not Given 11/3/23 2030)   ondansetron injection 4 mg (4 mg Intravenous Given 11/3/23 1742)     Medical Decision Making                        Medical Decision Making:   Initial Assessment:   48-year-old male with history of ADHD, alcoholism, patient states he drinks a proximally 20-30 shot glasses daily.  Patient has been doing this for proximally 1 year.  History coronary artery disease, obesity, hypertension, hyperlipidemia.  Patient presents to the emergency department with complaint of wanting  alcohol detox.  Patient states that he wants to stop drinking in his hoping to check herself in for alcoholism.  Patient states has been depressed however denies suicidal or homicidal ideation.  Patient has had 3 previous stays in alcohol detox programs.  Denies any seizures, no recent illness, denies any other constitutional symptoms.    Differential Diagnosis:   Alcoholism, polysubstance abuse, alcohol withdrawal, bipolar, comorbid  Clinical Tests:   Lab Tests: Ordered and Reviewed  Radiological Study: Ordered and Reviewed  ED Management:  Patient seen evaluated emergency department.  Currently at this time while patient was in process of being worked up for his alcoholism.  Patient decided that he did not want to go to alcohol detox program.  Patient alcohol level was found to be 349.  At this time patient clinically did not show visible signs of intoxication or impairment.  He was able to walk with a straight gait.  He was alert to person place time and event.  Patient denied being suicidal homicidal.  Out of abundance of precaution patient was released in correction care of his stepfather week currently lives with.  At this time they will decide to seek outpatient detox programs.  He is told to return to the emergency department if problems persist worsens additional concerns.  Patient was given extensive counseling on alcohol prevention and abstinence.  At the time of discharge patient did not exhibit any signs consistent with alcohol withdrawal symptoms.      Clinical Impression:   Final diagnoses:  [F10.10] Alcohol abuse (Primary)  [F10.20] Alcoholism        ED Disposition Condition    Discharge Stable          ED Prescriptions    None       Follow-up Information       Follow up With Specialties Details Why Contact Info    Riya Vidales NP Family Medicine Schedule an appointment as soon as possible for a visit  For recheck/continuing care [address] [facility]  [address] [place] [id number]  [phone]       [facility], Psychiatry Schedule an appointment as soon as possible for a visit in 3 days For recheck/continuing care [address] [place] [id number]  [phone]               Daniel Carter MD  11/03/23 4910

## 2023-11-08 ENCOUNTER — HOSPITAL ENCOUNTER (EMERGENCY)
Facility: HOSPITAL | Age: 48
Discharge: HOME | End: 2023-11-08
Attending: EMERGENCY MEDICINE
Payer: MEDICAID

## 2023-11-08 VITALS
DIASTOLIC BLOOD PRESSURE: 76 MMHG | SYSTOLIC BLOOD PRESSURE: 160 MMHG | BODY MASS INDEX: 37.67 KG/M2 | HEIGHT: 67 IN | OXYGEN SATURATION: 96 % | RESPIRATION RATE: 18 BRPM | WEIGHT: 240 LBS | TEMPERATURE: 99 F | HEART RATE: 108 BPM

## 2023-11-08 DIAGNOSIS — F10.90 ALCOHOL USE DISORDER: Primary | ICD-10-CM

## 2023-11-08 DIAGNOSIS — R11.10 VOMITING: ICD-10-CM

## 2023-11-08 LAB
ALBUMIN SERPL BCP-MCNC: 4.4 G/DL (ref 3.5–5.2)
ALLENS TEST: ABNORMAL
ALP SERPL-CCNC: 69 U/L (ref 55–135)
ALT SERPL W/O P-5'-P-CCNC: 29 U/L (ref 10–44)
ANION GAP SERPL CALC-SCNC: 19 MMOL/L (ref 8–16)
AST SERPL-CCNC: 35 U/L (ref 10–40)
BASOPHILS # BLD AUTO: 0.02 K/UL (ref 0–0.2)
BASOPHILS NFR BLD: 0.6 % (ref 0–1.9)
BILIRUB SERPL-MCNC: 0.5 MG/DL (ref 0.1–1)
BUN SERPL-MCNC: 18 MG/DL (ref 6–20)
CALCIUM SERPL-MCNC: 10 MG/DL (ref 8.7–10.5)
CHLORIDE SERPL-SCNC: 94 MMOL/L (ref 95–110)
CO2 SERPL-SCNC: 18 MMOL/L (ref 23–29)
CREAT SERPL-MCNC: 1.2 MG/DL (ref 0.5–1.4)
DELSYS: ABNORMAL
DIFFERENTIAL METHOD: ABNORMAL
EOSINOPHIL # BLD AUTO: 0 K/UL (ref 0–0.5)
EOSINOPHIL NFR BLD: 0.9 % (ref 0–8)
ERYTHROCYTE [DISTWIDTH] IN BLOOD BY AUTOMATED COUNT: 15.3 % (ref 11.5–14.5)
EST. GFR  (NO RACE VARIABLE): >60 ML/MIN/1.73 M^2
FLOW: 2
GLUCOSE SERPL-MCNC: 129 MG/DL (ref 70–110)
HCO3 UR-SCNC: 20.7 MMOL/L (ref 24–28)
HCT VFR BLD AUTO: 43.5 % (ref 40–54)
HGB BLD-MCNC: 14.5 G/DL (ref 14–18)
IMM GRANULOCYTES # BLD AUTO: 0.05 K/UL (ref 0–0.04)
IMM GRANULOCYTES NFR BLD AUTO: 1.5 % (ref 0–0.5)
LIPASE SERPL-CCNC: 66 U/L (ref 4–60)
LYMPHOCYTES # BLD AUTO: 0.8 K/UL (ref 1–4.8)
LYMPHOCYTES NFR BLD: 24.5 % (ref 18–48)
MCH RBC QN AUTO: 26.9 PG (ref 27–31)
MCHC RBC AUTO-ENTMCNC: 33.3 G/DL (ref 32–36)
MCV RBC AUTO: 81 FL (ref 82–98)
MODE: ABNORMAL
MONOCYTES # BLD AUTO: 0.2 K/UL (ref 0.3–1)
MONOCYTES NFR BLD: 7.1 % (ref 4–15)
NEUTROPHILS # BLD AUTO: 2.1 K/UL (ref 1.8–7.7)
NEUTROPHILS NFR BLD: 65.4 % (ref 38–73)
NRBC BLD-RTO: 1 /100 WBC
PCO2 BLDA: 35.7 MMHG (ref 35–45)
PH SMN: 7.37 [PH] (ref 7.35–7.45)
PLATELET # BLD AUTO: 149 K/UL (ref 150–450)
PMV BLD AUTO: 9.6 FL (ref 9.2–12.9)
PO2 BLDA: 50 MMHG (ref 40–60)
POC BE: -5 MMOL/L
POC SATURATED O2: 84 % (ref 95–100)
POC TCO2: 22 MMOL/L (ref 24–29)
POTASSIUM SERPL-SCNC: 3.9 MMOL/L (ref 3.5–5.1)
PROT SERPL-MCNC: 9.1 G/DL (ref 6–8.4)
RBC # BLD AUTO: 5.39 M/UL (ref 4.6–6.2)
SAMPLE: ABNORMAL
SITE: ABNORMAL
SODIUM SERPL-SCNC: 131 MMOL/L (ref 136–145)
TROPONIN I SERPL DL<=0.01 NG/ML-MCNC: 0.03 NG/ML (ref 0–0.03)
WBC # BLD AUTO: 3.23 K/UL (ref 3.9–12.7)

## 2023-11-08 PROCEDURE — 84484 ASSAY OF TROPONIN QUANT: CPT | Performed by: EMERGENCY MEDICINE

## 2023-11-08 PROCEDURE — 36415 COLL VENOUS BLD VENIPUNCTURE: CPT | Performed by: EMERGENCY MEDICINE

## 2023-11-08 PROCEDURE — 93010 ELECTROCARDIOGRAM REPORT: CPT | Mod: ,,, | Performed by: INTERNAL MEDICINE

## 2023-11-08 PROCEDURE — 83690 ASSAY OF LIPASE: CPT | Performed by: EMERGENCY MEDICINE

## 2023-11-08 PROCEDURE — 99284 EMERGENCY DEPT VISIT MOD MDM: CPT | Mod: 25

## 2023-11-08 PROCEDURE — 80053 COMPREHEN METABOLIC PANEL: CPT | Performed by: EMERGENCY MEDICINE

## 2023-11-08 PROCEDURE — 96375 TX/PRO/DX INJ NEW DRUG ADDON: CPT

## 2023-11-08 PROCEDURE — 96376 TX/PRO/DX INJ SAME DRUG ADON: CPT

## 2023-11-08 PROCEDURE — 93005 ELECTROCARDIOGRAM TRACING: CPT

## 2023-11-08 PROCEDURE — 94761 N-INVAS EAR/PLS OXIMETRY MLT: CPT

## 2023-11-08 PROCEDURE — 99900035 HC TECH TIME PER 15 MIN (STAT)

## 2023-11-08 PROCEDURE — 82803 BLOOD GASES ANY COMBINATION: CPT

## 2023-11-08 PROCEDURE — 96361 HYDRATE IV INFUSION ADD-ON: CPT

## 2023-11-08 PROCEDURE — 93010 EKG 12-LEAD: ICD-10-PCS | Mod: ,,, | Performed by: INTERNAL MEDICINE

## 2023-11-08 PROCEDURE — 96374 THER/PROPH/DIAG INJ IV PUSH: CPT

## 2023-11-08 PROCEDURE — 27000221 HC OXYGEN, UP TO 24 HOURS

## 2023-11-08 PROCEDURE — 63600175 PHARM REV CODE 636 W HCPCS: Performed by: EMERGENCY MEDICINE

## 2023-11-08 PROCEDURE — 25000003 PHARM REV CODE 250: Performed by: EMERGENCY MEDICINE

## 2023-11-08 PROCEDURE — 85025 COMPLETE CBC W/AUTO DIFF WBC: CPT | Performed by: EMERGENCY MEDICINE

## 2023-11-08 RX ORDER — LORAZEPAM 2 MG/ML
2 INJECTION INTRAMUSCULAR
Status: COMPLETED | OUTPATIENT
Start: 2023-11-08 | End: 2023-11-08

## 2023-11-08 RX ORDER — ONDANSETRON 4 MG/1
4 TABLET, ORALLY DISINTEGRATING ORAL EVERY 8 HOURS PRN
Qty: 12 TABLET | Refills: 0 | Status: SHIPPED | OUTPATIENT
Start: 2023-11-08 | End: 2024-04-03 | Stop reason: CLARIF

## 2023-11-08 RX ORDER — ONDANSETRON 2 MG/ML
4 INJECTION INTRAMUSCULAR; INTRAVENOUS
Status: COMPLETED | OUTPATIENT
Start: 2023-11-08 | End: 2023-11-08

## 2023-11-08 RX ADMIN — SODIUM CHLORIDE 1000 ML: 9 INJECTION, SOLUTION INTRAVENOUS at 09:11

## 2023-11-08 RX ADMIN — LORAZEPAM 2 MG: 2 INJECTION INTRAMUSCULAR; INTRAVENOUS at 11:11

## 2023-11-08 RX ADMIN — LORAZEPAM 2 MG: 2 INJECTION INTRAMUSCULAR; INTRAVENOUS at 09:11

## 2023-11-08 RX ADMIN — ONDANSETRON 4 MG: 2 INJECTION INTRAMUSCULAR; INTRAVENOUS at 09:11

## 2023-11-08 NOTE — ED NOTES
Upon discharge, patient is AAOx4, no cardiac or respiratory complications. Follow up care reviewed with patient and has been instructed to return to the ER if needed. Patient verbalized understanding and ambulated to the lobby without difficulty. SHAYAN KOHLER

## 2023-11-08 NOTE — ED PROVIDER NOTES
"Chief complaint:  Alcohol Problem (Reports in withdrawals -- drinks "20-30 shots a day" last EtOH consuption x 5 hrs ago)      HPI:  Lee Quintanilla is a 48 y.o. male with hx htn, CAD s/p CABG, AUD presenting with feeling of alcohol withdrawal.  Patient claims chronic, daily vomiting.  He endorses heavy alcohol use over the last several weeks after recent relapse.  He typically drinks during the day and the night, but has not drank alcohol since 4:00 a.m. this morning, approximately 5 hours ago due to nausea and vomiting.  Denies abdominal pain.  He did have an appointment for outpatient detox this morning, but came to the emergency department due to feeling poorly.    ROS: As per HPI and below:  No chest pain, dyspnea, hematemesis, diarrhea, blood in the stools, dark stools, fever, abdominal pain, focal numbness or weakness, visual change, difficulty walking.    Review of patient's allergies indicates:   Allergen Reactions    Inapsine [droperidol] Anaphylaxis     seizures    Effexor [venlafaxine]      Increased anxiety    Pcn [penicillins]     Bactrim [sulfamethoxazole-trimethoprim] Rash     Dry red rash all over when in the sun    Fluconazole Rash     Pruritis         Discharge Medication List as of 11/8/2023  1:04 PM        START taking these medications    Details   ondansetron (ZOFRAN-ODT) 4 MG TbDL Take 1 tablet (4 mg total) by mouth every 8 (eight) hours as needed (nausea, vomiting)., Starting Wed 11/8/2023, Print           CONTINUE these medications which have NOT CHANGED    Details   anastrozole (ARIMIDEX) 1 mg Tab Take by mouth., Starting Tue 2/28/2023, Historical Med      aspirin (ECOTRIN) 81 MG EC tablet Take 81 mg by mouth., Starting Tue 2/28/2023, Historical Med      ENTRESTO 24-26 mg per tablet Take 1 tablet by mouth., Starting Sat 4/1/2023, Historical Med      furosemide (LASIX) 40 MG tablet furosemide 40 mg tablet, Historical Med      hydrOXYzine pamoate (VISTARIL) 50 MG Cap Take 1 capsule " (50 mg total) by mouth every 8 (eight) hours as needed., Starting Thu 10/26/2023, Normal      JARDIANCE 10 mg tablet Take 10 mg by mouth., Starting Thu 6/8/2023, Historical Med      L-METHYLFOLATE FORTE 15-90.314 mg Cap Take 1 capsule by mouth., Starting Mon 3/27/2023, Historical Med      metoprolol tartrate (LOPRESSOR) 25 MG tablet Take 50 mg by mouth 2 (two) times daily., Starting Thu 1/19/2023, Historical Med      naltrexone microspheres (VIVITROL) 380 mg SSRR kit Inject 1 each (380 mg total) into the muscle every 30 days., Starting Wed 11/1/2023, Until Thu 10/31/2024, Normal      potassium chloride SA (K-DUR,KLOR-CON) 20 MEQ tablet potassium chloride ER 20 mEq tablet,extended release(part/cryst), Historical Med      rosuvastatin (CRESTOR) 40 MG Tab TAKE ONE TABLET BY MOUTH ONCE A DAY AS DIRECTED, Historical Med      sertraline (ZOLOFT) 100 MG tablet Take 1.5 tablets (150 mg total) by mouth once daily., Starting Thu 9/21/2023, Until Fri 9/20/2024, No Print      testosterone cypionate (DEPOTESTOTERONE CYPIONATE) 200 mg/mL injection testosterone cypionate 200 mg/mL intramuscular oil   INJECT 1 MILLILITER INTRAMUSCULAR EVERY WEEK, Historical Med      traZODone (DESYREL) 100 MG tablet Take 2 tablets (200 mg total) by mouth every evening., Starting Thu 9/21/2023, Normal             PMH:  As per HPI and below:  Past Medical History:   Diagnosis Date    ADHD (attention deficit hyperactivity disorder)     Arthritis     Asthma     as child only    Back pain     Coronary artery disease     Degeneration of lumbar intervertebral disc 05/2016    Depression     Elevated PSA     Headache     Hyperlipidemia     Hypertension     Kidney damage     stage 3 ; d/t aleve abuse    Kidney stone     Myocardial infarction     Neck pain     Numbness and tingling in hands     Numbness and tingling of both legs     Seizures     from inapsine 2002    Sleep apnea     no cpap    Wears glasses     CONTACS     Past Surgical History:   Procedure  Laterality Date    BACK SURGERY  2016    back surgery    BONE GRAFT N/A 2020    Procedure: BONE GRAFT;  Surgeon: Mateusz Blanco MD;  Location: [facility] OR;  Service: Orthopedics;  Laterality: N/A;    CARDIAC SURGERY  2020    CABG X 7    CORONARY ARTERY BYPASS GRAFT      7 vessels    HERNIA REPAIR Bilateral 2017    LITHOTRIPSY      LUMBAR LAMINECTOMY WITH FUSION Bilateral 2020    Procedure: LAMINECTOMY, SPINE, LUMBAR, WITH FUSION;  Surgeon: Mateusz Blanco MD;  Location: [facility] OR;  Service: Orthopedics;  Laterality: Bilateral;  MEDTRONIC  NTI  L-3    PILONIDAL CYST DRAINAGE      removal of abcess      scrotal    REMOVAL OF HARDWARE FROM SPINE Bilateral 2020    Procedure: REMOVAL, HARDWARE, SPINE;  Surgeon: Mateusz Blanco MD;  Location: [facility] OR;  Service: Orthopedics;  Laterality: Bilateral;  L 4-5    TYMPANOSTOMY TUBE PLACEMENT         Social History     Socioeconomic History    Marital status:    Occupational History    Occupation: disability   Tobacco Use    Smoking status: Former     Current packs/day: 0.00     Types: Cigarettes     Quit date: 2016     Years since quittin.8     Passive exposure: Past    Smokeless tobacco: Former     Quit date: 2016    Tobacco comments:     occasional   Substance and Sexual Activity    Alcohol use: Yes     Alcohol/week: 1.0 standard drink of alcohol     Types: 1 Glasses of wine per week     Comment: rare    Drug use: No    Sexual activity: Yes     Partners: Female     Social Determinants of Health     Financial Resource Strain: Unknown (2022)    Overall Financial Resource Strain (CARDIA)     Difficulty of Paying Living Expenses: Patient refused   Food Insecurity: Unknown (2022)    Hunger Vital Sign     Worried About Running Out of Food in the Last Year: Patient refused     Ran Out of Food in the Last Year: Patient refused   Transportation Needs: Unknown (2022)    PRAPARE - Transportation     Lack of Transportation  (Medical): Patient refused     Lack of Transportation (Non-Medical): Patient refused   Physical Activity: Unknown (4/25/2022)    Exercise Vital Sign     Days of Exercise per Week: Patient refused   Stress: Stress Concern Present (4/25/2022)    [employer] [place] [employer] - Occupational Stress Questionnaire     Feeling of Stress : Rather much   Social Connections: Unknown (4/25/2022)    Social Connection and Isolation Panel [NHANES]     Frequency of Communication with Friends and Family: Patient refused     Frequency of Social Gatherings with Friends and Family: Patient refused     Active Member of Clubs or Organizations: No     Attends Club or Organization Meetings: Never     Marital Status:    Housing Stability: Unknown (4/25/2022)    Housing Stability Vital Sign     Unable to Pay for Housing in the Last Year: Patient refused     Unstable Housing in the Last Year: Patient refused       Family History   Problem Relation Age of Onset    Heart disease Mother     Migraines Mother     Hypertension Mother     Early death Father     Heart disease Father     Diabetes Maternal Aunt     Diabetes Maternal Uncle     Diabetes Maternal Grandfather        Physical Exam:    Vitals:    11/08/23 1300   BP: (!) 160/76   Pulse: 108   Resp: 18   Temp: 98.8 °F (37.1 °C)     GENERAL:  No apparent distress.  Alert.  Smells of alcohol.  HEENT:  Moist mucous membranes.  Normocephalic and atraumatic.  Flushed.    NECK:  No swelling.  Midline trachea.   CARDIOVASCULAR:  Tachycardic with regular rhythm.  2+ radial pulses.  No murmur.  PULMONARY:  Lungs clear to auscultation bilaterally.  No wheezes, rales, or rhonci.    ABDOMEN:  Non-tender and non-distended.    EXTREMITIES:  Warm and well perfused.  Brisk capillary refill.  No resting or intention tremor.    NEUROLOGICAL:  Normal mental status.  Appropriate and conversant.    SKIN:  No rashes or ecchymoses.    BACK:  Atraumatic.  No CVA tenderness to palpation.      Labs  Reviewed   CBC W/ AUTO DIFFERENTIAL - Abnormal; Notable for the following components:       Result Value    WBC 3.23 (*)     MCV 81 (*)     MCH 26.9 (*)     RDW 15.3 (*)     Platelets 149 (*)     Immature Granulocytes 1.5 (*)     Immature Grans (Abs) 0.05 (*)     Lymph # 0.8 (*)     Mono # 0.2 (*)     nRBC 1 (*)     All other components within normal limits   COMPREHENSIVE METABOLIC PANEL - Abnormal; Notable for the following components:    Sodium 131 (*)     Chloride 94 (*)     CO2 18 (*)     Glucose 129 (*)     Total Protein 9.1 (*)     Anion Gap 19 (*)     All other components within normal limits   LIPASE - Abnormal; Notable for the following components:    Lipase 66 (*)     All other components within normal limits   TROPONIN I - Abnormal; Notable for the following components:    Troponin I 0.030 (*)     All other components within normal limits   ISTAT PROCEDURE - Abnormal; Notable for the following components:    POC HCO3 20.7 (*)     POC BE -5 (*)     POC TCO2 22 (*)     All other components within normal limits       Discharge Medication List as of 11/8/2023  1:04 PM        START taking these medications    Details   ondansetron (ZOFRAN-ODT) 4 MG TbDL Take 1 tablet (4 mg total) by mouth every 8 (eight) hours as needed (nausea, vomiting)., Starting Wed 11/8/2023, Print           CONTINUE these medications which have NOT CHANGED    Details   anastrozole (ARIMIDEX) 1 mg Tab Take by mouth., Starting Tue 2/28/2023, Historical Med      aspirin (ECOTRIN) 81 MG EC tablet Take 81 mg by mouth., Starting Tue 2/28/2023, Historical Med      ENTRESTO 24-26 mg per tablet Take 1 tablet by mouth., Starting Sat 4/1/2023, Historical Med      furosemide (LASIX) 40 MG tablet furosemide 40 mg tablet, Historical Med      hydrOXYzine pamoate (VISTARIL) 50 MG Cap Take 1 capsule (50 mg total) by mouth every 8 (eight) hours as needed., Starting Thu 10/26/2023, Normal      JARDIANCE 10 mg tablet Take 10 mg by mouth., Starting Thu  6/8/2023, Historical Med      L-METHYLFOLATE FORTE 15-90.314 mg Cap Take 1 capsule by mouth., Starting Mon 3/27/2023, Historical Med      metoprolol tartrate (LOPRESSOR) 25 MG tablet Take 50 mg by mouth 2 (two) times daily., Starting Thu 1/19/2023, Historical Med      naltrexone microspheres (VIVITROL) 380 mg SSRR kit Inject 1 each (380 mg total) into the muscle every 30 days., Starting Wed 11/1/2023, Until Thu 10/31/2024, Normal      potassium chloride SA (K-DUR,KLOR-CON) 20 MEQ tablet potassium chloride ER 20 mEq tablet,extended release(part/cryst), Historical Med      rosuvastatin (CRESTOR) 40 MG Tab TAKE ONE TABLET BY MOUTH ONCE A DAY AS DIRECTED, Historical Med      sertraline (ZOLOFT) 100 MG tablet Take 1.5 tablets (150 mg total) by mouth once daily., Starting Thu 9/21/2023, Until Fri 9/20/2024, No Print      testosterone cypionate (DEPOTESTOTERONE CYPIONATE) 200 mg/mL injection testosterone cypionate 200 mg/mL intramuscular oil   INJECT 1 MILLILITER INTRAMUSCULAR EVERY WEEK, Historical Med      traZODone (DESYREL) 100 MG tablet Take 2 tablets (200 mg total) by mouth every evening., Starting Thu 9/21/2023, Normal             Orders Placed This Encounter   Procedures    Complete Blood Count (CBC)    Comprehensive Metabolic Panel (CMP)    Lipase    Troponin I    Cardiac Monitoring - Adult    POCT VENOUS BLOOD GAS    Oxygen Continuous    Pulse Oximetry Continuous    EKG 12-lead    Insert Saline lock IV       Imaging Results    None         ED Course as of 11/08/23 1410   Wed Nov 08, 2023   1016 EKG:  Sinus tachycardia, rate of 107, normal intervals and axis.  Old T-wave inversions inferiorly and laterally, leads II, III, aVF as well as V5/V6 compared to prior 12/5/22.  No significant ST elevation or depression or sign of acute ischemia or infarction. (Independently interpreted by me)   [MR]   1302 Patient resting comfortably.  No tremulousness or tachycardia is present. [MR]      ED Course User Index  [MR]  Beto Roy MD       MDM:    48 y.o. male with history of alcohol dependence and alcoholism with nausea and vomiting.  He is tachycardic here.  IV fluids and antiemetic given for initial resuscitation.  Volume depletion secondary to GI losses and alcohol intake considered along with withdrawal.  IV lorazepam initially given to address potential withdrawal.  Mental status is normal with no tremulousness.  Low suspicion for severe withdrawal such as delirium tremens at this point.  There is no abdominal pain with a benign exam.  I have very low suspicion for emergent, life-threatening intra-abdominal process such as appendicitis, cholecystitis, abscess, perforated viscus.  I do not think further abdominal imaging is indicated.  Lab sent to assess for other end-organ dysfunction such as electrolyte derangement or renal insult.  Low suspicion for ACS with EKG ordered as well.    Symptoms improved with no signs of withdrawal.  I do not think patient requires admission for continued medication or IV fluids.  He is to closely follow up with PCP as well as outpatient detox and this was discussed in detail along with return precautions.  Mild decreased CO2 with anion gap likely secondary to recent alcohol intake.  I doubt toxic ingestion.  I doubt AKA.  He is not acidotic on VBG.  Minimal elevation of troponin similar to prior chronic mild elevation and I doubt ACS.  I do not think serial troponin or admission for cardiac monitoring is indicated.  I do not think emergent cardiac catheterization is indicated.  EKG is similar to prior.  Minor hyponatremia may be trended as an outpatient and I do not think requires further intervention.  Minimal elevation of lipase not indicative of pancreatitis at very low level of elevation.  I doubt pancreatitis at this time.  Return precautions reviewed.  Ondansetron as necessary prescribed for home.    Diagnoses:    1. Vomiting  2. AUD       Beto Roy MD  11/08/23  [id number]

## 2023-11-09 ENCOUNTER — TELEPHONE (OUTPATIENT)
Dept: FAMILY MEDICINE | Facility: CLINIC | Age: 48
End: 2023-11-09

## 2023-11-09 NOTE — TELEPHONE ENCOUNTER
Spoke with patient since he has not read the portal message. I explained that the withdrawals are very dangerous and not able to be monitored outpatient and per Riya, he needs inpatient facility to detox safely and then f/u with outpatient monitoring. Patient agrees, will call and see who has beds available.     [facility] - states they can get him in today in [place] if he calls now and answers the screening questions.    Spoke with patient and gave him the information. States he just needs to detox, I did explain that is what this program is for. To safely detox him and help get him set up with an IOP. Agrees to call. I offered to give the number and he states he already has it.

## 2023-11-10 ENCOUNTER — HOSPITAL ENCOUNTER (INPATIENT)
Facility: HOSPITAL | Age: 48
LOS: 4 days | Discharge: PSYCHIATRIC OR SUBSTANCE USE FACILITY | DRG: 897 | End: 2023-11-14
Attending: EMERGENCY MEDICINE | Admitting: INTERNAL MEDICINE
Payer: MEDICAID

## 2023-11-10 DIAGNOSIS — F10.939 ALCOHOL WITHDRAWAL: ICD-10-CM

## 2023-11-10 DIAGNOSIS — R11.10 VOMITING: ICD-10-CM

## 2023-11-10 PROBLEM — R74.8 ELEVATED LIPASE: Status: ACTIVE | Noted: 2017-12-11

## 2023-11-10 PROBLEM — E87.29 HIGH ANION GAP METABOLIC ACIDOSIS: Status: ACTIVE | Noted: 2023-11-10

## 2023-11-10 LAB
ALBUMIN SERPL BCP-MCNC: 4.5 G/DL (ref 3.5–5.2)
ALP SERPL-CCNC: 63 U/L (ref 55–135)
ALT SERPL W/O P-5'-P-CCNC: 43 U/L (ref 10–44)
ANION GAP SERPL CALC-SCNC: 20 MMOL/L (ref 8–16)
AST SERPL-CCNC: 48 U/L (ref 10–40)
BASOPHILS # BLD AUTO: 0.05 K/UL (ref 0–0.2)
BASOPHILS NFR BLD: 1.4 % (ref 0–1.9)
BILIRUB SERPL-MCNC: 0.4 MG/DL (ref 0.1–1)
BUN SERPL-MCNC: 18 MG/DL (ref 6–20)
CALCIUM SERPL-MCNC: 10.6 MG/DL (ref 8.7–10.5)
CHLORIDE SERPL-SCNC: 98 MMOL/L (ref 95–110)
CO2 SERPL-SCNC: 17 MMOL/L (ref 23–29)
CREAT SERPL-MCNC: 1.3 MG/DL (ref 0.5–1.4)
DIFFERENTIAL METHOD: ABNORMAL
EOSINOPHIL # BLD AUTO: 0.1 K/UL (ref 0–0.5)
EOSINOPHIL NFR BLD: 2.2 % (ref 0–8)
ERYTHROCYTE [DISTWIDTH] IN BLOOD BY AUTOMATED COUNT: 15.5 % (ref 11.5–14.5)
EST. GFR  (NO RACE VARIABLE): >60 ML/MIN/1.73 M^2
GLUCOSE SERPL-MCNC: 126 MG/DL (ref 70–110)
HCT VFR BLD AUTO: 44.2 % (ref 40–54)
HGB BLD-MCNC: 14.4 G/DL (ref 14–18)
IMM GRANULOCYTES # BLD AUTO: 0.17 K/UL (ref 0–0.04)
IMM GRANULOCYTES NFR BLD AUTO: 4.6 % (ref 0–0.5)
LIPASE SERPL-CCNC: 76 U/L (ref 4–60)
LYMPHOCYTES # BLD AUTO: 1 K/UL (ref 1–4.8)
LYMPHOCYTES NFR BLD: 27.2 % (ref 18–48)
MCH RBC QN AUTO: 26.7 PG (ref 27–31)
MCHC RBC AUTO-ENTMCNC: 32.6 G/DL (ref 32–36)
MCV RBC AUTO: 82 FL (ref 82–98)
MONOCYTES # BLD AUTO: 0.3 K/UL (ref 0.3–1)
MONOCYTES NFR BLD: 6.8 % (ref 4–15)
NEUTROPHILS # BLD AUTO: 2.1 K/UL (ref 1.8–7.7)
NEUTROPHILS NFR BLD: 57.8 % (ref 38–73)
NRBC BLD-RTO: 0 /100 WBC
PLATELET # BLD AUTO: 127 K/UL (ref 150–450)
PMV BLD AUTO: 9.6 FL (ref 9.2–12.9)
POTASSIUM SERPL-SCNC: 3.8 MMOL/L (ref 3.5–5.1)
PROT SERPL-MCNC: 9.1 G/DL (ref 6–8.4)
RBC # BLD AUTO: 5.39 M/UL (ref 4.6–6.2)
SODIUM SERPL-SCNC: 135 MMOL/L (ref 136–145)
WBC # BLD AUTO: 3.68 K/UL (ref 3.9–12.7)

## 2023-11-10 PROCEDURE — 93010 ELECTROCARDIOGRAM REPORT: CPT | Mod: ,,, | Performed by: GENERAL PRACTICE

## 2023-11-10 PROCEDURE — 25000003 PHARM REV CODE 250: Performed by: EMERGENCY MEDICINE

## 2023-11-10 PROCEDURE — A4216 STERILE WATER/SALINE, 10 ML: HCPCS | Performed by: INTERNAL MEDICINE

## 2023-11-10 PROCEDURE — 96365 THER/PROPH/DIAG IV INF INIT: CPT

## 2023-11-10 PROCEDURE — 25000003 PHARM REV CODE 250: Performed by: NURSE PRACTITIONER

## 2023-11-10 PROCEDURE — 94660 CPAP INITIATION&MGMT: CPT

## 2023-11-10 PROCEDURE — 25000003 PHARM REV CODE 250: Performed by: INTERNAL MEDICINE

## 2023-11-10 PROCEDURE — 96368 THER/DIAG CONCURRENT INF: CPT

## 2023-11-10 PROCEDURE — 83690 ASSAY OF LIPASE: CPT | Performed by: EMERGENCY MEDICINE

## 2023-11-10 PROCEDURE — 63600175 PHARM REV CODE 636 W HCPCS: Performed by: EMERGENCY MEDICINE

## 2023-11-10 PROCEDURE — 85025 COMPLETE CBC W/AUTO DIFF WBC: CPT | Performed by: EMERGENCY MEDICINE

## 2023-11-10 PROCEDURE — 96375 TX/PRO/DX INJ NEW DRUG ADDON: CPT

## 2023-11-10 PROCEDURE — 90833 PSYTX W PT W E/M 30 MIN: CPT | Mod: 95,,, | Performed by: PSYCHIATRY & NEUROLOGY

## 2023-11-10 PROCEDURE — 96361 HYDRATE IV INFUSION ADD-ON: CPT

## 2023-11-10 PROCEDURE — 63600175 PHARM REV CODE 636 W HCPCS: Performed by: INTERNAL MEDICINE

## 2023-11-10 PROCEDURE — 93010 EKG 12-LEAD: ICD-10-PCS | Mod: ,,, | Performed by: GENERAL PRACTICE

## 2023-11-10 PROCEDURE — 94761 N-INVAS EAR/PLS OXIMETRY MLT: CPT

## 2023-11-10 PROCEDURE — 80053 COMPREHEN METABOLIC PANEL: CPT | Performed by: EMERGENCY MEDICINE

## 2023-11-10 PROCEDURE — C9399 UNCLASSIFIED DRUGS OR BIOLOG: HCPCS | Performed by: EMERGENCY MEDICINE

## 2023-11-10 PROCEDURE — 96366 THER/PROPH/DIAG IV INF ADDON: CPT

## 2023-11-10 PROCEDURE — 96376 TX/PRO/DX INJ SAME DRUG ADON: CPT

## 2023-11-10 PROCEDURE — 99900035 HC TECH TIME PER 15 MIN (STAT)

## 2023-11-10 PROCEDURE — 99284 EMERGENCY DEPT VISIT MOD MDM: CPT | Mod: 25

## 2023-11-10 PROCEDURE — 99900031 HC PATIENT EDUCATION (STAT)

## 2023-11-10 PROCEDURE — 90833 PR PSYCHOTHERAPY W/PATIENT W/E&M, 30 MIN (ADD ON): ICD-10-PCS | Mod: 95,,, | Performed by: PSYCHIATRY & NEUROLOGY

## 2023-11-10 PROCEDURE — 20000000 HC ICU ROOM

## 2023-11-10 PROCEDURE — 99233 PR SUBSEQUENT HOSPITAL CARE,LEVL III: ICD-10-PCS | Mod: 95,,, | Performed by: PSYCHIATRY & NEUROLOGY

## 2023-11-10 PROCEDURE — 96367 TX/PROPH/DG ADDL SEQ IV INF: CPT

## 2023-11-10 PROCEDURE — 99233 SBSQ HOSP IP/OBS HIGH 50: CPT | Mod: 95,,, | Performed by: PSYCHIATRY & NEUROLOGY

## 2023-11-10 PROCEDURE — 93005 ELECTROCARDIOGRAM TRACING: CPT

## 2023-11-10 PROCEDURE — 27000221 HC OXYGEN, UP TO 24 HOURS

## 2023-11-10 RX ORDER — LORAZEPAM 1 MG/1
2 TABLET ORAL EVERY 4 HOURS PRN
Status: DISCONTINUED | OUTPATIENT
Start: 2023-11-10 | End: 2023-11-14 | Stop reason: HOSPADM

## 2023-11-10 RX ORDER — FAMOTIDINE 10 MG/ML
20 INJECTION INTRAVENOUS
Status: COMPLETED | OUTPATIENT
Start: 2023-11-10 | End: 2023-11-10

## 2023-11-10 RX ORDER — CALCIUM GLUCONATE 20 MG/ML
2 INJECTION, SOLUTION INTRAVENOUS
Status: DISCONTINUED | OUTPATIENT
Start: 2023-11-10 | End: 2023-11-14 | Stop reason: HOSPADM

## 2023-11-10 RX ORDER — SERTRALINE HYDROCHLORIDE 50 MG/1
150 TABLET, FILM COATED ORAL DAILY
Status: DISCONTINUED | OUTPATIENT
Start: 2023-11-11 | End: 2023-11-14 | Stop reason: HOSPADM

## 2023-11-10 RX ORDER — ONDANSETRON 2 MG/ML
4 INJECTION INTRAMUSCULAR; INTRAVENOUS EVERY 8 HOURS PRN
Status: DISCONTINUED | OUTPATIENT
Start: 2023-11-10 | End: 2023-11-14 | Stop reason: HOSPADM

## 2023-11-10 RX ORDER — ONDANSETRON 2 MG/ML
8 INJECTION INTRAMUSCULAR; INTRAVENOUS
Status: COMPLETED | OUTPATIENT
Start: 2023-11-10 | End: 2023-11-10

## 2023-11-10 RX ORDER — MAGNESIUM SULFATE HEPTAHYDRATE 40 MG/ML
4 INJECTION, SOLUTION INTRAVENOUS
Status: DISCONTINUED | OUTPATIENT
Start: 2023-11-10 | End: 2023-11-14 | Stop reason: HOSPADM

## 2023-11-10 RX ORDER — DEXMEDETOMIDINE HYDROCHLORIDE 4 UG/ML
0-1.4 INJECTION INTRAVENOUS CONTINUOUS
Status: DISCONTINUED | OUTPATIENT
Start: 2023-11-10 | End: 2023-11-10

## 2023-11-10 RX ORDER — LORAZEPAM 2 MG/ML
2 INJECTION INTRAMUSCULAR
Status: COMPLETED | OUTPATIENT
Start: 2023-11-10 | End: 2023-11-10

## 2023-11-10 RX ORDER — MAGNESIUM SULFATE HEPTAHYDRATE 40 MG/ML
2 INJECTION, SOLUTION INTRAVENOUS
Status: DISCONTINUED | OUTPATIENT
Start: 2023-11-10 | End: 2023-11-14 | Stop reason: HOSPADM

## 2023-11-10 RX ORDER — CALCIUM GLUCONATE 20 MG/ML
1 INJECTION, SOLUTION INTRAVENOUS
Status: DISCONTINUED | OUTPATIENT
Start: 2023-11-10 | End: 2023-11-14 | Stop reason: HOSPADM

## 2023-11-10 RX ORDER — MUPIROCIN 20 MG/G
OINTMENT TOPICAL 2 TIMES DAILY
Status: DISCONTINUED | OUTPATIENT
Start: 2023-11-10 | End: 2023-11-10

## 2023-11-10 RX ORDER — DEXTROSE MONOHYDRATE AND SODIUM CHLORIDE 5; .9 G/100ML; G/100ML
1000 INJECTION, SOLUTION INTRAVENOUS
Status: COMPLETED | OUTPATIENT
Start: 2023-11-10 | End: 2023-11-10

## 2023-11-10 RX ORDER — EMPAGLIFLOZIN 25 MG/1
25 TABLET, FILM COATED ORAL DAILY
COMMUNITY
Start: 2023-10-06 | End: 2024-04-03 | Stop reason: CLARIF

## 2023-11-10 RX ORDER — CALCIUM GLUCONATE 20 MG/ML
3 INJECTION, SOLUTION INTRAVENOUS
Status: DISCONTINUED | OUTPATIENT
Start: 2023-11-10 | End: 2023-11-14 | Stop reason: HOSPADM

## 2023-11-10 RX ORDER — POTASSIUM CHLORIDE 7.45 MG/ML
80 INJECTION INTRAVENOUS
Status: DISCONTINUED | OUTPATIENT
Start: 2023-11-10 | End: 2023-11-14 | Stop reason: HOSPADM

## 2023-11-10 RX ORDER — POTASSIUM CHLORIDE 7.45 MG/ML
40 INJECTION INTRAVENOUS
Status: DISCONTINUED | OUTPATIENT
Start: 2023-11-10 | End: 2023-11-14 | Stop reason: HOSPADM

## 2023-11-10 RX ORDER — ASPIRIN 81 MG/1
81 TABLET ORAL DAILY
Status: DISCONTINUED | OUTPATIENT
Start: 2023-11-11 | End: 2023-11-14 | Stop reason: HOSPADM

## 2023-11-10 RX ORDER — POTASSIUM CHLORIDE 7.45 MG/ML
60 INJECTION INTRAVENOUS
Status: DISCONTINUED | OUTPATIENT
Start: 2023-11-10 | End: 2023-11-14 | Stop reason: HOSPADM

## 2023-11-10 RX ORDER — SODIUM CHLORIDE 9 MG/ML
INJECTION, SOLUTION INTRAVENOUS CONTINUOUS
Status: DISCONTINUED | OUTPATIENT
Start: 2023-11-10 | End: 2023-11-14 | Stop reason: HOSPADM

## 2023-11-10 RX ORDER — TALC
6 POWDER (GRAM) TOPICAL NIGHTLY PRN
Status: DISCONTINUED | OUTPATIENT
Start: 2023-11-10 | End: 2023-11-14 | Stop reason: HOSPADM

## 2023-11-10 RX ORDER — CHLORDIAZEPOXIDE HYDROCHLORIDE 25 MG/1
50 CAPSULE, GELATIN COATED ORAL EVERY 6 HOURS
Status: COMPLETED | OUTPATIENT
Start: 2023-11-11 | End: 2023-11-11

## 2023-11-10 RX ORDER — ENOXAPARIN SODIUM 100 MG/ML
40 INJECTION SUBCUTANEOUS EVERY 24 HOURS
Status: DISCONTINUED | OUTPATIENT
Start: 2023-11-10 | End: 2023-11-14 | Stop reason: HOSPADM

## 2023-11-10 RX ORDER — DEXMEDETOMIDINE HYDROCHLORIDE 4 UG/ML
0-1.4 INJECTION, SOLUTION INTRAVENOUS CONTINUOUS
Status: DISCONTINUED | OUTPATIENT
Start: 2023-11-10 | End: 2023-11-14 | Stop reason: HOSPADM

## 2023-11-10 RX ORDER — DIAZEPAM 10 MG/2ML
5 INJECTION INTRAMUSCULAR
Status: COMPLETED | OUTPATIENT
Start: 2023-11-10 | End: 2023-11-10

## 2023-11-10 RX ORDER — SODIUM CHLORIDE 0.9 % (FLUSH) 0.9 %
10 SYRINGE (ML) INJECTION EVERY 12 HOURS
Status: DISCONTINUED | OUTPATIENT
Start: 2023-11-10 | End: 2023-11-14 | Stop reason: HOSPADM

## 2023-11-10 RX ORDER — THIAMINE HCL 100 MG
100 TABLET ORAL DAILY
Status: DISCONTINUED | OUTPATIENT
Start: 2023-11-11 | End: 2023-11-14 | Stop reason: HOSPADM

## 2023-11-10 RX ORDER — METOPROLOL TARTRATE 50 MG/1
50 TABLET ORAL 2 TIMES DAILY
Status: DISCONTINUED | OUTPATIENT
Start: 2023-11-10 | End: 2023-11-14 | Stop reason: HOSPADM

## 2023-11-10 RX ORDER — PROCHLORPERAZINE EDISYLATE 5 MG/ML
5 INJECTION INTRAMUSCULAR; INTRAVENOUS EVERY 6 HOURS PRN
Status: DISCONTINUED | OUTPATIENT
Start: 2023-11-10 | End: 2023-11-14 | Stop reason: HOSPADM

## 2023-11-10 RX ADMIN — LORAZEPAM 2 MG: 2 INJECTION INTRAMUSCULAR; INTRAVENOUS at 10:11

## 2023-11-10 RX ADMIN — DEXTROSE AND SODIUM CHLORIDE 1000 ML: 5; 900 INJECTION, SOLUTION INTRAVENOUS at 11:11

## 2023-11-10 RX ADMIN — LORAZEPAM 2 MG: 1 TABLET ORAL at 10:11

## 2023-11-10 RX ADMIN — SODIUM CHLORIDE: 9 INJECTION, SOLUTION INTRAVENOUS at 10:11

## 2023-11-10 RX ADMIN — DEXMEDETOMIDINE HYDROCHLORIDE 0.3 MCG/KG/HR: 4 INJECTION INTRAVENOUS at 01:11

## 2023-11-10 RX ADMIN — FOLIC ACID: 5 INJECTION, SOLUTION INTRAMUSCULAR; INTRAVENOUS; SUBCUTANEOUS at 06:11

## 2023-11-10 RX ADMIN — ENOXAPARIN SODIUM 40 MG: 40 INJECTION SUBCUTANEOUS at 10:11

## 2023-11-10 RX ADMIN — LORAZEPAM 2 MG: 2 INJECTION INTRAMUSCULAR; INTRAVENOUS at 12:11

## 2023-11-10 RX ADMIN — METOPROLOL TARTRATE 50 MG: 50 TABLET, FILM COATED ORAL at 10:11

## 2023-11-10 RX ADMIN — DIAZEPAM 5 MG: 5 INJECTION, SOLUTION INTRAMUSCULAR; INTRAVENOUS at 06:11

## 2023-11-10 RX ADMIN — PROMETHAZINE HYDROCHLORIDE 25 MG: 25 INJECTION INTRAMUSCULAR; INTRAVENOUS at 07:11

## 2023-11-10 RX ADMIN — SODIUM CHLORIDE 10 ML: 9 INJECTION INTRAMUSCULAR; INTRAVENOUS; SUBCUTANEOUS at 09:11

## 2023-11-10 RX ADMIN — DEXMEDETOMIDINE HYDROCHLORIDE 0.2 MCG/KG/HR: 4 INJECTION INTRAVENOUS at 01:11

## 2023-11-10 RX ADMIN — DEXMEDETOMIDINE HYDROCHLORIDE 0.7 MCG/KG/HR: 400 INJECTION INTRAVENOUS at 10:11

## 2023-11-10 RX ADMIN — ONDANSETRON 8 MG: 2 INJECTION INTRAMUSCULAR; INTRAVENOUS at 06:11

## 2023-11-10 RX ADMIN — FAMOTIDINE 20 MG: 10 INJECTION, SOLUTION INTRAVENOUS at 06:11

## 2023-11-10 RX ADMIN — SODIUM CHLORIDE: 9 INJECTION, SOLUTION INTRAVENOUS at 01:11

## 2023-11-10 RX ADMIN — LORAZEPAM 2 MG: 2 INJECTION INTRAMUSCULAR; INTRAVENOUS at 07:11

## 2023-11-10 RX ADMIN — DEXMEDETOMIDINE HYDROCHLORIDE 0.7 MCG/KG/HR: 4 INJECTION INTRAVENOUS at 07:11

## 2023-11-10 RX ADMIN — SACUBITRIL AND VALSARTAN 1 TABLET: 24; 26 TABLET, FILM COATED ORAL at 10:11

## 2023-11-10 NOTE — PLAN OF CARE
[facility] - ED  Initial Discharge Assessment       Primary Care Provider: Riya Vidales NP    Admission Diagnosis: Alcohol withdrawal [F10.939]  Vomiting [R11.10]    Admission Date: 11/10/2023  Expected Discharge Date:     Transition of Care Barriers: None    Payor: MEDICAID / Plan: [employer] / Product Type: Managed Medicaid /     Extended Emergency Contact Information  Primary Emergency Contact: Leti Rollins  Address: [address] [place] [address] [place] [address] [place]  Mobile Phone: [phone]  Relation: Mother  Preferred language: English   needed? No    Discharge Plan A: Rehab (drug)  Discharge Plan B: Home, Home with family      [facility] - [place] - [address]  [address]  [place] [address]  Phone: [phone] Fax: [phone]    SW met with patient at bedside to complete discharge planning assessment.  Patient alert and oriented xs 4.  Patient verified all demographic information on facesheet is correct.  Patient verified PCP is ALBERTO Vidales.  Patient verified [place] [employer] [place]  Patient with NO home health but has listed DME.  Patient with NO POA or Living Will.  Patient not on dialysis or medication coumadin.  Patient with no 30 day admission.  Patient with no financial issues at this time.  Patient family will provide transportation upon discharge from facility.  Patient independent with ADLs, live with parents, drives self.      Initial Assessment (most recent)       Adult Discharge Assessment - 11/10/23 1428          Discharge Assessment    Assessment Type Discharge Planning Assessment     Confirmed/corrected address, phone number and insurance Yes     Confirmed Demographics Correct on Facesheet     Source of Information patient     Communicated FANTA with patient/caregiver Date not available/Unable to determine     People in Home parent(s)     Facility Arrived From: home     Do you expect  to return to your current living situation? Yes     Do you have help at home or someone to help you manage your care at home? Yes     Who are your caregiver(s) and their phone number(s)? parents     Prior to hospitilization cognitive status: Alert/Oriented     Current cognitive status: Alert/Oriented     Equipment Currently Used at Home CPAP     Readmission within 30 days? No     Patient currently being followed by outpatient case management? No     Do you currently have service(s) that help you manage your care at home? No     Do you take prescription medications? Yes     Do you have prescription coverage? Yes     Do you have any problems affording any of your prescribed medications? No     Is the patient taking medications as prescribed? yes     Who is going to help you get home at discharge? parents     How do you get to doctors appointments? car, drives self     Are you on dialysis? No     Do you take coumadin? No     DME Needed Upon Discharge  none     Discharge Plan discussed with: Patient     Transition of Care Barriers None     Discharge Plan A Rehab   drug    Discharge Plan B Home;Home with family

## 2023-11-10 NOTE — Clinical Note
Diagnosis: Alcohol withdrawal [291.81.ICD-9-CM]   Admitting Provider:: CAROLYNE CORTEZ [[id number]]   Admit to which facility:: [facility] [[id number]]   Reason for IP Medical Treatment  (Clinical interventions that can only be accomplished in the IP setting? ) :: iv benzos/IVF   I certify that Inpatient services for greater than or equal to 2 midnights are medically necessary:: Yes   Plans for Post-Acute care--if anticipated (pick the single best option):: A. No post acute care anticipated at this time

## 2023-11-10 NOTE — ED PROVIDER NOTES
Encounter Date: 11/10/2023       History     Chief Complaint   Patient presents with    Alcohol Problem     Feels like going thru withdrawals. Last alcohol 3 hours ago. Typically drinks 20 to 30 shots of vodka a day     Patient is a 48-year-old male with a past medical history of alcoholism coronary artery disease prior myocardial infarction hypertension hyperlipidemia who presents the emergency room for evaluation of nausea and vomiting as well as restlessness.  Patient was seen here in the emergency room proximally 2 days ago and was treated for mild alcohol withdrawal.  He was discharged home with Zofran.  Patient states he has an appointment for outpatient detox in 3 days.  Due to the nausea and vomiting at home and slight tremulousness he drank again yesterday evening.  He is had some nausea and vomiting this morning.  He is some mild midepigastric abdominal pain but it does not radiate to his back.  He denies any black or bloody stools.  He has no chest pain or shortness breath but he is not feeling suicidal or homicidal.      Review of patient's allergies indicates:   Allergen Reactions    Inapsine [droperidol] Anaphylaxis     seizures    Effexor [venlafaxine]      Increased anxiety    Pcn [penicillins]     Bactrim [sulfamethoxazole-trimethoprim] Rash     Dry red rash all over when in the sun    Fluconazole Rash     Pruritis       Past Medical History:   Diagnosis Date    ADHD (attention deficit hyperactivity disorder)     Arthritis     Asthma     as child only    Back pain     Coronary artery disease     Degeneration of lumbar intervertebral disc 05/2016    Depression     Elevated PSA     Headache     Hyperlipidemia     Hypertension     Kidney damage     stage 3 ; d/t aleve abuse    Kidney stone     Myocardial infarction     Neck pain     Numbness and tingling in hands     Numbness and tingling of both legs     Seizures     from inapsine 2002    Sleep apnea     no cpap    Wears glasses     CONTACS     Past  Surgical History:   Procedure Laterality Date    BACK SURGERY  2016    back surgery    BONE GRAFT N/A 2020    Procedure: BONE GRAFT;  Surgeon: Mateusz Blanco MD;  Location: [facility] OR;  Service: Orthopedics;  Laterality: N/A;    CARDIAC SURGERY  2020    CABG X 7    CORONARY ARTERY BYPASS GRAFT      7 vessels    HERNIA REPAIR Bilateral 2017    LITHOTRIPSY      LUMBAR LAMINECTOMY WITH FUSION Bilateral 2020    Procedure: LAMINECTOMY, SPINE, LUMBAR, WITH FUSION;  Surgeon: Mateusz Blanco MD;  Location: [facility] OR;  Service: Orthopedics;  Laterality: Bilateral;  MEDTRONIC  NTI  L-3    PILONIDAL CYST DRAINAGE      removal of abcess      scrotal    REMOVAL OF HARDWARE FROM SPINE Bilateral 2020    Procedure: REMOVAL, HARDWARE, SPINE;  Surgeon: Mateusz Blanco MD;  Location: [facility] OR;  Service: Orthopedics;  Laterality: Bilateral;  L 4-5    TYMPANOSTOMY TUBE PLACEMENT       Family History   Problem Relation Age of Onset    Heart disease Mother     Migraines Mother     Hypertension Mother     Early death Father     Heart disease Father     Diabetes Maternal Aunt     Diabetes Maternal Uncle     Diabetes Maternal Grandfather      Social History     Tobacco Use    Smoking status: Former     Current packs/day: 0.00     Types: Cigarettes     Quit date: 2016     Years since quittin.8     Passive exposure: Past    Smokeless tobacco: Former     Quit date: 2016    Tobacco comments:     occasional   Substance Use Topics    Alcohol use: Yes     Alcohol/week: 1.0 standard drink of alcohol     Types: 1 Glasses of wine per week     Comment: 20 - 30 shots vodka per day or 1-2 1/5ths per day for 2 years (started )    Drug use: No     Review of Systems   Constitutional:  Negative for diaphoresis and fever.   HENT:  Negative for ear pain, rhinorrhea and sore throat.    Eyes:  Negative for pain and visual disturbance.   Respiratory:  Negative for cough and shortness of breath.    Cardiovascular:  Negative  for chest pain.   Gastrointestinal:  Positive for abdominal pain, nausea and vomiting. Negative for diarrhea.   Genitourinary:  Negative for difficulty urinating.   Musculoskeletal:  Negative for arthralgias.   Skin:  Negative for rash.   Neurological:  Positive for headaches. Negative for weakness, light-headedness and numbness.   Psychiatric/Behavioral:  Negative for agitation, confusion, dysphoric mood, self-injury and suicidal ideas. The patient is nervous/anxious.    All other systems reviewed and are negative.      Physical Exam     Initial Vitals [11/10/23 0514]   BP Pulse Resp Temp SpO2   (!) 178/103 (!) 116 16 98.5 °F (36.9 °C) 97 %      MAP       --         Physical Exam    Nursing note and vitals reviewed.  Constitutional: He appears well-developed and well-nourished. He is not diaphoretic. No distress.   HENT:   Head: Normocephalic and atraumatic.   Mouth/Throat: Oropharynx is clear and moist.   No tongue fasciculations   Eyes: Conjunctivae and EOM are normal. Pupils are equal, round, and reactive to light.   Neck: Neck supple. No thyromegaly present.   Normal range of motion.  Cardiovascular:  Regular rhythm, normal heart sounds and intact distal pulses.           Tachycardic   Pulmonary/Chest: Breath sounds normal. He has no wheezes. He has no rhonchi. He has no rales.   Abdominal: Abdomen is soft. Bowel sounds are normal. There is abdominal tenderness (mild tenderness midepigastric region).   Musculoskeletal:         General: Normal range of motion.      Cervical back: Normal range of motion and neck supple.     Neurological: He is alert and oriented to person, place, and time. He has normal strength. No sensory deficit.   Slightly tremulous in the hands   Skin: Skin is warm and dry.   Psychiatric: He has a normal mood and affect.         ED Course   Procedures  Labs Reviewed   CBC W/ AUTO DIFFERENTIAL - Abnormal; Notable for the following components:       Result Value    WBC 3.68 (*)     MCH 26.7  (*)     RDW 15.5 (*)     Platelets 127 (*)     Immature Granulocytes 4.6 (*)     Immature Grans (Abs) 0.17 (*)     All other components within normal limits   COMPREHENSIVE METABOLIC PANEL - Abnormal; Notable for the following components:    Sodium 135 (*)     CO2 17 (*)     Glucose 126 (*)     Calcium 10.6 (*)     Total Protein 9.1 (*)     AST 48 (*)     Anion Gap 20 (*)     All other components within normal limits   LIPASE - Abnormal; Notable for the following components:    Lipase 76 (*)     All other components within normal limits          Imaging Results    None          Medications   0.9%  NaCl infusion ( Intravenous Verify Only 11/13/23 0630)   metoprolol tartrate (LOPRESSOR) tablet 50 mg (50 mg Oral Given 11/12/23 2016)   sacubitriL-valsartan 24-26 mg per tablet 1 tablet (1 tablet Oral Given 11/12/23 2016)   aspirin EC tablet 81 mg (81 mg Oral Given 11/12/23 0844)   sertraline tablet 150 mg (150 mg Oral Given 11/12/23 0844)   sodium chloride 0.9% flush 10 mL (10 mLs Intravenous Not Given 11/12/23 0900)   melatonin tablet 6 mg (has no administration in time range)   enoxaparin injection 40 mg (40 mg Subcutaneous Given 11/12/23 8114)   ondansetron injection 4 mg (4 mg Intravenous Given 11/13/23 0520)   prochlorperazine injection Soln 5 mg (5 mg Intravenous Given 11/12/23 1058)   potassium chloride 10 mEq in 100 mL IVPB (has no administration in time range)     And   potassium chloride 10 mEq in 100 mL IVPB (has no administration in time range)     And   potassium chloride 10 mEq in 100 mL IVPB (has no administration in time range)   magnesium sulfate 2g in water 50mL IVPB (premix) (has no administration in time range)   magnesium sulfate 2g in water 50mL IVPB (premix) (has no administration in time range)   calcium gluconate 1 g in NS IVPB (premixed) (has no administration in time range)   calcium gluconate 1 g in NS IVPB (premixed) (has no administration in time range)   calcium gluconate 1 g in NS IVPB  (premixed) (has no administration in time range)   sodium phosphate 15 mmol in dextrose 5 % (D5W) 250 mL IVPB (has no administration in time range)   sodium phosphate 20.01 mmol in dextrose 5 % (D5W) 250 mL IVPB (has no administration in time range)   sodium phosphate 30 mmol in dextrose 5 % (D5W) 250 mL IVPB (has no administration in time range)   multivitamin tablet (1 tablet Oral Given 11/12/23 0844)   thiamine tablet 100 mg (100 mg Oral Given 11/12/23 0844)   LORazepam tablet 2 mg (2 mg Oral Given 11/13/23 0520)   dexmedetomidine (PRECEDEX) 400mcg/100mL 0.9% NaCL infusion (0.4 mcg/kg/hr × 111.2 kg Intravenous Rate/Dose Change 11/13/23 0719)   magnesium oxide tablet 800 mg (800 mg Oral Given 11/12/23 1111)   magnesium oxide tablet 800 mg (has no administration in time range)   diazePAM injection 5 mg (5 mg Intravenous Given 11/10/23 0610)   ondansetron injection 8 mg (8 mg Intravenous Given 11/10/23 0611)   famotidine (PF) injection 20 mg (20 mg Intravenous Given 11/10/23 0612)   sodium chloride 0.9% 1,000 mL with mvi, (ADULT) no.4 with vit K 3,300 unit- 150 mcg/10 mL 10 mL, thiamine 100 mg, folic acid 1 mg infusion ( Intravenous Stopped 11/10/23 0835)   promethazine (PHENERGAN) 25 mg in dextrose 5 % (D5W) 50 mL IVPB (0 mg Intravenous Stopped 11/10/23 0809)   LORazepam injection 2 mg (2 mg Intravenous Given 11/10/23 0749)   LORazepam injection 2 mg (2 mg Intravenous Given 11/10/23 1029)   dextrose 5 % and 0.9 % NaCl infusion (0 mLs Intravenous Stopped 11/10/23 1545)   LORazepam injection 2 mg (2 mg Intravenous Given 11/10/23 1236)   chlordiazepoxide capsule 50 mg (50 mg Oral Given 11/11/23 1818)     Medical Decision Making  Amount and/or Complexity of Data Reviewed  Labs: ordered.    Risk  Prescription drug management.  Decision regarding hospitalization.               ED Course as of 11/13/23 0912   Fri Nov 10, 2023   0654 Awaiting chemistry.  Patient feels better after Valium and Zofran.  He might be able to be  treated as an outpatient with a benzodiazepine taper until he can began outpatient treatment on Monday. [JS]   9982 Assumed care from Dr. Cedeno.  Patient states he drinks about 30-40 shots of vodka a day which is approximately 2 fifths.  He is supposed to going to rehab on Monday and has been trying to detox himself by drinking less alcohol but began to develop vomiting.  He states he is not eaten in approximately a week.  Currently on assessment he is tachycardic and tremulous.  He is getting fluids/banana bag in his gotten Valium.  I will give him Ativan and Phenergan since Zofran did not improve his emesis and patient is actively vomiting. [AS]   0759 EKG interpreted by myself shows sinus tachycardia with occasional PVCs.  120 beats per minute with possible inferior infarct cited on or before October 27, 2019. [AS]   1046 Patient is awake, alert, tremulous and tachycardic. [AS]      ED Course User Index  [AS] Js Lui MD  [JS] Adrian Cedeno MD                    Clinical Impression:   Final diagnoses:  [R11.10] Vomiting  [F10.939] Alcohol withdrawal        ED Disposition Condition    Admit                 Adrian Cedeno MD  11/13/23 0940

## 2023-11-10 NOTE — CONSULTS
[facility]  Psychiatry  Telepsychiatry Consult Note        Patient agreeable to consultation via telepsychiatry.    Tele-Consultation from Psychiatry started: 11/10/2023 at 3:55 PM  The chief complaint leading to psychiatric consultation is: Alcohol Abuse / Dependence with Complicated Withdrawal   This consultation was requested by Shwetha Joyner FNP, the primary team provider.  The location of the consulting psychiatrist is [place], [place].    The patient location is  [facility] EMERGENCY DEPARTMENT (awaiting inpatient / ICU bed)  The patient arrived at the ED at: on 11/10/2023     Also present with the patient at the time of the consultation: nurse / tech     Patient Identification:   Lee Quintanilla is a 48 y.o. male.    Patient information was obtained from patient, past medical records, and primary team.    Inpatient consult to Telemedicine - Psych  Consult performed by: Rusty Aguiar MD  Consult ordered by: Shwetha Joyner FNP        Consult Start Time: 11/10/2023 15:55 CST  Consult End Time: 11/10/2023 17:30 CST        HISTORY    Per ED MD:  Patient presents with    Alcohol Problem       Feels like going thru withdrawals. Last alcohol 3 hours ago. Typically drinks 20 to 30 shots of vodka a day   Patient is a 48-year-old male with a past medical history of alcoholism coronary artery disease prior myocardial infarction hypertension hyperlipidemia who presents the emergency room for evaluation of nausea and vomiting as well as restlessness.  Patient was seen here in the emergency room proximally 2 days ago and was treated for mild alcohol withdrawal.  He was discharged home with Zofran.  Patient states he has an appointment for outpatient detox in 3 days.  Due to the nausea and vomiting at home and slight tremulousness he drank again yesterday evening.  He is had some nausea and vomiting this morning.  He is some mild midepigastric abdominal pain but it does not radiate to his back.   He denies any black or bloody stools.  He has no chest pain or shortness breath but he is not feeling suicidal or homicidal.      Chief Complaint / Reason for Psychiatry Consult: Alcohol Abuse / Dependence with Complicated Withdrawal       HPI:   Lee Quintanilla is a 48 y.o. male with a past medical history as noted above/below and a past psychiatric history of Alcohol Use Disorder (with hx of alcohol withdrawal seizures), depression, anxiety, and insomnia, currently in the ED as noted above (awaiting hospital admission for alcohol withdrawal treatment).  Psychiatry was originally consulted as noted above.  The patient was seen and examined.  The chart was reviewed.  On examination today, the patient was alert and oriented to person, place, city, state, month, year, and situation.  He was CAM-ICU negative for delirium.  He endorses drinking 1 to 2 fifths of vodka daily for the past couple years.  He endorses a hx of alcohol withdrawal seizures.  He endorses trying to quit a few days ago but began having tremors, nausea, and vomiting, resulting in returning to heavy consumption.  He states that his last alcohol intake was about 3-4 hours prior to ED arrival.  He endorses intermittent issues with depression and anxiety as noted below in the detailed psych ROS that he states is complicated by his alcohol abuse / dependence.  He endorses taking Zoloft 100 mg PO daily for several years with some improvement in mood and anxiety.  He also endorses intermittent issues with insomnia for several years.  He endorses a fair appetite.  He denies any current or prior active or passive SI / HI.  He denies any current or prior AH, VH, TH, delusions, paranoia, or DIGFAST (david) s/s.  He endorses tending to ADLs at home.  He denies any adverse effects to his current medication regimen.  Regarding current medical/physical complaints, he endorses fatigue, myalgias, nausea, and BUE tremors.  He denies any other medical  complaints at this time.  NAD was observed during the examination.  He is motivated toward total sobriety via the [facility] alcohol rehab program following acute treatment of his current withdrawal.  Psychotherapy was implemented as noted below with a focus on improving depression, anxiety, sleep, and alcohol cessation / total sobriety.  See detailed psych ROS below.  See A/P below.        Psychiatric Review Of Systems - Currently, the patient is endorsing and/or denying the following:  (patient's endorsements are BOLDED below; if not BOLDED, then patient denied):    Endorses intermittent Symptoms of Depression: diminished mood, low motivation, loss of interest/anhedonia, irritability, diminished energy, change in sleep, change in appetite, diminished concentration or cognition or indecisiveness, PMA/R, excessive guilt or hopelessness or worthlessness, suicidal ideations    Endorses intermittent trouble with Sleep: initiation, maintenance, early morning awakening with inability to return to sleep    Denies Suicidal/Homicidal ideations: active/passive ideations, organized plans, future intentions    Denies Symptoms of psychosis: hallucinations, delusions, disorganized thinking, disorganized behavior or abnormal motor behavior, or negative symptoms (diminshed emotional expression, avolition, anhedonia, alogia, asociality)     Denies Symptoms of david or hypomania: elevated, expansive, or irritable mood with increased energy or activity; with inflated self-esteem or grandiosity, decreased need for sleep, increased rate of speech, FOI or racing thoughts, distractibility, increased goal directed activity or PMA, risky/disinhibited behavior    Endorses intermittent Symptoms of Anxiety: excessive anxiety/worry/fear, more days than not, about numerous issues, difficult to control, with restlessness, fatigue, poor concentration, irritability, muscle tension, sleep disturbance; causes functionally impairing distress     Denies  Symptoms of Panic Disorder: recurrent panic attacks, precipitated or un-precipitated, source of worry and/or behavioral changes secondary; with or without agoraphobia    Denies Symptoms of PTSD: h/o trauma; re-experiencing/intrusive symptoms, avoidant behavior, negative alterations in cognition or mood, or hyperarousal symptoms; with or without dissociative symptoms     Denies Symptoms of OCD: obsessions or compulsions     Denies Symptoms of Eating Disorders: anorexia, bulimia or binging    Endorses Substance Use (alcohol): intoxication, withdrawal, tolerance, used in larger amounts or duration than intended, unsuccessful attempts to limit or quit, increased time engaging in or seeking out, cravings or strong desire to use, failure to fulfill obligations, negative consequences in social/interpersonal/occupational,/recreational areas, use in dangerous situations, medical or psychological consequences       [facility] Toolkit ASQ Suicide Screening Tool:  In the past few weeks, have you wished you were dead? Denies   In the past few weeks, have you felt that you or your family would be better off if you were dead? Denies  In the past week, have you been having thoughts about killing yourself? Denies  Have you ever tried to kill yourself? Denies  Are you having thoughts of killing yourself right now? Denies       PSYCHOTHERAPY ADD-ON +[id number]   30 (16-37*) minutes    Time: 18 minutes  Participants: Met with patient    Therapeutic Intervention Type: behavior modifying psychotherapy, supportive psychotherapy  Why chosen therapy is appropriate versus another modality: relevant to diagnosis, patient responds to this modality, evidence based practice    Target symptoms: depression, anxiety, insomnia, and alcohol abuse / dependence   Primary focus: improving depression, anxiety, sleep, and alcohol cessation / total sobriety   Psychotherapeutic techniques: supportive and psychodynamic techniques; psycho-education; deep breathing  exercises; motivational interviewing; CBT; problem solving techniques and managing life & health stressors    Outcome monitoring methods: self-report, observation    Patient's response to intervention:  The patient's response to intervention is accepting.    Progress toward goals:  The patient's progress toward goals is fair / limited.      ROS:  General ROS: negative for - chills, fever or night sweats; positive for fatigue  Ophthalmic ROS: negative for - blurry vision, double vision or eye pain  ENT ROS: negative for - sinus pain, headaches, sore throat or visual changes  Allergy and Immunology ROS: negative for - hives, itchy/watery eyes or nasal congestion  Hematological and Lymphatic ROS: negative for - bleeding problems, bruising, jaundice or pallor  Endocrine ROS: negative for - galactorrhea, hot flashes, mood swings, palpitations or temperature intolerance  Respiratory ROS: negative for - cough, hemoptysis, shortness of breath, tachypnea or wheezing  Cardiovascular ROS: negative for - chest pain, dyspnea on exertion, loss of consciousness, palpitations, rapid heart rate or shortness of breath  Gastrointestinal ROS: negative for - appetite loss, abdominal pain, blood in stools, change in bowel habits, constipation or diarrhea; positive for nausea  Genito-Urinary ROS: negative for - incontinence, nocturia or pelvic pain  Musculoskeletal ROS: negative for - joint stiffness, joint swelling, joint pain; positive for diffuse myalgias   Neurological ROS: negative for - behavioral changes, confusion, dizziness, memory loss, numbness/tingling or seizures; positive for BUE tremors   Dermatological ROS: negative for dry skin, hair changes, pruritus or rash  Psychiatric ROS: see detailed psychiatric ROS above in history section       Past Psychiatric History:  Previous Medication Trials: Zoloft, Cymbalta, Vistaril, and Trazodone      Previous Psychiatric Hospitalizations: denies  Previous Suicide Attempts: denies    History of Violence: denies  Current / Recent Outpatient Psychiatrist: denies     Social History:  Marital Status:  / going through a divorce   Children: 0   Employment Status/Info: currently unemployed  Education: some college  Special Ed: denies   : denies   Caodaism: denies   Housing Status: lives in a home with his mother in [place], [place]   Hobbies/Leisure time: watching TV and reading   History of phys/sexual abuse: denies   Access to gun: denies    Family Psychiatric History: older brother with depression and anxiety ; multiple family members with addiction     Substance Abuse History:  Recreational Drugs: intermittent cannabis abuse ; denies recently ; denies other   Use of Alcohol: has been drinking 1 to 2 fifths of vodka daily for the past couple years ; endorses a hx of alcohol withdrawal seizures   Rehab History: 3 prior residential rehabs   Tobacco Use: (counseled on total cessation)   Social History     Tobacco Use   Smoking Status Former    Current packs/day: 0.00    Types: Cigarettes    Quit date: 2016    Years since quittin.8    Passive exposure: Past   Smokeless Tobacco Former    Quit date: 2016   Tobacco Comments    occasional   Use of Caffeine: denies   Use of OTC: denies  Legal consequences of chemical use: denies     Legal History:  Past Charges/Incarcerations: denies   Pending charges: denies     Psychosocial Stressors: marital, occupational, and alcohol abuse / dependence.   Functioning Relationships: good support system in his mother.   Strengths AND Liabilities: Strength: Patient accepts guidance/feedback, Strength: Patient is motivated for change., Strength: Patient has positive support network., Liability: Patient has poor health., Liability: Patient lacks coping skills.      PAST MEDICAL & SURGICAL HISTORY   Past Medical History:   Diagnosis Date    ADHD (attention deficit hyperactivity disorder)     Arthritis     Asthma     as child only    Back pain      Coronary artery disease     Degeneration of lumbar intervertebral disc 05/2016    Depression     Elevated PSA     Headache     Hyperlipidemia     Hypertension     Kidney damage     stage 3 ; d/t aleve abuse    Kidney stone     Myocardial infarction     Neck pain     Numbness and tingling in hands     Numbness and tingling of both legs     Seizures     from inapsine 2002    Sleep apnea     no cpap    Wears glasses     CONTACS     Past Surgical History:   Procedure Laterality Date    BACK SURGERY  2016    back surgery    BONE GRAFT N/A 11/5/2020    Procedure: BONE GRAFT;  Surgeon: Mateusz Blanco MD;  Location: [facility] OR;  Service: Orthopedics;  Laterality: N/A;    CARDIAC SURGERY  01/06/2020    CABG X 7    CORONARY ARTERY BYPASS GRAFT      7 vessels    HERNIA REPAIR Bilateral 11/22/2017    LITHOTRIPSY      LUMBAR LAMINECTOMY WITH FUSION Bilateral 11/5/2020    Procedure: LAMINECTOMY, SPINE, LUMBAR, WITH FUSION;  Surgeon: Mateusz Blanco MD;  Location: [facility] OR;  Service: Orthopedics;  Laterality: Bilateral;  MEDTRONIC  NTI  L-3    PILONIDAL CYST DRAINAGE      removal of abcess      scrotal    REMOVAL OF HARDWARE FROM SPINE Bilateral 11/5/2020    Procedure: REMOVAL, HARDWARE, SPINE;  Surgeon: Mateusz Blanco MD;  Location: [facility] OR;  Service: Orthopedics;  Laterality: Bilateral;  L 4-5    TYMPANOSTOMY TUBE PLACEMENT         NEUROLOGIC HISTORY  Seizures: yes, hx of alcohol withdrawal seizures    Head trauma: yes, related to falls    CVA: denies       FAMILY HISTORY   Family History   Problem Relation Age of Onset    Heart disease Mother     Migraines Mother     Hypertension Mother     Early death Father     Heart disease Father     Diabetes Maternal Aunt     Diabetes Maternal Uncle     Diabetes Maternal Grandfather        ALLERGIES   Review of patient's allergies indicates:   Allergen Reactions    Inapsine [droperidol] Anaphylaxis     seizures    Effexor [venlafaxine]      Increased anxiety    Pcn [penicillins]      Bactrim [sulfamethoxazole-trimethoprim] Rash     Dry red rash all over when in the sun    Fluconazole Rash     Pruritis         CURRENT MEDICATION REGIMEN   Home Meds:   Prior to Admission medications    Medication Sig Start Date End Date Taking? Authorizing Provider   anastrozole (ARIMIDEX) 1 mg Tab Take 1 mg by mouth every 7 days. 2/28/23   Provider, Historical   aspirin (ECOTRIN) 81 MG EC tablet Take 81 mg by mouth once daily. 2/28/23   Provider, Historical   ENTRESTO 24-26 mg per tablet Take 1 tablet by mouth. 4/1/23   Provider, Historical   furosemide (LASIX) 40 MG tablet Take 40 mg by mouth once daily.    Provider, Historical   hydrOXYzine pamoate (VISTARIL) 50 MG Cap Take 1 capsule (50 mg total) by mouth every 8 (eight) hours as needed. 10/26/23   Vincent Mansfield MD   JARDIANCE 25 mg tablet Take 25 mg by mouth Daily. 10/6/23   Provider, Historical   L-METHYLFOLATE FORTE 15-90.314 mg Cap Take 1 capsule by mouth Daily. 3/27/23   Provider, Historical   metoprolol tartrate (LOPRESSOR) 25 MG tablet Take 50 mg by mouth 2 (two) times daily. 1/19/23   Provider, Historical   naltrexone microspheres (VIVITROL) 380 mg SSRR kit Inject 1 each (380 mg total) into the muscle every 30 days. 11/1/23 10/31/24  Riya Vidales NP   ondansetron (ZOFRAN-ODT) 4 MG TbDL Take 1 tablet (4 mg total) by mouth every 8 (eight) hours as needed (nausea, vomiting). 11/8/23   Beto Roy MD   potassium chloride SA (K-DUR,KLOR-CON) 20 MEQ tablet Take 20 mEq by mouth once daily.    Provider, Historical   rosuvastatin (CRESTOR) 40 MG Tab Take 40 mg by mouth once daily. 10/12/21   Provider, Historical   sertraline (ZOLOFT) 100 MG tablet Take 1.5 tablets (150 mg total) by mouth once daily. 9/21/23 9/20/24  Riya Vidales NP   testosterone cypionate (DEPOTESTOTERONE CYPIONATE) 200 mg/mL injection Inject 200 mg into the muscle every 7 days.    Provider, Historical   traZODone (DESYREL) 100 MG tablet Take 2 tablets (200 mg total) by  mouth every evening. 9/21/23   Riya Vidales, ALBERTO   JARDIANCE 10 mg tablet Take 10 mg by mouth. 6/8/23 11/10/23  Provider, Historical       OTC Meds: denies     Scheduled Meds:    PRN Meds:    Psychotherapeutics (From admission, onward)      Start     Stop Route Frequency Ordered    11/10/23 1315  dexmedetomidine (PRECEDEX) 400mcg/100mL dextrose 5% infusion  (Critical Care Medications)        Question Answer Comment   Begin at (in mcg/kg/hr): 0.2    Titrate by (in mcg/kg/hr): 0.1    Titrate interval: (in minutes) 30    To maintain a RASS goal of: RASS (-2) Light sedation - Briefly awakens with eye contact to voice (<10 seconds)    Maximum dose of (in mcg/kg/hr): 1.4        -- IV Continuous 11/10/23 1301            LABORATORY DATA   Recent Results (from the past 72 hour(s))   Complete Blood Count (CBC)    Collection Time: 11/08/23  9:45 AM   Result Value Ref Range    WBC 3.23 (L) 3.90 - 12.70 K/uL    RBC 5.39 4.60 - 6.20 M/uL    Hemoglobin 14.5 14.0 - 18.0 g/dL    Hematocrit 43.5 40.0 - 54.0 %    MCV 81 (L) 82 - 98 fL    MCH 26.9 (L) 27.0 - 31.0 pg    MCHC 33.3 32.0 - 36.0 g/dL    RDW 15.3 (H) 11.5 - 14.5 %    Platelets 149 (L) 150 - 450 K/uL    MPV 9.6 9.2 - 12.9 fL    Immature Granulocytes 1.5 (H) 0.0 - 0.5 %    Gran # (ANC) 2.1 1.8 - 7.7 K/uL    Immature Grans (Abs) 0.05 (H) 0.00 - 0.04 K/uL    Lymph # 0.8 (L) 1.0 - 4.8 K/uL    Mono # 0.2 (L) 0.3 - 1.0 K/uL    Eos # 0.0 0.0 - 0.5 K/uL    Baso # 0.02 0.00 - 0.20 K/uL    nRBC 1 (A) 0 /100 WBC    Gran % 65.4 38.0 - 73.0 %    Lymph % 24.5 18.0 - 48.0 %    Mono % 7.1 4.0 - 15.0 %    Eosinophil % 0.9 0.0 - 8.0 %    Basophil % 0.6 0.0 - 1.9 %    Differential Method Automated    Comprehensive Metabolic Panel (CMP)    Collection Time: 11/08/23  9:45 AM   Result Value Ref Range    Sodium 131 (L) 136 - 145 mmol/L    Potassium 3.9 3.5 - 5.1 mmol/L    Chloride 94 (L) 95 - 110 mmol/L    CO2 18 (L) 23 - 29 mmol/L    Glucose 129 (H) 70 - 110 mg/dL    BUN 18 6 - 20 mg/dL     Creatinine 1.2 0.5 - 1.4 mg/dL    Calcium 10.0 8.7 - 10.5 mg/dL    Total Protein 9.1 (H) 6.0 - 8.4 g/dL    Albumin 4.4 3.5 - 5.2 g/dL    Total Bilirubin 0.5 0.1 - 1.0 mg/dL    Alkaline Phosphatase 69 55 - 135 U/L    AST 35 10 - 40 U/L    ALT 29 10 - 44 U/L    eGFR >60 >60 mL/min/1.73 m^2    Anion Gap 19 (H) 8 - 16 mmol/L   Lipase    Collection Time: 11/08/23  9:45 AM   Result Value Ref Range    Lipase 66 (H) 4 - 60 U/L   Troponin I    Collection Time: 11/08/23  9:45 AM   Result Value Ref Range    Troponin I 0.030 (H) 0.000 - 0.026 ng/mL   ISTAT PROCEDURE    Collection Time: 11/08/23 12:37 PM   Result Value Ref Range    POC PH 7.371 7.35 - 7.45    POC PCO2 35.7 35 - 45 mmHg    POC PO2 50 40 - 60 mmHg    POC HCO3 20.7 (L) 24 - 28 mmol/L    POC BE -5 (L) -2 to 2 mmol/L    POC SATURATED O2 84 95 - 100 %    POC TCO2 22 (L) 24 - 29 mmol/L    Sample VENOUS     Site Other     Allens Test N/A     DelSys Nasal Can     Mode SPONT     Flow 2    CBC Auto Differential    Collection Time: 11/10/23  6:30 AM   Result Value Ref Range    WBC 3.68 (L) 3.90 - 12.70 K/uL    RBC 5.39 4.60 - 6.20 M/uL    Hemoglobin 14.4 14.0 - 18.0 g/dL    Hematocrit 44.2 40.0 - 54.0 %    MCV 82 82 - 98 fL    MCH 26.7 (L) 27.0 - 31.0 pg    MCHC 32.6 32.0 - 36.0 g/dL    RDW 15.5 (H) 11.5 - 14.5 %    Platelets 127 (L) 150 - 450 K/uL    MPV 9.6 9.2 - 12.9 fL    Immature Granulocytes 4.6 (H) 0.0 - 0.5 %    Gran # (ANC) 2.1 1.8 - 7.7 K/uL    Immature Grans (Abs) 0.17 (H) 0.00 - 0.04 K/uL    Lymph # 1.0 1.0 - 4.8 K/uL    Mono # 0.3 0.3 - 1.0 K/uL    Eos # 0.1 0.0 - 0.5 K/uL    Baso # 0.05 0.00 - 0.20 K/uL    nRBC 0 0 /100 WBC    Gran % 57.8 38.0 - 73.0 %    Lymph % 27.2 18.0 - 48.0 %    Mono % 6.8 4.0 - 15.0 %    Eosinophil % 2.2 0.0 - 8.0 %    Basophil % 1.4 0.0 - 1.9 %    Differential Method Automated    Comprehensive Metabolic Panel    Collection Time: 11/10/23  6:30 AM   Result Value Ref Range    Sodium 135 (L) 136 - 145 mmol/L    Potassium 3.8 3.5 - 5.1  "mmol/L    Chloride 98 95 - 110 mmol/L    CO2 17 (L) 23 - 29 mmol/L    Glucose 126 (H) 70 - 110 mg/dL    BUN 18 6 - 20 mg/dL    Creatinine 1.3 0.5 - 1.4 mg/dL    Calcium 10.6 (H) 8.7 - 10.5 mg/dL    Total Protein 9.1 (H) 6.0 - 8.4 g/dL    Albumin 4.5 3.5 - 5.2 g/dL    Total Bilirubin 0.4 0.1 - 1.0 mg/dL    Alkaline Phosphatase 63 55 - 135 U/L    AST 48 (H) 10 - 40 U/L    ALT 43 10 - 44 U/L    eGFR >60 >60 mL/min/1.73 m^2    Anion Gap 20 (H) 8 - 16 mmol/L   Lipase    Collection Time: 11/10/23  6:30 AM   Result Value Ref Range    Lipase 76 (H) 4 - 60 U/L      No results found for: "PHENYTOIN", "PHENOBARB", "VALPROATE", "CBMZ"      EXAMINATION    VITALS   Vitals:    11/10/23 1230 11/10/23 1232 11/10/23 1502 11/10/23 1601   BP:  (!) 140/84 (!) 153/67 137/80   BP Location:       Patient Position:       Pulse: (!) 124 (!) 118 110 95   Resp:   18    Temp:       TempSrc:       SpO2: 95% (!) 94% 96%    Weight:            CONSTITUTIONAL  General Appearance: NAD, unremarkable, age appropriate, lying in bed, overweight, anxious     MUSCULOSKELETAL  Muscle Strength and Tone: WNL    Abnormal Involuntary Movements: BUE tremors observed   Gait and Station: Attempted but unable to assess due to medical acuity     PSYCHIATRIC   Behavior/Cooperation:  cooperative, psychomotor agitation, eye contact normal  Speech:  normal tone, normal pitch, normal volume, slowed  Language: grossly intact, able to name, able to repeat with spontaneous speech  Mood:  "OK ; a little shame"  Affect:  congruent with mood and constricted  Associations: intact; no THOMPSON  Thought Process: Linear and Goal-Directed toward total sobriety   Thought Content: denies SI, HI, AH, VH, TH, delusions, or paranoia (no RIS observed)   Sensorium:  Awake  Alert and Oriented: to person, place, city, state, month, year, and situation   Memory: 3/3 immediate, 1/3 at 5 minutes    Recent: Intact; able to report recent events   Remote: Limited / Intact; Named 3/4 past presidents "   Attention/concentration: Limited / Intact.  Able to spell [id number] & d-l-r-[id number]-w.   Similarities: Intact (difference between apple and orange?)  Abstract reasoning: Intact  Fund of Knowledge: Named 3/4 past presidents  Insight: Intact  Judgment: Intact    CAM ICU Delirium Assessment - NEGATIVE    Is the patient aware of the biomedical complications associated with substance abuse and mental illness? yes        MEDICAL DECISION MAKING    ASSESSMENT        Alcohol Withdrawal Syndrome with Complication   Alcohol Use Disorder, Severe, Dependence   Unspecified Depressive Disorder   Unspecified Anxiety Disorder  Unspecified Insomnia   (Rule out SIMD)       RECOMMENDATIONS       - Patient does not currently meet PEC criteria due to not being an imminent threat to self/others and not being gravely disabled 2/2 mental illness at this time.        - Continue patient's longstanding outpatient regimen of Zoloft at 100 mg PO daily for depression & anxiety (patient has been on this for several years ; monitor for worsening hyponatremia) (discussed risks/benefits/alt vs no treatment with patient).    Alcohol Withdrawal Protocol: (would begin scheduled Valium taper at 10 mg PO TID) (discussed risks/benefits/alt vs no treatment with patient)  - Frequent assessment with the CIWA-Ar is the standard of care and the hallmark of clinical practice  - Clinical [place] Withdrawal Assessment of Alcohol Scale (CIWA-Ar)  If CIWA is <8 and vital signs are stable, no PRN medication is required. Repeat vital signs q4 and the CIWA q8.  If CIWA is between 8 and 15, give Lorazepam 2 mg PO/IM q4 PRN and complete vital signs q2 and the CIWA q4.  If CIWA is ?15 or DBP ?110 mmHg, give Lorazepam 2 mg PO/IM q1 until patient has a CIWA of <15 or DBP <110 mmHg. CIWA and vital signs checked q1 until patients CIWA is <15 and DBP <110 mmHg  Call MD for for SBP >180, DBP >120, or HR >110 or if patient requires ?6 mg of lorazepam in 3 hours.  - Vitals q4hr;  Ativan 2mg PO/IM q4hr PRN, for CIWA >8 or if 2 criteria are met: Systolic BP>160, Diastolic BP >100 or HR >110  - Vitamin supplementation - Thiamine 100 mg daily, Folate 1 mg daily, & MVI daily     - After significant counseling, education, and motivational interviewing, he is interested in the [facility] alcohol rehab program for s/p treatment of his acute alcohol withdrawal.  Please have Hospital CM/SW assist patient with this for s/p discharge (patient awaiting admission to [facility] [facility] in [facility]).     - Psychotherapy was performed with patient as noted above with a focus on improving depression, anxiety, sleep, and alcohol cessation / total sobriety.     - Patient was instructed to call 911 and/or [phone] and return to the nearest ED if he begins feeling suicidal, homicidal, or gravely disabled (for s/p inpatient treatment of acute alcohol withdrawal).       - Thank you for this consult         Total time spent with patient face-to-face and/or managing/coordinating patient's care today (excluding the time spent on psychotherapy): 77 minutes   Time spent on psychotherapy today (as noted above): 18 minutes   Total time for encounter today including psychotherapy: 95 minutes      More than 50% of the time was spent counseling/coordinating care.     Consulting clinician was informed of the encounter and consult note.     Consultation ended: 11/10/2023 at 5:30 PM       STAFF:  Rusty Aguiar MD  [facility]   11/10/2023

## 2023-11-11 LAB
ALBUMIN SERPL BCP-MCNC: 3.8 G/DL (ref 3.5–5.2)
ALP SERPL-CCNC: 44 U/L (ref 55–135)
ALT SERPL W/O P-5'-P-CCNC: 27 U/L (ref 10–44)
AMPHET+METHAMPHET UR QL: NEGATIVE
ANION GAP SERPL CALC-SCNC: 6 MMOL/L (ref 8–16)
AST SERPL-CCNC: 29 U/L (ref 10–40)
BARBITURATES UR QL SCN>200 NG/ML: NEGATIVE
BASOPHILS # BLD AUTO: 0.03 K/UL (ref 0–0.2)
BASOPHILS NFR BLD: 0.8 % (ref 0–1.9)
BENZODIAZ UR QL SCN>200 NG/ML: ABNORMAL
BILIRUB SERPL-MCNC: 0.7 MG/DL (ref 0.1–1)
BUN SERPL-MCNC: 19 MG/DL (ref 6–20)
BZE UR QL SCN: NEGATIVE
CALCIUM SERPL-MCNC: 8.7 MG/DL (ref 8.7–10.5)
CANNABINOIDS UR QL SCN: NEGATIVE
CHLORIDE SERPL-SCNC: 103 MMOL/L (ref 95–110)
CO2 SERPL-SCNC: 25 MMOL/L (ref 23–29)
CREAT SERPL-MCNC: 1.2 MG/DL (ref 0.5–1.4)
CREAT UR-MCNC: 113 MG/DL (ref 23–375)
DIFFERENTIAL METHOD: ABNORMAL
EOSINOPHIL # BLD AUTO: 0.2 K/UL (ref 0–0.5)
EOSINOPHIL NFR BLD: 4.2 % (ref 0–8)
ERYTHROCYTE [DISTWIDTH] IN BLOOD BY AUTOMATED COUNT: 15.4 % (ref 11.5–14.5)
EST. GFR  (NO RACE VARIABLE): >60 ML/MIN/1.73 M^2
GLUCOSE SERPL-MCNC: 106 MG/DL (ref 70–110)
HCT VFR BLD AUTO: 38.8 % (ref 40–54)
HGB BLD-MCNC: 12.6 G/DL (ref 14–18)
IMM GRANULOCYTES # BLD AUTO: 0.08 K/UL (ref 0–0.04)
IMM GRANULOCYTES NFR BLD AUTO: 2.2 % (ref 0–0.5)
LYMPHOCYTES # BLD AUTO: 0.6 K/UL (ref 1–4.8)
LYMPHOCYTES NFR BLD: 17.3 % (ref 18–48)
MAGNESIUM SERPL-MCNC: 1.7 MG/DL (ref 1.6–2.6)
MCH RBC QN AUTO: 26.9 PG (ref 27–31)
MCHC RBC AUTO-ENTMCNC: 32.5 G/DL (ref 32–36)
MCV RBC AUTO: 83 FL (ref 82–98)
MONOCYTES # BLD AUTO: 0.3 K/UL (ref 0.3–1)
MONOCYTES NFR BLD: 8.1 % (ref 4–15)
NEUTROPHILS # BLD AUTO: 2.4 K/UL (ref 1.8–7.7)
NEUTROPHILS NFR BLD: 67.4 % (ref 38–73)
NRBC BLD-RTO: 0 /100 WBC
OPIATES UR QL SCN: NEGATIVE
PCP UR QL SCN>25 NG/ML: NEGATIVE
PHOSPHATE SERPL-MCNC: 2.8 MG/DL (ref 2.7–4.5)
PLATELET # BLD AUTO: 86 K/UL (ref 150–450)
PMV BLD AUTO: 9.3 FL (ref 9.2–12.9)
POTASSIUM SERPL-SCNC: 4.3 MMOL/L (ref 3.5–5.1)
PROT SERPL-MCNC: 6.8 G/DL (ref 6–8.4)
RBC # BLD AUTO: 4.69 M/UL (ref 4.6–6.2)
SODIUM SERPL-SCNC: 134 MMOL/L (ref 136–145)
TOXICOLOGY INFORMATION: ABNORMAL
WBC # BLD AUTO: 3.58 K/UL (ref 3.9–12.7)

## 2023-11-11 PROCEDURE — 80053 COMPREHEN METABOLIC PANEL: CPT | Performed by: INTERNAL MEDICINE

## 2023-11-11 PROCEDURE — 80307 DRUG TEST PRSMV CHEM ANLYZR: CPT | Performed by: INTERNAL MEDICINE

## 2023-11-11 PROCEDURE — 99900031 HC PATIENT EDUCATION (STAT)

## 2023-11-11 PROCEDURE — 84100 ASSAY OF PHOSPHORUS: CPT | Performed by: INTERNAL MEDICINE

## 2023-11-11 PROCEDURE — 94761 N-INVAS EAR/PLS OXIMETRY MLT: CPT

## 2023-11-11 PROCEDURE — 94660 CPAP INITIATION&MGMT: CPT

## 2023-11-11 PROCEDURE — 36415 COLL VENOUS BLD VENIPUNCTURE: CPT | Performed by: INTERNAL MEDICINE

## 2023-11-11 PROCEDURE — 85025 COMPLETE CBC W/AUTO DIFF WBC: CPT | Performed by: INTERNAL MEDICINE

## 2023-11-11 PROCEDURE — 20000000 HC ICU ROOM

## 2023-11-11 PROCEDURE — 25000003 PHARM REV CODE 250: Performed by: INTERNAL MEDICINE

## 2023-11-11 PROCEDURE — 63600175 PHARM REV CODE 636 W HCPCS: Performed by: INTERNAL MEDICINE

## 2023-11-11 PROCEDURE — A4216 STERILE WATER/SALINE, 10 ML: HCPCS | Performed by: INTERNAL MEDICINE

## 2023-11-11 PROCEDURE — 99900035 HC TECH TIME PER 15 MIN (STAT)

## 2023-11-11 PROCEDURE — 27000221 HC OXYGEN, UP TO 24 HOURS

## 2023-11-11 PROCEDURE — 83735 ASSAY OF MAGNESIUM: CPT | Performed by: INTERNAL MEDICINE

## 2023-11-11 RX ADMIN — DEXMEDETOMIDINE HYDROCHLORIDE 0.7 MCG/KG/HR: 400 INJECTION INTRAVENOUS at 06:11

## 2023-11-11 RX ADMIN — DEXMEDETOMIDINE HYDROCHLORIDE 0.7 MCG/KG/HR: 400 INJECTION INTRAVENOUS at 09:11

## 2023-11-11 RX ADMIN — CHLORDIAZEPOXIDE HYDROCHLORIDE 50 MG: 25 CAPSULE ORAL at 01:11

## 2023-11-11 RX ADMIN — CHLORDIAZEPOXIDE HYDROCHLORIDE 50 MG: 25 CAPSULE ORAL at 12:11

## 2023-11-11 RX ADMIN — CHLORDIAZEPOXIDE HYDROCHLORIDE 50 MG: 25 CAPSULE ORAL at 06:11

## 2023-11-11 RX ADMIN — SACUBITRIL AND VALSARTAN 1 TABLET: 24; 26 TABLET, FILM COATED ORAL at 08:11

## 2023-11-11 RX ADMIN — LORAZEPAM 2 MG: 1 TABLET ORAL at 06:11

## 2023-11-11 RX ADMIN — ENOXAPARIN SODIUM 40 MG: 40 INJECTION SUBCUTANEOUS at 05:11

## 2023-11-11 RX ADMIN — METOPROLOL TARTRATE 50 MG: 50 TABLET, FILM COATED ORAL at 08:11

## 2023-11-11 RX ADMIN — DEXMEDETOMIDINE HYDROCHLORIDE 0.7 MCG/KG/HR: 400 INJECTION INTRAVENOUS at 12:11

## 2023-11-11 RX ADMIN — ASPIRIN 81 MG: 81 TABLET, COATED ORAL at 10:11

## 2023-11-11 RX ADMIN — MULTIVITAMIN TABLET 1 TABLET: TABLET at 10:11

## 2023-11-11 RX ADMIN — METOPROLOL TARTRATE 50 MG: 50 TABLET, FILM COATED ORAL at 10:11

## 2023-11-11 RX ADMIN — SERTRALINE HYDROCHLORIDE 150 MG: 50 TABLET ORAL at 10:11

## 2023-11-11 RX ADMIN — SACUBITRIL AND VALSARTAN 1 TABLET: 24; 26 TABLET, FILM COATED ORAL at 10:11

## 2023-11-11 RX ADMIN — DEXMEDETOMIDINE HYDROCHLORIDE 0.7 MCG/KG/HR: 400 INJECTION INTRAVENOUS at 04:11

## 2023-11-11 RX ADMIN — SODIUM CHLORIDE 10 ML: 9 INJECTION INTRAMUSCULAR; INTRAVENOUS; SUBCUTANEOUS at 09:11

## 2023-11-11 RX ADMIN — SODIUM CHLORIDE: 9 INJECTION, SOLUTION INTRAVENOUS at 01:11

## 2023-11-11 RX ADMIN — LORAZEPAM 2 MG: 1 TABLET ORAL at 10:11

## 2023-11-11 RX ADMIN — SODIUM CHLORIDE: 9 INJECTION, SOLUTION INTRAVENOUS at 08:11

## 2023-11-11 RX ADMIN — DEXMEDETOMIDINE HYDROCHLORIDE 0.7 MCG/KG/HR: 400 INJECTION INTRAVENOUS at 02:11

## 2023-11-11 RX ADMIN — THIAMINE HCL TAB 100 MG 100 MG: 100 TAB at 10:11

## 2023-11-11 RX ADMIN — ONDANSETRON 4 MG: 2 INJECTION INTRAMUSCULAR; INTRAVENOUS at 06:11

## 2023-11-11 RX ADMIN — SODIUM CHLORIDE: 9 INJECTION, SOLUTION INTRAVENOUS at 03:11

## 2023-11-11 NOTE — PROGRESS NOTES
[facility] Medicine  Progress Note    Patient Name: Lee Quintanilla  MRN: [id number]  Patient Class: IP- Inpatient   Admission Date: 11/10/2023  Length of Stay: 1 days  Attending Physician: No att. providers found  Primary Care Provider: Riya Vidales NP        Subjective:     Principal Problem:Alcohol withdrawal        HPI:  Patient is a 48-year-old male with a history of alcohol use disorder, coronary artery disease, hypertension, and hyperlipidemia who presented to [facility] for evaluation tremors.  Patient states he has been drinking alcohol for the past 3 years.  At his peak he drank 3 5th of vodka a day.  He currently drinks 1-2 5th of vodka a day.  He has been in withdrawals before but this is the worst he is ever been.  His last drink was 3:00 a.m. on 11/10/2023.  He is an appointment on an outpatient detox facility in about 3 days.  He admits to some nausea and vomiting prior to arrival to the emergency department.  He was able to eat a normal lunch without any nausea or vomiting.  He is some mild midepigastric abdominal pain that does not radiate.  Denies any constipation, diarrhea, tarry stools, bloody stools, chest pains, or shortness of breath.    He was evaluated in the ED on 11/08/2023 for similar symptoms but was not admitted.  He was started on a Precedex drip and psych eval to him.  He has been transferred over to [facility] for ICU management.  Hemoglobin 14.4, WBC 3.6, platelets 127, sodium 135, potassium 3.8, creatinine 1.3, lipase 76, bicarb 17, anion gap 20 troponin 0.03, EKG shows sinus tachycardia with T-wave inversions in leads 2, 3, AVF, and V6.    During my encounter, patient still feels tremulous and fatigued.  Still complains of mild midepigastric abdominal pain that does not radiate.  Symptoms are slightly improved from the emergency department.  He believes he is able to tolerate an oral diet.      Overview/Hospital  Course:  Feeling better.  Still have some nausea.  Tremor improving.        Objective:     Vital Signs (Most Recent):  Temp: 98.6 °F (37 °C) (11/10/23 2030)  Pulse: 74 (11/11/23 0730)  Resp: 16 (11/11/23 0730)  BP: 127/72 (11/11/23 0545)  SpO2: 99 % (11/11/23 0730) Vital Signs (24h Range):  Temp:  [98.6 °F (37 °C)] 98.6 °F (37 °C)  Pulse:  [] 74  Resp:  [0-29] 16  SpO2:  [93 %-99 %] 99 %  BP: (120-158)/(67-91) 127/72     Weight: 111.1 kg (244 lb 14.9 oz)  Body mass index is 38.36 kg/m².    Intake/Output Summary (Last 24 hours) at 11/11/2023 1302  Last data filed at 11/11/2023 0520  Gross per 24 hour   Intake 1498.58 ml   Output 900 ml   Net 598.58 ml         Physical Exam  Constitutional:       General: He is not in acute distress.  HENT:      Head: Normocephalic and atraumatic.      Nose: No congestion.   Eyes:      General: No scleral icterus.        Right eye: No discharge.         Left eye: No discharge.      Extraocular Movements: Extraocular movements intact.   Cardiovascular:      Rate and Rhythm: Normal rate and regular rhythm.   Pulmonary:      Effort: Pulmonary effort is normal.      Breath sounds: Normal breath sounds.   Abdominal:      General: Abdomen is flat. Bowel sounds are normal.   Musculoskeletal:         General: No swelling or tenderness.      Cervical back: Normal range of motion and neck supple. No rigidity.   Skin:     General: Skin is warm.   Neurological:      General: No focal deficit present.      Mental Status: He is alert and oriented to person, place, and time.   Psychiatric:         Mood and Affect: Mood normal.             Significant Labs: All pertinent labs within the past 24 hours have been reviewed.  CBC:   Recent Labs   Lab 11/10/23  0630 11/11/23  0341   WBC 3.68* 3.58*   HGB 14.4 12.6*   HCT 44.2 38.8*   * 86*     CMP:   Recent Labs   Lab 11/10/23  0630 11/11/23  0341   * 134*   K 3.8 4.3   CL 98 103   CO2 17* 25   * 106   BUN 18 19   CREATININE 1.3  "1.2   CALCIUM 10.6* 8.7   PROT 9.1* 6.8   ALBUMIN 4.5 3.8   BILITOT 0.4 0.7   ALKPHOS 63 44*   AST 48* 29   ALT 43 27   ANIONGAP 20* 6*     Cardiac Markers: No results for input(s): "CKMB", "MYOGLOBIN", "BNP", "TROPISTAT" in the last 48 hours.    Significant Imaging: I have reviewed all pertinent imaging results/findings within the past 24 hours.      Assessment/Plan:      * Alcohol withdrawal  Last drink 3:00 a.m. 11/10/2023   Started on Precedex in ED.  Continue Precedex drip.  Patient is more calm today.  We will wean off Precedex  Start Librium 50 mg q.6h for 1 day then deescalate.  P.r.n. Ativan  Obtain UDS  Seizure and fall precautions  Daily thiamine and multivitamin  P.r.n. electrolyte replacement  NS at 125 mL/hr      High anion gap metabolic acidosis  Secondary to above.  Continue to monitor with regular CMPs.      Elevated lipase  Lipase 76.  Clinically patient does not appear to have acute pancreatitis.  We will continue IV fluids per management of alcohol withdrawal.    Patient tolerated lunch well.  We will continue with the diet and make NPO if having intractable nausea and vomiting.  If clinically starts to have worsening nausea, vomiting, or abdominal pains, make patient NPO and obtain abdominal ultrasound or CT abdomen.      MYNOR (generalized anxiety disorder)  Continue home Zoloft      Major depressive disorder with single episode  Continue Zoloft        VTE Risk Mitigation (From admission, onward)         Ordered     enoxaparin injection 40 mg  Daily         11/10/23 2105     IP VTE HIGH RISK PATIENT  Once         11/10/23 2105     Place sequential compression device  Until discontinued         11/10/23 2105                Discharge Planning   FANTA:      Code Status: Full Code   Is the patient medically ready for discharge?:     Reason for patient still in hospital (select all that apply): Treatment  Discharge Plan A: Rehab (drug)            Critical care time spent on the evaluation and treatment " of severe organ dysfunction, review of pertinent labs and imaging studies, discussions with consulting providers and discussions with patient/family: 35 minutes.      Cam Quevedo MD  Department of Hospital Medicine   [facility]

## 2023-11-11 NOTE — PLAN OF CARE
11/10/23 2301   Patient Assessment/Suction   Level of Consciousness (AVPU) alert  (Simultaneous filing. User may not have seen previous data.)   Respiratory Effort Unlabored   Expansion/Accessory Muscles/Retractions expansion symmetric  (Simultaneous filing. User may not have seen previous data.)   All Lung Fields Breath Sounds clear  (Simultaneous filing. User may not have seen previous data.)   Rhythm/Pattern, Respiratory snoring  (Simultaneous filing. User may not have seen previous data.)   Cough Frequency infrequent  (Simultaneous filing. User may not have seen previous data.)   Cough Type good;nonproductive  (Simultaneous filing. User may not have seen previous data.)   PRE-TX-O2   Device (Oxygen Therapy) CPAP  (Simultaneous filing. User may not have seen previous data.)   $ Is the patient on Low Flow Oxygen? Yes   Oxygen Concentration (%) 35   SpO2 97 %   Pulse Oximetry Type Continuous   $ Pulse Oximetry - Multiple Charge Pulse Oximetry - Multiple   Pulse 84   Resp 18   /73   Positioning   Body Position position changed independently   Head of Bed (HOB) Positioning HOB at 30 degrees   Positioning/Transfer Devices pillows   Ready to Wean/Extubation Screen   FIO2<=50 (chart decimal) 0.35   Preset CPAP/BiPAP Settings   Mode Of Delivery CPAP   $ CPAP/BiPAP Daily Charge BiPAP/CPAP Daily   $ Initial CPAP/BiPAP Setup? Yes   $ Is patient using? Yes   Size of Mask   (size B)   Sized Appropriately? Yes   Equipment Type V60   Airway Device Type medium nasal mask   CPAP (cm H2O) 10   Patient CPAP/BiPAP Settings   RR Total (Breaths/Min) 18   Tidal Volume (mL) 530   VE Minute Ventilation (L/min) 10 L/min   Peak Inspiratory Pressure (cm H2O) 12   TiTOT (%) 33   Total Leak (L/Min) 5   Patient Trigger - ST Mode Only (%) 100   CPAP/BiPAP Alarms   High Pressure (cm H2O) 40   Low Pressure (cm H2O) 10   High RR (breaths/min) 40   Low RR (breaths/min) 10   Apnea (Sec) 20   Education   $ Education BiPAP;15 min

## 2023-11-11 NOTE — PLAN OF CARE
Problem: Oral Intake Inadequate  Goal: Improved Oral Intake  Outcome: Ongoing, Progressing  Intervention: Promote and Optimize Oral Intake  Flowsheets (Taken 11/11/2023 105)  Oral Nutrition Promotion:   calorie-dense foods provided   other (see comments)     Recommendations  1.) Continue cardiac diet restrictions.   2.) Suggest increasing thiamine to 100mg TID and addition of folic acid 5mg QD to complete ETOH prophalyxis protocol.               Adjust folic acid to 1mg QD when pt out of ICU.   3.) RD to monitor and provide recommendations PRN     Goals:   1.) Pt to consume/tolerate >75% of meals.   2.) Pt to receive ETOH protocol.  Nutrition Goal Status: new

## 2023-11-11 NOTE — HOSPITAL COURSE
November 12, 2023  Nausea and tremor improving.    November 13, 2023  Still on Precedex IV and p.r.n. for Ativan for anxiety and DT  We will consult Psychiatry

## 2023-11-11 NOTE — ED NOTES
7670- report called to FREYA Al at [facility] 3rd floor ICU.   ADT 30 placed for transport.

## 2023-11-11 NOTE — PROGRESS NOTES
"[facility]  Adult Nutrition   Progress Note (Initial Assessment)     SUMMARY     Recommendations  1.) Continue cardiac diet restrictions.   2.) Suggest increasing thiamine to 100mg TID and addition of folic acid 5mg QD to complete ETOH prophalyxis protocol.    Adjust folic acid to 1mg QD when pt out of ICU.   3.) RD to monitor and provide recommendations PRN    Goals:   1.) Pt to consume/tolerate >75% of meals.   2.) Pt to receive ETOH protocol.  Nutrition Goal Status: new    Dietitian Rounds Brief  Identified at risk d/t ICU admit. presented to [facility] for evaluation tremors.  Patient states he has been drinking alcohol for the past 3 years. Pt currently receiving precedex in/out of sleep at time of visit. Per chart, pt has yet to consume meal since admitted. No GI distress. LBM PTA. Skin: intact per chart. Wt reviewed. NFPE not completed per pt request.    Diet order:   Current Diet Order: cardiac       Evaluation of Received Nutrient/Fluid Intake  Energy Calories Required: not meeting needs  Protein Required: not meeting needs  Fluid Required: meeting needs  Tolerance: tolerating     % Intake of Estimated Energy Needs: 0 - 25 %  % Meal Intake: 0 - 25 %      Intake/Output Summary (Last 24 hours) at 11/11/2023 1053  Last data filed at 11/11/2023 0520  Gross per 24 hour   Intake 1498.58 ml   Output 900 ml   Net 598.58 ml        Anthropometrics  Temp: 98.6 °F (37 °C)  Height: 5' 7" (170.2 cm)  Height (inches): 67 in  Weight Method: Standard Scale  Weight: 111.1 kg (244 lb 14.9 oz)  Weight (lb): 244.93 lb  Ideal Body Weight (IBW), Male: 148 lb  % Ideal Body Weight, Male (lb): 165.49 %  BMI (Calculated): 38.4  BMI Grade: 35 - 39.9 - obesity - grade II       Estimated/Assessed Needs  Weight Used For Calorie Calculations: 111.1 kg (244 lb 14.9 oz)  Energy Calorie Requirements (kcal): 2425  Energy Need Method: [place]-St Jeor (x1.25 AF)  Protein Requirements: 101-134 (1.5-2.0)  Weight Used For " Protein Calculations: 67 kg (147 lb 11.3 oz) (IBW)     Estimated Fluid Requirement Method: RDA Method  RDA Method (mL): 2425       Reason for Assessment  Reason For Assessment: identified at risk by screening criteria (ICU admit)  Diagnosis: other (see comments) (alcohol withdraw)  Relevant Medical History: alcohol use disorder, coronary artery disease, hypertension, and hyperlipidemia    Nutrition/Diet History  Food Allergies: NKFA  Factors Affecting Nutritional Intake: None identified at this time    Nutrition Risk Screen  Nutrition Risk Screen: no indicators present      Weight History:  Wt Readings from Last 5 Encounters:   11/11/23 111.1 kg (244 lb 14.9 oz)   11/08/23 108.9 kg (240 lb)   11/03/23 108.9 kg (240 lb)   09/21/23 109 kg (240 lb 6.4 oz)   04/19/23 111.6 kg (246 lb)        Lab/Procedures/Meds: Pertinent Labs/Meds Reviewed    Medications:Pertinent Medications Reviewed  Scheduled Meds:   aspirin  81 mg Oral Daily    chlordiazepoxide  50 mg Oral Q6H    enoxparin  40 mg Subcutaneous Daily    metoprolol tartrate  50 mg Oral BID    multivitamin  1 tablet Oral Daily    sacubitriL-valsartan  1 tablet Oral BID    sertraline  150 mg Oral Daily    sodium chloride 0.9%  10 mL Intravenous Q12H    thiamine  100 mg Oral Daily     Continuous Infusions:   sodium chloride 0.9% 125 mL/hr at 11/11/23 0357    dexmedeTOMIDine (Precedex) infusion (titrating) 0.7 mcg/kg/hr (11/11/23 0647)     PRN Meds:.calcium gluconate IVPB, calcium gluconate IVPB, calcium gluconate IVPB, LORazepam, magnesium sulfate IVPB, magnesium sulfate IVPB, melatonin, ondansetron, potassium chloride **AND** potassium chloride **AND** potassium chloride, prochlorperazine, sodium phosphate 15 mmol in dextrose 5 % (D5W) 250 mL IVPB, sodium phosphate 20.01 mmol in dextrose 5 % (D5W) 250 mL IVPB, sodium phosphate 30 mmol in dextrose 5 % (D5W) 250 mL IVPB    Labs: Pertinent Labs Reviewed  Clinical Chemistry:  Recent Labs   Lab 11/08/23  0945 11/10/23  0629  11/11/23  0341   * 135* 134*   K 3.9 3.8 4.3   CL 94* 98 103   CO2 18* 17* 25   * 126* 106   BUN 18 18 19   CREATININE 1.2 1.3 1.2   CALCIUM 10.0 10.6* 8.7   PROT 9.1* 9.1* 6.8   ALBUMIN 4.4 4.5 3.8   BILITOT 0.5 0.4 0.7   ALKPHOS 69 63 44*   AST 35 48* 29   ALT 29 43 27   ANIONGAP 19* 20* 6*   MG  --   --  1.7   PHOS  --   --  2.8   LIPASE 66* 76*  --      CBC:   Recent Labs   Lab 11/11/23 0341   WBC 3.58*   RBC 4.69   HGB 12.6*   HCT 38.8*   PLT 86*   MCV 83   MCH 26.9*   MCHC 32.5     Cardiac Profile:  Recent Labs   Lab 11/08/23  0945   TROPONINI 0.030*       Monitor and Evaluation  Food and Nutrient Intake: energy intake, food and beverage intake  Food and Nutrient Adminstration: diet order  Knowledge/Beliefs/Attitudes: food and nutrition knowledge/skill  Physical Activity and Function: nutrition-related ADLs and IADLs  Anthropometric Measurements: weight, weight change  Biochemical Data, Medical Tests and Procedures: electrolyte and renal panel, gastrointestinal profile, glucose/endocrine profile  Nutrition-Focused Physical Findings: overall appearance     Nutrition Risk  Level of Risk/Frequency of Follow-up: moderate     Nutrition Follow-Up  RD Follow-up?: Yes      Vicky Devlin, FELIX 11/11/2023 10:53 AM

## 2023-11-11 NOTE — SUBJECTIVE & OBJECTIVE
Objective:     Vital Signs (Most Recent):  Temp: 98.6 °F (37 °C) (11/10/23 2030)  Pulse: 74 (11/11/23 0730)  Resp: 16 (11/11/23 0730)  BP: 127/72 (11/11/23 0545)  SpO2: 99 % (11/11/23 0730) Vital Signs (24h Range):  Temp:  [98.6 °F (37 °C)] 98.6 °F (37 °C)  Pulse:  [] 74  Resp:  [0-29] 16  SpO2:  [93 %-99 %] 99 %  BP: (120-158)/(67-91) 127/72     Weight: 111.1 kg (244 lb 14.9 oz)  Body mass index is 38.36 kg/m².    Intake/Output Summary (Last 24 hours) at 11/11/2023 1302  Last data filed at 11/11/2023 0520  Gross per 24 hour   Intake 1498.58 ml   Output 900 ml   Net 598.58 ml         Physical Exam  Constitutional:       General: He is not in acute distress.  HENT:      Head: Normocephalic and atraumatic.      Nose: No congestion.   Eyes:      General: No scleral icterus.        Right eye: No discharge.         Left eye: No discharge.      Extraocular Movements: Extraocular movements intact.   Cardiovascular:      Rate and Rhythm: Normal rate and regular rhythm.   Pulmonary:      Effort: Pulmonary effort is normal.      Breath sounds: Normal breath sounds.   Abdominal:      General: Abdomen is flat. Bowel sounds are normal.   Musculoskeletal:         General: No swelling or tenderness.      Cervical back: Normal range of motion and neck supple. No rigidity.   Skin:     General: Skin is warm.   Neurological:      General: No focal deficit present.      Mental Status: He is alert and oriented to person, place, and time.   Psychiatric:         Mood and Affect: Mood normal.             Significant Labs: All pertinent labs within the past 24 hours have been reviewed.  CBC:   Recent Labs   Lab 11/10/23  0630 11/11/23  0341   WBC 3.68* 3.58*   HGB 14.4 12.6*   HCT 44.2 38.8*   * 86*     CMP:   Recent Labs   Lab 11/10/23  0630 11/11/23  0341   * 134*   K 3.8 4.3   CL 98 103   CO2 17* 25   * 106   BUN 18 19   CREATININE 1.3 1.2   CALCIUM 10.6* 8.7   PROT 9.1* 6.8   ALBUMIN 4.5 3.8   BILITOT 0.4 0.7  "  ALKPHOS 63 44*   AST 48* 29   ALT 43 27   ANIONGAP 20* 6*     Cardiac Markers: No results for input(s): "CKMB", "MYOGLOBIN", "BNP", "TROPISTAT" in the last 48 hours.    Significant Imaging: I have reviewed all pertinent imaging results/findings within the past 24 hours.  "

## 2023-11-11 NOTE — SUBJECTIVE & OBJECTIVE
Past Medical History:   Diagnosis Date    ADHD (attention deficit hyperactivity disorder)     Arthritis     Asthma     as child only    Back pain     Coronary artery disease     Degeneration of lumbar intervertebral disc 05/2016    Depression     Elevated PSA     Headache     Hyperlipidemia     Hypertension     Kidney damage     stage 3 ; d/t aleve abuse    Kidney stone     Myocardial infarction     Neck pain     Numbness and tingling in hands     Numbness and tingling of both legs     Seizures     from inapsine 2002    Sleep apnea     no cpap    Wears glasses     CONTACS       Past Surgical History:   Procedure Laterality Date    BACK SURGERY  2016    back surgery    BONE GRAFT N/A 11/5/2020    Procedure: BONE GRAFT;  Surgeon: Mateusz Blanco MD;  Location: [facility] OR;  Service: Orthopedics;  Laterality: N/A;    CARDIAC SURGERY  01/06/2020    CABG X 7    CORONARY ARTERY BYPASS GRAFT      7 vessels    HERNIA REPAIR Bilateral 11/22/2017    LITHOTRIPSY      LUMBAR LAMINECTOMY WITH FUSION Bilateral 11/5/2020    Procedure: LAMINECTOMY, SPINE, LUMBAR, WITH FUSION;  Surgeon: Mateusz Blanco MD;  Location: [facility] OR;  Service: Orthopedics;  Laterality: Bilateral;  MEDTRONIC  NTI  L-3    PILONIDAL CYST DRAINAGE      removal of abcess      scrotal    REMOVAL OF HARDWARE FROM SPINE Bilateral 11/5/2020    Procedure: REMOVAL, HARDWARE, SPINE;  Surgeon: Mateusz Blanco MD;  Location: [facility] OR;  Service: Orthopedics;  Laterality: Bilateral;  L 4-5    TYMPANOSTOMY TUBE PLACEMENT         Review of patient's allergies indicates:   Allergen Reactions    Inapsine [droperidol] Anaphylaxis     seizures    Effexor [venlafaxine]      Increased anxiety    Pcn [penicillins]     Bactrim [sulfamethoxazole-trimethoprim] Rash     Dry red rash all over when in the sun    Fluconazole Rash     Pruritis         No current facility-administered medications on file prior to encounter.     Current Outpatient Medications on File Prior to Encounter    Medication Sig    anastrozole (ARIMIDEX) 1 mg Tab Take 1 mg by mouth every 7 days.    aspirin (ECOTRIN) 81 MG EC tablet Take 81 mg by mouth once daily.    ENTRESTO 24-26 mg per tablet Take 1 tablet by mouth.    furosemide (LASIX) 40 MG tablet Take 40 mg by mouth once daily.    hydrOXYzine pamoate (VISTARIL) 50 MG Cap Take 1 capsule (50 mg total) by mouth every 8 (eight) hours as needed.    JARDIANCE 25 mg tablet Take 25 mg by mouth Daily.    L-METHYLFOLATE FORTE 15-90.314 mg Cap Take 1 capsule by mouth Daily.    metoprolol tartrate (LOPRESSOR) 25 MG tablet Take 50 mg by mouth 2 (two) times daily.    naltrexone microspheres (VIVITROL) 380 mg SSRR kit Inject 1 each (380 mg total) into the muscle every 30 days.    ondansetron (ZOFRAN-ODT) 4 MG TbDL Take 1 tablet (4 mg total) by mouth every 8 (eight) hours as needed (nausea, vomiting).    potassium chloride SA (K-DUR,KLOR-CON) 20 MEQ tablet Take 20 mEq by mouth once daily.    rosuvastatin (CRESTOR) 40 MG Tab Take 40 mg by mouth once daily.    sertraline (ZOLOFT) 100 MG tablet Take 1.5 tablets (150 mg total) by mouth once daily.    testosterone cypionate (DEPOTESTOTERONE CYPIONATE) 200 mg/mL injection Inject 200 mg into the muscle every 7 days.    traZODone (DESYREL) 100 MG tablet Take 2 tablets (200 mg total) by mouth every evening.    [DISCONTINUED] JARDIANCE 10 mg tablet Take 10 mg by mouth.     Family History       Problem Relation (Age of Onset)    Diabetes Maternal Aunt, Maternal Uncle, Maternal Grandfather    Early death Father    Heart disease Mother, Father    Hypertension Mother    Migraines Mother          Tobacco Use    Smoking status: Former     Current packs/day: 0.00     Types: Cigarettes     Quit date: 2016     Years since quittin.8     Passive exposure: Past    Smokeless tobacco: Former     Quit date: 2016    Tobacco comments:     occasional   Substance and Sexual Activity    Alcohol use: Yes     Alcohol/week: 1.0 standard drink of alcohol      Types: 1 Glasses of wine per week     Comment: 20 - 30 shots vodka per day or 1-2 1/5ths per day for 2 years (started 2021)    Drug use: No    Sexual activity: Yes     Partners: Female     Review of Systems  ROS:  Constitutional: Negative For: Chills, Fever, and Weight Change.  Positive for fatigue  Eyes: Negative For: Changes in vision, Drainage from eye, Eyes tearing/watery, Eye redness.  Ears, Nose, Mouth & Throat: Negative For: Nasal congestion, Rhinorrhea, Throat soreness.  Respiratory: Negative For: Cough, Shortness of breath.  Cardiovascular: Negative For: Chest pain, Swelling in leg or feet.  Positive for palpitations  Gastrointestinal: Negative For: Acid reflux, Blood in stool, Constipation, Diarrhea, positive for abdominal pains, Nausea, Vomiting.  : Negative For: Dysuria, hematuria, or incontinence,  Musculoskeletal: Negative For: Muscle weakness, Numbness/tingling, Trauma.  Integumentary: Negative For: Itch, Lesion(s), Rash, Skin changes.  Neurological: Negative For: Dizziness, Falls, Headache.  Psychiatric: Negative For: Anxiety/worry, Depression, and Sleep disturbance.  Endocrine: Negative For: cold or heat intolerance  Hematologic: Negative For: Easy bleeding/bruising    Objective:     Vital Signs (Most Recent):  Temp: 98.5 °F (36.9 °C) (11/10/23 0514)  Pulse: 89 (11/10/23 1915)  Resp: 16 (11/10/23 1915)  BP: (!) 158/84 (11/10/23 1915)  SpO2: 96 % (11/10/23 1915) Vital Signs (24h Range):  Temp:  [98.5 °F (36.9 °C)] 98.5 °F (36.9 °C)  Pulse:  [] 89  Resp:  [16-18] 16  SpO2:  [88 %-98 %] 96 %  BP: (122-178)/() 158/84     Weight: 111.1 kg (245 lb 0.7 oz)  Body mass index is 38.38 kg/m².     Physical Exam   General:     Diaphoretic, uncomfortable male in bed  Eyes:    Anicteric sclera. Conjunctiva non-injected  HENT:    Head normocephalic, atraumatic. Mucous membranes dry.   Neck:   Trachea midline, no thyromegaly.  No JVD or adenopathy.   Heart:    S1, S2 auscultated. No murmurs rubs or  gallops. Regular rhythm and rate.   Lungs:    Bilaterally clear in all lobes with equal chest rise.  No wheezes, rales, or rhonchi.   Abdomen:    Soft, nontender, nondistended.  Positive bowel sounds. No palpable masses.  No rebound or guarding.   Extremities:    Without clubbing or cyanosis.  No edema.  Peripheral pulses II/IV.  Neuro:    Alert, awake and oriented x3.  Cranial nerves appear grossly intact on limited neurological exam.  No focal motor or sensory deficit.  Skin:    No rashes, exudates, or nodules on limited skin exam  Psych:    Normal mood, affect and behavior with intact judgement and insight.        Significant Labs: All pertinent labs within the past 24 hours have been reviewed.  CBC:   Recent Labs   Lab 11/10/23  0630   WBC 3.68*   HGB 14.4   HCT 44.2   *     CMP:   Recent Labs   Lab 11/10/23  0630   *   K 3.8   CL 98   CO2 17*   *   BUN 18   CREATININE 1.3   CALCIUM 10.6*   PROT 9.1*   ALBUMIN 4.5   BILITOT 0.4   ALKPHOS 63   AST 48*   ALT 43   ANIONGAP 20*     Lipase:   Recent Labs   Lab 11/10/23  0630   LIPASE 76*       Significant Imaging: I have reviewed all pertinent imaging results/findings within the past 24 hours.    EKG shows sinus tachycardia with a T-wave inversion in leads 2, 3, AVF and V6 per my read.  Old infarctions.

## 2023-11-11 NOTE — H&P
[facility]  History & Physical    Patient Name: Lee Quintanilla  MRN: [id number]  Patient Class: IP- Inpatient  Admission Date: 11/10/2023  Attending Physician: Grabiel Beatty DO  Primary Care Provider: Riya Vidales NP         Patient information was obtained from patient and ER records.     Subjective:     Principal Problem:Alcohol withdrawal    Chief Complaint:   Chief Complaint   Patient presents with    Alcohol Problem     Feels like going thru withdrawals. Last alcohol 3 hours ago. Typically drinks 20 to 30 shots of vodka a day        HPI: Patient is a 48-year-old male with a history of alcohol use disorder, coronary artery disease, hypertension, and hyperlipidemia who presented to [facility] for evaluation tremors.  Patient states he has been drinking alcohol for the past 3 years.  At his peak he drank 3 5th of vodka a day.  He currently drinks 1-2 5th of vodka a day.  He has been in withdrawals before but this is the worst he is ever been.  His last drink was 3:00 a.m. on 11/10/2023.  He is an appointment on an outpatient detox facility in about 3 days.  He admits to some nausea and vomiting prior to arrival to the emergency department.  He was able to eat a normal lunch without any nausea or vomiting.  He is some mild midepigastric abdominal pain that does not radiate.  Denies any constipation, diarrhea, tarry stools, bloody stools, chest pains, or shortness of breath.    He was evaluated in the ED on 11/08/2023 for similar symptoms but was not admitted.  He was started on a Precedex drip and psych eval to him.  He has been transferred over to [facility] for ICU management.  Hemoglobin 14.4, WBC 3.6, platelets 127, sodium 135, potassium 3.8, creatinine 1.3, lipase 76, bicarb 17, anion gap 20 troponin 0.03, EKG shows sinus tachycardia with T-wave inversions in leads 2, 3, AVF, and V6.    During my encounter, patient still feels tremulous and  fatigued.  Still complains of mild midepigastric abdominal pain that does not radiate.  Symptoms are slightly improved from the emergency department.  He believes he is able to tolerate an oral diet.      Past Medical History:   Diagnosis Date    ADHD (attention deficit hyperactivity disorder)     Arthritis     Asthma     as child only    Back pain     Coronary artery disease     Degeneration of lumbar intervertebral disc 05/2016    Depression     Elevated PSA     Headache     Hyperlipidemia     Hypertension     Kidney damage     stage 3 ; d/t aleve abuse    Kidney stone     Myocardial infarction     Neck pain     Numbness and tingling in hands     Numbness and tingling of both legs     Seizures     from inapsine 2002    Sleep apnea     no cpap    Wears glasses     CONTACS       Past Surgical History:   Procedure Laterality Date    BACK SURGERY  2016    back surgery    BONE GRAFT N/A 11/5/2020    Procedure: BONE GRAFT;  Surgeon: Mateusz Blanco MD;  Location: [facility] OR;  Service: Orthopedics;  Laterality: N/A;    CARDIAC SURGERY  01/06/2020    CABG X 7    CORONARY ARTERY BYPASS GRAFT      7 vessels    HERNIA REPAIR Bilateral 11/22/2017    LITHOTRIPSY      LUMBAR LAMINECTOMY WITH FUSION Bilateral 11/5/2020    Procedure: LAMINECTOMY, SPINE, LUMBAR, WITH FUSION;  Surgeon: Mateusz Blanco MD;  Location: [facility] OR;  Service: Orthopedics;  Laterality: Bilateral;  MEDTRONIC  NTI  L-3    PILONIDAL CYST DRAINAGE      removal of abcess      scrotal    REMOVAL OF HARDWARE FROM SPINE Bilateral 11/5/2020    Procedure: REMOVAL, HARDWARE, SPINE;  Surgeon: Mateusz Blanco MD;  Location: [facility] OR;  Service: Orthopedics;  Laterality: Bilateral;  L 4-5    TYMPANOSTOMY TUBE PLACEMENT         Review of patient's allergies indicates:   Allergen Reactions    Inapsine [droperidol] Anaphylaxis     seizures    Effexor [venlafaxine]      Increased anxiety    Pcn [penicillins]     Bactrim [sulfamethoxazole-trimethoprim] Rash     Dry red rash all  over when in the sun    Fluconazole Rash     Pruritis         No current facility-administered medications on file prior to encounter.     Current Outpatient Medications on File Prior to Encounter   Medication Sig    anastrozole (ARIMIDEX) 1 mg Tab Take 1 mg by mouth every 7 days.    aspirin (ECOTRIN) 81 MG EC tablet Take 81 mg by mouth once daily.    ENTRESTO 24-26 mg per tablet Take 1 tablet by mouth.    furosemide (LASIX) 40 MG tablet Take 40 mg by mouth once daily.    hydrOXYzine pamoate (VISTARIL) 50 MG Cap Take 1 capsule (50 mg total) by mouth every 8 (eight) hours as needed.    JARDIANCE 25 mg tablet Take 25 mg by mouth Daily.    L-METHYLFOLATE FORTE 15-90.314 mg Cap Take 1 capsule by mouth Daily.    metoprolol tartrate (LOPRESSOR) 25 MG tablet Take 50 mg by mouth 2 (two) times daily.    naltrexone microspheres (VIVITROL) 380 mg SSRR kit Inject 1 each (380 mg total) into the muscle every 30 days.    ondansetron (ZOFRAN-ODT) 4 MG TbDL Take 1 tablet (4 mg total) by mouth every 8 (eight) hours as needed (nausea, vomiting).    potassium chloride SA (K-DUR,KLOR-CON) 20 MEQ tablet Take 20 mEq by mouth once daily.    rosuvastatin (CRESTOR) 40 MG Tab Take 40 mg by mouth once daily.    sertraline (ZOLOFT) 100 MG tablet Take 1.5 tablets (150 mg total) by mouth once daily.    testosterone cypionate (DEPOTESTOTERONE CYPIONATE) 200 mg/mL injection Inject 200 mg into the muscle every 7 days.    traZODone (DESYREL) 100 MG tablet Take 2 tablets (200 mg total) by mouth every evening.    [DISCONTINUED] JARDIANCE 10 mg tablet Take 10 mg by mouth.     Family History       Problem Relation (Age of Onset)    Diabetes Maternal Aunt, Maternal Uncle, Maternal Grandfather    Early death Father    Heart disease Mother, Father    Hypertension Mother    Migraines Mother          Tobacco Use    Smoking status: Former     Current packs/day: 0.00     Types: Cigarettes     Quit date: 2016     Years since quittin.8     Passive  exposure: Past    Smokeless tobacco: Former     Quit date: 6/5/2016    Tobacco comments:     occasional   Substance and Sexual Activity    Alcohol use: Yes     Alcohol/week: 1.0 standard drink of alcohol     Types: 1 Glasses of wine per week     Comment: 20 - 30 shots vodka per day or 1-2 1/5ths per day for 2 years (started 2021)    Drug use: No    Sexual activity: Yes     Partners: Female     Review of Systems  ROS:  Constitutional: Negative For: Chills, Fever, and Weight Change.  Positive for fatigue  Eyes: Negative For: Changes in vision, Drainage from eye, Eyes tearing/watery, Eye redness.  Ears, Nose, Mouth & Throat: Negative For: Nasal congestion, Rhinorrhea, Throat soreness.  Respiratory: Negative For: Cough, Shortness of breath.  Cardiovascular: Negative For: Chest pain, Swelling in leg or feet.  Positive for palpitations  Gastrointestinal: Negative For: Acid reflux, Blood in stool, Constipation, Diarrhea, positive for abdominal pains, Nausea, Vomiting.  : Negative For: Dysuria, hematuria, or incontinence,  Musculoskeletal: Negative For: Muscle weakness, Numbness/tingling, Trauma.  Integumentary: Negative For: Itch, Lesion(s), Rash, Skin changes.  Neurological: Negative For: Dizziness, Falls, Headache.  Psychiatric: Negative For: Anxiety/worry, Depression, and Sleep disturbance.  Endocrine: Negative For: cold or heat intolerance  Hematologic: Negative For: Easy bleeding/bruising    Objective:     Vital Signs (Most Recent):  Temp: 98.5 °F (36.9 °C) (11/10/23 0514)  Pulse: 89 (11/10/23 1915)  Resp: 16 (11/10/23 1915)  BP: (!) 158/84 (11/10/23 1915)  SpO2: 96 % (11/10/23 1915) Vital Signs (24h Range):  Temp:  [98.5 °F (36.9 °C)] 98.5 °F (36.9 °C)  Pulse:  [] 89  Resp:  [16-18] 16  SpO2:  [88 %-98 %] 96 %  BP: (122-178)/() 158/84     Weight: 111.1 kg (245 lb 0.7 oz)  Body mass index is 38.38 kg/m².     Physical Exam   General:     Diaphoretic, uncomfortable male in bed  Eyes:    Anicteric sclera.  Conjunctiva non-injected  HENT:    Head normocephalic, atraumatic. Mucous membranes dry.   Neck:   Trachea midline, no thyromegaly.  No JVD or adenopathy.   Heart:    S1, S2 auscultated. No murmurs rubs or gallops. Regular rhythm and rate.   Lungs:    Bilaterally clear in all lobes with equal chest rise.  No wheezes, rales, or rhonchi.   Abdomen:    Soft, mild abdominal tenderness in the epigastrium, nondistended.  Positive bowel sounds. No palpable masses.  No rebound or guarding.   Extremities:    Without clubbing or cyanosis.  No edema.  Peripheral pulses II/IV.  Neuro:    Alert, awake and oriented x3.  Cranial nerves appear grossly intact on limited neurological exam.  No focal motor or sensory deficit.  Skin:    No rashes, exudates, or nodules on limited skin exam  Psych:    Normal mood, affect and behavior with intact judgement and insight.        Significant Labs: All pertinent labs within the past 24 hours have been reviewed.  CBC:   Recent Labs   Lab 11/10/23  0630   WBC 3.68*   HGB 14.4   HCT 44.2   *     CMP:   Recent Labs   Lab 11/10/23  0630   *   K 3.8   CL 98   CO2 17*   *   BUN 18   CREATININE 1.3   CALCIUM 10.6*   PROT 9.1*   ALBUMIN 4.5   BILITOT 0.4   ALKPHOS 63   AST 48*   ALT 43   ANIONGAP 20*     Lipase:   Recent Labs   Lab 11/10/23  0630   LIPASE 76*       Significant Imaging: I have reviewed all pertinent imaging results/findings within the past 24 hours.    EKG shows sinus tachycardia with a T-wave inversion in leads 2, 3, AVF and V6 per my read.  Old infarctions.    Assessment/Plan:     * Alcohol withdrawal  Last drink 3:00 a.m. 11/10/2023   Started on Precedex in ED.  Continue Precedex drip  Start Librium 50 mg q.6h for 1 day then deescalate.  P.r.n. Ativan  Obtain UDS  Seizure and fall precautions  Daily thiamine and multivitamin  P.r.n. electrolyte replacement  NS at 125 mL/hr      High anion gap metabolic acidosis  Secondary to above.  Continue to monitor with  regular CMPs.      Elevated lipase  Lipase 76.  Clinically patient does not appear to have acute pancreatitis.  We will continue IV fluids per management of alcohol withdrawal.    Patient tolerated lunch well.  We will continue with the diet and make NPO if having intractable nausea and vomiting.  If clinically starts to have worsening nausea, vomiting, or abdominal pains, make patient NPO and obtain abdominal ultrasound or CT abdomen.  MELD score 0 (no LDH ordered)      MYNOR (generalized anxiety disorder)  Continue home Zoloft      Major depressive disorder with single episode  Continue Zoloft      VTE Risk Mitigation (From admission, onward)           Ordered     enoxaparin injection 40 mg  Daily         11/10/23 2105     IP VTE HIGH RISK PATIENT  Once         11/10/23 2105     Place sequential compression device  Until discontinued         11/10/23 2105                  Critical care time spent on the evaluation and treatment of severe organ dysfunction, review of pertinent labs and imaging studies, discussions with consulting providers and discussions with patient/family: 32 minutes.                 Grabiel Beatty DO  Department of Hospital Medicine  [facility]

## 2023-11-11 NOTE — HPI
Patient is a 48-year-old male with a history of alcohol use disorder, coronary artery disease, hypertension, and hyperlipidemia who presented to [facility] for evaluation tremors.  Patient states he has been drinking alcohol for the past 3 years.  At his peak he drank 3 5th of vodka a day.  He currently drinks 1-2 5th of vodka a day.  He has been in withdrawals before but this is the worst he is ever been.  His last drink was 3:00 a.m. on 11/10/2023.  He is an appointment on an outpatient detox facility in about 3 days.  He admits to some nausea and vomiting prior to arrival to the emergency department.  He was able to eat a normal lunch without any nausea or vomiting.  He is some mild midepigastric abdominal pain that does not radiate.  Denies any constipation, diarrhea, tarry stools, bloody stools, chest pains, or shortness of breath.    He was evaluated in the ED on 11/08/2023 for similar symptoms but was not admitted.  He was started on a Precedex drip and psych eval to him.  He has been transferred over to [facility] for ICU management.  Hemoglobin 14.4, WBC 3.6, platelets 127, sodium 135, potassium 3.8, creatinine 1.3, lipase 76, bicarb 17, anion gap 20 troponin 0.03, EKG shows sinus tachycardia with T-wave inversions in leads 2, 3, AVF, and V6.    During my encounter, patient still feels tremulous and fatigued.  Still complains of mild midepigastric abdominal pain that does not radiate.  Symptoms are slightly improved from the emergency department.  He believes he is able to tolerate an oral diet.

## 2023-11-11 NOTE — ASSESSMENT & PLAN NOTE
Lipase 76.  Clinically patient does not appear to have acute pancreatitis.  We will continue IV fluids per management of alcohol withdrawal.    Patient tolerated lunch well.  We will continue with the diet and make NPO if having intractable nausea and vomiting.  If clinically starts to have worsening nausea, vomiting, or abdominal pains, make patient NPO and obtain abdominal ultrasound or CT abdomen.

## 2023-11-11 NOTE — ASSESSMENT & PLAN NOTE
Last drink 3:00 a.m. 11/10/2023   Started on Precedex in ED.  Continue Precedex drip  Start Librium 50 mg q.6h for 1 day then deescalate.  P.r.n. Ativan  Obtain UDS  Seizure and fall precautions  Daily thiamine and multivitamin  P.r.n. electrolyte replacement  NS at 125 mL/hr

## 2023-11-11 NOTE — NURSING
Nurses Note -- 4 Eyes      11/10/2023   9:10 PM      Skin assessed during: Admit      [x] No Altered Skin Integrity Present    []Prevention Measures Documented      [] Yes- Altered Skin Integrity Present or Discovered   [] LDA Added if Not in Epic (Describe Wound)   [] New Altered Skin Integrity was Present on Admit and Documented in LDA   [] Wound Image Taken    Wound Care Consulted? No    Attending Nurse:  Guzman De Los Santos RN/Staff Member:   Gregory

## 2023-11-12 PROBLEM — R74.8 ABNORMAL LIVER ENZYMES: Status: ACTIVE | Noted: 2023-11-12

## 2023-11-12 LAB
ALBUMIN SERPL BCP-MCNC: 3.6 G/DL (ref 3.5–5.2)
ALP SERPL-CCNC: 43 U/L (ref 55–135)
ALT SERPL W/O P-5'-P-CCNC: 103 U/L (ref 10–44)
ANION GAP SERPL CALC-SCNC: 6 MMOL/L (ref 8–16)
AST SERPL-CCNC: 105 U/L (ref 10–40)
BILIRUB SERPL-MCNC: 0.5 MG/DL (ref 0.1–1)
BUN SERPL-MCNC: 15 MG/DL (ref 6–20)
CALCIUM SERPL-MCNC: 8.8 MG/DL (ref 8.7–10.5)
CHLORIDE SERPL-SCNC: 103 MMOL/L (ref 95–110)
CO2 SERPL-SCNC: 26 MMOL/L (ref 23–29)
CREAT SERPL-MCNC: 1.1 MG/DL (ref 0.5–1.4)
ERYTHROCYTE [DISTWIDTH] IN BLOOD BY AUTOMATED COUNT: 14.9 % (ref 11.5–14.5)
EST. GFR  (NO RACE VARIABLE): >60 ML/MIN/1.73 M^2
GLUCOSE SERPL-MCNC: 94 MG/DL (ref 70–110)
HCT VFR BLD AUTO: 39.2 % (ref 40–54)
HGB BLD-MCNC: 12.8 G/DL (ref 14–18)
MAGNESIUM SERPL-MCNC: 1.8 MG/DL (ref 1.6–2.6)
MAGNESIUM SERPL-MCNC: 2 MG/DL (ref 1.6–2.6)
MCH RBC QN AUTO: 27.2 PG (ref 27–31)
MCHC RBC AUTO-ENTMCNC: 32.7 G/DL (ref 32–36)
MCV RBC AUTO: 83 FL (ref 82–98)
PHOSPHATE SERPL-MCNC: 3.3 MG/DL (ref 2.7–4.5)
PLATELET # BLD AUTO: 98 K/UL (ref 150–450)
PMV BLD AUTO: 9.6 FL (ref 9.2–12.9)
POTASSIUM SERPL-SCNC: 4.2 MMOL/L (ref 3.5–5.1)
PROT SERPL-MCNC: 6.6 G/DL (ref 6–8.4)
RBC # BLD AUTO: 4.71 M/UL (ref 4.6–6.2)
SODIUM SERPL-SCNC: 135 MMOL/L (ref 136–145)
WBC # BLD AUTO: 3.76 K/UL (ref 3.9–12.7)

## 2023-11-12 PROCEDURE — 84100 ASSAY OF PHOSPHORUS: CPT | Performed by: STUDENT IN AN ORGANIZED HEALTH CARE EDUCATION/TRAINING PROGRAM

## 2023-11-12 PROCEDURE — 63600175 PHARM REV CODE 636 W HCPCS: Performed by: INTERNAL MEDICINE

## 2023-11-12 PROCEDURE — 83735 ASSAY OF MAGNESIUM: CPT | Mod: 91 | Performed by: INTERNAL MEDICINE

## 2023-11-12 PROCEDURE — 25000003 PHARM REV CODE 250: Performed by: INTERNAL MEDICINE

## 2023-11-12 PROCEDURE — 80053 COMPREHEN METABOLIC PANEL: CPT | Performed by: STUDENT IN AN ORGANIZED HEALTH CARE EDUCATION/TRAINING PROGRAM

## 2023-11-12 PROCEDURE — 20000000 HC ICU ROOM

## 2023-11-12 PROCEDURE — 94761 N-INVAS EAR/PLS OXIMETRY MLT: CPT

## 2023-11-12 PROCEDURE — 25000003 PHARM REV CODE 250: Performed by: STUDENT IN AN ORGANIZED HEALTH CARE EDUCATION/TRAINING PROGRAM

## 2023-11-12 PROCEDURE — 36415 COLL VENOUS BLD VENIPUNCTURE: CPT | Performed by: INTERNAL MEDICINE

## 2023-11-12 PROCEDURE — 85027 COMPLETE CBC AUTOMATED: CPT | Performed by: STUDENT IN AN ORGANIZED HEALTH CARE EDUCATION/TRAINING PROGRAM

## 2023-11-12 PROCEDURE — 27000221 HC OXYGEN, UP TO 24 HOURS

## 2023-11-12 PROCEDURE — 99900035 HC TECH TIME PER 15 MIN (STAT)

## 2023-11-12 PROCEDURE — 36415 COLL VENOUS BLD VENIPUNCTURE: CPT | Performed by: STUDENT IN AN ORGANIZED HEALTH CARE EDUCATION/TRAINING PROGRAM

## 2023-11-12 PROCEDURE — 94660 CPAP INITIATION&MGMT: CPT

## 2023-11-12 PROCEDURE — 83735 ASSAY OF MAGNESIUM: CPT | Performed by: STUDENT IN AN ORGANIZED HEALTH CARE EDUCATION/TRAINING PROGRAM

## 2023-11-12 RX ORDER — LANOLIN ALCOHOL/MO/W.PET/CERES
800 CREAM (GRAM) TOPICAL
Status: DISCONTINUED | OUTPATIENT
Start: 2023-11-12 | End: 2023-11-14 | Stop reason: HOSPADM

## 2023-11-12 RX ADMIN — DEXMEDETOMIDINE HYDROCHLORIDE 0.5 MCG/KG/HR: 400 INJECTION INTRAVENOUS at 11:11

## 2023-11-12 RX ADMIN — MULTIVITAMIN TABLET 1 TABLET: TABLET at 08:11

## 2023-11-12 RX ADMIN — SACUBITRIL AND VALSARTAN 1 TABLET: 24; 26 TABLET, FILM COATED ORAL at 08:11

## 2023-11-12 RX ADMIN — LORAZEPAM 2 MG: 1 TABLET ORAL at 01:11

## 2023-11-12 RX ADMIN — LORAZEPAM 2 MG: 1 TABLET ORAL at 08:11

## 2023-11-12 RX ADMIN — ENOXAPARIN SODIUM 40 MG: 40 INJECTION SUBCUTANEOUS at 05:11

## 2023-11-12 RX ADMIN — Medication 800 MG: at 11:11

## 2023-11-12 RX ADMIN — LORAZEPAM 2 MG: 1 TABLET ORAL at 06:11

## 2023-11-12 RX ADMIN — DEXMEDETOMIDINE HYDROCHLORIDE 0.5 MCG/KG/HR: 400 INJECTION INTRAVENOUS at 10:11

## 2023-11-12 RX ADMIN — LORAZEPAM 2 MG: 1 TABLET ORAL at 11:11

## 2023-11-12 RX ADMIN — SODIUM CHLORIDE: 9 INJECTION, SOLUTION INTRAVENOUS at 04:11

## 2023-11-12 RX ADMIN — LORAZEPAM 2 MG: 1 TABLET ORAL at 12:11

## 2023-11-12 RX ADMIN — DEXMEDETOMIDINE HYDROCHLORIDE 0.5 MCG/KG/HR: 400 INJECTION INTRAVENOUS at 05:11

## 2023-11-12 RX ADMIN — SODIUM CHLORIDE: 9 INJECTION, SOLUTION INTRAVENOUS at 12:11

## 2023-11-12 RX ADMIN — ONDANSETRON 4 MG: 2 INJECTION INTRAMUSCULAR; INTRAVENOUS at 06:11

## 2023-11-12 RX ADMIN — METOPROLOL TARTRATE 50 MG: 50 TABLET, FILM COATED ORAL at 08:11

## 2023-11-12 RX ADMIN — SERTRALINE HYDROCHLORIDE 150 MG: 50 TABLET ORAL at 08:11

## 2023-11-12 RX ADMIN — Medication 800 MG: at 05:11

## 2023-11-12 RX ADMIN — ASPIRIN 81 MG: 81 TABLET, COATED ORAL at 08:11

## 2023-11-12 RX ADMIN — DEXMEDETOMIDINE HYDROCHLORIDE 0.5 MCG/KG/HR: 400 INJECTION INTRAVENOUS at 03:11

## 2023-11-12 RX ADMIN — THIAMINE HCL TAB 100 MG 100 MG: 100 TAB at 08:11

## 2023-11-12 RX ADMIN — SODIUM CHLORIDE: 9 INJECTION, SOLUTION INTRAVENOUS at 09:11

## 2023-11-12 RX ADMIN — PROCHLORPERAZINE EDISYLATE 5 MG: 5 INJECTION INTRAMUSCULAR; INTRAVENOUS at 10:11

## 2023-11-12 NOTE — PLAN OF CARE
Problem: Adult Inpatient Plan of Care  Goal: Plan of Care Review  Outcome: Ongoing, Progressing  Goal: Patient-Specific Goal (Individualized)  Outcome: Ongoing, Progressing  Goal: Absence of Hospital-Acquired Illness or Injury  Outcome: Ongoing, Progressing  Goal: Optimal Comfort and Wellbeing  Outcome: Ongoing, Progressing  Goal: Readiness for Transition of Care  Outcome: Ongoing, Progressing     Problem: Oral Intake Inadequate  Goal: Improved Oral Intake  Outcome: Ongoing, Progressing     Problem: Alcohol Withdrawal  Goal: Alcohol Withdrawal Symptom Control  Outcome: Ongoing, Progressing     Problem: Acute Neurologic Deterioration (Alcohol Withdrawal)  Goal: Optimal Neurologic Function  Outcome: Ongoing, Progressing     Problem: Substance Misuse (Alcohol Withdrawal)  Goal: Readiness for Change Identified  Outcome: Ongoing, Progressing     Problem: Fall Injury Risk  Goal: Absence of Fall and Fall-Related Injury  Outcome: Ongoing, Progressing

## 2023-11-12 NOTE — ASSESSMENT & PLAN NOTE
Last drink 3:00 a.m. 11/10/2023   We will wean off Precedex drip  Start Librium 50 mg q.6h for 1 day then deescalate.  P.r.n. Ativan  Obtain UDS  Seizure and fall precautions  Daily thiamine and multivitamin  P.r.n. electrolyte replacement  Stop IV fluids

## 2023-11-12 NOTE — SUBJECTIVE & OBJECTIVE
Objective:     Vital Signs (Most Recent):  Temp: 97.8 °F (36.6 °C) (11/12/23 0830)  Pulse: 80 (11/12/23 0844)  Resp: (!) 29 (11/12/23 0800)  BP: 139/77 (11/12/23 0844)  SpO2: 97 % (11/12/23 0844) Vital Signs (24h Range):  Temp:  [97.8 °F (36.6 °C)-98 °F (36.7 °C)] 97.8 °F (36.6 °C)  Pulse:  [65-96] 80  Resp:  [13-29] 29  SpO2:  [96 %-100 %] 97 %  BP: (117-149)/(68-89) 139/77     Weight: 111.1 kg (244 lb 14.9 oz)  Body mass index is 38.36 kg/m².    Intake/Output Summary (Last 24 hours) at 11/12/2023 0957  Last data filed at 11/12/2023 0626  Gross per 24 hour   Intake 5976.36 ml   Output 3425 ml   Net 2551.36 ml         Physical Exam  Constitutional:       General: He is not in acute distress.  HENT:      Head: Normocephalic and atraumatic.      Nose: No congestion.   Eyes:      General: No scleral icterus.        Right eye: No discharge.         Left eye: No discharge.      Extraocular Movements: Extraocular movements intact.   Cardiovascular:      Rate and Rhythm: Normal rate and regular rhythm.   Pulmonary:      Effort: Pulmonary effort is normal.      Breath sounds: Normal breath sounds.   Abdominal:      General: Abdomen is flat. Bowel sounds are normal.   Musculoskeletal:         General: No swelling or tenderness.      Cervical back: Normal range of motion and neck supple. No rigidity.   Skin:     General: Skin is warm.   Neurological:      General: No focal deficit present.      Mental Status: He is alert and oriented to person, place, and time.   Psychiatric:         Mood and Affect: Mood normal.             Significant Labs: All pertinent labs within the past 24 hours have been reviewed.  CBC:   Recent Labs   Lab 11/11/23 0341 11/12/23  0500   WBC 3.58* 3.76*   HGB 12.6* 12.8*   HCT 38.8* 39.2*   PLT 86* 98*     CMP:   Recent Labs   Lab 11/11/23 0341 11/12/23  0500   * 135*   K 4.3 4.2    103   CO2 25 26    94   BUN 19 15   CREATININE 1.2 1.1   CALCIUM 8.7 8.8   PROT 6.8 6.6   ALBUMIN  3.8 3.6   BILITOT 0.7 0.5   ALKPHOS 44* 43*   AST 29 105*   ALT 27 103*   ANIONGAP 6* 6*       Significant Imaging: I have reviewed all pertinent imaging results/findings within the past 24 hours.

## 2023-11-12 NOTE — PROGRESS NOTES
Count includes the [facility] Medicine  Progress Note    Patient Name: Lee Quintanilla  MRN: [id number]  Patient Class: IP- Inpatient   Admission Date: 11/10/2023  Length of Stay: 2 days  Attending Physician: No att. providers found  Primary Care Provider: Riya Vidales NP        Subjective:     Principal Problem:Alcohol withdrawal        HPI:  Patient is a 48-year-old male with a history of alcohol use disorder, coronary artery disease, hypertension, and hyperlipidemia who presented to [facility] for evaluation tremors.  Patient states he has been drinking alcohol for the past 3 years.  At his peak he drank 3 5th of vodka a day.  He currently drinks 1-2 5th of vodka a day.  He has been in withdrawals before but this is the worst he is ever been.  His last drink was 3:00 a.m. on 11/10/2023.  He is an appointment on an outpatient detox facility in about 3 days.  He admits to some nausea and vomiting prior to arrival to the emergency department.  He was able to eat a normal lunch without any nausea or vomiting.  He is some mild midepigastric abdominal pain that does not radiate.  Denies any constipation, diarrhea, tarry stools, bloody stools, chest pains, or shortness of breath.    He was evaluated in the ED on 11/08/2023 for similar symptoms but was not admitted.  He was started on a Precedex drip and psych eval to him.  He has been transferred over to [facility] for ICU management.  Hemoglobin 14.4, WBC 3.6, platelets 127, sodium 135, potassium 3.8, creatinine 1.3, lipase 76, bicarb 17, anion gap 20 troponin 0.03, EKG shows sinus tachycardia with T-wave inversions in leads 2, 3, AVF, and V6.    During my encounter, patient still feels tremulous and fatigued.  Still complains of mild midepigastric abdominal pain that does not radiate.  Symptoms are slightly improved from the emergency department.  He believes he is able to tolerate an oral diet.      Overview/Hospital  Course:  November 12, 2023  Nausea and tremor improving.        Objective:     Vital Signs (Most Recent):  Temp: 97.8 °F (36.6 °C) (11/12/23 0830)  Pulse: 80 (11/12/23 0844)  Resp: (!) 29 (11/12/23 0800)  BP: 139/77 (11/12/23 0844)  SpO2: 97 % (11/12/23 0844) Vital Signs (24h Range):  Temp:  [97.8 °F (36.6 °C)-98 °F (36.7 °C)] 97.8 °F (36.6 °C)  Pulse:  [65-96] 80  Resp:  [13-29] 29  SpO2:  [96 %-100 %] 97 %  BP: (117-149)/(68-89) 139/77     Weight: 111.1 kg (244 lb 14.9 oz)  Body mass index is 38.36 kg/m².    Intake/Output Summary (Last 24 hours) at 11/12/2023 0957  Last data filed at 11/12/2023 0626  Gross per 24 hour   Intake 5976.36 ml   Output 3425 ml   Net 2551.36 ml         Physical Exam  Constitutional:       General: He is not in acute distress.  HENT:      Head: Normocephalic and atraumatic.      Nose: No congestion.   Eyes:      General: No scleral icterus.        Right eye: No discharge.         Left eye: No discharge.      Extraocular Movements: Extraocular movements intact.   Cardiovascular:      Rate and Rhythm: Normal rate and regular rhythm.   Pulmonary:      Effort: Pulmonary effort is normal.      Breath sounds: Normal breath sounds.   Abdominal:      General: Abdomen is flat. Bowel sounds are normal.   Musculoskeletal:         General: No swelling or tenderness.      Cervical back: Normal range of motion and neck supple. No rigidity.   Skin:     General: Skin is warm.   Neurological:      General: No focal deficit present.      Mental Status: He is alert and oriented to person, place, and time.   Psychiatric:         Mood and Affect: Mood normal.             Significant Labs: All pertinent labs within the past 24 hours have been reviewed.  CBC:   Recent Labs   Lab 11/11/23  0341 11/12/23  0500   WBC 3.58* 3.76*   HGB 12.6* 12.8*   HCT 38.8* 39.2*   PLT 86* 98*     CMP:   Recent Labs   Lab 11/11/23  0341 11/12/23  0500   * 135*   K 4.3 4.2    103   CO2 25 26    94   BUN 19 15    CREATININE 1.2 1.1   CALCIUM 8.7 8.8   PROT 6.8 6.6   ALBUMIN 3.8 3.6   BILITOT 0.7 0.5   ALKPHOS 44* 43*   AST 29 105*   ALT 27 103*   ANIONGAP 6* 6*       Significant Imaging: I have reviewed all pertinent imaging results/findings within the past 24 hours.      Assessment/Plan:      * Alcohol withdrawal  Last drink 3:00 a.m. 11/10/2023   Still on Precedex.  Hopefully we can wean him off tomorrow  Continue Librium 50 mg q.6h for 1 day then deescalate.  P.r.n. Ativan  Obtain UDS  Seizure and fall precautions  Daily thiamine and multivitamin  P.r.n. electrolyte replacement  NS at 125 mL/hr      High anion gap metabolic acidosis  Secondary to above.  Continue to monitor with regular CMPs.      Elevated lipase  Lipase 76.  Clinically patient does not appear to have acute pancreatitis.  We will continue IV fluids per management of alcohol withdrawal.    Patient tolerated lunch well.  We will continue with the diet and make NPO if having intractable nausea and vomiting.  If clinically starts to have worsening nausea, vomiting, or abdominal pains, make patient NPO and obtain abdominal ultrasound or CT abdomen.      MYNOR (generalized anxiety disorder)  Continue home Zoloft      Major depressive disorder with single episode  Continue Zoloft        VTE Risk Mitigation (From admission, onward)           Ordered     enoxaparin injection 40 mg  Daily         11/10/23 2105     IP VTE HIGH RISK PATIENT  Once         11/10/23 2105     Place sequential compression device  Until discontinued         11/10/23 2105                    Discharge Planning   FANTA:      Code Status: Full Code   Is the patient medically ready for discharge?:     Reason for patient still in hospital (select all that apply): Treatment  Discharge Plan A: Rehab (drug)            Critical care time spent on the evaluation and treatment of severe organ dysfunction, review of pertinent labs and imaging studies, discussions with consulting providers and discussions  with patient/family: 35 minutes.      Cam Quevedo MD  Department of Hospital Medicine   [facility]

## 2023-11-12 NOTE — PLAN OF CARE
Problem: Adult Inpatient Plan of Care  Goal: Optimal Comfort and Wellbeing  Outcome: Ongoing, Progressing     Problem: Alcohol Withdrawal  Goal: Alcohol Withdrawal Symptom Control  11/12/2023 0508 by Betzy Buitrago RN  Outcome: Ongoing, Progressing  11/12/2023 0508 by Betzy Buitrago RN  Outcome: Ongoing, Progressing     Problem: Acute Neurologic Deterioration (Alcohol Withdrawal)  Goal: Optimal Neurologic Function  11/12/2023 0508 by Betzy Buitrago RN  Outcome: Ongoing, Progressing  11/12/2023 0508 by Betzy Buitrago RN  Outcome: Ongoing, Progressing     Problem: Substance Misuse (Alcohol Withdrawal)  Goal: Readiness for Change Identified  11/12/2023 0508 by Betzy Buitrago RN  Outcome: Ongoing, Progressing  11/12/2023 0508 by Betzy Buitrago RN  Outcome: Ongoing, Progressing     Pt AAOx4. PERRLA noted. NSR on the monitor. BP stable. Pt on 2L NC. Pt uncooperative with CPAP overnight. Pt educated on the importance of wearing CPAP. Pt continued to periodically remove CPAP and eventually refuse it. Precedex infusing at 0.5mcg/kg/hr. PRN Ativan utilized for tremors/diaphoresis. 2125mL UO. PRN Zofran utilized for nausea.     Bed locked in the lowest position. Bed alarm set. Call light and personal belongings within reach. Suction at bedside and siderails padded.

## 2023-11-12 NOTE — PLAN OF CARE
11/11/23 2053   Patient Assessment/Suction   Level of Consciousness (AVPU) alert   Respiratory Effort Normal;Unlabored   All Lung Fields Breath Sounds clear;equal bilaterally   PRE-TX-O2   Device (Oxygen Therapy) nasal cannula   Flow (L/min) 3   SpO2 99 %   Pulse Oximetry Type Continuous   $ Pulse Oximetry - Multiple Charge Pulse Oximetry - Multiple   Pulse 73   Resp (!) 23   Preset CPAP/BiPAP Settings   Mode Of Delivery Standby;CPAP   $ CPAP/BiPAP Daily Charge BiPAP/CPAP Daily   $ Initial CPAP/BiPAP Setup? No   Equipment Type V60   CPAP (cm H2O) 10   CPAP/BiPAP Alarms   High Pressure (cm H2O) 40   Low Pressure (cm H2O) 10   High RR (breaths/min) 40   Low RR (breaths/min) 10   Apnea (Sec) 20   Education   $ Education BiPAP;15 min   Respiratory Evaluation   $ Care Plan Tech Time 15 min

## 2023-11-13 LAB
ALBUMIN SERPL BCP-MCNC: 3.7 G/DL (ref 3.5–5.2)
ALP SERPL-CCNC: 47 U/L (ref 55–135)
ALT SERPL W/O P-5'-P-CCNC: 114 U/L (ref 10–44)
ANION GAP SERPL CALC-SCNC: 7 MMOL/L (ref 8–16)
AST SERPL-CCNC: 84 U/L (ref 10–40)
BILIRUB SERPL-MCNC: 0.5 MG/DL (ref 0.1–1)
BUN SERPL-MCNC: 13 MG/DL (ref 6–20)
CALCIUM SERPL-MCNC: 8.5 MG/DL (ref 8.7–10.5)
CHLORIDE SERPL-SCNC: 104 MMOL/L (ref 95–110)
CO2 SERPL-SCNC: 24 MMOL/L (ref 23–29)
CREAT SERPL-MCNC: 1.1 MG/DL (ref 0.5–1.4)
ERYTHROCYTE [DISTWIDTH] IN BLOOD BY AUTOMATED COUNT: 15.4 % (ref 11.5–14.5)
EST. GFR  (NO RACE VARIABLE): >60 ML/MIN/1.73 M^2
GLUCOSE SERPL-MCNC: 90 MG/DL (ref 70–110)
HCT VFR BLD AUTO: 40.3 % (ref 40–54)
HGB BLD-MCNC: 12.7 G/DL (ref 14–18)
MAGNESIUM SERPL-MCNC: 2 MG/DL (ref 1.6–2.6)
MCH RBC QN AUTO: 26.6 PG (ref 27–31)
MCHC RBC AUTO-ENTMCNC: 31.5 G/DL (ref 32–36)
MCV RBC AUTO: 85 FL (ref 82–98)
PHOSPHATE SERPL-MCNC: 3.4 MG/DL (ref 2.7–4.5)
PLATELET # BLD AUTO: 111 K/UL (ref 150–450)
PMV BLD AUTO: 10.8 FL (ref 9.2–12.9)
POTASSIUM SERPL-SCNC: 4.1 MMOL/L (ref 3.5–5.1)
PROT SERPL-MCNC: 6.7 G/DL (ref 6–8.4)
RBC # BLD AUTO: 4.77 M/UL (ref 4.6–6.2)
SODIUM SERPL-SCNC: 135 MMOL/L (ref 136–145)
WBC # BLD AUTO: 3.31 K/UL (ref 3.9–12.7)

## 2023-11-13 PROCEDURE — 25000003 PHARM REV CODE 250: Performed by: INTERNAL MEDICINE

## 2023-11-13 PROCEDURE — 27000221 HC OXYGEN, UP TO 24 HOURS

## 2023-11-13 PROCEDURE — 80053 COMPREHEN METABOLIC PANEL: CPT | Performed by: STUDENT IN AN ORGANIZED HEALTH CARE EDUCATION/TRAINING PROGRAM

## 2023-11-13 PROCEDURE — 94799 UNLISTED PULMONARY SVC/PX: CPT

## 2023-11-13 PROCEDURE — 83735 ASSAY OF MAGNESIUM: CPT | Performed by: STUDENT IN AN ORGANIZED HEALTH CARE EDUCATION/TRAINING PROGRAM

## 2023-11-13 PROCEDURE — A4216 STERILE WATER/SALINE, 10 ML: HCPCS | Performed by: INTERNAL MEDICINE

## 2023-11-13 PROCEDURE — 36415 COLL VENOUS BLD VENIPUNCTURE: CPT | Performed by: STUDENT IN AN ORGANIZED HEALTH CARE EDUCATION/TRAINING PROGRAM

## 2023-11-13 PROCEDURE — 25000003 PHARM REV CODE 250: Performed by: STUDENT IN AN ORGANIZED HEALTH CARE EDUCATION/TRAINING PROGRAM

## 2023-11-13 PROCEDURE — 85027 COMPLETE CBC AUTOMATED: CPT | Performed by: STUDENT IN AN ORGANIZED HEALTH CARE EDUCATION/TRAINING PROGRAM

## 2023-11-13 PROCEDURE — 99900035 HC TECH TIME PER 15 MIN (STAT)

## 2023-11-13 PROCEDURE — 99900031 HC PATIENT EDUCATION (STAT)

## 2023-11-13 PROCEDURE — 20000000 HC ICU ROOM

## 2023-11-13 PROCEDURE — 21000000 HC CCU ICU ROOM CHARGE

## 2023-11-13 PROCEDURE — 63600175 PHARM REV CODE 636 W HCPCS: Performed by: INTERNAL MEDICINE

## 2023-11-13 PROCEDURE — 84100 ASSAY OF PHOSPHORUS: CPT | Performed by: STUDENT IN AN ORGANIZED HEALTH CARE EDUCATION/TRAINING PROGRAM

## 2023-11-13 PROCEDURE — 94761 N-INVAS EAR/PLS OXIMETRY MLT: CPT

## 2023-11-13 RX ORDER — CARBOXYMETHYLCELLULOSE SODIUM 5 MG/ML
1 SOLUTION/ DROPS OPHTHALMIC 3 TIMES DAILY PRN
Status: DISCONTINUED | OUTPATIENT
Start: 2023-11-13 | End: 2023-11-14 | Stop reason: HOSPADM

## 2023-11-13 RX ORDER — ACETAMINOPHEN 325 MG/1
650 TABLET ORAL EVERY 6 HOURS PRN
Status: DISCONTINUED | OUTPATIENT
Start: 2023-11-13 | End: 2023-11-14 | Stop reason: HOSPADM

## 2023-11-13 RX ADMIN — LORAZEPAM 2 MG: 1 TABLET ORAL at 11:11

## 2023-11-13 RX ADMIN — ONDANSETRON 4 MG: 2 INJECTION INTRAMUSCULAR; INTRAVENOUS at 11:11

## 2023-11-13 RX ADMIN — ONDANSETRON 4 MG: 2 INJECTION INTRAMUSCULAR; INTRAVENOUS at 05:11

## 2023-11-13 RX ADMIN — ASPIRIN 81 MG: 81 TABLET, COATED ORAL at 09:11

## 2023-11-13 RX ADMIN — DEXMEDETOMIDINE HYDROCHLORIDE 0.5 MCG/KG/HR: 400 INJECTION INTRAVENOUS at 03:11

## 2023-11-13 RX ADMIN — SODIUM CHLORIDE: 9 INJECTION, SOLUTION INTRAVENOUS at 05:11

## 2023-11-13 RX ADMIN — METOPROLOL TARTRATE 50 MG: 50 TABLET, FILM COATED ORAL at 08:11

## 2023-11-13 RX ADMIN — ACETAMINOPHEN 650 MG: 325 TABLET ORAL at 08:11

## 2023-11-13 RX ADMIN — CARBOXYMETHYLCELLULOSE SODIUM 1 DROP: 5 SOLUTION/ DROPS OPHTHALMIC at 09:11

## 2023-11-13 RX ADMIN — SERTRALINE HYDROCHLORIDE 150 MG: 50 TABLET ORAL at 09:11

## 2023-11-13 RX ADMIN — SACUBITRIL AND VALSARTAN 1 TABLET: 24; 26 TABLET, FILM COATED ORAL at 08:11

## 2023-11-13 RX ADMIN — ENOXAPARIN SODIUM 40 MG: 40 INJECTION SUBCUTANEOUS at 05:11

## 2023-11-13 RX ADMIN — THIAMINE HCL TAB 100 MG 100 MG: 100 TAB at 09:11

## 2023-11-13 RX ADMIN — MULTIVITAMIN TABLET 1 TABLET: TABLET at 09:11

## 2023-11-13 RX ADMIN — METOPROLOL TARTRATE 50 MG: 50 TABLET, FILM COATED ORAL at 09:11

## 2023-11-13 RX ADMIN — SODIUM CHLORIDE: 9 INJECTION, SOLUTION INTRAVENOUS at 08:11

## 2023-11-13 RX ADMIN — LORAZEPAM 2 MG: 1 TABLET ORAL at 06:11

## 2023-11-13 RX ADMIN — LORAZEPAM 2 MG: 1 TABLET ORAL at 02:11

## 2023-11-13 RX ADMIN — SACUBITRIL AND VALSARTAN 1 TABLET: 24; 26 TABLET, FILM COATED ORAL at 09:11

## 2023-11-13 RX ADMIN — PROCHLORPERAZINE EDISYLATE 5 MG: 5 INJECTION INTRAMUSCULAR; INTRAVENOUS at 02:11

## 2023-11-13 RX ADMIN — SODIUM CHLORIDE 10 ML: 9 INJECTION INTRAMUSCULAR; INTRAVENOUS; SUBCUTANEOUS at 02:11

## 2023-11-13 RX ADMIN — LORAZEPAM 2 MG: 1 TABLET ORAL at 05:11

## 2023-11-13 RX ADMIN — LORAZEPAM 2 MG: 1 TABLET ORAL at 09:11

## 2023-11-13 NOTE — PROGRESS NOTES
[facility] Medicine  Progress Note    Patient Name: Lee Quintanilla  MRN: [id number]  Patient Class: IP- Inpatient   Admission Date: 11/10/2023  Length of Stay: 3 days  Attending Physician: Cam Quevedo MD  Primary Care Provider: Riya Vidales NP        Subjective:     Principal Problem:Alcohol withdrawal        HPI:  Patient is a 48-year-old male with a history of alcohol use disorder, coronary artery disease, hypertension, and hyperlipidemia who presented to [facility] for evaluation tremors.  Patient states he has been drinking alcohol for the past 3 years.  At his peak he drank 3 5th of vodka a day.  He currently drinks 1-2 5th of vodka a day.  He has been in withdrawals before but this is the worst he is ever been.  His last drink was 3:00 a.m. on 11/10/2023.  He is an appointment on an outpatient detox facility in about 3 days.  He admits to some nausea and vomiting prior to arrival to the emergency department.  He was able to eat a normal lunch without any nausea or vomiting.  He is some mild midepigastric abdominal pain that does not radiate.  Denies any constipation, diarrhea, tarry stools, bloody stools, chest pains, or shortness of breath.    He was evaluated in the ED on 11/08/2023 for similar symptoms but was not admitted.  He was started on a Precedex drip and psych eval to him.  [facility]  Hemoglobin 14.4, WBC 3.6, platelets 127, sodium 135, potassium 3.8, creatinine 1.3, lipase 76, bicarb 17, anion gap 20 troponin 0.03, EKG shows sinus tachycardia with T-wave inversions in leads 2, 3, AVF, and V6.    During my encounter, patient still feels tremulous and fatigued.  Still complains of mild midepigastric abdominal pain that does not radiate.  Symptoms are slightly improved from the emergency department.  He believes he is able to tolerate an oral diet.    Overview/Hospital Course:  November  12, 2023  Nausea and tremor improving.    November 13, 2023  Still on Precedex IV and p.r.n. for Ativan for anxiety and DT  We will consult Psychiatry          Objective:     Vital Signs (Most Recent):  Temp: 97.9 °F (36.6 °C) (11/13/23 0701)  Pulse: 67 (11/13/23 1100)  Resp: 12 (11/13/23 1100)  BP: (!) 175/100 (11/13/23 1100)  SpO2: 95 % (11/13/23 1100) Vital Signs (24h Range):  Temp:  [97.7 °F (36.5 °C)-97.9 °F (36.6 °C)] 97.9 °F (36.6 °C)  Pulse:  [63-88] 67  Resp:  [12-29] 12  SpO2:  [90 %-100 %] 95 %  BP: (129-175)/() 175/100     Weight: 111.1 kg (244 lb 14.9 oz)  Body mass index is 38.36 kg/m².    Intake/Output Summary (Last 24 hours) at 11/13/2023 1422  Last data filed at 11/13/2023 1420  Gross per 24 hour   Intake 4632.86 ml   Output 3500 ml   Net 1132.86 ml         Physical Exam  Constitutional:       General: He is not in acute distress.  HENT:      Head: Normocephalic and atraumatic.      Nose: No congestion.   Eyes:      General: No scleral icterus.        Right eye: No discharge.         Left eye: No discharge.      Extraocular Movements: Extraocular movements intact.   Cardiovascular:      Rate and Rhythm: Normal rate and regular rhythm.   Pulmonary:      Effort: Pulmonary effort is normal.      Breath sounds: Normal breath sounds.   Abdominal:      General: Abdomen is flat. Bowel sounds are normal.   Musculoskeletal:         General: No swelling or tenderness.      Cervical back: Normal range of motion and neck supple. No rigidity.   Skin:     General: Skin is warm.   Neurological:      General: No focal deficit present.      Mental Status: He is alert and oriented to person, place, and time.   Psychiatric:         Mood and Affect: Mood normal.      Comments: Anxious             Significant Labs: All pertinent labs within the past 24 hours have been reviewed.  CBC:   Recent Labs   Lab 11/12/23  0500 11/13/23  0319   WBC 3.76* 3.31*   HGB 12.8* 12.7*   HCT 39.2* 40.3   PLT 98* 111*     CMP:    Recent Labs   Lab 11/12/23  0500 11/13/23  0319   * 135*   K 4.2 4.1    104   CO2 26 24   GLU 94 90   BUN 15 13   CREATININE 1.1 1.1   CALCIUM 8.8 8.5*   PROT 6.6 6.7   ALBUMIN 3.6 3.7   BILITOT 0.5 0.5   ALKPHOS 43* 47*   * 84*   * 114*   ANIONGAP 6* 7*       Significant Imaging: I have reviewed all pertinent imaging results/findings within the past 24 hours.    Assessment/Plan:      * Alcohol withdrawal  Last drink 3:00 a.m. 11/10/2023   Still on Precedex.  Unable to wean off Precedex.  We will consult Psychiatry  Start Librium 50 mg q.6h for 1 day then deescalate.  P.r.n. Ativan  Obtain UDS  Seizure and fall precautions  Daily thiamine and multivitamin  P.r.n. electrolyte replacement  Stop IV fluids      Abnormal liver enzymes  Probable secondary to alcohol abuse.  repeat liver function tests in the morning      High anion gap metabolic acidosis  Secondary to above.  Continue to monitor with regular CMPs.      Elevated lipase  Lipase 76.  Clinically patient does not appear to have acute pancreatitis.  We will continue IV fluids per management of alcohol withdrawal.    Patient tolerated lunch well.  We will continue with the diet and make NPO if having intractable nausea and vomiting.  If clinically starts to have worsening nausea, vomiting, or abdominal pains, make patient NPO and obtain abdominal ultrasound or CT abdomen.      MYNOR (generalized anxiety disorder)  Continue home Zoloft      Major depressive disorder with single episode  Continue Zoloft        VTE Risk Mitigation (From admission, onward)           Ordered     enoxaparin injection 40 mg  Daily         11/10/23 2105     IP VTE HIGH RISK PATIENT  Once         11/10/23 2105     Place sequential compression device  Until discontinued         11/10/23 2105                    Discharge Planning   FANTA:      Code Status: Full Code   Is the patient medically ready for discharge?:     Reason for patient still in hospital (select all  that apply): Treatment  Discharge Plan A: Rehab (drug)              Cam Quevedo MD  Department of Hospital Medicine   [facility]

## 2023-11-13 NOTE — CARE UPDATE
11/13/23 0901   Patient Assessment/Suction   Level of Consciousness (AVPU) alert   Respiratory Effort Normal   Expansion/Accessory Muscles/Retractions no use of accessory muscles   All Lung Fields Breath Sounds clear   PRE-TX-O2   Device (Oxygen Therapy) room air   SpO2 95 %   Pulse Oximetry Type Continuous   $ Pulse Oximetry - Multiple Charge Pulse Oximetry - Multiple   Pulse 77   Resp (!) 22   /81   Education   $ Education BiPAP;Other (see comment);15 min   Respiratory Evaluation   $ Care Plan Tech Time 15 min   $ Eval/Re-eval Charges Re-evaluation   Evaluation For Re-Eval 3 day

## 2023-11-13 NOTE — CARE UPDATE
11/12/23 2230   Patient Assessment/Suction   Level of Consciousness (AVPU) alert   Respiratory Effort Normal;Unlabored   Expansion/Accessory Muscles/Retractions no use of accessory muscles   All Lung Fields Breath Sounds equal bilaterally;clear   Rhythm/Pattern, Respiratory assisted mechanically   Cough Frequency infrequent   PRE-TX-O2   Device (Oxygen Therapy) CPAP   Oxygen Concentration (%) 35   SpO2 100 %   Pulse Oximetry Type Continuous   $ Pulse Oximetry - Multiple Charge Pulse Oximetry - Multiple   Pulse 68   Resp (!) 24   Ready to Wean/Extubation Screen   FIO2<=50 (chart decimal) 0.35   Preset CPAP/BiPAP Settings   Mode Of Delivery CPAP   $ Is patient using? Yes   Size of Mask   (Size B mouth mask)   Sized Appropriately? Yes   Equipment Type V60   CPAP (cm H2O) 10   Patient CPAP/BiPAP Settings   RR Total (Breaths/Min) 14   Tidal Volume (mL) 670   VE Minute Ventilation (L/min) 9.4 L/min   Peak Inspiratory Pressure (cm H2O) 11   TiTOT (%) 29   Total Leak (L/Min) 35   Patient Trigger - ST Mode Only (%) 100

## 2023-11-13 NOTE — ASSESSMENT & PLAN NOTE
Last drink 3:00 a.m. 11/10/2023   Still on Precedex.  Unable to wean off Precedex.  We will consult Psychiatry  Start Librium 50 mg q.6h for 1 day then deescalate.  P.r.n. Ativan  Obtain UDS  Seizure and fall precautions  Daily thiamine and multivitamin  P.r.n. electrolyte replacement  Stop IV fluids

## 2023-11-13 NOTE — SUBJECTIVE & OBJECTIVE
Objective:     Vital Signs (Most Recent):  Temp: 97.9 °F (36.6 °C) (11/13/23 0701)  Pulse: 67 (11/13/23 1100)  Resp: 12 (11/13/23 1100)  BP: (!) 175/100 (11/13/23 1100)  SpO2: 95 % (11/13/23 1100) Vital Signs (24h Range):  Temp:  [97.7 °F (36.5 °C)-97.9 °F (36.6 °C)] 97.9 °F (36.6 °C)  Pulse:  [63-88] 67  Resp:  [12-29] 12  SpO2:  [90 %-100 %] 95 %  BP: (129-175)/() 175/100     Weight: 111.1 kg (244 lb 14.9 oz)  Body mass index is 38.36 kg/m².    Intake/Output Summary (Last 24 hours) at 11/13/2023 1422  Last data filed at 11/13/2023 1420  Gross per 24 hour   Intake 4632.86 ml   Output 3500 ml   Net 1132.86 ml         Physical Exam  Constitutional:       General: He is not in acute distress.  HENT:      Head: Normocephalic and atraumatic.      Nose: No congestion.   Eyes:      General: No scleral icterus.        Right eye: No discharge.         Left eye: No discharge.      Extraocular Movements: Extraocular movements intact.   Cardiovascular:      Rate and Rhythm: Normal rate and regular rhythm.   Pulmonary:      Effort: Pulmonary effort is normal.      Breath sounds: Normal breath sounds.   Abdominal:      General: Abdomen is flat. Bowel sounds are normal.   Musculoskeletal:         General: No swelling or tenderness.      Cervical back: Normal range of motion and neck supple. No rigidity.   Skin:     General: Skin is warm.   Neurological:      General: No focal deficit present.      Mental Status: He is alert and oriented to person, place, and time.   Psychiatric:         Mood and Affect: Mood normal.      Comments: Anxious             Significant Labs: All pertinent labs within the past 24 hours have been reviewed.  CBC:   Recent Labs   Lab 11/12/23  0500 11/13/23 0319   WBC 3.76* 3.31*   HGB 12.8* 12.7*   HCT 39.2* 40.3   PLT 98* 111*     CMP:   Recent Labs   Lab 11/12/23  0500 11/13/23 0319   * 135*   K 4.2 4.1    104   CO2 26 24   GLU 94 90   BUN 15 13   CREATININE 1.1 1.1   CALCIUM  8.8 8.5*   PROT 6.6 6.7   ALBUMIN 3.6 3.7   BILITOT 0.5 0.5   ALKPHOS 43* 47*   * 84*   * 114*   ANIONGAP 6* 7*       Significant Imaging: I have reviewed all pertinent imaging results/findings within the past 24 hours.

## 2023-11-13 NOTE — PLAN OF CARE
Problem: Alcohol Withdrawal  Goal: Alcohol Withdrawal Symptom Control  Outcome: Ongoing, Progressing     Problem: Adult Inpatient Plan of Care  Goal: Patient-Specific Goal (Individualized)  Outcome: Ongoing, Progressing  Goal: Absence of Hospital-Acquired Illness or Injury  Outcome: Ongoing, Progressing  Goal: Optimal Comfort and Wellbeing  Outcome: Ongoing, Progressing  Goal: Readiness for Transition of Care  Outcome: Ongoing, Progressing     Stable vitals signs within the shift. Latest CIWA score is 6. Safety maintained.

## 2023-11-14 VITALS
DIASTOLIC BLOOD PRESSURE: 82 MMHG | OXYGEN SATURATION: 99 % | HEART RATE: 106 BPM | RESPIRATION RATE: 15 BRPM | HEIGHT: 67 IN | SYSTOLIC BLOOD PRESSURE: 167 MMHG | WEIGHT: 244.94 LBS | TEMPERATURE: 99 F | BODY MASS INDEX: 38.44 KG/M2

## 2023-11-14 LAB
ALBUMIN SERPL BCP-MCNC: 3.5 G/DL (ref 3.5–5.2)
ALP SERPL-CCNC: 47 U/L (ref 55–135)
ALT SERPL W/O P-5'-P-CCNC: 89 U/L (ref 10–44)
ANION GAP SERPL CALC-SCNC: 7 MMOL/L (ref 8–16)
AST SERPL-CCNC: 42 U/L (ref 10–40)
BILIRUB SERPL-MCNC: 0.6 MG/DL (ref 0.1–1)
BUN SERPL-MCNC: 16 MG/DL (ref 6–20)
CALCIUM SERPL-MCNC: 8.5 MG/DL (ref 8.7–10.5)
CHLORIDE SERPL-SCNC: 106 MMOL/L (ref 95–110)
CO2 SERPL-SCNC: 22 MMOL/L (ref 23–29)
CREAT SERPL-MCNC: 1.2 MG/DL (ref 0.5–1.4)
ERYTHROCYTE [DISTWIDTH] IN BLOOD BY AUTOMATED COUNT: 15.7 % (ref 11.5–14.5)
EST. GFR  (NO RACE VARIABLE): >60 ML/MIN/1.73 M^2
ETHANOL SERPL-MCNC: <10 MG/DL
ETHANOL SERPL-MCNC: <10 MG/DL
GLUCOSE SERPL-MCNC: 93 MG/DL (ref 70–110)
HCT VFR BLD AUTO: 38.7 % (ref 40–54)
HGB BLD-MCNC: 12.6 G/DL (ref 14–18)
MAGNESIUM SERPL-MCNC: 2 MG/DL (ref 1.6–2.6)
MCH RBC QN AUTO: 27.5 PG (ref 27–31)
MCHC RBC AUTO-ENTMCNC: 32.6 G/DL (ref 32–36)
MCV RBC AUTO: 84 FL (ref 82–98)
PHOSPHATE SERPL-MCNC: 3.5 MG/DL (ref 2.7–4.5)
PLATELET # BLD AUTO: 127 K/UL (ref 150–450)
PMV BLD AUTO: 10.7 FL (ref 9.2–12.9)
POTASSIUM SERPL-SCNC: 4.2 MMOL/L (ref 3.5–5.1)
PROT SERPL-MCNC: 6.6 G/DL (ref 6–8.4)
RBC # BLD AUTO: 4.59 M/UL (ref 4.6–6.2)
SODIUM SERPL-SCNC: 135 MMOL/L (ref 136–145)
WBC # BLD AUTO: 3.25 K/UL (ref 3.9–12.7)

## 2023-11-14 PROCEDURE — A4216 STERILE WATER/SALINE, 10 ML: HCPCS | Performed by: INTERNAL MEDICINE

## 2023-11-14 PROCEDURE — 36415 COLL VENOUS BLD VENIPUNCTURE: CPT | Performed by: STUDENT IN AN ORGANIZED HEALTH CARE EDUCATION/TRAINING PROGRAM

## 2023-11-14 PROCEDURE — 25000003 PHARM REV CODE 250: Performed by: INTERNAL MEDICINE

## 2023-11-14 PROCEDURE — 36415 COLL VENOUS BLD VENIPUNCTURE: CPT | Performed by: INTERNAL MEDICINE

## 2023-11-14 PROCEDURE — 94761 N-INVAS EAR/PLS OXIMETRY MLT: CPT

## 2023-11-14 PROCEDURE — 94799 UNLISTED PULMONARY SVC/PX: CPT

## 2023-11-14 PROCEDURE — 25000003 PHARM REV CODE 250: Performed by: STUDENT IN AN ORGANIZED HEALTH CARE EDUCATION/TRAINING PROGRAM

## 2023-11-14 PROCEDURE — 63600175 PHARM REV CODE 636 W HCPCS: Performed by: INTERNAL MEDICINE

## 2023-11-14 PROCEDURE — 99900031 HC PATIENT EDUCATION (STAT)

## 2023-11-14 PROCEDURE — 80053 COMPREHEN METABOLIC PANEL: CPT | Performed by: STUDENT IN AN ORGANIZED HEALTH CARE EDUCATION/TRAINING PROGRAM

## 2023-11-14 PROCEDURE — 82077 ASSAY SPEC XCP UR&BREATH IA: CPT | Performed by: INTERNAL MEDICINE

## 2023-11-14 PROCEDURE — 83735 ASSAY OF MAGNESIUM: CPT | Performed by: STUDENT IN AN ORGANIZED HEALTH CARE EDUCATION/TRAINING PROGRAM

## 2023-11-14 PROCEDURE — 84100 ASSAY OF PHOSPHORUS: CPT | Performed by: STUDENT IN AN ORGANIZED HEALTH CARE EDUCATION/TRAINING PROGRAM

## 2023-11-14 PROCEDURE — 99900035 HC TECH TIME PER 15 MIN (STAT)

## 2023-11-14 PROCEDURE — 27000221 HC OXYGEN, UP TO 24 HOURS

## 2023-11-14 PROCEDURE — 85027 COMPLETE CBC AUTOMATED: CPT | Performed by: STUDENT IN AN ORGANIZED HEALTH CARE EDUCATION/TRAINING PROGRAM

## 2023-11-14 RX ORDER — THIAMINE HCL 100 MG
100 TABLET ORAL 3 TIMES DAILY
Status: CANCELLED | OUTPATIENT
Start: 2023-11-14

## 2023-11-14 RX ORDER — FOLIC ACID 1 MG/1
1 TABLET ORAL DAILY
Status: CANCELLED | OUTPATIENT
Start: 2023-11-15

## 2023-11-14 RX ADMIN — CARBOXYMETHYLCELLULOSE SODIUM 1 DROP: 5 SOLUTION/ DROPS OPHTHALMIC at 04:11

## 2023-11-14 RX ADMIN — LORAZEPAM 2 MG: 1 TABLET ORAL at 02:11

## 2023-11-14 RX ADMIN — SODIUM CHLORIDE 10 ML: 9 INJECTION INTRAMUSCULAR; INTRAVENOUS; SUBCUTANEOUS at 09:11

## 2023-11-14 RX ADMIN — LORAZEPAM 2 MG: 1 TABLET ORAL at 03:11

## 2023-11-14 RX ADMIN — ACETAMINOPHEN 650 MG: 325 TABLET ORAL at 02:11

## 2023-11-14 RX ADMIN — ASPIRIN 81 MG: 81 TABLET, COATED ORAL at 10:11

## 2023-11-14 RX ADMIN — SODIUM CHLORIDE: 9 INJECTION, SOLUTION INTRAVENOUS at 03:11

## 2023-11-14 RX ADMIN — LORAZEPAM 2 MG: 1 TABLET ORAL at 06:11

## 2023-11-14 RX ADMIN — SERTRALINE HYDROCHLORIDE 150 MG: 50 TABLET ORAL at 10:11

## 2023-11-14 RX ADMIN — THIAMINE HCL TAB 100 MG 100 MG: 100 TAB at 10:11

## 2023-11-14 RX ADMIN — METOPROLOL TARTRATE 50 MG: 50 TABLET, FILM COATED ORAL at 10:11

## 2023-11-14 RX ADMIN — LORAZEPAM 2 MG: 1 TABLET ORAL at 10:11

## 2023-11-14 RX ADMIN — SACUBITRIL AND VALSARTAN 1 TABLET: 24; 26 TABLET, FILM COATED ORAL at 10:11

## 2023-11-14 RX ADMIN — Medication 6 MG: at 02:11

## 2023-11-14 RX ADMIN — ENOXAPARIN SODIUM 40 MG: 40 INJECTION SUBCUTANEOUS at 04:11

## 2023-11-14 RX ADMIN — MULTIVITAMIN TABLET 1 TABLET: TABLET at 10:11

## 2023-11-14 NOTE — PROGRESS NOTES
"[facility]  Adult Nutrition   Progress Note (Follow-Up)    SUMMARY     Recommendations  Recommendation/Intervention: 1. Recommend increase thiamine to TID and add folic acid 1 mg daily per protocol. 2 Continue to monitor intake, labs, and status change PRN.  Goals: 1.) Pt to consume/tolerate >75% of meals. 2.) Pt to receive ETOH protocol.  Nutrition Goal Status: progressing towards goal  Communication of RD Recs: reviewed with physician    Dietitian Rounds Brief  Patient off Precedex since yesterday; recommend adding folic acid 1 mg daily and increase thiamine to TID to MD. Possible inpatient psychiatric hospitalization upon physical stability per MD. RD to monitor PRN.    Diet order:   Current Diet Order: Cardiac diet         Evaluation of Received Nutrient/Fluid Intake  Energy Calories Required: not meeting needs  Protein Required: not meeting needs  Fluid Required: meeting needs  Tolerance: tolerating     % Intake of Estimated Energy Needs: 25 - 50 %  % Meal Intake: 25 - 50 %      Intake/Output Summary (Last 24 hours) at 11/14/2023 1442  Last data filed at 11/14/2023 1411  Gross per 24 hour   Intake 3836.92 ml   Output 3900 ml   Net -63.08 ml        Anthropometrics  Temp: 98 °F (36.7 °C)  Height: 5' 7" (170.2 cm)  Height (inches): 67 in  Weight Method: Standard Scale  Weight: 111.1 kg (244 lb 14.9 oz)  Weight (lb): 244.93 lb  Ideal Body Weight (IBW), Male: 148 lb  % Ideal Body Weight, Male (lb): 165.49 %  BMI (Calculated): 38.4  BMI Grade: 35 - 39.9 - obesity - grade II       Estimated/Assessed Needs  Weight Used For Calorie Calculations: 111.1 kg (244 lb 14.9 oz)  Energy Calorie Requirements (kcal): 2425  Energy Need Method: [place]-St Jeor (x1.25 AF)  Protein Requirements: 101-134 (1.5-2.0)  Weight Used For Protein Calculations: 67 kg (147 lb 11.3 oz) (IBW)     Estimated Fluid Requirement Method: RDA Method  RDA Method (mL): 2425       Reason for Assessment  Reason For Assessment: RD " follow-up  Diagnosis: other (see comments) (alcohol withdraw)  Relevant Medical History: alcohol use disorder, coronary artery disease, hypertension, and hyperlipidemia  Interdisciplinary Rounds: did not attend  General Information Comments: Identified at risk d/t ICU admit. presented to [facility] for evaluation tremors.  Patient states he has been drinking alcohol for the past 3 years. Pt currently receiving precedex in/out of sleep at time of visit. Per chart, pt has yet to consume meal since admitted. No GI distress. LBM PTA. Skin: intact per chart. Wt reviewed. NFPE not completed per pt request.    Nutrition/Diet History  Food Allergies: NKFA  Factors Affecting Nutritional Intake: None identified at this time    Nutrition Risk Screen  Nutrition Risk Screen: no indicators present     MST Score: 0  Have you recently lost weight without trying?: No  Weight loss score: 0  Have you been eating poorly because of a decreased appetite?: No  Appetite score: 0       Weight History:  Wt Readings from Last 5 Encounters:   11/11/23 111.1 kg (244 lb 14.9 oz)   11/08/23 108.9 kg (240 lb)   11/03/23 108.9 kg (240 lb)   09/21/23 109 kg (240 lb 6.4 oz)   04/19/23 111.6 kg (246 lb)        Lab/Procedures/Meds: Pertinent Labs/Meds Reviewed    Medications:Pertinent Medications Reviewed  Scheduled Meds:   aspirin  81 mg Oral Daily    enoxparin  40 mg Subcutaneous Daily    metoprolol tartrate  50 mg Oral BID    multivitamin  1 tablet Oral Daily    sacubitriL-valsartan  1 tablet Oral BID    sertraline  150 mg Oral Daily    sodium chloride 0.9%  10 mL Intravenous Q12H    thiamine  100 mg Oral Daily     Continuous Infusions:   sodium chloride 0.9% 125 mL/hr at 11/14/23 0510    dexmedeTOMIDine (Precedex) infusion (titrating) Stopped (11/13/23 5603)     PRN Meds:.acetaminophen, calcium gluconate IVPB, calcium gluconate IVPB, calcium gluconate IVPB, carboxymethylcellulose, LORazepam, magnesium oxide, magnesium oxide, magnesium  "sulfate IVPB, magnesium sulfate IVPB, melatonin, ondansetron, potassium chloride **AND** potassium chloride **AND** potassium chloride, prochlorperazine, sodium phosphate 15 mmol in dextrose 5 % (D5W) 250 mL IVPB, sodium phosphate 20.01 mmol in dextrose 5 % (D5W) 250 mL IVPB, sodium phosphate 30 mmol in dextrose 5 % (D5W) 250 mL IVPB    Labs: Pertinent Labs Reviewed  Clinical Chemistry:  Recent Labs   Lab 11/08/23  0945 11/10/23  0630 11/11/23  0341 11/12/23  0500 11/12/23  1914 11/13/23  0319 11/14/23  0344   * 135*   < > 135*  --  135* 135*   K 3.9 3.8   < > 4.2  --  4.1 4.2   CL 94* 98   < > 103  --  104 106   CO2 18* 17*   < > 26  --  24 22*   * 126*   < > 94  --  90 93   BUN 18 18   < > 15  --  13 16   CREATININE 1.2 1.3   < > 1.1  --  1.1 1.2   CALCIUM 10.0 10.6*   < > 8.8  --  8.5* 8.5*   PROT 9.1* 9.1*   < > 6.6  --  6.7 6.6   ALBUMIN 4.4 4.5   < > 3.6  --  3.7 3.5   BILITOT 0.5 0.4   < > 0.5  --  0.5 0.6   ALKPHOS 69 63   < > 43*  --  47* 47*   AST 35 48*   < > 105*  --  84* 42*   ALT 29 43   < > 103*  --  114* 89*   ANIONGAP 19* 20*   < > 6*  --  7* 7*   MG  --   --    < > 1.8   < > 2.0 2.0   PHOS  --   --    < > 3.3  --  3.4 3.5   LIPASE 66* 76*  --   --   --   --   --     < > = values in this interval not displayed.     CBC:   Recent Labs   Lab 11/14/23 0344   WBC 3.25*   RBC 4.59*   HGB 12.6*   HCT 38.7*   *   MCV 84   MCH 27.5   MCHC 32.6     Lipid Panel:  No results for input(s): "CHOL", "HDL", "LDLCALC", "TRIG", "CHOLHDL" in the last 168 hours.  Cardiac Profile:  Recent Labs   Lab 11/08/23  0945   TROPONINI 0.030*     Inflammatory Labs:  No results for input(s): "CRP" in the last 168 hours.  Diabetes:  No results for input(s): "HGBA1C", "POCTGLUCOSE" in the last 168 hours.  Thyroid & Parathyroid:  No results for input(s): "TSH", "FREET4", "C0IOTIJ", "F7VDJNJ", "THYROIDAB" in the last 168 hours.    Monitor and Evaluation  Food and Nutrient Intake: energy intake, food and beverage " intake  Food and Nutrient Adminstration: diet order  Knowledge/Beliefs/Attitudes: food and nutrition knowledge/skill  Physical Activity and Function: nutrition-related ADLs and IADLs  Anthropometric Measurements: weight, weight change  Biochemical Data, Medical Tests and Procedures: electrolyte and renal panel, gastrointestinal profile, glucose/endocrine profile  Nutrition-Focused Physical Findings: overall appearance     Nutrition Risk  Level of Risk/Frequency of Follow-up: moderate - high     Nutrition Follow-Up  RD Follow-up?: Yes      Alina Gomez RD, LDN 11/14/2023 2:42 PM

## 2023-11-14 NOTE — PROGRESS NOTES
[facility]  Progress Note    Patient Name: Lee Quintanilla  MRN: [id number]  Patient Class: IP- Inpatient   Admission Date: 11/10/2023  Length of Stay: 4 days  Attending Physician: Cam Quevedo MD  Primary Care Provider: Riya Vidales NP        Subjective:     Principal Problem:Alcohol withdrawal        HPI:  Patient is a 48-year-old male with a history of alcohol use disorder, coronary artery disease, hypertension, and hyperlipidemia who presented to [facility] for evaluation tremors.  Patient states he has been drinking alcohol for the past 3 years.  At his peak he drank 3 5th of vodka a day.  He currently drinks 1-2 5th of vodka a day.  He has been in withdrawals before but this is the worst he is ever been.  His last drink was 3:00 a.m. on 11/10/2023.  He is an appointment on an outpatient detox facility in about 3 days.  He admits to some nausea and vomiting prior to arrival to the emergency department.  He was able to eat a normal lunch without any nausea or vomiting.  He is some mild midepigastric abdominal pain that does not radiate.  Denies any constipation, diarrhea, tarry stools, bloody stools, chest pains, or shortness of breath.    He was evaluated in the ED on 11/08/2023 for similar symptoms but was not admitted.  He was started on a Precedex drip and psych eval to him.  He has been transferred over to [facility] for ICU management.  Hemoglobin 14.4, WBC 3.6, platelets 127, sodium 135, potassium 3.8, creatinine 1.3, lipase 76, bicarb 17, anion gap 20 troponin 0.03, EKG shows sinus tachycardia with T-wave inversions in leads 2, 3, AVF, and V6.    During my encounter, patient still feels tremulous and fatigued.  Still complains of mild midepigastric abdominal pain that does not radiate.  Symptoms are slightly improved from the emergency department.  He believes he is able to tolerate an oral diet.    Overview/Hospital Course:  November  12, 2023  Nausea and tremor improving.    November 13, 2023  Still on Precedex IV and p.r.n. for Ativan for anxiety and DT  We will consult Psychiatry    November 14, 2023  Off Precedex yesterday.  Still on Ativan p.r.n.  Anxiety improving.    Physical Exam  Constitutional:       General: He is not in acute distress.  HENT:      Head: Normocephalic and atraumatic.      Nose: No congestion.   Eyes:      General: No scleral icterus.        Right eye: No discharge.         Left eye: No discharge.      Extraocular Movements: Extraocular movements intact.   Cardiovascular:      Rate and Rhythm: Normal rate and regular rhythm.   Pulmonary:      Effort: Pulmonary effort is normal.      Breath sounds: Normal breath sounds.   Abdominal:      General: Abdomen is flat. Bowel sounds are normal.   Musculoskeletal:         General: No swelling or tenderness.      Cervical back: Normal range of motion and neck supple. No rigidity.   Skin:     General: Skin is warm.   Neurological:      General: No focal deficit present.      Mental Status: He is alert and oriented to person, place, and time.   Psychiatric:         Mood and Affect: Mood normal.        Assessment/Plan:      * Alcohol withdrawal  Last drink 3:00 a.m. 11/10/2023   Off Precedex since yesterday.  consult Psychiatry and recommended inpatient psych.  Patient is medically clear to discharge to inpatient psychiatry.  Alcohol level Check pending  P.r.n. Ativan  Obtain UDS  Seizure and fall precautions  Daily thiamine and multivitamin  P.r.n. electrolyte replacement  Stop IV fluids      Abnormal liver enzymes  Probable secondary to alcohol abuse.  repeat liver function tests in the morning      High anion gap metabolic acidosis  Secondary to above.  Continue to monitor with regular CMPs.      Elevated lipase  Lipase 76.  Clinically patient does not appear to have acute pancreatitis.  We will continue IV fluids per management of alcohol withdrawal.    Patient tolerated lunch  well.  We will continue with the diet and make NPO if having intractable nausea and vomiting.  If clinically starts to have worsening nausea, vomiting, or abdominal pains, make patient NPO and obtain abdominal ultrasound or CT abdomen.      MYNOR (generalized anxiety disorder)  Continue home Zoloft      Major depressive disorder with single episode  Continue Zoloft        VTE Risk Mitigation (From admission, onward)           Ordered     enoxaparin injection 40 mg  Daily         11/10/23 2105     IP VTE HIGH RISK PATIENT  Once         11/10/23 2105     Place sequential compression device  Until discontinued         11/10/23 2105                    Discharge Planning   FANTA: 11/14/2023     Code Status: Full Code   Is the patient medically ready for discharge?:     Reason for patient still in hospital (select all that apply): Treatment  Discharge Plan A: Rehab (drug)              Cam Quevedo MD  Department of Hospital Medicine   [facility]

## 2023-11-14 NOTE — CARE UPDATE
11/14/23 0800   Patient Assessment/Suction   Level of Consciousness (AVPU) alert   Respiratory Effort Normal   Expansion/Accessory Muscles/Retractions no use of accessory muscles   All Lung Fields Breath Sounds Anterior:   VALERIE Breath Sounds clear   Rhythm/Pattern, Respiratory unlabored   Cough Frequency no cough   PRE-TX-O2   Device (Oxygen Therapy) nasal cannula   $ Is the patient on Low Flow Oxygen? Yes   Flow (L/min) 2   Education   $ Education 15 min;Other (see comment)  (sats)
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
## 2023-11-14 NOTE — RESPIRATORY THERAPY
11/13/23 9640   Patient Assessment/Suction   Level of Consciousness (AVPU) alert   Respiratory Effort Normal;Unlabored   PRE-TX-O2   Device (Oxygen Therapy) CPAP   $ Is the patient on Low Flow Oxygen? Yes   Oxygen Concentration (%) 35   SpO2 98 %   Pulse Oximetry Type Continuous   $ Pulse Oximetry - Multiple Charge Pulse Oximetry - Multiple   Pulse 74   Resp 15   Ready to Wean/Extubation Screen   FIO2<=50 (chart decimal) 0.35   Preset CPAP/BiPAP Settings   Mode Of Delivery CPAP   $ Initial CPAP/BiPAP Setup? No   $ Is patient using? Yes   Sized Appropriately? Yes   Equipment Type V60   Airway Device Type medium nasal mask   CPAP (cm H2O) 10   Education   $ Education BiPAP;15 min   Respiratory Evaluation   $ Care Plan Tech Time 15 min   $ Eval/Re-eval Charges Re-evaluation

## 2023-11-15 ENCOUNTER — TELEPHONE (OUTPATIENT)
Dept: FAMILY MEDICINE | Facility: CLINIC | Age: 48
End: 2023-11-15

## 2023-11-15 NOTE — NURSING
Patient discharge via wheelchair on room air to rehab facility via transportation team at approximately 2050 om 11/14/2023. He is A and O x 4 and not complaining of any pain at this time. Report called to Vanessa at [facility].

## 2023-11-15 NOTE — RESPIRATORY THERAPY
11/14/23 1920   Patient Assessment/Suction   Level of Consciousness (AVPU) alert   Respiratory Effort Unlabored   PRE-TX-O2   Device (Oxygen Therapy) room air   SpO2 97 %   Pulse Oximetry Type Continuous   $ Pulse Oximetry - Multiple Charge Pulse Oximetry - Multiple   Pulse 85   Resp 15   Education   $ Education 15 min   Respiratory Evaluation   $ Care Plan Tech Time 15 min   $ Eval/Re-eval Charges Re-evaluation

## 2023-11-15 NOTE — DISCHARGE SUMMARY
[facility]  Discharge Summary      Patient Name: Lee Quintanilla  MRN: [id number]  Admission Date: 11/10/2023  Hospital Length of Stay: 4 days  Discharge Date and Time: 11/14/2023  8:50 PM  Attending Physician: No att. providers found   Discharging Provider: Cam Quevedo MD  Discharge Provider Team: Networked reference to record PCT   Primary Care Provider: Riya Vidales NP        HPI:    Patient is a 48-year-old male with a history of alcohol use disorder, coronary artery disease, hypertension, and hyperlipidemia who presented to [facility] for evaluation tremors.  Patient states he has been drinking alcohol for the past 3 years.  At his peak he drank 3 5th of vodka a day.  He currently drinks 1-2 5th of vodka a day.  He has been in withdrawals before but this is the worst he is ever been.  His last drink was 3:00 a.m. on 11/10/2023.  He is an appointment on an outpatient detox facility in about 3 days.  He admits to some nausea and vomiting prior to arrival to the emergency department.  He was able to eat a normal lunch without any nausea or vomiting.  He is some mild midepigastric abdominal pain that does not radiate.  Denies any constipation, diarrhea, tarry stools, bloody stools, chest pains, or shortness of breath.     He was evaluated in the ED on 11/08/2023 for similar symptoms but was not admitted.  He was started on a Precedex drip and psych eval to him.  He has been transferred over to [facility] for ICU management.  Hemoglobin 14.4, WBC 3.6, platelets 127, sodium 135, potassium 3.8, creatinine 1.3, lipase 76, bicarb 17, anion gap 20 troponin 0.03, EKG shows sinus tachycardia with T-wave inversions in leads 2, 3, AVF, and V6.     During my encounter, patient still feels tremulous and fatigued.  Still complains of mild midepigastric abdominal pain that does not radiate.  Symptoms are slightly improved from the emergency department.  He  believes he is able to tolerate an oral diet.         * No surgery found *      Hospital Course:     Patient admitted to ICU and Neurology consult.  From this standpoint the patient need to be discharged inpatient psychiatric rehab.  Precedex was off.  Patient stable and the patient was transferred to inpatient psych    * Alcohol withdrawal  Last drink 3:00 a.m. 11/10/2023   Off Precedex since yesterday.  consult Psychiatry and recommended inpatient psych.  Patient is medically clear to discharge to inpatient psychiatry.  Alcohol level Check pending  P.r.n. Ativan  Obtain UDS  Seizure and fall precautions  Daily thiamine and multivitamin  P.r.n. electrolyte replacement  Stop IV fluids        Abnormal liver enzymes  Probable secondary to alcohol abuse.  repeat liver function tests in the morning        High anion gap metabolic acidosis  Secondary to above.  Continue to monitor with regular CMPs.        Elevated lipase  Lipase 76.  Clinically patient does not appear to have acute pancreatitis.  We will continue IV fluids per management of alcohol withdrawal.    Patient tolerated lunch well.  We will continue with the diet and make NPO if having intractable nausea and vomiting.  If clinically starts to have worsening nausea, vomiting, or abdominal pains, make patient NPO and obtain abdominal ultrasound or CT abdomen.        MYNOR (generalized anxiety disorder)  Continue home Zoloft        Major depressive disorder with single episode  Continue Zoloft  Consults:   Consults (From admission, onward)          Status Ordering Provider     Inpatient consult to Telemedicine - Psych  Once        Provider:  Rusty Aguiar MD    Completed DAVIDSON SARMIENTO            Final Active Diagnoses:    Diagnosis Date Noted POA    PRINCIPAL PROBLEM:  Alcohol withdrawal [F10.939] 11/10/2023 Yes    Abnormal liver enzymes [R74.8] 11/12/2023 Unknown    High anion gap metabolic acidosis [E87.29] 11/10/2023 Yes    Elevated lipase [R74.8]  12/11/2017 Unknown    MYNOR (generalized anxiety disorder) [F41.1] 04/25/2016 Yes    Major depressive disorder with single episode [F32.9] 04/25/2016 Yes      Problems Resolved During this Admission:      Discharged Condition: fair    Disposition: Psychiatric Hospital    Follow Up:    Patient Instructions:   No discharge procedures on file.  Medications:  Reconciled Home Medications:      Medication List        ASK your doctor about these medications      anastrozole 1 mg Tab  Commonly known as: ARIMIDEX  Take 1 mg by mouth every 7 days.     aspirin 81 MG EC tablet  Commonly known as: ECOTRIN  Take 81 mg by mouth once daily.     ENTRESTO 24-26 mg per tablet  Generic drug: sacubitriL-valsartan  Take 1 tablet by mouth.     furosemide 40 MG tablet  Commonly known as: LASIX  Take 40 mg by mouth once daily.     hydrOXYzine pamoate 50 MG Cap  Commonly known as: VISTARIL  Take 1 capsule (50 mg total) by mouth every 8 (eight) hours as needed.     JARDIANCE 25 mg tablet  Generic drug: empagliflozin  Take 25 mg by mouth Daily.  Ask about: Which instructions should I use?     L-METHYLFOLATE FORTE 15-90.314 mg Cap  Generic drug: levomefolate-algal oil  Take 1 capsule by mouth Daily.     metoprolol tartrate 25 MG tablet  Commonly known as: LOPRESSOR  Take 50 mg by mouth 2 (two) times daily.     ondansetron 4 MG Tbdl  Commonly known as: ZOFRAN-ODT  Take 1 tablet (4 mg total) by mouth every 8 (eight) hours as needed (nausea, vomiting).     potassium chloride SA 20 MEQ tablet  Commonly known as: K-DUR,KLOR-CON  Take 20 mEq by mouth once daily.     rosuvastatin 40 MG Tab  Commonly known as: CRESTOR  Take 40 mg by mouth once daily.     sertraline 100 MG tablet  Commonly known as: ZOLOFT  Take 1.5 tablets (150 mg total) by mouth once daily.     testosterone cypionate 200 mg/mL injection  Commonly known as: DEPOTESTOTERONE CYPIONATE  Inject 200 mg into the muscle every 7 days.     traZODone 100 MG tablet  Commonly known as: DESYREL  Take  2 tablets (200 mg total) by mouth every evening.     VivitroL 380 mg Ssrr kit  Generic drug: naltrexone microspheres  Inject 1 each (380 mg total) into the muscle every 30 days.              Significant Diagnostic Studies: Labs: CMP   Recent Labs   Lab 11/14/23  0344   *   K 4.2      CO2 22*   GLU 93   BUN 16   CREATININE 1.2   CALCIUM 8.5*   PROT 6.6   ALBUMIN 3.5   BILITOT 0.6   ALKPHOS 47*   AST 42*   ALT 89*   ANIONGAP 7*    and CBC   Recent Labs   Lab 11/14/23  0344   WBC 3.25*   HGB 12.6*   HCT 38.7*   *     Microbiology:     Pending Diagnostic Studies:       None          Indwelling Lines/Drains at time of discharge:   Lines/Drains/Airways       None                   Time spent on the discharge of patient: 31 minutes    Cam Quevedo MD  Department of Hospital Medicine  [facility]

## 2023-11-17 NOTE — PLAN OF CARE
11/17/23 0854   Final Note   Assessment Type Final Discharge Note   Anticipated Discharge Disposition Psych

## 2023-12-29 ENCOUNTER — PATIENT MESSAGE (OUTPATIENT)
Dept: FAMILY MEDICINE | Facility: CLINIC | Age: 48
End: 2023-12-29
Payer: MEDICAID

## 2023-12-29 DIAGNOSIS — M51.36 DDD (DEGENERATIVE DISC DISEASE), LUMBAR: Primary | ICD-10-CM

## 2024-01-10 ENCOUNTER — OFFICE VISIT (OUTPATIENT)
Dept: ORTHOPEDICS | Facility: CLINIC | Age: 49
End: 2024-01-10
Payer: MEDICAID

## 2024-01-10 VITALS — HEIGHT: 67 IN | WEIGHT: 244 LBS | BODY MASS INDEX: 38.3 KG/M2

## 2024-01-10 DIAGNOSIS — M47.816 FACET ARTHRITIS OF LUMBAR REGION: ICD-10-CM

## 2024-01-10 DIAGNOSIS — M51.36 DDD (DEGENERATIVE DISC DISEASE), LUMBAR: ICD-10-CM

## 2024-01-10 DIAGNOSIS — Z98.1 HISTORY OF LUMBAR SPINAL FUSION: Primary | ICD-10-CM

## 2024-01-10 PROCEDURE — 3008F BODY MASS INDEX DOCD: CPT | Mod: CPTII,S$GLB,, | Performed by: ORTHOPAEDIC SURGERY

## 2024-01-10 PROCEDURE — 1159F MED LIST DOCD IN RCRD: CPT | Mod: CPTII,S$GLB,, | Performed by: ORTHOPAEDIC SURGERY

## 2024-01-10 PROCEDURE — 1160F RVW MEDS BY RX/DR IN RCRD: CPT | Mod: CPTII,S$GLB,, | Performed by: ORTHOPAEDIC SURGERY

## 2024-01-10 PROCEDURE — 99213 OFFICE O/P EST LOW 20 MIN: CPT | Mod: S$GLB,,, | Performed by: ORTHOPAEDIC SURGERY

## 2024-01-10 RX ORDER — ESCITALOPRAM OXALATE 10 MG/1
10 TABLET ORAL NIGHTLY
COMMUNITY
Start: 2024-01-02

## 2024-01-10 RX ORDER — QUETIAPINE FUMARATE 50 MG/1
50 TABLET, FILM COATED ORAL NIGHTLY
COMMUNITY

## 2024-01-10 NOTE — PROGRESS NOTES
Subjective:       Patient ID: Lee Quintanilla is a 48 y.o. male.    Chief Complaint: Pain of the Spine (Patient is here for a f/up on Lumbar Spine pain, Hx Lumbar Fusion, states his pain is worse than his last visit, bilat numbness & tingling that radiates into his legs and feet)      History of Present Illness    Prior to meeting with the patient I reviewed the medical chart in [facility]. This included reviewing the previous progress notes from our office, review of the patient's last appointment with their primary care provider, review of any visits to the emergency room, and review of any pain management appointments or procedures.   Lee comes in today for follow-up for his lower back.  He is a history of fusion at L4-5 and L5-S1.  This is generally served him very well.  He has had left-sided low back pain for the past year or so.  He does not recollect any injury or trauma.  He reports he does have numbness/tingling that goes into bilateral feet He denies any bowel or bladder changes.  Complains of back pain worse than leg pain.  He is seen for this about 9 months ago and given an intramuscular injection of Kenalog, started on Mobic anti-inflammatory, enrolled in physical therapy.  He completed his PT.  Reports he has not particularly beneficial.  His cardiologist stopped the anti-inflammatory.    Current Medications  Current Outpatient Medications   Medication Sig Dispense Refill    anastrozole (ARIMIDEX) 1 mg Tab Take 1 mg by mouth every 7 days.      aspirin (ECOTRIN) 81 MG EC tablet Take 81 mg by mouth once daily.      ENTRESTO 24-26 mg per tablet Take 1 tablet by mouth.      EScitalopram oxalate (LEXAPRO) 10 MG tablet Take 10 mg by mouth every evening.      furosemide (LASIX) 40 MG tablet Take 40 mg by mouth once daily.      hydrOXYzine pamoate (VISTARIL) 50 MG Cap Take 1 capsule (50 mg total) by mouth every 8 (eight) hours as needed. 90 capsule 2    JARDIANCE 25 mg tablet Take 25 mg by mouth Daily.       L-METHYLFOLATE FORTE 15-90.314 mg Cap Take 1 capsule by mouth Daily.      metoprolol tartrate (LOPRESSOR) 25 MG tablet Take 50 mg by mouth 2 (two) times daily.      naltrexone microspheres (VIVITROL) 380 mg SSRR kit Inject 1 each (380 mg total) into the muscle every 30 days. 1 each 0    ondansetron (ZOFRAN-ODT) 4 MG TbDL Take 1 tablet (4 mg total) by mouth every 8 (eight) hours as needed (nausea, vomiting). 12 tablet 0    potassium chloride SA (K-DUR,KLOR-CON) 20 MEQ tablet Take 20 mEq by mouth once daily.      QUEtiapine (SEROQUEL) 50 MG tablet Take 50 mg by mouth every evening.      rosuvastatin (CRESTOR) 40 MG Tab Take 40 mg by mouth once daily.      testosterone cypionate (DEPOTESTOTERONE CYPIONATE) 200 mg/mL injection Inject 200 mg into the muscle every 7 days.      traZODone (DESYREL) 100 MG tablet Take 2 tablets (200 mg total) by mouth every evening. (Patient not taking: Reported on 1/10/2024) 60 tablet 2     No current facility-administered medications for this visit.       Allergies  Review of patient's allergies indicates:   Allergen Reactions    Inapsine [droperidol] Anaphylaxis     seizures    Effexor [venlafaxine]      Increased anxiety    Pcn [penicillins]     Bactrim [sulfamethoxazole-trimethoprim] Rash     Dry red rash all over when in the sun    Fluconazole Rash     Pruritis         Past Medical History  Past Medical History:   Diagnosis Date    ADHD (attention deficit hyperactivity disorder)     Arthritis     Asthma     as child only    Back pain     Coronary artery disease     Degeneration of lumbar intervertebral disc 05/2016    Depression     Elevated PSA     Headache     Hyperlipidemia     Hypertension     Kidney damage     stage 3 ; d/t aleve abuse    Kidney stone     Myocardial infarction     Neck pain     Numbness and tingling in hands     Numbness and tingling of both legs     Seizures     from inapsine 2002    Sleep apnea     no cpap    Wears glasses     CONTACS       Surgical  History  Past Surgical History:   Procedure Laterality Date    BACK SURGERY  2016    back surgery    BONE GRAFT N/A 2020    Procedure: BONE GRAFT;  Surgeon: Mateusz Blanco MD;  Location: [facility] OR;  Service: Orthopedics;  Laterality: N/A;    CARDIAC SURGERY  2020    CABG X 7    CORONARY ARTERY BYPASS GRAFT      7 vessels    HERNIA REPAIR Bilateral 2017    LITHOTRIPSY      LUMBAR LAMINECTOMY WITH FUSION Bilateral 2020    Procedure: LAMINECTOMY, SPINE, LUMBAR, WITH FUSION;  Surgeon: Mateusz Blanco MD;  Location: [facility] OR;  Service: Orthopedics;  Laterality: Bilateral;  MEDTRONIC  NTI  L-3    PILONIDAL CYST DRAINAGE      removal of abcess      scrotal    REMOVAL OF HARDWARE FROM SPINE Bilateral 2020    Procedure: REMOVAL, HARDWARE, SPINE;  Surgeon: Mateusz Blanco MD;  Location: [facility] OR;  Service: Orthopedics;  Laterality: Bilateral;  L 4-5    TYMPANOSTOMY TUBE PLACEMENT         Family History:   Family History   Problem Relation Age of Onset    Heart disease Mother     Migraines Mother     Hypertension Mother     Early death Father     Heart disease Father     Diabetes Maternal Aunt     Diabetes Maternal Uncle     Diabetes Maternal Grandfather        Social History:   Social History     Socioeconomic History    Marital status:    Occupational History    Occupation: disability   Tobacco Use    Smoking status: Former     Current packs/day: 0.00     Types: Cigarettes     Quit date: 2016     Years since quittin.0     Passive exposure: Past    Smokeless tobacco: Former     Quit date: 2016    Tobacco comments:     occasional   Substance and Sexual Activity    Alcohol use: Yes     Alcohol/week: 1.0 standard drink of alcohol     Types: 1 Glasses of wine per week     Comment: 20 - 30 shots vodka per day or 1-2 1/5ths per day for 2 years (started )    Drug use: No    Sexual activity: Yes     Partners: Female     Social Determinants of Health     Financial Resource Strain:  Unknown (4/25/2022)    Overall Financial Resource Strain (CARDIA)     Difficulty of Paying Living Expenses: Patient declined   Food Insecurity: Unknown (4/25/2022)    Hunger Vital Sign     Worried About Running Out of Food in the Last Year: Patient declined     Ran Out of Food in the Last Year: Patient declined   Transportation Needs: Unknown (4/25/2022)    PRAPARE - Transportation     Lack of Transportation (Medical): Patient declined     Lack of Transportation (Non-Medical): Patient declined   Physical Activity: Unknown (4/25/2022)    Exercise Vital Sign     Days of Exercise per Week: Patient declined   Stress: Stress Concern Present (4/25/2022)    [employer] [place] [employer] - Occupational Stress Questionnaire     Feeling of Stress : Rather much   Social Connections: Unknown (4/25/2022)    Social Connection and Isolation Panel [NHANES]     Frequency of Communication with Friends and Family: Patient declined     Frequency of Social Gatherings with Friends and Family: Patient declined     Active Member of Clubs or Organizations: No     Attends Club or Organization Meetings: Never     Marital Status:    Housing Stability: Unknown (4/25/2022)    Housing Stability Vital Sign     Unable to Pay for Housing in the Last Year: Patient refused     Unstable Housing in the Last Year: Patient refused       Hospitalization/Major Diagnostic Procedure:     Review of Systems     General/Constitutional:  Chills denies. Fatigue denies. Fever denies. Weight gain denies. Weight loss denies.    Respiratory:  Shortness of breath denies.    Cardiovascular:  Chest pain denies.    Gastrointestinal:  Constipation denies. Diarrhea denies. Nausea denies. Vomiting denies.     Hematology:  Easy bruising denies. Prolonged bleeding denies.     Genitourinary:  Frequent urination denies. Pain in lower back denies. Painful urination denies.     Musculoskeletal:  See HPI for details    Skin:  Rash denies.    Neurologic:  Dizziness  denies. Gait abnormalities denies. Seizures denies. Tingling/Numbess denies.    Psychiatric:  Anxiety denies. Depressed mood denies.     Objective:   Vital Signs: There were no vitals filed for this visit.     Physical Exam      General Examination:     Constitutional: The patient is alert and oriented to lace person and time. Mood is pleasant.     Head/Face: Normal facial features normal eyebrows    Eyes: Normal extraocular motion bilaterally    Lungs: Respirations are equal and unlabored    Gait is coordinated.    Cardiovascular: There are no swelling or varicosities present.    Lymphatic: Negative for adenopathy    Skin: Normal    Neurological: Level of consciousness normal. Oriented to place person and time and situation    Psychiatric: Oriented to time place person and situation    Lumbar exam: Lumbar exam is similar to where he was on his last visit with us about 9 months ago. Skin throughout his lower back is clean dry and intact.  There is no erythema or ecchymosis.  There are no signs or symptoms of infection.  He has well-healed posterior lumbar incision consistent with his previous fusion surgeries.  He is neurovascularly intact throughout bilateral lower extremities.  He is a negative straight leg raise bilaterally.  He has well-preserved internal/external rotation of bilateral hips without pain.  He can weightbear as tolerated on bilateral lower extremities.  He is nontender to palpation over bilateral greater trochanters.  He has well-preserved flexion/extension of bilateral knees.  He is not really tender to palpation about the right-sided lumbar paravertebrals.  He is tender to palpation about the left-sided lumbar paravertebrals extending into the lumbosacral junction.     XRAY Report/ Interpretation:  Two views taken of the lumbar spine today:  AP and lateral views.  They reveal no acute fractures or dislocations.  He has interbody placement at L4-5 and L5-S1 and posterior instrumentation fusing  L4-5.  There is good bony bridging both fusion levels.  No evidence of hardware failure or interbody subsidence/dislocation.     Assessment:       1. History of lumbar spinal fusion    2. DDD (degenerative disc disease), lumbar    3. Facet arthritis of lumbar region        Plan:       Lee was seen today for pain.    Diagnoses and all orders for this visit:    History of lumbar spinal fusion  -     X-Ray Lumbar Spine Ap And Lateral  -     MRI Lumbar Spine W WO Cont; Future    DDD (degenerative disc disease), lumbar  -     X-Ray Lumbar Spine Ap And Lateral  -     Ambulatory referral/consult to Orthopedics  -     MRI Lumbar Spine W WO Cont; Future    Facet arthritis of lumbar region  -     X-Ray Lumbar Spine Ap And Lateral  -     MRI Lumbar Spine W WO Cont; Future         Follow up for MRI Lumbar Results.  This is to attest that the physician's assistant Gregory Haynes served in the capacity as scribe for this patient's encounter.  This is also to verify that I have reviewed the patient's history and helped formulate the treatment plan and discussed it with the physician's assistant.  I have actively participated in the evaluation and treatment plan for this patient visit.  The treatment plan and medical decision-making is outlined below    Patient has a prior history of lumbar fusion at L4-5 and L5-S1.  He is beginning to experience more frequent and severe low back pain as well as lower extremity radicular symptoms.  His back pain is more symptomatic than the legs.  He has failed conservative treatment measures with intramuscular steroid and physical therapy.  Briefly tried anti-inflammatories but was discontinued by his cardiologist secondary to his cardiac history.  I would like to get an updated MRI of his lumbar spine to evaluate for possible pain management referral for interventional procedures such as epidural steroid injections and/or medial branch blocks.  We will have follow-up with that data and we  will make further orthopedic treatment recommendations based off of those results.  He does have a previous history of ethanol abuse and would like to avoid narcotics.  I believe that is acceptable and appropriate.  We will help support him in that regard.    Treatment options were discussed with regards to the nature of the medical condition. Conservative pain intervention and surgical options were discussed in detail. The probability of success of each separate treatment option was discussed. The patient expressed a clear understanding of the treatment options. With regards to surgery, the procedure risk, benefits, complications, and outcomes were discussed. No guarantees were given with regards to surgical outcome.   The risk of complications, morbidity, and mortality of patient management decisions have been made at the time of this visit. These are associated with the patient's problems, diagnostic procedures and treatment options. This includes the possible management options selected and those considered but not selected by the patient after shared medical decision making we discussed with the patient.     This note was created using Dragon voice recognition software that occasionally misinterpreted phrases or words.

## 2024-01-17 ENCOUNTER — TELEPHONE (OUTPATIENT)
Dept: ORTHOPEDICS | Facility: CLINIC | Age: 49
End: 2024-01-17

## 2024-01-17 DIAGNOSIS — Z98.1 HISTORY OF LUMBAR SPINAL FUSION: ICD-10-CM

## 2024-01-17 DIAGNOSIS — M47.816 FACET ARTHRITIS OF LUMBAR REGION: Primary | ICD-10-CM

## 2024-01-17 DIAGNOSIS — M51.36 DDD (DEGENERATIVE DISC DISEASE), LUMBAR: ICD-10-CM

## 2024-01-17 NOTE — TELEPHONE ENCOUNTER
Spoke to patient. Advised his Lumbar MRI was denied because he has not had any physical therapy. Told him that we can send a referral to [facility]/PT and then once he has completed 6 weeks of therapy to come back in and we can resubmit for the MRI. Patient understood.    Referral pended to sign.

## 2024-01-18 ENCOUNTER — CLINICAL SUPPORT (OUTPATIENT)
Dept: REHABILITATION | Facility: HOSPITAL | Age: 49
End: 2024-01-18
Payer: MEDICAID

## 2024-01-18 DIAGNOSIS — M51.36 DDD (DEGENERATIVE DISC DISEASE), LUMBAR: ICD-10-CM

## 2024-01-18 DIAGNOSIS — M47.816 FACET ARTHRITIS OF LUMBAR REGION: ICD-10-CM

## 2024-01-18 DIAGNOSIS — M25.60 RANGE OF MOTION DEFICIT: ICD-10-CM

## 2024-01-18 DIAGNOSIS — Z98.1 HISTORY OF LUMBAR SPINAL FUSION: ICD-10-CM

## 2024-01-18 DIAGNOSIS — R53.81 DEBILITY: ICD-10-CM

## 2024-01-18 DIAGNOSIS — R29.898 IMPAIRED FLEXIBILITY OF LOWER EXTREMITY: Primary | ICD-10-CM

## 2024-01-18 DIAGNOSIS — Z98.890 HISTORY OF LUMBAR SURGERY: ICD-10-CM

## 2024-01-18 PROCEDURE — 97161 PT EVAL LOW COMPLEX 20 MIN: CPT | Mod: PN

## 2024-01-19 NOTE — PLAN OF CARE
[facility]   Physical Therapy Initial Evaluation      [id number]    Therapy Diagnosis:   Encounter Diagnoses   Name Primary?    Facet arthritis of lumbar region     History of lumbar spinal fusion     DDD (degenerative disc disease), lumbar     Impaired flexibility of lower extremity Yes    History of lumbar surgery     Debility     Range of motion deficit         Physician: Vincent Craig PA    Physician Orders: PT  Eval and Treat  Medical Diagnosis from Referral:   Diagnosis   M47.816 (ICD-10-CM) - Facet arthritis of lumbar region   Z98.1 (ICD-10-CM) - History of lumbar spinal fusion   M51.36 (ICD-10-CM) - DDD (degenerative disc disease), lumbar     Evaluation Date: 1/18/2024  Authorization Period Expiration:     12/31/2024     Plan of Care Expiration: 4/11/2024  Progress Note Due: 2/18/2024  Visit # / Visits authorized: 1/ 1   FOTO: 1/ 3    Precautions: Standard , previous lumbar fusion, previous open heart surgery (none recent)    Time In: 200pm  Time Out: 240pm  Total Billable Time: 40 minutes    Subjective     Date of onset: about a year, worse the past 6 months     History of current condition - Lee reports: patient reports chronic LBP and history of two lumbar spinal fusions (2016 and 2020). These left him with less pain for about a year after each surgery. The pain returned and has worsened since.   The pain now is constant and worse with prolonged positions. It feels like a knife in his spine is how he describes it. He also has spasms in his hips and tingling in both of his feet.   Neither sitting nor standing is worse than the other, both are bad. Can't get comfortable in bed. Isn't sleeping well.   Pain in the left low back goes to the anterior left hip, not so on the right.  Patient denies any recent changes in bowel and bladder function.    PMH: he reports open heart surgery in 2021 in addition to his two previous lumbar  fusions. See PMH list below for full medical history.    Falls: none    Imaging: see imaging section    Prior Therapy[facility], does not feel like it helped  Social History: Lives with family  Occupation: unemployed  Prior Level of Function: no pain affecting Activities of daily living, was a   Current Level of Function: pain affecting Activities of daily living, unable to work or sleep well    Pain:  Current 8/10, worst 10/10, best 8/10   Location: low back pain and Bilateral hip pain, numbness and tingling in Bilateral feet  Description: achy, stabbing, numb  Aggravating Factors: sitting, standing, walking, prolonged positions   Easing Factors: nothing reported    Patients goals: have less pain     Medical History:   Past Medical History:   Diagnosis Date    ADHD (attention deficit hyperactivity disorder)     Arthritis     Asthma     as child only    Back pain     Coronary artery disease     Degeneration of lumbar intervertebral disc 05/2016    Depression     Elevated PSA     Headache     Hyperlipidemia     Hypertension     Kidney damage     stage 3 ; d/t aleve abuse    Kidney stone     Myocardial infarction     Neck pain     Numbness and tingling in hands     Numbness and tingling of both legs     Seizures     from inapsine 2002    Sleep apnea     no cpap    Wears glasses     CONTACS       Surgical History:   Lee Quintanilla  has a past surgical history that includes removal of abcess; Pilonidal cyst drainage; Tympanostomy tube placement; Back surgery (2016); Lithotripsy; Hernia repair (Bilateral, 11/22/2017); Coronary artery bypass graft; Cardiac surgery (01/06/2020); Lumbar laminectomy with fusion (Bilateral, 11/5/2020); Removal of hardware from spine (Bilateral, 11/5/2020); and Bone graft (N/A, 11/5/2020).    Medications:   Lee has a current medication list which includes the following prescription(s): anastrozole, aspirin, entresto, escitalopram oxalate,  furosemide, hydroxyzine pamoate, jardiance, l-methylfolate forte, metoprolol tartrate, vivitrol, ondansetron, potassium chloride sa, quetiapine, rosuvastatin, testosterone cypionate, and trazodone.    Allergies:   Review of patient's allergies indicates:   Allergen Reactions    Inapsine [droperidol] Anaphylaxis     seizures    Effexor [venlafaxine]      Increased anxiety    Pcn [penicillins]     Bactrim [sulfamethoxazole-trimethoprim] Rash     Dry red rash all over when in the sun    Fluconazole Rash     Pruritis          Objective      Observation:   Patient is a 48 year old male presenting alert and oriented.    Posture:    Flat lumbar spine, increased thoracic kyphosis in upper thoracic spine    Functional Movements:   Gait: extension rotation to the left in the lumbar spine, no hip extension bilaterally  Squat: not attempted   Single leg balance: < 10 seconds Bilateral     Lumbar Range of Motion:   Motion Range of Motion Pain and Quality of Motion   Flexion 90% limited Worst pain   Extension 90% limited Painful   Right Side Bending 90% limited Painful   Left Side Bending 90% limited More painful than right SB    Right Rotation 90% limited Painful   Left Rotation 90% limited Painful     Special Tests:  Not performed today due to high irritability.     Neural Tension Testing:   Slump:Positive     Lower Extremity Neuro Screen:    Dermatomes:   Left Right   L1 (Inguinal region) Intact Intact   L2 (Middle-anterior thigh) Diminished Intact   L3 (Medial aspect of the knee) Diminished Intact   L4 (Medial-lower leg and ankle) Diminished Intact   L5 (Between first and second toes) Intact Intact   S1 (Lateral border of foot) Intact Intact   S2 (Popliteal space) Intact Intact     Myotomes:   Left Right   L2 (Hip flexion ) 4/5 4/5   L3 (Knee extension) 4-/5 4/5   L4 (Ankle dorsiflexion) 4-/5 4/5   L5 (Extension of great toe) 4/5 4/5   S1 (Ankle plantarflexion) 4/5 4/5   S2 (Knee flexion) 4/5 4/5     Reflexes:   Left Right    L3-4 (Patellar Tendon) 1+ 1+   S1 (Achilles Tendon) 2+ 2+   Clonus Negative (1 beat) Negative (1 beat)      Joint Mobility:   Hypomobile in thoracic spine, painful PA in mid lumbar region.    Palpation:   Tender to palpation in lumbar spine.    Flexibility:   Not performed today.    Intake Outcome Measure for FOTO Lumbar Survey    Therapist reviewed FOTO scores for Lee Quintanilla on 1/18/2024.   FOTO report - see Media section or FOTO account episode details.    Intake Score: 28%  Predicted Score: 42%         Treatment     Total Treatment time (time-based codes) separate from Evaluation: 10 minutes     Lee received the treatments listed below:      therapeutic exercises to develop strength, endurance, ROM, flexibility, and posture for 10 minutes including:    Supine Transverse abdominus activation/ diaphragmatic breathing x 3'  Supine clamshell yellow band x 20  Side Lying open book x 10    Patient Education and Home Exercises     Education provided:   - Home Exercise Program, diagnosis, prognosis, Plan of care   - Healthy Back program for chronic low back pain    Written Home Exercises Provided: yes. Exercises were reviewed and Lee was able to demonstrate them prior to the end of the session.  Lee demonstrated good  understanding of the education provided. See EMR under Patient Instructions for exercises provided during therapy sessions.    Assessment     Lee is a 48 y.o. male referred to outpatient Physical Therapy with a medical diagnosis of facet arthritis, DDD, and history of lumbar fusion. Patient presents with very high irritability, decreased sensation in the left Lower Extremity, gait and range of motion impairments, and decreased Lower Extremity strength. Testing was limited today due to high irritability. I gave him some low level interventions aimed at decreasing his pain and fear of movement. We discussed transferring to our healthy back program due to the chronicity of his LBP and  his previous round of regular physical therapy that was unsuccessful. He was open to this idea, so I will have him transferred to a healthy back therapist for an evaluation.    Patient prognosis is Guarded.   Patient will benefit from skilled outpatient Physical Therapy to address the deficits stated above and in the chart below, provide patient /family education, and to maximize patientt's level of independence.     Plan of care discussed with patient: Yes  Patient's spiritual, cultural and educational needs considered and patient is agreeable to the plan of care and goals as stated below:     Anticipated Barriers for therapy: chronicity of symptoms, two previous lumbar surgeries     Medical Necessity is demonstrated by the following  History  Co-morbidities and personal factors that may impact the plan of care [] LOW: no personal factors / co-morbidities  [] MODERATE: 1-2 personal factors / co-morbidities  [x] HIGH: 3+ personal factors / co-morbidities    Moderate / High Support Documentation:   Co-morbidities affecting plan of care: arthritis, CAD, depression, HTN, MI    Personal Factors:   no deficits     Examination  Body Structures and Functions, activity limitations and participation restrictions that may impact the plan of care [] LOW: addressing 1-2 elements  [] MODERATE: 3+ elements  [x] HIGH: 4+ elements (please support below)    Moderate / High Support Documentation: strength, sensation, range of motion, gait, reflexes     Clinical Presentation [] LOW: stable  [x] MODERATE: Evolving  [] HIGH: Unstable     Decision Making/ Complexity Score: moderate       Goals:  Short Term Goals: 6 weeks   Patient will be independent in Home Exercise Program sto decreased pain and improve mobility.    Long Term Goals: 12 weeks   Patient will have improved FOTO score to predicted level to show true functional improvement.   Patient goal: have less pain.  Patient will be independent in progressed Home Exercise Program to  support improving functional mobility.    *More goals may be added by the healthy back therapist.    Plan     Plan of care Certification: 1/18/2024 to 4/11/2024.    Outpatient Physical Therapy 1-2 times weekly for 12 weeks to include the following interventions: Gait Training, Manual Therapy, Moist Heat/ Ice, Neuromuscular Re-ed, Patient Education, Therapeutic Activities, and Therapeutic Exercise.     Transfer to [facility] for evaluation.    Laura Ball, PT, DPT        Physician's Signature: _________________________________________ Date: ________________

## 2024-02-01 RX ORDER — TRAZODONE HYDROCHLORIDE 100 MG/1
200 TABLET ORAL NIGHTLY
Qty: 60 TABLET | Refills: 0 | Status: SHIPPED | OUTPATIENT
Start: 2024-02-01 | End: 2024-04-03 | Stop reason: CLARIF

## 2024-02-02 ENCOUNTER — CLINICAL SUPPORT (OUTPATIENT)
Dept: REHABILITATION | Facility: HOSPITAL | Age: 49
End: 2024-02-02
Payer: MEDICAID

## 2024-02-02 DIAGNOSIS — Z73.89 PAIN SELF-MANAGEMENT DEFICIT: ICD-10-CM

## 2024-02-02 DIAGNOSIS — M25.60 RANGE OF MOTION DEFICIT: Primary | ICD-10-CM

## 2024-02-02 DIAGNOSIS — R29.898 WEAKNESS OF BACK: ICD-10-CM

## 2024-02-02 PROCEDURE — 97750 PHYSICAL PERFORMANCE TEST: CPT | Mod: PN

## 2024-02-02 PROCEDURE — 97112 NEUROMUSCULAR REEDUCATION: CPT | Mod: PN

## 2024-02-02 PROCEDURE — 97530 THERAPEUTIC ACTIVITIES: CPT | Mod: PN

## 2024-02-02 NOTE — PROGRESS NOTES
[facility]  Physical Therapy Treatment Note     Name: Lee Quintanilla  Clinic Number: [id number]    Therapy Diagnosis:   Encounter Diagnoses   Name Primary?    Range of motion deficit Yes    Pain self-management deficit     Weakness of back      Physician: Vincent Craig PA    Visit Date: 2/2/2024    Physician Orders: PT Eval and Treat  Medical Diagnosis from Referral:   Diagnosis   M47.816 (ICD-10-CM) - Facet arthritis of lumbar region   Z98.1 (ICD-10-CM) - History of lumbar spinal fusion   M51.36 (ICD-10-CM) - DDD (degenerative disc disease), lumbar      Evaluation Date: 1/18/2024  Authorization Period Expiration:     12/31/2024      Plan of Care Expiration: 4/11/2024  Progress Note Due: 2/18/2024  Visit # / Visits authorized: 1/ 1   FOTO: 1/ 3    MedX Testing:MedX testing visit 2    PTA Visit #: 0/5     Time In: 1355  Time Out: 1445  Total Billable Time: 50 minutes  INSURANCE and OUTCOMES: Fee for Service with FOTO Outcomes 1/3    Precautions: standard and previous lumbar fusion, previous open heart surgery (none recent)    Pattern of pain determined: 1 PEP    Subjective     Lee Quintanilla reports no change in his symptoms. .      Patient reports tolerating previous visit fair  Patient reports their pain to be 8/10 on a 0-10 scale with 0 being no pain and 10 being the worst pain imaginable.  Pain Location: hips, low back     Work and leisure: not working currently, leisure - not specified  Pt goals: to have less pain    Objective          MOVEMENT LOSS - Lumbar   Norms ROM Loss Initial   Flexion Fingers touch toes, sacral angle >/= 70 deg, uniform spinal curvature, posterior weight shift  major loss   Extension ASIS surpasses toes, spine of scapulae surpasses heels, uniform spinal curve major loss   Side glide Right  major loss   Side glide Left  major loss   Rotation Right PT observes contralateral shoulder major loss   Rotation Left PT observes  contralateral shoulder major loss       Special Tests:   Test Name  Test Result   Prone Instability Test    SI Joint Provocation Test equivocable   Straight Leg Raise (+)   Neural Tension Test (+)   Crossed Straight Leg Raise    Walking on toes Able, with difficulty   Walking on heels  Able, with difficulty       REPEATED TEST MOVEMENTS:    Baseline symptoms:  Repeated Flexion in Standing end range pain  pain during motion  worse   Repeated Extension in Standing end range pain  pain during motion  no effect   Repeated Flexion in lying    Repeated Extension in lying  end range pain  pain during motion  better       STATIC TESTS and other movements:   Prone lie better   Prone lie on elbows better   Sitting slouched  worse   Sitting erect    Standing slouched    Standing erect     Lying prone in extension  better   Long sitting      Sustained flexion    Sustained prone using mat better     Lumbar testing Visit 2    Lumbar  Isometric Testing on Med X equipment: Testing administered by PT    Test Initial Baseline Midpoint Final   Date 02-     ROM 0-30 deg     Max Peak Torque 60      Min Peak Torque 19      Flex/Ext Ratio ~ 3 / 1     % below normative data 79     % gain from initial test Not available visit 1           Outcomes:  Intake Score: 28%  Visit 6 Score:   Visit 10 Score:   Discharge Score:  Goal Score: 42%        Treatment     Lee received the treatments listed below:      Medical MedX Treatment as follows:  MedX testing performed day 2: Patient  received neuromuscular education to engage spinal musculature correctly for motor control and engagement of musculature for 25 minutes including the MedX exercise component and practice and standard testing. MedX dynamic exercise and baseline isometric test performed with instructions to guide the patient safely through the testing procedure. Patient instructed to perform isometric test correctly and safely while building to an optimal force with a pain-free  effort. Patient also instructed that they should feel support/pressure from MedX restraints but no pain/discomfort, and encouraged to report any pain to therapist. Patient demonstrated appropriate understanding of information and tolerance of test.  Education regarding purpose of test, safety during test given, and reviewed possible more soreness and strategies.         Lee participated in neuromuscular re-education activities to improve balance, coordination, proprioception, motor control and/or posture for 0  minutes. The following activities were included:    Extension in stance x 10  Bridging x 10  Extension in prone x 10     Lee participated in therapeutic exercises to develop strength, endurance, ROM, flexibility, posture, and core stabilization for 0 minutes including:    NEXT VISIT: introduce POSTERIOR PELVIC TILTS, bridging with abdominal, clams, open books, prone quad setting; continue with extension in stance, prone prop, extension in prone     Peripheral muscle strengthening which included one set of 15-20 repetitions at a slow and controlled 10-13 second per rep pace focused on strengthening supporting musculature in order to improve body mechanics and functional mobility. Patient and therapist focused on proper form during treatment to ensure optimal strengthening of each targeted muscle group.  Machines utilized included:  To be added next visit:Torso rotation, Chest Press, Rowing, and Triceps      Lee participated in dynamic functional therapeutic activities to improve functional performance and simulate household and community activities for 25  minutes. The following activities were included:    Education as below, issued written Healthy Back program packed and updated his home exercise program     Lee received manual therapy techniques for 0  minutes. The following activities were included:        Pt given cold pack for 5 minutes to low back  in z lie.    Patient Education and Home  Exercises     Home exercises include:  See wrap up  Cardio program (V5): - v3  Lifting education (V11): - TBD  Posture/Lumbar roll: recommended  Fridge Magnet Discharge handout (date given): - TBD  Equipment at home/gym membership: undetermined    Education provided:   - PT role and POC  - HEP  -- Patient was given an [facility] Healthy Back Visit 1 handout which discusses the following:  - what to expect in therapy  - an overview of the program, including health coaching and wellness  - importance of spinal hygiene, proper posture, lifting mechanics, sleep quality, and nutrition/hydration   - David roll trialed, recommended, and purchase information was provided.  - Patient received a handout regarding anticipated muscular soreness following the isometric test and strategies for management were reviewed with patient including stretching, using ice and scheduled rest.   - Patient received verbal education on the following:   - Healthy Back program   - purpose of the isometric test  - safe progression of lumbar strengthening, wellness approach, and systemic strengthening.   - safe usage of MedX machine and testing protocols.  - reviewed sleeping positions with pillow for postural support to allow muscle to better rest  - reviewed pathomechanics of mechanical low back pain.     Written Home Exercises Provided: yes.  Exercises were reviewed and Lee was able to demonstrate them prior to the end of the session.  Lee demonstrated good  understanding of the education provided.     See EMR under Patient Instructions for exercises provided 2/2/2024.    Assessment     Lee presents to second healthy back visit reporting ongoing, severe chronic low back pain , was able to demo HEP with Min VC for form. Pt was able to tolerate Medical MedX machine well as follows:  MedX testing performed and patient tolerated test well.  Pt was also able to complete half of the peripheral strengthening exercises without increased  discomfort and will complete the complete circuit next visit as tolerated.    Patient is making fair progress towards established goals.  Pt will continue to benefit from skilled outpatient physical therapy to address the deficits stated in the impairment chart, provide pt/family education and to maximize pt's level of independence in the home and community environment.     Anticipated Barriers for therapy: chronicity and severity of his symptoms    Pt's spiritual, cultural and educational needs considered and pt agreeable to plan of care and goals as stated below:     Goals:   Updated 02- to per Healthy Back program protocol    GOALS: Pt is in agreement with the following goals.    Short term goals:  6 weeks or 10 visits   - Pt will demonstrate increased lumbar MedX ROM by at least 3 degrees from the initial ROM value with improvements noted in functional ROM and ability to perform ADLs. Appropriate and Ongoing  - Pt will demonstrate increased MedX average isometric strength value by 10% from initial test resulting in improved ability to perform bending, lifting, and carrying activities safely, confidently. Appropriate and Ongoing  - Pt will report a reduction in worst pain score by 1-2 points for improved tolerance for transition from sit <-> stand. Appropriate and Ongoing  - Pt able to perform HEP correctly with minimal cueing or supervision from therapist to encourage independent management of symptoms. Appropriate and Ongoing    Long term goals: 10 weeks or 20 visits   - Pt will demonstrate increased lumbar MedX ROM by at least 6 degrees from initial ROM value, resulting in improved ability to perform functional forward bending while standing and sitting. Appropriate and Ongoing  - Pt will demonstrate increased MedX average isometric strength value by 40% from initial test resulting in improved ability to perform bending, lifting, and carrying activities safely and confidently. Appropriate and Ongoing  -  Pt to demonstrate ability to independently control and reduce their pain through posture positioning and mechanical movements throughout a typical day. Appropriate and Ongoing  - Pt will demonstrate reduced pain and improved functional outcomes as reported on the FOTO by reaching an intake score of >/= 40% functional ability in order to demonstrate subjective improvement in patient's condition. . Appropriate and Ongoing  - Pt will demonstrate independence with the HEP at discharge. Appropriate and Ongoing  - Patient to report improved sleep hygiene 2' decreased low back pain (patient goal) Appropriate and Ongoing    Plan     Continue with established Plan of Care towards established PT goals.     Therapist: Alanna Sheffield, PT CLT  2/2/2024

## 2024-02-02 NOTE — PLAN OF CARE
[facility] OUTPATIENT THERAPY AND WELLNESS - HEALTHY BACK  Physical Therapy Treatment Note     Healthy Back program ASSESSMENT AND PLAN OF CARE UPDATE    Name: Lee Quintanilla  Clinic Number: [id number]    Therapy Diagnosis:   Encounter Diagnosis   Name Primary?    Range of motion deficit Yes     Physician: Vincent Craig PA    Visit Date: 2/2/2024    Physician Orders: PT Eval and Treat  Medical Diagnosis from Referral:   Diagnosis   M47.816 (ICD-10-CM) - Facet arthritis of lumbar region   Z98.1 (ICD-10-CM) - History of lumbar spinal fusion   M51.36 (ICD-10-CM) - DDD (degenerative disc disease), lumbar      Evaluation Date: 1/18/2024  Authorization Period Expiration:     12/31/2024      Plan of Care Expiration: 4/11/2024  Progress Note Due: 2/18/2024  Visit # / Visits authorized: 1/ 1   FOTO: 1/ 3    MedX Testing:MedX testing visit 2    PTA Visit #: 0/5     Time In: 1355  Time Out: 1445  Total Billable Time: 50 minutes  INSURANCE and OUTCOMES: Fee for Service with FOTO Outcomes 1/3    Precautions: standard and previous lumbar fusion, previous open heart surgery (none recent)    Pattern of pain determined: 1 PEP    Subjective     Lee Quintanilla reports no change in his symptoms. .      Patient reports tolerating previous visit fair  Patient reports their pain to be 8/10 on a 0-10 scale with 0 being no pain and 10 being the worst pain imaginable.  Pain Location: hips, low back     Work and leisure: not working currently, leisure - not specified  Pt goals: to have less pain    Objective          MOVEMENT LOSS - Lumbar   Norms ROM Loss Initial   Flexion Fingers touch toes, sacral angle >/= 70 deg, uniform spinal curvature, posterior weight shift  major loss   Extension ASIS surpasses toes, spine of scapulae surpasses heels, uniform spinal curve major loss   Side glide Right  major loss   Side glide Left  major loss   Rotation Right PT observes contralateral shoulder major loss   Rotation Left PT  observes contralateral shoulder major loss       Special Tests:   Test Name  Test Result   Prone Instability Test    SI Joint Provocation Test equivocable   Straight Leg Raise (+)   Neural Tension Test (+)   Crossed Straight Leg Raise    Walking on toes Able, with difficulty   Walking on heels  Able, with difficulty       REPEATED TEST MOVEMENTS:    Baseline symptoms:  Repeated Flexion in Standing end range pain  pain during motion  worse   Repeated Extension in Standing end range pain  pain during motion  no effect   Repeated Flexion in lying    Repeated Extension in lying  end range pain  pain during motion  better       STATIC TESTS and other movements:   Prone lie better   Prone lie on elbows better   Sitting slouched  worse   Sitting erect    Standing slouched    Standing erect     Lying prone in extension  better   Long sitting      Sustained flexion    Sustained prone using mat better     Lumbar testing Visit 2    Lumbar  Isometric Testing on Med X equipment: Testing administered by PT    Test Initial Baseline Midpoint Final   Date 02-     ROM 0-30 deg     Max Peak Torque 60      Min Peak Torque 19      Flex/Ext Ratio ~ 3 / 1     % below normative data 79     % gain from initial test Not available visit 1           Outcomes:  Intake Score: 28%  Visit 6 Score:   Visit 10 Score:   Discharge Score:  Goal Score: 42%        Treatment     Lee received the treatments listed below:      Medical MedX Treatment as follows:  MedX testing performed day 2: Patient  received neuromuscular education to engage spinal musculature correctly for motor control and engagement of musculature for 25 minutes including the MedX exercise component and practice and standard testing. MedX dynamic exercise and baseline isometric test performed with instructions to guide the patient safely through the testing procedure. Patient instructed to perform isometric test correctly and safely while building to an optimal force with a  pain-free effort. Patient also instructed that they should feel support/pressure from MedX restraints but no pain/discomfort, and encouraged to report any pain to therapist. Patient demonstrated appropriate understanding of information and tolerance of test.  Education regarding purpose of test, safety during test given, and reviewed possible more soreness and strategies.         Lee participated in neuromuscular re-education activities to improve balance, coordination, proprioception, motor control and/or posture for 0  minutes. The following activities were included:    Extension in stance x 10  Bridging x 10  Extension in prone x 10     Lee participated in therapeutic exercises to develop strength, endurance, ROM, flexibility, posture, and core stabilization for 0 minutes including:    NEXT VISIT: introduce POSTERIOR PELVIC TILTS, bridging with abdominal, clams, open books, prone quad setting; continue with extension in stance, prone prop, extension in prone     Peripheral muscle strengthening which included one set of 15-20 repetitions at a slow and controlled 10-13 second per rep pace focused on strengthening supporting musculature in order to improve body mechanics and functional mobility. Patient and therapist focused on proper form during treatment to ensure optimal strengthening of each targeted muscle group.  Machines utilized included:  To be added next visit:Torso rotation, Chest Press, Rowing, and Triceps      Lee participated in dynamic functional therapeutic activities to improve functional performance and simulate household and community activities for 25  minutes. The following activities were included:    Education as below, issued written Healthy Back program packed and updated his home exercise program     Lee received manual therapy techniques for 0  minutes. The following activities were included:        Pt given cold pack for 5 minutes to low back  in z lie.    Patient Education and  Home Exercises     Home exercises include:  See wrap up  Cardio program (V5): - v3  Lifting education (V11): - TBD  Posture/Lumbar roll: recommended  Fridge Magnet Discharge handout (date given): - TBD  Equipment at home/gym membership: undetermined    Education provided:   - PT role and POC  - HEP  -- Patient was given an [facility] Healthy Back Visit 1 handout which discusses the following:  - what to expect in therapy  - an overview of the program, including health coaching and wellness  - importance of spinal hygiene, proper posture, lifting mechanics, sleep quality, and nutrition/hydration   - David roll trialed, recommended, and purchase information was provided.  - Patient received a handout regarding anticipated muscular soreness following the isometric test and strategies for management were reviewed with patient including stretching, using ice and scheduled rest.   - Patient received verbal education on the following:   - Healthy Back program   - purpose of the isometric test  - safe progression of lumbar strengthening, wellness approach, and systemic strengthening.   - safe usage of MedX machine and testing protocols.  - reviewed sleeping positions with pillow for postural support to allow muscle to better rest  - reviewed pathomechanics of mechanical low back pain.     Written Home Exercises Provided: yes.  Exercises were reviewed and Lee was able to demonstrate them prior to the end of the session.  Lee demonstrated good  understanding of the education provided.     See EMR under Patient Instructions for exercises provided 2/2/2024.    Assessment     Lee presents to second healthy back visit reporting ongoing, severe chronic low back pain , was able to demo HEP with Min VC for form. Pt was able to tolerate Medical MedX machine well as follows:  MedX testing performed and patient tolerated test well.  Pt was also able to complete half of the peripheral strengthening exercises without increased  discomfort and will complete the complete circuit next visit as tolerated.    Patient is making fair progress towards established goals.  Pt will continue to benefit from skilled outpatient physical therapy to address the deficits stated in the impairment chart, provide pt/family education and to maximize pt's level of independence in the home and community environment.     Anticipated Barriers for therapy: chronicity and severity of his symptoms    Pt's spiritual, cultural and educational needs considered and pt agreeable to plan of care and goals as stated below:     Goals:   Updated 02- to per Healthy Back program protocol    GOALS: Pt is in agreement with the following goals.    Short term goals:  6 weeks or 10 visits   - Pt will demonstrate increased lumbar MedX ROM by at least 3 degrees from the initial ROM value with improvements noted in functional ROM and ability to perform ADLs. Appropriate and Ongoing  - Pt will demonstrate increased MedX average isometric strength value by 10% from initial test resulting in improved ability to perform bending, lifting, and carrying activities safely, confidently. Appropriate and Ongoing  - Pt will report a reduction in worst pain score by 1-2 points for improved tolerance for transition from sit <-> stand. Appropriate and Ongoing  - Pt able to perform HEP correctly with minimal cueing or supervision from therapist to encourage independent management of symptoms. Appropriate and Ongoing    Long term goals: 10 weeks or 20 visits   - Pt will demonstrate increased lumbar MedX ROM by at least 6 degrees from initial ROM value, resulting in improved ability to perform functional forward bending while standing and sitting. Appropriate and Ongoing  - Pt will demonstrate increased MedX average isometric strength value by 40% from initial test resulting in improved ability to perform bending, lifting, and carrying activities safely and confidently. Appropriate and Ongoing  -  Pt to demonstrate ability to independently control and reduce their pain through posture positioning and mechanical movements throughout a typical day. Appropriate and Ongoing  - Pt will demonstrate reduced pain and improved functional outcomes as reported on the FOTO by reaching an intake score of >/= 40% functional ability in order to demonstrate subjective improvement in patient's condition. . Appropriate and Ongoing  - Pt will demonstrate independence with the HEP at discharge. Appropriate and Ongoing  - Patient to report improved sleep hygiene 2' decreased low back pain (patient goal) Appropriate and Ongoing    Plan     Continue with established Plan of Care towards established PT goals.     Therapist: Alanna Sheffield, PT CLT  2/2/2024

## 2024-02-03 ENCOUNTER — LAB VISIT (OUTPATIENT)
Dept: LAB | Facility: HOSPITAL | Age: 49
End: 2024-02-03
Attending: NURSE PRACTITIONER
Payer: MEDICAID

## 2024-02-03 DIAGNOSIS — T50.905A IMPAIRED GLUCOSE TOLERANCE ASSOCIATED WITH DRUGS: ICD-10-CM

## 2024-02-03 DIAGNOSIS — E78.5 HYPERLIPEMIA: ICD-10-CM

## 2024-02-03 DIAGNOSIS — I25.10 CORONARY ATHEROSCLEROSIS OF NATIVE CORONARY ARTERY: Primary | ICD-10-CM

## 2024-02-03 DIAGNOSIS — R73.02 IMPAIRED GLUCOSE TOLERANCE ASSOCIATED WITH DRUGS: ICD-10-CM

## 2024-02-03 DIAGNOSIS — N18.2 CHRONIC KIDNEY DISEASE, STAGE II (MILD): ICD-10-CM

## 2024-02-03 LAB
ALBUMIN SERPL BCP-MCNC: 3.9 G/DL (ref 3.5–5.2)
ALP SERPL-CCNC: 55 U/L (ref 55–135)
ALT SERPL W/O P-5'-P-CCNC: 17 U/L (ref 10–44)
ANION GAP SERPL CALC-SCNC: 7 MMOL/L (ref 8–16)
AST SERPL-CCNC: 15 U/L (ref 10–40)
BASOPHILS # BLD AUTO: 0.07 K/UL (ref 0–0.2)
BASOPHILS NFR BLD: 0.8 % (ref 0–1.9)
BILIRUB SERPL-MCNC: 0.5 MG/DL (ref 0.1–1)
BUN SERPL-MCNC: 8 MG/DL (ref 6–20)
CALCIUM SERPL-MCNC: 8.9 MG/DL (ref 8.7–10.5)
CHLORIDE SERPL-SCNC: 108 MMOL/L (ref 95–110)
CHOLEST SERPL-MCNC: 111 MG/DL (ref 120–199)
CHOLEST/HDLC SERPL: 2.9 {RATIO} (ref 2–5)
CO2 SERPL-SCNC: 24 MMOL/L (ref 23–29)
CREAT SERPL-MCNC: 1.6 MG/DL (ref 0.5–1.4)
DIFFERENTIAL METHOD BLD: ABNORMAL
EOSINOPHIL # BLD AUTO: 0.4 K/UL (ref 0–0.5)
EOSINOPHIL NFR BLD: 3.9 % (ref 0–8)
ERYTHROCYTE [DISTWIDTH] IN BLOOD BY AUTOMATED COUNT: 14.9 % (ref 11.5–14.5)
EST. GFR  (NO RACE VARIABLE): 52.8 ML/MIN/1.73 M^2
GLUCOSE SERPL-MCNC: 116 MG/DL (ref 70–110)
HCT VFR BLD AUTO: 47.8 % (ref 40–54)
HDLC SERPL-MCNC: 38 MG/DL (ref 40–75)
HDLC SERPL: 34.2 % (ref 20–50)
HGB BLD-MCNC: 15.5 G/DL (ref 14–18)
IMM GRANULOCYTES # BLD AUTO: 0.04 K/UL (ref 0–0.04)
IMM GRANULOCYTES NFR BLD AUTO: 0.4 % (ref 0–0.5)
LDLC SERPL CALC-MCNC: 49.4 MG/DL (ref 63–159)
LYMPHOCYTES # BLD AUTO: 2 K/UL (ref 1–4.8)
LYMPHOCYTES NFR BLD: 22 % (ref 18–48)
MCH RBC QN AUTO: 28 PG (ref 27–31)
MCHC RBC AUTO-ENTMCNC: 32.4 G/DL (ref 32–36)
MCV RBC AUTO: 86 FL (ref 82–98)
MONOCYTES # BLD AUTO: 0.5 K/UL (ref 0.3–1)
MONOCYTES NFR BLD: 5.7 % (ref 4–15)
NEUTROPHILS # BLD AUTO: 6 K/UL (ref 1.8–7.7)
NEUTROPHILS NFR BLD: 67.2 % (ref 38–73)
NONHDLC SERPL-MCNC: 73 MG/DL
NRBC BLD-RTO: 0 /100 WBC
PLATELET # BLD AUTO: 221 K/UL (ref 150–450)
PMV BLD AUTO: 10.7 FL (ref 9.2–12.9)
POTASSIUM SERPL-SCNC: 4.2 MMOL/L (ref 3.5–5.1)
PROT SERPL-MCNC: 7.1 G/DL (ref 6–8.4)
RBC # BLD AUTO: 5.54 M/UL (ref 4.6–6.2)
SODIUM SERPL-SCNC: 139 MMOL/L (ref 136–145)
TRIGL SERPL-MCNC: 118 MG/DL (ref 30–150)
WBC # BLD AUTO: 8.97 K/UL (ref 3.9–12.7)

## 2024-02-03 PROCEDURE — 85025 COMPLETE CBC W/AUTO DIFF WBC: CPT | Performed by: NURSE PRACTITIONER

## 2024-02-03 PROCEDURE — 36415 COLL VENOUS BLD VENIPUNCTURE: CPT | Performed by: NURSE PRACTITIONER

## 2024-02-03 PROCEDURE — 80053 COMPREHEN METABOLIC PANEL: CPT | Performed by: NURSE PRACTITIONER

## 2024-02-03 PROCEDURE — 80061 LIPID PANEL: CPT | Performed by: NURSE PRACTITIONER

## 2024-02-03 PROCEDURE — 83036 HEMOGLOBIN GLYCOSYLATED A1C: CPT | Performed by: NURSE PRACTITIONER

## 2024-02-04 LAB
ESTIMATED AVG GLUCOSE: 131 MG/DL (ref 68–131)
HBA1C MFR BLD: 6.2 % (ref 4.5–6.2)

## 2024-02-09 ENCOUNTER — CLINICAL SUPPORT (OUTPATIENT)
Dept: REHABILITATION | Facility: HOSPITAL | Age: 49
End: 2024-02-09
Payer: MEDICAID

## 2024-02-09 DIAGNOSIS — M25.60 RANGE OF MOTION DEFICIT: Primary | ICD-10-CM

## 2024-02-09 DIAGNOSIS — R29.898 WEAKNESS OF BACK: ICD-10-CM

## 2024-02-09 DIAGNOSIS — Z73.89 PAIN SELF-MANAGEMENT DEFICIT: ICD-10-CM

## 2024-02-09 PROCEDURE — 97110 THERAPEUTIC EXERCISES: CPT | Mod: PN

## 2024-02-09 NOTE — PROGRESS NOTES
[facility]      Name: Lee Quintanilla  Clinic Number: [id number]    Therapy Diagnosis:   Encounter Diagnoses   Name Primary?    Range of motion deficit Yes    Pain self-management deficit     Weakness of back      Physician: Vincent Craig PA    Visit Date: 2/9/2024    Physician Orders: PT Eval and Treat  Medical Diagnosis from Referral:   Diagnosis   M47.816 (ICD-10-CM) - Facet arthritis of lumbar region   Z98.1 (ICD-10-CM) - History of lumbar spinal fusion   M51.36 (ICD-10-CM) - DDD (degenerative disc disease), lumbar      Evaluation Date: 1/18/2024  Authorization Period Expiration:     3/26/2024      Plan of Care Expiration: 4/11/2024  Progress Note Due: 2/18/2024  Visit # / Visits authorized: 2/10  FOTO: 1/ 3    PTA Visit #: 0/5     Time In: 733 AM  Time Out: 830  Total Billable Time: 57 minutes  INSURANCE and OUTCOMES: Fee for Service with FOTO Outcomes 1/3    Precautions: standard and HTN standard and previous lumbar fusion, previous open heart surgery (none recent)     Pattern of pain determined: 1 PEP    Subjective   Lee Quintanilla reports no change in pain. Reports history of fusion 2016 and 2020. L3-S1.       Patient reports tolerating previous visit first tx session   Patient reports their pain to be 7/10 on a 0-10 scale with 0 being no pain and 10 being the worst pain imaginable.  Pain Location: midline to L side      Work and leisure: not working currently, leisure - not specified  Pt goals: to have less pain     Objective   Isometric Testing on Med X equipment: Testing administered by PT     Test Initial Baseline Midpoint Final   Date 02-       ROM 0-30 deg       Max Peak Torque 60        Min Peak Torque 19        Flex/Ext Ratio ~ 3 / 1       % below normative data 79       % gain from initial test Not available       Outcomes:  Intake Score: 28%  Visit 6 Score:   Visit 10 Score:   Discharge Score:  Goal Score: 42%     Treatment    Lee  received the treatments listed below:      Lee received neuromuscular education for 8 minutes via participation on the BUX Machine. Therapist assisted patient in isolating and engaging spinal stabilization musculature in order to improve functional ability and postural control. Patient performed exercise with therapist guidance in order to accurately use pacer function, avoid valsalva, and optimally exert effort within a safe and effective range via the Tez Exertion Rating Scale. Patient instructed to perform at a midrange of exertion and to complete 15-20 repetitions within appropriate split time, with proper technique, and while maintaining safety.         2/9/2024     8:23 AM   [facility]   Visit Number 3   VAS Pain Rating 7   Treadmill Time (in min.) 3 min   Speed 2 mph   Lumbar Weight 40 lbs   Repetitions 20   Rating of Perceived Exertion 2     Lee participated in therapeutic exercises to develop strength, endurance, ROM, flexibility, posture, and core stabilization for 49 minutes including:    Standing assessment:    Flexion: moderate limitation, stretch no pain L side   Extension: severe limitation, pain L side    SG: R SG pain L side moderate limitation, no pain L SG severe limitation    Rotation: severe limitation, pain R rotation   Prone press up 1 x 10, no effect   Prone press up sag, centralization with extension, looser with L SG and rotation bilaterally 1 x 10    Then 1 x 10 following  Bridges 3 x 10   Standing hip extension flexed over mat to improve glut activation    Sidelying ER clams red theraband 20   Sidelying open books      Torso, row and chest 2/9/2024  Peripheral muscle strengthening which included one set of 15-20 repetitions at a slow and controlled 10-13 second per rep pace focused on strengthening supporting musculature in order to improve body mechanics and functional mobility.  Patient and therapist focused on proper form during treatment to ensure optimal  strengthening of each targeted muscle group.  Machines utilized include torso rotation, chest press, triceps extension, bicep curl, and upright row. Leg extension, leg curl, leg press, and hip adduction/abduction added visit 3.      Home Exercises Provided and Patient Education Provided    Home exercises include:   Prone press up sag   Cardio program (V5): -  Lifting education (V11): -  Posture/ Using Lumbar Roll: educated  Fridge Magnet Discharge handout (date given): -  Equipment at home/gym membership: none    Education provided:   - PT role and POC  - HEP      Written Home Exercises Provided: Patient instructed to cont prior HEP.  Exercises were reviewed and Lee was able to demonstrate them prior to the end of the session. Lee demonstrated good  understanding of the education provided.     See EMR under Patient Instructions for exercises provided prior visit.    Assessment   Pt presents to second healthy back visit reporting no change, was able to demo HEP with Mod VC for form. Arrived with 7/10 pain. Standing lumbar assessment demonstrates severe range of motion limitations in all directions with pain associated with R SG and R rotation. This improved following repeated prone press up with sag. He also demonstrates poor glut activation and weakness in which he had difficulty clearing the mat with bridges but this improved with increasing repetitions. He demonstrated improved thoracic rotation following open books. Pt was able to start neuro reeducation training, strengthening, and endurance training on the lumbar MedX at 50% of max peak torque according to the initial visit isometric test. Pt was able to complete 20 reps, with 2 RPE. Pt was also able to complete half of the peripheral strengthening exercises without increased discomfort and will complete the complete circuit next visit as tolerated.    Patient is making fair progress towards established goals.  Pt will continue to benefit from skilled  outpatient physical therapy to address the deficits stated in the impairment chart, provide pt/family education and to maximize pt's level of independence in the home and community environment.     Anticipated Barriers for therapy: co morbidities, history of lumbar fusions   Pt's spiritual, cultural and educational needs considered and pt agreeable to plan of care and goals as stated below:     Goals:    Short term goals:  6 weeks or 10 visits   - Pt will demonstrate increased lumbar MedX ROM by at least 3 degrees from the initial ROM value with improvements noted in functional ROM and ability to perform ADLs. Appropriate and Ongoing  - Pt will demonstrate increased MedX average isometric strength value by 10% from initial test resulting in improved ability to perform bending, lifting, and carrying activities safely, confidently. Appropriate and Ongoing  - Pt will report a reduction in worst pain score by 1-2 points for improved tolerance for transition from sit <-> stand. Appropriate and Ongoing  - Pt able to perform HEP correctly with minimal cueing or supervision from therapist to encourage independent management of symptoms. Appropriate and Ongoing     Long term goals: 10 weeks or 20 visits   - Pt will demonstrate increased lumbar MedX ROM by at least 6 degrees from initial ROM value, resulting in improved ability to perform functional forward bending while standing and sitting. Appropriate and Ongoing  - Pt will demonstrate increased MedX average isometric strength value by 40% from initial test resulting in improved ability to perform bending, lifting, and carrying activities safely and confidently. Appropriate and Ongoing  - Pt to demonstrate ability to independently control and reduce their pain through posture positioning and mechanical movements throughout a typical day. Appropriate and Ongoing  - Pt will demonstrate reduced pain and improved functional outcomes as reported on the FOTO by reaching an  intake score of >/= 40% functional ability in order to demonstrate subjective improvement in patient's condition. . Appropriate and Ongoing  - Pt will demonstrate independence with the HEP at discharge. Appropriate and Ongoing  - Patient to report improved sleep hygiene 2' decreased low back pain (patient goal) Appropriate and Ongoing       Plan   Continue with established Plan of Care towards established PT goals.     Therapist: Belén Story, PT  2/9/2024

## 2024-02-12 ENCOUNTER — CLINICAL SUPPORT (OUTPATIENT)
Dept: REHABILITATION | Facility: HOSPITAL | Age: 49
End: 2024-02-12
Payer: MEDICAID

## 2024-02-12 DIAGNOSIS — R29.898 WEAKNESS OF BACK: ICD-10-CM

## 2024-02-12 DIAGNOSIS — M25.60 RANGE OF MOTION DEFICIT: Primary | ICD-10-CM

## 2024-02-12 DIAGNOSIS — Z73.89 PAIN SELF-MANAGEMENT DEFICIT: ICD-10-CM

## 2024-02-12 PROCEDURE — 97110 THERAPEUTIC EXERCISES: CPT | Mod: PN

## 2024-02-12 NOTE — PROGRESS NOTES
[facility]      Name: Lee Quintanilla  Clinic Number: [id number]    Therapy Diagnosis:   Encounter Diagnoses   Name Primary?    Range of motion deficit Yes    Pain self-management deficit     Weakness of back      Physician: Vincent Craig PA    Visit Date: 2/12/2024    Physician Orders: PT Eval and Treat  Medical Diagnosis from Referral:   Diagnosis   M47.816 (ICD-10-CM) - Facet arthritis of lumbar region   Z98.1 (ICD-10-CM) - History of lumbar spinal fusion   M51.36 (ICD-10-CM) - DDD (degenerative disc disease), lumbar      Evaluation Date: 1/18/2024  Authorization Period Expiration:     3/26/2024      Plan of Care Expiration: 4/11/2024  Progress Note Due: 2/18/2024  Visit # / Visits authorized: 3/10  FOTO: 1/ 3    PTA Visit #: 0/5     Time In: 732 AM  Time Out: 835 AM  Total Billable Time: 60 minutes  INSURANCE and OUTCOMES: Fee for Service with FOTO Outcomes 1/3    Precautions: standard and HTN standard and previous lumbar fusion, previous open heart surgery (none recent)     Pattern of pain determined: 1 PEP    Subjective   Lee Quintanilla reports after doing the press ups his back feels tighter.     Patient reports tolerating previous visit first tx session   Patient reports their pain to be 0/10 on a 0-10 scale with 0 being no pain and 10 being the worst pain imaginable.  Pain Location: midline to L side      Work and leisure: not working currently, leisure - not specified  Pt goals: to have less pain     Objective   Isometric Testing on Med X equipment: Testing administered by PT     Test Initial Baseline Midpoint Final   Date 02-       ROM 0-30 deg       Max Peak Torque 60        Min Peak Torque 19        Flex/Ext Ratio ~ 3 / 1       % below normative data 79       % gain from initial test Not available       Outcomes:  Intake Score: 28%  Visit 6 Score:   Visit 10 Score:   Discharge Score:  Goal Score: 42%     Treatment    Lee received the  "treatments listed below:      Lee received neuromuscular education for 8 minutes via participation on the Client Outlook Machine. Therapist assisted patient in isolating and engaging spinal stabilization musculature in order to improve functional ability and postural control. Patient performed exercise with therapist guidance in order to accurately use pacer function, avoid valsalva, and optimally exert effort within a safe and effective range via the Tez Exertion Rating Scale. Patient instructed to perform at a midrange of exertion and to complete 15-20 repetitions within appropriate split time, with proper technique, and while maintaining safety.         2/12/2024     8:40 AM   [facility]   Visit Number 4   VAS Pain Rating 4   Treadmill Time (in min.) 3 min   Speed 2 mph   Lumbar Weight 45 lbs   Repetitions 20   Rating of Perceived Exertion 3     Lee participated in therapeutic exercises to develop strength, endurance, ROM, flexibility, posture, and core stabilization for 52 minutes including:    Standing assessment:    Flexion: moderate limitation, stretch no pain L side   Extension: severe limitation, pain L side    SG: R SG pain L side moderate limitation, no pain L SG severe limitation    Rotation: severe limitation, pain on L side with L rotation     Prone press up 2 x 10, slight centralization from L side to more central    1 x 10 after first set of bird/dog for relief of pain with bird/dog , "feels stronger but no effect on pain"   Bridges 2 x 10 with hip abduction band green   + bird/dog 2 x 10   Attempted DKTC x 8 repetitions to see if this gave patient relief with bird/dog, but pain with DKTC reported and no effect on bird / dog  Sidelying open books 10 repetitions each side   + standing extension at edge of mat 10 repetitions     Peripheral muscle strengthening which included one set of 15-20 repetitions at a slow and controlled 10-13 second per rep pace focused on strengthening supporting " musculature in order to improve body mechanics and functional mobility.  Patient and therapist focused on proper form during treatment to ensure optimal strengthening of each targeted muscle group.  Machines utilized include torso rotation, chest press, triceps extension, bicep curl, and upright row. Leg extension, leg curl, leg press, and hip adduction/abduction     NP:   Standing hip extension flexed over mat to improve glut activation    Sidelying ER clams red theraband 20        Home Exercises Provided and Patient Education Provided    Home exercises include:   Prone press up       Cardio program (V5): -  Lifting education (V11): -  Posture/ Using Lumbar Roll: educated  Fridge Magnet Discharge handout (date given): -  Equipment at home/gym membership: none    Education provided:   - PT role and POC  - HEP      Written Home Exercises Provided: Patient instructed to cont prior HEP.  Exercises were reviewed and Lee was able to demonstrate them prior to the end of the session. Lee demonstrated good  understanding of the education provided.     See EMR under Patient Instructions for exercises provided prior visit.    Assessment     He arrives with reports of compliance with HEP however he reports feeling tighter after press ups at home. He continues to demonstrate severe lumbar and thoracic mobility in all directions. He did reports some centralization following prone press ups with reassessing standing baselines. Added bird/dog today with reports of back pain. Attempted DKTC to assess change with this but he reported onset of back pain with this and no effect on bird/dog therefore returned to prone press ups. Following press up he reported feeling more stable but continuation of pain. Therefore he continues to respond best to extension. He reported feeling looser post tx session.     Patient is making fair progress towards established goals.  Pt will continue to benefit from skilled outpatient physical therapy  to address the deficits stated in the impairment chart, provide pt/family education and to maximize pt's level of independence in the home and community environment.     Anticipated Barriers for therapy: co morbidities, history of lumbar fusions   Pt's spiritual, cultural and educational needs considered and pt agreeable to plan of care and goals as stated below:     Goals:    Short term goals:  6 weeks or 10 visits   - Pt will demonstrate increased lumbar MedX ROM by at least 3 degrees from the initial ROM value with improvements noted in functional ROM and ability to perform ADLs. Appropriate and Ongoing  - Pt will demonstrate increased MedX average isometric strength value by 10% from initial test resulting in improved ability to perform bending, lifting, and carrying activities safely, confidently. Appropriate and Ongoing  - Pt will report a reduction in worst pain score by 1-2 points for improved tolerance for transition from sit <-> stand. Appropriate and Ongoing  - Pt able to perform HEP correctly with minimal cueing or supervision from therapist to encourage independent management of symptoms. Appropriate and Ongoing     Long term goals: 10 weeks or 20 visits   - Pt will demonstrate increased lumbar MedX ROM by at least 6 degrees from initial ROM value, resulting in improved ability to perform functional forward bending while standing and sitting. Appropriate and Ongoing  - Pt will demonstrate increased MedX average isometric strength value by 40% from initial test resulting in improved ability to perform bending, lifting, and carrying activities safely and confidently. Appropriate and Ongoing  - Pt to demonstrate ability to independently control and reduce their pain through posture positioning and mechanical movements throughout a typical day. Appropriate and Ongoing  - Pt will demonstrate reduced pain and improved functional outcomes as reported on the FOTO by reaching an intake score of >/= 40%  functional ability in order to demonstrate subjective improvement in patient's condition. . Appropriate and Ongoing  - Pt will demonstrate independence with the HEP at discharge. Appropriate and Ongoing  - Patient to report improved sleep hygiene 2' decreased low back pain (patient goal) Appropriate and Ongoing       Plan   Continue with established Plan of Care towards established PT goals.     Therapist: Belén Story, PT  2/12/2024

## 2024-02-25 NOTE — CARE UPDATE
11/12/23 0727   Patient Assessment/Suction   Level of Consciousness (AVPU) responds to voice   Respiratory Effort Normal   Rhythm/Pattern, Respiratory unlabored;pattern regular   PRE-TX-O2   Device (Oxygen Therapy) nasal cannula   $ Is the patient on Low Flow Oxygen? Yes   Flow (L/min) 2   Pulse Oximetry Type Continuous   $ Pulse Oximetry - Multiple Charge Pulse Oximetry - Multiple   Preset CPAP/BiPAP Settings   Mode Of Delivery Standby;BiPAP S/T   $ CPAP/BiPAP Daily Charge BiPAP/CPAP Daily   $ Is patient using? Yes   Respiratory Evaluation   $ Care Plan Tech Time 15 min          Patient with known history of CHF and CAD, status post multiple stents in the past, last send proximally 2 years ago.  Now presenting with chest pain  Initial troponin at outside facility was approximate 54, and on 2-hour repeat was 161.  EKG without any ST segment elevation or depression  S/p cardiac cath and MAYRA to proximal LAD  Continue with Eliquis, plavix and high intensity statin

## 2024-03-05 ENCOUNTER — CLINICAL SUPPORT (OUTPATIENT)
Dept: REHABILITATION | Facility: HOSPITAL | Age: 49
End: 2024-03-05
Payer: MEDICAID

## 2024-03-05 DIAGNOSIS — M25.60 RANGE OF MOTION DEFICIT: Primary | ICD-10-CM

## 2024-03-05 DIAGNOSIS — R29.898 WEAKNESS OF BACK: ICD-10-CM

## 2024-03-05 DIAGNOSIS — Z73.89 PAIN SELF-MANAGEMENT DEFICIT: ICD-10-CM

## 2024-03-05 PROCEDURE — 97110 THERAPEUTIC EXERCISES: CPT | Mod: PN

## 2024-03-05 NOTE — PROGRESS NOTES
[facility]      Name: Lee Quintanilla  Clinic Number: [id number]    Therapy Diagnosis:   Encounter Diagnoses   Name Primary?    Range of motion deficit Yes    Pain self-management deficit     Weakness of back      Physician: Vincent Craig PA    Visit Date: 3/5/2024    Physician Orders: PT Eval and Treat  Medical Diagnosis from Referral:   Diagnosis   M47.816 (ICD-10-CM) - Facet arthritis of lumbar region   Z98.1 (ICD-10-CM) - History of lumbar spinal fusion   M51.36 (ICD-10-CM) - DDD (degenerative disc disease), lumbar      Evaluation Date: 1/18/2024  Authorization Period Expiration:     3/26/2024      Plan of Care Expiration: 4/11/2024  Progress Note Due: 2/18/2024  Visit # / Visits authorized: 5 /10  FOTO: 1/ 3    PTA Visit #: 0/5     Time In: 1455  Time Out: 1602  Total Billable Time: 57 minutes  INSURANCE and OUTCOMES: Fee for Service with FOTO Outcomes 1/3    Precautions: standard and HTN standard and previous lumbar fusion, previous open heart surgery (none recent)     Pattern of pain determined: 1 PEP    Subjective   Lee Quintanilla reports no change in symptoms. Reports if he bends and leans forward it feels little better.     Patient reports tolerating previous visit first tx session   Patient reports their pain to be 0/10 on a 0-10 scale with 0 being no pain and 10 being the worst pain imaginable.  Pain Location: midline to L side      Work and leisure: not working currently, leisure - not specified  Pt goals: to have less pain     Objective   Isometric Testing on Med X equipment: Testing administered by PT     Test Initial Baseline Midpoint Final   Date 02-       ROM 0-30 deg       Max Peak Torque 60        Min Peak Torque 19        Flex/Ext Ratio ~ 3 / 1       % below normative data 79       % gain from initial test Not available       Outcomes:  Intake Score: 28%  Visit 6 Score:   Visit 10 Score:   Discharge Score:  Goal Score: 42%      Treatment    Lee received the treatments listed below:      Lee received neuromuscular education for 8 minutes via participation on the [employer] Machine. Therapist assisted patient in isolating and engaging spinal stabilization musculature in order to improve functional ability and postural control. Patient performed exercise with therapist guidance in order to accurately use pacer function, avoid valsalva, and optimally exert effort within a safe and effective range via the Tez Exertion Rating Scale. Patient instructed to perform at a midrange of exertion and to complete 15-20 repetitions within appropriate split time, with proper technique, and while maintaining safety.         3/5/2024     3:51 PM   [facility]   Visit Number 4   VAS Pain Rating 7   Treadmill Time (in min.) 3 min   Speed 2 mph   Lumbar Weight 50 lbs   Repetitions 20   Rating of Perceived Exertion 3       Lee participated in therapeutic exercises to develop strength, endurance, ROM, flexibility, posture, and core stabilization for 49 minutes including:    Standing assessment:    Flexion: severe limitation, intense stretching    Extension: severe limitations, pain L side    SG: pain L side with L SG    Rotation: severe limitation, pain on L side with L rotation     Supine repeated flexion 2 x 10 repetitions    Better with flexion increased range of motion    No effect L SG and L rotation   Seated flexion 10 repetitions   Worse with flexion and L rotation SG , reports of unstable when returning from flexed position to seated position   Standing extension ( performed due to L sided pain with L rotation on torso machine- ceased performed 10 standing extensions- improvement in pain centralization )    Bridges 2 x 10 with hip abduction band blue  + bird/dog 2 x 5 repetitions each side   Sidelying ER blue theraband 2 x 10 each side   Sidelying open books 15 repetitions each side     Peripheral muscle strengthening which included  one set of 15-20 repetitions at a slow and controlled 10-13 second per rep pace focused on strengthening supporting musculature in order to improve body mechanics and functional mobility.  Patient and therapist focused on proper form during treatment to ensure optimal strengthening of each targeted muscle group.  Machines utilized include torso rotation, chest press, triceps extension, bicep curl, and upright row. Leg extension, leg curl, leg press, and hip adduction/abduction     NP:   Standing hip extension flexed over mat to improve glut activation    Sidelying ER clams red theraband 20      Home Exercises Provided and Patient Education Provided    Home exercises include:     Standing extensions ( 3/5/2024)    Cardio program (V5): -  Lifting education (V11): -  Posture/ Using Lumbar Roll: educated  [employer] Discharge handout (date given): -  Equipment at home/gym membership: none    Education provided:   - PT role and POC  - HEP      Written Home Exercises Provided: Patient instructed to cont prior HEP.  Exercises were reviewed and Lee was able to demonstrate them prior to the end of the session. Lee demonstrated good  understanding of the education provided.     See EMR under Patient Instructions for exercises provided prior visit.    Assessment     Arrived with continuation of low back pain without change. He has been minimally compliant with his HEP. He reports pain with prone press up. He arrives with severe lumbar range of motion limitations. Started with repeated flexion to assess possible another directional preference. He demonstrated significant improvement in standing flexion range of motion following repeated flexion and improved in symptoms as well. However with increasing repetitions with DKTC he reported some worsening. Attempted repeated seated flexion to see how he responded to this if he was somewhere where he was unable to lay down however this lead to worsening in pain and he also  reported some instability when performing these. Following mat activities, he performed torso rotation machine and reported L sided low back pain with L rotation. Had patient stand and perform some extension in standing with centralization to midline low back pain; therefore, Instructed patient to do standing extensions at home and see if he responds better to these. If he doesn't respond well to this then we will start doing more flexion. He tolerated medx and peripheral machines well.     Patient is making fair progress towards established goals.  Pt will continue to benefit from skilled outpatient physical therapy to address the deficits stated in the impairment chart, provide pt/family education and to maximize pt's level of independence in the home and community environment.     Anticipated Barriers for therapy: co morbidities, history of lumbar fusions   Pt's spiritual, cultural and educational needs considered and pt agreeable to plan of care and goals as stated below:     Goals:    Short term goals:  6 weeks or 10 visits   - Pt will demonstrate increased lumbar MedX ROM by at least 3 degrees from the initial ROM value with improvements noted in functional ROM and ability to perform ADLs. Appropriate and Ongoing  - Pt will demonstrate increased MedX average isometric strength value by 10% from initial test resulting in improved ability to perform bending, lifting, and carrying activities safely, confidently. Appropriate and Ongoing  - Pt will report a reduction in worst pain score by 1-2 points for improved tolerance for transition from sit <-> stand. Appropriate and Ongoing  - Pt able to perform HEP correctly with minimal cueing or supervision from therapist to encourage independent management of symptoms. Appropriate and Ongoing     Long term goals: 10 weeks or 20 visits   - Pt will demonstrate increased lumbar MedX ROM by at least 6 degrees from initial ROM value, resulting in improved ability to perform  functional forward bending while standing and sitting. Appropriate and Ongoing  - Pt will demonstrate increased MedX average isometric strength value by 40% from initial test resulting in improved ability to perform bending, lifting, and carrying activities safely and confidently. Appropriate and Ongoing  - Pt to demonstrate ability to independently control and reduce their pain through posture positioning and mechanical movements throughout a typical day. Appropriate and Ongoing  - Pt will demonstrate reduced pain and improved functional outcomes as reported on the FOTO by reaching an intake score of >/= 40% functional ability in order to demonstrate subjective improvement in patient's condition. . Appropriate and Ongoing  - Pt will demonstrate independence with the HEP at discharge. Appropriate and Ongoing  - Patient to report improved sleep hygiene 2' decreased low back pain (patient goal) Appropriate and Ongoing       Plan   Continue with established Plan of Care towards established PT goals.     Therapist: Belén Story, PT  3/5/2024

## 2024-03-12 ENCOUNTER — CLINICAL SUPPORT (OUTPATIENT)
Dept: REHABILITATION | Facility: HOSPITAL | Age: 49
End: 2024-03-12
Payer: MEDICAID

## 2024-03-12 DIAGNOSIS — Z73.89 PAIN SELF-MANAGEMENT DEFICIT: ICD-10-CM

## 2024-03-12 DIAGNOSIS — M25.60 RANGE OF MOTION DEFICIT: Primary | ICD-10-CM

## 2024-03-12 DIAGNOSIS — R29.898 WEAKNESS OF BACK: ICD-10-CM

## 2024-03-12 PROCEDURE — 97110 THERAPEUTIC EXERCISES: CPT | Mod: PN

## 2024-03-12 NOTE — PROGRESS NOTES
[facility]      Name: Lee Quintanilla  Clinic Number: [id number]    Therapy Diagnosis:   Encounter Diagnoses   Name Primary?    Range of motion deficit Yes    Pain self-management deficit     Weakness of back      Physician: Vincent Craig PA    Visit Date: 3/12/2024    Physician Orders: PT Eval and Treat  Medical Diagnosis from Referral:   Diagnosis   M47.816 (ICD-10-CM) - Facet arthritis of lumbar region   Z98.1 (ICD-10-CM) - History of lumbar spinal fusion   M51.36 (ICD-10-CM) - DDD (degenerative disc disease), lumbar      Evaluation Date: 1/18/2024  Authorization Period Expiration:     3/26/2024      Plan of Care Expiration: 4/11/2024  Progress Note Due: 2/18/2024  Visit # / Visits authorized: 6 /10  FOTO: 1/ 3    PTA Visit #: 0/5     Time In: 1457   Time Out: 1605  Total Billable Time: 68 minutes  INSURANCE and OUTCOMES: Fee for Service with FOTO Outcomes 1/3    Precautions: standard and HTN standard and previous lumbar fusion, previous open heart surgery (none recent)     Pattern of pain determined: 1 PEP    Subjective   Lee Quintanilla reports he has been trying to do standing extensions throughout the day.  The pain is the same.     Patient reports tolerating previous visit first tx session   Patient reports their pain to be 0/10 on a 0-10 scale with 0 being no pain and 10 being the worst pain imaginable.  Pain Location: midline to L side      Work and leisure: not working currently, leisure - not specified  Pt goals: to have less pain     Objective   Isometric Testing on Med X equipment: Testing administered by PT     Test Initial Baseline Midpoint Final   Date 02-       ROM 0-30 deg       Max Peak Torque 60        Min Peak Torque 19        Flex/Ext Ratio ~ 3 / 1       % below normative data 79       % gain from initial test Not available       Outcomes:  Intake Score: 28%  Visit 6 Score:   Visit 10 Score:   Discharge Score:  Goal Score: 42%      Treatment    Lee received the treatments listed below:      Physical Therapy technician assisted with treatment under direct supervision of treating therapist. Patient received 1:1 treatments for 27 minutes with Physical Therapist.     Lee received neuromuscular education for 8 minutes via participation on the [employer] MedX Machine. Therapist assisted patient in isolating and engaging spinal stabilization musculature in order to improve functional ability and postural control. Patient performed exercise with therapist guidance in order to accurately use pacer function, avoid valsalva, and optimally exert effort within a safe and effective range via the Tez Exertion Rating Scale. Patient instructed to perform at a midrange of exertion and to complete 15-20 repetitions within appropriate split time, with proper technique, and while maintaining safety.         3/12/2024     4:08 PM   [facility]   Visit Number 5   VAS Pain Rating 5   Treadmill Time (in min.) 3 min   Speed 2 mph   Lumbar Weight 55 lbs   Repetitions 20   Rating of Perceived Exertion 4     Lee participated in therapeutic exercises to develop strength, endurance, ROM, flexibility, posture, and core stabilization for 60 minutes including:    Standing assessment:    Flexion: severe limitation, better than last tx session mid tibia    Extension: severe limitations, pain L side    SG: pain L side with L SG    Rotation: severe limitation, pain on L side with L rotation     Prone on elbows, pain with exhale   Prone press up 10 repetitions, decrease pain with prone on elbows / exhale   Prone press up 10 , no change on standing baselines better with prone on elbows   Prone press up 10 repetitions prior to medx machine end of tx session     Educated on sitting with lumbar roll, trialed and given education on how to purchase     Sidelying ER green theraband 2 x 10 each side   Sidelying open books 15 repetitions each side   Bridges 2 x  10    Peripheral muscle strengthening which included one set of 15-20 repetitions at a slow and controlled 10-13 second per rep pace focused on strengthening supporting musculature in order to improve body mechanics and functional mobility.  Patient and therapist focused on proper form during treatment to ensure optimal strengthening of each targeted muscle group.  Machines utilized include torso rotation, chest press, triceps extension, bicep curl, and upright row. Leg extension, leg curl, leg press, and hip adduction/abduction     NP:     + bird/dog 2 x 5 repetitions each side   Standing hip extension flexed over mat to improve glut activation      Home Exercises Provided and Patient Education Provided    Home exercises include:     Standing extensions ( 3/5/2024)    Cardio program (V5): -  Lifting education (V11): -  Posture/ Using Lumbar Roll: educated  [employer] Discharge handout (date given): -  Equipment at home/gym membership: none    Education provided:   - PT role and POC  - HEP      Written Home Exercises Provided: Patient instructed to cont prior HEP.  Exercises were reviewed and Lee was able to demonstrate them prior to the end of the session. Lee demonstrated good  understanding of the education provided.     See EMR under Patient Instructions for exercises provided prior visit.    Assessment     Arrives with continuation of low back pain with minimal change since evaluation. He continues to demonstrate severe stiffness and limited range of motion in all directions. Standing flexion range of motion demonstrates improvement since last tx session when assessing baselines. Worked into extension today with good tolerance. He reported improvement following prone press ups when assuming prone on elbows position. Physical Therapist educated patient to continue with press ups and importance of using lumbar roll at home.     Patient is making fair progress towards established goals.  Pt will continue to  benefit from skilled outpatient physical therapy to address the deficits stated in the impairment chart, provide pt/family education and to maximize pt's level of independence in the home and community environment.     Anticipated Barriers for therapy: co morbidities, history of lumbar fusions   Pt's spiritual, cultural and educational needs considered and pt agreeable to plan of care and goals as stated below:     Goals:    Short term goals:  6 weeks or 10 visits   - Pt will demonstrate increased lumbar MedX ROM by at least 3 degrees from the initial ROM value with improvements noted in functional ROM and ability to perform ADLs. Appropriate and Ongoing  - Pt will demonstrate increased MedX average isometric strength value by 10% from initial test resulting in improved ability to perform bending, lifting, and carrying activities safely, confidently. Appropriate and Ongoing  - Pt will report a reduction in worst pain score by 1-2 points for improved tolerance for transition from sit <-> stand. Appropriate and Ongoing  - Pt able to perform HEP correctly with minimal cueing or supervision from therapist to encourage independent management of symptoms. Appropriate and Ongoing     Long term goals: 10 weeks or 20 visits   - Pt will demonstrate increased lumbar MedX ROM by at least 6 degrees from initial ROM value, resulting in improved ability to perform functional forward bending while standing and sitting. Appropriate and Ongoing  - Pt will demonstrate increased MedX average isometric strength value by 40% from initial test resulting in improved ability to perform bending, lifting, and carrying activities safely and confidently. Appropriate and Ongoing  - Pt to demonstrate ability to independently control and reduce their pain through posture positioning and mechanical movements throughout a typical day. Appropriate and Ongoing  - Pt will demonstrate reduced pain and improved functional outcomes as reported on the FOTO  by reaching an intake score of >/= 40% functional ability in order to demonstrate subjective improvement in patient's condition. . Appropriate and Ongoing  - Pt will demonstrate independence with the HEP at discharge. Appropriate and Ongoing  - Patient to report improved sleep hygiene 2' decreased low back pain (patient goal) Appropriate and Ongoing     Plan   Continue with established Plan of Care towards established PT goals.     Therapist: Belén Story, PT  3/12/2024

## 2024-03-15 ENCOUNTER — CLINICAL SUPPORT (OUTPATIENT)
Dept: REHABILITATION | Facility: HOSPITAL | Age: 49
End: 2024-03-15
Payer: MEDICAID

## 2024-03-15 DIAGNOSIS — Z73.89 PAIN SELF-MANAGEMENT DEFICIT: ICD-10-CM

## 2024-03-15 DIAGNOSIS — M25.60 RANGE OF MOTION DEFICIT: Primary | ICD-10-CM

## 2024-03-15 DIAGNOSIS — R29.898 WEAKNESS OF BACK: ICD-10-CM

## 2024-03-15 PROCEDURE — 97110 THERAPEUTIC EXERCISES: CPT | Mod: PN,CQ

## 2024-03-15 NOTE — PROGRESS NOTES
[facility]      Name: Lee Quintanilla  Clinic Number: [id number]    Therapy Diagnosis:   Encounter Diagnoses   Name Primary?    Range of motion deficit Yes    Pain self-management deficit     Weakness of back      Physician: Vincent Craig PA    Visit Date: 3/15/2024    Physician Orders: PT Eval and Treat  Medical Diagnosis from Referral:   Diagnosis   M47.816 (ICD-10-CM) - Facet arthritis of lumbar region   Z98.1 (ICD-10-CM) - History of lumbar spinal fusion   M51.36 (ICD-10-CM) - DDD (degenerative disc disease), lumbar      Evaluation Date: 1/18/2024  Authorization Period Expiration:     3/26/2024      Plan of Care Expiration: 03/26/2024   Progress Note Due: 2/18/2024  Visit # / Visits authorized: 7 /10  FOTO: 1/ 3    PTA Visit #: 1/5     Time In:  0700  Time Out: 0802  Total Billable Time: 62 minutes  INSURANCE and OUTCOMES: Fee for Service with FOTO Outcomes 1/3    Precautions: standard and HTN standard and previous lumbar fusion, previous open heart surgery (none recent)     Pattern of pain determined: 1 PEP    Subjective   Lee Quintanilla reports he has a lot of pain this morning.  Pain is across his lower back and hips (Left hip being worse)      Patient reports he has been very sore since last treatment and has not done his exercises at home.  Patient reports their pain to be 7-8/10 on a 0-10 scale with 0 being no pain and 10 being the worst pain imaginable.  Pain Location: midline to L side      Work and leisure: not working currently, leisure - not specified  Pt goals: to have less pain     Objective   Isometric Testing on Med X equipment: Testing administered by PT     Test Initial Baseline Midpoint Final   Date 02-       ROM 0-30 deg       Max Peak Torque 60        Min Peak Torque 19        Flex/Ext Ratio ~ 3 / 1       % below normative data 79       % gain from initial test Not available       Outcomes:  Intake Score: 28%  Visit 6 Score:  31/100  Visit 10 Score:   Discharge Score:  Goal Score: 42%     Treatment    Lee received the treatments listed below:        +++ALL charges filed per Medicaid Guidelines+++    Lee received neuromuscular education via participation on the Medical MedX Machine. Therapist assisted patient in isolating and engaging spinal stabilization musculature in order to improve functional ability and postural control. Patient performed exercise with therapist guidance in order to accurately use pacer function, avoid valsalva, and optimally exert effort within a safe and effective range via the Tez Exertion Rating Scale. Patient instructed to perform at a midrange of exertion and to complete 15-20 repetitions within appropriate split time, with proper technique, and while maintaining safety.         3/15/2024     8:11 AM   [facility]   Visit Number 6   VAS Pain Rating 8   Treadmill Time (in min.) 3 min   Speed 2.3 mph   Extension in Lying 10   Extension in Standing 20   Lumbar Weight 55 lbs   Repetitions 20   Rating of Perceived Exertion 3          Lee participated in therapeutic exercises to develop strength, endurance, ROM, flexibility, posture, and core stabilization for 62 minutes including:    Standing lumbar extension x 20 reps   Prone on elbows 2'  Prone press up x 10 repetitions, painful     Prone press up 10 , no change on standing baselines better with prone on elbows   Prone press up 10 repetitions prior to medx machine end of tx session     Sidelying external rotation green theraband 2 x 10 each side   Sidelying open books x 15 repetitions each side   Bridges x 18 reps (pain, middle spine/lumbar)    Peripheral muscle strengthening which included one set of 15-20 repetitions at a slow and controlled 10-13 second per rep pace focused on strengthening supporting musculature in order to improve body mechanics and functional mobility.  Patient and therapist focused on proper form during treatment to ensure  optimal strengthening of each targeted muscle group.  Machines utilized include torso rotation, chest press, triceps extension, bicep curl, and upright row. Leg extension, leg curl, leg press, and hip adduction/abduction     Not Performed:     + bird/dog 2 x 5 repetitions each side   Standing hip extension flexed over mat to improve glut activation      Home Exercises Provided and Patient Education Provided    Home exercises include:     Standing extensions ( 3/5/2024)    Cardio program (V5): -  Lifting education (V11): -  Posture/ Using Lumbar Roll: educated  Fridge Magnet Discharge handout (date given): -  Equipment at home/gym membership: none    Education provided:   - PT role and Plan of Care   - Home exercise program       Written Home Exercises Provided: Patient instructed to cont prior HEP.  Exercises were reviewed and Lee was able to demonstrate them prior to the end of the session. Lee demonstrated good  understanding of the education provided.     See EMR under Patient Instructions for exercises provided prior visit.    Assessment     Patient presents to PT this morning with reported increased lower back/hip pain.  He has very little trunk range of motion in all motions from guarding. Re-educated on doing exercises at home to improve range of motion.  Ice as needed also for discomfort/pain after exercises.       Patient is making fair progress towards established goals.  Pt will continue to benefit from skilled outpatient physical therapy to address the deficits stated in the impairment chart, provide pt/family education and to maximize pt's level of independence in the home and community environment.     Anticipated Barriers for therapy: co morbidities, history of lumbar fusions   Pt's spiritual, cultural and educational needs considered and pt agreeable to plan of care and goals as stated below:     Goals:    Short term goals:  6 weeks or 10 visits   - Pt will demonstrate increased lumbar MedX ROM  by at least 3 degrees from the initial ROM value with improvements noted in functional ROM and ability to perform ADLs. Appropriate and Ongoing  - Pt will demonstrate increased MedX average isometric strength value by 10% from initial test resulting in improved ability to perform bending, lifting, and carrying activities safely, confidently. Appropriate and Ongoing  - Pt will report a reduction in worst pain score by 1-2 points for improved tolerance for transition from sit <-> stand. Appropriate and Ongoing  - Pt able to perform HEP correctly with minimal cueing or supervision from therapist to encourage independent management of symptoms. Appropriate and Ongoing     Long term goals: 10 weeks or 20 visits   - Pt will demonstrate increased lumbar MedX ROM by at least 6 degrees from initial ROM value, resulting in improved ability to perform functional forward bending while standing and sitting. Appropriate and Ongoing  - Pt will demonstrate increased MedX average isometric strength value by 40% from initial test resulting in improved ability to perform bending, lifting, and carrying activities safely and confidently. Appropriate and Ongoing  - Pt to demonstrate ability to independently control and reduce their pain through posture positioning and mechanical movements throughout a typical day. Appropriate and Ongoing  - Pt will demonstrate reduced pain and improved functional outcomes as reported on the FOTO by reaching an intake score of >/= 40% functional ability in order to demonstrate subjective improvement in patient's condition. . Appropriate and Ongoing  - Pt will demonstrate independence with the HEP at discharge. Appropriate and Ongoing  - Patient to report improved sleep hygiene 2' decreased low back pain (patient goal) Appropriate and Ongoing     Plan   Continue with established Plan of Care towards established PT goals.     Arlyn Lopes, PTA  3/15/2024

## 2024-03-16 ENCOUNTER — LAB VISIT (OUTPATIENT)
Dept: LAB | Facility: HOSPITAL | Age: 49
End: 2024-03-16
Attending: OBSTETRICS & GYNECOLOGY
Payer: MEDICAID

## 2024-03-16 DIAGNOSIS — E29.1 3-OXO-5 ALPHA-STEROID DELTA 4-DEHYDROGENASE DEFICIENCY: Primary | ICD-10-CM

## 2024-03-16 LAB
BASOPHILS # BLD AUTO: 0.03 K/UL (ref 0–0.2)
BASOPHILS NFR BLD: 0.5 % (ref 0–1.9)
DIFFERENTIAL METHOD BLD: NORMAL
EOSINOPHIL # BLD AUTO: 0.2 K/UL (ref 0–0.5)
EOSINOPHIL NFR BLD: 3.7 % (ref 0–8)
ERYTHROCYTE [DISTWIDTH] IN BLOOD BY AUTOMATED COUNT: 14.1 % (ref 11.5–14.5)
HCT VFR BLD AUTO: 48.4 % (ref 40–54)
HGB BLD-MCNC: 15.6 G/DL (ref 14–18)
IMM GRANULOCYTES # BLD AUTO: 0.01 K/UL (ref 0–0.04)
IMM GRANULOCYTES NFR BLD AUTO: 0.2 % (ref 0–0.5)
LYMPHOCYTES # BLD AUTO: 1.3 K/UL (ref 1–4.8)
LYMPHOCYTES NFR BLD: 21.1 % (ref 18–48)
MCH RBC QN AUTO: 27.5 PG (ref 27–31)
MCHC RBC AUTO-ENTMCNC: 32.2 G/DL (ref 32–36)
MCV RBC AUTO: 85 FL (ref 82–98)
MONOCYTES # BLD AUTO: 0.5 K/UL (ref 0.3–1)
MONOCYTES NFR BLD: 8.2 % (ref 4–15)
NEUTROPHILS # BLD AUTO: 4 K/UL (ref 1.8–7.7)
NEUTROPHILS NFR BLD: 66.3 % (ref 38–73)
NRBC BLD-RTO: 0 /100 WBC
PLATELET # BLD AUTO: 180 K/UL (ref 150–450)
PMV BLD AUTO: 12 FL (ref 9.2–12.9)
RBC # BLD AUTO: 5.68 M/UL (ref 4.6–6.2)
WBC # BLD AUTO: 6.01 K/UL (ref 3.9–12.7)

## 2024-03-16 PROCEDURE — 82672 ASSAY OF ESTROGEN: CPT

## 2024-03-16 PROCEDURE — 84146 ASSAY OF PROLACTIN: CPT

## 2024-03-16 PROCEDURE — 36415 COLL VENOUS BLD VENIPUNCTURE: CPT

## 2024-03-16 PROCEDURE — 85025 COMPLETE CBC W/AUTO DIFF WBC: CPT

## 2024-03-16 PROCEDURE — 82670 ASSAY OF TOTAL ESTRADIOL: CPT

## 2024-03-16 PROCEDURE — 82627 DEHYDROEPIANDROSTERONE: CPT

## 2024-03-16 PROCEDURE — 84403 ASSAY OF TOTAL TESTOSTERONE: CPT

## 2024-03-18 LAB
DHEA-S SERPL-MCNC: 164 UG/DL (ref 71.6–375.4)
ESTRADIOL SERPL-MCNC: <5 PG/ML (ref 7.6–42.6)
PROLACTIN SERPL-MCNC: 12.7 NG/ML (ref 3.9–22.7)
TESTOST SERPL-MCNC: 135 NG/DL (ref 264–916)

## 2024-03-19 ENCOUNTER — OFFICE VISIT (OUTPATIENT)
Dept: FAMILY MEDICINE | Facility: CLINIC | Age: 49
End: 2024-03-19
Payer: MEDICAID

## 2024-03-19 VITALS
BODY MASS INDEX: 36.73 KG/M2 | HEART RATE: 97 BPM | SYSTOLIC BLOOD PRESSURE: 108 MMHG | WEIGHT: 234 LBS | OXYGEN SATURATION: 96 % | DIASTOLIC BLOOD PRESSURE: 80 MMHG | HEIGHT: 67 IN

## 2024-03-19 DIAGNOSIS — I25.810 CORONARY ARTERY DISEASE INVOLVING CORONARY BYPASS GRAFT OF NATIVE HEART WITHOUT ANGINA PECTORIS: ICD-10-CM

## 2024-03-19 DIAGNOSIS — G62.9 NEUROPATHY: ICD-10-CM

## 2024-03-19 DIAGNOSIS — L05.91 CHRONIC RECURRENT PILONIDAL CYST: Primary | ICD-10-CM

## 2024-03-19 DIAGNOSIS — H61.20 IMPACTED CERUMEN, UNSPECIFIED LATERALITY: ICD-10-CM

## 2024-03-19 DIAGNOSIS — J32.9 SINUSITIS, UNSPECIFIED CHRONICITY, UNSPECIFIED LOCATION: ICD-10-CM

## 2024-03-19 DIAGNOSIS — E78.5 HYPERLIPIDEMIA, UNSPECIFIED HYPERLIPIDEMIA TYPE: ICD-10-CM

## 2024-03-19 DIAGNOSIS — F10.10 ALCOHOL ABUSE: ICD-10-CM

## 2024-03-19 DIAGNOSIS — H91.90 HEARING LOSS, UNSPECIFIED HEARING LOSS TYPE, UNSPECIFIED LATERALITY: ICD-10-CM

## 2024-03-19 PROCEDURE — 4010F ACE/ARB THERAPY RXD/TAKEN: CPT | Mod: CPTII,S$GLB,, | Performed by: NURSE PRACTITIONER

## 2024-03-19 PROCEDURE — 3074F SYST BP LT 130 MM HG: CPT | Mod: CPTII,S$GLB,, | Performed by: NURSE PRACTITIONER

## 2024-03-19 PROCEDURE — 3008F BODY MASS INDEX DOCD: CPT | Mod: CPTII,S$GLB,, | Performed by: NURSE PRACTITIONER

## 2024-03-19 PROCEDURE — 99214 OFFICE O/P EST MOD 30 MIN: CPT | Mod: S$GLB,,, | Performed by: NURSE PRACTITIONER

## 2024-03-19 PROCEDURE — 3044F HG A1C LEVEL LT 7.0%: CPT | Mod: CPTII,S$GLB,, | Performed by: NURSE PRACTITIONER

## 2024-03-19 PROCEDURE — 1159F MED LIST DOCD IN RCRD: CPT | Mod: CPTII,S$GLB,, | Performed by: NURSE PRACTITIONER

## 2024-03-19 PROCEDURE — 1160F RVW MEDS BY RX/DR IN RCRD: CPT | Mod: CPTII,S$GLB,, | Performed by: NURSE PRACTITIONER

## 2024-03-19 PROCEDURE — 3079F DIAST BP 80-89 MM HG: CPT | Mod: CPTII,S$GLB,, | Performed by: NURSE PRACTITIONER

## 2024-03-19 RX ORDER — DOXEPIN HYDROCHLORIDE 50 MG/1
CAPSULE ORAL
COMMUNITY
End: 2024-04-03 | Stop reason: CLARIF

## 2024-03-19 RX ORDER — DEXAMETHASONE SODIUM PHOSPHATE 4 MG/ML
8 INJECTION, SOLUTION INTRA-ARTICULAR; INTRALESIONAL; INTRAMUSCULAR; INTRAVENOUS; SOFT TISSUE ONCE
Status: COMPLETED | OUTPATIENT
Start: 2024-03-19 | End: 2024-03-19

## 2024-03-19 RX ORDER — SEMAGLUTIDE 1.34 MG/ML
INJECTION, SOLUTION SUBCUTANEOUS
COMMUNITY
Start: 2024-02-02

## 2024-03-19 RX ORDER — AZITHROMYCIN 250 MG/1
TABLET, FILM COATED ORAL
Qty: 6 TABLET | Refills: 0 | Status: SHIPPED | OUTPATIENT
Start: 2024-03-19 | End: 2024-03-24

## 2024-03-19 RX ORDER — SACUBITRIL AND VALSARTAN 49; 51 MG/1; MG/1
1 TABLET, FILM COATED ORAL 2 TIMES DAILY
COMMUNITY

## 2024-03-19 RX ADMIN — DEXAMETHASONE SODIUM PHOSPHATE 8 MG: 4 INJECTION, SOLUTION INTRA-ARTICULAR; INTRALESIONAL; INTRAMUSCULAR; INTRAVENOUS; SOFT TISSUE at 04:03

## 2024-03-20 ENCOUNTER — TELEPHONE (OUTPATIENT)
Dept: FAMILY MEDICINE | Facility: CLINIC | Age: 49
End: 2024-03-20
Payer: MEDICAID

## 2024-03-20 NOTE — TELEPHONE ENCOUNTER
----- Message from Riya Vidales NP sent at 3/19/2024  9:35 AM CDT -----  Needs records from [place]. Recent angiogram

## 2024-03-20 NOTE — LETTER
[address] [facility] [address]  [place] [address]  Phone: [phone]   [phone]                        MD Capri Dorman, MD Beto Salas, PAAbdullahiC     Swati Atwood, ALBERTO Vigil, ALBERTO Vidales, [place]BERTO Perez, CÉSAR      Date: 03/20/2024        Patient: Lee Quintanilla  YOB: 1975      Please fax over most recent Angiogram from  Cardiology         Sincerely,     Lisette Weinstein LPN

## 2024-03-21 NOTE — PROGRESS NOTES
SUBJECTIVE:    Patient ID: Lee Quintanilla is a 48 y.o. male.    Chief Complaint: Follow-up (No bottles//Pt here for 6 mo follow up//Pt having sinus problems. C/o trouble breathing and hearing. Pt has swollen throat x4-5 months//A1C in labs, done 2/3/2024//[facility])    48 year old  male presents for urgent visit check up . He has a past medical history of ADHD (attention deficit hyperactivity disorder), Arthritis, Asthma, Back pain, Coronary artery disease, Degeneration of lumbar intervertebral disc (05/2016), Depression, Elevated PSA, Headache, Hyperlipidemia, Hypertension, Kidney damage, Kidney stone, Myocardial infarction, Neck pain, Numbness and tingling in hands, Numbness and tingling of both legs, Seizures, Sleep apnea,. Recently returned from rehab. Being seen by [facility] receiving vivitrol injections monthly. In pt for back pain  Today is complaining of ongoing sinus congestion. No fever. Sore throat. Post nasal drip. Has tried otc meds with minimal improvement.     Follow-up  Associated symptoms include a sore throat. Pertinent negatives include no arthralgias, chest pain, headaches, joint swelling, neck pain or vomiting.       Lab Visit on 03/16/2024   Component Date Value Ref Range Status    Testosterone 03/16/2024 135 (L)  264 - 916 ng/dL Final    WBC 03/16/2024 6.01  3.90 - 12.70 K/uL Final    RBC 03/16/2024 5.68  4.60 - 6.20 M/uL Final    Hemoglobin 03/16/2024 15.6  14.0 - 18.0 g/dL Final    Hematocrit 03/16/2024 48.4  40.0 - 54.0 % Final    MCV 03/16/2024 85  82 - 98 fL Final    MCH 03/16/2024 27.5  27.0 - 31.0 pg Final    MCHC 03/16/2024 32.2  32.0 - 36.0 g/dL Final    RDW 03/16/2024 14.1  11.5 - 14.5 % Final    Platelets 03/16/2024 180  150 - 450 K/uL Final    MPV 03/16/2024 12.0  9.2 - 12.9 fL Final    Immature Granulocytes 03/16/2024 0.2  0.0 - 0.5 % Final    Gran # (ANC) 03/16/2024 4.0  1.8 - 7.7 K/uL Final    Immature Grans (Abs) 03/16/2024 0.01  0.00 - 0.04 K/uL Final     Lymph # 03/16/2024 1.3  1.0 - 4.8 K/uL Final    Mono # 03/16/2024 0.5  0.3 - 1.0 K/uL Final    Eos # 03/16/2024 0.2  0.0 - 0.5 K/uL Final    Baso # 03/16/2024 0.03  0.00 - 0.20 K/uL Final    nRBC 03/16/2024 0  0 /100 WBC Final    Gran % 03/16/2024 66.3  38.0 - 73.0 % Final    Lymph % 03/16/2024 21.1  18.0 - 48.0 % Final    Mono % 03/16/2024 8.2  4.0 - 15.0 % Final    Eosinophil % 03/16/2024 3.7  0.0 - 8.0 % Final    Basophil % 03/16/2024 0.5  0.0 - 1.9 % Final    Differential Method 03/16/2024 Automated   Final    Estradiol 03/16/2024 <5.0 (L)  7.6 - 42.6 pg/mL Final    DHEA 03/16/2024 164.0  71.6 - 375.4 ug/dL Final    Prolactin 03/16/2024 12.7  3.9 - 22.7 ng/mL Final   Lab Visit on 02/03/2024   Component Date Value Ref Range Status    WBC 02/03/2024 8.97  3.90 - 12.70 K/uL Final    RBC 02/03/2024 5.54  4.60 - 6.20 M/uL Final    Hemoglobin 02/03/2024 15.5  14.0 - 18.0 g/dL Final    Hematocrit 02/03/2024 47.8  40.0 - 54.0 % Final    MCV 02/03/2024 86  82 - 98 fL Final    MCH 02/03/2024 28.0  27.0 - 31.0 pg Final    MCHC 02/03/2024 32.4  32.0 - 36.0 g/dL Final    RDW 02/03/2024 14.9 (H)  11.5 - 14.5 % Final    Platelets 02/03/2024 221  150 - 450 K/uL Final    MPV 02/03/2024 10.7  9.2 - 12.9 fL Final    Immature Granulocytes 02/03/2024 0.4  0.0 - 0.5 % Final    Gran # (ANC) 02/03/2024 6.0  1.8 - 7.7 K/uL Final    Immature Grans (Abs) 02/03/2024 0.04  0.00 - 0.04 K/uL Final    Lymph # 02/03/2024 2.0  1.0 - 4.8 K/uL Final    Mono # 02/03/2024 0.5  0.3 - 1.0 K/uL Final    Eos # 02/03/2024 0.4  0.0 - 0.5 K/uL Final    Baso # 02/03/2024 0.07  0.00 - 0.20 K/uL Final    nRBC 02/03/2024 0  0 /100 WBC Final    Gran % 02/03/2024 67.2  38.0 - 73.0 % Final    Lymph % 02/03/2024 22.0  18.0 - 48.0 % Final    Mono % 02/03/2024 5.7  4.0 - 15.0 % Final    Eosinophil % 02/03/2024 3.9  0.0 - 8.0 % Final    Basophil % 02/03/2024 0.8  0.0 - 1.9 % Final    Differential Method 02/03/2024 Automated   Final    Sodium 02/03/2024 139  136 - 145  mmol/L Final    Potassium 02/03/2024 4.2  3.5 - 5.1 mmol/L Final    Chloride 02/03/2024 108  95 - 110 mmol/L Final    CO2 02/03/2024 24  23 - 29 mmol/L Final    Glucose 02/03/2024 116 (H)  70 - 110 mg/dL Final    BUN 02/03/2024 8  6 - 20 mg/dL Final    Creatinine 02/03/2024 1.6 (H)  0.5 - 1.4 mg/dL Final    Calcium 02/03/2024 8.9  8.7 - 10.5 mg/dL Final    Total Protein 02/03/2024 7.1  6.0 - 8.4 g/dL Final    Albumin 02/03/2024 3.9  3.5 - 5.2 g/dL Final    Total Bilirubin 02/03/2024 0.5  0.1 - 1.0 mg/dL Final    Alkaline Phosphatase 02/03/2024 55  55 - 135 U/L Final    AST 02/03/2024 15  10 - 40 U/L Final    ALT 02/03/2024 17  10 - 44 U/L Final    eGFR 02/03/2024 52.8 (A)  >60 mL/min/1.73 m^2 Final    Anion Gap 02/03/2024 7 (L)  8 - 16 mmol/L Final    Hemoglobin A1C 02/03/2024 6.2  4.5 - 6.2 % Final    Estimated Avg Glucose 02/03/2024 131  68 - 131 mg/dL Final    Cholesterol 02/03/2024 111 (L)  120 - 199 mg/dL Final    Triglycerides 02/03/2024 118  30 - 150 mg/dL Final    HDL 02/03/2024 38 (L)  40 - 75 mg/dL Final    LDL Cholesterol 02/03/2024 49.4 (L)  63.0 - 159.0 mg/dL Final    HDL/Cholesterol Ratio 02/03/2024 34.2  20.0 - 50.0 % Final    Total Cholesterol/HDL Ratio 02/03/2024 2.9  2.0 - 5.0 Final    Non-HDL Cholesterol 02/03/2024 73  mg/dL Final   Admission on 11/10/2023, Discharged on 11/14/2023   Component Date Value Ref Range Status    WBC 11/10/2023 3.68 (L)  3.90 - 12.70 K/uL Final    RBC 11/10/2023 5.39  4.60 - 6.20 M/uL Final    Hemoglobin 11/10/2023 14.4  14.0 - 18.0 g/dL Final    Hematocrit 11/10/2023 44.2  40.0 - 54.0 % Final    MCV 11/10/2023 82  82 - 98 fL Final    MCH 11/10/2023 26.7 (L)  27.0 - 31.0 pg Final    MCHC 11/10/2023 32.6  32.0 - 36.0 g/dL Final    RDW 11/10/2023 15.5 (H)  11.5 - 14.5 % Final    Platelets 11/10/2023 127 (L)  150 - 450 K/uL Final    MPV 11/10/2023 9.6  9.2 - 12.9 fL Final    Immature Granulocytes 11/10/2023 4.6 (H)  0.0 - 0.5 % Final    Gran # (ANC) 11/10/2023 2.1  1.8 -  7.7 K/uL Final    Immature Grans (Abs) 11/10/2023 0.17 (H)  0.00 - 0.04 K/uL Final    Lymph # 11/10/2023 1.0  1.0 - 4.8 K/uL Final    Mono # 11/10/2023 0.3  0.3 - 1.0 K/uL Final    Eos # 11/10/2023 0.1  0.0 - 0.5 K/uL Final    Baso # 11/10/2023 0.05  0.00 - 0.20 K/uL Final    nRBC 11/10/2023 0  0 /100 WBC Final    Gran % 11/10/2023 57.8  38.0 - 73.0 % Final    Lymph % 11/10/2023 27.2  18.0 - 48.0 % Final    Mono % 11/10/2023 6.8  4.0 - 15.0 % Final    Eosinophil % 11/10/2023 2.2  0.0 - 8.0 % Final    Basophil % 11/10/2023 1.4  0.0 - 1.9 % Final    Differential Method 11/10/2023 Automated   Final    Sodium 11/10/2023 135 (L)  136 - 145 mmol/L Final    Potassium 11/10/2023 3.8  3.5 - 5.1 mmol/L Final    Chloride 11/10/2023 98  95 - 110 mmol/L Final    CO2 11/10/2023 17 (L)  23 - 29 mmol/L Final    Glucose 11/10/2023 126 (H)  70 - 110 mg/dL Final    BUN 11/10/2023 18  6 - 20 mg/dL Final    Creatinine 11/10/2023 1.3  0.5 - 1.4 mg/dL Final    Calcium 11/10/2023 10.6 (H)  8.7 - 10.5 mg/dL Final    Total Protein 11/10/2023 9.1 (H)  6.0 - 8.4 g/dL Final    Albumin 11/10/2023 4.5  3.5 - 5.2 g/dL Final    Total Bilirubin 11/10/2023 0.4  0.1 - 1.0 mg/dL Final    Alkaline Phosphatase 11/10/2023 63  55 - 135 U/L Final    AST 11/10/2023 48 (H)  10 - 40 U/L Final    ALT 11/10/2023 43  10 - 44 U/L Final    eGFR 11/10/2023 >60  >60 mL/min/1.73 m^2 Final    Anion Gap 11/10/2023 20 (H)  8 - 16 mmol/L Final    Lipase 11/10/2023 76 (H)  4 - 60 U/L Final    Benzodiazepines 11/11/2023 Presumptive Positive (A)  Negative Final    Cocaine (Metab.) 11/11/2023 Negative  Negative Final    Opiate Scrn, Ur 11/11/2023 Negative  Negative Final    Barbiturate Screen, Ur 11/11/2023 Negative  Negative Final    Amphetamine Screen, Ur 11/11/2023 Negative  Negative Final    THC 11/11/2023 Negative  Negative Final    Phencyclidine 11/11/2023 Negative  Negative Final    Creatinine, Urine 11/11/2023 113.0  23.0 - 375.0 mg/dL Final    Toxicology Information  11/11/2023 SEE COMMENT   Final    WBC 11/11/2023 3.58 (L)  3.90 - 12.70 K/uL Final    RBC 11/11/2023 4.69  4.60 - 6.20 M/uL Final    Hemoglobin 11/11/2023 12.6 (L)  14.0 - 18.0 g/dL Final    Hematocrit 11/11/2023 38.8 (L)  40.0 - 54.0 % Final    MCV 11/11/2023 83  82 - 98 fL Final    MCH 11/11/2023 26.9 (L)  27.0 - 31.0 pg Final    MCHC 11/11/2023 32.5  32.0 - 36.0 g/dL Final    RDW 11/11/2023 15.4 (H)  11.5 - 14.5 % Final    Platelets 11/11/2023 86 (L)  150 - 450 K/uL Final    MPV 11/11/2023 9.3  9.2 - 12.9 fL Final    Immature Granulocytes 11/11/2023 2.2 (H)  0.0 - 0.5 % Final    Gran # (ANC) 11/11/2023 2.4  1.8 - 7.7 K/uL Final    Immature Grans (Abs) 11/11/2023 0.08 (H)  0.00 - 0.04 K/uL Final    Lymph # 11/11/2023 0.6 (L)  1.0 - 4.8 K/uL Final    Mono # 11/11/2023 0.3  0.3 - 1.0 K/uL Final    Eos # 11/11/2023 0.2  0.0 - 0.5 K/uL Final    Baso # 11/11/2023 0.03  0.00 - 0.20 K/uL Final    nRBC 11/11/2023 0  0 /100 WBC Final    Gran % 11/11/2023 67.4  38.0 - 73.0 % Final    Lymph % 11/11/2023 17.3 (L)  18.0 - 48.0 % Final    Mono % 11/11/2023 8.1  4.0 - 15.0 % Final    Eosinophil % 11/11/2023 4.2  0.0 - 8.0 % Final    Basophil % 11/11/2023 0.8  0.0 - 1.9 % Final    Differential Method 11/11/2023 Automated   Final    Sodium 11/11/2023 134 (L)  136 - 145 mmol/L Final    Potassium 11/11/2023 4.3  3.5 - 5.1 mmol/L Final    Chloride 11/11/2023 103  95 - 110 mmol/L Final    CO2 11/11/2023 25  23 - 29 mmol/L Final    Glucose 11/11/2023 106  70 - 110 mg/dL Final    BUN 11/11/2023 19  6 - 20 mg/dL Final    Creatinine 11/11/2023 1.2  0.5 - 1.4 mg/dL Final    Calcium 11/11/2023 8.7  8.7 - 10.5 mg/dL Final    Total Protein 11/11/2023 6.8  6.0 - 8.4 g/dL Final    Albumin 11/11/2023 3.8  3.5 - 5.2 g/dL Final    Total Bilirubin 11/11/2023 0.7  0.1 - 1.0 mg/dL Final    Alkaline Phosphatase 11/11/2023 44 (L)  55 - 135 U/L Final    AST 11/11/2023 29  10 - 40 U/L Final    ALT 11/11/2023 27  10 - 44 U/L Final    eGFR 11/11/2023 >60.0   >60 mL/min/1.73 m^2 Final    Anion Gap 11/11/2023 6 (L)  8 - 16 mmol/L Final    Magnesium 11/11/2023 1.7  1.6 - 2.6 mg/dL Final    Phosphorus 11/11/2023 2.8  2.7 - 4.5 mg/dL Final    WBC 11/12/2023 3.76 (L)  3.90 - 12.70 K/uL Final    RBC 11/12/2023 4.71  4.60 - 6.20 M/uL Final    Hemoglobin 11/12/2023 12.8 (L)  14.0 - 18.0 g/dL Final    Hematocrit 11/12/2023 39.2 (L)  40.0 - 54.0 % Final    MCV 11/12/2023 83  82 - 98 fL Final    MCH 11/12/2023 27.2  27.0 - 31.0 pg Final    MCHC 11/12/2023 32.7  32.0 - 36.0 g/dL Final    RDW 11/12/2023 14.9 (H)  11.5 - 14.5 % Final    Platelets 11/12/2023 98 (L)  150 - 450 K/uL Final    MPV 11/12/2023 9.6  9.2 - 12.9 fL Final    Sodium 11/12/2023 135 (L)  136 - 145 mmol/L Final    Potassium 11/12/2023 4.2  3.5 - 5.1 mmol/L Final    Chloride 11/12/2023 103  95 - 110 mmol/L Final    CO2 11/12/2023 26  23 - 29 mmol/L Final    Glucose 11/12/2023 94  70 - 110 mg/dL Final    BUN 11/12/2023 15  6 - 20 mg/dL Final    Creatinine 11/12/2023 1.1  0.5 - 1.4 mg/dL Final    Calcium 11/12/2023 8.8  8.7 - 10.5 mg/dL Final    Total Protein 11/12/2023 6.6  6.0 - 8.4 g/dL Final    Albumin 11/12/2023 3.6  3.5 - 5.2 g/dL Final    Total Bilirubin 11/12/2023 0.5  0.1 - 1.0 mg/dL Final    Alkaline Phosphatase 11/12/2023 43 (L)  55 - 135 U/L Final    AST 11/12/2023 105 (H)  10 - 40 U/L Final    ALT 11/12/2023 103 (H)  10 - 44 U/L Final    eGFR 11/12/2023 >60.0  >60 mL/min/1.73 m^2 Final    Anion Gap 11/12/2023 6 (L)  8 - 16 mmol/L Final    Magnesium 11/12/2023 1.8  1.6 - 2.6 mg/dL Final    Phosphorus 11/12/2023 3.3  2.7 - 4.5 mg/dL Final    Magnesium 11/12/2023 2.0  1.6 - 2.6 mg/dL Final    WBC 11/13/2023 3.31 (L)  3.90 - 12.70 K/uL Final    RBC 11/13/2023 4.77  4.60 - 6.20 M/uL Final    Hemoglobin 11/13/2023 12.7 (L)  14.0 - 18.0 g/dL Final    Hematocrit 11/13/2023 40.3  40.0 - 54.0 % Final    MCV 11/13/2023 85  82 - 98 fL Final    MCH 11/13/2023 26.6 (L)  27.0 - 31.0 pg Final    MCHC 11/13/2023 31.5 (L)   32.0 - 36.0 g/dL Final    RDW 11/13/2023 15.4 (H)  11.5 - 14.5 % Final    Platelets 11/13/2023 111 (L)  150 - 450 K/uL Final    MPV 11/13/2023 10.8  9.2 - 12.9 fL Final    Sodium 11/13/2023 135 (L)  136 - 145 mmol/L Final    Potassium 11/13/2023 4.1  3.5 - 5.1 mmol/L Final    Chloride 11/13/2023 104  95 - 110 mmol/L Final    CO2 11/13/2023 24  23 - 29 mmol/L Final    Glucose 11/13/2023 90  70 - 110 mg/dL Final    BUN 11/13/2023 13  6 - 20 mg/dL Final    Creatinine 11/13/2023 1.1  0.5 - 1.4 mg/dL Final    Calcium 11/13/2023 8.5 (L)  8.7 - 10.5 mg/dL Final    Total Protein 11/13/2023 6.7  6.0 - 8.4 g/dL Final    Albumin 11/13/2023 3.7  3.5 - 5.2 g/dL Final    Total Bilirubin 11/13/2023 0.5  0.1 - 1.0 mg/dL Final    Alkaline Phosphatase 11/13/2023 47 (L)  55 - 135 U/L Final    AST 11/13/2023 84 (H)  10 - 40 U/L Final    ALT 11/13/2023 114 (H)  10 - 44 U/L Final    eGFR 11/13/2023 >60.0  >60 mL/min/1.73 m^2 Final    Anion Gap 11/13/2023 7 (L)  8 - 16 mmol/L Final    Magnesium 11/13/2023 2.0  1.6 - 2.6 mg/dL Final    Phosphorus 11/13/2023 3.4  2.7 - 4.5 mg/dL Final    WBC 11/14/2023 3.25 (L)  3.90 - 12.70 K/uL Final    RBC 11/14/2023 4.59 (L)  4.60 - 6.20 M/uL Final    Hemoglobin 11/14/2023 12.6 (L)  14.0 - 18.0 g/dL Final    Hematocrit 11/14/2023 38.7 (L)  40.0 - 54.0 % Final    MCV 11/14/2023 84  82 - 98 fL Final    MCH 11/14/2023 27.5  27.0 - 31.0 pg Final    MCHC 11/14/2023 32.6  32.0 - 36.0 g/dL Final    RDW 11/14/2023 15.7 (H)  11.5 - 14.5 % Final    Platelets 11/14/2023 127 (L)  150 - 450 K/uL Final    MPV 11/14/2023 10.7  9.2 - 12.9 fL Final    Sodium 11/14/2023 135 (L)  136 - 145 mmol/L Final    Potassium 11/14/2023 4.2  3.5 - 5.1 mmol/L Final    Chloride 11/14/2023 106  95 - 110 mmol/L Final    CO2 11/14/2023 22 (L)  23 - 29 mmol/L Final    Glucose 11/14/2023 93  70 - 110 mg/dL Final    BUN 11/14/2023 16  6 - 20 mg/dL Final    Creatinine 11/14/2023 1.2  0.5 - 1.4 mg/dL Final    Calcium 11/14/2023 8.5 (L)  8.7 -  10.5 mg/dL Final    Total Protein 11/14/2023 6.6  6.0 - 8.4 g/dL Final    Albumin 11/14/2023 3.5  3.5 - 5.2 g/dL Final    Total Bilirubin 11/14/2023 0.6  0.1 - 1.0 mg/dL Final    Alkaline Phosphatase 11/14/2023 47 (L)  55 - 135 U/L Final    AST 11/14/2023 42 (H)  10 - 40 U/L Final    ALT 11/14/2023 89 (H)  10 - 44 U/L Final    eGFR 11/14/2023 >60.0  >60 mL/min/1.73 m^2 Final    Anion Gap 11/14/2023 7 (L)  8 - 16 mmol/L Final    Magnesium 11/14/2023 2.0  1.6 - 2.6 mg/dL Final    Phosphorus 11/14/2023 3.5  2.7 - 4.5 mg/dL Final    Alcohol, Serum 11/14/2023 <10  <10 mg/dL Final    Alcohol, Serum 11/14/2023 <10  <10 mg/dL Final   Admission on 11/08/2023, Discharged on 11/08/2023   Component Date Value Ref Range Status    WBC 11/08/2023 3.23 (L)  3.90 - 12.70 K/uL Final    RBC 11/08/2023 5.39  4.60 - 6.20 M/uL Final    Hemoglobin 11/08/2023 14.5  14.0 - 18.0 g/dL Final    Hematocrit 11/08/2023 43.5  40.0 - 54.0 % Final    MCV 11/08/2023 81 (L)  82 - 98 fL Final    MCH 11/08/2023 26.9 (L)  27.0 - 31.0 pg Final    MCHC 11/08/2023 33.3  32.0 - 36.0 g/dL Final    RDW 11/08/2023 15.3 (H)  11.5 - 14.5 % Final    Platelets 11/08/2023 149 (L)  150 - 450 K/uL Final    MPV 11/08/2023 9.6  9.2 - 12.9 fL Final    Immature Granulocytes 11/08/2023 1.5 (H)  0.0 - 0.5 % Final    Gran # (ANC) 11/08/2023 2.1  1.8 - 7.7 K/uL Final    Immature Grans (Abs) 11/08/2023 0.05 (H)  0.00 - 0.04 K/uL Final    Lymph # 11/08/2023 0.8 (L)  1.0 - 4.8 K/uL Final    Mono # 11/08/2023 0.2 (L)  0.3 - 1.0 K/uL Final    Eos # 11/08/2023 0.0  0.0 - 0.5 K/uL Final    Baso # 11/08/2023 0.02  0.00 - 0.20 K/uL Final    nRBC 11/08/2023 1 (A)  0 /100 WBC Final    Gran % 11/08/2023 65.4  38.0 - 73.0 % Final    Lymph % 11/08/2023 24.5  18.0 - 48.0 % Final    Mono % 11/08/2023 7.1  4.0 - 15.0 % Final    Eosinophil % 11/08/2023 0.9  0.0 - 8.0 % Final    Basophil % 11/08/2023 0.6  0.0 - 1.9 % Final    Differential Method 11/08/2023 Automated   Final    Sodium 11/08/2023  131 (L)  136 - 145 mmol/L Final    Potassium 11/08/2023 3.9  3.5 - 5.1 mmol/L Final    Chloride 11/08/2023 94 (L)  95 - 110 mmol/L Final    CO2 11/08/2023 18 (L)  23 - 29 mmol/L Final    Glucose 11/08/2023 129 (H)  70 - 110 mg/dL Final    BUN 11/08/2023 18  6 - 20 mg/dL Final    Creatinine 11/08/2023 1.2  0.5 - 1.4 mg/dL Final    Calcium 11/08/2023 10.0  8.7 - 10.5 mg/dL Final    Total Protein 11/08/2023 9.1 (H)  6.0 - 8.4 g/dL Final    Albumin 11/08/2023 4.4  3.5 - 5.2 g/dL Final    Total Bilirubin 11/08/2023 0.5  0.1 - 1.0 mg/dL Final    Alkaline Phosphatase 11/08/2023 69  55 - 135 U/L Final    AST 11/08/2023 35  10 - 40 U/L Final    ALT 11/08/2023 29  10 - 44 U/L Final    eGFR 11/08/2023 >60  >60 mL/min/1.73 m^2 Final    Anion Gap 11/08/2023 19 (H)  8 - 16 mmol/L Final    Lipase 11/08/2023 66 (H)  4 - 60 U/L Final    Troponin I 11/08/2023 0.030 (H)  0.000 - 0.026 ng/mL Final    POC PH 11/08/2023 7.371  7.35 - 7.45 Final    POC PCO2 11/08/2023 35.7  35 - 45 mmHg Final    POC PO2 11/08/2023 50  40 - 60 mmHg Final    POC HCO3 11/08/2023 20.7 (L)  24 - 28 mmol/L Final    POC BE 11/08/2023 -5 (L)  -2 to 2 mmol/L Final    POC SATURATED O2 11/08/2023 84  95 - 100 % Final    POC TCO2 11/08/2023 22 (L)  24 - 29 mmol/L Final    Sample 11/08/2023 VENOUS   Final    Site 11/08/2023 Other   Final    Allens Test 11/08/2023 N/A   Final    DelSys 11/08/2023 Nasal Can   Final    Mode 11/08/2023 SPONT   Final    Flow 11/08/2023 2   Final   Admission on 11/03/2023, Discharged on 11/03/2023   Component Date Value Ref Range Status    WBC 11/03/2023 4.75  3.90 - 12.70 K/uL Final    RBC 11/03/2023 5.18  4.60 - 6.20 M/uL Final    Hemoglobin 11/03/2023 14.2  14.0 - 18.0 g/dL Final    Hematocrit 11/03/2023 43.2  40.0 - 54.0 % Final    MCV 11/03/2023 83  82 - 98 fL Final    MCH 11/03/2023 27.4  27.0 - 31.0 pg Final    MCHC 11/03/2023 32.9  32.0 - 36.0 g/dL Final    RDW 11/03/2023 15.6 (H)  11.5 - 14.5 % Final    Platelets 11/03/2023 175   150 - 450 K/uL Final    MPV 11/03/2023 9.9  9.2 - 12.9 fL Final    Immature Granulocytes 11/03/2023 1.9 (H)  0.0 - 0.5 % Final    Gran # (ANC) 11/03/2023 2.8  1.8 - 7.7 K/uL Final    Immature Grans (Abs) 11/03/2023 0.09 (H)  0.00 - 0.04 K/uL Final    Lymph # 11/03/2023 1.5  1.0 - 4.8 K/uL Final    Mono # 11/03/2023 0.2 (L)  0.3 - 1.0 K/uL Final    Eos # 11/03/2023 0.1  0.0 - 0.5 K/uL Final    Baso # 11/03/2023 0.05  0.00 - 0.20 K/uL Final    nRBC 11/03/2023 0  0 /100 WBC Final    Gran % 11/03/2023 58.9  38.0 - 73.0 % Final    Lymph % 11/03/2023 31.6  18.0 - 48.0 % Final    Mono % 11/03/2023 4.8  4.0 - 15.0 % Final    Eosinophil % 11/03/2023 1.7  0.0 - 8.0 % Final    Basophil % 11/03/2023 1.1  0.0 - 1.9 % Final    Differential Method 11/03/2023 Automated   Final    Sodium 11/03/2023 135 (L)  136 - 145 mmol/L Final    Potassium 11/03/2023 3.9  3.5 - 5.1 mmol/L Final    Chloride 11/03/2023 100  95 - 110 mmol/L Final    CO2 11/03/2023 19 (L)  23 - 29 mmol/L Final    Glucose 11/03/2023 147 (H)  70 - 110 mg/dL Final    BUN 11/03/2023 18  6 - 20 mg/dL Final    Creatinine 11/03/2023 1.3  0.5 - 1.4 mg/dL Final    Calcium 11/03/2023 9.5  8.7 - 10.5 mg/dL Final    Total Protein 11/03/2023 8.5 (H)  6.0 - 8.4 g/dL Final    Albumin 11/03/2023 4.6  3.5 - 5.2 g/dL Final    Total Bilirubin 11/03/2023 0.4  0.1 - 1.0 mg/dL Final    Alkaline Phosphatase 11/03/2023 59  55 - 135 U/L Final    AST 11/03/2023 22  10 - 40 U/L Final    ALT 11/03/2023 17  10 - 44 U/L Final    eGFR 11/03/2023 >60.0  >60 mL/min/1.73 m^2 Final    Anion Gap 11/03/2023 16  8 - 16 mmol/L Final    TSH 11/03/2023 3.244  0.340 - 5.600 uIU/mL Final    Specimen UA 11/03/2023 Urine, Clean Catch   Final    Color, UA 11/03/2023 Yellow  Yellow, Straw, [place] Final    Appearance, UA 11/03/2023 Clear  Clear Final    pH, UA 11/03/2023 6.0  5.0 - 8.0 Final    Specific Gravity, UA 11/03/2023 1.010  1.005 - 1.030 Final    Protein, UA 11/03/2023 1+ (A)  Negative Final    Glucose, UA  11/03/2023 1+ (A)  Negative Final    Ketones, UA 11/03/2023 Negative  Negative Final    Bilirubin (UA) 11/03/2023 Negative  Negative Final    Occult Blood UA 11/03/2023 1+ (A)  Negative Final    Nitrite, UA 11/03/2023 Negative  Negative Final    Urobilinogen, UA 11/03/2023 Negative  Negative EU/dL Final    Leukocytes, UA 11/03/2023 1+ (A)  Negative Final    Benzodiazepines 11/03/2023 Negative  Negative Final    Cocaine (Metab.) 11/03/2023 Negative  Negative Final    Opiate Scrn, Ur 11/03/2023 Negative  Negative Final    Barbiturate Screen, Ur 11/03/2023 Negative  Negative Final    Amphetamine Screen, Ur 11/03/2023 Negative  Negative Final    THC 11/03/2023 Negative  Negative Final    Phencyclidine 11/03/2023 Negative  Negative Final    Creatinine, Urine 11/03/2023 64.8  23.0 - 375.0 mg/dL Final    Toxicology Information 11/03/2023 SEE COMMENT   Final    Alcohol, Serum 11/03/2023 379 (HH)  <10 mg/dL Final    Acetaminophen (Tylenol), Serum 11/03/2023 <0.1 (L)  10.0 - 20.0 ug/mL Final    Salicylate Lvl 11/03/2023 <1.5 (L)  15.0 - 30.0 mg/dL Final    RBC, UA 11/03/2023 1  0 - 4 /hpf Final    WBC, UA 11/03/2023 9 (H)  0 - 5 /hpf Final    Bacteria 11/03/2023 Negative  None-Occ /hpf Final    Squam Epithel, UA 11/03/2023 1  /hpf Final    Hyaline Casts, UA 11/03/2023 3 (A)  0-1/lpf /lpf Final    Microscopic Comment 11/03/2023 SEE COMMENT   Final   Lab Visit on 10/03/2023   Component Date Value Ref Range Status    Testosterone 10/03/2023 556  264 - 916 ng/dL Final    Testosterone, Free 10/03/2023 14.7  6.8 - 21.5 pg/mL Final    Estradiol 10/03/2023 93.8 (H)  7.6 - 42.6 pg/mL Final    PSA Diagnostic 10/03/2023 0.99  0.00 - 4.00 ng/mL Final   Lab Visit on 09/21/2023   Component Date Value Ref Range Status    Sodium 09/21/2023 136  136 - 145 mmol/L Final    Potassium 09/21/2023 4.1  3.5 - 5.1 mmol/L Final    Chloride 09/21/2023 104  95 - 110 mmol/L Final    CO2 09/21/2023 26  23 - 29 mmol/L Final    Glucose 09/21/2023 96  70 -  110 mg/dL Final    BUN 09/21/2023 14  6 - 20 mg/dL Final    Creatinine 09/21/2023 1.7 (H)  0.5 - 1.4 mg/dL Final    Calcium 09/21/2023 9.5  8.7 - 10.5 mg/dL Final    Total Protein 09/21/2023 8.1  6.0 - 8.4 g/dL Final    Albumin 09/21/2023 4.4  3.5 - 5.2 g/dL Final    Total Bilirubin 09/21/2023 0.2  0.1 - 1.0 mg/dL Final    Alkaline Phosphatase 09/21/2023 65  55 - 135 U/L Final    AST 09/21/2023 16  10 - 40 U/L Final    ALT 09/21/2023 18  10 - 44 U/L Final    eGFR 09/21/2023 49.1 (A)  >60 mL/min/1.73 m^2 Final    Anion Gap 09/21/2023 6 (L)  8 - 16 mmol/L Final    WBC 09/21/2023 9.13  3.90 - 12.70 K/uL Final    RBC 09/21/2023 5.49  4.60 - 6.20 M/uL Final    Hemoglobin 09/21/2023 15.6  14.0 - 18.0 g/dL Final    Hematocrit 09/21/2023 48.8  40.0 - 54.0 % Final    MCV 09/21/2023 89  82 - 98 fL Final    MCH 09/21/2023 28.4  27.0 - 31.0 pg Final    MCHC 09/21/2023 32.0  32.0 - 36.0 g/dL Final    RDW 09/21/2023 13.9  11.5 - 14.5 % Final    Platelets 09/21/2023 241  150 - 450 K/uL Final    MPV 09/21/2023 11.5  9.2 - 12.9 fL Final    Cholesterol 09/21/2023 170  120 - 199 mg/dL Final    Triglycerides 09/21/2023 211 (H)  30 - 150 mg/dL Final    HDL 09/21/2023 47  40 - 75 mg/dL Final    LDL Cholesterol 09/21/2023 80.8  63.0 - 159.0 mg/dL Final    HDL/Cholesterol Ratio 09/21/2023 27.6  20.0 - 50.0 % Final    Total Cholesterol/HDL Ratio 09/21/2023 3.6  2.0 - 5.0 Final    Non-HDL Cholesterol 09/21/2023 123  mg/dL Final    Hemoglobin A1C 09/21/2023 5.4  4.5 - 6.2 % Final    Estimated Avg Glucose 09/21/2023 108  68 - 131 mg/dL Final       Past Medical History:   Diagnosis Date    ADHD (attention deficit hyperactivity disorder)     Arthritis     Asthma     as child only    Back pain     Coronary artery disease     Degeneration of lumbar intervertebral disc 05/2016    Depression     Elevated PSA     Headache     Hyperlipidemia     Hypertension     Kidney damage     stage 3 ; d/t aleve abuse    Kidney stone     Myocardial infarction      Neck pain     Numbness and tingling in hands     Numbness and tingling of both legs     Seizures     from inapsine 2002    Sleep apnea     no cpap    Wears glasses     CONTACS     Social History     Socioeconomic History    Marital status: Legally    Occupational History    Occupation: disability   Tobacco Use    Smoking status: Former     Current packs/day: 0.00     Types: Cigarettes     Quit date: 2016     Years since quittin.2     Passive exposure: Past    Smokeless tobacco: Former     Quit date: 2016    Tobacco comments:     occasional   Substance and Sexual Activity    Alcohol use: Yes     Alcohol/week: 1.0 standard drink of alcohol     Types: 1 Glasses of wine per week     Comment: 20 - 30 shots vodka per day or 1-2 1/5ths per day for 2 years (started )    Drug use: No    Sexual activity: Yes     Partners: Female     Social Determinants of Health     Financial Resource Strain: Medium Risk (3/18/2024)    Overall Financial Resource Strain (CARDIA)     Difficulty of Paying Living Expenses: Somewhat hard   Food Insecurity: No Food Insecurity (3/18/2024)    Hunger Vital Sign     Worried About Running Out of Food in the Last Year: Never true     Ran Out of Food in the Last Year: Never true   Transportation Needs: No Transportation Needs (3/18/2024)    PRAPARE - Transportation     Lack of Transportation (Medical): No     Lack of Transportation (Non-Medical): No   Physical Activity: Insufficiently Active (3/18/2024)    Exercise Vital Sign     Days of Exercise per Week: 2 days     Minutes of Exercise per Session: 60 min   Stress: No Stress Concern Present (3/18/2024)    [employer] [place] [employer] - Occupational Stress Questionnaire     Feeling of Stress : Only a little   Social Connections: Unknown (3/18/2024)    Social Connection and Isolation Panel [NHANES]     Frequency of Communication with Friends and Family: Twice a week     Frequency of Social Gatherings with Friends and  Family: Never     Active Member of Clubs or Organizations: No     Attends Club or Organization Meetings: Never     Marital Status:    Housing Stability: Unknown (3/18/2024)    Housing Stability Vital Sign     Unable to Pay for Housing in the Last Year: No     Unstable Housing in the Last Year: No     Past Surgical History:   Procedure Laterality Date    BACK SURGERY  2016    back surgery    BONE GRAFT N/A 11/5/2020    Procedure: BONE GRAFT;  Surgeon: Mateusz Blanco MD;  Location: [facility] OR;  Service: Orthopedics;  Laterality: N/A;    CARDIAC SURGERY  01/06/2020    CABG X 7    CORONARY ARTERY BYPASS GRAFT      7 vessels    HERNIA REPAIR Bilateral 11/22/2017    LITHOTRIPSY      LUMBAR LAMINECTOMY WITH FUSION Bilateral 11/5/2020    Procedure: LAMINECTOMY, SPINE, LUMBAR, WITH FUSION;  Surgeon: Mateusz Blanco MD;  Location: [facility] OR;  Service: Orthopedics;  Laterality: Bilateral;  MEDTRONIC  NTI  L-3    PILONIDAL CYST DRAINAGE      removal of abcess      scrotal    REMOVAL OF HARDWARE FROM SPINE Bilateral 11/5/2020    Procedure: REMOVAL, HARDWARE, SPINE;  Surgeon: Mateusz Blanco MD;  Location: [facility] OR;  Service: Orthopedics;  Laterality: Bilateral;  L 4-5    TYMPANOSTOMY TUBE PLACEMENT       Family History   Problem Relation Age of Onset    Heart disease Mother     Migraines Mother     Hypertension Mother     Early death Father     Heart disease Father     Diabetes Maternal Aunt     Diabetes Maternal Uncle     Diabetes Maternal Grandfather        All of your core healthy metrics are met.      Review of patient's allergies indicates:   Allergen Reactions    Inapsine [droperidol] Anaphylaxis     seizures    Effexor [venlafaxine]      Increased anxiety    Pcn [penicillins]     Bactrim [sulfamethoxazole-trimethoprim] Rash     Dry red rash all over when in the sun    Fluconazole Rash     Pruritis         Current Outpatient Medications:     anastrozole (ARIMIDEX) 1 mg Tab, Take 1 mg by mouth every 7 days., Disp: ,  Rfl:     aspirin (ECOTRIN) 81 MG EC tablet, Take 81 mg by mouth once daily., Disp: , Rfl:     doxepin (SINEQUAN) 50 MG capsule, TAKE 1 TO 2 CAPSULES BY MOUTH AT BEDTIME, Disp: , Rfl:     ENTRESTO 49-51 mg per tablet, Take 1 tablet by mouth 2 (two) times daily., Disp: , Rfl:     EScitalopram oxalate (LEXAPRO) 10 MG tablet, Take 10 mg by mouth every evening., Disp: , Rfl:     furosemide (LASIX) 40 MG tablet, Take 40 mg by mouth once daily., Disp: , Rfl:     metoprolol tartrate (LOPRESSOR) 25 MG tablet, Take 50 mg by mouth 2 (two) times daily., Disp: , Rfl:     naltrexone microspheres (VIVITROL) 380 mg SSRR kit, Inject 1 each (380 mg total) into the muscle every 30 days., Disp: 1 each, Rfl: 0    rosuvastatin (CRESTOR) 40 MG Tab, Take 40 mg by mouth once daily., Disp: , Rfl:     semaglutide (OZEMPIC) 0.25 mg or 0.5 mg(2 mg/1.5 mL) pen injector, Inject 0.25 mg every week by subcutaneous route as directed for 30 days., Disp: , Rfl:     testosterone cypionate (DEPOTESTOTERONE CYPIONATE) 200 mg/mL injection, Inject 200 mg into the muscle every 7 days., Disp: , Rfl:     azithromycin (Z-BECKY) 250 MG tablet, Take 2 tablets by mouth on day 1; Take 1 tablet by mouth on days 2-5, Disp: 6 tablet, Rfl: 0    ENTRESTO 24-26 mg per tablet, Take 1 tablet by mouth., Disp: , Rfl:     hydrOXYzine pamoate (VISTARIL) 50 MG Cap, Take 1 capsule (50 mg total) by mouth every 8 (eight) hours as needed., Disp: 90 capsule, Rfl: 2    JARDIANCE 25 mg tablet, Take 25 mg by mouth Daily., Disp: , Rfl:     L-METHYLFOLATE FORTE 15-90.314 mg Cap, Take 1 capsule by mouth Daily., Disp: , Rfl:     ondansetron (ZOFRAN-ODT) 4 MG TbDL, Take 1 tablet (4 mg total) by mouth every 8 (eight) hours as needed (nausea, vomiting)., Disp: 12 tablet, Rfl: 0    potassium chloride SA (K-DUR,KLOR-CON) 20 MEQ tablet, Take 20 mEq by mouth once daily., Disp: , Rfl:     QUEtiapine (SEROQUEL) 50 MG tablet, Take 50 mg by mouth every evening., Disp: , Rfl:     traZODone (DESYREL)  "100 MG tablet, Take 2 tablets (200 mg total) by mouth every evening. (Patient not taking: Reported on 3/19/2024), Disp: 60 tablet, Rfl: 0    Review of Systems   Constitutional:  Negative for activity change and unexpected weight change.   HENT:  Positive for hearing loss, sinus pressure and sore throat. Negative for rhinorrhea and trouble swallowing.    Eyes:  Negative for discharge and visual disturbance.   Respiratory:  Negative for chest tightness and wheezing.    Cardiovascular:  Negative for chest pain and palpitations.   Gastrointestinal:  Negative for blood in stool, constipation, diarrhea and vomiting.   Endocrine: Negative for polydipsia and polyuria.   Genitourinary:  Negative for difficulty urinating, hematuria and urgency.   Musculoskeletal:  Negative for arthralgias, joint swelling and neck pain.   Neurological:  Negative for headaches.   Psychiatric/Behavioral:  Negative for confusion and dysphoric mood.           Objective:      Vitals:    03/19/24 0905   BP: 108/80   Pulse: 97   SpO2: 96%   Weight: 106.1 kg (234 lb)   Height: 5' 7" (1.702 m)     Physical Exam  Vitals and nursing note reviewed.   Constitutional:       General: He is not in acute distress.     Appearance: Normal appearance. He is well-developed. He is obese. He is not ill-appearing.   HENT:      Right Ear: External ear normal.      Left Ear: External ear normal. There is impacted cerumen.      Mouth/Throat:      Tonsils: No tonsillar exudate.   Cardiovascular:      Rate and Rhythm: Normal rate and regular rhythm.      Heart sounds: Normal heart sounds.   Pulmonary:      Breath sounds: Normal breath sounds.   Lymphadenopathy:      Cervical: No cervical adenopathy.   Neurological:      Mental Status: He is alert.           Assessment:       1. Chronic recurrent pilonidal cyst    2. Hearing loss, unspecified hearing loss type, unspecified laterality    3. Impacted cerumen, unspecified laterality    4. Neuropathy    5. Coronary artery " disease involving coronary bypass graft of native heart without angina pectoris    6. Hyperlipidemia, unspecified hyperlipidemia type    7. Alcohol abuse    8. Sinusitis, unspecified chronicity, unspecified location         Plan:       Chronic recurrent pilonidal cyst  Comments:  refer to surgeon  Orders:  -     Ambulatory referral/consult to General Surgery; Future; Expected date: 03/26/2024    Hearing loss, unspecified hearing loss type, unspecified laterality  Comments:  refer to ent  Orders:  -     Ambulatory referral/consult to ENT; Future; Expected date: 03/26/2024    Impacted cerumen, unspecified laterality  Comments:  successful ear wash  Orders:  -     Ear wax removal    Neuropathy    Coronary artery disease involving coronary bypass graft of native heart without angina pectoris  Comments:  followed by cardio    Hyperlipidemia, unspecified hyperlipidemia type    Alcohol abuse  Comments:  continue vivitrol    Sinusitis, unspecified chronicity, unspecified location  Comments:  zpack decadron. call if persist    Other orders  -     dexAMETHasone injection 8 mg  -     azithromycin (Z-BECKY) 250 MG tablet; Take 2 tablets by mouth on day 1; Take 1 tablet by mouth on days 2-5  Dispense: 6 tablet; Refill: 0      Follow up in about 3 months (around 6/19/2024), or if symptoms worsen or fail to improve, for medication management.        3/21/2024 Riya Vidales

## 2024-03-22 LAB — ESTROGEN SERPL-MCNC: 56 PG/ML (ref 56–213)

## 2024-03-26 ENCOUNTER — OFFICE VISIT (OUTPATIENT)
Dept: SURGERY | Facility: CLINIC | Age: 49
End: 2024-03-26
Payer: MEDICAID

## 2024-03-26 VITALS — TEMPERATURE: 99 F | SYSTOLIC BLOOD PRESSURE: 101 MMHG | HEART RATE: 92 BPM | DIASTOLIC BLOOD PRESSURE: 69 MMHG

## 2024-03-26 DIAGNOSIS — L05.91 CHRONIC RECURRENT PILONIDAL CYST: Primary | ICD-10-CM

## 2024-03-26 PROCEDURE — 1160F RVW MEDS BY RX/DR IN RCRD: CPT | Mod: CPTII,,, | Performed by: SURGERY

## 2024-03-26 PROCEDURE — 4010F ACE/ARB THERAPY RXD/TAKEN: CPT | Mod: CPTII,,, | Performed by: SURGERY

## 2024-03-26 PROCEDURE — 99999 PR PBB SHADOW E&M-EST. PATIENT-LVL IV: CPT | Mod: PBBFAC,,, | Performed by: SURGERY

## 2024-03-26 PROCEDURE — 99214 OFFICE O/P EST MOD 30 MIN: CPT | Mod: PBBFAC,PN | Performed by: SURGERY

## 2024-03-26 PROCEDURE — 3074F SYST BP LT 130 MM HG: CPT | Mod: CPTII,,, | Performed by: SURGERY

## 2024-03-26 PROCEDURE — 3078F DIAST BP <80 MM HG: CPT | Mod: CPTII,,, | Performed by: SURGERY

## 2024-03-26 PROCEDURE — 1159F MED LIST DOCD IN RCRD: CPT | Mod: CPTII,,, | Performed by: SURGERY

## 2024-03-26 PROCEDURE — 99204 OFFICE O/P NEW MOD 45 MIN: CPT | Mod: S$PBB,,, | Performed by: SURGERY

## 2024-03-26 PROCEDURE — 3044F HG A1C LEVEL LT 7.0%: CPT | Mod: CPTII,,, | Performed by: SURGERY

## 2024-03-26 RX ORDER — CEFAZOLIN SODIUM 2 G/50ML
2 SOLUTION INTRAVENOUS
Status: CANCELLED | OUTPATIENT
Start: 2024-03-26

## 2024-03-26 NOTE — PROGRESS NOTES
Subjective:       Patient ID: Lee Quintanilla is a 48 y.o. male.    Chief Complaint: Other (Rectal/ tailbone  cyst)      HPI:  48-year-old male referred the office with a recurrent pilonidal cyst.  He states he had it excised when he was around 20 years old but over the past several months an area above the gluteal cleft has opened up and drained.  It has now healed as of about a week ago.  He has some discomfort in the area.  He states this is exactly how his original pilonidal cyst presented 20 years ago.  He reports no fevers or chills.  No active drainage.    Past Medical History:   Diagnosis Date    ADHD (attention deficit hyperactivity disorder)     Arthritis     Asthma     as child only    Back pain     Coronary artery disease     Degeneration of lumbar intervertebral disc 05/2016    Depression     Elevated PSA     Headache     Hyperlipidemia     Hypertension     Kidney damage     stage 3 ; d/t aleve abuse    Kidney stone     Myocardial infarction     Neck pain     Numbness and tingling in hands     Numbness and tingling of both legs     Seizures     from inapsine 2002    Sleep apnea     no cpap    Wears glasses     CONTACS     Past Surgical History:   Procedure Laterality Date    BACK SURGERY  2016    back surgery    BONE GRAFT N/A 11/5/2020    Procedure: BONE GRAFT;  Surgeon: Mateusz Blanco MD;  Location: [facility] OR;  Service: Orthopedics;  Laterality: N/A;    CARDIAC SURGERY  01/06/2020    CABG X 7    CORONARY ARTERY BYPASS GRAFT      7 vessels    HERNIA REPAIR Bilateral 11/22/2017    LITHOTRIPSY      LUMBAR LAMINECTOMY WITH FUSION Bilateral 11/5/2020    Procedure: LAMINECTOMY, SPINE, LUMBAR, WITH FUSION;  Surgeon: Mateusz Blanco MD;  Location: [facility] OR;  Service: Orthopedics;  Laterality: Bilateral;  MEDTRONIC  NTI  L-3    PILONIDAL CYST DRAINAGE      removal of abcess      scrotal    REMOVAL OF HARDWARE FROM SPINE Bilateral 11/5/2020    Procedure: REMOVAL, HARDWARE, SPINE;  Surgeon: Mateusz ALBRIGHT  MD Bella;  Location: [facility];  Service: Orthopedics;  Laterality: Bilateral;  L 4-5    TYMPANOSTOMY TUBE PLACEMENT       Review of patient's allergies indicates:   Allergen Reactions    Inapsine [droperidol] Anaphylaxis     seizures    Effexor [venlafaxine]      Increased anxiety    Pcn [penicillins]     Bactrim [sulfamethoxazole-trimethoprim] Rash     Dry red rash all over when in the sun    Fluconazole Rash     Pruritis       Medication List with Changes/Refills   Current Medications    ANASTROZOLE (ARIMIDEX) 1 MG TAB    Take 1 mg by mouth every 7 days.    ASPIRIN (ECOTRIN) 81 MG EC TABLET    Take 81 mg by mouth once daily.    DOXEPIN (SINEQUAN) 50 MG CAPSULE    TAKE 1 TO 2 CAPSULES BY MOUTH AT BEDTIME    ENTRESTO 24-26 MG PER TABLET    Take 1 tablet by mouth.    ENTRESTO 49-51 MG PER TABLET    Take 1 tablet by mouth 2 (two) times daily.    ESCITALOPRAM OXALATE (LEXAPRO) 10 MG TABLET    Take 10 mg by mouth every evening.    FUROSEMIDE (LASIX) 40 MG TABLET    Take 40 mg by mouth once daily.    HYDROXYZINE PAMOATE (VISTARIL) 50 MG CAP    Take 1 capsule (50 mg total) by mouth every 8 (eight) hours as needed.    JARDIANCE 25 MG TABLET    Take 25 mg by mouth Daily.    L-METHYLFOLATE FORTE 15-90.314 MG CAP    Take 1 capsule by mouth Daily.    METOPROLOL TARTRATE (LOPRESSOR) 25 MG TABLET    Take 50 mg by mouth 2 (two) times daily.    NALTREXONE MICROSPHERES (VIVITROL) 380 MG SSRR KIT    Inject 1 each (380 mg total) into the muscle every 30 days.    ONDANSETRON (ZOFRAN-ODT) 4 MG TBDL    Take 1 tablet (4 mg total) by mouth every 8 (eight) hours as needed (nausea, vomiting).    POTASSIUM CHLORIDE SA (K-DUR,KLOR-CON) 20 MEQ TABLET    Take 20 mEq by mouth once daily.    QUETIAPINE (SEROQUEL) 50 MG TABLET    Take 50 mg by mouth every evening.    ROSUVASTATIN (CRESTOR) 40 MG TAB    Take 40 mg by mouth once daily.    SEMAGLUTIDE (OZEMPIC) 0.25 MG OR 0.5 MG(2 MG/1.5 ML) PEN INJECTOR    Inject 0.25 mg every week by subcutaneous  route as directed for 30 days.    TESTOSTERONE CYPIONATE (DEPOTESTOTERONE CYPIONATE) 200 MG/ML INJECTION    Inject 200 mg into the muscle every 7 days.    TRAZODONE (DESYREL) 100 MG TABLET    Take 2 tablets (200 mg total) by mouth every evening.     Family History   Problem Relation Age of Onset    Heart disease Mother     Migraines Mother     Hypertension Mother     Early death Father     Heart disease Father     Diabetes Maternal Aunt     Diabetes Maternal Uncle     Diabetes Maternal Grandfather      Social History     Socioeconomic History    Marital status:    Occupational History    Occupation: disability   Tobacco Use    Smoking status: Former     Current packs/day: 0.00     Types: Cigarettes     Quit date: 2016     Years since quittin.2     Passive exposure: Past    Smokeless tobacco: Former     Quit date: 2016    Tobacco comments:     occasional   Substance and Sexual Activity    Alcohol use: Yes     Alcohol/week: 1.0 standard drink of alcohol     Types: 1 Glasses of wine per week     Comment: 20 - 30 shots vodka per day or 1-2 1/5ths per day for 2 years (started )    Drug use: No    Sexual activity: Yes     Partners: Female     Social Determinants of Health     Financial Resource Strain: Medium Risk (3/18/2024)    Overall Financial Resource Strain (CARDIA)     Difficulty of Paying Living Expenses: Somewhat hard   Food Insecurity: No Food Insecurity (3/18/2024)    Hunger Vital Sign     Worried About Running Out of Food in the Last Year: Never true     Ran Out of Food in the Last Year: Never true   Transportation Needs: No Transportation Needs (3/18/2024)    PRAPARE - Transportation     Lack of Transportation (Medical): No     Lack of Transportation (Non-Medical): No   Physical Activity: Insufficiently Active (3/18/2024)    Exercise Vital Sign     Days of Exercise per Week: 2 days     Minutes of Exercise per Session: 60 min   Stress: No Stress Concern Present (3/18/2024)    [employer] [employer] - Occupational Stress Questionnaire     Feeling of Stress : Only a little   Social Connections: Unknown (3/18/2024)    Social Connection and Isolation Panel [NHANES]     Frequency of Communication with Friends and Family: Twice a week     Frequency of Social Gatherings with Friends and Family: Never     Active Member of Clubs or Organizations: No     Attends Club or Organization Meetings: Never     Marital Status:    Housing Stability: Unknown (3/18/2024)    Housing Stability Vital Sign     Unable to Pay for Housing in the Last Year: No     Unstable Housing in the Last Year: No         Review of Systems   Constitutional:  Negative for appetite change, chills, fever and unexpected weight change.   HENT:  Negative for hearing loss, rhinorrhea, sore throat and voice change.    Eyes:  Negative for photophobia and visual disturbance.   Respiratory:  Negative for cough, choking and shortness of breath.    Cardiovascular:  Negative for chest pain, palpitations and leg swelling.   Gastrointestinal:  Negative for abdominal pain, blood in stool, constipation, diarrhea, nausea and vomiting.   Endocrine: Negative for cold intolerance, heat intolerance, polydipsia and polyuria.   Musculoskeletal:  Negative for arthralgias, back pain, joint swelling and neck stiffness.   Skin:  Negative for color change, pallor and rash.   Neurological:  Negative for dizziness, seizures, syncope and headaches.   Hematological:  Negative for adenopathy. Does not bruise/bleed easily.   Psychiatric/Behavioral:  Negative for agitation, behavioral problems and confusion.        Objective:      Physical Exam  Constitutional:       General: He is awake. He is not in acute distress.     Appearance: Normal appearance. He is well-developed. He is not toxic-appearing.   HENT:      Head: Normocephalic and atraumatic.   Pulmonary:      Effort: Pulmonary effort is normal. No tachypnea or bradypnea.   Abdominal:       General: There is no distension.      Palpations: Abdomen is soft.      Tenderness: There is no abdominal tenderness.   Musculoskeletal:      Cervical back: Neck supple.   Skin:            Comments: Healed pit above the gluteal cleft. The is also a small pore more inferiorly.    Neurological:      Mental Status: He is alert and oriented to person, place, and time.   Psychiatric:         Behavior: Behavior is cooperative.         Assessment/Plan:   Chronic recurrent pilonidal cyst  Comments:  refer to surgeon  Orders:  -     Ambulatory referral/consult to General Surgery  -     Case Request Operating Room: EXCISION, PILONIDAL CYST  -     Basic metabolic panel; Future; Expected date: 03/26/2024  -     CBC auto differential; Future; Expected date: 03/26/2024  -     EKG 12-lead; Future  -     X-Ray Chest PA And Lateral; Future; Expected date: 03/26/2024    Other orders  -     Vital signs; Standing  -     Insert peripheral IV; Standing  -     Diet NPO; Standing  -     IP VTE LOW RISK PATIENT; Standing  -     Place sequential compression device; Standing  -     cefazolin (ANCEF) 2 gram in dextrose 5% 50 mL IVPB (premix)  -     Pulse Oximetry Q4H; Standing  -     Full code; Standing  -     Place in Outpatient; Standing    Plan on re-excision of the pilonidal cyst in the operating room April 10th.  Risks and benefits of surgery discussed with the patient.  Will more than likely closed with a cleft lift flap approach.    I discussed the proposed procedures with the patient including risks, benefits, indications, alternatives and special concerns.  The patient appears to understand and agrees to go ahead with surgery.  I have made no promises, warranties or verbal agreements beyond what was discussed above.    No follow-ups on file.

## 2024-03-26 NOTE — H&P (VIEW-ONLY)
Subjective:       Patient ID: Lee Quintanilla is a 48 y.o. male.    Chief Complaint: Other (Rectal/ tailbone  cyst)      HPI:  48-year-old male referred the office with a recurrent pilonidal cyst.  He states he had it excised when he was around 20 years old but over the past several months an area above the gluteal cleft has opened up and drained.  It has now healed as of about a week ago.  He has some discomfort in the area.  He states this is exactly how his original pilonidal cyst presented 20 years ago.  He reports no fevers or chills.  No active drainage.    Past Medical History:   Diagnosis Date    ADHD (attention deficit hyperactivity disorder)     Arthritis     Asthma     as child only    Back pain     Coronary artery disease     Degeneration of lumbar intervertebral disc 05/2016    Depression     Elevated PSA     Headache     Hyperlipidemia     Hypertension     Kidney damage     stage 3 ; d/t aleve abuse    Kidney stone     Myocardial infarction     Neck pain     Numbness and tingling in hands     Numbness and tingling of both legs     Seizures     from inapsine 2002    Sleep apnea     no cpap    Wears glasses     CONTACS     Past Surgical History:   Procedure Laterality Date    BACK SURGERY  2016    back surgery    BONE GRAFT N/A 11/5/2020    Procedure: BONE GRAFT;  Surgeon: Mateusz Blanco MD;  Location: [facility] OR;  Service: Orthopedics;  Laterality: N/A;    CARDIAC SURGERY  01/06/2020    CABG X 7    CORONARY ARTERY BYPASS GRAFT      7 vessels    HERNIA REPAIR Bilateral 11/22/2017    LITHOTRIPSY      LUMBAR LAMINECTOMY WITH FUSION Bilateral 11/5/2020    Procedure: LAMINECTOMY, SPINE, LUMBAR, WITH FUSION;  Surgeon: Mateusz Blanco MD;  Location: [facility] OR;  Service: Orthopedics;  Laterality: Bilateral;  MEDTRONIC  NTI  L-3    PILONIDAL CYST DRAINAGE      removal of abcess      scrotal    REMOVAL OF HARDWARE FROM SPINE Bilateral 11/5/2020    Procedure: REMOVAL, HARDWARE, SPINE;  Surgeon: Mateusz ALBRIGHT  MD Bella;  [facility];  Service: Orthopedics;  Laterality: Bilateral;  L 4-5    TYMPANOSTOMY TUBE PLACEMENT       Review of patient's allergies indicates:   Allergen Reactions    Inapsine [droperidol] Anaphylaxis     seizures    Effexor [venlafaxine]      Increased anxiety    Pcn [penicillins]     Bactrim [sulfamethoxazole-trimethoprim] Rash     Dry red rash all over when in the sun    Fluconazole Rash     Pruritis       Medication List with Changes/Refills   Current Medications    ANASTROZOLE (ARIMIDEX) 1 MG TAB    Take 1 mg by mouth every 7 days.    ASPIRIN (ECOTRIN) 81 MG EC TABLET    Take 81 mg by mouth once daily.    DOXEPIN (SINEQUAN) 50 MG CAPSULE    TAKE 1 TO 2 CAPSULES BY MOUTH AT BEDTIME    ENTRESTO 24-26 MG PER TABLET    Take 1 tablet by mouth.    ENTRESTO 49-51 MG PER TABLET    Take 1 tablet by mouth 2 (two) times daily.    ESCITALOPRAM OXALATE (LEXAPRO) 10 MG TABLET    Take 10 mg by mouth every evening.    FUROSEMIDE (LASIX) 40 MG TABLET    Take 40 mg by mouth once daily.    HYDROXYZINE PAMOATE (VISTARIL) 50 MG CAP    Take 1 capsule (50 mg total) by mouth every 8 (eight) hours as needed.    JARDIANCE 25 MG TABLET    Take 25 mg by mouth Daily.    L-METHYLFOLATE FORTE 15-90.314 MG CAP    Take 1 capsule by mouth Daily.    METOPROLOL TARTRATE (LOPRESSOR) 25 MG TABLET    Take 50 mg by mouth 2 (two) times daily.    NALTREXONE MICROSPHERES (VIVITROL) 380 MG SSRR KIT    Inject 1 each (380 mg total) into the muscle every 30 days.    ONDANSETRON (ZOFRAN-ODT) 4 MG TBDL    Take 1 tablet (4 mg total) by mouth every 8 (eight) hours as needed (nausea, vomiting).    POTASSIUM CHLORIDE SA (K-DUR,KLOR-CON) 20 MEQ TABLET    Take 20 mEq by mouth once daily.    QUETIAPINE (SEROQUEL) 50 MG TABLET    Take 50 mg by mouth every evening.    ROSUVASTATIN (CRESTOR) 40 MG TAB    Take 40 mg by mouth once daily.    SEMAGLUTIDE (OZEMPIC) 0.25 MG OR 0.5 MG(2 MG/1.5 ML) PEN INJECTOR    Inject 0.25 mg every week by subcutaneous  route as directed for 30 days.    TESTOSTERONE CYPIONATE (DEPOTESTOTERONE CYPIONATE) 200 MG/ML INJECTION    Inject 200 mg into the muscle every 7 days.    TRAZODONE (DESYREL) 100 MG TABLET    Take 2 tablets (200 mg total) by mouth every evening.     Family History   Problem Relation Age of Onset    Heart disease Mother     Migraines Mother     Hypertension Mother     Early death Father     Heart disease Father     Diabetes Maternal Aunt     Diabetes Maternal Uncle     Diabetes Maternal Grandfather      Social History     Socioeconomic History    Marital status:    Occupational History    Occupation: disability   Tobacco Use    Smoking status: Former     Current packs/day: 0.00     Types: Cigarettes     Quit date: 2016     Years since quittin.2     Passive exposure: Past    Smokeless tobacco: Former     Quit date: 2016    Tobacco comments:     occasional   Substance and Sexual Activity    Alcohol use: Yes     Alcohol/week: 1.0 standard drink of alcohol     Types: 1 Glasses of wine per week     Comment: 20 - 30 shots vodka per day or 1-2 1/5ths per day for 2 years (started )    Drug use: No    Sexual activity: Yes     Partners: Female     Social Determinants of Health     Financial Resource Strain: Medium Risk (3/18/2024)    Overall Financial Resource Strain (CARDIA)     Difficulty of Paying Living Expenses: Somewhat hard   Food Insecurity: No Food Insecurity (3/18/2024)    Hunger Vital Sign     Worried About Running Out of Food in the Last Year: Never true     Ran Out of Food in the Last Year: Never true   Transportation Needs: No Transportation Needs (3/18/2024)    PRAPARE - Transportation     Lack of Transportation (Medical): No     Lack of Transportation (Non-Medical): No   Physical Activity: Insufficiently Active (3/18/2024)    Exercise Vital Sign     Days of Exercise per Week: 2 days     Minutes of Exercise per Session: 60 min   Stress: No Stress Concern Present (3/18/2024)    [employer]  [place] [employer] - Occupational Stress Questionnaire     Feeling of Stress : Only a little   Social Connections: Unknown (3/18/2024)    Social Connection and Isolation Panel [NHANES]     Frequency of Communication with Friends and Family: Twice a week     Frequency of Social Gatherings with Friends and Family: Never     Active Member of Clubs or Organizations: No     Attends Club or Organization Meetings: Never     Marital Status:    Housing Stability: Unknown (3/18/2024)    Housing Stability Vital Sign     Unable to Pay for Housing in the Last Year: No     Unstable Housing in the Last Year: No         Review of Systems   Constitutional:  Negative for appetite change, chills, fever and unexpected weight change.   HENT:  Negative for hearing loss, rhinorrhea, sore throat and voice change.    Eyes:  Negative for photophobia and visual disturbance.   Respiratory:  Negative for cough, choking and shortness of breath.    Cardiovascular:  Negative for chest pain, palpitations and leg swelling.   Gastrointestinal:  Negative for abdominal pain, blood in stool, constipation, diarrhea, nausea and vomiting.   Endocrine: Negative for cold intolerance, heat intolerance, polydipsia and polyuria.   Musculoskeletal:  Negative for arthralgias, back pain, joint swelling and neck stiffness.   Skin:  Negative for color change, pallor and rash.   Neurological:  Negative for dizziness, seizures, syncope and headaches.   Hematological:  Negative for adenopathy. Does not bruise/bleed easily.   Psychiatric/Behavioral:  Negative for agitation, behavioral problems and confusion.        Objective:      Physical Exam  Constitutional:       General: He is awake. He is not in acute distress.     Appearance: Normal appearance. He is well-developed. He is not toxic-appearing.   HENT:      Head: Normocephalic and atraumatic.   Pulmonary:      Effort: Pulmonary effort is normal. No tachypnea or bradypnea.   Abdominal:       General: There is no distension.      Palpations: Abdomen is soft.      Tenderness: There is no abdominal tenderness.   Musculoskeletal:      Cervical back: Neck supple.   Skin:            Comments: Healed pit above the gluteal cleft. The is also a small pore more inferiorly.    Neurological:      Mental Status: He is alert and oriented to person, place, and time.   Psychiatric:         Behavior: Behavior is cooperative.         Assessment/Plan:   Chronic recurrent pilonidal cyst  Comments:  refer to surgeon  Orders:  -     Ambulatory referral/consult to General Surgery  -     Case Request Operating Room: EXCISION, PILONIDAL CYST  -     Basic metabolic panel; Future; Expected date: 03/26/2024  -     CBC auto differential; Future; Expected date: 03/26/2024  -     EKG 12-lead; Future  -     X-Ray Chest PA And Lateral; Future; Expected date: 03/26/2024    Other orders  -     Vital signs; Standing  -     Insert peripheral IV; Standing  -     Diet NPO; Standing  -     IP VTE LOW RISK PATIENT; Standing  -     Place sequential compression device; Standing  -     cefazolin (ANCEF) 2 gram in dextrose 5% 50 mL IVPB (premix)  -     Pulse Oximetry Q4H; Standing  -     Full code; Standing  -     Place in Outpatient; Standing    Plan on re-excision of the pilonidal cyst in the operating room April 10th.  Risks and benefits of surgery discussed with the patient.  Will more than likely closed with a cleft lift flap approach.    I discussed the proposed procedures with the patient including risks, benefits, indications, alternatives and special concerns.  The patient appears to understand and agrees to go ahead with surgery.  I have made no promises, warranties or verbal agreements beyond what was discussed above.    No follow-ups on file.

## 2024-04-01 ENCOUNTER — PATIENT MESSAGE (OUTPATIENT)
Dept: FAMILY MEDICINE | Facility: CLINIC | Age: 49
End: 2024-04-01
Payer: MEDICAID

## 2024-04-01 RX ORDER — ESCITALOPRAM OXALATE 10 MG/1
10 TABLET ORAL NIGHTLY
Qty: 30 TABLET | Refills: 1 | Status: SHIPPED | OUTPATIENT
Start: 2024-04-01

## 2024-04-01 RX ORDER — ESCITALOPRAM OXALATE 10 MG/1
10 TABLET ORAL NIGHTLY
Qty: 30 TABLET | Refills: 1 | Status: SHIPPED | OUTPATIENT
Start: 2024-04-01 | End: 2024-04-01 | Stop reason: SDUPTHER

## 2024-04-02 ENCOUNTER — CLINICAL SUPPORT (OUTPATIENT)
Dept: REHABILITATION | Facility: HOSPITAL | Age: 49
End: 2024-04-02
Payer: MEDICAID

## 2024-04-02 DIAGNOSIS — Z73.89 PAIN SELF-MANAGEMENT DEFICIT: ICD-10-CM

## 2024-04-02 DIAGNOSIS — R29.898 WEAKNESS OF BACK: ICD-10-CM

## 2024-04-02 DIAGNOSIS — M25.60 RANGE OF MOTION DEFICIT: Primary | ICD-10-CM

## 2024-04-02 PROCEDURE — 97110 THERAPEUTIC EXERCISES: CPT | Mod: PN,CQ

## 2024-04-02 NOTE — PROGRESS NOTES
[facility]      Name: Lee Quintanilla  Clinic Number: [id number]    Therapy Diagnosis:   Encounter Diagnoses   Name Primary?    Range of motion deficit Yes    Pain self-management deficit     Weakness of back        Physician: Vincent Craig PA    Visit Date: 4/2/2024    Physician Orders: PT Eval and Treat  Medical Diagnosis from Referral:   Diagnosis   M47.816 (ICD-10-CM) - Facet arthritis of lumbar region   Z98.1 (ICD-10-CM) - History of lumbar spinal fusion   M51.36 (ICD-10-CM) - DDD (degenerative disc disease), lumbar      Evaluation Date: 1/18/2024  Authorization Period Expiration:     3/26/2024      Plan of Care Expiration: 03/26/2024   Progress Note Due: 2/18/2024  Visit # / Visits authorized: 8 /10  FOTO: 1/ 3    PTA Visit #: 1/5     Time In:  1400  Time Out: 1453  Total Billable Time: 53  minutes  INSURANCE and OUTCOMES: Fee for Service with FOTO Outcomes 1/3    Precautions: standard and HTN standard and previous lumbar fusion, previous open heart surgery (none recent)     Pattern of pain determined: 1 PEP    Subjective   Lee Quintanilla he is having increased back pain today.    Patient reports he has been very sore since last treatment and has not done his exercises at home.  Patient reports their pain to be 7/10 on a 0-10 scale with 0 being no pain and 10 being the worst pain imaginable.  Pain Location: midline to L side      Work and leisure: not working currently, leisure - not specified  Pt goals: to have less pain     Objective   Isometric Testing on Med X equipment: Testing administered by PT     Test Initial Baseline Midpoint Final   Date 02-       ROM 0-30 deg       Max Peak Torque 60        Min Peak Torque 19        Flex/Ext Ratio ~ 3 / 1       % below normative data 79       % gain from initial test Not available       Outcomes:  Intake Score: 28%  Visit 6 Score: 31/100  Visit 10 Score:   Discharge Score:  Goal Score: 42%      Treatment    Lee received the treatments listed below:        +++ALL charges filed per Medicaid Guidelines+++    Therapeutic exercises 53 minutes    Lee received neuromuscular education via participation on the Medical MedX Machine. Therapist assisted patient in isolating and engaging spinal stabilization musculature in order to improve functional ability and postural control. Patient performed exercise with therapist guidance in order to accurately use pacer function, avoid valsalva, and optimally exert effort within a safe and effective range via the Tez Exertion Rating Scale. Patient instructed to perform at a midrange of exertion and to complete 15-20 repetitions within appropriate split time, with proper technique, and while maintaining safety.            4/2/2024     2:50 PM   [facility]   Visit Number 7   VAS Pain Rating 7   Treadmill Time (in min.) 3 min   Speed 2.1 mph   Extension in Lying 10   Extension in Standing 20   Lumbar Weight 55 lbs   Repetitions 20   Rating of Perceived Exertion 3          Lee participated in therapeutic exercises to develop strength, endurance, ROM, flexibility, posture, and core stabilization for including:    Standing lumbar extension x 20 reps   Prone on elbows 2'  Prone press up 2 x 10 repetitions (minimal pain/stayed about the same throughout exercises)    Prone press up 10 , no change on standing baselines better with prone on elbows   Prone press up 10 repetitions prior to medx machine end of tx session     Sidelying external rotation green theraband 2 x 10 each side   Sidelying open books x 15 repetitions each side   Bridges 2 x 10 reps    Peripheral muscle strengthening which included one set of 15-20 repetitions at a slow and controlled 10-13 second per rep pace focused on strengthening supporting musculature in order to improve body mechanics and functional mobility.  Patient and therapist focused on proper form during treatment to ensure optimal  strengthening of each targeted muscle group.  Machines utilized include torso rotation, chest press, triceps extension, bicep curl, and upright row. Leg extension, leg curl, leg press, and hip adduction/abduction     Not Performed:   bird/dog 2 x 5 repetitions each side   Standing hip extension flexed over mat to improve glut activation      Home Exercises Provided and Patient Education Provided    Home exercises include:     Standing extensions ( 3/5/2024)    Cardio program (V5): -  Lifting education (V11): -  Posture/ Using Lumbar Roll: educated  Fridge Magnet Discharge handout (date given): -  Equipment at home/gym membership: none    Education provided:   - PT role and Plan of Care   - Home exercise program       Written Home Exercises Provided: Patient instructed to cont prior HEP.  Exercises were reviewed and Lee was able to demonstrate them prior to the end of the session. Lee demonstrated good  understanding of the education provided.     See EMR under Patient Instructions for exercises provided prior visit.    Assessment     Patient continues to present to PT with increased pain in his back.  He did not have ant difficulties with exercises her in PT today.    Patient is making fair progress towards established goals.  Pt will continue to benefit from skilled outpatient physical therapy to address the deficits stated in the impairment chart, provide pt/family education and to maximize pt's level of independence in the home and community environment.     Anticipated Barriers for therapy: co morbidities, history of lumbar fusions   Pt's spiritual, cultural and educational needs considered and pt agreeable to plan of care and goals as stated below:     Goals:    Short term goals:  6 weeks or 10 visits   - Pt will demonstrate increased lumbar MedX ROM by at least 3 degrees from the initial ROM value with improvements noted in functional ROM and ability to perform ADLs. Appropriate and Ongoing  - Pt will  demonstrate increased MedX average isometric strength value by 10% from initial test resulting in improved ability to perform bending, lifting, and carrying activities safely, confidently. Appropriate and Ongoing  - Pt will report a reduction in worst pain score by 1-2 points for improved tolerance for transition from sit <-> stand. Appropriate and Ongoing  - Pt able to perform HEP correctly with minimal cueing or supervision from therapist to encourage independent management of symptoms. Appropriate and Ongoing     Long term goals: 10 weeks or 20 visits   - Pt will demonstrate increased lumbar MedX ROM by at least 6 degrees from initial ROM value, resulting in improved ability to perform functional forward bending while standing and sitting. Appropriate and Ongoing  - Pt will demonstrate increased MedX average isometric strength value by 40% from initial test resulting in improved ability to perform bending, lifting, and carrying activities safely and confidently. Appropriate and Ongoing  - Pt to demonstrate ability to independently control and reduce their pain through posture positioning and mechanical movements throughout a typical day. Appropriate and Ongoing  - Pt will demonstrate reduced pain and improved functional outcomes as reported on the FOTO by reaching an intake score of >/= 40% functional ability in order to demonstrate subjective improvement in patient's condition. . Appropriate and Ongoing  - Pt will demonstrate independence with the HEP at discharge. Appropriate and Ongoing  - Patient to report improved sleep hygiene 2' decreased low back pain (patient goal) Appropriate and Ongoing     Plan   Continue with established Plan of Care towards established PT goals.     Arlyn Lopes, PTA  4/2/2024

## 2024-04-03 ENCOUNTER — HOSPITAL ENCOUNTER (OUTPATIENT)
Dept: RADIOLOGY | Facility: HOSPITAL | Age: 49
Discharge: HOME | End: 2024-04-03
Attending: SURGERY
Payer: MEDICAID

## 2024-04-03 ENCOUNTER — HOSPITAL ENCOUNTER (OUTPATIENT)
Dept: PREADMISSION TESTING | Facility: HOSPITAL | Age: 49
Discharge: HOME | End: 2024-04-03
Attending: SURGERY
Payer: MEDICAID

## 2024-04-03 VITALS
DIASTOLIC BLOOD PRESSURE: 74 MMHG | WEIGHT: 225 LBS | TEMPERATURE: 98 F | RESPIRATION RATE: 14 BRPM | SYSTOLIC BLOOD PRESSURE: 133 MMHG | HEART RATE: 84 BPM | OXYGEN SATURATION: 98 % | BODY MASS INDEX: 35.31 KG/M2 | HEIGHT: 67 IN

## 2024-04-03 DIAGNOSIS — L05.91 CHRONIC RECURRENT PILONIDAL CYST: ICD-10-CM

## 2024-04-03 PROCEDURE — 71046 X-RAY EXAM CHEST 2 VIEWS: CPT | Mod: TC

## 2024-04-03 RX ORDER — CLOPIDOGREL BISULFATE 75 MG/1
75 TABLET ORAL DAILY
COMMUNITY

## 2024-04-03 NOTE — DISCHARGE INSTRUCTIONS
INSTRUCTIONS  To confirm your doctor has scheduled your surgery for:    COVID TESTING SCHEDULED:       Morning of surgery please check in with registration near Parking Garage Entrance then proceed to Outpatient Surgery Department.    Preop nurses will call the afternoon prior to surgery between 4:00 and 6:00 PM with your final arrival time.  PLEASE NOTE:  The surgery schedule has many variables which may affect the time of your surgery case. Family members should be available if your surgery time changes. Plan to be here the day of your procedure between 4-6 hours.    TAKE ONLY THESE MEDICATIONS WITH A SMALL SIP OF WATER THE MORNING OF SURGERY: see list    DO NOT TAKE THESE MEDICATIONS 5-7 DAYS PRIOR to your procedure per your surgeon's request: ASPIRIN, ALEVE, BC powder, HODAN SELTZER, IBUPROFEN, FISH OIL, VITAMIN E, OR HERBALS   (May take Tylenol)    If you are prescribed any types of blood thinners (Aspirin, Coumadin, Plavix, Pradaxa, Xarelto, Aggrenox, Effient, Eliquis, Savasya, Brilinta or any other), please ask your surgeon how many days before scheduled procedure should you stop taking them. You may also need to verify with prescribing physician if it is OK to stop your blood thinners.      INSTRUCTIONS IMPORTANT!!  Do not eat or drink anything after midnight.  ONLY if you are diabetic, check your sugar in the morning before your procedure.  Do not smoke, vape or drink alcoholic beverages 24 hours prior to your procedure.  Shower the night before AND the morning of your procedure with a Chlorhexidine wash such as hibiclens or Dial antibacterial soap from neck down. You may use your own shampoo and face wash. This helps your skin to be as bacteria free as possible.  If you wear contact lenses, dentures, hearing aids or glasses, bring a container to put them in and give to a family member.    Please leave all jewelry, piercings and valuables at home.  DO NOT remove hair from the surgery site.   If your  condition changes such as fever, cough, etc, please notify your surgeon.   ONLY if you have been diagnosed with sleep apnea please bring your C-PAP machine.  ONLY if you wear home oxygen please bring your portable oxygen tank the day of your procedure.   ONLY for patients requiring bowel prep, written instructions will be given by your doctor's office.  ONLY if you have a neuro stimulator, please bring the controller with you the morning of surgery  Make arrangements in advance for transportation home by a responsible adult.  You must make arrangements for transportation, TAXI'S, UBER'S OR LYFTS ARE NOT ALLOWED.        If you have any questions about these instructions, call Pre-Op Admit  Nursing at [phone] or the Pre-Op Day Surgery Unit at [phone].

## 2024-04-04 ENCOUNTER — PATIENT MESSAGE (OUTPATIENT)
Dept: SURGERY | Facility: CLINIC | Age: 49
End: 2024-04-04
Payer: MEDICAID

## 2024-04-05 ENCOUNTER — PATIENT MESSAGE (OUTPATIENT)
Dept: OTOLARYNGOLOGY | Facility: CLINIC | Age: 49
End: 2024-04-05
Payer: MEDICAID

## 2024-04-05 ENCOUNTER — CLINICAL SUPPORT (OUTPATIENT)
Dept: REHABILITATION | Facility: HOSPITAL | Age: 49
End: 2024-04-05
Payer: MEDICAID

## 2024-04-05 DIAGNOSIS — M25.60 RANGE OF MOTION DEFICIT: Primary | ICD-10-CM

## 2024-04-05 DIAGNOSIS — R29.898 WEAKNESS OF BACK: ICD-10-CM

## 2024-04-05 DIAGNOSIS — Z73.89 PAIN SELF-MANAGEMENT DEFICIT: ICD-10-CM

## 2024-04-05 PROCEDURE — 97110 THERAPEUTIC EXERCISES: CPT | Mod: PN

## 2024-04-05 NOTE — PROGRESS NOTES
[facility]      Name: Lee Quintanilla  Clinic Number: [id number]    Therapy Diagnosis:   Encounter Diagnoses   Name Primary?    Range of motion deficit Yes    Pain self-management deficit     Weakness of back      Physician: Vincent Craig PA    Visit Date: 4/5/2024    Physician Orders: PT Eval and Treat  Medical Diagnosis from Referral:   Diagnosis   M47.816 (ICD-10-CM) - Facet arthritis of lumbar region   Z98.1 (ICD-10-CM) - History of lumbar spinal fusion   M51.36 (ICD-10-CM) - DDD (degenerative disc disease), lumbar      Evaluation Date: 1/18/2024  Authorization Period Expiration:     3/26/2024      Plan of Care Expiration: 03/26/2024   Progress Note Due: 2/18/2024  Visit # / Visits authorized: 9 /10  FOTO: 1/ 3    PTA Visit #: 0/5     Time In:  1515  Time Out: 1608  Total Billable Time: 53 minutes  INSURANCE and OUTCOMES: Fee for Service with FOTO Outcomes 1/3    Precautions: standard and HTN standard and previous lumbar fusion, previous open heart surgery (none recent)     Pattern of pain determined: 1 PEP    Subjective   Lee Quintanilla 7/10 back pain upon arrival. Continues to report poor compliance with HEP. No change in back pain. Denies worsening in pain.     Patient reports he has been very sore since last treatment and has not done his exercises at home.  Patient reports their pain to be 7/10 on a 0-10 scale with 0 being no pain and 10 being the worst pain imaginable.  Pain Location: midline to L side      Work and leisure: not working currently, leisure - not specified  Pt goals: to have less pain     Objective   Isometric Testing on Med X equipment: Testing administered by PT     Test Initial Baseline Midpoint Final   Date 02-       ROM 0-30 deg       Max Peak Torque 60        Min Peak Torque 19        Flex/Ext Ratio ~ 3 / 1       % below normative data 79       % gain from initial test Not available       Outcomes:  Intake Score:  28%  Visit 6 Score: 31/100  Visit 10 Score:   Discharge Score:  Goal Score: 42%     Treatment    Lee received the treatments listed below:      +++ALL charges filed per Medicaid Guidelines+++    Lee received neuromuscular education via participation on the [employer] Machine x 8 minutes. Therapist assisted patient in isolating and engaging spinal stabilization musculature in order to improve functional ability and postural control. Patient performed exercise with therapist guidance in order to accurately use pacer function, avoid valsalva, and optimally exert effort within a safe and effective range via the Tez Exertion Rating Scale. Patient instructed to perform at a midrange of exertion and to complete 15-20 repetitions within appropriate split time, with proper technique, and while maintaining safety.         4/5/2024     4:06 PM   [facility]   Visit Number 8   VAS Pain Rating 7   Treadmill Time (in min.) 3 min   Speed 2 mph   Lumbar Flexion 42   Lumbar Extension 0   Lumbar Weight 60 lbs   Repetitions 20   Rating of Perceived Exertion 3     Lee participated in therapeutic exercises to develop strength, endurance, ROM, flexibility, posture, and core stabilization for including x 42 minutes     Prone press up 10 , no change in L hip pain with L SG in standing   +Standing repeated side glide on wall, closing down the L side, 10 repetitions, decrease better with L SG in standing   Prone press up 10 repetitions    +Standing repeated side glide on wall, closing down the L side, 10 repetitions, decrease better L side low back pain with standing L SG    Sidelying external rotation green theraband 2 x 10 each side - NP  Sidelying open books x 15 repetitions each side   Bridges 2 x 10 repetitions with blue abduction theraband   + Bird/dog 2 x 5 repetitions each side 3 second for balance each way     Peripheral muscle strengthening which included one set of 15-20 repetitions at a slow and controlled 10-13  second per rep pace focused on strengthening supporting musculature in order to improve body mechanics and functional mobility.  Patient and therapist focused on proper form during treatment to ensure optimal strengthening of each targeted muscle group.  Machines utilized include torso rotation, chest press, triceps extension, bicep curl, and upright row. Leg extension, leg curl, leg press, and hip adduction/abduction     Manual: x 3 minutes    Lateral hip mob grade 3-4 LLE    Long axis hip distraction LLE only       Not Performed:   bird/dog 2 x 5 repetitions each side   Standing hip extension flexed over mat to improve glut activation      Home Exercises Provided and Patient Education Provided    Home exercises include:     Standing extensions or prone press ups    Cardio program (V5): -  Lifting education (V11): -  Posture/ Using Lumbar Roll: educated  Fridge Magnet Discharge handout (date given): -  Equipment at home/gym membership: none    Education provided:   - PT role and Plan of Care   - Home exercise program     Written Home Exercises Provided: Patient instructed to cont prior HEP.  Exercises were reviewed and Lee was able to demonstrate them prior to the end of the session. Lee demonstrated good  understanding of the education provided.     See EMR under Patient Instructions for exercises provided prior visit.    Assessment     Continues to arrive with high level of pain. Some improvement reported following prone press ups but better relief on the L side following repeated SG on the wall. Minimal change with manual therapy. He tolerated all other exercises well without complaints of pain. He continues to demonstrate significant lumbar stiffness. He reports fair to poor compliance with HEP limiting progress in therapy.     Patient is making fair progress towards established goals.  Pt will continue to benefit from skilled outpatient physical therapy to address the deficits stated in the impairment  chart, provide pt/family education and to maximize pt's level of independence in the home and community environment.     Anticipated Barriers for therapy: co morbidities, history of lumbar fusions   Pt's spiritual, cultural and educational needs considered and pt agreeable to plan of care and goals as stated below:     Goals:    Short term goals:  6 weeks or 10 visits   - Pt will demonstrate increased lumbar MedX ROM by at least 3 degrees from the initial ROM value with improvements noted in functional ROM and ability to perform ADLs. Appropriate and Ongoing  - Pt will demonstrate increased MedX average isometric strength value by 10% from initial test resulting in improved ability to perform bending, lifting, and carrying activities safely, confidently. Appropriate and Ongoing  - Pt will report a reduction in worst pain score by 1-2 points for improved tolerance for transition from sit <-> stand. Appropriate and Ongoing  - Pt able to perform HEP correctly with minimal cueing or supervision from therapist to encourage independent management of symptoms. Appropriate and Ongoing     Long term goals: 10 weeks or 20 visits   - Pt will demonstrate increased lumbar MedX ROM by at least 6 degrees from initial ROM value, resulting in improved ability to perform functional forward bending while standing and sitting. Appropriate and Ongoing  - Pt will demonstrate increased MedX average isometric strength value by 40% from initial test resulting in improved ability to perform bending, lifting, and carrying activities safely and confidently. Appropriate and Ongoing  - Pt to demonstrate ability to independently control and reduce their pain through posture positioning and mechanical movements throughout a typical day. Appropriate and Ongoing  - Pt will demonstrate reduced pain and improved functional outcomes as reported on the FOTO by reaching an intake score of >/= 40% functional ability in order to demonstrate subjective  improvement in patient's condition. . Appropriate and Ongoing  - Pt will demonstrate independence with the HEP at discharge. Appropriate and Ongoing  - Patient to report improved sleep hygiene 2' decreased low back pain (patient goal) Appropriate and Ongoing     Plan   Continue with established Plan of Care towards established PT goals.     Belén Story, PT  4/5/2024

## 2024-04-08 NOTE — TELEPHONE ENCOUNTER
Left message on voicemail for pt to call back. Need to let pt know that we are not currently accepting adult Medicaid. Phone staff was able to schedule appt due to Work Comp pulling up as primary. Need to apologize and cancel appt. Will send my [facility] message as well. Thanks, Ellyn

## 2024-04-09 ENCOUNTER — CLINICAL SUPPORT (OUTPATIENT)
Dept: REHABILITATION | Facility: HOSPITAL | Age: 49
End: 2024-04-09
Payer: MEDICAID

## 2024-04-09 DIAGNOSIS — R29.898 WEAKNESS OF BACK: ICD-10-CM

## 2024-04-09 DIAGNOSIS — M25.60 RANGE OF MOTION DEFICIT: Primary | ICD-10-CM

## 2024-04-09 DIAGNOSIS — Z73.89 PAIN SELF-MANAGEMENT DEFICIT: ICD-10-CM

## 2024-04-09 PROCEDURE — 97110 THERAPEUTIC EXERCISES: CPT | Mod: PN,CQ

## 2024-04-09 NOTE — PROGRESS NOTES
[facility]      Name: Lee Quintanilla  Clinic Number: [id number]    Therapy Diagnosis:   Encounter Diagnoses   Name Primary?    Range of motion deficit Yes    Pain self-management deficit     Weakness of back      Physician: Vincent Craig PA    Visit Date: 4/9/2024    Physician Orders: PT Eval and Treat  Medical Diagnosis from Referral:   Diagnosis   M47.816 (ICD-10-CM) - Facet arthritis of lumbar region   Z98.1 (ICD-10-CM) - History of lumbar spinal fusion   M51.36 (ICD-10-CM) - DDD (degenerative disc disease), lumbar      Evaluation Date: 1/18/2024  Authorization Period Expiration: 05/25/2024   Plan of Care Expiration: 03/26/2024   Progress Note Due: 2/18/2024  Visit # / Visits authorized: 9 /12  FOTO: 1/ 3    PTA Visit #: 1/5     Time In:  1500  Time Out: 1545  Total Billable Time: 45 minutes  INSURANCE and OUTCOMES: Fee for Service with FOTO Outcomes 1/3    Precautions: standard and HTN standard and previous lumbar fusion, previous open heart surgery (none recent)     Pattern of pain determined: 1 PEP    Subjective   Lee Quintanilla he is feeling ok.  Pt stated he is having back pain today    Patient reports he has been very sore since last treatment and has not done his exercises at home.  Patient reports their pain to be 7/10 on a 0-10 scale with 0 being no pain and 10 being the worst pain imaginable.  Pain Location: midline to L side      Work and leisure: not working currently, leisure - not specified  Pt goals: to have less pain     Objective   Isometric Testing on Med X equipment: Testing administered by PT     Test Initial Baseline Midpoint Final   Date 02-       ROM 0-30 deg       Max Peak Torque 60        Min Peak Torque 19        Flex/Ext Ratio ~ 3 / 1       % below normative data 79       % gain from initial test Not available       Outcomes:  Intake Score: 28%  Visit 6 Score: 31/100  Visit 10 Score:   Discharge Score:  Goal Score: 42%      Treatment    Lee received the treatments listed below:      +++ALL charges filed per Medicaid Guidelines+++  Therapeutic exercises x 45 minutes    Lee received neuromuscular education via participation on the [employer] Machine. Therapist assisted patient in isolating and engaging spinal stabilization musculature in order to improve functional ability and postural control. Patient performed exercise with therapist guidance in order to accurately use pacer function, avoid valsalva, and optimally exert effort within a safe and effective range via the Tez Exertion Rating Scale. Patient instructed to perform at a midrange of exertion and to complete 15-20 repetitions within appropriate split time, with proper technique, and while maintaining safety.       Lee participated in therapeutic exercises to develop strength, endurance, ROM, flexibility, posture, and core stabilization for including    Prone press up x 10 reps (pain stayed the same)  Standing repeated side glide on wall, closing down the Left  side, 10 repetitions, decrease better with Left  Side Glide in standing   Prone press up x 10 repetitions    Standing repeated side glide on wall, closing down the Left  side, x 10 repetitions, decrease better Left  side low back pain with standing Left Slide Glide    Sidelying external rotation blue theraband 2 x 10 each side   Sidelying open books x 15 repetitions each side   Bridges 2 x 10 repetitions with blue abduction theraband   Bird/dog 2 x 5 repetitions each side 3 second for balance each way     Peripheral muscle strengthening which included one set of 15-20 repetitions at a slow and controlled 10-13 second per rep pace focused on strengthening supporting musculature in order to improve body mechanics and functional mobility.  Patient and therapist focused on proper form during treatment to ensure optimal strengthening of each targeted muscle group.  Machines utilized include torso rotation, chest  press, triceps extension, bicep curl, and upright row. Leg extension, leg curl, leg press, and hip adduction/abduction       Not Performed:   bird/dog 2 x 5 repetitions each side   Standing hip extension flexed over mat to improve glut activation      Home Exercises Provided and Patient Education Provided    Home exercises include:     Standing extensions or prone press ups    Cardio program (V5): -  Lifting education (V11): -  Posture/ Using Lumbar Roll: educated  Fridge Magnet Discharge handout (date given): -  Equipment at home/gym membership: none    Education provided:   - PT role and Plan of Care   - Home exercise program     Written Home Exercises Provided: Patient instructed to cont prior HEP.  Exercises were reviewed and Lee was able to demonstrate them prior to the end of the session. Lee demonstrated good  understanding of the education provided.     See EMR under Patient Instructions for exercises provided prior visit.    Assessment     Patient continues to report to PT with high pain levels.  He states he has not been doing his exercises at home.  Pt was encouraged to do the exercises at home daily.      Patient is making fair progress towards established goals.  Pt will continue to benefit from skilled outpatient physical therapy to address the deficits stated in the impairment chart, provide pt/family education and to maximize pt's level of independence in the home and community environment.     Anticipated Barriers for therapy: co morbidities, history of lumbar fusions   Pt's spiritual, cultural and educational needs considered and pt agreeable to plan of care and goals as stated below:     Goals:    Short term goals:  6 weeks or 10 visits   - Pt will demonstrate increased lumbar MedX ROM by at least 3 degrees from the initial ROM value with improvements noted in functional ROM and ability to perform ADLs. Appropriate and Ongoing  - Pt will demonstrate increased MedX average isometric strength  value by 10% from initial test resulting in improved ability to perform bending, lifting, and carrying activities safely, confidently. Appropriate and Ongoing  - Pt will report a reduction in worst pain score by 1-2 points for improved tolerance for transition from sit <-> stand. Appropriate and Ongoing  - Pt able to perform HEP correctly with minimal cueing or supervision from therapist to encourage independent management of symptoms. Appropriate and Ongoing     Long term goals: 10 weeks or 20 visits   - Pt will demonstrate increased lumbar MedX ROM by at least 6 degrees from initial ROM value, resulting in improved ability to perform functional forward bending while standing and sitting. Appropriate and Ongoing  - Pt will demonstrate increased MedX average isometric strength value by 40% from initial test resulting in improved ability to perform bending, lifting, and carrying activities safely and confidently. Appropriate and Ongoing  - Pt to demonstrate ability to independently control and reduce their pain through posture positioning and mechanical movements throughout a typical day. Appropriate and Ongoing  - Pt will demonstrate reduced pain and improved functional outcomes as reported on the FOTO by reaching an intake score of >/= 40% functional ability in order to demonstrate subjective improvement in patient's condition. . Appropriate and Ongoing  - Pt will demonstrate independence with the HEP at discharge. Appropriate and Ongoing  - Patient to report improved sleep hygiene 2' decreased low back pain (patient goal) Appropriate and Ongoing     Plan   Continue with established Plan of Care towards established PT goals.     Arlyn Lopes, PTA  4/9/2024

## 2024-04-12 ENCOUNTER — CLINICAL SUPPORT (OUTPATIENT)
Dept: REHABILITATION | Facility: HOSPITAL | Age: 49
End: 2024-04-12
Payer: MEDICAID

## 2024-04-12 DIAGNOSIS — R29.898 WEAKNESS OF BACK: ICD-10-CM

## 2024-04-12 DIAGNOSIS — M25.60 RANGE OF MOTION DEFICIT: Primary | ICD-10-CM

## 2024-04-12 DIAGNOSIS — Z73.89 PAIN SELF-MANAGEMENT DEFICIT: ICD-10-CM

## 2024-04-12 PROCEDURE — 97110 THERAPEUTIC EXERCISES: CPT | Mod: PN

## 2024-04-12 NOTE — PROGRESS NOTES
[facility]      Name: Lee Quintanilla  Clinic Number: [id number]    Therapy Diagnosis:   Encounter Diagnoses   Name Primary?    Range of motion deficit Yes    Pain self-management deficit     Weakness of back      Physician: Vincent Craig PA    Visit Date: 4/12/2024    Physician Orders: PT Eval and Treat  Medical Diagnosis from Referral:   Diagnosis   M47.816 (ICD-10-CM) - Facet arthritis of lumbar region   Z98.1 (ICD-10-CM) - History of lumbar spinal fusion   M51.36 (ICD-10-CM) - DDD (degenerative disc disease), lumbar      Evaluation Date: 1/18/2024  Authorization Period Expiration: 05/25/2024   Plan of Care Expiration: 03/26/2024   Progress Note Due: 2/18/2024  Visit # / Visits authorized: 10 /12  FOTO: 1/ 3    PTA Visit #: 0/5     Time In:  1050   Time Out: 1200   Total Billable Time: 70 minutes  INSURANCE and OUTCOMES: Fee for Service with FOTO Outcomes 1/3    Precautions: standard and HTN standard and previous lumbar fusion, previous open heart surgery (none recent)     Pattern of pain determined: 1 PEP    Subjective   Lee Quintanilla arrives with bilateral 7/10 back pain. Continues to report non compliance with HEP. Surgery on the 15th for cyst removal. He will be out for two weeks.     Patient reports he has been very sore since last treatment and has not done his exercises at home.  Patient reports their pain to be 7/10 on a 0-10 scale with 0 being no pain and 10 being the worst pain imaginable.  Pain Location: midline to L side      Work and leisure: not working currently, leisure - not specified  Pt goals: to have less pain     Objective   Isometric Testing on Med X equipment: Testing administered by PT     Test Initial Baseline Midpoint Final   Date 02- 4/12/2024     ROM 0-30 deg  0-36     Max Peak Torque 60   135     Min Peak Torque 19   17     Flex/Ext Ratio ~ 3 / 1  8     % below normative data 79  57     % gain from initial test Not  available 68      Outcomes:  Intake Score: 28%  Visit 6 Score: 31/100  Visit 10 Score: 26/100   Discharge Score:   Goal Score: 42%     Treatment    Lee received the treatments listed below:      +++ALL charges filed per Medicaid Guidelines+++    Lee received neuromuscular education via participation on the bead Button Machine. Therapist assisted patient in isolating and engaging spinal stabilization musculature in order to improve functional ability and postural control. Patient performed exercise with therapist guidance in order to accurately use pacer function, avoid valsalva, and optimally exert effort within a safe and effective range via the Tez Exertion Rating Scale. Patient instructed to perform at a midrange of exertion and to complete 15-20 repetitions within appropriate split time, with proper technique, and while maintaining safety. X 12 minutes         4/12/2024    11:47 AM   [facility]   Visit Number 10   VAS Pain Rating 7   Treadmill Time (in min.) 3 min   Speed 2 mph   Lumbar Flexion 36   Lumbar Extension 0   Lumbar Peak Torque 135 ft. lbs.   Min Torque 17   Test Percent Below Normative Data 57 %   Test Percent Gain in Strength from Initial  68 %     Lee participated in therapeutic exercises to develop strength, endurance, ROM, flexibility, posture, and core stabilization for including x ** minutes     Repeated movements:     Baselines: severe limitations in all direction   More limited with extension and L SG   L sided back pain with L SG and extension     Prone press ups 10 repetitions, worse and increased tightness reported with extension   Standing L Sg on wall , no effect     Stabilization:   Bridges 2 x 10 repetitions with blue abduction theraband   + abdominal isometric crunch 15 x 5 second   + PPT 10 x 5 second  + PPT with march 10 repetitions each side   + standing palloff press black theraband 20 repetitions each side   + cat/cow 10 repetitions each way   Bird/dog 2 x 5  repetitions each side x 3 second hold, CGA for balance     Peripheral muscle strengthening which included one set of 15-20 repetitions at a slow and controlled 10-13 second per rep pace focused on strengthening supporting musculature in order to improve body mechanics and functional mobility.  Patient and therapist focused on proper form during treatment to ensure optimal strengthening of each targeted muscle group.  Machines utilized include torso rotation, chest press, triceps extension, bicep curl, and upright row. Leg extension, leg curl, leg press, and hip adduction/abduction     NP:  Sidelying external rotation blue theraband 2 x 10 each side   Sidelying open books x 15 repetitions each side  Standing hip extension flexed over mat to improve glut activation      Home Exercises Provided and Patient Education Provided    Home exercises include:     Standing extensions or prone press ups    Cardio program (V5): -  Lifting education (V11): -  Posture/ Using Lumbar Roll: educated  Fridge Magnet Discharge handout (date given): -  Equipment at home/gym membership: none    Education provided:   - PT role and Plan of Care   - Home exercise program     Written Home Exercises Provided: Patient instructed to cont prior HEP.  Exercises were reviewed and Lee was able to demonstrate them prior to the end of the session. Lee demonstrated good  understanding of the education provided.     See EMR under Patient Instructions for exercises provided prior visit.    Assessment     Continues to arrive with high level of pain with non compliance with HEP. Started to focus on more stabilization verse directional specific movements as he continues to demonstrate no clear directional preference. He tolerated this fairly well but reported pain throughout tx session with these low level exercises which require minimal movement of the lumbar spine. Medx retesting performed today demonstrating 6 deg improvement in range of motion and  68% increase in strength overall. He continues to demonstrate weakness in most extended positions at 12 and 0 deg especially. FOTO score demonstrates slight decline since last assessment and compared to evaluation.  Overall he has met his range of motion and strength goals since evaluation.     Patient is making fair progress towards established goals.  Pt will continue to benefit from skilled outpatient physical therapy to address the deficits stated in the impairment chart, provide pt/family education and to maximize pt's level of independence in the home and community environment.     Anticipated Barriers for therapy: co morbidities, history of lumbar fusions   Pt's spiritual, cultural and educational needs considered and pt agreeable to plan of care and goals as stated below:     Goals:    Short term goals:  6 weeks or 10 visits   - Pt will demonstrate increased lumbar MedX ROM by at least 3 degrees from the initial ROM value with improvements noted in functional ROM and ability to perform ADLs. MET 4/12/2024  - Pt will demonstrate increased MedX average isometric strength value by 10% from initial test resulting in improved ability to perform bending, lifting, and carrying activities safely, confidently. MET 4/12/2024  - Pt will report a reduction in worst pain score by 1-2 points for improved tolerance for transition from sit <-> stand. Appropriate and Ongoing  - Pt able to perform HEP correctly with minimal cueing or supervision from therapist to encourage independent management of symptoms. Appropriate and Ongoing     Long term goals: 10 weeks or 20 visits   - Pt will demonstrate increased lumbar MedX ROM by at least 6 degrees from initial ROM value, resulting in improved ability to perform functional forward bending while standing and sitting. MET 4/12/2024  - Pt will demonstrate increased MedX average isometric strength value by 40% from initial test resulting in improved ability to perform bending,  lifting, and carrying activities safely and confidently. MET 4/12/2024  - Pt to demonstrate ability to independently control and reduce their pain through posture positioning and mechanical movements throughout a typical day. Appropriate and Ongoing  - Pt will demonstrate reduced pain and improved functional outcomes as reported on the FOTO by reaching an intake score of >/= 40% functional ability in order to demonstrate subjective improvement in patient's condition. . Appropriate and Ongoing  - Pt will demonstrate independence with the HEP at discharge. Appropriate and Ongoing  - Patient to report improved sleep hygiene 2' decreased low back pain (patient goal) Appropriate and Ongoing     Plan   Continue with established Plan of Care towards established PT goals.     Belén Story, PT  4/12/2024

## 2024-04-15 ENCOUNTER — HOSPITAL ENCOUNTER (OUTPATIENT)
Facility: HOSPITAL | Age: 49
Discharge: HOME | End: 2024-04-15
Attending: SURGERY | Admitting: SURGERY
Payer: MEDICAID

## 2024-04-15 ENCOUNTER — ANESTHESIA (OUTPATIENT)
Dept: SURGERY | Facility: HOSPITAL | Age: 49
End: 2024-04-15
Payer: MEDICAID

## 2024-04-15 ENCOUNTER — ANESTHESIA EVENT (OUTPATIENT)
Dept: SURGERY | Facility: HOSPITAL | Age: 49
End: 2024-04-15
Payer: MEDICAID

## 2024-04-15 VITALS
TEMPERATURE: 98 F | SYSTOLIC BLOOD PRESSURE: 115 MMHG | RESPIRATION RATE: 16 BRPM | DIASTOLIC BLOOD PRESSURE: 71 MMHG | OXYGEN SATURATION: 98 % | WEIGHT: 225 LBS | BODY MASS INDEX: 35.31 KG/M2 | HEART RATE: 78 BPM | HEIGHT: 67 IN

## 2024-04-15 DIAGNOSIS — L05.91 CHRONIC RECURRENT PILONIDAL CYST: Primary | ICD-10-CM

## 2024-04-15 LAB — GLUCOSE SERPL-MCNC: 93 MG/DL (ref 70–110)

## 2024-04-15 PROCEDURE — 36000706: Performed by: SURGERY

## 2024-04-15 PROCEDURE — 71000039 HC RECOVERY, EACH ADD'L HOUR: Performed by: SURGERY

## 2024-04-15 PROCEDURE — C1729 CATH, DRAINAGE: HCPCS | Performed by: SURGERY

## 2024-04-15 PROCEDURE — D9220A PRA ANESTHESIA: Mod: CRNA,,, | Performed by: NURSE ANESTHETIST, CERTIFIED REGISTERED

## 2024-04-15 PROCEDURE — 25000003 PHARM REV CODE 250: Performed by: ANESTHESIOLOGY

## 2024-04-15 PROCEDURE — 63600175 PHARM REV CODE 636 W HCPCS: Performed by: ANESTHESIOLOGY

## 2024-04-15 PROCEDURE — 63600175 PHARM REV CODE 636 W HCPCS: Mod: JZ,JG | Performed by: SURGERY

## 2024-04-15 PROCEDURE — 36000707: Performed by: SURGERY

## 2024-04-15 PROCEDURE — 11772 EXC PILONIDAL CYST COMP: CPT | Mod: ,,, | Performed by: SURGERY

## 2024-04-15 PROCEDURE — 37000009 HC ANESTHESIA EA ADD 15 MINS: Performed by: SURGERY

## 2024-04-15 PROCEDURE — D9220A PRA ANESTHESIA: Mod: ANES,,, | Performed by: ANESTHESIOLOGY

## 2024-04-15 PROCEDURE — 37000008 HC ANESTHESIA 1ST 15 MINUTES: Performed by: SURGERY

## 2024-04-15 PROCEDURE — 63600175 PHARM REV CODE 636 W HCPCS: Performed by: NURSE ANESTHETIST, CERTIFIED REGISTERED

## 2024-04-15 PROCEDURE — 25000003 PHARM REV CODE 250: Performed by: NURSE ANESTHETIST, CERTIFIED REGISTERED

## 2024-04-15 PROCEDURE — 63600175 PHARM REV CODE 636 W HCPCS: Performed by: SURGERY

## 2024-04-15 PROCEDURE — 71000015 HC POSTOP RECOV 1ST HR: Performed by: SURGERY

## 2024-04-15 PROCEDURE — 71000033 HC RECOVERY, INTIAL HOUR: Performed by: SURGERY

## 2024-04-15 DEVICE — MEMBRANE AMNIOFIX 2X12CM: Type: IMPLANTABLE DEVICE | Site: SACRUM | Status: FUNCTIONAL

## 2024-04-15 RX ORDER — FENTANYL CITRATE 50 UG/ML
INJECTION, SOLUTION INTRAMUSCULAR; INTRAVENOUS
Status: DISCONTINUED | OUTPATIENT
Start: 2024-04-15 | End: 2024-04-15

## 2024-04-15 RX ORDER — ONDANSETRON HYDROCHLORIDE 2 MG/ML
INJECTION, SOLUTION INTRAMUSCULAR; INTRAVENOUS
Status: DISCONTINUED | OUTPATIENT
Start: 2024-04-15 | End: 2024-04-15

## 2024-04-15 RX ORDER — ROCURONIUM BROMIDE 10 MG/ML
INJECTION, SOLUTION INTRAVENOUS
Status: DISCONTINUED | OUTPATIENT
Start: 2024-04-15 | End: 2024-04-15

## 2024-04-15 RX ORDER — FAMOTIDINE 10 MG/ML
INJECTION INTRAVENOUS
Status: DISCONTINUED | OUTPATIENT
Start: 2024-04-15 | End: 2024-04-15

## 2024-04-15 RX ORDER — ONDANSETRON HYDROCHLORIDE 2 MG/ML
4 INJECTION, SOLUTION INTRAVENOUS DAILY PRN
Status: DISCONTINUED | OUTPATIENT
Start: 2024-04-15 | End: 2024-04-15 | Stop reason: HOSPADM

## 2024-04-15 RX ORDER — OXYCODONE HYDROCHLORIDE 5 MG/1
5 TABLET ORAL
Status: DISCONTINUED | OUTPATIENT
Start: 2024-04-15 | End: 2024-04-15 | Stop reason: HOSPADM

## 2024-04-15 RX ORDER — CEFAZOLIN SODIUM 2 G/50ML
2 SOLUTION INTRAVENOUS
Status: COMPLETED | OUTPATIENT
Start: 2024-04-15 | End: 2024-04-15

## 2024-04-15 RX ORDER — ACETAMINOPHEN 10 MG/ML
INJECTION, SOLUTION INTRAVENOUS
Status: DISCONTINUED | OUTPATIENT
Start: 2024-04-15 | End: 2024-04-15

## 2024-04-15 RX ORDER — DIPHENHYDRAMINE HYDROCHLORIDE 50 MG/ML
12.5 INJECTION INTRAMUSCULAR; INTRAVENOUS
Status: DISCONTINUED | OUTPATIENT
Start: 2024-04-15 | End: 2024-04-15 | Stop reason: HOSPADM

## 2024-04-15 RX ORDER — BUPIVACAINE HYDROCHLORIDE 2.5 MG/ML
INJECTION, SOLUTION EPIDURAL; INFILTRATION; INTRACAUDAL
Status: DISCONTINUED | OUTPATIENT
Start: 2024-04-15 | End: 2024-04-15 | Stop reason: HOSPADM

## 2024-04-15 RX ORDER — LIDOCAINE HYDROCHLORIDE 20 MG/ML
INJECTION, SOLUTION EPIDURAL; INFILTRATION; INTRACAUDAL; PERINEURAL
Status: DISCONTINUED | OUTPATIENT
Start: 2024-04-15 | End: 2024-04-15

## 2024-04-15 RX ORDER — HYDROCODONE BITARTRATE AND ACETAMINOPHEN 10; 325 MG/1; MG/1
1 TABLET ORAL EVERY 6 HOURS PRN
Qty: 20 TABLET | Refills: 0 | Status: SHIPPED | OUTPATIENT
Start: 2024-04-15

## 2024-04-15 RX ORDER — MIDAZOLAM HYDROCHLORIDE 1 MG/ML
INJECTION INTRAMUSCULAR; INTRAVENOUS
Status: DISCONTINUED | OUTPATIENT
Start: 2024-04-15 | End: 2024-04-15

## 2024-04-15 RX ORDER — MEPERIDINE HYDROCHLORIDE 50 MG/ML
12.5 INJECTION INTRAMUSCULAR; INTRAVENOUS; SUBCUTANEOUS EVERY 10 MIN PRN
Status: DISCONTINUED | OUTPATIENT
Start: 2024-04-15 | End: 2024-04-15 | Stop reason: HOSPADM

## 2024-04-15 RX ORDER — SUCCINYLCHOLINE CHLORIDE 20 MG/ML
INJECTION INTRAMUSCULAR; INTRAVENOUS
Status: DISCONTINUED | OUTPATIENT
Start: 2024-04-15 | End: 2024-04-15

## 2024-04-15 RX ORDER — PROPOFOL 10 MG/ML
VIAL (ML) INTRAVENOUS
Status: DISCONTINUED | OUTPATIENT
Start: 2024-04-15 | End: 2024-04-15

## 2024-04-15 RX ORDER — FENTANYL CITRATE 50 UG/ML
25 INJECTION, SOLUTION INTRAMUSCULAR; INTRAVENOUS EVERY 5 MIN PRN
Status: DISCONTINUED | OUTPATIENT
Start: 2024-04-15 | End: 2024-04-15 | Stop reason: HOSPADM

## 2024-04-15 RX ADMIN — ONDANSETRON 4 MG: 2 INJECTION INTRAMUSCULAR; INTRAVENOUS at 08:04

## 2024-04-15 RX ADMIN — ROCURONIUM BROMIDE 40 MG: 10 INJECTION, SOLUTION INTRAVENOUS at 08:04

## 2024-04-15 RX ADMIN — ACETAMINOPHEN 1000 MG: 10 INJECTION, SOLUTION INTRAVENOUS at 08:04

## 2024-04-15 RX ADMIN — PROPOFOL 200 MG: 10 INJECTION, EMULSION INTRAVENOUS at 08:04

## 2024-04-15 RX ADMIN — MIDAZOLAM HYDROCHLORIDE 2 MG: 1 INJECTION, SOLUTION INTRAMUSCULAR; INTRAVENOUS at 08:04

## 2024-04-15 RX ADMIN — LIDOCAINE HYDROCHLORIDE 100 MG: 20 INJECTION, SOLUTION INTRAVENOUS at 08:04

## 2024-04-15 RX ADMIN — SODIUM CHLORIDE, SODIUM LACTATE, POTASSIUM CHLORIDE, AND CALCIUM CHLORIDE: .6; .31; .03; .02 INJECTION, SOLUTION INTRAVENOUS at 08:04

## 2024-04-15 RX ADMIN — GLYCOPYRROLATE 0.2 MG: 0.2 INJECTION, SOLUTION INTRAMUSCULAR; INTRAVITREAL at 08:04

## 2024-04-15 RX ADMIN — ROCURONIUM BROMIDE 5 MG: 10 INJECTION, SOLUTION INTRAVENOUS at 08:04

## 2024-04-15 RX ADMIN — Medication 200 MG: at 08:04

## 2024-04-15 RX ADMIN — FAMOTIDINE 20 MG: 10 INJECTION, SOLUTION INTRAVENOUS at 09:04

## 2024-04-15 RX ADMIN — FENTANYL CITRATE 25 MCG: 50 INJECTION, SOLUTION INTRAMUSCULAR; INTRAVENOUS at 10:04

## 2024-04-15 RX ADMIN — SODIUM CHLORIDE, SODIUM LACTATE, POTASSIUM CHLORIDE, AND CALCIUM CHLORIDE: .6; .31; .03; .02 INJECTION, SOLUTION INTRAVENOUS at 09:04

## 2024-04-15 RX ADMIN — OXYCODONE HYDROCHLORIDE 5 MG: 5 TABLET ORAL at 10:04

## 2024-04-15 RX ADMIN — FENTANYL CITRATE 100 MCG: 0.05 INJECTION, SOLUTION INTRAMUSCULAR; INTRAVENOUS at 08:04

## 2024-04-15 RX ADMIN — SUGAMMADEX 200 MG: 100 INJECTION, SOLUTION INTRAVENOUS at 09:04

## 2024-04-15 RX ADMIN — CEFAZOLIN SODIUM 2 G: 2 SOLUTION INTRAVENOUS at 08:04

## 2024-04-15 RX ADMIN — ROCURONIUM BROMIDE 20 MG: 10 INJECTION, SOLUTION INTRAVENOUS at 09:04

## 2024-04-15 NOTE — ANESTHESIA PROCEDURE NOTES
Intubation    Date/Time: 4/15/2024 8:37 AM    Performed by: Jen Robles CRNA  Authorized by: Beto Pat MD    Intubation:     Induction:  Intravenous    Intubated:  Postinduction    Mask Ventilation:  Easy mask    Attempts:  1    Attempted By:  CRNA    Method of Intubation:  Direct    Blade:  Hoyt 3    Laryngeal View Grade: Grade I - full view of cords      Difficult Airway Encountered?: No      Complications:  None    Airway Device:  Oral endotracheal tube    Airway Device Size:  7.5    Style/Cuff Inflation:  Cuffed    Inflation Amount (mL):  6    Tube secured:  24    Placement Verified By:  Capnometry    Complicating Factors:  None    Findings Post-Intubation:  BS equal bilateral

## 2024-04-15 NOTE — BRIEF OP NOTE
[facility]  Brief Operative Note    SUMMARY     Surgery Date: 4/15/2024     Surgeons and Role:     * Jayden Phillips III, MD - Primary    Assisting Surgeon: None    Pre-op Diagnosis:  Chronic recurrent pilonidal cyst [L05.91]    Post-op Diagnosis:  Post-Op Diagnosis Codes:     * Chronic recurrent pilonidal cyst [L05.91]    Procedure(s) (LRB):  EXCISION, PILONIDAL CYST (N/A) using cleft lift flap procedure  Placement of amniotic allograft into wound bed to help with healing.    Anesthesia: General    Implants:  * No implants in log *    Operative Findings: Patient was taken to operating room and transferred to the operating room table in the supine position.  He was given general anesthesia and intubated.  He was then put into the prone position.  The Buttocks and lower back were prepped and draped. He had chronically indurated skin with a small sinus at the mid portion of the cleft.  The majority of the disease skin was to the left of midline.  An oblique cleft lift elliptical incision was made over the sacral skin and included the sinus.  The Wide part of the ellipse was around the left indurated and disease skin.  Dissection was carried down to the presacral fascia and the entire specimen was removed.  A 3 or 4 cm subcutaneous flap was created at the wide aspect of the ellipse.  The dissection was carried around to the right side of the ellipse and a 1-2 cm flap was created.  Cavity was irrigated.  The subcutaneous flap was approximated with interrupted 2-0 Vicryl.  A sheet of 12 x 2 cm amniotic allograft was placed over the subcutaneous tissue approximation.  A 15 German channel SAADIA was placed into the wound bed and brought out to the right of the incision.  Another layer of subcu cutaneous tissue was approximated over the drain.  This was using 4-0 Vicryl.  The skin was closed with running 4-0 Monocryl in a subcuticular fashion.   The incision was bandaged.  He was put back in the supine  position and extubated .  He was brought to the recovery room in stable condition.     Estimated Blood Loss: 10 mL  Estimated Blood Loss has been documented.         Specimens:   Specimen (24h ago, onward)       Start     Ordered    04/15/24 0934  Specimen to Pathology - Surgery  Once        Comments: Pre-op Diagnosis: Chronic recurrent pilonidal cyst [L05.91]Procedure(s):EXCISION, PILONIDAL CYST Number of specimens: 1Name of specimens: pilonidal cyst     Question:  Release to patient  Answer:  Immediate    04/15/24 0934    04/15/24 0920  Specimen to Pathology - Surgery  Once        Comments: Pre-op Diagnosis: Chronic recurrent pilonidal cyst [L05.91]Procedure(s):EXCISION, PILONIDAL CYST Number of specimens: 1Name of specimens: pilonidal cyst     Question:  Release to patient  Answer:  Immediate    04/15/24 0920                    [id number]

## 2024-04-15 NOTE — ANESTHESIA PREPROCEDURE EVALUATION
04/15/2024  Lee Quintanilla is a 48 y.o., male.      Patient Active Problem List   Diagnosis    BPH (benign prostatic hyperplasia)    ADD (attention deficit disorder)    Erectile dysfunction    Benign prostatic hypertrophy (BPH) with weak urinary stream    Severe obesity with body mass index (BMI) of 35.0 to 39.9 with serious comorbidity    Neuropathy    Myofascial pain    Lumbar radiculopathy    DDD (degenerative disc disease), lumbar    Major depressive disorder with single episode    MYNOR (generalized anxiety disorder)    CKD (chronic kidney disease) stage 3, GFR 30-59 ml/min    Insomnia    Neck pain    Degenerative disc disease, cervical    History of lumbar surgery    Other spondylosis, lumbar region    Abdominal pain    Nephrolithiasis    Inguinal hernia bilateral, non-recurrent    Elevated lipase    Hyperlipidemia    Depression    Hypertension    Degenerative lumbar disc    Coronary artery disease involving coronary bypass graft    Impaired flexibility of lower extremity    Debility    Syncope    Dehydration    Hypomagnesemia    Gastroesophageal reflux disease without esophagitis    Hx of CABG    Anemia, chronic disease    Coronary arteriosclerosis    Essential hypertension    Alcohol abuse    Alcohol withdrawal    High anion gap metabolic acidosis    Abnormal liver enzymes    Range of motion deficit    Pain self-management deficit    Weakness of back       Past Surgical History:   Procedure Laterality Date    BACK SURGERY  2016    back surgery    BONE GRAFT N/A 11/5/2020    Procedure: BONE GRAFT;  Surgeon: Mateusz Blanco MD;  Location: [facility];  Service: Orthopedics;  Laterality: N/A;    CARDIAC SURGERY  01/06/2020    CABG X 7    CORONARY ARTERY BYPASS GRAFT      7 vessels    HERNIA REPAIR Bilateral 11/22/2017    LITHOTRIPSY      LUMBAR LAMINECTOMY WITH FUSION Bilateral 11/5/2020     Procedure: LAMINECTOMY, SPINE, LUMBAR, WITH FUSION;  Surgeon: Mateusz Blanco MD;  Location: [facility] OR;  Service: Orthopedics;  Laterality: Bilateral;  MEDTRONIC  NTI  L-3    PILONIDAL CYST DRAINAGE      removal of abcess      scrotal    REMOVAL OF HARDWARE FROM SPINE Bilateral 11/5/2020    Procedure: REMOVAL, HARDWARE, SPINE;  Surgeon: Mateusz Blanco MD;  Location: [facility] OR;  Service: Orthopedics;  Laterality: Bilateral;  L 4-5    TYMPANOSTOMY TUBE PLACEMENT          Tobacco Use:  The patient  reports that he quit smoking about 8 years ago. His smoking use included cigarettes. He has been exposed to tobacco smoke. He quit smokeless tobacco use about 7 years ago.     Results for orders placed or performed during the hospital encounter of 11/10/23   EKG 12-lead    Collection Time: 11/10/23  7:31 AM    Narrative    Test Reason : R11.10,    Vent. Rate : 120 BPM     Atrial Rate : 120 BPM     P-R Int : 152 ms          QRS Dur : 092 ms      QT Int : 314 ms       P-R-T Axes : 036 078 -57 degrees     QTc Int : 443 ms    Sinus tachycardia with occasional Premature ventricular complexes  Possible Inferior infarct (cited on or before 22-OCT-2019)  Abnormal ECG  When compared with ECG of 08-NOV-2023 10:05,  Premature ventricular complexes are now Present  Confirmed by Jean WEEKS, Collins BELTRAN ([id number]) on 11/19/2023 2:33:51 PM    Referred By: [email or web address]   SELF           Confirmed By:Collins Pena MD             Lab Results   Component Value Date    WBC 11.34 04/03/2024    HGB 16.6 04/03/2024    HCT 51.2 04/03/2024    MCV 86 04/03/2024     04/03/2024     BMP  Lab Results   Component Value Date     (L) 04/03/2024    K 4.4 04/03/2024     04/03/2024    CO2 24 04/03/2024    BUN 10 04/03/2024    CREATININE 1.7 (H) 04/03/2024    CALCIUM 9.9 04/03/2024    ANIONGAP 9 04/03/2024    GLU 96 04/03/2024     (H) 02/03/2024    GLU 93 11/14/2023       Results for orders placed during the hospital encounter of  12/05/22    Echo Saline Bubble? No    Interpretation Summary  · The left ventricle is normal in size with mild concentric hypertrophy and moderately decreased systolic function.  · The estimated ejection fraction is 38%.  · Grade I left ventricular diastolic dysfunction.  · There is moderate left ventricular global hypokinesis.  · Normal right ventricular size with normal right ventricular systolic function.  · Mild mitral regurgitation.  · Normal central venous pressure (3 mmHg).              Pre-op Assessment    I have reviewed the Patient Summary Reports.     I have reviewed the Nursing Notes. I have reviewed the NPO Status.   I have reviewed the Medications.     Review of Systems  Anesthesia Hx:  No problems with previous Anesthesia             Denies Family Hx of Anesthesia complications.    Denies Personal Hx of Anesthesia complications.                    Social:  Former Smoker       Hematology/Oncology:  Hematology Normal                                     Cardiovascular:     Hypertension, well controlled  Past MI (remote hx of MI) CAD  asymptomatic CABG/stent        hyperlipidemia                             Pulmonary:    Asthma mild   Sleep Apnea, CPAP           Education provided regarding risk of obstructive sleep apnea            Renal/:  Chronic Renal Disease (stage 3 CRI), CKD renal calculi               Hepatic/GI:     GERD, well controlled             Musculoskeletal:         Spine Disorders: (chronic low back pain) lumbar Degenerative disease           Neurological:    Neuromuscular Disease, (lumbar radiculopathy, with intermittent Left leg symptoms)  Headaches (tension headaches) Seizures (hx of ? seizure reaction to inapsine, no ongoing problems), well controlled                                Endocrine:  Diabetes (pre-diabetes, on ozempic last dose > 1 week ago), well controlled           Psych:  Psychiatric History (ADD, ADHD) anxiety depression Alcohol abuse               Physical  Exam  General: Well nourished, Cooperative, Alert and Oriented    Airway:  Mallampati: III / II  Mouth Opening: Normal  TM Distance: Normal  Tongue: Normal  Neck ROM: Normal ROM    Dental:  Intact    Chest/Lungs:  Clear to auscultation    Heart:  Rate: Normal  Rhythm: Regular Rhythm  Sounds: Normal    Abdomen:  Normal, Soft, Nontender        Anesthesia Plan  Type of Anesthesia, risks & benefits discussed:    Anesthesia Type: Gen ETT  Intra-op Monitoring Plan: Standard ASA Monitors  Post Op Pain Control Plan: multimodal analgesia and IV/PO Opioids PRN  Induction:  IV  Airway Plan: Video, Post-Induction  Informed Consent: Informed consent signed with the Patient and all parties understand the risks and agree with anesthesia plan.  All questions answered.   ASA Score: 3  Anesthesia Plan Notes:     GETA  IV tylenol  Zofran Pepcid     Ready For Surgery From Anesthesia Perspective.     .

## 2024-04-15 NOTE — TRANSFER OF CARE
"Anesthesia Transfer of Care Note    Patient: Lee Quintanilla    Procedure(s) Performed: Procedure(s) (LRB):  EXCISION, PILONIDAL CYST (N/A)    Patient location: PACU    Anesthesia Type: general    Transport from OR: Transported from OR on room air with adequate spontaneous ventilation    Post pain: adequate analgesia    Post assessment: no apparent anesthetic complications    Post vital signs: stable    Level of consciousness: awake    Nausea/Vomiting: no nausea/vomiting    Complications: none    Transfer of care protocol was followed      Last vitals: Visit Vitals  /74 (BP Location: Right arm, Patient Position: Sitting)   Pulse 80   Temp 36.8 °C (98.2 °F) (Oral)   Resp 16   Ht 5' 7" (1.702 m)   Wt 102.1 kg (225 lb)   SpO2 96%   BMI 35.24 kg/m²     "

## 2024-04-15 NOTE — PLAN OF CARE
Patient taken to day surgery via stretcher at this time. Awake and alert not distressful. FREYA Diggs at bedside to assume care of patient.

## 2024-04-15 NOTE — DISCHARGE SUMMARY
Discharge Summary  General Surgery      Admit Date: 4/15/2024    Discharge Date :4/15/2024    Attending Physician: Jayden Phillips III     Discharge Physician: Jayden Phillips III    Discharged Condition: good    Discharge Diagnosis: Chronic recurrent pilonidal cyst [L05.91]    Treatments/Procedures: Procedure(s) (LRB):  EXCISION, PILONIDAL CYST (N/A)    Hospital Course: Uneventful; Discharged home from Recovery    Significant Diagnostic Studies: none    Disposition: Home or Self Care    Diet: Regular    Follow up: Office 10-14 days    Activity: No heavy lifting till seen in office.    Patient Instructions:   Current Discharge Medication List        START taking these medications    Details   HYDROcodone-acetaminophen (NORCO)  mg per tablet Take 1 tablet by mouth every 6 (six) hours as needed for Pain.  Qty: 20 tablet, Refills: 0    Comments: Quantity prescribed more than 7 day supply? No           CONTINUE these medications which have NOT CHANGED    Details   ENTRESTO 49-51 mg per tablet Take 1 tablet by mouth 2 (two) times daily.      EScitalopram oxalate (LEXAPRO) 10 MG tablet Take 1 tablet (10 mg total) by mouth every evening.  Qty: 30 tablet, Refills: 1      furosemide (LASIX) 40 MG tablet Take 40 mg by mouth once daily.      metoprolol tartrate (LOPRESSOR) 25 MG tablet Take 50 mg by mouth 2 (two) times daily.      rosuvastatin (CRESTOR) 40 MG Tab Take 40 mg by mouth once daily.      anastrozole (ARIMIDEX) 1 mg Tab Take 1 mg by mouth every 7 days.      aspirin (ECOTRIN) 81 MG EC tablet Take 81 mg by mouth once daily.      clopidogreL (PLAVIX) 75 mg tablet Take 75 mg by mouth once daily.      semaglutide (OZEMPIC) 0.25 mg or 0.5 mg(2 mg/1.5 mL) pen injector Inject 0.25 mg every week by subcutaneous route as directed for 30 days.      testosterone cypionate (DEPOTESTOTERONE CYPIONATE) 200 mg/mL injection Inject 200 mg into the muscle every 7 days.             Discharge Procedure Orders   Diet Adult  Regular     Lifting restrictions   Order Comments: No lifting over twenty pounds for six weeks     Remove dressing in 48 hours

## 2024-04-15 NOTE — DISCHARGE INSTRUCTIONS
DISCHARGE INSTRUCTIONS    No driving, operating heavy machinery or signing legal documents x 24 hours  No drinking alcohol x 24 hours or while taking pain medication  You should not be driving while taking pain medication  You have a drain in place.  Keep drain charged, record measured output, and color-  bring this information to office follow up  Do Not submerge wound in a tub, any pools of water or swimming pools until wound is completely healed  Keep an eye on wound- edges should be pink and well approximated-  the wound should not be red and inflamed.  Wound edges should not be .   Your wound should not be draining anything green, yellow or foul smelling- for these call the MD  You should not be running a temp greater than 101   Keep follow up appt

## 2024-04-15 NOTE — PLAN OF CARE
Pt instructed on emptying Fortino drain and dressing care.   Given measuring cups and instructed to record output and what drainage looks like.   Verbalized understanding.

## 2024-04-15 NOTE — ANESTHESIA POSTPROCEDURE EVALUATION
Anesthesia Post Evaluation    Patient: Lee Quintanilla    Procedure(s) Performed: Procedure(s) (LRB):  EXCISION, PILONIDAL CYST (N/A)    Final Anesthesia Type: general      Patient location during evaluation: PACU  Patient participation: Yes- Able to Participate  Level of consciousness: awake and alert and oriented  Post-procedure vital signs: reviewed and stable  Pain management: adequate  Airway patency: patent    PONV status at discharge: No PONV  Anesthetic complications: no      Cardiovascular status: blood pressure returned to baseline and hemodynamically stable  Respiratory status: unassisted, spontaneous ventilation and room air  Hydration status: euvolemic  Follow-up not needed.              Vitals Value Taken Time   /60 04/15/24 1115   Temp 36.2 °C (97.1 °F) 04/15/24 1115   Pulse 69 04/15/24 1124   Resp 14 04/15/24 1125   SpO2 96 % 04/15/24 1124   Vitals shown include unfiled device data.      No case tracking events are documented in the log.      Pain/Tanmay Score: Pain Rating Prior to Med Admin: 6 (4/15/2024 10:57 AM)  Tanmay Score: 10 (4/15/2024 11:15 AM)

## 2024-04-15 NOTE — OP NOTE
Surgery Date: 4/15/2024     Surgeons and Role:     * Jaydne Phillips III, MD - Primary    Assisting Surgeon: None    Pre-op Diagnosis:  Chronic recurrent pilonidal cyst [L05.91]    Post-op Diagnosis:  Post-Op Diagnosis Codes:     * Chronic recurrent pilonidal cyst [L05.91]    Procedure(s) (LRB):  EXCISION, PILONIDAL CYST (N/A) using cleft lift flap procedure  Placement of amniotic allograft into wound bed to help with healing.    Anesthesia: General    Implants:  * No implants in log *    Operative Findings: Patient was taken to operating room and transferred to the operating room table in the supine position.  He was given general anesthesia and intubated.  He was then put into the prone position.  The Buttocks and lower back were prepped and draped. He had chronically indurated skin with a small sinus at the mid portion of the cleft.  The majority of the disease skin was to the left of midline.  An oblique cleft lift elliptical incision was made over the sacral skin and included the sinus.  The Wide part of the ellipse was around the left indurated and disease skin.  Dissection was carried down to the presacral fascia and the entire specimen was removed.  A 3 or 4 cm subcutaneous flap was created at the wide aspect of the ellipse.  The dissection was carried around to the right side of the ellipse and a 1-2 cm flap was created.  Cavity was irrigated.  The subcutaneous flap was approximated with interrupted 2-0 Vicryl.  A sheet of 12 x 2 cm amniotic allograft was placed over the subcutaneous tissue approximation.  A 15 [place] channel SAADIA was placed into the wound bed and brought out to the right of the incision.  Another layer of subcutaneous tissue was approximated over the drain.  This was using 4-0 Vicryl.  The skin was closed with running 4-0 Monocryl in a subcuticular fashion.   The incision was bandaged.  He was put back in the supine position and extubated .  He was brought to the recovery room in stable  condition.     Estimated Blood Loss: 10 mL  Estimated Blood Loss has been documented.         Specimens:   Specimen (24h ago, onward)       Start     Ordered    04/15/24 0934  Specimen to Pathology - Surgery  Once        Comments: Pre-op Diagnosis: Chronic recurrent pilonidal cyst [L05.91]Procedure(s):EXCISION, PILONIDAL CYST Number of specimens: 1Name of specimens: pilonidal cyst     Question:  Release to patient  Answer:  Immediate    04/15/24 0934    04/15/24 0920  Specimen to Pathology - Surgery  Once        Comments: Pre-op Diagnosis: Chronic recurrent pilonidal cyst [L05.91]Procedure(s):EXCISION, PILONIDAL CYST Number of specimens: 1Name of specimens: pilonidal cyst     Question:  Release to patient  Answer:  Immediate    04/15/24 0920                    [id number]

## 2024-04-25 ENCOUNTER — OFFICE VISIT (OUTPATIENT)
Dept: SURGERY | Facility: CLINIC | Age: 49
End: 2024-04-25
Payer: MEDICAID

## 2024-04-25 VITALS
HEART RATE: 92 BPM | BODY MASS INDEX: 35.31 KG/M2 | DIASTOLIC BLOOD PRESSURE: 69 MMHG | WEIGHT: 225 LBS | SYSTOLIC BLOOD PRESSURE: 101 MMHG | TEMPERATURE: 98 F | HEIGHT: 67 IN

## 2024-04-25 DIAGNOSIS — L05.91 CHRONIC RECURRENT PILONIDAL CYST: Primary | ICD-10-CM

## 2024-04-25 PROCEDURE — 3078F DIAST BP <80 MM HG: CPT | Mod: CPTII,,, | Performed by: SURGERY

## 2024-04-25 PROCEDURE — 4010F ACE/ARB THERAPY RXD/TAKEN: CPT | Mod: CPTII,,, | Performed by: SURGERY

## 2024-04-25 PROCEDURE — 99213 OFFICE O/P EST LOW 20 MIN: CPT | Mod: PBBFAC,PN | Performed by: SURGERY

## 2024-04-25 PROCEDURE — 3044F HG A1C LEVEL LT 7.0%: CPT | Mod: CPTII,,, | Performed by: SURGERY

## 2024-04-25 PROCEDURE — 1159F MED LIST DOCD IN RCRD: CPT | Mod: CPTII,,, | Performed by: SURGERY

## 2024-04-25 PROCEDURE — 99999 PR PBB SHADOW E&M-EST. PATIENT-LVL III: CPT | Mod: PBBFAC,,, | Performed by: SURGERY

## 2024-04-25 PROCEDURE — 1160F RVW MEDS BY RX/DR IN RCRD: CPT | Mod: CPTII,,, | Performed by: SURGERY

## 2024-04-25 PROCEDURE — 3074F SYST BP LT 130 MM HG: CPT | Mod: CPTII,,, | Performed by: SURGERY

## 2024-04-25 PROCEDURE — 99024 POSTOP FOLLOW-UP VISIT: CPT | Mod: ,,, | Performed by: SURGERY

## 2024-05-02 ENCOUNTER — OFFICE VISIT (OUTPATIENT)
Dept: SURGERY | Facility: CLINIC | Age: 49
End: 2024-05-02
Payer: MEDICAID

## 2024-05-02 VITALS — TEMPERATURE: 98 F | HEART RATE: 64 BPM | DIASTOLIC BLOOD PRESSURE: 94 MMHG | SYSTOLIC BLOOD PRESSURE: 155 MMHG

## 2024-05-02 DIAGNOSIS — L05.91 CHRONIC RECURRENT PILONIDAL CYST: Primary | ICD-10-CM

## 2024-05-02 PROCEDURE — 3080F DIAST BP >= 90 MM HG: CPT | Mod: CPTII,,, | Performed by: SURGERY

## 2024-05-02 PROCEDURE — 4010F ACE/ARB THERAPY RXD/TAKEN: CPT | Mod: CPTII,,, | Performed by: SURGERY

## 2024-05-02 PROCEDURE — 1160F RVW MEDS BY RX/DR IN RCRD: CPT | Mod: CPTII,,, | Performed by: SURGERY

## 2024-05-02 PROCEDURE — 3044F HG A1C LEVEL LT 7.0%: CPT | Mod: CPTII,,, | Performed by: SURGERY

## 2024-05-02 PROCEDURE — 99999 PR PBB SHADOW E&M-EST. PATIENT-LVL III: CPT | Mod: PBBFAC,,, | Performed by: SURGERY

## 2024-05-02 PROCEDURE — 99213 OFFICE O/P EST LOW 20 MIN: CPT | Mod: PBBFAC,PN | Performed by: SURGERY

## 2024-05-02 PROCEDURE — 99024 POSTOP FOLLOW-UP VISIT: CPT | Mod: ,,, | Performed by: SURGERY

## 2024-05-02 PROCEDURE — 1159F MED LIST DOCD IN RCRD: CPT | Mod: CPTII,,, | Performed by: SURGERY

## 2024-05-02 PROCEDURE — 3077F SYST BP >= 140 MM HG: CPT | Mod: CPTII,,, | Performed by: SURGERY

## 2024-05-07 NOTE — PROGRESS NOTES
Subjective:       Patient ID: Lee Quintanilla is a 48 y.o. male.    Chief Complaint: Post-op Evaluation (Drain removal)      HPI:  Patient returns for drain evaluation.  Putting out about 20 cc per day.  No new complaints.  No fevers or chills.    Review of Systems    Objective:      Physical Exam  Constitutional:       General: He is awake. He is not in acute distress.     Appearance: He is not toxic-appearing.   Pulmonary:      Effort: Pulmonary effort is normal. No tachypnea, bradypnea or respiratory distress.   Neurological:      Mental Status: He is alert.   Psychiatric:         Behavior: Behavior is cooperative.       Assessment/Plan:   Chronic recurrent pilonidal cyst      Drain removed.  No evidence of complications.  Return to clinic p.r.n..

## 2024-05-14 ENCOUNTER — CLINICAL SUPPORT (OUTPATIENT)
Dept: REHABILITATION | Facility: HOSPITAL | Age: 49
End: 2024-05-14
Payer: MEDICAID

## 2024-05-14 DIAGNOSIS — Z73.89 PAIN SELF-MANAGEMENT DEFICIT: ICD-10-CM

## 2024-05-14 DIAGNOSIS — R29.898 WEAKNESS OF BACK: ICD-10-CM

## 2024-05-14 DIAGNOSIS — M25.60 RANGE OF MOTION DEFICIT: Primary | ICD-10-CM

## 2024-05-14 PROCEDURE — 97110 THERAPEUTIC EXERCISES: CPT | Mod: PN

## 2024-05-14 NOTE — PROGRESS NOTES
[facility] Back Physical Therapy Treatment      Name: Lee Quintanilla  Clinic Number: [id number]    Therapy Diagnosis:   Encounter Diagnoses   Name Primary?    Range of motion deficit Yes    Pain self-management deficit     Weakness of back      Physician: Vincent Craig PA    Visit Date: 5/14/2024    Physician Orders: PT Eval and Treat  Medical Diagnosis from Referral:   Diagnosis   M47.816 (ICD-10-CM) - Facet arthritis of lumbar region   Z98.1 (ICD-10-CM) - History of lumbar spinal fusion   M51.36 (ICD-10-CM) - DDD (degenerative disc disease), lumbar      Evaluation Date: 1/18/2024  Authorization Period Expiration: 05/25/2024   Plan of Care Expiration: 04/11/2024   Progress Note Due: 2/18/2024  Visit # / Visits authorized: 11 /12  FOTO: 1/ 3    PTA Visit #: 0/5     Time In:  1000 AM  Time Out: 1100 AM  Total Billable Time: 60 minutes  INSURANCE and OUTCOMES: Fee for Service with FOTO Outcomes 4/3    Precautions: standard and HTN standard and previous lumbar fusion, previous open heart surgery (none recent)     Pattern of pain determined: 1 PEP    Subjective   Lee Quitnanilla - 4 weeks since cyst removal. Reports a few incidences of sharp shooting pain into the LLE when sitting to the knee. Reports 7/10 pain upon arrival.     Patient reports he has been very sore since last treatment and has not done his exercises at home.  Patient reports their pain to be 7/10 on a 0-10 scale with 0 being no pain and 10 being the worst pain imaginable.    Pain Location: midline to L side      Work and leisure: not working currently, leisure - not specified  Pt goals: to have less pain     Objective   Isometric Testing on Med X equipment: Testing administered by PT     Test Initial Baseline Midpoint Final   Date 02- 4/12/2024     ROM 0-30 deg  0-36     Max Peak Torque 60   135     Min Peak Torque 19   17     Flex/Ext Ratio ~ 3 / 1  8     % below normative data 79  57     % gain from initial test Not  available 68      Outcomes:  Intake Score: 28%  Visit 6 Score: 31/100  Visit 10 Score: 26/100   Visit 11: 29/100   Discharge Score:   Goal Score: 40%     Treatment    Lee received the treatments listed below:      +++ALL charges filed per Medicaid Guidelines+++    Lee received neuromuscular education via participation on the HypePoints Machine. Therapist assisted patient in isolating and engaging spinal stabilization musculature in order to improve functional ability and postural control. Patient performed exercise with therapist guidance in order to accurately use pacer function, avoid valsalva, and optimally exert effort within a safe and effective range via the Tez Exertion Rating Scale. Patient instructed to perform at a midrange of exertion and to complete 15-20 repetitions within appropriate split time, with proper technique, and while maintaining safety. X 8 minutes         5/14/2024    10:57 AM   [facility]   Visit Number 11   VAS Pain Rating 7   Treadmill Time (in min.) 3 min   Speed 2 mph   Lumbar Weight 66 lbs   Repetitions 17   Rating of Perceived Exertion 4       Lee participated in therapeutic exercises to develop strength, endurance, ROM, flexibility, posture, and core stabilization for including x 52 minutes     Repeated movements:     Baselines: severe limitations in all directions   L sided back pain with L SG and R rotation     Prone on elbows x 2 minutes   Prone press ups 3 x 10 repetitions throughout tx session    Better rotation , worse L     Bridges 2 x 10 repetitions with blue abduction theraband   Bird/dog 2 x 5 repetitions each side x 3 second hold, CGA for balance   Sidelying open books   Sidelying external rotation green theraband 2 x 10 each side   Sidelying open books x 15 repetitions each side  Standing hip extension flexed over mat to improve glut activation     Peripheral muscle strengthening which included one set of 15-20 repetitions at a slow and controlled 10-13  second per rep pace focused on strengthening supporting musculature in order to improve body mechanics and functional mobility.  Patient and therapist focused on proper form during treatment to ensure optimal strengthening of each targeted muscle group.  Machines utilized include torso rotation, chest press, triceps extension, bicep curl, and upright row. Leg extension, leg curl, leg press, and hip adduction/abduction        NP:  + abdominal isometric crunch 15 x 5 second   + PPT 10 x 5 second  + PPT with march 10 repetitions each side   + standing palloff press black theraband 20 repetitions each side   + cat/cow 10 repetitions each way     Home Exercises Provided and Patient Education Provided    Home exercises include:     Standing extensions or prone press ups    Cardio program (V5): -  Lifting education (V11): -  Posture/ Using Lumbar Roll: educated  [employer] Discharge handout (date given): -  Equipment at home/gym membership: none    Education provided:   - PT role and Plan of Care   - Home exercise program     Written Home Exercises Provided: Patient instructed to cont prior HEP.  Exercises were reviewed and Lee was able to demonstrate them prior to the end of the session. Lee demonstrated good  understanding of the education provided.     See EMR under Patient Instructions for exercises provided prior visit.    Assessment     Arrives to therapy after being out for 4 weeks due to cyst removal. He continues to arrive with high level of pain reporting 7/10 today. He continues to demonstrate significantly limited lumbar range of motion in all direction especially extension and L SG. He reports pain with standing extension at midline, L sided pain with R rotation and L SG. Prone press ups performed today with decrease better with R rotation but no effect on L SG. He continues to demonstrate decreased core strength and stabilization as evident by difficulty with bird/dog and increased effort. Medx  increased by 10% today with good tolerance performing 17 repetitions at 4/10 RPE. Most peripheral machines also progressed with good tolerance.     Patient is making fair progress towards established goals.  Pt will continue to benefit from skilled outpatient physical therapy to address the deficits stated in the impairment chart, provide pt/family education and to maximize pt's level of independence in the home and community environment.     Anticipated Barriers for therapy: co morbidities, history of lumbar fusions   Pt's spiritual, cultural and educational needs considered and pt agreeable to plan of care and goals as stated below:     Goals:    Short term goals:  6 weeks or 10 visits   - Pt will demonstrate increased lumbar MedX ROM by at least 3 degrees from the initial ROM value with improvements noted in functional ROM and ability to perform ADLs. MET 4/12/2024  - Pt will demonstrate increased MedX average isometric strength value by 10% from initial test resulting in improved ability to perform bending, lifting, and carrying activities safely, confidently. MET 4/12/2024  - Pt will report a reduction in worst pain score by 1-2 points for improved tolerance for transition from sit <-> stand. Appropriate and Ongoing  - Pt able to perform HEP correctly with minimal cueing or supervision from therapist to encourage independent management of symptoms. Appropriate and Ongoing     Long term goals: 10 weeks or 20 visits   - Pt will demonstrate increased lumbar MedX ROM by at least 6 degrees from initial ROM value, resulting in improved ability to perform functional forward bending while standing and sitting. MET 4/12/2024  - Pt will demonstrate increased MedX average isometric strength value by 40% from initial test resulting in improved ability to perform bending, lifting, and carrying activities safely and confidently. MET 4/12/2024  - Pt to demonstrate ability to independently control and reduce their pain through  posture positioning and mechanical movements throughout a typical day. Appropriate and Ongoing  - Pt will demonstrate reduced pain and improved functional outcomes as reported on the FOTO by reaching an intake score of >/= 40% functional ability in order to demonstrate subjective improvement in patient's condition. Appropriate and Ongoing  - Pt will demonstrate independence with the HEP at discharge. Appropriate and Ongoing  - Patient to report improved sleep hygiene 2' decreased low back pain Appropriate and Ongoing     Plan   Continue with established Plan of Care towards established PT goals.     Belén Story, PT  5/14/2024

## 2024-05-14 NOTE — PLAN OF CARE
[facility]  Physical Therapy Plan of Care Note     Name: Lee Quintanilla  Clinic Number: [id number]    Therapy Diagnosis:   Encounter Diagnoses   Name Primary?    Range of motion deficit Yes    Pain self-management deficit     Weakness of back      Physician: Vincent Craig PA    Visit Date: 5/14/2024    Physician Orders: PT Eval and Treat  Medical Diagnosis from Referral:   Diagnosis   M47.816 (ICD-10-CM) - Facet arthritis of lumbar region   Z98.1 (ICD-10-CM) - History of lumbar spinal fusion   M51.36 (ICD-10-CM) - DDD (degenerative disc disease), lumbar      Evaluation Date: 1/18/2024  Authorization Period Expiration: 05/25/2024   Plan of Care Expiration: 04/11/2024   Progress Note Due: 2/18/2024  Visit # / Visits authorized: 11 /12  FOTO: 1/ 3    Precautions: Standard, Lumbar fusion   Functional Level Prior to Evaluation:  independent     SUBJECTIVE     Update: has not been to therapy in 4 weeks since cyst removal. Reports a few incidences of sharp shooting pain into the LLE when sitting to the knee. Reports 7/10 pain upon arrival.    OBJECTIVE     Update:     Isometric Testing on Med X equipment: Testing administered by PT     Test Initial Baseline Midpoint Final   Date 02- 4/12/2024     ROM 0-30 deg  0-36     Max Peak Torque 60   135     Min Peak Torque 19   17     Flex/Ext Ratio ~ 3 / 1  8     % below normative data 79  57     % gain from initial test Not available 68       Outcomes:  Intake Score: 28%  Visit 6 Score: 31/100  Visit 10 Score: 26/100   Visit 11: 29/100   Discharge Score:   Goal Score: 40%     ASSESSMENT     Update:     Patient arrives to therapy today after four week. He recently had cyst removal and this is his first visit since the surgery. He arrives with continuation of same complaint of L sided low back pain with intermittent radicular symptoms into the LLE. He reports he noticed this with sitting. Standing baselines exposed pain on the L  side with L SG and R rotation. Repeated movements performed with improvement in R rotation but no change in L Sg. He continues to demonstrate severe range of motion limitation of lumbar spine in all directions with history of lumbar fusion. He also continues to demonstrate decreased lumbar strength and range of motion based on norms on medx machine. However he did demonstrate 6 deg improvement in range of motion and increase of 68% since initial evaluation for strength on medx machine. Due to recent surgery, he reports minimal compliance with HEP and use of lumbar roll. He tolerated prone press ups well today however I am not sure how frequent he is doing these at home. He will continue to benefit from skilled Physical Therapist services to address remaining deficits to maximize functional mobility and quality of life.      Previous Short Term Goals Status:   2 met   New Short Term Goals Status:   2 met  Long Term Goal Status: continue per initial plan of care.  Reasons for Recertification of Therapy:   continuation of L sided low back pain with intermittent radicular symptoms, decreased core and back strength, decreased LE strength, poor posture     GOALS    Short term goals:  6 weeks or 10 visits   - Pt will demonstrate increased lumbar MedX ROM by at least 3 degrees from the initial ROM value with improvements noted in functional ROM and ability to perform ADLs. MET 4/12/2024  - Pt will demonstrate increased MedX average isometric strength value by 10% from initial test resulting in improved ability to perform bending, lifting, and carrying activities safely, confidently. MET 4/12/2024  - Pt will report a reduction in worst pain score by 1-2 points for improved tolerance for transition from sit <-> stand. Appropriate and Ongoing  - Pt able to perform HEP correctly with minimal cueing or supervision from therapist to encourage independent management of symptoms. Appropriate and Ongoing     Long term goals: 10  weeks or 20 visits   - Pt will demonstrate increased lumbar MedX ROM by at least 6 degrees from initial ROM value, resulting in improved ability to perform functional forward bending while standing and sitting. MET 4/12/2024  - Pt will demonstrate increased MedX average isometric strength value by 40% from initial test resulting in improved ability to perform bending, lifting, and carrying activities safely and confidently. MET 4/12/2024  - Pt to demonstrate ability to independently control and reduce their pain through posture positioning and mechanical movements throughout a typical day. Appropriate and Ongoing  - Pt will demonstrate reduced pain and improved functional outcomes as reported on the FOTO by reaching an intake score of >/= 40% functional ability in order to demonstrate subjective improvement in patient's condition. Appropriate and Ongoing  - Pt will demonstrate independence with the HEP at discharge. Appropriate and Ongoing  - Patient to report improved sleep hygiene 2' decreased low back pain Appropriate and Ongoing    PLAN     Updated Certification Period: 5/14/2024 to 7/14/2024   Recommended Treatment Plan: 1 times per week for 4 weeks:  Gait Training, Manual Therapy, Neuromuscular Re-ed, Patient Education, Therapeutic Activities, and Therapeutic Exercise  Other Recommendations: none     Belén Story, PT

## 2024-05-17 ENCOUNTER — CLINICAL SUPPORT (OUTPATIENT)
Dept: REHABILITATION | Facility: HOSPITAL | Age: 49
End: 2024-05-17

## 2024-05-17 DIAGNOSIS — M25.60 RANGE OF MOTION DEFICIT: Primary | ICD-10-CM

## 2024-05-17 DIAGNOSIS — Z73.89 PAIN SELF-MANAGEMENT DEFICIT: ICD-10-CM

## 2024-05-17 DIAGNOSIS — R29.898 WEAKNESS OF BACK: ICD-10-CM

## 2024-05-17 PROCEDURE — 97110 THERAPEUTIC EXERCISES: CPT | Mod: PN

## 2024-05-17 NOTE — PROGRESS NOTES
"[facility]      Name: Lee Quintanilla  Clinic Number: [id number]    Therapy Diagnosis:   Encounter Diagnoses   Name Primary?    Range of motion deficit Yes    Pain self-management deficit     Weakness of back      Physician: Vincent Craig PA    Visit Date: 5/17/2024    Physician Orders: PT Eval and Treat  Medical Diagnosis from Referral:   Diagnosis   M47.816 (ICD-10-CM) - Facet arthritis of lumbar region   Z98.1 (ICD-10-CM) - History of lumbar spinal fusion   M51.36 (ICD-10-CM) - DDD (degenerative disc disease), lumbar      Evaluation Date: 1/18/2024  Authorization Period Expiration: 05/25/2024   Plan of Care Expiration: 7/14/2024  Progress Note Due: 2/18/2024  Visit # / Visits authorized: 12 /12  FOTO: 1/ 3    PTA Visit #: 0/5     Time In: 905 AM  Time Out: 1000 AM  Total Billable Time: 55 minutes  INSURANCE and OUTCOMES: Fee for Service with FOTO Outcomes 4/3    Precautions: standard and HTN standard and previous lumbar fusion, previous open heart surgery (none recent)     Pattern of pain determined: 1 PEP    Subjective   Lee Quintanilla nerve pain shoots down to the L side. The pain is sharp making his knees want to drop. He arrives 7.5/10 pain. Reports mid to L sided pain in the hip. Continues to be non compliant with HEP due to "rough couple of days".     Patient reports he has been very sore since last treatment and has not done his exercises at home.  Patient reports their pain to be 7/10 on a 0-10 scale with 0 being no pain and 10 being the worst pain imaginable.    Pain Location: midline to L side      Work and leisure: not working currently, leisure - not specified  Pt goals: to have less pain     Objective   Isometric Testing on Med X equipment: Testing administered by PT     Test Initial Baseline Midpoint Final   Date 02- 4/12/2024     ROM 0-30 deg  0-36     Max Peak Torque 60   135     Min Peak Torque 19   17     Flex/Ext Ratio ~ 3 / 1  " 8     % below normative data 79  57     % gain from initial test Not available 68      Outcomes:  Intake Score: 28%  Visit 6 Score: 31/100  Visit 10 Score: 26/100   Visit 11: 29/100   Discharge Score:   Goal Score: 40%     Treatment    Lee received the treatments listed below:      +++ALL charges filed per Medicaid Guidelines+++    Lee received neuromuscular education via participation on the [employer] Machine. Therapist assisted patient in isolating and engaging spinal stabilization musculature in order to improve functional ability and postural control. Patient performed exercise with therapist guidance in order to accurately use pacer function, avoid valsalva, and optimally exert effort within a safe and effective range via the Tez Exertion Rating Scale. Patient instructed to perform at a midrange of exertion and to complete 15-20 repetitions within appropriate split time, with proper technique, and while maintaining safety. X 8 minutes         5/14/2024    10:57 AM   [facility]   Visit Number 11   VAS Pain Rating 7   Treadmill Time (in min.) 3 min   Speed 2 mph   Lumbar Weight 66 lbs   Repetitions 17   Rating of Perceived Exertion 4         Lee participated in therapeutic exercises to develop strength, endurance, ROM, flexibility, posture, and core stabilization for including x 47 minutes     Repeated movements:     Baselines: severe limitations in all directions   L sided back pain with L SG and L rotation     Prone on elbows x 2 minutes   Prone press ups 2 x 10 repetitions throughout tx session   Bridges 2 x 10 repetitions with blue abduction theraband   Bird/dog 2 x 5 repetitions each side x 3 second hold, CGA for balance   Sidelying open books 10 repetitions each side   Prone alternating arm and leg 10 repetitions each side , pain with L hip extension no effect following mobs or press ups, second set Physical Therapist assisted with L hip extension     Peripheral muscle strengthening which  included one set of 15-20 repetitions at a slow and controlled 10-13 second per rep pace focused on strengthening supporting musculature in order to improve body mechanics and functional mobility.  Patient and therapist focused on proper form during treatment to ensure optimal strengthening of each targeted muscle group.  Machines utilized include torso rotation, chest press, triceps extension, bicep curl, and upright row. Leg extension, leg curl, leg press, and hip adduction/abduction        NP:  + abdominal isometric crunch 15 x 5 second   + PPT 10 x 5 second  + PPT with march 10 repetitions each side   + standing palloff press black theraband 20 repetitions each side   + cat/cow 10 repetitions each way     Home Exercises Provided and Patient Education Provided    Home exercises include:     Standing extensions or prone press ups    Cardio program (V5): -  Lifting education (V11): -  Posture/ Using Lumbar Roll: educated  Fridge Magnet Discharge handout (date given): -  Equipment at home/gym membership: none    Education provided:   - PT role and Plan of Care   - Home exercise program     Written Home Exercises Provided: Patient instructed to cont prior HEP.  Exercises were reviewed and Lee was able to demonstrate them prior to the end of the session. Lee demonstrated good  understanding of the education provided.     See EMR under Patient Instructions for exercises provided prior visit.    Assessment     Arrives with continuation of back pain with radiation to the L side. He continues to be non compliant with HEP limiting his progress in therapy. He tolerated tx session fairly well. Prone press ups lead to increased standing range of motion. He reported pain with prone L hip extension. He continues to demonstrate weakness of core hips and trunk. He tolerated medx well with increased repetitions and decreased Rpe reported today.     Patient is making fair progress towards established goals.  Pt will continue to  benefit from skilled outpatient physical therapy to address the deficits stated in the impairment chart, provide pt/family education and to maximize pt's level of independence in the home and community environment.     Anticipated Barriers for therapy: co morbidities, history of lumbar fusions   Pt's spiritual, cultural and educational needs considered and pt agreeable to plan of care and goals as stated below:     Goals:    Short term goals:  6 weeks or 10 visits   - Pt will demonstrate increased lumbar MedX ROM by at least 3 degrees from the initial ROM value with improvements noted in functional ROM and ability to perform ADLs. MET 4/12/2024  - Pt will demonstrate increased MedX average isometric strength value by 10% from initial test resulting in improved ability to perform bending, lifting, and carrying activities safely, confidently. MET 4/12/2024  - Pt will report a reduction in worst pain score by 1-2 points for improved tolerance for transition from sit <-> stand. Appropriate and Ongoing  - Pt able to perform HEP correctly with minimal cueing or supervision from therapist to encourage independent management of symptoms. Appropriate and Ongoing     Long term goals: 10 weeks or 20 visits   - Pt will demonstrate increased lumbar MedX ROM by at least 6 degrees from initial ROM value, resulting in improved ability to perform functional forward bending while standing and sitting. MET 4/12/2024  - Pt will demonstrate increased MedX average isometric strength value by 40% from initial test resulting in improved ability to perform bending, lifting, and carrying activities safely and confidently. MET 4/12/2024  - Pt to demonstrate ability to independently control and reduce their pain through posture positioning and mechanical movements throughout a typical day. Appropriate and Ongoing  - Pt will demonstrate reduced pain and improved functional outcomes as reported on the FOTO by reaching an intake score of >/= 40%  functional ability in order to demonstrate subjective improvement in patient's condition. Appropriate and Ongoing  - Pt will demonstrate independence with the HEP at discharge. Appropriate and Ongoing  - Patient to report improved sleep hygiene 2' decreased low back pain Appropriate and Ongoing     Plan   Continue with established Plan of Care towards established PT goals.     Belén Story, PT  5/17/2024

## 2024-05-20 ENCOUNTER — PATIENT MESSAGE (OUTPATIENT)
Dept: ORTHOPEDICS | Facility: CLINIC | Age: 49
End: 2024-05-20

## 2024-05-21 ENCOUNTER — CLINICAL SUPPORT (OUTPATIENT)
Dept: REHABILITATION | Facility: HOSPITAL | Age: 49
End: 2024-05-21
Payer: MEDICAID

## 2024-05-21 DIAGNOSIS — M25.60 RANGE OF MOTION DEFICIT: Primary | ICD-10-CM

## 2024-05-21 DIAGNOSIS — R29.898 WEAKNESS OF BACK: ICD-10-CM

## 2024-05-21 DIAGNOSIS — Z73.89 PAIN SELF-MANAGEMENT DEFICIT: ICD-10-CM

## 2024-05-21 PROCEDURE — 97110 THERAPEUTIC EXERCISES: CPT | Mod: PN,CQ

## 2024-05-21 NOTE — PROGRESS NOTES
[facility]      Name: Lee Quintanilla  Clinic Number: [id number]    Therapy Diagnosis:   Encounter Diagnoses   Name Primary?    Range of motion deficit Yes    Pain self-management deficit     Weakness of back        Physician: Vincent Craig PA    Visit Date: 5/21/2024    Physician Orders: PT Eval and Treat  Medical Diagnosis from Referral:   Diagnosis   M47.816 (ICD-10-CM) - Facet arthritis of lumbar region   Z98.1 (ICD-10-CM) - History of lumbar spinal fusion   M51.36 (ICD-10-CM) - DDD (degenerative disc disease), lumbar      Evaluation Date: 1/18/2024  Authorization Period Expiration: 05/25/2024   Plan of Care Expiration: 7/14/2024  Progress Note Due: 2/18/2024  Visit # / Visits authorized: 12 /12  FOTO: 1/ 3    PTA Visit #: 0/5     Time In: 0855  Time Out: 0851  Total Billable Time: 56 minutes  INSURANCE and OUTCOMES: Fee for Service with FOTO Outcomes 4/3    Precautions: standard and HTN standard and previous lumbar fusion, previous open heart surgery (none recent)     Pattern of pain determined: 1 PEP    Subjective   Lee Quintanilla he continues to have pain this morning.      Patient reports he has been very sore since last treatment and has not done his exercises at home.  Patient reports their pain to be 7/10 on a 0-10 scale with 0 being no pain and 10 being the worst pain imaginable.    Pain Location: midline to L side      Work and leisure: not working currently, leisure - not specified  Pt goals: to have less pain     Objective   Isometric Testing on Med X equipment: Testing administered by PT     Test Initial Baseline Midpoint Final   Date 02- 4/12/2024     ROM 0-30 deg  0-36     Max Peak Torque 60   135     Min Peak Torque 19   17     Flex/Ext Ratio ~ 3 / 1  8     % below normative data 79  57     % gain from initial test Not available 68      Outcomes:  Intake Score: 28%  Visit 6 Score: 31/100  Visit 10 Score: 26/100   Visit 11: 29/100    Discharge Score:   Goal Score: 40%     Treatment    Lee received the treatments listed below:      +++ALL charges filed per Medicaid Guidelines+++    Lee received neuromuscular education via participation on the [employer] Machine. Therapist assisted patient in isolating and engaging spinal stabilization musculature in order to improve functional ability and postural control. Patient performed exercise with therapist guidance in order to accurately use pacer function, avoid valsalva, and optimally exert effort within a safe and effective range via the Tez Exertion Rating Scale. Patient instructed to perform at a midrange of exertion and to complete 15-20 repetitions within appropriate split time, with proper technique, and while maintaining safety. X 8 minutes           5/21/2024    10:02 AM   [facility]   Visit Number 13   VAS Pain Rating 7   Treadmill Time (in min.) 4 min   Speed 2 mph   Extension in Lying 20   Lumbar Weight 66 lbs   Repetitions 20   Rating of Perceived Exertion 3        Lee participated in therapeutic exercises to develop strength, endurance, ROM, flexibility, posture, and core stabilization for including x  minutes     Repeated movements:   Treadmill x 4 minutes, @ 2.0 mph     Prone on elbows x 2 minutes   Prone press ups 2 x 10 repetitions throughout tx session   Bridges 2 x 10 repetitions with blue abduction theraband   Bird/dog 2 x 5 repetitions each side x 3 second hold, CGA for balance   Sidelying open books x 10 repetitions each side   Sidelying external rotation Blue Theraband x 20 reps   Prone alternating arm and leg 10 repetitions each side   abdominal isometric crunch 15 x 5 second   Seated thoracia stretch over small roll, hands across chest x 20 reps       Peripheral muscle strengthening which included one set of 15-20 repetitions at a slow and controlled 10-13 second per rep pace focused on strengthening supporting musculature in order to improve body mechanics  and functional mobility.  Patient and therapist focused on proper form during treatment to ensure optimal strengthening of each targeted muscle group.  Machines utilized include torso rotation, chest press, triceps extension, bicep curl, and upright row. Leg extension, leg curl, leg press, and hip adduction/abduction        NP:  standing palloff press black theraband 20 repetitions each side   cat/cow 10 repetitions each way   Posterior pelvic tilts  10 x 5 second  Posterior pelvic tilts  with march 10 repetitions each side   Home Exercises Provided and Patient Education Provided    Home exercises include:     Standing extensions or prone press ups    Cardio program (V5): -  Lifting education (V11): -  Posture/ Using Lumbar Roll: educated  Fridge Magnet Discharge handout (date given): -  Equipment at home/gym membership: none    Education provided:   - PT role and Plan of Care   - Home exercise program     Written Home Exercises Provided: Patient instructed to cont prior HEP.  Exercises were reviewed and Lee was able to demonstrate them prior to the end of the session. Lee demonstrated good  understanding of the education provided.     See EMR under Patient Instructions for exercises provided prior visit.    Assessment     Patient continues to have increased pain in his back.  He reported his pain increasing when doing exercises also especially in prone.  Added thoracic extension stretch seated to address mid back discomfort.  He did well with the exercises and had no adverse effects reported.      Patient is making fair progress towards established goals.  Pt will continue to benefit from skilled outpatient physical therapy to address the deficits stated in the impairment chart, provide pt/family education and to maximize pt's level of independence in the home and community environment.     Anticipated Barriers for therapy: co morbidities, history of lumbar fusions   Pt's spiritual, cultural and educational  needs considered and pt agreeable to plan of care and goals as stated below:     Goals:    Short term goals:  6 weeks or 10 visits   - Pt will demonstrate increased lumbar MedX ROM by at least 3 degrees from the initial ROM value with improvements noted in functional ROM and ability to perform ADLs. MET 4/12/2024  - Pt will demonstrate increased MedX average isometric strength value by 10% from initial test resulting in improved ability to perform bending, lifting, and carrying activities safely, confidently. MET 4/12/2024  - Pt will report a reduction in worst pain score by 1-2 points for improved tolerance for transition from sit <-> stand. Appropriate and Ongoing  - Pt able to perform HEP correctly with minimal cueing or supervision from therapist to encourage independent management of symptoms. Appropriate and Ongoing     Long term goals: 10 weeks or 20 visits   - Pt will demonstrate increased lumbar MedX ROM by at least 6 degrees from initial ROM value, resulting in improved ability to perform functional forward bending while standing and sitting. MET 4/12/2024  - Pt will demonstrate increased MedX average isometric strength value by 40% from initial test resulting in improved ability to perform bending, lifting, and carrying activities safely and confidently. MET 4/12/2024  - Pt to demonstrate ability to independently control and reduce their pain through posture positioning and mechanical movements throughout a typical day. Appropriate and Ongoing  - Pt will demonstrate reduced pain and improved functional outcomes as reported on the FOTO by reaching an intake score of >/= 40% functional ability in order to demonstrate subjective improvement in patient's condition. Appropriate and Ongoing  - Pt will demonstrate independence with the HEP at discharge. Appropriate and Ongoing  - Patient to report improved sleep hygiene 2' decreased low back pain Appropriate and Ongoing     Plan   Continue with established Plan  of Care towards established PT goals.     Arlyn Lopes, PTA  5/21/2024

## 2024-05-24 ENCOUNTER — DOCUMENTATION ONLY (OUTPATIENT)
Dept: REHABILITATION | Facility: HOSPITAL | Age: 49
End: 2024-05-24
Payer: MEDICAID

## 2024-05-24 ENCOUNTER — CLINICAL SUPPORT (OUTPATIENT)
Dept: REHABILITATION | Facility: HOSPITAL | Age: 49
End: 2024-05-24
Payer: MEDICAID

## 2024-05-24 DIAGNOSIS — M25.60 RANGE OF MOTION DEFICIT: Primary | ICD-10-CM

## 2024-05-24 DIAGNOSIS — Z73.89 PAIN SELF-MANAGEMENT DEFICIT: ICD-10-CM

## 2024-05-24 DIAGNOSIS — R29.898 WEAKNESS OF BACK: ICD-10-CM

## 2024-05-24 PROCEDURE — 97110 THERAPEUTIC EXERCISES: CPT | Mod: PN

## 2024-05-24 NOTE — PROGRESS NOTES
PT/PTA met face to face to discuss pt's treatment plan and progress towards established goals. Pt will be seen by a physical therapist minimally every 6th visit or every 30 days.      Arlyn Lopes PTA

## 2024-05-24 NOTE — PROGRESS NOTES
[facility]      Name: Lee Quintanilla  Clinic Number: [id number]    Therapy Diagnosis:   Encounter Diagnoses   Name Primary?    Range of motion deficit Yes    Pain self-management deficit     Weakness of back      Physician: Vincent Craig PA    Visit Date: 5/24/2024    Physician Orders: PT Eval and Treat  Medical Diagnosis from Referral:   Diagnosis   M47.816 (ICD-10-CM) - Facet arthritis of lumbar region   Z98.1 (ICD-10-CM) - History of lumbar spinal fusion   M51.36 (ICD-10-CM) - DDD (degenerative disc disease), lumbar      Evaluation Date: 1/18/2024  Authorization Period Expiration: 7/5/2024  Plan of Care Expiration: 7/14/2024  Progress Note Due: 2/18/2024  Visit # / Visits authorized: 14 /16    PTA Visit #: 0/5     Time In: 1003 AM  Time Out: 1113 AM  Total Billable Time: 60 minutes  INSURANCE and OUTCOMES: Fee for Service with FOTO Outcomes 4/3    Precautions: standard and HTN standard and previous lumbar fusion, previous open heart surgery (none recent)     Pattern of pain determined: 1 PEP    Subjective   Lee Quintanilla no change. He sneezed the other day and his knees buckled.    Patient reports he has been very sore since last treatment and has not done his exercises at home.  Patient reports their pain to be 7/10 on a 0-10 scale with 0 being no pain and 10 being the worst pain imaginable.    Pain Location: midline to L side      Work and leisure: not working currently, leisure - not specified  Pt goals: to have less pain     Objective   Isometric Testing on Med X equipment: Testing administered by PT     Test Initial Baseline Midpoint Final   Date 02- 4/12/2024     ROM 0-30 deg  0-36     Max Peak Torque 60   135     Min Peak Torque 19   17     Flex/Ext Ratio ~ 3 / 1  8     % below normative data 79  57     % gain from initial test Not available 68      Outcomes:  Intake Score: 28%  Visit 6 Score: 31/100  Visit 10 Score: 26/100   Visit 11:  29/100   Discharge Score:   Goal Score: 40%     Treatment    Lee received the treatments listed below:      +++ALL charges filed per Medicaid Guidelines+++    Lee received neuromuscular education via participation on the [employer] Machine. Therapist assisted patient in isolating and engaging spinal stabilization musculature in order to improve functional ability and postural control. Patient performed exercise with therapist guidance in order to accurately use pacer function, avoid valsalva, and optimally exert effort within a safe and effective range via the Tez Exertion Rating Scale. Patient instructed to perform at a midrange of exertion and to complete 15-20 repetitions within appropriate split time, with proper technique, and while maintaining safety. X 8 minutes         5/24/2024    11:05 AM   [facility]   Visit Number 14   VAS Pain Rating 7   Lumbar Flexion 42   Lumbar Extension 0   Lumbar Weight 70 lbs   Repetitions 20   Rating of Perceived Exertion 4      Lee participated in therapeutic exercises to develop strength, endurance, ROM, flexibility, posture, and core stabilization for including x 52 minutes     Repeated movements:   Treadmill x 4 minutes, @ 2.0 mph     Prone on elbows x 2 minutes   Prone press ups 1 x 10 repetitions start of tx session   Bridges 2 x 10 repetitions with blue abduction theraband   Bird/dog 1 x 5 repetitions each side x 3 second hold, CGA for balance   Prone alternating arm and leg 10 repetitions each side   + fire hydrant green theraband 2 x 10 each side   Sidelying open books x 15 repetitions each side   Squats with dowel 15 repetitions     Peripheral muscle strengthening which included one set of 15-20 repetitions at a slow and controlled 10-13 second per rep pace focused on strengthening supporting musculature in order to improve body mechanics and functional mobility.  Patient and therapist focused on proper form during treatment to ensure optimal  strengthening of each targeted muscle group.  Machines utilized include torso rotation, chest press, triceps extension, bicep curl, and upright row. Leg extension, leg curl, leg press, and hip adduction/abduction      NP:   Abdominal isometric crunch 15 x 5 second   Seated thoracia stretch over small roll, hands across chest x 20 reps   standing palloff press black theraband 20 repetitions each side   cat/cow 10 repetitions each way   Posterior pelvic tilts  10 x 5 second  Posterior pelvic tilts  with march 10 repetitions each side     10 minute ice to lumbar spine x 10 minutes     Home Exercises Provided and Patient Education Provided    Home exercises include:     Standing extensions or prone press ups    Cardio program (V5): -  Lifting education (V11): -  Posture/ Using Lumbar Roll: educated  Fridge Magnet Discharge handout (date given): -  Equipment at home/gym membership: none    Education provided:   - PT role and Plan of Care   - Home exercise program     Written Home Exercises Provided: Patient instructed to cont prior HEP.  Exercises were reviewed and Lee was able to demonstrate them prior to the end of the session. Lee demonstrated good  understanding of the education provided.     See EMR under Patient Instructions for exercises provided prior visit.    Assessment     Patient continues to arrive with L sided low back pain with minimal change since evaluation. He continues to demonstrate significant weakness of back extensors and hip extensors when working in prone. He does demonstrate improvement in flexion range of motion on medx machine increasing range of motion from 36 to 42 today with greater ease. He continues to demonstrate overall stiffness with significant range of motion limitations of the spine. He has history of non compliance with HEP which is likely affecting his progress. We will plan for discharge next tx session. He is following up with MD on June 3rd.     Patient is making fair  progress towards established goals.  Pt will continue to benefit from skilled outpatient physical therapy to address the deficits stated in the impairment chart, provide pt/family education and to maximize pt's level of independence in the home and community environment.     Anticipated Barriers for therapy: co morbidities, history of lumbar fusions   Pt's spiritual, cultural and educational needs considered and pt agreeable to plan of care and goals as stated below:     Goals:    Short term goals:  6 weeks or 10 visits   - Pt will demonstrate increased lumbar MedX ROM by at least 3 degrees from the initial ROM value with improvements noted in functional ROM and ability to perform ADLs. MET 4/12/2024  - Pt will demonstrate increased MedX average isometric strength value by 10% from initial test resulting in improved ability to perform bending, lifting, and carrying activities safely, confidently. MET 4/12/2024  - Pt will report a reduction in worst pain score by 1-2 points for improved tolerance for transition from sit <-> stand. Appropriate and Ongoing  - Pt able to perform HEP correctly with minimal cueing or supervision from therapist to encourage independent management of symptoms. Appropriate and Ongoing     Long term goals: 10 weeks or 20 visits   - Pt will demonstrate increased lumbar MedX ROM by at least 6 degrees from initial ROM value, resulting in improved ability to perform functional forward bending while standing and sitting. MET 4/12/2024  - Pt will demonstrate increased MedX average isometric strength value by 40% from initial test resulting in improved ability to perform bending, lifting, and carrying activities safely and confidently. MET 4/12/2024  - Pt to demonstrate ability to independently control and reduce their pain through posture positioning and mechanical movements throughout a typical day. Appropriate and Ongoing  - Pt will demonstrate reduced pain and improved functional outcomes as  reported on the FOTO by reaching an intake score of >/= 40% functional ability in order to demonstrate subjective improvement in patient's condition. Appropriate and Ongoing  - Pt will demonstrate independence with the HEP at discharge. Appropriate and Ongoing  - Patient to report improved sleep hygiene 2' decreased low back pain Appropriate and Ongoing     Plan   Continue with established Plan of Care towards established PT goals.     Belén Story, PT  5/24/2024

## 2024-05-27 NOTE — PROGRESS NOTES
Subjective:       Patient ID: Lee Quintanilla is a 48 y.o. male.    Chief Complaint: Post-op Evaluation      HPI:  Patient returns for post op evaluation. Drain Putting out about 40 cc per day. No new complaints. No fevers or chills     Review of Systems    Objective:      Physical Exam  Constitutional:       General: He is not in acute distress.  Pulmonary:      Effort: Pulmonary effort is normal. No respiratory distress.   Skin:     Comments: Incision clean and intact.   Drain output serous.    Neurological:      Mental Status: He is alert and oriented to person, place, and time.       Assessment/Plan:   Chronic recurrent pilonidal cyst      S/p excision. Will keep drain in for now. RTC next week for eval for Drain removal.

## 2024-05-28 ENCOUNTER — CLINICAL SUPPORT (OUTPATIENT)
Dept: REHABILITATION | Facility: HOSPITAL | Age: 49
End: 2024-05-28
Payer: MEDICAID

## 2024-05-28 DIAGNOSIS — Z73.89 PAIN SELF-MANAGEMENT DEFICIT: ICD-10-CM

## 2024-05-28 DIAGNOSIS — R29.898 WEAKNESS OF BACK: ICD-10-CM

## 2024-05-28 DIAGNOSIS — M25.60 RANGE OF MOTION DEFICIT: Primary | ICD-10-CM

## 2024-05-28 PROCEDURE — 97110 THERAPEUTIC EXERCISES: CPT | Mod: PN

## 2024-05-28 NOTE — PLAN OF CARE
[facility] Discharge Summary     Name: Lee Quintanilla  Clinic Number: [id number]    Therapy Diagnosis:   Encounter Diagnoses   Name Primary?    Range of motion deficit Yes    Pain self-management deficit     Weakness of back      Physician: Vincent Craig PA         Physician Orders: PT Eval and Treat  Medical Diagnosis from Referral:   Diagnosis   M47.816 (ICD-10-CM) - Facet arthritis of lumbar region   Z98.1 (ICD-10-CM) - History of lumbar spinal fusion   M51.36 (ICD-10-CM) - DDD (degenerative disc disease), lumbar      Evaluation Date: 1/18/2024    Date of Last visit: 5/28/2024  Total Visits Received: 15     Pattern of pain determined: 5    Subjective     Lee Quintanilla his hips were really bothering him this morning.      Patient reports he has been very sore since last treatment and has not done his exercises at home.  Patient reports their pain to be 7/10 on a 0-10 scale with 0 being no pain and 10 being the worst pain imaginable.     Pain Location: midline to L side      Work and leisure: not working currently, leisure - not specified  Pt goals: to have less pain     Objective    Isometric Testing on Med X equipment: Testing administered by PT     Test Initial Baseline Midpoint Final   Date 02- 4/12/2024 5/28/2024   ROM 0-30 deg  0-36  0-42   Max Peak Torque 60   135  109   Min Peak Torque 19   17  32   Flex/Ext Ratio ~ 3 / 1  8  3.4   % below normative data 79  57  69%   % gain from initial test Not available 68 18%     Outcomes:  Intake Score: 28%  Visit 6 Score: 31/100  Visit 10 Score: 26/100   Visit 11: 29/100   Discharge Score: NT  Goal Score: 40%   Assessment      Goals:  Short term goals:  6 weeks or 10 visits   - Pt will demonstrate increased lumbar MedX ROM by at least 3 degrees from the initial ROM value with improvements noted in functional ROM and ability to perform ADLs. MET 4/12/2024  - Pt will demonstrate increased MedX average isometric  strength value by 10% from initial test resulting in improved ability to perform bending, lifting, and carrying activities safely, confidently. MET 4/12/2024  - Pt will report a reduction in worst pain score by 1-2 points for improved tolerance for transition from sit <-> stand. Not met 5/28/2024  - Pt able to perform HEP correctly with minimal cueing or supervision from therapist to encourage independent management of symptoms. MET 5/28/2024     Long term goals: 10 weeks or 20 visits   - Pt will demonstrate increased lumbar MedX ROM by at least 6 degrees from initial ROM value, resulting in improved ability to perform functional forward bending while standing and sitting. MET 4/12/2024  - Pt will demonstrate increased MedX average isometric strength value by 40% from initial test resulting in improved ability to perform bending, lifting, and carrying activities safely and confidently. MET 4/12/2024  - Pt to demonstrate ability to independently control and reduce their pain through posture positioning and mechanical movements throughout a typical day. Not met 5/28/2024  - Pt will demonstrate reduced pain and improved functional outcomes as reported on the FOTO by reaching an intake score of >/= 40% functional ability in order to demonstrate subjective improvement in patient's condition.  Not met 5/28/2024  - Pt will demonstrate independence with the HEP at discharge.  Met 5/28/2024  - Patient to report improved sleep hygiene 2' decreased low back pain  Not met 5/28/2024    Patient has attended 15 visits of the [facility] program for aerobic work, med ex isometric testing with dynamic strengthening on med ex equipment for spine, whole body strengthening on med ex equipment, HEP, education.  Patient demonstrates improvement in ability to reduce symptoms, improved posture, improved ROM and improved strength.  Reviewed HEP, and importance of maintaining a healthy spine through continued stretching and performance of  HEP, good posture, good ergonomics, good body mech with ADL, leisure, and work.  Discharge handout with HEP given, and discussed aspects of exercise program, cardio, strengthening, and stretching.    Patient expressed understanding information given.     Patient is being discharge 2/2 reaching maximum functional ability at this time. He continues to demonstrate significant stiffness of the spine. He does not demonstrate a directional preference. He also demonstrates signs and symptoms consistent with chronic pain and pain pattern 5 and has received education on this to improve functional mobility. His pain remains the same and denies improvement in pain. He demonstrates and reports poor compliance with HEP affecting his progress in therapy.      -Improved 12 ROM,  initially 30 on med ex test and currently 42  -Improved strength on lumbar med ex IM test with 18% average improvement noted and reduced pain noted by patient  -Improved outcome measure, scoring 28 on initial and 29 at discharge indicating overall improved function and perception of abilities    Discharge reason: Patient has reached the maximum rehab potential for the present time and Patient has completed allowable visits authorized by insurance    Plan   This patient is discharged from Physical Therapy    Belén Story, PT

## 2024-05-28 NOTE — PATIENT INSTRUCTIONS
"HEALTHY BACK TOOLS        KEEP YOUR SPINE FEELING FINE   HEALTHY HABITS   Do your "GO TO" stretches 2/day   Get a good night's REST   Watch your POSTURE in sitting/standing Drink PLENTY of water   Use a lumbar roll Eat LOTS of fruits & vegetables   GET UP often (walk and/or stretch) Manage your STRESS   Make your workplace IDEAL FOR YOU  Don't smoke   Lift correctly EXERCISE   Practice positive self talk-talk to yourself kindly like you would your best friend                         WHAT TO DO WHEN SYMPTOMS FLARE UP  Back and neck pain may occasionally flare up.  If you experience a flare   up, remember your tools. Be encouraged, by remembering that flare-ups will   usually pass.   My Tools:    ~Use your "Go To" Stretches/Positions   ~Keep Moving-pain usually gets better if you move  ~Z lie (with or without ice)  10 min several times a day until symptoms reduce  ~Slowly resume normal activities   ~Practice Deep Breathing and Relaxation techniques                                                 MY EXERCISE PLAN  GO TO STRETCHES  2/day (like brushing your teeth) STRENGTHENING  2-3 times/week CARDIO PROGRAM   min/week   DKTC  Bridging  Walking    Prone press up Open books    Seated flexion/ reaches  Fire hydrants     Prone alternating      Bird/dog           The physical cycle is very intuitive for people! As pain increases & you avoid activities, you get weaker. This leads to more pain, which leads to more activity avoidance & the cycle fuels on leading to maladaptive behaviors. You start avoiding activities you love to do & start having pain with normal & safe activities.    With chronic conditions, it is important to consider all of the negative psychological factors associated with pain. How we experience pain is based on the context of our lives. You are limited & thus getting less tristen out of life, but also have all of the past experiences with pain looming over you. This negative context is scientifically proven " to cause the brain to become more sensitive & perceive more pain. Pain may change or spread, & you may to start feeling pain from non-painful stimuli, such as movement & everyday life. This does not mean your pain is not real, it just means the brain is producing more than it should & your mind needs to be retrained along with your body.                   [employer]: Understanding Pain in less than 5 minutes, and what to do about it!  https://www.youtube.com/watch?[email or web address]        Chronic Pain Explained Video

## 2024-05-28 NOTE — PROGRESS NOTES
[facility]      Name: Lee Quintanilla  Clinic Number: [id number]    Therapy Diagnosis:   Encounter Diagnoses   Name Primary?    Range of motion deficit Yes    Pain self-management deficit     Weakness of back      Physician: Vincent Craig PA    Visit Date: 5/28/2024    Physician Orders: PT Eval and Treat  Medical Diagnosis from Referral:   Diagnosis   M47.816 (ICD-10-CM) - Facet arthritis of lumbar region   Z98.1 (ICD-10-CM) - History of lumbar spinal fusion   M51.36 (ICD-10-CM) - DDD (degenerative disc disease), lumbar      Evaluation Date: 1/18/2024  Authorization Period Expiration: 7/5/2024  Plan of Care Expiration: 7/14/2024  Progress Note Due: 2/18/2024  Visit # / Visits authorized: 15 /16    PTA Visit #: 0/5     Time In: 900 AM  Time Out: 1005 AM  Total Billable Time: 55 minutes  INSURANCE and OUTCOMES: Fee for Service with FOTO Outcomes 4/3    Precautions: standard and HTN standard and previous lumbar fusion, previous open heart surgery (none recent)     Pattern of pain determined: 1 PEP to pattern 5    Subjective   Lee Quintanilla his hips were really bothering him this morning.     Patient reports he has been very sore since last treatment and has not done his exercises at home.  Patient reports their pain to be 7/10 on a 0-10 scale with 0 being no pain and 10 being the worst pain imaginable.    Pain Location: midline to L side      Work and leisure: not working currently, leisure - not specified  Pt goals: to have less pain     Objective   Isometric Testing on Med X equipment: Testing administered by PT     Test Initial Baseline Midpoint Final   Date 02- 4/12/2024 5/28/2024   ROM 0-30 deg  0-36  0-42   Max Peak Torque 60   135  109   Min Peak Torque 19   17  32   Flex/Ext Ratio ~ 3 / 1  8  3.4   % below normative data 79  57  69%   % gain from initial test Not available 68 18%     Outcomes:  Intake Score: 28%  Visit 6 Score: 31/100  Visit  10 Score: 26/100   Visit 11: 29/100   Discharge Score: NT  Goal Score: 40%     Treatment    Lee received the treatments listed below:      Physical Therapy technician assisted with treatment under direct supervision of treating therapist. Patient received 1:1 treatments for 20 minutes with Physical Therapist.     +++ALL charges filed per Medicaid Guidelines+++    Lee received neuromuscular education via participation on the [employer] Machine. Therapist assisted patient in isolating and engaging spinal stabilization musculature in order to improve functional ability and postural control. Patient performed exercise with therapist guidance in order to accurately use pacer function, avoid valsalva, and optimally exert effort within a safe and effective range via the Tez Exertion Rating Scale. Patient instructed to perform at a midrange of exertion and to complete 15-20 repetitions within appropriate split time, with proper technique, and while maintaining safety. X 15 minutes         5/28/2024    10:51 AM   [facility]   Visit Number 15   VAS Pain Rating 7   Lumbar Flexion 42   Lumbar Extension 0   Lumbar Peak Torque 109 ft. lbs.   Min Torque 32   Test Percent Below Normative Data 69 %   Test Percent Gain in Strength from Initial  18 %      Lee participated in therapeutic exercises to develop strength, endurance, ROM, flexibility, posture, and core stabilization for including x 40 minutes     Treadmill x 3 minutes, @ 2.0 mph   Prone press ups 1 x 10 repetitions  Bridges 2 x 10 repetitions with blue abduction theraband   Prone alternating arm and leg 10 repetitions each side   Sidelying open books x 10 repetitions each side   Educated on chronic pain cycles, central sensitization, fridge magnet     Peripheral muscle strengthening which included one set of 15-20 repetitions at a slow and controlled 10-13 second per rep pace focused on strengthening supporting musculature in order to improve body  mechanics and functional mobility.  Patient and therapist focused on proper form during treatment to ensure optimal strengthening of each targeted muscle group.  Machines utilized include torso rotation, chest press, triceps extension, bicep curl, and upright row. Leg extension, leg curl, leg press, and hip adduction/abduction      Home Exercises Provided and Patient Education Provided    Home exercises include: standing extension prone press ups DKTC seated flexion   Cardio program (V5): -  Lifting education (V11): -  Posture/ Using Lumbar Roll: educated  Fridge Magnet Discharge handout (date given): -  Equipment at home/gym membership: none    Education provided:   - PT role and Plan of Care   - Home exercise program     Written Home Exercises Provided: Patient instructed to cont prior HEP.  Exercises were reviewed and Lee was able to demonstrate them prior to the end of the session. Lee demonstrated good  understanding of the education provided.     See EMR under Patient Instructions for exercises provided prior visit.    Assessment     See discharge note. Tx session focused on review of HEP and medx testing for preparation for discharge.      Plan     See discharge note.     Belén Story, PT  5/28/2024

## 2024-06-03 ENCOUNTER — OFFICE VISIT (OUTPATIENT)
Dept: ORTHOPEDICS | Facility: CLINIC | Age: 49
End: 2024-06-03
Payer: MEDICAID

## 2024-06-03 VITALS — HEIGHT: 67 IN | BODY MASS INDEX: 35.33 KG/M2 | WEIGHT: 225.06 LBS

## 2024-06-03 DIAGNOSIS — M51.36 DDD (DEGENERATIVE DISC DISEASE), LUMBAR: ICD-10-CM

## 2024-06-03 DIAGNOSIS — Z98.1 HISTORY OF LUMBAR SPINAL FUSION: ICD-10-CM

## 2024-06-03 DIAGNOSIS — M54.16 LUMBAR RADICULOPATHY: Primary | ICD-10-CM

## 2024-06-03 PROCEDURE — 1160F RVW MEDS BY RX/DR IN RCRD: CPT | Mod: CPTII,S$GLB,, | Performed by: ORTHOPAEDIC SURGERY

## 2024-06-03 PROCEDURE — 3044F HG A1C LEVEL LT 7.0%: CPT | Mod: CPTII,S$GLB,, | Performed by: ORTHOPAEDIC SURGERY

## 2024-06-03 PROCEDURE — 4010F ACE/ARB THERAPY RXD/TAKEN: CPT | Mod: CPTII,S$GLB,, | Performed by: ORTHOPAEDIC SURGERY

## 2024-06-03 PROCEDURE — 1159F MED LIST DOCD IN RCRD: CPT | Mod: CPTII,S$GLB,, | Performed by: ORTHOPAEDIC SURGERY

## 2024-06-03 PROCEDURE — 99213 OFFICE O/P EST LOW 20 MIN: CPT | Mod: S$GLB,,, | Performed by: ORTHOPAEDIC SURGERY

## 2024-06-03 PROCEDURE — 3008F BODY MASS INDEX DOCD: CPT | Mod: CPTII,S$GLB,, | Performed by: ORTHOPAEDIC SURGERY

## 2024-06-03 RX ORDER — TESTOSTERONE 200 MG
PELLET (EA) IMPLANTATION
COMMUNITY

## 2024-06-03 RX ORDER — GABAPENTIN 300 MG/1
300 CAPSULE ORAL 3 TIMES DAILY
Qty: 90 CAPSULE | Refills: 0 | Status: SHIPPED | OUTPATIENT
Start: 2024-06-03 | End: 2025-06-03

## 2024-06-03 RX ORDER — GABAPENTIN 300 MG/1
300 CAPSULE ORAL 3 TIMES DAILY
Qty: 90 CAPSULE | Refills: 0 | Status: SHIPPED | OUTPATIENT
Start: 2024-06-03 | End: 2024-06-03

## 2024-06-03 RX ORDER — ROSUVASTATIN CALCIUM 5 MG/1
TABLET, COATED ORAL
COMMUNITY

## 2024-06-03 RX ORDER — ANASTROZOLE 1 MG/1
TABLET ORAL
COMMUNITY

## 2024-06-03 RX ORDER — ISOSORBIDE MONONITRATE 30 MG/1
30 TABLET, EXTENDED RELEASE ORAL
COMMUNITY
Start: 2024-04-25

## 2024-06-03 RX ORDER — DULOXETIN HYDROCHLORIDE 20 MG/1
CAPSULE, DELAYED RELEASE ORAL
COMMUNITY

## 2024-06-03 NOTE — PROGRESS NOTES
Subjective:       Patient ID: Lee Quintanilla is a 48 y.o. male.    Chief Complaint: Pain of the Lumbar Spine (Patient is here for lumbar PT f/u. States pain has gotten worse since last visit. Has a constant pain and has numbness in both feet, left side is worse. )      History of Present Illness    Prior to meeting with the patient I reviewed the medical chart in [facility]. This included reviewing the previous progress notes from our office, review of the patient's last appointment with their primary care provider, review of any visits to the emergency room, and review of any pain management appointments or procedures.   Lee comes in today for follow-up for his lower back.  He is a history of fusion at L4-5 and L5-S1.  This has generally served him very well.  He has had left-sided low back pain for the past year or so.  He does not recollect any injury or trauma.  He reports he does have numbness/tingling that goes into bilateral feet, more so affected left lower extremity.  He denies any bowel or bladder changes.  Complains of back pain worse than leg pain.  He was seen for this just over 1 year ago and given an intramuscular injection of Kenalog, started on Mobic anti-inflammatory, enrolled in physical therapy.  He completed his PT.  Reports he was not particularly beneficial.  His cardiologist stopped the anti-inflammatory.     Current Medications  Current Outpatient Medications   Medication Sig Dispense Refill    anastrozole (ARIMIDEX) 1 mg Tab Take 1 mg by mouth every 7 days.      anastrozole (ARIMIDEX) 1 mg Tab Take by mouth.      aspirin (ECOTRIN) 81 MG EC tablet Take 81 mg by mouth once daily.      clopidogreL (PLAVIX) 75 mg tablet Take 75 mg by mouth once daily.      DULoxetine (CYMBALTA) 20 MG capsule Take by mouth.      ENTRESTO 49-51 mg per tablet Take 1 tablet by mouth 2 (two) times daily.      EScitalopram oxalate (LEXAPRO) 10 MG tablet Take 1 tablet (10 mg total) by mouth every evening. 30  tablet 1    furosemide (LASIX) 40 MG tablet Take 40 mg by mouth once daily.      isosorbide mononitrate (IMDUR) 30 MG 24 hr tablet Take 30 mg by mouth.      metoprolol tartrate (LOPRESSOR) 25 MG tablet Take 50 mg by mouth 2 (two) times daily.      rosuvastatin (CRESTOR) 40 MG Tab Take 40 mg by mouth once daily.      rosuvastatin (CRESTOR) 5 MG tablet Take by mouth.      semaglutide (OZEMPIC) 0.25 mg or 0.5 mg(2 mg/1.5 mL) pen injector Inject 0.25 mg every week by subcutaneous route as directed for 30 days.      testosterone 200 mg Pllt 200 MG Implant WEEKLY, 1 tab(s)      testosterone cypionate (DEPOTESTOTERONE CYPIONATE) 200 mg/mL injection Inject 200 mg into the muscle every 7 days.      testosterone cypionate 200 mg/mL Kit INJECT 1 MILLILITERS INTRAMUSCULAR EVERY WEEK      gabapentin (NEURONTIN) 300 MG capsule Take 1 capsule (300 mg total) by mouth 3 (three) times daily. 90 capsule 0    HYDROcodone-acetaminophen (NORCO)  mg per tablet Take 1 tablet by mouth every 6 (six) hours as needed for Pain. 20 tablet 0     No current facility-administered medications for this visit.       Allergies  Review of patient's allergies indicates:   Allergen Reactions    Inapsine [droperidol] Anaphylaxis     seizures    Effexor [venlafaxine]      Increased anxiety    Pcn [penicillins]     Bactrim [sulfamethoxazole-trimethoprim] Rash     Dry red rash all over when in the sun    Fluconazole Rash     Pruritis         Past Medical History  Past Medical History:   Diagnosis Date    ADHD (attention deficit hyperactivity disorder)     Arthritis     Asthma     as child only    Back pain     Coronary artery disease     Degeneration of lumbar intervertebral disc 05/2016    Depression     Digestive disorder     Elevated PSA     Headache     Hyperlipidemia     Hypertension     Kidney damage     stage 3 ; d/t aleve abuse    Kidney stone     Myocardial infarction     Neck pain     Numbness and tingling in hands     Numbness and tingling of  both legs     Seizures     from inapsine 2002    Sleep apnea     no cpap    Wears glasses     CONTACS       Surgical History  Past Surgical History:   Procedure Laterality Date    BACK SURGERY  2016    back surgery    BONE GRAFT N/A 2020    Procedure: BONE GRAFT;  Surgeon: Mateusz Blanco MD;  Location: [facility] OR;  Service: Orthopedics;  Laterality: N/A;    CARDIAC SURGERY  2020    CABG X 7    CORONARY ARTERY BYPASS GRAFT      7 vessels    HERNIA REPAIR Bilateral 2017    LITHOTRIPSY      LUMBAR LAMINECTOMY WITH FUSION Bilateral 2020    Procedure: LAMINECTOMY, SPINE, LUMBAR, WITH FUSION;  Surgeon: Mateusz Blanco MD;  Location: [facility] OR;  Service: Orthopedics;  Laterality: Bilateral;  MEDTRONIC  NTI  L-3    PILONIDAL CYST DRAINAGE      removal of abcess      scrotal    REMOVAL OF HARDWARE FROM SPINE Bilateral 2020    Procedure: REMOVAL, HARDWARE, SPINE;  Surgeon: Mateusz Blanco MD;  Location: [facility] OR;  Service: Orthopedics;  Laterality: Bilateral;  L 4-5    SURGICAL REMOVAL OF PILONIDAL CYST N/A 4/15/2024    Procedure: EXCISION, PILONIDAL CYST;  Surgeon: Jayden Phillips III, MD;  Location: [facility] OR;  Service: General;  Laterality: N/A;  sacral area    TYMPANOSTOMY TUBE PLACEMENT         Family History:   Family History   Problem Relation Name Age of Onset    Heart disease Mother      Migraines Mother      Hypertension Mother      Early death Father accident     Heart disease Father accident     Diabetes Maternal Aunt      Diabetes Maternal Uncle      Diabetes Maternal Grandfather         Social History:   Social History     Socioeconomic History    Marital status:    Occupational History    Occupation: disability   Tobacco Use    Smoking status: Former     Current packs/day: 0.00     Types: Cigarettes     Quit date: 2016     Years since quittin.4     Passive exposure: Past    Smokeless tobacco: Former     Quit date: 2016    Tobacco comments:     occasional    Substance and Sexual Activity    Alcohol use: Yes     Alcohol/week: 1.0 standard drink of alcohol     Types: 1 Glasses of wine per week     Comment: 20 - 30 shots vodka per day or 1-2 1/5ths per day for 2 years (started 2021)    Drug use: No    Sexual activity: Yes     Partners: Female     Social Determinants of Health     Financial Resource Strain: Medium Risk (3/18/2024)    Overall Financial Resource Strain (CARDIA)     Difficulty of Paying Living Expenses: Somewhat hard   Food Insecurity: No Food Insecurity (3/18/2024)    Hunger Vital Sign     Worried About Running Out of Food in the Last Year: Never true     Ran Out of Food in the Last Year: Never true   Transportation Needs: No Transportation Needs (3/18/2024)    PRAPARE - Transportation     Lack of Transportation (Medical): No     Lack of Transportation (Non-Medical): No   Physical Activity: Insufficiently Active (3/18/2024)    Exercise Vital Sign     Days of Exercise per Week: 2 days     Minutes of Exercise per Session: 60 min   Stress: No Stress Concern Present (3/18/2024)    [employer] - Occupational Stress Questionnaire     Feeling of Stress : Only a little   Housing Stability: Unknown (3/18/2024)    Housing Stability Vital Sign     Unable to Pay for Housing in the Last Year: No     Unstable Housing in the Last Year: No       Hospitalization/Major Diagnostic Procedure:     Review of Systems     General/Constitutional:  Chills denies. Fatigue denies. Fever denies. Weight gain denies. Weight loss denies.    Respiratory:  Shortness of breath denies.    Cardiovascular:  Chest pain denies.    Gastrointestinal:  Constipation denies. Diarrhea denies. Nausea denies. Vomiting denies.     Hematology:  Easy bruising denies. Prolonged bleeding denies.     Genitourinary:  Frequent urination denies. Pain in lower back denies. Painful urination denies.     Musculoskeletal:  See HPI for details    Skin:  Rash denies.    Neurologic:   Dizziness denies. Gait abnormalities denies. Seizures denies. Tingling/Numbess denies.    Psychiatric:  Anxiety denies. Depressed mood denies.     Objective:   Vital Signs: There were no vitals filed for this visit.     Physical Exam      General Examination:     Constitutional: The patient is alert and oriented to lace person and time. Mood is pleasant.     Head/Face: Normal facial features normal eyebrows    Eyes: Normal extraocular motion bilaterally    Lungs: Respirations are equal and unlabored    Gait is coordinated.    Cardiovascular: There are no swelling or varicosities present.    Lymphatic: Negative for adenopathy    Skin: Normal    Neurological: Level of consciousness normal. Oriented to place person and time and situation    Psychiatric: Oriented to time place person and situation    Lumbar exam: Lumbar exam is similar to where he was on previous visits with us. Skin throughout his lower back is clean dry and intact.  There is no erythema or ecchymosis.  There are no signs or symptoms of infection.  He has well-healed posterior lumbar incision consistent with his previous fusion surgeries.  He is neurovascularly intact throughout bilateral lower extremities.  He has a negative straight leg raise bilaterally.  He has well-preserved internal/external rotation of bilateral hips without pain.  He can weightbear as tolerated on bilateral lower extremities.  He is nontender to palpation over bilateral greater trochanters.  He has well-preserved flexion/extension of bilateral knees.  He is not really tender to palpation about the right-sided lumbar paravertebrals.  He is tender to palpation about the left-sided lumbar paravertebrals extending into the lumbosacral junction.  Does stand erect.  Mildly antalgic sit to stand.  Mildly antalgic gait.      XRAY Report/ Interpretation:  No new radiographs taken on today's clinic visit.    Assessment:       1. Lumbar radiculopathy    2. History of lumbar spinal fusion     3. DDD (degenerative disc disease), lumbar        Plan:       Lee was seen today for pain.    Diagnoses and all orders for this visit:    Lumbar radiculopathy  -     MRI Lumbar Spine W WO Cont; Future  -     gabapentin (NEURONTIN) 300 MG capsule; Take 1 capsule (300 mg total) by mouth 3 (three) times daily.    History of lumbar spinal fusion  -     MRI Lumbar Spine W WO Cont; Future    DDD (degenerative disc disease), lumbar  -     MRI Lumbar Spine W WO Cont; Future         No follow-ups on file.  This is to attest that the physician's assistant Gregory Haynes  served in the capacity as a scribe for this patient's encounter.  This is also to verify that I have reviewed the patient's history and helped formulate the treatment plan and discussed it with the physician's assistant.  I have actively participated in the evaluation and treatment plan for this patient.  The visit plan and medical decision-making is as outlined below  Once again, patient has a prior history of lumbar fusion at L4-5 and L5-S1.  He is beginning to experience more frequent and severe low back pain as well as lower extremity radicular symptoms, more so left lower extremity.  His back pain is more symptomatic than the legs.  He has failed conservative treatment measures with intramuscular steroid and physical therapy.  Briefly tried anti-inflammatories but was discontinued by his cardiologist secondary to his cardiac history.  Will add gabapentin to the treatment regimen today.  I would like to get an updated MRI of his lumbar spine to evaluate for possible pain management referral for interventional procedures such as epidural steroid injections and/or medial branch blocks.  We will have him follow-up with that data and we will make further orthopedic treatment recommendations based off of those results.  He does have a previous history of ethanol abuse and would like to avoid narcotics.  I believe that is acceptable and appropriate.  We  will help support him in that regard.     Treatment options were discussed with regards to the nature of the medical condition. Conservative pain intervention and surgical options were discussed in detail. The probability of success of each separate treatment option was discussed. The patient expressed a clear understanding of the treatment options. With regards to surgery, the procedure risk, benefits, complications, and outcomes were discussed. No guarantees were given with regards to surgical outcome.   The risk of complications, morbidity, and mortality of patient management decisions have been made at the time of this visit. These are associated with the patient's problems, diagnostic procedures and treatment options. This includes the possible management options selected and those considered but not selected by the patient after shared medical decision making we discussed with the patient.     This note was created using Dragon voice recognition software that occasionally misinterpreted phrases or words.

## 2024-06-11 ENCOUNTER — PATIENT MESSAGE (OUTPATIENT)
Dept: ORTHOPEDICS | Facility: CLINIC | Age: 49
End: 2024-06-11
Payer: MEDICAID

## 2024-06-15 ENCOUNTER — LAB VISIT (OUTPATIENT)
Dept: LAB | Facility: HOSPITAL | Age: 49
End: 2024-06-15
Payer: MEDICAID

## 2024-06-15 DIAGNOSIS — E29.1 DEFICIENCY OF TESTOSTERONE BIOSYNTHESIS: Primary | ICD-10-CM

## 2024-06-15 LAB
BASOPHILS # BLD AUTO: 0.03 K/UL (ref 0–0.2)
BASOPHILS NFR BLD: 0.4 % (ref 0–1.9)
DIFFERENTIAL METHOD BLD: ABNORMAL
EOSINOPHIL # BLD AUTO: 0.2 K/UL (ref 0–0.5)
EOSINOPHIL NFR BLD: 2.2 % (ref 0–8)
ERYTHROCYTE [DISTWIDTH] IN BLOOD BY AUTOMATED COUNT: 15.9 % (ref 11.5–14.5)
HCT VFR BLD AUTO: 50.2 % (ref 40–54)
HGB BLD-MCNC: 15.9 G/DL (ref 14–18)
IMM GRANULOCYTES # BLD AUTO: 0.03 K/UL (ref 0–0.04)
IMM GRANULOCYTES NFR BLD AUTO: 0.4 % (ref 0–0.5)
LYMPHOCYTES # BLD AUTO: 1.1 K/UL (ref 1–4.8)
LYMPHOCYTES NFR BLD: 14.9 % (ref 18–48)
MCH RBC QN AUTO: 27.7 PG (ref 27–31)
MCHC RBC AUTO-ENTMCNC: 31.7 G/DL (ref 32–36)
MCV RBC AUTO: 87 FL (ref 82–98)
MONOCYTES # BLD AUTO: 0.5 K/UL (ref 0.3–1)
MONOCYTES NFR BLD: 5.9 % (ref 4–15)
NEUTROPHILS # BLD AUTO: 5.8 K/UL (ref 1.8–7.7)
NEUTROPHILS NFR BLD: 76.2 % (ref 38–73)
NRBC BLD-RTO: 0 /100 WBC
PLATELET # BLD AUTO: 242 K/UL (ref 150–450)
PMV BLD AUTO: 11 FL (ref 9.2–12.9)
RBC # BLD AUTO: 5.75 M/UL (ref 4.6–6.2)
WBC # BLD AUTO: 7.58 K/UL (ref 3.9–12.7)

## 2024-06-15 PROCEDURE — 84403 ASSAY OF TOTAL TESTOSTERONE: CPT

## 2024-06-15 PROCEDURE — 82670 ASSAY OF TOTAL ESTRADIOL: CPT

## 2024-06-15 PROCEDURE — 85025 COMPLETE CBC W/AUTO DIFF WBC: CPT

## 2024-06-15 PROCEDURE — 82627 DEHYDROEPIANDROSTERONE: CPT

## 2024-06-15 PROCEDURE — 36415 COLL VENOUS BLD VENIPUNCTURE: CPT

## 2024-06-15 PROCEDURE — 84402 ASSAY OF FREE TESTOSTERONE: CPT

## 2024-06-15 PROCEDURE — 82672 ASSAY OF ESTROGEN: CPT

## 2024-06-17 LAB
DHEA-S SERPL-MCNC: 138 UG/DL (ref 71.6–375.4)
ESTRADIOL SERPL-MCNC: 41.9 PG/ML (ref 7.6–42.6)
TESTOST SERPL-MCNC: 289 NG/DL (ref 264–916)

## 2024-06-20 LAB — ESTROGEN SERPL-MCNC: 150 PG/ML (ref 56–213)

## 2024-06-21 ENCOUNTER — HOSPITAL ENCOUNTER (OUTPATIENT)
Dept: RADIOLOGY | Facility: HOSPITAL | Age: 49
Discharge: HOME | End: 2024-06-21
Attending: ORTHOPAEDIC SURGERY
Payer: MEDICAID

## 2024-06-21 DIAGNOSIS — Z98.1 HISTORY OF LUMBAR SPINAL FUSION: ICD-10-CM

## 2024-06-21 DIAGNOSIS — M54.16 LUMBAR RADICULOPATHY: ICD-10-CM

## 2024-06-21 DIAGNOSIS — M51.36 DDD (DEGENERATIVE DISC DISEASE), LUMBAR: ICD-10-CM

## 2024-06-21 LAB
CREAT SERPL-MCNC: 1.8 MG/DL (ref 0.5–1.4)
SAMPLE: ABNORMAL
TESTOST FREE SERPL-MCNC: 5.1 PG/ML (ref 6.8–21.5)

## 2024-06-21 PROCEDURE — A9585 GADOBUTROL INJECTION: HCPCS

## 2024-06-21 PROCEDURE — 72158 MRI LUMBAR SPINE W/O & W/DYE: CPT | Mod: TC

## 2024-06-21 PROCEDURE — 25500020 PHARM REV CODE 255

## 2024-06-21 PROCEDURE — 72158 MRI LUMBAR SPINE W/O & W/DYE: CPT | Mod: 26,,, | Performed by: RADIOLOGY

## 2024-06-21 RX ORDER — GADOBUTROL 604.72 MG/ML
INJECTION INTRAVENOUS
Status: COMPLETED
Start: 2024-06-21 | End: 2024-06-21

## 2024-06-21 RX ADMIN — GADOBUTROL 10 ML: 604.72 INJECTION INTRAVENOUS at 07:06

## 2024-06-26 ENCOUNTER — OFFICE VISIT (OUTPATIENT)
Dept: ORTHOPEDICS | Facility: CLINIC | Age: 49
End: 2024-06-26
Payer: MEDICAID

## 2024-06-26 VITALS
DIASTOLIC BLOOD PRESSURE: 84 MMHG | HEIGHT: 67 IN | SYSTOLIC BLOOD PRESSURE: 132 MMHG | WEIGHT: 218 LBS | BODY MASS INDEX: 34.21 KG/M2

## 2024-06-26 DIAGNOSIS — M47.816 FACET ARTHRITIS OF LUMBAR REGION: ICD-10-CM

## 2024-06-26 DIAGNOSIS — M51.36 DDD (DEGENERATIVE DISC DISEASE), LUMBAR: Primary | ICD-10-CM

## 2024-06-26 DIAGNOSIS — M48.061 SPINAL STENOSIS OF LUMBAR REGION, UNSPECIFIED WHETHER NEUROGENIC CLAUDICATION PRESENT: ICD-10-CM

## 2024-06-26 DIAGNOSIS — Z98.1 HISTORY OF LUMBAR FUSION: ICD-10-CM

## 2024-06-26 PROCEDURE — 1160F RVW MEDS BY RX/DR IN RCRD: CPT | Mod: CPTII,,, | Performed by: ORTHOPAEDIC SURGERY

## 2024-06-26 PROCEDURE — 3044F HG A1C LEVEL LT 7.0%: CPT | Mod: CPTII,,, | Performed by: ORTHOPAEDIC SURGERY

## 2024-06-26 PROCEDURE — 3075F SYST BP GE 130 - 139MM HG: CPT | Mod: CPTII,,, | Performed by: ORTHOPAEDIC SURGERY

## 2024-06-26 PROCEDURE — 4010F ACE/ARB THERAPY RXD/TAKEN: CPT | Mod: CPTII,,, | Performed by: ORTHOPAEDIC SURGERY

## 2024-06-26 PROCEDURE — 99213 OFFICE O/P EST LOW 20 MIN: CPT | Mod: S$PBB,,, | Performed by: ORTHOPAEDIC SURGERY

## 2024-06-26 PROCEDURE — 1159F MED LIST DOCD IN RCRD: CPT | Mod: CPTII,,, | Performed by: ORTHOPAEDIC SURGERY

## 2024-06-26 PROCEDURE — 3079F DIAST BP 80-89 MM HG: CPT | Mod: CPTII,,, | Performed by: ORTHOPAEDIC SURGERY

## 2024-06-26 PROCEDURE — 99999 PR PBB SHADOW E&M-EST. PATIENT-LVL IV: CPT | Mod: PBBFAC,,, | Performed by: ORTHOPAEDIC SURGERY

## 2024-06-26 PROCEDURE — 3008F BODY MASS INDEX DOCD: CPT | Mod: CPTII,,, | Performed by: ORTHOPAEDIC SURGERY

## 2024-06-26 PROCEDURE — 99214 OFFICE O/P EST MOD 30 MIN: CPT | Mod: PBBFAC,PN | Performed by: ORTHOPAEDIC SURGERY

## 2024-06-26 RX ORDER — TADALAFIL 10 MG/1
TABLET ORAL
COMMUNITY
Start: 2024-06-14

## 2024-06-26 NOTE — PROGRESS NOTES
Subjective:       Patient ID: Lee Quintanilla is a 48 y.o. male.    Chief Complaint: Pain of the Lumbar Spine (Patient is here for lumbar MRI results, states pain has gotten worse since last visit. Has numbness and tingling as well as shooting pain that radiates down throughout his left leg and foot. Painful and limited ROM )      History of Present Illness    Prior to meeting with the patient I reviewed the medical chart in [facility]. This included reviewing the previous progress notes from our office, review of the patient's last appointment with their primary care provider, review of any visits to the emergency room, and review of any pain management appointments or procedures.   Previous lumbar surgery 2020 has had recurrence of his back pain and does have some burning in his feet and also the left thigh he has had an MRI study of the lumbar spine that we will discuss today    See patient previous history as below   history of fusion at L4-5 and L5-S1.  This has generally served him very well.  He has had left-sided low back pain for the past year or so.  He does not recollect any injury or trauma.  He reports he does have numbness/tingling that goes into bilateral feet, more so affected left lower extremity.  He denies any bowel or bladder changes.  Complains of back pain worse than leg pain.  He was seen for this just over 1 year ago and given an intramuscular injection of Kenalog, started on Mobic anti-inflammatory, enrolled in physical therapy.  He completed his PT.  Reports he was not particularly beneficial.  His cardiologist stopped the anti-inflammatory.   Current Medications  Current Outpatient Medications   Medication Sig Dispense Refill    anastrozole (ARIMIDEX) 1 mg Tab Take 1 mg by mouth every 7 days.      anastrozole (ARIMIDEX) 1 mg Tab Take by mouth.      aspirin (ECOTRIN) 81 MG EC tablet Take 81 mg by mouth once daily.      clopidogreL (PLAVIX) 75 mg tablet Take 75 mg by mouth once daily.       DULoxetine (CYMBALTA) 20 MG capsule Take by mouth.      ENTRESTO 49-51 mg per tablet Take 1 tablet by mouth 2 (two) times daily.      EScitalopram oxalate (LEXAPRO) 10 MG tablet Take 1 tablet (10 mg total) by mouth every evening. 30 tablet 1    furosemide (LASIX) 40 MG tablet Take 40 mg by mouth once daily.      gabapentin (NEURONTIN) 300 MG capsule Take 1 capsule (300 mg total) by mouth 3 (three) times daily. 90 capsule 0    isosorbide mononitrate (IMDUR) 30 MG 24 hr tablet Take 30 mg by mouth.      metoprolol tartrate (LOPRESSOR) 25 MG tablet Take 50 mg by mouth 2 (two) times daily.      rosuvastatin (CRESTOR) 40 MG Tab Take 40 mg by mouth once daily.      rosuvastatin (CRESTOR) 5 MG tablet Take by mouth.      semaglutide (OZEMPIC) 0.25 mg or 0.5 mg(2 mg/1.5 mL) pen injector Inject 0.25 mg every week by subcutaneous route as directed for 30 days.      tadalafiL (CIALIS) 10 MG tablet TAKE 1 TABLET BY MOUTH ONCE A DAY AS NEEDED FOR 30 DAYS      testosterone 200 mg Pllt 200 MG Implant WEEKLY, 1 tab(s)      testosterone cypionate (DEPOTESTOTERONE CYPIONATE) 200 mg/mL injection Inject 200 mg into the muscle every 7 days.      testosterone cypionate 200 mg/mL Kit INJECT 1 MILLILITERS INTRAMUSCULAR EVERY WEEK      HYDROcodone-acetaminophen (NORCO)  mg per tablet Take 1 tablet by mouth every 6 (six) hours as needed for Pain. 20 tablet 0     No current facility-administered medications for this visit.       Allergies  Review of patient's allergies indicates:   Allergen Reactions    Inapsine [droperidol] Anaphylaxis     seizures    Effexor [venlafaxine]      Increased anxiety    Pcn [penicillins]     Bactrim [sulfamethoxazole-trimethoprim] Rash     Dry red rash all over when in the sun    Fluconazole Rash     Pruritis         Past Medical History  Past Medical History:   Diagnosis Date    ADHD (attention deficit hyperactivity disorder)     Arthritis     Asthma     as child only    Back pain     Coronary artery disease      Degeneration of lumbar intervertebral disc 05/2016    Depression     Digestive disorder     Elevated PSA     Headache     Hyperlipidemia     Hypertension     Kidney damage     stage 3 ; d/t aleve abuse    Kidney stone     Myocardial infarction     Neck pain     Numbness and tingling in hands     Numbness and tingling of both legs     Seizures     from inapsine 2002    Sleep apnea     no cpap    Wears glasses     CONTACS       Surgical History  Past Surgical History:   Procedure Laterality Date    BACK SURGERY  2016    back surgery    BONE GRAFT N/A 11/5/2020    Procedure: BONE GRAFT;  Surgeon: Mateusz Blanco MD;  Location: [facility] OR;  Service: Orthopedics;  Laterality: N/A;    CARDIAC SURGERY  01/06/2020    CABG X 7    CORONARY ARTERY BYPASS GRAFT      7 vessels    HERNIA REPAIR Bilateral 11/22/2017    LITHOTRIPSY      LUMBAR LAMINECTOMY WITH FUSION Bilateral 11/5/2020    Procedure: LAMINECTOMY, SPINE, LUMBAR, WITH FUSION;  Surgeon: Mateusz Blanco MD;  Location: [facility] OR;  Service: Orthopedics;  Laterality: Bilateral;  MEDTRONIC  NTI  L-3    PILONIDAL CYST DRAINAGE      removal of abcess      scrotal    REMOVAL OF HARDWARE FROM SPINE Bilateral 11/5/2020    Procedure: REMOVAL, HARDWARE, SPINE;  Surgeon: Mateusz Blanco MD;  Location: [facility] OR;  Service: Orthopedics;  Laterality: Bilateral;  L 4-5    SURGICAL REMOVAL OF PILONIDAL CYST N/A 4/15/2024    Procedure: EXCISION, PILONIDAL CYST;  Surgeon: Jayden Phillips III, MD;  Location: [facility];  Service: General;  Laterality: N/A;  sacral area    TYMPANOSTOMY TUBE PLACEMENT         Family History:   Family History   Problem Relation Name Age of Onset    Heart disease Mother      Migraines Mother      Hypertension Mother      Early death Father accident     Heart disease Father accident     Diabetes Maternal Aunt      Diabetes Maternal Uncle      Diabetes Maternal Grandfather         Social History:   Social History     Socioeconomic History    Marital status:     Occupational History    Occupation: disability   Tobacco Use    Smoking status: Former     Current packs/day: 0.00     Types: Cigarettes     Quit date: 2016     Years since quittin.4     Passive exposure: Past    Smokeless tobacco: Former     Quit date: 2016    Tobacco comments:     occasional   Substance and Sexual Activity    Alcohol use: Yes     Alcohol/week: 1.0 standard drink of alcohol     Types: 1 Glasses of wine per week     Comment: 20 - 30 shots vodka per day or 1-2 1/5ths per day for 2 years (started )    Drug use: No    Sexual activity: Yes     Partners: Female     Social Determinants of Health     Financial Resource Strain: Medium Risk (3/18/2024)    Overall Financial Resource Strain (CARDIA)     Difficulty of Paying Living Expenses: Somewhat hard   Food Insecurity: No Food Insecurity (3/18/2024)    Hunger Vital Sign     Worried About Running Out of Food in the Last Year: Never true     Ran Out of Food in the Last Year: Never true   Transportation Needs: No Transportation Needs (3/18/2024)    PRAPARE - Transportation     Lack of Transportation (Medical): No     Lack of Transportation (Non-Medical): No   Physical Activity: Insufficiently Active (3/18/2024)    Exercise Vital Sign     Days of Exercise per Week: 2 days     Minutes of Exercise per Session: 60 min   Stress: No Stress Concern Present (3/18/2024)    [employer] - Occupational Stress Questionnaire     Feeling of Stress : Only a little   Housing Stability: Unknown (3/18/2024)    Housing Stability Vital Sign     Unable to Pay for Housing in the Last Year: No     Unstable Housing in the Last Year: No       Hospitalization/Major Diagnostic Procedure:     Review of Systems     General/Constitutional:  Chills denies. Fatigue denies. Fever denies. Weight gain denies. Weight loss denies.    Respiratory:  Shortness of breath denies.    Cardiovascular:  Chest pain denies.    Gastrointestinal:   Constipation denies. Diarrhea denies. Nausea denies. Vomiting denies.     Hematology:  Easy bruising denies. Prolonged bleeding denies.     Genitourinary:  Frequent urination denies. Pain in lower back denies. Painful urination denies.     Musculoskeletal:  See HPI for details    Skin:  Rash denies.    Neurologic:  Dizziness denies. Gait abnormalities denies. Seizures denies. Tingling/Numbess denies.    Psychiatric:  Anxiety denies. Depressed mood denies.     Objective:   Vital Signs:   Vitals:    06/26/24 1018   BP: 132/84        Physical Exam      General Examination:     Constitutional: The patient is alert and oriented to lace person and time. Mood is pleasant.     Head/Face: Normal facial features normal eyebrows    Eyes: Normal extraocular motion bilaterally    Lungs: Respirations are equal and unlabored    Gait is coordinated.    Cardiovascular: There are no swelling or varicosities present.    Lymphatic: Negative for adenopathy    Skin: Normal    Neurological: Level of consciousness normal. Oriented to place person and time and situation    Psychiatric: Oriented to time place person and situation    Lumbar range of motion moderate restricted pain with extension and bending tenderness paraspinous muscles above previous lumbar surgery range of motion limited due to pain straight-leg-raising negative except for back pain  XRAY Report/ Interpretation:  Lumbar MRI was personally reviewed I agree results see report for details moderate disc space narrowing and disc degeneration L2-3 multilevel facet joint arthritis      pine W WO Cont  MRI Lumbar Spine W WO Cont  Order: [id number]  Status: Final result       Visible to patient: Yes (seen)       Next appt: 09/19/2024 at 10:00 AM in Family Medicine (Riya Vidales NP)       Dx: Lumbar radiculopathy; DDD (degenerati...    0 Result Notes  Details    Reading Physician Reading Date Result Priority   Royal Conn MD  [phone] 6/21/2024 Routine     Narrative &  Impression  EXAMINATION:  MRI LUMBAR SPINE W WO CONTRAST     CLINICAL HISTORY:  lumbar radiculopathy;  Radiculopathy, lumbar region     TECHNIQUE:  Multiplanar multisequence pre and post contrast enhanced lumbar spine MRI was performed after the uneventful intravenous administration of 10 mL Gadavist.     COMPARISON:  Plain films of the lumbar spine dated 01/10/2024, MRI dated 02/17/2016.  MRI thoracic spine dated 10/12/2016     FINDINGS:  Vertebral column: I suspect there is transitional lumbosacral anatomy with sacralization of L5 and hypoplastic T12 ribs.  However, numbering of vertebral bodies for this study will be the same as previously utilized for the sake of continuity.  Utilizing the previous numbering system, there are postsurgical changes with posterior lumbar interbody fusion of L3 and L4 with previous interbody fusion of L4 and L5.  There is moderate to marked disc space narrowing at the L4-5 and L5-S1 levels.  There is susceptibility artifact related to fusion hardware.  There is no significant edema.  There is no acute vertebral body fracture.  There is mild disc space narrowing at the L2-3 level.  This disc is desiccated.  Baseline marrow signal is normal.     Spinal canal, conus, epidural space: The spinal canal appears to be developmentally normal.  The conus terminates at the level of L1-2.  There is no abnormal epidural mass or fluid collection.     Findings by level:     On the sagittal images, the T12-L1 level is normal.     L1-2: There is a tiny 2 mm central disc protrusion.  There is no spinal canal or significant foraminal stenosis.     L2-3: There is disc space narrowing.  There is a mild disc bulge with subtle dorsal annular fissure.  There is no spinal canal or significant foraminal stenosis.     L3-4: There is a mild disc bulge with osteophytic ridging.  There is right greater than left facet joint arthropathy.  There is no spinal canal or significant foraminal stenosis.  There is  subtle trace retrolisthesis of L3 on L4.     L4-5: There are postsurgical changes of posterior lumbar interbody fusion and decompression.  There is no spinal stenosis.  There is some enhancement in the left ventral epidural space likely representing postoperative fibrosis, surrounding the left L5 root.  There is an underlying diffuse disc bulge with osteophytic ridging resulting in mild-to-moderate bilateral foraminal stenosis.     L5-S1: There are postsurgical changes of previous interbody fusion and posterior decompression.  There is no spinal stenosis or significant postoperative fibrosis.  There is osteophytic ridging.  Again there is no spinal stenosis.  There is mild-to-moderate bilateral foraminal stenosis.     Soft tissues: The posterior spinous soft tissues are disrupted due to surgery.  The prevertebral soft tissues are normal.     Impression:     1. There are degenerative and postsurgical changes discussed above.  There is suspected transitional lumbosacral anatomy as described above but the numbering system used on the current study is the same as previously utilized for the sake of continuity.  2. At the L4-5 postoperative level there is suspected fibrosis surrounding the left L5 root in the left ventral epidural space.  There is mild-to-moderate bilateral foraminal stenosis at this level without spinal stenosis.  3. At the postoperative L5-S1 level there is mild-to-moderate bilateral foraminal stenosis without spinal stenosis or fibrosis.        Electronically signed by:Royal Conn MD  Date:                                            06/21/2024  Time:                                           08:35                           Assessment:       No diagnosis found.    Plan:       There are no diagnoses linked to this encounter.     No follow-ups on file.    Referral to pain management for efforts to treat pain I believe adjacent to his previous fusion  Defer recommendation for any surgery to extend  his fusion up to the L2 level  Treatment options were discussed with regards to the nature of the medical condition. Conservative pain intervention and surgical options were discussed in detail. The probability of success of each separate treatment option was discussed. The patient expressed a clear understanding of the treatment options. With regards to surgery, the procedure risk, benefits, complications, and outcomes were discussed. No guarantees were given with regards to surgical outcome.   The risk of complications, morbidity, and mortality of patient management decisions have been made at the time of this visit. These are associated with the patient's problems, diagnostic procedures and treatment options. This includes the possible management options selected and those considered but not selected by the patient after shared medical decision making we discussed with the patient.     This note was created using Dragon voice recognition software that occasionally misinterpreted phrases or words.

## 2024-07-12 ENCOUNTER — HOSPITAL ENCOUNTER (EMERGENCY)
Facility: HOSPITAL | Age: 49
Discharge: HOME | End: 2024-07-13
Attending: EMERGENCY MEDICINE
Payer: MEDICAID

## 2024-07-12 DIAGNOSIS — K52.9 COLITIS: ICD-10-CM

## 2024-07-12 DIAGNOSIS — M87.9 OSTEONECROSIS OF BOTH HIPS: Primary | ICD-10-CM

## 2024-07-12 DIAGNOSIS — N17.9 AKI (ACUTE KIDNEY INJURY): ICD-10-CM

## 2024-07-12 DIAGNOSIS — E86.0 DEHYDRATION: ICD-10-CM

## 2024-07-12 DIAGNOSIS — R11.2 NAUSEA VOMITING AND DIARRHEA: ICD-10-CM

## 2024-07-12 DIAGNOSIS — R19.7 NAUSEA VOMITING AND DIARRHEA: ICD-10-CM

## 2024-07-12 LAB
ALBUMIN SERPL BCP-MCNC: 4.7 G/DL (ref 3.5–5.2)
ALP SERPL-CCNC: 76 U/L (ref 55–135)
ALT SERPL W/O P-5'-P-CCNC: 15 U/L (ref 10–44)
ANION GAP SERPL CALC-SCNC: 14 MMOL/L (ref 8–16)
AST SERPL-CCNC: 14 U/L (ref 10–40)
BACTERIA #/AREA URNS HPF: ABNORMAL /HPF
BASOPHILS # BLD AUTO: 0.04 K/UL (ref 0–0.2)
BASOPHILS NFR BLD: 0.3 % (ref 0–1.9)
BILIRUB SERPL-MCNC: 1 MG/DL (ref 0.1–1)
BILIRUB UR QL STRIP: ABNORMAL
BUN SERPL-MCNC: 13 MG/DL (ref 6–20)
CALCIUM SERPL-MCNC: 10.3 MG/DL (ref 8.7–10.5)
CHLORIDE SERPL-SCNC: 101 MMOL/L (ref 95–110)
CLARITY UR: CLEAR
CO2 SERPL-SCNC: 22 MMOL/L (ref 23–29)
COLOR UR: YELLOW
CREAT SERPL-MCNC: 1.6 MG/DL (ref 0.5–1.4)
DIFFERENTIAL METHOD BLD: ABNORMAL
EOSINOPHIL # BLD AUTO: 0 K/UL (ref 0–0.5)
EOSINOPHIL NFR BLD: 0.2 % (ref 0–8)
ERYTHROCYTE [DISTWIDTH] IN BLOOD BY AUTOMATED COUNT: 15.4 % (ref 11.5–14.5)
EST. GFR  (NO RACE VARIABLE): 52.8 ML/MIN/1.73 M^2
GLUCOSE SERPL-MCNC: 114 MG/DL (ref 70–110)
GLUCOSE UR QL STRIP: NEGATIVE
HCT VFR BLD AUTO: 51.6 % (ref 40–54)
HGB BLD-MCNC: 17.1 G/DL (ref 14–18)
HGB UR QL STRIP: ABNORMAL
HYALINE CASTS #/AREA URNS LPF: 0 /LPF
IMM GRANULOCYTES # BLD AUTO: 0.08 K/UL (ref 0–0.04)
IMM GRANULOCYTES NFR BLD AUTO: 0.5 % (ref 0–0.5)
KETONES UR QL STRIP: ABNORMAL
LEUKOCYTE ESTERASE UR QL STRIP: NEGATIVE
LIPASE SERPL-CCNC: 4 U/L (ref 4–60)
LYMPHOCYTES # BLD AUTO: 0.8 K/UL (ref 1–4.8)
LYMPHOCYTES NFR BLD: 5.2 % (ref 18–48)
MCH RBC QN AUTO: 28 PG (ref 27–31)
MCHC RBC AUTO-ENTMCNC: 33.1 G/DL (ref 32–36)
MCV RBC AUTO: 85 FL (ref 82–98)
MICROSCOPIC COMMENT: ABNORMAL
MONOCYTES # BLD AUTO: 0.7 K/UL (ref 0.3–1)
MONOCYTES NFR BLD: 5 % (ref 4–15)
NEUTROPHILS # BLD AUTO: 13.3 K/UL (ref 1.8–7.7)
NEUTROPHILS NFR BLD: 88.8 % (ref 38–73)
NITRITE UR QL STRIP: NEGATIVE
NRBC BLD-RTO: 0 /100 WBC
PH UR STRIP: 6 [PH] (ref 5–8)
PLATELET # BLD AUTO: 270 K/UL (ref 150–450)
PMV BLD AUTO: 11.3 FL (ref 9.2–12.9)
POTASSIUM SERPL-SCNC: 3.6 MMOL/L (ref 3.5–5.1)
PROT SERPL-MCNC: 8.6 G/DL (ref 6–8.4)
PROT UR QL STRIP: ABNORMAL
RBC # BLD AUTO: 6.11 M/UL (ref 4.6–6.2)
RBC #/AREA URNS HPF: 3 /HPF (ref 0–4)
SODIUM SERPL-SCNC: 137 MMOL/L (ref 136–145)
SP GR UR STRIP: >1.03 (ref 1–1.03)
SQUAMOUS #/AREA URNS HPF: 0 /HPF
URN SPEC COLLECT METH UR: ABNORMAL
UROBILINOGEN UR STRIP-ACNC: ABNORMAL EU/DL
WBC # BLD AUTO: 14.92 K/UL (ref 3.9–12.7)
WBC #/AREA URNS HPF: 6 /HPF (ref 0–5)

## 2024-07-12 PROCEDURE — 63600175 PHARM REV CODE 636 W HCPCS: Performed by: PHYSICIAN ASSISTANT

## 2024-07-12 PROCEDURE — 96376 TX/PRO/DX INJ SAME DRUG ADON: CPT

## 2024-07-12 PROCEDURE — 81001 URINALYSIS AUTO W/SCOPE: CPT | Performed by: PHYSICIAN ASSISTANT

## 2024-07-12 PROCEDURE — 63600175 PHARM REV CODE 636 W HCPCS: Performed by: EMERGENCY MEDICINE

## 2024-07-12 PROCEDURE — 96361 HYDRATE IV INFUSION ADD-ON: CPT

## 2024-07-12 PROCEDURE — 99285 EMERGENCY DEPT VISIT HI MDM: CPT | Mod: 25

## 2024-07-12 PROCEDURE — 25000003 PHARM REV CODE 250: Performed by: EMERGENCY MEDICINE

## 2024-07-12 PROCEDURE — 96375 TX/PRO/DX INJ NEW DRUG ADDON: CPT

## 2024-07-12 PROCEDURE — 96365 THER/PROPH/DIAG IV INF INIT: CPT | Mod: 59

## 2024-07-12 PROCEDURE — 85025 COMPLETE CBC W/AUTO DIFF WBC: CPT | Performed by: PHYSICIAN ASSISTANT

## 2024-07-12 PROCEDURE — 80053 COMPREHEN METABOLIC PANEL: CPT | Performed by: PHYSICIAN ASSISTANT

## 2024-07-12 PROCEDURE — 25500020 PHARM REV CODE 255: Performed by: EMERGENCY MEDICINE

## 2024-07-12 PROCEDURE — 83690 ASSAY OF LIPASE: CPT | Performed by: PHYSICIAN ASSISTANT

## 2024-07-12 RX ORDER — ONDANSETRON HYDROCHLORIDE 2 MG/ML
4 INJECTION, SOLUTION INTRAVENOUS
Status: COMPLETED | OUTPATIENT
Start: 2024-07-12 | End: 2024-07-12

## 2024-07-12 RX ORDER — MORPHINE SULFATE 4 MG/ML
4 INJECTION, SOLUTION INTRAMUSCULAR; INTRAVENOUS
Status: COMPLETED | OUTPATIENT
Start: 2024-07-12 | End: 2024-07-12

## 2024-07-12 RX ORDER — METOCLOPRAMIDE HYDROCHLORIDE 5 MG/ML
10 INJECTION INTRAMUSCULAR; INTRAVENOUS
Status: COMPLETED | OUTPATIENT
Start: 2024-07-12 | End: 2024-07-12

## 2024-07-12 RX ADMIN — METOCLOPRAMIDE HYDROCHLORIDE 10 MG: 5 INJECTION INTRAMUSCULAR; INTRAVENOUS at 10:07

## 2024-07-12 RX ADMIN — MORPHINE SULFATE 4 MG: 4 INJECTION, SOLUTION INTRAMUSCULAR; INTRAVENOUS at 11:07

## 2024-07-12 RX ADMIN — SODIUM CHLORIDE 1000 ML: 9 INJECTION, SOLUTION INTRAVENOUS at 10:07

## 2024-07-12 RX ADMIN — ONDANSETRON 4 MG: 2 INJECTION INTRAMUSCULAR; INTRAVENOUS at 07:07

## 2024-07-12 RX ADMIN — ONDANSETRON 4 MG: 2 INJECTION INTRAMUSCULAR; INTRAVENOUS at 10:07

## 2024-07-12 RX ADMIN — PROMETHAZINE HYDROCHLORIDE 12.5 MG: 25 INJECTION INTRAMUSCULAR; INTRAVENOUS at 11:07

## 2024-07-12 RX ADMIN — IOHEXOL 100 ML: 350 INJECTION, SOLUTION INTRAVENOUS at 08:07

## 2024-07-13 VITALS
SYSTOLIC BLOOD PRESSURE: 124 MMHG | OXYGEN SATURATION: 97 % | HEIGHT: 67 IN | HEART RATE: 84 BPM | WEIGHT: 210 LBS | RESPIRATION RATE: 18 BRPM | TEMPERATURE: 99 F | BODY MASS INDEX: 32.96 KG/M2 | DIASTOLIC BLOOD PRESSURE: 72 MMHG

## 2024-07-13 PROCEDURE — 63600175 PHARM REV CODE 636 W HCPCS: Performed by: EMERGENCY MEDICINE

## 2024-07-13 PROCEDURE — 25000003 PHARM REV CODE 250: Performed by: EMERGENCY MEDICINE

## 2024-07-13 RX ORDER — CIPROFLOXACIN 500 MG/1
500 TABLET ORAL 2 TIMES DAILY
Qty: 14 TABLET | Refills: 0 | Status: SHIPPED | OUTPATIENT
Start: 2024-07-13 | End: 2024-07-20

## 2024-07-13 RX ORDER — METRONIDAZOLE 500 MG/1
500 TABLET ORAL 3 TIMES DAILY
Qty: 21 TABLET | Refills: 0 | Status: SHIPPED | OUTPATIENT
Start: 2024-07-13 | End: 2024-07-20

## 2024-07-13 RX ORDER — HYDROCODONE BITARTRATE AND ACETAMINOPHEN 5; 325 MG/1; MG/1
1 TABLET ORAL EVERY 6 HOURS PRN
Qty: 12 TABLET | Refills: 0 | Status: SHIPPED | OUTPATIENT
Start: 2024-07-13 | End: 2024-07-17

## 2024-07-13 RX ORDER — PROMETHAZINE HYDROCHLORIDE 25 MG/1
25 TABLET ORAL EVERY 6 HOURS PRN
Qty: 15 TABLET | Refills: 0 | Status: ON HOLD | OUTPATIENT
Start: 2024-07-13

## 2024-07-13 RX ADMIN — PROMETHAZINE HYDROCHLORIDE 12.5 MG: 25 INJECTION INTRAMUSCULAR; INTRAVENOUS at 12:07

## 2024-07-13 NOTE — DISCHARGE INSTRUCTIONS
Start ciprofloxacin and metronidazole if abdominal pain and diarrhea symptoms continue for more than 7 days or you develop a fever more than 100.4

## 2024-07-13 NOTE — FIRST PROVIDER EVALUATION
Emergency Department TeleTriage Encounter Note      CHIEF COMPLAINT    Chief Complaint   Patient presents with    Abdominal Pain     On/off x 3 weeks , started ozempic    Diarrhea    Vomiting       VITAL SIGNS   Initial Vitals [07/12/24 1833]   BP Pulse Resp Temp SpO2   (!) 162/103 84 18 98.7 °F (37.1 °C) 97 %      MAP       --            ALLERGIES    Review of patient's allergies indicates:   Allergen Reactions    Inapsine [droperidol] Anaphylaxis     seizures    Effexor [venlafaxine]      Increased anxiety    Pcn [penicillins]     Bactrim [sulfamethoxazole-trimethoprim] Rash     Dry red rash all over when in the sun    Fluconazole Rash     Pruritis         PROVIDER TRIAGE NOTE  This is a teletriage evaluation of a 48 y.o. male presenting to the ED complaining of abdominal pain. Patient reports generalized abdominal pain, nausea, vomiting, and diarrhea since last night. Patient had increase in dose of ozempic 3 weeks ago. He also reports drinking alcohol yesterday.    Patient is alert and oriented. He speaks in complete sentences. He is sitting upright in the chair in no distress. He appears uncomfortable.     Patient requesting pain medication. Patient unable to tolerate PO at this time. Will give zofran IV. Last creatinine was elevated, will hold off on toradol until CMP results.     Initial orders will be placed and care will be transferred to an alternate provider when patient is roomed for a full evaluation. Any additional orders and the final disposition will be determined by that provider.         ORDERS  Labs Reviewed   CBC W/ AUTO DIFFERENTIAL   COMPREHENSIVE METABOLIC PANEL   LIPASE   URINALYSIS, REFLEX TO URINE CULTURE       ED Orders (720h ago, onward)      Start Ordered     Status Ordering Provider    07/12/24 1930 07/12/24 1921  ondansetron injection 4 mg  ED 1 Time         Ordered MICHELLE BROWN    07/12/24 1922 07/12/24 1921  Vital signs  Every 2 hours         Ordered MICHELLE BROWN    07/12/24  1921 07/12/24 1921  Diet NPO  Diet effective now         Ordered MICHELLE BROWN.    07/12/24 1921 07/12/24 1921  Insert peripheral IV  Once         Ordered MICHELLE BROWN.    07/12/24 1921 07/12/24 1921  CBC W/ AUTO DIFFERENTIAL  STAT         Ordered STEPHANIEMICHELLE.    07/12/24 1921 07/12/24 1921  Comp. Metabolic Panel  STAT         Ordered STEPHANIEMICHELLE.    07/12/24 1921 07/12/24 1921  Lipase  STAT         Ordered STEPHANIEMICHELLE.    07/12/24 1921 07/12/24 1921  Urinalysis, Reflex to Urine Culture Urine, Clean Catch  STAT         Ordered MICHELLE BROWN.    07/12/24 1921 07/12/24 1921  CT Abdomen Pelvis With IV Contrast NO Oral Contrast  1 time imaging         Ordered MICHELLE BROWN.              Virtual Visit Note: The provider triage portion of this emergency department evaluation and documentation was performed via [employer], a HIPAA-compliant telemedicine application, in concert with a tele-presenter in the room. A face to face patient evaluation with one of my colleagues will occur once the patient is placed in an emergency department room.      DISCLAIMER: This note was prepared with [email or web address] voice recognition transcription software. Garbled syntax, mangled pronouns, and other bizarre constructions may be attributed to that software system.

## 2024-07-13 NOTE — ED PROVIDER NOTES
Encounter Date: 7/12/2024       History     Chief Complaint   Patient presents with    Abdominal Pain     On/off x 3 weeks , started ozempic    Diarrhea    Vomiting     Emergent eval a 48-year-old male with history of type 2 diabetes on Ozempic over the past 3-4 months reports he has been having abdominal pain nausea vomiting diarrhea on Ozempic but they increase dosage a proximally 3 weeks ago and he reports symptoms worsened.  For the past several days to 1 week abdominal pain progressively worsened mostly in the left abdomen but also in the right abdomen with nausea and vomiting as well as diarrhea that is very mucousy.  No blood in stool or emesis.  Subjective fevers with chills and sweats over the past few days.  No change in urination no burning dysuria or frequency      Review of patient's allergies indicates:   Allergen Reactions    Inapsine [droperidol] Anaphylaxis     seizures    Effexor [venlafaxine]      Increased anxiety    Pcn [penicillins]     Bactrim [sulfamethoxazole-trimethoprim] Rash     Dry red rash all over when in the sun    Fluconazole Rash     Pruritis       Past Medical History:   Diagnosis Date    ADHD (attention deficit hyperactivity disorder)     Arthritis     Asthma     as child only    Back pain     Coronary artery disease     Degeneration of lumbar intervertebral disc 05/2016    Depression     Digestive disorder     Elevated PSA     Headache     Hyperlipidemia     Hypertension     Kidney damage     stage 3 ; d/t aleve abuse    Kidney stone     Myocardial infarction     Neck pain     Numbness and tingling in hands     Numbness and tingling of both legs     Seizures     from inapsine 2002    Sleep apnea     no cpap    Wears glasses     CONTACS     Past Surgical History:   Procedure Laterality Date    BACK SURGERY  2016    back surgery    BONE GRAFT N/A 11/5/2020    Procedure: BONE GRAFT;  Surgeon: Mateusz Blanco MD;  Location: [facility];  Service: Orthopedics;  Laterality: N/A;     CARDIAC SURGERY  2020    CABG X 7    CORONARY ARTERY BYPASS GRAFT      7 vessels    HERNIA REPAIR Bilateral 2017    LITHOTRIPSY      LUMBAR LAMINECTOMY WITH FUSION Bilateral 2020    Procedure: LAMINECTOMY, SPINE, LUMBAR, WITH FUSION;  Surgeon: Mateusz Blanco MD;  Location: [facility];  Service: Orthopedics;  Laterality: Bilateral;  MEDTRONIC  NTI  L-3    PILONIDAL CYST DRAINAGE      removal of abcess      scrotal    REMOVAL OF HARDWARE FROM SPINE Bilateral 2020    Procedure: REMOVAL, HARDWARE, SPINE;  Surgeon: Mateusz Blanco MD;  Location: [facility] OR;  Service: Orthopedics;  Laterality: Bilateral;  L 4-5    SURGICAL REMOVAL OF PILONIDAL CYST N/A 4/15/2024    Procedure: EXCISION, PILONIDAL CYST;  Surgeon: Jayden Phillips III, MD;  Location: [facility];  Service: General;  Laterality: N/A;  sacral area    TYMPANOSTOMY TUBE PLACEMENT       Family History   Problem Relation Name Age of Onset    Heart disease Mother      Migraines Mother      Hypertension Mother      Early death Father accident     Heart disease Father accident     Diabetes Maternal Aunt      Diabetes Maternal Uncle      Diabetes Maternal Grandfather       Social History     Tobacco Use    Smoking status: Former     Current packs/day: 0.00     Types: Cigarettes     Quit date: 2016     Years since quittin.5     Passive exposure: Past    Smokeless tobacco: Former     Quit date: 2016    Tobacco comments:     occasional   Substance Use Topics    Alcohol use: Yes     Alcohol/week: 1.0 standard drink of alcohol     Types: 1 Glasses of wine per week     Comment: 20 - 30 shots vodka per day or 1-2 1/5ths per day for 2 years (started )    Drug use: No     Review of Systems   Constitutional:  Positive for activity change, appetite change, chills and diaphoresis. Negative for fatigue and fever.   HENT:  Negative for congestion, postnasal drip and rhinorrhea.    Respiratory:  Negative for cough, chest tightness, shortness of  breath and wheezing.    Cardiovascular:  Negative for chest pain and palpitations.   Gastrointestinal:  Positive for abdominal pain, diarrhea, nausea and vomiting. Negative for anal bleeding, blood in stool and constipation.   Genitourinary:  Negative for dysuria, frequency and urgency.   Musculoskeletal:  Negative for neck pain and neck stiffness.   Neurological:  Negative for dizziness, weakness, light-headedness, numbness and headaches.   All other systems reviewed and are negative.      Physical Exam     Initial Vitals [07/12/24 1833]   BP Pulse Resp Temp SpO2   (!) 162/103 84 18 98.7 °F (37.1 °C) 97 %      MAP       --         Physical Exam    Nursing note and vitals reviewed.  Constitutional: He appears well-developed and well-nourished. He is not diaphoretic. No distress.   HENT:   Head: Normocephalic and atraumatic.   Right Ear: External ear normal.   Left Ear: External ear normal.   Nose: Nose normal.   Mouth/Throat: Oropharynx is clear and moist.   Eyes: Conjunctivae and EOM are normal. Pupils are equal, round, and reactive to light.   Neck: Neck supple. No tracheal deviation present.   Normal range of motion.  Cardiovascular:  Normal rate, regular rhythm, normal heart sounds and intact distal pulses.     Exam reveals no gallop and no friction rub.       No murmur heard.  Blood pressure 160/103 pulse 84   Pulmonary/Chest: Breath sounds normal. No stridor. No respiratory distress. He has no wheezes. He has no rhonchi. He has no rales. He exhibits no tenderness.   Abdominal: Abdomen is soft. Bowel sounds are normal. He exhibits no distension and no mass. There is abdominal tenderness.   Generalized tenderness throughout the abdomen no rebound or guarding normal bowel sounds There is no rebound and no guarding.   Musculoskeletal:         General: No edema. Normal range of motion.      Cervical back: Normal range of motion and neck supple.     Neurological: He is alert and oriented to person, place, and time.  He has normal strength. No cranial nerve deficit or sensory deficit.   Skin: Skin is warm and dry. No rash noted. No erythema. No pallor.   Psychiatric: He has a normal mood and affect. His behavior is normal. Judgment and thought content normal.         ED Course   Procedures  Labs Reviewed   CBC W/ AUTO DIFFERENTIAL - Abnormal; Notable for the following components:       Result Value    WBC 14.92 (*)     RDW 15.4 (*)     Gran # (ANC) 13.3 (*)     Immature Grans (Abs) 0.08 (*)     Lymph # 0.8 (*)     Gran % 88.8 (*)     Lymph % 5.2 (*)     All other components within normal limits   COMPREHENSIVE METABOLIC PANEL - Abnormal; Notable for the following components:    CO2 22 (*)     Glucose 114 (*)     Creatinine 1.6 (*)     Total Protein 8.6 (*)     eGFR 52.8 (*)     All other components within normal limits   URINALYSIS, REFLEX TO URINE CULTURE - Abnormal; Notable for the following components:    Specific Gravity, UA >1.030 (*)     Protein, UA 2+ (*)     Ketones, UA 2+ (*)     Bilirubin (UA) 1+ (*)     Urobilinogen, UA 2.0-3.0 (*)     All other components within normal limits    Narrative:     Specimen Source->Urine   URINALYSIS MICROSCOPIC - Abnormal; Notable for the following components:    WBC, UA 6 (*)     All other components within normal limits    Narrative:     Specimen Source->Urine   LIPASE          Imaging Results              CT Abdomen Pelvis With IV Contrast NO Oral Contrast (Final result)  Result time 07/12/24 21:28:37      Final result by Beto Moise MD (07/12/24 21:28:37)                   Impression:      Mild wall thickening of the right hemicolon which may relate to underdistention versus a mild nonspecific colitis.  Clinical correlation advised.    Mild hepatosplenomegaly.    Nonspecific circumferential bladder wall thickening.  This may relate to nondistention, although correlation with urinalysis advised to exclude cystitis/infection.    Significant patchy sclerosis of the right and  left proximal femur suggesting osteonecrosis.    Additional findings as above.      Electronically signed by: Beto Moise MD  Date:    07/12/2024  Time:    21:28               Narrative:    EXAMINATION:  CT ABDOMEN PELVIS WITH IV CONTRAST    CLINICAL HISTORY:  Abdominal pain, acute, nonlocalized;    TECHNIQUE:  Low dose axial images, sagittal and coronal reformations were obtained from the lung bases to the pubic symphysis following the IV administration of 100 mL of Omnipaque 350 .  Oral contrast was not given.    COMPARISON:  CT 08/25/2017    FINDINGS:  There is a stable pulmonary micronodule at the left lung base.  No large confluent airspace consolidation or significant pleural fluid.  There are scattered coronary artery calcifications present.  There are partially visualized postoperative change of prior median sternotomy.    The liver is mildly enlarged.  Small well-circumscribed hepatic hypodensities, likely cysts.  No discrete radiopaque calculi identified within the gallbladder lumen.  There is no intra-or extrahepatic biliary ductal dilatation.    Spleen is mildly enlarged.  Stomach appears within normal limits.  The pancreas and adrenal glands are unremarkable.    There is mild asymmetric cortical thinning and atrophy of the left kidney.  There is no evidence of hydronephrosis.  Urinary bladder demonstrates nonspecific circumferential wall thickening.  Prostate is borderline prominent.    The abdominal aorta is normal in course and caliber with mild atherosclerotic calcification along its course.  There is no retroperitoneal hematoma.    There is no evidence of small-bowel obstruction.  There is mild wall thickening versus nondistention of the right hemicolon.  No CT evidence of acute appendicitis.  There is no ascites, free fluid, or intraperitoneal free air. There are scattered shotty small mesenteric and retroperitoneal lymph nodes. There is a small fat containing umbilical hernia.    There is  remote postoperative changes of the lumbar spine.  There is significant patchy sclerosis of the right and left proximal femur suggesting osteonecrosis.  The extraperitoneal soft tissues are unremarkable.                                       Medications   ondansetron injection 4 mg (4 mg Intravenous Given 7/12/24 1949)   iohexoL (OMNIPAQUE 350) injection 100 mL (100 mLs Intravenous Given 7/12/24 2052)   sodium chloride 0.9% bolus 1,000 mL 1,000 mL (0 mLs Intravenous Stopped 7/13/24 0139)   ondansetron injection 4 mg (4 mg Intravenous Given 7/12/24 2237)   metoclopramide injection 10 mg (10 mg Intravenous Given 7/12/24 2238)   morphine injection 4 mg (4 mg Intravenous Given 7/12/24 2319)   promethazine (PHENERGAN) 12.5 mg in 0.9% NaCl 50 mL IVPB (0 mg Intravenous Stopped 7/13/24 0029)   promethazine (PHENERGAN) 12.5 mg in 0.9% NaCl 50 mL IVPB (0 mg Intravenous Stopped 7/13/24 0139)     Medical Decision Making  Emergent eval a 48-year-old male with history of type 2 diabetes on Ozempic over the past 3-4 months reports he has been having abdominal pain nausea vomiting diarrhea on Ozempic but they increase dosage a proximally 3 weeks ago and he reports symptoms worsened.  For the past several days to 1 week abdominal pain progressively worsened mostly in the left abdomen but also in the right abdomen with nausea and vomiting as well as diarrhea that is very mucousy.  No blood in stool or emesis.  Subjective fevers with chills and sweats over the past few days.  No change in urination no burning dysuria or frequency  On physical exam blood pressure mildly elevated other vitals normal afebrile 97.8  MDM    Patient presents for emergent evaluation of acute abdominal pain for several months with nausea vomiting diarrhea since being on Ozempic Ozempic dose increased several weeks ago but this week he also developed chills sweats subjective fevers and worsening pain that poses a threat to life and/or bodily function.    Differential diagnosis includes but was not limited to acute pancreatitis, acute cholecystitis and cholangitis, colitis, acute appendicitis, urinary tract infection, ovarian or testicular torsion, side effect of Ozempic epididymitis and orchitis, prostatitis, pyelonephritis, kidney stone, volvulus, small bowel obstruction, enteritis, gastritis, colitis, mesenteric ischemia, AAA, AAA rupture, aortic dissection, constipation, upper or lower gi bleeding, traumatic injury.   .   In the ED patient found to have acute right sided colitis, nausea vomiting diarrhea.    I ordered labs and personally reviewed them.  Labs significant for see below   I ordered CT scan and personally reviewed it and reviewed the radiologist interpretation.  CT significant for   Mild wall thickening of the right hemicolon which may relate to underdistention versus a mild nonspecific colitis.  Clinical correlation advised.     Mild hepatosplenomegaly.     Nonspecific circumferential bladder wall thickening.  This may relate to nondistention, although correlation with urinalysis advised to exclude cystitis/infection.     Significant patchy sclerosis of the right and left proximal femur suggesting osteonecrosis.     Additional findings as above.         Discharge MDM     Patient was managed in the ED with IV patient was already treated with 4 mg IV Zofran, but continued to have vomiting and nausea was given Reglan 10 mg and 4 more mg of Zofran but continued to have vomiting he received a L of IV fluids, 4 mg of morphine and 12.5 mg of Phenergan.  No further vomiting but ongoing nausea prior to discharge he was received 12.5 more mg of Phenergan felt well enough to be discharged home.  Discharged home with Cipro Flagyl to be in taking if symptoms continue for more than in the next several days.  As well as Phenergan and Norco for pain.  Patient was found today to have osteonecrosis in bilateral hips which she reports he has been having bad hip pain  and difficulty ambulating but thought it was due to his back.  He was already seeing Dr. Mateusz Blanco for his back but I have referred him to Orthopedics for the osteonecrosis of the hips    The response to treatment was good.    Patient was discharged in stable condition.  Detailed return precautions discussed.  Patient was told to follow up with primary care physician or specialist based on their diagnosis  Adamaris Olmos MD  \    Amount and/or Complexity of Data Reviewed  Labs: ordered. Decision-making details documented in ED Course.     Details: Urine with increased specific gravity 2+ protein 2+ ketones 1+ bilirubin 2-3 urobilinogen 6 white blood cells rare bacteria  White count 14.92 ANC 13.3  Bicarb 22 glucose 114 creatinine 1.6 BUN 13  Lipase for  Radiology: ordered.    Risk  Prescription drug management.                                      Clinical Impression:  Final diagnoses:  [M87.9] Osteonecrosis of both hips (Primary)  [K52.9] Colitis  [R11.2, R19.7] Nausea vomiting and diarrhea  [N17.9] RALF (acute kidney injury)  [E86.0] Dehydration          ED Disposition Condition    Discharge Stable          ED Prescriptions       Medication Sig Dispense Start Date End Date Auth. Provider    promethazine (PHENERGAN) 25 MG tablet Take 1 tablet (25 mg total) by mouth every 6 (six) hours as needed for Nausea. 15 tablet 7/13/2024 -- Adamaris Olmos MD    ciprofloxacin HCl (CIPRO) 500 MG tablet Take 1 tablet (500 mg total) by mouth 2 (two) times daily. for 7 days 14 tablet 7/13/2024 7/20/2024 Adamaris Olmos MD    metroNIDAZOLE (FLAGYL) 500 MG tablet Take 1 tablet (500 mg total) by mouth 3 (three) times daily. for 7 days 21 tablet 7/13/2024 7/20/2024 Adamaris Olmos MD    HYDROcodone-acetaminophen (NORCO) 5-325 mg per tablet Take 1 tablet by mouth every 6 (six) hours as needed for Pain. 12 tablet 7/13/2024 7/16/2024 Adamaris Olmos MD          Follow-up Information       Follow up With  Specialties Details Why Contact Info Additional Information    [facility] - Emergency Dept Emergency Medicine Go to  If symptoms worsen [address] [facility] [id number] [phone] 1st floor    Js Ponce MD Orthopedic Surgery Schedule an appointment as soon as possible for a visit  for further eval of osteonecrosis of bilateral hips [address] [place] [address] Dr Stephania GODINEZ [id number]  [phone]                Adamaris Olmos MD  07/13/24 0624

## 2024-07-15 ENCOUNTER — TELEPHONE (OUTPATIENT)
Dept: FAMILY MEDICINE | Facility: CLINIC | Age: 49
End: 2024-07-15
Payer: MEDICAID

## 2024-07-15 DIAGNOSIS — M25.551 BILATERAL HIP PAIN: Primary | ICD-10-CM

## 2024-07-15 DIAGNOSIS — M25.552 BILATERAL HIP PAIN: Primary | ICD-10-CM

## 2024-07-15 NOTE — TELEPHONE ENCOUNTER
----- Message from Collette Kc sent at 7/15/2024  8:54 AM CDT -----  The patient went to [facility] Friday night. He has Osteo Necrosis of both hips. He needs a referral to see Dr. Blanco. He will also need a f/u appointment with us. Pt's # [phone]

## 2024-07-17 ENCOUNTER — HOSPITAL ENCOUNTER (OUTPATIENT)
Dept: RADIOLOGY | Facility: HOSPITAL | Age: 49
Discharge: HOME | End: 2024-07-17
Attending: ORTHOPAEDIC SURGERY
Payer: MEDICAID

## 2024-07-17 ENCOUNTER — OFFICE VISIT (OUTPATIENT)
Dept: ORTHOPEDICS | Facility: CLINIC | Age: 49
End: 2024-07-17
Payer: MEDICAID

## 2024-07-17 VITALS — WEIGHT: 210.13 LBS | BODY MASS INDEX: 32.98 KG/M2 | HEIGHT: 67 IN

## 2024-07-17 DIAGNOSIS — M47.816 FACET ARTHRITIS OF LUMBAR REGION: ICD-10-CM

## 2024-07-17 DIAGNOSIS — Z98.1 HISTORY OF LUMBAR FUSION: ICD-10-CM

## 2024-07-17 DIAGNOSIS — M51.36 DDD (DEGENERATIVE DISC DISEASE), LUMBAR: ICD-10-CM

## 2024-07-17 DIAGNOSIS — M48.061 SPINAL STENOSIS OF LUMBAR REGION, UNSPECIFIED WHETHER NEUROGENIC CLAUDICATION PRESENT: ICD-10-CM

## 2024-07-17 DIAGNOSIS — R52 PAIN: ICD-10-CM

## 2024-07-17 DIAGNOSIS — M87.00 AVN (AVASCULAR NECROSIS OF BONE): Primary | ICD-10-CM

## 2024-07-17 DIAGNOSIS — Z98.890 HISTORY OF LUMBAR LAMINECTOMY: ICD-10-CM

## 2024-07-17 PROCEDURE — 99214 OFFICE O/P EST MOD 30 MIN: CPT | Mod: PBBFAC,25,PN | Performed by: ORTHOPAEDIC SURGERY

## 2024-07-17 PROCEDURE — 73521 X-RAY EXAM HIPS BI 2 VIEWS: CPT | Mod: TC,PN

## 2024-07-17 PROCEDURE — 73521 X-RAY EXAM HIPS BI 2 VIEWS: CPT | Mod: 26,,, | Performed by: RADIOLOGY

## 2024-07-17 PROCEDURE — 99999 PR PBB SHADOW E&M-EST. PATIENT-LVL IV: CPT | Mod: PBBFAC,,, | Performed by: ORTHOPAEDIC SURGERY

## 2024-07-17 NOTE — PROGRESS NOTES
Subjective:       Patient ID: Lee Quintanilla is a 48 y.o. male.    Chief Complaint: Pain of the Right Hip (Patient is here with complaints of bilateral hip & Buttock pain that radiates down his legs with numbness & tingling in his bilat feet) and Pain of the Left Hip      History of Present Illness    Prior to meeting with the patient I reviewed the medical chart in [facility]. This included reviewing the previous progress notes from our office, review of the patient's last appointment with their primary care provider, review of any visits to the emergency room, and review of any pain management appointments or procedures.       history of fusion at L4-5 and L5-S1.  This has generally served him very well.  He has had left-sided low back pain for the past year or so.  He does not recollect any injury or trauma.  He reports he does have numbness/tingling that goes into bilateral feet, more so affected left lower extremity.  He denies any bowel or bladder changes.  Complains of back pain worse than leg pain.  He was seen for this just over 1 year ago and given an intramuscular injection of Kenalog, started on Mobic anti-inflammatory, enrolled in physical therapy.  He completed his PT.  Reports he was not particularly beneficial.  His cardiologist stopped the anti-inflammatory.       Referral to pain management for efforts to treat pain I believe adjacent to his previous fusion  Defer recommendation for any surgery to extend his fusion up to the L2 level was the recommendation last visit  He is still awaiting a pain management injections to the lumbar spine.  Went to the emergency room over the weekend for abdominal pain  He had a CT scan of his abdomen and pelvis in a saw and abnormality of his hip joints and the referred him here he does have some hip pain but he describes it more buttock and lateral greater trochanter pain.    Current Medications  Current Outpatient Medications   Medication Sig Dispense Refill     anastrozole (ARIMIDEX) 1 mg Tab Take 1 mg by mouth every 7 days.      anastrozole (ARIMIDEX) 1 mg Tab Take by mouth.      aspirin (ECOTRIN) 81 MG EC tablet Take 81 mg by mouth once daily.      ciprofloxacin HCl (CIPRO) 500 MG tablet Take 1 tablet (500 mg total) by mouth 2 (two) times daily. for 7 days 14 tablet 0    clopidogreL (PLAVIX) 75 mg tablet Take 75 mg by mouth once daily.      ENTRESTO 49-51 mg per tablet Take 1 tablet by mouth 2 (two) times daily.      EScitalopram oxalate (LEXAPRO) 10 MG tablet Take 1 tablet (10 mg total) by mouth every evening. 30 tablet 1    furosemide (LASIX) 40 MG tablet Take 40 mg by mouth once daily.      gabapentin (NEURONTIN) 300 MG capsule Take 1 capsule (300 mg total) by mouth 3 (three) times daily. 90 capsule 0    isosorbide mononitrate (IMDUR) 30 MG 24 hr tablet Take 30 mg by mouth.      metoprolol tartrate (LOPRESSOR) 25 MG tablet Take 50 mg by mouth 2 (two) times daily.      metroNIDAZOLE (FLAGYL) 500 MG tablet Take 1 tablet (500 mg total) by mouth 3 (three) times daily. for 7 days 21 tablet 0    promethazine (PHENERGAN) 25 MG tablet Take 1 tablet (25 mg total) by mouth every 6 (six) hours as needed for Nausea. 15 tablet 0    rosuvastatin (CRESTOR) 40 MG Tab Take 40 mg by mouth once daily.      rosuvastatin (CRESTOR) 5 MG tablet Take by mouth.      tadalafiL (CIALIS) 10 MG tablet TAKE 1 TABLET BY MOUTH ONCE A DAY AS NEEDED FOR 30 DAYS      testosterone 200 mg Pllt 200 MG Implant WEEKLY, 1 tab(s)      testosterone cypionate (DEPOTESTOTERONE CYPIONATE) 200 mg/mL injection Inject 200 mg into the muscle every 7 days.      testosterone cypionate 200 mg/mL Kit INJECT 1 MILLILITERS INTRAMUSCULAR EVERY WEEK       No current facility-administered medications for this visit.       Allergies  Review of patient's allergies indicates:   Allergen Reactions    Inapsine [droperidol] Anaphylaxis     seizures    Effexor [venlafaxine]      Increased anxiety    Pcn [penicillins]     Bactrim  [sulfamethoxazole-trimethoprim] Rash     Dry red rash all over when in the sun    Fluconazole Rash     Pruritis         Past Medical History  Past Medical History:   Diagnosis Date    ADHD (attention deficit hyperactivity disorder)     Arthritis     Asthma     as child only    Back pain     Coronary artery disease     Degeneration of lumbar intervertebral disc 05/2016    Depression     Digestive disorder     Elevated PSA     Headache     Hyperlipidemia     Hypertension     Kidney damage     stage 3 ; d/t aleve abuse    Kidney stone     Myocardial infarction     Neck pain     Numbness and tingling in hands     Numbness and tingling of both legs     Osteonecrosis     Bilat Femur    Seizures     from inapsine 2002    Sleep apnea     no cpap    Wears glasses     CONTACS       Surgical History  Past Surgical History:   Procedure Laterality Date    BACK SURGERY  2016    back surgery    BONE GRAFT N/A 11/5/2020    Procedure: BONE GRAFT;  Surgeon: Mateusz Blanco MD;  Location: [facility] OR;  Service: Orthopedics;  Laterality: N/A;    CARDIAC SURGERY  01/06/2020    CABG X 7    CORONARY ARTERY BYPASS GRAFT      7 vessels    HERNIA REPAIR Bilateral 11/22/2017    LITHOTRIPSY      LUMBAR LAMINECTOMY WITH FUSION Bilateral 11/5/2020    Procedure: LAMINECTOMY, SPINE, LUMBAR, WITH FUSION;  Surgeon: Mateusz Blanco MD;  Location: [facility] OR;  Service: Orthopedics;  Laterality: Bilateral;  MEDTRONIC  NTI  L-3    PILONIDAL CYST DRAINAGE      removal of abcess      scrotal    REMOVAL OF HARDWARE FROM SPINE Bilateral 11/5/2020    Procedure: REMOVAL, HARDWARE, SPINE;  Surgeon: Mateusz Blanco MD;  Location: [facility] OR;  Service: Orthopedics;  Laterality: Bilateral;  L 4-5    SURGICAL REMOVAL OF PILONIDAL CYST N/A 4/15/2024    Procedure: EXCISION, PILONIDAL CYST;  Surgeon: Jayden Phillips III, MD;  Location: [facility] OR;  Service: General;  Laterality: N/A;  sacral area    TYMPANOSTOMY TUBE PLACEMENT         Family History:   Family History    Problem Relation Name Age of Onset    Heart disease Mother      Migraines Mother      Hypertension Mother      Early death Father accident     Heart disease Father accident     Diabetes Maternal Aunt      Diabetes Maternal Uncle      Diabetes Maternal Grandfather         Social History:   Social History     Socioeconomic History    Marital status:    Occupational History    Occupation: disability   Tobacco Use    Smoking status: Former     Current packs/day: 0.00     Types: Cigarettes     Quit date: 2016     Years since quittin.5     Passive exposure: Past    Smokeless tobacco: Former     Quit date: 2016    Tobacco comments:     occasional   Substance and Sexual Activity    Alcohol use: Yes     Alcohol/week: 1.0 standard drink of alcohol     Types: 1 Glasses of wine per week     Comment: 20 - 30 shots vodka per day or 1-2 1/5ths per day for 2 years (started )    Drug use: No    Sexual activity: Yes     Partners: Female     Social Determinants of Health     Financial Resource Strain: Medium Risk (3/18/2024)    Overall Financial Resource Strain (CARDIA)     Difficulty of Paying Living Expenses: Somewhat hard   Food Insecurity: No Food Insecurity (3/18/2024)    Hunger Vital Sign     Worried About Running Out of Food in the Last Year: Never true     Ran Out of Food in the Last Year: Never true   Transportation Needs: No Transportation Needs (3/18/2024)    PRAPARE - Transportation     Lack of Transportation (Medical): No     Lack of Transportation (Non-Medical): No   Physical Activity: Insufficiently Active (3/18/2024)    Exercise Vital Sign     Days of Exercise per Week: 2 days     Minutes of Exercise per Session: 60 min   Stress: No Stress Concern Present (3/18/2024)    [employer] [place] [employer] - Occupational Stress Questionnaire     Feeling of Stress : Only a little   Housing Stability: Unknown (3/18/2024)    Housing Stability Vital Sign     Unable to Pay for Housing in the  Last Year: No     Unstable Housing in the Last Year: No       Hospitalization/Major Diagnostic Procedure:     Review of Systems     General/Constitutional:  Chills denies. Fatigue denies. Fever denies. Weight gain denies. Weight loss denies.    Respiratory:  Shortness of breath denies.    Cardiovascular:  Chest pain denies.    Gastrointestinal:  Constipation denies. Diarrhea denies. Nausea denies. Vomiting denies.     Hematology:  Easy bruising denies. Prolonged bleeding denies.     Genitourinary:  Frequent urination denies. Pain in lower back denies. Painful urination denies.     Musculoskeletal:  See HPI for details    Skin:  Rash denies.    Neurologic:  Dizziness denies. Gait abnormalities denies. Seizures denies. Tingling/Numbess denies.    Psychiatric:  Anxiety denies. Depressed mood denies.     Objective:   Vital Signs: There were no vitals filed for this visit.     Physical Exam      General Examination:     Constitutional: The patient is alert and oriented to lace person and time. Mood is pleasant.     Head/Face: Normal facial features normal eyebrows    Eyes: Normal extraocular motion bilaterally    Lungs: Respirations are equal and unlabored    Gait is coordinated.    Cardiovascular: There are no swelling or varicosities present.    Lymphatic: Negative for adenopathy    Skin: Normal    Neurological: Level of consciousness normal. Oriented to place person and time and situation    Psychiatric: Oriented to time place person and situation    Mild pain with internal external rotation left hip no pain with internal external rotation right hip lumbar condition unchanged l  XRAY Report/ Interpretation:  AP pelvis and lateral views of both hips were taken and personally reviewed there are areas of alternating radiodense disease and radiolucencies in both hips that could be consistent with early stage aseptic necrosis the articular surfaces look normal.      Assessment:       1. AVN (avascular necrosis of bone)     2. DDD (degenerative disc disease), lumbar    3. Spinal stenosis of lumbar region, unspecified whether neurogenic claudication present    4. Facet arthritis of lumbar region    5. History of lumbar fusion    6. History of lumbar laminectomy        Plan:       Lee was seen today for pain and pain.    Diagnoses and all orders for this visit:    AVN (avascular necrosis of bone)  -     X-Ray Hips Bilateral 2 View Incl AP Pelvis; Future  -     MRI Hip Without Contrast Left; Future    DDD (degenerative disc disease), lumbar    Spinal stenosis of lumbar region, unspecified whether neurogenic claudication present    Facet arthritis of lumbar region    History of lumbar fusion    History of lumbar laminectomy         No follow-ups on file.    MRI study left hip is ordered to determine whether the he has early stage avascular necrosis of the left hip he should still go forward with his pain management injection.  Return after pain management injection and MRI    Treatment options were discussed with regards to the nature of the medical condition. Conservative pain intervention and surgical options were discussed in detail. The probability of success of each separate treatment option was discussed. The patient expressed a clear understanding of the treatment options. With regards to surgery, the procedure risk, benefits, complications, and outcomes were discussed. No guarantees were given with regards to surgical outcome.   The risk of complications, morbidity, and mortality of patient management decisions have been made at the time of this visit. These are associated with the patient's problems, diagnostic procedures and treatment options. This includes the possible management options selected and those considered but not selected by the patient after shared medical decision making we discussed with the patient.     This note was created using Dragon voice recognition software that occasionally misinterpreted phrases or  words.

## 2024-07-18 ENCOUNTER — TELEPHONE (OUTPATIENT)
Dept: ORTHOPEDICS | Facility: HOSPITAL | Age: 49
End: 2024-07-18

## 2024-07-18 DIAGNOSIS — M51.36 DDD (DEGENERATIVE DISC DISEASE), LUMBAR: ICD-10-CM

## 2024-07-18 DIAGNOSIS — M87.00 AVN (AVASCULAR NECROSIS OF BONE): Primary | ICD-10-CM

## 2024-07-18 DIAGNOSIS — M48.061 SPINAL STENOSIS OF LUMBAR REGION, UNSPECIFIED WHETHER NEUROGENIC CLAUDICATION PRESENT: ICD-10-CM

## 2024-07-18 RX ORDER — TRAMADOL HYDROCHLORIDE 50 MG/1
50 TABLET ORAL EVERY 8 HOURS PRN
Qty: 21 TABLET | Refills: 0 | Status: ON HOLD | OUTPATIENT
Start: 2024-07-18

## 2024-07-18 NOTE — TELEPHONE ENCOUNTER
----- Message from Shiraz Ulrich sent at 7/18/2024  9:38 AM CDT -----  Phone #: [phone]  Patient is requesting a refill of   Pharmacy:   [facility] [place] - [address]  [address]  [place] [address]  Phone: [phone] Fax: [phone]    [facility] - [place] - [address]  [address]  [place] [address]  Phone: [phone] Fax: [phone]              Pt is requesting  pain  meds

## 2024-07-22 ENCOUNTER — HOSPITAL ENCOUNTER (OUTPATIENT)
Facility: HOSPITAL | Age: 49
Discharge: HOME | End: 2024-07-24
Attending: EMERGENCY MEDICINE | Admitting: INTERNAL MEDICINE
Payer: MEDICAID

## 2024-07-22 DIAGNOSIS — K52.9 ACUTE COLITIS: ICD-10-CM

## 2024-07-22 DIAGNOSIS — R07.9 CHEST PAIN: ICD-10-CM

## 2024-07-22 DIAGNOSIS — Z78.9 FAILURE OF OUTPATIENT TREATMENT: ICD-10-CM

## 2024-07-22 DIAGNOSIS — K52.9 COLITIS: ICD-10-CM

## 2024-07-22 DIAGNOSIS — R10.9 ABDOMINAL PAIN, UNSPECIFIED ABDOMINAL LOCATION: Primary | ICD-10-CM

## 2024-07-22 PROBLEM — F41.9 ANXIETY: Status: ACTIVE | Noted: 2024-07-22

## 2024-07-22 LAB
ALBUMIN SERPL BCP-MCNC: 4.2 G/DL (ref 3.5–5.2)
ALP SERPL-CCNC: 63 U/L (ref 55–135)
ALT SERPL W/O P-5'-P-CCNC: 13 U/L (ref 10–44)
ANION GAP SERPL CALC-SCNC: 9 MMOL/L (ref 8–16)
AST SERPL-CCNC: 13 U/L (ref 10–40)
BASOPHILS # BLD AUTO: 0.04 K/UL (ref 0–0.2)
BASOPHILS NFR BLD: 0.4 % (ref 0–1.9)
BILIRUB SERPL-MCNC: 0.6 MG/DL (ref 0.1–1)
BILIRUB UR QL STRIP: NEGATIVE
BNP SERPL-MCNC: 20 PG/ML (ref 0–99)
BUN SERPL-MCNC: 13 MG/DL (ref 6–20)
CALCIUM SERPL-MCNC: 9.4 MG/DL (ref 8.7–10.5)
CHLORIDE SERPL-SCNC: 105 MMOL/L (ref 95–110)
CLARITY UR: CLEAR
CO2 SERPL-SCNC: 23 MMOL/L (ref 23–29)
COLOR UR: YELLOW
CREAT SERPL-MCNC: 1.6 MG/DL (ref 0.5–1.4)
CREAT SERPL-MCNC: 1.7 MG/DL (ref 0.5–1.4)
DIFFERENTIAL METHOD BLD: ABNORMAL
EOSINOPHIL # BLD AUTO: 0.1 K/UL (ref 0–0.5)
EOSINOPHIL NFR BLD: 1.4 % (ref 0–8)
ERYTHROCYTE [DISTWIDTH] IN BLOOD BY AUTOMATED COUNT: 15 % (ref 11.5–14.5)
EST. GFR  (NO RACE VARIABLE): 52.8 ML/MIN/1.73 M^2
GLUCOSE SERPL-MCNC: 155 MG/DL (ref 70–110)
GLUCOSE UR QL STRIP: NEGATIVE
HCT VFR BLD AUTO: 52.5 % (ref 40–54)
HGB BLD-MCNC: 17 G/DL (ref 14–18)
HGB UR QL STRIP: NEGATIVE
IMM GRANULOCYTES # BLD AUTO: 0.03 K/UL (ref 0–0.04)
IMM GRANULOCYTES NFR BLD AUTO: 0.3 % (ref 0–0.5)
KETONES UR QL STRIP: NEGATIVE
LEUKOCYTE ESTERASE UR QL STRIP: NEGATIVE
LIPASE SERPL-CCNC: 15 U/L (ref 4–60)
LYMPHOCYTES # BLD AUTO: 0.7 K/UL (ref 1–4.8)
LYMPHOCYTES NFR BLD: 7.2 % (ref 18–48)
MAGNESIUM SERPL-MCNC: 1.9 MG/DL (ref 1.6–2.6)
MCH RBC QN AUTO: 27.9 PG (ref 27–31)
MCHC RBC AUTO-ENTMCNC: 32.4 G/DL (ref 32–36)
MCV RBC AUTO: 86 FL (ref 82–98)
MONOCYTES # BLD AUTO: 0.4 K/UL (ref 0.3–1)
MONOCYTES NFR BLD: 4.3 % (ref 4–15)
NEUTROPHILS # BLD AUTO: 8.1 K/UL (ref 1.8–7.7)
NEUTROPHILS NFR BLD: 86.4 % (ref 38–73)
NITRITE UR QL STRIP: NEGATIVE
NRBC BLD-RTO: 0 /100 WBC
PH UR STRIP: 8 [PH] (ref 5–8)
PLATELET # BLD AUTO: 255 K/UL (ref 150–450)
PMV BLD AUTO: 11.8 FL (ref 9.2–12.9)
POTASSIUM SERPL-SCNC: 4 MMOL/L (ref 3.5–5.1)
PROT SERPL-MCNC: 8.1 G/DL (ref 6–8.4)
PROT UR QL STRIP: ABNORMAL
RBC # BLD AUTO: 6.09 M/UL (ref 4.6–6.2)
SAMPLE: ABNORMAL
SODIUM SERPL-SCNC: 137 MMOL/L (ref 136–145)
SP GR UR STRIP: 1.01 (ref 1–1.03)
TROPONIN I SERPL HS-MCNC: 4 PG/ML (ref 0–14.9)
URN SPEC COLLECT METH UR: ABNORMAL
UROBILINOGEN UR STRIP-ACNC: NEGATIVE EU/DL
WBC # BLD AUTO: 9.36 K/UL (ref 3.9–12.7)

## 2024-07-22 PROCEDURE — 85025 COMPLETE CBC W/AUTO DIFF WBC: CPT | Performed by: NURSE PRACTITIONER

## 2024-07-22 PROCEDURE — 96367 TX/PROPH/DG ADDL SEQ IV INF: CPT

## 2024-07-22 PROCEDURE — 96361 HYDRATE IV INFUSION ADD-ON: CPT

## 2024-07-22 PROCEDURE — 81003 URINALYSIS AUTO W/O SCOPE: CPT | Performed by: NURSE PRACTITIONER

## 2024-07-22 PROCEDURE — 80053 COMPREHEN METABOLIC PANEL: CPT | Performed by: NURSE PRACTITIONER

## 2024-07-22 PROCEDURE — G0378 HOSPITAL OBSERVATION PER HR: HCPCS

## 2024-07-22 PROCEDURE — 82565 ASSAY OF CREATININE: CPT

## 2024-07-22 PROCEDURE — 83735 ASSAY OF MAGNESIUM: CPT | Performed by: NURSE PRACTITIONER

## 2024-07-22 PROCEDURE — 96375 TX/PRO/DX INJ NEW DRUG ADDON: CPT

## 2024-07-22 PROCEDURE — 99285 EMERGENCY DEPT VISIT HI MDM: CPT | Mod: 25

## 2024-07-22 PROCEDURE — 96372 THER/PROPH/DIAG INJ SC/IM: CPT | Mod: 59 | Performed by: INTERNAL MEDICINE

## 2024-07-22 PROCEDURE — 93010 ELECTROCARDIOGRAM REPORT: CPT | Mod: ,,, | Performed by: INTERNAL MEDICINE

## 2024-07-22 PROCEDURE — 36415 COLL VENOUS BLD VENIPUNCTURE: CPT | Performed by: NURSE PRACTITIONER

## 2024-07-22 PROCEDURE — 96366 THER/PROPH/DIAG IV INF ADDON: CPT

## 2024-07-22 PROCEDURE — 84484 ASSAY OF TROPONIN QUANT: CPT | Performed by: NURSE PRACTITIONER

## 2024-07-22 PROCEDURE — 63600175 PHARM REV CODE 636 W HCPCS: Performed by: EMERGENCY MEDICINE

## 2024-07-22 PROCEDURE — 93005 ELECTROCARDIOGRAM TRACING: CPT | Performed by: INTERNAL MEDICINE

## 2024-07-22 PROCEDURE — 25000003 PHARM REV CODE 250: Performed by: INTERNAL MEDICINE

## 2024-07-22 PROCEDURE — 83880 ASSAY OF NATRIURETIC PEPTIDE: CPT | Performed by: NURSE PRACTITIONER

## 2024-07-22 PROCEDURE — 25000003 PHARM REV CODE 250: Performed by: EMERGENCY MEDICINE

## 2024-07-22 PROCEDURE — 96365 THER/PROPH/DIAG IV INF INIT: CPT

## 2024-07-22 PROCEDURE — 63600175 PHARM REV CODE 636 W HCPCS: Mod: JZ,JG | Performed by: INTERNAL MEDICINE

## 2024-07-22 PROCEDURE — 83690 ASSAY OF LIPASE: CPT | Performed by: NURSE PRACTITIONER

## 2024-07-22 RX ORDER — ACETAMINOPHEN 325 MG/1
650 TABLET ORAL EVERY 4 HOURS PRN
Status: DISCONTINUED | OUTPATIENT
Start: 2024-07-22 | End: 2024-07-24 | Stop reason: HOSPADM

## 2024-07-22 RX ORDER — SODIUM,POTASSIUM PHOSPHATES 280-250MG
2 POWDER IN PACKET (EA) ORAL
Status: DISCONTINUED | OUTPATIENT
Start: 2024-07-22 | End: 2024-07-24 | Stop reason: HOSPADM

## 2024-07-22 RX ORDER — LEVOFLOXACIN 5 MG/ML
500 INJECTION, SOLUTION INTRAVENOUS
Status: DISCONTINUED | OUTPATIENT
Start: 2024-07-22 | End: 2024-07-24 | Stop reason: HOSPADM

## 2024-07-22 RX ORDER — SEMAGLUTIDE 0.68 MG/ML
0.5 INJECTION, SOLUTION SUBCUTANEOUS
COMMUNITY

## 2024-07-22 RX ORDER — FAMOTIDINE 20 MG/1
20 TABLET, FILM COATED ORAL 2 TIMES DAILY
Status: DISCONTINUED | OUTPATIENT
Start: 2024-07-22 | End: 2024-07-24 | Stop reason: HOSPADM

## 2024-07-22 RX ORDER — CLOPIDOGREL BISULFATE 75 MG/1
75 TABLET ORAL DAILY
Status: DISCONTINUED | OUTPATIENT
Start: 2024-07-22 | End: 2024-07-24 | Stop reason: HOSPADM

## 2024-07-22 RX ORDER — HYDROCODONE BITARTRATE AND ACETAMINOPHEN 5; 325 MG/1; MG/1
1 TABLET ORAL EVERY 6 HOURS PRN
COMMUNITY
End: 2024-07-22

## 2024-07-22 RX ORDER — ISOSORBIDE MONONITRATE 30 MG/1
30 TABLET, EXTENDED RELEASE ORAL DAILY
Status: DISCONTINUED | OUTPATIENT
Start: 2024-07-22 | End: 2024-07-24 | Stop reason: HOSPADM

## 2024-07-22 RX ORDER — METRONIDAZOLE 500 MG/100ML
500 INJECTION, SOLUTION INTRAVENOUS
Status: COMPLETED | OUTPATIENT
Start: 2024-07-22 | End: 2024-07-22

## 2024-07-22 RX ORDER — ENOXAPARIN SODIUM 100 MG/ML
40 INJECTION SUBCUTANEOUS EVERY 24 HOURS
Status: DISCONTINUED | OUTPATIENT
Start: 2024-07-22 | End: 2024-07-24 | Stop reason: HOSPADM

## 2024-07-22 RX ORDER — PANTOPRAZOLE SODIUM 40 MG/10ML
40 INJECTION, POWDER, LYOPHILIZED, FOR SOLUTION INTRAVENOUS
Status: COMPLETED | OUTPATIENT
Start: 2024-07-22 | End: 2024-07-22

## 2024-07-22 RX ORDER — ACETAMINOPHEN 325 MG/1
650 TABLET ORAL
Status: COMPLETED | OUTPATIENT
Start: 2024-07-22 | End: 2024-07-22

## 2024-07-22 RX ORDER — LANOLIN ALCOHOL/MO/W.PET/CERES
800 CREAM (GRAM) TOPICAL
Status: DISCONTINUED | OUTPATIENT
Start: 2024-07-22 | End: 2024-07-24 | Stop reason: HOSPADM

## 2024-07-22 RX ORDER — METRONIDAZOLE 500 MG/100ML
500 INJECTION, SOLUTION INTRAVENOUS
Status: DISCONTINUED | OUTPATIENT
Start: 2024-07-22 | End: 2024-07-24 | Stop reason: HOSPADM

## 2024-07-22 RX ORDER — NALOXONE HCL 0.4 MG/ML
0.02 VIAL (ML) INJECTION
Status: DISCONTINUED | OUTPATIENT
Start: 2024-07-22 | End: 2024-07-24 | Stop reason: HOSPADM

## 2024-07-22 RX ORDER — ALPRAZOLAM 0.5 MG/1
0.5 TABLET ORAL 3 TIMES DAILY PRN
Status: DISCONTINUED | OUTPATIENT
Start: 2024-07-22 | End: 2024-07-24 | Stop reason: HOSPADM

## 2024-07-22 RX ORDER — METOPROLOL TARTRATE 50 MG/1
1 TABLET ORAL 2 TIMES DAILY
COMMUNITY

## 2024-07-22 RX ORDER — HYDROCODONE BITARTRATE AND ACETAMINOPHEN 5; 325 MG/1; MG/1
1 TABLET ORAL EVERY 6 HOURS PRN
Status: DISCONTINUED | OUTPATIENT
Start: 2024-07-22 | End: 2024-07-24 | Stop reason: HOSPADM

## 2024-07-22 RX ORDER — ALUMINUM HYDROXIDE, MAGNESIUM HYDROXIDE, AND SIMETHICONE 1200; 120; 1200 MG/30ML; MG/30ML; MG/30ML
30 SUSPENSION ORAL 4 TIMES DAILY PRN
Status: DISCONTINUED | OUTPATIENT
Start: 2024-07-22 | End: 2024-07-24 | Stop reason: HOSPADM

## 2024-07-22 RX ORDER — ASPIRIN 81 MG/1
81 TABLET ORAL DAILY
Status: DISCONTINUED | OUTPATIENT
Start: 2024-07-22 | End: 2024-07-24 | Stop reason: HOSPADM

## 2024-07-22 RX ORDER — ACETAMINOPHEN 325 MG/1
650 TABLET ORAL EVERY 8 HOURS PRN
Status: DISCONTINUED | OUTPATIENT
Start: 2024-07-22 | End: 2024-07-24 | Stop reason: HOSPADM

## 2024-07-22 RX ORDER — ONDANSETRON HYDROCHLORIDE 2 MG/ML
4 INJECTION, SOLUTION INTRAVENOUS EVERY 6 HOURS PRN
Status: DISCONTINUED | OUTPATIENT
Start: 2024-07-22 | End: 2024-07-24 | Stop reason: HOSPADM

## 2024-07-22 RX ORDER — LEVOFLOXACIN 5 MG/ML
500 INJECTION, SOLUTION INTRAVENOUS
Status: DISCONTINUED | OUTPATIENT
Start: 2024-07-22 | End: 2024-07-22

## 2024-07-22 RX ORDER — FUROSEMIDE 40 MG/1
40 TABLET ORAL DAILY
Status: DISCONTINUED | OUTPATIENT
Start: 2024-07-22 | End: 2024-07-22

## 2024-07-22 RX ORDER — TALC
6 POWDER (GRAM) TOPICAL NIGHTLY PRN
Status: DISCONTINUED | OUTPATIENT
Start: 2024-07-22 | End: 2024-07-24 | Stop reason: HOSPADM

## 2024-07-22 RX ORDER — HYOSCYAMINE SULFATE 0.5 MG/ML
0.25 INJECTION, SOLUTION SUBCUTANEOUS
Status: COMPLETED | OUTPATIENT
Start: 2024-07-22 | End: 2024-07-22

## 2024-07-22 RX ORDER — METOPROLOL TARTRATE 50 MG/1
50 TABLET ORAL 2 TIMES DAILY
Status: DISCONTINUED | OUTPATIENT
Start: 2024-07-22 | End: 2024-07-24 | Stop reason: HOSPADM

## 2024-07-22 RX ADMIN — ENOXAPARIN SODIUM 40 MG: 40 INJECTION SUBCUTANEOUS at 06:07

## 2024-07-22 RX ADMIN — PROMETHAZINE HYDROCHLORIDE 12.5 MG: 25 INJECTION INTRAMUSCULAR; INTRAVENOUS at 10:07

## 2024-07-22 RX ADMIN — METOPROLOL TARTRATE 50 MG: 50 TABLET, FILM COATED ORAL at 08:07

## 2024-07-22 RX ADMIN — CLOPIDOGREL BISULFATE 75 MG: 75 TABLET, FILM COATED ORAL at 06:07

## 2024-07-22 RX ADMIN — PROMETHAZINE HYDROCHLORIDE 12.5 MG: 25 INJECTION INTRAMUSCULAR; INTRAVENOUS at 02:07

## 2024-07-22 RX ADMIN — SODIUM CHLORIDE, POTASSIUM CHLORIDE, SODIUM LACTATE AND CALCIUM CHLORIDE 1000 ML: 600; 310; 30; 20 INJECTION, SOLUTION INTRAVENOUS at 10:07

## 2024-07-22 RX ADMIN — ACETAMINOPHEN 650 MG: 325 TABLET ORAL at 02:07

## 2024-07-22 RX ADMIN — FAMOTIDINE 20 MG: 20 TABLET ORAL at 08:07

## 2024-07-22 RX ADMIN — ALPRAZOLAM 0.5 MG: 0.5 TABLET ORAL at 06:07

## 2024-07-22 RX ADMIN — ALPRAZOLAM 0.5 MG: 0.5 TABLET ORAL at 08:07

## 2024-07-22 RX ADMIN — HYOSCYAMINE SULFATE 0.25 MG: 0.5 INJECTION, SOLUTION SUBCUTANEOUS at 10:07

## 2024-07-22 RX ADMIN — HYDROCODONE BITARTRATE AND ACETAMINOPHEN 1 TABLET: 5; 325 TABLET ORAL at 06:07

## 2024-07-22 RX ADMIN — PANTOPRAZOLE SODIUM 40 MG: 40 INJECTION, POWDER, LYOPHILIZED, FOR SOLUTION INTRAVENOUS at 10:07

## 2024-07-22 RX ADMIN — METRONIDAZOLE 500 MG: 5 INJECTION, SOLUTION INTRAVENOUS at 10:07

## 2024-07-22 RX ADMIN — METRONIDAZOLE 500 MG: 5 INJECTION, SOLUTION INTRAVENOUS at 02:07

## 2024-07-22 NOTE — ED PROVIDER NOTES
Encounter Date: 7/22/2024       History     Chief Complaint   Patient presents with    Vomiting    Abdominal Pain     Seen here recently for colitis not getting any better     48-year-old male presented emergency department with nausea and vomiting and some diarrhea and abdominal cramps.  Patient was diagnosed of colitis about 10 days ago and still continues to have symptoms and is getting worse so presented here.  Patient said he finished antibiotic treatment with Cipro and Flagyl.  Denies fever.  Patient has chills.  Denies dysuria or hematuria or any focal weakness or numbness.  Patient has history of coronary artery disease however has no chest pain.  Patient  not on blood thinners but used to be on blood thinners      Review of patient's allergies indicates:   Allergen Reactions    Inapsine [droperidol] Anaphylaxis     seizures    Effexor [venlafaxine]      Increased anxiety    Pcn [penicillins]     Bactrim [sulfamethoxazole-trimethoprim] Rash     Dry red rash all over when in the sun    Fluconazole Rash     Pruritis       Past Medical History:   Diagnosis Date    ADHD (attention deficit hyperactivity disorder)     Arthritis     Asthma     as child only    Back pain     Coronary artery disease     Degeneration of lumbar intervertebral disc 05/2016    Depression     Digestive disorder     Elevated PSA     Headache     Hyperlipidemia     Hypertension     Kidney damage     stage 3 ; d/t aleve abuse    Kidney stone     Myocardial infarction     Neck pain     Numbness and tingling in hands     Numbness and tingling of both legs     Osteonecrosis     Bilat Femur    Seizures     from inapsine 2002    Sleep apnea     no cpap    Wears glasses     CONTACS     Past Surgical History:   Procedure Laterality Date    BACK SURGERY  2016    back surgery    BONE GRAFT N/A 11/5/2020    Procedure: BONE GRAFT;  Surgeon: Mateusz Blanco MD;  Location: [facility];  Service: Orthopedics;  Laterality: N/A;    CARDIAC SURGERY  01/06/2020     CABG X 7    CORONARY ARTERY BYPASS GRAFT      7 vessels    HERNIA REPAIR Bilateral 2017    LITHOTRIPSY      LUMBAR LAMINECTOMY WITH FUSION Bilateral 2020    Procedure: LAMINECTOMY, SPINE, LUMBAR, WITH FUSION;  Surgeon: Mateusz Blanco MD;  Location: [facility] OR;  Service: Orthopedics;  Laterality: Bilateral;  MEDTRONIC  NTI  L-3    PILONIDAL CYST DRAINAGE      removal of abcess      scrotal    REMOVAL OF HARDWARE FROM SPINE Bilateral 2020    Procedure: REMOVAL, HARDWARE, SPINE;  Surgeon: Mateusz Blanco MD;  Location: [facility] OR;  Service: Orthopedics;  Laterality: Bilateral;  L 4-5    SURGICAL REMOVAL OF PILONIDAL CYST N/A 4/15/2024    Procedure: EXCISION, PILONIDAL CYST;  Surgeon: Jayden Phillips III, MD;  Location: [facility] OR;  Service: General;  Laterality: N/A;  sacral area    TYMPANOSTOMY TUBE PLACEMENT       Family History   Problem Relation Name Age of Onset    Heart disease Mother      Migraines Mother      Hypertension Mother      Early death Father accident     Heart disease Father accident     Diabetes Maternal Aunt      Diabetes Maternal Uncle      Diabetes Maternal Grandfather       Social History     Tobacco Use    Smoking status: Former     Current packs/day: 0.00     Types: Cigarettes     Quit date: 2016     Years since quittin.5     Passive exposure: Past    Smokeless tobacco: Former     Quit date: 2016    Tobacco comments:     occasional   Substance Use Topics    Alcohol use: Yes     Alcohol/week: 1.0 standard drink of alcohol     Types: 1 Glasses of wine per week     Comment: 20 - 30 shots vodka per day or 1-2 1/5ths per day for 2 years (started )    Drug use: No     Review of Systems   Constitutional:  Positive for chills and fatigue.   HENT: Negative.     Eyes: Negative.    Respiratory: Negative.     Cardiovascular: Negative.    Gastrointestinal:  Positive for abdominal pain, diarrhea, nausea and vomiting. Negative for blood in stool, constipation and rectal pain.    Endocrine: Negative.    Genitourinary: Negative.    Musculoskeletal: Negative.    Skin: Negative.    Allergic/Immunologic: Negative.    Neurological: Negative.    Hematological: Negative.    Psychiatric/Behavioral:  Negative for agitation.    All other systems reviewed and are negative.      Physical Exam     Initial Vitals [07/22/24 0841]   BP Pulse Resp Temp SpO2   (!) 158/105 (!) 118 18 98.1 °F (36.7 °C) 96 %      MAP       --         Physical Exam    Nursing note and vitals reviewed.  Constitutional: He appears well-developed and well-nourished.   HENT:   Head: Normocephalic and atraumatic.   Nose: Nose normal.   Eyes: Conjunctivae and EOM are normal.   Neck: No tracheal deviation present.   Normal range of motion.  Cardiovascular:  Normal rate, regular rhythm, normal heart sounds and intact distal pulses.     Exam reveals no friction rub.       No murmur heard.  Pulmonary/Chest: Breath sounds normal. No respiratory distress. He has no wheezes. He has no rales.   Abdominal: Abdomen is soft. He exhibits no distension. There is abdominal tenderness.   Diffuse abdominal tenderness   Musculoskeletal:         General: Normal range of motion.      Cervical back: Normal range of motion.     Neurological: He is alert and oriented to person, place, and time. He has normal strength. No sensory deficit. GCS score is 15. GCS eye subscore is 4. GCS verbal subscore is 5. GCS motor subscore is 6.   Skin: Skin is warm and dry. Capillary refill takes less than 2 seconds.   Psychiatric: He has a normal mood and affect. Thought content normal.         ED Course   Procedures  Labs Reviewed   CBC W/ AUTO DIFFERENTIAL - Abnormal       Result Value    WBC 9.36      RBC 6.09      Hemoglobin 17.0      Hematocrit 52.5      MCV 86      MCH 27.9      MCHC 32.4      RDW 15.0 (*)     Platelets 255      MPV 11.8      Immature Granulocytes 0.3      Gran # (ANC) 8.1 (*)     Immature Grans (Abs) 0.03      Lymph # 0.7 (*)     Mono # 0.4       Eos # 0.1      Baso # 0.04      nRBC 0      Gran % 86.4 (*)     Lymph % 7.2 (*)     Mono % 4.3      Eosinophil % 1.4      Basophil % 0.4      Differential Method Automated     COMPREHENSIVE METABOLIC PANEL - Abnormal    Sodium 137      Potassium 4.0      Chloride 105      CO2 23      Glucose 155 (*)     BUN 13      Creatinine 1.6 (*)     Calcium 9.4      Total Protein 8.1      Albumin 4.2      Total Bilirubin 0.6      Alkaline Phosphatase 63      AST 13      ALT 13      eGFR 52.8 (*)     Anion Gap 9     ISTAT CREATININE - Abnormal    POC Creatinine 1.7 (*)     Sample VENOUS     CULTURE, STOOL   LIPASE    Lipase 15     MAGNESIUM    Magnesium 1.9     B-TYPE NATRIURETIC PEPTIDE   TROPONIN I HIGH SENSITIVITY   URINALYSIS, REFLEX TO URINE CULTURE   OCCULT BLOOD X 1, STOOL   STOOL EXAM-OVA,CYSTS,PARASITES   WBC, STOOL   TROPONIN I HIGH SENSITIVITY   B-TYPE NATRIURETIC PEPTIDE   POCT CREATININE     EKG Readings: (Independently Interpreted)   Initial Reading: No STEMI. Rhythm: Normal Sinus Rhythm. Ectopy: No Ectopy. Conduction: Normal.     ECG Results              EKG 12-lead (In process)        Collection Time Result Time QRS Duration OHS QTC Calculation    07/22/24 10:58:32 07/22/24 11:15:56 88 427                     In process by Interface, Lab In [facility] (07/22/24 11:16:06)                   Narrative:    Test Reason : R06.02,    Vent. Rate : 077 BPM     Atrial Rate : 077 BPM     P-R Int : 200 ms          QRS Dur : 088 ms      QT Int : 378 ms       P-R-T Axes : 047 057 -47 degrees     QTc Int : 427 ms    Normal sinus rhythm  Inferior infarct (cited on or before 22-OCT-2019)  Abnormal ECG  When compared with ECG of 10-NOV-2023 07:31,  Premature ventricular complexes are no longer Present  Vent. rate has decreased BY  43 BPM    Referred By: [email or web address]   SELF           Confirmed By:                                   Imaging Results              CT Abdomen Pelvis  Without Contrast (Final result)  Result time 07/22/24  10:44:35   Procedure changed from CT Abdomen Pelvis With IV Contrast NO Oral Contrast     Final result by Wero Garcia MD (07/22/24 10:44:35)                   Impression:      1. Mild wall thickening of the colon, slightly increased when compared to July 12th.  While findings are probably in part related to incomplete distension, mild acute colitis is suspected.  Correlate with clinical findings.  2. Additional stable findings as above.      Electronically signed by: Wero Garcia  Date:    07/22/2024  Time:    10:44               Narrative:    EXAMINATION:  CT ABDOMEN PELVIS WITHOUT CONTRAST    CLINICAL HISTORY:  Abdominal abscess/infection suspected;.    TECHNIQUE:  Thin section axial images were obtained, without intravenous contrast. The lack of intravenous contrast limits assessment of solid organs and vascular structures.    COMPARISON:  July 12, 2024    FINDINGS:  The lung bases are unremarkable.  Coronary artery calcification is noted.    The liver has a normal noncontrast appearance with the exception of 3 small hypodensities shown on the previous contrast-enhanced study to reflect cysts.  The gallbladder and biliary tree are unremarkable.  The spleen is normal in size and appearance.  The pancreas and adrenal glands are within normal limits.    There is no evidence of renal mass, calculus, or hydronephrosis.  The abdominal aorta is moderately calcified without evidence of aneurysm.  Two small fat containing midline abdominal hernias are noted above the level of the umbilicus.    There is persistent mild wall thickening of the colon, with slightly increased wall thickening of portions of the descending colon.  Correlate for possible colitis.  There is no significant surrounding pericolonic edema.  No free air, free fluid, or lymphadenopathy is identified.    Images of the pelvis demonstrate an unremarkable urinary bladder.  The prostate gland measures 4.3 cm in transverse dimension.  There is  slight wall thickening of the sigmoid colon.  No pelvic free fluid or lymphadenopathy is identified.    No acute osseous abnormalities are demonstrated.  Changes of instrumented lumbar fusion are noted.                                       X-Ray Chest AP Portable (Final result)  Result time 07/22/24 10:29:28      Final result by Mariela Gupta MD (07/22/24 10:29:28)                   Impression:      No acute cardiopulmonary abnormality.    Prior median sternotomy      Electronically signed by: Mariela Gupta  Date:    07/22/2024  Time:    10:29               Narrative:    EXAMINATION:  XR CHEST AP PORTABLE    CLINICAL HISTORY:  CHF;    FINDINGS:  Portable chest at 10:06 is compared to 04/03/2024 shows normal cardiomediastinal silhouette.  There are median sternotomy wires.    Lungs are clear. Pulmonary vasculature is normal. No acute osseous abnormality.                                       Medications   promethazine (PHENERGAN) 12.5 mg in 0.9% NaCl 50 mL IVPB (12.5 mg Intravenous New Bag 7/22/24 1416)   levoFLOXacin 500 mg/100 mL IVPB 500 mg (has no administration in time range)   metronidazole IVPB 500 mg (has no administration in time range)   promethazine (PHENERGAN) 12.5 mg in 0.9% NaCl 50 mL IVPB (0 mg Intravenous Stopped 7/22/24 1028)   lactated ringers bolus 1,000 mL (1,000 mLs Intravenous New Bag 7/22/24 1019)   pantoprazole injection 40 mg (40 mg Intravenous Given 7/22/24 1007)   hyoscyamine injection 0.25 mg (0.25 mg Intravenous Given 7/22/24 1007)     Medical Decision Making  48-year-old male with abdominal pain and nausea and vomiting.  Patient having intractable symptoms and patient said he did not improve over the past 10 days despite using antibiotics and finished antibiotic.  Patient continued to have symptoms.  Patient was given antiemetics in the ER and still symptomatic.  CT scan done given patient's persistent symptoms and CT scan does show evidence of worsening colitis and given  worsening colitis and failure of outpatient treatment broad-spectrum antibiotics started and Hospital Medicine consulted for evaluation for further management and treatment    Amount and/or Complexity of Data Reviewed  Labs: ordered. Decision-making details documented in ED Course.  Radiology: ordered. Decision-making details documented in ED Course.  ECG/medicine tests: ordered. Decision-making details documented in ED Course.    Risk  Prescription drug management.  Decision regarding hospitalization.                                      Clinical Impression:  Final diagnoses:  [R10.9] Abdominal pain, unspecified abdominal location (Primary)  [K52.9] Colitis  [Z78.9] Failure of outpatient treatment          ED Disposition Condition    Admit Meryl Negrete MD  07/22/24 [id number]

## 2024-07-22 NOTE — NURSING
Nurses Note -- 4 Eyes      7/22/2024   6:27 PM      Skin assessed during: Admit      [x] No Altered Skin Integrity Present    []Prevention Measures Documented      [] Yes- Altered Skin Integrity Present or Discovered   [] LDA Added if Not in Epic (Describe Wound)   [] New Altered Skin Integrity was Present on Admit and Documented in LDA   [] Wound Image Taken    Wound Care Consulted? No    Attending Nurse:  FREYA Son    Second RN/Staff Member:   FREYA Garrison

## 2024-07-22 NOTE — ED NOTES
Recently dx with colitis - states once he finished his phenergan and another unnamed medication, he began throwing up again - states he has had 6 or 7 episodes of vomiting this morning.

## 2024-07-22 NOTE — HPI
48 year old pt getting admitted with acute colitis  Symptoms started with Nausea/Vomiting , loose stools and abdominal pain 10 days ago  Pt came to ER on July 12 and had CT abdomen done  He was discharged to home with PO abx  Per pt , even though he finished abx course his symptoms persisted  He came back to ER today and had another CT Abdomen  It showed persistent colitis and pt got admitted

## 2024-07-22 NOTE — SUBJECTIVE & OBJECTIVE
Past Medical History:   Diagnosis Date    ADHD (attention deficit hyperactivity disorder)     Arthritis     Asthma     as child only    Back pain     Coronary artery disease     Degeneration of lumbar intervertebral disc 05/2016    Depression     Digestive disorder     Elevated PSA     Headache     Hyperlipidemia     Hypertension     Kidney damage     stage 3 ; d/t aleve abuse    Kidney stone     Myocardial infarction     Neck pain     Numbness and tingling in hands     Numbness and tingling of both legs     Osteonecrosis     Bilat Femur    Seizures     from inapsine 2002    Sleep apnea     no cpap    Wears glasses     CONTACS       Past Surgical History:   Procedure Laterality Date    BACK SURGERY  2016    back surgery    BONE GRAFT N/A 11/5/2020    Procedure: BONE GRAFT;  Surgeon: Mateusz Blanco MD;  Location: [facility] OR;  Service: Orthopedics;  Laterality: N/A;    CARDIAC SURGERY  01/06/2020    CABG X 7    CORONARY ARTERY BYPASS GRAFT      7 vessels    HERNIA REPAIR Bilateral 11/22/2017    LITHOTRIPSY      LUMBAR LAMINECTOMY WITH FUSION Bilateral 11/5/2020    Procedure: LAMINECTOMY, SPINE, LUMBAR, WITH FUSION;  Surgeon: Mateusz Blanco MD;  Location: [facility] OR;  Service: Orthopedics;  Laterality: Bilateral;  MEDTRONIC  NTI  L-3    PILONIDAL CYST DRAINAGE      removal of abcess      scrotal    REMOVAL OF HARDWARE FROM SPINE Bilateral 11/5/2020    Procedure: REMOVAL, HARDWARE, SPINE;  Surgeon: Mateusz Blanco MD;  Location: [facility] OR;  Service: Orthopedics;  Laterality: Bilateral;  L 4-5    SURGICAL REMOVAL OF PILONIDAL CYST N/A 4/15/2024    Procedure: EXCISION, PILONIDAL CYST;  Surgeon: Jayden Phillips III, MD;  Location: [facility] OR;  Service: General;  Laterality: N/A;  sacral area    TYMPANOSTOMY TUBE PLACEMENT         Review of patient's allergies indicates:   Allergen Reactions    Inapsine [droperidol] Anaphylaxis     seizures    Effexor [venlafaxine]      Increased anxiety    Pcn [penicillins]     Bactrim  [sulfamethoxazole-trimethoprim] Rash     Dry red rash all over when in the sun    Fluconazole Rash     Pruritis         No current facility-administered medications on file prior to encounter.     Current Outpatient Medications on File Prior to Encounter   Medication Sig    anastrozole (ARIMIDEX) 1 mg Tab Take 1 mg by mouth every 7 days.    clopidogreL (PLAVIX) 75 mg tablet Take 75 mg by mouth once daily.    ENTRESTO 49-51 mg per tablet Take 1 tablet by mouth 2 (two) times daily.    EScitalopram oxalate (LEXAPRO) 10 MG tablet Take 1 tablet (10 mg total) by mouth every evening. (Patient taking differently: Take 10 mg by mouth once daily.)    metoprolol tartrate (LOPRESSOR) 50 MG tablet Take 1 tablet by mouth 2 (two) times daily.    promethazine (PHENERGAN) 25 MG tablet Take 1 tablet (25 mg total) by mouth every 6 (six) hours as needed for Nausea.    rosuvastatin (CRESTOR) 40 MG Tab Take 40 mg by mouth once daily.    semaglutide (OZEMPIC) 0.25 mg or 0.5 mg (2 mg/3 mL) pen injector Inject 0.5 mg into the skin every 7 days.    testosterone cypionate 200 mg/mL Kit Inject 1 mL into the muscle every 7 days.    tadalafiL (CIALIS) 10 MG tablet Take 10 mg by mouth daily as needed.    traMADoL (ULTRAM) 50 mg tablet Take 1 tablet (50 mg total) by mouth every 8 (eight) hours as needed for Pain.    [DISCONTINUED] anastrozole (ARIMIDEX) 1 mg Tab Take by mouth.    [DISCONTINUED] aspirin (ECOTRIN) 81 MG EC tablet Take 81 mg by mouth once daily.    [DISCONTINUED] furosemide (LASIX) 40 MG tablet Take 40 mg by mouth once daily.    [DISCONTINUED] gabapentin (NEURONTIN) 300 MG capsule Take 1 capsule (300 mg total) by mouth 3 (three) times daily.    [DISCONTINUED] HYDROcodone-acetaminophen (NORCO) 5-325 mg per tablet Take 1 tablet by mouth every 6 (six) hours as needed for Pain.    [DISCONTINUED] isosorbide mononitrate (IMDUR) 30 MG 24 hr tablet Take 30 mg by mouth once daily.    [DISCONTINUED] metoprolol tartrate (LOPRESSOR) 25 MG tablet  Take 50 mg by mouth 2 (two) times daily.    [DISCONTINUED] rosuvastatin (CRESTOR) 5 MG tablet Take by mouth.    [DISCONTINUED] testosterone 200 mg Pllt 200 MG Implant WEEKLY, 1 tab(s)    [DISCONTINUED] testosterone cypionate (DEPOTESTOTERONE CYPIONATE) 200 mg/mL injection Inject 200 mg into the muscle every 7 days.     Family History       Problem Relation (Age of Onset)    Diabetes Maternal Aunt, Maternal Uncle, Maternal Grandfather    Early death Father    Heart disease Mother, Father    Hypertension Mother    Migraines Mother          Tobacco Use    Smoking status: Former     Current packs/day: 0.00     Types: Cigarettes     Quit date: 2016     Years since quittin.5     Passive exposure: Past    Smokeless tobacco: Former     Quit date: 2016    Tobacco comments:     occasional   Substance and Sexual Activity    Alcohol use: Yes     Alcohol/week: 1.0 standard drink of alcohol     Types: 1 Glasses of wine per week     Comment: 20 - 30 shots vodka per day or 1-2 1/5ths per day for 2 years (started )    Drug use: No    Sexual activity: Yes     Partners: Female     Review of Systems   Constitutional:  Negative for activity change and appetite change.   HENT:  Negative for congestion and dental problem.    Eyes:  Negative for discharge and itching.   Respiratory:  Negative for shortness of breath.    Cardiovascular:  Negative for chest pain.   Gastrointestinal:  Positive for abdominal pain, diarrhea, nausea and vomiting. Negative for abdominal distention.   Endocrine: Negative for cold intolerance.   Genitourinary:  Negative for difficulty urinating and dysuria.   Musculoskeletal:  Negative for arthralgias and back pain.   Skin:  Negative for color change.   Neurological:  Negative for dizziness and facial asymmetry.   Hematological:  Negative for adenopathy.   Psychiatric/Behavioral:  Negative for agitation and behavioral problems.      Objective:     Vital Signs (Most Recent):  Temp: 97.4 °F (36.3 °C)  (07/22/24 1756)  Pulse: 85 (07/22/24 1756)  Resp: 18 (07/22/24 1813)  BP: 122/81 (07/22/24 1756)  SpO2: 95 % (07/22/24 1756) Vital Signs (24h Range):  Temp:  [97.4 °F (36.3 °C)-98.1 °F (36.7 °C)] 97.4 °F (36.3 °C)  Pulse:  [] 85  Resp:  [17-18] 18  SpO2:  [95 %-97 %] 95 %  BP: ()/() 122/81     Weight: 95.3 kg (210 lb)  Body mass index is 32.89 kg/m².     Physical Exam  Vitals and nursing note reviewed.   Constitutional:       Appearance: He is well-developed.   HENT:      Head: Atraumatic.      Right Ear: External ear normal.      Left Ear: External ear normal.      Nose: Nose normal.      Mouth/Throat:      Mouth: Mucous membranes are moist.   Eyes:      Extraocular Movements: Extraocular movements intact.   Cardiovascular:      Rate and Rhythm: Normal rate.   Pulmonary:      Effort: Pulmonary effort is normal.   Abdominal:      Palpations: Abdomen is soft.   Musculoskeletal:         General: Normal range of motion.      Cervical back: Full passive range of motion without pain and normal range of motion.   Skin:     General: Skin is warm.   Neurological:      Mental Status: He is alert and oriented to person, place, and time.   Psychiatric:         Behavior: Behavior normal.                Significant Labs: All pertinent labs within the past 24 hours have been reviewed.  CBC:   Recent Labs   Lab 07/22/24  0926   WBC 9.36   HGB 17.0   HCT 52.5        CMP:   Recent Labs   Lab 07/22/24  0926      K 4.0      CO2 23   *   BUN 13   CREATININE 1.6*   CALCIUM 9.4   PROT 8.1   ALBUMIN 4.2   BILITOT 0.6   ALKPHOS 63   AST 13   ALT 13   ANIONGAP 9       Significant Imaging: I have reviewed all pertinent imaging results/findings within the past 24 hours.

## 2024-07-22 NOTE — ASSESSMENT & PLAN NOTE
Hold Entresto/aldactone  Monitor renal panels   Latest Reference Range & Units 11/12/23 05:00 11/13/23 03:19 11/14/23 03:44 02/03/24 08:57 04/03/24 09:03 07/12/24 19:44 07/22/24 09:26   Creatinine 0.5 - 1.4 mg/dL 1.1 1.1 1.2 1.6 (H) 1.7 (H) 1.6 (H) 1.6 (H)

## 2024-07-22 NOTE — ASSESSMENT & PLAN NOTE
Chronic, controlled. Latest blood pressure and vitals reviewed-     Temp:  [97.4 °F (36.3 °C)-98.1 °F (36.7 °C)]   Pulse:  []   Resp:  [17-18]   BP: ()/()   SpO2:  [95 %-97 %] .   Home meds for hypertension were reviewed and noted below.   Hypertension Medications               ENTRESTO 49-51 mg per tablet Take 1 tablet by mouth 2 (two) times daily.    metoprolol tartrate (LOPRESSOR) 50 MG tablet Take 1 tablet by mouth 2 (two) times daily.            While in the hospital, will manage blood pressure as follows; Continue home antihypertensive regimen    Will utilize p.r.n. blood pressure medication only if patient's blood pressure greater than 140/90 and he develops symptoms such as worsening chest pain or shortness of breath.

## 2024-07-22 NOTE — H&P
[facility] - Emergency Dept  [facility] Medicine  History & Physical    Patient Name: Lee Quintanilla  MRN: [id number]  Patient Class: OP- Observation  Admission Date: 7/22/2024  Attending Physician: Oscar Perry MD   Primary Care Provider: Riya Vidales NP         Patient information was obtained from patient, past medical records, and ER records.     Subjective:     Principal Problem:Colitis    Chief Complaint:   Chief Complaint   Patient presents with    Vomiting    Abdominal Pain     Seen here recently for colitis not getting any better        HPI: 48 year old pt getting admitted with acute colitis  Symptoms started with Nausea/Vomiting , loose stools and abdominal pain 10 days ago  Pt came to ER on July 12 and had CT abdomen done  He was discharged to home with PO abx  Per pt , even though he finished abx course his symptoms persisted  He came back to ER today and had another CT Abdomen  It showed persistent colitis and pt got admitted     Past Medical History:   Diagnosis Date    ADHD (attention deficit hyperactivity disorder)     Arthritis     Asthma     as child only    Back pain     Coronary artery disease     Degeneration of lumbar intervertebral disc 05/2016    Depression     Digestive disorder     Elevated PSA     Headache     Hyperlipidemia     Hypertension     Kidney damage     stage 3 ; d/t aleve abuse    Kidney stone     Myocardial infarction     Neck pain     Numbness and tingling in hands     Numbness and tingling of both legs     Osteonecrosis     Bilat Femur    Seizures     from inapsine 2002    Sleep apnea     no cpap    Wears glasses     CONTACS       Past Surgical History:   Procedure Laterality Date    BACK SURGERY  2016    back surgery    BONE GRAFT N/A 11/5/2020    Procedure: BONE GRAFT;  Surgeon: Mateusz Blanco MD;  Location: [facility];  Service: Orthopedics;  Laterality: N/A;    CARDIAC SURGERY  01/06/2020    CABG X 7    CORONARY ARTERY BYPASS GRAFT      7 vessels     HERNIA REPAIR Bilateral 11/22/2017    LITHOTRIPSY      LUMBAR LAMINECTOMY WITH FUSION Bilateral 11/5/2020    Procedure: LAMINECTOMY, SPINE, LUMBAR, WITH FUSION;  Surgeon: Mateusz Blanco MD;  Location: [facility] OR;  Service: Orthopedics;  Laterality: Bilateral;  MEDTRONIC  NTI  L-3    PILONIDAL CYST DRAINAGE      removal of abcess      scrotal    REMOVAL OF HARDWARE FROM SPINE Bilateral 11/5/2020    Procedure: REMOVAL, HARDWARE, SPINE;  Surgeon: Mateusz Blanco MD;  Location: [facility] OR;  Service: Orthopedics;  Laterality: Bilateral;  L 4-5    SURGICAL REMOVAL OF PILONIDAL CYST N/A 4/15/2024    Procedure: EXCISION, PILONIDAL CYST;  Surgeon: Jayden Phillips III, MD;  Location: [facility] OR;  Service: General;  Laterality: N/A;  sacral area    TYMPANOSTOMY TUBE PLACEMENT         Review of patient's allergies indicates:   Allergen Reactions    Inapsine [droperidol] Anaphylaxis     seizures    Effexor [venlafaxine]      Increased anxiety    Pcn [penicillins]     Bactrim [sulfamethoxazole-trimethoprim] Rash     Dry red rash all over when in the sun    Fluconazole Rash     Pruritis         No current facility-administered medications on file prior to encounter.     Current Outpatient Medications on File Prior to Encounter   Medication Sig    anastrozole (ARIMIDEX) 1 mg Tab Take 1 mg by mouth every 7 days.    clopidogreL (PLAVIX) 75 mg tablet Take 75 mg by mouth once daily.    ENTRESTO 49-51 mg per tablet Take 1 tablet by mouth 2 (two) times daily.    EScitalopram oxalate (LEXAPRO) 10 MG tablet Take 1 tablet (10 mg total) by mouth every evening. (Patient taking differently: Take 10 mg by mouth once daily.)    metoprolol tartrate (LOPRESSOR) 50 MG tablet Take 1 tablet by mouth 2 (two) times daily.    promethazine (PHENERGAN) 25 MG tablet Take 1 tablet (25 mg total) by mouth every 6 (six) hours as needed for Nausea.    rosuvastatin (CRESTOR) 40 MG Tab Take 40 mg by mouth once daily.    semaglutide (OZEMPIC) 0.25 mg or 0.5 mg (2  mg/3 mL) pen injector Inject 0.5 mg into the skin every 7 days.    testosterone cypionate 200 mg/mL Kit Inject 1 mL into the muscle every 7 days.    tadalafiL (CIALIS) 10 MG tablet Take 10 mg by mouth daily as needed.    traMADoL (ULTRAM) 50 mg tablet Take 1 tablet (50 mg total) by mouth every 8 (eight) hours as needed for Pain.    [DISCONTINUED] anastrozole (ARIMIDEX) 1 mg Tab Take by mouth.    [DISCONTINUED] aspirin (ECOTRIN) 81 MG EC tablet Take 81 mg by mouth once daily.    [DISCONTINUED] furosemide (LASIX) 40 MG tablet Take 40 mg by mouth once daily.    [DISCONTINUED] gabapentin (NEURONTIN) 300 MG capsule Take 1 capsule (300 mg total) by mouth 3 (three) times daily.    [DISCONTINUED] HYDROcodone-acetaminophen (NORCO) 5-325 mg per tablet Take 1 tablet by mouth every 6 (six) hours as needed for Pain.    [DISCONTINUED] isosorbide mononitrate (IMDUR) 30 MG 24 hr tablet Take 30 mg by mouth once daily.    [DISCONTINUED] metoprolol tartrate (LOPRESSOR) 25 MG tablet Take 50 mg by mouth 2 (two) times daily.    [DISCONTINUED] rosuvastatin (CRESTOR) 5 MG tablet Take by mouth.    [DISCONTINUED] testosterone 200 mg Pllt 200 MG Implant WEEKLY, 1 tab(s)    [DISCONTINUED] testosterone cypionate (DEPOTESTOTERONE CYPIONATE) 200 mg/mL injection Inject 200 mg into the muscle every 7 days.     Family History       Problem Relation (Age of Onset)    Diabetes Maternal Aunt, Maternal Uncle, Maternal Grandfather    Early death Father    Heart disease Mother, Father    Hypertension Mother    Migraines Mother          Tobacco Use    Smoking status: Former     Current packs/day: 0.00     Types: Cigarettes     Quit date: 2016     Years since quittin.5     Passive exposure: Past    Smokeless tobacco: Former     Quit date: 2016    Tobacco comments:     occasional   Substance and Sexual Activity    Alcohol use: Yes     Alcohol/week: 1.0 standard drink of alcohol     Types: 1 Glasses of wine per week     Comment: 20 - 30 shots  vodka per day or 1-2 1/5ths per day for 2 years (started 2021)    Drug use: No    Sexual activity: Yes     Partners: Female     Review of Systems   Constitutional:  Negative for activity change and appetite change.   HENT:  Negative for congestion and dental problem.    Eyes:  Negative for discharge and itching.   Respiratory:  Negative for shortness of breath.    Cardiovascular:  Negative for chest pain.   Gastrointestinal:  Positive for abdominal pain, diarrhea, nausea and vomiting. Negative for abdominal distention.   Endocrine: Negative for cold intolerance.   Genitourinary:  Negative for difficulty urinating and dysuria.   Musculoskeletal:  Negative for arthralgias and back pain.   Skin:  Negative for color change.   Neurological:  Negative for dizziness and facial asymmetry.   Hematological:  Negative for adenopathy.   Psychiatric/Behavioral:  Negative for agitation and behavioral problems.      Objective:     Vital Signs (Most Recent):  Temp: 97.4 °F (36.3 °C) (07/22/24 1756)  Pulse: 85 (07/22/24 1756)  Resp: 18 (07/22/24 1813)  BP: 122/81 (07/22/24 1756)  SpO2: 95 % (07/22/24 1756) Vital Signs (24h Range):  Temp:  [97.4 °F (36.3 °C)-98.1 °F (36.7 °C)] 97.4 °F (36.3 °C)  Pulse:  [] 85  Resp:  [17-18] 18  SpO2:  [95 %-97 %] 95 %  BP: ()/() 122/81     Weight: 95.3 kg (210 lb)  Body mass index is 32.89 kg/m².     Physical Exam  Vitals and nursing note reviewed.   Constitutional:       Appearance: He is well-developed.   HENT:      Head: Atraumatic.      Right Ear: External ear normal.      Left Ear: External ear normal.      Nose: Nose normal.      Mouth/Throat:      Mouth: Mucous membranes are moist.   Eyes:      Extraocular Movements: Extraocular movements intact.   Cardiovascular:      Rate and Rhythm: Normal rate.   Pulmonary:      Effort: Pulmonary effort is normal.   Abdominal:      Palpations: Abdomen is soft.   Musculoskeletal:         General: Normal range of motion.      Cervical  back: Full passive range of motion without pain and normal range of motion.   Skin:     General: Skin is warm.   Neurological:      Mental Status: He is alert and oriented to person, place, and time.   Psychiatric:         Behavior: Behavior normal.                Significant Labs: All pertinent labs within the past 24 hours have been reviewed.  CBC:   Recent Labs   Lab 07/22/24  0926   WBC 9.36   HGB 17.0   HCT 52.5        CMP:   Recent Labs   Lab 07/22/24  0926      K 4.0      CO2 23   *   BUN 13   CREATININE 1.6*   CALCIUM 9.4   PROT 8.1   ALBUMIN 4.2   BILITOT 0.6   ALKPHOS 63   AST 13   ALT 13   ANIONGAP 9       Significant Imaging: I have reviewed all pertinent imaging results/findings within the past 24 hours.    Assessment/Plan:     * Colitis  Start on iv abx  Consult GI MD        Anxiety  Xanax PRN basis      Alcohol abuse  Per pt :he no longer takes ETOH      Hx of CABG  Per chart review      Hypertension  Chronic, controlled. Latest blood pressure and vitals reviewed-     Temp:  [97.4 °F (36.3 °C)-98.1 °F (36.7 °C)]   Pulse:  []   Resp:  [17-18]   BP: ()/()   SpO2:  [95 %-97 %] .   Home meds for hypertension were reviewed and noted below.   Hypertension Medications               ENTRESTO 49-51 mg per tablet Take 1 tablet by mouth 2 (two) times daily.    metoprolol tartrate (LOPRESSOR) 50 MG tablet Take 1 tablet by mouth 2 (two) times daily.            While in the hospital, will manage blood pressure as follows; Continue home antihypertensive regimen    Will utilize p.r.n. blood pressure medication only if patient's blood pressure greater than 140/90 and he develops symptoms such as worsening chest pain or shortness of breath.    CKD (chronic kidney disease) stage 3, GFR 30-59 ml/min  Hold Entresto/aldactone  Monitor renal panels   Latest Reference Range & Units 11/12/23 05:00 11/13/23 03:19 11/14/23 03:44 02/03/24 08:57 04/03/24 09:03 07/12/24 19:44 07/22/24  09:26   Creatinine 0.5 - 1.4 mg/dL 1.1 1.1 1.2 1.6 (H) 1.7 (H) 1.6 (H) 1.6 (H)         VTE Risk Mitigation (From admission, onward)           Ordered     enoxaparin injection 40 mg  Daily         07/22/24 1433     IP VTE HIGH RISK PATIENT  Once         07/22/24 1433     Place sequential compression device  Until discontinued         07/22/24 1433                                    Oscar Perry MD  Department of Hospital Medicine  [facility] hospital - Emergency Dept

## 2024-07-23 LAB
ALBUMIN SERPL BCP-MCNC: 4.1 G/DL (ref 3.5–5.2)
ALP SERPL-CCNC: 65 U/L (ref 55–135)
ALT SERPL W/O P-5'-P-CCNC: 13 U/L (ref 10–44)
ANION GAP SERPL CALC-SCNC: 7 MMOL/L (ref 8–16)
AST SERPL-CCNC: 15 U/L (ref 10–40)
BASOPHILS # BLD AUTO: 0.04 K/UL (ref 0–0.2)
BASOPHILS NFR BLD: 0.6 % (ref 0–1.9)
BILIRUB SERPL-MCNC: 0.7 MG/DL (ref 0.1–1)
BUN SERPL-MCNC: 12 MG/DL (ref 6–20)
C DIFF GDH STL QL: NEGATIVE
C DIFF TOX A+B STL QL IA: NEGATIVE
CALCIUM SERPL-MCNC: 9.5 MG/DL (ref 8.7–10.5)
CHLORIDE SERPL-SCNC: 104 MMOL/L (ref 95–110)
CO2 SERPL-SCNC: 29 MMOL/L (ref 23–29)
CREAT SERPL-MCNC: 1.5 MG/DL (ref 0.5–1.4)
DIFFERENTIAL METHOD BLD: ABNORMAL
EOSINOPHIL # BLD AUTO: 0.2 K/UL (ref 0–0.5)
EOSINOPHIL NFR BLD: 3.8 % (ref 0–8)
ERYTHROCYTE [DISTWIDTH] IN BLOOD BY AUTOMATED COUNT: 15 % (ref 11.5–14.5)
EST. GFR  (NO RACE VARIABLE): 57.1 ML/MIN/1.73 M^2
GLUCOSE SERPL-MCNC: 74 MG/DL (ref 70–110)
HCT VFR BLD AUTO: 54.1 % (ref 40–54)
HGB BLD-MCNC: 17.3 G/DL (ref 14–18)
IMM GRANULOCYTES # BLD AUTO: 0.02 K/UL (ref 0–0.04)
IMM GRANULOCYTES NFR BLD AUTO: 0.3 % (ref 0–0.5)
LYMPHOCYTES # BLD AUTO: 0.7 K/UL (ref 1–4.8)
LYMPHOCYTES NFR BLD: 10.8 % (ref 18–48)
MAGNESIUM SERPL-MCNC: 1.9 MG/DL (ref 1.6–2.6)
MCH RBC QN AUTO: 28 PG (ref 27–31)
MCHC RBC AUTO-ENTMCNC: 32 G/DL (ref 32–36)
MCV RBC AUTO: 88 FL (ref 82–98)
MONOCYTES # BLD AUTO: 0.5 K/UL (ref 0.3–1)
MONOCYTES NFR BLD: 8.5 % (ref 4–15)
NEUTROPHILS # BLD AUTO: 4.8 K/UL (ref 1.8–7.7)
NEUTROPHILS NFR BLD: 76 % (ref 38–73)
NRBC BLD-RTO: 0 /100 WBC
OB PNL STL: NEGATIVE
PLATELET # BLD AUTO: 203 K/UL (ref 150–450)
PMV BLD AUTO: 11.5 FL (ref 9.2–12.9)
POTASSIUM SERPL-SCNC: 4.5 MMOL/L (ref 3.5–5.1)
PROT SERPL-MCNC: 7.9 G/DL (ref 6–8.4)
RBC # BLD AUTO: 6.17 M/UL (ref 4.6–6.2)
SODIUM SERPL-SCNC: 140 MMOL/L (ref 136–145)
WBC # BLD AUTO: 6.37 K/UL (ref 3.9–12.7)
WBC #/AREA STL HPF: NORMAL /[HPF]

## 2024-07-23 PROCEDURE — 87177 OVA AND PARASITES SMEARS: CPT | Performed by: EMERGENCY MEDICINE

## 2024-07-23 PROCEDURE — 63600175 PHARM REV CODE 636 W HCPCS: Mod: JZ,JG | Performed by: INTERNAL MEDICINE

## 2024-07-23 PROCEDURE — 89055 LEUKOCYTE ASSESSMENT FECAL: CPT | Performed by: EMERGENCY MEDICINE

## 2024-07-23 PROCEDURE — 82272 OCCULT BLD FECES 1-3 TESTS: CPT | Performed by: EMERGENCY MEDICINE

## 2024-07-23 PROCEDURE — 25000003 PHARM REV CODE 250: Performed by: INTERNAL MEDICINE

## 2024-07-23 PROCEDURE — 96367 TX/PROPH/DG ADDL SEQ IV INF: CPT

## 2024-07-23 PROCEDURE — 96366 THER/PROPH/DIAG IV INF ADDON: CPT

## 2024-07-23 PROCEDURE — 87324 CLOSTRIDIUM AG IA: CPT | Performed by: INTERNAL MEDICINE

## 2024-07-23 PROCEDURE — 87046 STOOL CULTR AEROBIC BACT EA: CPT | Mod: 59 | Performed by: EMERGENCY MEDICINE

## 2024-07-23 PROCEDURE — 83735 ASSAY OF MAGNESIUM: CPT | Performed by: INTERNAL MEDICINE

## 2024-07-23 PROCEDURE — G0378 HOSPITAL OBSERVATION PER HR: HCPCS

## 2024-07-23 PROCEDURE — 30000890 LABCORP MISCELLANEOUS TEST: Performed by: EMERGENCY MEDICINE

## 2024-07-23 PROCEDURE — 94760 N-INVAS EAR/PLS OXIMETRY 1: CPT

## 2024-07-23 PROCEDURE — 36415 COLL VENOUS BLD VENIPUNCTURE: CPT | Performed by: INTERNAL MEDICINE

## 2024-07-23 PROCEDURE — 85025 COMPLETE CBC W/AUTO DIFF WBC: CPT | Performed by: INTERNAL MEDICINE

## 2024-07-23 PROCEDURE — 99900031 HC PATIENT EDUCATION (STAT)

## 2024-07-23 PROCEDURE — 87045 FECES CULTURE AEROBIC BACT: CPT | Performed by: EMERGENCY MEDICINE

## 2024-07-23 PROCEDURE — 80053 COMPREHEN METABOLIC PANEL: CPT | Performed by: INTERNAL MEDICINE

## 2024-07-23 PROCEDURE — 87449 NOS EACH ORGANISM AG IA: CPT | Mod: 91 | Performed by: EMERGENCY MEDICINE

## 2024-07-23 RX ORDER — SYRING-NEEDL,DISP,INSUL,0.3 ML 29 G X1/2"
296 SYRINGE, EMPTY DISPOSABLE MISCELLANEOUS ONCE
Status: COMPLETED | OUTPATIENT
Start: 2024-07-23 | End: 2024-07-23

## 2024-07-23 RX ADMIN — ACETAMINOPHEN 650 MG: 325 TABLET ORAL at 03:07

## 2024-07-23 RX ADMIN — ALPRAZOLAM 0.5 MG: 0.5 TABLET ORAL at 08:07

## 2024-07-23 RX ADMIN — LEVOFLOXACIN 500 MG: 500 INJECTION, SOLUTION INTRAVENOUS at 07:07

## 2024-07-23 RX ADMIN — METRONIDAZOLE 500 MG: 5 INJECTION, SOLUTION INTRAVENOUS at 05:07

## 2024-07-23 RX ADMIN — METOPROLOL TARTRATE 50 MG: 50 TABLET, FILM COATED ORAL at 09:07

## 2024-07-23 RX ADMIN — FAMOTIDINE 20 MG: 20 TABLET ORAL at 09:07

## 2024-07-23 RX ADMIN — METOPROLOL TARTRATE 50 MG: 50 TABLET, FILM COATED ORAL at 08:07

## 2024-07-23 RX ADMIN — FAMOTIDINE 20 MG: 20 TABLET ORAL at 08:07

## 2024-07-23 RX ADMIN — HYDROCODONE BITARTRATE AND ACETAMINOPHEN 1 TABLET: 5; 325 TABLET ORAL at 12:07

## 2024-07-23 RX ADMIN — ASPIRIN 81 MG: 81 TABLET, COATED ORAL at 09:07

## 2024-07-23 RX ADMIN — ISOSORBIDE MONONITRATE 30 MG: 30 TABLET, EXTENDED RELEASE ORAL at 09:07

## 2024-07-23 RX ADMIN — CLOPIDOGREL BISULFATE 75 MG: 75 TABLET, FILM COATED ORAL at 09:07

## 2024-07-23 RX ADMIN — METRONIDAZOLE 500 MG: 5 INJECTION, SOLUTION INTRAVENOUS at 09:07

## 2024-07-23 RX ADMIN — MAGESIUM CITRATE 296 ML: 1.75 LIQUID ORAL at 06:07

## 2024-07-23 RX ADMIN — Medication 6 MG: at 08:07

## 2024-07-23 RX ADMIN — METRONIDAZOLE 500 MG: 5 INJECTION, SOLUTION INTRAVENOUS at 01:07

## 2024-07-23 NOTE — PLAN OF CARE
[facility]  Initial Discharge Assessment     Met with patient at bedside for initial cm assessment. Patient aaox4, lives at home with his mother and is independent with ADLs. Verified demographics on face sheet as correct. Patient's last pcp visit was approximately 2 months ago. Current DME is a cpap machine. Patient drives himself to appointments but his mother will pick him up at discharge from the hospital. Current discharge plan is home. No immediate discharge needs identified at this time.    Primary Care Provider: Riya Vidales NP    Admission Diagnosis: Acute colitis [K52.9]    Admission Date: 7/22/2024  Expected Discharge Date:     Transition of Care Barriers: (P) None    Payor: MEDICAID / Plan: [place] [employer] / Product Type: Managed Medicaid /     Extended Emergency Contact Information  Primary Emergency Contact: Leti Rollins  Address: [address] [place] [address] [place] [address] [employer] [facility]  Mobile Phone: [phone]  Relation: Mother  Preferred language: English   needed? No    Discharge Plan A: (P) Home  Discharge Plan B: (P) Home      [facility] - [place] - [address]  [address]  [place] [address]  Phone: [phone] Fax: [phone]    [facility] - [place] - [address]  [address]  [place] [address]  Phone: [phone] Fax: [phone]      Initial Assessment (most recent)       Adult Discharge Assessment - 07/23/24 0841          Discharge Assessment    Assessment Type Discharge Planning Assessment (P)      Confirmed/corrected address, phone number and insurance Yes (P)      Confirmed Demographics Correct on Facesheet (P)      Source of Information patient (P)      Does patient/caregiver understand observation status Yes (P)      Communicated FANTA with patient/caregiver Yes (P)      Reason For Admission colitis (P)      People in Home parent(s) (P)      Do you expect to return to your current living situation?  Yes (P)      Do you have help at home or someone to help you manage your care at home? No (P)      Prior to hospitilization cognitive status: Alert/Oriented (P)      Current cognitive status: Alert/Oriented (P)      Walking or Climbing Stairs Difficulty no (P)      Dressing/Bathing Difficulty no (P)      Home Accessibility wheelchair accessible (P)      Home Layout Able to live on 1st floor (P)      Equipment Currently Used at Home CPAP (P)      Readmission within 30 days? No (P)      Patient currently being followed by outpatient case management? No (P)      Do you currently have service(s) that help you manage your care at home? No (P)      Do you take prescription medications? Yes (P)      Do you have prescription coverage? Yes (P)      Coverage [facility] medicaid (P)      Do you have any problems affording any of your prescribed medications? No (P)      Is the patient taking medications as prescribed? yes (P)      Who is going to help you get home at discharge? pt's mother or step father will pick him up (P)      How do you get to doctors appointments? car, drives self;family or friend will provide (P)      Are you on dialysis? No (P)      Do you take coumadin? No (P)      Discharge Plan A Home (P)      Discharge Plan B Home (P)      DME Needed Upon Discharge  none (P)      Discharge Plan discussed with: Patient (P)      Transition of Care Barriers None (P)

## 2024-07-23 NOTE — PROGRESS NOTES
[facility] Medicine  Progress Note    Patient Name: Lee Quintanilla  MRN: [id number]  Patient Class: OP- Observation   Admission Date: 7/22/2024  Length of Stay: 0 days  Attending Physician: Carlos Negron MD  Primary Care Provider: Riya Vidales NP        Subjective:     Principal Problem:Colitis        HPI:  48 year old pt getting admitted with acute colitis  Symptoms started with Nausea/Vomiting , loose stools and abdominal pain 10 days ago  Pt came to ER on July 12 and had CT abdomen done  He was discharged to home with PO abx  Per pt , even though he finished abx course his symptoms persisted  He came back to ER today and had another CT Abdomen  It showed persistent colitis and pt got admitted     Overview/Hospital Course:  Mr. Quintanilla has been monitored closely during his hospitalization. He was admitted on 7/22 for acute colitis with failed outpt treatment and intractable N/V. GI has been consulted and awaiting recs. He reports a colonoscopy about 2 years ago with several polyps and he was recommended to have another in 2 years. He was started on levaquin and flagyl. N/V has improved with a few episodes of retching but limited vomit. He has developed loose stools. He reports continued LLQ pain. CT abd/pelvis: Mild wall thickening of the colon, slightly increased when compared to July 12th c/w acute mild colitis.     Interval History: awaiting GI recs    Review of Systems   Constitutional:  Positive for fatigue.   Gastrointestinal:  Positive for abdominal pain, diarrhea, nausea and vomiting.   Musculoskeletal:  Positive for myalgias.   Neurological:  Positive for weakness.     Objective:     Vital Signs (Most Recent):  Temp: 99.1 °F (37.3 °C) (07/23/24 1149)  Pulse: 78 (07/23/24 1149)  Resp: 18 (07/23/24 1210)  BP: (!) 151/94 (07/23/24 1149)  SpO2: 98 % (07/23/24 1149) Vital Signs (24h Range):  Temp:  [97.4 °F (36.3 °C)-99.1 °F (37.3 °C)] 99.1 °F (37.3 °C)  Pulse:  [68-85]  78  Resp:  [17-18] 18  SpO2:  [95 %-98 %] 98 %  BP: (115-151)/(64-94) 151/94     Weight: 95.3 kg (210 lb)  Body mass index is 32.89 kg/m².    Intake/Output Summary (Last 24 hours) at 7/23/2024 1424  Last data filed at 7/23/2024 1150  Gross per 24 hour   Intake 350 ml   Output 1 ml   Net 349 ml         Physical Exam  Vitals and nursing note reviewed.   Constitutional:       Appearance: He is well-developed. He is obese.   HENT:      Head: Normocephalic and atraumatic.   Eyes:      Conjunctiva/sclera: Conjunctivae normal.      Pupils: Pupils are equal, round, and reactive to light.   Cardiovascular:      Rate and Rhythm: Normal rate and regular rhythm.      Heart sounds: Normal heart sounds.   Pulmonary:      Effort: Pulmonary effort is normal.      Breath sounds: Normal breath sounds.   Abdominal:      General: Bowel sounds are normal.      Palpations: Abdomen is soft.      Tenderness: There is abdominal tenderness.      Comments: LLQ tenderness on palpation   Musculoskeletal:         General: Normal range of motion.      Cervical back: Normal range of motion and neck supple.   Skin:     General: Skin is warm and dry.      Capillary Refill: Capillary refill takes less than 2 seconds.   Neurological:      Mental Status: He is alert and oriented to person, place, and time.   Psychiatric:         Behavior: Behavior normal.         Thought Content: Thought content normal.         Judgment: Judgment normal.             Significant Labs: All pertinent labs within the past 24 hours have been reviewed.  CBC:   Recent Labs   Lab 07/22/24  0926 07/23/24  0439   WBC 9.36 6.37   HGB 17.0 17.3   HCT 52.5 54.1*    203     CMP:   Recent Labs   Lab 07/22/24  0926 07/23/24  0439    140   K 4.0 4.5    104   CO2 23 29   * 74   BUN 13 12   CREATININE 1.6* 1.5*   CALCIUM 9.4 9.5   PROT 8.1 7.9   ALBUMIN 4.2 4.1   BILITOT 0.6 0.7   ALKPHOS 63 65   AST 13 15   ALT 13 13   ANIONGAP 9 7*       Significant Imaging: I  have reviewed all pertinent imaging results/findings within the past 24 hours.    CT Abdomen Pelvis  Without Contrast    Result Date: 7/22/2024  EXAMINATION: CT ABDOMEN PELVIS WITHOUT CONTRAST CLINICAL HISTORY: Abdominal abscess/infection suspected;. TECHNIQUE: Thin section axial images were obtained, without intravenous contrast. The lack of intravenous contrast limits assessment of solid organs and vascular structures. COMPARISON: July 12, 2024 FINDINGS: The lung bases are unremarkable.  Coronary artery calcification is noted. The liver has a normal noncontrast appearance with the exception of 3 small hypodensities shown on the previous contrast-enhanced study to reflect cysts.  The gallbladder and biliary tree are unremarkable.  The spleen is normal in size and appearance.  The pancreas and adrenal glands are within normal limits. There is no evidence of renal mass, calculus, or hydronephrosis.  The abdominal aorta is moderately calcified without evidence of aneurysm.  Two small fat containing midline abdominal hernias are noted above the level of the umbilicus. There is persistent mild wall thickening of the colon, with slightly increased wall thickening of portions of the descending colon.  Correlate for possible colitis.  There is no significant surrounding pericolonic edema.  No free air, free fluid, or lymphadenopathy is identified. Images of the pelvis demonstrate an unremarkable urinary bladder.  The prostate gland measures 4.3 cm in transverse dimension.  There is slight wall thickening of the sigmoid colon.  No pelvic free fluid or lymphadenopathy is identified. No acute osseous abnormalities are demonstrated.  Changes of instrumented lumbar fusion are noted.     1. Mild wall thickening of the colon, slightly increased when compared to July 12th.  While findings are probably in part related to incomplete distension, mild acute colitis is suspected.  Correlate with clinical findings. 2. Additional stable  findings as above. Electronically signed by: Wero Garcia Date:    07/22/2024 Time:    10:44    X-Ray Chest AP Portable    Result Date: 7/22/2024  EXAMINATION: XR CHEST AP PORTABLE CLINICAL HISTORY: CHF; FINDINGS: Portable chest at 10:06 is compared to 04/03/2024 shows normal cardiomediastinal silhouette.  There are median sternotomy wires. Lungs are clear. Pulmonary vasculature is normal. No acute osseous abnormality.     No acute cardiopulmonary abnormality. Prior median sternotomy Electronically signed by: Mariela Gupta Date:    07/22/2024 Time:    10:29        Assessment/Plan:      * Colitis  Failed outpt treatment  Admitted to med/surg  Continue IV levaquin/flagyl  GI consult  IV antiemetics        Anxiety  Xanax PRN basis      Alcohol abuse  Per pt :he no longer takes ETOH      Hx of CABG  Per chart review      Hypertension  Chronic, controlled. Latest blood pressure and vitals reviewed-     Temp:  [97.4 °F (36.3 °C)-99.1 °F (37.3 °C)]   Pulse:  [68-85]   Resp:  [17-18]   BP: (115-151)/(64-94)   SpO2:  [95 %-98 %] .   Home meds for hypertension were reviewed and noted below.   Hypertension Medications               ENTRESTO 49-51 mg per tablet Take 1 tablet by mouth 2 (two) times daily.    metoprolol tartrate (LOPRESSOR) 50 MG tablet Take 1 tablet by mouth 2 (two) times daily.            While in the hospital, will manage blood pressure as follows; Continue home antihypertensive regimen    Will utilize p.r.n. blood pressure medication only if patient's blood pressure greater than 140/90 and he develops symptoms such as worsening chest pain or shortness of breath.    CKD (chronic kidney disease) stage 3, GFR 30-59 ml/min  Hold Entresto/aldactone  Monitor renal panels   Latest Reference Range & Units 11/12/23 05:00 11/13/23 03:19 11/14/23 03:44 02/03/24 08:57 04/03/24 09:03 07/12/24 19:44 07/22/24 09:26   Creatinine 0.5 - 1.4 mg/dL 1.1 1.1 1.2 1.6 (H) 1.7 (H) 1.6 (H) 1.6 (H)         VTE Risk Mitigation  (From admission, onward)           Ordered     enoxaparin injection 40 mg  Daily         07/22/24 1433     IP VTE HIGH RISK PATIENT  Once         07/22/24 1433     Place sequential compression device  Until discontinued         07/22/24 1433                    Discharge Planning   FANTA: 7/25/2024     Code Status: Full Code   Is the patient medically ready for discharge?:     Reason for patient still in hospital (select all that apply): Consult recommendations  Discharge Plan A: Home                  Jaz Jonas NP  Department of Hospital Medicine   [facility]

## 2024-07-23 NOTE — ASSESSMENT & PLAN NOTE
Chronic, controlled. Latest blood pressure and vitals reviewed-     Temp:  [97.4 °F (36.3 °C)-99.1 °F (37.3 °C)]   Pulse:  [68-85]   Resp:  [17-18]   BP: (115-151)/(64-94)   SpO2:  [95 %-98 %] .   Home meds for hypertension were reviewed and noted below.   Hypertension Medications               ENTRESTO 49-51 mg per tablet Take 1 tablet by mouth 2 (two) times daily.    metoprolol tartrate (LOPRESSOR) 50 MG tablet Take 1 tablet by mouth 2 (two) times daily.            While in the hospital, will manage blood pressure as follows; Continue home antihypertensive regimen    Will utilize p.r.n. blood pressure medication only if patient's blood pressure greater than 140/90 and he develops symptoms such as worsening chest pain or shortness of breath.

## 2024-07-23 NOTE — HOSPITAL COURSE
Mr. Quintanilla has been monitored closely during his hospitalization. He was admitted on 7/22 for acute colitis with failed outpt treatment and intractable N/V. GI has been consulted and awaiting recs. He reports a colonoscopy about 2 years ago with several polyps and he was recommended to have another in 2 years. He was started on levaquin and flagyl. N/V has improved with a few episodes of retching but limited vomit. He has developed loose stools. He reports continued LLQ pain. CT abd/pelvis: Mild wall thickening of the colon, slightly increased when compared to July 12th c/w acute mild colitis. C.diff screen was negative. He underwent a flex sigmoidoscopy on 7/24 with no acute findings and was cleared for discharge by GI with close outpt follow up for GI pathogen panel and biopsies. He is recommended a high fiber diet and fiber supplements and imodium PRN for diarrhea. He was seen and examined on the date of discharge. Strict return prxn provided.

## 2024-07-23 NOTE — PLAN OF CARE
Problem: Wound  Goal: Improved Oral Intake  Intervention: Promote and Optimize Oral Intake  Flowsheets (Taken 7/23/2024 4972)  Nutrition Interventions: supplemental drinks provided

## 2024-07-23 NOTE — PLAN OF CARE
Pt AAOx 4. Rested well this shift. Q 2 hr safety checks made. No acute events overnight. No signs, symptoms, or complaints of distress at this time. Care ongoing.      Problem: Adult Inpatient Plan of Care  Goal: Plan of Care Review  Outcome: Progressing  Goal: Patient-Specific Goal (Individualized)  Outcome: Progressing  Goal: Absence of Hospital-Acquired Illness or Injury  Outcome: Progressing  Goal: Optimal Comfort and Wellbeing  Outcome: Progressing  Goal: Readiness for Transition of Care  Outcome: Progressing     Problem: Wound  Goal: Optimal Coping  Outcome: Progressing  Goal: Optimal Functional Ability  Outcome: Progressing  Goal: Absence of Infection Signs and Symptoms  Outcome: Progressing  Goal: Improved Oral Intake  Outcome: Progressing  Goal: Optimal Pain Control and Function  Outcome: Progressing  Goal: Skin Health and Integrity  Outcome: Progressing  Goal: Optimal Wound Healing  Outcome: Progressing     Problem: Fall Injury Risk  Goal: Absence of Fall and Fall-Related Injury  Outcome: Progressing

## 2024-07-23 NOTE — CONSULTS
GASTROENTEROLOGY INPATIENT CONSULT NOTE  Patient Name: Lee Quintanilla  Patient MRN: [id number]  Patient : 1975    Admit Date: 2024  Service date: 2024    Reason for Consult: colitis    PCP: Riya Vidales NP    Chief Complaint   Patient presents with    Vomiting    Abdominal Pain     Seen here recently for colitis not getting any better       HPI: Patient is a 48 y.o. male with PMHx OLEG, CKD, CAD/CABG (ASA/Plavix), back surgery, HLD, past tobacco use, IFG (ozempic) presents for evaluation of loose stools and abd pain. Acute, intermittent, progressive over past few weeks. Seen earlier this month w/ similar issues and given PO abx. Due to persistent symptoms, came back for evaluation. No bleeding despite DAPT. No sick contacts, ingestions or other issues.     CHART REVIEW:   WBC 6.3 (improved from 15 last week); Nml Hg/plts  CT ABd  - CAD/PVD; liver cysts; Nml panc/GB/biliary; mild colitis worse in Left  Colon  - multiple polyps s/p resection; colon otherwise nml    Past Medical History:  Past Medical History:   Diagnosis Date    ADHD (attention deficit hyperactivity disorder)     Arthritis     Asthma     as child only    Back pain     Coronary artery disease     Degeneration of lumbar intervertebral disc 2016    Depression     Digestive disorder     Elevated PSA     Headache     Hyperlipidemia     Hypertension     Kidney damage     stage 3 ; d/t aleve abuse    Kidney stone     Myocardial infarction     Neck pain     Numbness and tingling in hands     Numbness and tingling of both legs     Osteonecrosis     Bilat Femur    Seizures     from inapsine     Sleep apnea     no cpap    Wears glasses     CONTACS        Past Surgical History:  Past Surgical History:   Procedure Laterality Date    BACK SURGERY      back surgery    BONE GRAFT N/A 2020    Procedure: BONE GRAFT;  Surgeon: Mateusz Blanco MD;  Location: [facility];  Service: Orthopedics;  Laterality: N/A;    CARDIAC  SURGERY  01/06/2020    CABG X 7    CORONARY ARTERY BYPASS GRAFT      7 vessels    HERNIA REPAIR Bilateral 11/22/2017    LITHOTRIPSY      LUMBAR LAMINECTOMY WITH FUSION Bilateral 11/5/2020    Procedure: LAMINECTOMY, SPINE, LUMBAR, WITH FUSION;  Surgeon: Mateusz Blanco MD;  Location: [facility] OR;  Service: Orthopedics;  Laterality: Bilateral;  MEDTRONIC  NTI  L-3    PILONIDAL CYST DRAINAGE      removal of abcess      scrotal    REMOVAL OF HARDWARE FROM SPINE Bilateral 11/5/2020    Procedure: REMOVAL, HARDWARE, SPINE;  Surgeon: Mateusz Blanco MD;  Location: [facility] OR;  Service: Orthopedics;  Laterality: Bilateral;  L 4-5    SURGICAL REMOVAL OF PILONIDAL CYST N/A 4/15/2024    Procedure: EXCISION, PILONIDAL CYST;  Surgeon: Jayden Phillips III, MD;  Location: [facility] OR;  Service: General;  Laterality: N/A;  sacral area    TYMPANOSTOMY TUBE PLACEMENT          Home Medications:  Medications Prior to Admission   Medication Sig Dispense Refill Last Dose    anastrozole (ARIMIDEX) 1 mg Tab Take 1 mg by mouth every 7 days.   7/22/2024    clopidogreL (PLAVIX) 75 mg tablet Take 75 mg by mouth once daily.   7/22/2024 at 09:00    ENTRESTO 49-51 mg per tablet Take 1 tablet by mouth 2 (two) times daily.   7/22/2024 at 09:00    EScitalopram oxalate (LEXAPRO) 10 MG tablet Take 1 tablet (10 mg total) by mouth every evening. (Patient taking differently: Take 10 mg by mouth once daily.) 30 tablet 1 7/22/2024    metoprolol tartrate (LOPRESSOR) 50 MG tablet Take 1 tablet by mouth 2 (two) times daily.   7/22/2024 at 09:00    promethazine (PHENERGAN) 25 MG tablet Take 1 tablet (25 mg total) by mouth every 6 (six) hours as needed for Nausea. 15 tablet 0 7/21/2024    rosuvastatin (CRESTOR) 40 MG Tab Take 40 mg by mouth once daily.   7/22/2024 at 09:00    semaglutide (OZEMPIC) 0.25 mg or 0.5 mg (2 mg/3 mL) pen injector Inject 0.5 mg into the skin every 7 days.   Past Week    testosterone cypionate 200 mg/mL Kit Inject 1 mL into the muscle every 7  days.   Past Week    tadalafiL (CIALIS) 10 MG tablet Take 10 mg by mouth daily as needed.   Unknown    traMADoL (ULTRAM) 50 mg tablet Take 1 tablet (50 mg total) by mouth every 8 (eight) hours as needed for Pain. 21 tablet 0 Unknown       Inpatient Medications:   aspirin  81 mg Oral Daily    clopidogreL  75 mg Oral Daily    enoxparin  40 mg Subcutaneous Daily    famotidine  20 mg Oral BID    isosorbide mononitrate  30 mg Oral Daily    levoFLOXacin  500 mg Intravenous Q24H    metoprolol tartrate  50 mg Oral BID    metronidazole  500 mg Intravenous Q8H       Current Facility-Administered Medications:     acetaminophen, 650 mg, Oral, Q8H PRN    acetaminophen, 650 mg, Oral, Q4H PRN    ALPRAZolam, 0.5 mg, Oral, TID PRN    aluminum-magnesium hydroxide-simethicone, 30 mL, Oral, QID PRN    HYDROcodone-acetaminophen, 1 tablet, Oral, Q6H PRN    magnesium oxide, 800 mg, Oral, PRN    magnesium oxide, 800 mg, Oral, PRN    melatonin, 6 mg, Oral, Nightly PRN    naloxone, 0.02 mg, Intravenous, PRN    ondansetron, 4 mg, Intravenous, Q6H PRN    potassium bicarbonate, 35 mEq, Oral, PRN    potassium bicarbonate, 50 mEq, Oral, PRN    potassium bicarbonate, 60 mEq, Oral, PRN    potassium, sodium phosphates, 2 packet, Oral, PRN    potassium, sodium phosphates, 2 packet, Oral, PRN    potassium, sodium phosphates, 2 packet, Oral, PRN    promethazine (PHENERGAN) 12.5 mg in 0.9% NaCl 50 mL IVPB, 12.5 mg, Intravenous, Q6H PRN    Review of patient's allergies indicates:   Allergen Reactions    Inapsine [droperidol] Anaphylaxis     seizures    Effexor [venlafaxine]      Increased anxiety    Pcn [penicillins]     Bactrim [sulfamethoxazole-trimethoprim] Rash     Dry red rash all over when in the sun    Fluconazole Rash     Pruritis         Social History:   Social History     Occupational History    Occupation: disability   Tobacco Use    Smoking status: Former     Current packs/day: 0.00     Types: Cigarettes     Quit date: 1/1/2016     Years  "since quittin.5     Passive exposure: Past    Smokeless tobacco: Former     Quit date: 2016    Tobacco comments:     occasional   Substance and Sexual Activity    Alcohol use: Yes     Alcohol/week: 1.0 standard drink of alcohol     Types: 1 Glasses of wine per week     Comment: 20 - 30 shots vodka per day or 1-2 1/5ths per day for 2 years (started )    Drug use: No    Sexual activity: Yes     Partners: Female       Family History:   Family History   Problem Relation Name Age of Onset    Heart disease Mother      Migraines Mother      Hypertension Mother      Early death Father accident     Heart disease Father accident     Diabetes Maternal Aunt      Diabetes Maternal Uncle      Diabetes Maternal Grandfather         Review of Systems:  A 10 point review of systems was performed and was normal, except as mentioned in the HPI, including constitutional, HEENT, heme, lymph, cardiovascular, respiratory, gastrointestinal, genitourinary, neurologic, endocrine, psychiatric and musculoskeletal.      OBJECTIVE:    Physical Exam:  24 Hour Vital Sign Ranges: Temp:  [97.4 °F (36.3 °C)-99.1 °F (37.3 °C)] 98.8 °F (37.1 °C)  Pulse:  [68-85] 78  Resp:  [17-18] 18  SpO2:  [95 %-98 %] 95 %  BP: (102-151)/(55-94) 102/55  Most recent vitals: BP (!) 102/55   Pulse 78   Temp 98.8 °F (37.1 °C) (Oral)   Resp 18   Ht 5' 7" (1.702 m)   Wt 95.3 kg (210 lb)   SpO2 95%   BMI 32.89 kg/m²    GEN: well-developed, well-nourished, awake and alert, non-toxic appearing adult  HEENT: PERRL, sclera anicteric, oral mucosa pink and moist without lesion  NECK: trachea midline; Good ROM  CV: regular rate and rhythm, no murmurs or gallops  RESP: clear to auscultation bilaterally, no wheezes, rhonci or rales  ABD: soft, mild-tender, non-distended, normal bowel sounds  EXT: no swelling or edema, 2+ pulses distally  SKIN: no rashes or jaundice  PSYCH: normal affect    Labs:   Recent Labs     24  0926 24  0439   WBC 9.36 6.37   MCV " "86 88    203     Recent Labs     07/22/24  0926 07/23/24  0439    140   K 4.0 4.5    104   CO2 23 29   BUN 13 12   * 74     No results for input(s): "ALB" in the last 72 hours.    Invalid input(s): "ALKP", "SGOT", "SGPT", "TBIL", "DBIL", "TPRO"  No results for input(s): "PT", "INR", "PTT" in the last 72 hours.      Radiology Review:  CT Abdomen Pelvis  Without Contrast   Final Result      1. Mild wall thickening of the colon, slightly increased when compared to July 12th.  While findings are probably in part related to incomplete distension, mild acute colitis is suspected.  Correlate with clinical findings.   2. Additional stable findings as above.         Electronically signed by: Wero Garcia   Date:    07/22/2024   Time:    10:44      X-Ray Chest AP Portable   Final Result      No acute cardiopulmonary abnormality.      Prior median sternotomy         Electronically signed by: Mariela Gupta   Date:    07/22/2024   Time:    10:29            IMPRESSION / RECOMMENDATIONS:  48 y.o. male with PMHx OLEG, CKD, CAD/CABG (ASA/Plavix), back surgery, HLD, past tobacco use, IFG (ozempic) presents for evaluation of loose stools and abd pain w/ colitis on CT. Persistent symptoms despite abx and 1.5 weeks since onset of symptoms. RIsks, benefits, alternatives discussed in detail regarding upcoming procedures and sedation and possible complications. Some of the more common endoscopic complications include but not limited to immediate or delayed perforation, bleeding, infections, pain, inadvertent injury to surrounding tissue / organs and possible need for surgical evaluation. All questions answered and will proceed with procedure as planned.     -F/u C. Diff given recent abx although sx preceded abx use  -Flex sig tomorrow w/ limited interventions due to DAPT    Thank you for this consult.    Nate Adams III  7/23/2024  4:34 PM        "

## 2024-07-23 NOTE — CONSULTS
"[facility]  Adult Nutrition   Consult Note (Initial Assessment)     SUMMARY     Recommendations  Recommendation/Intervention: 1. Continue Cardiac diet as tolerated. 2. Recommend Ensure Clear mixed berry with meals. 3. Recommend probiotics to replenish gut tony with antibiotic therapy.  Goals: 1. Intake to be >/= 75% by follow up.  Nutrition Goal Status: new  Communication of RD Recs: reviewed with RN    Nutrition Diagnosis PES Statement: Inadequate oral intake related to early satiety as evidenced by patient reports decreased appetite and only tolerates small amount of food before feeling full.     Dietitian Rounds Brief  Consult for poor appetite. Patient with poor appetite / intake this past month.Pt reports he stopped taking Ozembic for weight loss 3 weeks ago and has been on antibiotics. Patient reports he had loose stools today; declined Banatrol due to dislike of banana flavor. Discussed with nurse to order probiotics. Will add Ensure Clear and encourage pt to drink if intake < 50% of meals. RD to monitor intake, labs, and status PRN.    Nutrition Related Social Determinants of Health: SDOH: Adequate food in home environment    Malnutrition Assessment  N/A  Planned weight loss 7 %, 15 lbs in 1 month (Ozembic).           Diet order:   Current Diet Order: Cardiac diet                 Evaluation of Received Nutrient/Fluid Intake  Energy Calories Required: not meeting needs  Protein Required: not meeting needs  Fluid Required: not meeting needs  Tolerance: not tolerating (full feeling; nausea)     % Intake of Estimated Energy Needs: 0 - 25 %  % Meal Intake: 0 - 25 %      Intake/Output Summary (Last 24 hours) at 7/23/2024 1440  Last data filed at 7/23/2024 1150  Gross per 24 hour   Intake 300 ml   Output 1 ml   Net 299 ml        Anthropometrics  Temp: 99.1 °F (37.3 °C)  Height Method: Stated  Height: 5' 7" (170.2 cm)  Height (inches): 67 in  Weight Method: Bed Scale  Weight: 95.3 kg (210 lb)  Weight " (lb): 210 lb  Ideal Body Weight (IBW), Male: 148 lb  % Ideal Body Weight, Male (lb): 141.89 %  BMI (Calculated): 32.9  BMI Grade: 30 - 34.9- obesity - grade I  Weight Loss: intentional       Estimated/Assessed Needs  Weight Used For Calorie Calculations: 95.3 kg (210 lb 1.6 oz)  Energy Calorie Requirements (kcal): 7993-8619 kcal/day (20-25 kcal/kg)  Energy Need Method: Kcal/kg  Protein Requirements: 67-80 gm/day (1.0-1.2 gm/kg IBW)  Weight Used For Protein Calculations: 67 kg (147 lb 11.3 oz) (IBW)     Estimated Fluid Requirement Method: RDA Method  RDA Method (mL): 1906       Reason for Assessment  Reason For Assessment: consult (decreased appetite.)  Diagnosis: gastrointestinal disease  Relevant Medical History: CAD, s/ p CABG, HTN, HLD,digestive disorder, CKD 3  Interdisciplinary Rounds: did not attend  General Information Comments: Patient admitted with acute colitis  Symptoms started with Nausea/Vomiting , loose stools and abdominal pain 10 days ago  Pt came to ER on July 12 and had CT abdomen done  He was discharged to home with PO abx  Per pt , even though he finished abx course his symptoms persisted  (MD H & P).    Nutrition/Diet History  Spiritual, Cultural Beliefs, Latter-day Practices, Values that Affect Care: no  Food Allergies: NKFA  Factors Affecting Nutritional Intake: nausea/vomiting, decreased appetite, early satiety    Nutrition Risk Screen  Nutrition Risk Screen: unintentional loss of 10 lbs or more in the past 2 months (on Ozembic)     MST Score: 2  Have you recently lost weight without trying?: Yes: 2-13 lbs  Weight loss score: 1  Have you been eating poorly because of a decreased appetite?: Yes  Appetite score: 1       Weight History:  Wt Readings from Last 10 Encounters:   07/22/24 95.3 kg (210 lb)   07/17/24 95.3 kg (210 lb 1.6 oz)   07/12/24 95.3 kg (210 lb)   06/26/24 98.9 kg (218 lb)   06/03/24 102.1 kg (225 lb 1.4 oz)   04/25/24 102.1 kg (225 lb)   04/15/24 102.1 kg (225 lb)   04/03/24  "102.1 kg (225 lb)   03/19/24 106.1 kg (234 lb)   01/10/24 110.7 kg (244 lb)        Lab/Procedures/Meds: Pertinent Labs/Meds Reviewed    Medications:Pertinent Medications Reviewed  Scheduled Meds:   aspirin  81 mg Oral Daily    clopidogreL  75 mg Oral Daily    enoxparin  40 mg Subcutaneous Daily    famotidine  20 mg Oral BID    isosorbide mononitrate  30 mg Oral Daily    levoFLOXacin  500 mg Intravenous Q24H    metoprolol tartrate  50 mg Oral BID    metronidazole  500 mg Intravenous Q8H     Continuous Infusions:  PRN Meds:.  Current Facility-Administered Medications:     acetaminophen, 650 mg, Oral, Q8H PRN    acetaminophen, 650 mg, Oral, Q4H PRN    ALPRAZolam, 0.5 mg, Oral, TID PRN    aluminum-magnesium hydroxide-simethicone, 30 mL, Oral, QID PRN    HYDROcodone-acetaminophen, 1 tablet, Oral, Q6H PRN    magnesium oxide, 800 mg, Oral, PRN    magnesium oxide, 800 mg, Oral, PRN    melatonin, 6 mg, Oral, Nightly PRN    naloxone, 0.02 mg, Intravenous, PRN    ondansetron, 4 mg, Intravenous, Q6H PRN    potassium bicarbonate, 35 mEq, Oral, PRN    potassium bicarbonate, 50 mEq, Oral, PRN    potassium bicarbonate, 60 mEq, Oral, PRN    potassium, sodium phosphates, 2 packet, Oral, PRN    potassium, sodium phosphates, 2 packet, Oral, PRN    potassium, sodium phosphates, 2 packet, Oral, PRN    promethazine (PHENERGAN) 12.5 mg in 0.9% NaCl 50 mL IVPB, 12.5 mg, Intravenous, Q6H PRN    Labs: Pertinent Labs Reviewed  Clinical Chemistry:  Recent Labs   Lab 07/22/24  0926 07/23/24  0439    140   K 4.0 4.5    104   CO2 23 29   * 74   BUN 13 12   CREATININE 1.6* 1.5*   CALCIUM 9.4 9.5   PROT 8.1 7.9   ALBUMIN 4.2 4.1   BILITOT 0.6 0.7   ALKPHOS 63 65   AST 13 15   ALT 13 13   ANIONGAP 9 7*   MG 1.9 1.9   LIPASE 15  --      CBC:   Recent Labs   Lab 07/23/24  0439   WBC 6.37   RBC 6.17   HGB 17.3   HCT 54.1*      MCV 88   MCH 28.0   MCHC 32.0     Lipid Panel:  No results for input(s): "CHOL", "HDL", "LDLCALC", " ""TRIG", "CHOLHDL" in the last 168 hours.  Cardiac Profile:  Recent Labs   Lab 07/22/24  0926   BNP 20     Inflammatory Labs:  No results for input(s): "CRP" in the last 168 hours.  Diabetes:  No results for input(s): "HGBA1C", "POCTGLUCOSE" in the last 168 hours.  Thyroid & Parathyroid:  No results for input(s): "TSH", "FREET4", "G5MNKXG", "Z7OBXTA", "THYROIDAB" in the last 168 hours.    Monitor and Evaluation  Food and Nutrient Intake: energy intake, food and beverage intake  Food and Nutrient Adminstration: diet order  Knowledge/Beliefs/Attitudes: food and nutrition knowledge/skill  Physical Activity and Function: nutrition-related ADLs and IADLs  Anthropometric Measurements: weight change, weight, body mass index  Biochemical Data, Medical Tests and Procedures: gastrointestinal profile, glucose/endocrine profile, inflammatory profile, electrolyte and renal panel, lipid profile  Nutrition-Focused Physical Findings: overall appearance     Nutrition Risk  Level of Risk/Frequency of Follow-up:  (1 x / week)     Nutrition Follow-Up  RD Follow-up?: Yes      Alina Gomez RD 07/23/2024 2:39 PM       "

## 2024-07-23 NOTE — CARE UPDATE
07/23/24 0810   Patient Assessment/Suction   Level of Consciousness (AVPU) alert   Respiratory Effort Normal;Unlabored   Expansion/Accessory Muscles/Retractions no use of accessory muscles;no retractions;expansion symmetric   All Lung Fields Breath Sounds clear   Rhythm/Pattern, Respiratory unlabored   Cough Frequency no cough   PRE-TX-O2   Device (Oxygen Therapy) room air   SpO2 96 %   Pulse Oximetry Type Intermittent   $ Pulse Oximetry - Single Charge Pulse Oximetry - Single   Pulse 82   Resp 17   Positioning   Body Position position changed independently;position maintained   Head of Bed (HOB) Positioning HOB at 20-30 degrees   Education   $ Education Oxygen;15 min

## 2024-07-23 NOTE — SUBJECTIVE & OBJECTIVE
Interval History: awaiting GI recs    Review of Systems   Constitutional:  Positive for fatigue.   Gastrointestinal:  Positive for abdominal pain, diarrhea, nausea and vomiting.   Musculoskeletal:  Positive for myalgias.   Neurological:  Positive for weakness.     Objective:     Vital Signs (Most Recent):  Temp: 99.1 °F (37.3 °C) (07/23/24 1149)  Pulse: 78 (07/23/24 1149)  Resp: 18 (07/23/24 1210)  BP: (!) 151/94 (07/23/24 1149)  SpO2: 98 % (07/23/24 1149) Vital Signs (24h Range):  Temp:  [97.4 °F (36.3 °C)-99.1 °F (37.3 °C)] 99.1 °F (37.3 °C)  Pulse:  [68-85] 78  Resp:  [17-18] 18  SpO2:  [95 %-98 %] 98 %  BP: (115-151)/(64-94) 151/94     Weight: 95.3 kg (210 lb)  Body mass index is 32.89 kg/m².    Intake/Output Summary (Last 24 hours) at 7/23/2024 1424  Last data filed at 7/23/2024 1150  Gross per 24 hour   Intake 350 ml   Output 1 ml   Net 349 ml         Physical Exam  Vitals and nursing note reviewed.   Constitutional:       Appearance: He is well-developed. He is obese.   HENT:      Head: Normocephalic and atraumatic.   Eyes:      Conjunctiva/sclera: Conjunctivae normal.      Pupils: Pupils are equal, round, and reactive to light.   Cardiovascular:      Rate and Rhythm: Normal rate and regular rhythm.      Heart sounds: Normal heart sounds.   Pulmonary:      Effort: Pulmonary effort is normal.      Breath sounds: Normal breath sounds.   Abdominal:      General: Bowel sounds are normal.      Palpations: Abdomen is soft.      Tenderness: There is abdominal tenderness.      Comments: LLQ tenderness on palpation   Musculoskeletal:         General: Normal range of motion.      Cervical back: Normal range of motion and neck supple.   Skin:     General: Skin is warm and dry.      Capillary Refill: Capillary refill takes less than 2 seconds.   Neurological:      Mental Status: He is alert and oriented to person, place, and time.   Psychiatric:         Behavior: Behavior normal.         Thought Content: Thought content  normal.         Judgment: Judgment normal.             Significant Labs: All pertinent labs within the past 24 hours have been reviewed.  CBC:   Recent Labs   Lab 07/22/24  0926 07/23/24  0439   WBC 9.36 6.37   HGB 17.0 17.3   HCT 52.5 54.1*    203     CMP:   Recent Labs   Lab 07/22/24  0926 07/23/24  0439    140   K 4.0 4.5    104   CO2 23 29   * 74   BUN 13 12   CREATININE 1.6* 1.5*   CALCIUM 9.4 9.5   PROT 8.1 7.9   ALBUMIN 4.2 4.1   BILITOT 0.6 0.7   ALKPHOS 63 65   AST 13 15   ALT 13 13   ANIONGAP 9 7*       Significant Imaging: I have reviewed all pertinent imaging results/findings within the past 24 hours.    CT Abdomen Pelvis  Without Contrast    Result Date: 7/22/2024  EXAMINATION: CT ABDOMEN PELVIS WITHOUT CONTRAST CLINICAL HISTORY: Abdominal abscess/infection suspected;. TECHNIQUE: Thin section axial images were obtained, without intravenous contrast. The lack of intravenous contrast limits assessment of solid organs and vascular structures. COMPARISON: July 12, 2024 FINDINGS: The lung bases are unremarkable.  Coronary artery calcification is noted. The liver has a normal noncontrast appearance with the exception of 3 small hypodensities shown on the previous contrast-enhanced study to reflect cysts.  The gallbladder and biliary tree are unremarkable.  The spleen is normal in size and appearance.  The pancreas and adrenal glands are within normal limits. There is no evidence of renal mass, calculus, or hydronephrosis.  The abdominal aorta is moderately calcified without evidence of aneurysm.  Two small fat containing midline abdominal hernias are noted above the level of the umbilicus. There is persistent mild wall thickening of the colon, with slightly increased wall thickening of portions of the descending colon.  Correlate for possible colitis.  There is no significant surrounding pericolonic edema.  No free air, free fluid, or lymphadenopathy is identified. Images of the  pelvis demonstrate an unremarkable urinary bladder.  The prostate gland measures 4.3 cm in transverse dimension.  There is slight wall thickening of the sigmoid colon.  No pelvic free fluid or lymphadenopathy is identified. No acute osseous abnormalities are demonstrated.  Changes of instrumented lumbar fusion are noted.     1. Mild wall thickening of the colon, slightly increased when compared to July 12th.  While findings are probably in part related to incomplete distension, mild acute colitis is suspected.  Correlate with clinical findings. 2. Additional stable findings as above. Electronically signed by: Wero Garcia Date:    07/22/2024 Time:    10:44    X-Ray Chest AP Portable    Result Date: 7/22/2024  EXAMINATION: XR CHEST AP PORTABLE CLINICAL HISTORY: CHF; FINDINGS: Portable chest at 10:06 is compared to 04/03/2024 shows normal cardiomediastinal silhouette.  There are median sternotomy wires. Lungs are clear. Pulmonary vasculature is normal. No acute osseous abnormality.     No acute cardiopulmonary abnormality. Prior median sternotomy Electronically signed by: Mariela Gupta Date:    07/22/2024 Time:    10:29

## 2024-07-23 NOTE — CARE UPDATE
07/22/24 1933   Patient Assessment/Suction   Level of Consciousness (AVPU) alert   Respiratory Effort Normal;Unlabored   Expansion/Accessory Muscles/Retractions no use of accessory muscles;no retractions   Cough Frequency no cough   PRE-TX-O2   Device (Oxygen Therapy) room air   SpO2 95 %

## 2024-07-23 NOTE — ASSESSMENT & PLAN NOTE
Failed outpt treatment  Admitted to med/surg  Continue IV levaquin/flagyl  GI consult  IV antiemetics

## 2024-07-24 ENCOUNTER — ANESTHESIA (OUTPATIENT)
Dept: SURGERY | Facility: HOSPITAL | Age: 49
End: 2024-07-24
Payer: MEDICAID

## 2024-07-24 ENCOUNTER — ANESTHESIA EVENT (OUTPATIENT)
Dept: SURGERY | Facility: HOSPITAL | Age: 49
End: 2024-07-24
Payer: MEDICAID

## 2024-07-24 VITALS
HEIGHT: 67 IN | RESPIRATION RATE: 20 BRPM | HEART RATE: 69 BPM | TEMPERATURE: 98 F | OXYGEN SATURATION: 95 % | WEIGHT: 210 LBS | BODY MASS INDEX: 32.96 KG/M2 | DIASTOLIC BLOOD PRESSURE: 55 MMHG | SYSTOLIC BLOOD PRESSURE: 118 MMHG

## 2024-07-24 VITALS
DIASTOLIC BLOOD PRESSURE: 56 MMHG | RESPIRATION RATE: 23 BRPM | OXYGEN SATURATION: 100 % | HEART RATE: 76 BPM | SYSTOLIC BLOOD PRESSURE: 155 MMHG

## 2024-07-24 DIAGNOSIS — M48.061 SPINAL STENOSIS OF LUMBAR REGION, UNSPECIFIED WHETHER NEUROGENIC CLAUDICATION PRESENT: ICD-10-CM

## 2024-07-24 DIAGNOSIS — M51.36 DDD (DEGENERATIVE DISC DISEASE), LUMBAR: ICD-10-CM

## 2024-07-24 DIAGNOSIS — M87.00 AVN (AVASCULAR NECROSIS OF BONE): ICD-10-CM

## 2024-07-24 LAB
ALBUMIN SERPL BCP-MCNC: 4 G/DL (ref 3.5–5.2)
ALP SERPL-CCNC: 58 U/L (ref 55–135)
ALT SERPL W/O P-5'-P-CCNC: 13 U/L (ref 10–44)
ANION GAP SERPL CALC-SCNC: 6 MMOL/L (ref 8–16)
AST SERPL-CCNC: 12 U/L (ref 10–40)
BASOPHILS # BLD AUTO: 0.03 K/UL (ref 0–0.2)
BASOPHILS NFR BLD: 0.4 % (ref 0–1.9)
BILIRUB SERPL-MCNC: 0.7 MG/DL (ref 0.1–1)
BUN SERPL-MCNC: 14 MG/DL (ref 6–20)
CALCIUM SERPL-MCNC: 9.3 MG/DL (ref 8.7–10.5)
CHLORIDE SERPL-SCNC: 104 MMOL/L (ref 95–110)
CO2 SERPL-SCNC: 28 MMOL/L (ref 23–29)
CREAT SERPL-MCNC: 1.6 MG/DL (ref 0.5–1.4)
DIFFERENTIAL METHOD BLD: ABNORMAL
EOSINOPHIL # BLD AUTO: 0.2 K/UL (ref 0–0.5)
EOSINOPHIL NFR BLD: 2.9 % (ref 0–8)
ERYTHROCYTE [DISTWIDTH] IN BLOOD BY AUTOMATED COUNT: 14.8 % (ref 11.5–14.5)
EST. GFR  (NO RACE VARIABLE): 52.8 ML/MIN/1.73 M^2
GLUCOSE SERPL-MCNC: 107 MG/DL (ref 70–110)
HCT VFR BLD AUTO: 50 % (ref 40–54)
HGB BLD-MCNC: 16 G/DL (ref 14–18)
IMM GRANULOCYTES # BLD AUTO: 0.02 K/UL (ref 0–0.04)
IMM GRANULOCYTES NFR BLD AUTO: 0.3 % (ref 0–0.5)
LYMPHOCYTES # BLD AUTO: 0.6 K/UL (ref 1–4.8)
LYMPHOCYTES NFR BLD: 8.4 % (ref 18–48)
MAGNESIUM SERPL-MCNC: 1.9 MG/DL (ref 1.6–2.6)
MCH RBC QN AUTO: 27.6 PG (ref 27–31)
MCHC RBC AUTO-ENTMCNC: 32 G/DL (ref 32–36)
MCV RBC AUTO: 86 FL (ref 82–98)
MONOCYTES # BLD AUTO: 0.5 K/UL (ref 0.3–1)
MONOCYTES NFR BLD: 6.6 % (ref 4–15)
NEUTROPHILS # BLD AUTO: 5.7 K/UL (ref 1.8–7.7)
NEUTROPHILS NFR BLD: 81.4 % (ref 38–73)
NRBC BLD-RTO: 0 /100 WBC
PLATELET # BLD AUTO: 202 K/UL (ref 150–450)
PMV BLD AUTO: 11.1 FL (ref 9.2–12.9)
POTASSIUM SERPL-SCNC: 4.3 MMOL/L (ref 3.5–5.1)
PROT SERPL-MCNC: 7.5 G/DL (ref 6–8.4)
RBC # BLD AUTO: 5.79 M/UL (ref 4.6–6.2)
SODIUM SERPL-SCNC: 138 MMOL/L (ref 136–145)
WBC # BLD AUTO: 7.01 K/UL (ref 3.9–12.7)

## 2024-07-24 PROCEDURE — 83735 ASSAY OF MAGNESIUM: CPT | Performed by: INTERNAL MEDICINE

## 2024-07-24 PROCEDURE — 63600175 PHARM REV CODE 636 W HCPCS: Performed by: INTERNAL MEDICINE

## 2024-07-24 PROCEDURE — 37000008 HC ANESTHESIA 1ST 15 MINUTES: Performed by: INTERNAL MEDICINE

## 2024-07-24 PROCEDURE — 36415 COLL VENOUS BLD VENIPUNCTURE: CPT | Performed by: INTERNAL MEDICINE

## 2024-07-24 PROCEDURE — 27200043 HC FORCEPS, BIOPSY: Performed by: INTERNAL MEDICINE

## 2024-07-24 PROCEDURE — 45331 SIGMOIDOSCOPY AND BIOPSY: CPT | Performed by: INTERNAL MEDICINE

## 2024-07-24 PROCEDURE — 96366 THER/PROPH/DIAG IV INF ADDON: CPT

## 2024-07-24 PROCEDURE — 96375 TX/PRO/DX INJ NEW DRUG ADDON: CPT

## 2024-07-24 PROCEDURE — 63600175 PHARM REV CODE 636 W HCPCS: Performed by: NURSE ANESTHETIST, CERTIFIED REGISTERED

## 2024-07-24 PROCEDURE — 85025 COMPLETE CBC W/AUTO DIFF WBC: CPT | Performed by: INTERNAL MEDICINE

## 2024-07-24 PROCEDURE — 80053 COMPREHEN METABOLIC PANEL: CPT | Performed by: INTERNAL MEDICINE

## 2024-07-24 PROCEDURE — 36415 COLL VENOUS BLD VENIPUNCTURE: CPT | Performed by: EMERGENCY MEDICINE

## 2024-07-24 PROCEDURE — G0378 HOSPITAL OBSERVATION PER HR: HCPCS

## 2024-07-24 PROCEDURE — 25000003 PHARM REV CODE 250: Performed by: INTERNAL MEDICINE

## 2024-07-24 PROCEDURE — 25000003 PHARM REV CODE 250: Performed by: NURSE ANESTHETIST, CERTIFIED REGISTERED

## 2024-07-24 RX ORDER — LOPERAMIDE HYDROCHLORIDE 2 MG/1
2 CAPSULE ORAL 4 TIMES DAILY PRN
Status: DISCONTINUED | OUTPATIENT
Start: 2024-07-24 | End: 2024-07-24 | Stop reason: HOSPADM

## 2024-07-24 RX ORDER — PROPOFOL 10 MG/ML
VIAL (ML) INTRAVENOUS
Status: DISCONTINUED | OUTPATIENT
Start: 2024-07-24 | End: 2024-07-24

## 2024-07-24 RX ORDER — PROMETHAZINE HYDROCHLORIDE 25 MG/1
25 TABLET ORAL EVERY 6 HOURS PRN
Qty: 16 TABLET | Refills: 0 | Status: SHIPPED | OUTPATIENT
Start: 2024-07-24 | End: 2024-07-28

## 2024-07-24 RX ORDER — LOPERAMIDE HYDROCHLORIDE 2 MG/1
2 CAPSULE ORAL 4 TIMES DAILY PRN
Qty: 40 CAPSULE | Refills: 0 | Status: SHIPPED | OUTPATIENT
Start: 2024-07-24 | End: 2024-08-03

## 2024-07-24 RX ORDER — SODIUM CHLORIDE 9 MG/ML
INJECTION, SOLUTION INTRAVENOUS CONTINUOUS PRN
Status: DISCONTINUED | OUTPATIENT
Start: 2024-07-24 | End: 2024-07-24

## 2024-07-24 RX ADMIN — SODIUM CHLORIDE: 0.9 INJECTION, SOLUTION INTRAVENOUS at 02:07

## 2024-07-24 RX ADMIN — ISOSORBIDE MONONITRATE 30 MG: 30 TABLET, EXTENDED RELEASE ORAL at 07:07

## 2024-07-24 RX ADMIN — HYDROCODONE BITARTRATE AND ACETAMINOPHEN 1 TABLET: 5; 325 TABLET ORAL at 07:07

## 2024-07-24 RX ADMIN — PROPOFOL 40 MG: 10 INJECTION, EMULSION INTRAVENOUS at 02:07

## 2024-07-24 RX ADMIN — ONDANSETRON 4 MG: 2 INJECTION INTRAMUSCULAR; INTRAVENOUS at 07:07

## 2024-07-24 RX ADMIN — METOPROLOL TARTRATE 50 MG: 50 TABLET, FILM COATED ORAL at 07:07

## 2024-07-24 RX ADMIN — PROPOFOL 80 MG: 10 INJECTION, EMULSION INTRAVENOUS at 02:07

## 2024-07-24 RX ADMIN — ASPIRIN 81 MG: 81 TABLET, COATED ORAL at 07:07

## 2024-07-24 RX ADMIN — CLOPIDOGREL BISULFATE 75 MG: 75 TABLET, FILM COATED ORAL at 07:07

## 2024-07-24 RX ADMIN — ALPRAZOLAM 0.5 MG: 0.5 TABLET ORAL at 10:07

## 2024-07-24 RX ADMIN — METRONIDAZOLE 500 MG: 5 INJECTION, SOLUTION INTRAVENOUS at 05:07

## 2024-07-24 RX ADMIN — FAMOTIDINE 20 MG: 20 TABLET ORAL at 07:07

## 2024-07-24 NOTE — TRANSFER OF CARE
"Anesthesia Transfer of Care Note    Patient: Lee Quintanilla    Procedure(s) Performed: Procedure(s) (LRB):  SIGMOIDOSCOPY, FLEXIBLE (N/A)    Patient location: GI    Anesthesia Type: general    Transport from OR: Transported from OR on 6-10 L/min O2 by face mask with adequate spontaneous ventilation    Post pain: adequate analgesia    Post assessment: no apparent anesthetic complications    Post vital signs: stable    Level of consciousness: awake and alert    Nausea/Vomiting: no nausea/vomiting    Complications: none    Transfer of care protocol was followed      Last vitals: Visit Vitals  /72   Pulse 73   Temp 36.5 °C (97.7 °F) (Oral)   Resp 18   Ht 5' 7" (1.702 m)   Wt 95.3 kg (210 lb)   SpO2 97%   BMI 32.89 kg/m²     "

## 2024-07-24 NOTE — PLAN OF CARE
Patient cleared for discharge by case management to home, no needs.       07/24/24 1520   Final Note   Assessment Type Final Discharge Note   Anticipated Discharge Disposition Home   Hospital Resources/Appts/Education Provided Appointments scheduled and added to AVS

## 2024-07-24 NOTE — [employer] PATIENT INSTRUCTIONS
Discharge Summary/Instructions after an Endoscopic Procedure  Patient Name: Lee Quintanilla  Patient MRN: [id number]  Patient YOB: 1975 Wednesday, July 24, 2024  Latesha Victoria MD  RESTRICTIONS:  During your procedure today, you received medications for sedation.  These   medications may affect your judgment, balance and coordination.  Therefore,   for 24 hours, you have the following restrictions:   - DO NOT drive a car, operate machinery, make legal/financial decisions,   sign important papers or drink alcohol.    ACTIVITY:  Today: no heavy lifting, straining or running due to procedural   sedation/anesthesia.  The following day: return to full activity including work.  DIET:  Eat and drink normally unless instructed otherwise.     TREATMENT FOR COMMON SIDE EFFECTS:  - Mild abdominal pain, nausea, belching, bloating or excessive gas:  rest,   eat lightly and use a heating pad.  - Sore Throat: treat with throat lozenges and/or gargle with warm salt   water.  - Because air was used during the procedure, expelling large amounts of air   from your rectum or belching is normal.  - If a bowel prep was taken, you may not have a bowel movement for 1-3 days.    This is normal.  SYMPTOMS TO WATCH FOR AND REPORT TO YOUR PHYSICIAN:  1. Abdominal pain or bloating, other than gas cramps.  2. Chest pain.  3. Back pain.  4. Signs of infection such as: chills or fever occurring within 24 hours   after the procedure.  5. Rectal bleeding, which would show as bright red, maroon, or black stools.   (A tablespoon of blood from the rectum is not serious, especially if   hemorrhoids are present.)  6. Vomiting.  7. Weakness or dizziness.  GO DIRECTLY TO THE NEAREST EMERGENCY ROOM IF YOU HAVE ANY OF THE FOLLOWING:      Difficulty breathing              Chills and/or fever over 101 F   Persistent vomiting and/or vomiting blood   Severe abdominal pain   Severe chest pain   Black, tarry stools   Bleeding- more than one  tablespoon   Any other symptom or condition that you feel may need urgent attention  Your doctor recommends these additional instructions:  If any biopsies were taken, your doctors clinic will contact you in 1 to 2   weeks with any results.  - Imodium 1 caplet PO PRN after loose bowel movement today.   - Use fiber, for example Citrucel, Fibercon, Konsyl or Metamucil.   - Resume regular diet today.   - Resume Plavix (clopidogrel) at prior dose today.  Refer to managing   physician for further adjustment of therapy.   - stool pcr ordered  - Discharge patient to home (with escort).  For questions, problems or results please call your physician - Latesha Victoria MD at Work:  [phone].  [facility], EMERGENCY ROOM PHONE NUMBER: [phone]  IF A COMPLICATION OR EMERGENCY SITUATION ARISES AND YOU ARE UNABLE TO REACH   YOUR PHYSICIAN - GO DIRECTLY TO THE EMERGENCY ROOM.  Latesha Victoria MD  7/24/2024 2:45:55 PM  This report has been verified and signed electronically.  Dear patient,  As a result of recent federal legislation (The Federal Cures Act), you may   receive lab or pathology results from your procedure in your [facility]   account before your physician is able to contact you. Your physician or   their representative will relay the results to you with their   recommendations at their soonest availability.  Thank you,  [employer]

## 2024-07-24 NOTE — DISCHARGE SUMMARY
[facility] Medicine  Discharge Summary      Patient Name: Lee Quintanilla  MRN: [id number]  GAIL: [id number]  Patient Class: OP- Observation  Admission Date: 7/22/2024  Hospital Length of Stay: 0 days  Discharge Date and Time: No discharge date for patient encounter.  Attending Physician: Carlos Negron MD   Discharging Provider: Jaz Jonas NP  Primary Care Provider: Riya Vidales NP    Primary Care Team: Networked reference to record PCT     HPI:   48 year old pt getting admitted with acute colitis  Symptoms started with Nausea/Vomiting , loose stools and abdominal pain 10 days ago  Pt came to ER on July 12 and had CT abdomen done  He was discharged to home with PO abx  Per pt , even though he finished abx course his symptoms persisted  He came back to ER today and had another CT Abdomen  It showed persistent colitis and pt got admitted     Procedure(s) (LRB):  SIGMOIDOSCOPY, FLEXIBLE (N/A)      Hospital Course:   Mr. Quintanilla has been monitored closely during his hospitalization. He was admitted on 7/22 for acute colitis with failed outpt treatment and intractable N/V. GI has been consulted and awaiting recs. He reports a colonoscopy about 2 years ago with several polyps and he was recommended to have another in 2 years. He was started on levaquin and flagyl. N/V has improved with a few episodes of retching but limited vomit. He has developed loose stools. He reports continued LLQ pain. CT abd/pelvis: Mild wall thickening of the colon, slightly increased when compared to July 12th c/w acute mild colitis. C.diff screen was negative. He underwent a flex sigmoidoscopy on 7/24 with no acute findings and was cleared for discharge by GI with close outpt follow up for GI pathogen panel and biopsies. He is recommended a high fiber diet and fiber supplements and imodium PRN for diarrhea. He was seen and examined on the date of discharge. Strict return prxn provided.     Goals of Care  Treatment Preferences:  Code Status: Full Code      Consults:   Consults (From admission, onward)          Status Ordering Provider     Inpatient consult to Registered Dietitian/Nutritionist  Once        Provider:  (Not yet assigned)    Completed RODRI GUEVARA     Inpatient consult to Gastroenterology  Once        Provider:  Nate Adams III, MD    Completed RODRI GUEVARA            No new Assessment & Plan notes have been filed under this hospital service since the last note was generated.  Service: Hospital Medicine    Final Active Diagnoses:    Diagnosis Date Noted POA    PRINCIPAL PROBLEM:  Colitis [K52.9] 07/22/2024 Yes    Anxiety [F41.9] 07/22/2024 Yes    Alcohol abuse [F10.10] 10/31/2023 Yes    Hx of CABG [Z95.1] 12/05/2022 Not Applicable    Hypertension [I10] 06/10/2020 Yes    CKD (chronic kidney disease) stage 3, GFR 30-59 ml/min [N18.30] 04/25/2016 Yes      Problems Resolved During this Admission:       Discharged Condition: stable    Disposition: Home or Self Care    Follow Up:   Follow-up Information       Riya Vidales NP Follow up on 8/12/2024.    Specialty: Family Medicine  Why: @ 10:20 am  Contact information:  [address]  [address]  [facility] [address]  [phone]               Latesha Victoria MD Follow up in 1 week(s).    Specialty: Gastroenterology  Contact information:  [address] [facility] [address]                           Patient Instructions:      Diet Adult Regular     Notify your health care provider if you experience any of the following:  temperature >100.4     Notify your health care provider if you experience any of the following:  persistent nausea and vomiting or diarrhea     Notify your health care provider if you experience any of the following:  severe uncontrolled pain     Notify your health care provider if you experience any of the following:  redness, tenderness, or signs of infection (pain, swelling, redness, odor or green/yellow discharge around  incision site)     Notify your health care provider if you experience any of the following:  increased confusion or weakness     Activity as tolerated       Significant Diagnostic Studies: Labs: CMP   Recent Labs   Lab 07/23/24  0439 07/24/24  0736    138   K 4.5 4.3    104   CO2 29 28   GLU 74 107   BUN 12 14   CREATININE 1.5* 1.6*   CALCIUM 9.5 9.3   PROT 7.9 7.5   ALBUMIN 4.1 4.0   BILITOT 0.7 0.7   ALKPHOS 65 58   AST 15 12   ALT 13 13   ANIONGAP 7* 6*    and CBC   Recent Labs   Lab 07/23/24  0439 07/24/24  0736   WBC 6.37 7.01   HGB 17.3 16.0   HCT 54.1* 50.0    202       Pending Diagnostic Studies:       Procedure Component Value Units Date/Time    Specimen to Pathology - Surgery [[id number]] Collected: 07/24/24 1447    Order Status: Sent Lab Status: No result     Specimen: Tissue     Specimen to Pathology - Surgery [[id number]] Collected: 07/24/24 1322    Order Status: Sent Lab Status: No result     Specimen: Tissue            Medications:  Reconciled Home Medications:      Medication List        START taking these medications      loperamide 2 mg capsule  Commonly known as: IMODIUM  Take 1 capsule (2 mg total) by mouth 4 (four) times daily as needed for Diarrhea.            CHANGE how you take these medications      EScitalopram oxalate 10 MG tablet  Commonly known as: LEXAPRO  Take 1 tablet (10 mg total) by mouth every evening.  What changed: when to take this            CONTINUE taking these medications      anastrozole 1 mg Tab  Commonly known as: ARIMIDEX  Take 1 mg by mouth every 7 days.     clopidogreL 75 mg tablet  Commonly known as: PLAVIX  Take 75 mg by mouth once daily.     ENTRESTO 49-51 mg per tablet  Generic drug: sacubitriL-valsartan  Take 1 tablet by mouth 2 (two) times daily.     metoprolol tartrate 50 MG tablet  Commonly known as: LOPRESSOR  Take 1 tablet by mouth 2 (two) times daily.     OZEMPIC 0.25 mg or 0.5 mg (2 mg/3 mL) pen injector  Generic drug: semaglutide  Inject  0.5 mg into the skin every 7 days.     promethazine 25 MG tablet  Commonly known as: PHENERGAN  Take 1 tablet (25 mg total) by mouth every 6 (six) hours as needed for Nausea.     rosuvastatin 40 MG Tab  Commonly known as: CRESTOR  Take 40 mg by mouth once daily.     tadalafiL 10 MG tablet  Commonly known as: CIALIS  Take 10 mg by mouth daily as needed.     testosterone cypionate 200 mg/mL Kit  Inject 1 mL into the muscle every 7 days.     traMADoL 50 mg tablet  Commonly known as: ULTRAM  Take 1 tablet (50 mg total) by mouth every 8 (eight) hours as needed for Pain.              Indwelling Lines/Drains at time of discharge:   Lines/Drains/Airways       Drain  Duration                  Closed/Suction Drain 04/15/24 0952 Tube - 1 Posterior Other (Comment) Bulb 15 Fr. 100 days                    Time spent on the discharge of patient: 43 minutes         Jaz Jonas NP  Department of Hospital Medicine  [facility]

## 2024-07-24 NOTE — ANESTHESIA PREPROCEDURE EVALUATION
07/24/2024  Lee Quintanilla is a 48 y.o., male.      Results for orders placed or performed during the hospital encounter of 07/22/24   EKG 12-lead    Collection Time: 07/22/24 10:58 AM   Result Value Ref Range    QRS Duration 88 ms    [facility] QTC Calculation 427 ms    Narrative    Test Reason : R06.02,    Vent. Rate : 077 BPM     Atrial Rate : 077 BPM     P-R Int : 200 ms          QRS Dur : 088 ms      QT Int : 378 ms       P-R-T Axes : 047 057 -47 degrees     QTc Int : 427 ms    Normal sinus rhythm  Inferior infarct (cited on or before 22-OCT-2019)  Abnormal ECG  When compared with ECG of 10-NOV-2023 07:31,  Premature ventricular complexes are no longer Present  Vent. rate has decreased BY  43 BPM    Referred By: [email or web address]   SELF           Confirmed By:         Imaging Results              CT Abdomen Pelvis  Without Contrast (Final result)  Result time 07/22/24 10:44:35   Procedure changed from CT Abdomen Pelvis With IV Contrast NO Oral Contrast     Final result by Wero Garcia MD (07/22/24 10:44:35)                   Impression:      1. Mild wall thickening of the colon, slightly increased when compared to July 12th.  While findings are probably in part related to incomplete distension, mild acute colitis is suspected.  Correlate with clinical findings.  2. Additional stable findings as above.      Electronically signed by: Wero Garcia  Date:    07/22/2024  Time:    10:44               Narrative:    EXAMINATION:  CT ABDOMEN PELVIS WITHOUT CONTRAST    CLINICAL HISTORY:  Abdominal abscess/infection suspected;.    TECHNIQUE:  Thin section axial images were obtained, without intravenous contrast. The lack of intravenous contrast limits assessment of solid organs and vascular structures.    COMPARISON:  July 12, 2024    FINDINGS:  The lung bases are unremarkable.  Coronary artery calcification  is noted.    The liver has a normal noncontrast appearance with the exception of 3 small hypodensities shown on the previous contrast-enhanced study to reflect cysts.  The gallbladder and biliary tree are unremarkable.  The spleen is normal in size and appearance.  The pancreas and adrenal glands are within normal limits.    There is no evidence of renal mass, calculus, or hydronephrosis.  The abdominal aorta is moderately calcified without evidence of aneurysm.  Two small fat containing midline abdominal hernias are noted above the level of the umbilicus.    There is persistent mild wall thickening of the colon, with slightly increased wall thickening of portions of the descending colon.  Correlate for possible colitis.  There is no significant surrounding pericolonic edema.  No free air, free fluid, or lymphadenopathy is identified.    Images of the pelvis demonstrate an unremarkable urinary bladder.  The prostate gland measures 4.3 cm in transverse dimension.  There is slight wall thickening of the sigmoid colon.  No pelvic free fluid or lymphadenopathy is identified.    No acute osseous abnormalities are demonstrated.  Changes of instrumented lumbar fusion are noted.                                       X-Ray Chest AP Portable (Final result)  Result time 07/22/24 10:29:28      Final result by Mariela Gupta MD (07/22/24 10:29:28)                   Impression:      No acute cardiopulmonary abnormality.    Prior median sternotomy      Electronically signed by: Mariela Gupta  Date:    07/22/2024  Time:    10:29               Narrative:    EXAMINATION:  XR CHEST AP PORTABLE    CLINICAL HISTORY:  CHF;    FINDINGS:  Portable chest at 10:06 is compared to 04/03/2024 shows normal cardiomediastinal silhouette.  There are median sternotomy wires.    Lungs are clear. Pulmonary vasculature is normal. No acute osseous abnormality.                                       Lab Results   Component Value Date    WBC 7.01  07/24/2024    HGB 16.0 07/24/2024    HCT 50.0 07/24/2024    MCV 86 07/24/2024     07/24/2024     BMP  Lab Results   Component Value Date     07/24/2024    K 4.3 07/24/2024     07/24/2024    CO2 28 07/24/2024    BUN 14 07/24/2024    CREATININE 1.6 (H) 07/24/2024    CALCIUM 9.3 07/24/2024    ANIONGAP 6 (L) 07/24/2024     07/24/2024    GLU 74 07/23/2024     (H) 07/22/2024       Results for orders placed during the hospital encounter of 12/05/22    Echo Saline Bubble? No    Interpretation Summary  · The left ventricle is normal in size with mild concentric hypertrophy and moderately decreased systolic function.  · The estimated ejection fraction is 38%.  · Grade I left ventricular diastolic dysfunction.  · There is moderate left ventricular global hypokinesis.  · Normal right ventricular size with normal right ventricular systolic function.  · Mild mitral regurgitation.  · Normal central venous pressure (3 mmHg).                     Pre-op Assessment    I have reviewed the Patient Summary Reports.     I have reviewed the Nursing Notes. I have reviewed the NPO Status.   I have reviewed the Medications.     Review of Systems  Anesthesia Hx:  No problems with previous Anesthesia   History of prior surgery of interest to airway management or planning: heart surgery.         Denies Family Hx of Anesthesia complications.    Denies Personal Hx of Anesthesia complications.                    Social:  Former Smoker       Hematology/Oncology:  Hematology Normal   Oncology Normal                                   EENT/Dental:  EENT/Dental Normal           Cardiovascular:     Hypertension, well controlled  Past MI (remote hx of MI) CAD  asymptomatic CABG/stent        hyperlipidemia   ECG has been reviewed. EF 38 %  Patient followed by Dr. Rothman  - seen 2 months ago                          Pulmonary:    Asthma mild   Sleep Apnea, CPAP           Education provided regarding risk of obstructive sleep  apnea            Renal/:  Chronic Renal Disease (stage 3 CRI), CKD renal calculi BPH      Kidney Function/Disease, Chronic Kidney Disease (CKD) , CKD Stage III (GFR 30-59)            Hepatic/GI:  Bowel Prep.   GERD, well controlled   Abdominal pain  Ozempic Therapy - last dose 4 weeks ago           Musculoskeletal:  Arthritis          Spine Disorders: (chronic low back pain) lumbar and cervical Degenerative disease, Chronic Pain and Disc disease           Neurological:    Neuromuscular Disease, (lumbar radiculopathy, with intermittent Left leg symptoms)  Headaches (tension headaches) Seizures (hx of ? seizure reaction to inapsine, no ongoing problems), well controlled    Last seizure 30 years ago x 1 secondary to Inapsine       Peripheral Neuropathy                          Endocrine:  Diabetes (pre-diabetes, on ozempic last dose > 1 week ago), poorly controlled, type 2   Ozempic Therapy       Obesity / BMI > 30  Psych:  Psychiatric History (ADD, ADHD) anxiety depression Alcohol abuseSleep Disorder and Insomnia.  Attention Deficit Disorder.     Sleep Disorder and Insomnia.        Physical Exam  General: Well nourished, Cooperative, Alert and Oriented    Airway:  Mallampati: III / II  Mouth Opening: Normal  TM Distance: Normal  Tongue: Normal  Neck ROM: Normal ROM    Dental:  Intact    Chest/Lungs:  Clear to auscultation    Heart:  Rate: Normal  Rhythm: Regular Rhythm  Sounds: Normal    Abdomen:  Normal, Soft, Nontender        Anesthesia Plan  Type of Anesthesia, risks & benefits discussed:    Anesthesia Type: Gen Natural Airway  Intra-op Monitoring Plan: Standard ASA Monitors  Post Op Pain Control Plan:   (medical reason for not using multimodal pain management)  Induction:  IV  Informed Consent: Informed consent signed with the Patient and all parties understand the risks and agree with anesthesia plan.  All questions answered.   ASA Score: 4  Anesthesia Plan Notes:         GNA  POM  Propofol   OLEG Precautions    Monitor IVF Administration Closely EF 38 %    Ready For Surgery From Anesthesia Perspective.     .

## 2024-07-24 NOTE — ANESTHESIA POSTPROCEDURE EVALUATION
Anesthesia Post Evaluation    Patient: Lee Quintanilla    Procedure(s) Performed: Procedure(s) (LRB):  SIGMOIDOSCOPY, FLEXIBLE (N/A)    Final Anesthesia Type: general      Patient location during evaluation: GI PACU  Patient participation: Yes- Able to Participate  Level of consciousness: awake and alert  Post-procedure vital signs: reviewed and stable  Pain management: adequate  Airway patency: patent    PONV status at discharge: No PONV  Anesthetic complications: no      Cardiovascular status: blood pressure returned to baseline and stable  Respiratory status: unassisted and room air  Hydration status: euvolemic  Follow-up not needed.              Vitals Value Taken Time   /55 07/24/24 1500   Temp 36.4 °C (97.5 °F) 07/24/24 1500   Pulse 70 07/24/24 1504   Resp 20 07/24/24 1500   SpO2 97 % 07/24/24 1504   Vitals shown include unfiled device data.      Event Time   Out of Recovery 07/24/2024 15:03:13         Pain/Tanmay Score: Pain Rating Prior to Med Admin: 8 (7/24/2024  7:29 AM)  Pain Rating Post Med Admin: 6 (7/24/2024  8:14 AM)

## 2024-07-24 NOTE — PLAN OF CARE
Problem: Adult Inpatient Plan of Care  Goal: Plan of Care Review  Outcome: Met  Goal: Patient-Specific Goal (Individualized)  Outcome: Met  Goal: Absence of Hospital-Acquired Illness or Injury  Outcome: Met  Goal: Optimal Comfort and Wellbeing  Outcome: Met  Goal: Readiness for Transition of Care  Outcome: Met     Problem: Wound  Goal: Optimal Coping  Outcome: Met  Goal: Optimal Functional Ability  Outcome: Met  Goal: Absence of Infection Signs and Symptoms  Outcome: Met  Goal: Improved Oral Intake  Outcome: Met  Goal: Optimal Pain Control and Function  Outcome: Met  Goal: Skin Health and Integrity  Outcome: Met  Goal: Optimal Wound Healing  Outcome: Met     Problem: Fall Injury Risk  Goal: Absence of Fall and Fall-Related Injury  Outcome: Met     Problem: Infection  Goal: Absence of Infection Signs and Symptoms  Outcome: Met

## 2024-07-25 ENCOUNTER — TELEPHONE (OUTPATIENT)
Dept: FAMILY MEDICINE | Facility: CLINIC | Age: 49
End: 2024-07-25
Payer: MEDICAID

## 2024-07-25 RX ORDER — TRAMADOL HYDROCHLORIDE 50 MG/1
50 TABLET ORAL EVERY 8 HOURS PRN
Qty: 21 TABLET | Refills: 0 | Status: SHIPPED | OUTPATIENT
Start: 2024-07-25

## 2024-07-25 NOTE — TELEPHONE ENCOUNTER
----- Message from Hayley Dorsey LPN sent at 7/25/2024  8:25 AM CDT -----  Regarding: HFU  Call patient - needs post-hospital phone call within 2 business days and hospital follow up visit scheduled within 7-14 days.

## 2024-07-26 ENCOUNTER — HOSPITAL ENCOUNTER (OUTPATIENT)
Dept: RADIOLOGY | Facility: HOSPITAL | Age: 49
Discharge: HOME | End: 2024-07-26
Attending: ORTHOPAEDIC SURGERY
Payer: MEDICAID

## 2024-07-26 DIAGNOSIS — M87.00 AVN (AVASCULAR NECROSIS OF BONE): ICD-10-CM

## 2024-07-26 LAB
BACTERIA STL CULT: NORMAL
LABCORP MISC TEST CODE: NORMAL
LABCORP MISC TEST NAME: NORMAL
LABCORP MISCELLANEOUS TEST: NORMAL

## 2024-07-26 PROCEDURE — 73721 MRI JNT OF LWR EXTRE W/O DYE: CPT | Mod: 26,LT,, | Performed by: RADIOLOGY

## 2024-07-26 PROCEDURE — 73721 MRI JNT OF LWR EXTRE W/O DYE: CPT | Mod: TC,PO,LT

## 2024-07-29 ENCOUNTER — HOSPITAL ENCOUNTER (EMERGENCY)
Facility: HOSPITAL | Age: 49
Discharge: HOME | End: 2024-07-29
Attending: EMERGENCY MEDICINE
Payer: MEDICAID

## 2024-07-29 VITALS
RESPIRATION RATE: 20 BRPM | HEART RATE: 91 BPM | OXYGEN SATURATION: 97 % | BODY MASS INDEX: 33.74 KG/M2 | WEIGHT: 215 LBS | DIASTOLIC BLOOD PRESSURE: 79 MMHG | SYSTOLIC BLOOD PRESSURE: 145 MMHG | HEIGHT: 67 IN | TEMPERATURE: 99 F

## 2024-07-29 DIAGNOSIS — F12.90 MARIJUANA USE: ICD-10-CM

## 2024-07-29 DIAGNOSIS — R11.2 NAUSEA AND VOMITING, UNSPECIFIED VOMITING TYPE: Primary | ICD-10-CM

## 2024-07-29 DIAGNOSIS — F12.188 CANNABIS HYPEREMESIS SYNDROME CONCURRENT WITH AND DUE TO CANNABIS ABUSE: ICD-10-CM

## 2024-07-29 DIAGNOSIS — R11.15 CYCLIC VOMITING SYNDROME: ICD-10-CM

## 2024-07-29 LAB
ALBUMIN SERPL BCP-MCNC: 4.2 G/DL (ref 3.5–5.2)
ALP SERPL-CCNC: 62 U/L (ref 55–135)
ALT SERPL W/O P-5'-P-CCNC: 16 U/L (ref 10–44)
ANION GAP SERPL CALC-SCNC: 13 MMOL/L (ref 8–16)
AST SERPL-CCNC: 14 U/L (ref 10–40)
BASOPHILS # BLD AUTO: 0.05 K/UL (ref 0–0.2)
BASOPHILS NFR BLD: 0.5 % (ref 0–1.9)
BILIRUB SERPL-MCNC: 0.6 MG/DL (ref 0.1–1)
BILIRUB UR QL STRIP: NEGATIVE
BNP SERPL-MCNC: 32 PG/ML (ref 0–99)
BUN SERPL-MCNC: 14 MG/DL (ref 6–20)
CALCIUM SERPL-MCNC: 9.8 MG/DL (ref 8.7–10.5)
CHLORIDE SERPL-SCNC: 104 MMOL/L (ref 95–110)
CLARITY UR: CLEAR
CO2 SERPL-SCNC: 20 MMOL/L (ref 23–29)
COLOR UR: YELLOW
CREAT SERPL-MCNC: 1.5 MG/DL (ref 0.5–1.4)
DIFFERENTIAL METHOD BLD: ABNORMAL
EOSINOPHIL # BLD AUTO: 0.2 K/UL (ref 0–0.5)
EOSINOPHIL NFR BLD: 2.4 % (ref 0–8)
ERYTHROCYTE [DISTWIDTH] IN BLOOD BY AUTOMATED COUNT: 15.6 % (ref 11.5–14.5)
EST. GFR  (NO RACE VARIABLE): 57.1 ML/MIN/1.73 M^2
ETHANOL SERPL-MCNC: <10 MG/DL
GLUCOSE SERPL-MCNC: 168 MG/DL (ref 70–110)
GLUCOSE UR QL STRIP: NEGATIVE
HCT VFR BLD AUTO: 54.7 % (ref 40–54)
HGB BLD-MCNC: 18.2 G/DL (ref 14–18)
HGB UR QL STRIP: NEGATIVE
IMM GRANULOCYTES # BLD AUTO: 0.04 K/UL (ref 0–0.04)
IMM GRANULOCYTES NFR BLD AUTO: 0.4 % (ref 0–0.5)
INFLUENZA A, MOLECULAR: NEGATIVE
INFLUENZA B, MOLECULAR: NEGATIVE
KETONES UR QL STRIP: NEGATIVE
LDH SERPL L TO P-CCNC: 0.75 MMOL/L (ref 0.5–2.2)
LEUKOCYTE ESTERASE UR QL STRIP: NEGATIVE
LIPASE SERPL-CCNC: 6 U/L (ref 4–60)
LYMPHOCYTES # BLD AUTO: 1.3 K/UL (ref 1–4.8)
LYMPHOCYTES NFR BLD: 12.9 % (ref 18–48)
MAGNESIUM SERPL-MCNC: 1.8 MG/DL (ref 1.6–2.6)
MCH RBC QN AUTO: 28.2 PG (ref 27–31)
MCHC RBC AUTO-ENTMCNC: 33.3 G/DL (ref 32–36)
MCV RBC AUTO: 85 FL (ref 82–98)
MONOCYTES # BLD AUTO: 0.5 K/UL (ref 0.3–1)
MONOCYTES NFR BLD: 4.8 % (ref 4–15)
NEUTROPHILS # BLD AUTO: 7.9 K/UL (ref 1.8–7.7)
NEUTROPHILS NFR BLD: 79 % (ref 38–73)
NITRITE UR QL STRIP: NEGATIVE
NRBC BLD-RTO: 0 /100 WBC
PH UR STRIP: 8 [PH] (ref 5–8)
PLATELET # BLD AUTO: 254 K/UL (ref 150–450)
PMV BLD AUTO: 11.6 FL (ref 9.2–12.9)
POTASSIUM SERPL-SCNC: 3.7 MMOL/L (ref 3.5–5.1)
PROT SERPL-MCNC: 8.3 G/DL (ref 6–8.4)
PROT UR QL STRIP: NEGATIVE
RBC # BLD AUTO: 6.46 M/UL (ref 4.6–6.2)
SAMPLE: NORMAL
SARS-COV-2 RDRP RESP QL NAA+PROBE: NEGATIVE
SODIUM SERPL-SCNC: 137 MMOL/L (ref 136–145)
SP GR UR STRIP: 1.01 (ref 1–1.03)
SPECIMEN SOURCE: NORMAL
TROPONIN I SERPL HS-MCNC: 5 PG/ML (ref 0–14.9)
TROPONIN I SERPL HS-MCNC: 6.2 PG/ML (ref 0–14.9)
URN SPEC COLLECT METH UR: NORMAL
UROBILINOGEN UR STRIP-ACNC: NEGATIVE EU/DL
WBC # BLD AUTO: 9.99 K/UL (ref 3.9–12.7)

## 2024-07-29 PROCEDURE — 63600175 PHARM REV CODE 636 W HCPCS: Performed by: EMERGENCY MEDICINE

## 2024-07-29 PROCEDURE — 81003 URINALYSIS AUTO W/O SCOPE: CPT | Mod: 59 | Performed by: EMERGENCY MEDICINE

## 2024-07-29 PROCEDURE — 93010 ELECTROCARDIOGRAM REPORT: CPT | Mod: ,,, | Performed by: GENERAL PRACTICE

## 2024-07-29 PROCEDURE — 85025 COMPLETE CBC W/AUTO DIFF WBC: CPT | Performed by: EMERGENCY MEDICINE

## 2024-07-29 PROCEDURE — 83735 ASSAY OF MAGNESIUM: CPT | Performed by: EMERGENCY MEDICINE

## 2024-07-29 PROCEDURE — 36415 COLL VENOUS BLD VENIPUNCTURE: CPT | Performed by: EMERGENCY MEDICINE

## 2024-07-29 PROCEDURE — 99285 EMERGENCY DEPT VISIT HI MDM: CPT | Mod: 25

## 2024-07-29 PROCEDURE — U0002 COVID-19 LAB TEST NON-CDC: HCPCS | Performed by: EMERGENCY MEDICINE

## 2024-07-29 PROCEDURE — 96375 TX/PRO/DX INJ NEW DRUG ADDON: CPT

## 2024-07-29 PROCEDURE — 82077 ASSAY SPEC XCP UR&BREATH IA: CPT | Performed by: EMERGENCY MEDICINE

## 2024-07-29 PROCEDURE — 93005 ELECTROCARDIOGRAM TRACING: CPT | Performed by: GENERAL PRACTICE

## 2024-07-29 PROCEDURE — 80053 COMPREHEN METABOLIC PANEL: CPT | Performed by: EMERGENCY MEDICINE

## 2024-07-29 PROCEDURE — 84484 ASSAY OF TROPONIN QUANT: CPT | Mod: 91 | Performed by: EMERGENCY MEDICINE

## 2024-07-29 PROCEDURE — 83880 ASSAY OF NATRIURETIC PEPTIDE: CPT | Performed by: EMERGENCY MEDICINE

## 2024-07-29 PROCEDURE — 87040 BLOOD CULTURE FOR BACTERIA: CPT | Mod: 59 | Performed by: EMERGENCY MEDICINE

## 2024-07-29 PROCEDURE — 87502 INFLUENZA DNA AMP PROBE: CPT | Performed by: EMERGENCY MEDICINE

## 2024-07-29 PROCEDURE — 96361 HYDRATE IV INFUSION ADD-ON: CPT

## 2024-07-29 PROCEDURE — 96376 TX/PRO/DX INJ SAME DRUG ADON: CPT

## 2024-07-29 PROCEDURE — 83690 ASSAY OF LIPASE: CPT | Performed by: EMERGENCY MEDICINE

## 2024-07-29 PROCEDURE — 96374 THER/PROPH/DIAG INJ IV PUSH: CPT

## 2024-07-29 RX ORDER — ONDANSETRON HYDROCHLORIDE 2 MG/ML
4 INJECTION, SOLUTION INTRAVENOUS
Status: COMPLETED | OUTPATIENT
Start: 2024-07-29 | End: 2024-07-29

## 2024-07-29 RX ORDER — DIPHENHYDRAMINE HYDROCHLORIDE 50 MG/ML
25 INJECTION INTRAMUSCULAR; INTRAVENOUS
Status: COMPLETED | OUTPATIENT
Start: 2024-07-29 | End: 2024-07-29

## 2024-07-29 RX ORDER — ONDANSETRON 4 MG/1
4 TABLET, FILM COATED ORAL EVERY 6 HOURS PRN
Qty: 12 TABLET | Refills: 0 | Status: SHIPPED | OUTPATIENT
Start: 2024-07-29

## 2024-07-29 RX ORDER — METOCLOPRAMIDE HYDROCHLORIDE 5 MG/ML
10 INJECTION INTRAMUSCULAR; INTRAVENOUS
Status: COMPLETED | OUTPATIENT
Start: 2024-07-29 | End: 2024-07-29

## 2024-07-29 RX ORDER — PANTOPRAZOLE SODIUM 40 MG/10ML
40 INJECTION, POWDER, LYOPHILIZED, FOR SOLUTION INTRAVENOUS
Status: COMPLETED | OUTPATIENT
Start: 2024-07-29 | End: 2024-07-29

## 2024-07-29 RX ORDER — HYOSCYAMINE SULFATE 0.12 MG/1
0.12 TABLET SUBLINGUAL EVERY 4 HOURS PRN
Qty: 12 TABLET | Refills: 0 | Status: SHIPPED | OUTPATIENT
Start: 2024-07-29

## 2024-07-29 RX ADMIN — METOCLOPRAMIDE HYDROCHLORIDE 10 MG: 5 INJECTION INTRAMUSCULAR; INTRAVENOUS at 10:07

## 2024-07-29 RX ADMIN — SODIUM CHLORIDE, POTASSIUM CHLORIDE, SODIUM LACTATE AND CALCIUM CHLORIDE 1000 ML: 600; 310; 30; 20 INJECTION, SOLUTION INTRAVENOUS at 07:07

## 2024-07-29 RX ADMIN — PANTOPRAZOLE SODIUM 40 MG: 40 INJECTION, POWDER, LYOPHILIZED, FOR SOLUTION INTRAVENOUS at 07:07

## 2024-07-29 RX ADMIN — ONDANSETRON 4 MG: 2 INJECTION INTRAMUSCULAR; INTRAVENOUS at 01:07

## 2024-07-29 RX ADMIN — SODIUM CHLORIDE, POTASSIUM CHLORIDE, SODIUM LACTATE AND CALCIUM CHLORIDE 1000 ML: 600; 310; 30; 20 INJECTION, SOLUTION INTRAVENOUS at 11:07

## 2024-07-29 RX ADMIN — DIPHENHYDRAMINE HYDROCHLORIDE 25 MG: 50 INJECTION INTRAMUSCULAR; INTRAVENOUS at 10:07

## 2024-07-29 RX ADMIN — ONDANSETRON 4 MG: 2 INJECTION INTRAMUSCULAR; INTRAVENOUS at 07:07

## 2024-07-29 NOTE — ED PROVIDER NOTES
Encounter Date: 7/29/2024       History     Chief Complaint   Patient presents with    Abdominal Pain    Vomiting     X1 day     48-year-old male with history of ADHD, asthma, coronary artery disease, hypertension, hyperlipidemia, chronic kidney disease stage 3, previous history of colitis.  Patient with known history of marijuana use.  Patient presents emergency department with complaint of abdominal cramping nausea and nonbilious nonbloody vomiting she was moaning today.  Patient denied any history of constipation or diarrhea, states that last bowel movement was noted today.  Denies fever.  Denies dysuria or hematuria.  Denies any other constitutional symptoms.  Patient did state that he had completed Cipro and Flagyl for his diverticulitis approximally 1 week ago.  Currently at this time patient does state over feels better however still has mild to moderate nausea.      Review of patient's allergies indicates:   Allergen Reactions    Inapsine [droperidol] Anaphylaxis     seizures    Effexor [venlafaxine]      Increased anxiety    Pcn [penicillins]     Bactrim [sulfamethoxazole-trimethoprim] Rash     Dry red rash all over when in the sun    Fluconazole Rash     Pruritis       Past Medical History:   Diagnosis Date    ADHD (attention deficit hyperactivity disorder)     Arthritis     Asthma     as child only    Back pain     Coronary artery disease     Degeneration of lumbar intervertebral disc 05/2016    Depression     Digestive disorder     Elevated PSA     Headache     Hyperlipidemia     Hypertension     Kidney damage     stage 3 ; d/t aleve abuse    Kidney stone     Myocardial infarction     Neck pain     Numbness and tingling in hands     Numbness and tingling of both legs     Osteonecrosis     Bilat Femur    Seizures     from inapsine 2002    Sleep apnea     no cpap    Wears glasses     CONTACS     Past Surgical History:   Procedure Laterality Date    BACK SURGERY  2016    back surgery    BONE GRAFT N/A  2020    Procedure: BONE GRAFT;  Surgeon: Mateusz Blanco MD;  Location: [facility] OR;  Service: Orthopedics;  Laterality: N/A;    CARDIAC SURGERY  2020    CABG X 7    CORONARY ARTERY BYPASS GRAFT      7 vessels    FLEXIBLE SIGMOIDOSCOPY N/A 2024    Procedure: SIGMOIDOSCOPY, FLEXIBLE;  Surgeon: Latesha Victoria MD;  Location: [facility];  Service: Endoscopy;  Laterality: N/A;    HERNIA REPAIR Bilateral 2017    LITHOTRIPSY      LUMBAR LAMINECTOMY WITH FUSION Bilateral 2020    Procedure: LAMINECTOMY, SPINE, LUMBAR, WITH FUSION;  Surgeon: Mateusz Blanco MD;  Location: [facility] OR;  Service: Orthopedics;  Laterality: Bilateral;  MEDTRONIC  NTI  L-3    PILONIDAL CYST DRAINAGE      removal of abcess      scrotal    REMOVAL OF HARDWARE FROM SPINE Bilateral 2020    Procedure: REMOVAL, HARDWARE, SPINE;  Surgeon: Mateusz Blanco MD;  Location: [facility] OR;  Service: Orthopedics;  Laterality: Bilateral;  L 4-5    SURGICAL REMOVAL OF PILONIDAL CYST N/A 4/15/2024    Procedure: EXCISION, PILONIDAL CYST;  Surgeon: Jayden Phillips III, MD;  Location: [facility] OR;  Service: General;  Laterality: N/A;  sacral area    TYMPANOSTOMY TUBE PLACEMENT       Family History   Problem Relation Name Age of Onset    Heart disease Mother      Migraines Mother      Hypertension Mother      Early death Father accident     Heart disease Father accident     Diabetes Maternal Aunt      Diabetes Maternal Uncle      Diabetes Maternal Grandfather       Social History     Tobacco Use    Smoking status: Former     Current packs/day: 0.00     Types: Cigarettes     Quit date: 2016     Years since quittin.5     Passive exposure: Past    Smokeless tobacco: Former     Quit date: 2016    Tobacco comments:     occasional   Substance Use Topics    Alcohol use: Yes     Alcohol/week: 1.0 standard drink of alcohol     Types: 1 Glasses of wine per week     Comment: 20 - 30 shots vodka per day or 1-2 1/5ths per day for 2 years  (started 2021)    Drug use: No     Review of Systems   Constitutional:  Negative for fever.   HENT:  Negative for sore throat.    Respiratory:  Negative for shortness of breath.    Cardiovascular:  Negative for chest pain.   Gastrointestinal:  Positive for abdominal pain, nausea and vomiting. Negative for abdominal distention, anal bleeding, blood in stool and diarrhea.   Genitourinary:  Negative for dysuria.   Musculoskeletal:  Negative for back pain.   Skin:  Negative for rash.   Neurological:  Negative for weakness.   Hematological:  Does not bruise/bleed easily.       Physical Exam     Initial Vitals [07/29/24 0718]   BP Pulse Resp Temp SpO2   139/89 (!) 118 20 98.7 °F (37.1 °C) (!) 94 %      MAP       --         Physical Exam    Nursing note and vitals reviewed.  Constitutional: He appears well-developed and well-nourished.   HENT:   Head: Normocephalic and atraumatic.   Nose: Nose normal.   Mouth/Throat: Oropharynx is clear and moist.   Eyes: Conjunctivae and EOM are normal. Pupils are equal, round, and reactive to light. No scleral icterus.   Neck: Neck supple.   Normal range of motion.  Cardiovascular:  Normal rate, regular rhythm, normal heart sounds and intact distal pulses.     Exam reveals no gallop and no friction rub.       No murmur heard.  Pulmonary/Chest: No stridor. No respiratory distress.   Course bilateral breath sounds no adventitious sounds   Abdominal: Abdomen is soft. Bowel sounds are normal. He exhibits no distension and no mass. There is no abdominal tenderness. There is no rebound and no guarding.   Musculoskeletal:         General: No edema. Normal range of motion.      Cervical back: Normal range of motion and neck supple.     Lymphadenopathy:     He has no cervical adenopathy.   Neurological: He is alert and oriented to person, place, and time. He has normal strength and normal reflexes. No cranial nerve deficit or sensory deficit. GCS score is 15. GCS eye subscore is 4. GCS verbal  subscore is 5. GCS motor subscore is 6.   Skin: Skin is warm and dry. Capillary refill takes less than 2 seconds. No rash noted.   Psychiatric: He has a normal mood and affect. His behavior is normal. Judgment and thought content normal.         ED Course   Procedures  Labs Reviewed   CBC W/ AUTO DIFFERENTIAL - Abnormal       Result Value    WBC 9.99      RBC 6.46 (*)     Hemoglobin 18.2 (*)     Hematocrit 54.7 (*)     MCV 85      MCH 28.2      MCHC 33.3      RDW 15.6 (*)     Platelets 254      MPV 11.6      Immature Granulocytes 0.4      Gran # (ANC) 7.9 (*)     Immature Grans (Abs) 0.04      Lymph # 1.3      Mono # 0.5      Eos # 0.2      Baso # 0.05      nRBC 0      Gran % 79.0 (*)     Lymph % 12.9 (*)     Mono % 4.8      Eosinophil % 2.4      Basophil % 0.5      Differential Method Automated     COMPREHENSIVE METABOLIC PANEL - Abnormal    Sodium 137      Potassium 3.7      Chloride 104      CO2 20 (*)     Glucose 168 (*)     BUN 14      Creatinine 1.5 (*)     Calcium 9.8      Total Protein 8.3      Albumin 4.2      Total Bilirubin 0.6      Alkaline Phosphatase 62      AST 14      ALT 16      eGFR 57.1 (*)     Anion Gap 13     CULTURE, BLOOD   CULTURE, BLOOD   URINALYSIS, REFLEX TO URINE CULTURE    Specimen UA Urine, Clean Catch      Color, UA Yellow      Appearance, UA Clear      pH, UA 8.0      Specific Gravity, UA 1.010      Protein, UA Negative      Glucose, UA Negative      Ketones, UA Negative      Bilirubin (UA) Negative      Occult Blood UA Negative      Nitrite, UA Negative      Urobilinogen, UA Negative      Leukocytes, UA Negative      Narrative:     In and Out Cath as needed it patient unable to void  Specimen Source->Urine   LIPASE    Lipase 6     SARS-COV-2 RNA AMPLIFICATION, QUAL    SARS-CoV-2 RNA, Amplification, Qual Negative     MAGNESIUM    Magnesium 1.8     TROPONIN I HIGH SENSITIVITY    Troponin I High Sensitivity 5.0     B-TYPE NATRIURETIC PEPTIDE    BNP 32     INFLUENZA A AND B ANTIGEN     Influenza A, Molecular Negative      Influenza B, Molecular Negative      Flu A & B Source Nasal swab      Narrative:     Specimen Source->Nasopharyngeal Swab   ALCOHOL,MEDICAL (ETHANOL)    Alcohol, Serum <10     TROPONIN I HIGH SENSITIVITY    Troponin I High Sensitivity 6.2     ISTAT LACTATE    POC Lactate 0.75      Sample VENOUS     POCT LACTATE        ECG Results              EKG 12-lead (In process)        Collection Time Result Time QRS Duration [facility] QTC Calculation    07/29/24 07:44:07 07/29/24 08:02:58 98 435                     In process by Interface, Lab In [facility] (07/29/24 08:03:06)                   Narrative:    Test Reason : R07.9,    Vent. Rate : 088 BPM     Atrial Rate : 088 BPM     P-R Int : 164 ms          QRS Dur : 098 ms      QT Int : 360 ms       P-R-T Axes : 045 055 -41 degrees     QTc Int : 435 ms    Sinus rhythm with occasional Premature ventricular complexes  Inferior infarct (cited on or before 22-OCT-2019)  Cannot rule out Anterior infarct ,age undetermined  T wave abnormality, consider lateral ischemia  Abnormal ECG  When compared with ECG of 22-JUL-2024 10:58,  Premature ventricular complexes are now Present    Referred By: [email or web address]   SELF           Confirmed By:                                   Imaging Results              X-Ray Chest AP Portable (Final result)  Result time 07/29/24 07:58:23      Final result by Fco Kinney MD (07/29/24 07:58:23)                   Impression:      No acute cardiac or pulmonary process.      Electronically signed by: Fco Kinney  Date:    07/29/2024  Time:    07:58               Narrative:    CLINICAL HISTORY:  ([id number] y/o  (1975) M    Chest Pain;    TECHNIQUE:  (A#[id number], exam time 7/29/2024 7:55)    XR CHEST AP PORTABLE [id number]    COMPARISON:  Radiographs 07/22/2024    FINDINGS:  The lungs are clear. Costophrenic angles are seen without effusion. No pneumothorax is identified. The heart is normal in size. Median sternotomy  wires are in expected configuration status post CABG. The mediastinum is within normal limits. Osseous structures appear within normal limits. The visualized upper abdomen is unremarkable.                                       Medications   ondansetron injection 4 mg (4 mg Intravenous Given 7/29/24 0741)   lactated ringers bolus 1,000 mL (0 mLs Intravenous Stopped 7/29/24 0841)   pantoprazole injection 40 mg (40 mg Intravenous Given 7/29/24 0741)   lactated ringers bolus 1,000 mL (0 mLs Intravenous Stopped 7/29/24 1237)   metoclopramide injection 10 mg (10 mg Intravenous Given 7/29/24 1033)   diphenhydrAMINE injection 25 mg (25 mg Intravenous Given 7/29/24 1031)     Medical Decision Making  Amount and/or Complexity of Data Reviewed  Labs: ordered.  Radiology: ordered.    Risk  Prescription drug management.               ED Course as of 07/29/24 1338   Mon Jul 29, 2024   1329 Patient seen evaluated emergency department.  Currently at this time patient found with nausea and vomiting which occurred earlier today.  Currently at this time however patient did admit that he had been using marijuana over last 2-3 weeks intermittently.  Last use was yesterday.  Patient on examination abdomen found to be soft nontender nondistended, no rebound or guarding was noted.  Patient denied melena or hematochezia.  No hematemesis.  Was afebrile.  Labs reviewed found to have influenza COVID negative.  With BNP at 32 lipase 6 alcohol less than 10 white count 9000.  Chemistry found with glucose 168 BUN 14 creatinine 1.5.  At this time patient was given IV hydration emergency department as well as Reglan and Benadryl.  Had overall improvement of symptoms.  Patient counseled about marijuana cessation.  Told to follow up with primary and Gastroenterology.  He is return to emergency department problems persist worsens or additional concerns. [RM]      ED Course User Index  [RM] Daniel Carter MD               Medical Decision Making:    Initial Assessment:   48-year-old male with history of ADHD, asthma, coronary artery disease, hypertension, hyperlipidemia, chronic kidney disease stage 3, previous history of colitis.  Patient with known history of marijuana use.  Patient presents emergency department with complaint of abdominal cramping nausea and nonbilious nonbloody vomiting she was moaning today.  Patient denied any history of constipation or diarrhea, states that last bowel movement was noted today.  Denies fever.  Denies dysuria or hematuria.  Denies any other constitutional symptoms.  Patient did state that he had completed Cipro and Flagyl for his diverticulitis approximally 1 week ago.  Currently at this time patient does state over feels better however still has mild to moderate nausea.    Differential Diagnosis:   Gastroenteritis, gastritis, colitis, cyclic vomiting syndrome secondary to marijuana use  Clinical Tests:   Lab Tests: Ordered and Reviewed  Radiological Study: Ordered and Reviewed  Medical Tests: Ordered and Reviewed             Clinical Impression:  Final diagnoses:  [R11.2] Nausea and vomiting, unspecified vomiting type (Primary)  [F12.90] Marijuana use  [R11.15] Cyclic vomiting syndrome  [F12.188] Cannabis hyperemesis syndrome concurrent with and due to cannabis abuse          ED Disposition Condition    Discharge Stable          ED Prescriptions       Medication Sig Dispense Start Date End Date Auth. Provider    ondansetron (ZOFRAN) 4 MG tablet Take 1 tablet (4 mg total) by mouth every 6 (six) hours as needed for Nausea. 12 tablet 7/29/2024 -- Daniel Carter MD    hyoscyamine 0.125 mg Subl Place 1 tablet (0.125 mg total) under the tongue every 4 (four) hours as needed. 12 tablet 7/29/2024 -- Daniel Carter MD          Follow-up Information    None          Daniel Carter MD  07/29/24 [id number]

## 2024-07-30 LAB
OHS QRS DURATION: 88 MS
OHS QTC CALCULATION: 427 MS

## 2024-08-01 DIAGNOSIS — M87.00 AVN (AVASCULAR NECROSIS OF BONE): ICD-10-CM

## 2024-08-01 DIAGNOSIS — M51.36 DDD (DEGENERATIVE DISC DISEASE), LUMBAR: ICD-10-CM

## 2024-08-01 DIAGNOSIS — M48.061 SPINAL STENOSIS OF LUMBAR REGION, UNSPECIFIED WHETHER NEUROGENIC CLAUDICATION PRESENT: ICD-10-CM

## 2024-08-01 RX ORDER — TRAMADOL HYDROCHLORIDE 50 MG/1
50 TABLET ORAL EVERY 8 HOURS PRN
Qty: 21 TABLET | Refills: 0 | Status: SHIPPED | OUTPATIENT
Start: 2024-08-01

## 2024-08-02 ENCOUNTER — PATIENT MESSAGE (OUTPATIENT)
Dept: FAMILY MEDICINE | Facility: CLINIC | Age: 49
End: 2024-08-02
Payer: MEDICAID

## 2024-08-02 RX ORDER — ESCITALOPRAM OXALATE 10 MG/1
10 TABLET ORAL DAILY
Qty: 30 TABLET | Refills: 1 | Status: SHIPPED | OUTPATIENT
Start: 2024-08-02

## 2024-08-03 LAB
BACTERIA BLD CULT: NORMAL
BACTERIA BLD CULT: NORMAL
OHS QRS DURATION: 98 MS
OHS QTC CALCULATION: 435 MS

## 2024-08-06 RX ORDER — BUSPIRONE HYDROCHLORIDE 15 MG/1
15 TABLET ORAL 3 TIMES DAILY
Qty: 90 TABLET | Refills: 0 | Status: SHIPPED | OUTPATIENT
Start: 2024-08-06 | End: 2025-08-06

## 2024-08-07 ENCOUNTER — HOSPITAL ENCOUNTER (EMERGENCY)
Facility: HOSPITAL | Age: 49
Discharge: HOME | End: 2024-08-07
Attending: EMERGENCY MEDICINE
Payer: MEDICAID

## 2024-08-07 ENCOUNTER — TELEPHONE (OUTPATIENT)
Dept: GASTROENTEROLOGY | Facility: CLINIC | Age: 49
End: 2024-08-07
Payer: MEDICAID

## 2024-08-07 VITALS
HEART RATE: 83 BPM | HEIGHT: 67 IN | SYSTOLIC BLOOD PRESSURE: 121 MMHG | DIASTOLIC BLOOD PRESSURE: 76 MMHG | OXYGEN SATURATION: 95 % | RESPIRATION RATE: 18 BRPM | TEMPERATURE: 99 F | BODY MASS INDEX: 33.74 KG/M2 | WEIGHT: 215 LBS

## 2024-08-07 DIAGNOSIS — M87.00 AVN (AVASCULAR NECROSIS OF BONE): ICD-10-CM

## 2024-08-07 DIAGNOSIS — M48.061 SPINAL STENOSIS OF LUMBAR REGION, UNSPECIFIED WHETHER NEUROGENIC CLAUDICATION PRESENT: ICD-10-CM

## 2024-08-07 DIAGNOSIS — M51.36 DDD (DEGENERATIVE DISC DISEASE), LUMBAR: ICD-10-CM

## 2024-08-07 DIAGNOSIS — K52.9 COLITIS: Primary | ICD-10-CM

## 2024-08-07 DIAGNOSIS — R11.10 HYPEREMESIS: ICD-10-CM

## 2024-08-07 LAB
ALBUMIN SERPL BCP-MCNC: 3.9 G/DL (ref 3.5–5.2)
ALP SERPL-CCNC: 55 U/L (ref 55–135)
ALT SERPL W/O P-5'-P-CCNC: 12 U/L (ref 10–44)
ANION GAP SERPL CALC-SCNC: 10 MMOL/L (ref 8–16)
AST SERPL-CCNC: 12 U/L (ref 10–40)
BASOPHILS # BLD AUTO: 0.02 K/UL (ref 0–0.2)
BASOPHILS NFR BLD: 0.2 % (ref 0–1.9)
BILIRUB SERPL-MCNC: 0.6 MG/DL (ref 0.1–1)
BILIRUB UR QL STRIP: NEGATIVE
BUN SERPL-MCNC: 14 MG/DL (ref 6–20)
CALCIUM SERPL-MCNC: 9.3 MG/DL (ref 8.7–10.5)
CHLORIDE SERPL-SCNC: 104 MMOL/L (ref 95–110)
CLARITY UR: CLEAR
CO2 SERPL-SCNC: 23 MMOL/L (ref 23–29)
COLOR UR: YELLOW
CREAT SERPL-MCNC: 1.4 MG/DL (ref 0.5–1.4)
CREAT SERPL-MCNC: 1.5 MG/DL (ref 0.5–1.4)
DIFFERENTIAL METHOD BLD: ABNORMAL
EOSINOPHIL # BLD AUTO: 0.1 K/UL (ref 0–0.5)
EOSINOPHIL NFR BLD: 0.7 % (ref 0–8)
ERYTHROCYTE [DISTWIDTH] IN BLOOD BY AUTOMATED COUNT: 14.8 % (ref 11.5–14.5)
EST. GFR  (NO RACE VARIABLE): >60 ML/MIN/1.73 M^2
GLUCOSE SERPL-MCNC: 185 MG/DL (ref 70–110)
GLUCOSE UR QL STRIP: NEGATIVE
HCT VFR BLD AUTO: 49.3 % (ref 40–54)
HGB BLD-MCNC: 16.1 G/DL (ref 14–18)
HGB UR QL STRIP: NEGATIVE
IMM GRANULOCYTES # BLD AUTO: 0.06 K/UL (ref 0–0.04)
IMM GRANULOCYTES NFR BLD AUTO: 0.7 % (ref 0–0.5)
KETONES UR QL STRIP: ABNORMAL
LEUKOCYTE ESTERASE UR QL STRIP: NEGATIVE
LIPASE SERPL-CCNC: 5 U/L (ref 4–60)
LYMPHOCYTES # BLD AUTO: 0.8 K/UL (ref 1–4.8)
LYMPHOCYTES NFR BLD: 8.7 % (ref 18–48)
MCH RBC QN AUTO: 27.6 PG (ref 27–31)
MCHC RBC AUTO-ENTMCNC: 32.7 G/DL (ref 32–36)
MCV RBC AUTO: 85 FL (ref 82–98)
MONOCYTES # BLD AUTO: 0.4 K/UL (ref 0.3–1)
MONOCYTES NFR BLD: 4.4 % (ref 4–15)
NEUTROPHILS # BLD AUTO: 7.7 K/UL (ref 1.8–7.7)
NEUTROPHILS NFR BLD: 85.3 % (ref 38–73)
NITRITE UR QL STRIP: NEGATIVE
NRBC BLD-RTO: 0 /100 WBC
PH UR STRIP: 8 [PH] (ref 5–8)
PLATELET # BLD AUTO: 239 K/UL (ref 150–450)
PMV BLD AUTO: 11.2 FL (ref 9.2–12.9)
POTASSIUM SERPL-SCNC: 3.8 MMOL/L (ref 3.5–5.1)
PROT SERPL-MCNC: 7.8 G/DL (ref 6–8.4)
PROT UR QL STRIP: ABNORMAL
RBC # BLD AUTO: 5.83 M/UL (ref 4.6–6.2)
SAMPLE: ABNORMAL
SODIUM SERPL-SCNC: 137 MMOL/L (ref 136–145)
SP GR UR STRIP: >1.03 (ref 1–1.03)
URN SPEC COLLECT METH UR: ABNORMAL
UROBILINOGEN UR STRIP-ACNC: NEGATIVE EU/DL
WBC # BLD AUTO: 8.96 K/UL (ref 3.9–12.7)

## 2024-08-07 PROCEDURE — 96376 TX/PRO/DX INJ SAME DRUG ADON: CPT

## 2024-08-07 PROCEDURE — 80053 COMPREHEN METABOLIC PANEL: CPT | Performed by: EMERGENCY MEDICINE

## 2024-08-07 PROCEDURE — 63600175 PHARM REV CODE 636 W HCPCS: Performed by: EMERGENCY MEDICINE

## 2024-08-07 PROCEDURE — 96361 HYDRATE IV INFUSION ADD-ON: CPT

## 2024-08-07 PROCEDURE — 81003 URINALYSIS AUTO W/O SCOPE: CPT | Performed by: EMERGENCY MEDICINE

## 2024-08-07 PROCEDURE — 83690 ASSAY OF LIPASE: CPT | Performed by: EMERGENCY MEDICINE

## 2024-08-07 PROCEDURE — 25500020 PHARM REV CODE 255: Performed by: EMERGENCY MEDICINE

## 2024-08-07 PROCEDURE — 99285 EMERGENCY DEPT VISIT HI MDM: CPT | Mod: 25

## 2024-08-07 PROCEDURE — 96375 TX/PRO/DX INJ NEW DRUG ADDON: CPT

## 2024-08-07 PROCEDURE — 96374 THER/PROPH/DIAG INJ IV PUSH: CPT

## 2024-08-07 PROCEDURE — 85025 COMPLETE CBC W/AUTO DIFF WBC: CPT | Performed by: EMERGENCY MEDICINE

## 2024-08-07 PROCEDURE — 82565 ASSAY OF CREATININE: CPT

## 2024-08-07 PROCEDURE — 25000003 PHARM REV CODE 250: Performed by: EMERGENCY MEDICINE

## 2024-08-07 RX ORDER — METOCLOPRAMIDE 10 MG/1
10 TABLET ORAL EVERY 6 HOURS
Qty: 30 TABLET | Refills: 0 | Status: SHIPPED | OUTPATIENT
Start: 2024-08-07

## 2024-08-07 RX ORDER — DIPHENHYDRAMINE HYDROCHLORIDE 50 MG/ML
25 INJECTION INTRAMUSCULAR; INTRAVENOUS
Status: COMPLETED | OUTPATIENT
Start: 2024-08-07 | End: 2024-08-07

## 2024-08-07 RX ORDER — METOCLOPRAMIDE HYDROCHLORIDE 5 MG/ML
10 INJECTION INTRAMUSCULAR; INTRAVENOUS
Status: COMPLETED | OUTPATIENT
Start: 2024-08-07 | End: 2024-08-07

## 2024-08-07 RX ORDER — LORAZEPAM 2 MG/ML
1 INJECTION INTRAMUSCULAR
Status: COMPLETED | OUTPATIENT
Start: 2024-08-07 | End: 2024-08-07

## 2024-08-07 RX ORDER — TRAMADOL HYDROCHLORIDE 50 MG/1
50 TABLET ORAL EVERY 8 HOURS PRN
Qty: 21 TABLET | Refills: 0 | Status: SHIPPED | OUTPATIENT
Start: 2024-08-08

## 2024-08-07 RX ADMIN — METOCLOPRAMIDE 10 MG: 5 INJECTION, SOLUTION INTRAMUSCULAR; INTRAVENOUS at 08:08

## 2024-08-07 RX ADMIN — LORAZEPAM 1 MG: 2 INJECTION INTRAMUSCULAR; INTRAVENOUS at 08:08

## 2024-08-07 RX ADMIN — DIPHENHYDRAMINE HYDROCHLORIDE 25 MG: 50 INJECTION INTRAMUSCULAR; INTRAVENOUS at 08:08

## 2024-08-07 RX ADMIN — SODIUM CHLORIDE 1000 ML: 9 INJECTION, SOLUTION INTRAVENOUS at 08:08

## 2024-08-07 RX ADMIN — IOHEXOL 100 ML: 350 INJECTION, SOLUTION INTRAVENOUS at 09:08

## 2024-08-07 RX ADMIN — METOCLOPRAMIDE 10 MG: 5 INJECTION, SOLUTION INTRAMUSCULAR; INTRAVENOUS at 11:08

## 2024-08-07 NOTE — DISCHARGE INSTRUCTIONS
Niall Meehan is trying to get you follow up appointment with GI physician.  Follow up with GI.  Return to ER if any fever, vomiting or any concern

## 2024-08-07 NOTE — PLAN OF CARE
08/07/24 1228   Discharge Reassessment   Assessment Type Discharge Planning Reassessment   Did the patient's condition or plan change since previous assessment? Yes   Discharge Plan A Home with family   Discharge Plan B Home   Post-Acute Status   Hospital Resources/Appts/Education Provided Appointments scheduled and added to AVS     Pt has [facility] appointment scheduled for on 8/20/2024 w/Dr. Howe arrival time 8:30. Please bring ER D/C paperwork, proof of income/insurance, ID, and medication list. If you can't keep this appointment please contact [employer] [phone].     Pt is scheduled with GI/Dr. Katlin Samuel on 12/24/2024 @ 2:30 pm.

## 2024-08-09 NOTE — ED PROVIDER NOTES
Encounter Date: 8/7/2024       History     Chief Complaint   Patient presents with    Vomiting     X 1 week, ecent visit to er. Not any better    Abdominal Pain     Patient presents complaining of vomiting and abdominal pain and discomfort.  Patient has had recent ER visits for the same.  Patient had recent flex sigmoidoscopy by Dr. Victoria.  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  Patient states he has recently again smoked marijuana.  Patient states he smokes about 3-4 joints per day.  He did smoke yesterday.  He denies any hematemesis or rectal bleeding.  At the worst symptoms are mild-to-moderate.      Review of patient's allergies indicates:   Allergen Reactions    Inapsine [droperidol] Anaphylaxis     seizures    Effexor [venlafaxine]      Increased anxiety    Pcn [penicillins]     Bactrim [sulfamethoxazole-trimethoprim] Rash     Dry red rash all over when in the sun    Fluconazole Rash     Pruritis       Past Medical History:   Diagnosis Date    ADHD (attention deficit hyperactivity disorder)     Arthritis     Asthma     as child only    Back pain     Coronary artery disease     Degeneration of lumbar intervertebral disc 05/2016    Depression     Digestive disorder     Elevated PSA     Headache     Hyperlipidemia     Hypertension     Kidney damage     stage 3 ; d/t aleve abuse    Kidney stone     Myocardial infarction     Neck pain     Numbness and tingling in hands     Numbness and tingling of both legs     Osteonecrosis     Bilat Femur    Seizures     from inapsine 2002    Sleep apnea     no cpap    Wears glasses     CONTACS     Past Surgical History:   Procedure Laterality Date    BACK SURGERY  2016    back surgery    BONE GRAFT N/A 11/5/2020    Procedure: BONE GRAFT;  Surgeon: Mateusz Blanco MD;  Location: [facility];  Service: Orthopedics;  Laterality: N/A;    CARDIAC SURGERY  01/06/2020    CABG X 7    CORONARY ARTERY BYPASS GRAFT      7 vessels    FLEXIBLE SIGMOIDOSCOPY  N/A 2024    Procedure: SIGMOIDOSCOPY, FLEXIBLE;  Surgeon: Latesha Victoria MD;  Location: [facility] ENDO;  Service: Endoscopy;  Laterality: N/A;    HERNIA REPAIR Bilateral 2017    LITHOTRIPSY      LUMBAR LAMINECTOMY WITH FUSION Bilateral 2020    Procedure: LAMINECTOMY, SPINE, LUMBAR, WITH FUSION;  Surgeon: Mateusz Blanco MD;  Location: [facility] OR;  Service: Orthopedics;  Laterality: Bilateral;  MEDTRONIC  NTI  L-3    PILONIDAL CYST DRAINAGE      removal of abcess      scrotal    REMOVAL OF HARDWARE FROM SPINE Bilateral 2020    Procedure: REMOVAL, HARDWARE, SPINE;  Surgeon: Mateusz Blanco MD;  Location: [facility] OR;  Service: Orthopedics;  Laterality: Bilateral;  L 4-5    SURGICAL REMOVAL OF PILONIDAL CYST N/A 4/15/2024    Procedure: EXCISION, PILONIDAL CYST;  Surgeon: Jayden Phillips III, MD;  Location: [facility] OR;  Service: General;  Laterality: N/A;  sacral area    TYMPANOSTOMY TUBE PLACEMENT       Family History   Problem Relation Name Age of Onset    Heart disease Mother      Migraines Mother      Hypertension Mother      Early death Father accident     Heart disease Father accident     Diabetes Maternal Aunt      Diabetes Maternal Uncle      Diabetes Maternal Grandfather       Social History     Tobacco Use    Smoking status: Former     Current packs/day: 0.00     Types: Cigarettes     Quit date: 2016     Years since quittin.6     Passive exposure: Past    Smokeless tobacco: Former     Quit date: 2016    Tobacco comments:     occasional   Substance Use Topics    Alcohol use: Yes     Alcohol/week: 1.0 standard drink of alcohol     Types: 1 Glasses of wine per week     Comment: 20 - 30 shots vodka per day or 1-2 1/5ths per day for 2 years (started )    Drug use: No     Review of Systems   All other systems reviewed and are negative.      Physical Exam     Initial Vitals [24 0735]   BP Pulse Resp Temp SpO2   (!) 145/88 99 18 98.6 °F (37 °C) (!) 93 %      MAP       --          Physical Exam    Nursing note and vitals reviewed.  Constitutional: He appears well-developed and well-nourished. He is not diaphoretic. No distress.   Pleasant, polite.   HENT:   Head: Normocephalic and atraumatic.   Eyes: EOM are normal.   Neck: Neck supple.   Normal range of motion.  Cardiovascular:  Normal rate, regular rhythm, normal heart sounds and intact distal pulses.           Pulmonary/Chest: Breath sounds normal. No respiratory distress.   Abdominal: Abdomen is soft.   Musculoskeletal:         General: Normal range of motion.      Cervical back: Normal range of motion and neck supple.     Neurological: He is alert and oriented to person, place, and time. He has normal strength.   Skin: Skin is warm and dry.   Psychiatric: He has a normal mood and affect. His behavior is normal. Judgment and thought content normal.         ED Course   Procedures  Labs Reviewed   CBC W/ AUTO DIFFERENTIAL - Abnormal       Result Value    WBC 8.96      RBC 5.83      Hemoglobin 16.1      Hematocrit 49.3      MCV 85      MCH 27.6      MCHC 32.7      RDW 14.8 (*)     Platelets 239      MPV 11.2      Immature Granulocytes 0.7 (*)     Gran # (ANC) 7.7      Immature Grans (Abs) 0.06 (*)     Lymph # 0.8 (*)     Mono # 0.4      Eos # 0.1      Baso # 0.02      nRBC 0      Gran % 85.3 (*)     Lymph % 8.7 (*)     Mono % 4.4      Eosinophil % 0.7      Basophil % 0.2      Differential Method Automated     COMPREHENSIVE METABOLIC PANEL - Abnormal    Sodium 137      Potassium 3.8      Chloride 104      CO2 23      Glucose 185 (*)     BUN 14      Creatinine 1.4      Calcium 9.3      Total Protein 7.8      Albumin 3.9      Total Bilirubin 0.6      Alkaline Phosphatase 55      AST 12      ALT 12      eGFR >60.0      Anion Gap 10     URINALYSIS, REFLEX TO URINE CULTURE - Abnormal    Specimen UA Urine, Clean Catch      Color, UA Yellow      Appearance, UA Clear      pH, UA 8.0      Specific Gravity, UA >1.030 (*)     Protein, UA Trace (*)      Glucose, UA Negative      Ketones, UA Trace (*)     Bilirubin (UA) Negative      Occult Blood UA Negative      Nitrite, UA Negative      Urobilinogen, UA Negative      Leukocytes, UA Negative      Narrative:     Specimen Source->Urine   ISTAT CREATININE - Abnormal    POC Creatinine 1.5 (*)     Sample VENOUS     LIPASE    Lipase 5            Imaging Results              CT Abdomen Pelvis With IV Contrast NO Oral Contrast (Final result)  Result time 08/07/24 09:43:45      Final result by Fco Kinney MD (08/07/24 09:43:45)                   Impression:      1.  Long segment somewhat diffuse colonic wall thickening with mucosal hyperemia suggestive of mild colitis, without findings of obstruction, intra-abdominal free air or abscess.    2.  Hepatomegaly.      Electronically signed by: Fco Kinney  Date:    08/07/2024  Time:    09:43               Narrative:      CMS MANDATED QUALITY DATA - CT RADIATION - 436    All CT scans at this facility utilize dose modulation, iterative reconstruction, and/or weight based dosing when appropriate to reduce radiation dose to as low as reasonably achievable.    CLINICAL HISTORY:  ([id number] y/o  (1975) M    LLQ abdominal pain;    TECHNIQUE:  (A#[id number], exam time 8/7/2024 9:33)    CT ABDOMEN PELVIS WITH IV CONTRAST [id number]    Axial CT images of the abdomen and pelvis were obtained from the dome of the diaphragm to the proximal thighs.    COMPARISON:  CT from 07/22/2024.    FINDINGS:  Lower Thorax:    The lung bases are clear.  The heart is mildly enlarged in size.  Dense coronary artery calcifications are seen in the RCA distribution.  Median sternotomy wires are present.    CT Abdomen:    Liver: The liver is enlarged measuring 20 cm sagittal right lobe.  There is punctate rounded low-attenuation foci in the liver, the largest of which measures simple fluid attenuation in keeping with simple cysts.    Gallbladder: The gallbladder is within normal  limits.    Biliary Tree: No intra or extrahepatic ductal dilation.    Spleen: Normal.    Pancreas: The pancreas is normal.    Adrenal Glands: Normal    Kidneys: The kidneys are normal in imaging appearance without hydronephrosis or hydroureter.    Vasculature: There are scattered atheromatous mural plaques in the aorta and iliacs with no aneurysm.    Lymph nodes: No abdominal lymphadenopathy is seen.    Intraperitoneal structures: There is no ascites.    Bowel: Mild long segment circumferential colonic wall thickening throughout the colon with mild mucosal hyperemia and submucosal edema, possibly secondary to under distension or mild colitis.  No finding of obstruction, intra-abdominal free air, abscess, or diverticulitis.    Abdominal wall: The abdominal wall and musculature are normal.    Musculoskeletal: No acute osseous abnormalities identified.  Postoperative fixation hardware/changes seen in the lower lumbar spine with levoscoliosis centered transitional lumbosacral vertebral body    CT Pelvis:    Bladder: Normal.    Reproductive Organs: Unremarkable.    Pelvic Lymph nodes: No pelvic lymphadenopathy is identified.                                       Medications   sodium chloride 0.9% bolus 1,000 mL 1,000 mL (0 mLs Intravenous Stopped 8/7/24 1113)   metoclopramide injection 10 mg (10 mg Intravenous Given 8/7/24 0826)   diphenhydrAMINE injection 25 mg (25 mg Intravenous Given 8/7/24 0825)   LORazepam injection 1 mg (1 mg Intravenous Given 8/7/24 0824)   iohexoL (OMNIPAQUE 350) injection 100 mL (100 mLs Intravenous Given 8/7/24 0918)   metoclopramide injection 10 mg (10 mg Intravenous Given 8/7/24 1129)     Medical Decision Making  Patient appears in no acute distress    Considerations include but are not limited to marijuana induced hyperemesis syndrome, laboratory abnormalities such as electrolyte abnormalities, acute kidney injury, dehydration, colitis, diverticulitis    Patient's CT imaging shows no  evidence of acute diverticulitis there is some mild thickening could suggest colitis however this is similar to previous CT scans.  Patient had recent flex sigmoidoscopy without acute findings.  I did discuss the case with Dr. Adams and we both agree patient can seek follow up outpatient with GI.  Patient likely having emesis secondary to marijuana.  Patient was counseled on the importance of decreasing marijuana use and possibly avoiding it altogether.  Patient did get symptomatic improvement with Reglan therapy.  Patient will be discharged in stable condition.  Detailed return precautions discussed.    Amount and/or Complexity of Data Reviewed  Labs: ordered.  Radiology: ordered.    Risk  Prescription drug management.                                      Clinical Impression:  Final diagnoses:  [K52.9] Colitis (Primary)  [R11.10] Hyperemesis          ED Disposition Condition    Discharge Stable          ED Prescriptions       Medication Sig Dispense Start Date End Date Auth. Provider    metoclopramide HCl (REGLAN) 10 MG tablet Take 1 tablet (10 mg total) by mouth every 6 (six) hours. 30 tablet 8/7/2024 -- Obie Bello MD          Follow-up Information       Follow up With Specialties Details Why Contact Info    Riya Vidales NP Family Medicine Schedule an appointment as soon as possible for a visit in 1 week  [address]  [address]  [place] [address]  [phone]      Katlin Samuel MD Gastroenterology Go on 12/24/2024 Pt is scheduled for a 1:30 PM appointment. Please bring D/C paperwork, proof of insurance, ID, and medication list. In the event you can't keep this appointment please contact the office at [phone] [address]  [address]  [facility] [address] [phone]      [facility]  Go on 8/20/2024 Pt has [employer] [facility] appointment scheduled for on 8/20/2024 w/Dr. Howe arrival time 8:30. Please bring ER D/C paperwork, proof of  income/insurance, ID, and medication list. If you can't keep this appointment please contact [facility] [phone]. [address] [facility] [address]  [phone]               Obie Bello MD  08/09/24 4411

## 2024-08-12 ENCOUNTER — OFFICE VISIT (OUTPATIENT)
Dept: ORTHOPEDICS | Facility: CLINIC | Age: 49
End: 2024-08-12
Payer: MEDICAID

## 2024-08-12 ENCOUNTER — OFFICE VISIT (OUTPATIENT)
Dept: FAMILY MEDICINE | Facility: CLINIC | Age: 49
End: 2024-08-12
Payer: MEDICAID

## 2024-08-12 VITALS — BODY MASS INDEX: 31.73 KG/M2 | HEIGHT: 67 IN | WEIGHT: 202.19 LBS

## 2024-08-12 VITALS
OXYGEN SATURATION: 95 % | HEIGHT: 67 IN | WEIGHT: 202.25 LBS | HEART RATE: 93 BPM | DIASTOLIC BLOOD PRESSURE: 68 MMHG | BODY MASS INDEX: 31.74 KG/M2 | SYSTOLIC BLOOD PRESSURE: 118 MMHG

## 2024-08-12 DIAGNOSIS — M87.052 AVASCULAR NECROSIS OF BONES OF BOTH HIPS: Primary | ICD-10-CM

## 2024-08-12 DIAGNOSIS — K52.9 COLITIS: Primary | ICD-10-CM

## 2024-08-12 DIAGNOSIS — M87.00 AVN (AVASCULAR NECROSIS OF BONE): ICD-10-CM

## 2024-08-12 DIAGNOSIS — M87.00 AVASCULAR NECROSIS: ICD-10-CM

## 2024-08-12 DIAGNOSIS — E78.5 HYPERLIPIDEMIA, UNSPECIFIED HYPERLIPIDEMIA TYPE: ICD-10-CM

## 2024-08-12 DIAGNOSIS — I25.810 CORONARY ARTERY DISEASE INVOLVING CORONARY BYPASS GRAFT OF NATIVE HEART WITHOUT ANGINA PECTORIS: ICD-10-CM

## 2024-08-12 DIAGNOSIS — K21.9 GASTROESOPHAGEAL REFLUX DISEASE, UNSPECIFIED WHETHER ESOPHAGITIS PRESENT: ICD-10-CM

## 2024-08-12 DIAGNOSIS — I10 ESSENTIAL HYPERTENSION: ICD-10-CM

## 2024-08-12 DIAGNOSIS — M87.051 AVASCULAR NECROSIS OF BONES OF BOTH HIPS: Primary | ICD-10-CM

## 2024-08-12 DIAGNOSIS — F41.1 GAD (GENERALIZED ANXIETY DISORDER): ICD-10-CM

## 2024-08-12 DIAGNOSIS — M25.552 BILATERAL HIP PAIN: ICD-10-CM

## 2024-08-12 DIAGNOSIS — M25.551 BILATERAL HIP PAIN: ICD-10-CM

## 2024-08-12 DIAGNOSIS — I50.9 CONGESTIVE HEART FAILURE, UNSPECIFIED HF CHRONICITY, UNSPECIFIED HEART FAILURE TYPE: ICD-10-CM

## 2024-08-12 PROCEDURE — 1160F RVW MEDS BY RX/DR IN RCRD: CPT | Mod: CPTII,S$GLB,, | Performed by: NURSE PRACTITIONER

## 2024-08-12 PROCEDURE — 4010F ACE/ARB THERAPY RXD/TAKEN: CPT | Mod: CPTII,,, | Performed by: ORTHOPAEDIC SURGERY

## 2024-08-12 PROCEDURE — 1159F MED LIST DOCD IN RCRD: CPT | Mod: CPTII,,, | Performed by: ORTHOPAEDIC SURGERY

## 2024-08-12 PROCEDURE — 1159F MED LIST DOCD IN RCRD: CPT | Mod: CPTII,S$GLB,, | Performed by: NURSE PRACTITIONER

## 2024-08-12 PROCEDURE — 3044F HG A1C LEVEL LT 7.0%: CPT | Mod: CPTII,S$GLB,, | Performed by: NURSE PRACTITIONER

## 2024-08-12 PROCEDURE — 99999 PR PBB SHADOW E&M-EST. PATIENT-LVL III: CPT | Mod: PBBFAC,,, | Performed by: ORTHOPAEDIC SURGERY

## 2024-08-12 PROCEDURE — 3008F BODY MASS INDEX DOCD: CPT | Mod: CPTII,S$GLB,, | Performed by: NURSE PRACTITIONER

## 2024-08-12 PROCEDURE — 99215 OFFICE O/P EST HI 40 MIN: CPT | Mod: S$GLB,,, | Performed by: NURSE PRACTITIONER

## 2024-08-12 PROCEDURE — 3078F DIAST BP <80 MM HG: CPT | Mod: CPTII,S$GLB,, | Performed by: NURSE PRACTITIONER

## 2024-08-12 PROCEDURE — 99213 OFFICE O/P EST LOW 20 MIN: CPT | Mod: S$PBB,,, | Performed by: ORTHOPAEDIC SURGERY

## 2024-08-12 PROCEDURE — 3008F BODY MASS INDEX DOCD: CPT | Mod: CPTII,,, | Performed by: ORTHOPAEDIC SURGERY

## 2024-08-12 PROCEDURE — 1160F RVW MEDS BY RX/DR IN RCRD: CPT | Mod: CPTII,,, | Performed by: ORTHOPAEDIC SURGERY

## 2024-08-12 PROCEDURE — 3074F SYST BP LT 130 MM HG: CPT | Mod: CPTII,S$GLB,, | Performed by: NURSE PRACTITIONER

## 2024-08-12 PROCEDURE — 4010F ACE/ARB THERAPY RXD/TAKEN: CPT | Mod: CPTII,S$GLB,, | Performed by: NURSE PRACTITIONER

## 2024-08-12 PROCEDURE — 3044F HG A1C LEVEL LT 7.0%: CPT | Mod: CPTII,,, | Performed by: ORTHOPAEDIC SURGERY

## 2024-08-12 PROCEDURE — 99213 OFFICE O/P EST LOW 20 MIN: CPT | Mod: PBBFAC,PN | Performed by: ORTHOPAEDIC SURGERY

## 2024-08-12 RX ORDER — ESCITALOPRAM OXALATE 20 MG/1
20 TABLET ORAL DAILY
Qty: 90 TABLET | Refills: 1 | Status: SHIPPED | OUTPATIENT
Start: 2024-08-12

## 2024-08-12 RX ORDER — PANTOPRAZOLE SODIUM 40 MG/1
40 TABLET, DELAYED RELEASE ORAL DAILY
Qty: 90 TABLET | Refills: 3 | Status: SHIPPED | OUTPATIENT
Start: 2024-08-12 | End: 2025-08-12

## 2024-08-12 RX ORDER — HYDROCODONE BITARTRATE AND ACETAMINOPHEN 5; 325 MG/1; MG/1
1 TABLET ORAL EVERY 6 HOURS PRN
Qty: 28 TABLET | Refills: 0 | Status: SHIPPED | OUTPATIENT
Start: 2024-08-12 | End: 2024-08-16 | Stop reason: SDUPTHER

## 2024-08-12 RX ORDER — LORAZEPAM 0.5 MG/1
0.5 TABLET ORAL EVERY 12 HOURS PRN
Qty: 45 TABLET | Refills: 0 | Status: SHIPPED | OUTPATIENT
Start: 2024-08-12 | End: 2024-09-11

## 2024-08-12 NOTE — PROGRESS NOTES
SUBJECTIVE:    Patient ID: Lee Quintanilla is a 48 y.o. male.    Chief Complaint: Hospital Follow Up (Bottles brought//no refills//-ERL)    48 year old presents for check up/hospital follow up. He is followed regularly for anxiety, hyperlipidemia, cad, avascular necrosis, htn, cad, back pain. He was admitted on 7/22 for acute colitis with failed outpt treatment and intractable N/V.He was started on levaquin and flagyl. N/V has improved with a few episodes of retching but limited vomit. He has developed loose stools. He reports continued LLQ pain. CT abd/pelvis: Mild wall thickening of the colon, slightly increased when compared to July 12th c/w acute mild colitis. C.diff screen was negative. He underwent a flex sigmoidoscopy on 7/24 with no acute findings and was cleared for discharge by GI with close outpt follow up for GI pathogen panel and biopsies. He is recommended a high fiber diet and fiber supplements and imodium PRN for diarrhea.patient returned to ed with similar symptoms 2 x since initial admission.  .  Patient had recent flex sigmoidoscopy by Dr. Victorai.  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  Started on reglan. Today is reporting some improvement.   Concerned about anxiety. Plans to schedule with psych but has not been able to get appointment. Worries a lot. Not drinking alcohol is smoking marijuana daily. Scheduled to see dr. Blanco today. Bilateral hip pain. Diagnosed with avascular necrosis  35lb weight loss since march. Was taking ozempic. Has since stopped      Admission on 08/07/2024, Discharged on 08/07/2024   Component Date Value Ref Range Status    WBC 08/07/2024 8.96  3.90 - 12.70 K/uL Final    RBC 08/07/2024 5.83  4.60 - 6.20 M/uL Final    Hemoglobin 08/07/2024 16.1  14.0 - 18.0 g/dL Final    Hematocrit 08/07/2024 49.3  40.0 - 54.0 % Final    MCV 08/07/2024 85  82 - 98 fL Final    MCH 08/07/2024 27.6  27.0 - 31.0 pg Final    MCHC 08/07/2024  32.7  32.0 - 36.0 g/dL Final    RDW 08/07/2024 14.8 (H)  11.5 - 14.5 % Final    Platelets 08/07/2024 239  150 - 450 K/uL Final    MPV 08/07/2024 11.2  9.2 - 12.9 fL Final    Immature Granulocytes 08/07/2024 0.7 (H)  0.0 - 0.5 % Final    Gran # (ANC) 08/07/2024 7.7  1.8 - 7.7 K/uL Final    Immature Grans (Abs) 08/07/2024 0.06 (H)  0.00 - 0.04 K/uL Final    Lymph # 08/07/2024 0.8 (L)  1.0 - 4.8 K/uL Final    Mono # 08/07/2024 0.4  0.3 - 1.0 K/uL Final    Eos # 08/07/2024 0.1  0.0 - 0.5 K/uL Final    Baso # 08/07/2024 0.02  0.00 - 0.20 K/uL Final    nRBC 08/07/2024 0  0 /100 WBC Final    Gran % 08/07/2024 85.3 (H)  38.0 - 73.0 % Final    Lymph % 08/07/2024 8.7 (L)  18.0 - 48.0 % Final    Mono % 08/07/2024 4.4  4.0 - 15.0 % Final    Eosinophil % 08/07/2024 0.7  0.0 - 8.0 % Final    Basophil % 08/07/2024 0.2  0.0 - 1.9 % Final    Differential Method 08/07/2024 Automated   Final    Sodium 08/07/2024 137  136 - 145 mmol/L Final    Potassium 08/07/2024 3.8  3.5 - 5.1 mmol/L Final    Chloride 08/07/2024 104  95 - 110 mmol/L Final    CO2 08/07/2024 23  23 - 29 mmol/L Final    Glucose 08/07/2024 185 (H)  70 - 110 mg/dL Final    BUN 08/07/2024 14  6 - 20 mg/dL Final    Creatinine 08/07/2024 1.4  0.5 - 1.4 mg/dL Final    Calcium 08/07/2024 9.3  8.7 - 10.5 mg/dL Final    Total Protein 08/07/2024 7.8  6.0 - 8.4 g/dL Final    Albumin 08/07/2024 3.9  3.5 - 5.2 g/dL Final    Total Bilirubin 08/07/2024 0.6  0.1 - 1.0 mg/dL Final    Alkaline Phosphatase 08/07/2024 55  55 - 135 U/L Final    AST 08/07/2024 12  10 - 40 U/L Final    ALT 08/07/2024 12  10 - 44 U/L Final    eGFR 08/07/2024 >60.0  >60 mL/min/1.73 m^2 Final    Anion Gap 08/07/2024 10  8 - 16 mmol/L Final    Lipase 08/07/2024 5  4 - 60 U/L Final    Specimen UA 08/07/2024 Urine, Clean Catch   Final    Color, UA 08/07/2024 Yellow  Yellow, Straw, Sasha Final    Appearance, UA 08/07/2024 Clear  Clear Final    pH, UA 08/07/2024 8.0  5.0 - 8.0  Final    Specific Gravity, UA 08/07/2024 >1.030 (A)  1.005 - 1.030 Final    Protein, UA 08/07/2024 Trace (A)  Negative Final    Glucose, UA 08/07/2024 Negative  Negative Final    Ketones, UA 08/07/2024 Trace (A)  Negative Final    Bilirubin (UA) 08/07/2024 Negative  Negative Final    Occult Blood UA 08/07/2024 Negative  Negative Final    Nitrite, UA 08/07/2024 Negative  Negative Final    Urobilinogen, UA 08/07/2024 Negative  Negative EU/dL Final    Leukocytes, UA 08/07/2024 Negative  Negative Final    POC Creatinine 08/07/2024 1.5 (H)  0.5 - 1.4 mg/dL Final    Sample 08/07/2024 VENOUS   Final   Admission on 07/29/2024, Discharged on 07/29/2024   Component Date Value Ref Range Status    WBC 07/29/2024 9.99  3.90 - 12.70 K/uL Final    RBC 07/29/2024 6.46 (H)  4.60 - 6.20 M/uL Final    Hemoglobin 07/29/2024 18.2 (H)  14.0 - 18.0 g/dL Final    Hematocrit 07/29/2024 54.7 (H)  40.0 - 54.0 % Final    MCV 07/29/2024 85  82 - 98 fL Final    MCH 07/29/2024 28.2  27.0 - 31.0 pg Final    MCHC 07/29/2024 33.3  32.0 - 36.0 g/dL Final    RDW 07/29/2024 15.6 (H)  11.5 - 14.5 % Final    Platelets 07/29/2024 254  150 - 450 K/uL Final    MPV 07/29/2024 11.6  9.2 - 12.9 fL Final    Immature Granulocytes 07/29/2024 0.4  0.0 - 0.5 % Final    Gran # (ANC) 07/29/2024 7.9 (H)  1.8 - 7.7 K/uL Final    Immature Grans (Abs) 07/29/2024 0.04  0.00 - 0.04 K/uL Final    Lymph # 07/29/2024 1.3  1.0 - 4.8 K/uL Final    Mono # 07/29/2024 0.5  0.3 - 1.0 K/uL Final    Eos # 07/29/2024 0.2  0.0 - 0.5 K/uL Final    Baso # 07/29/2024 0.05  0.00 - 0.20 K/uL Final    nRBC 07/29/2024 0  0 /100 WBC Final    Gran % 07/29/2024 79.0 (H)  38.0 - 73.0 % Final    Lymph % 07/29/2024 12.9 (L)  18.0 - 48.0 % Final    Mono % 07/29/2024 4.8  4.0 - 15.0 % Final    Eosinophil % 07/29/2024 2.4  0.0 - 8.0 % Final    Basophil % 07/29/2024 0.5  0.0 - 1.9 % Final    Differential Method 07/29/2024 Automated   Final    Sodium 07/29/2024 137   136 - 145 mmol/L Final    Potassium 07/29/2024 3.7  3.5 - 5.1 mmol/L Final    Chloride 07/29/2024 104  95 - 110 mmol/L Final    CO2 07/29/2024 20 (L)  23 - 29 mmol/L Final    Glucose 07/29/2024 168 (H)  70 - 110 mg/dL Final    BUN 07/29/2024 14  6 - 20 mg/dL Final    Creatinine 07/29/2024 1.5 (H)  0.5 - 1.4 mg/dL Final    Calcium 07/29/2024 9.8  8.7 - 10.5 mg/dL Final    Total Protein 07/29/2024 8.3  6.0 - 8.4 g/dL Final    Albumin 07/29/2024 4.2  3.5 - 5.2 g/dL Final    Total Bilirubin 07/29/2024 0.6  0.1 - 1.0 mg/dL Final    Alkaline Phosphatase 07/29/2024 62  55 - 135 U/L Final    AST 07/29/2024 14  10 - 40 U/L Final    ALT 07/29/2024 16  10 - 44 U/L Final    eGFR 07/29/2024 57.1 (A)  >60 mL/min/1.73 m^2 Final    Anion Gap 07/29/2024 13  8 - 16 mmol/L Final    Specimen UA 07/29/2024 Urine, Clean Catch   Final    Color, UA 07/29/2024 Yellow  Yellow, Straw, Sasha Final    Appearance, UA 07/29/2024 Clear  Clear Final    pH, UA 07/29/2024 8.0  5.0 - 8.0 Final    Specific Gravity, UA 07/29/2024 1.010  1.005 - 1.030 Final    Protein, UA 07/29/2024 Negative  Negative Final    Glucose, UA 07/29/2024 Negative  Negative Final    Ketones, UA 07/29/2024 Negative  Negative Final    Bilirubin (UA) 07/29/2024 Negative  Negative Final    Occult Blood UA 07/29/2024 Negative  Negative Final    Nitrite, UA 07/29/2024 Negative  Negative Final    Urobilinogen, UA 07/29/2024 Negative  Negative EU/dL Final    Leukocytes, UA 07/29/2024 Negative  Negative Final    Lipase 07/29/2024 6  4 - 60 U/L Final    SARS-CoV-2 RNA, Amplification, Qual 07/29/2024 Negative  Negative Final    Magnesium 07/29/2024 1.8  1.6 - 2.6 mg/dL Final    QRS Duration 07/29/2024 98  ms Final    [facility] QTC Calculation 07/29/2024 435  ms Final    Troponin I High Sensitivity 07/29/2024 5.0  0.0 - 14.9 pg/mL Final    BNP 07/29/2024 32  0 - 99 pg/mL Final    Influenza A, Molecular 07/29/2024 Negative  Negative Final    Influenza B,  Molecular 07/29/2024 Negative  Negative Final    Flu A & B Source 07/29/2024 Nasal swab   Final    Alcohol, Serum 07/29/2024 <10  <10 mg/dL Final    Troponin I High Sensitivity 07/29/2024 6.2  0.0 - 14.9 pg/mL Final    Blood Culture, Routine 07/29/2024 No growth after 5 days.   Final    Blood Culture, Routine 07/29/2024 No growth after 5 days.   Final    POC Lactate 07/29/2024 0.75  0.5 - 2.2 mmol/L Final    Sample 07/29/2024 VENOUS   Final   Admission on 07/22/2024, Discharged on 07/24/2024   Component Date Value Ref Range Status    WBC 07/22/2024 9.36  3.90 - 12.70 K/uL Final    RBC 07/22/2024 6.09  4.60 - 6.20 M/uL Final    Hemoglobin 07/22/2024 17.0  14.0 - 18.0 g/dL Final    Hematocrit 07/22/2024 52.5  40.0 - 54.0 % Final    MCV 07/22/2024 86  82 - 98 fL Final    MCH 07/22/2024 27.9  27.0 - 31.0 pg Final    MCHC 07/22/2024 32.4  32.0 - 36.0 g/dL Final    RDW 07/22/2024 15.0 (H)  11.5 - 14.5 % Final    Platelets 07/22/2024 255  150 - 450 K/uL Final    MPV 07/22/2024 11.8  9.2 - 12.9 fL Final    Immature Granulocytes 07/22/2024 0.3  0.0 - 0.5 % Final    Gran # (ANC) 07/22/2024 8.1 (H)  1.8 - 7.7 K/uL Final    Immature Grans (Abs) 07/22/2024 0.03  0.00 - 0.04 K/uL Final    Lymph # 07/22/2024 0.7 (L)  1.0 - 4.8 K/uL Final    Mono # 07/22/2024 0.4  0.3 - 1.0 K/uL Final    Eos # 07/22/2024 0.1  0.0 - 0.5 K/uL Final    Baso # 07/22/2024 0.04  0.00 - 0.20 K/uL Final    nRBC 07/22/2024 0  0 /100 WBC Final    Gran % 07/22/2024 86.4 (H)  38.0 - 73.0 % Final    Lymph % 07/22/2024 7.2 (L)  18.0 - 48.0 % Final    Mono % 07/22/2024 4.3  4.0 - 15.0 % Final    Eosinophil % 07/22/2024 1.4  0.0 - 8.0 % Final    Basophil % 07/22/2024 0.4  0.0 - 1.9 % Final    Differential Method 07/22/2024 Automated   Final    Sodium 07/22/2024 137  136 - 145 mmol/L Final    Potassium 07/22/2024 4.0  3.5 - 5.1 mmol/L Final    Chloride 07/22/2024 105  95 - 110 mmol/L Final    CO2 07/22/2024 23  23 - 29 mmol/L  Final    Glucose 07/22/2024 155 (H)  70 - 110 mg/dL Final    BUN 07/22/2024 13  6 - 20 mg/dL Final    Creatinine 07/22/2024 1.6 (H)  0.5 - 1.4 mg/dL Final    Calcium 07/22/2024 9.4  8.7 - 10.5 mg/dL Final    Total Protein 07/22/2024 8.1  6.0 - 8.4 g/dL Final    Albumin 07/22/2024 4.2  3.5 - 5.2 g/dL Final    Total Bilirubin 07/22/2024 0.6  0.1 - 1.0 mg/dL Final    Alkaline Phosphatase 07/22/2024 63  55 - 135 U/L Final    AST 07/22/2024 13  10 - 40 U/L Final    ALT 07/22/2024 13  10 - 44 U/L Final    eGFR 07/22/2024 52.8 (A)  >60 mL/min/1.73 m^2 Final    Anion Gap 07/22/2024 9  8 - 16 mmol/L Final    Specimen UA 07/22/2024 Urine, Clean Catch   Final    Color, UA 07/22/2024 Yellow  Yellow, Straw, Sasha Final    Appearance, UA 07/22/2024 Clear  Clear Final    pH, UA 07/22/2024 8.0  5.0 - 8.0 Final    Specific [place], UA 07/22/2024 1.015  1.005 - 1.030 Final    Protein, UA 07/22/2024 Trace (A)  Negative Final    Glucose, UA 07/22/2024 Negative  Negative Final    Ketones, UA 07/22/2024 Negative  Negative Final    Bilirubin (UA) 07/22/2024 Negative  Negative Final    Occult Blood UA 07/22/2024 Negative  Negative Final    Nitrite, UA 07/22/2024 Negative  Negative Final    Urobilinogen, UA 07/22/2024 Negative  Negative EU/dL Final    Leukocytes, UA 07/22/2024 Negative  Negative Final    Lipase 07/22/2024 15  4 - 60 U/L Final    Magnesium 07/22/2024 1.9  1.6 - 2.6 mg/dL Final    Occult Blood 07/23/2024 Negative  Negative Final    Stool Culture 07/23/2024 No Salmonella,Shigella,Vibrio,Campylobacter.   Final    Stool Culture 07/23/2024 No E coli 0157:H7 isolated.   Final    Stool Culture 07/23/2024 No enteric tony   Final    Stool WBC 07/23/2024 No neutrophils seen  No neutrophils seen Final    QRS Duration 07/22/2024 88  ms Final    [facility] QTC Calculation 07/22/2024 427  ms Final    POC Creatinine 07/22/2024 1.7 (H)  0.5 - 1.4 mg/dL Final    Sample 07/22/2024 VENOUS   Final    Troponin I  High Sensitivity 07/22/2024 4.0  0.0 - 14.9 pg/mL Final    BNP 07/22/2024 20  0 - 99 pg/mL Final    Sodium 07/23/2024 140  136 - 145 mmol/L Final    Potassium 07/23/2024 4.5  3.5 - 5.1 mmol/L Final    Chloride 07/23/2024 104  95 - 110 mmol/L Final    CO2 07/23/2024 29  23 - 29 mmol/L Final    Glucose 07/23/2024 74  70 - 110 mg/dL Final    BUN 07/23/2024 12  6 - 20 mg/dL Final    Creatinine 07/23/2024 1.5 (H)  0.5 - 1.4 mg/dL Final    Calcium 07/23/2024 9.5  8.7 - 10.5 mg/dL Final    Total Protein 07/23/2024 7.9  6.0 - 8.4 g/dL Final    Albumin 07/23/2024 4.1  3.5 - 5.2 g/dL Final    Total Bilirubin 07/23/2024 0.7  0.1 - 1.0 mg/dL Final    Alkaline Phosphatase 07/23/2024 65  55 - 135 U/L Final    AST 07/23/2024 15  10 - 40 U/L Final    ALT 07/23/2024 13  10 - 44 U/L Final    eGFR 07/23/2024 57.1 (A)  >60 mL/min/1.73 m^2 Final    Anion Gap 07/23/2024 7 (L)  8 - 16 mmol/L Final    Magnesium 07/23/2024 1.9  1.6 - 2.6 mg/dL Final    WBC 07/23/2024 6.37  3.90 - 12.70 K/uL Final    RBC 07/23/2024 6.17  4.60 - 6.20 M/uL Final    Hemoglobin 07/23/2024 17.3  14.0 - 18.0 g/dL Final    Hematocrit 07/23/2024 54.1 (H)  40.0 - 54.0 % Final    MCV 07/23/2024 88  82 - 98 fL Final    MCH 07/23/2024 28.0  27.0 - 31.0 pg Final    MCHC 07/23/2024 32.0  32.0 - 36.0 g/dL Final    RDW 07/23/2024 15.0 (H)  11.5 - 14.5 % Final    Platelets 07/23/2024 203  150 - 450 K/uL Final    MPV 07/23/2024 11.5  9.2 - 12.9 fL Final    Immature Granulocytes 07/23/2024 0.3  0.0 - 0.5 % Final    Gran # (ANC) 07/23/2024 4.8  1.8 - 7.7 K/uL Final    Immature Grans (Abs) 07/23/2024 0.02  0.00 - 0.04 K/uL Final    Lymph # 07/23/2024 0.7 (L)  1.0 - 4.8 K/uL Final    Mono # 07/23/2024 0.5  0.3 - 1.0 K/uL Final    Eos # 07/23/2024 0.2  0.0 - 0.5 K/uL Final    Baso # 07/23/2024 0.04  0.00 - 0.20 K/uL Final    nRBC 07/23/2024 0  0 /100 WBC Final    Gran % 07/23/2024 76.0 (H)  38.0 - 73.0 % Final    Lymph % 07/23/2024 10.8 (L)   18.0 - 48.0 % Final    Mono % 07/23/2024 8.5  4.0 - 15.0 % Final    Eosinophil % 07/23/2024 3.8  0.0 - 8.0 % Final    Basophil % 07/23/2024 0.6  0.0 - 1.9 % Final    Differential Method 07/23/2024 Automated   Final    C. diff Antigen 07/23/2024 Negative  Negative Final    C difficile Toxins A+B, EIA 07/23/2024 Negative  Negative Final    Sodium 07/24/2024 138  136 - 145 mmol/L Final    Potassium 07/24/2024 4.3  3.5 - 5.1 mmol/L Final    Chloride 07/24/2024 104  95 - 110 mmol/L Final    CO2 07/24/2024 28  23 - 29 mmol/L Final    Glucose 07/24/2024 107  70 - 110 mg/dL Final    BUN 07/24/2024 14  6 - 20 mg/dL Final    Creatinine 07/24/2024 1.6 (H)  0.5 - 1.4 mg/dL Final    Calcium 07/24/2024 9.3  8.7 - 10.5 mg/dL Final    Total Protein 07/24/2024 7.5  6.0 - 8.4 g/dL Final    Albumin 07/24/2024 4.0  3.5 - 5.2 g/dL Final    Total Bilirubin 07/24/2024 0.7  0.1 - 1.0 mg/dL Final    Alkaline Phosphatase 07/24/2024 58  55 - 135 U/L Final    AST 07/24/2024 12  10 - 40 U/L Final    ALT 07/24/2024 13  10 - 44 U/L Final    eGFR 07/24/2024 52.8 (A)  >60 mL/min/1.73 m^2 Final    Anion Gap 07/24/2024 6 (L)  8 - 16 mmol/L Final    Magnesium 07/24/2024 1.9  1.6 - 2.6 mg/dL Final    WBC 07/24/2024 7.01  3.90 - 12.70 K/uL Final    RBC 07/24/2024 5.79  4.60 - 6.20 M/uL Final    Hemoglobin 07/24/2024 16.0  14.0 - 18.0 g/dL Final    Hematocrit 07/24/2024 50.0  40.0 - 54.0 % Final    MCV 07/24/2024 86  82 - 98 fL Final    MCH 07/24/2024 27.6  27.0 - 31.0 pg Final    MCHC 07/24/2024 32.0  32.0 - 36.0 g/dL Final    RDW 07/24/2024 14.8 (H)  11.5 - 14.5 % Final    Platelets 07/24/2024 202  150 - 450 K/uL Final    MPV 07/24/2024 11.1  9.2 - 12.9 fL Final    Immature Granulocytes 07/24/2024 0.3  0.0 - 0.5 % Final    Gran # (ANC) 07/24/2024 5.7  1.8 - 7.7 K/uL Final    Immature Grans (Abs) 07/24/2024 0.02  0.00 - 0.04 K/uL Final    Lymph # 07/24/2024 0.6 (L)  1.0 - 4.8 K/uL Final    Mono # 07/24/2024 0.5  0.3  - 1.0 K/uL Final    Eos # 07/24/2024 0.2  0.0 - 0.5 K/uL Final    Baso # 07/24/2024 0.03  0.00 - 0.20 K/uL Final    nRBC 07/24/2024 0  0 /100 WBC Final    Gran % 07/24/2024 81.4 (H)  38.0 - 73.0 % Final    Lymph % 07/24/2024 8.4 (L)  18.0 - 48.0 % Final    Mono % 07/24/2024 6.6  4.0 - 15.0 % Final    Eosinophil % 07/24/2024 2.9  0.0 - 8.0 % Final    Basophil % 07/24/2024 0.4  0.0 - 1.9 % Final    Differential Method 07/24/2024 Automated   Final    [facility] Miscellaneous Test 07/23/2024 COMMENT   Final    [facility] Test Code: 07/23/2024 [id number]   Final    [facility] Test Name: 07/23/2024 O+P Exam, Formalin Only   Final   Admission on 07/12/2024, Discharged on 07/13/2024   Component Date Value Ref Range Status    WBC 07/12/2024 14.92 (H)  3.90 - 12.70 K/uL Final    RBC 07/12/2024 6.11  4.60 - 6.20 M/uL Final    Hemoglobin 07/12/2024 17.1  14.0 - 18.0 g/dL Final    Hematocrit 07/12/2024 51.6  40.0 - 54.0 % Final    MCV 07/12/2024 85  82 - 98 fL Final    MCH 07/12/2024 28.0  27.0 - 31.0 pg Final    MCHC 07/12/2024 33.1  32.0 - 36.0 g/dL Final    RDW 07/12/2024 15.4 (H)  11.5 - 14.5 % Final    Platelets 07/12/2024 270  150 - 450 K/uL Final    MPV 07/12/2024 11.3  9.2 - 12.9 fL Final    Immature Granulocytes 07/12/2024 0.5  0.0 - 0.5 % Final    Gran # (ANC) 07/12/2024 13.3 (H)  1.8 - 7.7 K/uL Final    Immature Grans (Abs) 07/12/2024 0.08 (H)  0.00 - 0.04 K/uL Final    Lymph # 07/12/2024 0.8 (L)  1.0 - 4.8 K/uL Final    Mono # 07/12/2024 0.7  0.3 - 1.0 K/uL Final    Eos # 07/12/2024 0.0  0.0 - 0.5 K/uL Final    Baso # 07/12/2024 0.04  0.00 - 0.20 K/uL Final    nRBC 07/12/2024 0  0 /100 WBC Final    Gran % 07/12/2024 88.8 (H)  38.0 - 73.0 % Final    Lymph % 07/12/2024 5.2 (L)  18.0 - 48.0 % Final    Mono % 07/12/2024 5.0  4.0 - 15.0 % Final    Eosinophil % 07/12/2024 0.2  0.0 - 8.0 % Final    Basophil % 07/12/2024 0.3  0.0 - 1.9 % Final    Differential Method 07/12/2024 Automated   Final     Sodium 07/12/2024 137  136 - 145 mmol/L Final    Potassium 07/12/2024 3.6  3.5 - 5.1 mmol/L Final    Chloride 07/12/2024 101  95 - 110 mmol/L Final    CO2 07/12/2024 22 (L)  23 - 29 mmol/L Final    Glucose 07/12/2024 114 (H)  70 - 110 mg/dL Final    BUN 07/12/2024 13  6 - 20 mg/dL Final    Creatinine 07/12/2024 1.6 (H)  0.5 - 1.4 mg/dL Final    Calcium 07/12/2024 10.3  8.7 - 10.5 mg/dL Final    Total Protein 07/12/2024 8.6 (H)  6.0 - 8.4 g/dL Final    Albumin 07/12/2024 4.7  3.5 - 5.2 g/dL Final    Total Bilirubin 07/12/2024 1.0  0.1 - 1.0 mg/dL Final    Alkaline Phosphatase 07/12/2024 76  55 - 135 U/L Final    AST 07/12/2024 14  10 - 40 U/L Final    ALT 07/12/2024 15  10 - 44 U/L Final    eGFR 07/12/2024 52.8 (A)  >60 mL/min/1.73 m^2 Final    Anion Gap 07/12/2024 14  8 - 16 mmol/L Final    Lipase 07/12/2024 4  4 - 60 U/L Final    Specimen UA 07/12/2024 Urine, Clean Catch   Final    Color, UA 07/12/2024 Yellow  Yellow, Straw, Sasha Final    Appearance, UA 07/12/2024 Clear  Clear Final    pH, UA 07/12/2024 6.0  5.0 - 8.0 Final    Specific [place], UA 07/12/2024 >1.030 (A)  1.005 - 1.030 Final    Protein, UA 07/12/2024 2+ (A)  Negative Final    Glucose, UA 07/12/2024 Negative  Negative Final    Ketones, UA 07/12/2024 2+ (A)  Negative Final    Bilirubin (UA) 07/12/2024 1+ (A)  Negative Final    Occult Blood UA 07/12/2024 TRACE  Negative Final    Nitrite, UA 07/12/2024 Negative  Negative Final    Urobilinogen, UA 07/12/2024 2.0-3.0 (A)  Negative EU/dL Final    Leukocytes, UA 07/12/2024 Negative  Negative Final    RBC, UA 07/12/2024 3  0 - 4 /hpf Final    WBC, UA 07/12/2024 6 (H)  0 - 5 /hpf Final    Bacteria 07/12/2024 Rare  None-Occ /hpf Final    Squam Epithel, UA 07/12/2024 0  /hpf Final    Hyaline Casts, UA 07/12/2024 0  0-1/lpf /lpf Final    Microscopic Comment 07/12/2024 SEE COMMENT   Final   Hospital Outpatient Visit on 06/21/2024   Component Date Value Ref Range Status    POC  Creatinine 06/21/2024 1.8 (H)  0.5 - 1.4 mg/dL Final    Sample 06/21/2024 unknown   Final   Lab Visit on 06/15/2024   Component Date Value Ref Range Status    Testosterone 06/15/2024 289  264 - 916 ng/dL Final    Testosterone, Free 06/15/2024 5.1 (L)  6.8 - 21.5 pg/mL Final    Estrogen 06/15/2024 150  56 - 213 pg/mL Final    Estradiol 06/15/2024 41.9  7.6 - 42.6 pg/mL Final    WBC 06/15/2024 7.58  3.90 - 12.70 K/uL Final    RBC 06/15/2024 5.75  4.60 - 6.20 M/uL Final    Hemoglobin 06/15/2024 15.9  14.0 - 18.0 g/dL Final    Hematocrit 06/15/2024 50.2  40.0 - 54.0 % Final    MCV 06/15/2024 87  82 - 98 fL Final    MCH 06/15/2024 27.7  27.0 - 31.0 pg Final    MCHC 06/15/2024 31.7 (L)  32.0 - 36.0 g/dL Final    RDW 06/15/2024 15.9 (H)  11.5 - 14.5 % Final    Platelets 06/15/2024 242  150 - 450 K/uL Final    MPV 06/15/2024 11.0  9.2 - 12.9 fL Final    Immature Granulocytes 06/15/2024 0.4  0.0 - 0.5 % Final    Gran # (ANC) 06/15/2024 5.8  1.8 - 7.7 K/uL Final    Immature Grans (Abs) 06/15/2024 0.03  0.00 - 0.04 K/uL Final    Lymph # 06/15/2024 1.1  1.0 - 4.8 K/uL Final    Mono # 06/15/2024 0.5  0.3 - 1.0 K/uL Final    Eos # 06/15/2024 0.2  0.0 - 0.5 K/uL Final    Baso # 06/15/2024 0.03  0.00 - 0.20 K/uL Final    nRBC 06/15/2024 0  0 /100 WBC Final    Gran % 06/15/2024 76.2 (H)  38.0 - 73.0 % Final    Lymph % 06/15/2024 14.9 (L)  18.0 - 48.0 % Final    Mono % 06/15/2024 5.9  4.0 - 15.0 % Final    Eosinophil % 06/15/2024 2.2  0.0 - 8.0 % Final    Basophil % 06/15/2024 0.4  0.0 - 1.9 % Final    Differential Method 06/15/2024 Automated   Final    DHEA 06/15/2024 138.0  71.6 - 375.4 ug/dL Final   Admission on 04/15/2024, Discharged on 04/15/2024   Component Date Value Ref Range Status    POC Glucose 04/15/2024 93  70 - 110 Final   Lab Visit on 04/03/2024   Component Date Value Ref Range Status    Sodium 04/03/2024 135 (L)  136 - 145 mmol/L Final    Potassium 04/03/2024 4.4  3.5 - 5.1 mmol/L  Final    Chloride 04/03/2024 102  95 - 110 mmol/L Final    CO2 04/03/2024 24  23 - 29 mmol/L Final    Glucose 04/03/2024 96  70 - 110 mg/dL Final    BUN 04/03/2024 10  6 - 20 mg/dL Final    Creatinine 04/03/2024 1.7 (H)  0.5 - 1.4 mg/dL Final    Calcium 04/03/2024 9.9  8.7 - 10.5 mg/dL Final    Anion Gap 04/03/2024 9  8 - 16 mmol/L Final    eGFR 04/03/2024 49.1 (A)  >60 mL/min/1.73 m^2 Final    WBC 04/03/2024 11.34  3.90 - 12.70 K/uL Final    RBC 04/03/2024 5.99  4.60 - 6.20 M/uL Final    Hemoglobin 04/03/2024 16.6  14.0 - 18.0 g/dL Final    Hematocrit 04/03/2024 51.2  40.0 - 54.0 % Final    MCV 04/03/2024 86  82 - 98 fL Final    MCH 04/03/2024 27.7  27.0 - 31.0 pg Final    MCHC 04/03/2024 32.4  32.0 - 36.0 g/dL Final    RDW 04/03/2024 15.1 (H)  11.5 - 14.5 % Final    Platelets 04/03/2024 217  150 - 450 K/uL Final    MPV 04/03/2024 10.8  9.2 - 12.9 fL Final    Immature Granulocytes 04/03/2024 0.3  0.0 - 0.5 % Final    Gran # (ANC) 04/03/2024 9.0 (H)  1.8 - 7.7 K/uL Final    Immature Grans (Abs) 04/03/2024 0.03  0.00 - 0.04 K/uL Final    Lymph # 04/03/2024 1.5  1.0 - 4.8 K/uL Final    Mono # 04/03/2024 0.5  0.3 - 1.0 K/uL Final    Eos # 04/03/2024 0.3  0.0 - 0.5 K/uL Final    Baso # 04/03/2024 0.04  0.00 - 0.20 K/uL Final    nRBC 04/03/2024 0  0 /100 WBC Final    Gran % 04/03/2024 79.5 (H)  38.0 - 73.0 % Final    Lymph % 04/03/2024 13.1 (L)  18.0 - 48.0 % Final    Mono % 04/03/2024 4.4  4.0 - 15.0 % Final    Eosinophil % 04/03/2024 2.3  0.0 - 8.0 % Final    Basophil % 04/03/2024 0.4  0.0 - 1.9 % Final    Differential Method 04/03/2024 Automated   Final   Lab Visit on 03/16/2024   Component Date Value Ref Range Status    Testosterone 03/16/2024 135 (L)  264 - 916 ng/dL Final    Estrogen 03/16/2024 56  56 - 213 pg/mL Final    WBC 03/16/2024 6.01  3.90 - 12.70 K/uL Final    RBC 03/16/2024 5.68  4.60 - 6.20 M/uL Final    Hemoglobin 03/16/2024 15.6  14.0 - 18.0 g/dL Final     Hematocrit 03/16/2024 48.4  40.0 - 54.0 % Final    MCV 03/16/2024 85  82 - 98 fL Final    MCH 03/16/2024 27.5  27.0 - 31.0 pg Final    MCHC 03/16/2024 32.2  32.0 - 36.0 g/dL Final    RDW 03/16/2024 14.1  11.5 - 14.5 % Final    Platelets 03/16/2024 180  150 - 450 K/uL Final    MPV 03/16/2024 12.0  9.2 - 12.9 fL Final    Immature Granulocytes 03/16/2024 0.2  0.0 - 0.5 % Final    Gran # (ANC) 03/16/2024 4.0  1.8 - 7.7 K/uL Final    Immature Grans (Abs) 03/16/2024 0.01  0.00 - 0.04 K/uL Final    Lymph # 03/16/2024 1.3  1.0 - 4.8 K/uL Final    Mono # 03/16/2024 0.5  0.3 - 1.0 K/uL Final    Eos # 03/16/2024 0.2  0.0 - 0.5 K/uL Final    Baso # 03/16/2024 0.03  0.00 - 0.20 K/uL Final    nRBC 03/16/2024 0  0 /100 WBC Final    Gran % 03/16/2024 66.3  38.0 - 73.0 % Final    Lymph % 03/16/2024 21.1  18.0 - 48.0 % Final    Mono % 03/16/2024 8.2  4.0 - 15.0 % Final    Eosinophil % 03/16/2024 3.7  0.0 - 8.0 % Final    Basophil % 03/16/2024 0.5  0.0 - 1.9 % Final    Differential Method 03/16/2024 Automated   Final    Estradiol 03/16/2024 <5.0 (L)  7.6 - 42.6 pg/mL Final    DHEA 03/16/2024 164.0  71.6 - 375.4 ug/dL Final    Prolactin 03/16/2024 12.7  3.9 - 22.7 ng/mL Final       Past Medical History:   Diagnosis Date    ADHD (attention deficit hyperactivity disorder)     Arthritis     Asthma     as child only    Back pain     Coronary artery disease     Degeneration of lumbar intervertebral disc 05/2016    Depression     Digestive disorder     Elevated PSA     Headache     Hyperlipidemia     Hypertension     Kidney damage     stage 3 ; d/t aleve abuse    Kidney stone     Myocardial infarction     Neck pain     Numbness and tingling in hands     Numbness and tingling of both legs     Osteonecrosis     Bilat Femur    Seizures     from inapsine 2002    Sleep apnea     no cpap    Wears glasses     CONTACS     Social History     Socioeconomic History    Marital status:    Occupational  History    Occupation: disability   Tobacco Use    Smoking status: Former     Current packs/day: 0.00     Types: Cigarettes     Quit date: 2016     Years since quittin.6     Passive exposure: Past    Smokeless tobacco: Former     Quit date: 2016    Tobacco comments:     occasional   Substance and Sexual Activity    Alcohol use: Yes     Alcohol/week: 1.0 standard drink of alcohol     Types: 1 Glasses of wine per week     Comment: 20 - 30 shots vodka per day or 1-2 1/5ths per day for 2 years (started )    Drug use: No    Sexual activity: Yes     Partners: Female     Social Determinants of Health     Financial Resource Strain: Medium Risk (3/18/2024)    Overall Financial Resource Strain (CARDIA)     Difficulty of Paying Living Expenses: Somewhat hard   Food Insecurity: No Food Insecurity (3/18/2024)    Hunger Vital Sign     Worried About Running Out of Food in the Last Year: Never true     Ran Out of Food in the Last Year: Never true   Transportation Needs: No Transportation Needs (3/18/2024)    PRAPARE - Transportation     Lack of Transportation (Medical): No     Lack of Transportation (Non-Medical): No   Physical Activity: Insufficiently Active (3/18/2024)    Exercise Vital Sign     Days of Exercise per Week: 2 days     Minutes of Exercise per Session: 60 min   Stress: No Stress Concern Present (3/18/2024)    [employer] [place] [employer] - Occupational Stress Questionnaire     Feeling of Stress : Only a little   Housing Stability: Unknown (3/18/2024)    Housing Stability Vital Sign     Unable to Pay for Housing in the Last Year: No     Unstable Housing in the Last Year: No     Past Surgical History:   Procedure Laterality Date    BACK SURGERY  2016    back surgery    BONE GRAFT N/A 2020    Procedure: BONE GRAFT;  Surgeon: Mateusz Blanco MD;  Location: [facility];  Service: Orthopedics;  Laterality: N/A;    CARDIAC SURGERY  2020    CABG X 7    CORONARY ARTERY  BYPASS GRAFT      7 vessels    FLEXIBLE SIGMOIDOSCOPY N/A 7/24/2024    Procedure: SIGMOIDOSCOPY, FLEXIBLE;  Surgeon: Latesha Victoria MD;  Location: [facility] ENDO;  Service: Endoscopy;  Laterality: N/A;    HERNIA REPAIR Bilateral 11/22/2017    LITHOTRIPSY      LUMBAR LAMINECTOMY WITH FUSION Bilateral 11/5/2020    Procedure: LAMINECTOMY, SPINE, LUMBAR, WITH FUSION;  Surgeon: Mateusz Blanco MD;  Location: [facility] OR;  Service: Orthopedics;  Laterality: Bilateral;  MEDTRONIC  NTI  L-3    PILONIDAL CYST DRAINAGE      removal of abcess      scrotal    REMOVAL OF HARDWARE FROM SPINE Bilateral 11/5/2020    Procedure: REMOVAL, HARDWARE, SPINE;  Surgeon: Mateusz Blanco MD;  Location: [facility] OR;  Service: Orthopedics;  Laterality: Bilateral;  L 4-5    SURGICAL REMOVAL OF PILONIDAL CYST N/A 4/15/2024    Procedure: EXCISION, PILONIDAL CYST;  Surgeon: Jayden Phillips III, MD;  Location: [facility] OR;  Service: General;  Laterality: N/A;  sacral area    TYMPANOSTOMY TUBE PLACEMENT       Family History   Problem Relation Name Age of Onset    Heart disease Mother      Migraines Mother      Hypertension Mother      Early death Father accident     Heart disease Father accident     Diabetes Maternal Aunt      Diabetes Maternal Uncle      Diabetes Maternal Grandfather         All of your core healthy metrics are met.      Review of patient's allergies indicates:   Allergen Reactions    Inapsine [droperidol] Anaphylaxis     seizures    Effexor [venlafaxine]      Increased anxiety    Pcn [penicillins]     Bactrim [sulfamethoxazole-trimethoprim] Rash     Dry red rash all over when in the sun    Fluconazole Rash     Pruritis         Current Outpatient Medications:     anastrozole (ARIMIDEX) 1 mg Tab, Take 1 mg by mouth every 7 days., Disp: , Rfl:     busPIRone (BUSPAR) 15 MG tablet, Take 1 tablet (15 mg total) by mouth 3 (three) times daily., Disp: 90 tablet, Rfl: 0    clopidogreL (PLAVIX) 75 mg tablet, Take 75 mg by  mouth once daily., Disp: , Rfl:     ENTRESTO 49-51 mg per tablet, Take 1 tablet by mouth 2 (two) times daily., Disp: , Rfl:     metoclopramide HCl (REGLAN) 10 MG tablet, Take 1 tablet (10 mg total) by mouth every 6 (six) hours., Disp: 30 tablet, Rfl: 0    metoprolol tartrate (LOPRESSOR) 50 MG tablet, Take 1 tablet by mouth 2 (two) times daily., Disp: , Rfl:     rosuvastatin (CRESTOR) 40 MG Tab, Take 40 mg by mouth once daily., Disp: , Rfl:     tadalafiL (CIALIS) 10 MG tablet, Take 10 mg by mouth daily as needed., Disp: , Rfl:     testosterone cypionate 200 mg/mL Kit, Inject 1 mL into the muscle every 7 days., Disp: , Rfl:     EScitalopram oxalate (LEXAPRO) 20 MG tablet, Take 1 tablet (20 mg total) by mouth once daily., Disp: 90 tablet, Rfl: 1    HYDROcodone-acetaminophen (NORCO) 5-325 mg per tablet, Take 1 tablet by mouth every 6 (six) hours as needed for Pain., Disp: 28 tablet, Rfl: 0    hyoscyamine 0.125 mg Subl, Place 1 tablet (0.125 mg total) under the tongue every 4 (four) hours as needed., Disp: 12 tablet, Rfl: 0    LORazepam (ATIVAN) 0.5 MG tablet, Take 1 tablet (0.5 mg total) by mouth every 12 (twelve) hours as needed for Anxiety., Disp: 45 tablet, Rfl: 0    pantoprazole (PROTONIX) 40 MG tablet, Take 1 tablet (40 mg total) by mouth once daily., Disp: 90 tablet, Rfl: 3    Review of Systems   Constitutional:  Positive for unexpected weight change. Negative for fatigue and fever.   HENT:  Negative for ear pain, sinus pressure and sore throat.    Eyes:  Negative for pain.   Respiratory:  Negative for cough and shortness of breath.    Cardiovascular:  Negative for chest pain and leg swelling.   Gastrointestinal:  Positive for diarrhea. Negative for abdominal pain, constipation, nausea and vomiting.   Genitourinary:  Negative for dysuria, frequency and urgency.   Musculoskeletal:  Negative for arthralgias.   Skin:  Negative for rash.   Neurological:  Negative for dizziness, weakness and headaches.  "  Psychiatric/Behavioral:  Negative for sleep disturbance.           Objective:      Vitals:    08/12/24 1022   BP: 118/68   Pulse: 93   SpO2: 95%   Weight: 91.7 kg (202 lb 4 oz)   Height: 5' 7" (1.702 m)     Physical Exam  Vitals and nursing note reviewed.   Constitutional:       General: He is not in acute distress.     Appearance: Normal appearance. He is well-developed.   HENT:      Head: Normocephalic and atraumatic.      Right Ear: External ear normal.      Left Ear: External ear normal.   Eyes:      Pupils: Pupils are equal, round, and reactive to light.   Neck:      Trachea: No tracheal deviation.   Cardiovascular:      Rate and Rhythm: Normal rate and regular rhythm.      Heart sounds: No murmur heard.     No friction rub. No gallop.   Pulmonary:      Breath sounds: Normal breath sounds. No stridor. No wheezing or rales.   Abdominal:      Palpations: Abdomen is soft. There is no mass.      Tenderness: There is no abdominal tenderness.   Musculoskeletal:         General: No tenderness or deformity.      Cervical back: Neck supple.      Lumbar back: Decreased range of motion.      Right hip: Decreased range of motion.      Left hip: Decreased range of motion.   Lymphadenopathy:      Cervical: No cervical adenopathy.   Skin:     General: Skin is warm and dry.   Neurological:      Mental Status: He is alert and oriented to person, place, and time.      Coordination: Coordination normal.   Psychiatric:         Mood and Affect: Mood is anxious.         Thought Content: Thought content normal.         Assessment:       1. Colitis    2. MYNOR (generalized anxiety disorder)    3. Gastroesophageal reflux disease, unspecified whether esophagitis present    4. Hyperlipidemia, unspecified hyperlipidemia type    5. Bilateral hip pain    6. Coronary artery disease involving coronary bypass graft of native heart without angina pectoris    7. Essential hypertension    8. Congestive heart failure, unspecified HF chronicity, " unspecified heart failure type    9. Avascular necrosis         Plan:       Colitis  Comments:  follow up with gi  Orders:  -     Ambulatory referral/consult to Gastroenterology; Future; Expected date: 08/19/2024    MYNOR (generalized anxiety disorder)  Comments:  ativan prn. referred to psych. increase lexapro to 20mg  Orders:  -     EScitalopram oxalate (LEXAPRO) 20 MG tablet; Take 1 tablet (20 mg total) by mouth once daily.  Dispense: 90 tablet; Refill: 1  -     LORazepam (ATIVAN) 0.5 MG tablet; Take 1 tablet (0.5 mg total) by mouth every 12 (twelve) hours as needed for Anxiety.  Dispense: 45 tablet; Refill: 0    Gastroesophageal reflux disease, unspecified whether esophagitis present  Comments:  protonix  Orders:  -     pantoprazole (PROTONIX) 40 MG tablet; Take 1 tablet (40 mg total) by mouth once daily.  Dispense: 90 tablet; Refill: 3    Hyperlipidemia, unspecified hyperlipidemia type  Comments:  crestor    Bilateral hip pain  Comments:  followed by dr. ross    Coronary artery disease involving coronary bypass graft of native heart without angina pectoris  Comments:  followed by cardio    Essential hypertension  Comments:  bp[ is controlled    Congestive heart failure, unspecified HF chronicity, unspecified heart failure type  Comments:  entresto. cardio following    Avascular necrosis  Comments:  dr. ross following      Follow up in about 6 weeks (around 9/23/2024), or if symptoms worsen or fail to improve, for medication management.    ..Total time spend on encounter: 40-54 minutes. This includes face to face time and non-face to face time preparing to see the patient (eg, review of tests), obtaining and/or reviewing separately obtained history, documenting clinical information in the electronic or other health record, independently interpreting results and communicating results to the patient/family/caregiver, or care coordinator.        8/14/2024 Riya Vidales

## 2024-08-12 NOTE — PROGRESS NOTES
Subjective:       Patient ID: Lee Quintanilla is a 48 y.o. male.    Chief Complaint: Pain of the Left Hip (Patient is here for left hip MRI results, states pain has stayed about the same since last visit. Has numbness and tingling as well as burning and shooting pain )      History of Present Illness    Prior to meeting with the patient I reviewed the medical chart in [facility]. This included reviewing the previous progress notes from our office, review of the patient's last appointment with their primary care provider, review of any visits to the emergency room, and review of any pain management appointments or procedures.   Follow-up evaluation for left hip pain he discuss results of MRI study left hip has progressive increasing pain actually in both hips left greater than right he discuss results of MRI of left hip    Current Medications  Current Outpatient Medications   Medication Sig Dispense Refill    anastrozole (ARIMIDEX) 1 mg Tab Take 1 mg by mouth every 7 days.      busPIRone (BUSPAR) 15 MG tablet Take 1 tablet (15 mg total) by mouth 3 (three) times daily. 90 tablet 0    clopidogreL (PLAVIX) 75 mg tablet Take 75 mg by mouth once daily.      ENTRESTO 49-51 mg per tablet Take 1 tablet by mouth 2 (two) times daily.      EScitalopram oxalate (LEXAPRO) 20 MG tablet Take 1 tablet (20 mg total) by mouth once daily. 90 tablet 1    LORazepam (ATIVAN) 0.5 MG tablet Take 1 tablet (0.5 mg total) by mouth every 12 (twelve) hours as needed for Anxiety. 45 tablet 0    metoclopramide HCl (REGLAN) 10 MG tablet Take 1 tablet (10 mg total) by mouth every 6 (six) hours. 30 tablet 0    metoprolol tartrate (LOPRESSOR) 50 MG tablet Take 1 tablet by mouth 2 (two) times daily.      pantoprazole (PROTONIX) 40 MG tablet Take 1 tablet (40 mg total) by mouth once daily. 90 tablet 3    rosuvastatin (CRESTOR) 40 MG Tab Take 40 mg by mouth once daily.      tadalafiL (CIALIS) 10 MG tablet Take 10 mg by mouth daily as needed.       testosterone cypionate 200 mg/mL Kit Inject 1 mL into the muscle every 7 days.      traMADoL (ULTRAM) 50 mg tablet Take 1 tablet (50 mg total) by mouth every 8 (eight) hours as needed for Pain. 21 tablet 0    hyoscyamine 0.125 mg Subl Place 1 tablet (0.125 mg total) under the tongue every 4 (four) hours as needed. 12 tablet 0     No current facility-administered medications for this visit.       Allergies  Review of patient's allergies indicates:   Allergen Reactions    Inapsine [droperidol] Anaphylaxis     seizures    Effexor [venlafaxine]      Increased anxiety    Pcn [penicillins]     Bactrim [sulfamethoxazole-trimethoprim] Rash     Dry red rash all over when in the sun    Fluconazole Rash     Pruritis         Past Medical History  Past Medical History:   Diagnosis Date    ADHD (attention deficit hyperactivity disorder)     Arthritis     Asthma     as child only    Back pain     Coronary artery disease     Degeneration of lumbar intervertebral disc 05/2016    Depression     Digestive disorder     Elevated PSA     Headache     Hyperlipidemia     Hypertension     Kidney damage     stage 3 ; d/t aleve abuse    Kidney stone     Myocardial infarction     Neck pain     Numbness and tingling in hands     Numbness and tingling of both legs     Osteonecrosis     Bilat Femur    Seizures     from inapsine 2002    Sleep apnea     no cpap    Wears glasses     CONTACS       Surgical History  Past Surgical History:   Procedure Laterality Date    BACK SURGERY  2016    back surgery    BONE GRAFT N/A 11/5/2020    Procedure: BONE GRAFT;  Surgeon: Mateusz Blanco MD;  Location: [facility];  Service: Orthopedics;  Laterality: N/A;    CARDIAC SURGERY  01/06/2020    CABG X 7    CORONARY ARTERY BYPASS GRAFT      7 vessels    FLEXIBLE SIGMOIDOSCOPY N/A 7/24/2024    Procedure: SIGMOIDOSCOPY, FLEXIBLE;  Surgeon: Latesha Victoria MD;  Location: [facility];  Service: Endoscopy;  Laterality: N/A;    HERNIA REPAIR Bilateral 11/22/2017     LITHOTRIPSY      LUMBAR LAMINECTOMY WITH FUSION Bilateral 2020    Procedure: LAMINECTOMY, SPINE, LUMBAR, WITH FUSION;  Surgeon: Mateusz Blanco MD;  Location: [facility] OR;  Service: Orthopedics;  Laterality: Bilateral;  MEDTRONIC  NTI  L-3    PILONIDAL CYST DRAINAGE      removal of abcess      scrotal    REMOVAL OF HARDWARE FROM SPINE Bilateral 2020    Procedure: REMOVAL, HARDWARE, SPINE;  Surgeon: Mateusz Blanco MD;  Location: [facility] OR;  Service: Orthopedics;  Laterality: Bilateral;  L 4-5    SURGICAL REMOVAL OF PILONIDAL CYST N/A 4/15/2024    Procedure: EXCISION, PILONIDAL CYST;  Surgeon: Jayden Phillips III, MD;  Location: [facility] OR;  Service: General;  Laterality: N/A;  sacral area    TYMPANOSTOMY TUBE PLACEMENT         Family History:   Family History   Problem Relation Name Age of Onset    Heart disease Mother      Migraines Mother      Hypertension Mother      Early death Father accident     Heart disease Father accident     Diabetes Maternal Aunt      Diabetes Maternal Uncle      Diabetes Maternal Grandfather         Social History:   Social History     Socioeconomic History    Marital status:    Occupational History    Occupation: disability   Tobacco Use    Smoking status: Former     Current packs/day: 0.00     Types: Cigarettes     Quit date: 2016     Years since quittin.6     Passive exposure: Past    Smokeless tobacco: Former     Quit date: 2016    Tobacco comments:     occasional   Substance and Sexual Activity    Alcohol use: Yes     Alcohol/week: 1.0 standard drink of alcohol     Types: 1 Glasses of wine per week     Comment: 20 - 30 shots vodka per day or 1-2 1/5ths per day for 2 years (started )    Drug use: No    Sexual activity: Yes     Partners: Female     Social Determinants of Health     Financial Resource Strain: Medium Risk (3/18/2024)    Overall Financial Resource Strain (CARDIA)     Difficulty of Paying Living Expenses: Somewhat hard   Food Insecurity: No  Food Insecurity (3/18/2024)    Hunger Vital Sign     Worried About Running Out of Food in the Last Year: Never true     Ran Out of Food in the Last Year: Never true   Transportation Needs: No Transportation Needs (3/18/2024)    PRAPARE - Transportation     Lack of Transportation (Medical): No     Lack of Transportation (Non-Medical): No   Physical Activity: Insufficiently Active (3/18/2024)    Exercise Vital Sign     Days of Exercise per Week: 2 days     Minutes of Exercise per Session: 60 min   Stress: No Stress Concern Present (3/18/2024)    [employer] [place] [employer] - Occupational Stress Questionnaire     Feeling of Stress : Only a little   Housing Stability: Unknown (3/18/2024)    Housing Stability Vital Sign     Unable to Pay for Housing in the Last Year: No     Unstable Housing in the Last Year: No       Hospitalization/Major Diagnostic Procedure:     Review of Systems     General/Constitutional:  Chills denies. Fatigue denies. Fever denies. Weight gain denies. Weight loss denies.    Respiratory:  Shortness of breath denies.    Cardiovascular:  Chest pain denies.    Gastrointestinal:  Constipation denies. Diarrhea denies. Nausea denies. Vomiting denies.     Hematology:  Easy bruising denies. Prolonged bleeding denies.     Genitourinary:  Frequent urination denies. Pain in lower back denies. Painful urination denies.     Musculoskeletal:  See HPI for details    Skin:  Rash denies.    Neurologic:  Dizziness denies. Gait abnormalities denies. Seizures denies. Tingling/Numbess denies.    Psychiatric:  Anxiety denies. Depressed mood denies.     Objective:   Vital Signs: There were no vitals filed for this visit.     Physical Exam      General Examination:     Constitutional: The patient is alert and oriented to lace person and time. Mood is pleasant.     Head/Face: Normal facial features normal eyebrows    Eyes: Normal extraocular motion bilaterally    Lungs: Respirations are equal and  unlabored    Gait is coordinated.    Cardiovascular: There are no swelling or varicosities present.    Lymphatic: Negative for adenopathy    Skin: Normal    Neurological: Level of consciousness normal. Oriented to place person and time and situation    Psychiatric: Oriented to time place person and situation    Marked restriction internal external rotation both hips left greater than right pain with extension and bending.  XRAY Report/ Interpretation:  MRI left hip personally reviewed MRI of the pelvis shows early avascular necrosis in both hips.  Please see report for details      Assessment:       1. Avascular necrosis of bones of both hips    2. AVN (avascular necrosis of bone)        Plan:       Lee was seen today for pain.    Diagnoses and all orders for this visit:    Avascular necrosis of bones of both hips    AVN (avascular necrosis of bone)         No follow-ups on file.    Natural history of avascular necrosis was discussed patient referred to Dr. Jaimes to discuss possible total hip arthroplasty both hips left greater than right    Treatment options were discussed with regards to the nature of the medical condition. Conservative pain intervention and surgical options were discussed in detail. The probability of success of each separate treatment option was discussed. The patient expressed a clear understanding of the treatment options. With regards to surgery, the procedure risk, benefits, complications, and outcomes were discussed. No guarantees were given with regards to surgical outcome.   The risk of complications, morbidity, and mortality of patient management decisions have been made at the time of this visit. These are associated with the patient's problems, diagnostic procedures and treatment options. This includes the possible management options selected and those considered but not selected by the patient after shared medical decision making we discussed with the patient.     This note was  created using Dragon voice recognition software that occasionally misinterpreted phrases or words.

## 2024-08-15 ENCOUNTER — OFFICE VISIT (OUTPATIENT)
Dept: ORTHOPEDICS | Facility: CLINIC | Age: 49
End: 2024-08-15
Payer: MEDICAID

## 2024-08-15 VITALS — BODY MASS INDEX: 31.71 KG/M2 | HEIGHT: 67 IN | WEIGHT: 202 LBS

## 2024-08-15 DIAGNOSIS — M87.051 AVASCULAR NECROSIS OF HIP, RIGHT: ICD-10-CM

## 2024-08-15 DIAGNOSIS — M87.052 AVASCULAR NECROSIS OF HIP, LEFT: Primary | ICD-10-CM

## 2024-08-15 PROCEDURE — 99999 PR PBB SHADOW E&M-EST. PATIENT-LVL III: CPT | Mod: PBBFAC,,, | Performed by: ORTHOPAEDIC SURGERY

## 2024-08-15 PROCEDURE — 99213 OFFICE O/P EST LOW 20 MIN: CPT | Mod: PBBFAC,PN | Performed by: ORTHOPAEDIC SURGERY

## 2024-08-15 NOTE — PROGRESS NOTES
[facility]    Subjective:     Chief Complaint:   Chief Complaint   Patient presents with    Left Hip - Pain     Patient is here today to follow up left hip pain. He would like to discuss left TKA.He states his pain has worsened since last visit.        Past Medical History:   Diagnosis Date    ADHD (attention deficit hyperactivity disorder)     Arthritis     Asthma     as child only    Back pain     Coronary artery disease     Degeneration of lumbar intervertebral disc 05/2016    Depression     Digestive disorder     Elevated PSA     Headache     Hyperlipidemia     Hypertension     Kidney damage     stage 3 ; d/t aleve abuse    Kidney stone     Myocardial infarction     Neck pain     Numbness and tingling in hands     Numbness and tingling of both legs     Osteonecrosis     Bilat Femur    Seizures     from inapsine 2002    Sleep apnea     no cpap    Wears glasses     CONTACS       Past Surgical History:   Procedure Laterality Date    BACK SURGERY  2016    back surgery    BONE GRAFT N/A 11/5/2020    Procedure: BONE GRAFT;  Surgeon: Mateusz Blanco MD;  Location: [facility];  Service: Orthopedics;  Laterality: N/A;    CARDIAC SURGERY  01/06/2020    CABG X 7    CORONARY ARTERY BYPASS GRAFT      7 vessels    FLEXIBLE SIGMOIDOSCOPY N/A 7/24/2024    Procedure: SIGMOIDOSCOPY, FLEXIBLE;  Surgeon: Latesha Victoria MD;  Location: [facility];  Service: Endoscopy;  Laterality: N/A;    HERNIA REPAIR Bilateral 11/22/2017    LITHOTRIPSY      LUMBAR LAMINECTOMY WITH FUSION Bilateral 11/5/2020    Procedure: LAMINECTOMY, SPINE, LUMBAR, WITH FUSION;  Surgeon: Mateusz Blanco MD;  Location: [facility] OR;  Service: Orthopedics;  Laterality: Bilateral;  MEDTRONIC  NTI  L-3    PILONIDAL CYST DRAINAGE      removal of abcess      scrotal    REMOVAL OF HARDWARE FROM SPINE Bilateral 11/5/2020    Procedure: REMOVAL, HARDWARE, SPINE;  Surgeon: Mateusz Blanco MD;  Location: [facility] OR;  Service: Orthopedics;  Laterality: Bilateral;  L 4-5     SURGICAL REMOVAL OF PILONIDAL CYST N/A 4/15/2024    Procedure: EXCISION, PILONIDAL CYST;  Surgeon: Jayden Phillips III, MD;  Location: [facility];  Service: General;  Laterality: N/A;  sacral area    TYMPANOSTOMY TUBE PLACEMENT         Current Outpatient Medications   Medication Sig    anastrozole (ARIMIDEX) 1 mg Tab Take 1 mg by mouth every 7 days.    busPIRone (BUSPAR) 15 MG tablet Take 1 tablet (15 mg total) by mouth 3 (three) times daily.    clopidogreL (PLAVIX) 75 mg tablet Take 75 mg by mouth once daily.    ENTRESTO 49-51 mg per tablet Take 1 tablet by mouth 2 (two) times daily.    EScitalopram oxalate (LEXAPRO) 20 MG tablet Take 1 tablet (20 mg total) by mouth once daily.    HYDROcodone-acetaminophen (NORCO) 5-325 mg per tablet Take 1 tablet by mouth every 6 (six) hours as needed for Pain.    LORazepam (ATIVAN) 0.5 MG tablet Take 1 tablet (0.5 mg total) by mouth every 12 (twelve) hours as needed for Anxiety.    metoclopramide HCl (REGLAN) 10 MG tablet Take 1 tablet (10 mg total) by mouth every 6 (six) hours.    metoprolol tartrate (LOPRESSOR) 50 MG tablet Take 1 tablet by mouth 2 (two) times daily.    pantoprazole (PROTONIX) 40 MG tablet Take 1 tablet (40 mg total) by mouth once daily.    rosuvastatin (CRESTOR) 40 MG Tab Take 40 mg by mouth once daily.    tadalafiL (CIALIS) 10 MG tablet Take 10 mg by mouth daily as needed.    testosterone cypionate 200 mg/mL Kit Inject 1 mL into the muscle every 7 days.    hyoscyamine 0.125 mg Subl Place 1 tablet (0.125 mg total) under the tongue every 4 (four) hours as needed.     No current facility-administered medications for this visit.       Review of patient's allergies indicates:   Allergen Reactions    Inapsine [droperidol] Anaphylaxis     seizures    Effexor [venlafaxine]      Increased anxiety    Pcn [penicillins]     Bactrim [sulfamethoxazole-trimethoprim] Rash     Dry red rash all over when in the sun    Fluconazole Rash     Pruritis         Family History    Problem Relation Name Age of Onset    Heart disease Mother      Migraines Mother      Hypertension Mother      Early death Father accident     Heart disease Father accident     Diabetes Maternal Aunt      Diabetes Maternal Uncle      Diabetes Maternal Grandfather         Social History     Socioeconomic History    Marital status:    Occupational History    Occupation: disability   Tobacco Use    Smoking status: Former     Current packs/day: 0.00     Types: Cigarettes     Quit date: 2016     Years since quittin.6     Passive exposure: Past    Smokeless tobacco: Former     Quit date: 2016    Tobacco comments:     occasional   Substance and Sexual Activity    Alcohol use: Yes     Alcohol/week: 1.0 standard drink of alcohol     Types: 1 Glasses of wine per week     Comment: 20 - 30 shots vodka per day or 1-2 1/5ths per day for 2 years (started )    Drug use: No    Sexual activity: Yes     Partners: Female     Social Determinants of Health     Financial Resource Strain: Medium Risk (3/18/2024)    Overall Financial Resource Strain (CARDIA)     Difficulty of Paying Living Expenses: Somewhat hard   Food Insecurity: No Food Insecurity (3/18/2024)    Hunger Vital Sign     Worried About Running Out of Food in the Last Year: Never true     Ran Out of Food in the Last Year: Never true   Transportation Needs: No Transportation Needs (3/18/2024)    PRAPARE - Transportation     Lack of Transportation (Medical): No     Lack of Transportation (Non-Medical): No   Physical Activity: Insufficiently Active (3/18/2024)    Exercise Vital Sign     Days of Exercise per Week: 2 days     Minutes of Exercise per Session: 60 min   Stress: No Stress Concern Present (3/18/2024)    [employer] [place] [employer] - Occupational Stress Questionnaire     Feeling of Stress : Only a little   Housing Stability: Unknown (3/18/2024)    Housing Stability Vital Sign     Unable to Pay for Housing in the Last Year: No      Unstable Housing in the Last Year: No       History of present illness:  Lee comes in today for his bilateral hips, primarily of the left hip.  He has bilateral hip/groin pain.  He has known avascular necrosis about bilateral femoral heads proven on MRI.  Unfortunately, he has pain on a daily basis.  He feels it in the groin.  The left is more symptomatic than the right.  Pain is worse with weight-bearing activities.  He has limitations with range of motion.  It has caused limitation in some of his ADLs.    Review of Systems:    Constitution: Negative for chills, fever, and sweats.  Negative for unexplained weight loss.    HENT:  Negative for headaches and blurry vision.    Cardiovascular:Negative for chest pain or irregular heart beat. Negative for hypertension.    Respiratory:  Negative for cough and shortness of breath.    Gastrointestinal: Negative for abdominal pain, heartburn, melena, nausea, and vomitting.    Genitourinary:  Negative bladder incontinence and dysuria.    Musculoskeletal:  See HPI for details.     Neurological: Negative for numbness.    Psychiatric/Behavioral: Negative for depression.  The patient is not nervous/anxious.      Endocrine: Negative for polyuria    Hematologic/Lymphatic: Negative for bleeding problem.  Does not bruise/bleed easily.    Skin: Negative for poor would healing and rash    Objective:      Physical Examination:    Vital Signs:  There were no vitals filed for this visit.    Body mass index is 31.64 kg/m².    This a well-developed, well nourished patient in no acute distress.  They are alert and oriented and cooperative to examination.        Bilateral hip exam: Skin to bilateral hips clean dry and intact.  No erythema or ecchymosis bilaterally.  No signs or symptoms of infection bilaterally.  He is neurovascularly intact throughout bilateral lower extremities.  Negative Homans bilaterally.  Some decreased external rotation of the left hip and increased pain with attempts  at doing so.  Good internal rotation of the left hip.  Good internal/external rotation of the right hip but it is painful for him.  Nontender over bilateral greater trochanters.  He can weightbear as tolerated on bilateral lower extremities but has a slightly decreased ambulation l[place] and a mildly antalgic gait.  Nonantalgic sit to stand.      Pertinent New Results:    XRAY Report / Interpretation:   No new radiographs taken on today's clinic visit.    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      Risks and benefits of proceeding with left total hip arthroplasty were discussed with him today.  We discussed the outpatient nature of the procedure.  We discussed the need for likely arthroplasty on the contralateral hip at some point in the future.  He understood this.  Does have previous lumbar surgery history and still is under care for that.  We explained to him the hip replacement with a resolve his groin pain but not affect anything with his low back pain.  All of his questions were answered.  He clearly understood and wished to proceed.  Surgical consents were obtained today.    Risks of the procedure were reviewed with the patient.  This includes, but is not limited to: bleeding, pain, swelling, decreased range of motion, nerve injury/damage, wound dehiscence, hardware failure, deep vein thrombosis, pulmonary embolism, reflex sympathetic dystrophy, leg length discrepancy, dislocation, thigh pain, and possible need for further surgery.    The mobility limitation cannot be sufficiently resolved by the use of a cane. Patient's functional mobility deficit can be sufficiently resolved with the use of a (Rolling Walker or Walker). Patient's mobility limitation significantly impairs their ability to participate in one of more activities of daily living. The use of a (RW or Walker) will significantly improve the patient's ability to participate in MRADLS and the patient will use it on  "regular basis in the home.    Collins Haynes, Physician Assistant, served in the capacity as a "scribe" for this patient encounter.  A "face-to-face" encounter occurred with Dr. Lázaro Jaimes on this date.  The treatment plan and medical decision-making is outlined above. Patient was seen and examined with a chaperone.       This note was created using Dragon voice recognition software that occasionally misinterpreted phrases or words.          "

## 2024-08-16 ENCOUNTER — TELEPHONE (OUTPATIENT)
Dept: ORTHOPEDICS | Facility: HOSPITAL | Age: 49
End: 2024-08-16

## 2024-08-16 DIAGNOSIS — M87.051 AVASCULAR NECROSIS OF HIP, RIGHT: ICD-10-CM

## 2024-08-16 DIAGNOSIS — M87.052 AVASCULAR NECROSIS OF HIP, LEFT: Primary | ICD-10-CM

## 2024-08-16 RX ORDER — HYDROCODONE BITARTRATE AND ACETAMINOPHEN 5; 325 MG/1; MG/1
1 TABLET ORAL EVERY 12 HOURS PRN
Qty: 14 TABLET | Refills: 0 | Status: SHIPPED | OUTPATIENT
Start: 2024-08-16 | End: 2024-08-23

## 2024-08-16 NOTE — TELEPHONE ENCOUNTER
----- Message from Laura Hawthorne MA sent at 8/16/2024 10:27 AM CDT -----  Pharmacy would not fill prescription due to no diagnosis. Will we please send with diagnosis code?

## 2024-08-19 ENCOUNTER — PATIENT MESSAGE (OUTPATIENT)
Dept: FAMILY MEDICINE | Facility: CLINIC | Age: 49
End: 2024-08-19
Payer: MEDICAID

## 2024-08-19 ENCOUNTER — PATIENT MESSAGE (OUTPATIENT)
Dept: ORTHOPEDICS | Facility: CLINIC | Age: 49
End: 2024-08-19
Payer: MEDICAID

## 2024-08-19 RX ORDER — METOCLOPRAMIDE 10 MG/1
10 TABLET ORAL EVERY 6 HOURS
Qty: 30 TABLET | Refills: 0 | Status: SHIPPED | OUTPATIENT
Start: 2024-08-19

## 2024-08-21 ENCOUNTER — PATIENT MESSAGE (OUTPATIENT)
Dept: ORTHOPEDICS | Facility: CLINIC | Age: 49
End: 2024-08-21
Payer: MEDICAID

## 2024-08-21 DIAGNOSIS — M87.052 AVASCULAR NECROSIS OF BONE OF LEFT HIP: ICD-10-CM

## 2024-08-21 DIAGNOSIS — Z01.818 PRE-OP TESTING: ICD-10-CM

## 2024-08-21 DIAGNOSIS — M87.052 AVASCULAR NECROSIS OF HIP, LEFT: Primary | ICD-10-CM

## 2024-08-21 DIAGNOSIS — Z79.01 CURRENT USE OF ANTICOAGULANT THERAPY: ICD-10-CM

## 2024-08-21 RX ORDER — CLINDAMYCIN PHOSPHATE 900 MG/50ML
900 INJECTION, SOLUTION INTRAVENOUS
OUTPATIENT
Start: 2024-08-21

## 2024-08-22 DIAGNOSIS — M87.051 AVASCULAR NECROSIS OF HIP, RIGHT: ICD-10-CM

## 2024-08-22 DIAGNOSIS — M87.052 AVASCULAR NECROSIS OF HIP, LEFT: ICD-10-CM

## 2024-08-22 RX ORDER — HYDROCODONE BITARTRATE AND ACETAMINOPHEN 5; 325 MG/1; MG/1
1 TABLET ORAL EVERY 12 HOURS PRN
Qty: 14 TABLET | Refills: 0 | Status: SHIPPED | OUTPATIENT
Start: 2024-08-23 | End: 2024-08-30

## 2024-08-28 ENCOUNTER — HOSPITAL ENCOUNTER (OUTPATIENT)
Dept: PREADMISSION TESTING | Facility: HOSPITAL | Age: 49
Discharge: HOME | End: 2024-08-28
Attending: ORTHOPAEDIC SURGERY
Payer: MEDICAID

## 2024-08-28 DIAGNOSIS — M87.052 AVASCULAR NECROSIS OF HIP, LEFT: Primary | ICD-10-CM

## 2024-08-28 DIAGNOSIS — Z01.818 PREOP TESTING: Primary | ICD-10-CM

## 2024-08-28 NOTE — PRE ADMISSION SCREENING
"               [facility] Risk Assessment Scale    Patient Name: Lee Quintanilla  YOB: 1975  MRN: [id number]            RIsk Factor Measure Recommendation Patient Data Scale/Score   BMI >40 Reconsider surgery, weight loss   Estimated body mass index is 31.64 kg/m² as calculated from the following:    Height as of 8/15/24: 5' 7" (1.702 m).    Weight as of 8/15/24: 91.6 kg (202 lb).   [] 0 = 1 - 24.9  [] 1 = 25-29.9  [x] 2 = 30-34.9  [] 3 = 35-39.9  [] 4 = 40-44.9  [] 5 = 45-99.9   Hemoglobin AIC (if applicable) >9 Delay surgery until DM under control  Refer for:  Nutrition Therapy  Exercise   Medication    Lab Results   Component Value Date    HGBA1C 6.2 02/03/2024       Lab Results   Component Value Date     (H) 08/07/2024      [] 0 = 4.0-5.6  [x] 1 = 5.7-6.4  [] 2 = 6.5-6.9  [] 3 = 7.0-7.9  [] 4 = 8.0-8.9  [] 5 = 9.0-12   Hemoglobin (Anemia) <9 Delay surgery   Correct anemia Lab Results   Component Value Date    HGB 16.1 08/07/2024    [] 20 - <9.0                    Albumin <3 Delay surgery &Workup Lab Results   Component Value Date    ALBUMIN 3.9 08/07/2024    [] 20 - <3.0   Smoking Cessation >4 Weeks Delay Surgery  Refer to OP Cessation Class    Former Smoker  Quit: 2016 [] 20 - current smoker                                _____ PPD                    Hx of MI, PE, Arrhythmia, CVA, DVT <30 Days Delay Surgery    N/A [] 20      Infection Variable Delay surgery and re-evaluate   N/A [] 20 - recent/current infection     Depression (PHQ) >10 out of 27 Delay Surgery and re-evaluate  Medication  Counseling              [x] 0     []1     []2     []3      []4      [] 5                    (1-4)      (5-9)  (10-14)  (15-19)   (20-27)     Memory Impairment & Memory loss (Mini-Cog Screening Tool) Advanced dementia and/or Parkinson's Reconsider surgery     [x] 0     []1     []2     []3     []4     [] 5     Physical Conditioning (Modified AM-PAC Per Physical Therapy at [facility]) Unable to ambulate on day " of surgery Delay surgery and re-evaluate  Pre-Rehabilitation   (PT evaluation)       [x]  0   []4       []8     []12        []16     []20       (<20%)   (<40%)   (<60%)   (<80% )    (>80%)     Home Environment/Caregiver support  (Per /Navigator Interview)    Availability of basic services and/or approprate assistance during post-operative period Delay surgery and re-evaluate  Safe home environment  Health   1 week post-surgery  Transportation  availability  Ability to obtain DME/Medications post-op    [x] 0     []1     []2     []3     []4     [] 5  [x] 0     []1     []2     []3     []4     [] 5  [x] 0     []1     []2     []3     []4     [] 5  [x] 0     []1     []2     []3     []4     [] 5         MD Contact: Dr. Jaimes Comments:  Total Score:  3

## 2024-08-28 NOTE — PRE ADMISSION SCREENING
Patient Name: Lee Quintanilla  YOB: 1975   MRN: [id number]     [facility]   Basic Mobility Inpatient Short Form 6 Clicks         How much difficulty does the patient currently have  Unable  A Lot  A Little  None      1. Turning over in bed (including adjusting bedclothes, sheets and blankets)?     1 []    2 []    3 []    4 [x]        2. Sitting down on and standing up from a chair with arms (e.g., wheelchair, bedside commode, etc.)     1 []  2 []  3 []     4 [x]      3. Moving from lying on back to sitting on the side of the bed?     1 []  2 []  3 [x]    4 []    How much help from another person does the patient currently need  Total  A Lot  A Little  None      4. Moving to and from a bed to a chair (including a wheelchair)?    1 []  2 []  3 []    4 [x]      5. Need to walk in hospital room?    1 []  2 []  3 []    4 [x]      6. Climbing 3-5 steps with a railing?    1 []  2 []  3 []    4 [x]       Raw Score:      23            CMS 0-100% Score:   11.20         %   Standardized Score:   56.93            CMS Modifier:      CI                                    [facility]   Basic Mobility Inpatient Short Form 6 Clicks Score Conversion Table*         *Use this form to convert -PAC Basic Mobility Inpatient Raw Scores.   [facility] Inpatient Basic Mobility Short Form Scoring Example   1. Add the number values associated with the response to each item. For example, items totals yield a Raw Score of 21.   2. Match the raw score to the t-Scale scores (t-Scale score = 50.25, SE = 4.69).   3. Find the associated CMS % (CMS % = 28.97%).   4. Locate the correct CMS Functional Modifier Code, or G Code (G code = CJ)     NOTE: Each -PAC Short Form has a separate conversion table. Make sure that you use the correct conversion table.       Instruction Manual - page 45 contains conversion table

## 2024-08-28 NOTE — PRE ADMISSION SCREENING
[facility] ASSESSMENT    Name Lee Quintanilla   MRN [id number]    Age/Sex 48 y.o. male    Surgeon Dr. Lázaro Jaimes   [facility] Date 8/28/2024   Surgery Date 9/11/2024   Procedure Left Hip Arthroplasty   Insurance Payor: MEDICAID / Plan: [facility] CONNECT / Product Type: Managed Medicaid /    Care Team Patient Care Team:  Riya Vidales NP as PCP - General (Family Medicine)  Vicky Saeed MD as Consulting Physician (Urology)  Vincent Mansfield MD as Consulting Physician (Family Medicine)  Byron Coffman MD as Consulting Physician (Gastroenterology)  Juan Miguel Rothman MD as Consulting Physician (Cardiology)  Law Chiang MD as Consulting Physician (Nephrology)    Pharmacy   [facility] - [place], [place] - [address]  [address]  [place] [address]  Phone: [phone] Fax: [phone]    [facility] - [place], [place] - [address]  [address]  [place] [address]  Phone: [phone] Fax: [phone]     AM-PAC Score   23   Risk Assessment Score 3     Past Medical History:   Diagnosis Date    ADHD (attention deficit hyperactivity disorder)     Arthritis     Asthma     as child only    Back pain     Coronary artery disease     Degeneration of lumbar intervertebral disc 05/2016    Depression     Digestive disorder     Elevated PSA     Headache     Hyperlipidemia     Hypertension     Kidney damage     stage 3 ; d/t aleve abuse    Kidney stone     Myocardial infarction     Neck pain     Numbness and tingling in hands     Numbness and tingling of both legs     Osteonecrosis     Bilat Femur    Seizures     from inapsine 2002    Sleep apnea     no cpap    Wears glasses     CONTACS       Past Surgical History:   Procedure Laterality Date    BACK SURGERY  2016    back surgery    BONE GRAFT N/A 11/5/2020    Procedure: BONE GRAFT;  Surgeon: Mateusz Blanco MD;  Location: [facility];  Service: Orthopedics;  Laterality: N/A;    CARDIAC SURGERY  01/06/2020    CABG X 7     CORONARY ARTERY BYPASS GRAFT      7 vessels    FLEXIBLE SIGMOIDOSCOPY N/A 7/24/2024    Procedure: SIGMOIDOSCOPY, FLEXIBLE;  Surgeon: Latesha Victoria MD;  Location: [facility] ENDO;  Service: Endoscopy;  Laterality: N/A;    HERNIA REPAIR Bilateral 11/22/2017    LITHOTRIPSY      LUMBAR LAMINECTOMY WITH FUSION Bilateral 11/5/2020    Procedure: LAMINECTOMY, SPINE, LUMBAR, WITH FUSION;  Surgeon: Mateusz Blanco MD;  Location: [facility] OR;  Service: Orthopedics;  Laterality: Bilateral;  MEDTRONIC  NTI  L-3    PILONIDAL CYST DRAINAGE      removal of abcess      scrotal    REMOVAL OF HARDWARE FROM SPINE Bilateral 11/5/2020    Procedure: REMOVAL, HARDWARE, SPINE;  Surgeon: Mateusz Blanco MD;  Location: [facility] OR;  Service: Orthopedics;  Laterality: Bilateral;  L 4-5    SURGICAL REMOVAL OF PILONIDAL CYST N/A 4/15/2024    Procedure: EXCISION, PILONIDAL CYST;  Surgeon: Jayden Phillips III, MD;  Location: [facility] OR;  Service: General;  Laterality: N/A;  sacral area    TYMPANOSTOMY TUBE PLACEMENT           Home Enviroment     Living Arrangement: Lives with family  Home Environment: 1-story house/ trailer, tub-shower  Home Safety Concerns: Pets in the home: dogs (1).    DISCHARGE CAREGIVER/SUPPORT SYSTEM     Identified post-op caregiver: Patient has children / family / friends to help, mother, Leti Rollins and step father, Juwan Rollins.  Patient's caregiver(s) will be able to provide physical assistance. Patient will have someone to assist overnight.      Caregiver present at pre-op interview:  No      PRE-OPERATIVE FUNCTIONAL STATUS     Employment: Unemployed    Pre-op Functional Status: Patient is independent with mobility/ambulation, transfers, ADL's, IADL's.    Use of assistive device for ambulation: none  ADL: self care  ADL Limitations: difficulty with walking  Medical Restrictions: Unstable ambulation and Decreased range of motions in extremities    POTENTIAL BARRIERS TO DISCHARGE/POTENTIAL POST-OP COMPLICATIONS     Patient with hx  of asthma, back pain, coronary artery disease with long-term anticoagulation use (Plavix 75 mg once daily), HTN, kidney damage, myocardial infarction, seizures, sleep apnea without CPAP use. POSSIBLE SAME DAY DISCHARGE.    DISCHARGE PLAN     Expected LOS of 1 days or less for joint replacement discussed with patient. We also discussed a discharge path of HH for approximately two weeks with a transition to outpatient PT on the third week given no post-op complications.      Patient in agreement with discharge plan: Yes    Discharge to: Discharge home with home health (PT/OT) x2 weeks with transition to outpatient PT     HH:  [facility] ([place], [place]). Patient disclosure form completed and sent to case management for upload to the medical record.      OP PT: [facility] ([facility])     Home DME: single point cane and assistive device kit    Needed DME at D/C: rolling walker, bedside commode, and tub transfer bench. Patient to purchase BSC & Tub Transfer Bench from retail prior to surgery.     Rx: Per Dr. Jaimes at discharge     Meds to Beds: Yes  Patient expected to discharge on current regimen of  Plavix (Clopidogrel) 75 mg once daily for DVT prophylaxis.

## 2024-08-29 DIAGNOSIS — M87.052 AVASCULAR NECROSIS OF HIP, LEFT: ICD-10-CM

## 2024-08-29 DIAGNOSIS — M87.051 AVASCULAR NECROSIS OF HIP, RIGHT: ICD-10-CM

## 2024-08-29 RX ORDER — HYDROCODONE BITARTRATE AND ACETAMINOPHEN 5; 325 MG/1; MG/1
1 TABLET ORAL EVERY 12 HOURS PRN
Qty: 14 TABLET | Refills: 0 | Status: SHIPPED | OUTPATIENT
Start: 2024-08-29 | End: 2024-09-05

## 2024-09-04 ENCOUNTER — HOSPITAL ENCOUNTER (OUTPATIENT)
Dept: PREADMISSION TESTING | Facility: HOSPITAL | Age: 49
Discharge: HOME | End: 2024-09-04
Attending: ORTHOPAEDIC SURGERY
Payer: MEDICAID

## 2024-09-04 VITALS
OXYGEN SATURATION: 98 % | SYSTOLIC BLOOD PRESSURE: 140 MMHG | WEIGHT: 210 LBS | RESPIRATION RATE: 14 BRPM | BODY MASS INDEX: 32.96 KG/M2 | HEART RATE: 84 BPM | HEIGHT: 67 IN | TEMPERATURE: 98 F | DIASTOLIC BLOOD PRESSURE: 74 MMHG

## 2024-09-04 DIAGNOSIS — Z79.01 CURRENT USE OF ANTICOAGULANT THERAPY: ICD-10-CM

## 2024-09-04 DIAGNOSIS — Z01.818 PRE-OP TESTING: ICD-10-CM

## 2024-09-04 DIAGNOSIS — M87.052 AVASCULAR NECROSIS OF HIP, LEFT: ICD-10-CM

## 2024-09-04 DIAGNOSIS — Z01.818 PREOP TESTING: ICD-10-CM

## 2024-09-04 LAB
ABO + RH BLD: NORMAL
ANION GAP SERPL CALC-SCNC: 10 MMOL/L (ref 8–16)
APTT PPP: 27.1 SEC (ref 21–32)
BASOPHILS # BLD AUTO: 0.03 K/UL (ref 0–0.2)
BASOPHILS NFR BLD: 0.3 % (ref 0–1.9)
BLD GP AB SCN CELLS X3 SERPL QL: NORMAL
BUN SERPL-MCNC: 12 MG/DL (ref 6–20)
CALCIUM SERPL-MCNC: 9 MG/DL (ref 8.7–10.5)
CHLORIDE SERPL-SCNC: 103 MMOL/L (ref 95–110)
CO2 SERPL-SCNC: 22 MMOL/L (ref 23–29)
CREAT SERPL-MCNC: 1.4 MG/DL (ref 0.5–1.4)
DIFFERENTIAL METHOD BLD: ABNORMAL
EOSINOPHIL # BLD AUTO: 0.1 K/UL (ref 0–0.5)
EOSINOPHIL NFR BLD: 1.2 % (ref 0–8)
ERYTHROCYTE [DISTWIDTH] IN BLOOD BY AUTOMATED COUNT: 16.2 % (ref 11.5–14.5)
EST. GFR  (NO RACE VARIABLE): >60 ML/MIN/1.73 M^2
GLUCOSE SERPL-MCNC: 174 MG/DL (ref 70–110)
HCT VFR BLD AUTO: 45.9 % (ref 40–54)
HGB BLD-MCNC: 15 G/DL (ref 14–18)
IMM GRANULOCYTES # BLD AUTO: 0.03 K/UL (ref 0–0.04)
IMM GRANULOCYTES NFR BLD AUTO: 0.3 % (ref 0–0.5)
INR PPP: 1 (ref 0.8–1.2)
LYMPHOCYTES # BLD AUTO: 1.2 K/UL (ref 1–4.8)
LYMPHOCYTES NFR BLD: 10.7 % (ref 18–48)
MCH RBC QN AUTO: 29 PG (ref 27–31)
MCHC RBC AUTO-ENTMCNC: 32.7 G/DL (ref 32–36)
MCV RBC AUTO: 89 FL (ref 82–98)
MONOCYTES # BLD AUTO: 0.4 K/UL (ref 0.3–1)
MONOCYTES NFR BLD: 4 % (ref 4–15)
NEUTROPHILS # BLD AUTO: 9.2 K/UL (ref 1.8–7.7)
NEUTROPHILS NFR BLD: 83.5 % (ref 38–73)
NRBC BLD-RTO: 0 /100 WBC
PLATELET # BLD AUTO: 220 K/UL (ref 150–450)
PMV BLD AUTO: 11.2 FL (ref 9.2–12.9)
POTASSIUM SERPL-SCNC: 4.3 MMOL/L (ref 3.5–5.1)
PROTHROMBIN TIME: 10.9 SEC (ref 9–12.5)
RBC # BLD AUTO: 5.18 M/UL (ref 4.6–6.2)
SODIUM SERPL-SCNC: 135 MMOL/L (ref 136–145)
SPECIMEN OUTDATE: NORMAL
WBC # BLD AUTO: 10.95 K/UL (ref 3.9–12.7)

## 2024-09-04 PROCEDURE — 86850 RBC ANTIBODY SCREEN: CPT | Performed by: ORTHOPAEDIC SURGERY

## 2024-09-04 PROCEDURE — 85730 THROMBOPLASTIN TIME PARTIAL: CPT | Performed by: ORTHOPAEDIC SURGERY

## 2024-09-04 PROCEDURE — 85610 PROTHROMBIN TIME: CPT | Performed by: ORTHOPAEDIC SURGERY

## 2024-09-04 PROCEDURE — 80048 BASIC METABOLIC PNL TOTAL CA: CPT | Performed by: ORTHOPAEDIC SURGERY

## 2024-09-04 PROCEDURE — 86900 BLOOD TYPING SEROLOGIC ABO: CPT | Performed by: ORTHOPAEDIC SURGERY

## 2024-09-04 PROCEDURE — 85025 COMPLETE CBC W/AUTO DIFF WBC: CPT | Performed by: ORTHOPAEDIC SURGERY

## 2024-09-04 NOTE — DISCHARGE INSTRUCTIONS
INSTRUCTIONS  To confirm your doctor has scheduled your surgery for:      Morning of surgery please check in with registration near Parking Garage Entrance then proceed to Outpatient Surgery Department.    Preop nurses will call the afternoon prior to surgery between 4:00 and 6:00 PM with your final arrival time.  PLEASE NOTE:  The surgery schedule has many variables which may affect the time of your surgery case. Family members should be available if your surgery time changes. Plan to be here the day of your procedure between 4-6 hours.    TAKE ONLY THESE MEDICATIONS WITH A SMALL SIP OF WATER THE MORNING OF SURGERY: see list    DO NOT TAKE THESE MEDICATIONS 5-7 DAYS PRIOR to your procedure per your surgeon's request: ASPIRIN, ALEVE, BC powder, HODAN SELTZER, IBUPROFEN, FISH OIL, VITAMIN E, OR HERBALS   (May take Tylenol)    If you are prescribed any types of blood thinners (Aspirin, Coumadin, Plavix, Pradaxa, Xarelto, Aggrenox, Effient, Eliquis, Savasya, Brilinta or any other), please ask your surgeon how many days before scheduled procedure should you stop taking them. You may also need to verify with prescribing physician if it is OK to stop your blood thinners.      INSTRUCTIONS IMPORTANT!!  Do not eat or drink anything after midnight.  ONLY if you are diabetic, check your sugar in the morning before your procedure.  Do not smoke, vape or drink alcoholic beverages 24 hours prior to your procedure.  Shower the night before AND the morning of your procedure with a Chlorhexidine wash such as hibiclens or Dial antibacterial soap from neck down. You may use your own shampoo and face wash. This helps your skin to be as bacteria free as possible.  If you wear contact lenses, dentures, hearing aids or glasses, bring a container to put them in and give to a family member.    Please leave all jewelry, piercings and valuables at home.  OK to shave hair from surgical site with ELECTRIC RAZOR ONLY.  If your condition  changes such as fever, cough, etc, please notify your surgeon.   ONLY if you have been diagnosed with sleep apnea please bring your C-PAP machine.  ONLY if you wear home oxygen please bring your portable oxygen tank the day of your procedure.   ONLY for patients requiring bowel prep, written instructions will be given by your doctor's office.  ONLY if you have a neuro stimulator, please bring the controller with you the morning of surgery  Make arrangements in advance for transportation home by a responsible adult.  You must make arrangements for transportation, TAXI'S, UBER'S OR LYFTS ARE NOT ALLOWED.        If you have any questions about these instructions, call Pre-Op Admit  Nursing at [phone] or the Pre-Op Day Surgery Unit at [phone].

## 2024-09-05 ENCOUNTER — TELEPHONE (OUTPATIENT)
Dept: ORTHOPEDICS | Facility: HOSPITAL | Age: 49
End: 2024-09-05

## 2024-09-05 DIAGNOSIS — M87.051 AVASCULAR NECROSIS OF HIP, RIGHT: ICD-10-CM

## 2024-09-05 DIAGNOSIS — M87.052 AVASCULAR NECROSIS OF HIP, LEFT: Primary | ICD-10-CM

## 2024-09-05 DIAGNOSIS — F41.1 GAD (GENERALIZED ANXIETY DISORDER): ICD-10-CM

## 2024-09-05 RX ORDER — LORAZEPAM 0.5 MG/1
0.5 TABLET ORAL EVERY 12 HOURS PRN
Qty: 45 TABLET | Refills: 0 | Status: CANCELLED | OUTPATIENT
Start: 2024-09-05 | End: 2024-10-05

## 2024-09-05 RX ORDER — HYDROCODONE BITARTRATE AND ACETAMINOPHEN 5; 325 MG/1; MG/1
1 TABLET ORAL EVERY 12 HOURS PRN
Qty: 14 TABLET | Refills: 0 | Status: SHIPPED | OUTPATIENT
Start: 2024-09-05

## 2024-09-05 NOTE — TELEPHONE ENCOUNTER
----- Message from Elida Alcocer sent at 9/5/2024  9:19 AM CDT -----  Phone #: [phone]  Patient is requesting a refill of Hydrocodone        Pharmacy:   [facility] [place] - [place] - [address]  [address] [address]  Phone: [phone] Fax: [phone]

## 2024-09-09 DIAGNOSIS — M87.052 AVASCULAR NECROSIS OF HIP, LEFT: Primary | ICD-10-CM

## 2024-09-09 RX ORDER — GABAPENTIN 300 MG/1
300 CAPSULE ORAL 2 TIMES DAILY
Qty: 60 CAPSULE | Refills: 0 | Status: SHIPPED | OUTPATIENT
Start: 2024-09-09 | End: 2024-10-10

## 2024-09-09 RX ORDER — OXYCODONE AND ACETAMINOPHEN 7.5; 325 MG/1; MG/1
1 TABLET ORAL EVERY 6 HOURS PRN
Qty: 28 TABLET | Refills: 0 | Status: SHIPPED | OUTPATIENT
Start: 2024-09-09

## 2024-09-09 RX ORDER — CELECOXIB 200 MG/1
200 CAPSULE ORAL 2 TIMES DAILY
Qty: 60 CAPSULE | Refills: 0 | Status: SHIPPED | OUTPATIENT
Start: 2024-09-09 | End: 2024-10-10

## 2024-09-09 RX ORDER — DOCUSATE SODIUM 100 MG/1
100 CAPSULE, LIQUID FILLED ORAL 2 TIMES DAILY
Qty: 60 CAPSULE | Refills: 0 | Status: SHIPPED | OUTPATIENT
Start: 2024-09-09 | End: 2024-10-10

## 2024-09-16 ENCOUNTER — TELEPHONE (OUTPATIENT)
Dept: ORTHOPEDICS | Facility: CLINIC | Age: 49
End: 2024-09-16
Payer: MEDICAID

## 2024-09-16 DIAGNOSIS — Z96.642 S/P TOTAL LEFT HIP ARTHROPLASTY: Primary | ICD-10-CM

## 2024-09-16 DIAGNOSIS — M87.052 AVASCULAR NECROSIS OF HIP, LEFT: ICD-10-CM

## 2024-09-16 RX ORDER — OXYCODONE AND ACETAMINOPHEN 7.5; 325 MG/1; MG/1
1 TABLET ORAL EVERY 6 HOURS PRN
Qty: 28 TABLET | Refills: 0 | Status: SHIPPED | OUTPATIENT
Start: 2024-09-16 | End: 2024-09-18

## 2024-09-16 NOTE — TELEPHONE ENCOUNTER
Electronically prescribed a prescription for percocet with instructions to take one tablet by mouth every 6 hours as needed for post operative pain.  I prescribed quantity 28 for a one week supply.

## 2024-09-16 NOTE — TELEPHONE ENCOUNTER
Patient took his after surgery medicine prior to surgery. Patient is requesting refill of pain medication for post surgery to be sent to pharmacy on file

## 2024-09-18 DIAGNOSIS — M87.052 AVASCULAR NECROSIS OF HIP, LEFT: Primary | ICD-10-CM

## 2024-09-18 RX ORDER — OXYCODONE AND ACETAMINOPHEN 7.5; 325 MG/1; MG/1
1 TABLET ORAL EVERY 6 HOURS PRN
Qty: 28 TABLET | Refills: 0 | Status: SHIPPED | OUTPATIENT
Start: 2024-09-18

## 2024-09-18 RX ORDER — DOCUSATE SODIUM 100 MG/1
100 CAPSULE, LIQUID FILLED ORAL 2 TIMES DAILY
Qty: 60 CAPSULE | Refills: 0 | Status: SHIPPED | OUTPATIENT
Start: 2024-09-18 | End: 2024-10-18

## 2024-09-18 RX ORDER — CELECOXIB 200 MG/1
200 CAPSULE ORAL 2 TIMES DAILY
Qty: 60 CAPSULE | Refills: 0 | Status: SHIPPED | OUTPATIENT
Start: 2024-09-18 | End: 2024-10-18

## 2024-09-18 RX ORDER — GABAPENTIN 300 MG/1
300 CAPSULE ORAL 2 TIMES DAILY
Qty: 60 CAPSULE | Refills: 0 | Status: SHIPPED | OUTPATIENT
Start: 2024-09-18 | End: 2024-10-18

## 2024-09-19 ENCOUNTER — OFFICE VISIT (OUTPATIENT)
Dept: FAMILY MEDICINE | Facility: CLINIC | Age: 49
End: 2024-09-19
Payer: MEDICAID

## 2024-09-19 VITALS
OXYGEN SATURATION: 97 % | HEART RATE: 71 BPM | HEIGHT: 67 IN | DIASTOLIC BLOOD PRESSURE: 80 MMHG | SYSTOLIC BLOOD PRESSURE: 110 MMHG | BODY MASS INDEX: 32.62 KG/M2 | WEIGHT: 207.81 LBS

## 2024-09-19 DIAGNOSIS — I10 ESSENTIAL HYPERTENSION: ICD-10-CM

## 2024-09-19 DIAGNOSIS — E78.5 HYPERLIPIDEMIA, UNSPECIFIED HYPERLIPIDEMIA TYPE: ICD-10-CM

## 2024-09-19 DIAGNOSIS — Z23 NEEDS FLU SHOT: Primary | ICD-10-CM

## 2024-09-19 DIAGNOSIS — F41.1 GAD (GENERALIZED ANXIETY DISORDER): ICD-10-CM

## 2024-09-19 DIAGNOSIS — K21.9 GASTROESOPHAGEAL REFLUX DISEASE WITHOUT ESOPHAGITIS: ICD-10-CM

## 2024-09-19 DIAGNOSIS — M87.00 AVASCULAR NECROSIS: ICD-10-CM

## 2024-09-19 DIAGNOSIS — M25.552 BILATERAL HIP PAIN: ICD-10-CM

## 2024-09-19 DIAGNOSIS — M25.551 BILATERAL HIP PAIN: ICD-10-CM

## 2024-09-19 PROCEDURE — 3079F DIAST BP 80-89 MM HG: CPT | Mod: CPTII,S$GLB,, | Performed by: NURSE PRACTITIONER

## 2024-09-19 PROCEDURE — 3008F BODY MASS INDEX DOCD: CPT | Mod: CPTII,S$GLB,, | Performed by: NURSE PRACTITIONER

## 2024-09-19 PROCEDURE — 99214 OFFICE O/P EST MOD 30 MIN: CPT | Mod: 25,S$GLB,, | Performed by: NURSE PRACTITIONER

## 2024-09-19 PROCEDURE — 4010F ACE/ARB THERAPY RXD/TAKEN: CPT | Mod: CPTII,S$GLB,, | Performed by: NURSE PRACTITIONER

## 2024-09-19 PROCEDURE — 3074F SYST BP LT 130 MM HG: CPT | Mod: CPTII,S$GLB,, | Performed by: NURSE PRACTITIONER

## 2024-09-19 PROCEDURE — 90471 IMMUNIZATION ADMIN: CPT | Mod: S$GLB,,, | Performed by: NURSE PRACTITIONER

## 2024-09-19 PROCEDURE — 90656 IIV3 VACC NO PRSV 0.5 ML IM: CPT | Mod: S$GLB,,, | Performed by: NURSE PRACTITIONER

## 2024-09-19 PROCEDURE — 3044F HG A1C LEVEL LT 7.0%: CPT | Mod: CPTII,S$GLB,, | Performed by: NURSE PRACTITIONER

## 2024-09-19 RX ORDER — LORAZEPAM 0.5 MG/1
0.5 TABLET ORAL EVERY 12 HOURS PRN
Qty: 60 TABLET | Refills: 0 | Status: SHIPPED | OUTPATIENT
Start: 2024-09-19 | End: 2024-10-19

## 2024-09-19 RX ORDER — FLUTICASONE PROPIONATE 50 MCG
2 SPRAY, SUSPENSION (ML) NASAL DAILY
Qty: 16 G | Refills: 0 | Status: SHIPPED | OUTPATIENT
Start: 2024-09-19

## 2024-09-19 RX ORDER — AZITHROMYCIN 250 MG/1
TABLET, FILM COATED ORAL
Qty: 6 TABLET | Refills: 0 | Status: SHIPPED | OUTPATIENT
Start: 2024-09-19 | End: 2024-09-24

## 2024-09-20 NOTE — PRE-PROCEDURE INSTRUCTIONS
Pre-op phone call made to pt.  All medications reviewed and  pre-procedure  instructions given to pt.  Pt verbalized understanding.    Information added to My Chart Patient Portal

## 2024-09-21 ENCOUNTER — ANESTHESIA EVENT (OUTPATIENT)
Dept: SURGERY | Facility: HOSPITAL | Age: 49
End: 2024-09-21
Payer: MEDICAID

## 2024-09-23 ENCOUNTER — HOSPITAL ENCOUNTER (OUTPATIENT)
Facility: HOSPITAL | Age: 49
Discharge: HOME | End: 2024-09-23
Attending: ORTHOPAEDIC SURGERY | Admitting: ORTHOPAEDIC SURGERY
Payer: MEDICAID

## 2024-09-23 ENCOUNTER — ANESTHESIA (OUTPATIENT)
Dept: SURGERY | Facility: HOSPITAL | Age: 49
End: 2024-09-23
Payer: MEDICAID

## 2024-09-23 DIAGNOSIS — M87.052 AVASCULAR NECROSIS OF BONE OF LEFT HIP: ICD-10-CM

## 2024-09-23 DIAGNOSIS — Z01.818 PRE-OP TESTING: ICD-10-CM

## 2024-09-23 DIAGNOSIS — M87.052 IDIOPATHIC ASEPTIC NECROSIS OF LEFT FEMUR: Primary | ICD-10-CM

## 2024-09-23 DIAGNOSIS — M87.052 AVASCULAR NECROSIS OF HIP, LEFT: ICD-10-CM

## 2024-09-23 DIAGNOSIS — Z79.01 CURRENT USE OF ANTICOAGULANT THERAPY: ICD-10-CM

## 2024-09-23 LAB
ABO + RH BLD: NORMAL
BLD GP AB SCN CELLS X3 SERPL QL: NORMAL
SPECIMEN OUTDATE: NORMAL

## 2024-09-23 PROCEDURE — 63600175 PHARM REV CODE 636 W HCPCS: Performed by: ANESTHESIOLOGY

## 2024-09-23 PROCEDURE — 63600175 PHARM REV CODE 636 W HCPCS: Performed by: NURSE ANESTHETIST, CERTIFIED REGISTERED

## 2024-09-23 PROCEDURE — 99900035 HC TECH TIME PER 15 MIN (STAT)

## 2024-09-23 PROCEDURE — 97161 PT EVAL LOW COMPLEX 20 MIN: CPT

## 2024-09-23 PROCEDURE — 86850 RBC ANTIBODY SCREEN: CPT | Performed by: ORTHOPAEDIC SURGERY

## 2024-09-23 PROCEDURE — 71000016 HC POSTOP RECOV ADDL HR: Performed by: ORTHOPAEDIC SURGERY

## 2024-09-23 PROCEDURE — 27201423 OPTIME MED/SURG SUP & DEVICES STERILE SUPPLY: Performed by: ORTHOPAEDIC SURGERY

## 2024-09-23 PROCEDURE — 25000003 PHARM REV CODE 250: Performed by: NURSE ANESTHETIST, CERTIFIED REGISTERED

## 2024-09-23 PROCEDURE — 27130 TOTAL HIP ARTHROPLASTY: CPT | Mod: LT,,, | Performed by: ORTHOPAEDIC SURGERY

## 2024-09-23 PROCEDURE — 97535 SELF CARE MNGMENT TRAINING: CPT

## 2024-09-23 PROCEDURE — 63600175 PHARM REV CODE 636 W HCPCS: Mod: JZ,JG | Performed by: ANESTHESIOLOGY

## 2024-09-23 PROCEDURE — C1769 GUIDE WIRE: HCPCS | Performed by: ORTHOPAEDIC SURGERY

## 2024-09-23 PROCEDURE — 25000003 PHARM REV CODE 250: Performed by: ANESTHESIOLOGY

## 2024-09-23 PROCEDURE — 36000710: Performed by: ORTHOPAEDIC SURGERY

## 2024-09-23 PROCEDURE — 36000711: Performed by: ORTHOPAEDIC SURGERY

## 2024-09-23 PROCEDURE — 37000009 HC ANESTHESIA EA ADD 15 MINS: Performed by: ORTHOPAEDIC SURGERY

## 2024-09-23 PROCEDURE — 97165 OT EVAL LOW COMPLEX 30 MIN: CPT

## 2024-09-23 PROCEDURE — 37000008 HC ANESTHESIA 1ST 15 MINUTES: Performed by: ORTHOPAEDIC SURGERY

## 2024-09-23 PROCEDURE — 36415 COLL VENOUS BLD VENIPUNCTURE: CPT | Performed by: ORTHOPAEDIC SURGERY

## 2024-09-23 PROCEDURE — 71000033 HC RECOVERY, INTIAL HOUR: Performed by: ORTHOPAEDIC SURGERY

## 2024-09-23 PROCEDURE — 63600175 PHARM REV CODE 636 W HCPCS: Mod: JZ,JG | Performed by: ORTHOPAEDIC SURGERY

## 2024-09-23 PROCEDURE — 27200688 HC TRAY, SPINAL-HYPER/ ISOBARIC: Performed by: ANESTHESIOLOGY

## 2024-09-23 PROCEDURE — 71000015 HC POSTOP RECOV 1ST HR: Performed by: ORTHOPAEDIC SURGERY

## 2024-09-23 PROCEDURE — 86901 BLOOD TYPING SEROLOGIC RH(D): CPT | Performed by: ORTHOPAEDIC SURGERY

## 2024-09-23 PROCEDURE — C1776 JOINT DEVICE (IMPLANTABLE): HCPCS | Performed by: ORTHOPAEDIC SURGERY

## 2024-09-23 PROCEDURE — 71000039 HC RECOVERY, EACH ADD'L HOUR: Performed by: ORTHOPAEDIC SURGERY

## 2024-09-23 DEVICE — SUMMIT FEMORAL STEM 12/14 TAPER TAPER ED W/POROCOAT SIZE 4 STD 140MM
Type: IMPLANTABLE DEVICE | Site: HIP | Status: FUNCTIONAL
Brand: SUMMIT POROCOAT

## 2024-09-23 DEVICE — BIOLOX DELTA CERAMIC FEMORAL HEAD +1.5 36MM DIA 12/14 TAPER
Type: IMPLANTABLE DEVICE | Site: HIP | Status: FUNCTIONAL
Brand: BIOLOX DELTA

## 2024-09-23 DEVICE — PINNACLE GRIPTION ACETABULAR SHELL SECTOR 54MM OD
Type: IMPLANTABLE DEVICE | Site: HIP | Status: FUNCTIONAL
Brand: PINNACLE GRIPTION

## 2024-09-23 DEVICE — PINNACLE HIP SOLUTIONS ALTRX POLYETHYLENE ACETABULAR LINER +4 10 DEGREE 36MM ID 54MM OD
Type: IMPLANTABLE DEVICE | Site: HIP | Status: FUNCTIONAL
Brand: PINNACLE ALTRX

## 2024-09-23 RX ORDER — FENTANYL CITRATE 50 UG/ML
25 INJECTION, SOLUTION INTRAMUSCULAR; INTRAVENOUS EVERY 5 MIN PRN
Status: DISCONTINUED | OUTPATIENT
Start: 2024-09-23 | End: 2024-09-23 | Stop reason: HOSPADM

## 2024-09-23 RX ORDER — ONDANSETRON HYDROCHLORIDE 2 MG/ML
INJECTION, SOLUTION INTRAVENOUS
Status: DISCONTINUED | OUTPATIENT
Start: 2024-09-23 | End: 2024-09-23

## 2024-09-23 RX ORDER — DEXTROSE MONOHYDRATE AND SODIUM CHLORIDE 5; .45 G/100ML; G/100ML
INJECTION, SOLUTION INTRAVENOUS CONTINUOUS
Status: CANCELLED | OUTPATIENT
Start: 2024-09-23

## 2024-09-23 RX ORDER — CEFAZOLIN SODIUM 1 G/50ML
1 SOLUTION INTRAVENOUS
Status: CANCELLED | OUTPATIENT
Start: 2024-09-23 | End: 2024-09-24

## 2024-09-23 RX ORDER — PROPOFOL 10 MG/ML
VIAL (ML) INTRAVENOUS CONTINUOUS PRN
Status: DISCONTINUED | OUTPATIENT
Start: 2024-09-23 | End: 2024-09-23

## 2024-09-23 RX ORDER — MIDAZOLAM HYDROCHLORIDE 1 MG/ML
INJECTION INTRAMUSCULAR; INTRAVENOUS
Status: DISCONTINUED | OUTPATIENT
Start: 2024-09-23 | End: 2024-09-23

## 2024-09-23 RX ORDER — MUPIROCIN 20 MG/G
1 OINTMENT TOPICAL 2 TIMES DAILY
Status: CANCELLED | OUTPATIENT
Start: 2024-09-23 | End: 2024-09-28

## 2024-09-23 RX ORDER — TRANEXAMIC ACID 100 MG/ML
INJECTION, SOLUTION INTRAVENOUS
Status: DISCONTINUED | OUTPATIENT
Start: 2024-09-23 | End: 2024-09-23

## 2024-09-23 RX ORDER — PHENYLEPHRINE HYDROCHLORIDE 10 MG/ML
INJECTION INTRAVENOUS
Status: DISCONTINUED | OUTPATIENT
Start: 2024-09-23 | End: 2024-09-23

## 2024-09-23 RX ORDER — MEPERIDINE HYDROCHLORIDE 50 MG/ML
12.5 INJECTION INTRAMUSCULAR; INTRAVENOUS; SUBCUTANEOUS ONCE
Status: DISCONTINUED | OUTPATIENT
Start: 2024-09-23 | End: 2024-09-23 | Stop reason: HOSPADM

## 2024-09-23 RX ORDER — GLYCOPYRROLATE 0.2 MG/ML
INJECTION INTRAMUSCULAR; INTRAVENOUS
Status: DISCONTINUED | OUTPATIENT
Start: 2024-09-23 | End: 2024-09-23

## 2024-09-23 RX ORDER — BUPIVACAINE HYDROCHLORIDE 7.5 MG/ML
INJECTION, SOLUTION EPIDURAL; RETROBULBAR
Status: COMPLETED | OUTPATIENT
Start: 2024-09-23 | End: 2024-09-23

## 2024-09-23 RX ORDER — SODIUM CHLORIDE 9 MG/ML
INJECTION, SOLUTION INTRAVENOUS CONTINUOUS
Status: DISCONTINUED | OUTPATIENT
Start: 2024-09-23 | End: 2024-09-23 | Stop reason: HOSPADM

## 2024-09-23 RX ORDER — KETAMINE HCL IN 0.9 % NACL 50 MG/5 ML
SYRINGE (ML) INTRAVENOUS
Status: DISCONTINUED | OUTPATIENT
Start: 2024-09-23 | End: 2024-09-23

## 2024-09-23 RX ORDER — OXYCODONE HYDROCHLORIDE 5 MG/1
5 TABLET ORAL
Status: DISCONTINUED | OUTPATIENT
Start: 2024-09-23 | End: 2024-09-23 | Stop reason: HOSPADM

## 2024-09-23 RX ORDER — ZOLPIDEM TARTRATE 5 MG/1
5 TABLET ORAL NIGHTLY PRN
Status: CANCELLED | OUTPATIENT
Start: 2024-09-23

## 2024-09-23 RX ORDER — ONDANSETRON HYDROCHLORIDE 2 MG/ML
4 INJECTION, SOLUTION INTRAVENOUS ONCE
Status: DISCONTINUED | OUTPATIENT
Start: 2024-09-23 | End: 2024-09-23 | Stop reason: HOSPADM

## 2024-09-23 RX ORDER — CELECOXIB 100 MG/1
400 CAPSULE ORAL ONCE
Status: COMPLETED | OUTPATIENT
Start: 2024-09-23 | End: 2024-09-23

## 2024-09-23 RX ORDER — LIDOCAINE HYDROCHLORIDE 20 MG/ML
INJECTION INTRAVENOUS
Status: DISCONTINUED | OUTPATIENT
Start: 2024-09-23 | End: 2024-09-23

## 2024-09-23 RX ORDER — LIDOCAINE HYDROCHLORIDE 10 MG/ML
1 INJECTION, SOLUTION EPIDURAL; INFILTRATION; INTRACAUDAL; PERINEURAL ONCE
Status: DISCONTINUED | OUTPATIENT
Start: 2024-09-23 | End: 2024-09-23 | Stop reason: HOSPADM

## 2024-09-23 RX ORDER — CLINDAMYCIN PHOSPHATE 900 MG/50ML
900 INJECTION, SOLUTION INTRAVENOUS
Status: COMPLETED | OUTPATIENT
Start: 2024-09-23 | End: 2024-09-23

## 2024-09-23 RX ORDER — GLUCAGON 1 MG
1 KIT INJECTION
Status: DISCONTINUED | OUTPATIENT
Start: 2024-09-23 | End: 2024-09-23 | Stop reason: HOSPADM

## 2024-09-23 RX ORDER — DEXAMETHASONE SODIUM PHOSPHATE 10 MG/ML
INJECTION INTRAMUSCULAR; INTRAVENOUS
Status: DISCONTINUED | OUTPATIENT
Start: 2024-09-23 | End: 2024-09-23

## 2024-09-23 RX ORDER — DIPHENHYDRAMINE HYDROCHLORIDE 50 MG/ML
25 INJECTION INTRAMUSCULAR; INTRAVENOUS EVERY 6 HOURS PRN
Status: DISCONTINUED | OUTPATIENT
Start: 2024-09-23 | End: 2024-09-23 | Stop reason: HOSPADM

## 2024-09-23 RX ORDER — FENTANYL CITRATE 50 UG/ML
25 INJECTION, SOLUTION INTRAMUSCULAR; INTRAVENOUS EVERY 5 MIN PRN
Status: COMPLETED | OUTPATIENT
Start: 2024-09-23 | End: 2024-09-23

## 2024-09-23 RX ORDER — HYDROCODONE BITARTRATE AND ACETAMINOPHEN 5; 325 MG/1; MG/1
1 TABLET ORAL EVERY 4 HOURS PRN
Status: CANCELLED | OUTPATIENT
Start: 2024-09-23

## 2024-09-23 RX ORDER — FAMOTIDINE 20 MG/1
20 TABLET, FILM COATED ORAL 2 TIMES DAILY
Status: CANCELLED | OUTPATIENT
Start: 2024-09-23

## 2024-09-23 RX ORDER — SODIUM CHLORIDE 0.9 % (FLUSH) 0.9 %
5 SYRINGE (ML) INJECTION
Status: DISCONTINUED | OUTPATIENT
Start: 2024-09-23 | End: 2024-09-23 | Stop reason: HOSPADM

## 2024-09-23 RX ORDER — ACETAMINOPHEN 500 MG
1000 TABLET ORAL
Status: COMPLETED | OUTPATIENT
Start: 2024-09-23 | End: 2024-09-23

## 2024-09-23 RX ORDER — PROCHLORPERAZINE EDISYLATE 5 MG/ML
5 INJECTION INTRAMUSCULAR; INTRAVENOUS EVERY 4 HOURS PRN
Status: DISCONTINUED | OUTPATIENT
Start: 2024-09-23 | End: 2024-09-23 | Stop reason: HOSPADM

## 2024-09-23 RX ORDER — OXYCODONE HCL 10 MG/1
10 TABLET, FILM COATED, EXTENDED RELEASE ORAL ONCE
Status: COMPLETED | OUTPATIENT
Start: 2024-09-23 | End: 2024-09-23

## 2024-09-23 RX ORDER — FENTANYL CITRATE 50 UG/ML
INJECTION, SOLUTION INTRAMUSCULAR; INTRAVENOUS
Status: DISCONTINUED | OUTPATIENT
Start: 2024-09-23 | End: 2024-09-23

## 2024-09-23 RX ORDER — BISACODYL 10 MG/1
10 SUPPOSITORY RECTAL DAILY
Status: CANCELLED | OUTPATIENT
Start: 2024-09-23 | End: 2024-09-26

## 2024-09-23 RX ADMIN — CLINDAMYCIN PHOSPHATE 900 MG: 18 INJECTION, SOLUTION INTRAVENOUS at 11:09

## 2024-09-23 RX ADMIN — FENTANYL CITRATE 25 MCG: 50 INJECTION INTRAMUSCULAR; INTRAVENOUS at 02:09

## 2024-09-23 RX ADMIN — PHENYLEPHRINE HYDROCHLORIDE 100 MCG: 10 INJECTION INTRAVENOUS at 12:09

## 2024-09-23 RX ADMIN — OXYCODONE HYDROCHLORIDE 5 MG: 5 TABLET ORAL at 04:09

## 2024-09-23 RX ADMIN — FENTANYL CITRATE 25 MCG: 50 INJECTION, SOLUTION INTRAMUSCULAR; INTRAVENOUS at 11:09

## 2024-09-23 RX ADMIN — TRANEXAMIC ACID 1000 MG: 100 INJECTION, SOLUTION INTRAVENOUS at 11:09

## 2024-09-23 RX ADMIN — SODIUM CHLORIDE, SODIUM GLUCONATE, SODIUM ACETATE, POTASSIUM CHLORIDE AND MAGNESIUM CHLORIDE: 526; 502; 368; 37; 30 INJECTION, SOLUTION INTRAVENOUS at 12:09

## 2024-09-23 RX ADMIN — SODIUM CHLORIDE, SODIUM GLUCONATE, SODIUM ACETATE, POTASSIUM CHLORIDE AND MAGNESIUM CHLORIDE: 526; 502; 368; 37; 30 INJECTION, SOLUTION INTRAVENOUS at 01:09

## 2024-09-23 RX ADMIN — ACETAMINOPHEN 1000 MG: 500 TABLET ORAL at 10:09

## 2024-09-23 RX ADMIN — SODIUM CHLORIDE, SODIUM GLUCONATE, SODIUM ACETATE, POTASSIUM CHLORIDE AND MAGNESIUM CHLORIDE: 526; 502; 368; 37; 30 INJECTION, SOLUTION INTRAVENOUS at 10:09

## 2024-09-23 RX ADMIN — Medication 30 MG: at 11:09

## 2024-09-23 RX ADMIN — OXYCODONE HYDROCHLORIDE 5 MG: 5 TABLET ORAL at 02:09

## 2024-09-23 RX ADMIN — OXYCODONE HYDROCHLORIDE 10 MG: 10 TABLET, FILM COATED, EXTENDED RELEASE ORAL at 10:09

## 2024-09-23 RX ADMIN — Medication 20 MG: at 12:09

## 2024-09-23 RX ADMIN — MIDAZOLAM HYDROCHLORIDE 2 MG: 1 INJECTION INTRAMUSCULAR; INTRAVENOUS at 11:09

## 2024-09-23 RX ADMIN — PROPOFOL 50 MCG/KG/MIN: 10 INJECTION, EMULSION INTRAVENOUS at 11:09

## 2024-09-23 RX ADMIN — ONDANSETRON 4 MG: 2 INJECTION INTRAMUSCULAR; INTRAVENOUS at 11:09

## 2024-09-23 RX ADMIN — FENTANYL CITRATE 25 MCG: 50 INJECTION, SOLUTION INTRAMUSCULAR; INTRAVENOUS at 12:09

## 2024-09-23 RX ADMIN — GLYCOPYRROLATE 0.2 MG: 0.2 INJECTION INTRAMUSCULAR; INTRAVENOUS at 11:09

## 2024-09-23 RX ADMIN — BUPIVACAINE HYDROCHLORIDE 1.4 ML: 7.5 INJECTION, SOLUTION EPIDURAL; RETROBULBAR at 11:09

## 2024-09-23 RX ADMIN — DEXAMETHASONE SODIUM PHOSPHATE 8 MG: 10 INJECTION, SOLUTION INTRAMUSCULAR; INTRAVENOUS at 11:09

## 2024-09-23 RX ADMIN — CELECOXIB 400 MG: 100 CAPSULE ORAL at 10:09

## 2024-09-23 RX ADMIN — LIDOCAINE HYDROCHLORIDE 50 MG: 20 INJECTION, SOLUTION INTRAVENOUS at 11:09

## 2024-09-23 RX ADMIN — SODIUM CHLORIDE 1000 ML: 9 INJECTION, SOLUTION INTRAVENOUS at 10:09

## 2024-09-23 RX ADMIN — PHENYLEPHRINE HYDROCHLORIDE 100 MCG: 10 INJECTION INTRAVENOUS at 01:09

## 2024-09-23 NOTE — PT/OT/SLP EVAL
Occupational Therapy   Evaluation and Discharge Note    Name: Lee Quintanilla  MRN: [id number]  Admitting Diagnosis: <principal problem not specified>  Recent Surgery: Procedure(s) (LRB):  ARTHROPLASTY, HIP (Left) Day of Surgery    Recommendations:     Discharge Recommendations: Low Intensity Therapy  Discharge Equipment Recommendations: shower chair  Barriers to discharge:  None    Assessment:     Lee Quintanilla is a 49 y.o. male with a medical diagnosis of L MARINA. Pt agreed to participate in OT. Pt was given education on hip precautions including no hip flexion greater than 90 degrees, no IR of hip and no adduction of hip. Pt required CGA with sit <> stand transfer using a RW. He required mod A to don his shorts using a reacher. Pt was given education on use of DME including a raised toilet seat and shower chair to maintain precautions during functional transfers. Pt is scheduled for discharge today and would benefit from skilled home health OT services to increase his independence with ADLs and functional transfer skills to return to his PLOF. At this time, patient does not require further acute OT services.     Plan:     During this hospitalization, patient does not require further acute OT services.  Please re-consult if situation changes.      Subjective     Chief Complaint: Left hip pain  Patient/Family Comments/goals: Patient agreed to participate in OT.    Occupational Profile:  Living Environment: Pt lives in a single story house.  Previous level of function: Independent   Assistance upon Discharge: family    Pain/Comfort:  Pain Rating 1: 8/10  Location - Side 1: Left  Location 1: hip    Objective:     Communicated with: nurse prior to session.  Patient found HOB elevated with blood pressure cuff and peripheral IV upon OT entry to room.    General Precautions: Standard, fall  Orthopedic Precautions: LLE weight bearing as tolerated, LLE posterior precautions  Braces: hip abduction  pillow  Respiratory Status: Room air     Occupational Performance:    Bed Mobility:    Patient completed Scooting/Bridging with stand by assistance  Patient completed Supine to Sit with stand by assistance    Functional Mobility/Transfers:  Patient completed Sit <> Stand Transfer with contact guard assistance with rolling walker   Patient completed Bed > Chair Transfer using Step Transfer technique with contact guard assistance with rolling walker  Functional Mobility: ~ 25 ft using a RW with CGA    Activities of Daily Living:  Upper Body Dressing: stand by assistance  Lower Body Dressing: moderate assistance using a reacher  Toileting: contact guard assistance    Cognitive/Visual Perceptual:  Cognitive/Psychosocial Skills:  -       Oriented to: Person, Place, Time, and Situation   -       Follows Commands/attention:Follows multistep commands  -       Communication: clear/fluent  -       Memory: No Deficits noted  -       Safety awareness/insight to disability: intact   -       Mood/Affect/Coping skills/emotional control: Cooperative and Pleasant    Physical Exam:  Upper Extremity Range of Motion:  -       Right Upper Extremity: WFL  -       Left Upper Extremity: WFL  Upper Extremity Strength: -       Right Upper Extremity: WFL  -       Left Upper Extremity: WFL    Treatment & Education:  Educated on posterior hip precautions - patient recalled hip precautions   Educated on use of a reacher for LB dressing   Educated on safety with functional mobility; hand placement to ensure safe transfers to various surfaces in prep for ADLs   Education on use of DME including a raised toilet seat and shower chair to maintain precautions during functional transfers     Patient left up in chair with all lines intact and nurse present.    GOALS:   Multidisciplinary Problems       Occupational Therapy Goals       Not on file                    History:     Past Medical History:   Diagnosis Date    ADHD (attention deficit  hyperactivity disorder)     Arthritis     Asthma     as child only    Back pain     Coronary artery disease     Degeneration of lumbar intervertebral disc 05/2016    Depression     Digestive disorder     Elevated PSA     Headache     Hyperlipidemia     Hypertension     Kidney damage     stage 3 ; d/t aleve abuse    Kidney stone     Myocardial infarction     Neck pain     Numbness and tingling in hands     Numbness and tingling of both legs     Osteonecrosis     Bilat Femur    Seizures     from inapsine 2002    Sleep apnea     no cpap    Wears glasses     CONTACS         Past Surgical History:   Procedure Laterality Date    BACK SURGERY  2016    back surgery    BONE GRAFT N/A 11/5/2020    Procedure: BONE GRAFT;  Surgeon: Mateusz Blanco MD;  Location: [facility] OR;  Service: Orthopedics;  Laterality: N/A;    CARDIAC SURGERY  01/06/2020    CABG X 7    CORONARY ARTERY BYPASS GRAFT      7 vessels    FLEXIBLE SIGMOIDOSCOPY N/A 7/24/2024    Procedure: SIGMOIDOSCOPY, FLEXIBLE;  Surgeon: Latesha Victoria MD;  Location: [facility];  Service: Endoscopy;  Laterality: N/A;    HERNIA REPAIR Bilateral 11/22/2017    LITHOTRIPSY      LUMBAR LAMINECTOMY WITH FUSION Bilateral 11/5/2020    Procedure: LAMINECTOMY, SPINE, LUMBAR, WITH FUSION;  Surgeon: Mateusz Blanco MD;  Location: [facility] OR;  Service: Orthopedics;  Laterality: Bilateral;  MEDTRONIC  NTI  L-3    PILONIDAL CYST DRAINAGE      removal of abcess      scrotal    REMOVAL OF HARDWARE FROM SPINE Bilateral 11/5/2020    Procedure: REMOVAL, HARDWARE, SPINE;  Surgeon: Mateusz Blanco MD;  Location: [facility] OR;  Service: Orthopedics;  Laterality: Bilateral;  L 4-5    SURGICAL REMOVAL OF PILONIDAL CYST N/A 4/15/2024    Procedure: EXCISION, PILONIDAL CYST;  Surgeon: Jayden Phillips III, MD;  Location: [facility] OR;  Service: General;  Laterality: N/A;  sacral area    TYMPANOSTOMY TUBE PLACEMENT         Time Tracking:     OT Date of Treatment: 09/23/24  OT Start Time: 1645  OT Stop Time:  1712  OT Total Time (min): 27 min    Billable Minutes:Evaluation 15  Self Care/Home Management 12    9/23/2024

## 2024-09-23 NOTE — ANESTHESIA PROCEDURE NOTES
Spinal    Diagnosis: osteoarthritis  Patient location during procedure: OR  Start time: 9/23/2024 11:54 AM  Timeout: 9/23/2024 11:54 AM  End time: 9/23/2024 11:58 AM    Staffing  Authorizing Provider: Ezekiel Egan MD  Performing Provider: Ezekiel Egan MD    Staffing  Performed by: Ezekiel Egan MD  Authorized by: Ezekiel Egan MD    Preanesthetic Checklist  Completed: patient identified, IV checked, site marked, risks and benefits discussed, surgical consent, monitors and equipment checked, pre-op evaluation and timeout performed  Spinal Block  Patient position: sitting  Prep: ChloraPrep  Patient monitoring: heart rate, cardiac monitor, continuous pulse ox, continuous capnometry and frequent blood pressure checks  Approach: midline  Location: L4-5  Injection technique: single shot  CSF Fluid: clear free-flowing CSF  Needle  Needle type: pencil-tip   Needle gauge: 25 G  Needle length: 3.5 in  Additional Documentation: incremental injection, negative aspiration for heme and no paresthesia on injection  Needle localization: anatomical landmarks  Assessment  Sensory level: T10   Dermatomal levels determined by alcohol wipe  Ease of block: easy  Patient's tolerance of the procedure: comfortable throughout block and no complaints  Medications:    Medications: bupivacaine (pf) (MARCAINE) injection 0.75% - Intraspinal   1.4 mL - 9/23/2024 11:58:00 AM

## 2024-09-23 NOTE — H&P
CC/Indication for Procedure: 49 y.o. male with Avascular necrosis of hip, left [M87.052]  Pre-op testing [Z01.818]  Current use of anticoagulant therapy [Z79.01]  Avascular necrosis of bone of left hip [M87.052].    Patient scheduled for OR TOTAL HIP ARTHROPLASTY [44262] (ARTHROPLASTY, HIP, LEFT)  OR TOTAL HIP ARTHROPLASTY [59268].    Past Medical History:   Diagnosis Date    ADHD (attention deficit hyperactivity disorder)     Arthritis     Asthma     as child only    Back pain     Coronary artery disease     Degeneration of lumbar intervertebral disc 05/2016    Depression     Digestive disorder     Elevated PSA     Headache     Hyperlipidemia     Hypertension     Kidney damage     stage 3 ; d/t aleve abuse    Kidney stone     Myocardial infarction     Neck pain     Numbness and tingling in hands     Numbness and tingling of both legs     Osteonecrosis     Bilat Femur    Seizures     from inapsine 2002    Sleep apnea     no cpap    Wears glasses     CONTACS     Past Surgical History:   Procedure Laterality Date    BACK SURGERY  2016    back surgery    BONE GRAFT N/A 11/5/2020    Procedure: BONE GRAFT;  Surgeon: Mateusz Blanco MD;  Location: [facility];  Service: Orthopedics;  Laterality: N/A;    CARDIAC SURGERY  01/06/2020    CABG X 7    CORONARY ARTERY BYPASS GRAFT      7 vessels    FLEXIBLE SIGMOIDOSCOPY N/A 7/24/2024    Procedure: SIGMOIDOSCOPY, FLEXIBLE;  Surgeon: Latesha Victoria MD;  Location: [facility];  Service: Endoscopy;  Laterality: N/A;    HERNIA REPAIR Bilateral 11/22/2017    LITHOTRIPSY      LUMBAR LAMINECTOMY WITH FUSION Bilateral 11/5/2020    Procedure: LAMINECTOMY, SPINE, LUMBAR, WITH FUSION;  Surgeon: Mateusz Blanco MD;  Location: [facility];  Service: Orthopedics;  Laterality: Bilateral;  MEDTRONIC  NTI  L-3    PILONIDAL CYST DRAINAGE      removal of abcess      scrotal    REMOVAL OF HARDWARE FROM SPINE Bilateral 11/5/2020    Procedure: REMOVAL, HARDWARE, SPINE;  Surgeon: Mateusz Blanco MD;   Location: [facility] OR;  Service: Orthopedics;  Laterality: Bilateral;  L 4-5    SURGICAL REMOVAL OF PILONIDAL CYST N/A 4/15/2024    Procedure: EXCISION, PILONIDAL CYST;  Surgeon: Jayden Phillips III, MD;  Location: [facility] OR;  Service: General;  Laterality: N/A;  sacral area    TYMPANOSTOMY TUBE PLACEMENT       Family History   Problem Relation Name Age of Onset    Heart disease Mother      Migraines Mother      Hypertension Mother      Early death Father accident     Heart disease Father accident     Diabetes Maternal Aunt      Diabetes Maternal Uncle      Diabetes Maternal Grandfather       Social History     Socioeconomic History    Marital status:    Occupational History    Occupation: disability   Tobacco Use    Smoking status: Former     Current packs/day: 0.00     Types: Cigarettes     Quit date: 2016     Years since quittin.7     Passive exposure: Past    Smokeless tobacco: Former     Quit date: 2016    Tobacco comments:     Occasional  vaping   Substance and Sexual Activity    Alcohol use: Yes     Alcohol/week: 1.0 standard drink of alcohol     Types: 1 Glasses of wine per week     Comment: 20 - 30 shots vodka per day or 1-2 1/5ths per day for 2 years (started )    Drug use: No    Sexual activity: Yes     Partners: Female     Social Determinants of Health     Financial Resource Strain: Medium Risk (3/18/2024)    Overall Financial Resource Strain (CARDIA)     Difficulty of Paying Living Expenses: Somewhat hard   Food Insecurity: No Food Insecurity (3/18/2024)    Hunger Vital Sign     Worried About Running Out of Food in the Last Year: Never true     Ran Out of Food in the Last Year: Never true   Transportation Needs: No Transportation Needs (3/18/2024)    PRAPARE - Transportation     Lack of Transportation (Medical): No     Lack of Transportation (Non-Medical): No   Physical Activity: Insufficiently Active (3/18/2024)    Exercise Vital Sign     Days of Exercise per Week: 2 days      Minutes of Exercise per Session: 60 min   Stress: No Stress Concern Present (3/18/2024)    [employer] - Occupational Stress Questionnaire     Feeling of Stress : Only a little   Housing Stability: Unknown (3/18/2024)    Housing Stability Vital Sign     Unable to Pay for Housing in the Last Year: No     Unstable Housing in the Last Year: No       Review of patient's allergies indicates:   Allergen Reactions    Inapsine [droperidol] Anaphylaxis     seizures    Effexor [venlafaxine]      Increased anxiety    Pcn [penicillins]     Bactrim [sulfamethoxazole-trimethoprim] Rash     Dry red rash all over when in the sun    Fluconazole Rash     Pruritis           Current Facility-Administered Medications:     0.9%  NaCl infusion, , Intravenous, Continuous, Arnol Escobar MD    acetaminophen tablet 1,000 mg, 1,000 mg, Oral, Once Pre-Op, Arnol Escobar MD    celecoxib capsule 400 mg, 400 mg, Oral, Once, Arnol Escobar MD    clindamycin in D5W 900 mg/50 mL IVPB 900 mg, 900 mg, Intravenous, On Call Procedure, Lázaro Jaimes MD    fentaNYL 50 mcg/mL injection 25 mcg, 25 mcg, Intravenous, Q5 Min PRN, Arnol Escobar MD    LIDOcaine (PF) 10 mg/ml (1%) injection 10 mg, 1 mL, Intradermal, Once, Arnol Escobar MD    oxyCODONE 12 hr tablet 10 mg, 10 mg, Oral, Once, Arnol Escobar MD    sodium chloride 0.9% bolus 1,000 mL 1,000 mL, 1,000 mL, Intravenous, Once, Arnol Escobar MD    tranexamic acid (CYKLOKAPRON) 1,000 mg in 0.9% NaCl 100 mL IVPB (MB+), 1,000 mg, Intravenous, On Call Procedure, Lázaro Jaimes MD    ROS:    Denies chest pain or palpitations  Denies shortness of breath  Denies fevers or chills  Denies chest pain  Denies abdominal pain    PE:    General Appearance: Well nourished  Orientation: Oriented to time, place, person  Mental Status: Alert  Heart: RRR  Lungs: CTA  Abdomen: Soft and non-tender    Anesthesia/Surgery risks, benefits and alternative options  discussed and understood by patient/family.    This note was created using Dragon voice recognition software that occasionally misinterpreted phrases or words.

## 2024-09-23 NOTE — ANESTHESIA PREPROCEDURE EVALUATION
09/23/2024  Lee Quintanilla is a 49 y.o., male.      Pre-op Assessment    I have reviewed the Patient Summary Reports.     I have reviewed the Nursing Notes. I have reviewed the NPO Status.   I have reviewed the Medications.     Review of Systems  Anesthesia Hx:  No problems with previous Anesthesia                Social:  Former Smoker       Cardiovascular:     Hypertension  Past MI CAD                  Coronary Artery Disease:          Hx of Myocardial Infarction                  Hypertension         Pulmonary:    Asthma    Sleep Apnea   Asthma:    Obstructive Sleep Apnea (OLEG).           Renal/:  Chronic Renal Disease        Kidney Function/Disease             Hepatic/GI:     GERD      Gerd          Musculoskeletal:  Arthritis        Arthritis     Spine Disorders: lumbar            Neurological:    Neuromuscular Disease,  Headaches Seizures     Dx of Headaches   Arthritis      Seizure Disorder                        Neuromuscular Disease   Endocrine:        Obesity / BMI > 30  Psych:  Psychiatric History                  Physical Exam  General: Well nourished, Cooperative, Alert and Oriented    Airway:  Mallampati: II / I  Mouth Opening: Normal  TM Distance: Normal  Tongue: Normal    Dental:  Intact    Chest/Lungs:  Normal Respiratory Rate    Heart:  Rate: Normal        Anesthesia Plan  Type of Anesthesia, risks & benefits discussed:    Anesthesia Type: Spinal, Gen Supraglottic Airway, Gen ETT  Intra-op Monitoring Plan: Standard ASA Monitors  Post Op Pain Control Plan: multimodal analgesia and IV/PO Opioids PRN  Induction:  IV  Airway Plan: Direct and Video, Post-Induction  Informed Consent: Informed consent signed with the Patient and all parties understand the risks and agree with anesthesia plan.  All questions answered. Patient consented to blood products? Yes  ASA Score: 3  Anesthesia Plan  Notes: Patient with history of lumbar fusion/ multiple procedures.  Discussed attempting spinal with low threshold for converting to general.      Ready For Surgery From Anesthesia Perspective.     .

## 2024-09-23 NOTE — ANESTHESIA POSTPROCEDURE EVALUATION
Anesthesia Post Evaluation    Patient: Lee Quintanilla    Procedure(s) Performed: Procedure(s) (LRB):  ARTHROPLASTY, HIP (Left)    Final Anesthesia Type: spinal      Patient location during evaluation: PACU  Patient participation: Yes- Able to Participate  Level of consciousness: awake and alert  Post-procedure vital signs: reviewed and stable  Pain management: adequate  Airway patency: patent    PONV status at discharge: No PONV  Anesthetic complications: no      Cardiovascular status: hemodynamically stable  Respiratory status: unassisted and room air  Hydration status: euvolemic  Follow-up not needed.              Vitals Value Taken Time   /61 09/23/24 1525   Temp 36.7 °C (98 °F) 09/23/24 1515   Pulse 50 09/23/24 1525   Resp 16 09/23/24 1621   SpO2 97 % 09/23/24 1525         Event Time   Out of Recovery 15:25:00         Pain/Tanmay Score: Pain Rating Prior to Med Admin: 8 (9/23/2024  4:21 PM)  Pain Rating Post Med Admin: 8 (9/23/2024  2:51 PM)  Tanmay Score: 10 (9/23/2024  4:23 PM)

## 2024-09-23 NOTE — PROGRESS NOTES
SUBJECTIVE:    Patient ID: Lee Quintanilla is a 49 y.o. male.    Chief Complaint: Follow-up (No bottles//Pt is here for a 6 month follow up//Requesting flu shot//KE)    49 year old  male presents for  check up . He has a past medical history of ADHD (attention deficit hyperactivity disorder), Arthritis, Asthma, Back pain, Coronary artery disease, Degeneration of lumbar intervertebral disc (05/2016), Depression, Elevated PSA, Headache, Hyperlipidemia, Hypertension, Kidney damage, Kidney stone, Myocardial infarction, Neck pain, Numbness and tingling in hands, Numbness and tingling of both legs, Seizures, Sleep apnea, avascular necrosis. Planning hip surgery next week with dr. Jaimes. Under lots of stress. Has not seen psych. Lexapro has helped some. Abdominal pain improving. Less nausea. Requesting refill on ativan. Denies alcohol use.     Follow-up  Associated symptoms include congestion and a sore throat. Pertinent negatives include no arthralgias, chest pain, headaches, joint swelling, neck pain, vomiting or weakness.       Hospital Outpatient Visit on 09/04/2024   Component Date Value Ref Range Status    WBC 09/04/2024 10.95  3.90 - 12.70 K/uL Final    RBC 09/04/2024 5.18  4.60 - 6.20 M/uL Final    Hemoglobin 09/04/2024 15.0  14.0 - 18.0 g/dL Final    Hematocrit 09/04/2024 45.9  40.0 - 54.0 % Final    MCV 09/04/2024 89  82 - 98 fL Final    MCH 09/04/2024 29.0  27.0 - 31.0 pg Final    MCHC 09/04/2024 32.7  32.0 - 36.0 g/dL Final    RDW 09/04/2024 16.2 (H)  11.5 - 14.5 % Final    Platelets 09/04/2024 220  150 - 450 K/uL Final    MPV 09/04/2024 11.2  9.2 - 12.9 fL Final    Immature Granulocytes 09/04/2024 0.3  0.0 - 0.5 % Final    Gran # (ANC) 09/04/2024 9.2 (H)  1.8 - 7.7 K/uL Final    Immature Grans (Abs) 09/04/2024 0.03  0.00 - 0.04 K/uL Final    Lymph # 09/04/2024 1.2  1.0 - 4.8 K/uL Final    Mono # 09/04/2024 0.4  0.3 - 1.0 K/uL Final    Eos # 09/04/2024 0.1  0.0 - 0.5 K/uL Final    Baso # 09/04/2024  0.03  0.00 - 0.20 K/uL Final    nRBC 09/04/2024 0  0 /100 WBC Final    Gran % 09/04/2024 83.5 (H)  38.0 - 73.0 % Final    Lymph % 09/04/2024 10.7 (L)  18.0 - 48.0 % Final    Mono % 09/04/2024 4.0  4.0 - 15.0 % Final    Eosinophil % 09/04/2024 1.2  0.0 - 8.0 % Final    Basophil % 09/04/2024 0.3  0.0 - 1.9 % Final    Differential Method 09/04/2024 Automated   Final    Sodium 09/04/2024 135 (L)  136 - 145 mmol/L Final    Potassium 09/04/2024 4.3  3.5 - 5.1 mmol/L Final    Chloride 09/04/2024 103  95 - 110 mmol/L Final    CO2 09/04/2024 22 (L)  23 - 29 mmol/L Final    Glucose 09/04/2024 174 (H)  70 - 110 mg/dL Final    BUN 09/04/2024 12  6 - 20 mg/dL Final    Creatinine 09/04/2024 1.4  0.5 - 1.4 mg/dL Final    Calcium 09/04/2024 9.0  8.7 - 10.5 mg/dL Final    Anion Gap 09/04/2024 10  8 - 16 mmol/L Final    eGFR 09/04/2024 >60.0  >60 mL/min/1.73 m^2 Final    aPTT 09/04/2024 27.1  21.0 - 32.0 sec Final    Prothrombin Time 09/04/2024 10.9  9.0 - 12.5 sec Final    INR 09/04/2024 1.0  0.8 - 1.2 Final    Group & Rh 09/04/2024 A POS   Final    Indirect Ashok 09/04/2024 NEG   Final    Specimen Outdate 09/04/2024 09/18/2024 23:59   Final   Admission on 08/07/2024, Discharged on 08/07/2024   Component Date Value Ref Range Status    WBC 08/07/2024 8.96  3.90 - 12.70 K/uL Final    RBC 08/07/2024 5.83  4.60 - 6.20 M/uL Final    Hemoglobin 08/07/2024 16.1  14.0 - 18.0 g/dL Final    Hematocrit 08/07/2024 49.3  40.0 - 54.0 % Final    MCV 08/07/2024 85  82 - 98 fL Final    MCH 08/07/2024 27.6  27.0 - 31.0 pg Final    MCHC 08/07/2024 32.7  32.0 - 36.0 g/dL Final    RDW 08/07/2024 14.8 (H)  11.5 - 14.5 % Final    Platelets 08/07/2024 239  150 - 450 K/uL Final    MPV 08/07/2024 11.2  9.2 - 12.9 fL Final    Immature Granulocytes 08/07/2024 0.7 (H)  0.0 - 0.5 % Final    Gran # (ANC) 08/07/2024 7.7  1.8 - 7.7 K/uL Final    Immature Grans (Abs) 08/07/2024 0.06 (H)  0.00 - 0.04 K/uL Final    Lymph # 08/07/2024 0.8 (L)  1.0 - 4.8 K/uL Final     Mono # 08/07/2024 0.4  0.3 - 1.0 K/uL Final    Eos # 08/07/2024 0.1  0.0 - 0.5 K/uL Final    Baso # 08/07/2024 0.02  0.00 - 0.20 K/uL Final    nRBC 08/07/2024 0  0 /100 WBC Final    Gran % 08/07/2024 85.3 (H)  38.0 - 73.0 % Final    Lymph % 08/07/2024 8.7 (L)  18.0 - 48.0 % Final    Mono % 08/07/2024 4.4  4.0 - 15.0 % Final    Eosinophil % 08/07/2024 0.7  0.0 - 8.0 % Final    Basophil % 08/07/2024 0.2  0.0 - 1.9 % Final    Differential Method 08/07/2024 Automated   Final    Sodium 08/07/2024 137  136 - 145 mmol/L Final    Potassium 08/07/2024 3.8  3.5 - 5.1 mmol/L Final    Chloride 08/07/2024 104  95 - 110 mmol/L Final    CO2 08/07/2024 23  23 - 29 mmol/L Final    Glucose 08/07/2024 185 (H)  70 - 110 mg/dL Final    BUN 08/07/2024 14  6 - 20 mg/dL Final    Creatinine 08/07/2024 1.4  0.5 - 1.4 mg/dL Final    Calcium 08/07/2024 9.3  8.7 - 10.5 mg/dL Final    Total Protein 08/07/2024 7.8  6.0 - 8.4 g/dL Final    Albumin 08/07/2024 3.9  3.5 - 5.2 g/dL Final    Total Bilirubin 08/07/2024 0.6  0.1 - 1.0 mg/dL Final    Alkaline Phosphatase 08/07/2024 55  55 - 135 U/L Final    AST 08/07/2024 12  10 - 40 U/L Final    ALT 08/07/2024 12  10 - 44 U/L Final    eGFR 08/07/2024 >60.0  >60 mL/min/1.73 m^2 Final    Anion Gap 08/07/2024 10  8 - 16 mmol/L Final    Lipase 08/07/2024 5  4 - 60 U/L Final    Specimen UA 08/07/2024 Urine, Clean Catch   Final    Color, UA 08/07/2024 Yellow  Yellow, Straw, Sasha Final    Appearance, UA 08/07/2024 Clear  Clear Final    pH, UA 08/07/2024 8.0  5.0 - 8.0 Final    Specific Gravity, UA 08/07/2024 >1.030 (A)  1.005 - 1.030 Final    Protein, UA 08/07/2024 Trace (A)  Negative Final    Glucose, UA 08/07/2024 Negative  Negative Final    Ketones, UA 08/07/2024 Trace (A)  Negative Final    Bilirubin (UA) 08/07/2024 Negative  Negative Final    Occult Blood UA 08/07/2024 Negative  Negative Final    Nitrite, UA 08/07/2024 Negative  Negative Final    Urobilinogen, UA 08/07/2024 Negative  Negative EU/dL Final     Leukocytes, UA 08/07/2024 Negative  Negative Final    POC Creatinine 08/07/2024 1.5 (H)  0.5 - 1.4 mg/dL Final    Sample 08/07/2024 VENOUS   Final   Admission on 07/29/2024, Discharged on 07/29/2024   Component Date Value Ref Range Status    WBC 07/29/2024 9.99  3.90 - 12.70 K/uL Final    RBC 07/29/2024 6.46 (H)  4.60 - 6.20 M/uL Final    Hemoglobin 07/29/2024 18.2 (H)  14.0 - 18.0 g/dL Final    Hematocrit 07/29/2024 54.7 (H)  40.0 - 54.0 % Final    MCV 07/29/2024 85  82 - 98 fL Final    MCH 07/29/2024 28.2  27.0 - 31.0 pg Final    MCHC 07/29/2024 33.3  32.0 - 36.0 g/dL Final    RDW 07/29/2024 15.6 (H)  11.5 - 14.5 % Final    Platelets 07/29/2024 254  150 - 450 K/uL Final    MPV 07/29/2024 11.6  9.2 - 12.9 fL Final    Immature Granulocytes 07/29/2024 0.4  0.0 - 0.5 % Final    Gran # (ANC) 07/29/2024 7.9 (H)  1.8 - 7.7 K/uL Final    Immature Grans (Abs) 07/29/2024 0.04  0.00 - 0.04 K/uL Final    Lymph # 07/29/2024 1.3  1.0 - 4.8 K/uL Final    Mono # 07/29/2024 0.5  0.3 - 1.0 K/uL Final    Eos # 07/29/2024 0.2  0.0 - 0.5 K/uL Final    Baso # 07/29/2024 0.05  0.00 - 0.20 K/uL Final    nRBC 07/29/2024 0  0 /100 WBC Final    Gran % 07/29/2024 79.0 (H)  38.0 - 73.0 % Final    Lymph % 07/29/2024 12.9 (L)  18.0 - 48.0 % Final    Mono % 07/29/2024 4.8  4.0 - 15.0 % Final    Eosinophil % 07/29/2024 2.4  0.0 - 8.0 % Final    Basophil % 07/29/2024 0.5  0.0 - 1.9 % Final    Differential Method 07/29/2024 Automated   Final    Sodium 07/29/2024 137  136 - 145 mmol/L Final    Potassium 07/29/2024 3.7  3.5 - 5.1 mmol/L Final    Chloride 07/29/2024 104  95 - 110 mmol/L Final    CO2 07/29/2024 20 (L)  23 - 29 mmol/L Final    Glucose 07/29/2024 168 (H)  70 - 110 mg/dL Final    BUN 07/29/2024 14  6 - 20 mg/dL Final    Creatinine 07/29/2024 1.5 (H)  0.5 - 1.4 mg/dL Final    Calcium 07/29/2024 9.8  8.7 - 10.5 mg/dL Final    Total Protein 07/29/2024 8.3  6.0 - 8.4 g/dL Final    Albumin 07/29/2024 4.2  3.5 - 5.2 g/dL Final    Total  Bilirubin 07/29/2024 0.6  0.1 - 1.0 mg/dL Final    Alkaline Phosphatase 07/29/2024 62  55 - 135 U/L Final    AST 07/29/2024 14  10 - 40 U/L Final    ALT 07/29/2024 16  10 - 44 U/L Final    eGFR 07/29/2024 57.1 (A)  >60 mL/min/1.73 m^2 Final    Anion Gap 07/29/2024 13  8 - 16 mmol/L Final    Specimen UA 07/29/2024 Urine, Clean Catch   Final    Color, UA 07/29/2024 Yellow  Yellow, Straw, Sasha Final    Appearance, UA 07/29/2024 Clear  Clear Final    pH, UA 07/29/2024 8.0  5.0 - 8.0 Final    Specific Gravity, UA 07/29/2024 1.010  1.005 - 1.030 Final    Protein, UA 07/29/2024 Negative  Negative Final    Glucose, UA 07/29/2024 Negative  Negative Final    Ketones, UA 07/29/2024 Negative  Negative Final    Bilirubin (UA) 07/29/2024 Negative  Negative Final    Occult Blood UA 07/29/2024 Negative  Negative Final    Nitrite, UA 07/29/2024 Negative  Negative Final    Urobilinogen, UA 07/29/2024 Negative  Negative EU/dL Final    Leukocytes, UA 07/29/2024 Negative  Negative Final    Lipase 07/29/2024 6  4 - 60 U/L Final    SARS-CoV-2 RNA, Amplification, Qual 07/29/2024 Negative  Negative Final    Magnesium 07/29/2024 1.8  1.6 - 2.6 mg/dL Final    QRS Duration 07/29/2024 98  ms Final    [facility] QTC Calculation 07/29/2024 435  ms Final    Troponin I High Sensitivity 07/29/2024 5.0  0.0 - 14.9 pg/mL Final    BNP 07/29/2024 32  0 - 99 pg/mL Final    Influenza A, Molecular 07/29/2024 Negative  Negative Final    Influenza B, Molecular 07/29/2024 Negative  Negative Final    Flu A & B Source 07/29/2024 Nasal swab   Final    Alcohol, Serum 07/29/2024 <10  <10 mg/dL Final    Troponin I High Sensitivity 07/29/2024 6.2  0.0 - 14.9 pg/mL Final    Blood Culture, Routine 07/29/2024 No growth after 5 days.   Final    Blood Culture, Routine 07/29/2024 No growth after 5 days.   Final    POC Lactate 07/29/2024 0.75  0.5 - 2.2 mmol/L Final    Sample 07/29/2024 VENOUS   Final   Admission on 07/22/2024, Discharged on 07/24/2024   Component Date Value Ref  Range Status    WBC 07/22/2024 9.36  3.90 - 12.70 K/uL Final    RBC 07/22/2024 6.09  4.60 - 6.20 M/uL Final    Hemoglobin 07/22/2024 17.0  14.0 - 18.0 g/dL Final    Hematocrit 07/22/2024 52.5  40.0 - 54.0 % Final    MCV 07/22/2024 86  82 - 98 fL Final    MCH 07/22/2024 27.9  27.0 - 31.0 pg Final    MCHC 07/22/2024 32.4  32.0 - 36.0 g/dL Final    RDW 07/22/2024 15.0 (H)  11.5 - 14.5 % Final    Platelets 07/22/2024 255  150 - 450 K/uL Final    MPV 07/22/2024 11.8  9.2 - 12.9 fL Final    Immature Granulocytes 07/22/2024 0.3  0.0 - 0.5 % Final    Gran # (ANC) 07/22/2024 8.1 (H)  1.8 - 7.7 K/uL Final    Immature Grans (Abs) 07/22/2024 0.03  0.00 - 0.04 K/uL Final    Lymph # 07/22/2024 0.7 (L)  1.0 - 4.8 K/uL Final    Mono # 07/22/2024 0.4  0.3 - 1.0 K/uL Final    Eos # 07/22/2024 0.1  0.0 - 0.5 K/uL Final    Baso # 07/22/2024 0.04  0.00 - 0.20 K/uL Final    nRBC 07/22/2024 0  0 /100 WBC Final    Gran % 07/22/2024 86.4 (H)  38.0 - 73.0 % Final    Lymph % 07/22/2024 7.2 (L)  18.0 - 48.0 % Final    Mono % 07/22/2024 4.3  4.0 - 15.0 % Final    Eosinophil % 07/22/2024 1.4  0.0 - 8.0 % Final    Basophil % 07/22/2024 0.4  0.0 - 1.9 % Final    Differential Method 07/22/2024 Automated   Final    Sodium 07/22/2024 137  136 - 145 mmol/L Final    Potassium 07/22/2024 4.0  3.5 - 5.1 mmol/L Final    Chloride 07/22/2024 105  95 - 110 mmol/L Final    CO2 07/22/2024 23  23 - 29 mmol/L Final    Glucose 07/22/2024 155 (H)  70 - 110 mg/dL Final    BUN 07/22/2024 13  6 - 20 mg/dL Final    Creatinine 07/22/2024 1.6 (H)  0.5 - 1.4 mg/dL Final    Calcium 07/22/2024 9.4  8.7 - 10.5 mg/dL Final    Total Protein 07/22/2024 8.1  6.0 - 8.4 g/dL Final    Albumin 07/22/2024 4.2  3.5 - 5.2 g/dL Final    Total Bilirubin 07/22/2024 0.6  0.1 - 1.0 mg/dL Final    Alkaline Phosphatase 07/22/2024 63  55 - 135 U/L Final    AST 07/22/2024 13  10 - 40 U/L Final    ALT 07/22/2024 13  10 - 44 U/L Final    eGFR 07/22/2024 52.8 (A)  >60 mL/min/1.73 m^2 Final     Anion Gap 07/22/2024 9  8 - 16 mmol/L Final    Specimen UA 07/22/2024 Urine, Clean Catch   Final    Color, UA 07/22/2024 Yellow  Yellow, Straw, Sasha Final    Appearance, UA 07/22/2024 Clear  Clear Final    pH, UA 07/22/2024 8.0  5.0 - 8.0 Final    Specific Gravity, UA 07/22/2024 1.015  1.005 - 1.030 Final    Protein, UA 07/22/2024 Trace (A)  Negative Final    Glucose, UA 07/22/2024 Negative  Negative Final    Ketones, UA 07/22/2024 Negative  Negative Final    Bilirubin (UA) 07/22/2024 Negative  Negative Final    Occult Blood UA 07/22/2024 Negative  Negative Final    Nitrite, UA 07/22/2024 Negative  Negative Final    Urobilinogen, UA 07/22/2024 Negative  Negative EU/dL Final    Leukocytes, UA 07/22/2024 Negative  Negative Final    Lipase 07/22/2024 15  4 - 60 U/L Final    Magnesium 07/22/2024 1.9  1.6 - 2.6 mg/dL Final    Occult Blood 07/23/2024 Negative  Negative Final    Stool Culture 07/23/2024 No Salmonella,Shigella,Vibrio,Campylobacter.   Final    Stool Culture 07/23/2024 No E coli 0157:H7 isolated.   Final    Stool Culture 07/23/2024 No enteric tony   Final    Stool WBC 07/23/2024 No neutrophils seen  No neutrophils seen Final    QRS Duration 07/22/2024 88  ms Final    [facility] QTC Calculation 07/22/2024 427  ms Final    POC Creatinine 07/22/2024 1.7 (H)  0.5 - 1.4 mg/dL Final    Sample 07/22/2024 VENOUS   Final    Troponin I High Sensitivity 07/22/2024 4.0  0.0 - 14.9 pg/mL Final    BNP 07/22/2024 20  0 - 99 pg/mL Final    Sodium 07/23/2024 140  136 - 145 mmol/L Final    Potassium 07/23/2024 4.5  3.5 - 5.1 mmol/L Final    Chloride 07/23/2024 104  95 - 110 mmol/L Final    CO2 07/23/2024 29  23 - 29 mmol/L Final    Glucose 07/23/2024 74  70 - 110 mg/dL Final    BUN 07/23/2024 12  6 - 20 mg/dL Final    Creatinine 07/23/2024 1.5 (H)  0.5 - 1.4 mg/dL Final    Calcium 07/23/2024 9.5  8.7 - 10.5 mg/dL Final    Total Protein 07/23/2024 7.9  6.0 - 8.4 g/dL Final    Albumin 07/23/2024 4.1  3.5 - 5.2 g/dL Final    Total  Bilirubin 07/23/2024 0.7  0.1 - 1.0 mg/dL Final    Alkaline Phosphatase 07/23/2024 65  55 - 135 U/L Final    AST 07/23/2024 15  10 - 40 U/L Final    ALT 07/23/2024 13  10 - 44 U/L Final    eGFR 07/23/2024 57.1 (A)  >60 mL/min/1.73 m^2 Final    Anion Gap 07/23/2024 7 (L)  8 - 16 mmol/L Final    Magnesium 07/23/2024 1.9  1.6 - 2.6 mg/dL Final    WBC 07/23/2024 6.37  3.90 - 12.70 K/uL Final    RBC 07/23/2024 6.17  4.60 - 6.20 M/uL Final    Hemoglobin 07/23/2024 17.3  14.0 - 18.0 g/dL Final    Hematocrit 07/23/2024 54.1 (H)  40.0 - 54.0 % Final    MCV 07/23/2024 88  82 - 98 fL Final    MCH 07/23/2024 28.0  27.0 - 31.0 pg Final    MCHC 07/23/2024 32.0  32.0 - 36.0 g/dL Final    RDW 07/23/2024 15.0 (H)  11.5 - 14.5 % Final    Platelets 07/23/2024 203  150 - 450 K/uL Final    MPV 07/23/2024 11.5  9.2 - 12.9 fL Final    Immature Granulocytes 07/23/2024 0.3  0.0 - 0.5 % Final    Gran # (ANC) 07/23/2024 4.8  1.8 - 7.7 K/uL Final    Immature Grans (Abs) 07/23/2024 0.02  0.00 - 0.04 K/uL Final    Lymph # 07/23/2024 0.7 (L)  1.0 - 4.8 K/uL Final    Mono # 07/23/2024 0.5  0.3 - 1.0 K/uL Final    Eos # 07/23/2024 0.2  0.0 - 0.5 K/uL Final    Baso # 07/23/2024 0.04  0.00 - 0.20 K/uL Final    nRBC 07/23/2024 0  0 /100 WBC Final    Gran % 07/23/2024 76.0 (H)  38.0 - 73.0 % Final    Lymph % 07/23/2024 10.8 (L)  18.0 - 48.0 % Final    Mono % 07/23/2024 8.5  4.0 - 15.0 % Final    Eosinophil % 07/23/2024 3.8  0.0 - 8.0 % Final    Basophil % 07/23/2024 0.6  0.0 - 1.9 % Final    Differential Method 07/23/2024 Automated   Final    C. diff Antigen 07/23/2024 Negative  Negative Final    C difficile Toxins A+B, EIA 07/23/2024 Negative  Negative Final    Sodium 07/24/2024 138  136 - 145 mmol/L Final    Potassium 07/24/2024 4.3  3.5 - 5.1 mmol/L Final    Chloride 07/24/2024 104  95 - 110 mmol/L Final    CO2 07/24/2024 28  23 - 29 mmol/L Final    Glucose 07/24/2024 107  70 - 110 mg/dL Final    BUN 07/24/2024 14  6 - 20 mg/dL Final    Creatinine  07/24/2024 1.6 (H)  0.5 - 1.4 mg/dL Final    Calcium 07/24/2024 9.3  8.7 - 10.5 mg/dL Final    Total Protein 07/24/2024 7.5  6.0 - 8.4 g/dL Final    Albumin 07/24/2024 4.0  3.5 - 5.2 g/dL Final    Total Bilirubin 07/24/2024 0.7  0.1 - 1.0 mg/dL Final    Alkaline Phosphatase 07/24/2024 58  55 - 135 U/L Final    AST 07/24/2024 12  10 - 40 U/L Final    ALT 07/24/2024 13  10 - 44 U/L Final    eGFR 07/24/2024 52.8 (A)  >60 mL/min/1.73 m^2 Final    Anion Gap 07/24/2024 6 (L)  8 - 16 mmol/L Final    Magnesium 07/24/2024 1.9  1.6 - 2.6 mg/dL Final    WBC 07/24/2024 7.01  3.90 - 12.70 K/uL Final    RBC 07/24/2024 5.79  4.60 - 6.20 M/uL Final    Hemoglobin 07/24/2024 16.0  14.0 - 18.0 g/dL Final    Hematocrit 07/24/2024 50.0  40.0 - 54.0 % Final    MCV 07/24/2024 86  82 - 98 fL Final    MCH 07/24/2024 27.6  27.0 - 31.0 pg Final    MCHC 07/24/2024 32.0  32.0 - 36.0 g/dL Final    RDW 07/24/2024 14.8 (H)  11.5 - 14.5 % Final    Platelets 07/24/2024 202  150 - 450 K/uL Final    MPV 07/24/2024 11.1  9.2 - 12.9 fL Final    Immature Granulocytes 07/24/2024 0.3  0.0 - 0.5 % Final    Gran # (ANC) 07/24/2024 5.7  1.8 - 7.7 K/uL Final    Immature Grans (Abs) 07/24/2024 0.02  0.00 - 0.04 K/uL Final    Lymph # 07/24/2024 0.6 (L)  1.0 - 4.8 K/uL Final    Mono # 07/24/2024 0.5  0.3 - 1.0 K/uL Final    Eos # 07/24/2024 0.2  0.0 - 0.5 K/uL Final    Baso # 07/24/2024 0.03  0.00 - 0.20 K/uL Final    nRBC 07/24/2024 0  0 /100 WBC Final    Gran % 07/24/2024 81.4 (H)  38.0 - 73.0 % Final    Lymph % 07/24/2024 8.4 (L)  18.0 - 48.0 % Final    Mono % 07/24/2024 6.6  4.0 - 15.0 % Final    Eosinophil % 07/24/2024 2.9  0.0 - 8.0 % Final    Basophil % 07/24/2024 0.4  0.0 - 1.9 % Final    Differential Method 07/24/2024 Automated   Final    [facility] Miscellaneous Test 07/23/2024 COMMENT   Final    [facility] Test Code: 07/23/2024 [id number]   Final    [facility] Test Name: 07/23/2024 O+P Exam, Formalin Only   Final   Admission on 07/12/2024, Discharged on 07/13/2024    Component Date Value Ref Range Status    WBC 07/12/2024 14.92 (H)  3.90 - 12.70 K/uL Final    RBC 07/12/2024 6.11  4.60 - 6.20 M/uL Final    Hemoglobin 07/12/2024 17.1  14.0 - 18.0 g/dL Final    Hematocrit 07/12/2024 51.6  40.0 - 54.0 % Final    MCV 07/12/2024 85  82 - 98 fL Final    MCH 07/12/2024 28.0  27.0 - 31.0 pg Final    MCHC 07/12/2024 33.1  32.0 - 36.0 g/dL Final    RDW 07/12/2024 15.4 (H)  11.5 - 14.5 % Final    Platelets 07/12/2024 270  150 - 450 K/uL Final    MPV 07/12/2024 11.3  9.2 - 12.9 fL Final    Immature Granulocytes 07/12/2024 0.5  0.0 - 0.5 % Final    Gran # (ANC) 07/12/2024 13.3 (H)  1.8 - 7.7 K/uL Final    Immature Grans (Abs) 07/12/2024 0.08 (H)  0.00 - 0.04 K/uL Final    Lymph # 07/12/2024 0.8 (L)  1.0 - 4.8 K/uL Final    Mono # 07/12/2024 0.7  0.3 - 1.0 K/uL Final    Eos # 07/12/2024 0.0  0.0 - 0.5 K/uL Final    Baso # 07/12/2024 0.04  0.00 - 0.20 K/uL Final    nRBC 07/12/2024 0  0 /100 WBC Final    Gran % 07/12/2024 88.8 (H)  38.0 - 73.0 % Final    Lymph % 07/12/2024 5.2 (L)  18.0 - 48.0 % Final    Mono % 07/12/2024 5.0  4.0 - 15.0 % Final    Eosinophil % 07/12/2024 0.2  0.0 - 8.0 % Final    Basophil % 07/12/2024 0.3  0.0 - 1.9 % Final    Differential Method 07/12/2024 Automated   Final    Sodium 07/12/2024 137  136 - 145 mmol/L Final    Potassium 07/12/2024 3.6  3.5 - 5.1 mmol/L Final    Chloride 07/12/2024 101  95 - 110 mmol/L Final    CO2 07/12/2024 22 (L)  23 - 29 mmol/L Final    Glucose 07/12/2024 114 (H)  70 - 110 mg/dL Final    BUN 07/12/2024 13  6 - 20 mg/dL Final    Creatinine 07/12/2024 1.6 (H)  0.5 - 1.4 mg/dL Final    Calcium 07/12/2024 10.3  8.7 - 10.5 mg/dL Final    Total Protein 07/12/2024 8.6 (H)  6.0 - 8.4 g/dL Final    Albumin 07/12/2024 4.7  3.5 - 5.2 g/dL Final    Total Bilirubin 07/12/2024 1.0  0.1 - 1.0 mg/dL Final    Alkaline Phosphatase 07/12/2024 76  55 - 135 U/L Final    AST 07/12/2024 14  10 - 40 U/L Final    ALT 07/12/2024 15  10 - 44 U/L Final    eGFR  07/12/2024 52.8 (A)  >60 mL/min/1.73 m^2 Final    Anion Gap 07/12/2024 14  8 - 16 mmol/L Final    Lipase 07/12/2024 4  4 - 60 U/L Final    Specimen UA 07/12/2024 Urine, Clean Catch   Final    Color, UA 07/12/2024 Yellow  Yellow, Straw, [place] Final    Appearance, UA 07/12/2024 Clear  Clear Final    pH, UA 07/12/2024 6.0  5.0 - 8.0 Final    Specific Gravity, UA 07/12/2024 >1.030 (A)  1.005 - 1.030 Final    Protein, UA 07/12/2024 2+ (A)  Negative Final    Glucose, UA 07/12/2024 Negative  Negative Final    Ketones, UA 07/12/2024 2+ (A)  Negative Final    Bilirubin (UA) 07/12/2024 1+ (A)  Negative Final    Occult Blood UA 07/12/2024 TRACE  Negative Final    Nitrite, UA 07/12/2024 Negative  Negative Final    Urobilinogen, UA 07/12/2024 2.0-3.0 (A)  Negative EU/dL Final    Leukocytes, UA 07/12/2024 Negative  Negative Final    RBC, UA 07/12/2024 3  0 - 4 /hpf Final    WBC, UA 07/12/2024 6 (H)  0 - 5 /hpf Final    Bacteria 07/12/2024 Rare  None-Occ /hpf Final    Squam Epithel, UA 07/12/2024 0  /hpf Final    Hyaline Casts, UA 07/12/2024 0  0-1/lpf /lpf Final    Microscopic Comment 07/12/2024 SEE COMMENT   Final   Hospital Outpatient Visit on 06/21/2024   Component Date Value Ref Range Status    POC Creatinine 06/21/2024 1.8 (H)  0.5 - 1.4 mg/dL Final    Sample 06/21/2024 unknown   Final   Lab Visit on 06/15/2024   Component Date Value Ref Range Status    Testosterone 06/15/2024 289  264 - 916 ng/dL Final    Testosterone, Free 06/15/2024 5.1 (L)  6.8 - 21.5 pg/mL Final    Estrogen 06/15/2024 150  56 - 213 pg/mL Final    Estradiol 06/15/2024 41.9  7.6 - 42.6 pg/mL Final    WBC 06/15/2024 7.58  3.90 - 12.70 K/uL Final    RBC 06/15/2024 5.75  4.60 - 6.20 M/uL Final    Hemoglobin 06/15/2024 15.9  14.0 - 18.0 g/dL Final    Hematocrit 06/15/2024 50.2  40.0 - 54.0 % Final    MCV 06/15/2024 87  82 - 98 fL Final    MCH 06/15/2024 27.7  27.0 - 31.0 pg Final    MCHC 06/15/2024 31.7 (L)  32.0 - 36.0 g/dL Final    RDW 06/15/2024 15.9 (H)   11.5 - 14.5 % Final    Platelets 06/15/2024 242  150 - 450 K/uL Final    MPV 06/15/2024 11.0  9.2 - 12.9 fL Final    Immature Granulocytes 06/15/2024 0.4  0.0 - 0.5 % Final    Gran # (ANC) 06/15/2024 5.8  1.8 - 7.7 K/uL Final    Immature Grans (Abs) 06/15/2024 0.03  0.00 - 0.04 K/uL Final    Lymph # 06/15/2024 1.1  1.0 - 4.8 K/uL Final    Mono # 06/15/2024 0.5  0.3 - 1.0 K/uL Final    Eos # 06/15/2024 0.2  0.0 - 0.5 K/uL Final    Baso # 06/15/2024 0.03  0.00 - 0.20 K/uL Final    nRBC 06/15/2024 0  0 /100 WBC Final    Gran % 06/15/2024 76.2 (H)  38.0 - 73.0 % Final    Lymph % 06/15/2024 14.9 (L)  18.0 - 48.0 % Final    Mono % 06/15/2024 5.9  4.0 - 15.0 % Final    Eosinophil % 06/15/2024 2.2  0.0 - 8.0 % Final    Basophil % 06/15/2024 0.4  0.0 - 1.9 % Final    Differential Method 06/15/2024 Automated   Final    DHEA 06/15/2024 138.0  71.6 - 375.4 ug/dL Final   Admission on 04/15/2024, Discharged on 04/15/2024   Component Date Value Ref Range Status    POC Glucose 04/15/2024 93  70 - 110 Final   Lab Visit on 04/03/2024   Component Date Value Ref Range Status    Sodium 04/03/2024 135 (L)  136 - 145 mmol/L Final    Potassium 04/03/2024 4.4  3.5 - 5.1 mmol/L Final    Chloride 04/03/2024 102  95 - 110 mmol/L Final    CO2 04/03/2024 24  23 - 29 mmol/L Final    Glucose 04/03/2024 96  70 - 110 mg/dL Final    BUN 04/03/2024 10  6 - 20 mg/dL Final    Creatinine 04/03/2024 1.7 (H)  0.5 - 1.4 mg/dL Final    Calcium 04/03/2024 9.9  8.7 - 10.5 mg/dL Final    Anion Gap 04/03/2024 9  8 - 16 mmol/L Final    eGFR 04/03/2024 49.1 (A)  >60 mL/min/1.73 m^2 Final    WBC 04/03/2024 11.34  3.90 - 12.70 K/uL Final    RBC 04/03/2024 5.99  4.60 - 6.20 M/uL Final    Hemoglobin 04/03/2024 16.6  14.0 - 18.0 g/dL Final    Hematocrit 04/03/2024 51.2  40.0 - 54.0 % Final    MCV 04/03/2024 86  82 - 98 fL Final    MCH 04/03/2024 27.7  27.0 - 31.0 pg Final    MCHC 04/03/2024 32.4  32.0 - 36.0 g/dL Final    RDW 04/03/2024 15.1 (H)  11.5 - 14.5 % Final     Platelets 2024 217  150 - 450 K/uL Final    MPV 2024 10.8  9.2 - 12.9 fL Final    Immature Granulocytes 2024 0.3  0.0 - 0.5 % Final    Gran # (ANC) 2024 9.0 (H)  1.8 - 7.7 K/uL Final    Immature Grans (Abs) 2024 0.03  0.00 - 0.04 K/uL Final    Lymph # 2024 1.5  1.0 - 4.8 K/uL Final    Mono # 2024 0.5  0.3 - 1.0 K/uL Final    Eos # 2024 0.3  0.0 - 0.5 K/uL Final    Baso # 2024 0.04  0.00 - 0.20 K/uL Final    nRBC 2024 0  0 /100 WBC Final    Gran % 2024 79.5 (H)  38.0 - 73.0 % Final    Lymph % 2024 13.1 (L)  18.0 - 48.0 % Final    Mono % 2024 4.4  4.0 - 15.0 % Final    Eosinophil % 2024 2.3  0.0 - 8.0 % Final    Basophil % 2024 0.4  0.0 - 1.9 % Final    Differential Method 2024 Automated   Final       Past Medical History:   Diagnosis Date    ADHD (attention deficit hyperactivity disorder)     Arthritis     Asthma     as child only    Back pain     Coronary artery disease     Degeneration of lumbar intervertebral disc 2016    Depression     Digestive disorder     Elevated PSA     Headache     Hyperlipidemia     Hypertension     Kidney damage     stage 3 ; d/t aleve abuse    Kidney stone     Myocardial infarction     Neck pain     Numbness and tingling in hands     Numbness and tingling of both legs     Osteonecrosis     Bilat Femur    Seizures     from inapsine     Sleep apnea     no cpap    Wears glasses     CONTACS     Social History     Socioeconomic History    Marital status:    Occupational History    Occupation: disability   Tobacco Use    Smoking status: Former     Current packs/day: 0.00     Types: Cigarettes     Quit date: 2016     Years since quittin.7     Passive exposure: Past    Smokeless tobacco: Former     Quit date: 2016    Tobacco comments:     Occasional  vaping   Substance and Sexual Activity    Alcohol use: Yes     Alcohol/week: 1.0 standard drink of alcohol     Types: 1  Glasses of wine per week     Comment: 20 - 30 shots vodka per day or 1-2 1/5ths per day for 2 years (started 2021)    Drug use: No    Sexual activity: Yes     Partners: Female     Social Determinants of Health     Financial Resource Strain: Medium Risk (3/18/2024)    Overall Financial Resource Strain (CARDIA)     Difficulty of Paying Living Expenses: Somewhat hard   Food Insecurity: No Food Insecurity (3/18/2024)    Hunger Vital Sign     Worried About Running Out of Food in the Last Year: Never true     Ran Out of Food in the Last Year: Never true   Transportation Needs: No Transportation Needs (3/18/2024)    PRAPARE - Transportation     Lack of Transportation (Medical): No     Lack of Transportation (Non-Medical): No   Physical Activity: Insufficiently Active (3/18/2024)    Exercise Vital Sign     Days of Exercise per Week: 2 days     Minutes of Exercise per Session: 60 min   Stress: No Stress Concern Present (3/18/2024)    [employer] - Occupational Stress Questionnaire     Feeling of Stress : Only a little   Housing Stability: Unknown (3/18/2024)    Housing Stability Vital Sign     Unable to Pay for Housing in the Last Year: No     Unstable Housing in the Last Year: No     Past Surgical History:   Procedure Laterality Date    BACK SURGERY  2016    back surgery    BONE GRAFT N/A 11/5/2020    Procedure: BONE GRAFT;  Surgeon: Mateusz Blanco MD;  Location: [facility];  Service: Orthopedics;  Laterality: N/A;    CARDIAC SURGERY  01/06/2020    CABG X 7    CORONARY ARTERY BYPASS GRAFT      7 vessels    FLEXIBLE SIGMOIDOSCOPY N/A 7/24/2024    Procedure: SIGMOIDOSCOPY, FLEXIBLE;  Surgeon: Latesha Victoria MD;  Location: [facility];  Service: Endoscopy;  Laterality: N/A;    HERNIA REPAIR Bilateral 11/22/2017    LITHOTRIPSY      LUMBAR LAMINECTOMY WITH FUSION Bilateral 11/5/2020    Procedure: LAMINECTOMY, SPINE, LUMBAR, WITH FUSION;  Surgeon: Mateusz Blanco MD;  Location: [facility];  Service:  Orthopedics;  Laterality: Bilateral;  MEDTRONIC  NTI  L-3    PILONIDAL CYST DRAINAGE      removal of abcess      scrotal    REMOVAL OF HARDWARE FROM SPINE Bilateral 11/5/2020    Procedure: REMOVAL, HARDWARE, SPINE;  Surgeon: Mateusz Blanco MD;  Location: [facility] OR;  Service: Orthopedics;  Laterality: Bilateral;  L 4-5    SURGICAL REMOVAL OF PILONIDAL CYST N/A 4/15/2024    Procedure: EXCISION, PILONIDAL CYST;  Surgeon: Jayden Phillips III, MD;  Location: [facility];  Service: General;  Laterality: N/A;  sacral area    TYMPANOSTOMY TUBE PLACEMENT       Family History   Problem Relation Name Age of Onset    Heart disease Mother      Migraines Mother      Hypertension Mother      Early death Father accident     Heart disease Father accident     Diabetes Maternal Aunt      Diabetes Maternal Uncle      Diabetes Maternal Grandfather         All of your core healthy metrics are met.      Review of patient's allergies indicates:   Allergen Reactions    Inapsine [droperidol] Anaphylaxis     seizures    Effexor [venlafaxine]      Increased anxiety    Pcn [penicillins]     Bactrim [sulfamethoxazole-trimethoprim] Rash     Dry red rash all over when in the sun    Fluconazole Rash     Pruritis         Current Outpatient Medications:     anastrozole (ARIMIDEX) 1 mg Tab, Take 1 mg by mouth every 7 days., Disp: , Rfl:     azithromycin (Z-BECKY) 250 MG tablet, Take 2 tablets by mouth on day 1; Take 1 tablet by mouth on days 2-5, Disp: 6 tablet, Rfl: 0    busPIRone (BUSPAR) 15 MG tablet, Take 1 tablet (15 mg total) by mouth 3 (three) times daily., Disp: 90 tablet, Rfl: 0    celecoxib (CELEBREX) 200 MG capsule, Take 1 capsule (200 mg total) by mouth 2 (two) times daily., Disp: 60 capsule, Rfl: 0    clopidogreL (PLAVIX) 75 mg tablet, Take 75 mg by mouth once daily., Disp: , Rfl:     docusate sodium (COLACE) 100 MG capsule, Take 1 capsule (100 mg total) by mouth 2 (two) times daily., Disp: 60 capsule, Rfl: 0    ENTRESTO 49-51 mg per  tablet, Take 1 tablet by mouth 2 (two) times daily., Disp: , Rfl:     EScitalopram oxalate (LEXAPRO) 20 MG tablet, Take 1 tablet (20 mg total) by mouth once daily., Disp: 90 tablet, Rfl: 1    fluticasone propionate (FLONASE) 50 mcg/actuation nasal spray, 2 sprays (100 mcg total) by Each Nostril route once daily., Disp: 16 g, Rfl: 0    gabapentin (NEURONTIN) 300 MG capsule, Take 1 capsule (300 mg total) by mouth 2 (two) times daily., Disp: 60 capsule, Rfl: 0    LORazepam (ATIVAN) 0.5 MG tablet, Take 1 tablet (0.5 mg total) by mouth every 12 (twelve) hours as needed for Anxiety., Disp: 60 tablet, Rfl: 0    metoclopramide HCl (REGLAN) 10 MG tablet, Take 1 tablet (10 mg total) by mouth every 6 (six) hours., Disp: 30 tablet, Rfl: 0    metoprolol tartrate (LOPRESSOR) 50 MG tablet, Take 1 tablet by mouth 2 (two) times daily., Disp: , Rfl:     oxyCODONE-acetaminophen (PERCOCET) 7.5-325 mg per tablet, Take 1 tablet by mouth every 6 (six) hours as needed for Pain., Disp: 28 tablet, Rfl: 0    pantoprazole (PROTONIX) 40 MG tablet, Take 1 tablet (40 mg total) by mouth once daily., Disp: 90 tablet, Rfl: 3    rosuvastatin (CRESTOR) 40 MG Tab, Take 40 mg by mouth once daily., Disp: , Rfl:     tadalafiL (CIALIS) 10 MG tablet, Take 10 mg by mouth daily as needed., Disp: , Rfl:     testosterone cypionate 200 mg/mL Kit, Inject 1 mL into the muscle every 7 days., Disp: , Rfl:     Review of Systems   Constitutional:  Negative for activity change and unexpected weight change.   HENT:  Positive for congestion, rhinorrhea and sore throat. Negative for hearing loss and trouble swallowing.    Eyes:  Negative for discharge and visual disturbance.   Respiratory:  Negative for chest tightness and wheezing.    Cardiovascular:  Negative for chest pain and palpitations.   Gastrointestinal:  Negative for blood in stool, constipation, diarrhea and vomiting.   Endocrine: Negative for polydipsia and polyuria.   Genitourinary:  Negative for difficulty  "urinating, hematuria and urgency.   Musculoskeletal:  Negative for arthralgias, joint swelling and neck pain.        Hip pain   Neurological:  Negative for weakness and headaches.   Psychiatric/Behavioral:  Negative for confusion and dysphoric mood. The patient is nervous/anxious.           Objective:      Vitals:    09/19/24 0945   BP: 110/80   Pulse: 71   SpO2: 97%   Weight: 94.3 kg (207 lb 12.8 oz)   Height: 5' 7" (1.702 m)     Physical Exam  Vitals and nursing note reviewed.   Constitutional:       General: He is not in acute distress.     Appearance: Normal appearance. He is well-developed. He is obese.   HENT:      Head: Normocephalic and atraumatic.      Right Ear: External ear normal.      Left Ear: External ear normal.   Eyes:      Pupils: Pupils are equal, round, and reactive to light.   Neck:      Trachea: No tracheal deviation.   Cardiovascular:      Rate and Rhythm: Normal rate and regular rhythm.      Heart sounds: No murmur heard.     No friction rub. No gallop.   Pulmonary:      Breath sounds: Normal breath sounds. No stridor. No wheezing or rales.   Abdominal:      Palpations: Abdomen is soft. There is no mass.      Tenderness: There is no abdominal tenderness.   Musculoskeletal:         General: No tenderness or deformity.      Cervical back: Neck supple.      Right hip: Decreased range of motion.      Left hip: Decreased range of motion.   Lymphadenopathy:      Cervical: No cervical adenopathy.   Skin:     General: Skin is warm and dry.   Neurological:      Mental Status: He is alert and oriented to person, place, and time.      Coordination: Coordination normal.   Psychiatric:         Mood and Affect: Mood is anxious.         Thought Content: Thought content normal.           Assessment:       1. Needs flu shot    2. MYNOR (generalized anxiety disorder)    3. Bilateral hip pain    4. Essential hypertension    5. Avascular necrosis    6. Hyperlipidemia, unspecified hyperlipidemia type    7. " Gastroesophageal reflux disease without esophagitis         Plan:       Needs flu shot  -     Discontinue: (VFC) influenza (Flulaval, Fluzone, Fluarix) 45 mcg/0.5 mL IM vaccine (> or = 6 mo) 0.5 mL  -     influenza (Flulaval, Fluzone, Fluarix) 45 mcg/0.5 mL IM vaccine (> or = 6 mo) 0.5 mL    MYNOR (generalized anxiety disorder)  Comments:  ativan prn. referred to psych. increase lexapro to 20mg. #60 of ativan temporary. patient aware  Orders:  -     LORazepam (ATIVAN) 0.5 MG tablet; Take 1 tablet (0.5 mg total) by mouth every 12 (twelve) hours as needed for Anxiety.  Dispense: 60 tablet; Refill: 0    Bilateral hip pain  Comments:  scheduled with dr. saavedra    Essential hypertension  Comments:  bp controlled    Avascular necrosis  Comments:  arthroplasty of left hip planned next week    Hyperlipidemia, unspecified hyperlipidemia type  Comments:  crestor    Gastroesophageal reflux disease without esophagitis  Comments:  protonix    Other orders  -     azithromycin (Z-BECKY) 250 MG tablet; Take 2 tablets by mouth on day 1; Take 1 tablet by mouth on days 2-5  Dispense: 6 tablet; Refill: 0  -     fluticasone propionate (FLONASE) 50 mcg/actuation nasal spray; 2 sprays (100 mcg total) by Each Nostril route once daily.  Dispense: 16 g; Refill: 0      Follow up in about 3 months (around 12/19/2024), or if symptoms worsen or fail to improve, for medication management.        9/22/2024 Riya Vidales

## 2024-09-23 NOTE — OP NOTE
[facility]  Surgery Department  Operative Note    SUMMARY     Date of Procedure: 9/23/2024     Procedure:  Left total hip arthroplasty    Surgeons and Role:     * Lázaro Jaimes MD - Primary    Assisting Surgeon:  Sergo    Pre-Operative Diagnosis: Avascular necrosis of hip, left [M87.052]  Pre-op testing [Z01.818]  Current use of anticoagulant therapy [Z79.01]    Post-Operative Diagnosis: Post-Op Diagnosis Codes:     * Avascular necrosis of hip, left [M87.052]     * Pre-op testing [Z01.818]     * Current use of anticoagulant therapy [Z79.01]    Anesthesia: Spinal    Intraoperative Findings:  Avascular necrosis of left hip    Description of the Findings of the Procedure:  Patient was placed on the operative table in supine position.  The patient was then turned to the lateral decubitus position.  He was prepped and draped in the usual sterile manner for surgery.  A posterior lateral approach undertaken to the hip and carried down sharply through the skin.  All bleeding was meticulously controlled.  The deep fascia was incised in line with its fibers.  The sciatic nerve was visualized and protected.  The Charnley retractors were placed.  The shoulder external rotators were taken down and tagged for later reattachment.  The capsule was split.  The hip was dislocated.  A preliminary head cut was made in the head was removed.  There was significant delamination of the cartilage.  At this point we used a cookie cutter than the canal finer and then the lateralize.  We then reamed to a 4.  At this point we broached to a 4 that gave us complete fill.  We removed the broach in turned our attention to the acetabulum.  Retractors were placed anteriorly and posteriorly.  The foveal contents were cleaned with a Bovie coagulator.  We now medialized to the level of the fovea and then reamed until we got good bleeding bone.  That was a 53 mm Reamer.  We tapped a 54 mm cup into position we had an  outstanding Press-Fit.  We placed a liner.  We pulled on the construct and was very well seated.  We turned our attention back to the femur.  We dropped her 4. Broach.  We trialed with a +1.5 we had very good stability both in flexion and extension symmetric leg lengths.  I pulsed and irrigated.  We tapped our actual into position.  The construct trialed perfectly.  We closed the short external rotators with 2. FiberWire.  The sciatic nerve was noted to be in good position.  We removed the Charnley retractors and irrigated.  We closed the deep fascia with a combination of 2. FiberWire in a running Quill stitch.  We irrigated and closed the subcu with 2-0 Quill.  The skin was closed with 4-0 Monocryl.  Sterile dressings were applied and the patient was noted to be in stable condition    Complications: No    Estimated Blood Loss (EBL): * No values recorded between 9/23/2024 12:36 PM and 9/23/2024  1:24 PM *           Implants:   Implant Name Type Inv. Item Serial No.  Lot No. LRB No. Used Action   CUP 54MM - [id number]  CUP 54MM  [employer] [id number] Left 1 Implanted   LINER ACET PNCL ALT 10 +4 36X5 - [id number]  LINER ACET PNCL ALT 10 +4 36X5  [employer] [id number] Left 1 Implanted   HEAD FEM 12/14 TAPER 1.5X36 - [id number]  HEAD FEM 12/14 TAPER 1.5X36  [employer] [id number] Left 1 Implanted   STEM FEM TPR PCT STD SZ 4 - [id number]  STEM FEM TPR PCT STD SZ 4  [employer] [id number] Left 1 Implanted       Specimens:   Specimen (24h ago, onward)      None                    Condition: Good    Disposition: PACU - hemodynamically stable.              Discharge Note    SUMMARY     Admit Date: 9/23/2024    Discharge Date and Time:  09/23/2024 1:24 PM    Hospital Course (synopsis of major diagnoses, care, treatment, and services provided during the course of the hospital stay): Uneventful      Final Diagnosis: Post-Op Diagnosis Codes:     * Avascular necrosis of hip, left [M87.052]     * Pre-op testing [Z01.818]     *  Current use of anticoagulant therapy [Z79.01]    Disposition: Home or Self Care    Follow Up/Patient Instructions:     Medications:  Reconciled Home Medications:   Current Discharge Medication List        CONTINUE these medications which have NOT CHANGED    Details   azithromycin (Z-BECKY) 250 MG tablet Take 2 tablets by mouth on day 1; Take 1 tablet by mouth on days 2-5  Qty: 6 tablet, Refills: 0      busPIRone (BUSPAR) 15 MG tablet Take 1 tablet (15 mg total) by mouth 3 (three) times daily.  Qty: 90 tablet, Refills: 0      clopidogreL (PLAVIX) 75 mg tablet Take 75 mg by mouth once daily.      ENTRESTO 49-51 mg per tablet Take 1 tablet by mouth 2 (two) times daily.      EScitalopram oxalate (LEXAPRO) 20 MG tablet Take 1 tablet (20 mg total) by mouth once daily.  Qty: 90 tablet, Refills: 1    Associated Diagnoses: MYNOR (generalized anxiety disorder)      fluticasone propionate (FLONASE) 50 mcg/actuation nasal spray 2 sprays (100 mcg total) by Each Nostril route once daily.  Qty: 16 g, Refills: 0      LORazepam (ATIVAN) 0.5 MG tablet Take 1 tablet (0.5 mg total) by mouth every 12 (twelve) hours as needed for Anxiety.  Qty: 60 tablet, Refills: 0    Associated Diagnoses: MYNOR (generalized anxiety disorder)      metoclopramide HCl (REGLAN) 10 MG tablet Take 1 tablet (10 mg total) by mouth every 6 (six) hours.  Qty: 30 tablet, Refills: 0      metoprolol tartrate (LOPRESSOR) 50 MG tablet Take 1 tablet by mouth 2 (two) times daily.      oxyCODONE-acetaminophen (PERCOCET) 7.5-325 mg per tablet Take 1 tablet by mouth every 6 (six) hours as needed for Pain.  Qty: 28 tablet, Refills: 0    Comments: This prescription and the attached 3 other prescriptions are for postoperative use for the patient's scheduled total joint arthroplasty on September 23, 2024.  This is for the meds to bed program.  Associated Diagnoses: Avascular necrosis of hip, left      pantoprazole (PROTONIX) 40 MG tablet Take 1 tablet (40 mg total) by mouth once  daily.  Qty: 90 tablet, Refills: 3    Associated Diagnoses: Gastroesophageal reflux disease, unspecified whether esophagitis present      rosuvastatin (CRESTOR) 40 MG Tab Take 40 mg by mouth once daily.      tadalafiL (CIALIS) 10 MG tablet Take 10 mg by mouth daily as needed.      testosterone cypionate 200 mg/mL Kit Inject 1 mL into the muscle every 7 days.      anastrozole (ARIMIDEX) 1 mg Tab Take 1 mg by mouth every 7 days.      celecoxib (CELEBREX) 200 MG capsule Take 1 capsule (200 mg total) by mouth 2 (two) times daily.  Qty: 60 capsule, Refills: 0    Associated Diagnoses: Avascular necrosis of hip, left      docusate sodium (COLACE) 100 MG capsule Take 1 capsule (100 mg total) by mouth 2 (two) times daily.  Qty: 60 capsule, Refills: 0    Associated Diagnoses: Avascular necrosis of hip, left      gabapentin (NEURONTIN) 300 MG capsule Take 1 capsule (300 mg total) by mouth 2 (two) times daily.  Qty: 60 capsule, Refills: 0    Associated Diagnoses: Avascular necrosis of hip, left           No discharge procedures on file.   Follow-up Information       [facility]. Call in 1 day(s).    Specialties: Home Health Services, Home Therapy Services, Home Living Aide Services  Why: As needed  Contact information:  [address] [place] [id number]  [phone]

## 2024-09-23 NOTE — PLAN OF CARE
Arrived alone with family set up for text messages. Plan of care reviewed and warm blanket provided. Completed the T&S

## 2024-09-23 NOTE — PLAN OF CARE
1700: Occupational therapy at bedside    1715: Physical therapy at bedside    1720: Patient walking in busch with physical therapy    1730: Cleared by physical therapy

## 2024-09-23 NOTE — PLAN OF CARE
Patient given discharge instructions. Educated on post-anesthesia precautions, dressing care and when to notify MD. Instructed when to follow up. Peripheral IV discontinued with catheter intact. Transferred to car via wheelchair and left with parent to home.

## 2024-09-23 NOTE — DISCHARGE INSTRUCTIONS
"Discharge Instructions: After Your Surgery/Procedure  Youve just had surgery. During surgery you were given medicine called anesthesia to keep you relaxed and free of pain. After surgery you may have some pain or nausea. This is common. Here are some tips for feeling better and getting well after surgery.     Stay on schedule with your medication.   Going home  Your doctor or nurse will show you how to take care of yourself when you go home. He or she will also answer your questions. Have an adult family member or friend drive you home.      For your safety we recommend these precaution for the first 24 hours after your procedure:  Do not drive or use heavy equipment.  Do not make important decisions or sign legal papers.  Do not drink alcohol.  Have someone stay with you, if needed. He or she can watch for problems and help keep you safe.  Your concentration, balance, coordination, and judgement may be impaired for many hours after anesthesia.  Use caution when ambulating or standing up.     You may feel weak and "washed out" after anesthesia and surgery.      Subtle residual effects of general anesthesia or sedation with regional / local anesthesia can last more than 24 hours.  Rest for the remainder of the day or longer if your Doctor/Surgeon has advised you to do so.  Although you may feel normal within the first 24 hours, your reflexes and mental ability may be impaired without you realizing it.  You may feel dizzy, lightheaded or sleepy for 24 hours or longer.      Be sure to go to all follow-up visits with your doctor. And rest after your surgery for as long as your doctor tells you to.  Coping with pain  If you have pain after surgery, pain medicine will help you feel better. Take it as told, before pain becomes severe. Also, ask your doctor or pharmacist about other ways to control pain. This might be with heat, ice, or relaxation. And follow any other instructions your surgeon or nurse gives you.  Tips " for taking pain medicine  To get the best relief possible, remember these points:  Pain medicines can upset your stomach. Taking them with a little food may help.  Most pain relievers taken by mouth need at least 20 to 30 minutes to start to work.  Taking medicine on a schedule can help you remember to take it. Try to time your medicine so that you can take it before starting an activity. This might be before you get dressed, go for a walk, or sit down for dinner.  Constipation is a common side effect of pain medicines. Call your doctor before taking any medicines such as laxatives or stool softeners to help ease constipation. Also ask if you should skip any foods. Drinking lots of fluids and eating foods such as fruits and vegetables that are high in fiber can also help. Remember, do not take laxatives unless your surgeon has prescribed them.  Drinking alcohol and taking pain medicine can cause dizziness and slow your breathing. It can even be deadly. Do not drink alcohol while taking pain medicine.  Pain medicine can make you react more slowly to things. Do not drive or run machinery while taking pain medicine.  Your health care provider may tell you to take acetaminophen to help ease your pain. Ask him or her how much you are supposed to take each day. Acetaminophen or other pain relievers may interact with your prescription medicines or other over-the-counter (OTC) drugs. Some prescription medicines have acetaminophen and other ingredients. Using both prescription and OTC acetaminophen for pain can cause you to overdose. Read the labels on your OTC medicines with care. This will help you to clearly know the list of ingredients, how much to take, and any warnings. It may also help you not take too much acetaminophen. If you have questions or do not understand the information, ask your pharmacist or health care provider to explain it to you before you take the OTC medicine.  Managing nausea  Some people have an  upset stomach after surgery. This is often because of anesthesia, pain, or pain medicine, or the stress of surgery. These tips will help you handle nausea and eat healthy foods as you get better. If you were on a special food plan before surgery, ask your doctor if you should follow it while you get better. These tips may help:  Do not push yourself to eat. Your body will tell you when to eat and how much.  Start off with clear liquids and soup. They are easier to digest.  Next try semi-solid foods, such as mashed potatoes, applesauce, and gelatin, as you feel ready.  Slowly move to solid foods. Dont eat fatty, rich, or spicy foods at first.  Do not force yourself to have 3 large meals a day. Instead eat smaller amounts more often.  Take pain medicines with a small amount of solid food, such as crackers or toast, to avoid nausea.     Call your surgeon if  You still have pain an hour after taking medicine. The medicine may not be strong enough.  You feel too sleepy, dizzy, or groggy. The medicine may be too strong.  You have side effects like nausea, vomiting, or skin changes, such as rash, itching, or hives.       If you have obstructive sleep apnea  You were given anesthesia medicine during surgery to keep you comfortable and free of pain. After surgery, you may have more apnea spells because of this medicine and other medicines you were given. The spells may last longer than usual.   At home:  Keep using the continuous positive airway pressure (CPAP) device when you sleep. Unless your health care provider tells you not to, use it when you sleep, day or night. CPAP is a common device used to treat obstructive sleep apnea.  Talk with your provider before taking any pain medicine, muscle relaxants, or sedatives. Your provider will tell you about the possible dangers of taking these medicines.  © 6874-1006 [employer]. [address] [place] [address], [place] [address]. All rights reserved. This information is  not intended as a substitute for professional medical care. Always follow your healthcare professional's instructions.          Using an Incentive Spirometer    An incentive spirometer is a device that helps you do deep breathing exercises. These exercises expand your lungs, aid in circulation, and help prevent pneumonia. Deep breathing exercises also help you breathe better and improve the function of your lungs by:  Keeping your lungs clear  Strengthening your breathing muscles  Helping prevent respiratory complications or problems  The incentive spirometer gives you a way to take an active part in recover. A nurse or therapist will teach you breathing exercises. To do these exercises, you will breathe in through your mouth and not your nose. The incentive spirometer only works correctly if you breathe in through your mouth.  Steps to clear lungs  Step 1. Exhale normally. Then, inhale normally.  Relax and breathe out.  Step 2. Place your lips tightly around the mouthpiece.  Make sure the device is upright and not tilted.  Step 3. Inhale as much air as you can through the mouthpiece (don't breath through your nose).  Inhale slowly and deeply.  Hold your breath long enough to keep the balls or disk raised for at least 3 to 5 seconds, or as instructed by your healthcare provider.  Some spirometers have an indicator to let you know that you are breathing in too fast. If the indicator goes off, breathe in more slowly.  Step 4. Repeat the exercise regularly.  Do this exercise every hour while you're awake, or as instructed by your healthcare provider.  If you were taught deep breathing and coughing exercises, do them regularly as instructed by your healthcare provider.           Post op instructions for prevention of DVT  What is deep vein thrombosis?  Deep vein thrombosis (DVT) is the medical term for blood clots in the deep veins of the leg.  These blood clots can be dangerous.  A DVT can block a blood vessel and keep  blood from getting where it needs to go.  Another problem is that the clot can travel to other parts of the body such as the lungs.  A clot that travels to the lungs is called a pulmonary embolus (PE) and can cause serious problems with breathing which can lead to death.  Am I at risk for DVT/PE?  If you are not very active, you are at risk of DVT.  Anyone confined to bed, sitting for long periods of time, recovering from surgery, etc. increases the risk of DVT.  Other risk factors are cancer diagnosis, certain medications, estrogen replacement in any form,older age, obesity, pregnancy, smoking, history of clotting disorders, and dehydration.  How will I know if I have a DVT?  Swelling in the lower leg  Pain  Warmth, redness, hardness or bulging of the vein  If you have any of these symptoms, call your doctors office right away.  Some people will not have any symptoms until the clot moves to the lungs.  What are the symptoms of a PE?  Panting, shortness of breath, or trouble breathing  Sharp, knife-like chest pain when you breathe  Coughing or coughing up blood  Rapid heartbeat  If you have any of these symptoms or get worse quickly, call 911 for emergency treatment.  How can I prevent a DVT?  Avoid long periods of inactivity and dont cross your legs--get up and walk around every hour or so.  Stay active--walking after surgery is highly encouraged.  This means you should get out of the house and walk in the neighborhood.  Going up and down stairs will not impair healing (unless advised against such activity by your doctor).    Drink plenty of noncaffeinated, nonalcoholic fluids each day to prevent dehydration.  Wear special support stockings, if they have been advised by your doctor.  If you travel, stop at least once an hour and walk around.  Avoid smoking (assistance with stopping is available through your healthcare provider)  Always notify your doctor if you are not able to follow the post operative  instructions that are given to you at the time of discharge.  It may be necessary to prescribe one of the medications available to prevent DVT.          We hope your stay was comfortable as you heal now, mend and rest.    For we have enjoyed taking care of you by giving your our best.    And as you get better, by regaining your health and strength;   We count it as a privilege to have served you and hope your time at [facility] was well spent.      Thank  You!!!

## 2024-09-23 NOTE — TRANSFER OF CARE
"Anesthesia Transfer of Care Note    Patient: Lee Quintanilla    Procedure(s) Performed: Procedure(s) (LRB):  ARTHROPLASTY, HIP (Left)    Patient location: PACU    Anesthesia Type: spinal    Transport from OR: Transported from OR on 6-10 L/min O2 by face mask with adequate spontaneous ventilation    Post pain: adequate analgesia    Post assessment: no apparent anesthetic complications    Post vital signs: stable    Level of consciousness: sedated    Nausea/Vomiting: no nausea/vomiting    Complications: none    Transfer of care protocol was followed    Last vitals: Visit Vitals  /81 (BP Location: Left arm, Patient Position: Lying)   Pulse 67   Temp 36.9 °C (98.4 °F) (Skin)   Resp 18   Ht 5' 7" (1.702 m)   Wt 93.9 kg (207 lb)   SpO2 96%   BMI 32.42 kg/m²     "

## 2024-09-23 NOTE — PLAN OF CARE
Patient released per anesthesia to phase 2 post op. Pt is awake and pain at acceptable level. Tolerating po fluids. No complaints of nausea. Surgical site intact no bleeding. Incentive spirometer instructions done. Family updated. Patient transported to post op via stretcher. Any personal items remain with patient.

## 2024-09-23 NOTE — PT/OT/SLP EVAL
Physical Therapy Evaluation and Discharge Note    Patient Name:  Lee Quintanilla   MRN:  [id number]    Recommendations:     Discharge Recommendations: Low Intensity Therapy  Discharge Equipment Recommendations: none   Barriers to discharge: None    Assessment:     Lee Quintanilla is a 49 y.o. male admitted with a medical diagnosis of left MARINA .  At this time, patient is scheduled for discharge home and appears will be safe with mobility in the ome.  Patient would benefit though from continued PT to work on strengthening to further increase safety with mobility.      Recent Surgery: Procedure(s) (LRB):  ARTHROPLASTY, HIP (Left) Day of Surgery    Plan:     During this hospitalization, patient does not require further acute PT services.  Please re-consult if situation changes.      Subjective     Chief Complaint: pain  Patient/Family Comments/goals: go home  Pain/Comfort:  Pain Rating 1: 3/10  Location - Side 1: Left  Location - Orientation 1: lower  Location 1: hip  Pain Addressed 1: Reposition, Cessation of Activity  Pain Rating Post-Intervention 1: 4/10    Patients cultural, spiritual, Anglican conflicts given the current situation:      Living Environment:  Currently lives with family in [address] home.  Prior to admission, patients level of function was independent.  Equipment used at home: none.  DME owned (not currently used): rolling walker.  Upon discharge, patient will have assistance from family.    Objective:     Communicated with nurse prior to session.  Patient found supine with cryotherapy upon PT entry to room.    General Precautions: Standard, fall    Orthopedic Precautions:LLE weight bearing as tolerated, LLE posterior precautions   Braces:    Respiratory Status: Room air    Exams:  RLE ROM: WFL  RLE Strength: WNL  LLE ROM: bit tested  LLE Strength: Deficits: 3-/5 overall    Functional Mobility:  Transfers:     Sit to Stand:  stand by assistance with rolling walker  Gait: x 200 feet rw  "SBA  Stairs:  Pt ascended/descended 4" curb step with Rolling Walker with no handrails with Contact Guard Assistance.     AM-PAC 6 CLICK MOBILITY  Total Score:20       Treatment and Education:  None given    AM-PAC 6 CLICK MOBILITY  Total Score:20     Patient left up in chair with call button in reach and nurse notified.    GOALS:   Multidisciplinary Problems       Physical Therapy Goals       Not on file                    History:     Past Medical History:   Diagnosis Date    ADHD (attention deficit hyperactivity disorder)     Arthritis     Asthma     as child only    Back pain     Coronary artery disease     Degeneration of lumbar intervertebral disc 05/2016    Depression     Digestive disorder     Elevated PSA     Headache     Hyperlipidemia     Hypertension     Kidney damage     stage 3 ; d/t aleve abuse    Kidney stone     Myocardial infarction     Neck pain     Numbness and tingling in hands     Numbness and tingling of both legs     Osteonecrosis     Bilat Femur    Seizures     from inapsine 2002    Sleep apnea     no cpap    Wears glasses     CONTACS       Past Surgical History:   Procedure Laterality Date    BACK SURGERY  2016    back surgery    BONE GRAFT N/A 11/5/2020    Procedure: BONE GRAFT;  Surgeon: Mateusz Blanco MD;  Location: [facility] OR;  Service: Orthopedics;  Laterality: N/A;    CARDIAC SURGERY  01/06/2020    CABG X 7    CORONARY ARTERY BYPASS GRAFT      7 vessels    FLEXIBLE SIGMOIDOSCOPY N/A 7/24/2024    Procedure: SIGMOIDOSCOPY, FLEXIBLE;  Surgeon: Latesha Victoria MD;  Location: [facility];  Service: Endoscopy;  Laterality: N/A;    HERNIA REPAIR Bilateral 11/22/2017    LITHOTRIPSY      LUMBAR LAMINECTOMY WITH FUSION Bilateral 11/5/2020    Procedure: LAMINECTOMY, SPINE, LUMBAR, WITH FUSION;  Surgeon: Mateusz Blanco MD;  Location: [facility] OR;  Service: Orthopedics;  Laterality: Bilateral;  MEDTRONIC  NTI  L-3    PILONIDAL CYST DRAINAGE      removal of abcess      scrotal    REMOVAL OF HARDWARE " FROM SPINE Bilateral 11/5/2020    Procedure: REMOVAL, HARDWARE, SPINE;  Surgeon: Mateusz Blanco MD;  Location: [facility] OR;  Service: Orthopedics;  Laterality: Bilateral;  L 4-5    SURGICAL REMOVAL OF PILONIDAL CYST N/A 4/15/2024    Procedure: EXCISION, PILONIDAL CYST;  Surgeon: Jayden Phillips III, MD;  Location: [facility] OR;  Service: General;  Laterality: N/A;  sacral area    TYMPANOSTOMY TUBE PLACEMENT         Time Tracking:     PT Received On: 09/23/24  PT Start Time: 1713     PT Stop Time: 1730  PT Total Time (min): 17 min     Billable Minutes: Evaluation 17      09/23/2024

## 2024-09-24 ENCOUNTER — OFFICE VISIT (OUTPATIENT)
Dept: ORTHOPEDICS | Facility: CLINIC | Age: 49
End: 2024-09-24
Payer: MEDICAID

## 2024-09-24 VITALS
DIASTOLIC BLOOD PRESSURE: 61 MMHG | SYSTOLIC BLOOD PRESSURE: 100 MMHG | BODY MASS INDEX: 32.49 KG/M2 | HEIGHT: 67 IN | TEMPERATURE: 98 F | WEIGHT: 207 LBS | OXYGEN SATURATION: 97 % | RESPIRATION RATE: 16 BRPM | HEART RATE: 50 BPM

## 2024-09-24 VITALS — WEIGHT: 207 LBS | HEIGHT: 67 IN | BODY MASS INDEX: 32.49 KG/M2

## 2024-09-24 DIAGNOSIS — K21.9 GASTROESOPHAGEAL REFLUX DISEASE, UNSPECIFIED WHETHER ESOPHAGITIS PRESENT: ICD-10-CM

## 2024-09-24 DIAGNOSIS — Z96.642 STATUS POST TOTAL HIP REPLACEMENT, LEFT: Primary | ICD-10-CM

## 2024-09-24 PROCEDURE — 99213 OFFICE O/P EST LOW 20 MIN: CPT | Mod: PBBFAC,PN | Performed by: ORTHOPAEDIC SURGERY

## 2024-09-24 PROCEDURE — 3044F HG A1C LEVEL LT 7.0%: CPT | Mod: CPTII,,, | Performed by: ORTHOPAEDIC SURGERY

## 2024-09-24 PROCEDURE — 4010F ACE/ARB THERAPY RXD/TAKEN: CPT | Mod: CPTII,,, | Performed by: ORTHOPAEDIC SURGERY

## 2024-09-24 PROCEDURE — 99999 PR PBB SHADOW E&M-EST. PATIENT-LVL III: CPT | Mod: PBBFAC,,, | Performed by: ORTHOPAEDIC SURGERY

## 2024-09-24 PROCEDURE — 1159F MED LIST DOCD IN RCRD: CPT | Mod: CPTII,,, | Performed by: ORTHOPAEDIC SURGERY

## 2024-09-24 PROCEDURE — 99024 POSTOP FOLLOW-UP VISIT: CPT | Mod: ,,, | Performed by: ORTHOPAEDIC SURGERY

## 2024-09-24 PROCEDURE — 1160F RVW MEDS BY RX/DR IN RCRD: CPT | Mod: CPTII,,, | Performed by: ORTHOPAEDIC SURGERY

## 2024-09-24 RX ORDER — PANTOPRAZOLE SODIUM 40 MG/1
40 TABLET, DELAYED RELEASE ORAL DAILY
Qty: 90 TABLET | Refills: 3 | Status: SHIPPED | OUTPATIENT
Start: 2024-09-24 | End: 2025-09-24

## 2024-09-24 RX ORDER — HYDROMORPHONE HYDROCHLORIDE 4 MG/1
4 TABLET ORAL EVERY 6 HOURS PRN
Qty: 28 TABLET | Refills: 0 | Status: SHIPPED | OUTPATIENT
Start: 2024-09-24 | End: 2024-10-22

## 2024-09-24 NOTE — PROGRESS NOTES
[facility]    Subjective:     Chief Complaint:   Chief Complaint   Patient presents with    Left Hip - Post-op Evaluation     S/p Left MARINA POD 1, surg 9/23/24, had a rough night, couldn't get comfortable to sleep,        Past Medical History:   Diagnosis Date    ADHD (attention deficit hyperactivity disorder)     Arthritis     Asthma     as child only    Back pain     Coronary artery disease     Degeneration of lumbar intervertebral disc 05/2016    Depression     Digestive disorder     Elevated PSA     Headache     Hyperlipidemia     Hypertension     Kidney damage     stage 3 ; d/t aleve abuse    Kidney stone     Myocardial infarction     Neck pain     Numbness and tingling in hands     Numbness and tingling of both legs     Osteonecrosis     Bilat Femur    Seizures     from inapsine 2002    Sleep apnea     no cpap    Wears glasses     CONTACS       Past Surgical History:   Procedure Laterality Date    BACK SURGERY  2016    back surgery    BONE GRAFT N/A 11/5/2020    Procedure: BONE GRAFT;  Surgeon: Mateusz Blanco MD;  Location: [facility];  Service: Orthopedics;  Laterality: N/A;    CARDIAC SURGERY  01/06/2020    CABG X 7    CORONARY ARTERY BYPASS GRAFT      7 vessels    FLEXIBLE SIGMOIDOSCOPY N/A 7/24/2024    Procedure: SIGMOIDOSCOPY, FLEXIBLE;  Surgeon: Latesha Victoria MD;  Location: [facility] ENDO;  Service: Endoscopy;  Laterality: N/A;    HERNIA REPAIR Bilateral 11/22/2017    LITHOTRIPSY      LUMBAR LAMINECTOMY WITH FUSION Bilateral 11/5/2020    Procedure: LAMINECTOMY, SPINE, LUMBAR, WITH FUSION;  Surgeon: Mateusz Blanco MD;  Location: [facility] OR;  Service: Orthopedics;  Laterality: Bilateral;  MEDTRONIC  NTI  L-3    PILONIDAL CYST DRAINAGE      removal of abcess      scrotal    REMOVAL OF HARDWARE FROM SPINE Bilateral 11/5/2020    Procedure: REMOVAL, HARDWARE, SPINE;  Surgeon: Mateusz Blanco MD;  Location: [facility] OR;  Service: Orthopedics;  Laterality: Bilateral;  L 4-5    SURGICAL REMOVAL OF PILONIDAL  CYST N/A 4/15/2024    Procedure: EXCISION, PILONIDAL CYST;  Surgeon: Jayden Phillips III, MD;  Location: [facility];  Service: General;  Laterality: N/A;  sacral area    TYMPANOSTOMY TUBE PLACEMENT         Current Outpatient Medications   Medication Sig    anastrozole (ARIMIDEX) 1 mg Tab Take 1 mg by mouth every 7 days.    azithromycin (Z-BECKY) 250 MG tablet Take 2 tablets by mouth on day 1; Take 1 tablet by mouth on days 2-5    busPIRone (BUSPAR) 15 MG tablet Take 1 tablet (15 mg total) by mouth 3 (three) times daily.    celecoxib (CELEBREX) 200 MG capsule Take 1 capsule (200 mg total) by mouth 2 (two) times daily.    clopidogreL (PLAVIX) 75 mg tablet Take 75 mg by mouth once daily.    docusate sodium (COLACE) 100 MG capsule Take 1 capsule (100 mg total) by mouth 2 (two) times daily.    ENTRESTO 49-51 mg per tablet Take 1 tablet by mouth 2 (two) times daily.    EScitalopram oxalate (LEXAPRO) 20 MG tablet Take 1 tablet (20 mg total) by mouth once daily.    fluticasone propionate (FLONASE) 50 mcg/actuation nasal spray 2 sprays (100 mcg total) by Each Nostril route once daily.    gabapentin (NEURONTIN) 300 MG capsule Take 1 capsule (300 mg total) by mouth 2 (two) times daily.    LORazepam (ATIVAN) 0.5 MG tablet Take 1 tablet (0.5 mg total) by mouth every 12 (twelve) hours as needed for Anxiety.    metoclopramide HCl (REGLAN) 10 MG tablet Take 1 tablet (10 mg total) by mouth every 6 (six) hours.    metoprolol tartrate (LOPRESSOR) 50 MG tablet Take 1 tablet by mouth 2 (two) times daily.    oxyCODONE-acetaminophen (PERCOCET) 7.5-325 mg per tablet Take 1 tablet by mouth every 6 (six) hours as needed for Pain.    pantoprazole (PROTONIX) 40 MG tablet Take 1 tablet (40 mg total) by mouth once daily.    rosuvastatin (CRESTOR) 40 MG Tab Take 40 mg by mouth once daily.    tadalafiL (CIALIS) 10 MG tablet Take 10 mg by mouth daily as needed.    testosterone cypionate 200 mg/mL Kit Inject 1 mL into the muscle every 7 days.     No  current facility-administered medications for this visit.       Review of patient's allergies indicates:   Allergen Reactions    Inapsine [droperidol] Anaphylaxis     seizures    Effexor [venlafaxine]      Increased anxiety    Pcn [penicillins]     Bactrim [sulfamethoxazole-trimethoprim] Rash     Dry red rash all over when in the sun    Fluconazole Rash     Pruritis         Family History   Problem Relation Name Age of Onset    Heart disease Mother      Migraines Mother      Hypertension Mother      Early death Father accident     Heart disease Father accident     Diabetes Maternal Aunt      Diabetes Maternal Uncle      Diabetes Maternal Grandfather         Social History     Socioeconomic History    Marital status:    Occupational History    Occupation: disability   Tobacco Use    Smoking status: Former     Current packs/day: 0.00     Types: Cigarettes     Quit date: 2016     Years since quittin.7     Passive exposure: Past    Smokeless tobacco: Former     Quit date: 2016    Tobacco comments:     Occasional  vaping   Substance and Sexual Activity    Alcohol use: Yes     Alcohol/week: 1.0 standard drink of alcohol     Types: 1 Glasses of wine per week     Comment: 20 - 30 shots vodka per day or 1-2 1/5ths per day for 2 years (started )    Drug use: No    Sexual activity: Yes     Partners: Female     Social Determinants of Health     Financial Resource Strain: Medium Risk (3/18/2024)    Overall Financial Resource Strain (CARDIA)     Difficulty of Paying Living Expenses: Somewhat hard   Food Insecurity: No Food Insecurity (3/18/2024)    Hunger Vital Sign     Worried About Running Out of Food in the Last Year: Never true     Ran Out of Food in the Last Year: Never true   Transportation Needs: No Transportation Needs (3/18/2024)    PRAPARE - Transportation     Lack of Transportation (Medical): No     Lack of Transportation (Non-Medical): No   Physical Activity: Insufficiently Active (3/18/2024)     Exercise Vital Sign     Days of Exercise per Week: 2 days     Minutes of Exercise per Session: 60 min   Stress: No Stress Concern Present (3/18/2024)    [employer] - Occupational Stress Questionnaire     Feeling of Stress : Only a little   Housing Stability: Unknown (3/18/2024)    Housing Stability Vital Sign     Unable to Pay for Housing in the Last Year: No     Unstable Housing in the Last Year: No       History of present illness:  Patient comes in today for the left hip.  He is status post total hip arthroplasty doing very well      Review of Systems:    Constitution: Negative for chills, fever, and sweats.  Negative for unexplained weight loss.    HENT:  Negative for headaches and blurry vision.    Cardiovascular:Negative for chest pain or irregular heart beat. Negative for hypertension.    Respiratory:  Negative for cough and shortness of breath.    Gastrointestinal: Negative for abdominal pain, heartburn, melena, nausea, and vomitting.    Genitourinary:  Negative bladder incontinence and dysuria.    Musculoskeletal:  See HPI for details.     Neurological: Negative for numbness.    Psychiatric/Behavioral: Negative for depression.  The patient is not nervous/anxious.      Endocrine: Negative for polyuria    Hematologic/Lymphatic: Negative for bleeding problem.  Does not bruise/bleed easily.    Skin: Negative for poor would healing and rash    Objective:      Physical Examination:    Vital Signs:  There were no vitals filed for this visit.    Body mass index is 32.42 kg/m².    This a well-developed, well nourished patient in no acute distress.  They are alert and oriented and cooperative to examination.        Wounds are clean dry and intact.  His leg lengths are symmetric.  Neurovascularly intact  Pertinent New Results:    XRAY Report / Interpretation:       Assessment/Plan:      Stable following total hip arthroplasty doing very well follow-up in 6 weeks      This note was  created using Dragon voice recognition software that occasionally misinterpreted phrases or words.

## 2024-09-29 DIAGNOSIS — Z96.642 STATUS POST TOTAL HIP REPLACEMENT, LEFT: ICD-10-CM

## 2024-09-30 RX ORDER — HYDROMORPHONE HYDROCHLORIDE 4 MG/1
4 TABLET ORAL EVERY 6 HOURS PRN
Qty: 28 TABLET | Refills: 0 | Status: SHIPPED | OUTPATIENT
Start: 2024-09-30 | End: 2024-10-07

## 2024-10-02 ENCOUNTER — EXTERNAL HOME HEALTH (OUTPATIENT)
Dept: HOME HEALTH SERVICES | Facility: HOSPITAL | Age: 49
End: 2024-10-02
Payer: MEDICAID

## 2024-10-07 ENCOUNTER — HOSPITAL ENCOUNTER (OUTPATIENT)
Dept: RADIOLOGY | Facility: HOSPITAL | Age: 49
Discharge: HOME | End: 2024-10-07
Attending: PHYSICIAN ASSISTANT
Payer: MEDICAID

## 2024-10-07 ENCOUNTER — OFFICE VISIT (OUTPATIENT)
Dept: ORTHOPEDICS | Facility: CLINIC | Age: 49
End: 2024-10-07
Payer: MEDICAID

## 2024-10-07 VITALS — BODY MASS INDEX: 31.39 KG/M2 | HEIGHT: 67 IN | WEIGHT: 200 LBS

## 2024-10-07 DIAGNOSIS — Z96.642 S/P TOTAL LEFT HIP ARTHROPLASTY: ICD-10-CM

## 2024-10-07 DIAGNOSIS — Z96.642 STATUS POST TOTAL HIP REPLACEMENT, LEFT: ICD-10-CM

## 2024-10-07 DIAGNOSIS — Z96.642 STATUS POST TOTAL HIP REPLACEMENT, LEFT: Primary | ICD-10-CM

## 2024-10-07 PROCEDURE — 1159F MED LIST DOCD IN RCRD: CPT | Mod: CPTII,,, | Performed by: PHYSICIAN ASSISTANT

## 2024-10-07 PROCEDURE — 4010F ACE/ARB THERAPY RXD/TAKEN: CPT | Mod: CPTII,,, | Performed by: PHYSICIAN ASSISTANT

## 2024-10-07 PROCEDURE — 72170 X-RAY EXAM OF PELVIS: CPT | Mod: 26,,, | Performed by: RADIOLOGY

## 2024-10-07 PROCEDURE — 99024 POSTOP FOLLOW-UP VISIT: CPT | Mod: ,,, | Performed by: PHYSICIAN ASSISTANT

## 2024-10-07 PROCEDURE — 99213 OFFICE O/P EST LOW 20 MIN: CPT | Mod: PBBFAC,25,PN | Performed by: PHYSICIAN ASSISTANT

## 2024-10-07 PROCEDURE — 3044F HG A1C LEVEL LT 7.0%: CPT | Mod: CPTII,,, | Performed by: PHYSICIAN ASSISTANT

## 2024-10-07 PROCEDURE — 72170 X-RAY EXAM OF PELVIS: CPT | Mod: TC,PN

## 2024-10-07 PROCEDURE — 99999 PR PBB SHADOW E&M-EST. PATIENT-LVL III: CPT | Mod: PBBFAC,,, | Performed by: PHYSICIAN ASSISTANT

## 2024-10-07 RX ORDER — OXYCODONE AND ACETAMINOPHEN 7.5; 325 MG/1; MG/1
1 TABLET ORAL EVERY 6 HOURS PRN
Qty: 28 TABLET | Refills: 0 | Status: SHIPPED | OUTPATIENT
Start: 2024-10-07

## 2024-10-07 NOTE — PROGRESS NOTES
[facility]  [address]  [place] [id number]  Phone: [phone]   [phone]    Patient's PCP:Riya Vidales NP  Referring Provider: No ref. provider found    POST-OP Note:    Subjective:        Chief Complaint:   Chief Complaint   Patient presents with    Left Hip - Post-op Evaluation     Patient is here for a PO f/u on left MARINA 9.23.24, states pain has stayed about the same since last visit. Has numbness and tingling as well as burning and shooting pain        Past Medical History:   Diagnosis Date    ADHD (attention deficit hyperactivity disorder)     Arthritis     Asthma     as child only    Back pain     Coronary artery disease     Degeneration of lumbar intervertebral disc 05/2016    Depression     Digestive disorder     Elevated PSA     Headache     Hyperlipidemia     Hypertension     Kidney damage     stage 3 ; d/t aleve abuse    Kidney stone     Myocardial infarction     Neck pain     Numbness and tingling in hands     Numbness and tingling of both legs     Osteonecrosis     Bilat Femur    Seizures     from inapsine 2002    Sleep apnea     no cpap    Wears glasses     CONTACS       Past Surgical History:   Procedure Laterality Date    BACK SURGERY  2016    back surgery    BONE GRAFT N/A 11/5/2020    Procedure: BONE GRAFT;  Surgeon: Mateusz Blanco MD;  Location: [facility] OR;  Service: Orthopedics;  Laterality: N/A;    CARDIAC SURGERY  01/06/2020    CABG X 7    CORONARY ARTERY BYPASS GRAFT      7 vessels    FLEXIBLE SIGMOIDOSCOPY N/A 7/24/2024    Procedure: SIGMOIDOSCOPY, FLEXIBLE;  Surgeon: Latesha Victoria MD;  Location: [facility] ENDO;  Service: Endoscopy;  Laterality: N/A;    HERNIA REPAIR Bilateral 11/22/2017    HIP ARTHROPLASTY Left 9/23/2024    Procedure: ARTHROPLASTY, HIP;  Surgeon: Lázaro Jaimes MD;  Location: [facility] OR;  Service: Orthopedics;  Laterality: Left;  Sincere    LITHOTRIPSY      LUMBAR LAMINECTOMY WITH FUSION Bilateral 11/5/2020    Procedure: LAMINECTOMY, SPINE,  LUMBAR, WITH FUSION;  Surgeon: Mateusz Blanco MD;  Location: [facility] OR;  Service: Orthopedics;  Laterality: Bilateral;  MEDTRONIC  NTI  L-3    PILONIDAL CYST DRAINAGE      removal of abcess      scrotal    REMOVAL OF HARDWARE FROM SPINE Bilateral 11/5/2020    Procedure: REMOVAL, HARDWARE, SPINE;  Surgeon: Mateusz Blanco MD;  Location: [facility] OR;  Service: Orthopedics;  Laterality: Bilateral;  L 4-5    SURGICAL REMOVAL OF PILONIDAL CYST N/A 4/15/2024    Procedure: EXCISION, PILONIDAL CYST;  Surgeon: Jayden Phillips III, MD;  Location: [facility] OR;  Service: General;  Laterality: N/A;  sacral area    TYMPANOSTOMY TUBE PLACEMENT         Current Outpatient Medications   Medication Sig    anastrozole (ARIMIDEX) 1 mg Tab Take 1 mg by mouth every 7 days.    busPIRone (BUSPAR) 15 MG tablet Take 1 tablet (15 mg total) by mouth 3 (three) times daily.    celecoxib (CELEBREX) 200 MG capsule Take 1 capsule (200 mg total) by mouth 2 (two) times daily.    clopidogreL (PLAVIX) 75 mg tablet Take 75 mg by mouth once daily.    docusate sodium (COLACE) 100 MG capsule Take 1 capsule (100 mg total) by mouth 2 (two) times daily.    ENTRESTO 49-51 mg per tablet Take 1 tablet by mouth 2 (two) times daily.    EScitalopram oxalate (LEXAPRO) 20 MG tablet Take 1 tablet (20 mg total) by mouth once daily.    fluticasone propionate (FLONASE) 50 mcg/actuation nasal spray 2 sprays (100 mcg total) by Each Nostril route once daily.    gabapentin (NEURONTIN) 300 MG capsule Take 1 capsule (300 mg total) by mouth 2 (two) times daily.    HYDROmorphone (DILAUDID) 4 MG tablet Take 1 tablet (4 mg total) by mouth every 6 (six) hours as needed for Pain.    LORazepam (ATIVAN) 0.5 MG tablet Take 1 tablet (0.5 mg total) by mouth every 12 (twelve) hours as needed for Anxiety.    metoclopramide HCl (REGLAN) 10 MG tablet Take 1 tablet (10 mg total) by mouth every 6 (six) hours.    metoprolol tartrate (LOPRESSOR) 50 MG tablet Take 1 tablet by mouth 2 (two) times  daily.    pantoprazole (PROTONIX) 40 MG tablet Take 1 tablet (40 mg total) by mouth once daily.    rosuvastatin (CRESTOR) 40 MG Tab Take 40 mg by mouth once daily.    tadalafiL (CIALIS) 10 MG tablet Take 10 mg by mouth daily as needed.    testosterone cypionate 200 mg/mL Kit Inject 1 mL into the muscle every 7 days.    oxyCODONE-acetaminophen (PERCOCET) 7.5-325 mg per tablet Take 1 tablet by mouth every 6 (six) hours as needed for Pain.     No current facility-administered medications for this visit.       Review of patient's allergies indicates:   Allergen Reactions    Inapsine [droperidol] Anaphylaxis     seizures    Effexor [venlafaxine]      Increased anxiety    Pcn [penicillins]     Bactrim [sulfamethoxazole-trimethoprim] Rash     Dry red rash all over when in the sun    Fluconazole Rash     Pruritis         Family History   Problem Relation Name Age of Onset    Heart disease Mother      Migraines Mother      Hypertension Mother      Early death Father accident     Heart disease Father accident     Diabetes Maternal Aunt      Diabetes Maternal Uncle      Diabetes Maternal Grandfather         Social History     Socioeconomic History    Marital status:    Occupational History    Occupation: disability   Tobacco Use    Smoking status: Former     Current packs/day: 0.00     Types: Cigarettes     Quit date: 2016     Years since quittin.7     Passive exposure: Past    Smokeless tobacco: Former     Quit date: 2016    Tobacco comments:     Occasional  vaping   Substance and Sexual Activity    Alcohol use: Yes     Alcohol/week: 1.0 standard drink of alcohol     Types: 1 Glasses of wine per week     Comment: 20 - 30 shots vodka per day or 1-2 1/5ths per day for 2 years (started )    Drug use: No    Sexual activity: Yes     Partners: Female     Social Drivers of Health     Financial Resource Strain: Medium Risk (3/18/2024)    Overall Financial Resource Strain (CARDIA)     Difficulty of Paying  Living Expenses: Somewhat hard   Food Insecurity: No Food Insecurity (3/18/2024)    Hunger Vital Sign     Worried About Running Out of Food in the Last Year: Never true     Ran Out of Food in the Last Year: Never true   Transportation Needs: No Transportation Needs (3/18/2024)    PRAPARE - Transportation     Lack of Transportation (Medical): No     Lack of Transportation (Non-Medical): No   Physical Activity: Insufficiently Active (3/18/2024)    Exercise Vital Sign     Days of Exercise per Week: 2 days     Minutes of Exercise per Session: 60 min   Stress: No Stress Concern Present (3/18/2024)    [employer] - Occupational Stress Questionnaire     Feeling of Stress : Only a little   Housing Stability: Unknown (3/18/2024)    Housing Stability Vital Sign     Unable to Pay for Housing in the Last Year: No     Unstable Housing in the Last Year: No       History of present illness: 49 y.o. male returns to clinic today status post Left hip arthroplasty September 23rd.  Patient is here today for routine follow-up.    Still complaining significant left posterior hip and buttocks pain.  He does have some groin pain as well.  He has been compliant with his hip precautions.    Review of Systems:    Musculoskeletal:  See HPI       Objective:        Physical Examination:    Vital Signs: There were no vitals filed for this visit.    Body mass index is 31.32 kg/m².    This a well-developed, well nourished patient in no acute distress.  They are alert and oriented and cooperative to examination.        Examination of the Left hip, with surgical incision consistent with history of stated procedure. The skin is dry and intact, no erythema or ecchymosis, no signs symptoms of infection.  No evidence of wound failure dehiscence.  The remainder of the patient's surgical dressings and sutures were removed today without complication. Calf soft nontender, straight leg raise is negative.  There are 2+ distal  pulses and intact light touch sensation.        Pertinent New Results:       XRAY Report / Interpretation:  AP pelvis taken today in the office demonstrate a left total hip arthroplasty to be in appropriate position failure or loosening.         Assessment:       1. Status post total hip replacement, left    2. S/P total left hip arthroplasty      Plan:     Status post total hip replacement, left  -     X-Ray Pelvis Routine AP; Future; Expected date: 10/07/2024    S/P total left hip arthroplasty  -     oxyCODONE-acetaminophen (PERCOCET) 7.5-325 mg per tablet; Take 1 tablet by mouth every 6 (six) hours as needed for Pain.  Dispense: 28 tablet; Refill: 0        Follow up for 4 wk f/u, //XR// s/p left MARINA 9/23/24, Post-op.      Stable status post hip arthroplasty.  We went over wound care and medications in great detail as well as hip precautions.  We are going to transition from home health formal physical therapy.  Continue to work with strengthening and range motion.  Follow up in 1 month.    Wound Care review: on approximately day 3 after surgery, or 72 hours after your initial surgery date, you may begin cleaning your wounds (AS LONG AS THERE IS NO DRAINAGE PRESENT).     Gentle cleansing with a mild soap and water is recommended. Pat the area dry, and then cover the wound with a clean, sterile dressing.  Do this at least once daily.  Do not apply any ointments creams or lotions to the wounds until told to do so.  Avoid submerging or immersing the wounds in water such as a sink, tub, or pool for at least 1 month after surgery.    Hip precautions include:   1. avoid bending at the waist or hip greater than or past 90°,   2. twisting your leg in or out,   3. crossing your legs.     Sit with your hips higher than your knees, sit in a chair with armrests, and sleep on your back with a pillow between your legs. These should be followed for 3 months after surgery.    Medication review, at the time of your surgery you  were prescribed a combination of various medications which may have included up to six prescriptions:      1. First prescription was for an anticoagulant.  Most commonly aspirin 81 mg to be taken every 12 hours for 30 days postoperatively for DVT prophylaxis.  If you took aspirin prior to the start of this medication you can resume your normal aspirin dosing per your prescribing provider after 30 days.    If you were already on an anticoagulant then continue this medication as directed daily.    2. Second prescription was for a narcotic pain medication (Percocet 7.5 mg or similar) with instructions to take 1 tablet by mouth every 6 hours as needed for pain. Over the next 3 months, we will continue to taper your medications decreasing both the frequency and strength of the medications over time.    3. Third prescription was for an anti-inflammatory, Celebrex 200 mg with instructions to take 1 tab by mouth as needed every 12 hours for 1 month postoperatively.     4. Fourth prescription is for Neurontin 300 mg with instructions to take 1 tablet by mouth as needed every 12 hours for 1 month postoperatively.    5. Fifth prescription was for Colace 100 mg with instructions to take 1 tablet by mouth as needed every 12 hours for constipation associated with narcotic use.    6.  A Sixth and final prescription may be an antiemetic such as Zofran 4 mg with instructions to take 1 tablet by mouth as needed for postop nausea if you suffer from this.           NIKHIL Paulson, PA-C      EMR Statement:  Please note that portions of this patient encounter record were imported from the patients electronic medical record and that others were dictated using voice recognition software. For these reasons grammatical errors, nonsensical language, and apparently contradictory statements may be present.  These should be disregarded or interpreted with respect to the context of the document.

## 2024-10-08 ENCOUNTER — PATIENT MESSAGE (OUTPATIENT)
Dept: ORTHOPEDICS | Facility: CLINIC | Age: 49
End: 2024-10-08
Payer: MEDICAID

## 2024-10-08 DIAGNOSIS — Z96.642 STATUS POST TOTAL HIP REPLACEMENT, LEFT: Primary | ICD-10-CM

## 2024-10-09 PROBLEM — Z96.642 STATUS POST TOTAL HIP REPLACEMENT, LEFT: Status: ACTIVE | Noted: 2024-10-09

## 2024-10-10 ENCOUNTER — CLINICAL SUPPORT (OUTPATIENT)
Dept: REHABILITATION | Facility: HOSPITAL | Age: 49
End: 2024-10-10
Payer: MEDICAID

## 2024-10-10 ENCOUNTER — PATIENT MESSAGE (OUTPATIENT)
Dept: ORTHOPEDICS | Facility: CLINIC | Age: 49
End: 2024-10-10
Payer: MEDICAID

## 2024-10-10 DIAGNOSIS — Z96.642 STATUS POST TOTAL HIP REPLACEMENT, LEFT: Primary | ICD-10-CM

## 2024-10-10 DIAGNOSIS — R29.898 DECREASED STRENGTH OF LOWER EXTREMITY: ICD-10-CM

## 2024-10-10 DIAGNOSIS — R26.9 GAIT ABNORMALITY: ICD-10-CM

## 2024-10-10 PROCEDURE — 97161 PT EVAL LOW COMPLEX 20 MIN: CPT | Mod: PN | Performed by: PHYSICAL THERAPIST

## 2024-10-10 PROCEDURE — 97110 THERAPEUTIC EXERCISES: CPT | Mod: PN | Performed by: PHYSICAL THERAPIST

## 2024-10-11 NOTE — PLAN OF CARE
"[facility]   Physical Therapy Initial Evaluation     [id number]    Therapy Diagnosis:   Encounter Diagnoses   Name Primary?    Status post total hip replacement, left Yes    Decreased strength of lower extremity     Gait abnormality         Physician: Phani Joel PA    Physician Orders: PT Eval and Treat & Hip Precautions  Medical Diagnosis from Referral: Status post total hip replacement, left   Evaluation Date: 10/10/2024  Authorization Period Expiration: 10/8/2025  Plan of Care Expiration: 1/10/2025  Progress Note Due: 11/10/2024  Visit # / Visits authorized: 1/ 1  (POC: 1/24)   FOTO: 1/3    Precautions: Standard and Hip Precautions   Insurance: Payor: MEDICAID / Plan: [place] [place] / Product Type: Managed Medicaid /     Time In: 1400  Time Out: 1500  Total Appointment Time (timed & untimed codes): 60 minutes      SUBJECTIVE   Date of onset: Date of surgery: 9/23/2024    History of current condition - Lee reports: a chronic history of bilateral hip pain. He reports increased pain with prolonged standing and walking. The patient states an abdominal X-ray of his abdomin performed while he was hospitalized with colitis revealed avascular necrosis of both hips. He underwent a left MARINA on 9/23/2024 and was discharged the same day ambulating with a FWW. He received HHPT for 2 weeks. He now presents to outpatient PT ambulating without assistive device. He reports generalized left hip pain. He has difficulty transferring from sit to stand and prolonged standing/walking.    Falls: 0    Imaging, X-rays: "A noncemented left total hip arthroplasty is present showing no evidence for loosening, migration, or periprosthetic fracture there is partially imaged instrumented fusion of the lumbar spine. "    Prior Therapy: PT ([facility])  Social History: Single lives with their family  Occupation: Not working  Prior Level of Function: " Independent  Current Level of Function: Decreased ability to perform ADL and limited tolerance to standing and walking.    Pain:  Current 7/10, worst 9/10, best 7/10   Location: left hip    Description: Sharp  Aggravating Factors: Standing, Walking, and Getting out of bed/chair  Easing Factors: rest    Patients goals: Return to previous level of activity     Medical History:   Past Medical History:   Diagnosis Date    ADHD (attention deficit hyperactivity disorder)     Arthritis     Asthma     as child only    Back pain     Coronary artery disease     Degeneration of lumbar intervertebral disc 05/2016    Depression     Digestive disorder     Elevated PSA     Headache     Hyperlipidemia     Hypertension     Kidney damage     stage 3 ; d/t aleve abuse    Kidney stone     Myocardial infarction     Neck pain     Numbness and tingling in hands     Numbness and tingling of both legs     Osteonecrosis     Bilat Femur    Seizures     from inapsine 2002    Sleep apnea     no cpap    Wears glasses     CONTACS       Surgical History:   Lee Quintanilla  has a past surgical history that includes removal of abcess; Pilonidal cyst drainage; Tympanostomy tube placement; Back surgery (2016); Lithotripsy; Hernia repair (Bilateral, 11/22/2017); Coronary artery bypass graft; Cardiac surgery (01/06/2020); Lumbar laminectomy with fusion (Bilateral, 11/5/2020); Removal of hardware from spine (Bilateral, 11/5/2020); Bone graft (N/A, 11/5/2020); Surgical removal of pilonidal cyst (N/A, 4/15/2024); Flexible sigmoidoscopy (N/A, 7/24/2024); and Hip Arthroplasty (Left, 9/23/2024).    Medications:   Lee has a current medication list which includes the following prescription(s): anastrozole, buspirone, celecoxib, clopidogrel, docusate sodium, entresto, escitalopram oxalate, fluticasone propionate, gabapentin, lorazepam, metoclopramide hcl, metoprolol tartrate, oxycodone-acetaminophen, pantoprazole, rosuvastatin, tadalafil, and  testosterone cypionate.    Allergies:   Review of patient's allergies indicates:   Allergen Reactions    Inapsine [droperidol] Anaphylaxis     seizures    Effexor [venlafaxine]      Increased anxiety    Pcn [penicillins]     Bactrim [sulfamethoxazole-trimethoprim] Rash     Dry red rash all over when in the sun    Fluconazole Rash     Pruritis            OBJECTIVE     Posture: Decreased lumbar lordosis and forward head in standing  Palpation: Moderate point tenderness noted with palpation of the lateral aspect of the left hip  Sensation: Decreased along the incision site, otherwise intact    Range of Motion/Strength:     LE ROM Left Right   Hip flexion 90 degrees 116 degrees   Hip abduction 18 degrees 36 degrees       LE MMT Left Right   Hip flexion 4/5 5/5   Hip abduction 4/5 5/5   Hip adduction 4/5 5/5   Hip external rotation 4/5 5/5   Hip Internal rotation 4/5 5/5   Knee extension 4/5 5/5   Knee flexion 4/5 5/5         Gait Without AD   Analysis Antalgic gait: Decreased stance time on the left       Limitation/Restriction for FOTO  Survey    Therapist reviewed FOTO scores for Lee Quintanilla on 10/10/2024.   FOTO documents entered into [employer] - see Media section.    Limitation Score: 33%         TREATMENT     Total Treatment time (time-based codes) separate from Evaluation: 30 minutes      Lee received the treatments listed below:      THERAPEUTIC EXERCISES to develop strength and ROM for 30 minutes including :     Standing hip abduction 3 x 10  Standing hip extension 3 x 10  Marching 3 x 10  Mini squats 3 x 10  Hip hikes 3 x 10  Forward step ups 3 x 10 4 inch step  Lateral step ups 3 x 10 4 inch step     PATIENT EDUCATION AND HOME EXERCISES     Education provided:   - Hip precautions reviewed    Written Home Exercises Provided: yes. Exercises were reviewed and Lee was able to demonstrate them prior to the end of the session.  Lee demonstrated good  understanding of the education provided. See EMR  under Patient Instructions for exercises provided during therapy sessions.    ASSESSMENT     Lee is a 49 y.o. male referred to outpatient Physical Therapy with a medical diagnosis of Status post total hip replacement, left . Patient presents with :    Left hip pain  Decreased lower extremity strength  Decreased ability to perform ADL and limited tolerance to standing and walking.      Patient prognosis is Good.   Patientt will benefit from skilled outpatient Physical Therapy to address the deficits stated above and in the chart below, provide patient /family education, and to maximize patientt's level of independence.     Plan of care discussed with patient: Yes  Patient's spiritual, cultural and educational needs considered and patient is agreeable to the plan of care and goals as stated below:     Anticipated Barriers for therapy: none    Medical Necessity is demonstrated by the following  History  Co-morbidities and personal factors that may impact the plan of care [x] LOW: no personal factors / co-morbidities  [] MODERATE: 1-2 personal factors / co-morbidities  [] HIGH: 3+ personal factors / co-morbidities    Moderate / High Support Documentation:      Examination  Body Structures and Functions, activity limitations and participation restrictions that may impact the plan of care [x] LOW: addressing 1-2 elements  [] MODERATE: 3+ elements  [] HIGH: 4+ elements (please support below)    Moderate / High Support Documentation:      Clinical Presentation [x] LOW: stable  [] MODERATE: Evolving  [] HIGH: Unstable     Decision Making/ Complexity Score: low       Goals:  SHORT TERM GOALS:  6 weeks  Progress Date met   The patient will begin a written HEP [] Met  [] Not Met  [] Progressing     Decrease soft tissue tenderness to mild [] Met  [] Not Met  [] Progressing     Increase hip strength to 4+/5 [] Met  [] Not Met  [] Progressing     The patient will be able to ascend/descend 10 step/stairs without onset of  symptoms.   [] Met  [] Not Met  [] Progressing        LONG TERM GOALS: 12 weeks  Progress Date met   The patient will be independent with a HEP for maintenance [] Met  [] Not Met  [] Progressing     Increase hip ROM to WFL [] Met  [] Not Met  [] Progressing     Increase left hip strength to 5/5 [] Met  [] Not Met  [] Progressing     Decrease FOTO score to 65 % [] Met  [] Not Met  [] Progressing     The patient will be able to ascend/descend 20 step/stairs without onset of symptoms.   [] Met  [] Not Met  [] Progressing         PLAN   Plan of care Certification: 10/10/2024 to 1/10/2025.    Outpatient Physical Therapy 2 times weekly for 12 weeks to include the following interventions: Gait Training, Manual Therapy, Neuromuscular Re-ed, Patient Education, Therapeutic Activities, and Therapeutic Exercise.     Colby White, PT      I CERTIFY THE NEED FOR THESE SERVICES FURNISHED UNDER THIS PLAN OF TREATMENT AND WHILE UNDER MY CARE   Physician's comments:     Physician's Signature: ___________________________________________________

## 2024-10-12 ENCOUNTER — LAB VISIT (OUTPATIENT)
Dept: LAB | Facility: HOSPITAL | Age: 49
End: 2024-10-12
Payer: MEDICAID

## 2024-10-12 DIAGNOSIS — E11.9 DIABETES MELLITUS WITHOUT COMPLICATION: ICD-10-CM

## 2024-10-12 DIAGNOSIS — Z96.642 S/P TOTAL LEFT HIP ARTHROPLASTY: ICD-10-CM

## 2024-10-12 DIAGNOSIS — E78.5 HYPERLIPEMIA: ICD-10-CM

## 2024-10-12 DIAGNOSIS — N18.2 CHRONIC KIDNEY DISEASE, STAGE II (MILD): Primary | ICD-10-CM

## 2024-10-12 DIAGNOSIS — E29.1 3-OXO-5 ALPHA-STEROID DELTA 4-DEHYDROGENASE DEFICIENCY: ICD-10-CM

## 2024-10-12 LAB
ALBUMIN SERPL BCP-MCNC: 4.1 G/DL (ref 3.5–5.2)
ALBUMIN SERPL BCP-MCNC: 4.1 G/DL (ref 3.5–5.2)
ALP SERPL-CCNC: 80 U/L (ref 55–135)
ALP SERPL-CCNC: 80 U/L (ref 55–135)
ALT SERPL W/O P-5'-P-CCNC: 23 U/L (ref 10–44)
ALT SERPL W/O P-5'-P-CCNC: 23 U/L (ref 10–44)
ANION GAP SERPL CALC-SCNC: 5 MMOL/L (ref 8–16)
ANION GAP SERPL CALC-SCNC: 5 MMOL/L (ref 8–16)
AST SERPL-CCNC: 19 U/L (ref 10–40)
AST SERPL-CCNC: 19 U/L (ref 10–40)
BASOPHILS # BLD AUTO: 0.04 K/UL (ref 0–0.2)
BASOPHILS NFR BLD: 0.4 % (ref 0–1.9)
BILIRUB SERPL-MCNC: 0.5 MG/DL (ref 0.1–1)
BILIRUB SERPL-MCNC: 0.5 MG/DL (ref 0.1–1)
BUN SERPL-MCNC: 17 MG/DL (ref 6–20)
BUN SERPL-MCNC: 17 MG/DL (ref 6–20)
CALCIUM SERPL-MCNC: 9.1 MG/DL (ref 8.7–10.5)
CALCIUM SERPL-MCNC: 9.1 MG/DL (ref 8.7–10.5)
CHLORIDE SERPL-SCNC: 104 MMOL/L (ref 95–110)
CHLORIDE SERPL-SCNC: 104 MMOL/L (ref 95–110)
CHOLEST SERPL-MCNC: 155 MG/DL (ref 120–199)
CHOLEST/HDLC SERPL: 3.4 {RATIO} (ref 2–5)
CO2 SERPL-SCNC: 31 MMOL/L (ref 23–29)
CO2 SERPL-SCNC: 31 MMOL/L (ref 23–29)
CREAT SERPL-MCNC: 1.5 MG/DL (ref 0.5–1.4)
CREAT SERPL-MCNC: 1.5 MG/DL (ref 0.5–1.4)
DIFFERENTIAL METHOD BLD: ABNORMAL
EOSINOPHIL # BLD AUTO: 0.4 K/UL (ref 0–0.5)
EOSINOPHIL NFR BLD: 3.3 % (ref 0–8)
ERYTHROCYTE [DISTWIDTH] IN BLOOD BY AUTOMATED COUNT: 14.6 % (ref 11.5–14.5)
EST. GFR  (NO RACE VARIABLE): 56.7 ML/MIN/1.73 M^2
EST. GFR  (NO RACE VARIABLE): 56.7 ML/MIN/1.73 M^2
ESTIMATED AVG GLUCOSE: 108 MG/DL (ref 68–131)
GLUCOSE SERPL-MCNC: 144 MG/DL (ref 70–110)
GLUCOSE SERPL-MCNC: 144 MG/DL (ref 70–110)
HBA1C MFR BLD: 5.4 % (ref 4.5–6.2)
HCT VFR BLD AUTO: 42.3 % (ref 40–54)
HDLC SERPL-MCNC: 45 MG/DL (ref 40–75)
HDLC SERPL: 29 % (ref 20–50)
HGB BLD-MCNC: 14 G/DL (ref 14–18)
IMM GRANULOCYTES # BLD AUTO: 0.07 K/UL (ref 0–0.04)
IMM GRANULOCYTES NFR BLD AUTO: 0.6 % (ref 0–0.5)
LDLC SERPL CALC-MCNC: 75.8 MG/DL (ref 63–159)
LYMPHOCYTES # BLD AUTO: 1.8 K/UL (ref 1–4.8)
LYMPHOCYTES NFR BLD: 16.4 % (ref 18–48)
MCH RBC QN AUTO: 29.7 PG (ref 27–31)
MCHC RBC AUTO-ENTMCNC: 33.1 G/DL (ref 32–36)
MCV RBC AUTO: 90 FL (ref 82–98)
MONOCYTES # BLD AUTO: 0.7 K/UL (ref 0.3–1)
MONOCYTES NFR BLD: 5.9 % (ref 4–15)
NEUTROPHILS # BLD AUTO: 8.1 K/UL (ref 1.8–7.7)
NEUTROPHILS NFR BLD: 73.4 % (ref 38–73)
NONHDLC SERPL-MCNC: 110 MG/DL
NRBC BLD-RTO: 0 /100 WBC
PLATELET # BLD AUTO: 254 K/UL (ref 150–450)
PMV BLD AUTO: 10.9 FL (ref 9.2–12.9)
POTASSIUM SERPL-SCNC: 4 MMOL/L (ref 3.5–5.1)
POTASSIUM SERPL-SCNC: 4 MMOL/L (ref 3.5–5.1)
PROT SERPL-MCNC: 7.5 G/DL (ref 6–8.4)
PROT SERPL-MCNC: 7.5 G/DL (ref 6–8.4)
RBC # BLD AUTO: 4.71 M/UL (ref 4.6–6.2)
SODIUM SERPL-SCNC: 140 MMOL/L (ref 136–145)
SODIUM SERPL-SCNC: 140 MMOL/L (ref 136–145)
TRIGL SERPL-MCNC: 171 MG/DL (ref 30–150)
WBC # BLD AUTO: 11.07 K/UL (ref 3.9–12.7)

## 2024-10-12 PROCEDURE — 84403 ASSAY OF TOTAL TESTOSTERONE: CPT

## 2024-10-12 PROCEDURE — 36415 COLL VENOUS BLD VENIPUNCTURE: CPT

## 2024-10-12 PROCEDURE — 84402 ASSAY OF FREE TESTOSTERONE: CPT

## 2024-10-12 PROCEDURE — 85025 COMPLETE CBC W/AUTO DIFF WBC: CPT

## 2024-10-12 PROCEDURE — 83036 HEMOGLOBIN GLYCOSYLATED A1C: CPT

## 2024-10-12 PROCEDURE — 80061 LIPID PANEL: CPT

## 2024-10-12 PROCEDURE — 82670 ASSAY OF TOTAL ESTRADIOL: CPT

## 2024-10-12 PROCEDURE — 82672 ASSAY OF ESTROGEN: CPT

## 2024-10-12 PROCEDURE — 80053 COMPREHEN METABOLIC PANEL: CPT

## 2024-10-14 LAB
ESTRADIOL SERPL-MCNC: 10.3 PG/ML (ref 7.6–42.6)
TESTOST SERPL-MCNC: 143 NG/DL (ref 264–916)

## 2024-10-14 RX ORDER — OXYCODONE AND ACETAMINOPHEN 7.5; 325 MG/1; MG/1
1 TABLET ORAL EVERY 6 HOURS PRN
Qty: 28 TABLET | Refills: 0 | Status: SHIPPED | OUTPATIENT
Start: 2024-10-14

## 2024-10-15 ENCOUNTER — CLINICAL SUPPORT (OUTPATIENT)
Dept: REHABILITATION | Facility: HOSPITAL | Age: 49
End: 2024-10-15
Payer: MEDICAID

## 2024-10-15 DIAGNOSIS — R29.898 DECREASED STRENGTH OF LOWER EXTREMITY: ICD-10-CM

## 2024-10-15 DIAGNOSIS — Z96.642 STATUS POST TOTAL HIP REPLACEMENT, LEFT: Primary | ICD-10-CM

## 2024-10-15 DIAGNOSIS — R26.9 GAIT ABNORMALITY: ICD-10-CM

## 2024-10-15 PROCEDURE — 97110 THERAPEUTIC EXERCISES: CPT | Mod: KX,PN,CQ

## 2024-10-15 NOTE — PROGRESS NOTES
[facility]   Physical Therapy Treatment Note     Name: Lee Quintanilla  Clinic Number: [id number]    Therapy Diagnosis:   Encounter Diagnoses   Name Primary?    Status post total hip replacement, left Yes    Decreased strength of lower extremity     Gait abnormality      Physician: Phani Joel PA    Visit Date: 10/15/2024    Physician Orders: PT Eval and Treat & Hip Precautions  Medical Diagnosis from Referral: Status post total hip replacement, left   Evaluation Date: 10/10/2024  Authorization Period Expiration: 10/8/2025  Plan of Care Expiration: 1/10/2025  Progress Note Due: 11/10/2024  Visit # / Visits authorized: 1/ 12  (POC: 1/24)   FOTO: 1/3     Limitation/Restriction for FOTO  Survey     Therapist reviewed FOTO scores for Lee Quintanilla on 10/10/2024.   FOTO documents entered into [employer] - see Media section.     Limitation Score: 33%    Precautions:  Standard and Hip Precautions    Time In: 205p  Time Out: 301p  Total Billable Time: 56 minutes      SUBJECTIVE     Pt reports: no complaints today.  He was compliant with home exercise program.  Response to previous treatment: first visit after eval  Functional change: ongoing    Pain: 6/10  Location: left hip      OBJECTIVE     Objective Measures updated at progress report unless specified.     Treatment   Date of surgery: 9/23/2024  Lee received the treatments listed below:      therapeutic exercises to develop strength, ROM, flexibility, posture, and core stabilization for 56 MEDICAID minutes including:    Nustep x 10 minutes, seat @ 18  Bridges x 20  Hip abd Green Theraband x 30  SLR x 20  Hip adduction with ball x 30    Seated hamstring curl Red Theraband x 30  Long Arc Quad 1# x 30    Standing hip abduction 3 x 10  Standing hip extension 3 x 10  Marching 3 x 10  Mini squats 3 x 10  Hip hikes 3 x 10 Bilateral   Forward step ups 3 x 10 4 inch step  Lateral step ups 3 x 10 4 inch step         Patient  Education and Home Exercises     Home Exercises Provided and Patient Education Provided     Education provided:   - cont HOME EXERCISE PROGRAM     Written Home Exercises Provided: Patient instructed to cont prior HEP. Exercises were reviewed and Lee was able to demonstrate them prior to the end of the session.  Lee demonstrated good  understanding of the education provided. See EMR under Patient Instructions for exercises provided during therapy sessions    ASSESSMENT     Lee presents with decreased L hip and LE strength.  Decreased stance time and slower swing through on his Left Lower Extremity.  Overall good tolerance for PRE's.  Continued gait training and strengthening to improve functional activities, ADL's.    Lee Is progressing well towards his goals.   Pt prognosis is Good.     Pt will continue to benefit from skilled outpatient physical therapy to address the deficits listed in the problem list box on initial evaluation, provide pt/family education and to maximize pt's level of independence in the home and community environment.     Pt's spiritual, cultural and educational needs considered and pt agreeable to plan of care and goals.     Anticipated barriers to physical therapy: none    Goals:  SHORT TERM GOALS:  6 weeks  Progress  10/15/2024 Date met   The patient will begin a written HEP [x] Met  [] Not Met  [] Progressing  10/15/2024   Decrease soft tissue tenderness to mild [] Met  [] Not Met  [x] Progressing     Increase hip strength to 4+/5 [] Met  [] Not Met  [x] Progressing     The patient will be able to ascend/descend 10 step/stairs without onset of symptoms.    [] Met  [] Not Met  [x] Progressing        LONG TERM GOALS: 12 weeks  Progress  10/15/2024 Date met   The patient will be independent with a HEP for maintenance [] Met  [] Not Met  [x] Progressing     Increase hip ROM to WFL [] Met  [] Not Met  [x] Progressing     Increase left hip strength to 5/5 [] Met  [] Not Met  [x]  Progressing     Decrease FOTO score to 65 % [] Met  [] Not Met  [x] Progressing     The patient will be able to ascend/descend 20 step/stairs without onset of symptoms.    [] Met  [] Not Met  [x] Progressing         PLAN     Cont per Plan of Care, strength, balance    Alanna Llamas, PTA

## 2024-10-16 LAB — ESTROGEN SERPL-MCNC: 85 PG/ML (ref 56–213)

## 2024-10-17 ENCOUNTER — CLINICAL SUPPORT (OUTPATIENT)
Dept: REHABILITATION | Facility: HOSPITAL | Age: 49
End: 2024-10-17
Payer: MEDICAID

## 2024-10-17 ENCOUNTER — DOCUMENTATION ONLY (OUTPATIENT)
Dept: REHABILITATION | Facility: HOSPITAL | Age: 49
End: 2024-10-17
Payer: MEDICAID

## 2024-10-17 DIAGNOSIS — R26.9 GAIT ABNORMALITY: ICD-10-CM

## 2024-10-17 DIAGNOSIS — Z96.642 STATUS POST TOTAL HIP REPLACEMENT, LEFT: Primary | ICD-10-CM

## 2024-10-17 DIAGNOSIS — R29.898 DECREASED STRENGTH OF LOWER EXTREMITY: ICD-10-CM

## 2024-10-17 LAB — TESTOST FREE SERPL-MCNC: 3.3 PG/ML (ref 6.8–21.5)

## 2024-10-17 PROCEDURE — 97110 THERAPEUTIC EXERCISES: CPT | Mod: PN | Performed by: PHYSICAL THERAPIST

## 2024-10-17 NOTE — PROGRESS NOTES
[facility]   Physical Therapy Treatment Note     Name: Lee Quintanilla  Clinic Number: [id number]    Therapy Diagnosis:   Encounter Diagnoses   Name Primary?    Status post total hip replacement, left Yes    Decreased strength of lower extremity     Gait abnormality        Physician: Phani Joel PA    Visit Date: 10/17/2024    Physician Orders: PT Eval and Treat & Hip Precautions  Medical Diagnosis from Referral: Status post total hip replacement, left   Evaluation Date: 10/10/2024  Authorization Period Expiration: 12/14/2024  Plan of Care Expiration: 1/10/2025  Progress Note Due: 11/10/2024  Visit # / Visits authorized: 1/ 12  (POC: 3/24)   FOTO: 1/3     Limitation/Restriction for FOTO  Survey     Therapist reviewed FOTO scores for Lee Quintanilla on 10/10/2024.   FOTO documents entered into [employer] - see Media section.     Limitation Score: 33%    Precautions:  Standard and Hip Precautions    Time In: 205p  Time Out: 301p  Total Billable Time: 56 minutes      SUBJECTIVE     Pt reports: no complaints today.  He was compliant with home exercise program.  Response to previous treatment: first visit after eval  Functional change: ongoing    Pain: 6/10  Location: left hip      OBJECTIVE     Objective Measures updated at progress report unless specified.     Treatment   Date of surgery: 9/23/2024  Lee received the treatments listed below:      therapeutic exercises to develop strength, ROM, flexibility, posture, and core stabilization for 56 MEDICAID minutes including:    Nustep x 10 minutes, seat @ 18  Bridges x 20  Hip abd Green Theraband x 30  SLR x 20  Hip adduction with ball x 30    Seated hamstring curl Red Theraband x 30  Long Arc Quad 1# x 30    Standing hip abduction 3 x 10  Standing hip extension 3 x 10  Marching 3 x 10  Mini squats 3 x 10  Hip hikes 3 x 10 Bilateral   Forward step ups 3 x 10 4 inch step  Lateral step ups 3 x 10 4 inch step        Patient  Education and Home Exercises     Home Exercises Provided and Patient Education Provided     Education provided:   - cont HOME EXERCISE PROGRAM     Written Home Exercises Provided: Patient instructed to cont prior HEP. Exercises were reviewed and Lee was able to demonstrate them prior to the end of the session.  Lee demonstrated good  understanding of the education provided. See EMR under Patient Instructions for exercises provided during therapy sessions    ASSESSMENT     The patient presents ambulating with sway bilaterally. He tolerated progressing of treatment to Precor resistance machines without exacerbation of symptoms.     Lee Is progressing well towards his goals.   Pt prognosis is Good.     Pt will continue to benefit from skilled outpatient physical therapy to address the deficits listed in the problem list box on initial evaluation, provide pt/family education and to maximize pt's level of independence in the home and community environment.     Pt's spiritual, cultural and educational needs considered and pt agreeable to plan of care and goals.     Anticipated barriers to physical therapy: none    Goals:  SHORT TERM GOALS:  6 weeks  Progress  10/17/2024 Date met   The patient will begin a written HEP [x] Met  [] Not Met  [] Progressing  10/15/2024   Decrease soft tissue tenderness to mild [] Met  [] Not Met  [x] Progressing     Increase hip strength to 4+/5 [] Met  [] Not Met  [x] Progressing     The patient will be able to ascend/descend 10 step/stairs without onset of symptoms.    [] Met  [] Not Met  [x] Progressing        LONG TERM GOALS: 12 weeks  Progress  10/17/2024 Date met   The patient will be independent with a HEP for maintenance [] Met  [] Not Met  [x] Progressing     Increase hip ROM to WFL [] Met  [] Not Met  [x] Progressing     Increase left hip strength to 5/5 [] Met  [] Not Met  [x] Progressing     Decrease FOTO score to 65 % [] Met  [] Not Met  [x] Progressing     The patient will  be able to ascend/descend 20 step/stairs without onset of symptoms.    [] Met  [] Not Met  [x] Progressing         PLAN     Cont per Plan of Care, strength, balance    Colby White, PT

## 2024-10-17 NOTE — PROGRESS NOTES
PT/PTA met face to face to discuss pt's treatment plan and progress towards established goals. Pt will be seen by a physical therapist minimally every 6th visit or every 30 days.    Alanna Llamas PTA

## 2024-10-18 DIAGNOSIS — F41.1 GAD (GENERALIZED ANXIETY DISORDER): ICD-10-CM

## 2024-10-18 RX ORDER — LORAZEPAM 0.5 MG/1
0.5 TABLET ORAL EVERY 12 HOURS PRN
Qty: 60 TABLET | Refills: 0 | Status: CANCELLED | OUTPATIENT
Start: 2024-10-18 | End: 2024-11-17

## 2024-10-20 DIAGNOSIS — Z96.642 S/P TOTAL LEFT HIP ARTHROPLASTY: ICD-10-CM

## 2024-10-21 ENCOUNTER — CLINICAL SUPPORT (OUTPATIENT)
Dept: REHABILITATION | Facility: HOSPITAL | Age: 49
End: 2024-10-21
Payer: MEDICAID

## 2024-10-21 DIAGNOSIS — R26.9 GAIT ABNORMALITY: ICD-10-CM

## 2024-10-21 DIAGNOSIS — R29.898 DECREASED STRENGTH OF LOWER EXTREMITY: ICD-10-CM

## 2024-10-21 DIAGNOSIS — Z96.642 STATUS POST TOTAL HIP REPLACEMENT, LEFT: Primary | ICD-10-CM

## 2024-10-21 PROCEDURE — 97110 THERAPEUTIC EXERCISES: CPT | Mod: PN | Performed by: PHYSICAL THERAPIST

## 2024-10-21 RX ORDER — OXYCODONE AND ACETAMINOPHEN 7.5; 325 MG/1; MG/1
1 TABLET ORAL EVERY 6 HOURS PRN
Qty: 28 TABLET | Refills: 0 | Status: SHIPPED | OUTPATIENT
Start: 2024-10-21

## 2024-10-21 NOTE — PROGRESS NOTES
[facility]   Physical Therapy Treatment Note     Name: Lee Quintanilla  Clinic Number: [id number]    Therapy Diagnosis:   No diagnosis found.      Physician: Phani Joel PA    Visit Date: 10/21/2024    Physician Orders: PT Eval and Treat & Hip Precautions  Medical Diagnosis from Referral: Status post total hip replacement, left   Evaluation Date: 10/10/2024  Authorization Period Expiration: 12/14/2024  Plan of Care Expiration: 1/10/2025  Progress Note Due: 11/10/2024  Visit # / Visits authorized: 1/ 12  (POC: 3/24)   FOTO: 1/3     Limitation/Restriction for FOTO  Survey     Therapist reviewed FOTO scores for Lee Quintanilla on 10/10/2024.   FOTO documents entered into [employer] - see Media section.     Limitation Score: 33%    Precautions:  Standard and Hip Precautions    Time In: 205p  Time Out: 301p  Total Billable Time: 56 minutes      SUBJECTIVE     Pt reports: no complaints today.  He was compliant with home exercise program.  Response to previous treatment: first visit after eval  Functional change: ongoing    Pain: 6/10  Location: left hip      OBJECTIVE     Objective Measures updated at progress report unless specified.     Treatment   Date of surgery: 9/23/2024  Lee received the treatments listed below:      therapeutic exercises to develop strength, ROM, flexibility, posture, and core stabilization for 56 MEDICAID minutes including:    Nustep x 10 minutes, seat @ 18  Bridges x 20  Hip abd Green Theraband x 30  SLR x 20  Hip adduction with ball x 30    Seated hamstring curl Red Theraband x 30  Long Arc Quad 1# x 30    Standing hip abduction 3 x 10  Standing hip extension 3 x 10  Marching 3 x 10  Mini squats 3 x 10  Hip hikes 3 x 10 Bilateral   Forward step ups 3 x 10 4 inch step  Lateral step ups 3 x 10 4 inch step        Patient Education and Home Exercises     Home Exercises Provided and Patient Education Provided     Education provided:   - cont HOME  EXERCISE PROGRAM     Written Home Exercises Provided: Patient instructed to cont prior HEP. Exercises were reviewed and Lee was able to demonstrate them prior to the end of the session.  Lee demonstrated good  understanding of the education provided. See EMR under Patient Instructions for exercises provided during therapy sessions    ASSESSMENT     The patient presents ambulating with sway bilaterally. He tolerated progressing of treatment to Precor resistance machines without exacerbation of symptoms.     Lee Is progressing well towards his goals.   Pt prognosis is Good.     Pt will continue to benefit from skilled outpatient physical therapy to address the deficits listed in the problem list box on initial evaluation, provide pt/family education and to maximize pt's level of independence in the home and community environment.     Pt's spiritual, cultural and educational needs considered and pt agreeable to plan of care and goals.     Anticipated barriers to physical therapy: none    Goals:  SHORT TERM GOALS:  6 weeks  Progress  10/21/2024 Date met   The patient will begin a written HEP [x] Met  [] Not Met  [] Progressing  10/15/2024   Decrease soft tissue tenderness to mild [] Met  [] Not Met  [x] Progressing     Increase hip strength to 4+/5 [] Met  [] Not Met  [x] Progressing     The patient will be able to ascend/descend 10 step/stairs without onset of symptoms.    [] Met  [] Not Met  [x] Progressing        LONG TERM GOALS: 12 weeks  Progress  10/21/2024 Date met   The patient will be independent with a HEP for maintenance [] Met  [] Not Met  [x] Progressing     Increase hip ROM to WFL [] Met  [] Not Met  [x] Progressing     Increase left hip strength to 5/5 [] Met  [] Not Met  [x] Progressing     Decrease FOTO score to 65 % [] Met  [] Not Met  [x] Progressing     The patient will be able to ascend/descend 20 step/stairs without onset of symptoms.    [] Met  [] Not Met  [x] Progressing         PLAN      Cont per Plan of Care, strength, balance    Colby White, PT

## 2024-10-21 NOTE — PROGRESS NOTES
[facility]   Physical Therapy Treatment Note     Name: Lee Quintanilla  Clinic Number: [id number]    Therapy Diagnosis:   Encounter Diagnoses   Name Primary?    Status post total hip replacement, left Yes    Decreased strength of lower extremity     Gait abnormality          Physician: Phani Joel PA    Visit Date: 10/21/2024    Physician Orders: PT Eval and Treat & Hip Precautions  Medical Diagnosis from Referral: Status post total hip replacement, left   Evaluation Date: 10/10/2024  Authorization Period Expiration: 12/14/2024  Plan of Care Expiration: 1/10/2025  Progress Note Due: 11/10/2024  Visit # / Visits authorized: 1/ 12  (POC: 3/24)   FOTO: 1/3     Limitation/Restriction for FOTO  Survey     Therapist reviewed FOTO scores for Lee Quintanilla on 10/10/2024.   FOTO documents entered into EPIC - see Media section.     Limitation Score: 33%    Precautions:  Standard and Hip Precautions    Time In: 205p  Time Out: 301p  Total Billable Time: 56 minutes      SUBJECTIVE     Pt reports: no complaints today.  He was compliant with home exercise program.  Response to previous treatment: first visit after eval  Functional change: ongoing    Pain: 6/10  Location: left hip      OBJECTIVE     Objective Measures updated at progress report unless specified.     Treatment   Date of surgery: 9/23/2024  Lee received the treatments listed below:      therapeutic exercises to develop strength, ROM, flexibility, posture, and core stabilization for 56 MEDICAID minutes including:    Nustep x 10 minutes, seat @ 18  Bridges x 20  Hip abd Green Theraband x 30  SLR x 20  Hip adduction with ball x 30    Seated hamstring curl Red Theraband x 30  Long Arc Quad 1# x 30    Standing hip abduction 3 x 10  Standing hip extension 3 x 10  Marching 3 x 10  Mini squats 3 x 10  Hip hikes 3 x 10 Bilateral   Forward step ups 3 x 10 4 inch step  Lateral step ups 3 x 10 4 inch step     Precor Leg  Press seat 11, 3 x 10 60#     Precor Hip abduction 3 x 10 50#     Patient Education and Home Exercises     Home Exercises Provided and Patient Education Provided     Education provided:   - cont HOME EXERCISE PROGRAM     Written Home Exercises Provided: Patient instructed to cont prior HEP. Exercises were reviewed and Lee was able to demonstrate them prior to the end of the session.  Lee demonstrated good  understanding of the education provided. See EMR under Patient Instructions for exercises provided during therapy sessions    ASSESSMENT     The patient presents ambulating with wide base of support. He displays left lower extremity weakness. He progressed to Precor hip strengthening machines today. Patient tolerated treatment well without report of adverse effects.    Lee Is progressing well towards his goals.   Pt prognosis is Good.     Pt will continue to benefit from skilled outpatient physical therapy to address the deficits listed in the problem list box on initial evaluation, provide pt/family education and to maximize pt's level of independence in the home and community environment.     Pt's spiritual, cultural and educational needs considered and pt agreeable to plan of care and goals.     Anticipated barriers to physical therapy: none    Goals:  SHORT TERM GOALS:  6 weeks  Progress  10/21/2024 Date met   The patient will begin a written HEP [x] Met  [] Not Met  [] Progressing  10/15/2024   Decrease soft tissue tenderness to mild [] Met  [] Not Met  [x] Progressing     Increase hip strength to 4+/5 [] Met  [] Not Met  [x] Progressing     The patient will be able to ascend/descend 10 step/stairs without onset of symptoms.    [] Met  [] Not Met  [x] Progressing        LONG TERM GOALS: 12 weeks  Progress  10/21/2024 Date met   The patient will be independent with a HEP for maintenance [] Met  [] Not Met  [x] Progressing     Increase hip ROM to WFL [] Met  [] Not Met  [x] Progressing     Increase left  hip strength to 5/5 [] Met  [] Not Met  [x] Progressing     Decrease FOTO score to 65 % [] Met  [] Not Met  [x] Progressing     The patient will be able to ascend/descend 20 step/stairs without onset of symptoms.    [] Met  [] Not Met  [x] Progressing         PLAN     Cont per Plan of Care, strength, balance    Colby White, PT

## 2024-10-24 ENCOUNTER — CLINICAL SUPPORT (OUTPATIENT)
Dept: REHABILITATION | Facility: HOSPITAL | Age: 49
End: 2024-10-24
Payer: MEDICAID

## 2024-10-24 DIAGNOSIS — Z96.642 STATUS POST TOTAL HIP REPLACEMENT, LEFT: Primary | ICD-10-CM

## 2024-10-24 DIAGNOSIS — R26.9 GAIT ABNORMALITY: ICD-10-CM

## 2024-10-24 DIAGNOSIS — R29.898 DECREASED STRENGTH OF LOWER EXTREMITY: ICD-10-CM

## 2024-10-24 PROCEDURE — 97110 THERAPEUTIC EXERCISES: CPT | Mod: PN | Performed by: PHYSICAL THERAPIST

## 2024-10-24 NOTE — PROGRESS NOTES
[facility]   Physical Therapy Treatment Note     Name: Lee Quintanilla  Clinic Number: [id number]    Therapy Diagnosis:   Encounter Diagnoses   Name Primary?    Status post total hip replacement, left Yes    Decreased strength of lower extremity     Gait abnormality            Physician: Phani Joel PA    Visit Date: 10/24/2024    Physician Orders: PT Eval and Treat & Hip Precautions  Medical Diagnosis from Referral: Status post total hip replacement, left   Evaluation Date: 10/10/2024  Authorization Period Expiration: 12/14/2024  Plan of Care Expiration: 1/10/2025  Progress Note Due: 11/10/2024  Visit # / Visits authorized: 4/ 12  (POC: 5/24)   FOTO: 1/3     Limitation/Restriction for FOTO  Survey     Therapist reviewed FOTO scores for Lee Quintanilla on 10/10/2024.   FOTO documents entered into EPIC - see Media section.     Limitation Score: 33%    Precautions:  Standard and Hip Precautions    Time In: 900  Time Out: 955  Total Billable Time: 55 minutes      SUBJECTIVE     Pt reports: left knee pain today.  He was compliant with home exercise program.  Response to previous treatment: first visit after eval  Functional change: ongoing    Pain: 6/10  Location: left hip      OBJECTIVE     Objective Measures updated at progress report unless specified.     Treatment   Date of surgery: 9/23/2024  Lee received the treatments listed below:      therapeutic exercises to develop strength, ROM, flexibility, posture, and core stabilization for 55 MEDICAID minutes including:    Nustep x 10 minutes, seat @ 18  Bridges x 20  Hip abd Green Theraband x 30  SLR x 20  Hip adduction with ball x 30    Seated hamstring curl Red Theraband x 30  Long Arc Quad 1# x 30    Standing hip abduction 3 x 10  Standing hip extension 3 x 10  Marching 3 x 10  Mini squats 3 x 10  Hip hikes 3 x 10 Bilateral   Forward step ups 3 x 10 4 inch step  Lateral step ups 3 x 10 4 inch step     Precor Leg  Press seat 11, 3 x 10 60#     Precor Hip abduction 3 x 10 50#     Patient Education and Home Exercises     Home Exercises Provided and Patient Education Provided     Education provided:   - cont HOME EXERCISE PROGRAM     Written Home Exercises Provided: Patient instructed to cont prior HEP. Exercises were reviewed and Lee was able to demonstrate them prior to the end of the session.  Lee demonstrated good  understanding of the education provided. See EMR under Patient Instructions for exercises provided during therapy sessions    ASSESSMENT     The patient presents complaints left knee pain. He is ambulating with decreased stance time on the left lower extremity secondary to knee pain. Patient tolerated treatment well without report of adverse effects.    Lee Is progressing well towards his goals.   Pt prognosis is Good.     Pt will continue to benefit from skilled outpatient physical therapy to address the deficits listed in the problem list box on initial evaluation, provide pt/family education and to maximize pt's level of independence in the home and community environment.     Pt's spiritual, cultural and educational needs considered and pt agreeable to plan of care and goals.     Anticipated barriers to physical therapy: none    Goals:  SHORT TERM GOALS:  6 weeks  Progress  10/24/2024 Date met   The patient will begin a written HEP [x] Met  [] Not Met  [] Progressing  10/15/2024   Decrease soft tissue tenderness to mild [] Met  [] Not Met  [x] Progressing     Increase hip strength to 4+/5 [] Met  [] Not Met  [x] Progressing     The patient will be able to ascend/descend 10 step/stairs without onset of symptoms.    [] Met  [] Not Met  [x] Progressing        LONG TERM GOALS: 12 weeks  Progress  10/24/2024 Date met   The patient will be independent with a HEP for maintenance [] Met  [] Not Met  [x] Progressing     Increase hip ROM to WFL [] Met  [] Not Met  [x] Progressing     Increase left hip strength to  5/5 [] Met  [] Not Met  [x] Progressing     Decrease FOTO score to 65 % [] Met  [] Not Met  [x] Progressing     The patient will be able to ascend/descend 20 step/stairs without onset of symptoms.    [] Met  [] Not Met  [x] Progressing         PLAN     Cont per Plan of Care, strength, balance    Colby White, PT

## 2024-10-25 RX ORDER — BUSPIRONE HYDROCHLORIDE 15 MG/1
15 TABLET ORAL 3 TIMES DAILY
Qty: 90 TABLET | Refills: 0 | Status: SHIPPED | OUTPATIENT
Start: 2024-10-25 | End: 2025-10-25

## 2024-10-27 DIAGNOSIS — Z96.642 S/P TOTAL LEFT HIP ARTHROPLASTY: ICD-10-CM

## 2024-10-27 RX ORDER — OXYCODONE AND ACETAMINOPHEN 7.5; 325 MG/1; MG/1
1 TABLET ORAL EVERY 6 HOURS PRN
Qty: 28 TABLET | Refills: 0 | Status: CANCELLED | OUTPATIENT
Start: 2024-10-27

## 2024-10-28 ENCOUNTER — CLINICAL SUPPORT (OUTPATIENT)
Dept: REHABILITATION | Facility: HOSPITAL | Age: 49
End: 2024-10-28
Payer: MEDICAID

## 2024-10-28 DIAGNOSIS — Z96.642 STATUS POST TOTAL HIP REPLACEMENT, LEFT: Primary | ICD-10-CM

## 2024-10-28 DIAGNOSIS — R26.9 GAIT ABNORMALITY: ICD-10-CM

## 2024-10-28 DIAGNOSIS — R29.898 DECREASED STRENGTH OF LOWER EXTREMITY: ICD-10-CM

## 2024-10-28 PROCEDURE — 97110 THERAPEUTIC EXERCISES: CPT | Mod: KX,PN,CQ

## 2024-10-28 RX ORDER — OXYCODONE AND ACETAMINOPHEN 5; 325 MG/1; MG/1
1 TABLET ORAL EVERY 8 HOURS PRN
Qty: 21 EACH | Refills: 0 | Status: SHIPPED | OUTPATIENT
Start: 2024-10-28 | End: 2024-11-04

## 2024-10-31 ENCOUNTER — CLINICAL SUPPORT (OUTPATIENT)
Dept: REHABILITATION | Facility: HOSPITAL | Age: 49
End: 2024-10-31
Payer: MEDICAID

## 2024-10-31 DIAGNOSIS — R26.9 GAIT ABNORMALITY: ICD-10-CM

## 2024-10-31 DIAGNOSIS — Z96.642 STATUS POST TOTAL HIP REPLACEMENT, LEFT: Primary | ICD-10-CM

## 2024-10-31 DIAGNOSIS — R29.898 DECREASED STRENGTH OF LOWER EXTREMITY: ICD-10-CM

## 2024-10-31 PROCEDURE — 97110 THERAPEUTIC EXERCISES: CPT | Mod: PN,CQ

## 2024-11-03 DIAGNOSIS — Z96.642 STATUS POST TOTAL HIP REPLACEMENT, LEFT: Primary | ICD-10-CM

## 2024-11-03 DIAGNOSIS — Z96.642 S/P TOTAL LEFT HIP ARTHROPLASTY: ICD-10-CM

## 2024-11-03 RX ORDER — OXYCODONE AND ACETAMINOPHEN 5; 325 MG/1; MG/1
1 TABLET ORAL EVERY 8 HOURS PRN
Qty: 21 EACH | Refills: 0 | Status: CANCELLED | OUTPATIENT
Start: 2024-11-03 | End: 2024-11-10

## 2024-11-04 RX ORDER — HYDROCODONE BITARTRATE AND ACETAMINOPHEN 7.5; 325 MG/1; MG/1
1 TABLET ORAL EVERY 8 HOURS PRN
Qty: 21 TABLET | Refills: 0 | Status: SHIPPED | OUTPATIENT
Start: 2024-11-04 | End: 2024-11-11

## 2024-11-04 NOTE — TELEPHONE ENCOUNTER
Electronically prescribed a prescription for Norco with instructions to take one tablet by mouth every 8 hours as needed for post operative pain.  I prescribed quantity 21 for a one week supply.

## 2024-11-25 DIAGNOSIS — Z96.642 S/P TOTAL LEFT HIP ARTHROPLASTY: ICD-10-CM

## 2024-11-25 RX ORDER — HYDROCODONE BITARTRATE AND ACETAMINOPHEN 5; 325 MG/1; MG/1
1 TABLET ORAL EVERY 8 HOURS PRN
Qty: 21 TABLET | Refills: 0 | Status: SHIPPED | OUTPATIENT
Start: 2024-11-25 | End: 2024-12-02

## 2024-11-25 RX ORDER — HYDROCODONE BITARTRATE AND ACETAMINOPHEN 7.5; 325 MG/1; MG/1
1 TABLET ORAL EVERY 8 HOURS PRN
Qty: 21 TABLET | Refills: 0 | Status: CANCELLED | OUTPATIENT
Start: 2024-11-25 | End: 2024-12-02

## 2024-12-06 ENCOUNTER — PATIENT MESSAGE (OUTPATIENT)
Dept: FAMILY MEDICINE | Facility: CLINIC | Age: 49
End: 2024-12-06
Payer: MEDICAID

## 2024-12-06 DIAGNOSIS — F41.1 GAD (GENERALIZED ANXIETY DISORDER): ICD-10-CM

## 2024-12-06 DIAGNOSIS — Z96.642 S/P TOTAL LEFT HIP ARTHROPLASTY: ICD-10-CM

## 2024-12-06 DIAGNOSIS — M25.552 LEFT HIP PAIN: Primary | ICD-10-CM

## 2024-12-06 RX ORDER — HYDROCODONE BITARTRATE AND ACETAMINOPHEN 5; 325 MG/1; MG/1
1 TABLET ORAL EVERY 8 HOURS PRN
Qty: 21 TABLET | Refills: 0 | Status: SHIPPED | OUTPATIENT
Start: 2024-12-06 | End: 2024-12-13

## 2024-12-09 RX ORDER — ESCITALOPRAM OXALATE 20 MG/1
20 TABLET ORAL DAILY
Qty: 90 TABLET | Refills: 1 | Status: SHIPPED | OUTPATIENT
Start: 2024-12-09

## 2024-12-13 DIAGNOSIS — Z96.642 S/P TOTAL LEFT HIP ARTHROPLASTY: ICD-10-CM

## 2024-12-13 RX ORDER — HYDROCODONE BITARTRATE AND ACETAMINOPHEN 5; 325 MG/1; MG/1
1 TABLET ORAL EVERY 8 HOURS PRN
Qty: 21 TABLET | Refills: 0 | Status: SHIPPED | OUTPATIENT
Start: 2024-12-13 | End: 2024-12-20

## 2024-12-17 ENCOUNTER — OFFICE VISIT (OUTPATIENT)
Dept: ORTHOPEDICS | Facility: CLINIC | Age: 49
End: 2024-12-17
Payer: MEDICAID

## 2024-12-17 ENCOUNTER — PATIENT MESSAGE (OUTPATIENT)
Dept: ORTHOPEDICS | Facility: CLINIC | Age: 49
End: 2024-12-17
Payer: MEDICAID

## 2024-12-17 ENCOUNTER — HOSPITAL ENCOUNTER (OUTPATIENT)
Dept: RADIOLOGY | Facility: HOSPITAL | Age: 49
Discharge: HOME | End: 2024-12-17
Attending: ORTHOPAEDIC SURGERY
Payer: MEDICAID

## 2024-12-17 VITALS — HEIGHT: 67 IN | WEIGHT: 220 LBS | BODY MASS INDEX: 34.53 KG/M2

## 2024-12-17 DIAGNOSIS — Z01.818 PRE-OP TESTING: ICD-10-CM

## 2024-12-17 DIAGNOSIS — M25.562 ACUTE PAIN OF LEFT KNEE: ICD-10-CM

## 2024-12-17 DIAGNOSIS — Z96.642 S/P TOTAL LEFT HIP ARTHROPLASTY: Primary | ICD-10-CM

## 2024-12-17 DIAGNOSIS — Z79.01 CURRENT USE OF ANTICOAGULANT THERAPY: ICD-10-CM

## 2024-12-17 DIAGNOSIS — S80.02XA CONTUSION OF LEFT KNEE, INITIAL ENCOUNTER: ICD-10-CM

## 2024-12-17 DIAGNOSIS — M87.051 AVASCULAR NECROSIS OF RIGHT FEMUR: ICD-10-CM

## 2024-12-17 DIAGNOSIS — M87.051 AVASCULAR NECROSIS OF BONE OF RIGHT HIP: ICD-10-CM

## 2024-12-17 PROCEDURE — 99999 PR PBB SHADOW E&M-EST. PATIENT-LVL V: CPT | Mod: PBBFAC,,, | Performed by: ORTHOPAEDIC SURGERY

## 2024-12-17 PROCEDURE — 72170 X-RAY EXAM OF PELVIS: CPT | Mod: 26,,, | Performed by: RADIOLOGY

## 2024-12-17 PROCEDURE — 73562 X-RAY EXAM OF KNEE 3: CPT | Mod: TC,PN,LT

## 2024-12-17 PROCEDURE — 99215 OFFICE O/P EST HI 40 MIN: CPT | Mod: PBBFAC,25,PN | Performed by: ORTHOPAEDIC SURGERY

## 2024-12-17 PROCEDURE — 72170 X-RAY EXAM OF PELVIS: CPT | Mod: TC,PN

## 2024-12-17 PROCEDURE — 73562 X-RAY EXAM OF KNEE 3: CPT | Mod: 26,LT,, | Performed by: RADIOLOGY

## 2024-12-17 RX ORDER — CLINDAMYCIN PHOSPHATE 900 MG/50ML
900 INJECTION, SOLUTION INTRAVENOUS
OUTPATIENT
Start: 2024-12-17

## 2024-12-17 NOTE — PROGRESS NOTES
[facility]    Subjective:     Chief Complaint:   Chief Complaint   Patient presents with    Left Hip - Post-op Evaluation     Hip pain and MARINA 09/23/2024; making out good very minimal irritation from the incision did get hit by an elevator door , discuss surgery on the right hip; knee pain behind the kneecap constant shoot pain     Left Knee - Pain    Right Hip - Pain       Past Medical History:   Diagnosis Date    ADHD (attention deficit hyperactivity disorder)     Arthritis     Asthma     as child only    Back pain     Coronary artery disease     Degeneration of lumbar intervertebral disc 05/2016    Depression     Digestive disorder     Elevated PSA     Headache     Hyperlipidemia     Hypertension     Kidney damage     stage 3 ; d/t aleve abuse    Kidney stone     Myocardial infarction     Neck pain     Numbness and tingling in hands     Numbness and tingling of both legs     Osteonecrosis     Bilat Femur    Seizures     from inapsine 2002    Sleep apnea     no cpap    Wears glasses     CONTACS       Past Surgical History:   Procedure Laterality Date    BACK SURGERY  2016    back surgery    BONE GRAFT N/A 11/5/2020    Procedure: BONE GRAFT;  Surgeon: Mateusz Blanco MD;  Location: [facility] OR;  Service: Orthopedics;  Laterality: N/A;    CARDIAC SURGERY  01/06/2020    CABG X 7    CORONARY ARTERY BYPASS GRAFT      7 vessels    FLEXIBLE SIGMOIDOSCOPY N/A 7/24/2024    Procedure: SIGMOIDOSCOPY, FLEXIBLE;  Surgeon: Latesha Victoria MD;  Location: [facility] END[facility];  Service: Endoscopy;  Laterality: N/A;    HERNIA REPAIR Bilateral 11/22/2017    HIP ARTHROPLASTY Left 9/23/2024    Procedure: ARTHROPLASTY, HIP;  Surgeon: Lázaro Jaimes MD;  Location: [facility] OR;  Service: Orthopedics;  Laterality: Left;  Sincere    LITHOTRIPSY      LUMBAR LAMINECTOMY WITH FUSION Bilateral 11/5/2020    Procedure: LAMINECTOMY, SPINE, LUMBAR, WITH FUSION;  Surgeon: Mateusz Blanco MD;  Location: [facility] OR;  Service: Orthopedics;  Laterality:  Bilateral;  MEDTRONIC  NTI  L-3    PILONIDAL CYST DRAINAGE      removal of abcess      scrotal    REMOVAL OF HARDWARE FROM SPINE Bilateral 11/5/2020    Procedure: REMOVAL, HARDWARE, SPINE;  Surgeon: Mateusz Blanco MD;  Location: [facility] OR;  Service: Orthopedics;  Laterality: Bilateral;  L 4-5    SURGICAL REMOVAL OF PILONIDAL CYST N/A 4/15/2024    Procedure: EXCISION, PILONIDAL CYST;  Surgeon: Jayden Phillips III, MD;  Location: [facility] OR;  Service: General;  Laterality: N/A;  sacral area    TYMPANOSTOMY TUBE PLACEMENT         Current Outpatient Medications   Medication Sig    anastrozole (ARIMIDEX) 1 mg Tab Take 1 mg by mouth every 7 days.    busPIRone (BUSPAR) 15 MG tablet Take 1 tablet (15 mg total) by mouth 3 (three) times daily.    clopidogreL (PLAVIX) 75 mg tablet Take 75 mg by mouth once daily.    ENTRESTO 49-51 mg per tablet Take 1 tablet by mouth 2 (two) times daily.    EScitalopram oxalate (LEXAPRO) 20 MG tablet Take 1 tablet (20 mg total) by mouth once daily.    fluticasone propionate (FLONASE) 50 mcg/actuation nasal spray 2 sprays (100 mcg total) by Each Nostril route once daily.    HYDROcodone-acetaminophen (NORCO) 5-325 mg per tablet Take 1 tablet by mouth every 8 (eight) hours as needed for Pain.    metoprolol tartrate (LOPRESSOR) 50 MG tablet Take 1 tablet by mouth 2 (two) times daily.    pantoprazole (PROTONIX) 40 MG tablet Take 1 tablet (40 mg total) by mouth once daily.    rosuvastatin (CRESTOR) 40 MG Tab Take 40 mg by mouth once daily.    tadalafiL (CIALIS) 10 MG tablet Take 10 mg by mouth daily as needed.    testosterone cypionate 200 mg/mL Kit Inject 1 mL into the muscle every 7 days.    gabapentin (NEURONTIN) 300 MG capsule Take 1 capsule (300 mg total) by mouth 2 (two) times daily.    LORazepam (ATIVAN) 0.5 MG tablet Take 1 tablet (0.5 mg total) by mouth every 12 (twelve) hours as needed for Anxiety.    metoclopramide HCl (REGLAN) 10 MG tablet Take 1 tablet (10 mg total) by mouth every 6  (six) hours.     No current facility-administered medications for this visit.       Review of patient's allergies indicates:   Allergen Reactions    Inapsine [droperidol] Anaphylaxis     seizures    Effexor [venlafaxine]      Increased anxiety    Pcn [penicillins]     Bactrim [sulfamethoxazole-trimethoprim] Rash     Dry red rash all over when in the sun    Fluconazole Rash     Pruritis         Family History   Problem Relation Name Age of Onset    Heart disease Mother      Migraines Mother      Hypertension Mother      Early death Father accident     Heart disease Father accident     Diabetes Maternal Aunt      Diabetes Maternal Uncle      Diabetes Maternal Grandfather         Social History     Socioeconomic History    Marital status:    Occupational History    Occupation: disability   Tobacco Use    Smoking status: Former     Current packs/day: 0.00     Types: Cigarettes     Quit date: 2016     Years since quittin.9     Passive exposure: Past    Smokeless tobacco: Former     Quit date: 2016    Tobacco comments:     Occasional  vaping   Substance and Sexual Activity    Alcohol use: Yes     Alcohol/week: 1.0 standard drink of alcohol     Types: 1 Glasses of wine per week     Comment: 20 - 30 shots vodka per day or 1-2 1/5ths per day for 2 years (started )    Drug use: No    Sexual activity: Yes     Partners: Female     Social Drivers of Health     Financial Resource Strain: Medium Risk (3/18/2024)    Overall Financial Resource Strain (CARDIA)     Difficulty of Paying Living Expenses: Somewhat hard   Food Insecurity: No Food Insecurity (3/18/2024)    Hunger Vital Sign     Worried About Running Out of Food in the Last Year: Never true     Ran Out of Food in the Last Year: Never true   Transportation Needs: No Transportation Needs (3/18/2024)    PRAPARE - Transportation     Lack of Transportation (Medical): No     Lack of Transportation (Non-Medical): No   Physical Activity: Insufficiently  Active (3/18/2024)    Exercise Vital Sign     Days of Exercise per Week: 2 days     Minutes of Exercise per Session: 60 min   Stress: No Stress Concern Present (3/18/2024)    [employer] - Occupational Stress Questionnaire     Feeling of Stress : Only a little   Housing Stability: Unknown (3/18/2024)    Housing Stability Vital Sign     Unable to Pay for Housing in the Last Year: No     Unstable Housing in the Last Year: No       History of present illness:  49-year-old male, returns to clinic today to follow-up on multiple musculoskeletal complaints.  He is a proximally 3 months postop from a left total hip arthroplasty for avascular necrosis late September of this year.  He has been doing well, his pain is improving.  Unfortunately he had an acute injury a few weeks ago when he was trapped in an elevator door.  He was seen and evaluated in the emergency department concerned about his hip replacement.  Some pain in the left leg.  He now complains of chronic left knee pain.      His right hip is also painful.  MRI of the left hip did demonstrate changes in the bilateral femoral head and neck is suggestive of avascular/osteonecrosis.      Review of Systems:    Constitution: Negative for chills, fever, and sweats.  Negative for unexplained weight loss.    HENT:  Negative for headaches and blurry vision.    Cardiovascular:Negative for chest pain or irregular heart beat. Negative for hypertension.    Respiratory:  Negative for cough and shortness of breath.    Gastrointestinal: Negative for abdominal pain, heartburn, melena, nausea, and vomitting.    Genitourinary:  Negative bladder incontinence and dysuria.    Musculoskeletal:  See HPI for details.     Neurological: Negative for numbness.    Psychiatric/Behavioral: Negative for depression.  The patient is not nervous/anxious.      Endocrine: Negative for polyuria    Hematologic/Lymphatic: Negative for bleeding problem.  Does not bruise/bleed  easily.    Skin: Negative for poor would healing and rash    Objective:      Physical Examination:    Vital Signs:  There were no vitals filed for this visit.    Body mass index is 34.46 kg/m².    This a well-developed, well nourished patient in no acute distress.  They are alert and oriented and cooperative to examination.        Examination of the left hip, no significant pain with gentle range of motion of the left hip, he does have some pain with motion of the left knee.  Range motion in the knee 0-120.  Tender over the joint lines.  Calf soft nontender, straight leg raise is negative.  Examination of the right hip, painful range motion referred to the groin with limited internal rotation.    Pertinent New Results:    Narrative & Impression  EXAMINATION:  MRI HIP WITHOUT CONTRAST LEFT     CLINICAL HISTORY:  AVN;  Idiopathic aseptic necrosis of unspecified bone     TECHNIQUE:  Multiplanar noncontrast imaging is performed.     COMPARISON:  Recent CT examinations.     FINDINGS:  There are geographic areas of marrow signal alteration involving the femoral heads and extending into the femoral necks bilaterally and inter trochanteric region of the proximal left femur.  There is mild associated marrow edema.  The imaging findings are compatible with areas of avascular necrosis/bone infarction.     There are mild-moderate degrees of diffuse narrowing of the hip joint spaces bilaterally.  There is small para-articular osteophyte formation.  The femoral heads appear appropriately maintained without evidence of subchondral collapse.  There are trace amounts of joint fluid.     Degenerative marrow signal changes are observed within the lower lumbar spine.  The remainder of the visualized osseous structures have an unremarkable marrow appearance.  There is been previous instrumented fusion within the lumbar spine.     Impression:     Findings compatible with areas of avascular necrosis/bone infarction within the proximal  femur but bilaterally associated with mild marrow edema.  See additional details as above.     Mild osteoarthritic changes.        Electronically signed by:Adi Narvaez  Date:                                            07/26/2024  Time:                                           12:17        Exam Ended: 07/26/24 08:50 CDT Last Resulted: 07/26/24 12:17 CDT     XRAY Report / Interpretation:   AP pelvis demonstrate left total hip arthroplasty to be in appropriate position without evidence of hardware failure or loosening or subsidence.  Visualized soft tissues are unremarkable.    AP lateral sunrise views of the left knee taken today in the office without acute osseous abnormality.    Assessment/Plan:      Status post left total hip arthroplasty a proximally 3 months ago.  Patient is doing well.  X-rays are unremarkable for acute osseous abnormalities.  We reviewed the x-rays and the MRI with references to the right hip which did show avascular necrosis and changes in the right femoral head and neck.  He wishes to proceed with right total hip arthroplasty.  We will do this after the new year.    Patient was consented for surgery. Risks and benefits of the procedure were discussed in great detail to include the expected preoperative, intraoperative and postoperative courses. Complications may include but are not limited to bleeding, infection, damage to nerves, arteries, blood vessels, bones, tendons, ligaments, stiffness, instability, deep vein thrombus (DVT), pulmonary embolus (PE), altered sensation, continued pain, RSD, loss of motion or a need for additional surgery. As well as general anesthetic complications including seizure, stroke, heart attack and even death.    The mobility limitation cannot be sufficiently resolved by the use of a cane. Patient's functional mobility deficit can be sufficiently resolved with the use of a (Rolling Walker or Walker). Patient's mobility limitation significantly impairs their  "ability to participate in one of more activities of daily living. The use of a (RW or Walker) will significantly improve the patient's ability to participate in MRADLS and the patient will use it on regular basis in the home.    For the left knee, I think he is having some musculoskeletal pain after the injury or accident with the elevator.  X-rays of the knee are unremarkable for fracture or osseous abnormality.  We will continue to observe follow up p.r.n..    Phani Joel, Physician Assistant, served in the capacity as a "scribe" for this patient encounter.  A "face-to-face" encounter occurred with Dr. Lázaro Jaimes on this date.  The treatment plan and medical decision-making is outlined above. Patient was seen and examined with a chaperone.       This note was created using Dragon voice recognition software that occasionally misinterpreted phrases or words.          "

## 2024-12-19 DIAGNOSIS — Z96.642 S/P TOTAL LEFT HIP ARTHROPLASTY: ICD-10-CM

## 2024-12-20 RX ORDER — HYDROCODONE BITARTRATE AND ACETAMINOPHEN 5; 325 MG/1; MG/1
1 TABLET ORAL EVERY 8 HOURS PRN
Qty: 21 TABLET | Refills: 0 | Status: SHIPPED | OUTPATIENT
Start: 2024-12-20 | End: 2024-12-27

## 2024-12-27 DIAGNOSIS — Z96.642 S/P TOTAL LEFT HIP ARTHROPLASTY: ICD-10-CM

## 2024-12-27 RX ORDER — HYDROCODONE BITARTRATE AND ACETAMINOPHEN 5; 325 MG/1; MG/1
1 TABLET ORAL EVERY 12 HOURS PRN
Qty: 14 TABLET | Refills: 0 | Status: SHIPPED | OUTPATIENT
Start: 2024-12-27 | End: 2025-01-03

## 2025-01-03 DIAGNOSIS — Z96.642 S/P TOTAL LEFT HIP ARTHROPLASTY: ICD-10-CM

## 2025-01-03 RX ORDER — HYDROCODONE BITARTRATE AND ACETAMINOPHEN 5; 325 MG/1; MG/1
1 TABLET ORAL EVERY 12 HOURS PRN
Qty: 14 TABLET | Refills: 0 | Status: SHIPPED | OUTPATIENT
Start: 2025-01-03 | End: 2025-01-10

## 2025-01-09 DIAGNOSIS — Z96.642 S/P TOTAL LEFT HIP ARTHROPLASTY: ICD-10-CM

## 2025-01-10 RX ORDER — HYDROCODONE BITARTRATE AND ACETAMINOPHEN 5; 325 MG/1; MG/1
1 TABLET ORAL EVERY 12 HOURS PRN
Qty: 14 TABLET | Refills: 0 | Status: SHIPPED | OUTPATIENT
Start: 2025-01-10 | End: 2025-01-17

## 2025-01-17 DIAGNOSIS — Z96.642 S/P TOTAL LEFT HIP ARTHROPLASTY: ICD-10-CM

## 2025-01-17 RX ORDER — HYDROCODONE BITARTRATE AND ACETAMINOPHEN 5; 325 MG/1; MG/1
1 TABLET ORAL EVERY 12 HOURS PRN
Qty: 14 TABLET | Refills: 0 | Status: SHIPPED | OUTPATIENT
Start: 2025-01-17 | End: 2025-01-24 | Stop reason: SDUPTHER

## 2025-01-24 DIAGNOSIS — Z96.642 S/P TOTAL LEFT HIP ARTHROPLASTY: ICD-10-CM

## 2025-01-24 RX ORDER — HYDROCODONE BITARTRATE AND ACETAMINOPHEN 5; 325 MG/1; MG/1
1 TABLET ORAL EVERY 12 HOURS PRN
Qty: 14 TABLET | Refills: 0 | Status: SHIPPED | OUTPATIENT
Start: 2025-01-24 | End: 2025-01-31 | Stop reason: SDUPTHER

## 2025-01-31 DIAGNOSIS — Z96.642 S/P TOTAL LEFT HIP ARTHROPLASTY: ICD-10-CM

## 2025-01-31 RX ORDER — HYDROCODONE BITARTRATE AND ACETAMINOPHEN 5; 325 MG/1; MG/1
1 TABLET ORAL EVERY 12 HOURS PRN
Qty: 14 TABLET | Refills: 0 | Status: SHIPPED | OUTPATIENT
Start: 2025-01-31 | End: 2025-02-07

## 2025-02-03 ENCOUNTER — HOSPITAL ENCOUNTER (OUTPATIENT)
Dept: RADIOLOGY | Facility: HOSPITAL | Age: 50
Discharge: HOME | End: 2025-02-03
Attending: ORTHOPAEDIC SURGERY
Payer: MEDICAID

## 2025-02-03 ENCOUNTER — HOSPITAL ENCOUNTER (OUTPATIENT)
Dept: PREADMISSION TESTING | Facility: HOSPITAL | Age: 50
Discharge: HOME | End: 2025-02-03
Attending: ORTHOPAEDIC SURGERY
Payer: MEDICAID

## 2025-02-03 VITALS — BODY MASS INDEX: 34.53 KG/M2 | HEIGHT: 67 IN | WEIGHT: 220 LBS

## 2025-02-03 DIAGNOSIS — Z01.818 PRE-OP TESTING: ICD-10-CM

## 2025-02-03 DIAGNOSIS — Z79.01 CURRENT USE OF ANTICOAGULANT THERAPY: ICD-10-CM

## 2025-02-03 DIAGNOSIS — M87.051 AVASCULAR NECROSIS OF RIGHT FEMUR: ICD-10-CM

## 2025-02-03 LAB
ABO + RH BLD: NORMAL
ALBUMIN SERPL BCP-MCNC: 3.8 G/DL (ref 3.5–5.2)
ALP SERPL-CCNC: 59 U/L (ref 40–150)
ALT SERPL W/O P-5'-P-CCNC: 12 U/L (ref 10–44)
ANION GAP SERPL CALC-SCNC: 10 MMOL/L (ref 8–16)
APTT PPP: 26.1 SEC (ref 21–32)
AST SERPL-CCNC: 14 U/L (ref 10–40)
BASOPHILS # BLD AUTO: 0.05 K/UL (ref 0–0.2)
BASOPHILS NFR BLD: 0.6 % (ref 0–1.9)
BILIRUB SERPL-MCNC: 0.4 MG/DL (ref 0.1–1)
BLD GP AB SCN CELLS X3 SERPL QL: NORMAL
BUN SERPL-MCNC: 16 MG/DL (ref 6–20)
CALCIUM SERPL-MCNC: 9.3 MG/DL (ref 8.7–10.5)
CHLORIDE SERPL-SCNC: 103 MMOL/L (ref 95–110)
CO2 SERPL-SCNC: 27 MMOL/L (ref 23–29)
CREAT SERPL-MCNC: 1.9 MG/DL (ref 0.5–1.4)
DIFFERENTIAL METHOD BLD: ABNORMAL
EOSINOPHIL # BLD AUTO: 0.4 K/UL (ref 0–0.5)
EOSINOPHIL NFR BLD: 4.7 % (ref 0–8)
ERYTHROCYTE [DISTWIDTH] IN BLOOD BY AUTOMATED COUNT: 12.5 % (ref 11.5–14.5)
EST. GFR  (NO RACE VARIABLE): 43 ML/MIN/1.73 M^2
GLUCOSE SERPL-MCNC: 84 MG/DL (ref 70–110)
HCT VFR BLD AUTO: 48.7 % (ref 40–54)
HGB BLD-MCNC: 15 G/DL (ref 14–18)
IMM GRANULOCYTES # BLD AUTO: 0.03 K/UL (ref 0–0.04)
IMM GRANULOCYTES NFR BLD AUTO: 0.4 % (ref 0–0.5)
INR PPP: 1 (ref 0.8–1.2)
LYMPHOCYTES # BLD AUTO: 1.3 K/UL (ref 1–4.8)
LYMPHOCYTES NFR BLD: 15.1 % (ref 18–48)
MCH RBC QN AUTO: 26.7 PG (ref 27–31)
MCHC RBC AUTO-ENTMCNC: 30.8 G/DL (ref 32–36)
MCV RBC AUTO: 87 FL (ref 82–98)
MONOCYTES # BLD AUTO: 0.5 K/UL (ref 0.3–1)
MONOCYTES NFR BLD: 5.7 % (ref 4–15)
NEUTROPHILS # BLD AUTO: 6.3 K/UL (ref 1.8–7.7)
NEUTROPHILS NFR BLD: 73.5 % (ref 38–73)
NRBC BLD-RTO: 0 /100 WBC
PLATELET # BLD AUTO: 311 K/UL (ref 150–450)
PMV BLD AUTO: 10.7 FL (ref 9.2–12.9)
POTASSIUM SERPL-SCNC: 4.6 MMOL/L (ref 3.5–5.1)
PROT SERPL-MCNC: 8.2 G/DL (ref 6–8.4)
PROTHROMBIN TIME: 11.5 SEC (ref 9–12.5)
RBC # BLD AUTO: 5.61 M/UL (ref 4.6–6.2)
SODIUM SERPL-SCNC: 140 MMOL/L (ref 136–145)
SPECIMEN OUTDATE: NORMAL
WBC # BLD AUTO: 8.55 K/UL (ref 3.9–12.7)

## 2025-02-03 PROCEDURE — 85610 PROTHROMBIN TIME: CPT | Performed by: ORTHOPAEDIC SURGERY

## 2025-02-03 PROCEDURE — 85730 THROMBOPLASTIN TIME PARTIAL: CPT | Performed by: ORTHOPAEDIC SURGERY

## 2025-02-03 PROCEDURE — 85025 COMPLETE CBC W/AUTO DIFF WBC: CPT | Performed by: ORTHOPAEDIC SURGERY

## 2025-02-03 PROCEDURE — 80053 COMPREHEN METABOLIC PANEL: CPT | Performed by: ORTHOPAEDIC SURGERY

## 2025-02-03 PROCEDURE — 71046 X-RAY EXAM CHEST 2 VIEWS: CPT | Mod: 26,,, | Performed by: RADIOLOGY

## 2025-02-03 PROCEDURE — 71046 X-RAY EXAM CHEST 2 VIEWS: CPT | Mod: TC

## 2025-02-03 PROCEDURE — 86901 BLOOD TYPING SEROLOGIC RH(D): CPT | Performed by: ORTHOPAEDIC SURGERY

## 2025-02-03 RX ORDER — HYDROXYZINE HYDROCHLORIDE 50 MG/1
1 TABLET, FILM COATED ORAL 2 TIMES DAILY
COMMUNITY

## 2025-02-03 RX ORDER — CYCLOBENZAPRINE HCL 10 MG
10 TABLET ORAL 2 TIMES DAILY
COMMUNITY
Start: 2025-01-30

## 2025-02-03 RX ORDER — FUROSEMIDE 20 MG/1
1 TABLET ORAL EVERY MORNING
COMMUNITY

## 2025-02-03 RX ORDER — TRAZODONE HYDROCHLORIDE 50 MG/1
1 TABLET ORAL NIGHTLY
COMMUNITY

## 2025-02-03 NOTE — PRE ADMISSION SCREENING
JOINT CAMP ASSESSMENT    Name Lee Quintanilla   MRN [id number]    Age/Sex 49 y.o. male    Surgeon Dr. Lázaro Jaimes   Joint Camp Date 2/3/2025   Surgery Date 2/17/2025   Procedure Right Hip Arthroplasty   Insurance Payor: MEDICAID / Plan: [facility] [employer] / Product Type: Managed Medicaid /    Care Team Patient Care Team:  Riya Vidales NP as PCP - General (Family Medicine)  Vicky Saeed MD as Consulting Physician (Urology)  Vincent Mansfield MD as Consulting Physician (Family Medicine)  Byorn Coffman MD as Consulting Physician (Gastroenterology)  Juan Miguel Rothman MD as Consulting Physician (Cardiology)  Law Chiang MD as Consulting Physician (Nephrology)    Pharmacy   [facility] [place] - [place], [place] - [address]  [address]  [place] [address]  Phone: [phone] Fax: [phone]     AM-PAC Score   21   Risk Assessment Score 6     Past Medical History:   Diagnosis Date    ADHD (attention deficit hyperactivity disorder)     Arthritis     Asthma     as child only    Back pain     Coronary artery disease     Degeneration of lumbar intervertebral disc 05/2016    Depression     Digestive disorder     Elevated PSA     Headache     Hyperlipidemia     Hypertension     Kidney damage     stage 3 ; d/t aleve abuse    Kidney stone     Myocardial infarction     Neck pain     Numbness and tingling in hands     Numbness and tingling of both legs     Osteonecrosis     Bilat Femur    Seizures     from inapsine 2002    Sleep apnea     no cpap    Wears glasses     CONTACS       Past Surgical History:   Procedure Laterality Date    BACK SURGERY  2016    back surgery    BONE GRAFT N/A 11/5/2020    Procedure: BONE GRAFT;  Surgeon: Mateusz Blanco MD;  Location: [facility];  Service: Orthopedics;  Laterality: N/A;    CARDIAC SURGERY  01/06/2020    CABG X 7    CORONARY ARTERY BYPASS GRAFT      7 vessels    FLEXIBLE SIGMOIDOSCOPY N/A 7/24/2024    Procedure: SIGMOIDOSCOPY, FLEXIBLE;  Surgeon:  Latesha Victoria MD;  Location: [facility] END[facility];  Service: Endoscopy;  Laterality: N/A;    HERNIA REPAIR Bilateral 11/22/2017    HIP ARTHROPLASTY Left 9/23/2024    Procedure: ARTHROPLASTY, HIP;  Surgeon: Lázaro Jaimes MD;  Location: [facility] OR;  Service: Orthopedics;  Laterality: Left;  Sincere    LITHOTRIPSY      LUMBAR LAMINECTOMY WITH FUSION Bilateral 11/5/2020    Procedure: LAMINECTOMY, SPINE, LUMBAR, WITH FUSION;  Surgeon: Mateusz Blanco MD;  Location: [facility] OR;  Service: Orthopedics;  Laterality: Bilateral;  MEDTRONIC  NTI  L-3    PILONIDAL CYST DRAINAGE      removal of abcess      scrotal    REMOVAL OF HARDWARE FROM SPINE Bilateral 11/5/2020    Procedure: REMOVAL, HARDWARE, SPINE;  Surgeon: Mateusz Blanco MD;  Location: [facility] OR;  Service: Orthopedics;  Laterality: Bilateral;  L 4-5    SURGICAL REMOVAL OF PILONIDAL CYST N/A 4/15/2024    Procedure: EXCISION, PILONIDAL CYST;  Surgeon: Jayden Phillips III, MD;  Location: [facility] OR;  Service: General;  Laterality: N/A;  sacral area    TYMPANOSTOMY TUBE PLACEMENT           Home Enviroment     Living Arrangement: Lives with family  Home Environment: 1-story house/ trailer, tub only  Home Safety Concerns: Pets in the home: dogs (1).    DISCHARGE CAREGIVER/SUPPORT SYSTEM     Identified post-op caregiver: Patient has children / family / friends to help, mother, Leti Rollins and step-father, Ihsan Rollins.  Patient's caregiver(s) will be able to provide physical assistance. Patient will have someone to assist overnight.      Caregiver present at pre-op interview:  No      PRE-OPERATIVE FUNCTIONAL STATUS     Employment: Unemployed    Pre-op Functional Status: Patient is independent with mobility/ambulation, transfers, ADL's, IADL's.    Use of assistive device for ambulation: none  ADL: self care  ADL Limitations: difficulty with walking  Medical Restrictions: Unstable ambulation and Decreased range of motions in extremities    POTENTIAL BARRIERS TO DISCHARGE/POTENTIAL POST-OP  COMPLICATIONS     Patient with hx of asthma, back pain, coronary artery disease with long-term anticoagulation use (Plavix 75 mg once daily), HTN, kidney damage, myocardial infarction, seizures, sleep apnea without CPAP use. Patient had left hip arthroplasty on 09/11/2024 without complication. POSSIBLE SAME DAY DISCHARGE.     DISCHARGE PLAN     Expected LOS of 1 days or less for joint replacement discussed with patient. We also discussed a discharge path of HH for approximately two weeks with a transition to outpatient PT on the third week given no post-op complications.      Patient in agreement with discharge plan: Yes    Discharge to: Discharge home with home health (PT/OT) x2 weeks with transition to outpatient PT     HH:  [facility] ([place], [place]).  Patient disclosure form completed and sent to case management for upload to the medical record.      OP PT: [facility] ([facility])     Home DME: rolling walker, single point cane, bedside commode, tub transfer bench, and assistive device kit    Needed DME at D/C: none     Rx: Per Dr. Jaimes at discharge     Meds to Beds: Yes  Patient expected to discharge on current regimen of Plavix (Clopidogrel) 75 mg once daily for DVT prophylaxis.

## 2025-02-03 NOTE — PRE ADMISSION SCREENING
"               CJR Risk Assessment Scale    Patient Name: Lee Quintanilla  YOB: 1975  MRN: [id number]            RIsk Factor Measure Recommendation Patient Data Scale/Score   BMI >40 Reconsider surgery, weight loss   Estimated body mass index is 34.46 kg/m² as calculated from the following:    Height as of this encounter: 5' 7" (1.702 m).    Weight as of this encounter: 99.8 kg (220 lb).   [] 0 = 1 - 24.9  [] 1 = 25-29.9  [x] 2 = 30-34.9  [] 3 = 35-39.9  [] 4 = 40-44.9  [] 5 = 45-99.9   Hemoglobin AIC (if applicable) >9 Delay surgery until DM under control  Refer for:  Nutrition Therapy  Exercise   Medication    Lab Results   Component Value Date    HGBA1C 5.4 10/12/2024       Lab Results   Component Value Date    GLU 84 02/03/2025      [x] 0 = 4.0-5.6  [] 1 = 5.7-6.4  [] 2 = 6.5-6.9  [] 3 = 7.0-7.9  [] 4 = 8.0-8.9  [] 5 = 9.0-12   Hemoglobin (Anemia) <9 Delay surgery   Correct anemia Lab Results   Component Value Date    HGB 15.0 02/03/2025    [] 20 - <9.0                    Albumin <3 Delay surgery &Workup Lab Results   Component Value Date    ALBUMIN 3.8 02/03/2025    [] 20 - <3.0   Smoking Cessation >4 Weeks Delay Surgery  Refer to OP Cessation Class    Former Smoker  Quit: 2016 [] 20 - current smoker                                _____ PPD                    Hx of MI, PE, Arrhythmia, CVA, DVT <30 Days Delay Surgery    N/A [] 20      Infection Variable Delay surgery and re-evaluate   N/A [] 20 - recent/current infection     Depression (PHQ) >10 out of 27 Delay Surgery and re-evaluate  Medication  Counseling              [x] 0     []1     []2     []3      []4      [] 5                    (1-4)      (5-9)  (10-14)  (15-19)   (20-27)     Memory Impairment & Memory loss (Mini-Cog Screening Tool) Advanced dementia and/or Parkinson's Reconsider surgery     [x] 0     []1     []2     []3     []4     [] 5     Physical Conditioning (Modified AM-PAC Per Physical Therapy at Joint Camp) Unable to " ambulate on day of surgery Delay surgery and re-evaluate  Pre-Rehabilitation   (PT evaluation)       []  0   [x]4       []8     []12        []16     []20       (<20%)   (<40%)   (<60%)   (<80% )    (>80%)     Home Environment/Caregiver support  (Per /Navigator Interview)    Availability of basic services and/or approprate assistance during post-operative period Delay surgery and re-evaluate  Safe home environment  Health   1 week post-surgery  Transportation  availability  Ability to obtain DME/Medications post-op    [x] 0     []1     []2     []3     []4     [] 5  [x] 0     []1     []2     []3     []4     [] 5  [x] 0     []1     []2     []3     []4     [] 5  [x] 0     []1     []2     []3     []4     [] 5         MD Contact: Dr. Jaimes Comments:  Total Score:  6

## 2025-02-03 NOTE — PRE ADMISSION SCREENING
Patient Name: Lee Quintanilla  YOB: 1975   MRN: [id number]     [facility]   Basic Mobility Inpatient Short Form 6 Clicks         How much difficulty does the patient currently have  Unable  A Lot  A Little  None      1. Turning over in bed (including adjusting bedclothes, sheets and blankets)?     1 []    2 []    3 [x]    4 []        2. Sitting down on and standing up from a chair with arms (e.g., wheelchair, bedside commode, etc.)     1 []  2 []  3 [x]     4 []      3. Moving from lying on back to sitting on the side of the bed?     1 []  2 []  3 [x]    4 []    How much help from another person does the patient currently need  Total  A Lot  A Little  None      4. Moving to and from a bed to a chair (including a wheelchair)?    1 []  2 []  3 []    4 [x]      5. Need to walk in hospital room?    1 []  2 []  3 []    4 [x]      6. Climbing 3-5 steps with a railing?    1 []  2 []  3 []    4 [x]       Raw Score:    21              CMS 0-100% Score:   28.97         %   Standardized Score:     50.25          CMS Modifier:       CJ                                   [facility]   Basic Mobility Inpatient Short Form 6 Clicks Score Conversion Table*         *Use this form to convert -PAC Basic Mobility Inpatient Raw Scores.   [facility] Inpatient Basic Mobility Short Form Scoring Example   1. Add the number values associated with the response to each item. For example, items totals yield a Raw Score of 21.   2. Match the raw score to the t-Scale scores (t-Scale score = 50.25, SE = 4.69).   3. Find the associated CMS % (CMS % = 28.97%).   4. Locate the correct CMS Functional Modifier Code, or G Code (G code = CJ)     NOTE: Each -PAC Short Form has a separate conversion table. Make sure that you use the correct conversion table.       Instruction Manual - page 45 contains conversion table

## 2025-02-03 NOTE — DISCHARGE INSTRUCTIONS
To confirm, Your doctor has instructed you that surgery is scheduled for: 2/17/25 WITH DR. DESIR    Please report to [facility], Registration the morning of surgery. You must check-in and receive a wristband before going to your procedure.  [address] [place] [address] [place] [address]    Pre-Op will call the FRIDAY prior to surgery between 1:00 and 6:00 PM with the final arrival time.  Phone number: [phone]    PLEASE NOTE:  The surgery schedule has many variables which may affect the time of your surgery case.  Family members should be available if your surgery time changes.  Plan to be here the day of your procedure between 4-6 hours.    MEDICATIONS:  TAKE ONLY THESE MEDICATIONS WITH A SMALL SIP OF WATER THE MORNING OF YOUR PROCEDURE:  SEE MED LIST      DO NOT TAKE THESE MEDICATIONS 5-7 DAYS PRIOR to your procedure or per your surgeon's request:   ASPIRIN, ALEVE, ADVIL, IBUPROFEN, FISH OIL VITAMIN E, HERBALS  (May take Tylenol)    ONLY if you are prescribed any types of blood thinners such as:  Aspirin, Coumadin, Plavix, Pradaxa, Xarelto, Aggrenox, Effient, Eliquis, Savasya, Brilinta, or any other, ask your surgeon whether you should stop taking them and how long before surgery you should stop.  You may also need to verify with the prescribing physician if it is ok to stop your medication.      INSTRUCTIONS IMPORTANT!!  Do not eat or drink anything between midnight and the time of your procedure- this includes gum, mints, and candy.  EXCEPT: you may have clear liquids such as:  WATER, BLACK COFFEE, UNSWEET TEA, OR GATORADE (NO RED OR PURPLE) UP TO 2 HOURS PRIOR TO YOUR ARRIVAL TIME.  Do not smoke or drink alcoholic beverages 24 hours prior to your procedure.  Shower the night before AND the morning of your procedure with a Chlorhexidine wash such as Hibiclens or Dial antibacterial soap from the neck down.  Do not get it on your face or in your eyes.  You may use your own shampoo and face wash.  This helps your skin to be as bacteria free as possible.    If you wear contact lenses, dentures, hearing aids or glasses, bring a container to put them in during surgery and give to a family member for safe keeping.  Please leave all jewelry, piercing's and valuables at home. You must remove your false eyelashes prior to surgery.    DO NOT remove hair from the surgery site.  Do not shave the incision site unless you are given specific instructions to do so.    ONLY if you have been diagnosed with sleep apnea please bring your C-PAP machine.  ONLY if you wear home oxygen please bring your portable oxygen tank the day of your procedure.  ONLY if you have a history of OPEN HEART SURGERY you will need a clearance from your Cardiologist per Anesthesia.      ONLY for patients requiring bowel prep, written instructions will be given by your doctor's office.  ONLY if you have a neuro stimulator, please bring the controller with you the morning of surgery  ONLY if a type and screen test is needed before surgery, please return:  If your doctor has scheduled you for an overnight stay, bring a small overnight bag with any personal items you need.  Make arrangements in advance for transportation home by a responsible adult. You can not go home in an uber or a cab per hospital policy.  It is not safe to drive a vehicle during the 24 hours after anesthesia.          All  facilities and properties are tobacco free.  Smoking is NOT allowed.   If you have any questions about these instructions, call Pre-Op Admit  Nursing at [phone] or the Pre-Op Day Surgery Unit at [phone].

## 2025-02-04 DIAGNOSIS — M87.051 AVASCULAR NECROSIS OF RIGHT FEMUR: Primary | ICD-10-CM

## 2025-02-07 DIAGNOSIS — Z96.642 S/P TOTAL LEFT HIP ARTHROPLASTY: ICD-10-CM

## 2025-02-07 RX ORDER — HYDROCODONE BITARTRATE AND ACETAMINOPHEN 5; 325 MG/1; MG/1
1 TABLET ORAL EVERY 12 HOURS PRN
Qty: 14 TABLET | Refills: 0 | Status: SHIPPED | OUTPATIENT
Start: 2025-02-07 | End: 2025-02-12

## 2025-02-12 DIAGNOSIS — M87.051 AVASCULAR NECROSIS OF BONE OF RIGHT HIP: ICD-10-CM

## 2025-02-12 DIAGNOSIS — M87.051 AVASCULAR NECROSIS OF RIGHT FEMUR: Primary | ICD-10-CM

## 2025-02-12 RX ORDER — OXYCODONE AND ACETAMINOPHEN 7.5; 325 MG/1; MG/1
1 TABLET ORAL EVERY 6 HOURS PRN
Qty: 28 TABLET | Refills: 0 | Status: SHIPPED | OUTPATIENT
Start: 2025-02-12

## 2025-02-12 RX ORDER — DOCUSATE SODIUM 100 MG/1
100 CAPSULE, LIQUID FILLED ORAL 2 TIMES DAILY
Qty: 60 CAPSULE | Refills: 0 | Status: SHIPPED | OUTPATIENT
Start: 2025-02-12 | End: 2025-03-16

## 2025-02-12 RX ORDER — ONDANSETRON 4 MG/1
4 TABLET, FILM COATED ORAL EVERY 6 HOURS PRN
Qty: 10 TABLET | Refills: 0 | Status: SHIPPED | OUTPATIENT
Start: 2025-02-12

## 2025-02-12 RX ORDER — GABAPENTIN 300 MG/1
300 CAPSULE ORAL 2 TIMES DAILY
Qty: 60 CAPSULE | Refills: 0 | Status: SHIPPED | OUTPATIENT
Start: 2025-02-12 | End: 2025-03-16

## 2025-02-15 ENCOUNTER — ANESTHESIA EVENT (OUTPATIENT)
Dept: SURGERY | Facility: HOSPITAL | Age: 50
End: 2025-02-15
Payer: MEDICAID

## 2025-02-16 DIAGNOSIS — K21.9 GASTROESOPHAGEAL REFLUX DISEASE, UNSPECIFIED WHETHER ESOPHAGITIS PRESENT: ICD-10-CM

## 2025-02-17 ENCOUNTER — ANESTHESIA (OUTPATIENT)
Dept: SURGERY | Facility: HOSPITAL | Age: 50
End: 2025-02-17
Payer: MEDICAID

## 2025-02-17 ENCOUNTER — HOSPITAL ENCOUNTER (OUTPATIENT)
Facility: HOSPITAL | Age: 50
Discharge: HOME | End: 2025-02-17
Attending: ORTHOPAEDIC SURGERY | Admitting: ORTHOPAEDIC SURGERY
Payer: MEDICAID

## 2025-02-17 DIAGNOSIS — Z79.01 CURRENT USE OF ANTICOAGULANT THERAPY: ICD-10-CM

## 2025-02-17 DIAGNOSIS — M87.051 AVASCULAR NECROSIS OF RIGHT FEMUR: ICD-10-CM

## 2025-02-17 DIAGNOSIS — Z01.818 PRE-OP TESTING: ICD-10-CM

## 2025-02-17 DIAGNOSIS — M87.051 AVASCULAR NECROSIS OF BONE OF RIGHT HIP: Primary | ICD-10-CM

## 2025-02-17 PROCEDURE — 94799 UNLISTED PULMONARY SVC/PX: CPT

## 2025-02-17 PROCEDURE — 63600175 PHARM REV CODE 636 W HCPCS: Performed by: ANESTHESIOLOGY

## 2025-02-17 PROCEDURE — C1769 GUIDE WIRE: HCPCS | Performed by: ORTHOPAEDIC SURGERY

## 2025-02-17 PROCEDURE — 97165 OT EVAL LOW COMPLEX 30 MIN: CPT

## 2025-02-17 PROCEDURE — 63600175 PHARM REV CODE 636 W HCPCS: Mod: TB,JZ | Performed by: ANESTHESIOLOGY

## 2025-02-17 PROCEDURE — 71000016 HC POSTOP RECOV ADDL HR: Performed by: ORTHOPAEDIC SURGERY

## 2025-02-17 PROCEDURE — 25000003 PHARM REV CODE 250: Performed by: ORTHOPAEDIC SURGERY

## 2025-02-17 PROCEDURE — 63600175 PHARM REV CODE 636 W HCPCS: Performed by: ORTHOPAEDIC SURGERY

## 2025-02-17 PROCEDURE — 25000003 PHARM REV CODE 250: Performed by: ANESTHESIOLOGY

## 2025-02-17 PROCEDURE — 27201423 OPTIME MED/SURG SUP & DEVICES STERILE SUPPLY: Performed by: ORTHOPAEDIC SURGERY

## 2025-02-17 PROCEDURE — 71000015 HC POSTOP RECOV 1ST HR: Performed by: ORTHOPAEDIC SURGERY

## 2025-02-17 PROCEDURE — 63600175 PHARM REV CODE 636 W HCPCS: Performed by: NURSE ANESTHETIST, CERTIFIED REGISTERED

## 2025-02-17 PROCEDURE — 37000008 HC ANESTHESIA 1ST 15 MINUTES: Performed by: ORTHOPAEDIC SURGERY

## 2025-02-17 PROCEDURE — C1776 JOINT DEVICE (IMPLANTABLE): HCPCS | Performed by: ORTHOPAEDIC SURGERY

## 2025-02-17 PROCEDURE — 27130 TOTAL HIP ARTHROPLASTY: CPT | Mod: RT,,, | Performed by: ORTHOPAEDIC SURGERY

## 2025-02-17 PROCEDURE — 37000009 HC ANESTHESIA EA ADD 15 MINS: Performed by: ORTHOPAEDIC SURGERY

## 2025-02-17 PROCEDURE — 97161 PT EVAL LOW COMPLEX 20 MIN: CPT

## 2025-02-17 PROCEDURE — 36000711: Performed by: ORTHOPAEDIC SURGERY

## 2025-02-17 PROCEDURE — 71000039 HC RECOVERY, EACH ADD'L HOUR: Performed by: ORTHOPAEDIC SURGERY

## 2025-02-17 PROCEDURE — 71000033 HC RECOVERY, INTIAL HOUR: Performed by: ORTHOPAEDIC SURGERY

## 2025-02-17 PROCEDURE — C1713 ANCHOR/SCREW BN/BN,TIS/BN: HCPCS | Performed by: ORTHOPAEDIC SURGERY

## 2025-02-17 PROCEDURE — 36000710: Performed by: ORTHOPAEDIC SURGERY

## 2025-02-17 PROCEDURE — 25000003 PHARM REV CODE 250: Performed by: NURSE ANESTHETIST, CERTIFIED REGISTERED

## 2025-02-17 DEVICE — PINNACLE HIP SOLUTIONS ALTRX POLYETHYLENE ACETABULAR LINER +4 10 DEGREE 36MM ID 56MM OD
Type: IMPLANTABLE DEVICE | Site: HIP | Status: FUNCTIONAL
Brand: PINNACLE ALTRX

## 2025-02-17 DEVICE — BIOLOX DELTA CERAMIC FEMORAL HEAD +5.0 36MM DIA 12/14 TAPER
Type: IMPLANTABLE DEVICE | Site: HIP | Status: FUNCTIONAL
Brand: BIOLOX DELTA

## 2025-02-17 DEVICE — SUMMIT FEMORAL STEM 12/14 TAPER TAPER ED W/POROCOAT SIZE 4 STD 140MM
Type: IMPLANTABLE DEVICE | Site: HIP | Status: FUNCTIONAL
Brand: SUMMIT POROCOAT

## 2025-02-17 DEVICE — PINNACLE GRIPTION ACETABULAR SHELL SECTOR 56MM OD
Type: IMPLANTABLE DEVICE | Site: HIP | Status: FUNCTIONAL
Brand: PINNACLE GRIPTION

## 2025-02-17 RX ORDER — ZOLPIDEM TARTRATE 5 MG/1
5 TABLET ORAL NIGHTLY PRN
Status: CANCELLED | OUTPATIENT
Start: 2025-02-17

## 2025-02-17 RX ORDER — MUPIROCIN 20 MG/G
1 OINTMENT TOPICAL 2 TIMES DAILY
Status: CANCELLED | OUTPATIENT
Start: 2025-02-17 | End: 2025-02-22

## 2025-02-17 RX ORDER — OXYCODONE HCL 10 MG/1
10 TABLET, FILM COATED, EXTENDED RELEASE ORAL ONCE
Status: COMPLETED | OUTPATIENT
Start: 2025-02-17 | End: 2025-02-17

## 2025-02-17 RX ORDER — LORAZEPAM 2 MG/ML
0.5 INJECTION INTRAMUSCULAR ONCE
Status: COMPLETED | OUTPATIENT
Start: 2025-02-17 | End: 2025-02-17

## 2025-02-17 RX ORDER — DIPHENHYDRAMINE HYDROCHLORIDE 50 MG/ML
12.5 INJECTION INTRAMUSCULAR; INTRAVENOUS EVERY 6 HOURS PRN
Status: DISCONTINUED | OUTPATIENT
Start: 2025-02-17 | End: 2025-02-17 | Stop reason: HOSPADM

## 2025-02-17 RX ORDER — CLINDAMYCIN PHOSPHATE 900 MG/50ML
900 INJECTION, SOLUTION INTRAVENOUS
Status: COMPLETED | OUTPATIENT
Start: 2025-02-17 | End: 2025-02-17

## 2025-02-17 RX ORDER — MIDAZOLAM HYDROCHLORIDE 1 MG/ML
.5-4 INJECTION, SOLUTION INTRAMUSCULAR; INTRAVENOUS
Status: DISCONTINUED | OUTPATIENT
Start: 2025-02-17 | End: 2025-02-17 | Stop reason: HOSPADM

## 2025-02-17 RX ORDER — ACETAMINOPHEN 10 MG/ML
INJECTION, SOLUTION INTRAVENOUS
Status: DISCONTINUED | OUTPATIENT
Start: 2025-02-17 | End: 2025-02-17

## 2025-02-17 RX ORDER — ACETAMINOPHEN 500 MG
1000 TABLET ORAL
Status: COMPLETED | OUTPATIENT
Start: 2025-02-17 | End: 2025-02-17

## 2025-02-17 RX ORDER — HYDROCODONE BITARTRATE AND ACETAMINOPHEN 5; 325 MG/1; MG/1
1 TABLET ORAL EVERY 4 HOURS PRN
Refills: 0 | Status: CANCELLED | OUTPATIENT
Start: 2025-02-17

## 2025-02-17 RX ORDER — FAMOTIDINE 20 MG/1
20 TABLET, FILM COATED ORAL 2 TIMES DAILY
Status: CANCELLED | OUTPATIENT
Start: 2025-02-17

## 2025-02-17 RX ORDER — CEFAZOLIN SODIUM 1 G/50ML
1 SOLUTION INTRAVENOUS
Status: CANCELLED | OUTPATIENT
Start: 2025-02-17 | End: 2025-02-18

## 2025-02-17 RX ORDER — DEXTROSE MONOHYDRATE AND SODIUM CHLORIDE 5; .45 G/100ML; G/100ML
INJECTION, SOLUTION INTRAVENOUS CONTINUOUS
Status: CANCELLED | OUTPATIENT
Start: 2025-02-17

## 2025-02-17 RX ORDER — PANTOPRAZOLE SODIUM 40 MG/1
40 TABLET, DELAYED RELEASE ORAL DAILY
Qty: 90 TABLET | Refills: 3 | Status: SHIPPED | OUTPATIENT
Start: 2025-02-17 | End: 2026-02-17

## 2025-02-17 RX ORDER — SUCCINYLCHOLINE CHLORIDE 20 MG/ML
INJECTION INTRAMUSCULAR; INTRAVENOUS
Status: DISCONTINUED | OUTPATIENT
Start: 2025-02-17 | End: 2025-02-17

## 2025-02-17 RX ORDER — OXYCODONE HYDROCHLORIDE 5 MG/1
5 TABLET ORAL
Status: DISCONTINUED | OUTPATIENT
Start: 2025-02-17 | End: 2025-02-17 | Stop reason: HOSPADM

## 2025-02-17 RX ORDER — DEXAMETHASONE SODIUM PHOSPHATE 4 MG/ML
INJECTION, SOLUTION INTRA-ARTICULAR; INTRALESIONAL; INTRAMUSCULAR; INTRAVENOUS; SOFT TISSUE
Status: DISCONTINUED | OUTPATIENT
Start: 2025-02-17 | End: 2025-02-17

## 2025-02-17 RX ORDER — ROCURONIUM BROMIDE 10 MG/ML
INJECTION, SOLUTION INTRAVENOUS
Status: DISCONTINUED | OUTPATIENT
Start: 2025-02-17 | End: 2025-02-17

## 2025-02-17 RX ORDER — LIDOCAINE HYDROCHLORIDE 20 MG/ML
INJECTION INTRAVENOUS
Status: DISCONTINUED | OUTPATIENT
Start: 2025-02-17 | End: 2025-02-17

## 2025-02-17 RX ORDER — HYDROCODONE BITARTRATE AND ACETAMINOPHEN 5; 325 MG/1; MG/1
1 TABLET ORAL EVERY 6 HOURS PRN
COMMUNITY

## 2025-02-17 RX ORDER — OXYCODONE HYDROCHLORIDE 5 MG/1
5 TABLET ORAL
Refills: 0 | Status: DISCONTINUED | OUTPATIENT
Start: 2025-02-17 | End: 2025-02-17 | Stop reason: HOSPADM

## 2025-02-17 RX ORDER — SODIUM CHLORIDE, SODIUM LACTATE, POTASSIUM CHLORIDE, CALCIUM CHLORIDE 600; 310; 30; 20 MG/100ML; MG/100ML; MG/100ML; MG/100ML
1000 INJECTION, SOLUTION INTRAVENOUS ONCE
Status: DISCONTINUED | OUTPATIENT
Start: 2025-02-17 | End: 2025-02-17 | Stop reason: HOSPADM

## 2025-02-17 RX ORDER — SODIUM CHLORIDE 0.9 % (FLUSH) 0.9 %
5 SYRINGE (ML) INJECTION
Status: DISCONTINUED | OUTPATIENT
Start: 2025-02-17 | End: 2025-02-17 | Stop reason: HOSPADM

## 2025-02-17 RX ORDER — ONDANSETRON HYDROCHLORIDE 2 MG/ML
INJECTION, SOLUTION INTRAVENOUS
Status: DISCONTINUED | OUTPATIENT
Start: 2025-02-17 | End: 2025-02-17

## 2025-02-17 RX ORDER — PROPOFOL 10 MG/ML
VIAL (ML) INTRAVENOUS
Status: DISCONTINUED | OUTPATIENT
Start: 2025-02-17 | End: 2025-02-17

## 2025-02-17 RX ORDER — FENTANYL CITRATE 50 UG/ML
25-200 INJECTION, SOLUTION INTRAMUSCULAR; INTRAVENOUS
Status: DISCONTINUED | OUTPATIENT
Start: 2025-02-17 | End: 2025-02-17 | Stop reason: HOSPADM

## 2025-02-17 RX ORDER — HYDROMORPHONE HYDROCHLORIDE 2 MG/ML
0.2 INJECTION, SOLUTION INTRAMUSCULAR; INTRAVENOUS; SUBCUTANEOUS EVERY 5 MIN PRN
Status: COMPLETED | OUTPATIENT
Start: 2025-02-17 | End: 2025-02-17

## 2025-02-17 RX ORDER — EPHEDRINE SULFATE 50 MG/ML
INJECTION, SOLUTION INTRAVENOUS
Status: DISCONTINUED | OUTPATIENT
Start: 2025-02-17 | End: 2025-02-17

## 2025-02-17 RX ORDER — ONDANSETRON HYDROCHLORIDE 2 MG/ML
4 INJECTION, SOLUTION INTRAVENOUS ONCE AS NEEDED
Status: DISCONTINUED | OUTPATIENT
Start: 2025-02-17 | End: 2025-02-17 | Stop reason: HOSPADM

## 2025-02-17 RX ORDER — PHENYLEPHRINE HYDROCHLORIDE 10 MG/ML
INJECTION INTRAVENOUS
Status: DISCONTINUED | OUTPATIENT
Start: 2025-02-17 | End: 2025-02-17

## 2025-02-17 RX ORDER — FENTANYL CITRATE 50 UG/ML
25 INJECTION, SOLUTION INTRAMUSCULAR; INTRAVENOUS EVERY 5 MIN PRN
Status: COMPLETED | OUTPATIENT
Start: 2025-02-17 | End: 2025-02-17

## 2025-02-17 RX ORDER — BISACODYL 10 MG/1
10 SUPPOSITORY RECTAL DAILY
Status: CANCELLED | OUTPATIENT
Start: 2025-02-17 | End: 2025-02-20

## 2025-02-17 RX ORDER — GLUCAGON 1 MG
1 KIT INJECTION
Status: DISCONTINUED | OUTPATIENT
Start: 2025-02-17 | End: 2025-02-17 | Stop reason: HOSPADM

## 2025-02-17 RX ADMIN — OXYCODONE 5 MG: 5 TABLET ORAL at 02:02

## 2025-02-17 RX ADMIN — HYDROMORPHONE HYDROCHLORIDE 0.2 MG: 2 INJECTION INTRAMUSCULAR; INTRAVENOUS; SUBCUTANEOUS at 12:02

## 2025-02-17 RX ADMIN — FENTANYL CITRATE 25 MCG: 50 INJECTION, SOLUTION INTRAMUSCULAR; INTRAVENOUS at 12:02

## 2025-02-17 RX ADMIN — EPHEDRINE SULFATE 25 MG: 50 INJECTION, SOLUTION INTRAMUSCULAR; INTRAVENOUS; SUBCUTANEOUS at 09:02

## 2025-02-17 RX ADMIN — ROCURONIUM BROMIDE 35 MG: 10 INJECTION, SOLUTION INTRAVENOUS at 09:02

## 2025-02-17 RX ADMIN — PROPOFOL 150 MG: 10 INJECTION, EMULSION INTRAVENOUS at 09:02

## 2025-02-17 RX ADMIN — SUGAMMADEX 200 MG: 100 INJECTION, SOLUTION INTRAVENOUS at 11:02

## 2025-02-17 RX ADMIN — OXYCODONE 5 MG: 5 TABLET ORAL at 12:02

## 2025-02-17 RX ADMIN — OXYCODONE HYDROCHLORIDE 10 MG: 10 TABLET, FILM COATED, EXTENDED RELEASE ORAL at 07:02

## 2025-02-17 RX ADMIN — TRANEXAMIC ACID 1000 MG: 100 INJECTION, SOLUTION INTRAVENOUS at 09:02

## 2025-02-17 RX ADMIN — FENTANYL CITRATE 50 MCG: 50 INJECTION, SOLUTION INTRAMUSCULAR; INTRAVENOUS at 11:02

## 2025-02-17 RX ADMIN — MIDAZOLAM HYDROCHLORIDE 2 MG: 1 INJECTION, SOLUTION INTRAMUSCULAR; INTRAVENOUS at 09:02

## 2025-02-17 RX ADMIN — ROCURONIUM BROMIDE 5 MG: 10 INJECTION, SOLUTION INTRAVENOUS at 09:02

## 2025-02-17 RX ADMIN — ACETAMINOPHEN 1000 MG: 500 TABLET ORAL at 07:02

## 2025-02-17 RX ADMIN — SODIUM CHLORIDE, SODIUM GLUCONATE, SODIUM ACETATE, POTASSIUM CHLORIDE AND MAGNESIUM CHLORIDE: 526; 502; 368; 37; 30 INJECTION, SOLUTION INTRAVENOUS at 10:02

## 2025-02-17 RX ADMIN — SUCCINYLCHOLINE CHLORIDE 160 MG: 20 INJECTION, SOLUTION INTRAMUSCULAR; INTRAVENOUS at 09:02

## 2025-02-17 RX ADMIN — HYDROMORPHONE HYDROCHLORIDE 0.2 MG: 2 INJECTION INTRAMUSCULAR; INTRAVENOUS; SUBCUTANEOUS at 01:02

## 2025-02-17 RX ADMIN — ROCURONIUM BROMIDE 10 MG: 10 INJECTION, SOLUTION INTRAVENOUS at 10:02

## 2025-02-17 RX ADMIN — PHENYLEPHRINE HYDROCHLORIDE 200 MCG: 10 INJECTION INTRAVENOUS at 09:02

## 2025-02-17 RX ADMIN — PHENYLEPHRINE HYDROCHLORIDE 100 MCG: 10 INJECTION INTRAVENOUS at 11:02

## 2025-02-17 RX ADMIN — ONDANSETRON 4 MG: 2 INJECTION INTRAMUSCULAR; INTRAVENOUS at 09:02

## 2025-02-17 RX ADMIN — LIDOCAINE HYDROCHLORIDE 100 MG: 20 INJECTION, SOLUTION INTRAVENOUS at 09:02

## 2025-02-17 RX ADMIN — SODIUM CHLORIDE, SODIUM GLUCONATE, SODIUM ACETATE, POTASSIUM CHLORIDE AND MAGNESIUM CHLORIDE: 526; 502; 368; 37; 30 INJECTION, SOLUTION INTRAVENOUS at 08:02

## 2025-02-17 RX ADMIN — CLINDAMYCIN PHOSPHATE 900 MG: 18 INJECTION, SOLUTION INTRAVENOUS at 09:02

## 2025-02-17 RX ADMIN — LORAZEPAM 0.5 MG: 2 INJECTION INTRAMUSCULAR; INTRAVENOUS at 01:02

## 2025-02-17 RX ADMIN — DEXAMETHASONE SODIUM PHOSPHATE 8 MG: 4 INJECTION, SOLUTION INTRA-ARTICULAR; INTRALESIONAL; INTRAMUSCULAR; INTRAVENOUS; SOFT TISSUE at 09:02

## 2025-02-17 RX ADMIN — ACETAMINOPHEN 1000 MG: 10 INJECTION INTRAVENOUS at 11:02

## 2025-02-17 RX ADMIN — FENTANYL CITRATE 100 MCG: 50 INJECTION, SOLUTION INTRAMUSCULAR; INTRAVENOUS at 09:02

## 2025-02-17 NOTE — PLAN OF CARE
Patient received from recovery at this time.  AAOX3.  NAD noted.  Dressing to right hip remains clean, dry and intact with abduction pillow in place. Tolerating po intake well with no complaints of nausea/vomiting.  Patient able to move BLE with no problems noted.  Rolling walker returned to bedside.

## 2025-02-17 NOTE — PT/OT/SLP EVAL
Occupational Therapy   Evaluation and Discharge Note    Name: Lee Quintanilla  MRN: [id number]  Admitting Diagnosis: <principal problem not specified>  Recent Surgery: Procedure(s) (LRB):  ARTHROPLASTY, HIP REPLACEMENT, RIGHT (Right) * Day of Surgery *    Recommendations:     Discharge Recommendations: Low Intensity Therapy  Discharge Equipment Recommendations: none; Pt owns a bedside commode and a RW.  Barriers to discharge:  None    Assessment:     Lee Quintanilla is a 49 y.o. male with a medical diagnosis of right MARIAN. Pt was agreeable to participate in OT. Pt was given education on hip precautions including no hip flexion greater than 90 degrees, no IR of hip and no adduction of hip. Pt required CGA and verbal cues for hand placement with sit <> stand transfer using a RW. Pt required set up A with UBD. Pt was given instruction and demonstration of assistive devices including a reacher, sock aid and shoe horn to increase his independence with LBD. Pt verbalized understanding and required min A to don his pants. Pt was given education on use of DME including a raised toilet seat and shower chair to maintain precautions during functional transfers. Pt is scheduled for discharge today and would benefit from skilled home health OT services to increase his independence with ADLs and functional transfer skills to return to his PLOF. At this time, patient does not require further acute OT services.      Plan:     During this hospitalization, patient does not require further acute OT services.  Please re-consult if situation changes.    Plan of Care Reviewed with: patient    Subjective     Chief Complaint: Right hip pain  Patient/Family Comments/goals: Pain relief    Occupational Profile:  Living Environment: Pt lives alone.   Previous level of function: Independent with ADLs  Equipment Used at home: none  Assistance upon Discharge: family    Pain/Comfort:  Pain Rating 1: 8/10  Location - Side 1: Right  Pain  Addressed 1: Nurse notified, Reposition    Objective:     Communicated with: nurse Carlos prior to session.  Patient found HOB elevated with peripheral IV upon OT entry to room.    General Precautions: Standard, fall  Orthopedic Precautions: RLE weight bearing as tolerated, RLE posterior precautions  Braces: Hip abduction brace  Respiratory Status: Room air     Occupational Performance:    Bed Mobility:    Patient completed Scooting/Bridging with stand by assistance  Patient completed Supine to Sit with stand by assistance    Functional Mobility/Transfers:  Patient completed Sit <> Stand Transfer with contact guard assistance  with  rolling walker   Patient completed Bed <> Chair Transfer using Step Transfer technique with contact guard assistance with rolling walker  Functional Mobility: ~35 feet using a RW with CGA    Activities of Daily Living:  Upper Body Dressing: set up A  Lower Body Dressing: minimum assistance to his pants using a reacher  Toileting: minimum assistance for clothing management    Cognitive/Visual Perceptual:  Cognitive/Psychosocial Skills:  -       Oriented to: Person, Place, Time, and Situation   -       Follows Commands/attention:Follows multistep  commands  -       Communication: clear/fluent  -       Memory: No Deficits noted  -       Safety awareness/insight to disability: intact   -       Mood/Affect/Coping skills/emotional control: Cooperative and Pleasant    Physical Exam:  Upper Extremity Range of Motion:  -       Right Upper Extremity: WFL  -       Left Upper Extremity: WFL  Upper Extremity Strength: -       Right Upper Extremity: WFL  -       Left Upper Extremity: WFL      Treatment & Education:  Educated on the importance of mobility to maximize recovery.?   Educated on posterior hip precautions - patient recalled hip precautions?   Educated on use of hip kit during LB dressing?          4.  Educated on safety with functional mobility; hand placement to ensure safe transfers to  various surfaces in prep for ADLs?      Patient left up in chair with all lines intact and nurse and PT notified.    GOALS:   Multidisciplinary Problems       Occupational Therapy Goals       Not on file                    DME Justifications:  No DME recommended requiring DME justifications    History:     Past Medical History:   Diagnosis Date    ADHD (attention deficit hyperactivity disorder)     Arthritis     Asthma     as child only    Back pain     Coronary artery disease     Degeneration of lumbar intervertebral disc 05/2016    Depression     Digestive disorder     Elevated PSA     Headache     Hyperlipidemia     Hypertension     Kidney damage     stage 3 ; d/t aleve abuse    Kidney stone     Myocardial infarction     Neck pain     Numbness and tingling in hands     Numbness and tingling of both legs     Osteonecrosis     Bilat Femur    Seizures     from inapsine 2002    Sleep apnea     no cpap    Wears glasses     CONTACS         Past Surgical History:   Procedure Laterality Date    BACK SURGERY  2016    back surgery    BONE GRAFT N/A 11/5/2020    Procedure: BONE GRAFT;  Surgeon: Mateusz Blanco MD;  Location: [facility];  Service: Orthopedics;  Laterality: N/A;    CARDIAC SURGERY  01/06/2020    CABG X 7    CORONARY ARTERY BYPASS GRAFT      7 vessels    FLEXIBLE SIGMOIDOSCOPY N/A 7/24/2024    Procedure: SIGMOIDOSCOPY, FLEXIBLE;  Surgeon: Latesha Victoria MD;  Location: [facility];  Service: Endoscopy;  Laterality: N/A;    HERNIA REPAIR Bilateral 11/22/2017    HIP ARTHROPLASTY Left 9/23/2024    Procedure: ARTHROPLASTY, HIP;  Surgeon: Lázaro Jaimes MD;  Location: [facility] OR;  Service: Orthopedics;  Laterality: Left;  Sincere    LITHOTRIPSY      LUMBAR LAMINECTOMY WITH FUSION Bilateral 11/5/2020    Procedure: LAMINECTOMY, SPINE, LUMBAR, WITH FUSION;  Surgeon: Mateusz Blanco MD;  Location: [facility] OR;  Service: Orthopedics;  Laterality: Bilateral;  MEDTRONIC  NTI  L-3    PILONIDAL CYST DRAINAGE      removal of abcess       scrotal    REMOVAL OF HARDWARE FROM SPINE Bilateral 11/5/2020    Procedure: REMOVAL, HARDWARE, SPINE;  Surgeon: Mateusz Blanco MD;  Location: [facility] OR;  Service: Orthopedics;  Laterality: Bilateral;  L 4-5    SURGICAL REMOVAL OF PILONIDAL CYST N/A 4/15/2024    Procedure: EXCISION, PILONIDAL CYST;  Surgeon: Jayden Phillips III, MD;  Location: [facility] OR;  Service: General;  Laterality: N/A;  sacral area    TYMPANOSTOMY TUBE PLACEMENT         Time Tracking:     OT Date of Treatment: 02/17/25  OT Start Time: 1617  OT Stop Time: 1633  OT Total Time (min): 16 min    Billable Minutes:Evaluation 16    2/17/2025

## 2025-02-17 NOTE — OP NOTE
[facility]  Surgery Department  Operative Note    SUMMARY     Date of Procedure: 2/17/2025     Procedure:  Right total hip arthroplasty    Surgeons and Role:     * Lázaro Jaimes MD - Primary    Assisting Surgeon:  Sergo    Pre-Operative Diagnosis: Avascular necrosis of right femur [M87.051]  Pre-op testing [Z01.818]  Current use of anticoagulant therapy [Z79.01]    Post-Operative Diagnosis: Post-Op Diagnosis Codes:     * Avascular necrosis of right femur [M87.051]     * Pre-op testing [Z01.818]     * Current use of anticoagulant therapy [Z79.01]    Anesthesia: Spinal    Intraoperative Findings:  Avascular necrosis right hip with delamination of the cartilage off the femoral head    Description of the Findings of the Procedure:  Patient was placed on the operative table lateral decubitus position.  He was prepped and draped in the usual sterile manner for surgery.  Standard posterior lateral approach undertaken to the hip and carried down sharply through the skin.  The fascia was incised in line with its fibers.  The sciatic nerve was visualized and protected.  The Charnley retractors were placed.  The short external rotators were taken down and tagged for later reattachment.  The capsule was split.  At this point the hip was dislocated.  Preliminary head cut was made in the femoral head was removed.  There was delamination of the cartilage from the head consistent with avascular necrosis.  We now used the cookie cutter followed by the canal finer followed by the lateralizer.  We now reamed up to a size 4.  We now broached to a 4 that gave us excellent fit and fill.  We turned our attention to the acetabulum.  Retractors were placed anteriorly and posteriorly.  Foveal contents were cleaned.  We medialized with a 47 just down to the level of the fovea.  We now began reaming into we had good bleeding bone.  At this point I tapped a 52 mm cup into position.  There was slightly more room at  the periphery than I wanted and so I removed the 54 mm cup and medialized slightly more with a 53 mm Reamer.  I then reamed with the 55 mm Reamer.  I tapped a 56 mm cup in 2 position now had an outstanding Press-Fit.  I tapped the liner into position and began trialing.  At this point we turned our attention to the stem.  I dropped the stem into position.  I trialed with a +1 5 and then a +5 the +5 had symmetric leg lengths and excellent range of motion.  I removed the trial components and tapped the actual components into position the construct trialed beautifully.  I pulsed and irrigated.  We closed the capsule with 2. FiberWire.  We closed the short external rotators with 2. FiberWire.  We irrigated again.  The sciatic nerve was in good position and condition and we removed the Charnley retractors.  We closed the deep fascia with a running 0 Strattice fixed.  We irrigated again and closed the subcu with 2-0 Vicryl and the skin with 4-0 Monocryl.  Sterile dressings were applied and the patient was noted to be in stable condition pending an x-ray neurovascular check    Complications: No    Estimated Blood Loss (EBL): 200 mL           Implants:   Implant Name Type Inv. Item Serial No.  Lot No. LRB No. Used Action   CUP 54MM - [id number]  CUP 54MM  [employer] [id number] Right 1 Implanted and Explanted   LINER ACET PNCL ALT 10 +4 36X5 - [id number]  LINER ACET PNCL ALT 10 +4 36X5  [employer] [id number] Right 1 Implanted and Explanted   CUP ACT PINNACLE SECTOR 56 TIT - [id number]  CUP ACT PINNACLE SECTOR 56 TIT  [employer] [id number] Right 1 Implanted   SCREW PINNACLE 6.5 X 25 - [id number]  SCREW PINNACLE 6.5 X 25  [employer] [id number] Right 1 Implanted   LINER ALTRX +4 10DEG [id number] - [id number]  LINER ALTRX +4 10DEG [id number]  [facility] [id number] Right 1 Implanted   STEM FEM TPR PCT STD SZ 4 - [id number]  STEM FEM TPR PCT STD SZ 4  [employer] [id number] Right 1 Implanted   HEAD CERAMAX 12/14 +5 36MM - [id number]  HEAD  CERAMAX 12/14 +5 36MM  [employer] [id number] Right 1 Implanted       Specimens:   Specimen (24h ago, onward)      None                    Condition: Good    Disposition: PACU - hemodynamically stable.              Discharge Note    SUMMARY     Admit Date: 2/17/2025    Discharge Date and Time:  02/17/2025 11:35 AM    Hospital Course (synopsis of major diagnoses, care, treatment, and services provided during the course of the hospital stay): Uneventful      Final Diagnosis: Post-Op Diagnosis Codes:     * Avascular necrosis of right femur [M87.051]     * Pre-op testing [Z01.818]     * Current use of anticoagulant therapy [Z79.01]    Disposition: Home or Self Care    Follow Up/Patient Instructions:     Medications:  Reconciled Home Medications:   Current Discharge Medication List        CONTINUE these medications which have NOT CHANGED    Details   busPIRone (BUSPAR) 15 MG tablet Take 1 tablet (15 mg total) by mouth 3 (three) times daily.  Qty: 90 tablet, Refills: 0      cyclobenzaprine (FLEXERIL) 10 MG tablet Take 10 mg by mouth 2 (two) times daily.      ENTRESTO 49-51 mg per tablet Take 1 tablet by mouth 2 (two) times daily.      EScitalopram oxalate (LEXAPRO) 20 MG tablet Take 1 tablet (20 mg total) by mouth once daily.  Qty: 90 tablet, Refills: 1    Associated Diagnoses: MYNOR (generalized anxiety disorder)      furosemide (LASIX) 20 MG tablet Take 1 tablet by mouth every morning.      HYDROcodone-acetaminophen (NORCO) 5-325 mg per tablet Take 1 tablet by mouth every 6 (six) hours as needed for Pain.      hydrOXYzine (ATARAX) 50 MG tablet Take 1 tablet by mouth 2 (two) times daily.      metoprolol tartrate (LOPRESSOR) 50 MG tablet Take 1 tablet by mouth 2 (two) times daily.      rosuvastatin (CRESTOR) 40 MG Tab Take 40 mg by mouth every evening.      tadalafiL (CIALIS) 10 MG tablet Take 10 mg by mouth daily as needed.      traZODone (DESYREL) 50 MG tablet Take 1 tablet by mouth every evening.      anastrozole (ARIMIDEX) 1  mg Tab Take 1 mg by mouth every 7 days.      clopidogreL (PLAVIX) 75 mg tablet Take 75 mg by mouth once daily.      docusate sodium (COLACE) 100 MG capsule Take 1 capsule (100 mg total) by mouth 2 (two) times daily.  Qty: 60 capsule, Refills: 0    Associated Diagnoses: Avascular necrosis of right femur; Avascular necrosis of bone of right hip      fluticasone propionate (FLONASE) 50 mcg/actuation nasal spray 2 sprays (100 mcg total) by Each Nostril route once daily.  Qty: 16 g, Refills: 0      gabapentin (NEURONTIN) 300 MG capsule Take 1 capsule (300 mg total) by mouth 2 (two) times daily.  Qty: 60 capsule, Refills: 0    Associated Diagnoses: Avascular necrosis of right femur; Avascular necrosis of bone of right hip      ondansetron (ZOFRAN) 4 MG tablet Take 1 tablet (4 mg total) by mouth every 6 (six) hours as needed for Nausea.  Qty: 10 tablet, Refills: 0    Associated Diagnoses: Avascular necrosis of right femur; Avascular necrosis of bone of right hip      oxyCODONE-acetaminophen (PERCOCET) 7.5-325 mg per tablet Take 1 tablet by mouth every 6 (six) hours as needed for Pain.  Qty: 28 tablet, Refills: 0    Comments: This prescription and the attached prescriptions are for postoperative use for the patient's scheduled total joint arthroplasty on Monday February 17, 2025.  This is for the meds to bed program.  Associated Diagnoses: Avascular necrosis of right femur; Avascular necrosis of bone of right hip      pantoprazole (PROTONIX) 40 MG tablet Take 1 tablet (40 mg total) by mouth once daily.  Qty: 90 tablet, Refills: 3    Associated Diagnoses: Gastroesophageal reflux disease, unspecified whether esophagitis present      testosterone cypionate 200 mg/mL Kit Inject 1 mL into the muscle every 7 days.           No discharge procedures on file.   Follow-up Information       [facility]. Call in 1 day(s).    Specialty: Home Health Services  Why: As needed  Contact information:  [address] [place] [address]  [address]  [address]  [facility] [address]  [phone]

## 2025-02-17 NOTE — PLAN OF CARE
Patient cleared by PT/OT to discharge to home. Patient voiced readiness to discharge to home and awaiting ride.  Patient able to void with no problems noted.  Dressing to right hip remains clean dry and intact.  Medications delivered to bedside and reviewed with patient.  Discharge instructions given to pt and family/friend, verbalized understanding and questions answered. Handouts provided. Belongings given back to pt. IV removed- catheter intact. Discharge via wheelchair.

## 2025-02-17 NOTE — TRANSFER OF CARE
"Anesthesia Transfer of Care Note    Patient: Lee Quintanilla    Procedure(s) Performed: Procedure(s) (LRB):  ARTHROPLASTY, HIP REPLACEMENT, RIGHT (Right)    Patient location: PACU    Anesthesia Type: general    Transport from OR: Transported from OR on 2-3 L/min O2 by NC with adequate spontaneous ventilation    Post pain: adequate analgesia    Post assessment: no apparent anesthetic complications and tolerated procedure well    Post vital signs: stable    Level of consciousness: responds to stimulation and sedated    Nausea/Vomiting: no nausea/vomiting    Complications: none    Transfer of care protocol was followed      Last vitals: Visit Vitals  BP (!) 113/58   Pulse 64   Temp 36.9 °C (98.4 °F)   Resp 13   Ht 5' 7" (1.702 m)   Wt 102.1 kg (225 lb)   SpO2 (!) 94%   BMI 35.24 kg/m²     "

## 2025-02-17 NOTE — PROGRESS NOTES
Pt prepared for surgery. Pt resting in bed, no family present  Mom signed up for text messaging. Belongings brought over to post op cabinet. IS teaching performed. Fall risk and  Allergy armband placed. SCDs on.

## 2025-02-17 NOTE — ANESTHESIA PROCEDURE NOTES
Intubation    Date/Time: 2/17/2025 9:21 AM    Performed by: Antoni Cardenas CRNA  Authorized by: Arnol Escobar MD    Intubation:     Induction:  Intravenous    Intubated:  Postinduction    Mask Ventilation:  Easy mask    Attempts:  1    Attempted By:  CRNA    Method of Intubation:  Video laryngoscopy    Blade:  Hoyt 3    Laryngeal View Grade: Grade I - full view of cords      Difficult Airway Encountered?: No      Complications:  None    Airway Device:  Oral endotracheal tube    Airway Device Size:  7.5    Style/Cuff Inflation:  Cuffed (inflated to minimal occlusive pressure)    Inflation Amount (mL):  5    Tube secured:  21    Secured at:  The lips    Placement Verified By:  Capnometry    Complicating Factors:  None    Findings Post-Intubation:  BS equal bilateral and atraumatic/condition of teeth unchanged

## 2025-02-17 NOTE — DISCHARGE INSTRUCTIONS
"Discharge Instructions: After Your Surgery/Procedure  Youve just had surgery. During surgery you were given medicine called anesthesia to keep you relaxed and free of pain. After surgery you may have some pain or nausea. This is common. Here are some tips for feeling better and getting well after surgery.     Stay on schedule with your medication.   Going home  Your doctor or nurse will show you how to take care of yourself when you go home. He or she will also answer your questions. Have an adult family member or friend drive you home.      For your safety we recommend these precaution for the first 24 hours after your procedure:  Do not drive or use heavy equipment.  Do not make important decisions or sign legal papers.  Do not drink alcohol.  Have someone stay with you, if needed. He or she can watch for problems and help keep you safe.  Your concentration, balance, coordination, and judgement may be impaired for many hours after anesthesia.  Use caution when ambulating or standing up.     You may feel weak and "washed out" after anesthesia and surgery.      Subtle residual effects of general anesthesia or sedation with regional / local anesthesia can last more than 24 hours.  Rest for the remainder of the day or longer if your Doctor/Surgeon has advised you to do so.  Although you may feel normal within the first 24 hours, your reflexes and mental ability may be impaired without you realizing it.  You may feel dizzy, lightheaded or sleepy for 24 hours or longer.      Be sure to go to all follow-up visits with your doctor. And rest after your surgery for as long as your doctor tells you to.  Coping with pain  If you have pain after surgery, pain medicine will help you feel better. Take it as told, before pain becomes severe. Also, ask your doctor or pharmacist about other ways to control pain. This might be with heat, ice, or relaxation. And follow any other instructions your surgeon or nurse gives you.  Tips " for taking pain medicine  To get the best relief possible, remember these points:  Pain medicines can upset your stomach. Taking them with a little food may help.  Most pain relievers taken by mouth need at least 20 to 30 minutes to start to work.  Taking medicine on a schedule can help you remember to take it. Try to time your medicine so that you can take it before starting an activity. This might be before you get dressed, go for a walk, or sit down for dinner.  Constipation is a common side effect of pain medicines. Call your doctor before taking any medicines such as laxatives or stool softeners to help ease constipation. Also ask if you should skip any foods. Drinking lots of fluids and eating foods such as fruits and vegetables that are high in fiber can also help. Remember, do not take laxatives unless your surgeon has prescribed them.  Drinking alcohol and taking pain medicine can cause dizziness and slow your breathing. It can even be deadly. Do not drink alcohol while taking pain medicine.  Pain medicine can make you react more slowly to things. Do not drive or run machinery while taking pain medicine.  Your health care provider may tell you to take acetaminophen to help ease your pain. Ask him or her how much you are supposed to take each day. Acetaminophen or other pain relievers may interact with your prescription medicines or other over-the-counter (OTC) drugs. Some prescription medicines have acetaminophen and other ingredients. Using both prescription and OTC acetaminophen for pain can cause you to overdose. Read the labels on your OTC medicines with care. This will help you to clearly know the list of ingredients, how much to take, and any warnings. It may also help you not take too much acetaminophen. If you have questions or do not understand the information, ask your pharmacist or health care provider to explain it to you before you take the OTC medicine.  Managing nausea  Some people have an  upset stomach after surgery. This is often because of anesthesia, pain, or pain medicine, or the stress of surgery. These tips will help you handle nausea and eat healthy foods as you get better. If you were on a special food plan before surgery, ask your doctor if you should follow it while you get better. These tips may help:  Do not push yourself to eat. Your body will tell you when to eat and how much.  Start off with clear liquids and soup. They are easier to digest.  Next try semi-solid foods, such as mashed potatoes, applesauce, and gelatin, as you feel ready.  Slowly move to solid foods. Dont eat fatty, rich, or spicy foods at first.  Do not force yourself to have 3 large meals a day. Instead eat smaller amounts more often.  Take pain medicines with a small amount of solid food, such as crackers or toast, to avoid nausea.     Call your surgeon if  You still have pain an hour after taking medicine. The medicine may not be strong enough.  You feel too sleepy, dizzy, or groggy. The medicine may be too strong.  You have side effects like nausea, vomiting, or skin changes, such as rash, itching, or hives.       If you have obstructive sleep apnea  You were given anesthesia medicine during surgery to keep you comfortable and free of pain. After surgery, you may have more apnea spells because of this medicine and other medicines you were given. The spells may last longer than usual.   At home:  Keep using the continuous positive airway pressure (CPAP) device when you sleep. Unless your health care provider tells you not to, use it when you sleep, day or night. CPAP is a common device used to treat obstructive sleep apnea.  Talk with your provider before taking any pain medicine, muscle relaxants, or sedatives. Your provider will tell you about the possible dangers of taking these medicines.  © 0487-8635 [employer]. [address] [place] [address], [place] [address]. All rights reserved. This information is  not intended as a substitute for professional medical care. Always follow your healthcare professional's instructions.              Using an Incentive Spirometer    An incentive spirometer is a device that helps you do deep breathing exercises. These exercises expand your lungs, aid in circulation, and help prevent pneumonia. Deep breathing exercises also help you breathe better and improve the function of your lungs by:  Keeping your lungs clear  Strengthening your breathing muscles  Helping prevent respiratory complications or problems  The incentive spirometer gives you a way to take an active part in recover. A nurse or therapist will teach you breathing exercises. To do these exercises, you will breathe in through your mouth and not your nose. The incentive spirometer only works correctly if you breathe in through your mouth.  Steps to clear lungs  Step 1. Exhale normally. Then, inhale normally.  Relax and breathe out.  Step 2. Place your lips tightly around the mouthpiece.  Make sure the device is upright and not tilted.  Step 3. Inhale as much air as you can through the mouthpiece (don't breath through your nose).  Inhale slowly and deeply.  Hold your breath long enough to keep the balls or disk raised for at least 3 to 5 seconds, or as instructed by your healthcare provider.  Some spirometers have an indicator to let you know that you are breathing in too fast. If the indicator goes off, breathe in more slowly.  Step 4. Repeat the exercise regularly.  Do this exercise every hour while you're awake, or as instructed by your healthcare provider.  If you were taught deep breathing and coughing exercises, do them regularly as instructed by your healthcare provider.                 Post op instructions for prevention of DVT  What is deep vein thrombosis?  Deep vein thrombosis (DVT) is the medical term for blood clots in the deep veins of the leg.  These blood clots can be dangerous.  A DVT can block a blood  vessel and keep blood from getting where it needs to go.  Another problem is that the clot can travel to other parts of the body such as the lungs.  A clot that travels to the lungs is called a pulmonary embolus (PE) and can cause serious problems with breathing which can lead to death.  Am I at risk for DVT/PE?  If you are not very active, you are at risk of DVT.  Anyone confined to bed, sitting for long periods of time, recovering from surgery, etc. increases the risk of DVT.  Other risk factors are cancer diagnosis, certain medications, estrogen replacement in any form,older age, obesity, pregnancy, smoking, history of clotting disorders, and dehydration.  How will I know if I have a DVT?  Swelling in the lower leg  Pain  Warmth, redness, hardness or bulging of the vein  If you have any of these symptoms, call your doctors office right away.  Some people will not have any symptoms until the clot moves to the lungs.  What are the symptoms of a PE?  Panting, shortness of breath, or trouble breathing  Sharp, knife-like chest pain when you breathe  Coughing or coughing up blood  Rapid heartbeat  If you have any of these symptoms or get worse quickly, call 911 for emergency treatment.  How can I prevent a DVT?  Avoid long periods of inactivity and dont cross your legs--get up and walk around every hour or so.  Stay active--walking after surgery is highly encouraged.  This means you should get out of the house and walk in the neighborhood.  Going up and down stairs will not impair healing (unless advised against such activity by your doctor).    Drink plenty of noncaffeinated, nonalcoholic fluids each day to prevent dehydration.  Wear special support stockings, if they have been advised by your doctor.  If you travel, stop at least once an hour and walk around.  Avoid smoking (assistance with stopping is available through your healthcare provider)  Always notify your doctor if you are not able to follow the post  operative instructions that are given to you at the time of discharge.  It may be necessary to prescribe one of the medications available to prevent DVT.               We hope your stay was comfortable as you heal now, mend and rest.    For we have enjoyed taking care of you by giving your our best.    And as you get better, by regaining your health and strength;   We count it as a privilege to have served you and hope your time at [facility] was well spent.      Thank  You!!!

## 2025-02-17 NOTE — PT/OT/SLP EVAL
Physical Therapy Evaluation and Discharge Note    Patient Name:  Lee Quintanilla   MRN:  [id number]    Recommendations:     Discharge Recommendations: Low Intensity Therapy  Discharge Equipment Recommendations: none   Barriers to discharge: None    Assessment:     Lee Quintanilla is a 49 y.o. male admitted with a medical diagnosis of <principal problem not specified>. .  Pt seen at post 04, alert and agreeable to PT- up in chair post OT and scheduled for discharge.  Pt able to name all posterior RH precautions. Recent L MARINA  and did well with recovery. Pt ambulated at hallways CGA 250ft  with good gait speed. Back to room and up in chair.  LIT    Recent Surgery: Procedure(s) (LRB):  ARTHROPLASTY, HIP REPLACEMENT, RIGHT (Right) * Day of Surgery *    Plan:     During this hospitalization, patient does not require further acute PT services.  Please re-consult if situation changes.      Subjective   Stated he did good with L MARINA and able to recite all precautions  Chief Complaint: none  Patient/Family Comments/goals: get well  Pain/Comfort:  Pain Rating 1: 0/10    Patients cultural, spiritual, Buddhism conflicts given the current situation:      Living Environment:  Home with parents  Prior to admission, patients level of function was indep.  Equipment used at home: none.  DME owned (not currently used): rolling walker.  Upon discharge, patient will have assistance from family.    Objective:     Communicated with nurse Carlos prior to session.  Patient found up in chair with hip abduction pillow upon PT entry to room.    General Precautions: Standard, fall    Orthopedic Precautions:RLE posterior precautions, RLE weight bearing as tolerated   Braces: N/A  Respiratory Status: Room air    Exams:  Postural Exam:  Patient presented with the following abnormalities:    -       BMI 35.24  RLE ROM: Deficits: withjin R posterior hip precautions  RLE Strength: Deficits: 3/5  LLE ROM: WFL  LLE Strength:  WFL    Functional Mobility:  Transfers:     Sit to Stand:  contact guard assistance with rolling walker  Gait: 250ft with CGA RW with WBAT RLE    AM-PAC 6 CLICK MOBILITY  Total Score:16       Treatment and Education:  Patient was educated on the importance of OOB activity and functional mobility to negate negative effects of prolonged bed rest during hospitalization, safe transfers and ambulation, and D/C planning   Pt educated on thera ex that he could complete kacie GS  Pt able to name all posterior RH precautions    AM-PAC 6 CLICK MOBILITY  Total Score:16     Patient left up in chair with all lines intact, call button in reach, and nurse Tab notified.    GOALS:   Multidisciplinary Problems       Physical Therapy Goals       Not on file                    DME Justifications:  No DME recommended requiring DME justifications    History:     Past Medical History:   Diagnosis Date    ADHD (attention deficit hyperactivity disorder)     Arthritis     Asthma     as child only    Back pain     Coronary artery disease     Degeneration of lumbar intervertebral disc 05/2016    Depression     Digestive disorder     Elevated PSA     Headache     Hyperlipidemia     Hypertension     Kidney damage     stage 3 ; d/t aleve abuse    Kidney stone     Myocardial infarction     Neck pain     Numbness and tingling in hands     Numbness and tingling of both legs     Osteonecrosis     Bilat Femur    Seizures     from inapsine 2002    Sleep apnea     no cpap    Wears glasses     CONTACS       Past Surgical History:   Procedure Laterality Date    BACK SURGERY  2016    back surgery    BONE GRAFT N/A 11/5/2020    Procedure: BONE GRAFT;  Surgeon: Mateusz Blanco MD;  [facility];  Service: Orthopedics;  Laterality: N/A;    CARDIAC SURGERY  01/06/2020    CABG X 7    CORONARY ARTERY BYPASS GRAFT      7 vessels    FLEXIBLE SIGMOIDOSCOPY N/A 7/24/2024    Procedure: SIGMOIDOSCOPY, FLEXIBLE;  Surgeon: Latesha Victoria MD;  Location: [facility]  ENDO;  Service: Endoscopy;  Laterality: N/A;    HERNIA REPAIR Bilateral 11/22/2017    HIP ARTHROPLASTY Left 9/23/2024    Procedure: ARTHROPLASTY, HIP;  Surgeon: Lázaro Jaimes MD;  Location: [facility] OR;  Service: Orthopedics;  Laterality: Left;  Sincere    LITHOTRIPSY      LUMBAR LAMINECTOMY WITH FUSION Bilateral 11/5/2020    Procedure: LAMINECTOMY, SPINE, LUMBAR, WITH FUSION;  Surgeon: Mateusz Blanco MD;  Location: [facility] OR;  Service: Orthopedics;  Laterality: Bilateral;  MEDTRONIC  NTI  L-3    PILONIDAL CYST DRAINAGE      removal of abcess      scrotal    REMOVAL OF HARDWARE FROM SPINE Bilateral 11/5/2020    Procedure: REMOVAL, HARDWARE, SPINE;  Surgeon: Mateusz Blanco MD;  Location: [facility] OR;  Service: Orthopedics;  Laterality: Bilateral;  L 4-5    SURGICAL REMOVAL OF PILONIDAL CYST N/A 4/15/2024    Procedure: EXCISION, PILONIDAL CYST;  Surgeon: Jayden Phillips III, MD;  Location: [facility];  Service: General;  Laterality: N/A;  sacral area    TYMPANOSTOMY TUBE PLACEMENT         Time Tracking:     PT Received On: 02/17/25  PT Start Time: 1639     PT Stop Time: 1648  PT Total Time (min): 9 min     Billable Minutes: Evaluation 9 02/17/2025

## 2025-02-17 NOTE — ANESTHESIA PREPROCEDURE EVALUATION
02/17/2025  Lee Quintanilla is a 49 y.o., male.      Pre-op Assessment    I have reviewed the Patient Summary Reports.     I have reviewed the Nursing Notes. I have reviewed the NPO Status.   I have reviewed the Medications.     Review of Systems  Anesthesia Hx:  No problems with previous Anesthesia                Social:  Former Smoker       Cardiovascular:     Hypertension, well controlled  Past MI CAD   CABG/stent (7v CABG >1 year ago)        hyperlipidemia    EF 40%                             Pulmonary:    Asthma asymptomatic and mild   Sleep Apnea                Renal/:  Chronic Renal Disease, CKD renal calculi               Hepatic/GI:     GERD                Musculoskeletal:  Arthritis          Spine Disorders: lumbar Degenerative disease and Disc disease           Neurological:    Neuromuscular Disease,  Headaches Seizures, well controlled          Peripheral Neuropathy                          Endocrine:  Endocrine Normal          Obesity / BMI > 30, Morbid Obesity / BMI > 40  Psych:  Psychiatric History (ADHD) anxiety depression              Physical Exam  General: Well nourished, Cooperative, Alert and Oriented    Airway:  Mallampati: II   Mouth Opening: Normal  TM Distance: Normal  Neck ROM: Normal ROM    Anesthesia Plan  Type of Anesthesia, risks & benefits discussed:    Anesthesia Type: Gen ETT, Gen Supraglottic Airway, Gen Natural Airway, MAC  Intra-op Monitoring Plan: Standard ASA Monitors  Post Op Pain Control Plan: multimodal analgesia and peripheral nerve block  Induction:  IV  Airway Plan: Direct, Video and Fiberoptic, Post-Induction  Informed Consent: Informed consent signed with the Patient and all parties understand the risks and agree with anesthesia plan.  All questions answered.   ASA Score: 3    Ready For Surgery From Anesthesia Perspective.   .

## 2025-02-17 NOTE — H&P
CC/Indication for Procedure: 49 y.o. male with Avascular necrosis of right femur [M87.051]  Pre-op testing [Z01.818]  Current use of anticoagulant therapy [Z79.01]  Avascular necrosis of bone of right hip [M87.051].    Patient scheduled for ME TOTAL HIP ARTHROPLASTY [[id number]] (ARTHROPLASTY, HIP REPLACEMENT, RIGHT).    Past Medical History:   Diagnosis Date    ADHD (attention deficit hyperactivity disorder)     Arthritis     Asthma     as child only    Back pain     Coronary artery disease     Degeneration of lumbar intervertebral disc 05/2016    Depression     Digestive disorder     Elevated PSA     Headache     Hyperlipidemia     Hypertension     Kidney damage     stage 3 ; d/t aleve abuse    Kidney stone     Myocardial infarction     Neck pain     Numbness and tingling in hands     Numbness and tingling of both legs     Osteonecrosis     Bilat Femur    Seizures     from inapsine 2002    Sleep apnea     no cpap    Wears glasses     CONTACS     Past Surgical History:   Procedure Laterality Date    BACK SURGERY  2016    back surgery    BONE GRAFT N/A 11/5/2020    Procedure: BONE GRAFT;  Surgeon: Mateusz Blanco MD;  Location: [facility] OR;  Service: Orthopedics;  Laterality: N/A;    CARDIAC SURGERY  01/06/2020    CABG X 7    CORONARY ARTERY BYPASS GRAFT      7 vessels    FLEXIBLE SIGMOIDOSCOPY N/A 7/24/2024    Procedure: SIGMOIDOSCOPY, FLEXIBLE;  Surgeon: Latesha Victoria MD;  Location: [facility] ENDO;  Service: Endoscopy;  Laterality: N/A;    HERNIA REPAIR Bilateral 11/22/2017    HIP ARTHROPLASTY Left 9/23/2024    Procedure: ARTHROPLASTY, HIP;  Surgeon: Lázaro Jaimes MD;  Location: [facility] OR;  Service: Orthopedics;  Laterality: Left;  Sincere    LITHOTRIPSY      LUMBAR LAMINECTOMY WITH FUSION Bilateral 11/5/2020    Procedure: LAMINECTOMY, SPINE, LUMBAR, WITH FUSION;  Surgeon: Mateusz Blanco MD;  Location: [facility] OR;  Service: Orthopedics;  Laterality: Bilateral;  MEDTRONIC  NTI  L-3    PILONIDAL CYST DRAINAGE      removal of  abcess      scrotal    REMOVAL OF HARDWARE FROM SPINE Bilateral 2020    Procedure: REMOVAL, HARDWARE, SPINE;  Surgeon: Mateusz Blanco MD;  Location: [facility] OR;  Service: Orthopedics;  Laterality: Bilateral;  L 4-5    SURGICAL REMOVAL OF PILONIDAL CYST N/A 4/15/2024    Procedure: EXCISION, PILONIDAL CYST;  Surgeon: Jayden Phillips III, MD;  Location: [facility] OR;  Service: General;  Laterality: N/A;  sacral area    TYMPANOSTOMY TUBE PLACEMENT       Family History   Problem Relation Name Age of Onset    Heart disease Mother      Migraines Mother      Hypertension Mother      Early death Father accident     Heart disease Father accident     Diabetes Maternal Aunt      Diabetes Maternal Uncle      Diabetes Maternal Grandfather       Social History     Socioeconomic History    Marital status:    Occupational History    Occupation: disability   Tobacco Use    Smoking status: Former     Current packs/day: 0.00     Types: Cigarettes     Quit date: 2016     Years since quittin.1     Passive exposure: Past    Smokeless tobacco: Former     Quit date: 2016    Tobacco comments:     Occasional  vaping   Substance and Sexual Activity    Alcohol use: Not Currently     Alcohol/week: 1.0 standard drink of alcohol     Types: 1 Glasses of wine per week     Comment: 20 - 30 shots vodka per day or 1-2 1/5ths per day for 2 years (started )    Drug use: No    Sexual activity: Yes     Partners: Female     Social Drivers of Health     Financial Resource Strain: Medium Risk (3/18/2024)    Overall Financial Resource Strain (CARDIA)     Difficulty of Paying Living Expenses: Somewhat hard   Food Insecurity: No Food Insecurity (3/18/2024)    Hunger Vital Sign     Worried About Running Out of Food in the Last Year: Never true     Ran Out of Food in the Last Year: Never true   Transportation Needs: No Transportation Needs (3/18/2024)    PRAPARE - Transportation     Lack of Transportation (Medical): No     Lack of  Transportation (Non-Medical): No   Physical Activity: Insufficiently Active (3/18/2024)    Exercise Vital Sign     Days of Exercise per Week: 2 days     Minutes of Exercise per Session: 60 min   Stress: No Stress Concern Present (3/18/2024)    [employer] - Occupational Stress Questionnaire     Feeling of Stress : Only a little   Housing Stability: Unknown (3/18/2024)    Housing Stability Vital Sign     Unable to Pay for Housing in the Last Year: No     Unstable Housing in the Last Year: No       Review of patient's allergies indicates:   Allergen Reactions    Inapsine [droperidol] Anaphylaxis     seizures    Effexor [venlafaxine]      Increased anxiety    Pcn [penicillins]     Bactrim [sulfamethoxazole-trimethoprim] Rash     Dry red rash all over when in the sun    Fluconazole Rash     Pruritis         Current Medications[1]    ROS:    Denies chest pain or palpitations  Denies shortness of breath  Denies fevers or chills  Denies chest pain  Denies abdominal pain    PE:    General Appearance: Well nourished  Orientation: Oriented to time, place, person  Mental Status: Alert  Heart: RRR  Lungs: CTA  Abdomen: Soft and non-tender    Anesthesia/Surgery risks, benefits and alternative options discussed and understood by patient/family.    This note was created using Dragon voice recognition software that occasionally misinterpreted phrases or words.       [1]   Current Facility-Administered Medications:     clindamycin in D5W 900 mg/50 mL IVPB 900 mg, 900 mg, Intravenous, On Call Procedure, Lázaro Jaimes MD    fentaNYL 50 mcg/mL injection  mcg,  mcg, Intravenous, PRN, José Antonio Aguiar MD    lactated ringers infusion, 1,000 mL, Intravenous, Once, José Antonio Aguiar MD    midazolam injection 0.5-4 mg, 0.5-4 mg, Intravenous, PRN, José Antonio Aguiar MD    tranexamic acid (CYKLOKAPRON) 1,000 mg in 0.9% NaCl 100 mL IVPB (MB+), 1,000 mg, Intravenous, On Call Procedure, Lázaro Jaimes MD

## 2025-02-18 ENCOUNTER — OFFICE VISIT (OUTPATIENT)
Dept: ORTHOPEDICS | Facility: CLINIC | Age: 50
End: 2025-02-18
Payer: MEDICAID

## 2025-02-18 VITALS
WEIGHT: 225 LBS | BODY MASS INDEX: 35.31 KG/M2 | HEART RATE: 86 BPM | OXYGEN SATURATION: 96 % | TEMPERATURE: 98 F | RESPIRATION RATE: 18 BRPM | HEIGHT: 67 IN | SYSTOLIC BLOOD PRESSURE: 104 MMHG | DIASTOLIC BLOOD PRESSURE: 51 MMHG

## 2025-02-18 VITALS — BODY MASS INDEX: 35.33 KG/M2 | WEIGHT: 225.06 LBS | HEIGHT: 67 IN

## 2025-02-18 DIAGNOSIS — Z96.642 S/P TOTAL LEFT HIP ARTHROPLASTY: Primary | ICD-10-CM

## 2025-02-18 PROCEDURE — G0180 MD CERTIFICATION HHA PATIENT: HCPCS | Mod: ,,, | Performed by: ORTHOPAEDIC SURGERY

## 2025-02-18 PROCEDURE — 99214 OFFICE O/P EST MOD 30 MIN: CPT | Mod: PBBFAC,PN | Performed by: ORTHOPAEDIC SURGERY

## 2025-02-18 RX ORDER — HYDROMORPHONE HYDROCHLORIDE 4 MG/1
4 TABLET ORAL EVERY 6 HOURS PRN
Qty: 28 TABLET | Refills: 0 | Status: SHIPPED | OUTPATIENT
Start: 2025-02-18

## 2025-02-18 NOTE — ANESTHESIA POSTPROCEDURE EVALUATION
Anesthesia Post Evaluation    Patient: Lee Quintanilla    Procedure(s) Performed: Procedure(s) (LRB):  ARTHROPLASTY, HIP REPLACEMENT (Right)    Final Anesthesia Type: general      Patient location during evaluation: PACU  Patient participation: Yes- Able to Participate  Level of consciousness: awake and alert and oriented  Post-procedure vital signs: reviewed and stable  Pain management: adequate  Airway patency: patent    PONV status at discharge: No PONV  Anesthetic complications: no      Cardiovascular status: blood pressure returned to baseline and stable  Respiratory status: unassisted and spontaneous ventilation  Hydration status: euvolemic  Follow-up not needed.              Vitals Value Taken Time   /51 02/17/25 16:00   Temp 36.9 °C (98.4 °F) 02/17/25 12:00   Pulse 86 02/17/25 16:00   Resp 18 02/17/25 16:00   SpO2 96 % 02/17/25 16:00         Event Time   Out of Recovery 14:55:00         Pain/Tanmay Score: Pain Rating Prior to Med Admin: 9 (2/17/2025  2:26 PM)  Pain Rating Post Med Admin: 8 (2/17/2025  2:55 PM)  Tanmay Score: 10 (2/17/2025  2:45 PM)  Modified Tanmay Score: 19 (2/17/2025  4:00 PM)

## 2025-02-18 NOTE — PROGRESS NOTES
[facility] POST-OP NOTE    Subjective:           Chief Complaint:   Chief Complaint   Patient presents with    Right Hip - Post-op Evaluation     Patient is here for PO day one right MARINA 2.17.25, states he is having constant soreness        Past Medical History:   Diagnosis Date    ADHD (attention deficit hyperactivity disorder)     Arthritis     Asthma     as child only    Back pain     Coronary artery disease     Degeneration of lumbar intervertebral disc 05/2016    Depression     Digestive disorder     Elevated PSA     Headache     Hyperlipidemia     Hypertension     Kidney damage     stage 3 ; d/t aleve abuse    Kidney stone     Myocardial infarction     Neck pain     Numbness and tingling in hands     Numbness and tingling of both legs     Osteonecrosis     Bilat Femur    Seizures     from inapsine 2002    Sleep apnea     no cpap    Wears glasses     CONTACS       Past Surgical History:   Procedure Laterality Date    BACK SURGERY  2016    back surgery    BONE GRAFT N/A 11/5/2020    Procedure: BONE GRAFT;  Surgeon: Mateusz Blanco MD;  Location: [facility];  Service: Orthopedics;  Laterality: N/A;    CARDIAC SURGERY  01/06/2020    CABG X 7    CORONARY ARTERY BYPASS GRAFT      7 vessels    FLEXIBLE SIGMOIDOSCOPY N/A 7/24/2024    Procedure: SIGMOIDOSCOPY, FLEXIBLE;  Surgeon: Latesha Victoria MD;  Location: [facility] ENDO;  Service: Endoscopy;  Laterality: N/A;    HERNIA REPAIR Bilateral 11/22/2017    HIP ARTHROPLASTY Left 9/23/2024    Procedure: ARTHROPLASTY, HIP;  Surgeon: Lázaro Jaimes MD;  Location: [facility] OR;  Service: Orthopedics;  Laterality: Left;  Sincere    LITHOTRIPSY      LUMBAR LAMINECTOMY WITH FUSION Bilateral 11/5/2020    Procedure: LAMINECTOMY, SPINE, LUMBAR, WITH FUSION;  Surgeon: Mateusz Blanco MD;  Location: [facility] OR;  Service: Orthopedics;  Laterality: Bilateral;  MEDTRONIC  NTI  L-3    PILONIDAL CYST DRAINAGE      removal of abcess      scrotal    REMOVAL OF HARDWARE FROM SPINE Bilateral  11/5/2020    Procedure: REMOVAL, HARDWARE, SPINE;  Surgeon: Mateusz Blanco MD;  Location: [facility] OR;  Service: Orthopedics;  Laterality: Bilateral;  L 4-5    SURGICAL REMOVAL OF PILONIDAL CYST N/A 4/15/2024    Procedure: EXCISION, PILONIDAL CYST;  Surgeon: Jayden Phillips III, MD;  Location: [facility] OR;  Service: General;  Laterality: N/A;  sacral area    TYMPANOSTOMY TUBE PLACEMENT         Current Outpatient Medications   Medication Sig    anastrozole (ARIMIDEX) 1 mg Tab Take 1 mg by mouth every 7 days.    busPIRone (BUSPAR) 15 MG tablet Take 1 tablet (15 mg total) by mouth 3 (three) times daily.    clopidogreL (PLAVIX) 75 mg tablet Take 75 mg by mouth once daily.    cyclobenzaprine (FLEXERIL) 10 MG tablet Take 10 mg by mouth 2 (two) times daily.    docusate sodium (COLACE) 100 MG capsule Take 1 capsule (100 mg total) by mouth 2 (two) times daily.    ENTRESTO 49-51 mg per tablet Take 1 tablet by mouth 2 (two) times daily.    EScitalopram oxalate (LEXAPRO) 20 MG tablet Take 1 tablet (20 mg total) by mouth once daily.    fluticasone propionate (FLONASE) 50 mcg/actuation nasal spray 2 sprays (100 mcg total) by Each Nostril route once daily.    furosemide (LASIX) 20 MG tablet Take 1 tablet by mouth every morning.    gabapentin (NEURONTIN) 300 MG capsule Take 1 capsule (300 mg total) by mouth 2 (two) times daily.    HYDROcodone-acetaminophen (NORCO) 5-325 mg per tablet Take 1 tablet by mouth every 6 (six) hours as needed for Pain.    hydrOXYzine (ATARAX) 50 MG tablet Take 1 tablet by mouth 2 (two) times daily.    metoprolol tartrate (LOPRESSOR) 50 MG tablet Take 1 tablet by mouth 2 (two) times daily.    ondansetron (ZOFRAN) 4 MG tablet Take 1 tablet (4 mg total) by mouth every 6 (six) hours as needed for Nausea.    oxyCODONE-acetaminophen (PERCOCET) 7.5-325 mg per tablet Take 1 tablet by mouth every 6 (six) hours as needed for Pain.    pantoprazole (PROTONIX) 40 MG tablet Take 1 tablet (40 mg total) by mouth once  daily.    rosuvastatin (CRESTOR) 40 MG Tab Take 40 mg by mouth every evening.    tadalafiL (CIALIS) 10 MG tablet Take 10 mg by mouth daily as needed.    testosterone cypionate 200 mg/mL Kit Inject 1 mL into the muscle every 7 days.    traZODone (DESYREL) 50 MG tablet Take 1 tablet by mouth every evening.     No current facility-administered medications for this visit.       Review of patient's allergies indicates:   Allergen Reactions    Inapsine [droperidol] Anaphylaxis     seizures    Effexor [venlafaxine]      Increased anxiety    Pcn [penicillins]     Bactrim [sulfamethoxazole-trimethoprim] Rash     Dry red rash all over when in the sun    Fluconazole Rash     Pruritis         Family History   Problem Relation Name Age of Onset    Heart disease Mother      Migraines Mother      Hypertension Mother      Early death Father accident     Heart disease Father accident     Diabetes Maternal Aunt      Diabetes Maternal Uncle      Diabetes Maternal Grandfather         Social History[1]    History of present illness:  Postop day 1 right total hip arthroplasty.  Comes in today for postop wound check, dressing change, pain control assessment.  Reports he is having difficulty with pain control Percocet.    Review of Systems:    Musculoskeletal:  See HPI      Objective:        Physical Examination:    Vital Signs:  There were no vitals filed for this visit.    Body mass index is 35.25 kg/m².    This a well-developed, well nourished patient in no acute distress.  They are alert and oriented and cooperative to examination.        Right hip exam: Skin to right hip clean dry intact.  No erythema or ecchymosis.  No signs or symptoms of infection.  Right lateral hip incision healing well without wound dehiscence or drainage.  Negative Homans on the right.  He is neurovascularly intact throughout the right lower extremity.  He can weightbear as tolerated on the right lower extremity.  Right EHL is intact.  He can dorsiflex and  "plantar flex at the right ankle.    Pertinent New Results:    XRAY Report / Interpretation:   No new radiographs taken on today's clinic visit.    Assessment/Plan:      1. Status post right total hip arthroplasty.      Dressing change to right hip today.  He is set to begin home health PT later today.  He has resumed his Plavix anticoagulant.  We will see him back in about 10 days for wound check and suture removal.  We did review total hip replacement precautions.  He is familiar with the he is having had his left total hip replaced previously.  Dr. Jaimes disposed of 25 tablets of Percocet 7.5/325 provided him a 1 week prescription of Dilaudid 4 mg to be taken once every 6 hours as needed for pain, prescribed quantity 28.    Collins Haynes, Physician Assistant, served in the capacity as a "scribe" for this patient encounter.  A "face-to-face" encounter occurred with Dr. Lázaro Jaimes on this date.  The treatment plan and medical decision-making is outlined above. Patient was seen and examined with a chaperone.     This note was created using Dragon voice recognition software that occasionally misinterpreted phrases or words.             [1]   Social History  Socioeconomic History    Marital status:    Occupational History    Occupation: disability   Tobacco Use    Smoking status: Former     Current packs/day: 0.00     Types: Cigarettes     Quit date: 2016     Years since quittin.1     Passive exposure: Past    Smokeless tobacco: Former     Quit date: 2016    Tobacco comments:     Occasional  vaping   Substance and Sexual Activity    Alcohol use: Not Currently     Alcohol/week: 1.0 standard drink of alcohol     Types: 1 Glasses of wine per week     Comment: 20 - 30 shots vodka per day or 1-2 1/5ths per day for 2 years (started )    Drug use: No    Sexual activity: Yes     Partners: Female     Social Drivers of Health     Financial Resource Strain: Medium Risk (3/18/2024)    Overall " Financial Resource Strain (CARDIA)     Difficulty of Paying Living Expenses: Somewhat hard   Food Insecurity: No Food Insecurity (3/18/2024)    Hunger Vital Sign     Worried About Running Out of Food in the Last Year: Never true     Ran Out of Food in the Last Year: Never true   Transportation Needs: No Transportation Needs (3/18/2024)    PRAPARE - Transportation     Lack of Transportation (Medical): No     Lack of Transportation (Non-Medical): No   Physical Activity: Insufficiently Active (3/18/2024)    Exercise Vital Sign     Days of Exercise per Week: 2 days     Minutes of Exercise per Session: 60 min   Stress: No Stress Concern Present (3/18/2024)    [employer] - Occupational Stress Questionnaire     Feeling of Stress : Only a little   Housing Stability: Unknown (3/18/2024)    Housing Stability Vital Sign     Unable to Pay for Housing in the Last Year: No     Unstable Housing in the Last Year: No

## 2025-02-22 ENCOUNTER — LAB VISIT (OUTPATIENT)
Dept: LAB | Facility: HOSPITAL | Age: 50
End: 2025-02-22
Payer: MEDICAID

## 2025-02-22 DIAGNOSIS — E29.1 3-OXO-5 ALPHA-STEROID DELTA 4-DEHYDROGENASE DEFICIENCY: ICD-10-CM

## 2025-02-22 DIAGNOSIS — N18.2 CHRONIC KIDNEY DISEASE, STAGE II (MILD): Primary | ICD-10-CM

## 2025-02-22 LAB
ALBUMIN SERPL BCP-MCNC: 3.9 G/DL (ref 3.5–5.2)
ALP SERPL-CCNC: 45 U/L (ref 55–135)
ALT SERPL W/O P-5'-P-CCNC: 22 U/L (ref 10–44)
ANION GAP SERPL CALC-SCNC: 7 MMOL/L (ref 8–16)
AST SERPL-CCNC: 26 U/L (ref 10–40)
BASOPHILS # BLD AUTO: 0.03 K/UL (ref 0–0.2)
BASOPHILS NFR BLD: 0.4 % (ref 0–1.9)
BILIRUB SERPL-MCNC: 0.8 MG/DL (ref 0.1–1)
BUN SERPL-MCNC: 14 MG/DL (ref 6–20)
CALCIUM SERPL-MCNC: 9 MG/DL (ref 8.7–10.5)
CHLORIDE SERPL-SCNC: 100 MMOL/L (ref 95–110)
CO2 SERPL-SCNC: 27 MMOL/L (ref 23–29)
CREAT SERPL-MCNC: 1.9 MG/DL (ref 0.5–1.4)
DIFFERENTIAL METHOD BLD: ABNORMAL
EOSINOPHIL # BLD AUTO: 0.3 K/UL (ref 0–0.5)
EOSINOPHIL NFR BLD: 4.1 % (ref 0–8)
ERYTHROCYTE [DISTWIDTH] IN BLOOD BY AUTOMATED COUNT: 13.8 % (ref 11.5–14.5)
EST. GFR  (NO RACE VARIABLE): 42.7 ML/MIN/1.73 M^2
GLUCOSE SERPL-MCNC: 107 MG/DL (ref 70–110)
HCT VFR BLD AUTO: 36.3 % (ref 40–54)
HGB BLD-MCNC: 11.4 G/DL (ref 14–18)
IMM GRANULOCYTES # BLD AUTO: 0.05 K/UL (ref 0–0.04)
IMM GRANULOCYTES NFR BLD AUTO: 0.6 % (ref 0–0.5)
LYMPHOCYTES # BLD AUTO: 1 K/UL (ref 1–4.8)
LYMPHOCYTES NFR BLD: 12.4 % (ref 18–48)
MCH RBC QN AUTO: 27 PG (ref 27–31)
MCHC RBC AUTO-ENTMCNC: 31.4 G/DL (ref 32–36)
MCV RBC AUTO: 86 FL (ref 82–98)
MONOCYTES # BLD AUTO: 0.6 K/UL (ref 0.3–1)
MONOCYTES NFR BLD: 8.2 % (ref 4–15)
NEUTROPHILS # BLD AUTO: 5.7 K/UL (ref 1.8–7.7)
NEUTROPHILS NFR BLD: 74.3 % (ref 38–73)
NRBC BLD-RTO: 0 /100 WBC
PLATELET # BLD AUTO: 194 K/UL (ref 150–450)
PMV BLD AUTO: 10.3 FL (ref 9.2–12.9)
POTASSIUM SERPL-SCNC: 4.3 MMOL/L (ref 3.5–5.1)
PROT SERPL-MCNC: 7.2 G/DL (ref 6–8.4)
RBC # BLD AUTO: 4.22 M/UL (ref 4.6–6.2)
SODIUM SERPL-SCNC: 134 MMOL/L (ref 136–145)
WBC # BLD AUTO: 7.72 K/UL (ref 3.9–12.7)

## 2025-02-22 PROCEDURE — 36415 COLL VENOUS BLD VENIPUNCTURE: CPT

## 2025-02-22 PROCEDURE — 82670 ASSAY OF TOTAL ESTRADIOL: CPT

## 2025-02-22 PROCEDURE — 83002 ASSAY OF GONADOTROPIN (LH): CPT

## 2025-02-22 PROCEDURE — 84403 ASSAY OF TOTAL TESTOSTERONE: CPT

## 2025-02-22 PROCEDURE — 85025 COMPLETE CBC W/AUTO DIFF WBC: CPT

## 2025-02-22 PROCEDURE — 83001 ASSAY OF GONADOTROPIN (FSH): CPT

## 2025-02-22 PROCEDURE — 84402 ASSAY OF FREE TESTOSTERONE: CPT

## 2025-02-22 PROCEDURE — 80053 COMPREHEN METABOLIC PANEL: CPT

## 2025-02-22 PROCEDURE — 82672 ASSAY OF ESTROGEN: CPT

## 2025-02-24 DIAGNOSIS — Z96.642 S/P TOTAL LEFT HIP ARTHROPLASTY: ICD-10-CM

## 2025-02-24 DIAGNOSIS — M87.051 AVASCULAR NECROSIS OF RIGHT FEMUR: ICD-10-CM

## 2025-02-24 DIAGNOSIS — M87.051 AVASCULAR NECROSIS OF BONE OF RIGHT HIP: ICD-10-CM

## 2025-02-24 LAB
ESTRADIOL SERPL-MCNC: 32.5 PG/ML (ref 7.6–42.6)
FSH SERPL-ACNC: <0.3 MIU/ML (ref 1.5–12.4)
LABCORP MISC TEST CODE: NORMAL
LABCORP MISC TEST NAME: NORMAL
LABCORP MISCELLANEOUS TEST: NORMAL
LH SERPL-ACNC: <0.3 MIU/ML (ref 1.7–8.6)
TESTOST SERPL-MCNC: 619 NG/DL (ref 264–916)

## 2025-02-24 RX ORDER — HYDROMORPHONE HYDROCHLORIDE 4 MG/1
4 TABLET ORAL EVERY 6 HOURS PRN
Qty: 28 TABLET | Refills: 0 | Status: CANCELLED | OUTPATIENT
Start: 2025-02-24

## 2025-02-24 RX ORDER — OXYCODONE AND ACETAMINOPHEN 7.5; 325 MG/1; MG/1
1 TABLET ORAL EVERY 6 HOURS PRN
Qty: 28 TABLET | Refills: 0 | Status: SHIPPED | OUTPATIENT
Start: 2025-02-24

## 2025-02-26 LAB — ESTROGEN SERPL-MCNC: 190 PG/ML (ref 56–213)

## 2025-02-27 ENCOUNTER — OFFICE VISIT (OUTPATIENT)
Dept: FAMILY MEDICINE | Facility: CLINIC | Age: 50
End: 2025-02-27
Payer: MEDICAID

## 2025-02-27 ENCOUNTER — PATIENT MESSAGE (OUTPATIENT)
Dept: FAMILY MEDICINE | Facility: CLINIC | Age: 50
End: 2025-02-27

## 2025-02-27 VITALS
BODY MASS INDEX: 36.22 KG/M2 | HEIGHT: 67 IN | HEART RATE: 96 BPM | SYSTOLIC BLOOD PRESSURE: 100 MMHG | DIASTOLIC BLOOD PRESSURE: 52 MMHG | WEIGHT: 230.81 LBS | OXYGEN SATURATION: 100 %

## 2025-02-27 DIAGNOSIS — E78.5 HYPERLIPIDEMIA, UNSPECIFIED HYPERLIPIDEMIA TYPE: ICD-10-CM

## 2025-02-27 DIAGNOSIS — I25.810 CORONARY ARTERY DISEASE INVOLVING CORONARY BYPASS GRAFT OF NATIVE HEART WITHOUT ANGINA PECTORIS: ICD-10-CM

## 2025-02-27 DIAGNOSIS — I10 ESSENTIAL HYPERTENSION: ICD-10-CM

## 2025-02-27 DIAGNOSIS — M51.360 DEGENERATION OF INTERVERTEBRAL DISC OF LUMBAR REGION WITH DISCOGENIC BACK PAIN: ICD-10-CM

## 2025-02-27 DIAGNOSIS — M87.00 AVASCULAR NECROSIS: ICD-10-CM

## 2025-02-27 DIAGNOSIS — K21.9 GASTROESOPHAGEAL REFLUX DISEASE, UNSPECIFIED WHETHER ESOPHAGITIS PRESENT: Primary | ICD-10-CM

## 2025-02-27 DIAGNOSIS — M54.16 LUMBAR RADICULOPATHY: ICD-10-CM

## 2025-02-27 PROCEDURE — 3078F DIAST BP <80 MM HG: CPT | Mod: CPTII,S$GLB,, | Performed by: NURSE PRACTITIONER

## 2025-02-27 PROCEDURE — 3008F BODY MASS INDEX DOCD: CPT | Mod: CPTII,S$GLB,, | Performed by: NURSE PRACTITIONER

## 2025-02-27 PROCEDURE — 99214 OFFICE O/P EST MOD 30 MIN: CPT | Mod: S$GLB,,, | Performed by: NURSE PRACTITIONER

## 2025-02-27 PROCEDURE — 3074F SYST BP LT 130 MM HG: CPT | Mod: CPTII,S$GLB,, | Performed by: NURSE PRACTITIONER

## 2025-02-27 PROCEDURE — 1159F MED LIST DOCD IN RCRD: CPT | Mod: CPTII,S$GLB,, | Performed by: NURSE PRACTITIONER

## 2025-02-27 PROCEDURE — 4010F ACE/ARB THERAPY RXD/TAKEN: CPT | Mod: CPTII,S$GLB,, | Performed by: NURSE PRACTITIONER

## 2025-02-27 PROCEDURE — 1160F RVW MEDS BY RX/DR IN RCRD: CPT | Mod: CPTII,S$GLB,, | Performed by: NURSE PRACTITIONER

## 2025-02-27 RX ORDER — SEMAGLUTIDE 0.25 MG/.5ML
INJECTION, SOLUTION SUBCUTANEOUS
COMMUNITY
Start: 2025-01-08

## 2025-02-28 ENCOUNTER — EXTERNAL HOME HEALTH (OUTPATIENT)
Dept: HOME HEALTH SERVICES | Facility: HOSPITAL | Age: 50
End: 2025-02-28
Payer: MEDICAID

## 2025-02-28 LAB — TESTOST FREE SERPL-MCNC: 26 PG/ML (ref 6.8–21.5)

## 2025-03-01 DIAGNOSIS — M87.051 AVASCULAR NECROSIS OF BONE OF RIGHT HIP: ICD-10-CM

## 2025-03-01 DIAGNOSIS — M87.051 AVASCULAR NECROSIS OF RIGHT FEMUR: ICD-10-CM

## 2025-03-03 ENCOUNTER — TELEPHONE (OUTPATIENT)
Dept: ORTHOPEDICS | Facility: CLINIC | Age: 50
End: 2025-03-03
Payer: MEDICAID

## 2025-03-03 DIAGNOSIS — M87.051 AVASCULAR NECROSIS OF BONE OF RIGHT HIP: ICD-10-CM

## 2025-03-03 DIAGNOSIS — M87.051 AVASCULAR NECROSIS OF RIGHT FEMUR: ICD-10-CM

## 2025-03-03 RX ORDER — OXYCODONE AND ACETAMINOPHEN 7.5; 325 MG/1; MG/1
1 TABLET ORAL EVERY 6 HOURS PRN
Qty: 28 TABLET | Refills: 0 | Status: CANCELLED | OUTPATIENT
Start: 2025-03-03

## 2025-03-03 NOTE — TELEPHONE ENCOUNTER
----- Message from Elida sent at 3/3/2025 11:05 AM CST -----  Phone #: [phone] is requesting a refill of PercocetPharmacy: [facility] [place] [facility] [id number] [place] [id number]: [phone] Fax: [phone]

## 2025-03-04 RX ORDER — OXYCODONE AND ACETAMINOPHEN 7.5; 325 MG/1; MG/1
1 TABLET ORAL EVERY 8 HOURS PRN
Qty: 21 TABLET | Refills: 0 | Status: SHIPPED | OUTPATIENT
Start: 2025-03-04

## 2025-03-05 NOTE — PROGRESS NOTES
SUBJECTIVE:    Patient ID: Lee Quintanilla is a 49 y.o. male.    Chief Complaint: Sinus Problem (No bottles//Pt is here to discuss sinus issues and kidney function levels//Also having anxiety flare up//KE)    History of Present Illness    CHIEF COMPLAINT:  49 year old male  presents today for follow up   POST-OPERATIVE STATUS:  He underwent right hip replacement surgery last Monday. He currently uses a cane outside the home instead of a walker for convenience during transportation. He is receiving home health physical therapy and will start outpatient physical therapy twice weekly. He reports significant pain radiating down his leg into his joints and both kneecaps, which he attributes to post-surgical hip realignment.    SURGICAL HISTORY:  He underwent left hip replacement in late September with improvement in symptoms beginning around January.    PAIN MANAGEMENT:  He is taking Percocet for pain management.    SLEEP:  He reports persistent difficulty sleeping despite Trazodone 50 mg.    ENT:  He reports complete loss of smell and taste with no improvement. He had previously scheduled an ENT consultation that was not completed.    RENAL:  GFR showed a decrease from 56 to 42.    MEDICATIONS:  Current medications include Trazodone 50 mg, Percocet, and a fluid pill taken daily as needed.    SOCIAL HISTORY:  He denies alcohol and tobacco use.      ROS:  General: -fever, -chills, -fatigue, -weight gain, -weight loss  Eyes: -vision changes, -redness, -discharge  ENT: -ear pain, -nasal congestion, -sore throat  Cardiovascular: -chest pain, -palpitations, -lower extremity edema  Respiratory: -cough, -shortness of breath  Gastrointestinal: -abdominal pain, -nausea, -vomiting, -diarrhea, -constipation, -blood in stool  Genitourinary: -dysuria, -hematuria, -frequency  Musculoskeletal: +joint pain, -muscle pain  Skin: -rash, -lesion  Neurological: -headache, -dizziness, -numbness, -tingling  Psychiatric: -anxiety,  -depression, +sleep difficulty         Lab Visit on 02/22/2025   Component Date Value Ref Range Status    Sodium 02/22/2025 134 (L)  136 - 145 mmol/L Final    Potassium 02/22/2025 4.3  3.5 - 5.1 mmol/L Final    Chloride 02/22/2025 100  95 - 110 mmol/L Final    CO2 02/22/2025 27  23 - 29 mmol/L Final    Glucose 02/22/2025 107  70 - 110 mg/dL Final    BUN 02/22/2025 14  6 - 20 mg/dL Final    Creatinine 02/22/2025 1.9 (H)  0.5 - 1.4 mg/dL Final    Calcium 02/22/2025 9.0  8.7 - 10.5 mg/dL Final    Total Protein 02/22/2025 7.2  6.0 - 8.4 g/dL Final    Albumin 02/22/2025 3.9  3.5 - 5.2 g/dL Final    Total Bilirubin 02/22/2025 0.8  0.1 - 1.0 mg/dL Final    Alkaline Phosphatase 02/22/2025 45 (L)  55 - 135 U/L Final    AST 02/22/2025 26  10 - 40 U/L Final    ALT 02/22/2025 22  10 - 44 U/L Final    eGFR 02/22/2025 42.7 (A)  >60 mL/min/1.73 m^2 Final    Anion Gap 02/22/2025 7 (L)  8 - 16 mmol/L Final    WBC 02/22/2025 7.72  3.90 - 12.70 K/uL Final    RBC 02/22/2025 4.22 (L)  4.60 - 6.20 M/uL Final    Hemoglobin 02/22/2025 11.4 (L)  14.0 - 18.0 g/dL Final    Hematocrit 02/22/2025 36.3 (L)  40.0 - 54.0 % Final    MCV 02/22/2025 86  82 - 98 fL Final    MCH 02/22/2025 27.0  27.0 - 31.0 pg Final    MCHC 02/22/2025 31.4 (L)  32.0 - 36.0 g/dL Final    RDW 02/22/2025 13.8  11.5 - 14.5 % Final    Platelets 02/22/2025 194  150 - 450 K/uL Final    MPV 02/22/2025 10.3  9.2 - 12.9 fL Final    Immature Granulocytes 02/22/2025 0.6 (H)  0.0 - 0.5 % Final    Gran # (ANC) 02/22/2025 5.7  1.8 - 7.7 K/uL Final    Immature Grans (Abs) 02/22/2025 0.05 (H)  0.00 - 0.04 K/uL Final    Lymph # 02/22/2025 1.0  1.0 - 4.8 K/uL Final    Mono # 02/22/2025 0.6  0.3 - 1.0 K/uL Final    Eos # 02/22/2025 0.3  0.0 - 0.5 K/uL Final    Baso # 02/22/2025 0.03  0.00 - 0.20 K/uL Final    nRBC 02/22/2025 0  0 /100 WBC Final    Gran % 02/22/2025 74.3 (H)  38.0 - 73.0 % Final    Lymph % 02/22/2025 12.4 (L)  18.0 - 48.0 % Final    Mono % 02/22/2025 8.2  4.0 - 15.0 %  Final    Eosinophil % 02/22/2025 4.1  0.0 - 8.0 % Final    Basophil % 02/22/2025 0.4  0.0 - 1.9 % Final    Differential Method 02/22/2025 Automated   Final    Testosterone, Free 02/22/2025 26.0 (H)  6.8 - 21.5 pg/mL Final    Testosterone 02/22/2025 619  264 - 916 ng/dL Final    Estradiol 02/22/2025 32.5  7.6 - 42.6 pg/mL Final    Estrogen 02/22/2025 190  56 - 213 pg/mL Final    LH 02/22/2025 <0.3 (L)  1.7 - 8.6 mIU/mL Final    Follicle Stimulating Hormone 02/22/2025 <0.3 (L)  1.5 - 12.4 mIU/mL Final    [facility] Miscellaneous Test 02/22/2025 COMMENT   Final    [facility] Test Code: 02/22/2025 [id number]   Final    [facility] Test Name: 02/22/2025 Sex Hormone-binding Globulin   Final   Hospital Outpatient Visit on 02/03/2025   Component Date Value Ref Range Status    WBC 02/03/2025 8.55  3.90 - 12.70 K/uL Final    RBC 02/03/2025 5.61  4.60 - 6.20 M/uL Final    Hemoglobin 02/03/2025 15.0  14.0 - 18.0 g/dL Final    Hematocrit 02/03/2025 48.7  40.0 - 54.0 % Final    MCV 02/03/2025 87  82 - 98 fL Final    MCH 02/03/2025 26.7 (L)  27.0 - 31.0 pg Final    MCHC 02/03/2025 30.8 (L)  32.0 - 36.0 g/dL Final    RDW 02/03/2025 12.5  11.5 - 14.5 % Final    Platelets 02/03/2025 311  150 - 450 K/uL Final    MPV 02/03/2025 10.7  9.2 - 12.9 fL Final    Immature Granulocytes 02/03/2025 0.4  0.0 - 0.5 % Final    Gran # (ANC) 02/03/2025 6.3  1.8 - 7.7 K/uL Final    Immature Grans (Abs) 02/03/2025 0.03  0.00 - 0.04 K/uL Final    Lymph # 02/03/2025 1.3  1.0 - 4.8 K/uL Final    Mono # 02/03/2025 0.5  0.3 - 1.0 K/uL Final    Eos # 02/03/2025 0.4  0.0 - 0.5 K/uL Final    Baso # 02/03/2025 0.05  0.00 - 0.20 K/uL Final    nRBC 02/03/2025 0  0 /100 WBC Final    Gran % 02/03/2025 73.5 (H)  38.0 - 73.0 % Final    Lymph % 02/03/2025 15.1 (L)  18.0 - 48.0 % Final    Mono % 02/03/2025 5.7  4.0 - 15.0 % Final    Eosinophil % 02/03/2025 4.7  0.0 - 8.0 % Final    Basophil % 02/03/2025 0.6  0.0 - 1.9 % Final    Differential Method 02/03/2025 Automated   Final     Sodium 02/03/2025 140  136 - 145 mmol/L Final    Potassium 02/03/2025 4.6  3.5 - 5.1 mmol/L Final    Chloride 02/03/2025 103  95 - 110 mmol/L Final    CO2 02/03/2025 27  23 - 29 mmol/L Final    Glucose 02/03/2025 84  70 - 110 mg/dL Final    BUN 02/03/2025 16  6 - 20 mg/dL Final    Creatinine 02/03/2025 1.9 (H)  0.5 - 1.4 mg/dL Final    Calcium 02/03/2025 9.3  8.7 - 10.5 mg/dL Final    Total Protein 02/03/2025 8.2  6.0 - 8.4 g/dL Final    Albumin 02/03/2025 3.8  3.5 - 5.2 g/dL Final    Total Bilirubin 02/03/2025 0.4  0.1 - 1.0 mg/dL Final    Alkaline Phosphatase 02/03/2025 59  40 - 150 U/L Final    AST 02/03/2025 14  10 - 40 U/L Final    ALT 02/03/2025 12  10 - 44 U/L Final    eGFR 02/03/2025 43 (A)  >60 mL/min/1.73 m^2 Final    Anion Gap 02/03/2025 10  8 - 16 mmol/L Final    Group & Rh 02/03/2025 A POS   Final    Indirect Ashok 02/03/2025 NEG   Final    Specimen Outdate 02/03/2025 02/17/2025 23:59   Final    aPTT 02/03/2025 26.1  21.0 - 32.0 sec Final    Prothrombin Time 02/03/2025 11.5  9.0 - 12.5 sec Final    INR 02/03/2025 1.0  0.8 - 1.2 Final   Lab Visit on 10/12/2024   Component Date Value Ref Range Status    Sodium 10/12/2024 140  136 - 145 mmol/L Final    Potassium 10/12/2024 4.0  3.5 - 5.1 mmol/L Final    Chloride 10/12/2024 104  95 - 110 mmol/L Final    CO2 10/12/2024 31 (H)  23 - 29 mmol/L Final    Glucose 10/12/2024 144 (H)  70 - 110 mg/dL Final    BUN 10/12/2024 17  6 - 20 mg/dL Final    Creatinine 10/12/2024 1.5 (H)  0.5 - 1.4 mg/dL Final    Calcium 10/12/2024 9.1  8.7 - 10.5 mg/dL Final    Total Protein 10/12/2024 7.5  6.0 - 8.4 g/dL Final    Albumin 10/12/2024 4.1  3.5 - 5.2 g/dL Final    Total Bilirubin 10/12/2024 0.5  0.1 - 1.0 mg/dL Final    Alkaline Phosphatase 10/12/2024 80  55 - 135 U/L Final    AST 10/12/2024 19  10 - 40 U/L Final    ALT 10/12/2024 23  10 - 44 U/L Final    eGFR 10/12/2024 56.7 (A)  >60 mL/min/1.73 m^2 Final    Anion Gap 10/12/2024 5 (L)  8 - 16 mmol/L Final    Testosterone,  Free 10/12/2024 3.3 (L)  6.8 - 21.5 pg/mL Final    Testosterone 10/12/2024 143 (L)  264 - 916 ng/dL Final    Estrogen 10/12/2024 85  56 - 213 pg/mL Final    Estradiol 10/12/2024 10.3  7.6 - 42.6 pg/mL Final    WBC 10/12/2024 11.07  3.90 - 12.70 K/uL Final    RBC 10/12/2024 4.71  4.60 - 6.20 M/uL Final    Hemoglobin 10/12/2024 14.0  14.0 - 18.0 g/dL Final    Hematocrit 10/12/2024 42.3  40.0 - 54.0 % Final    MCV 10/12/2024 90  82 - 98 fL Final    MCH 10/12/2024 29.7  27.0 - 31.0 pg Final    MCHC 10/12/2024 33.1  32.0 - 36.0 g/dL Final    RDW 10/12/2024 14.6 (H)  11.5 - 14.5 % Final    Platelets 10/12/2024 254  150 - 450 K/uL Final    MPV 10/12/2024 10.9  9.2 - 12.9 fL Final    Immature Granulocytes 10/12/2024 0.6 (H)  0.0 - 0.5 % Final    Gran # (ANC) 10/12/2024 8.1 (H)  1.8 - 7.7 K/uL Final    Immature Grans (Abs) 10/12/2024 0.07 (H)  0.00 - 0.04 K/uL Final    Lymph # 10/12/2024 1.8  1.0 - 4.8 K/uL Final    Mono # 10/12/2024 0.7  0.3 - 1.0 K/uL Final    Eos # 10/12/2024 0.4  0.0 - 0.5 K/uL Final    Baso # 10/12/2024 0.04  0.00 - 0.20 K/uL Final    nRBC 10/12/2024 0  0 /100 WBC Final    Gran % 10/12/2024 73.4 (H)  38.0 - 73.0 % Final    Lymph % 10/12/2024 16.4 (L)  18.0 - 48.0 % Final    Mono % 10/12/2024 5.9  4.0 - 15.0 % Final    Eosinophil % 10/12/2024 3.3  0.0 - 8.0 % Final    Basophil % 10/12/2024 0.4  0.0 - 1.9 % Final    Differential Method 10/12/2024 Automated   Final    Cholesterol 10/12/2024 155  120 - 199 mg/dL Final    Triglycerides 10/12/2024 171 (H)  30 - 150 mg/dL Final    HDL 10/12/2024 45  40 - 75 mg/dL Final    LDL Cholesterol 10/12/2024 75.8  63.0 - 159.0 mg/dL Final    HDL/Cholesterol Ratio 10/12/2024 29.0  20.0 - 50.0 % Final    Total Cholesterol/HDL Ratio 10/12/2024 3.4  2.0 - 5.0 Final    Non-HDL Cholesterol 10/12/2024 110  mg/dL Final    Hemoglobin A1C 10/12/2024 5.4  4.5 - 6.2 % Final    Estimated Avg Glucose 10/12/2024 108  68 - 131 mg/dL Final    Sodium 10/12/2024 140  136 - 145 mmol/L  Final    Potassium 10/12/2024 4.0  3.5 - 5.1 mmol/L Final    Chloride 10/12/2024 104  95 - 110 mmol/L Final    CO2 10/12/2024 31 (H)  23 - 29 mmol/L Final    Glucose 10/12/2024 144 (H)  70 - 110 mg/dL Final    BUN 10/12/2024 17  6 - 20 mg/dL Final    Creatinine 10/12/2024 1.5 (H)  0.5 - 1.4 mg/dL Final    Calcium 10/12/2024 9.1  8.7 - 10.5 mg/dL Final    Total Protein 10/12/2024 7.5  6.0 - 8.4 g/dL Final    Albumin 10/12/2024 4.1  3.5 - 5.2 g/dL Final    Total Bilirubin 10/12/2024 0.5  0.1 - 1.0 mg/dL Final    Alkaline Phosphatase 10/12/2024 80  55 - 135 U/L Final    AST 10/12/2024 19  10 - 40 U/L Final    ALT 10/12/2024 23  10 - 44 U/L Final    eGFR 10/12/2024 56.7 (A)  >60 mL/min/1.73 m^2 Final    Anion Gap 10/12/2024 5 (L)  8 - 16 mmol/L Final   Admission on 09/23/2024, Discharged on 09/23/2024   Component Date Value Ref Range Status    Group & Rh 09/23/2024 A POS   Final    Indirect Ashok 09/23/2024 NEG   Final    Specimen Outdate 09/23/2024 09/26/2024 23:59   Final   Hospital Outpatient Visit on 09/04/2024   Component Date Value Ref Range Status    WBC 09/04/2024 10.95  3.90 - 12.70 K/uL Final    RBC 09/04/2024 5.18  4.60 - 6.20 M/uL Final    Hemoglobin 09/04/2024 15.0  14.0 - 18.0 g/dL Final    Hematocrit 09/04/2024 45.9  40.0 - 54.0 % Final    MCV 09/04/2024 89  82 - 98 fL Final    MCH 09/04/2024 29.0  27.0 - 31.0 pg Final    MCHC 09/04/2024 32.7  32.0 - 36.0 g/dL Final    RDW 09/04/2024 16.2 (H)  11.5 - 14.5 % Final    Platelets 09/04/2024 220  150 - 450 K/uL Final    MPV 09/04/2024 11.2  9.2 - 12.9 fL Final    Immature Granulocytes 09/04/2024 0.3  0.0 - 0.5 % Final    Gran # (ANC) 09/04/2024 9.2 (H)  1.8 - 7.7 K/uL Final    Immature Grans (Abs) 09/04/2024 0.03  0.00 - 0.04 K/uL Final    Lymph # 09/04/2024 1.2  1.0 - 4.8 K/uL Final    Mono # 09/04/2024 0.4  0.3 - 1.0 K/uL Final    Eos # 09/04/2024 0.1  0.0 - 0.5 K/uL Final    Baso # 09/04/2024 0.03  0.00 - 0.20 K/uL Final    nRBC 09/04/2024 0  0 /100 WBC  Final    Gran % 09/04/2024 83.5 (H)  38.0 - 73.0 % Final    Lymph % 09/04/2024 10.7 (L)  18.0 - 48.0 % Final    Mono % 09/04/2024 4.0  4.0 - 15.0 % Final    Eosinophil % 09/04/2024 1.2  0.0 - 8.0 % Final    Basophil % 09/04/2024 0.3  0.0 - 1.9 % Final    Differential Method 09/04/2024 Automated   Final    Sodium 09/04/2024 135 (L)  136 - 145 mmol/L Final    Potassium 09/04/2024 4.3  3.5 - 5.1 mmol/L Final    Chloride 09/04/2024 103  95 - 110 mmol/L Final    CO2 09/04/2024 22 (L)  23 - 29 mmol/L Final    Glucose 09/04/2024 174 (H)  70 - 110 mg/dL Final    BUN 09/04/2024 12  6 - 20 mg/dL Final    Creatinine 09/04/2024 1.4  0.5 - 1.4 mg/dL Final    Calcium 09/04/2024 9.0  8.7 - 10.5 mg/dL Final    Anion Gap 09/04/2024 10  8 - 16 mmol/L Final    eGFR 09/04/2024 >60.0  >60 mL/min/1.73 m^2 Final    aPTT 09/04/2024 27.1  21.0 - 32.0 sec Final    Prothrombin Time 09/04/2024 10.9  9.0 - 12.5 sec Final    INR 09/04/2024 1.0  0.8 - 1.2 Final    Group & Rh 09/04/2024 A POS   Final    Indirect Ashok 09/04/2024 NEG   Final    Specimen Outdate 09/04/2024 09/18/2024 23:59   Final       Past Medical History:   Diagnosis Date    ADHD (attention deficit hyperactivity disorder)     Arthritis     Asthma     as child only    Back pain     Coronary artery disease     Degeneration of lumbar intervertebral disc 05/2016    Depression     Digestive disorder     Elevated PSA     Headache     Hyperlipidemia     Hypertension     Kidney damage     stage 3 ; d/t aleve abuse    Kidney stone     Myocardial infarction     Neck pain     Numbness and tingling in hands     Numbness and tingling of both legs     Osteonecrosis     Bilat Femur    Seizures     from inapsine 2002    Sleep apnea     no cpap    Wears glasses     CONTACS     Social History[1]  Past Surgical History:   Procedure Laterality Date    BACK SURGERY  2016    back surgery    BONE GRAFT N/A 11/05/2020    Procedure: BONE GRAFT;  Surgeon: Mateusz Blanco MD;  Location: [facility] OR;   Service: Orthopedics;  Laterality: N/A;    CARDIAC SURGERY  01/06/2020    CABG X 7    CORONARY ARTERY BYPASS GRAFT      7 vessels    FLEXIBLE SIGMOIDOSCOPY N/A 07/24/2024    Procedure: SIGMOIDOSCOPY, FLEXIBLE;  Surgeon: Latesha Victoria MD;  Location: [facility];  Service: Endoscopy;  Laterality: N/A;    HERNIA REPAIR Bilateral 11/22/2017    HIP ARTHROPLASTY Left 09/23/2024    Procedure: ARTHROPLASTY, HIP;  Surgeon: Lázaro Jaimes MD;  Location: [facility] OR;  Service: Orthopedics;  Laterality: Left;  Sincere    HIP REPLACEMENT ARTHROPLASTY Right 2/17/2025    Procedure: ARTHROPLASTY, HIP REPLACEMENT;  Surgeon: Lázaro Jaimes MD;  Location: [facility] OR;  Service: Orthopedics;  Laterality: Right;  Sincere    LITHOTRIPSY      LUMBAR LAMINECTOMY WITH FUSION Bilateral 11/05/2020    Procedure: LAMINECTOMY, SPINE, LUMBAR, WITH FUSION;  Surgeon: Mateusz Blanco MD;  Location: [facility];  Service: Orthopedics;  Laterality: Bilateral;  MEDTRONIC  NTI  L-3    PILONIDAL CYST DRAINAGE      removal of abcess      scrotal    REMOVAL OF HARDWARE FROM SPINE Bilateral 11/05/2020    Procedure: REMOVAL, HARDWARE, SPINE;  Surgeon: Mateusz Blanco MD;  Location: [facility] OR;  Service: Orthopedics;  Laterality: Bilateral;  L 4-5    SURGICAL REMOVAL OF PILONIDAL CYST N/A 04/15/2024    Procedure: EXCISION, PILONIDAL CYST;  Surgeon: Jayden Phillips III, MD;  Location: [facility];  Service: General;  Laterality: N/A;  sacral area    TOTAL HIP ARTHROPLASTY Right 02/17/2025    TYMPANOSTOMY TUBE PLACEMENT       Family History   Problem Relation Name Age of Onset    Heart disease Mother      Migraines Mother      Hypertension Mother      Early death Father accident     Heart disease Father accident     Diabetes Maternal Aunt      Diabetes Maternal Uncle      Diabetes Maternal Grandfather         All of your core healthy metrics are met.      Review of patient's allergies indicates:   Allergen Reactions    Inapsine [droperidol] Anaphylaxis     seizures     "Effexor [venlafaxine]      Increased anxiety    Pcn [penicillins]     Bactrim [sulfamethoxazole-trimethoprim] Rash     Dry red rash all over when in the sun    Fluconazole Rash     Pruritis       Current Medications[2]    Objective:      Vitals:    02/27/25 0827   BP: (!) 100/52   Pulse: 96   SpO2: 100%   Weight: 104.7 kg (230 lb 12.8 oz)   Height: 5' 7" (1.702 m)     Physical Exam    General: No acute distress. Well-developed. Well-nourished.obese  HENT: Normocephalic. Atraumatic. Nares patent. Moist oral mucosa.  Ears: Bilateral TMs clear. Bilateral EACs clear.  Cardiovascular: Regular rate. Regular rhythm. No murmurs. No rubs. No gallops. .  Respiratory: Normal respiratory effort. Clear to auscultation bilaterally. No rales. No rhonchi. No wheezing.  Abdomen: Soft. Non-tender. Non-distended. Normoactive bowel sounds.  Musculoskeletal: No  obvious deformity. Recent surgery right hip walking independently  Extremities: from  Neurological: Alert & oriented x3. No slurred speech.   Psychiatric: Normal mood. Normal affect. Good insight. Good judgment.  Skin: Warm. Dry. No rash.       Assessment:       Assessment & Plan    - Assessed post-operative progress of right hip replacement performed last week  - Evaluated pain down leg and into knee joints, likely due to corrected hip alignment  - Considered reduced kidney function (GFR 42) and recent fluid retention  - Weighed risks and benefits of pain management options, opting for Tylenol over NSAIDs due to concerns about bone healing and kidney function  - Determined need for sleep medication adjustment  - Noted ongoing issues with smell and taste, requiring follow-up with ENT    RIGHT HIP REPLACEMENT:  - Performed right hip replacement surgery last Monday.  - Noted the patient is using a cane and walker for mobility.  - Acknowledged the patient's pain and difficulty moving, attributing it to recent surgery and hip realignment..  - Recommend adding Tylenol in between " Percocet doses for additional pain relief.  - Noted severe pain in the right hip following recent hip replacement surgery.  - Observed pain radiating down the leg and into the knee joints, possibly due to hip realignment.  - Acknowledged pain might be due to correction of the patient's gait after surgery.  - Discussed maximum daily Tylenol dosage in relation to current Percocet intake.  - Clarified safety of combining Tylenol with Percocet for pain management.  - Prescribed Tylenol as needed for pain, up to 4000 mg daily total (including Tylenol in Percocet).  - Continued Percocet as prescribed for post-operative pain.  - Scheduled follow-up next week to discuss potential injections for pain relief.  - Noted patient is using a cane and walker for mobility following hip replacement surgery.  - Acknowledged the importance of using mobility aids for safety during recovery.  - Instructed patient to continue using walker at home for safety.  - Lee to continue using walker at home for safety.    LEFT HIP REPLACEMENT:      INSOMNIA:  - Noted ongoing difficulty sleeping despite taking Trazodone.  - Increased Trazodone dosage from 50 mg to 100 mg at bedtime for sleep.  - Instructed patient to contact office if 100 mg Trazodone is effective for sleep to adjust prescription accordingly.    ANOSMIA AND AGEUSIA:  - Noted ongoing inability to smell or taste.  - Advised patient to reschedule ENT appointment to address smell and taste issues.    DECREASED KIDNEY FUNCTION:  - Noted decrease in GFR from 56 to 42 in recent labs.  - Discussed potential factors affecting kidney function.  - Advised patient to maintain adequate hydration.  - Continued fluid pill daily as needed for swelling.  - Recommend avoiding NSAIDs due to kidney function concerns.    Plan:       Gastroesophageal reflux disease, unspecified whether esophagitis present  Comments:  protonix    Essential hypertension  Comments:  bp is controlled    Hyperlipidemia,  unspecified hyperlipidemia type  Comments:  crestor    Coronary artery disease involving coronary bypass graft of native heart without angina pectoris  Comments:  followed by cardio    Lumbar radiculopathy  Comments:  followed by pain management    Degeneration of intervertebral disc of lumbar region with discogenic back pain    Avascular necrosis  Comments:  hip surgery with dr. finger.      Follow up in about 6 months (around 2025), or if symptoms worsen or fail to improve, for medication management.        This note was generated with the assistance of ambient listening technology. Verbal consent was obtained by the patient and accompanying visitor(s) for the recording of patient appointment to facilitate this note. I attest to having reviewed and edited the generated note for accuracy, though some syntax or spelling errors may persist. Please contact the author of this note for any clarification.      3/5/2025 Riya Vidales      Answers submitted by the patient for this visit:  Review of Systems Questionnaire (Submitted on 2025)  activity change: Yes  unexpected weight change: No  neck pain: No  hearing loss: No  rhinorrhea: No  trouble swallowing: No  eye discharge: No  visual disturbance: No  chest tightness: No  wheezing: No  chest pain: No  palpitations: No  blood in stool: No  constipation: No  vomiting: No  diarrhea: No  polydipsia: No  polyuria: Yes  difficulty urinating: Yes  urgency: Yes  hematuria: No  joint swelling: No  arthralgias: No  headaches: No  weakness: No  confusion: No  dysphoric mood: No         [1]   Social History  Socioeconomic History    Marital status:    Occupational History    Occupation: disability   Tobacco Use    Smoking status: Former     Current packs/day: 0.00     Types: Cigarettes     Quit date: 2016     Years since quittin.1     Passive exposure: Past    Smokeless tobacco: Former     Quit date: 2016    Tobacco comments:     Occasional  vaping    Substance and Sexual Activity    Alcohol use: Not Currently     Alcohol/week: 1.0 standard drink of alcohol     Types: 1 Glasses of wine per week     Comment: 20 - 30 shots vodka per day or 1-2 1/5ths per day for 2 years (started 2021)    Drug use: No    Sexual activity: Yes     Partners: Female     Social Drivers of Health     Financial Resource Strain: Medium Risk (3/18/2024)    Overall Financial Resource Strain (CARDIA)     Difficulty of Paying Living Expenses: Somewhat hard   Food Insecurity: No Food Insecurity (3/18/2024)    Hunger Vital Sign     Worried About Running Out of Food in the Last Year: Never true     Ran Out of Food in the Last Year: Never true   Transportation Needs: No Transportation Needs (3/18/2024)    PRAPARE - Transportation     Lack of Transportation (Medical): No     Lack of Transportation (Non-Medical): No   Physical Activity: Insufficiently Active (3/18/2024)    Exercise Vital Sign     Days of Exercise per Week: 2 days     Minutes of Exercise per Session: 60 min   Stress: No Stress Concern Present (3/18/2024)    [employer] - Occupational Stress Questionnaire     Feeling of Stress : Only a little   Housing Stability: Unknown (3/18/2024)    Housing Stability Vital Sign     Unable to Pay for Housing in the Last Year: No     Unstable Housing in the Last Year: No   [2]   Current Outpatient Medications:     anastrozole (ARIMIDEX) 1 mg Tab, Take 1 mg by mouth every 7 days., Disp: , Rfl:     busPIRone (BUSPAR) 15 MG tablet, Take 1 tablet (15 mg total) by mouth 3 (three) times daily., Disp: 90 tablet, Rfl: 0    clopidogreL (PLAVIX) 75 mg tablet, Take 75 mg by mouth once daily., Disp: , Rfl:     cyclobenzaprine (FLEXERIL) 10 MG tablet, Take 10 mg by mouth 2 (two) times daily., Disp: , Rfl:     docusate sodium (COLACE) 100 MG capsule, Take 1 capsule (100 mg total) by mouth 2 (two) times daily., Disp: 60 capsule, Rfl: 0    ENTRESTO 49-51 mg per tablet, Take 1 tablet by  mouth 2 (two) times daily., Disp: , Rfl:     EScitalopram oxalate (LEXAPRO) 20 MG tablet, Take 1 tablet (20 mg total) by mouth once daily., Disp: 90 tablet, Rfl: 1    fluticasone propionate (FLONASE) 50 mcg/actuation nasal spray, 2 sprays (100 mcg total) by Each Nostril route once daily., Disp: 16 g, Rfl: 0    furosemide (LASIX) 20 MG tablet, Take 1 tablet by mouth every morning., Disp: , Rfl:     gabapentin (NEURONTIN) 300 MG capsule, Take 1 capsule (300 mg total) by mouth 2 (two) times daily., Disp: 60 capsule, Rfl: 0    hydrOXYzine (ATARAX) 50 MG tablet, Take 1 tablet by mouth 2 (two) times daily., Disp: , Rfl:     metoprolol tartrate (LOPRESSOR) 50 MG tablet, Take 1 tablet by mouth 2 (two) times daily., Disp: , Rfl:     ondansetron (ZOFRAN) 4 MG tablet, Take 1 tablet (4 mg total) by mouth every 6 (six) hours as needed for Nausea., Disp: 10 tablet, Rfl: 0    pantoprazole (PROTONIX) 40 MG tablet, Take 1 tablet (40 mg total) by mouth once daily., Disp: 90 tablet, Rfl: 3    rosuvastatin (CRESTOR) 40 MG Tab, Take 40 mg by mouth every evening., Disp: , Rfl:     tadalafiL (CIALIS) 10 MG tablet, Take 10 mg by mouth daily as needed., Disp: , Rfl:     testosterone cypionate 200 mg/mL Kit, Inject 1 mL into the muscle every 7 days., Disp: , Rfl:     traZODone (DESYREL) 50 MG tablet, Take 1 tablet by mouth every evening., Disp: , Rfl:     WEGOVY 0.25 mg/0.5 mL PnIj, INJECT 0.25 MG SUBCUTANEOUSLY WEEKLY AS DIRECTED, Disp: , Rfl:     oxyCODONE-acetaminophen (PERCOCET) 7.5-325 mg per tablet, Take 1 tablet by mouth every 8 (eight) hours as needed for Pain., Disp: 21 tablet, Rfl: 0

## 2025-03-07 ENCOUNTER — CLINICAL SUPPORT (OUTPATIENT)
Dept: ORTHOPEDICS | Facility: CLINIC | Age: 50
End: 2025-03-07
Payer: MEDICAID

## 2025-03-07 ENCOUNTER — TELEPHONE (OUTPATIENT)
Dept: ORTHOPEDICS | Facility: CLINIC | Age: 50
End: 2025-03-07

## 2025-03-07 VITALS — HEIGHT: 67 IN | BODY MASS INDEX: 36.22 KG/M2 | WEIGHT: 230.81 LBS

## 2025-03-07 DIAGNOSIS — Z96.641 STATUS POST TOTAL HIP REPLACEMENT, RIGHT: Primary | ICD-10-CM

## 2025-03-07 PROCEDURE — 99213 OFFICE O/P EST LOW 20 MIN: CPT | Mod: PBBFAC,PN

## 2025-03-07 PROCEDURE — 99999 PR PBB SHADOW E&M-EST. PATIENT-LVL III: CPT | Mod: PBBFAC,,,

## 2025-03-07 NOTE — PROGRESS NOTES
[facility] POST-OP NOTE    Subjective:           Chief Complaint:   Chief Complaint   Patient presents with    Right Hip - Post-op Evaluation     Patient is here for PO right MARINA 2.17.25, states pain has slightly improved since last visit. Has off and on burning and shooting pain        Past Medical History:   Diagnosis Date    ADHD (attention deficit hyperactivity disorder)     Arthritis     Asthma     as child only    Back pain     Coronary artery disease     Degeneration of lumbar intervertebral disc 05/2016    Depression     Digestive disorder     Elevated PSA     Headache     Hyperlipidemia     Hypertension     Kidney damage     stage 3 ; d/t aleve abuse    Kidney stone     Myocardial infarction     Neck pain     Numbness and tingling in hands     Numbness and tingling of both legs     Osteonecrosis     Bilat Femur    Seizures     from inapsine 2002    Sleep apnea     no cpap    Wears glasses     CONTACS       Past Surgical History:   Procedure Laterality Date    BACK SURGERY  2016    back surgery    BONE GRAFT N/A 11/05/2020    Procedure: BONE GRAFT;  Surgeon: Mateusz Blanco MD;  Location: [facility] OR;  Service: Orthopedics;  Laterality: N/A;    CARDIAC SURGERY  01/06/2020    CABG X 7    CORONARY ARTERY BYPASS GRAFT      7 vessels    FLEXIBLE SIGMOIDOSCOPY N/A 07/24/2024    Procedure: SIGMOIDOSCOPY, FLEXIBLE;  Surgeon: Latesha Victoria MD;  Location: [facility] ENDO;  Service: Endoscopy;  Laterality: N/A;    HERNIA REPAIR Bilateral 11/22/2017    HIP ARTHROPLASTY Left 09/23/2024    Procedure: ARTHROPLASTY, HIP;  Surgeon: Lázaro Jaimes MD;  Location: [facility] OR;  Service: Orthopedics;  Laterality: Left;  Sincere    HIP REPLACEMENT ARTHROPLASTY Right 2/17/2025    Procedure: ARTHROPLASTY, HIP REPLACEMENT;  Surgeon: Lázaro Jaimes MD;  Location: [facility] OR;  Service: Orthopedics;  Laterality: Right;  Sincere    LITHOTRIPSY      LUMBAR LAMINECTOMY WITH FUSION Bilateral 11/05/2020    Procedure: LAMINECTOMY, SPINE,  LUMBAR, WITH FUSION;  Surgeon: Mateusz Blanco MD;  Location: [facility] OR;  Service: Orthopedics;  Laterality: Bilateral;  MEDTRONIC  NTI  L-3    PILONIDAL CYST DRAINAGE      removal of abcess      scrotal    REMOVAL OF HARDWARE FROM SPINE Bilateral 11/05/2020    Procedure: REMOVAL, HARDWARE, SPINE;  Surgeon: Mateusz Blanco MD;  Location: [facility] OR;  Service: Orthopedics;  Laterality: Bilateral;  L 4-5    SURGICAL REMOVAL OF PILONIDAL CYST N/A 04/15/2024    Procedure: EXCISION, PILONIDAL CYST;  Surgeon: Jayden Phillips III, MD;  Location: [facility] OR;  Service: General;  Laterality: N/A;  sacral area    TOTAL HIP ARTHROPLASTY Right 02/17/2025    TYMPANOSTOMY TUBE PLACEMENT         Current Outpatient Medications   Medication Sig    anastrozole (ARIMIDEX) 1 mg Tab Take 1 mg by mouth every 7 days.    busPIRone (BUSPAR) 15 MG tablet Take 1 tablet (15 mg total) by mouth 3 (three) times daily.    clopidogreL (PLAVIX) 75 mg tablet Take 75 mg by mouth once daily.    cyclobenzaprine (FLEXERIL) 10 MG tablet Take 10 mg by mouth 2 (two) times daily.    docusate sodium (COLACE) 100 MG capsule Take 1 capsule (100 mg total) by mouth 2 (two) times daily.    ENTRESTO 49-51 mg per tablet Take 1 tablet by mouth 2 (two) times daily.    EScitalopram oxalate (LEXAPRO) 20 MG tablet Take 1 tablet (20 mg total) by mouth once daily.    fluticasone propionate (FLONASE) 50 mcg/actuation nasal spray 2 sprays (100 mcg total) by Each Nostril route once daily.    furosemide (LASIX) 20 MG tablet Take 1 tablet by mouth every morning.    gabapentin (NEURONTIN) 300 MG capsule Take 1 capsule (300 mg total) by mouth 2 (two) times daily.    hydrOXYzine (ATARAX) 50 MG tablet Take 1 tablet by mouth 2 (two) times daily.    metoprolol tartrate (LOPRESSOR) 50 MG tablet Take 1 tablet by mouth 2 (two) times daily.    ondansetron (ZOFRAN) 4 MG tablet Take 1 tablet (4 mg total) by mouth every 6 (six) hours as needed for Nausea.    oxyCODONE-acetaminophen (PERCOCET)  7.5-325 mg per tablet Take 1 tablet by mouth every 8 (eight) hours as needed for Pain.    pantoprazole (PROTONIX) 40 MG tablet Take 1 tablet (40 mg total) by mouth once daily.    rosuvastatin (CRESTOR) 40 MG Tab Take 40 mg by mouth every evening.    tadalafiL (CIALIS) 10 MG tablet Take 10 mg by mouth daily as needed.    testosterone cypionate 200 mg/mL Kit Inject 1 mL into the muscle every 7 days.    traZODone (DESYREL) 50 MG tablet Take 1 tablet by mouth every evening.    WEGOVY 0.25 mg/0.5 mL PnIj INJECT 0.25 MG SUBCUTANEOUSLY WEEKLY AS DIRECTED     No current facility-administered medications for this visit.       Review of patient's allergies indicates:   Allergen Reactions    Inapsine [droperidol] Anaphylaxis     seizures    Effexor [venlafaxine]      Increased anxiety    Pcn [penicillins]     Bactrim [sulfamethoxazole-trimethoprim] Rash     Dry red rash all over when in the sun    Fluconazole Rash     Pruritis         Family History   Problem Relation Name Age of Onset    Heart disease Mother      Migraines Mother      Hypertension Mother      Early death Father accident     Heart disease Father accident     Diabetes Maternal Aunt      Diabetes Maternal Uncle      Diabetes Maternal Grandfather         Social History[1]    History of present illness:  Patient comes in today for follow-up for his right total hip arthroplasty.  Comes in today for routine follow up and suture removal.  He is doing well.  He has finished his home health physical therapy.  Ready to transition to outpatient PT.    Review of Systems:    Musculoskeletal:  See HPI      Objective:        Physical Examination:    Vital Signs:  There were no vitals filed for this visit.    Body mass index is 36.15 kg/m².    This a well-developed, well nourished patient in no acute distress.  They are alert and oriented and cooperative to examination.        Right hip exam: Skin to right hip clean dry and intact.  No erythema or ecchymosis.  No signs or  symptoms of infection.  Right lateral hip incision healing well without wound dehiscence or drainage.  Negative Homans on the right.  He can weightbear as tolerated right lower extremity.  He ambulates with a single prong cane.  He is neurovascularly intact throughout the right lower extremity.    Pertinent New Results:    XRAY Report / Interpretation:   No new radiographs taken on today's clinic visit.    Assessment/Plan:      1. Status post right total hip arthroplasty.      Sutures removed today.  He tolerated this well.  Advised to clean the operative site once a day with warm soapy water.  Do not apply any topical creams or ointments.  Do not submerge underwater in a pool or bathtub type environment for least 1 more week.  We did review total hip replacement precautions to help reduce the incidence of dislocation.  Prescription was given to transition to outpatient PT now that home health is commenced.  He has resumed his preoperative anticoagulants.  We will check him back in 4 weeks to assess his postoperative progress.    NIKHIL Guillory, CÉSAR    This note was created using Dragon voice recognition software that occasionally misinterpreted phrases or words.             [1]   Social History  Socioeconomic History    Marital status:    Occupational History    Occupation: disability   Tobacco Use    Smoking status: Former     Current packs/day: 0.00     Types: Cigarettes     Quit date: 2016     Years since quittin.1     Passive exposure: Past    Smokeless tobacco: Former     Quit date: 2016    Tobacco comments:     Occasional  vaping   Substance and Sexual Activity    Alcohol use: Not Currently     Alcohol/week: 1.0 standard drink of alcohol     Types: 1 Glasses of wine per week     Comment: 20 - 30 shots vodka per day or 1-2 1/5ths per day for 2 years (started )    Drug use: No    Sexual activity: Yes     Partners: Female     Social Drivers of Health     Financial Resource  Strain: Medium Risk (3/18/2024)    Overall Financial Resource Strain (CARDIA)     Difficulty of Paying Living Expenses: Somewhat hard   Food Insecurity: No Food Insecurity (3/18/2024)    Hunger Vital Sign     Worried About Running Out of Food in the Last Year: Never true     Ran Out of Food in the Last Year: Never true   Transportation Needs: No Transportation Needs (3/18/2024)    PRAPARE - Transportation     Lack of Transportation (Medical): No     Lack of Transportation (Non-Medical): No   Physical Activity: Insufficiently Active (3/18/2024)    Exercise Vital Sign     Days of Exercise per Week: 2 days     Minutes of Exercise per Session: 60 min   Stress: No Stress Concern Present (3/18/2024)    [employer] - Occupational Stress Questionnaire     Feeling of Stress : Only a little   Housing Stability: Unknown (3/18/2024)    Housing Stability Vital Sign     Unable to Pay for Housing in the Last Year: No     Unstable Housing in the Last Year: No

## 2025-03-10 DIAGNOSIS — Z96.641 STATUS POST TOTAL HIP REPLACEMENT, RIGHT: Primary | ICD-10-CM

## 2025-03-10 RX ORDER — OXYCODONE AND ACETAMINOPHEN 7.5; 325 MG/1; MG/1
1 TABLET ORAL EVERY 8 HOURS PRN
Qty: 21 TABLET | Refills: 0 | Status: SHIPPED | OUTPATIENT
Start: 2025-03-10

## 2025-03-11 ENCOUNTER — CLINICAL SUPPORT (OUTPATIENT)
Dept: REHABILITATION | Facility: HOSPITAL | Age: 50
End: 2025-03-11
Payer: MEDICAID

## 2025-03-11 DIAGNOSIS — Z74.09 IMPAIRED FUNCTIONAL MOBILITY, BALANCE, GAIT, AND ENDURANCE: ICD-10-CM

## 2025-03-11 DIAGNOSIS — Z96.641 STATUS POST TOTAL HIP REPLACEMENT, RIGHT: ICD-10-CM

## 2025-03-11 DIAGNOSIS — M25.651 DECREASED RANGE OF RIGHT HIP MOVEMENT: Primary | ICD-10-CM

## 2025-03-11 PROCEDURE — 97161 PT EVAL LOW COMPLEX 20 MIN: CPT | Mod: PN

## 2025-03-11 NOTE — PROGRESS NOTES
Outpatient Rehab    Physical Therapy Evaluation    Patient Name: Lee Quintanilla  MRN: [id number]  YOB: 1975  Encounter Date: 3/11/2025    Therapy Diagnosis:   Encounter Diagnoses   Name Primary?    Status post total hip replacement, right     Decreased range of right hip movement Yes    Impaired functional mobility, balance, gait, and endurance      Physician: Collins Haynes, *    Physician Orders: Eval and Treat  Medical Diagnosis: Status post total hip replacement, right [Z96.641]     Visit # / Visits Authorized:  1 / 1   Date of Evaluation:  3/11/2025   Insurance Authorization Period: 3/7/2025 to 3/7/2026  Plan of Care Certification:  3/11/2025 to 5/9/2025      Time In: 1345   Time Out: 1432  Total Time: 47   Total Billable Time: 47 minutes    Date of Procedure: 2/17/2025; Left Hip completed 9/2024  Procedure:  Right total hip arthroplasty; posterolateral approach    Intake Outcome Measure for FOTO Survey    Therapist reviewed FOTO scores for Lee Quintanilla on 3/11/2025.   FOTO report - see Media section or FOTO account episode details.     Intake Score: 41%         Subjective   History of Present Illness  Lee is a 49 y.o. male who reports to physical therapy with a chief concern of R MARINA.     The patient reports a medical diagnosis of Status post total hip replacement, right (Z96.641).            History of Present Condition/Illness: Pt reports hx of AVN which led to B hip replacements. States he has his L hip replaced in 9/2024 and developed on knee pain on L side but now is dealing with the same thing on R. Denies any R hip pain. Pt states he has HH PT for past 2 weeks. Pt denies needing any assistance with ADL's. Pt denies any falls or LOB. Pt states he is not working or trying to go back to work right now. Pt states today is the first day without his cane. Pt states pain is consistent along B knees and low back. Pt states back has to be fused at L1-L2 due to AVN but  is waiting to get this done. Pt states he has had previous fusion from L3-L5 in 2020. Pt states he has to recline to get relief off his back. Sitting helps knees. Pt states sit<>stands make pain worse as well as initially walking. Pt verbalizes precautions well.     Activities of Daily Living  Social history was obtained from Patient.               Previously independent with activities of daily living? Yes     Currently independent with activities of daily living? Yes          Previously independent with instrumental activities of daily living? Yes     Currently independent with instrumental activities of daily living? Yes              Pain     Patient reports a current pain level of 7/10. Pain at best is reported as 5/10. Pain at worst is reported as 9/10.   Location: B knees and low back  Clinical Progression (since onset): Stable  Pain Qualities: Aching  Pain-Relieving Factors: Rest, Sitting  Pain-Aggravating Factors: Walking, Transfers         Employment  Patient reports: Does the patient's condition impact their ability to work?      None currently      Past Medical History/Physical Systems Review:   Lee Quintanilla  has a past medical history of ADHD (attention deficit hyperactivity disorder), Arthritis, Asthma, Back pain, Coronary artery disease, Degeneration of lumbar intervertebral disc, Depression, Digestive disorder, Elevated PSA, Headache, Hyperlipidemia, Hypertension, Kidney damage, Kidney stone, Myocardial infarction, Neck pain, Numbness and tingling in hands, Numbness and tingling of both legs, Osteonecrosis, Seizures, Sleep apnea, and Wears glasses.    Lee Quintanilla  has a past surgical history that includes removal of abcess; Pilonidal cyst drainage; Tympanostomy tube placement; Back surgery (2016); Lithotripsy; Hernia repair (Bilateral, 11/22/2017); Coronary artery bypass graft; Cardiac surgery (01/06/2020); Lumbar laminectomy with fusion (Bilateral, 11/05/2020); Removal of  hardware from spine (Bilateral, 11/05/2020); Bone graft (N/A, 11/05/2020); Surgical removal of pilonidal cyst (N/A, 04/15/2024); Flexible sigmoidoscopy (N/A, 07/24/2024); Hip Arthroplasty (Left, 09/23/2024); Total hip arthroplasty (Right, 02/17/2025); and Hip replacement arthroplasty (Right, 2/17/2025).    Lee has a current medication list which includes the following prescription(s): anastrozole, buspirone, clopidogrel, cyclobenzaprine, docusate sodium, entresto, escitalopram oxalate, fluticasone propionate, furosemide, gabapentin, hydroxyzine, metoprolol tartrate, ondansetron, oxycodone-acetaminophen, pantoprazole, rosuvastatin, tadalafil, testosterone cypionate, trazodone, and wegovy.    Review of patient's allergies indicates:   Allergen Reactions    Inapsine [droperidol] Anaphylaxis     seizures    Effexor [venlafaxine]      Increased anxiety    Pcn [penicillins]     Bactrim [sulfamethoxazole-trimethoprim] Rash     Dry red rash all over when in the sun    Fluconazole Rash     Pruritis        X-ray: 2/17/2025: Impression: Interval right hip arthroplasty without complication.    Objective   Posture                 Increased thoracic kyphosis and flattened lumbar spine.     Knee Observations  Right Knee Observations  Not Present: Straight Leg Raise Extensor Lag  Left Knee Observations  Not Present: Straight Leg Raise Extensor Lag             Hip Range of Motion   Right Hip   Active (deg) Passive (deg) Pain   Flexion 80 90 Yes   Extension 10       ABduction 35 40 Yes   ADduction         External Rotation 90/90         External Rotation Prone         Internal Rotation 90/90         Internal Rotation Prone             Left Hip   Active (deg) Passive (deg) Pain   Flexion 80 90 Yes   Extension 10       ABduction 35 40 Yes   ADduction         External Rotation 90/90         External Rotation Prone         Internal Rotation 90/90         Internal Rotation Prone             ROM assessment stayed in protective ranges.                Hip Strength - Planes of Motion   Right Strength Right Pain Left Strength Left  Pain   Flexion (L2) 3+   3+     Extension 3+   3+     ABduction 3+   3+     ADduction 4   4     Internal Rotation     4-     External Rotation 3+   3+         Knee Strength   Right Strength Right Pain Left Strength Left  Pain   Flexion (S2) 4-   4-     Prone Flexion 4-   4-     Extension (L3) 4-   4-       Knee Extensor Lag  No Lag: Right and Left       Ankle/Foot Strength - Planes of Motion   Right Strength Right Pain Left Strength Left  Pain   Dorsiflexion (L4) 5   5     Plantar Flexion (S1) 5   5     Inversion 5   5     Eversion 5   5     Great Toe Flexion 5   5     Great Toe Extension (L5) 5   5     Lesser Toes Flexion 5   5     Lesser Toes Extension 5   5            Hip Joint Mobility  Right Hip Joint Mobility  Hypomobile: Anterior Capsule and Posterior Capsule  Left Hip Joint Mobility  Hypomobile: Anterior Capsule and Posterior Capsule  Empty end-feels into flexion/Abd/extension. Pt guarded.            Gait Analysis  Gait Analysis Details  TUs without AD; 5xSTS: 23s without UE support. Gait: decreased step length with wide ADAN; forward trunk lean and increased sway toward stance leg.      Single Leg Balance: 1s on R; 2s on L     Treatment:  Therapeutic Exercise  Therapeutic Exercise Activity 1: NuStep 5 minutes; Level 1  Therapeutic Exercise Activity 2: Supine Clams RTB 2 x 10 B  Therapeutic Exercise Activity 3: Supine bridges 2 x 8  Therapeutic Exercise Activity 4: SLR 2 x 5 B  Therapeutic Exercise Activity 5: Seated Thoracic Ext 2 x 10  Therapeutic Exercise Activity 6: Standing Lumbar ext on mat x 15  Therapeutic Exercise Activity 7: Education: use cane / HEP / POC / precautions    Assessment & Plan   Assessment  Lee presents with a condition of Low complexity.   Presentation of Symptoms: Stable  Will Comorbidities Impact Care: Yes  AVN; multiple surgeries    Functional Limitations: Activity tolerance, Pain  when reaching, Pain with ADLs/IADLs, Painful locomotion/ambulation, Participating in leisure activities, Squatting, Standing tolerance, Transfers, Range of motion, Maintaining balance, Increased risk of fall, Gait limitations, Functional mobility, Fine motor coordination  Impairments: Abnormal coordination, Abnormal gait, Activity intolerance, Abnormal or restricted range of motion, Impaired physical strength, Lack of appropriate home exercise program, Weight-bearing intolerance  Personal Factors Affecting Prognosis: Pain    Patient Goal for Therapy (PT): walk better  Prognosis: Fair  Prognosis Details: Multiple surgeries/weakness  Assessment Details: Lee is a 49 y.o. male referred to outpatient Physical Therapy with a medical diagnosis of Status post total hip replacement, right. He presents with pain during gait; limited hip ROM; LQ weakness; tightness of hip flexors; difficulty with balance; abnormal gait and functional limitations of difficulty walking/transfers. Patient would benefit from skilled physical therapy to address these limitations and begin return to OF.      Plan  From a physical therapy perspective, the patient would benefit from: Skilled Rehab Services    Planned therapy interventions include: Therapeutic exercise, Therapeutic activities, Neuromuscular re-education, Manual therapy, Gait training, and ADLs/IADLs.            Visit Frequency: 2 times Per Week for 8 Weeks.       This plan was discussed with Patient.   Discussion participants: Agreed Upon Plan of Care             Patient's spiritual, cultural, and educational needs considered and patient agreeable to plan of care and goals.     Education  Education was done with Patient. The patient's learning style includes Demonstration, Listening, and Pictures/video. The patient Verbalizes understanding and Demonstrates understanding.         HEP / use cane / sit to stand mechanics / POC       Goals:   Active       LTG       Pt will improve TUG  to <12s to demonstrate decrease in fall risk       Start:  03/11/25    Expected End:  05/09/25            Pt will improve 5xSTS to <20s to demonstrate improved LQ strength and efficiency.        Start:  03/11/25    Expected End:  05/09/25            Pt will report 50% reduction in hip/knee.back pain to improve QOL and tolerance to ADL's       Start:  03/11/25    Expected End:  05/09/25               STG       Pt will improve SLB to at least 5s B to demonstrate improve weight bearing tolerance and pelvic stability       Start:  03/11/25    Expected End:  04/11/25            Pt will improve MMT of LQ by 1/3 grade to improve hip loading tolerance       Start:  03/11/25    Expected End:  04/11/25            Pt will improve TUG to <15s to demonstrate improve gait speed and coordination        Start:  03/11/25    Expected End:  04/11/25                Aditya Alvarez, PT, DPT

## 2025-03-12 DIAGNOSIS — Z96.641 STATUS POST TOTAL HIP REPLACEMENT, RIGHT: Primary | ICD-10-CM

## 2025-03-12 RX ORDER — GABAPENTIN 300 MG/1
300 CAPSULE ORAL 2 TIMES DAILY
Qty: 60 CAPSULE | Refills: 0 | Status: SHIPPED | OUTPATIENT
Start: 2025-03-12 | End: 2025-04-12

## 2025-03-13 ENCOUNTER — CLINICAL SUPPORT (OUTPATIENT)
Dept: REHABILITATION | Facility: HOSPITAL | Age: 50
End: 2025-03-13
Payer: MEDICAID

## 2025-03-13 DIAGNOSIS — Z74.09 IMPAIRED FUNCTIONAL MOBILITY, BALANCE, GAIT, AND ENDURANCE: ICD-10-CM

## 2025-03-13 DIAGNOSIS — M25.651 DECREASED RANGE OF RIGHT HIP MOVEMENT: Primary | ICD-10-CM

## 2025-03-13 PROCEDURE — 97110 THERAPEUTIC EXERCISES: CPT | Mod: PN

## 2025-03-13 NOTE — PROGRESS NOTES
Outpatient Rehab    Physical Therapy Visit    Patient Name: Lee Quintanilla  MRN: [id number]  YOB: 1975  Encounter Date: 3/13/2025    Therapy Diagnosis:   Encounter Diagnoses   Name Primary?    Decreased range of right hip movement Yes    Impaired functional mobility, balance, gait, and endurance      Physician: Collins Haynes, *    Physician Orders: Eval and Treat  Medical Diagnosis: Status post total hip replacement, right [Z96.641]      Visit # / Visits Authorized:  1 / 12  Date of Evaluation:  3/11/2025   Insurance Authorization Period: 3/13/2025 to 5/13/2026  Plan of Care Certification:  3/11/2025 to 5/9/2025       Time In:   300 pm  Time Out:   344 pm   Total Time:   44 minutes  Total Billable Time: 44 minutes    FOTO:  Intake Score:  %  Survey Score 1:  %  Survey Score 2:  %         Subjective    Knee soreness         Objective            Treatment:  Therapeutic Exercise  Therapeutic Exercise Activity 1: NuStep 8 minutes; Level 1  Therapeutic Exercise Activity 2: Supine Clams GTB 2 x 10 B  Therapeutic Exercise Activity 3: Supine Bridge 3 x 8; Supine Hip Add with ball squeeze 3 x 8; 3s hold  Therapeutic Exercise Activity 4: SLR 3 x 8 B  Therapeutic Exercise Activity 5: Prone Quad stretch 3 x 20s  Therapeutic Exercise Activity 6: Prone on elbow for hip ext stretch 3 x 15s  Therapeutic Exercise Activity 7: Education: use cane / HEP / POC / precautions  Therapeutic Exercise Activity 8: Standing March 3 x 8  Therapeutic Exercise Activity 9: Standing Hip Ext leaning on wedge 3 x 8 B  Therapeutic Exercise Activity 10: Seated LAQ 10# 3 x 10 B              Assessment & Plan   Assessment:  Lee did well today without setbacks. Focused primarily on mat exercises today with light closed chain activity due to knee soreness upon arrival. Plan to continue to progress as able with hip precaution considerations.        Patient will continue to benefit from skilled outpatient physical therapy to  address the deficits listed in the problem list box on initial evaluation, provide pt/family education and to maximize pt's level of independence in the home and community environment.     Patient's spiritual, cultural, and educational needs considered and patient agreeable to plan of care and goals.           Plan:  progress as tolerated following posterior hip precautions    Goals:   Active       LTG       Pt will improve TUG to <12s to demonstrate decrease in fall risk       Start:  03/11/25    Expected End:  05/09/25            Pt will improve 5xSTS to <20s to demonstrate improved LQ strength and efficiency.        Start:  03/11/25    Expected End:  05/09/25            Pt will report 50% reduction in hip/knee.back pain to improve QOL and tolerance to ADL's       Start:  03/11/25    Expected End:  05/09/25               STG       Pt will improve SLB to at least 5s B to demonstrate improve weight bearing tolerance and pelvic stability       Start:  03/11/25    Expected End:  04/11/25            Pt will improve MMT of LQ by 1/3 grade to improve hip loading tolerance       Start:  03/11/25    Expected End:  04/11/25            Pt will improve TUG to <15s to demonstrate improve gait speed and coordination        Start:  03/11/25    Expected End:  04/11/25                Aditya Alvarez, PT, DPT

## 2025-03-17 DIAGNOSIS — Z96.641 STATUS POST TOTAL HIP REPLACEMENT, RIGHT: ICD-10-CM

## 2025-03-17 RX ORDER — OXYCODONE AND ACETAMINOPHEN 5; 325 MG/1; MG/1
1 TABLET ORAL EVERY 8 HOURS PRN
Qty: 21 EACH | Refills: 0 | Status: SHIPPED | OUTPATIENT
Start: 2025-03-17 | End: 2025-03-24

## 2025-03-17 RX ORDER — OXYCODONE AND ACETAMINOPHEN 7.5; 325 MG/1; MG/1
1 TABLET ORAL EVERY 8 HOURS PRN
Qty: 21 TABLET | Refills: 0 | Status: CANCELLED | OUTPATIENT
Start: 2025-03-17

## 2025-03-18 ENCOUNTER — CLINICAL SUPPORT (OUTPATIENT)
Dept: REHABILITATION | Facility: HOSPITAL | Age: 50
End: 2025-03-18
Payer: MEDICAID

## 2025-03-18 DIAGNOSIS — M25.651 DECREASED RANGE OF RIGHT HIP MOVEMENT: Primary | ICD-10-CM

## 2025-03-18 DIAGNOSIS — Z74.09 IMPAIRED FUNCTIONAL MOBILITY, BALANCE, GAIT, AND ENDURANCE: ICD-10-CM

## 2025-03-18 PROCEDURE — 97110 THERAPEUTIC EXERCISES: CPT | Mod: PN

## 2025-03-18 NOTE — PROGRESS NOTES
Outpatient Rehab  Physical Therapy Visit    Patient Name: Lee Quintanilla   MRN: [id number]   YOB: 1975   Encounter Date: 3/18/2025     Therapy Diagnosis:   Encounter Diagnoses   Name Primary?    Decreased range of right hip movement Yes    Impaired functional mobility, balance, gait, and endurance      Physician: Collins Haynes, *    Physician Orders: Eval and Treat  Medical Diagnosis: Status post total hip replacement, right [Z96.641]      Visit # / Visits Authorized:  1 / 12  Date of Evaluation:  3/11/2025   Insurance Authorization Period: 3/13/2025 to 5/13/2026  Plan of Care Certification:  3/11/2025 to 5/9/2025     Time In: 1350   Time Out: 1444  Total Time: 54   Total Billable Time: 54 minutes    FOTO:  Intake Score:  %  Survey Score 1:  %  Survey Score 2:  %       Subjective   Sore for about a day after last session; knees feel a little better..  Pain reported as 5/10.      Objective          Treatment:  Therapeutic Exercise  TE 1: NuStep 8 minutes; Level 1  TE 2: Supine Clams GTB 2 x 10 B  TE 3: Supine Bridge 3 x 8;  TE 4: SLR 3 x 8 B 2#  TE 5: Standing Hip Abd 3 x 8 B  TE 6: Prone press up - partial range (pain-free) 2 x 10; 5s hold  TE 7: Education: use cane / HEP / POC / precautions  TE 8: SLB 5 x 10s holds; Wall Squat 3 x 8 (0-45 degrees)  TE 9: Standing Hip Ext leaning on wedge 3 x 8 B  TE 10: Seated LAQ 10# 3 x 10 B  Prone Hip flexor stretch with prop/strap 3 x 30s B           Assessment & Plan     Assessment:  Lee did well today. Progressed mobility as well as progressive loading activities. Requires verbal cueing to maintain position and light isometric holds at end-range. Plan to continue to progress as tolerated.        Patient will continue to benefit from skilled outpatient physical therapy to address the deficits listed in the problem list box on initial evaluation, provide pt/family education and to maximize pt's level of independence in the home and community  environment.     Patient's spiritual, cultural, and educational needs considered and patient agreeable to plan of care and goals.           Plan:  progress as tolerated following posterior hip precautions    Goals:   Active       LTG       Pt will improve TUG to <12s to demonstrate decrease in fall risk       Start:  03/11/25    Expected End:  05/09/25            Pt will improve 5xSTS to <20s to demonstrate improved LQ strength and efficiency.        Start:  03/11/25    Expected End:  05/09/25            Pt will report 50% reduction in hip/knee.back pain to improve QOL and tolerance to ADL's       Start:  03/11/25    Expected End:  05/09/25               STG       Pt will improve SLB to at least 5s B to demonstrate improve weight bearing tolerance and pelvic stability       Start:  03/11/25    Expected End:  04/11/25            Pt will improve MMT of LQ by 1/3 grade to improve hip loading tolerance       Start:  03/11/25    Expected End:  04/11/25            Pt will improve TUG to <15s to demonstrate improve gait speed and coordination        Start:  03/11/25    Expected End:  04/11/25                Aditya Alvarez, PT, DPT

## 2025-03-20 ENCOUNTER — CLINICAL SUPPORT (OUTPATIENT)
Dept: REHABILITATION | Facility: HOSPITAL | Age: 50
End: 2025-03-20
Payer: MEDICAID

## 2025-03-20 DIAGNOSIS — M25.651 DECREASED RANGE OF RIGHT HIP MOVEMENT: Primary | ICD-10-CM

## 2025-03-20 DIAGNOSIS — Z74.09 IMPAIRED FUNCTIONAL MOBILITY, BALANCE, GAIT, AND ENDURANCE: ICD-10-CM

## 2025-03-20 PROCEDURE — 97110 THERAPEUTIC EXERCISES: CPT | Mod: PN,CQ

## 2025-03-20 NOTE — PROGRESS NOTES
Outpatient Rehab  Physical Therapy Visit    Patient Name: Lee Quintanilla   MRN: [id number]   YOB: 1975   Encounter Date: 3/20/2025     Therapy Diagnosis:   Encounter Diagnoses   Name Primary?    Decreased range of right hip movement Yes    Impaired functional mobility, balance, gait, and endurance      Physician: Collins Haynes, *    Physician Orders: Eval and Treat  Medical Diagnosis: Status post total hip replacement, right [Z96.641]      Visit # / Visits Authorized:  2 / 12  Date of Evaluation:  3/11/2025   Insurance Authorization Period: 3/13/2025 to 5/13/2026  Plan of Care Certification:  3/11/2025 to 5/9/2025     Time In: 1400   Time Out: 1500  Total Time: 60   Total Billable Time: 54 minutes    FOTO:  Intake Score:  %  Survey Score 1:  %  Survey Score 2:  %       Subjective   Had a long drive back from [place] and his back is bothering him more as as result..  Pain reported as 5/10.      Objective          Treatment:  Therapeutic Exercise  TE 1: NuStep 10 minutes; Level 1  TE 2: Supine Clams GTB 3 x 10 B  TE 3: Supine Bridge 3 x 10  TE 4: SLR 3 x 10 B 2#  TE 5: Standing Hip Abd 3 x 10 B; lateral stepping 3 x 10 yards  TE 6: Prone press up - partial range (pain-free) 2 x 10; 5s hold  TE 7: Education: towel roll for lumbar support for prolonged sitting  TE 8: SLB 5 x 10s holds; Wall Squat 3 x 8 (0-45 degrees)  TE 9: Standing Hip Ext leaning on wedge 3 x 10 B  TE 10: Seated LAQ 10# 3 x 10 B  Prone Hip flexor stretch with prop/strap 3 x 30s B   Self hip extension mobilization on stair 5 seconds holds x 15 repetitions         Assessment & Plan     Assessment:  Lee noted with good tolerance to treatment. Able to progress repetitions on several exercises without issues. Provided education as indicated above to reduce lumbar pain with prolonged sitting. Will continue to progress load to right hip to allow him to return to prior level of function without pain or limitations.         Patient will continue to benefit from skilled outpatient physical therapy to address the deficits listed in the problem list box on initial evaluation, provide pt/family education and to maximize pt's level of independence in the home and community environment.     Patient's spiritual, cultural, and educational needs considered and patient agreeable to plan of care and goals.           Plan:  progress as tolerated following posterior hip precautions    Goals:   Active       LTG       Pt will improve TUG to <12s to demonstrate decrease in fall risk (Progressing)       Start:  03/11/25    Expected End:  05/09/25            Pt will improve 5xSTS to <20s to demonstrate improved LQ strength and efficiency.  (Progressing)       Start:  03/11/25    Expected End:  05/09/25            Pt will report 50% reduction in hip/knee.back pain to improve QOL and tolerance to ADL's (Progressing)       Start:  03/11/25    Expected End:  05/09/25               STG       Pt will improve SLB to at least 5s B to demonstrate improve weight bearing tolerance and pelvic stability (Progressing)       Start:  03/11/25    Expected End:  04/11/25            Pt will improve MMT of LQ by 1/3 grade to improve hip loading tolerance (Progressing)       Start:  03/11/25    Expected End:  04/11/25            Pt will improve TUG to <15s to demonstrate improve gait speed and coordination  (Progressing)       Start:  03/11/25    Expected End:  04/11/25                Vicky Elias PTA

## 2025-03-24 DIAGNOSIS — Z96.641 STATUS POST TOTAL HIP REPLACEMENT, RIGHT: ICD-10-CM

## 2025-03-24 RX ORDER — OXYCODONE AND ACETAMINOPHEN 5; 325 MG/1; MG/1
1 TABLET ORAL EVERY 8 HOURS PRN
Qty: 21 EACH | Refills: 0 | Status: SHIPPED | OUTPATIENT
Start: 2025-03-24 | End: 2025-03-31

## 2025-03-25 ENCOUNTER — CLINICAL SUPPORT (OUTPATIENT)
Dept: REHABILITATION | Facility: HOSPITAL | Age: 50
End: 2025-03-25
Payer: MEDICAID

## 2025-03-25 DIAGNOSIS — Z74.09 IMPAIRED FUNCTIONAL MOBILITY, BALANCE, GAIT, AND ENDURANCE: ICD-10-CM

## 2025-03-25 DIAGNOSIS — M25.651 DECREASED RANGE OF RIGHT HIP MOVEMENT: Primary | ICD-10-CM

## 2025-03-25 PROCEDURE — 97110 THERAPEUTIC EXERCISES: CPT | Mod: PN

## 2025-03-25 NOTE — PROGRESS NOTES
Outpatient Rehab  Physical Therapy Visit    Patient Name: Lee Quintanilla   MRN: [id number]   YOB: 1975   Encounter Date: 3/25/2025     Therapy Diagnosis:   Encounter Diagnoses   Name Primary?    Decreased range of right hip movement Yes    Impaired functional mobility, balance, gait, and endurance      Physician: Collins Haynes, *    Physician Orders: Eval and Treat  Medical Diagnosis: Status post total hip replacement, right [Z96.641]      Visit # / Visits Authorized:  2 / 12  Date of Evaluation:  3/11/2025   Insurance Authorization Period: 3/13/2025 to 5/13/2026  Plan of Care Certification:  3/11/2025 to 5/9/2025     Time In: 1400   Time Out: 1453  Total Time: 53   Total Billable Time: 53 minutes    FOTO:  Intake Score:  %  Survey Score 1:  %  Survey Score 2:  %       Subjective   States knees have been feeling good. More so back pain with transfers.  Pain reported as 2/10.      Objective          Treatment:  Therapeutic Exercise  TE 1: NuStep 10 minutes; Level 1  TE 2: Supine DKTC with theraball; pain-free range 3 x 10  TE 3: Supine Paloff Press Perturbation; 3 x 10 B  TE 4: SLR 3 x 10 B 2#  TE 5: Supine Clams BTB 3 x 10 B  TE 6: Side Steps along EOM x 8  TE 7: Education: towel roll for lumbar support for prolonged sitting  TE 8: SLB 3 x 20s holds; Wall Squat 3 x 8 (0-45 degrees)         Assessment & Plan     Assessment: Lee tolerated treatment well today. Progressed to more loading to help with transitional movements. Plan to continue to progress as tolerated. Hip/knee pain is less.  Evaluation/Treatment Tolerance: Patient tolerated treatment well    Patient will continue to benefit from skilled outpatient physical therapy to address the deficits listed in the problem list box on initial evaluation, provide pt/family education and to maximize pt's level of independence in the home and community environment.     Patient's spiritual, cultural, and educational needs considered and  patient agreeable to plan of care and goals.           Plan: progress as toleratedfollowing posterior hip precautions    Goals:   Active       LTG       Pt will improve TUG to <12s to demonstrate decrease in fall risk (Progressing)       Start:  03/11/25    Expected End:  05/09/25            Pt will improve 5xSTS to <20s to demonstrate improved LQ strength and efficiency.  (Progressing)       Start:  03/11/25    Expected End:  05/09/25            Pt will report 50% reduction in hip/knee.back pain to improve QOL and tolerance to ADL's (Progressing)       Start:  03/11/25    Expected End:  05/09/25               STG       Pt will improve SLB to at least 5s B to demonstrate improve weight bearing tolerance and pelvic stability (Progressing)       Start:  03/11/25    Expected End:  04/11/25            Pt will improve MMT of LQ by 1/3 grade to improve hip loading tolerance (Progressing)       Start:  03/11/25    Expected End:  04/11/25            Pt will improve TUG to <15s to demonstrate improve gait speed and coordination  (Progressing)       Start:  03/11/25    Expected End:  04/11/25                Aditya Alvarez, PT, DPT

## 2025-03-26 ENCOUNTER — PATIENT MESSAGE (OUTPATIENT)
Dept: FAMILY MEDICINE | Facility: CLINIC | Age: 50
End: 2025-03-26
Payer: MEDICAID

## 2025-03-26 DIAGNOSIS — M51.360 DEGENERATION OF INTERVERTEBRAL DISC OF LUMBAR REGION WITH DISCOGENIC BACK PAIN: Primary | ICD-10-CM

## 2025-03-27 ENCOUNTER — CLINICAL SUPPORT (OUTPATIENT)
Dept: REHABILITATION | Facility: HOSPITAL | Age: 50
End: 2025-03-27
Payer: MEDICAID

## 2025-03-27 DIAGNOSIS — M25.651 DECREASED RANGE OF RIGHT HIP MOVEMENT: Primary | ICD-10-CM

## 2025-03-27 DIAGNOSIS — Z74.09 IMPAIRED FUNCTIONAL MOBILITY, BALANCE, GAIT, AND ENDURANCE: ICD-10-CM

## 2025-03-27 PROCEDURE — 97110 THERAPEUTIC EXERCISES: CPT | Mod: PN,CQ

## 2025-03-28 NOTE — PROGRESS NOTES
Outpatient Rehab  Physical Therapy Visit    Patient Name: Lee Quintanilla   MRN: [id number]   YOB: 1975   Encounter Date: 3/27/2025     Therapy Diagnosis:   Encounter Diagnoses   Name Primary?    Decreased range of right hip movement Yes    Impaired functional mobility, balance, gait, and endurance      Physician: Collins Haynes, *    Physician Orders: Eval and Treat  Medical Diagnosis: Status post total hip replacement, right [Z96.641]      Visit # / Visits Authorized:  5 / 12  Date of Evaluation:  3/11/2025   Insurance Authorization Period: 3/13/2025 to 5/13/2026  Plan of Care Certification:  3/11/2025 to 5/9/2025     Time In: 1400   Time Out: 1453  Total Time: 53   Total Billable Time: 53 minutes    FOTO:  Intake Score:  %  Survey Score 1:  %  Survey Score 2:  %       Subjective   Doing well. Was sore for about a day after his last visit..  Pain reported as 6/10.      Objective          Treatment:  Therapeutic Exercise  TE 1: NuStep 10 minutes; Level 1  TE 2: Supine DKTC with theraball; pain-free range 3 x 10; Supine Swiss Ball PressDown for core eng  TE 3: Supine Paloff Press Perturbation; 3 x 10 B  TE 4: SLR 3 x 10 B 2#  TE 5: Supine Clams BTB 3 x 10 B  TE 6: Side Steps along EOM x 8  TE 8: SLB 3 x 20s holds; Wall Squat 3 x 8 (0-45 degrees)  TE 9: Seated Thoracic Rotation in open books form x 15 B  TE 10: Seated LAQ 15# 3 x 10 B; Standing Wall Slides to promote thoracic extension x 20         Assessment & Plan     Assessment: Lee noted with good tolerance to tx. Continued focus on improving hip strengthening to improve tolerance to prolonged activities. Will continue to benefit from skilled PT to maximize gains as able to allow him to return to PLOF.       Patient will continue to benefit from skilled outpatient physical therapy to address the deficits listed in the problem list box on initial evaluation, provide pt/family education and to maximize pt's level of independence in  the home and community environment.     Patient's spiritual, cultural, and educational needs considered and patient agreeable to plan of care and goals.           Plan: progress as toleratedfollowing posterior hip precautions    Goals:   Active       LTG       Pt will improve TUG to <12s to demonstrate decrease in fall risk (Progressing)       Start:  03/11/25    Expected End:  05/09/25            Pt will improve 5xSTS to <20s to demonstrate improved LQ strength and efficiency.  (Progressing)       Start:  03/11/25    Expected End:  05/09/25            Pt will report 50% reduction in hip/knee.back pain to improve QOL and tolerance to ADL's (Progressing)       Start:  03/11/25    Expected End:  05/09/25               STG       Pt will improve SLB to at least 5s B to demonstrate improve weight bearing tolerance and pelvic stability (Progressing)       Start:  03/11/25    Expected End:  04/11/25            Pt will improve MMT of LQ by 1/3 grade to improve hip loading tolerance (Progressing)       Start:  03/11/25    Expected End:  04/11/25            Pt will improve TUG to <15s to demonstrate improve gait speed and coordination  (Progressing)       Start:  03/11/25    Expected End:  04/11/25                Vicky Elias, PTA

## 2025-03-31 DIAGNOSIS — Z96.641 STATUS POST TOTAL HIP REPLACEMENT, RIGHT: ICD-10-CM

## 2025-03-31 RX ORDER — OXYCODONE AND ACETAMINOPHEN 5; 325 MG/1; MG/1
1 TABLET ORAL EVERY 8 HOURS PRN
Qty: 21 EACH | Refills: 0 | Status: SHIPPED | OUTPATIENT
Start: 2025-03-31 | End: 2025-04-07

## 2025-04-01 ENCOUNTER — CLINICAL SUPPORT (OUTPATIENT)
Dept: REHABILITATION | Facility: HOSPITAL | Age: 50
End: 2025-04-01
Payer: MEDICAID

## 2025-04-01 DIAGNOSIS — Z74.09 IMPAIRED FUNCTIONAL MOBILITY, BALANCE, GAIT, AND ENDURANCE: ICD-10-CM

## 2025-04-01 DIAGNOSIS — M25.651 DECREASED RANGE OF RIGHT HIP MOVEMENT: Primary | ICD-10-CM

## 2025-04-01 PROCEDURE — 97110 THERAPEUTIC EXERCISES: CPT | Mod: PN

## 2025-04-01 NOTE — PROGRESS NOTES
Outpatient Rehab  Physical Therapy Visit    Patient Name: Lee Quintanilla   MRN: [id number]   YOB: 1975   Encounter Date: 4/1/2025     Therapy Diagnosis:   Encounter Diagnoses   Name Primary?    Decreased range of right hip movement Yes    Impaired functional mobility, balance, gait, and endurance      Physician: Collins Haynes, *    Physician Orders: Eval and Treat  Medical Diagnosis: Status post total hip replacement, right [Z96.641]      Visit # / Visits Authorized:  5 / 12  Date of Evaluation:  3/11/2025   Insurance Authorization Period: 3/13/2025 to 5/13/2026  Plan of Care Certification:  3/11/2025 to 5/9/2025     Time In: 1400   Time Out: 1454  Total Time: 54   Total Billable Time: 54 minutes    FOTO:  Intake Score:  %  Survey Score 1:  %  Survey Score 2:  %       Subjective   Back is still giving him trouble. Feeling better in hips/knees though..  Pain reported as 2/10.      Objective          Treatment:  Therapeutic Exercise  TE 1: NuStep 10 minutes; Level 1  TE 2: Supine DKTC with theraball; pain-free range 3 x 10; Supine Swiss Ball PressDown for core eng  TE 4: SLR 3 x 10 B 2#  TE 5: Standing Clams 3 x 8 B GTB  TE 6: Standing Hip 3-way 3 x 5 B; with UE support  TE 7: DL Leg Press 40# 3 x 10  TE 8: SL Leg Press 20# 3 x 8 B  TE 10: Seated LAQ 15# 3 x 10 B; Seated Hip Abd 70# 3 x 10       Assessment & Plan     Assessment: Lee did well today. Continue to work on core stabilization to assist with back relief. Also added in leg press to improve functional activity tolerance. Pt responded well but needed cueing for foot/knee positioning  Evaluation/Treatment Tolerance: Patient tolerated treatment well    Patient will continue to benefit from skilled outpatient physical therapy to address the deficits listed in the problem list box on initial evaluation, provide pt/family education and to maximize pt's level of independence in the home and community environment.     Patient's  spiritual, cultural, and educational needs considered and patient agreeable to plan of care and goals.           Plan:  following posterior hip precautions    Goals:   Active       LTG       Pt will improve TUG to <12s to demonstrate decrease in fall risk (Progressing)       Start:  03/11/25    Expected End:  05/09/25            Pt will improve 5xSTS to <20s to demonstrate improved LQ strength and efficiency.  (Progressing)       Start:  03/11/25    Expected End:  05/09/25            Pt will report 50% reduction in hip/knee.back pain to improve QOL and tolerance to ADL's (Progressing)       Start:  03/11/25    Expected End:  05/09/25               STG       Pt will improve SLB to at least 5s B to demonstrate improve weight bearing tolerance and pelvic stability (Progressing)       Start:  03/11/25    Expected End:  04/11/25            Pt will improve MMT of LQ by 1/3 grade to improve hip loading tolerance (Progressing)       Start:  03/11/25    Expected End:  04/11/25            Pt will improve TUG to <15s to demonstrate improve gait speed and coordination  (Progressing)       Start:  03/11/25    Expected End:  04/11/25                Aditya Alvraez, PT, DPT

## 2025-04-03 ENCOUNTER — CLINICAL SUPPORT (OUTPATIENT)
Dept: REHABILITATION | Facility: HOSPITAL | Age: 50
End: 2025-04-03
Payer: MEDICAID

## 2025-04-03 DIAGNOSIS — M25.651 DECREASED RANGE OF RIGHT HIP MOVEMENT: Primary | ICD-10-CM

## 2025-04-03 DIAGNOSIS — Z74.09 IMPAIRED FUNCTIONAL MOBILITY, BALANCE, GAIT, AND ENDURANCE: ICD-10-CM

## 2025-04-03 PROCEDURE — 97110 THERAPEUTIC EXERCISES: CPT | Mod: PN,CQ

## 2025-04-03 NOTE — PROGRESS NOTES
Outpatient Rehab  Physical Therapy Visit    Patient Name: Lee Quintanilla   MRN: [id number]   YOB: 1975   Encounter Date: 4/3/2025     Therapy Diagnosis:   Encounter Diagnoses   Name Primary?    Decreased range of right hip movement Yes    Impaired functional mobility, balance, gait, and endurance      Physician: Collins Haynes, *    Physician Orders: Eval and Treat  Medical Diagnosis: Status post total hip replacement, right [Z96.641]      Visit # / Visits Authorized:  5 / 12  Date of Evaluation:  3/11/2025   Insurance Authorization Period: 3/13/2025 to 5/13/2026  Plan of Care Certification:  3/11/2025 to 5/9/2025     Time In: 1340   Time Out: 1435  Total Time: 55   Total Billable Time: 54 minutes      Physical Therapy technician assisted with treatment under direct supervision of treating therapist as needed.     FOTO:  Intake Score:  %  Survey Score 1:  %  Survey Score 2:  %       Subjective   Hip is doing ok..  Pain reported as 2/10.      Objective          Treatment:  Therapeutic Exercise  TE 1: NuStep 10 minutes; Level 1  TE 2: Supine DKTC with theraball; pain-free range 3 x 10; Supine Swiss Ball PressDown for core eng  TE 3: Supine Paloff Press Perturbation; 3 x 10 B  TE 4: SLR 3 x 10 B 2#  TE 5: Standing Clams 3 x 8 B GTB  TE 6: Standing Hip 3-way 3 x 5 B; with UE support  TE 7: DL Leg Press 40# 3 x 10  TE 8: SL Leg Press 20# 3 x 8 B  TE 10: Seated LAQ 15# 3 x 10 B; Seated Hip Abd 70# 3 x 10       Assessment & Plan     Assessment: Lee noted with good tolerance to tx. Continued focus on developing strength as able to improve deficits. Will progress load as able.       Patient will continue to benefit from skilled outpatient physical therapy to address the deficits listed in the problem list box on initial evaluation, provide pt/family education and to maximize pt's level of independence in the home and community environment.     Patient's spiritual, cultural, and educational  needs considered and patient agreeable to plan of care and goals.           Plan: progress as toleratedfollowing posterior hip precautions    Goals:   Active       LTG       Pt will improve TUG to <12s to demonstrate decrease in fall risk (Progressing)       Start:  03/11/25    Expected End:  05/09/25            Pt will improve 5xSTS to <20s to demonstrate improved LQ strength and efficiency.  (Progressing)       Start:  03/11/25    Expected End:  05/09/25            Pt will report 50% reduction in hip/knee.back pain to improve QOL and tolerance to ADL's (Progressing)       Start:  03/11/25    Expected End:  05/09/25               STG       Pt will improve SLB to at least 5s B to demonstrate improve weight bearing tolerance and pelvic stability (Progressing)       Start:  03/11/25    Expected End:  04/11/25            Pt will improve MMT of LQ by 1/3 grade to improve hip loading tolerance (Progressing)       Start:  03/11/25    Expected End:  04/11/25            Pt will improve TUG to <15s to demonstrate improve gait speed and coordination  (Progressing)       Start:  03/11/25    Expected End:  04/11/25                Vicky Elias, PTA

## 2025-04-06 DIAGNOSIS — Z96.641 STATUS POST TOTAL HIP REPLACEMENT, RIGHT: ICD-10-CM

## 2025-04-06 RX ORDER — OXYCODONE AND ACETAMINOPHEN 5; 325 MG/1; MG/1
1 TABLET ORAL EVERY 8 HOURS PRN
Qty: 21 EACH | Refills: 0 | Status: CANCELLED | OUTPATIENT
Start: 2025-04-06 | End: 2025-04-13

## 2025-04-07 ENCOUNTER — OFFICE VISIT (OUTPATIENT)
Dept: ORTHOPEDICS | Facility: CLINIC | Age: 50
End: 2025-04-07
Payer: MEDICAID

## 2025-04-07 ENCOUNTER — HOSPITAL ENCOUNTER (OUTPATIENT)
Dept: RADIOLOGY | Facility: HOSPITAL | Age: 50
Discharge: HOME | End: 2025-04-07
Attending: ORTHOPAEDIC SURGERY
Payer: MEDICAID

## 2025-04-07 VITALS — HEIGHT: 67 IN | BODY MASS INDEX: 36.22 KG/M2 | WEIGHT: 230.81 LBS

## 2025-04-07 DIAGNOSIS — K21.9 GASTROESOPHAGEAL REFLUX DISEASE, UNSPECIFIED WHETHER ESOPHAGITIS PRESENT: ICD-10-CM

## 2025-04-07 DIAGNOSIS — M54.16 LUMBAR RADICULOPATHY: Primary | ICD-10-CM

## 2025-04-07 DIAGNOSIS — Z98.1 ADJACENT SEGMENT DISEASE OF LUMBAR SPINE WITH HISTORY OF FUSION PROCEDURE: ICD-10-CM

## 2025-04-07 DIAGNOSIS — M51.369 ADJACENT SEGMENT DISEASE OF LUMBAR SPINE WITH HISTORY OF FUSION PROCEDURE: ICD-10-CM

## 2025-04-07 DIAGNOSIS — M43.10 RETROLISTHESIS OF VERTEBRAE: ICD-10-CM

## 2025-04-07 DIAGNOSIS — Z98.1 HISTORY OF LUMBAR SPINAL FUSION: ICD-10-CM

## 2025-04-07 PROCEDURE — 99999 PR PBB SHADOW E&M-EST. PATIENT-LVL V: CPT | Mod: PBBFAC,,, | Performed by: ORTHOPAEDIC SURGERY

## 2025-04-07 PROCEDURE — 72100 X-RAY EXAM L-S SPINE 2/3 VWS: CPT | Mod: 26,,, | Performed by: RADIOLOGY

## 2025-04-07 PROCEDURE — 72100 X-RAY EXAM L-S SPINE 2/3 VWS: CPT | Mod: TC,PN

## 2025-04-07 PROCEDURE — 4010F ACE/ARB THERAPY RXD/TAKEN: CPT | Mod: CPTII,,, | Performed by: ORTHOPAEDIC SURGERY

## 2025-04-07 PROCEDURE — 1160F RVW MEDS BY RX/DR IN RCRD: CPT | Mod: CPTII,,, | Performed by: ORTHOPAEDIC SURGERY

## 2025-04-07 PROCEDURE — 99024 POSTOP FOLLOW-UP VISIT: CPT | Mod: ,,, | Performed by: ORTHOPAEDIC SURGERY

## 2025-04-07 PROCEDURE — 1159F MED LIST DOCD IN RCRD: CPT | Mod: CPTII,,, | Performed by: ORTHOPAEDIC SURGERY

## 2025-04-07 PROCEDURE — 99215 OFFICE O/P EST HI 40 MIN: CPT | Mod: PBBFAC,25,PN | Performed by: ORTHOPAEDIC SURGERY

## 2025-04-07 RX ORDER — PANTOPRAZOLE SODIUM 40 MG/1
40 TABLET, DELAYED RELEASE ORAL DAILY
Qty: 90 TABLET | Refills: 3 | Status: SHIPPED | OUTPATIENT
Start: 2025-04-07 | End: 2026-04-07

## 2025-04-07 RX ORDER — BUSPIRONE HYDROCHLORIDE 15 MG/1
15 TABLET ORAL 3 TIMES DAILY
Qty: 90 TABLET | Refills: 0 | Status: SHIPPED | OUTPATIENT
Start: 2025-04-07 | End: 2026-04-07

## 2025-04-07 RX ORDER — HYDROCODONE BITARTRATE AND ACETAMINOPHEN 7.5; 325 MG/1; MG/1
1 TABLET ORAL EVERY 8 HOURS PRN
Qty: 21 TABLET | Refills: 0 | Status: SHIPPED | OUTPATIENT
Start: 2025-04-07

## 2025-04-07 RX ORDER — TADALAFIL 5 MG/1
1 TABLET ORAL DAILY
COMMUNITY
Start: 2025-03-12

## 2025-04-07 NOTE — PROGRESS NOTES
Subjective:       Patient ID: Lee Quintanilla is a 49 y.o. male.    Chief Complaint: Pain of the Lumbar Spine (Patient is here for lumbar pain for about a year and is progressively getting worse. Has a constant dull aching pain as well as numbness and tingling that radiates down the legs to the feet. Has painful and limited ROM as well as extreme pain when sitting )      History of Present Illness    Prior to meeting with the patient I reviewed the medical chart in [facility]. This included reviewing the previous progress notes from our office, review of the patient's last appointment with their primary care provider, review of any visits to the emergency room, and review of any pain management appointments or procedures.   This gentleman had a fusion at L4-5 several years ago actually 2020 and did well but over the last 2 years has had progressively increasing back pain.  He has had bilateral hip replacements the last hip done in September 2024 by Dr. Jaimes.  He has been undergoing physical therapy initially for his hip but also has had therapy incorporate treatment for his low back pain his pain persist it radiates to both thighs no bowel or bladder incontinence    Current Medications  Current Medications[1]    Allergies  Review of patient's allergies indicates:   Allergen Reactions    Inapsine [droperidol] Anaphylaxis     seizures    Effexor [venlafaxine]      Increased anxiety    Pcn [penicillins]     Bactrim [sulfamethoxazole-trimethoprim] Rash     Dry red rash all over when in the sun    Fluconazole Rash     Pruritis         Past Medical History  Past Medical History:   Diagnosis Date    ADHD (attention deficit hyperactivity disorder)     Arthritis     Asthma     as child only    Back pain     Coronary artery disease     Degeneration of lumbar intervertebral disc 05/2016    Depression     Digestive disorder     Elevated PSA     Headache     Hyperlipidemia     Hypertension     Kidney damage     stage 3 ; d/t  aleve abuse    Kidney stone     Myocardial infarction     Neck pain     Numbness and tingling in hands     Numbness and tingling of both legs     Osteonecrosis     Bilat Femur    Seizures     from inapsine 2002    Sleep apnea     no cpap    Wears glasses     CONTACS       Surgical History  Past Surgical History:   Procedure Laterality Date    BACK SURGERY  2016    back surgery    BONE GRAFT N/A 11/05/2020    Procedure: BONE GRAFT;  Surgeon: Mateusz Blanco MD;  Location: [facility] OR;  Service: Orthopedics;  Laterality: N/A;    CARDIAC SURGERY  01/06/2020    CABG X 7    CORONARY ARTERY BYPASS GRAFT      7 vessels    FLEXIBLE SIGMOIDOSCOPY N/A 07/24/2024    Procedure: SIGMOIDOSCOPY, FLEXIBLE;  Surgeon: Latesha Victoria MD;  Location: [facility] ENDO;  Service: Endoscopy;  Laterality: N/A;    HERNIA REPAIR Bilateral 11/22/2017    HIP ARTHROPLASTY Left 09/23/2024    Procedure: ARTHROPLASTY, HIP;  Surgeon: Lázaro Jaimes MD;  Location: [facility] OR;  Service: Orthopedics;  Laterality: Left;  [place]    HIP REPLACEMENT ARTHROPLASTY Right 2/17/2025    Procedure: ARTHROPLASTY, HIP REPLACEMENT;  Surgeon: Lázaro Jaimes MD;  Location: [facility] OR;  Service: Orthopedics;  Laterality: Right;  Sincere    LITHOTRIPSY      LUMBAR LAMINECTOMY WITH FUSION Bilateral 11/05/2020    Procedure: LAMINECTOMY, SPINE, LUMBAR, WITH FUSION;  Surgeon: Mateusz Blanco MD;  Location: [facility] OR;  Service: Orthopedics;  Laterality: Bilateral;  MEDTRONIC  NTI  L-3    PILONIDAL CYST DRAINAGE      removal of abcess      scrotal    REMOVAL OF HARDWARE FROM SPINE Bilateral 11/05/2020    Procedure: REMOVAL, HARDWARE, SPINE;  Surgeon: Mateusz Blanco MD;  Location: [facility] OR;  Service: Orthopedics;  Laterality: Bilateral;  L 4-5    SURGICAL REMOVAL OF PILONIDAL CYST N/A 04/15/2024    Procedure: EXCISION, PILONIDAL CYST;  Surgeon: Jayden Phillips III, MD;  Location: [facility] OR;  Service: General;  Laterality: N/A;  sacral area    TOTAL HIP ARTHROPLASTY Right 02/17/2025     TYMPANOSTOMY TUBE PLACEMENT         Family History:   Family History   Problem Relation Name Age of Onset    Heart disease Mother      Migraines Mother      Hypertension Mother      Early death Father accident     Heart disease Father accident     Diabetes Maternal Aunt      Diabetes Maternal Uncle      Diabetes Maternal Grandfather         Social History:   Social History[2]    Hospitalization/Major Diagnostic Procedure:     Review of Systems     General/Constitutional:  Chills denies. Fatigue denies. Fever denies. Weight gain denies. Weight loss denies.    Respiratory:  Shortness of breath denies.    Cardiovascular:  Chest pain denies.    Gastrointestinal:  Constipation denies. Diarrhea denies. Nausea denies. Vomiting denies.     Hematology:  Easy bruising denies. Prolonged bleeding denies.     Genitourinary:  Frequent urination denies. Pain in lower back denies. Painful urination denies.     Musculoskeletal:  See HPI for details    Skin:  Rash denies.    Neurologic:  Dizziness denies. Gait abnormalities denies. Seizures denies. Tingling/Numbess denies.    Psychiatric:  Anxiety denies. Depressed mood denies.     Objective:   Vital Signs: There were no vitals filed for this visit.     Physical Exam      General Examination:     Constitutional: The patient is alert and oriented to lace person and time. Mood is pleasant.     Head/Face: Normal facial features normal eyebrows    Eyes: Normal extraocular motion bilaterally    Lungs: Respirations are equal and unlabored    Gait is coordinated.    Cardiovascular: There are no swelling or varicosities present.    Lymphatic: Negative for adenopathy    Skin: Normal    Neurological: Level of consciousness normal. Oriented to place person and time and situation    Psychiatric: Oriented to time place person and situation    Well-healed incision patient can not stand erect has pain when doing so tender paraspinous muscles mid lumbar area bilaterally right greater left range of  motion limited hip motion intact straight leg raising maneuver causes back pain only subjective numbness L3 nerve root distribution bilaterally motor exam intact  XRAY Report/ Interpretation:  AP and lateral x-rays of lumbar spine will performed today. Indications low back pain. Findings:  Instrumented lumbar fusion L4-5 level.  Slight retrolisthesis L3 on L4 as well as L2 on L3.  Previous MRI study performed June 2024 shows loss of signal intensity early disc degeneration at the L2-3 level varying degrees of for hypertrophy and foraminal stenosis.  Please see report for details      Assessment:       1. Lumbar radiculopathy    2. History of lumbar spinal fusion    3. Retrolisthesis of vertebrae    4. 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    More likely not he has adjacent segment foraminal stenosis and disc degeneration above his previous fusion at the L4-5 level he has undergone physical therapy for rehab of his hip replacements and he states they were also incorporated some exercises for his lower back as well we would suggest that he have an MRI of the lumbar spine specifically look at the levels above the L4-5 level return after MRI  Treatment options were discussed with regards to the nature of the  medical condition. Conservative pain intervention and surgical options were discussed in detail. The probability of success of each separate treatment option was discussed. The patient expressed a clear understanding of the treatment options. With regards to surgery, the procedure risk, benefits, complications, and outcomes were discussed. No guarantees were given with regards to surgical outcome.   The risk of complications, morbidity, and mortality of patient management decisions have been made at the time of this visit. These are associated with the patient's problems, diagnostic procedures and treatment options. This includes the possible management options selected and those considered but not selected by the patient after shared medical decision making we discussed with the patient.     This note was created using Dragon voice recognition software that occasionally misinterpreted phrases or words.         [1]   Current Outpatient Medications   Medication Sig Dispense Refill    anastrozole (ARIMIDEX) 1 mg Tab Take 1 mg by mouth every 7 days.      clopidogreL (PLAVIX) 75 mg tablet Take 75 mg by mouth once daily.      cyclobenzaprine (FLEXERIL) 10 MG tablet Take 10 mg by mouth 2 (two) times daily.      ENTRESTO 49-51 mg per tablet Take 1 tablet by mouth 2 (two) times daily.      EScitalopram oxalate (LEXAPRO) 20 MG tablet Take 1 tablet (20 mg total) by mouth once daily. 90 tablet 1    fluticasone propionate (FLONASE) 50 mcg/actuation nasal spray 2 sprays (100 mcg total) by Each Nostril route once daily. 16 g 0    furosemide (LASIX) 20 MG tablet Take 1 tablet by mouth every morning.      gabapentin (NEURONTIN) 300 MG capsule Take 1 capsule (300 mg total) by mouth 2 (two) times daily. 60 capsule 0    HYDROcodone-acetaminophen (NORCO) 7.5-325 mg per tablet Take 1 tablet by mouth every 8 (eight) hours as needed for Pain. 21 tablet 0    hydrOXYzine (ATARAX) 50 MG tablet Take 1 tablet by mouth 2 (two) times daily.       metoprolol tartrate (LOPRESSOR) 50 MG tablet Take 1 tablet by mouth 2 (two) times daily.      ondansetron (ZOFRAN) 4 MG tablet Take 1 tablet (4 mg total) by mouth every 6 (six) hours as needed for Nausea. 10 tablet 0    rosuvastatin (CRESTOR) 40 MG Tab Take 40 mg by mouth every evening.      tadalafiL (CIALIS) 5 MG tablet Take 1 tablet by mouth once daily.      testosterone cypionate 200 mg/mL Kit Inject 1 mL into the muscle every 7 days.      traZODone (DESYREL) 50 MG tablet Take 1 tablet by mouth every evening.      WEGOVY 0.25 mg/0.5 mL PnIj INJECT 0.25 MG SUBCUTANEOUSLY WEEKLY AS DIRECTED      busPIRone (BUSPAR) 15 MG tablet Take 1 tablet (15 mg total) by mouth 3 (three) times daily. 90 tablet 0    oxyCODONE-acetaminophen (PERCOCET) 5-325 mg per tablet Take 1 tablet by mouth every 8 (eight) hours as needed for Pain. (Patient not taking: Reported on 2025) 21 each 0    pantoprazole (PROTONIX) 40 MG tablet Take 1 tablet (40 mg total) by mouth once daily. (Patient not taking: Reported on 2025) 90 tablet 3    tadalafiL (CIALIS) 10 MG tablet Take 10 mg by mouth daily as needed. (Patient not taking: Reported on 2025)       No current facility-administered medications for this visit.   [2]   Social History  Socioeconomic History    Marital status:    Occupational History    Occupation: disability   Tobacco Use    Smoking status: Former     Current packs/day: 0.00     Types: Cigarettes     Quit date: 2016     Years since quittin.2     Passive exposure: Past    Smokeless tobacco: Former     Quit date: 2016    Tobacco comments:     Occasional  vaping   Substance and Sexual Activity    Alcohol use: Not Currently     Alcohol/week: 1.0 standard drink of alcohol     Types: 1 Glasses of wine per week     Comment: 20 - 30 shots vodka per day or 1-2 1/5ths per day for 2 years (started )    Drug use: No    Sexual activity: Yes     Partners: Female     Social Drivers of Health     Financial  Resource Strain: Medium Risk (3/18/2024)    Overall Financial Resource Strain (CARDIA)     Difficulty of Paying Living Expenses: Somewhat hard   Food Insecurity: No Food Insecurity (3/18/2024)    Hunger Vital Sign     Worried About Running Out of Food in the Last Year: Never true     Ran Out of Food in the Last Year: Never true   Transportation Needs: No Transportation Needs (3/18/2024)    PRAPARE - Transportation     Lack of Transportation (Medical): No     Lack of Transportation (Non-Medical): No   Physical Activity: Insufficiently Active (3/18/2024)    Exercise Vital Sign     Days of Exercise per Week: 2 days     Minutes of Exercise per Session: 60 min   Stress: No Stress Concern Present (3/18/2024)    [employer] - Occupational Stress Questionnaire     Feeling of Stress : Only a little   Housing Stability: Unknown (3/18/2024)    Housing Stability Vital Sign     Unable to Pay for Housing in the Last Year: No     Unstable Housing in the Last Year: No

## 2025-04-08 ENCOUNTER — OFFICE VISIT (OUTPATIENT)
Dept: ORTHOPEDICS | Facility: CLINIC | Age: 50
End: 2025-04-08
Payer: MEDICAID

## 2025-04-08 ENCOUNTER — HOSPITAL ENCOUNTER (OUTPATIENT)
Dept: RADIOLOGY | Facility: HOSPITAL | Age: 50
Discharge: HOME | End: 2025-04-08
Attending: ORTHOPAEDIC SURGERY
Payer: MEDICAID

## 2025-04-08 VITALS — BODY MASS INDEX: 36.22 KG/M2 | WEIGHT: 230.81 LBS | HEIGHT: 67 IN

## 2025-04-08 DIAGNOSIS — Z96.641 STATUS POST TOTAL HIP REPLACEMENT, RIGHT: Primary | ICD-10-CM

## 2025-04-08 PROCEDURE — 4010F ACE/ARB THERAPY RXD/TAKEN: CPT | Mod: CPTII,,, | Performed by: ORTHOPAEDIC SURGERY

## 2025-04-08 PROCEDURE — 1159F MED LIST DOCD IN RCRD: CPT | Mod: CPTII,,, | Performed by: ORTHOPAEDIC SURGERY

## 2025-04-08 PROCEDURE — 99213 OFFICE O/P EST LOW 20 MIN: CPT | Mod: PBBFAC,25,PN | Performed by: ORTHOPAEDIC SURGERY

## 2025-04-08 PROCEDURE — 99999 PR PBB SHADOW E&M-EST. PATIENT-LVL III: CPT | Mod: PBBFAC,,, | Performed by: ORTHOPAEDIC SURGERY

## 2025-04-08 PROCEDURE — 72170 X-RAY EXAM OF PELVIS: CPT | Mod: 26,,, | Performed by: RADIOLOGY

## 2025-04-08 PROCEDURE — 99024 POSTOP FOLLOW-UP VISIT: CPT | Mod: ,,, | Performed by: ORTHOPAEDIC SURGERY

## 2025-04-08 PROCEDURE — 1160F RVW MEDS BY RX/DR IN RCRD: CPT | Mod: CPTII,,, | Performed by: ORTHOPAEDIC SURGERY

## 2025-04-08 PROCEDURE — 72170 X-RAY EXAM OF PELVIS: CPT | Mod: TC,PN

## 2025-04-08 NOTE — PROGRESS NOTES
[facility]     Subjective:    Chief Complaint:   Chief Complaint   Patient presents with    Right Hip - Post-op Evaluation     PO R MARINA 2.17.25. PT is going well, but still has pain associated after.        Past Medical History:   Diagnosis Date    ADHD (attention deficit hyperactivity disorder)     Arthritis     Asthma     as child only    Back pain     Coronary artery disease     Degeneration of lumbar intervertebral disc 05/2016    Depression     Digestive disorder     Elevated PSA     Headache     Hyperlipidemia     Hypertension     Kidney damage     stage 3 ; d/t aleve abuse    Kidney stone     Myocardial infarction     Neck pain     Numbness and tingling in hands     Numbness and tingling of both legs     Osteonecrosis     Bilat Femur    Seizures     from inapsine 2002    Sleep apnea     no cpap    Wears glasses     CONTACS       Past Surgical History:   Procedure Laterality Date    BACK SURGERY  2016    back surgery    BONE GRAFT N/A 11/05/2020    Procedure: BONE GRAFT;  Surgeon: Mateusz Blanco MD;  Location: [facility] OR;  Service: Orthopedics;  Laterality: N/A;    CARDIAC SURGERY  01/06/2020    CABG X 7    CORONARY ARTERY BYPASS GRAFT      7 vessels    FLEXIBLE SIGMOIDOSCOPY N/A 07/24/2024    Procedure: SIGMOIDOSCOPY, FLEXIBLE;  Surgeon: Latesha Victoria MD;  Location: [facility] ENDO;  Service: Endoscopy;  Laterality: N/A;    HERNIA REPAIR Bilateral 11/22/2017    HIP ARTHROPLASTY Left 09/23/2024    Procedure: ARTHROPLASTY, HIP;  Surgeon: Lázaro Jaimes MD;  Location: [facility] OR;  Service: Orthopedics;  Laterality: Left;  Sincere    HIP REPLACEMENT ARTHROPLASTY Right 2/17/2025    Procedure: ARTHROPLASTY, HIP REPLACEMENT;  Surgeon: Lázaro Jaimes MD;  Location: [facility] OR;  Service: Orthopedics;  Laterality: Right;  Sincere    LITHOTRIPSY      LUMBAR LAMINECTOMY WITH FUSION Bilateral 11/05/2020    Procedure: LAMINECTOMY, SPINE, LUMBAR, WITH FUSION;  Surgeon: Mateusz Blanco MD;  Location: [facility] OR;  Service:  Orthopedics;  Laterality: Bilateral;  MEDTRONIC  NTI  L-3    PILONIDAL CYST DRAINAGE      removal of abcess      scrotal    REMOVAL OF HARDWARE FROM SPINE Bilateral 11/05/2020    Procedure: REMOVAL, HARDWARE, SPINE;  Surgeon: Mateusz Blanco MD;  Location: [facility] OR;  Service: Orthopedics;  Laterality: Bilateral;  L 4-5    SURGICAL REMOVAL OF PILONIDAL CYST N/A 04/15/2024    Procedure: EXCISION, PILONIDAL CYST;  Surgeon: Jayden Phillips III, MD;  Location: [facility] OR;  Service: General;  Laterality: N/A;  sacral area    TOTAL HIP ARTHROPLASTY Right 02/17/2025    TYMPANOSTOMY TUBE PLACEMENT         Current Outpatient Medications   Medication Sig    anastrozole (ARIMIDEX) 1 mg Tab Take 1 mg by mouth every 7 days.    busPIRone (BUSPAR) 15 MG tablet Take 1 tablet (15 mg total) by mouth 3 (three) times daily.    clopidogreL (PLAVIX) 75 mg tablet Take 75 mg by mouth once daily.    cyclobenzaprine (FLEXERIL) 10 MG tablet Take 10 mg by mouth 2 (two) times daily.    ENTRESTO 49-51 mg per tablet Take 1 tablet by mouth 2 (two) times daily.    EScitalopram oxalate (LEXAPRO) 20 MG tablet Take 1 tablet (20 mg total) by mouth once daily.    fluticasone propionate (FLONASE) 50 mcg/actuation nasal spray 2 sprays (100 mcg total) by Each Nostril route once daily.    furosemide (LASIX) 20 MG tablet Take 1 tablet by mouth every morning.    gabapentin (NEURONTIN) 300 MG capsule Take 1 capsule (300 mg total) by mouth 2 (two) times daily.    HYDROcodone-acetaminophen (NORCO) 7.5-325 mg per tablet Take 1 tablet by mouth every 8 (eight) hours as needed for Pain.    hydrOXYzine (ATARAX) 50 MG tablet Take 1 tablet by mouth 2 (two) times daily.    metoprolol tartrate (LOPRESSOR) 50 MG tablet Take 1 tablet by mouth 2 (two) times daily.    ondansetron (ZOFRAN) 4 MG tablet Take 1 tablet (4 mg total) by mouth every 6 (six) hours as needed for Nausea.    rosuvastatin (CRESTOR) 40 MG Tab Take 40 mg by mouth every evening.    tadalafiL (CIALIS) 5 MG  tablet Take 1 tablet by mouth once daily.    testosterone cypionate 200 mg/mL Kit Inject 1 mL into the muscle every 7 days.    traZODone (DESYREL) 50 MG tablet Take 1 tablet by mouth every evening.    WEGOVY 0.25 mg/0.5 mL PnIj INJECT 0.25 MG SUBCUTANEOUSLY WEEKLY AS DIRECTED    pantoprazole (PROTONIX) 40 MG tablet Take 1 tablet (40 mg total) by mouth once daily. (Patient not taking: Reported on 4/8/2025)    tadalafiL (CIALIS) 10 MG tablet Take 10 mg by mouth daily as needed. (Patient not taking: Reported on 4/8/2025)     No current facility-administered medications for this visit.       Review of patient's allergies indicates:   Allergen Reactions    Inapsine [droperidol] Anaphylaxis     seizures    Effexor [venlafaxine]      Increased anxiety    Pcn [penicillins]     Bactrim [sulfamethoxazole-trimethoprim] Rash     Dry red rash all over when in the sun    Fluconazole Rash     Pruritis         Family History   Problem Relation Name Age of Onset    Heart disease Mother      Migraines Mother      Hypertension Mother      Early death Father accident     Heart disease Father accident     Diabetes Maternal Aunt      Diabetes Maternal Uncle      Diabetes Maternal Grandfather         Social History[1]    History of present illness:  49-year-old male 7 weeks status post right total hip arthroplasty performed on February 17, 2025.  He states that he has been doing well and is pleased with his outcome at this time.  He has been attending formal physical therapy regularly which he feels has been helping improve his mobility and strengthening.  He mentions intermittent soreness the day of physical therapy in the day after him which he takes his pain medications to help manage his discomfort.      Review of Systems:    Constitution: Negative for chills, fever, and sweats.  Negative for unexplained weight loss.    HENT:  Negative for headaches and blurry vision.    Cardiovascular:Negative for chest pain or irregular heart beat.  Negative for hypertension.    Respiratory:  Negative for cough and shortness of breath.    Gastrointestinal: Negative for abdominal pain, heartburn, melena, nausea, and vomitting.    Genitourinary:  Negative bladder incontinence and dysuria.    Musculoskeletal:  See HPI for details.    Neurological: Negative for numbness.    Psychiatric/Behavioral: Negative for depression.  The patient is not nervous/anxious.      Endocrine: Negative for polyuria    Hematologic/Lymphatic: Negative for bleeding problem.  Does not bruise/bleed easily.    Skin: Negative for poor would healing and rash    Objective:     Physical Examination:    Vital Signs: VITALS@    Body mass index is 36.15 kg/m².    This a well-developed, well nourished patient in no acute distress.  They are alert and oriented and cooperative to examination.        Right hip exam:  Well-healed incision of the right hip consistent with right total hip arthroplasty.  Skin of the right hip is clean dry and intact.  No erythema or ecchymosis.  No signs or symptoms of infection.  Good range of motion of the right hip without groin pain.  Negative Homans on the right.  He is weight-bearing on the right hip without an assistive device.    Pertinent New Results:    XRAY Report / Interpretation:  Two views of the right hip taken in clinic today reveal hardware consistent with right total hip arthroplasty in proper alignment without evidence of hardware loosening or failure.    Assessment/Plan:     Stable 7 weeks status post right total hip arthroplasty performed on February 17, 2025.  He has been progressing well postoperatively.  He has been attending formal physical therapy regularly and feels it has been helping.  Recommend he continue with physical therapy to continue improving his mobility and strengthening of the right hip postoperatively.  He has no restrictions and can return to his regular daily activities.  Follow up as needed in regards to his right hip.    Rudolph  "Carla, Physician Assistant, served in the capacity as a "scribe" for this patient encounter.  A "face-to-face" encounter occurred with Dr. Lázaro Jaimes on this date.  The treatment plan and medical decision-making is outlined above. Patient was seen and examined with a chaperone.     This note was created using Dragon voice recognition software that occasionally misinterpreted phrases or words.       [1]   Social History  Socioeconomic History    Marital status:    Occupational History    Occupation: disability   Tobacco Use    Smoking status: Former     Current packs/day: 0.00     Types: Cigarettes     Quit date: 2016     Years since quittin.2     Passive exposure: Past    Smokeless tobacco: Former     Quit date: 2016    Tobacco comments:     Occasional  vaping   Substance and Sexual Activity    Alcohol use: Not Currently     Alcohol/week: 1.0 standard drink of alcohol     Types: 1 Glasses of wine per week     Comment: 20 - 30 shots vodka per day or 1-2 1/5ths per day for 2 years (started )    Drug use: No    Sexual activity: Yes     Partners: Female     Social Drivers of Health     Financial Resource Strain: Medium Risk (3/18/2024)    Overall Financial Resource Strain (CARDIA)     Difficulty of Paying Living Expenses: Somewhat hard   Food Insecurity: No Food Insecurity (3/18/2024)    Hunger Vital Sign     Worried About Running Out of Food in the Last Year: Never true     Ran Out of Food in the Last Year: Never true   Transportation Needs: No Transportation Needs (3/18/2024)    PRAPARE - Transportation     Lack of Transportation (Medical): No     Lack of Transportation (Non-Medical): No   Physical Activity: Insufficiently Active (3/18/2024)    Exercise Vital Sign     Days of Exercise per Week: 2 days     Minutes of Exercise per Session: 60 min   Stress: No Stress Concern Present (3/18/2024)    [employer] - Occupational Stress Questionnaire     Feeling of " Stress : Only a little   Housing Stability: Unknown (3/18/2024)    Housing Stability Vital Sign     Unable to Pay for Housing in the Last Year: No     Unstable Housing in the Last Year: No

## 2025-04-10 ENCOUNTER — CLINICAL SUPPORT (OUTPATIENT)
Dept: REHABILITATION | Facility: HOSPITAL | Age: 50
End: 2025-04-10
Payer: MEDICAID

## 2025-04-10 DIAGNOSIS — M25.651 DECREASED RANGE OF RIGHT HIP MOVEMENT: Primary | ICD-10-CM

## 2025-04-10 DIAGNOSIS — Z74.09 IMPAIRED FUNCTIONAL MOBILITY, BALANCE, GAIT, AND ENDURANCE: ICD-10-CM

## 2025-04-10 DIAGNOSIS — Z96.641 STATUS POST TOTAL HIP REPLACEMENT, RIGHT: ICD-10-CM

## 2025-04-10 PROCEDURE — 97110 THERAPEUTIC EXERCISES: CPT | Mod: PN,CQ

## 2025-04-10 RX ORDER — GABAPENTIN 300 MG/1
300 CAPSULE ORAL 2 TIMES DAILY
Qty: 60 CAPSULE | Refills: 0 | Status: SHIPPED | OUTPATIENT
Start: 2025-04-10 | End: 2025-05-10

## 2025-04-11 ENCOUNTER — HOSPITAL ENCOUNTER (OUTPATIENT)
Dept: RADIOLOGY | Facility: HOSPITAL | Age: 50
Discharge: HOME | End: 2025-04-11
Attending: ORTHOPAEDIC SURGERY
Payer: MEDICAID

## 2025-04-11 DIAGNOSIS — M54.16 LUMBAR RADICULOPATHY: ICD-10-CM

## 2025-04-11 DIAGNOSIS — Z98.1 ADJACENT SEGMENT DISEASE OF LUMBAR SPINE WITH HISTORY OF FUSION PROCEDURE: ICD-10-CM

## 2025-04-11 DIAGNOSIS — Z98.1 HISTORY OF LUMBAR SPINAL FUSION: ICD-10-CM

## 2025-04-11 DIAGNOSIS — M51.369 ADJACENT SEGMENT DISEASE OF LUMBAR SPINE WITH HISTORY OF FUSION PROCEDURE: ICD-10-CM

## 2025-04-11 PROCEDURE — 72148 MRI LUMBAR SPINE W/O DYE: CPT | Mod: 26,,, | Performed by: RADIOLOGY

## 2025-04-11 PROCEDURE — 72148 MRI LUMBAR SPINE W/O DYE: CPT | Mod: TC

## 2025-04-11 NOTE — PROGRESS NOTES
Outpatient Rehab  Physical Therapy Visit    Patient Name: Lee Quintanilla   MRN: [id number]   YOB: 1975   Encounter Date: 4/10/2025     Therapy Diagnosis:   Encounter Diagnoses   Name Primary?    Decreased range of right hip movement Yes    Impaired functional mobility, balance, gait, and endurance      Physician: Collins Haynes, *    Physician Orders: Eval and Treat  Medical Diagnosis: Status post total hip replacement, right [Z96.641]      Visit # / Visits Authorized:  6 / 12  Date of Evaluation:  3/11/2025   Insurance Authorization Period: 3/13/2025 to 5/13/2026  Plan of Care Certification:  3/11/2025 to 5/9/2025     Time In: 1400   Time Out: 1455  Total Time: 55   Total Billable Time: 54 minutes      Physical Therapy technician assisted with treatment under direct supervision of treating therapist as needed.     FOTO:  Intake Score:  %  Survey Score 1:  %  Survey Score 2:  %       Subjective   He saw his doctor for his back and is scheduled for an MRI tomorrow. Hip is feeling much better since surgery..  Pain reported as 2/10.      Objective          Treatment:  Therapeutic Exercise  TE 1: NuStep 10 minutes; Level 1  TE 2: Supine DKTC with theraball; pain-free range 3 x 10; Supine Swiss Ball PressDown for core eng  TE 3: Supine Paloff Press Perturbation; 3 x 10 B  TE 4: SLR 3 x 10 B 2#  TE 5: Standing Clams 3 x 8 B GTB  TE 6: Standing Hip 3-way 3 x 5 B; with UE support  TE 7: DL Leg Press 40# 3 x 10  TE 8: SL Leg Press 20# 3 x 8 B  TE 9: Seated Thoracic Rotation in open books form x 15 B  TE 10: Seated LAQ 15# 3 x 10 B; Seated Hip Abd 70# 3 x 10       Assessment & Plan     Assessment: Lee notes with good tolerance to tx. He is doing well with right hip progressions but remains limited by lumbar symptomology. Continued foucs on improving remaining strength deficits to allow him to return to PLOF without pain or limitations.  Evaluation/Treatment Tolerance: Patient tolerated  treatment well    Patient will continue to benefit from skilled outpatient physical therapy to address the deficits listed in the problem list box on initial evaluation, provide pt/family education and to maximize pt's level of independence in the home and community environment.     Patient's spiritual, cultural, and educational needs considered and patient agreeable to plan of care and goals.           Plan: progress as toleratedfollowing posterior hip precautions    Goals:   Active       LTG       Pt will improve TUG to <12s to demonstrate decrease in fall risk (Progressing)       Start:  03/11/25    Expected End:  05/09/25            Pt will improve 5xSTS to <20s to demonstrate improved LQ strength and efficiency.  (Progressing)       Start:  03/11/25    Expected End:  05/09/25            Pt will report 50% reduction in hip/knee.back pain to improve QOL and tolerance to ADL's (Progressing)       Start:  03/11/25    Expected End:  05/09/25               STG       Pt will improve SLB to at least 5s B to demonstrate improve weight bearing tolerance and pelvic stability (Progressing)       Start:  03/11/25    Expected End:  04/11/25            Pt will improve MMT of LQ by 1/3 grade to improve hip loading tolerance (Progressing)       Start:  03/11/25    Expected End:  04/11/25            Pt will improve TUG to <15s to demonstrate improve gait speed and coordination  (Progressing)       Start:  03/11/25    Expected End:  04/11/25                Vicky Elias, PTA

## 2025-04-14 ENCOUNTER — LAB VISIT (OUTPATIENT)
Dept: LAB | Facility: HOSPITAL | Age: 50
End: 2025-04-14
Attending: INTERNAL MEDICINE
Payer: MEDICAID

## 2025-04-14 ENCOUNTER — OFFICE VISIT (OUTPATIENT)
Dept: ORTHOPEDICS | Facility: CLINIC | Age: 50
End: 2025-04-14
Payer: MEDICAID

## 2025-04-14 ENCOUNTER — PATIENT MESSAGE (OUTPATIENT)
Dept: ORTHOPEDICS | Facility: CLINIC | Age: 50
End: 2025-04-14

## 2025-04-14 VITALS
SYSTOLIC BLOOD PRESSURE: 108 MMHG | DIASTOLIC BLOOD PRESSURE: 58 MMHG | BODY MASS INDEX: 36.22 KG/M2 | WEIGHT: 230.81 LBS | HEIGHT: 67 IN

## 2025-04-14 DIAGNOSIS — M43.10 RETROLISTHESIS OF VERTEBRAE: ICD-10-CM

## 2025-04-14 DIAGNOSIS — M51.369 ADJACENT SEGMENT DISEASE OF LUMBAR SPINE WITH HISTORY OF FUSION PROCEDURE: ICD-10-CM

## 2025-04-14 DIAGNOSIS — Z98.1 HISTORY OF LUMBAR SPINAL FUSION: ICD-10-CM

## 2025-04-14 DIAGNOSIS — Z96.641 STATUS POST TOTAL HIP REPLACEMENT, RIGHT: ICD-10-CM

## 2025-04-14 DIAGNOSIS — M48.061 FORAMINAL STENOSIS OF LUMBAR REGION: ICD-10-CM

## 2025-04-14 DIAGNOSIS — M54.16 LUMBAR RADICULOPATHY: Primary | ICD-10-CM

## 2025-04-14 DIAGNOSIS — Z98.1 ADJACENT SEGMENT DISEASE OF LUMBAR SPINE WITH HISTORY OF FUSION PROCEDURE: ICD-10-CM

## 2025-04-14 DIAGNOSIS — N17.9 ACUTE KIDNEY FAILURE, UNSPECIFIED: Primary | ICD-10-CM

## 2025-04-14 DIAGNOSIS — E87.6 HYPOPOTASSEMIA: ICD-10-CM

## 2025-04-14 LAB
ABSOLUTE EOSINOPHIL (SMH): 0.2 K/UL
ABSOLUTE MONOCYTE (SMH): 0.86 K/UL (ref 0.3–1)
ABSOLUTE NEUTROPHIL COUNT (SMH): 7.2 K/UL (ref 1.8–7.7)
ALBUMIN SERPL-MCNC: 4.1 G/DL (ref 3.5–5.2)
ANION GAP (SMH): 7 MMOL/L (ref 8–16)
BASOPHILS # BLD AUTO: 0.03 K/UL
BASOPHILS NFR BLD AUTO: 0.3 %
BILIRUB UR QL STRIP.AUTO: NEGATIVE
BUN SERPL-MCNC: 9 MG/DL (ref 6–20)
CALCIUM SERPL-MCNC: 9.2 MG/DL (ref 8.7–10.5)
CHLORIDE SERPL-SCNC: 104 MMOL/L (ref 95–110)
CLARITY UR: CLEAR
CO2 SERPL-SCNC: 27 MMOL/L (ref 23–29)
COLOR UR AUTO: YELLOW
CREAT SERPL-MCNC: 1.7 MG/DL (ref 0.5–1.4)
CREAT UR-MCNC: 153.8 MG/DL (ref 23–375)
ERYTHROCYTE [DISTWIDTH] IN BLOOD BY AUTOMATED COUNT: 15.5 % (ref 11.5–14.5)
GFR SERPLBLD CREATININE-BSD FMLA CKD-EPI: 49 ML/MIN/1.73/M2
GLUCOSE SERPL-MCNC: 72 MG/DL (ref 70–110)
GLUCOSE UR QL STRIP: NEGATIVE
HCT VFR BLD AUTO: 46.2 % (ref 40–54)
HGB BLD-MCNC: 14.5 GM/DL (ref 14–18)
HGB UR QL STRIP: NEGATIVE
IMM GRANULOCYTES # BLD AUTO: 0.06 K/UL (ref 0–0.04)
IMM GRANULOCYTES NFR BLD AUTO: 0.6 % (ref 0–0.5)
KETONES UR QL STRIP: NEGATIVE
LEUKOCYTE ESTERASE UR QL STRIP: NEGATIVE
LYMPHOCYTES # BLD AUTO: 1.82 K/UL (ref 1–4.8)
MAGNESIUM SERPL-MCNC: 1.9 MG/DL (ref 1.6–2.6)
MCH RBC QN AUTO: 26.7 PG (ref 27–31)
MCHC RBC AUTO-ENTMCNC: 31.4 G/DL (ref 32–36)
MCV RBC AUTO: 85 FL (ref 82–98)
MICROSCOPIC COMMENT: NORMAL
NITRITE UR QL STRIP: NEGATIVE
NUCLEATED RBC (/100WBC) (SMH): 0 /100 WBC
PH UR STRIP: 7 [PH]
PHOSPHATE SERPL-MCNC: 3.4 MG/DL (ref 2.7–4.5)
PLATELET # BLD AUTO: 255 K/UL (ref 150–450)
PMV BLD AUTO: 10.9 FL (ref 9.2–12.9)
POTASSIUM SERPL-SCNC: 4.1 MMOL/L (ref 3.5–5.1)
PROT UR QL STRIP: NEGATIVE
PROT UR-MCNC: 24 MG/DL
RBC # BLD AUTO: 5.44 M/UL (ref 4.6–6.2)
RBC #/AREA URNS AUTO: 1 /HPF
RELATIVE EOSINOPHIL (SMH): 2 % (ref 0–8)
RELATIVE LYMPHOCYTE (SMH): 17.9 % (ref 18–48)
RELATIVE MONOCYTE (SMH): 8.5 % (ref 4–15)
RELATIVE NEUTROPHIL (SMH): 70.7 % (ref 38–73)
SODIUM SERPL-SCNC: 138 MMOL/L (ref 136–145)
SP GR UR STRIP: 1.01
UROBILINOGEN UR STRIP-ACNC: NEGATIVE EU/DL
WBC # BLD AUTO: 10.15 K/UL (ref 3.9–12.7)
WBC #/AREA URNS AUTO: 1 /HPF

## 2025-04-14 PROCEDURE — 3078F DIAST BP <80 MM HG: CPT | Mod: CPTII,,, | Performed by: ORTHOPAEDIC SURGERY

## 2025-04-14 PROCEDURE — 36415 COLL VENOUS BLD VENIPUNCTURE: CPT

## 2025-04-14 PROCEDURE — 99213 OFFICE O/P EST LOW 20 MIN: CPT | Mod: S$PBB,24,, | Performed by: ORTHOPAEDIC SURGERY

## 2025-04-14 PROCEDURE — 82435 ASSAY OF BLOOD CHLORIDE: CPT

## 2025-04-14 PROCEDURE — 99999 PR PBB SHADOW E&M-EST. PATIENT-LVL IV: CPT | Mod: PBBFAC,,, | Performed by: ORTHOPAEDIC SURGERY

## 2025-04-14 PROCEDURE — 85025 COMPLETE CBC W/AUTO DIFF WBC: CPT

## 2025-04-14 PROCEDURE — 4010F ACE/ARB THERAPY RXD/TAKEN: CPT | Mod: CPTII,,, | Performed by: ORTHOPAEDIC SURGERY

## 2025-04-14 PROCEDURE — 83735 ASSAY OF MAGNESIUM: CPT

## 2025-04-14 PROCEDURE — 81001 URINALYSIS AUTO W/SCOPE: CPT

## 2025-04-14 PROCEDURE — 1159F MED LIST DOCD IN RCRD: CPT | Mod: CPTII,,, | Performed by: ORTHOPAEDIC SURGERY

## 2025-04-14 PROCEDURE — 3074F SYST BP LT 130 MM HG: CPT | Mod: CPTII,,, | Performed by: ORTHOPAEDIC SURGERY

## 2025-04-14 PROCEDURE — 99214 OFFICE O/P EST MOD 30 MIN: CPT | Mod: PBBFAC,PN | Performed by: ORTHOPAEDIC SURGERY

## 2025-04-14 PROCEDURE — 82570 ASSAY OF URINE CREATININE: CPT

## 2025-04-14 PROCEDURE — 84156 ASSAY OF PROTEIN URINE: CPT

## 2025-04-14 PROCEDURE — 3008F BODY MASS INDEX DOCD: CPT | Mod: CPTII,,, | Performed by: ORTHOPAEDIC SURGERY

## 2025-04-14 PROCEDURE — 1160F RVW MEDS BY RX/DR IN RCRD: CPT | Mod: CPTII,,, | Performed by: ORTHOPAEDIC SURGERY

## 2025-04-14 RX ORDER — HYDROCODONE BITARTRATE AND ACETAMINOPHEN 7.5; 325 MG/1; MG/1
1 TABLET ORAL EVERY 8 HOURS PRN
Qty: 21 TABLET | Refills: 0 | Status: SHIPPED | OUTPATIENT
Start: 2025-04-14

## 2025-04-14 RX ORDER — GABAPENTIN 300 MG/1
300 CAPSULE ORAL 3 TIMES DAILY
Qty: 90 CAPSULE | Refills: 2 | Status: SHIPPED | OUTPATIENT
Start: 2025-04-14

## 2025-04-14 RX ORDER — CYCLOBENZAPRINE HCL 10 MG
10 TABLET ORAL 2 TIMES DAILY
Qty: 60 TABLET | Refills: 3 | Status: SHIPPED | OUTPATIENT
Start: 2025-04-14

## 2025-04-14 NOTE — PROGRESS NOTES
Subjective:       Patient ID: Lee Quintanilla is a 49 y.o. male.    Chief Complaint: Pain of the Lumbar Spine (MRI Lumbar results, lumbar pain is same, no change, c/o pain down bilateral legs to fee, c/o numbness and tingling to bilateral legs, H/O L3-4 LL&F 11/5/2020)      History of Present Illness    Prior to meeting with the patient I reviewed the medical chart in [facility]. This included reviewing the previous progress notes from our office, review of the patient's last appointment with their primary care provider, review of any visits to the emergency room, and review of any pain management appointments or procedures.   Lee comes in today for follow-up for his lumbar spine with bilateral lower extremity radicular symptoms.  He does have history of L5-S1 interbody fusion in 2016 with Dr. Simms.  He had a subsequent interbody with posterior instrumentation fusing L4-5 with Dr. Blanco in 2020.  He has done well with both of these.  Unfortunately, the past couple of years she has developed more frequent low back pain.  Recent bilateral total hip arthroplasties with Dr. Jaimes that has done well for him.  He has been doing physical therapy for his hips and back.  Unfortunately he has not had significant relief with his back symptoms.  It radiates to bilateral lower extremities to just below the knee bilaterally.  Denies any bowel or bladder incontinence.  His leg and back symptoms are equally problematic for him.  He reports his right leg is a bit more symptomatic than the left.    Current Medications  Current Medications[1]    Allergies  Review of patient's allergies indicates:   Allergen Reactions    Inapsine [droperidol] Anaphylaxis     seizures    Effexor [venlafaxine]      Increased anxiety    Pcn [penicillins]     Bactrim [sulfamethoxazole-trimethoprim] Rash     Dry red rash all over when in the sun    Fluconazole Rash     Pruritis         Past Medical History  Past Medical History:   Diagnosis Date     ADHD (attention deficit hyperactivity disorder)     Arthritis     Asthma     as child only    Back pain     Coronary artery disease     Degeneration of lumbar intervertebral disc 05/2016    Depression     Digestive disorder     Elevated PSA     Headache     Hyperlipidemia     Hypertension     Kidney damage     stage 3 ; d/t aleve abuse    Kidney stone     Myocardial infarction     Neck pain     Numbness and tingling in hands     Numbness and tingling of both legs     Osteonecrosis     Bilat Femur    Seizures     from inapsine 2002    Sleep apnea     no cpap    Wears glasses     CONTACS       Surgical History  Past Surgical History:   Procedure Laterality Date    BACK SURGERY  2016    back surgery    BONE GRAFT N/A 11/05/2020    Procedure: BONE GRAFT;  Surgeon: Mateusz Blanco MD;  Location: [facility] OR;  Service: Orthopedics;  Laterality: N/A;    CARDIAC SURGERY  01/06/2020    CABG X 7    CORONARY ARTERY BYPASS GRAFT      7 vessels    FLEXIBLE SIGMOIDOSCOPY N/A 07/24/2024    Procedure: SIGMOIDOSCOPY, FLEXIBLE;  Surgeon: Latesha Victoria MD;  Location: [facility] ENDO;  Service: Endoscopy;  Laterality: N/A;    HERNIA REPAIR Bilateral 11/22/2017    HIP ARTHROPLASTY Left 09/23/2024    Procedure: ARTHROPLASTY, HIP;  Surgeon: Lázaro Jaimes MD;  Location: [facility] OR;  Service: Orthopedics;  Laterality: Left;  Sincere    HIP REPLACEMENT ARTHROPLASTY Right 2/17/2025    Procedure: ARTHROPLASTY, HIP REPLACEMENT;  Surgeon: Lázaro Jaimes MD;  Location: [facility] OR;  Service: Orthopedics;  Laterality: Right;  Sincere    LITHOTRIPSY      LUMBAR LAMINECTOMY WITH FUSION Bilateral 11/05/2020    Procedure: LAMINECTOMY, SPINE, LUMBAR, WITH FUSION;  Surgeon: Mateusz Blanco MD;  Location: [facility] OR;  Service: Orthopedics;  Laterality: Bilateral;  MEDTRONIC  NTI  L-3    PILONIDAL CYST DRAINAGE      removal of abcess      scrotal    REMOVAL OF HARDWARE FROM SPINE Bilateral 11/05/2020    Procedure: REMOVAL, HARDWARE, SPINE;  Surgeon: Mateusz Blanco MD;   Location: [facility] OR;  Service: Orthopedics;  Laterality: Bilateral;  L 4-5    SURGICAL REMOVAL OF PILONIDAL CYST N/A 04/15/2024    Procedure: EXCISION, PILONIDAL CYST;  Surgeon: Jayden Phillips III, MD;  Location: [facility] OR;  Service: General;  Laterality: N/A;  sacral area    TOTAL HIP ARTHROPLASTY Right 02/17/2025    TYMPANOSTOMY TUBE PLACEMENT         Family History:   Family History   Problem Relation Name Age of Onset    Heart disease Mother      Migraines Mother      Hypertension Mother      Early death Father accident     Heart disease Father accident     Diabetes Maternal Aunt      Diabetes Maternal Uncle      Diabetes Maternal Grandfather         Social History:   Social History[2]    Hospitalization/Major Diagnostic Procedure:     Review of Systems     General/Constitutional:  Chills denies. Fatigue denies. Fever denies. Weight gain denies. Weight loss denies.    Respiratory:  Shortness of breath denies.    Cardiovascular:  Chest pain denies.    Gastrointestinal:  Constipation denies. Diarrhea denies. Nausea denies. Vomiting denies.     Hematology:  Easy bruising denies. Prolonged bleeding denies.     Genitourinary:  Frequent urination denies. Pain in lower back denies. Painful urination denies.     Musculoskeletal:  See HPI for details    Skin:  Rash denies.    Neurologic:  Dizziness denies. Gait abnormalities denies. Seizures denies. Tingling/Numbess denies.    Psychiatric:  Anxiety denies. Depressed mood denies.     Objective:   Vital Signs:   Vitals:    04/14/25 0933   BP: (!) 108/58        Physical Exam      General Examination:     Constitutional: The patient is alert and oriented to lace person and time. Mood is pleasant.     Head/Face: Normal facial features normal eyebrows    Eyes: Normal extraocular motion bilaterally    Lungs: Respirations are equal and unlabored    Gait is coordinated.    Cardiovascular: There are no swelling or varicosities present.    Lymphatic: Negative for  adenopathy    Skin: Normal    Neurological: Level of consciousness normal. Oriented to place person and time and situation    Psychiatric: Oriented to time place person and situation    Lumbar exam: Skin to lower back clean dry and intact.  No erythema or ecchymosis.  Well-healed lumbar incision consistent with previous surgeries without wound dehiscence or drainage.  He does stand erect.  He can weightbear as tolerated on bilateral lower extremities.  Nonantalgic gait.  Ambulates without an aid.  Mild diffuse tenderness to bilateral lumbar paravertebrals.  Straight leg raise maneuver does not produce radicular symptoms but does cause an increase in low back pain.  Well-preserved internal/external rotation of bilateral hips.    XRAY Report/ Interpretation:  No new radiographs taken on today's clinic visit.  See attached report of lumbar MRI.    Assessment:       1. Lumbar radiculopathy    2. Foraminal stenosis of lumbar region    3. History of lumbar spinal fusion    4. Retrolisthesis of vertebrae    5. Adjacent segment disease of lumbar spine with history of fusion procedure    6. Status post total hip replacement, right        Plan:       Lee was seen today for pain.    Diagnoses and all orders for this visit:    Lumbar radiculopathy    Foraminal stenosis of lumbar region    History of lumbar spinal fusion    Retrolisthesis of vertebrae    Adjacent segment disease of lumbar spine with history of fusion procedure    Status post total hip replacement, right  -     gabapentin (NEURONTIN) 300 MG capsule; Take 1 capsule (300 mg total) by mouth 3 (three) times daily.  The following orders have not been finalized:  -     HYDROcodone-acetaminophen (NORCO) 7.5-325 mg per tablet         Follow up for 6-8 week f/u - lumbar pain/radiculopathy.  This is to attest that the physician's assistant Rudolph Aguirre served in the capacity as a scribe for this patient's encounter.  This is also to verify that I have reviewed the  patient's history and helped formulate the treatment plan and discussed it with the physician's assistant.  I have actively participated in the evaluation and treatment plan for this patient.  The visit plan and medical decision-making is as outlined below  Treatment options were review with him today.  We will increase his gabapentin 300 mg from twice a day to 3 times a day.  Refill was provided.  He declined a referral to pain management due to previous poor response.  We will also provided a refill of his Norco pain medication.  We will check him back in 2 months to see how he responds to the increasing gabapentin.    Treatment options were discussed with regards to the nature of the medical condition. Conservative pain intervention and surgical options were discussed in detail. The probability of success of each separate treatment option was discussed. The patient expressed a clear understanding of the treatment options. With regards to surgery, the procedure risk, benefits, complications, and outcomes were discussed. No guarantees were given with regards to surgical outcome.   The risk of complications, morbidity, and mortality of patient management decisions have been made at the time of this visit. These are associated with the patient's problems, diagnostic procedures and treatment options. This includes the possible management options selected and those considered but not selected by the patient after shared medical decision making we discussed with the patient.     This note was created using Dragon voice recognition software that occasionally misinterpreted phrases or words.         [1]   Current Outpatient Medications   Medication Sig Dispense Refill    anastrozole (ARIMIDEX) 1 mg Tab Take 1 mg by mouth every 7 days.      busPIRone (BUSPAR) 15 MG tablet Take 1 tablet (15 mg total) by mouth 3 (three) times daily. 90 tablet 0    clopidogreL (PLAVIX) 75 mg tablet Take 75 mg by mouth once daily.       cyclobenzaprine (FLEXERIL) 10 MG tablet Take 10 mg by mouth 2 (two) times daily.      ENTRESTO 49-51 mg per tablet Take 1 tablet by mouth 2 (two) times daily.      EScitalopram oxalate (LEXAPRO) 20 MG tablet Take 1 tablet (20 mg total) by mouth once daily. 90 tablet 1    fluticasone propionate (FLONASE) 50 mcg/actuation nasal spray 2 sprays (100 mcg total) by Each Nostril route once daily. 16 g 0    furosemide (LASIX) 20 MG tablet Take 1 tablet by mouth every morning.      HYDROcodone-acetaminophen (NORCO) 7.5-325 mg per tablet Take 1 tablet by mouth every 8 (eight) hours as needed for Pain. 21 tablet 0    hydrOXYzine (ATARAX) 50 MG tablet Take 1 tablet by mouth 2 (two) times daily.      metoprolol tartrate (LOPRESSOR) 50 MG tablet Take 1 tablet by mouth 2 (two) times daily.      ondansetron (ZOFRAN) 4 MG tablet Take 1 tablet (4 mg total) by mouth every 6 (six) hours as needed for Nausea. 10 tablet 0    pantoprazole (PROTONIX) 40 MG tablet Take 1 tablet (40 mg total) by mouth once daily. 90 tablet 3    rosuvastatin (CRESTOR) 40 MG Tab Take 40 mg by mouth every evening.      tadalafiL (CIALIS) 10 MG tablet Take 10 mg by mouth daily as needed.      tadalafiL (CIALIS) 5 MG tablet Take 1 tablet by mouth once daily.      testosterone cypionate 200 mg/mL Kit Inject 1 mL into the muscle every 7 days.      traZODone (DESYREL) 50 MG tablet Take 1 tablet by mouth every evening.      WEGOVY 0.25 mg/0.5 mL PnIj INJECT 0.25 MG SUBCUTANEOUSLY WEEKLY AS DIRECTED      gabapentin (NEURONTIN) 300 MG capsule Take 1 capsule (300 mg total) by mouth 3 (three) times daily. 90 capsule 2     No current facility-administered medications for this visit.   [2]   Social History  Socioeconomic History    Marital status:    Occupational History    Occupation: disability   Tobacco Use    Smoking status: Former     Current packs/day: 0.00     Types: Cigarettes     Quit date: 2016     Years since quittin.2     Passive exposure: Past     Smokeless tobacco: Former     Quit date: 6/5/2016    Tobacco comments:     Occasional  vaping   Substance and Sexual Activity    Alcohol use: Not Currently     Alcohol/week: 1.0 standard drink of alcohol     Types: 1 Glasses of wine per week     Comment: 20 - 30 shots vodka per day or 1-2 1/5ths per day for 2 years (started 2021)    Drug use: No    Sexual activity: Yes     Partners: Female     Social Drivers of Health     Financial Resource Strain: Medium Risk (3/18/2024)    Overall Financial Resource Strain (CARDIA)     Difficulty of Paying Living Expenses: Somewhat hard   Food Insecurity: No Food Insecurity (3/18/2024)    Hunger Vital Sign     Worried About Running Out of Food in the Last Year: Never true     Ran Out of Food in the Last Year: Never true   Transportation Needs: No Transportation Needs (3/18/2024)    PRAPARE - Transportation     Lack of Transportation (Medical): No     Lack of Transportation (Non-Medical): No   Physical Activity: Insufficiently Active (3/18/2024)    Exercise Vital Sign     Days of Exercise per Week: 2 days     Minutes of Exercise per Session: 60 min   Stress: No Stress Concern Present (3/18/2024)    [employer] [place] [employer] - Occupational Stress Questionnaire     Feeling of Stress : Only a little   Housing Stability: Unknown (3/18/2024)    Housing Stability Vital Sign     Unable to Pay for Housing in the Last Year: No     Unstable Housing in the Last Year: No

## 2025-04-20 DIAGNOSIS — Z96.641 STATUS POST TOTAL HIP REPLACEMENT, RIGHT: ICD-10-CM

## 2025-04-21 RX ORDER — HYDROCODONE BITARTRATE AND ACETAMINOPHEN 7.5; 325 MG/1; MG/1
1 TABLET ORAL EVERY 8 HOURS PRN
Qty: 21 TABLET | Refills: 0 | Status: SHIPPED | OUTPATIENT
Start: 2025-04-21

## 2025-04-22 ENCOUNTER — CLINICAL SUPPORT (OUTPATIENT)
Dept: REHABILITATION | Facility: HOSPITAL | Age: 50
End: 2025-04-22
Payer: MEDICAID

## 2025-04-22 DIAGNOSIS — Z74.09 IMPAIRED FUNCTIONAL MOBILITY, BALANCE, GAIT, AND ENDURANCE: ICD-10-CM

## 2025-04-22 DIAGNOSIS — M25.651 DECREASED RANGE OF RIGHT HIP MOVEMENT: Primary | ICD-10-CM

## 2025-04-22 PROCEDURE — 97110 THERAPEUTIC EXERCISES: CPT | Mod: PN

## 2025-04-22 NOTE — PROGRESS NOTES
Outpatient Rehab  Physical Therapy Visit    Patient Name: Lee Quintanilla   MRN: [id number]   YOB: 1975   Encounter Date: 4/22/2025     Therapy Diagnosis:   Encounter Diagnoses   Name Primary?    Decreased range of right hip movement Yes    Impaired functional mobility, balance, gait, and endurance        Physician: Collins Haynes, *    Physician Orders: Eval and Treat  Medical Diagnosis: Status post total hip replacement, right [Z96.641]      Visit # / Visits Authorized:  9 / 12  Date of Evaluation:  3/11/2025   Insurance Authorization Period: 3/13/2025 to 5/13/2026  Plan of Care Certification:  3/11/2025 to 5/9/2025     Time In: 1400   Time Out: 1453  Total Time: 53   Total Billable Time: 53 minutes      Physical Therapy technician assisted with treatment under direct supervision of treating therapist as needed.     FOTO:  Intake Score:  %  Survey Score 1:  %  Survey Score 2:  %       Subjective   Doing better; much less hip and knee pain but back has been giving him trouble still. Going to see doctor about it in June,.States picking up stuff off the ground is an issue.   Pain reported as 3/10.      Objective   LQ MMT: 5/5; except hip abd and ext which is 4/5    TUG: 10s; 5xSTS: 16s without UE support. Gait: decreased step length with wide ADAN; forward trunk lean and increased sway toward stance leg.      Single Leg Balance: 10s on R and L       Treatment:  Therapeutic Exercise  TE 1: NuStep 10 minutes; Level 1  TE 2: Re-assessment above  TE 3: DL Bridge 3 x 10  TE 4: SLR 3# 3 x 10 B  TE 5: Side Steps along window with UE support as needed 3 x 8 yards  TE 6: Standing Hip 3-way 3 x 5 B; with UE support  TE 7: DL Leg Press 40# 3 x 10  TE 8: SL Leg Press 20# 3 x 8 B  TE 9: Sled Push Fwd/Bwd 3 x 10 yards  SL LAQ 20# 3 x 10   Hip Abd 75# 3 x 10   Shuttle Hip Ext 12# 3 x 10      Assessment & Plan     Assessment: Lee demonstrates significant improvement functionally since initial  evaluation. He has met all goals beside 1. Agreeable to finish out this POC to home program in early May.  Evaluation/Treatment Tolerance: Patient tolerated treatment well    Patient will continue to benefit from skilled outpatient physical therapy to address the deficits listed in the problem list box on initial evaluation, provide pt/family education and to maximize pt's level of independence in the home and community environment.     Patient's spiritual, cultural, and educational needs considered and patient agreeable to plan of care and goals.           Plan:  following posterior hip precautions    Goals:   Active       LTG       Pt will improve TUG to <12s to demonstrate decrease in fall risk (Met)       Start:  03/11/25    Expected End:  05/09/25    Resolved:  04/22/25         Pt will improve 5xSTS to <20s to demonstrate improved LQ strength and efficiency.  (Met)       Start:  03/11/25    Expected End:  05/09/25    Resolved:  04/22/25         Pt will report 50% reduction in hip/knee.back pain to improve QOL and tolerance to ADL's (Progressing)       Start:  03/11/25    Expected End:  05/09/25              Resolved       STG       Pt will improve SLB to at least 5s B to demonstrate improve weight bearing tolerance and pelvic stability (Met)       Start:  03/11/25    Expected End:  04/11/25    Resolved:  04/22/25         Pt will improve MMT of LQ by 1/3 grade to improve hip loading tolerance (Met)       Start:  03/11/25    Expected End:  04/11/25    Resolved:  04/22/25         Pt will improve TUG to <15s to demonstrate improve gait speed and coordination  (Met)       Start:  03/11/25    Expected End:  04/11/25    Resolved:  04/22/25             Aditya Alvarez, PT, DPT

## 2025-04-24 ENCOUNTER — CLINICAL SUPPORT (OUTPATIENT)
Dept: REHABILITATION | Facility: HOSPITAL | Age: 50
End: 2025-04-24
Payer: MEDICAID

## 2025-04-24 DIAGNOSIS — Z74.09 IMPAIRED FUNCTIONAL MOBILITY, BALANCE, GAIT, AND ENDURANCE: ICD-10-CM

## 2025-04-24 DIAGNOSIS — M25.651 DECREASED RANGE OF RIGHT HIP MOVEMENT: Primary | ICD-10-CM

## 2025-04-24 PROCEDURE — 97110 THERAPEUTIC EXERCISES: CPT | Mod: PN,CQ

## 2025-04-24 NOTE — PROGRESS NOTES
Outpatient Rehab  Physical Therapy Visit    Patient Name: Lee Quintanilla   MRN: [id number]   YOB: 1975   Encounter Date: 4/24/2025     Therapy Diagnosis:   Encounter Diagnoses   Name Primary?    Decreased range of right hip movement Yes    Impaired functional mobility, balance, gait, and endurance        Physician: Collins Haynes, *    Physician Orders: Eval and Treat  Medical Diagnosis: Status post total hip replacement, right [Z96.641]      Visit # / Visits Authorized:  10 / 12  Date of Evaluation:  3/11/2025   Insurance Authorization Period: 3/13/2025 to 5/13/2026  Plan of Care Certification:  3/11/2025 to 5/9/2025     Time In: 1400   Time Out: 1455  Total Time: 55   Total Billable Time: 53 minutes      Physical Therapy technician assisted with treatment under direct supervision of treating therapist as needed.     FOTO:  Intake Score:  %  Survey Score 1:  %  Survey Score 2:  %       Subjective   Lee continues to report back pain..  Pain reported as 3/10.      Objective   LQ MMT: 5/5; except hip abd and ext which is 4/5    TUG: 10s; 5xSTS: 16s without UE support. Gait: decreased step length with wide ADAN; forward trunk lean and increased sway toward stance leg.      Single Leg Balance: 10s on R and L       Treatment:  Therapeutic Exercise  TE 1: NuStep 10 minutes; Level 1.5  TE 3: DL Bridge 3 x 10  TE 4: SLR 3# 3 x 10 B  TE 5: lateral walks RTB along window RTR with UE support as needed 3 x 8 yards  TE 6: Standing Hip 3-way 3 x 5 B; with UE support  TE 7: DL Leg Press 40# 3 x 10  TE 8: SL Leg Press 20# 3 x 8 B  TE 9: Sled Push Fwd/Bwd 3 x 10 yards  TE 10: Seated LAQ 20# 3 x 10 B; Seated Hip Abd 70# 3 x 10  SL LAQ 20# 3 x 10   Hip Abd 75# 3 x 10   Shuttle Hip Ext 12# 3 x 10      Assessment & Plan     Assessment: Lee tolerated all perscribed exercises well this session. Progressed resistance on several precor machines. Will continue to progress BLE strengthening and motor control  as tolerated.       Patient will continue to benefit from skilled outpatient physical therapy to address the deficits listed in the problem list box on initial evaluation, provide pt/family education and to maximize pt's level of independence in the home and community environment.     Patient's spiritual, cultural, and educational needs considered and patient agreeable to plan of care and goals.           Plan:  following posterior hip precautions    Goals:   Active       LTG       Pt will improve TUG to <12s to demonstrate decrease in fall risk (Met)       Start:  03/11/25    Expected End:  05/09/25    Resolved:  04/22/25         Pt will improve 5xSTS to <20s to demonstrate improved LQ strength and efficiency.  (Met)       Start:  03/11/25    Expected End:  05/09/25    Resolved:  04/22/25         Pt will report 50% reduction in hip/knee.back pain to improve QOL and tolerance to ADL's (Progressing)       Start:  03/11/25    Expected End:  05/09/25              Resolved       STG       Pt will improve SLB to at least 5s B to demonstrate improve weight bearing tolerance and pelvic stability (Met)       Start:  03/11/25    Expected End:  04/11/25    Resolved:  04/22/25         Pt will improve MMT of LQ by 1/3 grade to improve hip loading tolerance (Met)       Start:  03/11/25    Expected End:  04/11/25    Resolved:  04/22/25         Pt will improve TUG to <15s to demonstrate improve gait speed and coordination  (Met)       Start:  03/11/25    Expected End:  04/11/25    Resolved:  04/22/25             Vicky Elias, IGNACIO

## 2025-04-27 DIAGNOSIS — Z96.641 STATUS POST TOTAL HIP REPLACEMENT, RIGHT: ICD-10-CM

## 2025-04-28 DIAGNOSIS — Z96.641 STATUS POST TOTAL HIP REPLACEMENT, RIGHT: ICD-10-CM

## 2025-04-28 RX ORDER — HYDROCODONE BITARTRATE AND ACETAMINOPHEN 7.5; 325 MG/1; MG/1
1 TABLET ORAL EVERY 8 HOURS PRN
Qty: 21 TABLET | Refills: 0 | OUTPATIENT
Start: 2025-04-28

## 2025-04-28 RX ORDER — HYDROCODONE BITARTRATE AND ACETAMINOPHEN 5; 325 MG/1; MG/1
1 TABLET ORAL EVERY 8 HOURS PRN
Qty: 21 TABLET | Refills: 0 | Status: SHIPPED | OUTPATIENT
Start: 2025-04-28

## 2025-04-28 RX ORDER — HYDROCODONE BITARTRATE AND ACETAMINOPHEN 7.5; 325 MG/1; MG/1
1 TABLET ORAL EVERY 8 HOURS PRN
Qty: 21 TABLET | Refills: 0 | Status: CANCELLED | OUTPATIENT
Start: 2025-04-28

## 2025-04-28 NOTE — TELEPHONE ENCOUNTER
Ja But [id number] Fam (0695) 0 22.50 MME Medicaid [place]         04/14/2025 04/14/2025 2 Hydrocodone-Acetamin 7.5-325 21.00 7 Ba Jama [id number] Fam (0695) 0 22.50 [place]   04/12/2025 02/21/2025 2 Testosterone Cyp 200 Mg/ml 4.00 28 Br Wag [id number] Fam (0695) 1  [place]   04/10/2025 04/10/2025 2 Gabapentin 300 Mg Capsule 60.00 30 Si Fin [id number] Fam (0695) 0  [place]   04/07/2025 04/07/2025 2 Hydrocodone-Acetamin 7.5-325 21.00 7 Ba Jama [id number] Fam (0695) 0 22.50 [place]   03/31/2025 03/31/2025 2 Oxycodone-Acetaminophen 5-325 21.00 7 [facility]         Not from me

## 2025-04-29 ENCOUNTER — CLINICAL SUPPORT (OUTPATIENT)
Dept: REHABILITATION | Facility: HOSPITAL | Age: 50
End: 2025-04-29
Payer: MEDICAID

## 2025-04-29 DIAGNOSIS — M25.651 DECREASED RANGE OF RIGHT HIP MOVEMENT: Primary | ICD-10-CM

## 2025-04-29 DIAGNOSIS — Z74.09 IMPAIRED FUNCTIONAL MOBILITY, BALANCE, GAIT, AND ENDURANCE: ICD-10-CM

## 2025-04-29 PROCEDURE — 97110 THERAPEUTIC EXERCISES: CPT | Mod: PN

## 2025-04-29 NOTE — PROGRESS NOTES
Outpatient Rehab  Physical Therapy Visit    Patient Name: Lee Quintanilla   MRN: [id number]   YOB: 1975   Encounter Date: 4/29/2025     Therapy Diagnosis:   Encounter Diagnoses   Name Primary?    Decreased range of right hip movement Yes    Impaired functional mobility, balance, gait, and endurance        Physician: Collins Haynes, *    Physician Orders: Eval and Treat  Medical Diagnosis: Status post total hip replacement, right [Z96.641]      Visit # / Visits Authorized:  10 / 12  Date of Evaluation:  3/11/2025   Insurance Authorization Period: 3/13/2025 to 5/13/2026  Plan of Care Certification:  3/11/2025 to 5/9/2025     Time In:   158 pm  Time Out:  251 pm  Total Time:   53  Total Billable Time: 53 minutes      Physical Therapy technician assisted with treatment under direct supervision of treating therapist as needed.     FOTO:  Intake Score:  %  Survey Score 1:  %  Survey Score 2:  %       Subjective             Objective   LQ MMT: 5/5; except hip abd and ext which is 4/5    TUG: 10s; 5xSTS: 16s without UE support. Gait: decreased step length with wide ADAN; forward trunk lean and increased sway toward stance leg.   Single Leg Balance: 10s on R and L       Treatment:  Therapeutic Exercise  TE 1: NuStep 10 minutes; Level 1.5  TE 2: Standing Hip Ext stretch 3 x 20s  TE 3: DL Bridge 3 x 10  TE 4: SLR 3# 3 x 10 B  TE 6: Standing Hip 3-way 3 x 5 B; with UE support 3#  TE 7: DL Leg Press 40# 3 x 10  TE 8: SL Leg Press 20# 3 x 8 B  TE 9: Sled Push Fwd/Bwd 3 x 10 yards  TE 10: Seated LAQ 20# 3 x 10 B; Seated Hip Abd 70# 3 x 10   Standing Clams 2 x 15     Assessment & Plan     Assessment:  Lee continues to do well and perform exercise efficiently. He is beginning to require less cueing for positional awareness. Plan to continue to progress as tolerated. D/c at end of POC.        Patient will continue to benefit from skilled outpatient physical therapy to address the deficits listed in the  problem list box on initial evaluation, provide pt/family education and to maximize pt's level of independence in the home and community environment.     Patient's spiritual, cultural, and educational needs considered and patient agreeable to plan of care and goals.           Plan:  following posterior hip precautions    Goals:   Active       LTG       Pt will improve TUG to <12s to demonstrate decrease in fall risk (Met)       Start:  03/11/25    Expected End:  05/09/25    Resolved:  04/22/25         Pt will improve 5xSTS to <20s to demonstrate improved LQ strength and efficiency.  (Met)       Start:  03/11/25    Expected End:  05/09/25    Resolved:  04/22/25         Pt will report 50% reduction in hip/knee.back pain to improve QOL and tolerance to ADL's (Progressing)       Start:  03/11/25    Expected End:  05/09/25              Resolved       STG       Pt will improve SLB to at least 5s B to demonstrate improve weight bearing tolerance and pelvic stability (Met)       Start:  03/11/25    Expected End:  04/11/25    Resolved:  04/22/25         Pt will improve MMT of LQ by 1/3 grade to improve hip loading tolerance (Met)       Start:  03/11/25    Expected End:  04/11/25    Resolved:  04/22/25         Pt will improve TUG to <15s to demonstrate improve gait speed and coordination  (Met)       Start:  03/11/25    Expected End:  04/11/25    Resolved:  04/22/25             Aditya Alvarez, PT, DPT

## 2025-05-05 DIAGNOSIS — Z96.641 STATUS POST TOTAL HIP REPLACEMENT, RIGHT: ICD-10-CM

## 2025-05-06 RX ORDER — HYDROCODONE BITARTRATE AND ACETAMINOPHEN 5; 325 MG/1; MG/1
1 TABLET ORAL EVERY 8 HOURS PRN
Qty: 21 TABLET | Refills: 0 | Status: SHIPPED | OUTPATIENT
Start: 2025-05-06

## 2025-05-13 DIAGNOSIS — Z96.641 STATUS POST TOTAL HIP REPLACEMENT, RIGHT: ICD-10-CM

## 2025-05-13 RX ORDER — HYDROCODONE BITARTRATE AND ACETAMINOPHEN 5; 325 MG/1; MG/1
1 TABLET ORAL EVERY 12 HOURS PRN
Qty: 14 TABLET | Refills: 0 | Status: SHIPPED | OUTPATIENT
Start: 2025-05-13

## 2025-06-03 ENCOUNTER — OFFICE VISIT (OUTPATIENT)
Dept: FAMILY MEDICINE | Facility: CLINIC | Age: 50
End: 2025-06-03
Payer: MEDICAID

## 2025-06-03 VITALS
HEART RATE: 59 BPM | WEIGHT: 227.63 LBS | HEIGHT: 67 IN | SYSTOLIC BLOOD PRESSURE: 112 MMHG | OXYGEN SATURATION: 96 % | DIASTOLIC BLOOD PRESSURE: 78 MMHG | BODY MASS INDEX: 35.73 KG/M2

## 2025-06-03 DIAGNOSIS — E66.01 SEVERE OBESITY WITH BODY MASS INDEX (BMI) OF 35.0 TO 39.9 WITH SERIOUS COMORBIDITY: Primary | ICD-10-CM

## 2025-06-03 DIAGNOSIS — M25.551 BILATERAL HIP PAIN: ICD-10-CM

## 2025-06-03 DIAGNOSIS — I50.22 CHRONIC SYSTOLIC CONGESTIVE HEART FAILURE: ICD-10-CM

## 2025-06-03 DIAGNOSIS — M54.16 LUMBAR RADICULOPATHY: ICD-10-CM

## 2025-06-03 DIAGNOSIS — E78.5 HYPERLIPIDEMIA, UNSPECIFIED HYPERLIPIDEMIA TYPE: ICD-10-CM

## 2025-06-03 DIAGNOSIS — I25.10 CORONARY ARTERIOSCLEROSIS: ICD-10-CM

## 2025-06-03 DIAGNOSIS — E55.9 VITAMIN D DEFICIENCY: ICD-10-CM

## 2025-06-03 DIAGNOSIS — M25.552 BILATERAL HIP PAIN: ICD-10-CM

## 2025-06-03 DIAGNOSIS — G62.9 NEUROPATHY: ICD-10-CM

## 2025-06-03 DIAGNOSIS — F41.1 GAD (GENERALIZED ANXIETY DISORDER): ICD-10-CM

## 2025-06-03 DIAGNOSIS — I10 ESSENTIAL HYPERTENSION: ICD-10-CM

## 2025-06-03 DIAGNOSIS — K21.9 GASTROESOPHAGEAL REFLUX DISEASE, UNSPECIFIED WHETHER ESOPHAGITIS PRESENT: ICD-10-CM

## 2025-06-03 PROCEDURE — 4010F ACE/ARB THERAPY RXD/TAKEN: CPT | Mod: CPTII,S$GLB,, | Performed by: NURSE PRACTITIONER

## 2025-06-03 PROCEDURE — 3074F SYST BP LT 130 MM HG: CPT | Mod: CPTII,S$GLB,, | Performed by: NURSE PRACTITIONER

## 2025-06-03 PROCEDURE — 1160F RVW MEDS BY RX/DR IN RCRD: CPT | Mod: CPTII,S$GLB,, | Performed by: NURSE PRACTITIONER

## 2025-06-03 PROCEDURE — 1159F MED LIST DOCD IN RCRD: CPT | Mod: CPTII,S$GLB,, | Performed by: NURSE PRACTITIONER

## 2025-06-03 PROCEDURE — 3008F BODY MASS INDEX DOCD: CPT | Mod: CPTII,S$GLB,, | Performed by: NURSE PRACTITIONER

## 2025-06-03 PROCEDURE — 3078F DIAST BP <80 MM HG: CPT | Mod: CPTII,S$GLB,, | Performed by: NURSE PRACTITIONER

## 2025-06-03 PROCEDURE — 99214 OFFICE O/P EST MOD 30 MIN: CPT | Mod: S$GLB,,, | Performed by: NURSE PRACTITIONER

## 2025-06-03 RX ORDER — TRAZODONE HYDROCHLORIDE 100 MG/1
100 TABLET ORAL NIGHTLY
Qty: 90 TABLET | Refills: 1 | Status: SHIPPED | OUTPATIENT
Start: 2025-06-03

## 2025-06-03 RX ORDER — ESCITALOPRAM OXALATE 20 MG/1
20 TABLET ORAL DAILY
Qty: 90 TABLET | Refills: 1 | Status: SHIPPED | OUTPATIENT
Start: 2025-06-03

## 2025-06-03 RX ORDER — BUSPIRONE HYDROCHLORIDE 15 MG/1
15 TABLET ORAL 3 TIMES DAILY
Qty: 90 TABLET | Refills: 0 | Status: SHIPPED | OUTPATIENT
Start: 2025-06-03 | End: 2026-06-03

## 2025-06-03 RX ORDER — PANTOPRAZOLE SODIUM 40 MG/1
40 TABLET, DELAYED RELEASE ORAL DAILY
Qty: 90 TABLET | Refills: 3 | Status: SHIPPED | OUTPATIENT
Start: 2025-06-03 | End: 2026-06-03

## 2025-06-03 RX ORDER — SEMAGLUTIDE 1 MG/.5ML
1 INJECTION, SOLUTION SUBCUTANEOUS
Qty: 2 ML | Refills: 2 | Status: SHIPPED | OUTPATIENT
Start: 2025-06-03

## 2025-06-04 LAB
25(OH)D3+25(OH)D2 SERPL-MCNC: 17 NG/ML (ref 30–100)
ALBUMIN SERPL-MCNC: 4.3 G/DL (ref 3.6–5.1)
ALBUMIN/GLOB SERPL: 1.3 (CALC) (ref 1–2.5)
ALP SERPL-CCNC: 67 U/L (ref 36–130)
ALT SERPL-CCNC: 12 U/L (ref 9–46)
AST SERPL-CCNC: 12 U/L (ref 10–40)
BASOPHILS # BLD AUTO: 40 CELLS/UL (ref 0–200)
BASOPHILS NFR BLD AUTO: 0.5 %
BILIRUB SERPL-MCNC: 0.6 MG/DL (ref 0.2–1.2)
BUN SERPL-MCNC: 11 MG/DL (ref 7–25)
BUN/CREAT SERPL: 6 (CALC) (ref 6–22)
CALCIUM SERPL-MCNC: 9.2 MG/DL (ref 8.6–10.3)
CHLORIDE SERPL-SCNC: 103 MMOL/L (ref 98–110)
CHOLEST SERPL-MCNC: 111 MG/DL
CHOLEST/HDLC SERPL: 2.6 (CALC)
CO2 SERPL-SCNC: 27 MMOL/L (ref 20–32)
CREAT SERPL-MCNC: 1.73 MG/DL (ref 0.6–1.29)
EGFR: 48 ML/MIN/1.73M2
EOSINOPHIL # BLD AUTO: 119 CELLS/UL (ref 15–500)
EOSINOPHIL NFR BLD AUTO: 1.5 %
ERYTHROCYTE [DISTWIDTH] IN BLOOD BY AUTOMATED COUNT: 14.5 % (ref 11–15)
GLOBULIN SER CALC-MCNC: 3.4 G/DL (CALC) (ref 1.9–3.7)
GLUCOSE SERPL-MCNC: 129 MG/DL (ref 65–99)
HCT VFR BLD AUTO: 50 % (ref 38.5–50)
HDLC SERPL-MCNC: 42 MG/DL
HGB BLD-MCNC: 15 G/DL (ref 13.2–17.1)
LDLC SERPL CALC-MCNC: 48 MG/DL (CALC)
LYMPHOCYTES # BLD AUTO: 1209 CELLS/UL (ref 850–3900)
LYMPHOCYTES NFR BLD AUTO: 15.3 %
MCH RBC QN AUTO: 25.4 PG (ref 27–33)
MCHC RBC AUTO-ENTMCNC: 30 G/DL (ref 32–36)
MCV RBC AUTO: 84.7 FL (ref 80–100)
MONOCYTES # BLD AUTO: 482 CELLS/UL (ref 200–950)
MONOCYTES NFR BLD AUTO: 6.1 %
NEUTROPHILS # BLD AUTO: 6051 CELLS/UL (ref 1500–7800)
NEUTROPHILS NFR BLD AUTO: 76.6 %
NONHDLC SERPL-MCNC: 69 MG/DL (CALC)
PLATELET # BLD AUTO: 248 THOUSAND/UL (ref 140–400)
PMV BLD REES-ECKER: 11.1 FL (ref 7.5–12.5)
POTASSIUM SERPL-SCNC: 4.2 MMOL/L (ref 3.5–5.3)
PROT SERPL-MCNC: 7.7 G/DL (ref 6.1–8.1)
RBC # BLD AUTO: 5.9 MILLION/UL (ref 4.2–5.8)
SODIUM SERPL-SCNC: 137 MMOL/L (ref 135–146)
TRIGL SERPL-MCNC: 131 MG/DL
TSH SERPL-ACNC: 1.01 MIU/L (ref 0.4–4.5)
WBC # BLD AUTO: 7.9 THOUSAND/UL (ref 3.8–10.8)

## 2025-06-07 ENCOUNTER — PATIENT MESSAGE (OUTPATIENT)
Dept: FAMILY MEDICINE | Facility: CLINIC | Age: 50
End: 2025-06-07
Payer: MEDICAID

## 2025-06-09 ENCOUNTER — OFFICE VISIT (OUTPATIENT)
Dept: ORTHOPEDICS | Facility: CLINIC | Age: 50
End: 2025-06-09
Payer: MEDICAID

## 2025-06-09 ENCOUNTER — HOSPITAL ENCOUNTER (OUTPATIENT)
Dept: RADIOLOGY | Facility: HOSPITAL | Age: 50
Discharge: HOME | End: 2025-06-09
Attending: ORTHOPAEDIC SURGERY
Payer: MEDICAID

## 2025-06-09 VITALS — WEIGHT: 227.5 LBS | HEIGHT: 67 IN | BODY MASS INDEX: 35.71 KG/M2

## 2025-06-09 DIAGNOSIS — Z98.1 HISTORY OF LUMBAR SPINAL FUSION: ICD-10-CM

## 2025-06-09 DIAGNOSIS — M43.10 RETROLISTHESIS OF VERTEBRAE: ICD-10-CM

## 2025-06-09 DIAGNOSIS — M47.816 LUMBAR SPONDYLOSIS: Primary | ICD-10-CM

## 2025-06-09 PROCEDURE — 72120 X-RAY BEND ONLY L-S SPINE: CPT | Mod: TC,PN

## 2025-06-09 PROCEDURE — 4010F ACE/ARB THERAPY RXD/TAKEN: CPT | Mod: CPTII,,, | Performed by: ORTHOPAEDIC SURGERY

## 2025-06-09 PROCEDURE — 1160F RVW MEDS BY RX/DR IN RCRD: CPT | Mod: CPTII,,, | Performed by: ORTHOPAEDIC SURGERY

## 2025-06-09 PROCEDURE — 99213 OFFICE O/P EST LOW 20 MIN: CPT | Mod: S$PBB,,, | Performed by: ORTHOPAEDIC SURGERY

## 2025-06-09 PROCEDURE — 99215 OFFICE O/P EST HI 40 MIN: CPT | Mod: PBBFAC,25,PN | Performed by: ORTHOPAEDIC SURGERY

## 2025-06-09 PROCEDURE — 1159F MED LIST DOCD IN RCRD: CPT | Mod: CPTII,,, | Performed by: ORTHOPAEDIC SURGERY

## 2025-06-09 PROCEDURE — 3008F BODY MASS INDEX DOCD: CPT | Mod: CPTII,,, | Performed by: ORTHOPAEDIC SURGERY

## 2025-06-09 PROCEDURE — 72120 X-RAY BEND ONLY L-S SPINE: CPT | Mod: 26,,, | Performed by: RADIOLOGY

## 2025-06-09 PROCEDURE — 99999 PR PBB SHADOW E&M-EST. PATIENT-LVL V: CPT | Mod: PBBFAC,,, | Performed by: ORTHOPAEDIC SURGERY

## 2025-06-09 RX ORDER — CLINDAMYCIN PHOSPHATE 900 MG/50ML
900 INJECTION, SOLUTION INTRAVENOUS
OUTPATIENT
Start: 2025-06-09

## 2025-06-09 NOTE — PROGRESS NOTES
Subjective:       Patient ID: Lee Quintanilla is a 49 y.o. male.    Chief Complaint: Pain of the Lumbar Spine (6-8 week f/u - lumbar pain/radiculopathy, Gabapentin increase. Patient states pain in the legs and lower back has increased since previous appt. States Hydrocodone, Gabapentin, Flexeril did not help.)      History of Present Illness    Prior to meeting with the patient I reviewed the medical chart in [facility]. This included reviewing the previous progress notes from our office, review of the patient's last appointment with their primary care provider, review of any visits to the emergency room, and review of any pain management appointments or procedures.   Lee comes in today for follow-up for his lumbar spine with lower extremity radicular symptoms.  Unfortunately, the increase in his gabapentin did not provide any reduction in his symptoms.  He does complain of back pain more so than leg symptoms.  Radiates down both legs to about the level of the knee he reports.  Denies any bowel or bladder incontinence.  It does have previous history of L4-5 and L5-S1 with interbody.  Posterior instrumentation fusing L 4-5.    Current Medications  Current Medications[1]    Allergies  Review of patient's allergies indicates:   Allergen Reactions    Inapsine [droperidol] Anaphylaxis     seizures    Effexor [venlafaxine]      Increased anxiety    Pcn [penicillins]     Bactrim [sulfamethoxazole-trimethoprim] Rash     Dry red rash all over when in the sun    Fluconazole Rash     Pruritis         Past Medical History  Past Medical History:   Diagnosis Date    ADHD (attention deficit hyperactivity disorder)     Arthritis     Asthma     as child only    Back pain     Coronary artery disease     Degeneration of lumbar intervertebral disc 05/2016    Depression     Digestive disorder     Elevated PSA     Headache     Hyperlipidemia     Hypertension     Kidney damage     stage 3 ; d/t aleve abuse    Kidney stone      Myocardial infarction     Neck pain     Numbness and tingling in hands     Numbness and tingling of both legs     Osteonecrosis     Bilat Femur    Seizures     from inapsine 2002    Sleep apnea     no cpap    Wears glasses     CONTACS       Surgical History  Past Surgical History:   Procedure Laterality Date    BACK SURGERY  2016    back surgery    BONE GRAFT N/A 11/05/2020    Procedure: BONE GRAFT;  Surgeon: Mateusz Blanco MD;  Location: [facility];  Service: Orthopedics;  Laterality: N/A;    CARDIAC SURGERY  01/06/2020    CABG X 7    CORONARY ARTERY BYPASS GRAFT      7 vessels    FLEXIBLE SIGMOIDOSCOPY N/A 07/24/2024    Procedure: SIGMOIDOSCOPY, FLEXIBLE;  Surgeon: Latesha Victoria MD;  Location: [facility] ENDO;  Service: Endoscopy;  Laterality: N/A;    HERNIA REPAIR Bilateral 11/22/2017    HIP ARTHROPLASTY Left 09/23/2024    Procedure: ARTHROPLASTY, HIP;  Surgeon: Lázaro Jaimes MD;  Location: [facility] OR;  Service: Orthopedics;  Laterality: Left;  Sincere    HIP REPLACEMENT ARTHROPLASTY Right 2/17/2025    Procedure: ARTHROPLASTY, HIP REPLACEMENT;  Surgeon: Lázaro Jaimes MD;  Location: [facility] OR;  Service: Orthopedics;  Laterality: Right;  Sincere    LITHOTRIPSY      LUMBAR LAMINECTOMY WITH FUSION Bilateral 11/05/2020    Procedure: LAMINECTOMY, SPINE, LUMBAR, WITH FUSION;  Surgeon: Mateusz Blanco MD;  Location: [facility] OR;  Service: Orthopedics;  Laterality: Bilateral;  MEDTRONIC  NTI  L-3    PILONIDAL CYST DRAINAGE      removal of abcess      scrotal    REMOVAL OF HARDWARE FROM SPINE Bilateral 11/05/2020    Procedure: REMOVAL, HARDWARE, SPINE;  Surgeon: Mateusz Blanco MD;  Location: [facility] OR;  Service: Orthopedics;  Laterality: Bilateral;  L 4-5    SURGICAL REMOVAL OF PILONIDAL CYST N/A 04/15/2024    Procedure: EXCISION, PILONIDAL CYST;  Surgeon: Jayden Phillips III, MD;  Location: [facility] OR;  Service: General;  Laterality: N/A;  sacral area    TOTAL HIP ARTHROPLASTY Right 02/17/2025    TYMPANOSTOMY TUBE PLACEMENT          Family History:   Family History   Problem Relation Name Age of Onset    Heart disease Mother      Migraines Mother      Hypertension Mother      Early death Father accident     Heart disease Father accident     Diabetes Maternal Aunt      Diabetes Maternal Uncle      Diabetes Maternal Grandfather         Social History:   Social History[2]    Hospitalization/Major Diagnostic Procedure:     Review of Systems     General/Constitutional:  Chills denies. Fatigue denies. Fever denies. Weight gain denies. Weight loss denies.    Respiratory:  Shortness of breath denies.    Cardiovascular:  Chest pain denies.    Gastrointestinal:  Constipation denies. Diarrhea denies. Nausea denies. Vomiting denies.     Hematology:  Easy bruising denies. Prolonged bleeding denies.     Genitourinary:  Frequent urination denies. Pain in lower back denies. Painful urination denies.     Musculoskeletal:  See HPI for details    Skin:  Rash denies.    Neurologic:  Dizziness denies. Gait abnormalities denies. Seizures denies. Tingling/Numbess denies.    Psychiatric:  Anxiety denies. Depressed mood denies.     Objective:   Vital Signs: There were no vitals filed for this visit.     Physical Exam      General Examination:     Constitutional: The patient is alert and oriented to lace person and time. Mood is pleasant.     Head/Face: Normal facial features normal eyebrows    Eyes: Normal extraocular motion bilaterally    Lungs: Respirations are equal and unlabored    Gait is coordinated.    Cardiovascular: There are no swelling or varicosities present.    Lymphatic: Negative for adenopathy    Skin: Normal    Neurological: Level of consciousness normal. Oriented to place person and time and situation    Psychiatric: Oriented to time place person and situation    Lumbar exam: Skin to lower back clean dry and intact.  No erythema or ecchymosis.  No signs or symptoms of infection.  Posterior lumbar incision well healed without wound dehiscence or  drainage.  Patient does stand erect.  He can weightbear as tolerated on bilateral lower extremities.  He ambulates with a slow london and a mildly antalgic gait.  Ambulates without an aid.  Negative Homans bilaterally.  Negative straight leg raise maneuver bilaterally.  Diffuse tenderness about bilateral lumbar paravertebrals extending into the lumbosacral junction.  Subjective numbness L3 nerve root distribution bilaterally.  Reflexes are symmetrical but hypoactive.    XRAY Report/ Interpretation:  Standing lateral flexion/extension views taken today.  Done to assess the retrolisthesis at L3-4.  Patient does have 3 mm translation upon comparison flexion/extension views at the L3-4 level.    Assessment:       1. Retrolisthesis of vertebrae    2. History of lumbar spinal fusion    3. Lumbar spondylosis        Plan:       Lee was seen today for pain.    Diagnoses and all orders for this visit:    Retrolisthesis of vertebrae  -     X-Ray Lumbar Spine Flexion And Extension Only    History of lumbar spinal fusion  -     X-Ray Lumbar Spine Flexion And Extension Only    Lumbar spondylosis         Follow up for surgery.  This is to attest that the assistant, Gregory Haynes served in the capacity as a scribe for this patient's encounter.  This is also verify that I have reviewed the patient's history and  formulated the treatment plan for this patient.  I have evaluated this patient and formulated a treatment plan for this patient visit.  The treatment plan and medical decision-making is outlined below.  Patient has a history of lumbar fusion at L4-5 and L5-S1.  He has retrolisthesis at L3-4.  He has spondylosis at L3-4.  Treatment options were reviewed with him today at length.  Discussed referral to pain management for epidural steroid injections versus discogram to get more definitive clarity on L3-4 being a symptomatic level.  Feel very confident that it is this level.  Patient does not want to go there anymore  procedures.  He is interested in definitive treatment/fixation.    Plan would be to remove the rods spanning 4-5 and extend the posterior instrumented fusion proximally to include the L3-4 level with interbody at L3-4 as well.  Essentially laminectomy at L3 with transforaminal lumbar interbody fusion at L3-4.  The technical aspects of the surgery were discussed in detail with the patient today. The patient was able to verbalize an understanding. The procedure risk benefits alternatives and possible complications of the surgical procedure was discussed including expected outcomes. No guarantees were given regards to outcomes. Consent forms were will be signed at a later date. All questions regarding the surgery itself were answered. The patient wishes to proceed with surgery and will be scheduled. The patient may require preoperative medical clearance.    He will need to obtain preoperative cardiac clearance and be off of his Plavix preoperatively.  He has had cardiac surgery   Risks and benefits of the procedure were discussed with him today.  He understood and wished to proceed.  Surgical consents were obtained today.    Treatment options were discussed with regards to the nature of the medical condition. Conservative pain intervention and surgical options were discussed in detail. The probability of success of each separate treatment option was discussed. The patient expressed a clear understanding of the treatment options. With regards to surgery, the procedure risk, benefits, complications, and outcomes were discussed. No guarantees were given with regards to surgical outcome.   The risk of complications, morbidity, and mortality of patient management decisions have been made at the time of this visit. These are associated with the patient's problems, diagnostic procedures and treatment options. This includes the possible management options selected and those considered but not selected by the patient after  shared medical decision making we discussed with the patient.     This note was created using Dragon voice recognition software that occasionally misinterpreted phrases or words.         [1]   Current Outpatient Medications   Medication Sig Dispense Refill    anastrozole (ARIMIDEX) 1 mg Tab Take 1 mg by mouth every 7 days.      busPIRone (BUSPAR) 15 MG tablet Take 1 tablet (15 mg total) by mouth 3 (three) times daily. 90 tablet 0    clopidogreL (PLAVIX) 75 mg tablet Take 75 mg by mouth once daily.      cyclobenzaprine (FLEXERIL) 10 MG tablet Take 1 tablet (10 mg total) by mouth 2 (two) times daily. 60 tablet 3    ENTRESTO 49-51 mg per tablet Take 1 tablet by mouth 2 (two) times daily.      EScitalopram oxalate (LEXAPRO) 20 MG tablet Take 1 tablet (20 mg total) by mouth once daily. 90 tablet 1    fluticasone propionate (FLONASE) 50 mcg/actuation nasal spray 2 sprays (100 mcg total) by Each Nostril route once daily. 16 g 0    furosemide (LASIX) 20 MG tablet Take 1 tablet by mouth every morning.      gabapentin (NEURONTIN) 300 MG capsule Take 1 capsule (300 mg total) by mouth 3 (three) times daily. 90 capsule 2    hydrOXYzine (ATARAX) 50 MG tablet Take 1 tablet by mouth 2 (two) times daily.      metoprolol tartrate (LOPRESSOR) 50 MG tablet Take 1 tablet by mouth 2 (two) times daily.      ondansetron (ZOFRAN) 4 MG tablet Take 1 tablet (4 mg total) by mouth every 6 (six) hours as needed for Nausea. 10 tablet 0    pantoprazole (PROTONIX) 40 MG tablet Take 1 tablet (40 mg total) by mouth once daily. 90 tablet 3    rosuvastatin (CRESTOR) 40 MG Tab Take 40 mg by mouth every evening.      semaglutide, weight loss, (WEGOVY) 1 mg/0.5 mL PnIj Inject 1 mg into the skin every 7 days. 2 mL 2    tadalafiL (CIALIS) 10 MG tablet Take 10 mg by mouth daily as needed.      tadalafiL (CIALIS) 5 MG tablet Take 1 tablet by mouth once daily.      testosterone cypionate 200 mg/mL Kit Inject 1 mL into the muscle every 7 days.      traZODone  (DESYREL) 100 MG tablet Take 1 tablet (100 mg total) by mouth every evening. 90 tablet 1    HYDROcodone-acetaminophen (NORCO) 5-325 mg per tablet Take 1 tablet by mouth every 12 (twelve) hours as needed for Pain. (Patient not taking: Reported on 2025) 14 tablet 0     No current facility-administered medications for this visit.   [2]   Social History  Socioeconomic History    Marital status:    Occupational History    Occupation: disability   Tobacco Use    Smoking status: Former     Current packs/day: 0.00     Types: Cigarettes     Quit date: 2016     Years since quittin.4     Passive exposure: Past    Smokeless tobacco: Former     Quit date: 2016    Tobacco comments:     Occasional  vaping   Substance and Sexual Activity    Alcohol use: Not Currently     Alcohol/week: 1.0 standard drink of alcohol     Types: 1 Glasses of wine per week     Comment: 20 - 30 shots vodka per day or 1-2 1/5ths per day for 2 years (started )    Drug use: No    Sexual activity: Yes     Partners: Female     Social Drivers of Health     Financial Resource Strain: Medium Risk (3/18/2024)    Overall Financial Resource Strain (CARDIA)     Difficulty of Paying Living Expenses: Somewhat hard   Food Insecurity: No Food Insecurity (3/18/2024)    Hunger Vital Sign     Worried About Running Out of Food in the Last Year: Never true     Ran Out of Food in the Last Year: Never true   Transportation Needs: No Transportation Needs (3/18/2024)    PRAPARE - Transportation     Lack of Transportation (Medical): No     Lack of Transportation (Non-Medical): No   Physical Activity: Insufficiently Active (3/18/2024)    Exercise Vital Sign     Days of Exercise per Week: 2 days     Minutes of Exercise per Session: 60 min   Stress: No Stress Concern Present (3/18/2024)    [employer] - Occupational Stress Questionnaire     Feeling of Stress : Only a little   Housing Stability: Unknown (3/18/2024)    Housing  Stability Vital Sign     Unable to Pay for Housing in the Last Year: No     Unstable Housing in the Last Year: No

## 2025-06-16 ENCOUNTER — RESULTS FOLLOW-UP (OUTPATIENT)
Dept: FAMILY MEDICINE | Facility: CLINIC | Age: 50
End: 2025-06-16

## 2025-06-16 RX ORDER — CHOLECALCIFEROL (VITAMIN D3) 25 MCG
2000 TABLET ORAL DAILY
COMMUNITY

## 2025-06-16 NOTE — PROGRESS NOTES
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 propionate (FLONASE) 50 mcg/actuation nasal spray, 2 sprays (100 mcg total) by Each Nostril route once daily., Disp: 16 g, Rfl: 0    furosemide (LASIX) 20 MG tablet, Take 1 tablet by mouth every morning., Disp: , Rfl:     gabapentin (NEURONTIN) 300 MG capsule, Take 1 capsule (300 mg total) by mouth 3 (three) times daily., Disp: 90 capsule, Rfl: 2    hydrOXYzine (ATARAX) 50 MG tablet, Take 1 tablet by mouth 2 (two) times daily., Disp: , Rfl:     metoprolol tartrate (LOPRESSOR) 50 MG tablet, Take 1 tablet by mouth 2 (two) times daily., Disp: , Rfl:     ondansetron (ZOFRAN) 4 MG tablet, Take 1 tablet (4 mg total) by mouth every 6 (six) hours as needed for Nausea., Disp: 10 tablet, Rfl: 0    rosuvastatin (CRESTOR) 40 MG Tab, Take 40 mg by mouth every evening., Disp: , Rfl:     tadalafiL (CIALIS) 10 MG tablet, Take 10 mg by mouth daily as needed., Disp: , Rfl:     tadalafiL (CIALIS) 5 MG tablet, Take 1 tablet by mouth once daily., Disp: , Rfl:     testosterone cypionate 200 mg/mL Kit, Inject 1 mL into the muscle every 7 days., Disp: , Rfl:     busPIRone (BUSPAR) 15 MG tablet, Take 1 tablet (15 mg total) by mouth 3 (three) times daily., Disp: 90 tablet, Rfl: 0    EScitalopram oxalate (LEXAPRO) 20 MG tablet, Take 1 tablet (20 mg total) by mouth once daily., Disp: 90 tablet, Rfl: 1    HYDROcodone-acetaminophen (NORCO) 5-325 mg per tablet, Take 1 tablet by mouth  every 12 (twelve) hours as needed for Pain. (Patient not taking: Reported on 6/9/2025), Disp: 14 tablet, Rfl: 0    pantoprazole (PROTONIX) 40 MG tablet, Take 1 tablet (40 mg total) by mouth once daily., Disp: 90 tablet, Rfl: 3    semaglutide, weight loss, (WEGOVY) 1 mg/0.5 mL PnIj, Inject 1 mg into the skin every 7 days., Disp: 2 mL, Rfl: 2    traZODone (DESYREL) 100 MG tablet, Take 1 tablet (100 mg total) by mouth every evening., Disp: 90 tablet, Rfl: 1

## 2025-06-17 ENCOUNTER — HOSPITAL ENCOUNTER (OUTPATIENT)
Dept: PREADMISSION TESTING | Facility: HOSPITAL | Age: 50
Discharge: HOME | End: 2025-06-17
Attending: ORTHOPAEDIC SURGERY
Payer: MEDICAID

## 2025-06-17 DIAGNOSIS — M43.10 RETROLISTHESIS OF VERTEBRAE: ICD-10-CM

## 2025-06-17 DIAGNOSIS — Z98.1 HISTORY OF LUMBAR SPINAL FUSION: ICD-10-CM

## 2025-06-17 DIAGNOSIS — M47.816 LUMBAR SPONDYLOSIS: ICD-10-CM

## 2025-06-17 DIAGNOSIS — Z01.818 PRE-OP TESTING: ICD-10-CM

## 2025-06-17 LAB
APTT PPP: 30.5 SECONDS (ref 21–32)
BACTERIA #/AREA URNS AUTO: ABNORMAL /HPF
BILIRUB UR QL STRIP.AUTO: NEGATIVE
CLARITY UR: CLEAR
COLOR UR AUTO: YELLOW
GLUCOSE UR QL STRIP: NEGATIVE
HGB UR QL STRIP: NEGATIVE
HYALINE CASTS UR QL AUTO: 0 /LPF (ref 0–1)
INDIRECT COOMBS: NORMAL
INR PPP: 1 (ref 0.8–1.2)
KETONES UR QL STRIP: ABNORMAL
LEUKOCYTE ESTERASE UR QL STRIP: NEGATIVE
MICROSCOPIC COMMENT: ABNORMAL
NITRITE UR QL STRIP: NEGATIVE
OHS QRS DURATION: 80 MS
OHS QTC CALCULATION: 440 MS
PH UR STRIP: 7 [PH]
PROT UR QL STRIP: ABNORMAL
PROTHROMBIN TIME: 11.4 SECONDS (ref 9–12.5)
RBC #/AREA URNS AUTO: 1 /HPF
RH BLD: NORMAL
SP GR UR STRIP: >=1.03
SPECIMEN OUTDATE: NORMAL
SQUAMOUS #/AREA URNS AUTO: <1 /HPF
UROBILINOGEN UR STRIP-ACNC: ABNORMAL EU/DL
WBC #/AREA URNS AUTO: 1 /HPF
YEAST UR QL AUTO: ABNORMAL

## 2025-06-17 PROCEDURE — 86850 RBC ANTIBODY SCREEN: CPT | Performed by: ORTHOPAEDIC SURGERY

## 2025-06-17 PROCEDURE — 85610 PROTHROMBIN TIME: CPT | Performed by: ORTHOPAEDIC SURGERY

## 2025-06-17 PROCEDURE — 81003 URINALYSIS AUTO W/O SCOPE: CPT | Performed by: ORTHOPAEDIC SURGERY

## 2025-06-17 PROCEDURE — 93005 ELECTROCARDIOGRAM TRACING: CPT

## 2025-06-17 PROCEDURE — 93010 ELECTROCARDIOGRAM REPORT: CPT | Mod: ,,, | Performed by: INTERNAL MEDICINE

## 2025-06-17 PROCEDURE — 36415 COLL VENOUS BLD VENIPUNCTURE: CPT | Performed by: ORTHOPAEDIC SURGERY

## 2025-06-17 PROCEDURE — 85730 THROMBOPLASTIN TIME PARTIAL: CPT | Performed by: ORTHOPAEDIC SURGERY

## 2025-06-17 RX ORDER — ENOXAPARIN SODIUM 100 MG/ML
1 INJECTION SUBCUTANEOUS 2 TIMES DAILY
COMMUNITY
Start: 2025-06-11

## 2025-06-17 NOTE — DISCHARGE INSTRUCTIONS
To confirm, Your doctor has instructed you that surgery is scheduled for: 6/24, TUESDAY    Please report to [facility], Registration the morning of surgery. You must check-in and receive a wristband before going to your procedure.  [address] [place] [address] [place] [address]    Pre-Op will call the afternoon prior to surgery between 1:00 and 6:00 PM with the final arrival time.  Phone number: [phone]    PLEASE NOTE:  The surgery schedule has many variables which may affect the time of your surgery case.  Family members should be available if your surgery time changes.  Plan to be here the day of your procedure between 4-6 hours.    MEDICATIONS:  TAKE ONLY THESE MEDICATIONS WITH A SMALL SIP OF WATER THE MORNING OF YOUR PROCEDURE:      SEE MED LIST          DO NOT TAKE THESE MEDICATIONS 5-7 DAYS PRIOR to your procedure or per your surgeon's request:   ASPIRIN, ALEVE, ADVIL, IBUPROFEN, FISH OIL VITAMIN E, HERBALS  (May take Tylenol)    ONLY if you are prescribed any types of blood thinners such as:  Aspirin, Coumadin, Plavix, Pradaxa, Xarelto, Aggrenox, Effient, Eliquis, Savasya, Brilinta, or any other, ask your surgeon whether you should stop taking them and how long before surgery you should stop.  You may also need to verify with the prescribing physician if it is ok to stop your medication.      INSTRUCTIONS IMPORTANT!!  Do not eat or drink anything between midnight and the time of your procedure- this includes gum, mints, and candy.  EXCEPT: you may have clear liquids such as:  WATER, BLACK COFFEE, UNSWEET TEA, OR GATORADE (NO RED OR PURPLE) UP TO 2 HOURS PRIOR TO YOUR ARRIVAL TIME.  Do not smoke or drink alcoholic beverages 24 hours prior to your procedure.  Shower the night before AND the morning of your procedure with a Chlorhexidine wash such as Hibiclens or Dial antibacterial soap from the neck down.  Do not get it on your face or in your eyes.  You may use your own shampoo and face  wash. This helps your skin to be as bacteria free as possible.    If you wear contact lenses, dentures, hearing aids or glasses, bring a container to put them in during surgery and give to a family member for safe keeping.  Please leave all jewelry, piercing's and valuables at home. You must remove your false eyelashes prior to surgery.    DO NOT remove hair from the surgery site.  Do not shave the incision site unless you are given specific instructions to do so.    ONLY if you have been diagnosed with sleep apnea please bring your C-PAP machine.  ONLY if you wear home oxygen please bring your portable oxygen tank the day of your procedure.  ONLY if you have a history of OPEN HEART SURGERY you will need a clearance from your Cardiologist per Anesthesia.      ONLY for patients requiring bowel prep, written instructions will be given by your doctor's office.  ONLY if you have any type of stimulator implant or any type of implanted device with a remote control.  Please bring the controller with you the morning of surgery  If your doctor has scheduled you for an overnight stay, bring a small overnight bag with any personal items you need.  Make arrangements in advance for transportation home by a responsible adult. You can not go home in an uber or a cab per hospital policy.  It is not safe to drive a vehicle during the 24 hours after anesthesia.          All  facilities and properties are tobacco free.  Smoking is NOT allowed.   If you have any questions about these instructions, call Pre-Op Admit  Nursing at [phone] or the Pre-Op Day Surgery Unit at [phone].

## 2025-06-23 ENCOUNTER — TELEPHONE (OUTPATIENT)
Dept: ORTHOPEDICS | Facility: CLINIC | Age: 50
End: 2025-06-23
Payer: MEDICAID

## 2025-06-23 ENCOUNTER — ANESTHESIA EVENT (OUTPATIENT)
Dept: SURGERY | Facility: HOSPITAL | Age: 50
DRG: 402 | End: 2025-06-23
Payer: MEDICAID

## 2025-06-23 NOTE — TELEPHONE ENCOUNTER
----- Message from Nelson Hinds sent at 6/23/2025  1:27 PM CDT -----  Regarding: Surgery  Patient called regarding denied surgery, was there any change is that status since Friday

## 2025-06-24 ENCOUNTER — ANESTHESIA (OUTPATIENT)
Dept: SURGERY | Facility: HOSPITAL | Age: 50
DRG: 402 | End: 2025-06-24
Payer: MEDICAID

## 2025-06-27 DIAGNOSIS — Z96.641 STATUS POST TOTAL HIP REPLACEMENT, RIGHT: ICD-10-CM

## 2025-06-27 RX ORDER — HYDROCODONE BITARTRATE AND ACETAMINOPHEN 5; 325 MG/1; MG/1
1 TABLET ORAL EVERY 12 HOURS PRN
Qty: 14 TABLET | Refills: 0 | Status: SHIPPED | OUTPATIENT
Start: 2025-06-27

## 2025-06-27 NOTE — TELEPHONE ENCOUNTER
Patient surgery denied as of right now. Under review after peer to peer done. Having a lot of pain.

## 2025-06-28 ENCOUNTER — LAB VISIT (OUTPATIENT)
Dept: LAB | Facility: HOSPITAL | Age: 50
End: 2025-06-28
Payer: MEDICAID

## 2025-06-28 DIAGNOSIS — E11.9 DIABETES MELLITUS WITHOUT COMPLICATION: ICD-10-CM

## 2025-06-28 DIAGNOSIS — E29.1 3-OXO-5 ALPHA-STEROID DELTA 4-DEHYDROGENASE DEFICIENCY: Primary | ICD-10-CM

## 2025-06-28 LAB
EAG (SMH): 105 MG/DL (ref 68–131)
HBA1C MFR BLD: 5.3 % (ref 4.5–6.2)

## 2025-06-28 PROCEDURE — 83036 HEMOGLOBIN GLYCOSYLATED A1C: CPT

## 2025-06-28 PROCEDURE — 82670 ASSAY OF TOTAL ESTRADIOL: CPT

## 2025-06-28 PROCEDURE — 36415 COLL VENOUS BLD VENIPUNCTURE: CPT

## 2025-06-28 PROCEDURE — 84403 ASSAY OF TOTAL TESTOSTERONE: CPT

## 2025-06-28 PROCEDURE — 82672 ASSAY OF ESTROGEN: CPT

## 2025-06-30 LAB — ESTRADIOL SERPL-MCNC: 47.6 PG/ML (ref 7.6–42.6)

## 2025-07-01 DIAGNOSIS — M48.061 FORAMINAL STENOSIS OF LUMBAR REGION: ICD-10-CM

## 2025-07-01 DIAGNOSIS — M54.16 LUMBAR RADICULOPATHY: ICD-10-CM

## 2025-07-01 DIAGNOSIS — Z98.1 HISTORY OF LUMBAR SPINAL FUSION: ICD-10-CM

## 2025-07-01 DIAGNOSIS — M43.10 RETROLISTHESIS OF VERTEBRAE: Primary | ICD-10-CM

## 2025-07-01 DIAGNOSIS — M47.816 LUMBAR SPONDYLOSIS: ICD-10-CM

## 2025-07-01 LAB
ESTROGEN SERPL-MCNC: 95 PG/ML (ref 56–213)
TESTOST FREE SERPL-MCNC: 29.7 PG/ML (ref 6.8–21.5)
TESTOST SERPL-MCNC: 1147 NG/DL (ref 264–916)

## 2025-07-01 RX ORDER — CLINDAMYCIN PHOSPHATE 900 MG/50ML
900 INJECTION, SOLUTION INTRAVENOUS
OUTPATIENT
Start: 2025-07-01

## 2025-07-04 DIAGNOSIS — Z96.641 STATUS POST TOTAL HIP REPLACEMENT, RIGHT: ICD-10-CM

## 2025-07-07 RX ORDER — HYDROCODONE BITARTRATE AND ACETAMINOPHEN 5; 325 MG/1; MG/1
1 TABLET ORAL EVERY 12 HOURS PRN
Qty: 14 TABLET | Refills: 0 | Status: SHIPPED | OUTPATIENT
Start: 2025-07-07

## 2025-07-09 ENCOUNTER — HOSPITAL ENCOUNTER (OUTPATIENT)
Dept: PREADMISSION TESTING | Facility: HOSPITAL | Age: 50
Discharge: HOME | End: 2025-07-09
Attending: ORTHOPAEDIC SURGERY
Payer: MEDICAID

## 2025-07-09 DIAGNOSIS — M54.16 LUMBAR RADICULOPATHY: ICD-10-CM

## 2025-07-09 DIAGNOSIS — M48.061 FORAMINAL STENOSIS OF LUMBAR REGION: ICD-10-CM

## 2025-07-09 DIAGNOSIS — Z98.1 HISTORY OF LUMBAR SPINAL FUSION: ICD-10-CM

## 2025-07-09 DIAGNOSIS — M43.10 RETROLISTHESIS OF VERTEBRAE: ICD-10-CM

## 2025-07-09 DIAGNOSIS — M47.816 LUMBAR SPONDYLOSIS: ICD-10-CM

## 2025-07-09 LAB
APTT PPP: 28.5 SECONDS (ref 21–32)
INDIRECT COOMBS: NORMAL
INR PPP: 1 (ref 0.8–1.2)
PROTHROMBIN TIME: 11.5 SECONDS (ref 9–12.5)
RH BLD: NORMAL
SPECIMEN OUTDATE: NORMAL

## 2025-07-09 PROCEDURE — 85610 PROTHROMBIN TIME: CPT | Performed by: ORTHOPAEDIC SURGERY

## 2025-07-09 PROCEDURE — 86901 BLOOD TYPING SEROLOGIC RH(D): CPT | Performed by: ORTHOPAEDIC SURGERY

## 2025-07-09 PROCEDURE — 36415 COLL VENOUS BLD VENIPUNCTURE: CPT | Performed by: ORTHOPAEDIC SURGERY

## 2025-07-09 PROCEDURE — 85730 THROMBOPLASTIN TIME PARTIAL: CPT | Performed by: ORTHOPAEDIC SURGERY

## 2025-07-15 ENCOUNTER — HOSPITAL ENCOUNTER (INPATIENT)
Facility: HOSPITAL | Age: 50
LOS: 1 days | Discharge: HOME | DRG: 402 | End: 2025-07-17
Attending: ORTHOPAEDIC SURGERY | Admitting: INTERNAL MEDICINE
Payer: MEDICAID

## 2025-07-15 DIAGNOSIS — M47.816 LUMBAR SPONDYLOSIS: ICD-10-CM

## 2025-07-15 DIAGNOSIS — I50.9 CHF (CONGESTIVE HEART FAILURE): ICD-10-CM

## 2025-07-15 DIAGNOSIS — Z98.1 HISTORY OF LUMBAR SPINAL FUSION: ICD-10-CM

## 2025-07-15 DIAGNOSIS — M48.061 FORAMINAL STENOSIS OF LUMBAR REGION: ICD-10-CM

## 2025-07-15 DIAGNOSIS — M43.10 RETROLISTHESIS OF VERTEBRAE: ICD-10-CM

## 2025-07-15 DIAGNOSIS — M54.16 LUMBAR RADICULOPATHY: ICD-10-CM

## 2025-07-15 LAB
ABSOLUTE EOSINOPHIL (SMH): 0.15 K/UL
ABSOLUTE MONOCYTE (SMH): 0.6 K/UL (ref 0.3–1)
ABSOLUTE NEUTROPHIL COUNT (SMH): 5.7 K/UL (ref 1.8–7.7)
ANION GAP (SMH): 8 MMOL/L (ref 8–16)
BASOPHILS # BLD AUTO: 0.02 K/UL
BASOPHILS NFR BLD AUTO: 0.3 %
BUN SERPL-MCNC: 12 MG/DL (ref 6–20)
CALCIUM SERPL-MCNC: 8.1 MG/DL (ref 8.7–10.5)
CHLORIDE SERPL-SCNC: 108 MMOL/L (ref 95–110)
CO2 SERPL-SCNC: 24 MMOL/L (ref 23–29)
CREAT SERPL-MCNC: 1.7 MG/DL (ref 0.5–1.4)
ERYTHROCYTE [DISTWIDTH] IN BLOOD BY AUTOMATED COUNT: 15.6 % (ref 11.5–14.5)
GFR SERPLBLD CREATININE-BSD FMLA CKD-EPI: 49 ML/MIN/1.73/M2
GLUCOSE SERPL-MCNC: 94 MG/DL (ref 70–110)
HCT VFR BLD AUTO: 40.2 % (ref 40–54)
HGB BLD-MCNC: 12.3 GM/DL (ref 14–18)
IMM GRANULOCYTES # BLD AUTO: 0.08 K/UL (ref 0–0.04)
IMM GRANULOCYTES NFR BLD AUTO: 1.1 % (ref 0–0.5)
LYMPHOCYTES # BLD AUTO: 0.93 K/UL (ref 1–4.8)
MCH RBC QN AUTO: 25.9 PG (ref 27–31)
MCHC RBC AUTO-ENTMCNC: 30.6 G/DL (ref 32–36)
MCV RBC AUTO: 85 FL (ref 82–98)
NUCLEATED RBC (/100WBC) (SMH): 0 /100 WBC
PLATELET # BLD AUTO: 200 K/UL (ref 150–450)
PMV BLD AUTO: 10.5 FL (ref 9.2–12.9)
POTASSIUM SERPL-SCNC: 5.1 MMOL/L (ref 3.5–5.1)
RBC # BLD AUTO: 4.75 M/UL (ref 4.6–6.2)
RELATIVE EOSINOPHIL (SMH): 2 % (ref 0–8)
RELATIVE LYMPHOCYTE (SMH): 12.4 % (ref 18–48)
RELATIVE MONOCYTE (SMH): 8 % (ref 4–15)
RELATIVE NEUTROPHIL (SMH): 76.2 % (ref 38–73)
SODIUM SERPL-SCNC: 140 MMOL/L (ref 136–145)
WBC # BLD AUTO: 7.49 K/UL (ref 3.9–12.7)

## 2025-07-15 PROCEDURE — 63600175 PHARM REV CODE 636 W HCPCS: Mod: JZ | Performed by: ANESTHESIOLOGY

## 2025-07-15 PROCEDURE — 85025 COMPLETE CBC W/AUTO DIFF WBC: CPT | Performed by: ORTHOPAEDIC SURGERY

## 2025-07-15 PROCEDURE — 25000003 PHARM REV CODE 250: Performed by: ANESTHESIOLOGY

## 2025-07-15 PROCEDURE — 99406 BEHAV CHNG SMOKING 3-10 MIN: CPT

## 2025-07-15 PROCEDURE — 80048 BASIC METABOLIC PNL TOTAL CA: CPT | Performed by: ORTHOPAEDIC SURGERY

## 2025-07-15 PROCEDURE — 37000008 HC ANESTHESIA 1ST 15 MINUTES: Performed by: ORTHOPAEDIC SURGERY

## 2025-07-15 PROCEDURE — 11000001 HC ACUTE MED/SURG PRIVATE ROOM

## 2025-07-15 PROCEDURE — 61783 SCAN PROC SPINAL: CPT | Mod: XU,,, | Performed by: ORTHOPAEDIC SURGERY

## 2025-07-15 PROCEDURE — P9045 ALBUMIN (HUMAN), 5%, 250 ML: HCPCS | Mod: JZ,TB | Performed by: NURSE ANESTHETIST, CERTIFIED REGISTERED

## 2025-07-15 PROCEDURE — 71000039 HC RECOVERY, EACH ADD'L HOUR: Performed by: ORTHOPAEDIC SURGERY

## 2025-07-15 PROCEDURE — 22853 INSJ BIOMECHANICAL DEVICE: CPT | Mod: ,,, | Performed by: ORTHOPAEDIC SURGERY

## 2025-07-15 PROCEDURE — C1713 ANCHOR/SCREW BN/BN,TIS/BN: HCPCS | Performed by: ORTHOPAEDIC SURGERY

## 2025-07-15 PROCEDURE — 22633 ARTHRD CMBN 1NTRSPC LUMBAR: CPT | Mod: ,,, | Performed by: ORTHOPAEDIC SURGERY

## 2025-07-15 PROCEDURE — 22842 INSERT SPINE FIXATION DEVICE: CPT | Mod: ,,, | Performed by: ORTHOPAEDIC SURGERY

## 2025-07-15 PROCEDURE — 25000003 PHARM REV CODE 250: Performed by: NURSE ANESTHETIST, CERTIFIED REGISTERED

## 2025-07-15 PROCEDURE — 63052 LAM FACETC/FRMT ARTHRD LUM 1: CPT | Mod: ,,, | Performed by: ORTHOPAEDIC SURGERY

## 2025-07-15 PROCEDURE — 36415 COLL VENOUS BLD VENIPUNCTURE: CPT | Performed by: ORTHOPAEDIC SURGERY

## 2025-07-15 PROCEDURE — 94760 N-INVAS EAR/PLS OXIMETRY 1: CPT

## 2025-07-15 PROCEDURE — 20936 SP BONE AGRFT LOCAL ADD-ON: CPT | Mod: ,,, | Performed by: ORTHOPAEDIC SURGERY

## 2025-07-15 PROCEDURE — 0SG00AJ FUSION OF LUMBAR VERTEBRAL JOINT WITH INTERBODY FUSION DEVICE, POSTERIOR APPROACH, ANTERIOR COLUMN, OPEN APPROACH: ICD-10-PCS | Performed by: ORTHOPAEDIC SURGERY

## 2025-07-15 PROCEDURE — 25000003 PHARM REV CODE 250: Performed by: ORTHOPAEDIC SURGERY

## 2025-07-15 PROCEDURE — 27000221 HC OXYGEN, UP TO 24 HOURS

## 2025-07-15 PROCEDURE — 37000009 HC ANESTHESIA EA ADD 15 MINS: Performed by: ORTHOPAEDIC SURGERY

## 2025-07-15 PROCEDURE — 27201423 OPTIME MED/SURG SUP & DEVICES STERILE SUPPLY: Performed by: ORTHOPAEDIC SURGERY

## 2025-07-15 PROCEDURE — 99900035 HC TECH TIME PER 15 MIN (STAT)

## 2025-07-15 PROCEDURE — 0SG0071 FUSION OF LUMBAR VERTEBRAL JOINT WITH AUTOLOGOUS TISSUE SUBSTITUTE, POSTERIOR APPROACH, POSTERIOR COLUMN, OPEN APPROACH: ICD-10-PCS | Performed by: ORTHOPAEDIC SURGERY

## 2025-07-15 PROCEDURE — 94799 UNLISTED PULMONARY SVC/PX: CPT | Mod: XB

## 2025-07-15 PROCEDURE — 0QP004Z REMOVAL OF INTERNAL FIXATION DEVICE FROM LUMBAR VERTEBRA, OPEN APPROACH: ICD-10-PCS | Performed by: ORTHOPAEDIC SURGERY

## 2025-07-15 PROCEDURE — 94799 UNLISTED PULMONARY SVC/PX: CPT

## 2025-07-15 PROCEDURE — 27800903 OPTIME MED/SURG SUP & DEVICES OTHER IMPLANTS: Performed by: ORTHOPAEDIC SURGERY

## 2025-07-15 PROCEDURE — 71000033 HC RECOVERY, INTIAL HOUR: Performed by: ORTHOPAEDIC SURGERY

## 2025-07-15 PROCEDURE — 63600175 PHARM REV CODE 636 W HCPCS: Performed by: NURSE ANESTHETIST, CERTIFIED REGISTERED

## 2025-07-15 PROCEDURE — 63600175 PHARM REV CODE 636 W HCPCS: Performed by: ORTHOPAEDIC SURGERY

## 2025-07-15 PROCEDURE — 63600175 PHARM REV CODE 636 W HCPCS: Mod: JZ,TB | Performed by: ORTHOPAEDIC SURGERY

## 2025-07-15 PROCEDURE — 36000711: Performed by: ORTHOPAEDIC SURGERY

## 2025-07-15 PROCEDURE — C1729 CATH, DRAINAGE: HCPCS | Performed by: ORTHOPAEDIC SURGERY

## 2025-07-15 PROCEDURE — 94761 N-INVAS EAR/PLS OXIMETRY MLT: CPT

## 2025-07-15 PROCEDURE — 20930 SP BONE ALGRFT MORSEL ADD-ON: CPT | Mod: ,,, | Performed by: ORTHOPAEDIC SURGERY

## 2025-07-15 PROCEDURE — 36000710: Performed by: ORTHOPAEDIC SURGERY

## 2025-07-15 DEVICE — PUTTY GRAFTON DBF 3CC: Type: IMPLANTABLE DEVICE | Site: SPINE LUMBAR | Status: FUNCTIONAL

## 2025-07-15 DEVICE — ROD HORIZON CURV 5.5CCM 70MM: Type: IMPLANTABLE DEVICE | Site: SPINE LUMBAR | Status: FUNCTIONAL

## 2025-07-15 DEVICE — IMPLANTABLE DEVICE: Type: IMPLANTABLE DEVICE | Site: SPINE LUMBAR | Status: FUNCTIONAL

## 2025-07-15 DEVICE — SET SCREW HORIZON SOLERA 5.5-6: Type: IMPLANTABLE DEVICE | Site: SPINE LUMBAR | Status: FUNCTIONAL

## 2025-07-15 RX ORDER — CLINDAMYCIN PHOSPHATE 900 MG/50ML
900 INJECTION, SOLUTION INTRAVENOUS
Status: COMPLETED | OUTPATIENT
Start: 2025-07-15 | End: 2025-07-15

## 2025-07-15 RX ORDER — OXYCODONE HYDROCHLORIDE 5 MG/1
5 TABLET ORAL
Status: DISCONTINUED | OUTPATIENT
Start: 2025-07-15 | End: 2025-07-15 | Stop reason: HOSPADM

## 2025-07-15 RX ORDER — GABAPENTIN 300 MG/1
300 CAPSULE ORAL 3 TIMES DAILY
Status: DISCONTINUED | OUTPATIENT
Start: 2025-07-15 | End: 2025-07-17 | Stop reason: HOSPADM

## 2025-07-15 RX ORDER — BUPIVACAINE 13.3 MG/ML
INJECTION, SUSPENSION, LIPOSOMAL INFILTRATION
Status: DISCONTINUED | OUTPATIENT
Start: 2025-07-15 | End: 2025-07-15 | Stop reason: HOSPADM

## 2025-07-15 RX ORDER — SUCCINYLCHOLINE CHLORIDE 20 MG/ML
INJECTION INTRAMUSCULAR; INTRAVENOUS
Status: DISCONTINUED | OUTPATIENT
Start: 2025-07-15 | End: 2025-07-15

## 2025-07-15 RX ORDER — ACETAMINOPHEN 325 MG/1
650 TABLET ORAL EVERY 6 HOURS PRN
Status: DISCONTINUED | OUTPATIENT
Start: 2025-07-16 | End: 2025-07-17 | Stop reason: HOSPADM

## 2025-07-15 RX ORDER — DIPHENHYDRAMINE HCL 25 MG
50 CAPSULE ORAL EVERY 6 HOURS PRN
Status: DISCONTINUED | OUTPATIENT
Start: 2025-07-15 | End: 2025-07-17 | Stop reason: HOSPADM

## 2025-07-15 RX ORDER — KETAMINE HCL IN 0.9 % NACL 50 MG/5 ML
SYRINGE (ML) INTRAVENOUS
Status: DISCONTINUED | OUTPATIENT
Start: 2025-07-15 | End: 2025-07-15

## 2025-07-15 RX ORDER — FUROSEMIDE 20 MG/1
20 TABLET ORAL EVERY MORNING
Status: DISCONTINUED | OUTPATIENT
Start: 2025-07-16 | End: 2025-07-16

## 2025-07-15 RX ORDER — EPHEDRINE SULFATE 50 MG/ML
INJECTION, SOLUTION INTRAVENOUS
Status: DISCONTINUED | OUTPATIENT
Start: 2025-07-15 | End: 2025-07-15

## 2025-07-15 RX ORDER — GLUCAGON 1 MG
1 KIT INJECTION
Status: DISCONTINUED | OUTPATIENT
Start: 2025-07-15 | End: 2025-07-15 | Stop reason: HOSPADM

## 2025-07-15 RX ORDER — DOCUSATE SODIUM 100 MG/1
100 CAPSULE, LIQUID FILLED ORAL DAILY
Status: DISCONTINUED | OUTPATIENT
Start: 2025-07-15 | End: 2025-07-17 | Stop reason: HOSPADM

## 2025-07-15 RX ORDER — ANASTROZOLE 1 MG/1
1 TABLET ORAL
Status: DISCONTINUED | OUTPATIENT
Start: 2025-07-21 | End: 2025-07-17 | Stop reason: HOSPADM

## 2025-07-15 RX ORDER — FENTANYL CITRATE 50 UG/ML
INJECTION, SOLUTION INTRAMUSCULAR; INTRAVENOUS
Status: DISCONTINUED | OUTPATIENT
Start: 2025-07-15 | End: 2025-07-15

## 2025-07-15 RX ORDER — OXYCODONE HYDROCHLORIDE 5 MG/1
5 TABLET ORAL EVERY 4 HOURS PRN
Status: DISCONTINUED | OUTPATIENT
Start: 2025-07-15 | End: 2025-07-17 | Stop reason: HOSPADM

## 2025-07-15 RX ORDER — BUPIVACAINE HYDROCHLORIDE 5 MG/ML
INJECTION, SOLUTION EPIDURAL; INTRACAUDAL; PERINEURAL
Status: DISCONTINUED | OUTPATIENT
Start: 2025-07-15 | End: 2025-07-15 | Stop reason: HOSPADM

## 2025-07-15 RX ORDER — METOPROLOL TARTRATE 50 MG/1
50 TABLET ORAL 2 TIMES DAILY
Status: DISCONTINUED | OUTPATIENT
Start: 2025-07-15 | End: 2025-07-16

## 2025-07-15 RX ORDER — HYDROMORPHONE HYDROCHLORIDE 2 MG/ML
0.2 INJECTION, SOLUTION INTRAMUSCULAR; INTRAVENOUS; SUBCUTANEOUS EVERY 5 MIN PRN
Status: COMPLETED | OUTPATIENT
Start: 2025-07-15 | End: 2025-07-15

## 2025-07-15 RX ORDER — VANCOMYCIN HYDROCHLORIDE 1 G/20ML
INJECTION, POWDER, LYOPHILIZED, FOR SOLUTION INTRAVENOUS
Status: DISCONTINUED | OUTPATIENT
Start: 2025-07-15 | End: 2025-07-15 | Stop reason: HOSPADM

## 2025-07-15 RX ORDER — DEXAMETHASONE SODIUM PHOSPHATE 4 MG/ML
INJECTION, SOLUTION INTRA-ARTICULAR; INTRALESIONAL; INTRAMUSCULAR; INTRAVENOUS; SOFT TISSUE
Status: DISCONTINUED | OUTPATIENT
Start: 2025-07-15 | End: 2025-07-15

## 2025-07-15 RX ORDER — ENOXAPARIN SODIUM 100 MG/ML
1 INJECTION SUBCUTANEOUS 2 TIMES DAILY
Status: DISCONTINUED | OUTPATIENT
Start: 2025-07-15 | End: 2025-07-15

## 2025-07-15 RX ORDER — ACETAMINOPHEN 10 MG/ML
INJECTION, SOLUTION INTRAVENOUS
Status: DISCONTINUED | OUTPATIENT
Start: 2025-07-15 | End: 2025-07-15

## 2025-07-15 RX ORDER — PROPOFOL 10 MG/ML
VIAL (ML) INTRAVENOUS
Status: DISCONTINUED | OUTPATIENT
Start: 2025-07-15 | End: 2025-07-15

## 2025-07-15 RX ORDER — MUPIROCIN 20 MG/G
OINTMENT TOPICAL 2 TIMES DAILY
Status: DISCONTINUED | OUTPATIENT
Start: 2025-07-15 | End: 2025-07-17 | Stop reason: HOSPADM

## 2025-07-15 RX ORDER — PROCHLORPERAZINE EDISYLATE 5 MG/ML
5 INJECTION INTRAMUSCULAR; INTRAVENOUS EVERY 6 HOURS PRN
Status: DISCONTINUED | OUTPATIENT
Start: 2025-07-15 | End: 2025-07-17 | Stop reason: HOSPADM

## 2025-07-15 RX ORDER — HYDROMORPHONE HYDROCHLORIDE 2 MG/ML
2 INJECTION, SOLUTION INTRAMUSCULAR; INTRAVENOUS; SUBCUTANEOUS
Status: DISCONTINUED | OUTPATIENT
Start: 2025-07-15 | End: 2025-07-17 | Stop reason: HOSPADM

## 2025-07-15 RX ORDER — LIDOCAINE HYDROCHLORIDE 20 MG/ML
INJECTION INTRAVENOUS
Status: DISCONTINUED | OUTPATIENT
Start: 2025-07-15 | End: 2025-07-15

## 2025-07-15 RX ORDER — PHENYLEPHRINE HYDROCHLORIDE 10 MG/ML
INJECTION INTRAVENOUS
Status: DISCONTINUED | OUTPATIENT
Start: 2025-07-15 | End: 2025-07-15

## 2025-07-15 RX ORDER — ALUMINUM HYDROXIDE, MAGNESIUM HYDROXIDE, AND SIMETHICONE 1200; 120; 1200 MG/30ML; MG/30ML; MG/30ML
30 SUSPENSION ORAL EVERY 4 HOURS PRN
Status: DISCONTINUED | OUTPATIENT
Start: 2025-07-15 | End: 2025-07-17 | Stop reason: HOSPADM

## 2025-07-15 RX ORDER — ROCURONIUM BROMIDE 10 MG/ML
INJECTION, SOLUTION INTRAVENOUS
Status: DISCONTINUED | OUTPATIENT
Start: 2025-07-15 | End: 2025-07-15

## 2025-07-15 RX ORDER — ONDANSETRON 4 MG/1
4 TABLET, FILM COATED ORAL EVERY 6 HOURS PRN
Status: DISCONTINUED | OUTPATIENT
Start: 2025-07-15 | End: 2025-07-15 | Stop reason: SDUPTHER

## 2025-07-15 RX ORDER — ESCITALOPRAM OXALATE 10 MG/1
20 TABLET ORAL DAILY
Status: DISCONTINUED | OUTPATIENT
Start: 2025-07-16 | End: 2025-07-17 | Stop reason: HOSPADM

## 2025-07-15 RX ORDER — OXYCODONE AND ACETAMINOPHEN 10; 325 MG/1; MG/1
1 TABLET ORAL EVERY 4 HOURS PRN
Qty: 42 TABLET | Refills: 0 | Status: SHIPPED | OUTPATIENT
Start: 2025-07-15 | End: 2025-07-23 | Stop reason: SDUPTHER

## 2025-07-15 RX ORDER — MIDAZOLAM HYDROCHLORIDE 1 MG/ML
INJECTION INTRAMUSCULAR; INTRAVENOUS
Status: DISCONTINUED | OUTPATIENT
Start: 2025-07-15 | End: 2025-07-15

## 2025-07-15 RX ORDER — HYDROMORPHONE HCL IN 0.9% NACL 6 MG/30 ML
PATIENT CONTROLLED ANALGESIA SYRINGE INTRAVENOUS CONTINUOUS
Refills: 0 | Status: DISCONTINUED | OUTPATIENT
Start: 2025-07-15 | End: 2025-07-16

## 2025-07-15 RX ORDER — TRAZODONE HYDROCHLORIDE 50 MG/1
100 TABLET ORAL NIGHTLY
Status: DISCONTINUED | OUTPATIENT
Start: 2025-07-15 | End: 2025-07-17 | Stop reason: HOSPADM

## 2025-07-15 RX ORDER — ATORVASTATIN CALCIUM 10 MG/1
10 TABLET, FILM COATED ORAL DAILY
Status: DISCONTINUED | OUTPATIENT
Start: 2025-07-15 | End: 2025-07-17 | Stop reason: HOSPADM

## 2025-07-15 RX ORDER — SODIUM CHLORIDE, SODIUM LACTATE, POTASSIUM CHLORIDE, CALCIUM CHLORIDE 600; 310; 30; 20 MG/100ML; MG/100ML; MG/100ML; MG/100ML
INJECTION, SOLUTION INTRAVENOUS CONTINUOUS
Status: DISCONTINUED | OUTPATIENT
Start: 2025-07-15 | End: 2025-07-16

## 2025-07-15 RX ORDER — LIDOCAINE HYDROCHLORIDE 10 MG/ML
1 INJECTION, SOLUTION EPIDURAL; INFILTRATION; INTRACAUDAL; PERINEURAL ONCE
Status: DISCONTINUED | OUTPATIENT
Start: 2025-07-15 | End: 2025-07-15 | Stop reason: HOSPADM

## 2025-07-15 RX ORDER — HEPARIN SODIUM 5000 [USP'U]/ML
INJECTION, SOLUTION INTRAVENOUS; SUBCUTANEOUS
Status: DISCONTINUED | OUTPATIENT
Start: 2025-07-15 | End: 2025-07-15 | Stop reason: HOSPADM

## 2025-07-15 RX ORDER — CLINDAMYCIN PHOSPHATE 900 MG/50ML
900 INJECTION, SOLUTION INTRAVENOUS
Status: COMPLETED | OUTPATIENT
Start: 2025-07-15 | End: 2025-07-16

## 2025-07-15 RX ORDER — AMOXICILLIN 250 MG
2 CAPSULE ORAL NIGHTLY PRN
Status: DISCONTINUED | OUTPATIENT
Start: 2025-07-15 | End: 2025-07-17 | Stop reason: HOSPADM

## 2025-07-15 RX ORDER — PANTOPRAZOLE SODIUM 40 MG/1
40 TABLET, DELAYED RELEASE ORAL DAILY
Status: DISCONTINUED | OUTPATIENT
Start: 2025-07-16 | End: 2025-07-17 | Stop reason: HOSPADM

## 2025-07-15 RX ORDER — BISACODYL 10 MG/1
10 SUPPOSITORY RECTAL DAILY
Status: DISCONTINUED | OUTPATIENT
Start: 2025-07-15 | End: 2025-07-17 | Stop reason: HOSPADM

## 2025-07-15 RX ORDER — HYDROMORPHONE HYDROCHLORIDE 2 MG/ML
INJECTION, SOLUTION INTRAMUSCULAR; INTRAVENOUS; SUBCUTANEOUS
Status: DISCONTINUED | OUTPATIENT
Start: 2025-07-15 | End: 2025-07-15

## 2025-07-15 RX ORDER — ENOXAPARIN SODIUM 100 MG/ML
100 INJECTION SUBCUTANEOUS
Status: CANCELLED | OUTPATIENT
Start: 2025-07-15

## 2025-07-15 RX ORDER — ONDANSETRON HYDROCHLORIDE 2 MG/ML
INJECTION, SOLUTION INTRAMUSCULAR; INTRAVENOUS
Status: DISCONTINUED | OUTPATIENT
Start: 2025-07-15 | End: 2025-07-15

## 2025-07-15 RX ORDER — TALC
9 POWDER (GRAM) TOPICAL NIGHTLY PRN
Status: DISCONTINUED | OUTPATIENT
Start: 2025-07-15 | End: 2025-07-17 | Stop reason: HOSPADM

## 2025-07-15 RX ORDER — FENTANYL CITRATE 50 UG/ML
25 INJECTION, SOLUTION INTRAMUSCULAR; INTRAVENOUS EVERY 5 MIN PRN
Status: DISCONTINUED | OUTPATIENT
Start: 2025-07-15 | End: 2025-07-15 | Stop reason: HOSPADM

## 2025-07-15 RX ORDER — ALBUMIN HUMAN 50 G/1000ML
SOLUTION INTRAVENOUS
Status: DISCONTINUED | OUTPATIENT
Start: 2025-07-15 | End: 2025-07-15

## 2025-07-15 RX ORDER — ONDANSETRON 8 MG/1
8 TABLET, ORALLY DISINTEGRATING ORAL EVERY 6 HOURS PRN
Status: DISCONTINUED | OUTPATIENT
Start: 2025-07-15 | End: 2025-07-17 | Stop reason: HOSPADM

## 2025-07-15 RX ORDER — OXYCODONE HYDROCHLORIDE 10 MG/1
10 TABLET ORAL EVERY 4 HOURS PRN
Status: DISCONTINUED | OUTPATIENT
Start: 2025-07-15 | End: 2025-07-17 | Stop reason: HOSPADM

## 2025-07-15 RX ORDER — NALOXONE HCL 0.4 MG/ML
0.02 VIAL (ML) INJECTION
Status: DISCONTINUED | OUTPATIENT
Start: 2025-07-15 | End: 2025-07-17 | Stop reason: HOSPADM

## 2025-07-15 RX ORDER — CLOPIDOGREL BISULFATE 75 MG/1
75 TABLET ORAL DAILY
Status: DISCONTINUED | OUTPATIENT
Start: 2025-07-15 | End: 2025-07-17 | Stop reason: HOSPADM

## 2025-07-15 RX ADMIN — Medication: at 06:07

## 2025-07-15 RX ADMIN — HYDROMORPHONE HYDROCHLORIDE 0.2 MG: 2 INJECTION INTRAMUSCULAR; INTRAVENOUS; SUBCUTANEOUS at 01:07

## 2025-07-15 RX ADMIN — EPHEDRINE SULFATE 10 MG: 50 INJECTION, SOLUTION INTRAMUSCULAR; INTRAVENOUS; SUBCUTANEOUS at 09:07

## 2025-07-15 RX ADMIN — OXYCODONE HYDROCHLORIDE 5 MG: 5 TABLET ORAL at 01:07

## 2025-07-15 RX ADMIN — HYDROMORPHONE HYDROCHLORIDE 0.2 MG: 2 INJECTION INTRAMUSCULAR; INTRAVENOUS; SUBCUTANEOUS at 02:07

## 2025-07-15 RX ADMIN — ALBUMIN (HUMAN) 250 ML: 12.5 SOLUTION INTRAVENOUS at 10:07

## 2025-07-15 RX ADMIN — DEXAMETHASONE SODIUM PHOSPHATE 4 MG: 4 INJECTION, SOLUTION INTRA-ARTICULAR; INTRALESIONAL; INTRAMUSCULAR; INTRAVENOUS; SOFT TISSUE at 09:07

## 2025-07-15 RX ADMIN — HYDROMORPHONE HYDROCHLORIDE 1 MG: 2 INJECTION INTRAMUSCULAR; INTRAVENOUS; SUBCUTANEOUS at 09:07

## 2025-07-15 RX ADMIN — BUSPIRONE HYDROCHLORIDE 15 MG: 10 TABLET ORAL at 05:07

## 2025-07-15 RX ADMIN — ONDANSETRON 4 MG: 2 INJECTION INTRAMUSCULAR; INTRAVENOUS at 09:07

## 2025-07-15 RX ADMIN — EPHEDRINE SULFATE 10 MG: 50 INJECTION, SOLUTION INTRAMUSCULAR; INTRAVENOUS; SUBCUTANEOUS at 10:07

## 2025-07-15 RX ADMIN — CLOPIDOGREL 75 MG: 75 TABLET ORAL at 05:07

## 2025-07-15 RX ADMIN — ROCURONIUM BROMIDE 10 MG: 10 INJECTION, SOLUTION INTRAVENOUS at 09:07

## 2025-07-15 RX ADMIN — Medication 25 MG: at 09:07

## 2025-07-15 RX ADMIN — FENTANYL CITRATE 100 MCG: 50 INJECTION, SOLUTION INTRAMUSCULAR; INTRAVENOUS at 10:07

## 2025-07-15 RX ADMIN — MIDAZOLAM HYDROCHLORIDE 2 MG: 1 INJECTION, SOLUTION INTRAMUSCULAR; INTRAVENOUS at 09:07

## 2025-07-15 RX ADMIN — ATORVASTATIN CALCIUM 10 MG: 10 TABLET, FILM COATED ORAL at 05:07

## 2025-07-15 RX ADMIN — PHENYLEPHRINE HYDROCHLORIDE 200 MCG: 10 INJECTION INTRAVENOUS at 10:07

## 2025-07-15 RX ADMIN — CLINDAMYCIN PHOSPHATE 900 MG: 900 INJECTION, SOLUTION INTRAVENOUS at 05:07

## 2025-07-15 RX ADMIN — PROPOFOL 200 MG: 10 INJECTION, EMULSION INTRAVENOUS at 09:07

## 2025-07-15 RX ADMIN — PHENYLEPHRINE HYDROCHLORIDE 200 MCG: 10 INJECTION INTRAVENOUS at 09:07

## 2025-07-15 RX ADMIN — SODIUM CHLORIDE, SODIUM GLUCONATE, SODIUM ACETATE, POTASSIUM CHLORIDE AND MAGNESIUM CHLORIDE: 526; 502; 368; 37; 30 INJECTION, SOLUTION INTRAVENOUS at 07:07

## 2025-07-15 RX ADMIN — ACETAMINOPHEN 1000 MG: 10 INJECTION INTRAVENOUS at 10:07

## 2025-07-15 RX ADMIN — SUCCINYLCHOLINE CHLORIDE 150 MG: 20 INJECTION, SOLUTION INTRAMUSCULAR; INTRAVENOUS at 09:07

## 2025-07-15 RX ADMIN — PROPOFOL 50 MG: 10 INJECTION, EMULSION INTRAVENOUS at 09:07

## 2025-07-15 RX ADMIN — SACUBITRIL AND VALSARTAN 1 TABLET: 49; 51 TABLET, FILM COATED ORAL at 08:07

## 2025-07-15 RX ADMIN — DOCUSATE SODIUM 100 MG: 100 CAPSULE, LIQUID FILLED ORAL at 05:07

## 2025-07-15 RX ADMIN — Medication: at 02:07

## 2025-07-15 RX ADMIN — PHENYLEPHRINE HYDROCHLORIDE 150 MCG: 10 INJECTION INTRAVENOUS at 10:07

## 2025-07-15 RX ADMIN — CLINDAMYCIN PHOSPHATE 900 MG: 900 INJECTION, SOLUTION INTRAVENOUS at 09:07

## 2025-07-15 RX ADMIN — Medication 25 MG: at 10:07

## 2025-07-15 RX ADMIN — METOPROLOL TARTRATE 50 MG: 50 TABLET, FILM COATED ORAL at 08:07

## 2025-07-15 RX ADMIN — LIDOCAINE HYDROCHLORIDE 100 MG: 20 INJECTION, SOLUTION INTRAVENOUS at 12:07

## 2025-07-15 RX ADMIN — SODIUM CHLORIDE, SODIUM GLUCONATE, SODIUM ACETATE, POTASSIUM CHLORIDE AND MAGNESIUM CHLORIDE: 526; 502; 368; 37; 30 INJECTION, SOLUTION INTRAVENOUS at 10:07

## 2025-07-15 RX ADMIN — ALBUMIN (HUMAN) 250 ML: 12.5 SOLUTION INTRAVENOUS at 11:07

## 2025-07-15 RX ADMIN — PROPOFOL 50 MG: 10 INJECTION, EMULSION INTRAVENOUS at 10:07

## 2025-07-15 RX ADMIN — GABAPENTIN 300 MG: 300 CAPSULE ORAL at 05:07

## 2025-07-15 RX ADMIN — PHENYLEPHRINE HYDROCHLORIDE 30 MCG/MIN: 10 INJECTION INTRAVENOUS at 10:07

## 2025-07-15 RX ADMIN — ONDANSETRON 4 MG: 2 INJECTION INTRAMUSCULAR; INTRAVENOUS at 11:07

## 2025-07-15 RX ADMIN — SODIUM CHLORIDE, POTASSIUM CHLORIDE, SODIUM LACTATE AND CALCIUM CHLORIDE: 600; 310; 30; 20 INJECTION, SOLUTION INTRAVENOUS at 02:07

## 2025-07-15 RX ADMIN — MUPIROCIN 1 G: 20 OINTMENT TOPICAL at 08:07

## 2025-07-15 RX ADMIN — LIDOCAINE HYDROCHLORIDE 100 MG: 20 INJECTION, SOLUTION INTRAVENOUS at 09:07

## 2025-07-15 RX ADMIN — GABAPENTIN 300 MG: 300 CAPSULE ORAL at 08:07

## 2025-07-15 RX ADMIN — TRAZODONE HYDROCHLORIDE 100 MG: 50 TABLET ORAL at 08:07

## 2025-07-15 NOTE — ANESTHESIA PREPROCEDURE EVALUATION
07/15/2025  Lee Quintanilla is a 49 y.o., male.      Pre-op Assessment    I have reviewed the Patient Summary Reports.     I have reviewed the Nursing Notes. I have reviewed the NPO Status.   I have reviewed the Medications.     Review of Systems  Anesthesia Hx:  No problems with previous Anesthesia                Social:  Former Smoker       Cardiovascular:     Hypertension  Past MI CAD                    Coronary Artery Disease:          Hx of Myocardial Infarction                  Hypertension         Pulmonary:    Asthma    Sleep Apnea   Asthma:    Obstructive Sleep Apnea (OLEG).           Renal/:  Chronic Renal Disease        Kidney Function/Disease             Hepatic/GI:     GERD         Gerd          Musculoskeletal:  Arthritis          Spine Disorders: lumbar            Neurological:    Neuromuscular Disease,  Headaches Seizures     Dx of Headaches      Seizure Disorder                        Neuromuscular Disease   Endocrine:        Obesity / BMI > 30  Psych:  Psychiatric History  depression                Physical Exam  General: Well nourished, Cooperative, Alert and Oriented    Airway:  Mallampati: II / I  Mouth Opening: Normal  TM Distance: Normal  Tongue: Normal    Dental:  Intact    Chest/Lungs:  Normal Respiratory Rate    Heart:  Rate: Normal        Anesthesia Plan  Type of Anesthesia, risks & benefits discussed:    Anesthesia Type: Gen ETT  Intra-op Monitoring Plan: Standard ASA Monitors  Post Op Pain Control Plan: multimodal analgesia and IV/PO Opioids PRN  Induction:  IV  Airway Plan: Direct and Video, Post-Induction  Informed Consent: Informed consent signed with the Patient and all parties understand the risks and agree with anesthesia plan.  All questions answered.   ASA Score: 3  Day of Surgery Review of History & Physical: H&P Update referred to the  surgeon/provider.  Anesthesia Plan Notes: Patient with history of lumbar fusion/ multiple procedures.  Discussed attempting spinal with low threshold for converting to general.      Ready For Surgery From Anesthesia Perspective.     .

## 2025-07-15 NOTE — PLAN OF CARE
AAO x4. NAD. VSS. Calm and cooperative. Speech appropriate. Tolerating clear liquids. Denies nausea. Pain controlled. 20G IV to Lt FA infusing with dressing CDI. Dressing to back CDI. Accordion drain intact and draining well. Washburn intact and draining clear yellow urine. Tele [id number] connected and monitoring room notified. PCA handoff performed with Francesca PILLAI.  Family notified of patient status. All safety measures taken. Bed in low position. Bedrails up x2. Bed locked. All patient belongings in post op. Cleared by anesthesia for phase 2.

## 2025-07-15 NOTE — H&P
[facility] Medicine  History & Physical    Patient Name: Lee Quintanilla  MRN: [id number]  Patient Class: OP- Outpatient Recovery  Admission Date: 7/15/2025  Attending Physician: Mateusz Blanco MD   Primary Care Provider: Riya Vdiales NP         Patient information was obtained from patient, past medical records, and ER records.     Subjective:     Principal Problem:<principal problem not specified>    Chief Complaint: No chief complaint on file.       HPI: 49 year old with a past medical history of Asthma, CAD, Hypertension, HLD, CKD st. III, MI, Seizures, OLEG, lumbar spondylosis, lumbar radiculopathy, foraminal stenosis of the lumbar region, s/p lumbar spinal fusion, Retrolisthesis of vertebrae, who is admitted to Hospital Medicine Service s/p Combined transforaminal lumbar interbody fusion and posterolateral fusion L3-4 level, L3 laminectomy for lumbar stenosis, and Removal of posterior nonsegmental instrumentation L4-5 with Dr. Blanco. Patient given Albumin in OR and blood products. Patient will be admitted for further Medical Management.     Past Medical History:   Diagnosis Date    ADHD (attention deficit hyperactivity disorder)     Arthritis     Asthma     as child only    Back pain     Coronary artery disease     Degeneration of lumbar intervertebral disc 05/2016    Depression     Digestive disorder     Elevated PSA     Headache     Hyperlipidemia     Hypertension     Kidney damage     stage 3 ; d/t aleve abuse    Kidney stone     Myocardial infarction     Neck pain     Numbness and tingling in hands     Numbness and tingling of both legs     Osteonecrosis     Bilat Femur    Seizures     from inapsine 2002    Sleep apnea     no cpap    Wears glasses     CONTACS       Past Surgical History:   Procedure Laterality Date    BACK SURGERY  2016    back surgery    BONE GRAFT N/A 11/05/2020    Procedure: BONE GRAFT;  Surgeon: Mateusz Blanco MD;  Location: [facility];   Service: Orthopedics;  Laterality: N/A;    CARDIAC SURGERY  01/06/2020    CABG X 7    CORONARY ARTERY BYPASS GRAFT      7 vessels    FLEXIBLE SIGMOIDOSCOPY N/A 07/24/2024    Procedure: SIGMOIDOSCOPY, FLEXIBLE;  Surgeon: Latesha Victoria MD;  Location: [facility] ENDO;  Service: Endoscopy;  Laterality: N/A;    HERNIA REPAIR Bilateral 11/22/2017    HIP ARTHROPLASTY Left 09/23/2024    Procedure: ARTHROPLASTY, HIP;  Surgeon: Lázaro Jaimes MD;  Location: [facility] OR;  Service: Orthopedics;  Laterality: Left;  Sincere    HIP REPLACEMENT ARTHROPLASTY Right 02/17/2025    Procedure: ARTHROPLASTY, HIP REPLACEMENT;  Surgeon: Lázaro Jaimes MD;  Location: [facility] OR;  Service: Orthopedics;  Laterality: Right;  Sincere    JOINT REPLACEMENT Bilateral     HIPS    LITHOTRIPSY      LUMBAR LAMINECTOMY WITH FUSION Bilateral 11/05/2020    Procedure: LAMINECTOMY, SPINE, LUMBAR, WITH FUSION;  Surgeon: Mateusz Blanco MD;  Location: [facility] OR;  Service: Orthopedics;  Laterality: Bilateral;  [employer]  NTI  L-3    PILONIDAL CYST DRAINAGE      removal of abcess      scrotal    REMOVAL OF HARDWARE FROM SPINE Bilateral 11/05/2020    Procedure: REMOVAL, HARDWARE, SPINE;  Surgeon: Mateusz Blanco MD;  Location: [facility] OR;  Service: Orthopedics;  Laterality: Bilateral;  L 4-5    SURGICAL REMOVAL OF PILONIDAL CYST N/A 04/15/2024    Procedure: EXCISION, PILONIDAL CYST;  Surgeon: Jayden Phillips III, MD;  Location: [facility] OR;  Service: General;  Laterality: N/A;  sacral area    TYMPANOSTOMY TUBE PLACEMENT         Review of patient's allergies indicates:   Allergen Reactions    Inapsine [droperidol] Anaphylaxis     seizures    Bactrim [sulfamethoxazole-trimethoprim] Rash     Dry red rash all over when in the sun    Effexor [venlafaxine] Other (See Comments)     Increased anxiety    Fluconazole Rash     Pruritis      Pcn [penicillins] Other (See Comments)     UNSURE OF REACTION--FROM SCRATCH TEST       Current Facility-Administered Medications on File Prior to  Encounter   Medication    [DISCONTINUED] acetaminophen 1,000 mg/100 mL (10 mg/mL) injection    [DISCONTINUED] albumin human 5% bottle    [DISCONTINUED] dexAMETHasone injection    [DISCONTINUED] ePHEDrine sulfate    [DISCONTINUED] fentaNYL 50 mcg/mL injection    [DISCONTINUED] HYDROmorphone (PF) injection    [DISCONTINUED] ketamine in 0.9 % sod chloride 50 mg/5 mL (10 mg/mL) injection    [DISCONTINUED] LIDOcaine (cardiac) injection    [DISCONTINUED] midazolam injection    [DISCONTINUED] ondansetron HCl (PF) injection    [DISCONTINUED] PHENYLephrine 10 mg in 0.9% NaCl 100 mL infusion    [DISCONTINUED] PHENYLephrine injection    [DISCONTINUED] propofol (DIPRIVAN) 10 mg/mL infusion    [DISCONTINUED] rocuronium injection    [DISCONTINUED] succinylcholine injection     Current Outpatient Medications on File Prior to Encounter   Medication Sig    anastrozole (ARIMIDEX) 1 mg Tab Take 1 mg by mouth every 7 days.    busPIRone (BUSPAR) 15 MG tablet Take 1 tablet (15 mg total) by mouth 3 (three) times daily.    enoxaparin (LOVENOX) 100 mg/mL Syrg Inject 1 mg/kg into the skin 2 (two) times daily.    ENTRESTO 49-51 mg per tablet Take 1 tablet by mouth 2 (two) times daily.    EScitalopram oxalate (LEXAPRO) 20 MG tablet Take 1 tablet (20 mg total) by mouth once daily.    metoprolol tartrate (LOPRESSOR) 50 MG tablet Take 1 tablet by mouth 2 (two) times daily.    pantoprazole (PROTONIX) 40 MG tablet Take 1 tablet (40 mg total) by mouth once daily.    rosuvastatin (CRESTOR) 40 MG Tab Take 40 mg by mouth every evening.    tadalafiL (CIALIS) 5 MG tablet Take 1 tablet by mouth once daily.    testosterone cypionate 200 mg/mL Kit Inject 1 mL into the muscle every 7 days.    traZODone (DESYREL) 100 MG tablet Take 1 tablet (100 mg total) by mouth every evening.    clopidogreL (PLAVIX) 75 mg tablet Take 75 mg by mouth once daily.    cyclobenzaprine (FLEXERIL) 10 MG tablet Take 1 tablet (10 mg total) by mouth 2 (two) times daily.     fluticasone propionate (FLONASE) 50 mcg/actuation nasal spray 2 sprays (100 mcg total) by Each Nostril route once daily.    furosemide (LASIX) 20 MG tablet Take 1 tablet by mouth every morning.    gabapentin (NEURONTIN) 300 MG capsule Take 1 capsule (300 mg total) by mouth 3 (three) times daily.    hydrOXYzine (ATARAX) 50 MG tablet Take 1 tablet by mouth 2 (two) times daily. (Patient not taking: Reported on 2025)    ondansetron (ZOFRAN) 4 MG tablet Take 1 tablet (4 mg total) by mouth every 6 (six) hours as needed for Nausea.    semaglutide, weight loss, (WEGOVY) 1 mg/0.5 mL PnIj Inject 1 mg into the skin every 7 days.    tadalafiL (CIALIS) 10 MG tablet Take 10 mg by mouth daily as needed. (Patient not taking: Reported on 2025)    vitamin D (VITAMIN D3) 1000 units Tab Take 2,000 Units by mouth once daily.     Family History       Problem Relation (Age of Onset)    Diabetes Maternal Aunt, Maternal Uncle, Maternal Grandfather    Early death Father    Heart disease Mother, Father    Hypertension Mother    Migraines Mother          Tobacco Use    Smoking status: Former     Current packs/day: 0.00     Types: Cigarettes     Quit date: 2016     Years since quittin.5     Passive exposure: Past    Smokeless tobacco: Former     Quit date: 2016    Tobacco comments:     Occasional  vaping   Substance and Sexual Activity    Alcohol use: Not Currently    Drug use: No    Sexual activity: Yes     Partners: Female     Review of Systems   Constitutional: Negative.    Respiratory: Negative.     Cardiovascular: Negative.    Gastrointestinal: Negative.    Genitourinary: Negative.    Musculoskeletal:  Positive for back pain and gait problem.   Skin: Negative.    Psychiatric/Behavioral: Negative.       Objective:     Vital Signs (Most Recent):  Temp: 98.1 °F (36.7 °C) (07/15/25 1237)  Pulse: 88 (07/15/25 1325)  Resp: (!) 22 (07/15/25 1340)  BP: (!) 104/59 (07/15/25 1325)  SpO2: (!) 94 % (07/15/25 1325) Vital Signs  "(24h Range):  Temp:  [97.6 °F (36.4 °C)-98.1 °F (36.7 °C)] 98.1 °F (36.7 °C)  Pulse:  [75-94] 88  Resp:  [8-22] 22  SpO2:  [94 %-99 %] 94 %  BP: ()/(50-67) 104/59     Weight: 102.1 kg (225 lb)  Body mass index is 35.24 kg/m².     Physical Exam  Vitals reviewed.   Constitutional:       Appearance: Normal appearance.   HENT:      Head: Normocephalic and atraumatic.      Mouth/Throat:      Mouth: Mucous membranes are moist.   Eyes:      Pupils: Pupils are equal, round, and reactive to light.   Cardiovascular:      Rate and Rhythm: Normal rate.   Pulmonary:      Effort: Pulmonary effort is normal. No respiratory distress.      Breath sounds: Normal breath sounds.      Comments: On supplemental oxygen  Abdominal:      General: Bowel sounds are normal. There is no distension.      Palpations: Abdomen is soft.      Tenderness: There is no abdominal tenderness.   Genitourinary:     Comments: Washburn cath inplace  Musculoskeletal:      Cervical back: Neck supple.   Skin:     Capillary Refill: Capillary refill takes less than 2 seconds.      Comments: Pca pump    Neurological:      General: No focal deficit present.      Mental Status: He is alert. Mental status is at baseline.   Psychiatric:         Mood and Affect: Mood normal.              CRANIAL NERVES     CN III, IV, VI   Pupils are equal, round, and reactive to light.       Significant Labs: All pertinent labs within the past 24 hours have been reviewed.  BMP: No results for input(s): "GLU", "NA", "K", "CL", "CO2", "BUN", "CREATININE", "CALCIUM", "MG" in the last 48 hours.  CBC:   Recent Labs   Lab 07/15/25  1309   WBC 7.49   HGB 12.3*   HCT 40.2        CMP: No results for input(s): "NA", "K", "CL", "CO2", "GLU", "BUN", "CREATININE", "CALCIUM", "PROT", "ALBUMIN", "BILITOT", "ALKPHOS", "AST", "ALT", "ANIONGAP", "EGFRNONAA" in the last 48 hours.    Invalid input(s): "ESTGFAFRICA"    Significant Imaging: I have reviewed all pertinent imaging results/findings " within the past 24 hours.  Assessment/Plan:     Assessment & Plan  Lumbar radiculopathy  Lumbar spondylosis  Foraminal stenosis of lumbar region  s/p Combined transforaminal lumbar interbody fusion and posterolateral fusion L3-4 level, L3 laminectomy for lumbar stenosis, and Removal of posterior nonsegmental instrumentation L4-5 with Dr. Blanco.    Pca pain pump  Dr. Blanco following.   PT/OT    Hypertension  Patient's blood pressure range in the last 24 hours was: BP  Min: 93/54  Max: 118/61.The patient's inpatient anti-hypertensive regimen is listed below:  Current Antihypertensives  metoprolol tartrate (LOPRESSOR) tablet 50 mg, 2 times daily, Oral  furosemide tablet 20 mg, Every morning, Oral    Plan  - BP is controlled, no changes needed to their regimen    Severe obesity with body mass index (BMI) of 35.0 to 39.9 with serious comorbidity  Body mass index is 35.24 kg/m². Morbid obesity complicates all aspects of disease management from diagnostic modalities to treatment. Weight loss encouraged and health benefits explained to patient.     VTE Risk Mitigation (From admission, onward)           Ordered     enoxaparin injection 100 mg  2 times daily         07/15/25 1448     IP VTE LOW RISK PATIENT  Once         07/15/25 0628     Place JELENA hose  Until discontinued         07/15/25 0628     Place sequential compression device  Until discontinued         07/15/25 0628                       Susan Philip NP  Department of Hospital Medicine  [facility]/Surg

## 2025-07-15 NOTE — ANESTHESIA PROCEDURE NOTES
Intubation    Date/Time: 7/15/2025 9:27 AM    Performed by: Barry Orellana CRNA  Authorized by: Ezekiel Egan MD    Intubation:     Induction:  Intravenous    Intubated:  Postinduction    Mask Ventilation:  Not attempted    Attempts:  1    Attempted By:  CRNA    Method of Intubation:  Video laryngoscopy    Blade:  Hoyt 3    Laryngeal View Grade: Grade I - full view of cords      Difficult Airway Encountered?: No      Complications:  None    Airway Device:  Oral endotracheal tube    Airway Device Size:  7.5    Style/Cuff Inflation:  Cuffed (inflated to minimal occlusive pressure)    Tube secured:  23    Secured at:  The lips    Placement Verified By:  Capnometry    Complicating Factors:  Short neck and obesity    Findings Post-Intubation:  BS equal bilateral

## 2025-07-15 NOTE — BRIEF OP NOTE
[facility]  Brief Operative Note    SUMMARY     Surgery Date: 7/15/2025     Surgeons and Role:     * Mateusz Blanco MD - Primary    Assisting Surgeon: yolande    Pre-op Diagnosis:  Retrolisthesis of vertebrae [M43.10]  History of lumbar spinal fusion [Z98.1]  Lumbar spondylosis [M47.816]  Lumbar radiculopathy [M54.16]  Foraminal stenosis of lumbar region [M48.061]    Post-op Diagnosis:  Post-Op Diagnosis Codes:     * Retrolisthesis of vertebrae [M43.10]     * History of lumbar spinal fusion [Z98.1]     * Lumbar spondylosis [M47.816]     * Lumbar radiculopathy [M54.16]     * Foraminal stenosis of lumbar region [M48.061]    Procedure(s) (LRB):  LAMINECTOMY, SPINE, LUMBAR, WITH ARTHRODESIS (Bilateral)    Anesthesia: General    Implants:  Implant Name Type Inv. Item Serial No.  Lot No. LRB No. Used Action   PUTTY HERBERTH DBF 3CC - [id number]  PUTTY HERBERTH DBF 3C [id number] [employer] [place]   1 Implanted   [employer] SPACER CAPSTONE PTC SPINAL SYSTEM    [employer] 95SJ N/A 1 Implanted   CAGE 26 X 13    [employer] 955J N/A 1 Implanted   SET SCREW HORIZON SOLERA 5.5-6 - [id number]  SET SCREW HORIZON SOLERA 5.5-6  [employer] [place]  N/A 6 Implanted   SHIRA HORIZON CURV 5.5CCM 70MM - [id number]  SHIRA HORIZON CURV 5.5CCM 70MM  [employer] [place]  N/A 2 Implanted   SCREW 5.5 X 45    [employer]  N/A 2 Implanted   SCREW 7.5 X 45    [employer]  N/A 1 Implanted   SCREW 7.5 X 50    [employer]  N/A 1 Implanted       Operative Findings:  Lumbar retrolisthesis, lumbar foraminal stenosis, lumbar spondylosis    Estimated Blood Loss: 250 mL    Estimated Blood Loss has been documented.         Specimens:   Specimen (24h ago, onward)      None          * No specimens in log *    [id number]

## 2025-07-15 NOTE — PLAN OF CARE
Patient ready for surgery. Surgery and anesthesia consents signed. Educated on incentive spirometer use. Belongings in pre-op locker. Mother set up with text message notifications.

## 2025-07-15 NOTE — OP NOTE
[facility]  Orthopedic  Operative Note    SUMMARY     Date of Procedure: 7/15/2025     Procedure:   1. Combined transforaminal lumbar interbody fusion and posterolateral fusion L3-4 level  2. Insertion interbody device L3-4  3. Removal of posterior nonsegmental instrumentation L4-5  4. Posterior segment instrumentation L3-L5 with Medtronic hardware  5. Harvesting local autograft through same incision for spinal fusion  6. Use of allograft  7. L3 laminectomy for lumbar stenosis  8. 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  Neural monitoring leads were placed in the patient he has turned prone on transverse rolls with his abdomen allowed to hang free.  His back was shaved cleansed with alcohol and prepped with ChloraPrep solution and draped in the usual fashion.  A time-out was performed intravenous antibiotics were given  Due to the complexity of the surgical procedure it is my opinion that a skilled surgical assistant was necessary to complete the procedure in a safe and timely manner. the role of the assistant included patient positioning surgical exposure and wound closure. Kat lanier served as 1st surgical assistant    An incision was made from  L2-L5 exposing the hardware at L4-5 in the lateral gutter.  We identified the L3 spinous process and the transverse process of L3 as well as L3-4 facet joints.  Starting in the midline a midline foraminotomy.  We decompress the a distractor was to increase the reticularis.  The pars interarticularis and facet left side was removed identifying the forearm zone.  The nerve roots and dural sac were protected.  A needle was placed in the disc and and annular window into the L3-4 disc on the left side entered the disc space removing the disc with pituitary rongeur curettes and disc she instruments.  We then made a trial was inserted and gave a good fit the morselized bone used from the decompression was then packed into the anterior disc space and a 13 mm Medtronic interbody device was brought onto the field, filled with autograft, and impacted into the L3-4 disc space.  We then removed the set screws at L4-5 and identified the L4 screws bilaterally which were loose.  We placed a reference arc on the L2 spinous process and brought the O arm navigation device onto the field.  We visualized the pedicles of L3 and L4 and performed an intraoperative  CT scan.  Using computer-assisted navigation we then inserted Medtronic titanium screws in the pedicles of L3 and L4 bilaterally.  After inserting the screws we checked all 4 screws with neural monitoring all levels tested with greater than 10 milliamps.  We covered the areas of decompression with Gelfoam and then placed bone graft in the posterolateral gutter from L3-L4 on the right side only.  On the side where we did the interbody fusion we did not place bone graft.  A 70 mm fabrice was then used to connect the 3 screws on the left and right side and appropriate locking screws were then inserted and tightened to proper torque.  The construct was visualized and looked satisfactory.  Vancomycin powder was placed in the depth of the wound the lumbodorsal fascia was closed.  We  infiltrated the paraspinous muscles with long-acting anesthetic for postop pain control.  We brought a drain out through a separate stab incision and closed the subcutaneous tissues.  Staples were used on the skin followed by sterile dressings.  Throughout the case abnormalities noted on neural monitoring.  The patient was turned supine and extubated.            Complications: None    Estimated Blood Loss (EBL):            Implants:   Implant Name Type Inv. Item Serial No.  Lot No. LRB No. Used Action   LOG [id number] - MEDTRONIC CAPSTONE PEEK INTERBODY - 1           PUTTY HER[employer] DBF 3CC - [id number]  PUTTY HERBERTH DBF 3CC [id number] [employer] [place]   1 Implanted   MEDTRONIC SPACER CAPSTONE PTC SPINAL SYSTEM    MEDTRONIC [id number] N/A 1 Implanted   CAGE 26 X 13    MEDTRONIC [id number] N/A 1 Implanted   SET SCREW HORIZON SOLERA 5.5-6 - [id number]  SET SCREW HORIZON SOLERA 5.5-6  [employer] [place]  N/A 6 Implanted   SHIRA HORIZON CURV 5.5CCM 70MM - [id number]  SHIRA HORIZON CURV 5.5CCM 70MM  [employer]TRON[employer] [place]  N/A 2 Implanted   SCREW 5.5 X 45    MEDTRONIC  N/A 2 Implanted   SCREW 7.5 X 45    MEDTRONIC  N/A 1 Implanted   SCREW 7.5 X 50    MEDTRONIC  N/A 1 Implanted       Specimens:   Specimen (24h ago, onward)      None                    Condition: Good    Disposition: PACU - hemodynamically stable.    Attestation: I was present and scrubbed for the entire procedure.

## 2025-07-15 NOTE — ASSESSMENT & PLAN NOTE
Patient's blood pressure range in the last 24 hours was: BP  Min: 93/54  Max: 118/61.The patient's inpatient anti-hypertensive regimen is listed below:  Current Antihypertensives  metoprolol tartrate (LOPRESSOR) tablet 50 mg, 2 times daily, Oral  furosemide tablet 20 mg, Every morning, Oral    Plan  - BP is controlled, no changes needed to their regimen

## 2025-07-15 NOTE — HPI
49 year old with a past medical history of Asthma, CAD, Hypertension, HLD, CKD st. III, MI, Seizures, OLEG, lumbar spondylosis, lumbar radiculopathy, foraminal stenosis of the lumbar region, s/p lumbar spinal fusion, Retrolisthesis of vertebrae, who is admitted to Hospital Medicine Service s/p Combined transforaminal lumbar interbody fusion and posterolateral fusion L3-4 level, L3 laminectomy for lumbar stenosis, and Removal of posterior nonsegmental instrumentation L4-5 with Dr. Blanco. Patient given Albumin in OR and blood products. Patient will be admitted for further Medical Management.

## 2025-07-15 NOTE — H&P
CC/Indication for Procedure: 49 y.o. male with Retrolisthesis of vertebrae [M43.10]  History of lumbar spinal fusion [Z98.1]  Lumbar spondylosis [M47.816]  Lumbar radiculopathy [M54.16]  Foraminal stenosis of lumbar region [M48.061].    Patient scheduled for IA ARTHRODESIS, COMBINED TECHN, SNGL INTERSPACE, LUMBAR [15648] (LAMINECTOMY, SPINE, LUMBAR, WITH ARTHRODESIS L3-4)  IA LAMINEC/FACETECT/FORAMIN,LUMBAR 1 SEG [37336] (LAMINECTOMY, SPINE, LUMBAR, WITH ARTHRODESIS L3-4)  IA POSTERIOR NON-SEGMENTAL INSTRUMENTATION [11848] (LAMINECTOMY, SPINE, LUMBAR, WITH ARTHRODESIS L3-4)  IA INSERT BIOMECH DEV W/INTERBODY ARTHRODESIS, EA CONTIGUOUS DEFECT [17086] (LAMINECTOMY, SPINE, LUMBAR, WITH ARTHRODESIS L3-4)  IA AUTOGRAFT SPINE SURGERY LOCAL FROM SAME INCISION [93888] (LAMINECTOMY, SPINE, LUMBAR, WITH ARTHRODESIS L3-4).    Past Medical History:   Diagnosis Date    ADHD (attention deficit hyperactivity disorder)     Arthritis     Asthma     as child only    Back pain     Coronary artery disease     Degeneration of lumbar intervertebral disc 05/2016    Depression     Digestive disorder     Elevated PSA     Headache     Hyperlipidemia     Hypertension     Kidney damage     stage 3 ; d/t aleve abuse    Kidney stone     Myocardial infarction     Neck pain     Numbness and tingling in hands     Numbness and tingling of both legs     Osteonecrosis     Bilat Femur    Seizures     from inapsine 2002    Sleep apnea     no cpap    Wears glasses     CONTACS     Past Surgical History:   Procedure Laterality Date    BACK SURGERY  2016    back surgery    BONE GRAFT N/A 11/05/2020    Procedure: BONE GRAFT;  Surgeon: Mateusz Blanco MD;  Location: [facility];  Service: Orthopedics;  Laterality: N/A;    CARDIAC SURGERY  01/06/2020    CABG X 7    CORONARY ARTERY BYPASS GRAFT      7 vessels    FLEXIBLE SIGMOIDOSCOPY N/A 07/24/2024    Procedure: SIGMOIDOSCOPY, FLEXIBLE;  Surgeon: Latesha Victoria MD;  Location: [facility];  Service: Endoscopy;   Laterality: N/A;    HERNIA REPAIR Bilateral 2017    HIP ARTHROPLASTY Left 2024    Procedure: ARTHROPLASTY, HIP;  Surgeon: Lázaro Jaimes MD;  Location: [facility] OR;  Service: Orthopedics;  Laterality: Left;  Sincere    HIP REPLACEMENT ARTHROPLASTY Right 2025    Procedure: ARTHROPLASTY, HIP REPLACEMENT;  Surgeon: Lázaro Jaimes MD;  Location: [facility] OR;  Service: Orthopedics;  Laterality: Right;  Sincere    JOINT REPLACEMENT Bilateral     HIPS    LITHOTRIPSY      LUMBAR LAMINECTOMY WITH FUSION Bilateral 2020    Procedure: LAMINECTOMY, SPINE, LUMBAR, WITH FUSION;  Surgeon: Mateusz Blanco MD;  Location: [facility] OR;  Service: Orthopedics;  Laterality: Bilateral;  MEDTRONIC  NTI  L-3    PILONIDAL CYST DRAINAGE      removal of abcess      scrotal    REMOVAL OF HARDWARE FROM SPINE Bilateral 2020    Procedure: REMOVAL, HARDWARE, SPINE;  Surgeon: Mateusz Blanco MD;  Location: [facility] OR;  Service: Orthopedics;  Laterality: Bilateral;  L 4-5    SURGICAL REMOVAL OF PILONIDAL CYST N/A 04/15/2024    Procedure: EXCISION, PILONIDAL CYST;  Surgeon: Jayden Phillips III, MD;  Location: [facility] OR;  Service: General;  Laterality: N/A;  sacral area    TYMPANOSTOMY TUBE PLACEMENT       Family History   Problem Relation Name Age of Onset    Heart disease Mother      Migraines Mother      Hypertension Mother      Early death Father accident     Heart disease Father accident     Diabetes Maternal Aunt      Diabetes Maternal Uncle      Diabetes Maternal Grandfather       Social History     Socioeconomic History    Marital status:    Occupational History    Occupation: disability   Tobacco Use    Smoking status: Former     Current packs/day: 0.00     Types: Cigarettes     Quit date: 2016     Years since quittin.5     Passive exposure: Past    Smokeless tobacco: Former     Quit date: 2016    Tobacco comments:     Occasional  vaping   Substance and Sexual Activity    Alcohol use: Not Currently    Drug  use: No    Sexual activity: Yes     Partners: Female     Social Drivers of Health     Financial Resource Strain: Medium Risk (3/18/2024)    Overall Financial Resource Strain (CARDIA)     Difficulty of Paying Living Expenses: Somewhat hard   Food Insecurity: No Food Insecurity (3/18/2024)    Hunger Vital Sign     Worried About Running Out of Food in the Last Year: Never true     Ran Out of Food in the Last Year: Never true   Transportation Needs: No Transportation Needs (3/18/2024)    PRAPARE - Transportation     Lack of Transportation (Medical): No     Lack of Transportation (Non-Medical): No   Physical Activity: Insufficiently Active (3/18/2024)    Exercise Vital Sign     Days of Exercise per Week: 2 days     Minutes of Exercise per Session: 60 min   Stress: No Stress Concern Present (3/18/2024)    [employer] - Occupational Stress Questionnaire     Feeling of Stress : Only a little   Housing Stability: Unknown (3/18/2024)    Housing Stability Vital Sign     Unable to Pay for Housing in the Last Year: No     Unstable Housing in the Last Year: No       Review of patient's allergies indicates:   Allergen Reactions    Inapsine [droperidol] Anaphylaxis     seizures    Bactrim [sulfamethoxazole-trimethoprim] Rash     Dry red rash all over when in the sun    Effexor [venlafaxine] Other (See Comments)     Increased anxiety    Fluconazole Rash     Pruritis      Pcn [penicillins] Other (See Comments)     UNSURE OF REACTION--FROM SCRATCH TEST       Current Medications[1]    ROS:    Denies chest pain or palpitations  Denies shortness of breath  Denies fevers or chills  Denies chest pain  Denies abdominal pain    PE:    General Appearance: Well nourished  Orientation: Oriented to time, place, person  Mental Status: Alert  Heart: RRR  Lungs: CTA  Abdomen: Soft and non-tender    Anesthesia/Surgery risks, benefits and alternative options discussed and understood by patient/family.    This note was  created using Dragon voice recognition software that occasionally misinterpreted phrases or words.        [1]   Current Facility-Administered Medications:     clindamycin in D5W 900 mg/50 mL IVPB 900 mg, 900 mg, Intravenous, On Call Procedure, Mateusz Blanco MD    electrolyte-S (ISOLYTE), , Intravenous, Continuous, Zulay Cheema MD    LIDOcaine (PF) 10 mg/ml (1%) injection 10 mg, 1 mL, Intradermal, Once, Zulay Cheema MD

## 2025-07-15 NOTE — CARE UPDATE
07/15/25 1557   Patient Assessment/Suction   Level of Consciousness (AVPU) alert   Respiratory Effort Unlabored   Expansion/Accessory Muscles/Retractions no use of accessory muscles;no retractions;expansion symmetric   All Lung Fields Breath Sounds Anterior:;Lateral:;diminished   Rhythm/Pattern, Respiratory unlabored;pattern regular;depth regular;no shortness of breath reported   Cough Frequency no cough   PRE-TX-O2   Device (Oxygen Therapy) room air   SpO2: Pre-Ductal (Right Hand) 95 %   Pulse Oximetry Type Intermittent   $ Pulse Oximetry - Single Charge Pulse Oximetry - Single   Pulse 86   Resp 15   ETCO2   $ ETCO2 Usage Currently wearing   ETCO2 (mmHg) 44 mmHg   ETCO2 Device Type Bedside Monitor   Incentive Spirometer   $ Incentive Spirometer Charges done with encouragement   Administration (IS) mouthpiece utilized   Number of Repetitions (IS) 10   Level Incentive Spirometer (mL) 1500   Patient Tolerance (IS) no adverse signs/symptoms present   Tobacco Cessation Intervention   Do you use any type of tobacco product? No   $ Smoking Cessation Charges Smoking Cessation - Intermediate (Non-CTTS)   Respiratory Evaluation   $ Care Plan Tech Time 15 min   $ Respiratory Evaluation Complete   Evaluation For New Orders   Admitting Diagnosis Lumbar radiculopathy   Cardiac Diagnosis CAD, MI   Pulmonary Diagnosis OLEG, Childhood asthma    Current Surgeries 7/15/24   Home Oxygen   Has Home Oxygen? No   Home Aerosol, MDI, DPI, and Other Treatments/Therapies   Home Respiratory Therapy Per Patient/Review of Chart Yes   Other Home Respiratory Therapies cpap   Oxygen Care Plan   Oxygen Care Plan Per Protocol   SPO2 Goal (%) 95% cardiac   Rationale SpO2 is <95% (Cardiac Pt.)   Bronchodilator Care Plan   Rationale No Rationale found   Atelectasis Care Plan   Atelectasis Care Plan Incentive Spiromentry   Frequency TID   I.S. Goal (ml) 1960 ml   I.S. Minimum (ml) 980 ml   Rationale   (post op)   Airway Clearance Care Plan   Rationale  No rationale found

## 2025-07-15 NOTE — ASSESSMENT & PLAN NOTE
s/p Combined transforaminal lumbar interbody fusion and posterolateral fusion L3-4 level, L3 laminectomy for lumbar stenosis, and Removal of posterior nonsegmental instrumentation L4-5 with Dr. Blanco.    Pca pain pump  Dr. Blanco following.   PT/OT

## 2025-07-15 NOTE — SUBJECTIVE & OBJECTIVE
Past Medical History:   Diagnosis Date    ADHD (attention deficit hyperactivity disorder)     Arthritis     Asthma     as child only    Back pain     Coronary artery disease     Degeneration of lumbar intervertebral disc 05/2016    Depression     Digestive disorder     Elevated PSA     Headache     Hyperlipidemia     Hypertension     Kidney damage     stage 3 ; d/t aleve abuse    Kidney stone     Myocardial infarction     Neck pain     Numbness and tingling in hands     Numbness and tingling of both legs     Osteonecrosis     Bilat Femur    Seizures     from inapsine 2002    Sleep apnea     no cpap    Wears glasses     CONTACS       Past Surgical History:   Procedure Laterality Date    BACK SURGERY  2016    back surgery    BONE GRAFT N/A 11/05/2020    Procedure: BONE GRAFT;  Surgeon: Mateusz Blanco MD;  Location: [facility];  Service: Orthopedics;  Laterality: N/A;    CARDIAC SURGERY  01/06/2020    CABG X 7    CORONARY ARTERY BYPASS GRAFT      7 vessels    FLEXIBLE SIGMOIDOSCOPY N/A 07/24/2024    Procedure: SIGMOIDOSCOPY, FLEXIBLE;  Surgeon: Latesha Victoria MD;  Location: [facility] ENDO;  Service: Endoscopy;  Laterality: N/A;    HERNIA REPAIR Bilateral 11/22/2017    HIP ARTHROPLASTY Left 09/23/2024    Procedure: ARTHROPLASTY, HIP;  Surgeon: Lázaro Jaimes MD;  Location: [facility] OR;  Service: Orthopedics;  Laterality: Left;  Sincere    HIP REPLACEMENT ARTHROPLASTY Right 02/17/2025    Procedure: ARTHROPLASTY, HIP REPLACEMENT;  Surgeon: Láazro Jaimes MD;  Location: [facility] OR;  Service: Orthopedics;  Laterality: Right;  Sincere    JOINT REPLACEMENT Bilateral     HIPS    LITHOTRIPSY      LUMBAR LAMINECTOMY WITH FUSION Bilateral 11/05/2020    Procedure: LAMINECTOMY, SPINE, LUMBAR, WITH FUSION;  Surgeon: Mateusz Blanco MD;  Location: [facility] OR;  Service: Orthopedics;  Laterality: Bilateral;  [employer]  NTI  L-3    PILONIDAL CYST DRAINAGE      removal of abcess      scrotal    REMOVAL OF HARDWARE FROM SPINE Bilateral 11/05/2020     Procedure: REMOVAL, HARDWARE, SPINE;  Surgeon: Mateusz Blanco MD;  Location: [facility] OR;  Service: Orthopedics;  Laterality: Bilateral;  L 4-5    SURGICAL REMOVAL OF PILONIDAL CYST N/A 04/15/2024    Procedure: EXCISION, PILONIDAL CYST;  Surgeon: Jayden Phillips III, MD;  Location: [facility] OR;  Service: General;  Laterality: N/A;  sacral area    TYMPANOSTOMY TUBE PLACEMENT         Review of patient's allergies indicates:   Allergen Reactions    Inapsine [droperidol] Anaphylaxis     seizures    Bactrim [sulfamethoxazole-trimethoprim] Rash     Dry red rash all over when in the sun    Effexor [venlafaxine] Other (See Comments)     Increased anxiety    Fluconazole Rash     Pruritis      Pcn [penicillins] Other (See Comments)     UNSURE OF REACTION--FROM SCRATCH TEST       Current Facility-Administered Medications on File Prior to Encounter   Medication    [DISCONTINUED] acetaminophen 1,000 mg/100 mL (10 mg/mL) injection    [DISCONTINUED] albumin human 5% bottle    [DISCONTINUED] dexAMETHasone injection    [DISCONTINUED] ePHEDrine sulfate    [DISCONTINUED] fentaNYL 50 mcg/mL injection    [DISCONTINUED] HYDROmorphone (PF) injection    [DISCONTINUED] ketamine in 0.9 % sod chloride 50 mg/5 mL (10 mg/mL) injection    [DISCONTINUED] LIDOcaine (cardiac) injection    [DISCONTINUED] midazolam injection    [DISCONTINUED] ondansetron HCl (PF) injection    [DISCONTINUED] PHENYLephrine 10 mg in 0.9% NaCl 100 mL infusion    [DISCONTINUED] PHENYLephrine injection    [DISCONTINUED] propofol (DIPRIVAN) 10 mg/mL infusion    [DISCONTINUED] rocuronium injection    [DISCONTINUED] succinylcholine injection     Current Outpatient Medications on File Prior to Encounter   Medication Sig    anastrozole (ARIMIDEX) 1 mg Tab Take 1 mg by mouth every 7 days.    busPIRone (BUSPAR) 15 MG tablet Take 1 tablet (15 mg total) by mouth 3 (three) times daily.    enoxaparin (LOVENOX) 100 mg/mL Syrg Inject 1 mg/kg into the skin 2 (two) times daily.    ENTRESTO  49-51 mg per tablet Take 1 tablet by mouth 2 (two) times daily.    EScitalopram oxalate (LEXAPRO) 20 MG tablet Take 1 tablet (20 mg total) by mouth once daily.    metoprolol tartrate (LOPRESSOR) 50 MG tablet Take 1 tablet by mouth 2 (two) times daily.    pantoprazole (PROTONIX) 40 MG tablet Take 1 tablet (40 mg total) by mouth once daily.    rosuvastatin (CRESTOR) 40 MG Tab Take 40 mg by mouth every evening.    tadalafiL (CIALIS) 5 MG tablet Take 1 tablet by mouth once daily.    testosterone cypionate 200 mg/mL Kit Inject 1 mL into the muscle every 7 days.    traZODone (DESYREL) 100 MG tablet Take 1 tablet (100 mg total) by mouth every evening.    clopidogreL (PLAVIX) 75 mg tablet Take 75 mg by mouth once daily.    cyclobenzaprine (FLEXERIL) 10 MG tablet Take 1 tablet (10 mg total) by mouth 2 (two) times daily.    fluticasone propionate (FLONASE) 50 mcg/actuation nasal spray 2 sprays (100 mcg total) by Each Nostril route once daily.    furosemide (LASIX) 20 MG tablet Take 1 tablet by mouth every morning.    gabapentin (NEURONTIN) 300 MG capsule Take 1 capsule (300 mg total) by mouth 3 (three) times daily.    hydrOXYzine (ATARAX) 50 MG tablet Take 1 tablet by mouth 2 (two) times daily. (Patient not taking: Reported on 6/17/2025)    ondansetron (ZOFRAN) 4 MG tablet Take 1 tablet (4 mg total) by mouth every 6 (six) hours as needed for Nausea.    semaglutide, weight loss, (WEGOVY) 1 mg/0.5 mL PnIj Inject 1 mg into the skin every 7 days.    tadalafiL (CIALIS) 10 MG tablet Take 10 mg by mouth daily as needed. (Patient not taking: Reported on 6/17/2025)    vitamin D (VITAMIN D3) 1000 units Tab Take 2,000 Units by mouth once daily.     Family History       Problem Relation (Age of Onset)    Diabetes Maternal Aunt, Maternal Uncle, Maternal Grandfather    Early death Father    Heart disease Mother, Father    Hypertension Mother    Migraines Mother          Tobacco Use    Smoking status: Former     Current packs/day: 0.00      Types: Cigarettes     Quit date: 2016     Years since quittin.5     Passive exposure: Past    Smokeless tobacco: Former     Quit date: 2016    Tobacco comments:     Occasional  vaping   Substance and Sexual Activity    Alcohol use: Not Currently    Drug use: No    Sexual activity: Yes     Partners: Female     Review of Systems   Constitutional: Negative.    Respiratory: Negative.     Cardiovascular: Negative.    Gastrointestinal: Negative.    Genitourinary: Negative.    Musculoskeletal:  Positive for back pain and gait problem.   Skin: Negative.    Psychiatric/Behavioral: Negative.       Objective:     Vital Signs (Most Recent):  Temp: 98.1 °F (36.7 °C) (07/15/25 1237)  Pulse: 88 (07/15/25 1325)  Resp: (!) 22 (07/15/25 1340)  BP: (!) 104/59 (07/15/25 1325)  SpO2: (!) 94 % (07/15/25 1325) Vital Signs (24h Range):  Temp:  [97.6 °F (36.4 °C)-98.1 °F (36.7 °C)] 98.1 °F (36.7 °C)  Pulse:  [75-94] 88  Resp:  [8-22] 22  SpO2:  [94 %-99 %] 94 %  BP: ()/(50-67) 104/59     Weight: 102.1 kg (225 lb)  Body mass index is 35.24 kg/m².     Physical Exam  Vitals reviewed.   Constitutional:       Appearance: Normal appearance.   HENT:      Head: Normocephalic and atraumatic.      Mouth/Throat:      Mouth: Mucous membranes are moist.   Eyes:      Pupils: Pupils are equal, round, and reactive to light.   Cardiovascular:      Rate and Rhythm: Normal rate.   Pulmonary:      Effort: Pulmonary effort is normal. No respiratory distress.      Breath sounds: Normal breath sounds.      Comments: On supplemental oxygen  Abdominal:      General: Bowel sounds are normal. There is no distension.      Palpations: Abdomen is soft.      Tenderness: There is no abdominal tenderness.   Genitourinary:     Comments: [place] cath inplace  Musculoskeletal:      Cervical back: Neck supple.   Skin:     Capillary Refill: Capillary refill takes less than 2 seconds.      Comments: Pca pump    Neurological:      General: No focal deficit  "present.      Mental Status: He is alert. Mental status is at baseline.   Psychiatric:         Mood and Affect: Mood normal.              CRANIAL NERVES     CN III, IV, VI   Pupils are equal, round, and reactive to light.       Significant Labs: All pertinent labs within the past 24 hours have been reviewed.  BMP: No results for input(s): "GLU", "NA", "K", "CL", "CO2", "BUN", "CREATININE", "CALCIUM", "MG" in the last 48 hours.  CBC:   Recent Labs   Lab 07/15/25  1309   WBC 7.49   HGB 12.3*   HCT 40.2        CMP: No results for input(s): "NA", "K", "CL", "CO2", "GLU", "BUN", "CREATININE", "CALCIUM", "PROT", "ALBUMIN", "BILITOT", "ALKPHOS", "AST", "ALT", "ANIONGAP", "EGFRNONAA" in the last 48 hours.    Invalid input(s): "ESTGFAFRICA"    Significant Imaging: I have reviewed all pertinent imaging results/findings within the past 24 hours.  "

## 2025-07-15 NOTE — TRANSFER OF CARE
Anesthesia Transfer of Care Note    Patient: Lee Quintanilla    Procedure(s) Performed: Procedure(s) (LRB):  LAMINECTOMY, SPINE, LUMBAR, WITH ARTHRODESIS (Bilateral)    Patient location: PACU    Anesthesia Type: general    Transport from OR: Transported from OR on 6-10 L/min O2 by face mask with adequate spontaneous ventilation    Post pain: adequate analgesia    Post assessment: no apparent anesthetic complications    Post vital signs: stable    Level of consciousness: sedated    Nausea/Vomiting: no nausea/vomiting    Complications: none    Transfer of care protocol was followed      Last vitals: Visit Vitals  BP (!) 97/50 (BP Location: Right arm, Patient Position: Sitting)   Pulse 80   Temp 36.7 °C (98.1 °F) (Temporal)   Resp 14   Wt 102.1 kg (225 lb)   SpO2 (!) 94%   BMI 35.24 kg/m²

## 2025-07-15 NOTE — ANESTHESIA POSTPROCEDURE EVALUATION
Anesthesia Post Evaluation    Patient: Lee Quintanilla    Procedure(s) Performed: Procedure(s) (LRB):  LAMINECTOMY, SPINE, LUMBAR, WITH ARTHRODESIS (Bilateral)    Final Anesthesia Type: general      Patient location during evaluation: PACU  Patient participation: Yes- Able to Participate  Level of consciousness: awake and alert  Post-procedure vital signs: reviewed and stable  Pain management: adequate  Airway patency: patent    PONV status at discharge: No PONV  Anesthetic complications: no      Cardiovascular status: hemodynamically stable  Respiratory status: unassisted and room air  Hydration status: euvolemic  Follow-up not needed.              Vitals Value Taken Time   /55 07/15/25 14:48   Temp 36.8 °C (98.2 °F) 07/15/25 14:48   Pulse 85 07/15/25 14:48   Resp 15 07/15/25 14:55   SpO2 96 % 07/15/25 14:48         Event Time   Out of Recovery 14:30:00         Pain/Tanmay Score: Pain Rating Prior to Med Admin: 7 (7/15/2025  2:55 PM)  Tanmay Score: 10 (7/15/2025  2:30 PM)

## 2025-07-16 PROBLEM — I50.22 CHRONIC SYSTOLIC (CONGESTIVE) HEART FAILURE: Status: ACTIVE | Noted: 2025-07-16

## 2025-07-16 PROBLEM — I95.9 HYPOTENSION: Status: ACTIVE | Noted: 2025-07-16

## 2025-07-16 LAB
ABSOLUTE EOSINOPHIL (SMH): 0.15 K/UL
ABSOLUTE MONOCYTE (SMH): 0.95 K/UL (ref 0.3–1)
ABSOLUTE NEUTROPHIL COUNT (SMH): 7.1 K/UL (ref 1.8–7.7)
ANION GAP (SMH): 8 MMOL/L (ref 8–16)
AORTIC ROOT ANNULUS: 2.7 CM
AORTIC SIZE INDEX: 1.1 CM/M2
AORTIC VALVE CUSP SEPERATION: 2.12 CM
APICAL FOUR CHAMBER EJECTION FRACTION: 50 %
APICAL TWO CHAMBER EJECTION FRACTION: 42 %
ASCENDING AORTA: 2.5 CM
AV INDEX (PROSTH): 1
AV MEAN GRADIENT: 6 MMHG
AV PEAK GRADIENT: 12 MMHG
AV VALVE AREA BY VELOCITY RATIO: 3.4 CM²
AV VALVE AREA: 4.1 CM²
AV VELOCITY RATIO: 0.82
BASOPHILS # BLD AUTO: 0.02 K/UL
BASOPHILS NFR BLD AUTO: 0.2 %
BSA FOR ECHO PROCEDURE: 2.3 M2
BUN SERPL-MCNC: 13 MG/DL (ref 6–20)
CALCIUM SERPL-MCNC: 8.2 MG/DL (ref 8.7–10.5)
CHLORIDE SERPL-SCNC: 105 MMOL/L (ref 95–110)
CO2 SERPL-SCNC: 24 MMOL/L (ref 23–29)
CREAT SERPL-MCNC: 1.7 MG/DL (ref 0.5–1.4)
CV ECHO LV RWT: 0.43 CM
DOP CALC AO PEAK VEL: 1.7 M/S
DOP CALC AO VTI: 21.5 CM
DOP CALC LVOT AREA: 4.2 CM2
DOP CALC LVOT DIAMETER: 2.3 CM
DOP CALC LVOT PEAK VEL: 1.4 M/S
DOP CALC LVOT STROKE VOLUME: 88.9 CM3
DOP CALC MV VTI: 21.8 CM
DOP CALCLVOT PEAK VEL VTI: 21.4 CM
E WAVE DECELERATION TIME: 208 MSEC
E/A RATIO: 0.9
E/E' RATIO: 12 M/S
ECHO LV POSTERIOR WALL: 1.1 CM (ref 0.6–1.1)
ERYTHROCYTE [DISTWIDTH] IN BLOOD BY AUTOMATED COUNT: 15.9 % (ref 11.5–14.5)
FRACTIONAL SHORTENING: 21.6 % (ref 28–44)
GFR SERPLBLD CREATININE-BSD FMLA CKD-EPI: 49 ML/MIN/1.73/M2
GLUCOSE SERPL-MCNC: 110 MG/DL (ref 70–110)
HCT VFR BLD AUTO: 36.3 % (ref 40–54)
HGB BLD-MCNC: 10.9 GM/DL (ref 14–18)
HOLD SPECIMEN: NORMAL
IMM GRANULOCYTES # BLD AUTO: 0.04 K/UL (ref 0–0.04)
IMM GRANULOCYTES NFR BLD AUTO: 0.4 % (ref 0–0.5)
INTERVENTRICULAR SEPTUM: 1 CM (ref 0.6–1.1)
IVRT: 95 MSEC
LA MAJOR: 5.8 CM
LEFT ATRIUM AREA SYSTOLIC (APICAL 2 CHAMBER): 18.86 CM2
LEFT ATRIUM AREA SYSTOLIC (APICAL 4 CHAMBER): 20.21 CM2
LEFT ATRIUM VOLUME INDEX MOD: 24 ML/M2
LEFT ATRIUM VOLUME MOD: 53 ML
LEFT INTERNAL DIMENSION IN SYSTOLE: 4 CM (ref 2.1–4)
LEFT VENTRICLE DIASTOLIC VOLUME INDEX: 56.11 ML/M2
LEFT VENTRICLE DIASTOLIC VOLUME: 124 ML
LEFT VENTRICLE END DIASTOLIC VOLUME APICAL 2 CHAMBER: 90.77 ML
LEFT VENTRICLE END DIASTOLIC VOLUME APICAL 4 CHAMBER: 129.29 ML
LEFT VENTRICLE END SYSTOLIC VOLUME APICAL 2 CHAMBER: 49.99 ML
LEFT VENTRICLE END SYSTOLIC VOLUME APICAL 4 CHAMBER: 55.15 ML
LEFT VENTRICLE MASS INDEX: 90.8 G/M2
LEFT VENTRICLE SYSTOLIC VOLUME INDEX: 32.1 ML/M2
LEFT VENTRICLE SYSTOLIC VOLUME: 71 ML
LEFT VENTRICULAR INTERNAL DIMENSION IN DIASTOLE: 5.1 CM (ref 3.5–6)
LEFT VENTRICULAR MASS: 200.8 G
LV LATERAL E/E' RATIO: 9.5 M/S
LV SEPTAL E/E' RATIO: 15.8 M/S
LVED V (TEICH): 123.53 ML
LVES V (TEICH): 70.58 ML
LVOT MG: 4.22 MMHG
LVOT MV: 0.91 CM/S
LYMPHOCYTES # BLD AUTO: 0.98 K/UL (ref 1–4.8)
Lab: 1.5 CM/M
MCH RBC QN AUTO: 25.8 PG (ref 27–31)
MCHC RBC AUTO-ENTMCNC: 30 G/DL (ref 32–36)
MCV RBC AUTO: 86 FL (ref 82–98)
MV MEAN GRADIENT: 1 MMHG
MV PEAK A VEL: 1.05 M/S
MV PEAK E VEL: 0.95 M/S
MV PEAK GRADIENT: 4 MMHG
MV STENOSIS PRESSURE HALF TIME: 63.65 MS
MV VALVE AREA BY CONTINUITY EQUATION: 4.08 CM2
MV VALVE AREA P 1/2 METHOD: 3.46 CM2
NUCLEATED RBC (/100WBC) (SMH): 0 /100 WBC
OHS CV CPX PATIENT HEIGHT IN: 67
OHS CV RV/LV RATIO: 0.69 CM
OHS LV EJECTION FRACTION SIMPSONS BIPLANE MOD: 46 %
PISA TR MAX VEL: 2.2 M/S
PLATELET # BLD AUTO: 179 K/UL (ref 150–450)
PMV BLD AUTO: 10.4 FL (ref 9.2–12.9)
POTASSIUM SERPL-SCNC: 4.2 MMOL/L (ref 3.5–5.1)
PV MV: 1.35 M/S
PV PEAK GRADIENT: 12 MMHG
PV PEAK VELOCITY: 1.75 M/S
RA MAJOR: 4.78 CM
RA PRESSURE ESTIMATED: 3 MMHG
RBC # BLD AUTO: 4.22 M/UL (ref 4.6–6.2)
RELATIVE EOSINOPHIL (SMH): 1.6 % (ref 0–8)
RELATIVE LYMPHOCYTE (SMH): 10.6 % (ref 18–48)
RELATIVE MONOCYTE (SMH): 10.2 % (ref 4–15)
RELATIVE NEUTROPHIL (SMH): 77 % (ref 38–73)
RIGHT VENTRICLE DIASTOLIC BASEL DIMENSION: 3.5 CM
RIGHT VENTRICLE DIASTOLIC LENGTH: 7.8 CM
RIGHT VENTRICULAR END-DIASTOLIC DIMENSION: 3.46 CM
RIGHT VENTRICULAR LENGTH IN DIASTOLE (APICAL 4-CHAMBER VIEW): 7.83 CM
RV TB RVSP: 5 MMHG
RV TISSUE DOPPLER FREE WALL SYSTOLIC VELOCITY 1 (APICAL 4 CHAMBER VIEW): 14.74 CM/S
SODIUM SERPL-SCNC: 137 MMOL/L (ref 136–145)
STJ: 2.4 CM
TDI LATERAL: 0.1 M/S
TDI SEPTAL: 0.06 M/S
TDI: 0.08 M/S
TR MAX PG: 19 MMHG
TV REST PULMONARY ARTERY PRESSURE: 22 MMHG
WBC # BLD AUTO: 9.27 K/UL (ref 3.9–12.7)
Z-SCORE OF LEFT VENTRICULAR DIMENSION IN END DIASTOLE: -4.03
Z-SCORE OF LEFT VENTRICULAR DIMENSION IN END SYSTOLE: -1.16

## 2025-07-16 PROCEDURE — 11000001 HC ACUTE MED/SURG PRIVATE ROOM

## 2025-07-16 PROCEDURE — 97161 PT EVAL LOW COMPLEX 20 MIN: CPT

## 2025-07-16 PROCEDURE — 36415 COLL VENOUS BLD VENIPUNCTURE: CPT | Performed by: ORTHOPAEDIC SURGERY

## 2025-07-16 PROCEDURE — 97165 OT EVAL LOW COMPLEX 30 MIN: CPT

## 2025-07-16 PROCEDURE — 85025 COMPLETE CBC W/AUTO DIFF WBC: CPT | Performed by: ORTHOPAEDIC SURGERY

## 2025-07-16 PROCEDURE — 94799 UNLISTED PULMONARY SVC/PX: CPT

## 2025-07-16 PROCEDURE — 80048 BASIC METABOLIC PNL TOTAL CA: CPT | Performed by: ORTHOPAEDIC SURGERY

## 2025-07-16 PROCEDURE — 25000003 PHARM REV CODE 250: Performed by: ORTHOPAEDIC SURGERY

## 2025-07-16 PROCEDURE — 97116 GAIT TRAINING THERAPY: CPT

## 2025-07-16 PROCEDURE — 63600175 PHARM REV CODE 636 W HCPCS: Mod: JZ | Performed by: ANESTHESIOLOGY

## 2025-07-16 PROCEDURE — 25000003 PHARM REV CODE 250: Performed by: NURSE PRACTITIONER

## 2025-07-16 PROCEDURE — 63600175 PHARM REV CODE 636 W HCPCS: Performed by: NURSE PRACTITIONER

## 2025-07-16 PROCEDURE — 99900035 HC TECH TIME PER 15 MIN (STAT)

## 2025-07-16 PROCEDURE — 63600175 PHARM REV CODE 636 W HCPCS: Performed by: ORTHOPAEDIC SURGERY

## 2025-07-16 PROCEDURE — 97110 THERAPEUTIC EXERCISES: CPT

## 2025-07-16 PROCEDURE — 94761 N-INVAS EAR/PLS OXIMETRY MLT: CPT

## 2025-07-16 RX ORDER — METOPROLOL TARTRATE 50 MG/1
50 TABLET ORAL 2 TIMES DAILY
Status: DISCONTINUED | OUTPATIENT
Start: 2025-07-16 | End: 2025-07-16

## 2025-07-16 RX ORDER — KETOROLAC TROMETHAMINE 30 MG/ML
15 INJECTION, SOLUTION INTRAMUSCULAR; INTRAVENOUS EVERY 6 HOURS
Status: DISCONTINUED | OUTPATIENT
Start: 2025-07-16 | End: 2025-07-16

## 2025-07-16 RX ORDER — CYCLOBENZAPRINE HCL 5 MG
10 TABLET ORAL 3 TIMES DAILY PRN
Status: DISCONTINUED | OUTPATIENT
Start: 2025-07-16 | End: 2025-07-17 | Stop reason: HOSPADM

## 2025-07-16 RX ORDER — MIDODRINE HYDROCHLORIDE 5 MG/1
10 TABLET ORAL EVERY 8 HOURS PRN
Status: DISCONTINUED | OUTPATIENT
Start: 2025-07-16 | End: 2025-07-17 | Stop reason: HOSPADM

## 2025-07-16 RX ORDER — MIDODRINE HYDROCHLORIDE 5 MG/1
10 TABLET ORAL ONCE
Status: COMPLETED | OUTPATIENT
Start: 2025-07-16 | End: 2025-07-16

## 2025-07-16 RX ADMIN — CYCLOBENZAPRINE HYDROCHLORIDE 10 MG: 5 TABLET, FILM COATED ORAL at 07:07

## 2025-07-16 RX ADMIN — BUSPIRONE HYDROCHLORIDE 15 MG: 10 TABLET ORAL at 08:07

## 2025-07-16 RX ADMIN — TRAZODONE HYDROCHLORIDE 100 MG: 50 TABLET ORAL at 08:07

## 2025-07-16 RX ADMIN — CYCLOBENZAPRINE HYDROCHLORIDE 10 MG: 5 TABLET, FILM COATED ORAL at 11:07

## 2025-07-16 RX ADMIN — OXYCODONE HYDROCHLORIDE 15 MG: 5 TABLET ORAL at 09:07

## 2025-07-16 RX ADMIN — PANTOPRAZOLE SODIUM 40 MG: 40 TABLET, DELAYED RELEASE ORAL at 08:07

## 2025-07-16 RX ADMIN — GABAPENTIN 300 MG: 300 CAPSULE ORAL at 08:07

## 2025-07-16 RX ADMIN — SODIUM CHLORIDE, POTASSIUM CHLORIDE, SODIUM LACTATE AND CALCIUM CHLORIDE: 600; 310; 30; 20 INJECTION, SOLUTION INTRAVENOUS at 09:07

## 2025-07-16 RX ADMIN — MUPIROCIN 1 G: 20 OINTMENT TOPICAL at 08:07

## 2025-07-16 RX ADMIN — OXYCODONE HYDROCHLORIDE 15 MG: 5 TABLET ORAL at 02:07

## 2025-07-16 RX ADMIN — BUSPIRONE HYDROCHLORIDE 15 MG: 10 TABLET ORAL at 02:07

## 2025-07-16 RX ADMIN — SODIUM CHLORIDE, POTASSIUM CHLORIDE, SODIUM LACTATE AND CALCIUM CHLORIDE 500 ML: 600; 310; 30; 20 INJECTION, SOLUTION INTRAVENOUS at 08:07

## 2025-07-16 RX ADMIN — DOCUSATE SODIUM 100 MG: 100 CAPSULE, LIQUID FILLED ORAL at 08:07

## 2025-07-16 RX ADMIN — MIDODRINE HYDROCHLORIDE 10 MG: 5 TABLET ORAL at 08:07

## 2025-07-16 RX ADMIN — CLOPIDOGREL 75 MG: 75 TABLET ORAL at 08:07

## 2025-07-16 RX ADMIN — OXYCODONE HYDROCHLORIDE 15 MG: 5 TABLET ORAL at 11:07

## 2025-07-16 RX ADMIN — OXYCODONE HYDROCHLORIDE 15 MG: 5 TABLET ORAL at 07:07

## 2025-07-16 RX ADMIN — KETOROLAC TROMETHAMINE 15 MG: 30 INJECTION, SOLUTION INTRAMUSCULAR; INTRAVENOUS at 07:07

## 2025-07-16 RX ADMIN — GABAPENTIN 300 MG: 300 CAPSULE ORAL at 02:07

## 2025-07-16 RX ADMIN — FUROSEMIDE 20 MG: 20 TABLET ORAL at 06:07

## 2025-07-16 RX ADMIN — Medication: at 12:07

## 2025-07-16 RX ADMIN — ATORVASTATIN CALCIUM 10 MG: 10 TABLET, FILM COATED ORAL at 08:07

## 2025-07-16 RX ADMIN — CLINDAMYCIN PHOSPHATE 900 MG: 900 INJECTION, SOLUTION INTRAVENOUS at 01:07

## 2025-07-16 RX ADMIN — ESCITALOPRAM OXALATE 20 MG: 10 TABLET, FILM COATED ORAL at 08:07

## 2025-07-16 NOTE — PT/OT/SLP EVAL
Occupational Therapy   Evaluation    Name: Lee Quintanilla  MRN: [id number]  Admitting Diagnosis: Lumbar radiculopathy  Recent Surgery: Procedure(s) (LRB):  LAMINECTOMY, SPINE, LUMBAR, WITH ARTHRODESIS (Bilateral) 1 Day Post-Op    Recommendations:     Discharge Recommendations:  (Low Int?)  Discharge Equipment Recommendations:  other (see comments) (OT provided instruction in use of sock aid. Pt verbalized understanding)  Barriers to discharge:       Assessment:     Lee Quintanilla is a 49 y.o. male with a medical diagnosis of Lumbar radiculopathy.  He presents with the following performance deficits affecting function: weakness, impaired endurance, impaired self care skills, impaired functional mobility, pain, decreased ROM, orthopedic precautions.      Rehab Prognosis: Good; patient would benefit from acute skilled OT services to address these deficits and reach maximum level of function.       Plan:     Patient to be seen 5 x/week to address the above listed problems via self-care/home management, therapeutic activities, therapeutic exercises  Plan of Care Expires: 08/13/25  Plan of Care Reviewed with: patient, family    Subjective     Chief Complaint: pain  Patient/Family Comments/goals: To get better    Occupational Profile:  Living Environment: Pt lives with parents in a 1 story home with no ARIADNE. Pt has a walk in shower with grab bar and raised toilet. OT provided education in use of shower chair as needed. Pt to obtain on own if desired.  Previous level of function: Independent but with difficulty  Roles and Routines: Son  Equipment Used at Home: cane, straight, walker, rolling, grab bar, other (see comments), raised toilet (Pt has a long shoe horn,reacher, and long bath sponge)  Assistance upon Discharge: Parents/family    Pain/Comfort:  Pain Rating 1: 8/10 (Pt reports recently medicated)  Location 1: back    Patients cultural, spiritual, Worship conflicts given the current situation:       Objective:     [facility]  Patient found HOB elevated with bed alarm, PICC line, telemetry upon OT entry to room.    General Precautions: Standard, fall  Orthopedic Precautions: spinal precautions  Braces:  (Has TLSO for comfort)  Respiratory Status: Room air    Occupational Performance:      Activities of Daily Living:  Feeding:  independence    Grooming: stand by assistance set up in sitting  Upper Body Dressing: stand by assistance set up in sitting  Lower Body Dressing: moderate assistance    Toileting: contact guard assistance      Cognitive/Visual Perceptual:  Pt alert and oriented. Wears glasses    Physical Exam:  Upper Extremity Strength:    -       Right Upper Extremity: WFL  -       Left Upper Extremity: WFL    AMPAC 6 Click ADL:  AMPAC Total Score: 19    Treatment & Education:  OT provided education in role of OT. Patient verbalized understanding and participated in OT.  OT provided instruction in home safety and spinal precautions with ADL/IADL including review of home set up and DME/AE. Patient verbalized understanding.   OT provided education in calling for assist. Patient verbalized understanding.  See above for additional tx/education      Patient left HOB elevated with all lines intact, call button in reach, bed alarm on, and family present    GOALS:   Multidisciplinary Problems       Occupational Therapy Goals          Problem: Occupational Therapy    Goal Priority Disciplines Outcome Interventions   Occupational Therapy Goal     OT, PT/OT     Description: Goals to be met by: 8/13/25     Patient will increase functional independence with ADLs by performing:    UE Dressing with Modified [place].  LE Dressing with Modified [place].  Grooming while standing at sink with Modified [place].  Toileting from toilet with Modified [place] for hygiene and clothing management.   Bathing from  shower chair/bench with Modified [place].  Toilet  transfer to toilet with Modified [place].  Increased strength and functional activity tolerance for ADL's/IADL's                         DME Justifications:  No DME recommended requiring DME justifications    History:     Past Medical History:   Diagnosis Date    ADHD (attention deficit hyperactivity disorder)     Arthritis     Asthma     as child only    Back pain     Coronary artery disease     Degeneration of lumbar intervertebral disc 05/2016    Depression     Digestive disorder     Elevated PSA     Headache     Hyperlipidemia     Hypertension     Kidney damage     stage 3 ; d/t aleve abuse    Kidney stone     Myocardial infarction     Neck pain     Numbness and tingling in hands     Numbness and tingling of both legs     Osteonecrosis     Bilat Femur    Seizures     from inapsine 2002    Sleep apnea     no cpap    Wears glasses     CONTACS         Past Surgical History:   Procedure Laterality Date    BACK SURGERY  2016    back surgery    BONE GRAFT N/A 11/05/2020    Procedure: BONE GRAFT;  Surgeon: Mateusz Blanco MD;  Location: [facility];  Service: Orthopedics;  Laterality: N/A;    CARDIAC SURGERY  01/06/2020    CABG X 7    CORONARY ARTERY BYPASS GRAFT      7 vessels    FLEXIBLE SIGMOIDOSCOPY N/A 07/24/2024    Procedure: SIGMOIDOSCOPY, FLEXIBLE;  Surgeon: Latesha Victoria MD;  Location: [facility];  Service: Endoscopy;  Laterality: N/A;    HERNIA REPAIR Bilateral 11/22/2017    HIP ARTHROPLASTY Left 09/23/2024    Procedure: ARTHROPLASTY, HIP;  Surgeon: Lázaro Jaimes MD;  Location: [facility];  Service: Orthopedics;  Laterality: Left;  [place]    HIP REPLACEMENT ARTHROPLASTY Right 02/17/2025    Procedure: ARTHROPLASTY, HIP REPLACEMENT;  Surgeon: Lázaro Jaimes MD;  Location: [facility];  Service: Orthopedics;  Laterality: Right;  [place]    JOINT REPLACEMENT Bilateral     HIPS    LITHOTRIPSY      LUMBAR LAMINECTOMY WITH FUSION Bilateral 11/05/2020    Procedure: LAMINECTOMY, SPINE, LUMBAR, WITH FUSION;  Surgeon:  Mateusz Blanco MD;  Location: [facility] OR;  Service: Orthopedics;  Laterality: Bilateral;  MEDTRONIC  NTI  L-3    LUMBAR LAMINECTOMY WITH FUSION Bilateral 7/15/2025    Procedure: LAMINECTOMY, SPINE, LUMBAR, WITH ARTHRODESIS;  Surgeon: Mateusz Blanco MD;  Location: [facility] OR;  Service: Orthopedics;  Laterality: Bilateral;    PILONIDAL CYST DRAINAGE      removal of abcess      scrotal    REMOVAL OF HARDWARE FROM SPINE Bilateral 11/05/2020    Procedure: REMOVAL, HARDWARE, SPINE;  Surgeon: Mateusz Blanco MD;  Location: [facility] OR;  Service: Orthopedics;  Laterality: Bilateral;  L 4-5    SURGICAL REMOVAL OF PILONIDAL CYST N/A 04/15/2024    Procedure: EXCISION, PILONIDAL CYST;  Surgeon: Jayden Phillips III, MD;  Location: [facility] OR;  Service: General;  Laterality: N/A;  sacral area    TYMPANOSTOMY TUBE PLACEMENT         Time Tracking:     OT Date of Treatment: 07/16/25  OT Start Time: 1405  OT Stop Time: 1416  OT Total Time (min): 11 min    Billable Minutes:Evaluation 11 7/16/2025

## 2025-07-16 NOTE — ASSESSMENT & PLAN NOTE
Creatine stable for now. BMP reviewed- noted Estimated Creatinine Clearance: 62.8 mL/min (A) (based on SCr of 1.7 mg/dL (H)). according to latest data. Based on current GFR, CKD stage is stage 3 - GFR 30-59.  Monitor UOP and serial BMP and adjust therapy as needed. Renally dose meds. Avoid nephrotoxic medications and procedures.  Continue IV hydration    Stop Ketoralac.

## 2025-07-16 NOTE — PLAN OF CARE
YARELIS Hawk  accepted and start of care, Friday 07/16/25 1223   Post-Acute Status   Post-Acute Authorization Home Health   Home Health Status Set-up Complete/Auth obtained   Discharge Plan   Discharge Plan A Home Health   Discharge Plan B Home Health

## 2025-07-16 NOTE — ASSESSMENT & PLAN NOTE
Hold all antihypertensives  Give Midodrine 10 mg x1 dose  Midrodrine 10 mg TID prn SBP < 100   ml bolus  Hold PCA pump.

## 2025-07-16 NOTE — PT/OT/SLP EVAL
Physical Therapy Evaluation    Patient Name:  Lee Quintanilla   MRN:  [id number]    Recommendations:     Discharge Recommendations: No Therapy Indicated   Discharge Equipment Recommendations: none   Barriers to discharge: medical status    Assessment:     Lee Quintanilla is a 49 y.o. male admitted with a medical diagnosis of Lumbar radiculopathy.  He presents with the following impairments/functional limitations: weakness, impaired endurance, impaired self care skills, impaired functional mobility, gait instability, impaired balance, decreased safety awareness, decreased lower extremity function, pain, impaired cardiopulmonary response to activity, orthopedic precautions Patient agreed to PT evaluation. Patient performed supine TE 10reps ankle pumps, quad sets, glute sets, and heel slides. Educated on log rolling technique for bed mobility and on spinal precautions; pt verbalized understanding. Patient was SBA for log roll and did so slowly and cautiously. Sat EOB unsupported with good balance. Transfer CGA for safety up to RW. Gait mechanics are characterized by decreased london, decreased foot clearance, decreased step length, lateral sway at each step with RW; chair follow for safety.     Rehab Prognosis: Good; patient would benefit from acute skilled PT services to address these deficits and reach maximum level of function.    Recent Surgery: Procedure(s) (LRB):  LAMINECTOMY, SPINE, LUMBAR, WITH ARTHRODESIS (Bilateral) 1 Day Post-Op    Plan:     During this hospitalization, patient to be seen 6 x/week to address the identified rehab impairments via gait training, therapeutic activities, therapeutic exercises, neuromuscular re-education and progress toward the following goals:    Plan of Care Expires:  08/16/25    Subjective     Chief Complaint: I got up last night with the nurses and used the RW  Patient/Family Comments/goals: to go home but I am staying another night to get my pain under  control  Pain/Comfort:  Pain Rating 1: 8/10  Location 1: back  Pain Addressed 1: Reposition    Patients cultural, spiritual, Cheondoism conflicts given the current situation: no    Living Environment:  Lives with parents, no steps  Prior to admission, patients level of function was needed help to feed dog, do laundry (bending activities).  Equipment used at home: walker, rolling, cane, straight, blood pressure machine, CPAP, grab bar.  DME owned (not currently used): none.  Upon discharge, patient will have assistance from family.    Objective:     Communicated with nurse prior to session.  Patient found HOB elevated with SAADIA drain, sarabia catheter, telemetry, bed alarm  upon PT entry to room.    General Precautions: Standard, fall  Orthopedic Precautions:spinal precautions   Braces:  (pt has TLSO for comfort)  Respiratory Status: Room air    Exams:  Gross Motor Coordination:  WFL  RLE ROM: WNL  RLE Strength: WNL  LLE ROM: WNL  LLE Strength: WNL    Functional Mobility:  Bed Mobility:     Supine to Sit: SBA with cues for log rolling  Transfers:     Sit to Stand:  contact guard assistance with rolling walker  Gait: 250ft RW slowly SBA with chair follow      AM-PAC 6 CLICK MOBILITY  Total Score:18       Treatment & Education:  Pt educated on POC, discharge recommendation, need for assist with mobility, use of call bell to seek assistance as needed and fall prevention      Patient left up in chair with all lines intact, call button in reach, chair alarm on, and mother present.    GOALS:   Multidisciplinary Problems       Physical Therapy Goals          Problem: Physical Therapy    Goal Priority Disciplines Outcome Interventions   Physical Therapy Goal     PT, PT/OT     Description: Goals to be met by: 25     Patient will increase functional independence with mobility by performin. Supine to sit with Modified [place]  2. Sit to stand transfer with Modified [place]  3. Bed to chair transfer with  Modified [place] using Rolling Walker  4. Gait  x 250 feet with Modified [place] using Rolling Walker.                          DME Justifications:  No DME recommended requiring DME justifications    History:     Past Medical History:   Diagnosis Date    ADHD (attention deficit hyperactivity disorder)     Arthritis     Asthma     as child only    Back pain     Coronary artery disease     Degeneration of lumbar intervertebral disc 05/2016    Depression     Digestive disorder     Elevated PSA     Headache     Hyperlipidemia     Hypertension     Kidney damage     stage 3 ; d/t aleve abuse    Kidney stone     Myocardial infarction     Neck pain     Numbness and tingling in hands     Numbness and tingling of both legs     Osteonecrosis     Bilat Femur    Seizures     from inapsine 2002    Sleep apnea     no cpap    Wears glasses     CONTACS       Past Surgical History:   Procedure Laterality Date    BACK SURGERY  2016    back surgery    BONE GRAFT N/A 11/05/2020    Procedure: BONE GRAFT;  Surgeon: Mateusz Blanco MD;  Location: [facility];  Service: Orthopedics;  Laterality: N/A;    CARDIAC SURGERY  01/06/2020    CABG X 7    CORONARY ARTERY BYPASS GRAFT      7 vessels    FLEXIBLE SIGMOIDOSCOPY N/A 07/24/2024    Procedure: SIGMOIDOSCOPY, FLEXIBLE;  Surgeon: Latesha Victoria MD;  [facility]: [facility];  Service: Endoscopy;  Laterality: N/A;    HERNIA REPAIR Bilateral 11/22/2017    HIP ARTHROPLASTY Left 09/23/2024    Procedure: ARTHROPLASTY, HIP;  Surgeon: Lázaro Jaimes MD;  Location: [facility] OR;  Service: Orthopedics;  Laterality: Left;  Sincere    HIP REPLACEMENT ARTHROPLASTY Right 02/17/2025    Procedure: ARTHROPLASTY, HIP REPLACEMENT;  Surgeon: Lázaro Jaimes MD;  Location: [facility];  Service: Orthopedics;  Laterality: Right;  [place]    JOINT REPLACEMENT Bilateral     HIPS    LITHOTRIPSY      LUMBAR LAMINECTOMY WITH FUSION Bilateral 11/05/2020    Procedure: LAMINECTOMY, SPINE, LUMBAR, WITH FUSION;  Surgeon: Mateusz ALBRIGHT  MD Bella;  Location: [facility] OR;  Service: Orthopedics;  Laterality: Bilateral;  MEDTRONIC  NTI  L-3    LUMBAR LAMINECTOMY WITH FUSION Bilateral 7/15/2025    Procedure: LAMINECTOMY, SPINE, LUMBAR, WITH ARTHRODESIS;  Surgeon: Mateusz Blanco MD;  Location: [facility] OR;  Service: Orthopedics;  Laterality: Bilateral;    PILONIDAL CYST DRAINAGE      removal of abcess      scrotal    REMOVAL OF HARDWARE FROM SPINE Bilateral 11/05/2020    Procedure: REMOVAL, HARDWARE, SPINE;  Surgeon: Mateusz Blanco MD;  Location: [facility] OR;  Service: Orthopedics;  Laterality: Bilateral;  L 4-5    SURGICAL REMOVAL OF PILONIDAL CYST N/A 04/15/2024    Procedure: EXCISION, PILONIDAL CYST;  Surgeon: Jayden Phillips III, MD;  Location: [facility] OR;  Service: General;  Laterality: N/A;  sacral area    TYMPANOSTOMY TUBE PLACEMENT         Time Tracking:     PT Received On: 07/16/25  PT Start Time: 1003     PT Stop Time: 1042  PT Total Time (min): 39 min     Billable Minutes: Evaluation 15, Gait Training 16, and Therapeutic Exercise 8      07/16/2025
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
## 2025-07-16 NOTE — RESPIRATORY THERAPY
07/16/25 0758   Patient Assessment/Suction   Level of Consciousness (AVPU) alert   Respiratory Effort Normal;Unlabored   Expansion/Accessory Muscles/Retractions expansion symmetric;no retractions;no use of accessory muscles   Rhythm/Pattern, Respiratory depth regular;pattern regular;unlabored   Cough Frequency no cough   PRE-TX-O2   Device (Oxygen Therapy) room air  (weaned off O2 nurse notified)   SpO2 96 %  (nc2L)   Pulse Oximetry Type Intermittent   $ Pulse Oximetry - Multiple Charge Pulse Oximetry - Multiple   Pulse 96   Resp 16   ETCO2   $ ETCO2 Usage Currently wearing   ETCO2 (mmHg) 43 mmHg   ETCO2 Device Type Portable Bedside Monitor;Nasal Cannula   Incentive Spirometer   $ Incentive Spirometer Charges done with encouragement   Incentive Spirometer Predicted Level (mL) 1960   Administration (IS) proper technique demonstrated   Number of Repetitions (IS) 10   Level Incentive Spirometer (mL) 1500   Patient Tolerance (IS) good;no adverse signs/symptoms present

## 2025-07-16 NOTE — PLAN OF CARE
Informed patient  of recommendation of HH and pt is agreeable to the plan.  Patient provided with a list of facilities in network with patient's payor plan.  Providers that are owned, operated or affiliated with [facility] are included on the list.    Patient has no preference.  Will send HH order to [facility].       If above agency unable to accept, will send additional referrals to in network providers.     07/16/25 1205   Post-Acute Status   Post-Acute Authorization Home Health   Home Health Status Referrals Sent   Patient choice form signed by patient/caregiver List from CMS Compare   Discharge Plan   Discharge Plan A Home Health   Discharge Plan B Home Health

## 2025-07-16 NOTE — PROGRESS NOTES
[facility]/Surg  Orthopedics  Progress Note    Patient Name: Lee Quintanilla  MRN: [id number]  Admission Date: 7/15/2025  Hospital Length of Stay: 0 days  Attending Provider: Gaye Buckley MD  Primary Care Provider: Riya Vidales NP  Follow-up For: Procedure(s) (LRB):  LAMINECTOMY, SPINE, LUMBAR, WITH ARTHRODESIS (Bilateral)    Post-Operative Day: 1 Day Post-Op  Subjective:     Principal Problem:Lumbar radiculopathy    Principal Orthopedic Problem:      Interval History:  Postoperative day 1. Moderate postoperative back pain    Review of patient's allergies indicates:   Allergen Reactions    Inapsine [droperidol] Anaphylaxis     seizures    Bactrim [sulfamethoxazole-trimethoprim] Rash     Dry red rash all over when in the sun    Effexor [venlafaxine] Other (See Comments)     Increased anxiety    Fluconazole Rash     Pruritis      Pcn [penicillins] Other (See Comments)     UNSURE OF REACTION--FROM SCRATCH TEST       Current Facility-Administered Medications   Medication    acetaminophen tablet 650 mg    aluminum-magnesium hydroxide-simethicone 200-200-20 mg/5 mL suspension 30 mL    [START ON 7/21/2025] anastrozole tablet 1 mg    atorvastatin tablet 10 mg    bisacodyL suppository 10 mg    busPIRone tablet 15 mg    clopidogreL tablet 75 mg    diphenhydrAMINE capsule 50 mg    docusate sodium capsule 100 mg    EScitalopram oxalate tablet 20 mg    furosemide tablet 20 mg    gabapentin capsule 300 mg    HYDROmorphone (PF) injection 2 mg    HYDROmorphone PCA syringe 6 mg/30 mL (0.2 mg/mL) NS    ketorolac injection 15 mg    lactated ringers infusion    melatonin tablet 9 mg    metoprolol tartrate (LOPRESSOR) tablet 50 mg    mupirocin 2 % ointment    naloxone 0.4 mg/mL injection 0.02 mg    ondansetron disintegrating tablet 8 mg    oxyCODONE immediate release tablet 15 mg    oxyCODONE immediate release tablet 5 mg    oxyCODONE immediate release tablet Tab 10 mg    pantoprazole EC tablet 40 mg     "prochlorperazine injection Soln 5 mg    sacubitriL-valsartan 49-51 mg per tablet 1 tablet    senna-docusate 8.6-50 mg per tablet 2 tablet    traZODone tablet 100 mg     Objective:     Vital Signs (Most Recent):  Temp: 99.1 °F (37.3 °C) (07/16/25 0720)  Pulse: 96 (07/16/25 0720)  Resp: 14 (07/16/25 0720)  BP: (!) 92/55 (07/16/25 0720)  SpO2: 96 % (07/16/25 0720) Vital Signs (24h Range):  Temp:  [97.6 °F (36.4 °C)-99.1 °F (37.3 °C)] 99.1 °F (37.3 °C)  Pulse:  [78-96] 96  Resp:  [8-22] 14  SpO2:  [93 %-99 %] 96 %  BP: ()/(50-64) 92/55     Weight: 112 kg (246 lb 14.6 oz)  Height: 5' 7" (170.2 cm)  Body mass index is 38.67 kg/m².      Intake/Output Summary (Last 24 hours) at 7/16/2025 0732  Last data filed at 7/16/2025 0630  Gross per 24 hour   Intake 3678.39 ml   Output 1830 ml   Net 1848.39 ml        General    Vitals reviewed.  Constitutional: He is oriented to person, place, and time.   Pulmonary/Chest: Effort normal.   Neurological: He is alert and oriented to person, place, and time.   Psychiatric: He has a normal mood and affect. His behavior is normal.         Back (L-Spine & T-Spine) / Neck (C-Spine) Exam     Comments:  Hemovac drain and catheter both in place and will be removed later today.  Moving both lower extremities without difficulty.  Abdomen not distended         Significant Labs: All pertinent labs within the past 24 hours have been reviewed.    Significant Imaging: None  Assessment/Plan:     Foraminal stenosis of lumbar region  Impression: Stable postoperative day 1.  As expected moderate postoperative low back pain  Plan: Ad Ketoralac to pain regimen.  Progress diet as tolerated.  Start physical therapy.  Discontinue drain and Washburn catheter.  Stable from orthopedic perspective          Mateusz Blanco MD  Orthopedics  [facility]/Surg    "

## 2025-07-16 NOTE — SUBJECTIVE & OBJECTIVE
Interval History: Patient seen and examined. NAD. Hypotensive this am. DC PCA pump. Transitioned patient to oral pain regimen. Stopped antihypertensive medications. Midodrine 10 mg given. Dc sarabia cath.     Review of Systems   Constitutional: Negative.    Respiratory: Negative.     Cardiovascular: Negative.    Genitourinary: Negative.    Musculoskeletal:  Positive for back pain.     Objective:     Vital Signs (Most Recent):  Temp: 99.1 °F (37.3 °C) (07/16/25 0720)  Pulse: 96 (07/16/25 0758)  Resp: 16 (07/16/25 0758)  BP: (!) 90/52 (07/16/25 0725)  SpO2: 96 % (nc2L) (07/16/25 0758) Vital Signs (24h Range):  Temp:  [97.6 °F (36.4 °C)-99.1 °F (37.3 °C)] 99.1 °F (37.3 °C)  Pulse:  [78-96] 96  Resp:  [8-22] 16  SpO2:  [93 %-99 %] 96 %  BP: ()/(50-64) 90/52     Weight: 112 kg (246 lb 14.6 oz)  Body mass index is 38.67 kg/m².    Intake/Output Summary (Last 24 hours) at 7/16/2025 0912  Last data filed at 7/16/2025 0816  Gross per 24 hour   Intake 4098.39 ml   Output 2130 ml   Net 1968.39 ml         Physical Exam  Vitals reviewed.   HENT:      Head: Normocephalic and atraumatic.      Mouth/Throat:      Mouth: Mucous membranes are moist.   Eyes:      Pupils: Pupils are equal, round, and reactive to light.   Cardiovascular:      Rate and Rhythm: Normal rate.      Pulses: Normal pulses.   Pulmonary:      Effort: Pulmonary effort is normal. No respiratory distress.      Breath sounds: Normal breath sounds.   Abdominal:      General: There is no distension.      Palpations: Abdomen is soft.   Genitourinary:     Comments: Sarabia cath removed.   Musculoskeletal:         General: Normal range of motion.      Cervical back: Normal range of motion and neck supple.   Skin:     General: Skin is warm.      Comments: Hemovac inplace  Dressing to spine CDI.    Neurological:      General: No focal deficit present.      Mental Status: He is alert. Mental status is at baseline.   Psychiatric:         Mood and Affect: Mood normal.          "      Significant Labs: All pertinent labs within the past 24 hours have been reviewed.  CBC:   Recent Labs   Lab 07/15/25  1309 07/16/25  0517   WBC 7.49 9.27   HGB 12.3* 10.9*   HCT 40.2 36.3*    179     CMP:   Recent Labs   Lab 07/15/25  1309 07/16/25  0517    137   K 5.1 4.2    105   CO2 24 24   GLU 94 110   BUN 12 13   CREATININE 1.7* 1.7*   CALCIUM 8.1* 8.2*   ANIONGAP 8 8     POCT Glucose: No results for input(s): "POCTGLUCOSE" in the last 48 hours.    Significant Imaging: I have reviewed all pertinent imaging results/findings within the past 24 hours.  "

## 2025-07-16 NOTE — ASSESSMENT & PLAN NOTE
"Patient has Systolic (HFrEF) heart failure that is Chronic. On presentation their CHF was well compensated. Most recent BNP and echo results are listed below.  No results for input(s): "BNP" in the last 72 hours.  Latest ECHO  Results for orders placed during the hospital encounter of 12/05/22    Echo Saline Bubble? No    Interpretation Summary  · The left ventricle is normal in size with mild concentric hypertrophy and moderately decreased systolic function.  · The estimated ejection fraction is 38%.  · Grade I left ventricular diastolic dysfunction.  · There is moderate left ventricular global hypokinesis.  · Normal right ventricular size with normal right ventricular systolic function.  · Mild mitral regurgitation.  · Normal central venous pressure (3 mmHg).    Current Heart Failure Medications       Plan  - Monitor strict I&Os and daily weights.    - Place on telemetry  - Low sodium diet  - Place on fluid restriction of 1.5 L.   - Cardiology has not been consulted    Echo ordered  Hold entresto, furosemide, metoprolol due to hypotension.   Start on Midodrine 10 mg TID for SBP < 100  Chest xray to r/o fluid overload  Stop IV fluids    "

## 2025-07-16 NOTE — PROGRESS NOTES
[facility] Medicine  Progress Note    Patient Name: Lee Quintanilla  MRN: [id number]  Patient Class: OP- Outpatient Recovery   Admission Date: 7/15/2025  Length of Stay: 0 days  Attending Physician: Gaye Buckley MD  Primary Care Provider: Riya Vidales NP    Subjective     Principal Problem:Lumbar radiculopathy    HPI:  49 year old with a past medical history of Asthma, CAD, Hypertension, HLD, CKD st. III, MI, Seizures, OLEG, lumbar spondylosis, lumbar radiculopathy, foraminal stenosis of the lumbar region, s/p lumbar spinal fusion, Retrolisthesis of vertebrae, who is admitted to Hospital Medicine Service s/p Combined transforaminal lumbar interbody fusion and posterolateral fusion L3-4 level, L3 laminectomy for lumbar stenosis, and Removal of posterior nonsegmental instrumentation L4-5 with Dr. Blanco. Patient given Albumin in OR and blood products. Patient will be admitted for further Medical Management.     Overview/Hospital Course:  Lee Quintanilla was monitored closely during his hospitalization. Hospital Medicine was consulted for medical management after s/p   Pain was controlled with PCA pump. He was maintained on IV hydration.     Interval History: Patient seen and examined. NAD. Hypotensive this am. DC PCA pump. Transitioned patient to oral pain regimen. Stopped antihypertensive medications. Midodrine 10 mg given. Dc sarabia cath.     Review of Systems   Constitutional: Negative.    Respiratory: Negative.     Cardiovascular: Negative.    Genitourinary: Negative.    Musculoskeletal:  Positive for back pain.     Objective:     Vital Signs (Most Recent):  Temp: 99.1 °F (37.3 °C) (07/16/25 0720)  Pulse: 96 (07/16/25 0758)  Resp: 16 (07/16/25 0758)  BP: (!) 90/52 (07/16/25 0725)  SpO2: 96 % (nc2L) (07/16/25 0758) Vital Signs (24h Range):  Temp:  [97.6 °F (36.4 °C)-99.1 °F (37.3 °C)] 99.1 °F (37.3 °C)  Pulse:  [78-96] 96  Resp:  [8-22] 16  SpO2:  [93 %-99 %] 96 %  BP:  "()/(50-64) 90/52     Weight: 112 kg (246 lb 14.6 oz)  Body mass index is 38.67 kg/m².    Intake/Output Summary (Last 24 hours) at 7/16/2025 0912  Last data filed at 7/16/2025 0816  Gross per 24 hour   Intake 4098.39 ml   Output 2130 ml   Net 1968.39 ml         Physical Exam  Vitals reviewed.   HENT:      Head: Normocephalic and atraumatic.      Mouth/Throat:      Mouth: Mucous membranes are moist.   Eyes:      Pupils: Pupils are equal, round, and reactive to light.   Cardiovascular:      Rate and Rhythm: Normal rate.      Pulses: Normal pulses.   Pulmonary:      Effort: Pulmonary effort is normal. No respiratory distress.      Breath sounds: Normal breath sounds.   Abdominal:      General: There is no distension.      Palpations: Abdomen is soft.   Genitourinary:     Comments: Washburn cath removed.   Musculoskeletal:         General: Normal range of motion.      Cervical back: Normal range of motion and neck supple.   Skin:     General: Skin is warm.      Comments: Hemovac inplace  Dressing to spine CDI.    Neurological:      General: No focal deficit present.      Mental Status: He is alert. Mental status is at baseline.   Psychiatric:         Mood and Affect: Mood normal.               Significant Labs: All pertinent labs within the past 24 hours have been reviewed.  CBC:   Recent Labs   Lab 07/15/25  1309 07/16/25  0517   WBC 7.49 9.27   HGB 12.3* 10.9*   HCT 40.2 36.3*    179     CMP:   Recent Labs   Lab 07/15/25  1309 07/16/25  0517    137   K 5.1 4.2    105   CO2 24 24   GLU 94 110   BUN 12 13   CREATININE 1.7* 1.7*   CALCIUM 8.1* 8.2*   ANIONGAP 8 8     POCT Glucose: No results for input(s): "POCTGLUCOSE" in the last 48 hours.    Significant Imaging: I have reviewed all pertinent imaging results/findings within the past 24 hours.      Assessment & Plan  Lumbar radiculopathy  Lumbar spondylosis  Foraminal stenosis of lumbar region  s/p Combined transforaminal lumbar interbody fusion and " "posterolateral fusion L3-4 level, L3 laminectomy for lumbar stenosis, and Removal of posterior nonsegmental instrumentation L4-5 with Dr. Blanco.    Pca pain pump- stopped, transition to oral pain control  Washburn cath removed  Hold ketoralac due to CKD 3  Dr. Blanoc following.   PT/OT    Hypertension  Patient's blood pressure range in the last 24 hours was: BP  Min: 90/52  Max: 118/61.The patient's inpatient anti-hypertensive regimen is listed below:  Current Antihypertensives       Plan  - BP is controlled, no changes needed to their regimen  Hold all antihypertensives due to hypotension  Severe obesity with body mass index (BMI) of 35.0 to 39.9 with serious comorbidity  Body mass index is 38.67 kg/m². Morbid obesity complicates all aspects of disease management from diagnostic modalities to treatment. Weight loss encouraged and health benefits explained to patient.     Stage 3a chronic kidney disease  Creatine stable for now. BMP reviewed- noted Estimated Creatinine Clearance: 62.8 mL/min (A) (based on SCr of 1.7 mg/dL (H)). according to latest data. Based on current GFR, CKD stage is stage 3 - GFR 30-59.  Monitor UOP and serial BMP and adjust therapy as needed. Renally dose meds. Avoid nephrotoxic medications and procedures.  Continue IV hydration    Stop Ketoralac.   Hypotension  Hold all antihypertensives  Give Midodrine 10 mg x1 dose  Midrodrine 10 mg TID prn SBP < 100   ml bolus  Hold PCA pump.     Chronic systolic (congestive) heart failure  Patient has Systolic (HFrEF) heart failure that is Chronic. On presentation their CHF was well compensated. Most recent BNP and echo results are listed below.  No results for input(s): "BNP" in the last 72 hours.  Latest ECHO  Results for orders placed during the hospital encounter of 12/05/22    Echo Saline Bubble? No    Interpretation Summary  · The left ventricle is normal in size with mild concentric hypertrophy and moderately decreased systolic function.  · The " estimated ejection fraction is 38%.  · Grade I left ventricular diastolic dysfunction.  · There is moderate left ventricular global hypokinesis.  · Normal right ventricular size with normal right ventricular systolic function.  · Mild mitral regurgitation.  · Normal central venous pressure (3 mmHg).    Current Heart Failure Medications       Plan  - Monitor strict I&Os and daily weights.    - Place on telemetry  - Low sodium diet  - Place on fluid restriction of 1.5 L.   - Cardiology has not been consulted    Echo ordered  Hold entresto, furosemide, metoprolol due to hypotension.   Start on Midodrine 10 mg TID for SBP < 100  Chest xray to r/o fluid overload  Stop IV fluids    VTE Risk Mitigation (From admission, onward)           Ordered     IP VTE LOW RISK PATIENT  Once         07/15/25 0628     Place JELENA hose  Until discontinued         07/15/25 0628     Place sequential compression device  Until discontinued         07/15/25 0628                    Discharge Planning   FANTA: 7/17/2025     Code Status: Full Code   Medical Readiness for Discharge Date:   Discharge Plan A: Home Health                  Please place Justification for DME      Susan Philip NP  Department of Hospital Medicine   [facility]/Surg

## 2025-07-16 NOTE — PLAN OF CARE
POC reviewed with patient. Verbalized understanding. Pain controlled per PCA pump. Remains alert and oriented without signs of oversedation. Dressing clean, dry, and intact. Accordion drain to closed suction with sanguinous drainage. Moving well in bed. Rolling from side to side. Diet advanced to regular. Tolerating regular diet. NAD.

## 2025-07-16 NOTE — PLAN OF CARE
Goals to be met by: 8/13/25     Patient will increase functional independence with ADLs by performing:    UE Dressing with Modified Gooding.  LE Dressing with Modified Gooding.  Grooming while standing at sink with Modified Gooding.  Toileting from toilet with Modified Gooding for hygiene and clothing management.   Bathing from  shower chair/bench with Modified Gooding.  Toilet transfer to toilet with Modified Gooding.  Increased strength and functional activity tolerance for ADL's/IADL's

## 2025-07-16 NOTE — PLAN OF CARE
[facility] - Med/Surg  Initial Discharge Assessment       Primary Care Provider: Riya Vidales NP    Admission Diagnosis: Retrolisthesis of vertebrae [M43.10]  History of lumbar spinal fusion [Z98.1]  Lumbar spondylosis [M47.816]  Lumbar radiculopathy [M54.16]  Foraminal stenosis of lumbar region [M48.061]    Admission Date: 7/15/2025  Expected Discharge Date: 7/17/2025    Completed, with pt, discharge assessment, verified pharmacy and information on facesheet.  No HH, dialysis or Coumadin.  See DME below.  Stepdad will drive pt home.  Will dc with HH.    Transition of Care Barriers: None    Payor: MEDICAID / Plan: [place] [employer] / Product Type: Managed Medicaid /     Extended Emergency Contact Information  Primary Emergency Contact: Leti Rollins  Address: [address] [place] [address] [place] [address] [facility]  Mobile Phone: [phone]  Relation: Mother  Preferred language: English   needed? No    Discharge Plan A: Home Health  Discharge Plan B: Home Health      [facility] [place] [facility] [address] [facility]  [address] [facility]  [place] [address]  Phone: [phone] Fax: [phone]      Initial Assessment (most recent)       Adult Discharge Assessment - 07/16/25 1109          Discharge Assessment    Assessment Type Discharge Planning Assessment     Confirmed/corrected address, phone number and insurance Yes     Confirmed Demographics Correct on Facesheet     Source of Information patient     People in Home parent(s)     Facility Arrived From: home     Do you expect to return to your current living situation? Yes     Do you have help at home or someone to help you manage your care at home? Yes     Who are your caregiver(s) and their phone number(s)? parents     Prior to hospitilization cognitive status: Alert/Oriented     Current cognitive status: Alert/Oriented     Walking or Climbing Stairs Difficulty no     Dressing/Bathing Difficulty no     Home Accessibility wheelchair  accessible     Home Layout Able to live on 1st floor     Equipment Currently Used at Home walker, rolling;cane, straight;blood pressure machine;scale;CPAP;grab bar     Readmission within 30 days? No     Patient currently being followed by outpatient case management? No     Do you currently have service(s) that help you manage your care at home? No     Do you take prescription medications? Yes     Do you have prescription coverage? Yes     Do you have any problems affording any of your prescribed medications? No     Is the patient taking medications as prescribed? yes     Who is going to help you get home at discharge? Cristhian camarena     How do you get to doctors appointments? car, drives self     Are you on dialysis? No     Do you take coumadin? No     Discharge Plan A Home Health     Discharge Plan B Home Health     DME Needed Upon Discharge  none     Discharge Plan discussed with: Patient     Transition of Care Barriers None

## 2025-07-16 NOTE — ASSESSMENT & PLAN NOTE
Patient's blood pressure range in the last 24 hours was: BP  Min: 90/52  Max: 118/61.The patient's inpatient anti-hypertensive regimen is listed below:  Current Antihypertensives       Plan  - BP is controlled, no changes needed to their regimen  Hold all antihypertensives due to hypotension

## 2025-07-16 NOTE — ASSESSMENT & PLAN NOTE
Impression: Stable postoperative day 1.  As expected moderate postoperative low back pain  Plan: Ad Ketoralac to pain regimen.  Progress diet as tolerated.  Start physical therapy.  Discontinue drain and Washburn catheter.  Stable from orthopedic perspective

## 2025-07-16 NOTE — ASSESSMENT & PLAN NOTE
s/p Combined transforaminal lumbar interbody fusion and posterolateral fusion L3-4 level, L3 laminectomy for lumbar stenosis, and Removal of posterior nonsegmental instrumentation L4-5 with Dr. Blanco.    Pca pain pump- stopped, transition to oral pain control  Washburn cath removed  Hold ketoralac due to CKD 3  Dr. Blanco following.   PT/OT

## 2025-07-16 NOTE — SUBJECTIVE & OBJECTIVE
Principal Problem:Lumbar radiculopathy    Principal Orthopedic Problem:      Interval History:  Postoperative day 1. Moderate postoperative back pain    Review of patient's allergies indicates:   Allergen Reactions    Inapsine [droperidol] Anaphylaxis     seizures    Bactrim [sulfamethoxazole-trimethoprim] Rash     Dry red rash all over when in the sun    Effexor [venlafaxine] Other (See Comments)     Increased anxiety    Fluconazole Rash     Pruritis      Pcn [penicillins] Other (See Comments)     UNSURE OF REACTION--FROM SCRATCH TEST       Current Facility-Administered Medications   Medication    acetaminophen tablet 650 mg    aluminum-magnesium hydroxide-simethicone 200-200-20 mg/5 mL suspension 30 mL    [START ON 7/21/2025] anastrozole tablet 1 mg    atorvastatin tablet 10 mg    bisacodyL suppository 10 mg    busPIRone tablet 15 mg    clopidogreL tablet 75 mg    diphenhydrAMINE capsule 50 mg    docusate sodium capsule 100 mg    EScitalopram oxalate tablet 20 mg    furosemide tablet 20 mg    gabapentin capsule 300 mg    HYDROmorphone (PF) injection 2 mg    HYDROmorphone PCA syringe 6 mg/30 mL (0.2 mg/mL) NS    ketorolac injection 15 mg    lactated ringers infusion    melatonin tablet 9 mg    metoprolol tartrate (LOPRESSOR) tablet 50 mg    mupirocin 2 % ointment    naloxone 0.4 mg/mL injection 0.02 mg    ondansetron disintegrating tablet 8 mg    oxyCODONE immediate release tablet 15 mg    oxyCODONE immediate release tablet 5 mg    oxyCODONE immediate release tablet Tab 10 mg    pantoprazole EC tablet 40 mg    prochlorperazine injection Soln 5 mg    sacubitriL-valsartan 49-51 mg per tablet 1 tablet    senna-docusate 8.6-50 mg per tablet 2 tablet    traZODone tablet 100 mg     Objective:     Vital Signs (Most Recent):  Temp: 99.1 °F (37.3 °C) (07/16/25 0720)  Pulse: 96 (07/16/25 0720)  Resp: 14 (07/16/25 0720)  BP: (!) 92/55 (07/16/25 0720)  SpO2: 96 % (07/16/25 0720) Vital Signs (24h Range):  Temp:  [97.6 °F (36.4  "°C)-99.1 °F (37.3 °C)] 99.1 °F (37.3 °C)  Pulse:  [78-96] 96  Resp:  [8-22] 14  SpO2:  [93 %-99 %] 96 %  BP: ()/(50-64) 92/55     Weight: 112 kg (246 lb 14.6 oz)  Height: 5' 7" (170.2 cm)  Body mass index is 38.67 kg/m².      Intake/Output Summary (Last 24 hours) at 7/16/2025 0732  Last data filed at 7/16/2025 0630  Gross per 24 hour   Intake 3678.39 ml   Output 1830 ml   Net 1848.39 ml        General    Vitals reviewed.  Constitutional: He is oriented to person, place, and time.   Pulmonary/Chest: Effort normal.   Neurological: He is alert and oriented to person, place, and time.   Psychiatric: He has a normal mood and affect. His behavior is normal.         Back (L-Spine & T-Spine) / Neck (C-Spine) Exam     Comments:  Hemovac drain and catheter both in place and will be removed later today.  Moving both lower extremities without difficulty.  Abdomen not distended         Significant Labs: All pertinent labs within the past 24 hours have been reviewed.    Significant Imaging: None  "

## 2025-07-16 NOTE — PLAN OF CARE
Plan of care reviewed with patient. Patient verbalized complete understanding. PRN pain medications administered per MAR. PCA pump discontinued, SAADIA drain and sarabia discontinued at this shift, patient tolerated well. All fall precautions maintained. Bed in the lowest position, wheels locked. Call light within reach. Side rails ups x2. Slip resistant socks maintained.    Problem: Adult Inpatient Plan of Care  Goal: Plan of Care Review  Outcome: Progressing  Problem: Wound  Goal: Absence of Infection Signs and Symptoms  Outcome: Progressing     Problem: Fall Injury Risk  Goal: Absence of Fall and Fall-Related Injury  Outcome: Progressing     Problem: Wound  Goal: Optimal Pain Control and Function  Outcome: Progressing

## 2025-07-16 NOTE — PLAN OF CARE
POC/medications reviewed with pt, verbalized understanding. Pt AAO x 4, able to make needs known. Continuous cardiac monitoring in place. Meds given per MAR. IV intact and patent with IVF infusing as ordered. IS at bedside and encouraged usage. Purposeful q2hr rounding done. Pain managed with PCA pump. Patient was able to get up and walk in the busch at beginning of shift, tolerated well. Safety maintained with side rails up x3, bed wheels locked, bed in lowest position, and call light in reach. Pt educated to call for assistance with ambulation, verbalized understanding. Pt remains free of falls. No further needs expressed at this time.

## 2025-07-16 NOTE — NURSING
0725 BP 92/55, manual 90/52, NP notified  0817 Midodrine 10mg & 500ml LR bolus administered  0930 /55

## 2025-07-16 NOTE — ASSESSMENT & PLAN NOTE
Body mass index is 38.67 kg/m². Morbid obesity complicates all aspects of disease management from diagnostic modalities to treatment. Weight loss encouraged and health benefits explained to patient.

## 2025-07-17 VITALS
WEIGHT: 246 LBS | RESPIRATION RATE: 18 BRPM | OXYGEN SATURATION: 96 % | SYSTOLIC BLOOD PRESSURE: 107 MMHG | HEIGHT: 67 IN | BODY MASS INDEX: 38.61 KG/M2 | HEART RATE: 95 BPM | TEMPERATURE: 99 F | DIASTOLIC BLOOD PRESSURE: 59 MMHG

## 2025-07-17 LAB
ABSOLUTE EOSINOPHIL (SMH): 0.16 K/UL
ABSOLUTE MONOCYTE (SMH): 0.95 K/UL (ref 0.3–1)
ABSOLUTE NEUTROPHIL COUNT (SMH): 6.5 K/UL (ref 1.8–7.7)
ANION GAP (SMH): 7 MMOL/L (ref 8–16)
BASOPHILS # BLD AUTO: 0.02 K/UL
BASOPHILS NFR BLD AUTO: 0.2 %
BUN SERPL-MCNC: 10 MG/DL (ref 6–20)
CALCIUM SERPL-MCNC: 8.2 MG/DL (ref 8.7–10.5)
CHLORIDE SERPL-SCNC: 105 MMOL/L (ref 95–110)
CO2 SERPL-SCNC: 24 MMOL/L (ref 23–29)
CREAT SERPL-MCNC: 1.4 MG/DL (ref 0.5–1.4)
ERYTHROCYTE [DISTWIDTH] IN BLOOD BY AUTOMATED COUNT: 15.8 % (ref 11.5–14.5)
GFR SERPLBLD CREATININE-BSD FMLA CKD-EPI: >60 ML/MIN/1.73/M2
GLUCOSE SERPL-MCNC: 98 MG/DL (ref 70–110)
HCT VFR BLD AUTO: 35.1 % (ref 40–54)
HGB BLD-MCNC: 10.8 GM/DL (ref 14–18)
HOLD SPECIMEN: NORMAL
IMM GRANULOCYTES # BLD AUTO: 0.12 K/UL (ref 0–0.04)
IMM GRANULOCYTES NFR BLD AUTO: 1.4 % (ref 0–0.5)
LYMPHOCYTES # BLD AUTO: 1.08 K/UL (ref 1–4.8)
MCH RBC QN AUTO: 25.5 PG (ref 27–31)
MCHC RBC AUTO-ENTMCNC: 30.8 G/DL (ref 32–36)
MCV RBC AUTO: 83 FL (ref 82–98)
NUCLEATED RBC (/100WBC) (SMH): 0 /100 WBC
PLATELET # BLD AUTO: 168 K/UL (ref 150–450)
PMV BLD AUTO: 10.5 FL (ref 9.2–12.9)
POTASSIUM SERPL-SCNC: 3.9 MMOL/L (ref 3.5–5.1)
RBC # BLD AUTO: 4.23 M/UL (ref 4.6–6.2)
RELATIVE EOSINOPHIL (SMH): 1.8 % (ref 0–8)
RELATIVE LYMPHOCYTE (SMH): 12.2 % (ref 18–48)
RELATIVE MONOCYTE (SMH): 10.7 % (ref 4–15)
RELATIVE NEUTROPHIL (SMH): 73.7 % (ref 38–73)
SODIUM SERPL-SCNC: 136 MMOL/L (ref 136–145)
WBC # BLD AUTO: 8.87 K/UL (ref 3.9–12.7)

## 2025-07-17 PROCEDURE — 94799 UNLISTED PULMONARY SVC/PX: CPT

## 2025-07-17 PROCEDURE — 25000003 PHARM REV CODE 250: Performed by: ORTHOPAEDIC SURGERY

## 2025-07-17 PROCEDURE — 63600175 PHARM REV CODE 636 W HCPCS: Mod: JZ | Performed by: ORTHOPAEDIC SURGERY

## 2025-07-17 PROCEDURE — 25000003 PHARM REV CODE 250

## 2025-07-17 PROCEDURE — 94761 N-INVAS EAR/PLS OXIMETRY MLT: CPT

## 2025-07-17 PROCEDURE — 36415 COLL VENOUS BLD VENIPUNCTURE: CPT | Performed by: ORTHOPAEDIC SURGERY

## 2025-07-17 PROCEDURE — 85025 COMPLETE CBC W/AUTO DIFF WBC: CPT | Performed by: ORTHOPAEDIC SURGERY

## 2025-07-17 PROCEDURE — 97116 GAIT TRAINING THERAPY: CPT | Mod: CQ

## 2025-07-17 PROCEDURE — 82310 ASSAY OF CALCIUM: CPT | Performed by: ORTHOPAEDIC SURGERY

## 2025-07-17 RX ORDER — MUPIROCIN 20 MG/G
OINTMENT TOPICAL 2 TIMES DAILY
Start: 2025-07-17

## 2025-07-17 RX ORDER — MIDODRINE HYDROCHLORIDE 2.5 MG/1
2.5 TABLET ORAL 2 TIMES DAILY PRN
Qty: 60 TABLET | Refills: 0 | Status: SHIPPED | OUTPATIENT
Start: 2025-07-17

## 2025-07-17 RX ORDER — METOPROLOL TARTRATE 50 MG/1
50 TABLET ORAL 2 TIMES DAILY
Status: DISCONTINUED | OUTPATIENT
Start: 2025-07-17 | End: 2025-07-17 | Stop reason: HOSPADM

## 2025-07-17 RX ORDER — DIPHENHYDRAMINE HCL 50 MG
50 CAPSULE ORAL EVERY 6 HOURS PRN
COMMUNITY
Start: 2025-07-17

## 2025-07-17 RX ORDER — FUROSEMIDE 20 MG/1
20 TABLET ORAL DAILY
Status: DISCONTINUED | OUTPATIENT
Start: 2025-07-17 | End: 2025-07-17 | Stop reason: HOSPADM

## 2025-07-17 RX ORDER — DOCUSATE SODIUM 100 MG/1
100 CAPSULE, LIQUID FILLED ORAL DAILY
Qty: 30 CAPSULE | Refills: 0 | Status: SHIPPED | OUTPATIENT
Start: 2025-07-18

## 2025-07-17 RX ADMIN — DOCUSATE SODIUM 100 MG: 100 CAPSULE, LIQUID FILLED ORAL at 08:07

## 2025-07-17 RX ADMIN — ESCITALOPRAM OXALATE 20 MG: 10 TABLET, FILM COATED ORAL at 08:07

## 2025-07-17 RX ADMIN — CLOPIDOGREL 75 MG: 75 TABLET ORAL at 08:07

## 2025-07-17 RX ADMIN — HYDROMORPHONE HYDROCHLORIDE 2 MG: 2 INJECTION, SOLUTION INTRAMUSCULAR; INTRAVENOUS; SUBCUTANEOUS at 09:07

## 2025-07-17 RX ADMIN — OXYCODONE HYDROCHLORIDE 15 MG: 5 TABLET ORAL at 03:07

## 2025-07-17 RX ADMIN — FUROSEMIDE 20 MG: 20 TABLET ORAL at 09:07

## 2025-07-17 RX ADMIN — HYDROMORPHONE HYDROCHLORIDE 2 MG: 2 INJECTION, SOLUTION INTRAMUSCULAR; INTRAVENOUS; SUBCUTANEOUS at 05:07

## 2025-07-17 RX ADMIN — BUSPIRONE HYDROCHLORIDE 15 MG: 10 TABLET ORAL at 08:07

## 2025-07-17 RX ADMIN — SACUBITRIL AND VALSARTAN 1 TABLET: 49; 51 TABLET, FILM COATED ORAL at 09:07

## 2025-07-17 RX ADMIN — GABAPENTIN 300 MG: 300 CAPSULE ORAL at 08:07

## 2025-07-17 RX ADMIN — METOPROLOL TARTRATE 50 MG: 50 TABLET, FILM COATED ORAL at 09:07

## 2025-07-17 RX ADMIN — MUPIROCIN 1 G: 20 OINTMENT TOPICAL at 08:07

## 2025-07-17 RX ADMIN — OXYCODONE HYDROCHLORIDE 15 MG: 5 TABLET ORAL at 08:07

## 2025-07-17 RX ADMIN — PANTOPRAZOLE SODIUM 40 MG: 40 TABLET, DELAYED RELEASE ORAL at 08:07

## 2025-07-17 RX ADMIN — ATORVASTATIN CALCIUM 10 MG: 10 TABLET, FILM COATED ORAL at 08:07

## 2025-07-17 NOTE — ASSESSMENT & PLAN NOTE
Patient's blood pressure range in the last 24 hours was: BP  Min: 104/51  Max: 131/69.The patient's inpatient anti-hypertensive regimen is listed below:  Current Antihypertensives     CHF medications resumed prior to discharge  Plan  - BP is controlled, no changes needed to their regimen

## 2025-07-17 NOTE — ASSESSMENT & PLAN NOTE
s/p Combined transforaminal lumbar interbody fusion and posterolateral fusion L3-4 level, L3 laminectomy for lumbar stenosis, and Removal of posterior nonsegmental instrumentation L4-5 with Dr. Blanco.    Pca pain pump- stopped, transition to oral pain control  Washburn cath removed  Hold ketoralac due to CKD 3  Dr. Blanco following.   PT/OT:  Low intensity therapy    s/p Combined transforaminal lumbar interbody fusion and posterolateral fusion L3-4 level, L3 laminectomy for lumbar stenosis, and Removal of posterior nonsegmental instrumentation L4-5 with Dr. Blanco.    Pca pain pump- stopped, transition to oral pain control  Washburn cath removed  Hold ketoralac due to CKD 3  Dr. Blanco following.   PT/OT    s/p Combined transforaminal lumbar interbody fusion and posterolateral fusion L3-4 level, L3 laminectomy for lumbar stenosis, and Removal of posterior nonsegmental instrumentation L4-5 with Dr. Blanco.

## 2025-07-17 NOTE — DISCHARGE INSTRUCTIONS
You were admitted to the hospital for lumbar radiculopathy status post retrolisthesis of vertebrae with Dr. Blanco.  You were managed with oral and IV pain medications.  You were to continue oral pain medicines as needed.  You worked with physical therapy and home health orders estimated placed.  Take all medications as prescribed Your to follow up with your primary care provider in 1 week.  Follow with Dr. Blanco in 2-3 weeks.  Monitor blood pressure daily.      Our goal at [facility] is to always give you outstanding care and exceptional service. You may receive a survey from [employer] by mail, text or e-mail in the next 24-48 hours asking about the care you received with us. The survey should only take 5-10 minutes to complete and is very important to us.     Your feedback provides us with a way to recognize our staff who work tirelessly to provide the best care! Also, your responses help us learn how to improve when your experience was below our aspiration of excellence. We are always looking for ways to improve your stay. We WILL use your feedback to continue making improvements to help us provide the highest quality care. We keep your personal information and feedback confidential. We appreciate your time completing this survey and can't wait to hear from you!!!    We look forward to your continued care with us! Thanks so much for choosing [facility] for your healthcare needs!

## 2025-07-17 NOTE — PLAN OF CARE
POC/medications reviewed with pt, verbalized understanding. Pt AAO x 4, able to make needs known. Continuous cardiac monitoring in place. Meds given per MAR. IV intact and patent. IS at bedside and encouraged usage. Purposeful q2hr rounding done. Safety maintained with side rails up x3, bed wheels locked, bed in lowest position, and call light in reach. Pt educated to call for assistance with ambulation, verbalized understanding. Pt remains free of falls. No further needs expressed at this time.

## 2025-07-17 NOTE — PT/OT/SLP PROGRESS
Occupational Therapy      Patient Name:  Lee Quintanilla   MRN:  [id number]    Patient not seen today secondary to Other (Comment) (Pt discharged prior to OT Tx).    7/17/2025

## 2025-07-17 NOTE — ASSESSMENT & PLAN NOTE
s/p Combined transforaminal lumbar interbody fusion and posterolateral fusion L3-4 level, L3 laminectomy for lumbar stenosis, and Removal of posterior nonsegmental instrumentation L4-5 with Dr. Blanco.    Pca pain pump- stopped, transition to oral pain control  Washburn cath removed  Hold ketoralac due to CKD 3  Dr. Blanco following.   PT/OT:  Low intensity therapy    s/p Combined transforaminal lumbar interbody fusion and posterolateral fusion L3-4 level, L3 laminectomy for lumbar stenosis, and Removal of posterior nonsegmental instrumentation L4-5 with Dr. Blanco.    Pca pain pump- stopped, transition to oral pain control  Washburn cath removed  Hold ketoralac due to CKD 3  Dr. Blanco following.   PT/OT    s/p Combined transforaminal lumbar interbody fusion and posterolateral fusion L3-4 level, L3 laminectomy for lumbar stenosis, and Removal of posterior nonsegmental instrumentation L4-5 with Dr. Blnaco.

## 2025-07-17 NOTE — CARE UPDATE
07/16/25 1935   Patient Assessment/Suction   Level of Consciousness (AVPU) alert   Respiratory Effort Normal;Unlabored   Expansion/Accessory Muscles/Retractions expansion symmetric;no retractions;no use of accessory muscles   PRE-TX-O2   Device (Oxygen Therapy) room air   SpO2 98 %   Pulse Oximetry Type Intermittent   Pulse (!) 115   Resp 18   Incentive Spirometer   $ Incentive Spirometer Charges done with encouragement   Administration (IS) proper technique demonstrated   Number of Repetitions (IS) 6   Level Incentive Spirometer (mL) 2500   Patient Tolerance (IS) good

## 2025-07-17 NOTE — NURSING
Patient discharged. Dressing to back changed, staples intact. Peripheral IV removed.  Left floor via wheelchair, transportation proved by family.

## 2025-07-17 NOTE — PLAN OF CARE
Pt clear for DC from case management standpoint. Discharging to home.  - [facility].  Stepdad to provide transportation home at discharge.    Pending surgical clearance and rounds by attending.     07/17/25 0932   Final Note   Assessment Type Final Discharge Note   Anticipated Discharge Disposition Home-Health   Post-Acute Status   Discharge Delays (!) Personal Transportation

## 2025-07-17 NOTE — ASSESSMENT & PLAN NOTE
Creatine stable for now. BMP reviewed- noted Estimated Creatinine Clearance: 76.1 mL/min (based on SCr of 1.4 mg/dL). according to latest data. Based on current GFR, CKD stage is stage 3 - GFR 30-59.  Monitor UOP and serial BMP and adjust therapy as needed. Renally dose meds. Avoid nephrotoxic medications and procedures.  Continue IV hydration    Stop Ketoralac.

## 2025-07-17 NOTE — ASSESSMENT & PLAN NOTE
Body mass index is 38.53 kg/m². Morbid obesity complicates all aspects of disease management from diagnostic modalities to treatment. Weight loss encouraged and health benefits explained to patient.

## 2025-07-17 NOTE — PT/OT/SLP PROGRESS
Physical Therapy Treatment    Patient Name:  Lee Quintanilla   MRN:  [id number]    Recommendations:     Discharge Recommendations: No Therapy Indicated  Discharge Equipment Recommendations: none  Barriers to discharge: None    Assessment:     Lee Quintanilla is a 49 y.o. male admitted with a medical diagnosis of Lumbar radiculopathy.  He presents with the following impairments/functional limitations: weakness, impaired endurance, impaired self care skills, impaired functional mobility, gait instability, pain, orthopedic precautions . Awake, alert, supine in bed.  Agreed to participate in therapy.  Reports 8/10 pain but anticipates change in position may help. Sup > sit CGA.  Sit > stand rw, CGA.  TLSO donned,  spinal precautions reviewed.  Ambulated 250', rw, CGA .  Sat up in chair at bedside, TLSO removed.     Rehab Prognosis: Good; patient would benefit from acute skilled PT services to address these deficits and reach maximum level of function.    Recent Surgery: Procedure(s) (LRB):  LAMINECTOMY, SPINE, LUMBAR, WITH ARTHRODESIS (Bilateral) 2 Days Post-Op    Plan:     During this hospitalization, patient to be seen 6 x/week to address the identified rehab impairments via gait training, therapeutic activities, therapeutic exercises, neuromuscular re-education and progress toward the following goals:    Plan of Care Expires:  08/16/25    Subjective     Chief Complaint: back pain  Patient/Family Comments/goals: to return home  Pain/Comfort:  Pain Rating 1: 8/10  Location - Orientation 1: generalized  Location 1: back  Pain Addressed 1: Pre-medicate for activity, Reposition, Nurse notified      Objective:     Communicated with nurse Delarosa prior to session.  Patient found supine with   upon PT entry to room.     General Precautions: Standard, fall  Orthopedic Precautions: spinal precautions  Braces: TLSO  Respiratory Status: Room air     Functional Mobility:  Bed Mobility:     Supine to Sit: contact guard  assistance  Transfers:     Sit to Stand:  contact guard assistance with rolling walker  Gait: 250', rw, CGA, TLSO in place.       AM-PAC 6 CLICK MOBILITY          Treatment & Education:  Ambulate with rw and assistance for safety.     Patient left up in chair with all lines intact, call button in reach, chair alarm on, and nurse Isaura notified..    GOALS:   Multidisciplinary Problems       Physical Therapy Goals       Not on file              Multidisciplinary Problems (Resolved)          Problem: Physical Therapy    Goal Priority Disciplines Outcome Interventions   Physical Therapy Goal   (Resolved)     PT, PT/OT Met    Description: Goals to be met by: 25     Patient will increase functional independence with mobility by performin. Supine to sit with Modified [place]  2. Sit to stand transfer with Modified [place]  3. Bed to chair transfer with Modified [place] using Rolling Walker  4. Gait  x 250 feet with Modified [place] using Rolling Walker.                          DME Justifications:  No DME recommended requiring DME justifications    Time Tracking:     PT Received On: 25  PT Start Time: 0910     PT Stop Time: 922  PT Total Time (min): 12 min     Billable Minutes: Gait Training 12min    Treatment Type: Treatment  PT/PTA: PTA     Number of PTA visits since last PT visit: 2025

## 2025-07-17 NOTE — DISCHARGE SUMMARY
[facility]  Discharge Summary      Patient Name: Lee Quintanilla  MRN: [id number]  GAIL: [id number]  Patient Class: IP- Inpatient  Admission Date: 7/15/2025  Hospital Length of Stay: 1 days  Discharge Date and Time: 7/17/2025 12:47 PM  Attending Physician: Gaye Buckley MD   Discharging Provider: Eulalia Whitman NP  Primary Care Provider: Riya Vidales NP    Primary Care Team: Networked reference to record PCT     HPI:   49 year old with a past medical history of Asthma, CAD, Hypertension, HLD, CKD st. III, MI, Seizures, OLEG, lumbar spondylosis, lumbar radiculopathy, foraminal stenosis of the lumbar region, s/p lumbar spinal fusion, Retrolisthesis of vertebrae, who is admitted to Hospital Medicine Service s/p Combined transforaminal lumbar interbody fusion and posterolateral fusion L3-4 level, L3 laminectomy for lumbar stenosis, and Removal of posterior nonsegmental instrumentation L4-5 with Dr. Blanco. Patient given Albumin in OR and blood products. Patient will be admitted for further Medical Management.     Procedure(s) (LRB):  LAMINECTOMY, SPINE, LUMBAR, WITH ARTHRODESIS (Bilateral)      Hospital Course:   Lee Quintanilla 49 y.o. male was closely monitored while in the hospital.  Patient was admitted to Hospital Medicine status post Combined transforaminal lumbar interbody fusion and posterolateral fusion L3-4 level, L3 laminectomy for lumbar stenosis, and Removal of posterior nonsegmental instrumentation L4-5 performed by Dr. Blanco.  Patient was given IV med and blood products in the OR.  Patient became hypotensive with a systolic blood pressure in the 90s.  PCA was stopped.  Washburn catheter was discontinued.  CHF medications were placed on hold, and patient was started on midodrine.  Patient was given a 500 cc IV bolus with significant response.  Chest x-ray unremarkable with normal cardiac silhouette.  Echocardiogram showed grade 1 diastolic  function with a 40-45% ejection fraction which is improved from previous echocardiogram.  Patient was admitted to Hospital Medicine for overnight monitoring, pain control, and IV hydration.  Blood pressure was monitored closely inpatient remained stable overnight.  Patient had good urinary output status post Washburn removal.  Patient worked with PT/OT during hospitalization and low intensity therapy was recommended.  Home health orders were placed.  Case management following for discharge planning.    Patient seen and examined on day of discharge.  Patient in no acute distress.  Chart reviewed.  CHF medications resumed and blood pressure remained stable.  Patient advised to keep a log of his blood pressure in the morning before and after taking his medications.  Patient is to follow with primary care provider.  Patient has a appointment with Cardiology after discharge that was prescheduled.  Prescription for narcotic pain management was given to patient by surgeon.  Patient is safely discharged home with home health.     Goals of Care Treatment Preferences:  Code Status: Full Code         Consults:     Assessment & Plan  Lumbar radiculopathy  Lumbar spondylosis  Foraminal stenosis of lumbar region  s/p Combined transforaminal lumbar interbody fusion and posterolateral fusion L3-4 level, L3 laminectomy for lumbar stenosis, and Removal of posterior nonsegmental instrumentation L4-5 with Dr. Blanco.    Pca pain pump- stopped, transition to oral pain control  Washburn cath removed  Hold ketoralac due to CKD 3  Dr. Blanco following.   PT/OT:  Low intensity therapy    s/p Combined transforaminal lumbar interbody fusion and posterolateral fusion L3-4 level, L3 laminectomy for lumbar stenosis, and Removal of posterior nonsegmental instrumentation L4-5 with Dr. Blanco.    Pca pain pump- stopped, transition to oral pain control  Washburn cath removed  Hold ketoralac due to CKD 3  Dr. Blanco following.   PT/OT    s/p Combined  "transforaminal lumbar interbody fusion and posterolateral fusion L3-4 level, L3 laminectomy for lumbar stenosis, and Removal of posterior nonsegmental instrumentation L4-5 with Dr. Blanco.        Hypertension  Patient's blood pressure range in the last 24 hours was: BP  Min: 104/51  Max: 131/69.The patient's inpatient anti-hypertensive regimen is listed below:  Current Antihypertensives     CHF medications resumed prior to discharge  Plan  - BP is controlled, no changes needed to their regimen    Severe obesity with body mass index (BMI) of 35.0 to 39.9 with serious comorbidity  Body mass index is 38.53 kg/m². Morbid obesity complicates all aspects of disease management from diagnostic modalities to treatment. Weight loss encouraged and health benefits explained to patient.     Stage 3a chronic kidney disease  Creatine stable for now. BMP reviewed- noted Estimated Creatinine Clearance: 76.1 mL/min (based on SCr of 1.4 mg/dL). according to latest data. Based on current GFR, CKD stage is stage 3 - GFR 30-59.  Monitor UOP and serial BMP and adjust therapy as needed. Renally dose meds. Avoid nephrotoxic medications and procedures.  Continue IV hydration    Stop Ketoralac.   Hypotension  Antihypertensive resumed  Give Midodrine 10 mg x1 dose  Midrodrine 10 mg TID prn SBP < 100   ml bolus  Hold PCA pump.   Improved prior to discharge    Chronic systolic (congestive) heart failure  Patient has Systolic (HFrEF) heart failure that is Chronic. On presentation their CHF was well compensated. Most recent BNP and echo results are listed below.  No results for input(s): "BNP" in the last 72 hours.  Latest ECHO  Results for orders placed during the hospital encounter of 12/05/22    Echo Saline Bubble? No    Interpretation Summary  · The left ventricle is normal in size with mild concentric hypertrophy and moderately decreased systolic function.  · The estimated ejection fraction is 38%.  · Grade I left ventricular diastolic " dysfunction.  · There is moderate left ventricular global hypokinesis.  · Normal right ventricular size with normal right ventricular systolic function.  · Mild mitral regurgitation.  · Normal central venous pressure (3 mmHg).    Current Heart Failure Medications       Plan  - Monitor strict I&Os and daily weights.    - Place on telemetry  - Low sodium diet  - Place on fluid restriction of 1.5 L.   - Cardiology has not been consulted    Echo ordered  Hold entresto, furosemide, metoprolol due to hypotension.   Start on Midodrine 10 mg TID for SBP < 100  Chest xray to r/o fluid overload  Stop IV fluids    Final Active Diagnoses:    Diagnosis Date Noted POA    PRINCIPAL PROBLEM:  Lumbar radiculopathy [M54.16] 03/10/2016 Yes    Hypotension [I95.9] 07/16/2025 No    Chronic systolic (congestive) heart failure [I50.22] 07/16/2025 Yes    Foraminal stenosis of lumbar region [M48.061] 07/15/2025 Yes    Hypertension [I10] 06/10/2020 Yes    Lumbar spondylosis [M47.816] 12/06/2016 Yes    Stage 3a chronic kidney disease [N18.31] 04/25/2016 Yes    Severe obesity with body mass index (BMI) of 35.0 to 39.9 with serious comorbidity [E66.01] 01/22/2016 Yes      Problems Resolved During this Admission:       Discharged Condition: stable    Disposition: Home or Self Care    Follow Up:   Contact information for follow-up providers       Mateusz Blanco MD. Go on 8/1/2025.    Specialties: Orthopedic Surgery, Surgery  Why: 8/1/2025 10:45 AM Rudolph Aguirre PA-C  Contact information:  [address]  [address]  [place] [address]  [phone]               Riya Vidales NP Follow up.    Specialty: Family Medicine  Why: As needed  Contact information:  [address]  [address]  [place] [address]  [phone]                       Contact information for after-discharge care       Home Medical Care       [facility] .    Services: Home Health Services, Home Rehabilitation  Contact information:  [address] [place] [address]  [address]  [facility] [address]  [phone]                                 Patient Instructions:      Ambulatory referral/consult to Home Health   Standing Status: Future   Referral Priority: Routine Referral Type: Home Health   Referral Reason: Specialty Services Required   Requested Specialty: Home Health Services   Number of Visits Requested: 1       Significant Diagnostic Studies: Labs: CMP   Recent Labs   Lab 07/16/25  0517 07/17/25  0523    136   K 4.2 3.9    105   CO2 24 24    98   BUN 13 10   CREATININE 1.7* 1.4   CALCIUM 8.2* 8.2*   ANIONGAP 8 7*   , CBC   Recent Labs   Lab 07/16/25  0517 07/17/25  0523   WBC 9.27 8.87   HGB 10.9* 10.8*   HCT 36.3* 35.1*    168   , and All labs within the past 24 hours have been reviewed    Pending Diagnostic Studies:       None           Medications:  Reconciled Home Medications:      Medication List        START taking these medications      diphenhydrAMINE 50 MG capsule  Commonly known as: BENADRYL  Take 1 capsule (50 mg total) by mouth every 6 (six) hours as needed for Itching.     docusate sodium 100 MG capsule  Commonly known as: COLACE  Take 1 capsule (100 mg total) by mouth once daily.  Start taking on: July 18, 2025     midodrine 2.5 MG Tab  Commonly known as: PROAMATINE  Take 1 tablet (2.5 mg total) by mouth 2 (two) times daily as needed (SBP <100).     mupirocin 2 % ointment  Commonly known as: BACTROBAN  by Nasal route 2 (two) times daily.     oxyCODONE-acetaminophen  mg per tablet  Commonly known as: PERCOCET  Take 1 tablet by mouth every 4 (four) hours as needed for Pain.            CONTINUE taking these medications      anastrozole 1 mg Tab  Commonly known as: ARIMIDEX  Take 1 mg by mouth every 7 days.     busPIRone 15 MG tablet  Commonly known as: BUSPAR  Take 1 tablet (15 mg total) by mouth 3 (three) times daily.     clopidogreL 75 mg tablet  Commonly known as: PLAVIX  Take 75 mg by mouth once daily.     cyclobenzaprine 10  MG tablet  Commonly known as: FLEXERIL  Take 1 tablet (10 mg total) by mouth 2 (two) times daily.     ENTRESTO 49-51 mg per tablet  Generic drug: sacubitriL-valsartan  Take 1 tablet by mouth 2 (two) times daily.     EScitalopram oxalate 20 MG tablet  Commonly known as: LEXAPRO  Take 1 tablet (20 mg total) by mouth once daily.     fluticasone propionate 50 mcg/actuation nasal spray  Commonly known as: FLONASE  2 sprays (100 mcg total) by Each Nostril route once daily.     furosemide 20 MG tablet  Commonly known as: LASIX  Take 1 tablet by mouth every morning.     gabapentin 300 MG capsule  Commonly known as: NEURONTIN  Take 1 capsule (300 mg total) by mouth 3 (three) times daily.     HYDROcodone-acetaminophen 5-325 mg per tablet  Commonly known as: NORCO  Take 1 tablet by mouth every 12 (twelve) hours as needed for Pain.     metoprolol tartrate 50 MG tablet  Commonly known as: LOPRESSOR  Take 1 tablet by mouth 2 (two) times daily.     ondansetron 4 MG tablet  Commonly known as: ZOFRAN  Take 1 tablet (4 mg total) by mouth every 6 (six) hours as needed for Nausea.     pantoprazole 40 MG tablet  Commonly known as: PROTONIX  Take 1 tablet (40 mg total) by mouth once daily.     rosuvastatin 40 MG Tab  Commonly known as: CRESTOR  Take 40 mg by mouth every evening.     * tadalafiL 5 MG tablet  Commonly known as: CIALIS  Take 1 tablet by mouth once daily.     testosterone cypionate 200 mg/mL Kit  Inject 1 mL into the muscle every 7 days.     traZODone 100 MG tablet  Commonly known as: DESYREL  Take 1 tablet (100 mg total) by mouth every evening.     vitamin D 1000 units Tab  Commonly known as: VITAMIN D3  Take 2,000 Units by mouth once daily.     WEGOVY 1 mg/0.5 mL Pnij  Generic drug: semaglutide (weight loss)  Inject 1 mg into the skin every 7 days.           * This list has 1 medication(s) that are the same as other medications prescribed for you. Read the directions carefully, and ask your doctor or other care provider to  review them with you.                ASK your doctor about these medications      enoxaparin 100 mg/mL Syrg  Commonly known as: LOVENOX  Inject 1 mg/kg into the skin 2 (two) times daily.     hydrOXYzine 50 MG tablet  Commonly known as: ATARAX  Take 1 tablet by mouth 2 (two) times daily.     * tadalafiL 10 MG tablet  Commonly known as: CIALIS  Take 10 mg by mouth daily as needed.           * This list has 1 medication(s) that are the same as other medications prescribed for you. Read the directions carefully, and ask your doctor or other care provider to review them with you.                  Indwelling Lines/Drains at time of discharge:   Lines/Drains/Airways       None                       Time spent on the discharge of patient: 35 minutes         Eulalia Whitman NP  Department of Hospital Medicine  [facility]/Surg

## 2025-07-17 NOTE — ASSESSMENT & PLAN NOTE
Antihypertensive resumed  Give Midodrine 10 mg x1 dose  Midrodrine 10 mg TID prn SBP < 100   ml bolus  Hold PCA pump.   Improved prior to discharge

## 2025-07-18 PROCEDURE — G0180 MD CERTIFICATION HHA PATIENT: HCPCS | Mod: ,,, | Performed by: ORTHOPAEDIC SURGERY

## 2025-07-23 DIAGNOSIS — M47.816 LUMBAR SPONDYLOSIS: ICD-10-CM

## 2025-07-23 DIAGNOSIS — Z98.1 S/P LUMBAR SPINAL FUSION: Primary | ICD-10-CM

## 2025-07-23 DIAGNOSIS — M54.16 LUMBAR RADICULOPATHY: ICD-10-CM

## 2025-07-23 RX ORDER — OXYCODONE AND ACETAMINOPHEN 10; 325 MG/1; MG/1
1 TABLET ORAL EVERY 6 HOURS PRN
Qty: 28 TABLET | Refills: 0 | Status: SHIPPED | OUTPATIENT
Start: 2025-07-24

## 2025-07-23 NOTE — TELEPHONE ENCOUNTER
----- Message from Med Assistant Lizet sent at 7/23/2025  9:52 AM CDT -----  Regarding: Rx refill  Pain meds to [facility] PO Lumbar surgery

## 2025-07-24 DIAGNOSIS — F41.1 GAD (GENERALIZED ANXIETY DISORDER): ICD-10-CM

## 2025-07-24 RX ORDER — BUSPIRONE HYDROCHLORIDE 15 MG/1
15 TABLET ORAL 3 TIMES DAILY
Qty: 90 TABLET | Refills: 0 | Status: SHIPPED | OUTPATIENT
Start: 2025-07-24 | End: 2026-07-24

## 2025-07-30 ENCOUNTER — EXTERNAL HOME HEALTH (OUTPATIENT)
Dept: HOME HEALTH SERVICES | Facility: HOSPITAL | Age: 50
End: 2025-07-30
Payer: MEDICAID

## 2025-07-30 DIAGNOSIS — Z98.1 S/P LUMBAR SPINAL FUSION: ICD-10-CM

## 2025-07-30 RX ORDER — OXYCODONE AND ACETAMINOPHEN 10; 325 MG/1; MG/1
1 TABLET ORAL EVERY 6 HOURS PRN
Qty: 28 TABLET | Refills: 0 | Status: CANCELLED | OUTPATIENT
Start: 2025-07-30

## 2025-07-30 RX ORDER — OXYCODONE AND ACETAMINOPHEN 7.5; 325 MG/1; MG/1
1 TABLET ORAL EVERY 6 HOURS PRN
Qty: 28 TABLET | Refills: 0 | Status: SHIPPED | OUTPATIENT
Start: 2025-07-30

## 2025-08-01 ENCOUNTER — HOSPITAL ENCOUNTER (OUTPATIENT)
Dept: RADIOLOGY | Facility: HOSPITAL | Age: 50
Discharge: HOME | End: 2025-08-01
Payer: MEDICAID

## 2025-08-01 ENCOUNTER — OFFICE VISIT (OUTPATIENT)
Dept: ORTHOPEDICS | Facility: CLINIC | Age: 50
End: 2025-08-01
Payer: MEDICAID

## 2025-08-01 VITALS — BODY MASS INDEX: 38.62 KG/M2 | HEIGHT: 67 IN | WEIGHT: 246.06 LBS

## 2025-08-01 DIAGNOSIS — Z98.1 S/P LUMBAR SPINAL FUSION: Primary | ICD-10-CM

## 2025-08-01 PROCEDURE — 72100 X-RAY EXAM L-S SPINE 2/3 VWS: CPT | Mod: TC,PN

## 2025-08-01 PROCEDURE — 99214 OFFICE O/P EST MOD 30 MIN: CPT | Mod: PBBFAC,25,PN

## 2025-08-01 PROCEDURE — 72100 X-RAY EXAM L-S SPINE 2/3 VWS: CPT | Mod: 26,,, | Performed by: RADIOLOGY

## 2025-08-01 PROCEDURE — 99999 PR PBB SHADOW E&M-EST. PATIENT-LVL IV: CPT | Mod: PBBFAC,,,

## 2025-08-01 NOTE — PROGRESS NOTES
[facility] ORTHOPEDICS  [address]  [place] [id number]  Phone: [phone]   [phone]    Patient's PCP: Riya Vidales NP  Referring Provider: No ref. provider found    Subjective:     Chief Complaint:   Chief Complaint   Patient presents with    Lumbar Spine - Post-op Evaluation     XR//STAPLES// L3-4 TLIF/PLF 7/15/25. States he is getting better every day.          Past Medical History:   Diagnosis Date    ADHD (attention deficit hyperactivity disorder)     Arthritis     Asthma     as child only    Back pain     Coronary artery disease     Degeneration of lumbar intervertebral disc 05/2016    Depression     Digestive disorder     Elevated PSA     Headache     Hyperlipidemia     Hypertension     Kidney damage     stage 3 ; d/t aleve abuse    Kidney stone     Myocardial infarction     Neck pain     Numbness and tingling in hands     Numbness and tingling of both legs     Osteonecrosis     Bilat Femur    Seizures     from inapsine 2002    Sleep apnea     no cpap    Wears glasses     CONTACS       Past Surgical History:   Procedure Laterality Date    BACK SURGERY  2016    back surgery    BONE GRAFT N/A 11/05/2020    Procedure: BONE GRAFT;  Surgeon: Mateusz Blanco MD;  Location: [facility];  Service: Orthopedics;  Laterality: N/A;    CARDIAC SURGERY  01/06/2020    CABG X 7    CORONARY ARTERY BYPASS GRAFT      7 vessels    FLEXIBLE SIGMOIDOSCOPY N/A 07/24/2024    Procedure: SIGMOIDOSCOPY, FLEXIBLE;  Surgeon: Latesha Victoria MD;  Location: [facility];  Service: Endoscopy;  Laterality: N/A;    HERNIA REPAIR Bilateral 11/22/2017    HIP ARTHROPLASTY Left 09/23/2024    Procedure: ARTHROPLASTY, HIP;  Surgeon: Lázaro Jaimes MD;  Location: [facility] OR;  Service: Orthopedics;  Laterality: Left;  Sincere    HIP REPLACEMENT ARTHROPLASTY Right 02/17/2025    Procedure: ARTHROPLASTY, HIP REPLACEMENT;  Surgeon: Lázaro Jaimes MD;  Location: [facility] OR;  Service: Orthopedics;  Laterality: Right;  Sincere    JOINT  REPLACEMENT Bilateral     HIPS    LITHOTRIPSY      LUMBAR LAMINECTOMY WITH FUSION Bilateral 11/05/2020    Procedure: LAMINECTOMY, SPINE, LUMBAR, WITH FUSION;  Surgeon: Mateusz Blanco MD;  Location: [facility] OR;  Service: Orthopedics;  Laterality: Bilateral;  MEDTRONIC  NTI  L-3    LUMBAR LAMINECTOMY WITH FUSION Bilateral 7/15/2025    Procedure: LAMINECTOMY, SPINE, LUMBAR, WITH ARTHRODESIS;  Surgeon: Mateusz Blanco MD;  Location: [facility] OR;  Service: Orthopedics;  Laterality: Bilateral;    PILONIDAL CYST DRAINAGE      removal of abcess      scrotal    REMOVAL OF HARDWARE FROM SPINE Bilateral 11/05/2020    Procedure: REMOVAL, HARDWARE, SPINE;  Surgeon: Mateusz Blanco MD;  Location: [facility] OR;  Service: Orthopedics;  Laterality: Bilateral;  L 4-5    SURGICAL REMOVAL OF PILONIDAL CYST N/A 04/15/2024    Procedure: EXCISION, PILONIDAL CYST;  Surgeon: Jayden Phillips III, MD;  Location: [facility] OR;  Service: General;  Laterality: N/A;  sacral area    TYMPANOSTOMY TUBE PLACEMENT         Current Outpatient Medications   Medication Sig    anastrozole (ARIMIDEX) 1 mg Tab Take 1 mg by mouth every 7 days.    busPIRone (BUSPAR) 15 MG tablet Take 1 tablet (15 mg total) by mouth 3 (three) times daily.    clopidogreL (PLAVIX) 75 mg tablet Take 75 mg by mouth once daily.    cyclobenzaprine (FLEXERIL) 10 MG tablet Take 1 tablet (10 mg total) by mouth 2 (two) times daily.    diphenhydrAMINE (BENADRYL) 50 MG capsule Take 1 capsule (50 mg total) by mouth every 6 (six) hours as needed for Itching.    docusate sodium (COLACE) 100 MG capsule Take 1 capsule (100 mg total) by mouth once daily.    enoxaparin (LOVENOX) 100 mg/mL Syrg Inject 1 mg/kg into the skin 2 (two) times daily.    ENTRESTO 49-51 mg per tablet Take 1 tablet by mouth 2 (two) times daily.    EScitalopram oxalate (LEXAPRO) 20 MG tablet Take 1 tablet (20 mg total) by mouth once daily.    furosemide (LASIX) 20 MG tablet Take 1 tablet by mouth every morning.    gabapentin  (NEURONTIN) 300 MG capsule Take 1 capsule (300 mg total) by mouth 3 (three) times daily.    metoprolol tartrate (LOPRESSOR) 50 MG tablet Take 1 tablet by mouth 2 (two) times daily.    midodrine (PROAMATINE) 2.5 MG Tab Take 1 tablet (2.5 mg total) by mouth 2 (two) times daily as needed (SBP <100).    mupirocin (BACTROBAN) 2 % ointment by Nasal route 2 (two) times daily.    ondansetron (ZOFRAN) 4 MG tablet Take 1 tablet (4 mg total) by mouth every 6 (six) hours as needed for Nausea.    oxyCODONE-acetaminophen (PERCOCET) 7.5-325 mg per tablet Take 1 tablet by mouth every 6 (six) hours as needed for Pain.    pantoprazole (PROTONIX) 40 MG tablet Take 1 tablet (40 mg total) by mouth once daily.    rosuvastatin (CRESTOR) 40 MG Tab Take 40 mg by mouth every evening.    semaglutide, weight loss, (WEGOVY) 1 mg/0.5 mL PnIj Inject 1 mg into the skin every 7 days.    tadalafiL (CIALIS) 5 MG tablet Take 1 tablet by mouth once daily.    testosterone cypionate 200 mg/mL Kit Inject 1 mL into the muscle every 7 days.    traZODone (DESYREL) 100 MG tablet Take 1 tablet (100 mg total) by mouth every evening.    vitamin D (VITAMIN D3) 1000 units Tab Take 2,000 Units by mouth once daily.    fluticasone propionate (FLONASE) 50 mcg/actuation nasal spray 2 sprays (100 mcg total) by Each Nostril route once daily.    hydrOXYzine (ATARAX) 50 MG tablet Take 1 tablet by mouth 2 (two) times daily. (Patient not taking: Reported on 6/17/2025)    tadalafiL (CIALIS) 10 MG tablet Take 10 mg by mouth daily as needed. (Patient not taking: Reported on 8/1/2025)     No current facility-administered medications for this visit.       Review of patient's allergies indicates:   Allergen Reactions    Inapsine [droperidol] Anaphylaxis     seizures    Bactrim [sulfamethoxazole-trimethoprim] Rash     Dry red rash all over when in the sun    Effexor [venlafaxine] Other (See Comments)     Increased anxiety    Fluconazole Rash     Pruritis      Pcn [penicillins]  Other (See Comments)     UNSURE OF REACTION--FROM SCRATCH TEST       Family History   Problem Relation Name Age of Onset    Heart disease Mother      Migraines Mother      Hypertension Mother      Early death Father accident     Heart disease Father accident     Diabetes Maternal Aunt      Diabetes Maternal Uncle      Diabetes Maternal Grandfather         Social History[1]    Prior to meeting with the patient I reviewed the medical chart in [facility]. This included reviewing the previous progress notes from our office, review of the patient's last appointment with their primary care provider, review of any visits to the emergency room, and review of any pain management appointments or procedures.    History of present illness: 49 y.o. male  2-1/2 weeks status post L3-L4 TLIF/PLIF performed on July 15, 2025.  Here today for staple removal and wound check.  He states that he is doing well with manageable pain at today's visit.  He states that his low back pain continues to improve each today.       Review of Systems:    Constitutional: Negative for chills, fever and weight loss.  HENT: Negative for congestion.    Eyes: Negative for discharge and redness.  Respiratory: Negative for cough and shortness of breath.    Cardiovascular: Negative for chest pain.  Gastrointestinal: Negative for nausea and vomiting.  Musculoskeletal: See HPI.  Skin: Negative for rash.  Neurological: Negative for headaches.  Endo/Heme/Allergies: Does not bruise/bleed easily.  Psychiatric/Behavioral: The patient is not nervous/anxious.    All other systems reviewed and are negative.       Objective:     Physical Examination:    Vital Signs: VITALS@    Body mass index is 38.53 kg/m².    This a well-developed, well nourished patient in no acute distress.  They are alert and oriented and cooperative to examination.    Lumbar spine exam:  Incision to the lumbar spine consistent with prior lumbar fusion without evidence of wound failure or dehiscence.  No  erythema or ecchymosis.  No signs or symptoms of infection.  No drainage.  Expected postoperative limited range of motion lumbar spine with flexion-extension, rotation, lateral bending.  Tenderness to paraspinal muscles bilaterally.  Negative Homans.  Negative straight leg raise test.  Distally neurovascularly intact.  He is weight-bearing in his bilateral lower extremities and ambulates without an assistive device.  He ambulates with a nonantalgic gait.      Pertinent New Results:      XRAY Report / Interpretation:   Two views of the lumbar spine taken in clinic today reveal hardware consistent with L3-L5 fusion and interbody devices in proper alignment without evidence of hardware failure or loosening.  Visualized soft tissues unremarkable.    Procedure/s:          Assessment:     1. S/P lumbar spinal fusion      Plan:    S/P lumbar spinal fusion  -     X-Ray Lumbar Spine Ap And Lateral        Follow up in about 4 weeks (around 8/29/2025).    Stable 2-1/2 weeks status post L3-L4 TLIF/PLIF performed on July 15, 2025.  He is doing very well postoperatively.  I reassured him that his low back discomfort should continue to improve with time.  He is free to perform his regular activities as tolerated with careful emphasis on lifting greater than about 15 lb when bending at the waist.  I would like him to follow up in about 4 weeks to reassess his postoperative progress.    Wound Care review: on approximately day 3 after surgery, or 72 hours after your initial surgery date, you may begin cleaning your wounds (AS LONG AS THERE IS NO DRAINAGE PRESENT).      Gentle cleansing with a mild soap and water is recommended. Pat the area dry, and then cover the wound with a clean, sterile dressing.  Do this at least once daily.  Do not apply any ointments creams or lotions to the wounds until told to do so.  Avoid submerging or immersing the wounds in water such as a sink, tub, or pool for at least 1 month after surgery.    The  patient and I had a thorough discussion today.  We discussed the working diagnosis as well as several other potential alternative diagnoses.  We discussed treatment options, both conservative and surgical.  Conservative treatment options would include things such as activity modifications, workplace modifications, a period of rest, oral versus topical over the counter and oral versus topical prescription anti-inflammatory medications, physical therapy / occupational therapy, splinting / bracing, immobilization, corticosteroid injections, and others.      Rudolph Aguirre PA-C      EMR Statement:  Please note that portions of this patient encounter record were imported from the patients electronic medical record and that others were dictated using voice recognition software. For these reasons grammatical errors, nonsensical language, and apparently contradictory statements may be present.  These should be disregarded or interpreted with respect to the context of the document.       [1]   Social History  Socioeconomic History    Marital status:    Occupational History    Occupation: disability   Tobacco Use    Smoking status: Former     Current packs/day: 0.00     Types: Cigarettes     Quit date: 2016     Years since quittin.5     Passive exposure: Past    Smokeless tobacco: Former     Quit date: 2016    Tobacco comments:     Occasional  vaping   Substance and Sexual Activity    Alcohol use: Not Currently    Drug use: No    Sexual activity: Yes     Partners: Female     Social Drivers of Health     Financial Resource Strain: Low Risk  (7/15/2025)    Overall Financial Resource Strain (CARDIA)     Difficulty of Paying Living Expenses: Not hard at all   Food Insecurity: No Food Insecurity (7/15/2025)    Hunger Vital Sign     Worried About Running Out of Food in the Last Year: Never true     Ran Out of Food in the Last Year: Never true   Transportation Needs: No Transportation Needs (7/15/2025)     PRAPARE - Transportation     Lack of Transportation (Medical): No     Lack of Transportation (Non-Medical): No   Physical Activity: Insufficiently Active (3/18/2024)    Exercise Vital Sign     Days of Exercise per Week: 2 days     Minutes of Exercise per Session: 60 min   Stress: No Stress Concern Present (7/15/2025)    [employer] - Occupational Stress Questionnaire     Feeling of Stress : Not at all   Housing Stability: Low Risk  (7/15/2025)    Housing Stability Vital Sign     Unable to Pay for Housing in the Last Year: No     Number of Times Moved in the Last Year: 0     Homeless in the Last Year: No

## 2025-08-06 DIAGNOSIS — Z98.1 S/P LUMBAR SPINAL FUSION: ICD-10-CM

## 2025-08-06 RX ORDER — OXYCODONE AND ACETAMINOPHEN 7.5; 325 MG/1; MG/1
1 TABLET ORAL EVERY 6 HOURS PRN
Qty: 28 TABLET | Refills: 0 | Status: SHIPPED | OUTPATIENT
Start: 2025-08-06

## 2025-08-13 DIAGNOSIS — Z96.641 STATUS POST TOTAL HIP REPLACEMENT, RIGHT: ICD-10-CM

## 2025-08-13 DIAGNOSIS — Z98.1 S/P LUMBAR SPINAL FUSION: ICD-10-CM

## 2025-08-13 RX ORDER — OXYCODONE AND ACETAMINOPHEN 7.5; 325 MG/1; MG/1
1 TABLET ORAL EVERY 6 HOURS PRN
Qty: 28 TABLET | Refills: 0 | Status: SHIPPED | OUTPATIENT
Start: 2025-08-13

## 2025-08-14 RX ORDER — GABAPENTIN 300 MG/1
300 CAPSULE ORAL 3 TIMES DAILY
Qty: 90 CAPSULE | Refills: 2 | Status: SHIPPED | OUTPATIENT
Start: 2025-08-14

## 2025-08-14 RX ORDER — CYCLOBENZAPRINE HCL 10 MG
10 TABLET ORAL 2 TIMES DAILY
Qty: 60 TABLET | Refills: 3 | Status: SHIPPED | OUTPATIENT
Start: 2025-08-14

## 2025-08-20 DIAGNOSIS — Z98.1 S/P LUMBAR SPINAL FUSION: ICD-10-CM

## 2025-08-20 RX ORDER — OXYCODONE AND ACETAMINOPHEN 7.5; 325 MG/1; MG/1
1 TABLET ORAL EVERY 8 HOURS PRN
Qty: 21 TABLET | Refills: 0 | Status: SHIPPED | OUTPATIENT
Start: 2025-08-20

## 2025-08-20 RX ORDER — OXYCODONE AND ACETAMINOPHEN 7.5; 325 MG/1; MG/1
1 TABLET ORAL EVERY 6 HOURS PRN
Qty: 28 TABLET | Refills: 0 | Status: CANCELLED | OUTPATIENT
Start: 2025-08-20

## 2025-08-27 DIAGNOSIS — Z98.1 S/P LUMBAR SPINAL FUSION: ICD-10-CM

## 2025-08-27 RX ORDER — OXYCODONE AND ACETAMINOPHEN 7.5; 325 MG/1; MG/1
1 TABLET ORAL EVERY 8 HOURS PRN
Qty: 21 TABLET | Refills: 0 | Status: SHIPPED | OUTPATIENT
Start: 2025-08-27

## 2025-09-03 ENCOUNTER — HOSPITAL ENCOUNTER (OUTPATIENT)
Dept: RADIOLOGY | Facility: HOSPITAL | Age: 50
Discharge: HOME | End: 2025-09-03
Attending: ORTHOPAEDIC SURGERY
Payer: MEDICAID

## 2025-09-03 DIAGNOSIS — Z98.1 S/P LUMBAR SPINAL FUSION: Primary | ICD-10-CM

## 2025-09-03 PROCEDURE — 72100 X-RAY EXAM L-S SPINE 2/3 VWS: CPT | Mod: TC,PN

## 2025-09-03 PROCEDURE — 72100 X-RAY EXAM L-S SPINE 2/3 VWS: CPT | Mod: 26,,, | Performed by: RADIOLOGY

## 2025-09-03 RX ORDER — OXYCODONE AND ACETAMINOPHEN 5; 325 MG/1; MG/1
1 TABLET ORAL EVERY 8 HOURS PRN
Qty: 21 TABLET | Refills: 0 | Status: SHIPPED | OUTPATIENT
Start: 2025-09-03

## (undated) DEVICE — DRAPE INVISISHIELD TOWEL SMALL

## (undated) DEVICE — NDL PNEUMO INSUFFLATI 120MM

## (undated) DEVICE — SYR 50CC LL

## (undated) DEVICE — SUT VICRYL ANTI 2-0 27IN

## (undated) DEVICE — EVACUATOR WOUND BULB 100CC

## (undated) DEVICE — SOL 9P NACL IRR PIC IL

## (undated) DEVICE — SYR ONLY LUER LOCK 20CC

## (undated) DEVICE — TAPE ADH MEDIPORE 4 X 10YDS

## (undated) DEVICE — BNDG COFLEX FOAM LF2 ST 6X5YD

## (undated) DEVICE — SUT ETHILON 3-0 PS2 18 BLK

## (undated) DEVICE — GLOVE SENSICARE PI GRN 8.5

## (undated) DEVICE — GLOVE SURG ULTRA TOUCH 8

## (undated) DEVICE — DRAPE STERI INSTRUMENT 1018

## (undated) DEVICE — SCRUB HIBICLENS 4% CHG 4OZ

## (undated) DEVICE — KIT EVACUATOR 3-SPRING 1/8 DRN

## (undated) DEVICE — PINNACLE HIP SOLUTIONS ALTRX POLYETHYLENE ACETABULAR LINER +4 10 DEGREE 36MM ID 54MM OD
Type: IMPLANTABLE DEVICE | Site: HIP | Status: NON-FUNCTIONAL
Brand: PINNACLE ALTRX
Removed: 2025-02-17

## (undated) DEVICE — PILLOW HIP ABDUCTION MED

## (undated) DEVICE — DRESSING GAUZE OIL EMUL 3X8

## (undated) DEVICE — APPLICATOR CHLORAPREP ORN 26ML

## (undated) DEVICE — SUT BONE WAX 2.5 GRMS 12/BX

## (undated) DEVICE — ALCOHOL RUBBING 70% ISO 4OZ

## (undated) DEVICE — GLOVE SENSICARE PI ALOE 6

## (undated) DEVICE — STRAP OR TABLE 5IN X 72IN

## (undated) DEVICE — SEALER BIPOLAR TISSUE 6.0

## (undated) DEVICE — INTERPULSE SET

## (undated) DEVICE — KIT WING PAD POSITIONING

## (undated) DEVICE — COVER TABLE 44X90 STERILE

## (undated) DEVICE — SEE MEDLINE ITEM 146292

## (undated) DEVICE — ELECTRODE REM PLYHSV RETURN 9

## (undated) DEVICE — DRAPE T TRNSVRS LAP 102X78X121

## (undated) DEVICE — SPHERE NDI PASSIVE

## (undated) DEVICE — TAPE MEDIPORE 3 X 10YD

## (undated) DEVICE — SOL NACL IRR 3000ML

## (undated) DEVICE — SET IRR URLGY 2LINE UNIV SPIKE

## (undated) DEVICE — PIN STEINMANN TROCAR 7/64X9IN
Type: IMPLANTABLE DEVICE | Site: HIP | Status: NON-FUNCTIONAL
Removed: 2025-02-17

## (undated) DEVICE — TOWEL OR DISP STRL BLUE 4/PK

## (undated) DEVICE — PIN STEINMANN TROCAR 7/64X9IN
Type: IMPLANTABLE DEVICE | Site: HIP | Status: NON-FUNCTIONAL
Removed: 2024-09-23

## (undated) DEVICE — HANDPIECE INTERPULSE SET

## (undated) DEVICE — SPONGE LAP 18X18 PREWASHED

## (undated) DEVICE — TROCAR Z THREAD 12X100 SHIELD

## (undated) DEVICE — PAD OR EGGCRATE BLUE 2X20X72

## (undated) DEVICE — COVER TIP CURVED SCISSORS XI

## (undated) DEVICE — NDL SAFETY 25G X 1.5 ECLIPSE

## (undated) DEVICE — SUT STRATAFIX SPIRAL VIOLET

## (undated) DEVICE — DRAPE C-ARMOR EQUIPMENT COVER

## (undated) DEVICE — DRAPE HIP PCH 112X137X89IN

## (undated) DEVICE — SUT STRATAFIX PDO 2-0 SH

## (undated) DEVICE — NDL MAYO 2

## (undated) DEVICE — SUT MCRYL PLUS 4-0 PS2 27IN

## (undated) DEVICE — PACK SIRUS BASIC V SURG STRL

## (undated) DEVICE — PACK CUSTOM LUMBAR LAM SLI

## (undated) DEVICE — SUT CTD VICRYL 2-0 UND BR O

## (undated) DEVICE — TUBING SUC UNIV W/CONN 12FT

## (undated) DEVICE — DRAIN SINGLE ROUND 3/16 15F

## (undated) DEVICE — PACK CUSTOM UNIV BASIN SLI

## (undated) DEVICE — SEE MEDLINE ITEM 152622

## (undated) DEVICE — UNDERGLOVES BIOGEL PI SIZE 8.5

## (undated) DEVICE — SEE MEDLINE ITEM 157117

## (undated) DEVICE — CORD BIPOLAR 12 FOOT

## (undated) DEVICE — SLEEVE SCD EXPRESS CALF MEDIUM

## (undated) DEVICE — GLOVE SENSICARE PI ALOE 8.5

## (undated) DEVICE — SUT FIBERWIRE 2 38 IN TAPER

## (undated) DEVICE — Device

## (undated) DEVICE — NDL BOX COUNTER

## (undated) DEVICE — STERILE WATER 1000ML BOTTLE

## (undated) DEVICE — BLADE SURG CARBON STEEL #10

## (undated) DEVICE — SPONGE LAP 4X18 PREWASHED

## (undated) DEVICE — RESERVOIR (FOR C A T S)

## (undated) DEVICE — SEALS

## (undated) DEVICE — SEE MEDLINE ITEM 157116

## (undated) DEVICE — ADHESIVE MASTISOL VIAL 48/BX

## (undated) DEVICE — SOL NACL IRR 1000ML BTL

## (undated) DEVICE — PAD ABDOMINAL STERILE 8X10IN

## (undated) DEVICE — PINNACLE CANCELLOUS BONE SCREW 6.5MM X 25MM
Type: IMPLANTABLE DEVICE | Site: HIP | Status: NON-FUNCTIONAL
Brand: PINNACLE
Removed: 2025-02-17

## (undated) DEVICE — OBTURATOR 8MM BLADELESS

## (undated) DEVICE — APRON DISPOSP PLASTIC 24INX42

## (undated) DEVICE — DRAPE INCISE IOBAN 2 23X33IN

## (undated) DEVICE — CLOSURE SKIN STERI STRIP 1/2X4

## (undated) DEVICE — SEE MEDLINE ITEM 157185

## (undated) DEVICE — DRAPE THREE-QTR REINF 53X77IN

## (undated) DEVICE — SYR 10CC LUER LOCK

## (undated) DEVICE — SOL CLEARIFY VISUALIZATION LAP

## (undated) DEVICE — SUT MONOCRYL 4-0 SH UND MON

## (undated) DEVICE — SOL NS 1000CC

## (undated) DEVICE — BOWL STERILE LARGE 32OZ

## (undated) DEVICE — SET AT1 AUTOTRANSFUSION

## (undated) DEVICE — DRAPE O-ARM STERILE

## (undated) DEVICE — STAPLER SKIN ROTATING HEAD

## (undated) DEVICE — PENCIL SMK EVAC CONNECTOR 10FT

## (undated) DEVICE — DRAIN EVACUATOR 400CC 1/4IN

## (undated) DEVICE — SEE MEDLINE ITEM 152651

## (undated) DEVICE — TRAY GENERAL SURGERY SMH

## (undated) DEVICE — SUT CTD VICRYL 1 UND OS-8

## (undated) DEVICE — PAD ABD 8X10 STERILE

## (undated) DEVICE — SHEET DRAPE MEDIUM

## (undated) DEVICE — TUBING PNEUMO

## (undated) DEVICE — SLEEVE SCD EXPRESS KNEE MEDIUM

## (undated) DEVICE — GLOVE SURG ULTRA TOUCH 6

## (undated) DEVICE — GLOVE BIOGEL PI MICRO INDIC 8

## (undated) DEVICE — SET CYSTO IRRIGATION UNIV SPIK

## (undated) DEVICE — ALCOHOL 70% ISOP RUBBING 4OZ

## (undated) DEVICE — KIT ROBOTIC 3 ARM DA VINCI SI

## (undated) DEVICE — TRAY CATH 1-LYR URIMTR 16FR

## (undated) DEVICE — LINER SUCTION 3000CC

## (undated) DEVICE — SOL IRRI STRL WATER 1000ML

## (undated) DEVICE — SOL NORMAL USPCA 0.9%

## (undated) DEVICE — CRADLE LAMINECTOMY ARM

## (undated) DEVICE — PROBE 100MM PEDICLE SCREW

## (undated) DEVICE — DRAPE ORTH SPLIT 77X108IN

## (undated) DEVICE — GLOVE BIOGEL PI MICRO SZ 7.5

## (undated) DEVICE — SYS LABEL CORRECT MED

## (undated) DEVICE — GLOVE BIOGEL PI ORTHO PRO 8.5

## (undated) DEVICE — SUT CTD VICRYL VIL BR SH 27

## (undated) DEVICE — GLOVE SENSICARE PI GRN 7

## (undated) DEVICE — DRAPE INCISE IOBAN 2 23X17IN

## (undated) DEVICE — SWABSTICK BENZOIN 4 IN

## (undated) DEVICE — DRAPE SURG U SLOT 47X70 LONG

## (undated) DEVICE — DRAPE SPLIT ADHESIVE 77X120IN

## (undated) DEVICE — SEE MEDLINE ITEM 157131

## (undated) DEVICE — UNDERGLOVES BIOGEL PI SZ 6 LF

## (undated) DEVICE — MANIFOLD 4 PORT

## (undated) DEVICE — TUBING ANTICOAGULANT

## (undated) DEVICE — DRESSING TEGADERM 2X2 3/4

## (undated) DEVICE — SOL NACL 0.9% IV INJ 250ML

## (undated) DEVICE — GOWN B1 X-LG X-LONG

## (undated) DEVICE — SPONGE SUPER KERLIX 6X6.75IN

## (undated) DEVICE — ELECTRODE MEGADYNE RETURN DUAL

## (undated) DEVICE — DRAPE C ARM 42 X 120 10/BX

## (undated) DEVICE — BLADE SGTL XTK LNG 25X78.5X1.5

## (undated) DEVICE — TAPE MEDIPORE 4IN X 2YDS

## (undated) DEVICE — SOLN BETADINE SWAB AIDS

## (undated) DEVICE — GLOVE SENSICARE PI ALOE 7

## (undated) DEVICE — SYS CLSR DERMABOND PRINEO 42CM

## (undated) DEVICE — ELECTRODE BLD EXT 6.50 ST DISP

## (undated) DEVICE — SEE MEDLINE ITEM 152487

## (undated) DEVICE — DRAPE U SPLIT SHEET 54X76IN

## (undated) DEVICE — ADHESIVE DERMABOND ADVANCED

## (undated) DEVICE — GLOVE SURG ULTRA TOUCH 7.5

## (undated) DEVICE — JELLY LUBRICANT STERILE 4 OZ

## (undated) DEVICE — WRAP SHLDR HIP ACCU THRM PACK

## (undated) DEVICE — SHEET SPLIT IMPERVIOUS 60X70

## (undated) DEVICE — PINNACLE GRIPTION ACETABULAR SHELL SECTOR 54MM OD
Type: IMPLANTABLE DEVICE | Site: HIP | Status: NON-FUNCTIONAL
Brand: PINNACLE GRIPTION
Removed: 2025-02-17

## (undated) DEVICE — DRAPE ABDOMINAL TIBURON 14X11

## (undated) DEVICE — PACK BASIC

## (undated) DEVICE — BLADE MILL COARSE DISPOSABLE

## (undated) DEVICE — FORCEP BAYO INSU SGL USE STRGT

## (undated) DEVICE — GOWN TOGA SYS PEELWY ZIP 2 XL

## (undated) DEVICE — SOCKINETTE IMPERVIOUS 12X48IN

## (undated) DEVICE — NDL SPINAL 18GX3.5 SPINOCAN

## (undated) DEVICE — SUT MONOCRYL PLUS UD 3-0 27

## (undated) DEVICE — UNDERGLOVES BIOGEL PI SZ 7 LF

## (undated) DEVICE — LUBRICANT SURGILUBE 2 OZ

## (undated) DEVICE — SUT 0 VICRYL / UR6 (J603)

## (undated) DEVICE — GUIDE WIRE MOTION .035 X 150CM

## (undated) DEVICE — KIT C.A.T.S. FAST START 4/CASE

## (undated) DEVICE — DRESSING N ADH OIL EMUL 3X8 3S

## (undated) DEVICE — DRAPE STERI U-SHAPED 47X51IN

## (undated) DEVICE — ELECTRODE BLADE TEFLON 6

## (undated) DEVICE — INSTRUMENT FRAZIER 10FR W/VENT

## (undated) DEVICE — SEE MEDLINE ITEM 107746

## (undated) DEVICE — NDL ECLIPSE SAFETY 23G 1.5IN

## (undated) DEVICE — BLADE SAW SGTL NARROW 0.89

## (undated) DEVICE — SOL WATER STRL IRR 1000ML

## (undated) DEVICE — DRESSING TRANS 2X2 TEGADERM

## (undated) DEVICE — GLOVE SENSICARE PI GRN 6.5

## (undated) DEVICE — SYS SURGIPHOR STRL IRRG

## (undated) DEVICE — DRAPE STERI-DRAPE 1000 17X11IN

## (undated) DEVICE — GLOVE SENSICARE PI GRN 6

## (undated) DEVICE — COVER SURG LIGHT HANDLE

## (undated) DEVICE — DRAIN CHANNEL ROUND 15FR

## (undated) DEVICE — UNDERGLOVES BIOGEL PI SIZE 8

## (undated) DEVICE — SUT VICRYL PLUS 3-0 SH 18IN

## (undated) DEVICE — YANKAUER FLEX NO VENT HI CAP

## (undated) DEVICE — TUBE SUCTION YANKAUER HI CAP

## (undated) DEVICE — GLOVE BIOGEL SKINSENSE PI 8.5

## (undated) DEVICE — SPONGE BULKEE II ABSRB 6X6.75

## (undated) DEVICE — TOGA FLYTE PEEL AWAY XLARGE

## (undated) DEVICE — CONTAINER SPECIMEN STRL 4OZ

## (undated) DEVICE — SEE MEDLINE ITEM 152680

## (undated) DEVICE — SOL IRR NACL .9% 3000ML

## (undated) DEVICE — GAUZE SPONGE BULKEE 6X6.75IN

## (undated) DEVICE — ELECTRODE BLD ULTRA 4IN STRL

## (undated) DEVICE — TRAY TOTAL HIP SMH

## (undated) DEVICE — SUT CTD VICRYL 1 UND BR

## (undated) DEVICE — DRESSING LEUKOPLAST FLEX 1X3IN

## (undated) DEVICE — SUT STRATAFIX PDS 1 CTX 18IN

## (undated) DEVICE — GOWN SURG  X-LG

## (undated) DEVICE — WATER STERILE INJ 500ML BAG

## (undated) DEVICE — BLADE FINE MILL PREP